# Patient Record
Sex: MALE | Race: WHITE | NOT HISPANIC OR LATINO | Employment: OTHER | URBAN - METROPOLITAN AREA
[De-identification: names, ages, dates, MRNs, and addresses within clinical notes are randomized per-mention and may not be internally consistent; named-entity substitution may affect disease eponyms.]

---

## 2023-04-04 ASSESSMENT — PATIENT HEALTH QUESTIONNAIRE - PHQ9: SUM OF ALL RESPONSES TO PHQ9 QUESTIONS 1 & 2: 0

## 2023-04-05 LAB
ALBUMIN (G/DL) IN SER/PLAS: 3.1 G/DL (ref 3.4–5)
ANION GAP IN SER/PLAS: 14 MMOL/L (ref 10–20)
CALCIUM (MG/DL) IN SER/PLAS: 8 MG/DL (ref 8.6–10.6)
CARBON DIOXIDE, TOTAL (MMOL/L) IN SER/PLAS: 28 MMOL/L (ref 21–32)
CHLORIDE (MMOL/L) IN SER/PLAS: 89 MMOL/L (ref 98–107)
CREATININE (MG/DL) IN SER/PLAS: 0.9 MG/DL (ref 0.5–1.3)
GFR MALE: >90 ML/MIN/1.73M2
GLUCOSE (MG/DL) IN SER/PLAS: 87 MG/DL (ref 74–99)
PHOSPHATE (MG/DL) IN SER/PLAS: 2.3 MG/DL (ref 2.5–4.9)
POTASSIUM (MMOL/L) IN SER/PLAS: 4.2 MMOL/L (ref 3.5–5.3)
SODIUM (MMOL/L) IN SER/PLAS: 127 MMOL/L (ref 136–145)
UREA NITROGEN (MG/DL) IN SER/PLAS: 19 MG/DL (ref 6–23)

## 2023-05-16 ENCOUNTER — HOSPITAL ENCOUNTER (OUTPATIENT)
Dept: DATA CONVERSION | Facility: HOSPITAL | Age: 63
End: 2023-05-23
Attending: STUDENT IN AN ORGANIZED HEALTH CARE EDUCATION/TRAINING PROGRAM
Payer: MEDICARE

## 2023-05-16 DIAGNOSIS — Z95.811 LVAD (LEFT VENTRICULAR ASSIST DEVICE) PRESENT (MULTI): ICD-10-CM

## 2023-05-16 DIAGNOSIS — G54.0 BRACHIAL PLEXOPATHY: ICD-10-CM

## 2023-05-16 DIAGNOSIS — I42.9 CARDIOMYOPATHY, UNSPECIFIED TYPE (MULTI): Primary | ICD-10-CM

## 2023-05-16 DIAGNOSIS — I47.20 VENTRICULAR TACHYARRHYTHMIA (MULTI): ICD-10-CM

## 2023-05-16 DIAGNOSIS — I48.20 CHRONIC ATRIAL FIBRILLATION (MULTI): ICD-10-CM

## 2023-05-16 DIAGNOSIS — R78.81 SALMONELLA BACTEREMIA: ICD-10-CM

## 2023-05-16 LAB
ANION GAP IN SER/PLAS: 9 MMOL/L (ref 10–20)
ANION GAP IN SER/PLAS: 9 MMOL/L (ref 10–20)
BASOPHILS (10*3/UL) IN BLOOD BY AUTOMATED COUNT: 0.09 X10E9/L (ref 0–0.1)
BASOPHILS (10*3/UL) IN BLOOD BY AUTOMATED COUNT: 0.09 X10E9/L (ref 0–0.1)
BASOPHILS/100 LEUKOCYTES IN BLOOD BY AUTOMATED COUNT: 1 % (ref 0–2)
BASOPHILS/100 LEUKOCYTES IN BLOOD BY AUTOMATED COUNT: 1 % (ref 0–2)
CALCIUM (MG/DL) IN SER/PLAS: 8.5 MG/DL (ref 8.6–10.3)
CALCIUM (MG/DL) IN SER/PLAS: 8.5 MG/DL (ref 8.6–10.3)
CARBON DIOXIDE, TOTAL (MMOL/L) IN SER/PLAS: 27 MMOL/L (ref 21–32)
CARBON DIOXIDE, TOTAL (MMOL/L) IN SER/PLAS: 27 MMOL/L (ref 21–32)
CHLORIDE (MMOL/L) IN SER/PLAS: 103 MMOL/L (ref 98–107)
CHLORIDE (MMOL/L) IN SER/PLAS: 103 MMOL/L (ref 98–107)
CREATININE (MG/DL) IN SER/PLAS: 0.57 MG/DL (ref 0.5–1.3)
CREATININE (MG/DL) IN SER/PLAS: 0.57 MG/DL (ref 0.5–1.3)
EOSINOPHILS (10*3/UL) IN BLOOD BY AUTOMATED COUNT: 0.17 X10E9/L (ref 0–0.7)
EOSINOPHILS (10*3/UL) IN BLOOD BY AUTOMATED COUNT: 0.17 X10E9/L (ref 0–0.7)
EOSINOPHILS/100 LEUKOCYTES IN BLOOD BY AUTOMATED COUNT: 1.9 % (ref 0–6)
EOSINOPHILS/100 LEUKOCYTES IN BLOOD BY AUTOMATED COUNT: 1.9 % (ref 0–6)
ERYTHROCYTE DISTRIBUTION WIDTH (RATIO) BY AUTOMATED COUNT: 15.2 % (ref 11.5–14.5)
ERYTHROCYTE DISTRIBUTION WIDTH (RATIO) BY AUTOMATED COUNT: 15.2 % (ref 11.5–14.5)
ERYTHROCYTE MEAN CORPUSCULAR HEMOGLOBIN CONCENTRATION (G/DL) BY AUTOMATED: 31.3 G/DL (ref 32–36)
ERYTHROCYTE MEAN CORPUSCULAR HEMOGLOBIN CONCENTRATION (G/DL) BY AUTOMATED: 31.3 G/DL (ref 32–36)
ERYTHROCYTE MEAN CORPUSCULAR VOLUME (FL) BY AUTOMATED COUNT: 98 FL (ref 80–100)
ERYTHROCYTE MEAN CORPUSCULAR VOLUME (FL) BY AUTOMATED COUNT: 98 FL (ref 80–100)
ERYTHROCYTES (10*6/UL) IN BLOOD BY AUTOMATED COUNT: 3.44 X10E12/L (ref 4.5–5.9)
ERYTHROCYTES (10*6/UL) IN BLOOD BY AUTOMATED COUNT: 3.44 X10E12/L (ref 4.5–5.9)
GFR MALE: >90 ML/MIN/1.73M2
GFR MALE: >90 ML/MIN/1.73M2
GLUCOSE (MG/DL) IN SER/PLAS: 100 MG/DL (ref 74–99)
GLUCOSE (MG/DL) IN SER/PLAS: 100 MG/DL (ref 74–99)
HEMATOCRIT (%) IN BLOOD BY AUTOMATED COUNT: 33.6 % (ref 41–52)
HEMATOCRIT (%) IN BLOOD BY AUTOMATED COUNT: 33.6 % (ref 41–52)
HEMOGLOBIN (G/DL) IN BLOOD: 10.5 G/DL (ref 13.5–17.5)
HEMOGLOBIN (G/DL) IN BLOOD: 10.5 G/DL (ref 13.5–17.5)
IMMATURE GRANULOCYTES/100 LEUKOCYTES IN BLOOD BY AUTOMATED COUNT: 0.7 % (ref 0–0.9)
IMMATURE GRANULOCYTES/100 LEUKOCYTES IN BLOOD BY AUTOMATED COUNT: 0.7 % (ref 0–0.9)
INR IN PPP BY COAGULATION ASSAY: 3 (ref 0.9–1.1)
INR IN PPP BY COAGULATION ASSAY: 3 (ref 0.9–1.1)
LEUKOCYTES (10*3/UL) IN BLOOD BY AUTOMATED COUNT: 8.9 X10E9/L (ref 4.4–11.3)
LEUKOCYTES (10*3/UL) IN BLOOD BY AUTOMATED COUNT: 8.9 X10E9/L (ref 4.4–11.3)
LYMPHOCYTES (10*3/UL) IN BLOOD BY AUTOMATED COUNT: 1.29 X10E9/L (ref 1.2–4.8)
LYMPHOCYTES (10*3/UL) IN BLOOD BY AUTOMATED COUNT: 1.29 X10E9/L (ref 1.2–4.8)
LYMPHOCYTES/100 LEUKOCYTES IN BLOOD BY AUTOMATED COUNT: 14.5 % (ref 13–44)
LYMPHOCYTES/100 LEUKOCYTES IN BLOOD BY AUTOMATED COUNT: 14.5 % (ref 13–44)
MONOCYTES (10*3/UL) IN BLOOD BY AUTOMATED COUNT: 0.75 X10E9/L (ref 0.1–1)
MONOCYTES (10*3/UL) IN BLOOD BY AUTOMATED COUNT: 0.75 X10E9/L (ref 0.1–1)
MONOCYTES/100 LEUKOCYTES IN BLOOD BY AUTOMATED COUNT: 8.4 % (ref 2–10)
MONOCYTES/100 LEUKOCYTES IN BLOOD BY AUTOMATED COUNT: 8.4 % (ref 2–10)
NEUTROPHILS (10*3/UL) IN BLOOD BY AUTOMATED COUNT: 6.54 X10E9/L (ref 1.2–7.7)
NEUTROPHILS (10*3/UL) IN BLOOD BY AUTOMATED COUNT: 6.54 X10E9/L (ref 1.2–7.7)
NEUTROPHILS/100 LEUKOCYTES IN BLOOD BY AUTOMATED COUNT: 73.5 % (ref 40–80)
NEUTROPHILS/100 LEUKOCYTES IN BLOOD BY AUTOMATED COUNT: 73.5 % (ref 40–80)
PLATELETS (10*3/UL) IN BLOOD AUTOMATED COUNT: 512 X10E9/L (ref 150–450)
PLATELETS (10*3/UL) IN BLOOD AUTOMATED COUNT: 512 X10E9/L (ref 150–450)
POTASSIUM (MMOL/L) IN SER/PLAS: 4.1 MMOL/L (ref 3.5–5.3)
POTASSIUM (MMOL/L) IN SER/PLAS: 4.1 MMOL/L (ref 3.5–5.3)
PROTHROMBIN TIME (PT) IN PPP BY COAGULATION ASSAY: 34.6 SEC (ref 9.8–13.4)
PROTHROMBIN TIME (PT) IN PPP BY COAGULATION ASSAY: 34.6 SEC (ref 9.8–13.4)
SODIUM (MMOL/L) IN SER/PLAS: 135 MMOL/L (ref 136–145)
SODIUM (MMOL/L) IN SER/PLAS: 135 MMOL/L (ref 136–145)
UREA NITROGEN (MG/DL) IN SER/PLAS: 17 MG/DL (ref 6–23)
UREA NITROGEN (MG/DL) IN SER/PLAS: 17 MG/DL (ref 6–23)

## 2023-05-16 PROCEDURE — 99222 1ST HOSP IP/OBS MODERATE 55: CPT | Performed by: INTERNAL MEDICINE

## 2023-05-17 LAB
ALANINE AMINOTRANSFERASE (SGPT) (U/L) IN SER/PLAS: 31 U/L (ref 10–52)
ALBUMIN (G/DL) IN SER/PLAS: 3.2 G/DL (ref 3.4–5)
ALKALINE PHOSPHATASE (U/L) IN SER/PLAS: 98 U/L (ref 33–136)
ANION GAP IN SER/PLAS: 12 MMOL/L (ref 10–20)
ASPARTATE AMINOTRANSFERASE (SGOT) (U/L) IN SER/PLAS: 27 U/L (ref 9–39)
BILIRUBIN TOTAL (MG/DL) IN SER/PLAS: 0.9 MG/DL (ref 0–1.2)
CALCIUM (MG/DL) IN SER/PLAS: 8.6 MG/DL (ref 8.6–10.3)
CARBON DIOXIDE, TOTAL (MMOL/L) IN SER/PLAS: 29 MMOL/L (ref 21–32)
CHLORIDE (MMOL/L) IN SER/PLAS: 99 MMOL/L (ref 98–107)
CREATININE (MG/DL) IN SER/PLAS: 0.54 MG/DL (ref 0.5–1.3)
ERYTHROCYTE DISTRIBUTION WIDTH (RATIO) BY AUTOMATED COUNT: 15.1 % (ref 11.5–14.5)
ERYTHROCYTE DISTRIBUTION WIDTH (RATIO) BY AUTOMATED COUNT: 15.1 % (ref 11.5–14.5)
ERYTHROCYTE MEAN CORPUSCULAR HEMOGLOBIN CONCENTRATION (G/DL) BY AUTOMATED: 31.4 G/DL (ref 32–36)
ERYTHROCYTE MEAN CORPUSCULAR HEMOGLOBIN CONCENTRATION (G/DL) BY AUTOMATED: 31.4 G/DL (ref 32–36)
ERYTHROCYTE MEAN CORPUSCULAR VOLUME (FL) BY AUTOMATED COUNT: 98 FL (ref 80–100)
ERYTHROCYTE MEAN CORPUSCULAR VOLUME (FL) BY AUTOMATED COUNT: 98 FL (ref 80–100)
ERYTHROCYTES (10*6/UL) IN BLOOD BY AUTOMATED COUNT: 3.64 X10E12/L (ref 4.5–5.9)
ERYTHROCYTES (10*6/UL) IN BLOOD BY AUTOMATED COUNT: 3.64 X10E12/L (ref 4.5–5.9)
GFR MALE: >90 ML/MIN/1.73M2
GLUCOSE (MG/DL) IN SER/PLAS: 72 MG/DL (ref 74–99)
HEMATOCRIT (%) IN BLOOD BY AUTOMATED COUNT: 35.7 % (ref 41–52)
HEMATOCRIT (%) IN BLOOD BY AUTOMATED COUNT: 35.7 % (ref 41–52)
HEMOGLOBIN (G/DL) IN BLOOD: 11.2 G/DL (ref 13.5–17.5)
HEMOGLOBIN (G/DL) IN BLOOD: 11.2 G/DL (ref 13.5–17.5)
INR IN PPP BY COAGULATION ASSAY: 2.6 (ref 0.9–1.1)
LEUKOCYTES (10*3/UL) IN BLOOD BY AUTOMATED COUNT: 8.1 X10E9/L (ref 4.4–11.3)
LEUKOCYTES (10*3/UL) IN BLOOD BY AUTOMATED COUNT: 8.1 X10E9/L (ref 4.4–11.3)
MAGNESIUM (MG/DL) IN SER/PLAS: 2.14 MG/DL (ref 1.6–2.4)
PLATELETS (10*3/UL) IN BLOOD AUTOMATED COUNT: 508 X10E9/L (ref 150–450)
PLATELETS (10*3/UL) IN BLOOD AUTOMATED COUNT: 508 X10E9/L (ref 150–450)
POTASSIUM (MMOL/L) IN SER/PLAS: 4 MMOL/L (ref 3.5–5.3)
PROTEIN TOTAL: 5.9 G/DL (ref 6.4–8.2)
PROTHROMBIN TIME (PT) IN PPP BY COAGULATION ASSAY: 30.2 SEC (ref 9.8–13.4)
SODIUM (MMOL/L) IN SER/PLAS: 136 MMOL/L (ref 136–145)
UREA NITROGEN (MG/DL) IN SER/PLAS: 15 MG/DL (ref 6–23)

## 2023-05-20 LAB
ALANINE AMINOTRANSFERASE (SGPT) (U/L) IN SER/PLAS: 32 U/L (ref 10–52)
ALBUMIN (G/DL) IN SER/PLAS: 3.4 G/DL (ref 3.4–5)
ALKALINE PHOSPHATASE (U/L) IN SER/PLAS: 99 U/L (ref 33–136)
ANION GAP IN SER/PLAS: 10 MMOL/L (ref 10–20)
ASPARTATE AMINOTRANSFERASE (SGOT) (U/L) IN SER/PLAS: 27 U/L (ref 9–39)
BILIRUBIN TOTAL (MG/DL) IN SER/PLAS: 0.7 MG/DL (ref 0–1.2)
CALCIUM (MG/DL) IN SER/PLAS: 8.8 MG/DL (ref 8.6–10.3)
CARBON DIOXIDE, TOTAL (MMOL/L) IN SER/PLAS: 29 MMOL/L (ref 21–32)
CHLORIDE (MMOL/L) IN SER/PLAS: 101 MMOL/L (ref 98–107)
CREATININE (MG/DL) IN SER/PLAS: 0.62 MG/DL (ref 0.5–1.3)
ERYTHROCYTE DISTRIBUTION WIDTH (RATIO) BY AUTOMATED COUNT: 14.9 % (ref 11.5–14.5)
ERYTHROCYTE MEAN CORPUSCULAR HEMOGLOBIN CONCENTRATION (G/DL) BY AUTOMATED: 31.6 G/DL (ref 32–36)
ERYTHROCYTE MEAN CORPUSCULAR VOLUME (FL) BY AUTOMATED COUNT: 97 FL (ref 80–100)
ERYTHROCYTES (10*6/UL) IN BLOOD BY AUTOMATED COUNT: 3.63 X10E12/L (ref 4.5–5.9)
GFR MALE: >90 ML/MIN/1.73M2
GLUCOSE (MG/DL) IN SER/PLAS: 82 MG/DL (ref 74–99)
HEMATOCRIT (%) IN BLOOD BY AUTOMATED COUNT: 35.1 % (ref 41–52)
HEMOGLOBIN (G/DL) IN BLOOD: 11.1 G/DL (ref 13.5–17.5)
INR IN PPP BY COAGULATION ASSAY: 2.3 (ref 0.9–1.1)
LEUKOCYTES (10*3/UL) IN BLOOD BY AUTOMATED COUNT: 7.1 X10E9/L (ref 4.4–11.3)
MAGNESIUM (MG/DL) IN SER/PLAS: 2.18 MG/DL (ref 1.6–2.4)
PLATELETS (10*3/UL) IN BLOOD AUTOMATED COUNT: 399 X10E9/L (ref 150–450)
POTASSIUM (MMOL/L) IN SER/PLAS: 4.2 MMOL/L (ref 3.5–5.3)
PROTEIN TOTAL: 6.2 G/DL (ref 6.4–8.2)
PROTHROMBIN TIME (PT) IN PPP BY COAGULATION ASSAY: 26.6 SEC (ref 9.8–13.4)
SODIUM (MMOL/L) IN SER/PLAS: 136 MMOL/L (ref 136–145)
UREA NITROGEN (MG/DL) IN SER/PLAS: 20 MG/DL (ref 6–23)

## 2023-05-21 LAB
ANION GAP IN SER/PLAS: NORMAL
CALCIUM (MG/DL) IN SER/PLAS: NORMAL
CARBON DIOXIDE, TOTAL (MMOL/L) IN SER/PLAS: NORMAL
CHLORIDE (MMOL/L) IN SER/PLAS: NORMAL
CREATININE (MG/DL) IN SER/PLAS: NORMAL
ERYTHROCYTE DISTRIBUTION WIDTH (RATIO) BY AUTOMATED COUNT: NORMAL
ERYTHROCYTE MEAN CORPUSCULAR HEMOGLOBIN CONCENTRATION (G/DL) BY AUTOMATED: NORMAL
ERYTHROCYTE MEAN CORPUSCULAR VOLUME (FL) BY AUTOMATED COUNT: NORMAL
ERYTHROCYTES (10*6/UL) IN BLOOD BY AUTOMATED COUNT: NORMAL
GFR FEMALE: NORMAL
GFR MALE: NORMAL
GLUCOSE (MG/DL) IN SER/PLAS: NORMAL
HEMATOCRIT (%) IN BLOOD BY AUTOMATED COUNT: NORMAL
HEMOGLOBIN (G/DL) IN BLOOD: NORMAL
LEUKOCYTES (10*3/UL) IN BLOOD BY AUTOMATED COUNT: NORMAL
NRBC (PER 100 WBCS) BY AUTOMATED COUNT: NORMAL
PLATELETS (10*3/UL) IN BLOOD AUTOMATED COUNT: NORMAL
POTASSIUM (MMOL/L) IN SER/PLAS: NORMAL
SODIUM (MMOL/L) IN SER/PLAS: NORMAL
UREA NITROGEN (MG/DL) IN SER/PLAS: NORMAL

## 2023-05-23 LAB
ALANINE AMINOTRANSFERASE (SGPT) (U/L) IN SER/PLAS: 22 U/L (ref 10–52)
ALANINE AMINOTRANSFERASE (SGPT) (U/L) IN SER/PLAS: NORMAL
ALBUMIN (G/DL) IN SER/PLAS: 3.3 G/DL (ref 3.4–5)
ALBUMIN (G/DL) IN SER/PLAS: NORMAL
ALKALINE PHOSPHATASE (U/L) IN SER/PLAS: 98 U/L (ref 33–136)
ALKALINE PHOSPHATASE (U/L) IN SER/PLAS: NORMAL
ANION GAP IN SER/PLAS: 12 MMOL/L (ref 10–20)
ANION GAP IN SER/PLAS: NORMAL
ASPARTATE AMINOTRANSFERASE (SGOT) (U/L) IN SER/PLAS: 19 U/L (ref 9–39)
ASPARTATE AMINOTRANSFERASE (SGOT) (U/L) IN SER/PLAS: NORMAL
BILIRUBIN TOTAL (MG/DL) IN SER/PLAS: 0.7 MG/DL (ref 0–1.2)
BILIRUBIN TOTAL (MG/DL) IN SER/PLAS: NORMAL
CALCIUM (MG/DL) IN SER/PLAS: 8.5 MG/DL (ref 8.6–10.3)
CALCIUM (MG/DL) IN SER/PLAS: NORMAL
CARBON DIOXIDE, TOTAL (MMOL/L) IN SER/PLAS: 29 MMOL/L (ref 21–32)
CARBON DIOXIDE, TOTAL (MMOL/L) IN SER/PLAS: NORMAL
CHLORIDE (MMOL/L) IN SER/PLAS: 101 MMOL/L (ref 98–107)
CHLORIDE (MMOL/L) IN SER/PLAS: NORMAL
CREATININE (MG/DL) IN SER/PLAS: 0.7 MG/DL (ref 0.5–1.3)
CREATININE (MG/DL) IN SER/PLAS: NORMAL
ERYTHROCYTE DISTRIBUTION WIDTH (RATIO) BY AUTOMATED COUNT: 15.2 % (ref 11.5–14.5)
ERYTHROCYTE DISTRIBUTION WIDTH (RATIO) BY AUTOMATED COUNT: NORMAL
ERYTHROCYTE MEAN CORPUSCULAR HEMOGLOBIN CONCENTRATION (G/DL) BY AUTOMATED: 31.3 G/DL (ref 32–36)
ERYTHROCYTE MEAN CORPUSCULAR HEMOGLOBIN CONCENTRATION (G/DL) BY AUTOMATED: NORMAL
ERYTHROCYTE MEAN CORPUSCULAR VOLUME (FL) BY AUTOMATED COUNT: 97 FL (ref 80–100)
ERYTHROCYTE MEAN CORPUSCULAR VOLUME (FL) BY AUTOMATED COUNT: NORMAL
ERYTHROCYTES (10*6/UL) IN BLOOD BY AUTOMATED COUNT: 3.47 X10E12/L (ref 4.5–5.9)
ERYTHROCYTES (10*6/UL) IN BLOOD BY AUTOMATED COUNT: NORMAL
GFR FEMALE: NORMAL
GFR MALE: >90 ML/MIN/1.73M2
GFR MALE: NORMAL
GLUCOSE (MG/DL) IN SER/PLAS: 73 MG/DL (ref 74–99)
GLUCOSE (MG/DL) IN SER/PLAS: NORMAL
HEMATOCRIT (%) IN BLOOD BY AUTOMATED COUNT: 33.6 % (ref 41–52)
HEMATOCRIT (%) IN BLOOD BY AUTOMATED COUNT: NORMAL
HEMOGLOBIN (G/DL) IN BLOOD: 10.5 G/DL (ref 13.5–17.5)
HEMOGLOBIN (G/DL) IN BLOOD: NORMAL
INR IN PPP BY COAGULATION ASSAY: 1.9 (ref 0.9–1.1)
INR IN PPP BY COAGULATION ASSAY: NORMAL
LEUKOCYTES (10*3/UL) IN BLOOD BY AUTOMATED COUNT: 8 X10E9/L (ref 4.4–11.3)
LEUKOCYTES (10*3/UL) IN BLOOD BY AUTOMATED COUNT: NORMAL
MAGNESIUM (MG/DL) IN SER/PLAS: 2.03 MG/DL (ref 1.6–2.4)
MAGNESIUM (MG/DL) IN SER/PLAS: NORMAL
NRBC (PER 100 WBCS) BY AUTOMATED COUNT: NORMAL
PLATELETS (10*3/UL) IN BLOOD AUTOMATED COUNT: 321 X10E9/L (ref 150–450)
PLATELETS (10*3/UL) IN BLOOD AUTOMATED COUNT: NORMAL
POTASSIUM (MMOL/L) IN SER/PLAS: 4 MMOL/L (ref 3.5–5.3)
POTASSIUM (MMOL/L) IN SER/PLAS: NORMAL
PROTEIN TOTAL: 5.9 G/DL (ref 6.4–8.2)
PROTEIN TOTAL: NORMAL
PROTHROMBIN TIME (PT) IN PPP BY COAGULATION ASSAY: 21.6 SEC (ref 9.8–13.4)
PROTHROMBIN TIME (PT) IN PPP BY COAGULATION ASSAY: NORMAL
SODIUM (MMOL/L) IN SER/PLAS: 138 MMOL/L (ref 136–145)
SODIUM (MMOL/L) IN SER/PLAS: NORMAL
UREA NITROGEN (MG/DL) IN SER/PLAS: 21 MG/DL (ref 6–23)
UREA NITROGEN (MG/DL) IN SER/PLAS: NORMAL

## 2023-05-26 LAB
ALANINE AMINOTRANSFERASE (SGPT) (U/L) IN SER/PLAS: NORMAL
ALBUMIN (G/DL) IN SER/PLAS: NORMAL
ALKALINE PHOSPHATASE (U/L) IN SER/PLAS: NORMAL
ANION GAP IN SER/PLAS: NORMAL
ASPARTATE AMINOTRANSFERASE (SGOT) (U/L) IN SER/PLAS: NORMAL
BILIRUBIN TOTAL (MG/DL) IN SER/PLAS: NORMAL
CALCIUM (MG/DL) IN SER/PLAS: NORMAL
CARBON DIOXIDE, TOTAL (MMOL/L) IN SER/PLAS: NORMAL
CHLORIDE (MMOL/L) IN SER/PLAS: NORMAL
CREATININE (MG/DL) IN SER/PLAS: NORMAL
ERYTHROCYTE DISTRIBUTION WIDTH (RATIO) BY AUTOMATED COUNT: NORMAL
ERYTHROCYTE MEAN CORPUSCULAR HEMOGLOBIN CONCENTRATION (G/DL) BY AUTOMATED: NORMAL
ERYTHROCYTE MEAN CORPUSCULAR VOLUME (FL) BY AUTOMATED COUNT: NORMAL
ERYTHROCYTES (10*6/UL) IN BLOOD BY AUTOMATED COUNT: NORMAL
GFR FEMALE: NORMAL
GFR MALE: NORMAL
GLUCOSE (MG/DL) IN SER/PLAS: NORMAL
HEMATOCRIT (%) IN BLOOD BY AUTOMATED COUNT: NORMAL
HEMOGLOBIN (G/DL) IN BLOOD: NORMAL
INR IN PPP BY COAGULATION ASSAY: NORMAL
LEUKOCYTES (10*3/UL) IN BLOOD BY AUTOMATED COUNT: NORMAL
MAGNESIUM (MG/DL) IN SER/PLAS: NORMAL
NRBC (PER 100 WBCS) BY AUTOMATED COUNT: NORMAL
PLATELETS (10*3/UL) IN BLOOD AUTOMATED COUNT: NORMAL
POTASSIUM (MMOL/L) IN SER/PLAS: NORMAL
PROTEIN TOTAL: NORMAL
PROTHROMBIN TIME (PT) IN PPP BY COAGULATION ASSAY: NORMAL
SODIUM (MMOL/L) IN SER/PLAS: NORMAL
UREA NITROGEN (MG/DL) IN SER/PLAS: NORMAL

## 2023-05-31 LAB
ALANINE AMINOTRANSFERASE (SGPT) (U/L) IN SER/PLAS: NORMAL
ALBUMIN (G/DL) IN SER/PLAS: NORMAL
ALKALINE PHOSPHATASE (U/L) IN SER/PLAS: NORMAL
ANION GAP IN SER/PLAS: NORMAL
ASPARTATE AMINOTRANSFERASE (SGOT) (U/L) IN SER/PLAS: NORMAL
BILIRUBIN TOTAL (MG/DL) IN SER/PLAS: NORMAL
CALCIUM (MG/DL) IN SER/PLAS: NORMAL
CARBON DIOXIDE, TOTAL (MMOL/L) IN SER/PLAS: NORMAL
CHLORIDE (MMOL/L) IN SER/PLAS: NORMAL
CREATININE (MG/DL) IN SER/PLAS: NORMAL
ERYTHROCYTE DISTRIBUTION WIDTH (RATIO) BY AUTOMATED COUNT: NORMAL
ERYTHROCYTE MEAN CORPUSCULAR HEMOGLOBIN CONCENTRATION (G/DL) BY AUTOMATED: NORMAL
ERYTHROCYTE MEAN CORPUSCULAR VOLUME (FL) BY AUTOMATED COUNT: NORMAL
ERYTHROCYTES (10*6/UL) IN BLOOD BY AUTOMATED COUNT: NORMAL
GFR FEMALE: NORMAL
GFR MALE: NORMAL
GLUCOSE (MG/DL) IN SER/PLAS: NORMAL
HEMATOCRIT (%) IN BLOOD BY AUTOMATED COUNT: NORMAL
HEMOGLOBIN (G/DL) IN BLOOD: NORMAL
INR IN PPP BY COAGULATION ASSAY: NORMAL
LEUKOCYTES (10*3/UL) IN BLOOD BY AUTOMATED COUNT: NORMAL
MAGNESIUM (MG/DL) IN SER/PLAS: NORMAL
NRBC (PER 100 WBCS) BY AUTOMATED COUNT: NORMAL
PLATELETS (10*3/UL) IN BLOOD AUTOMATED COUNT: NORMAL
POTASSIUM (MMOL/L) IN SER/PLAS: NORMAL
PROTEIN TOTAL: NORMAL
PROTHROMBIN TIME (PT) IN PPP BY COAGULATION ASSAY: NORMAL
SODIUM (MMOL/L) IN SER/PLAS: NORMAL
THYROTROPIN (MIU/L) IN SER/PLAS BY DETECTION LIMIT <= 0.05 MIU/L: NORMAL
THYROXINE (T4) FREE (NG/DL) IN SER/PLAS: NORMAL
UREA NITROGEN (MG/DL) IN SER/PLAS: NORMAL

## 2023-10-02 ENCOUNTER — TELEPHONE (OUTPATIENT)
Dept: TRANSPLANT | Facility: HOSPITAL | Age: 63
End: 2023-10-02
Payer: MEDICARE

## 2023-10-02 DIAGNOSIS — I50.20 HFREF (HEART FAILURE WITH REDUCED EJECTION FRACTION) (MULTI): Primary | ICD-10-CM

## 2023-10-02 RX ORDER — SACUBITRIL AND VALSARTAN 49; 51 MG/1; MG/1
1 TABLET, FILM COATED ORAL 2 TIMES DAILY
Qty: 180 TABLET | Refills: 3 | Status: ON HOLD | OUTPATIENT
Start: 2023-10-02 | End: 2024-03-06 | Stop reason: SDUPTHER

## 2023-10-02 RX ORDER — SACUBITRIL AND VALSARTAN 49; 51 MG/1; MG/1
1 TABLET, FILM COATED ORAL 2 TIMES DAILY
COMMUNITY
Start: 2023-03-12 | End: 2023-10-02 | Stop reason: SDUPTHER

## 2023-10-06 ENCOUNTER — DOCUMENTATION (OUTPATIENT)
Dept: TRANSPLANT | Facility: HOSPITAL | Age: 63
End: 2023-10-06
Payer: MEDICARE

## 2023-10-06 RX ORDER — TAMSULOSIN HYDROCHLORIDE 0.4 MG/1
0.4 CAPSULE ORAL DAILY
COMMUNITY
Start: 2023-08-18 | End: 2024-01-10

## 2023-10-06 RX ORDER — WARFARIN 2.5 MG/1
TABLET ORAL
COMMUNITY
Start: 2023-08-09 | End: 2024-03-06 | Stop reason: HOSPADM

## 2023-10-06 RX ORDER — PANTOPRAZOLE SODIUM 40 MG/1
40 TABLET, DELAYED RELEASE ORAL
COMMUNITY
Start: 2023-08-13 | End: 2023-10-27 | Stop reason: SDUPTHER

## 2023-10-06 RX ORDER — OMEGA-3-ACID ETHYL ESTERS 1 G/1
2 CAPSULE, LIQUID FILLED ORAL 2 TIMES DAILY
COMMUNITY
Start: 2023-06-06 | End: 2024-02-19 | Stop reason: ALTCHOICE

## 2023-10-06 RX ORDER — SERTRALINE HYDROCHLORIDE 25 MG/1
25 TABLET, FILM COATED ORAL DAILY
COMMUNITY
Start: 2023-08-18 | End: 2024-01-10

## 2023-10-06 RX ORDER — FLUTICASONE PROPIONATE 50 MCG
2 SPRAY, SUSPENSION (ML) NASAL DAILY
COMMUNITY
Start: 2023-02-07 | End: 2024-02-14 | Stop reason: WASHOUT

## 2023-10-06 RX ORDER — TORSEMIDE 10 MG/1
10 TABLET ORAL AS NEEDED
COMMUNITY
Start: 2023-09-06 | End: 2024-04-04 | Stop reason: SDUPTHER

## 2023-10-06 RX ORDER — LEVOTHYROXINE SODIUM 175 UG/1
175 TABLET ORAL
COMMUNITY
Start: 2023-08-13 | End: 2023-10-27 | Stop reason: SDUPTHER

## 2023-10-06 RX ORDER — SPIRONOLACTONE 50 MG/1
50 TABLET, FILM COATED ORAL DAILY
COMMUNITY
Start: 2023-09-21 | End: 2024-02-15 | Stop reason: SDUPTHER

## 2023-10-06 RX ORDER — ASPIRIN 81 MG/1
81 TABLET ORAL DAILY
COMMUNITY
Start: 2023-05-23

## 2023-10-06 RX ORDER — DIGOXIN 125 MCG
125 TABLET ORAL DAILY
COMMUNITY
Start: 2023-08-13 | End: 2024-04-10

## 2023-10-06 RX ORDER — EMPAGLIFLOZIN 10 MG/1
10 TABLET, FILM COATED ORAL DAILY
COMMUNITY
Start: 2023-08-13 | End: 2023-12-08 | Stop reason: SINTOL

## 2023-10-06 RX ORDER — BUDESONIDE AND FORMOTEROL FUMARATE DIHYDRATE 160; 4.5 UG/1; UG/1
2 AEROSOL RESPIRATORY (INHALATION)
Status: ON HOLD | COMMUNITY
Start: 2023-02-07 | End: 2024-02-23 | Stop reason: WASHOUT

## 2023-10-06 RX ORDER — AMIODARONE HYDROCHLORIDE 200 MG/1
200 TABLET ORAL DAILY
COMMUNITY
Start: 2023-09-25 | End: 2024-03-06 | Stop reason: HOSPADM

## 2023-10-27 ENCOUNTER — TELEPHONE (OUTPATIENT)
Dept: TRANSPLANT | Facility: HOSPITAL | Age: 63
End: 2023-10-27
Payer: MEDICARE

## 2023-10-27 DIAGNOSIS — E07.9 THYROID CONDITION: ICD-10-CM

## 2023-10-27 DIAGNOSIS — T82.9XXD COMPLICATION INVOLVING LEFT VENTRICULAR ASSIST DEVICE (LVAD), SUBSEQUENT ENCOUNTER: ICD-10-CM

## 2023-10-27 DIAGNOSIS — K21.9 GASTROESOPHAGEAL REFLUX DISEASE, UNSPECIFIED WHETHER ESOPHAGITIS PRESENT: ICD-10-CM

## 2023-10-27 DIAGNOSIS — I50.20 HFREF (HEART FAILURE WITH REDUCED EJECTION FRACTION) (MULTI): ICD-10-CM

## 2023-10-30 RX ORDER — LEVOTHYROXINE SODIUM 175 UG/1
175 TABLET ORAL
Qty: 90 TABLET | Refills: 3 | Status: SHIPPED | OUTPATIENT
Start: 2023-10-30 | End: 2024-03-06 | Stop reason: HOSPADM

## 2023-10-30 RX ORDER — PANTOPRAZOLE SODIUM 40 MG/1
40 TABLET, DELAYED RELEASE ORAL
Qty: 90 TABLET | Refills: 3 | Status: SHIPPED | OUTPATIENT
Start: 2023-10-30 | End: 2024-03-06 | Stop reason: HOSPADM

## 2023-10-31 ENCOUNTER — TELEPHONE (OUTPATIENT)
Dept: TRANSPLANT | Facility: HOSPITAL | Age: 63
End: 2023-10-31
Payer: MEDICARE

## 2023-10-31 NOTE — TELEPHONE ENCOUNTER
Patient had called to confirm his prescriptions were sent for refills because he got a notification from the pharmacy saying that his prescriptions were cancelled. Had patient call pharmacy to verify because new prescriptions were sent on Friday 10/27. Called patient back and he said that he spoke to pharmacy and they did receive the new prescriptions and were working on filling them and he should get them in the next day or 2.

## 2023-12-08 ENCOUNTER — OFFICE VISIT (OUTPATIENT)
Dept: TRANSPLANT | Facility: HOSPITAL | Age: 63
End: 2023-12-08
Payer: MEDICARE

## 2023-12-08 VITALS
TEMPERATURE: 97.9 F | HEART RATE: 79 BPM | BODY MASS INDEX: 32.96 KG/M2 | DIASTOLIC BLOOD PRESSURE: 55 MMHG | OXYGEN SATURATION: 95 % | SYSTOLIC BLOOD PRESSURE: 96 MMHG | WEIGHT: 223.2 LBS

## 2023-12-08 DIAGNOSIS — I50.22: ICD-10-CM

## 2023-12-08 DIAGNOSIS — I50.20 HFREF (HEART FAILURE WITH REDUCED EJECTION FRACTION) (MULTI): ICD-10-CM

## 2023-12-08 DIAGNOSIS — T82.9XXD COMPLICATION INVOLVING LEFT VENTRICULAR ASSIST DEVICE (LVAD), SUBSEQUENT ENCOUNTER: ICD-10-CM

## 2023-12-08 DIAGNOSIS — I42.9 CARDIOMYOPATHY, UNSPECIFIED TYPE (MULTI): ICD-10-CM

## 2023-12-08 PROCEDURE — 99215 OFFICE O/P EST HI 40 MIN: CPT | Performed by: STUDENT IN AN ORGANIZED HEALTH CARE EDUCATION/TRAINING PROGRAM

## 2023-12-08 PROCEDURE — 3008F BODY MASS INDEX DOCD: CPT | Performed by: STUDENT IN AN ORGANIZED HEALTH CARE EDUCATION/TRAINING PROGRAM

## 2023-12-08 RX ORDER — DAPAGLIFLOZIN 10 MG/1
10 TABLET, FILM COATED ORAL DAILY
Qty: 30 TABLET | Refills: 11 | Status: SHIPPED | OUTPATIENT
Start: 2023-12-08 | End: 2024-12-07

## 2023-12-08 ASSESSMENT — ENCOUNTER SYMPTOMS
PSYCHIATRIC NEGATIVE: 1
ENDOCRINE NEGATIVE: 1
BLURRED VISION: 1
CONSTITUTIONAL NEGATIVE: 1
HEMATOLOGIC/LYMPHATIC NEGATIVE: 1
CARDIOVASCULAR NEGATIVE: 1
MUSCULOSKELETAL NEGATIVE: 1
DIZZINESS: 1
RESPIRATORY NEGATIVE: 1
GASTROINTESTINAL NEGATIVE: 1

## 2023-12-08 ASSESSMENT — PAIN SCALES - GENERAL: PAINLEVEL: 0-NO PAIN

## 2023-12-08 NOTE — PATIENT INSTRUCTIONS
Patient Instructions:  -Please bring a list of your medications to every visit.  -If you have any questions or concerns, please contact the LVAD office at [phone], option 3 or the direct line at [phone]. Please state that you are an LVAD patient. If it is after hours and it is an emergency, please page the LVAD pager by calling [phone] #[phone]  - Continue current medications with the exception of: Begin Farxiga 10mg daily  --   - Labwork: CBC,CMP,LDH,DIG  - Imaging/Procedures: Echo  - Referrals: Electrophysiology   - Followup: 3 months

## 2023-12-08 NOTE — PROGRESS NOTES
VAD Cardiologist: Anita Vincent   VAD Coordinator: Ailyn Michelle   In visit: Patient, LVAD JUVENCIO Michelle  Patient's Location: [place] [address]    Date of Visit: 12/08/2023  1:00 PM EST  Location of visit: [facility]   Type of Visit: LVAD followup    Type of VAD:  HM3  Implant Date: 4/30/2023  Reason for VAD: ICM  Intent: Long-Term (patient choice)     HPI / Summary:   Anatoly Lane is a very pleasant 63 y.o. male presenting for management of Stage D HFrEF NYHA class 2-3.  s/p HM3 4/30/2023 CAD, h/o pAF and VT s/p ICD, hypothyroidism, and stage D systolic HF/ICM/HFrEF who returns to the LVAD clinic for ongoing evaluation and management.       Today:  Patient stopped taking empagliflozin recently due to itching. Patient states in the morning at times he will get dizzy with blurred vision before eating breakfast. This has happened about 10 times in the past several months. Patient further reports that he thinks he was shocked by his device on 8/3/2023. EP referral sent in as patient has not been seen for his ICD for one year.  Patient denies CP, palpitations, GRAVES, orthopnea, PND, edema, LVAD alarms, N/V/D, hematochezia, hematuria, epistaxis. Takes diuretic pills 2 times weekly. Most recent INR is 2.3.    Patient does not want to have a transplant at this time, will read about repeated need for right heart catheterization.     Driveline is clean with no drainage. Dressing last changed on 12/6/2023. Changed today in clinic.     Patient has not completed Cardiac Rehab.      Review of Systems   Constitutional: Negative.   HENT: Negative.     Eyes:  Positive for blurred vision.   Cardiovascular: Negative.    Respiratory: Negative.     Endocrine: Negative.    Hematologic/Lymphatic: Negative.    Skin: Negative.    Musculoskeletal: Negative.    Gastrointestinal: Negative.    Genitourinary: Negative.    Neurological:  Positive for dizziness.   Psychiatric/Behavioral: Negative.     : Full 10 pt review of  "symptoms of negative unless discussed above.     Medical History:   No past medical history on file.  Surgical Hx:   Past Surgical History:   Procedure Laterality Date    CT ABDOMEN PELVIS ANGIOGRAM W AND/OR WO IV CONTRAST  4/13/2023    CT ABDOMEN PELVIS ANGIOGRAM W AND/OR WO IV CONTRAST [facility] CT    OTHER SURGICAL HISTORY  01/05/2022    Complete colonoscopy    OTHER SURGICAL HISTORY  01/05/2022    Cardioverter defibrillator insertion      Vitals:       6/23/2023     1:26 PM 5/30/2023     2:22 PM 8/31/2022    11:04 AM 2/15/2022     4:04 PM   Vitals   Systolic   112 88   Diastolic   72 60   Heart Rate   74 73   Height (in)  1.753 m (5' 9\") 1.753 m (5' 9\") 1.753 m (5' 9\")   Weight (lb) 173 160 180 189   BMI 25.55 kg/m2 23.63 kg/m2 26.58 kg/m2 27.91 kg/m2   BSA (m2) 1.95 m2 1.88 m2 1.99 m2 2.04 m2     Wt Readings from Last 5 Encounters:   06/23/23 78.5 kg (173 lb)   05/30/23 72.6 kg (160 lb)   08/31/22 81.6 kg (180 lb)   02/15/22 85.7 kg (189 lb)     GEN: Pleasant, well-appearing, no acute distress.  HEENT: JVP not elevated, no icterus.   CHEST: Clear to auscultation. Sternotomy well healed.  CV: LVAD hum  ABD: Soft, ND, NT.  Driveline: No drainage. Dressing c/d/I. Secured.  Area of erythema in the region near there is contact between skin and tape.    EXT: Warm, well perfused, No LE edema.   NEURO: Pleasant, GAINES, Oriented to plan     Labs:   CMP:  Recent Labs     05/31/23  2134 05/28/23  0603 05/26/23  0056 05/23/23  0729 05/23/23  0304   NA CANCELED CANCELED CANCELED 138 CANCELED   K CANCELED CANCELED CANCELED 4.0 CANCELED   CL CANCELED CANCELED CANCELED 101 CANCELED   CO2 CANCELED CANCELED CANCELED 29 CANCELED   ANIONGAP CANCELED CANCELED CANCELED 12 CANCELED   BUN CANCELED CANCELED CANCELED 21 CANCELED   CREATININE CANCELED CANCELED CANCELED 0.70 CANCELED   MG CANCELED CANCELED CANCELED 2.03 CANCELED     Recent Labs     05/31/23  2134 05/28/23  0603 05/26/23  0056 05/23/23  0729 05/23/23  0304 05/21/23  1943 "   ALBUMIN CANCELED CANCELED CANCELED 3.3* CANCELED CANCELED   ALKPHOS CANCELED CANCELED CANCELED 98 CANCELED CANCELED   ALT CANCELED CANCELED CANCELED 22 CANCELED CANCELED   AST CANCELED CANCELED CANCELED 19 CANCELED CANCELED   BILITOT CANCELED CANCELED CANCELED 0.7 CANCELED CANCELED   LIPASE  --   --   --   --   --  CANCELED     CBC:  Recent Labs     05/31/23 2134 05/28/23  0603 05/26/23  0056 05/23/23  0729 05/23/23  0304   WBC CANCELED CANCELED CANCELED 8.0 CANCELED   HGB CANCELED CANCELED CANCELED 10.5* CANCELED   HCT CANCELED CANCELED CANCELED 33.6* CANCELED   PLT CANCELED CANCELED CANCELED 321 CANCELED   MCV CANCELED CANCELED CANCELED 97 CANCELED     COAG:   Recent Labs     05/31/23 2134 05/28/23  0603 05/26/23  0056 05/02/23  0030 05/01/23  0033 04/30/23  1619 04/30/23  0413 04/29/23  0221 04/28/23  0201 04/21/23  1412 04/21/23  1125 04/08/23  0315 04/07/23  1835 04/06/23  1852 04/06/23  1714   INR CANCELED CANCELED CANCELED   < > 1.3* 1.3* 1.2* 1.1 1.1   < > 1.2*   < >  --    < >  --    HAUF  --   --   --   --   --   --  0.3 0.3 0.3   < >  --    < >  --   --   --    HAPTOGLOBIN  --   --   --   --   --   --   --   --   --   --  <30  --  <30  --  <30   FIBRINOGEN  --   --   --   --  264 255 356 368 345   < >  --   --   --   --   --     < > = values in this interval not displayed.     ABO:   Recent Labs     05/02/23  0746   ABO AB     HEME/ENDO:  Recent Labs     05/31/23 2134 05/21/23 1943 05/09/23 0642 05/03/23 0228 04/25/23  0911 04/21/23  1125 04/07/23  0416 03/31/23 2331 03/31/23 2142 03/29/23 2151   FERRITIN  --   --   --   --   --  352*  --   --   --  79   IRONSAT  --   --   --   --   --  16*  --   --   --  6*   TSH CANCELED CANCELED 8.41* 6.56*   < >  --    < >  --    < > 15.36*   HGBA1C  --   --   --   --   --   --   --  5.8*  --  6.7*    < > = values in this interval not displayed.      CARDIAC:   Recent Labs     05/21/23 1943 05/18/23 2335 05/18/23  0859 05/17/23  0759 05/15/23  0633  05/14/23  1122 04/02/23  0154 03/29/23  2151   LDH  --  CANCELED CANCELED CANCELED 189 216   < >  --    TROPHS CANCELED  CANCELED  --   --   --   --   --   --   --    BNP CANCELED  --   --   --   --   --   --  3,231*    < > = values in this interval not displayed.     Recent Labs     03/29/23  2151   CHOL 126   LDLF 85   HDL 24.0*   TRIG 83     MICRO:   Recent Labs     05/12/23  0857   CRP 14.77*     Echocardiogram     Narrative  [facility], [address] [place] [address]  Tel [phone] and Fax [phone]    TRANSTHORACIC ECHOCARDIOGRAM REPORT      Patient Name:     BILLIE WARD       Nichelle Physician:  [id number] Spencer Lilly MD  Study Date:       5/15/2023          Referring           SHERRILL FLEMING  Physician:  MRN/PID:          [id number]           PCP:  Accession/Order#: [id number]          [facility] HHVI Non  Location:           Invasive  YOB: 1960          Fellow:             [id number] Tobi Ledbetter MD  Gender:           M                  Nurse:              Mei Jeff RN  Admit Date:       3/29/2023          Sonographer:        TONYA Wolfe RDCS  Admission Status: Inpatient -        Additional Staff:  Routine  Height:           175.26 cm          CC Report to:       [address]  Weight:           73.03 kg           Study Type:         Echocardiogram  BSA:              1.88 m2    Diagnosis/ICD: Z95.811-Presence of heart assist device  Indication:    Post LVAD follow up  Procedure/CPT: Echo Limited-79868; Color Doppler-44321; Doppler Limited-77027    Patient History:  Smoker:            Former.  Pertinent History: CAD, Cardiomyopathy and A-Fib. HFrEF, VT s/p dual chamber  ICD, anemia, hypothyroidism, cardiogenic shock, s/p LVAD  HMIII.    Study Detail: The following Echo studies were performed: 2D, M-Mode, Doppler and  color flow. Definity used as a contrast agent for endocardial  border definition. Total contrast used for this  procedure was 3.0  mL via IV push.      PHYSICIAN INTERPRETATION:  Left Ventricle: The left ventricular systolic function is severely decreased, with an estimated ejection fraction of 10-15%. There is global hypokinesis of the left ventricle with minor regional variations. The left ventricular cavity size is severely dilated. Spectral Doppler shows an abnormal pattern of left ventricular diastolic filling.  Left Atrium: The left atrium is severely dilated.  Right Ventricle: The right ventricle was not well visualized. There is reduced right ventricular systolic function. The RV is not completely visualized, but appears to be likely moderately dilated with moderately decreased function.  Right Atrium: The right atrium was not well visualized. There is a device visualized in the right atrium.  Aortic Valve: The aortic valve appears abnormal. There is mild aortic valve regurgitation.  Mitral Valve: The mitral valve is normal in structure. There is trace mitral valve regurgitation.  Tricuspid Valve: The tricuspid valve is structurally normal. Tricuspid regurgitation was not assessed.  Pulmonic Valve: The pulmonic valve is structurally normal. There is no indication of pulmonic valve regurgitation.  Pericardium: There is a trivial pericardial effusion.  Pleural: There is left pleural effusion.  Aorta: The aortic root was not well visualized.  Systemic Veins: The inferior vena cava appears to be of normal size. There is IVC inspiratory collapse greater than 50%.  In comparison to the previous echocardiogram(s): Although previous studies are available for review, their comparison with the current echocardiogram is clinically irrelevant. Compared with study from 4/25/2023,.    LEFT VENTRICULAR ASSIST DEVICE:  Study Type: This is a follow-up LVAD echocardiogram.  LVAD: The patient has a(n) Heartmate III LVAD device present.  Inflow Cannula: The inflow cannula is visualized in the left ventricular apex.  Outflow Cannula:  Visualization of the outflow cannula is technically difficult.  AV Leaflet Mobility: The aortic valve leaflets do not appear to open.  Inflow Velocities: The LVAD cannula inflow velocity is normal (.5-2msec).        CONCLUSIONS:  1. Left ventricular systolic function is severely decreased with a 10-15% estimated ejection fraction.  2. Spectral Doppler shows an abnormal pattern of left ventricular diastolic filling.  3. The RV is not completely visualized, but appears to be likely moderately dilated with moderately decreased function.  4. There is reduced right ventricular systolic function.  5. The left atrium is severely dilated.  6. Aortic valve appears abnormal.  7. Mild aortic valve regurgitation.  8. Left ventricular cavity size is severely dilated.  9. There is global hypokinesis of the left ventricle with minor regional variations.    QUANTITATIVE DATA SUMMARY:  2D MEASUREMENTS:  Normal Ranges:  IVSd:          0.90 cm    (0.6-1.1cm)  LVPWd:         1.00 cm    (0.6-1.1cm)  LVIDd:         6.20 cm    (3.9-5.9cm)  LV Mass Index: 129.8 g/m2    LV SYSTOLIC FUNCTION BY 2D PLANIMETRY (MOD):  Normal Ranges:  EF-A4C View: 13.9 % (>=55%)  EF-A2C View: 21.8 %  EF-Biplane:  17.2 %    TRICUSPID VALVE/RVSP:  Normal Ranges:  IVC Diam: 1.16 cm      [facility], Cath Lab, [address] [place] [address]    Cardiovascular Catheterization Report    Patient Name:     BILLIE      Performing Physician:  [id number] Vince WARD MD  Study Date:       4/27/2023  Verifying Physician:   [id number] Vince Boyer MD  MRN/PID:          [id number]   Cardiologist/Co-scrub:  Accession/Order#: [id number]  Fellow:                [id number] Tobi Ledbetter MD  YOB: 1960  Fellow:  Gender:           M          Referring Physician:  Admit Date:                  Referring Physician:   [id number] Anita Vincent MD  Surgeon:                     Referring Physician:    NA      Study: Right Heart Catheterization      Indications:  BILLIE WARD is a 63 year old male who presents with hypertension, atrial fibrillation and Tobacco Use - Former, Patient was referred for right heart catheterization to monitor hemodynamics for an acute exacerbation of heart failure requiring Impella support. Cardiomyopathy.    Appropriate Use Criteria:  Re-evaluation of know cardiomyopathy with change in clinical status or on cardiac exam or to guide therapy; AUC score = 7.    Procedure Description:  After infiltration with 1% Lidocaine, the was cannulated with a modified Seldinger technique. After infiltration of local anesthetic, the right internal jugular vein was identified with two-dimensional ultrasound. Under direct ultrasound visualization, the right internal jugular vein was cannulated with a micropuncture technique. A 9 Kittitian sheath was placed in the vein. A balloon tipped catheter was positioned in the right heart system to record pressures and remained in the patient when transferred from the lab. Cardiac output was calculated via the Raven method. Post-procedure, the venous sheath was left intact and sutured in place.    Right Heart Catheterization:  A balloon tipped catheter was positioned in the right heart system to record pressures and remained in the patient when transferred from the lab. Cardiac output was calculated via the Raven method.    Hemo Personnel:  +----------------------+-------------+  Name                  Duty           +----------------------+-------------+  Vince Boyer MD, MD 1  +----------------------+-------------+  Jey Driscoll RN       PROC CIRC 1  +----------------------+-------------+  Mariya Fam RN     PROC CIRC 2  +----------------------+-------------+  Edilberto Reinoso RN    PROC RECORD 1  +----------------------+-------------+  Tobi Ledbetter MD         FELLOW PHYS 1  +----------------------+-------------+      Sedation  Time:  +------------------------+----------------------------------------+  Sedation Start/End TimesTime                                      +------------------------+----------------------------------------+  Start                   4/27/2023 10:26:07                        +------------------------+----------------------------------------+  Drugs                   Versed 0.5 mg IV per physician for sedat  +------------------------+----------------------------------------+  End                     4/27/2023 10:56:04                        +------------------------+----------------------------------------+      Equipment Used:  +---------------------+--------------------------------------------------------+              Date/Time                                             Description  +---------------------+--------------------------------------------------------+        Hemodynamic Pressures:  Resting hemodynamics on maximal Impella support revealed a moderately elevated PCW pressure with a normal Raven cardiac index of 3.4 L/min/mï¿½. The PVR is normal at that 72.  +----+----------+---------+-------------+-------------+---+----+-------+-------+  SiteDate Time   Phase  Systolic mmHg  Diastolic  ED MeanA-Wave V-Wave                   Name                    mmHg     mmHmmHg mmHg   mmHg                                                      g                     +----+----------+---------+-------------+-------------+---+----+-------+-------+    AO 4/27/2023     Rest          101           72     78                      10:47:20                                                                      AM                                                          +----+----------+---------+-------------+-------------+---+----+-------+-------+    RA 4/27/2023     Rest                                4      8      5        10:48:31                                                                       AM                                                          +----+----------+---------+-------------+-------------+---+----+-------+-------+    RV 4/27/2023     Rest           47               8                          10:49:08                                                                      AM                                                          +----+----------+---------+-------------+-------------+---+----+-------+-------+    PW 4/27/2023     Rest                               20     21     24        10:50:13                                                                      AM                                                          +----+----------+---------+-------------+-------------+---+----+-------+-------+    PA 4/27/2023     Rest           47           18     26                      10:51:05                                                                      AM                                                          +----+----------+---------+-------------+-------------+---+----+-------+-------+        Oxygen Saturation %:  +-----------+----------+------------+  Sample SiteO2 Sat (%)HB (g/100ml)  +-----------+----------+------------+           FA        97         7.8  +-----------+----------+------------+           PA        62         7.8  +-----------+----------+------------+           FA        97         7.8  +-----------+----------+------------+           PA        62         7.8  +-----------+----------+------------+      Cardiac Outputs:  +----+---+---+      CI CO   +----+---+---+  FICK3.46.7  +----+---+---+      Vascular Resistance Calculated Values (Wood Units):  +---+---+  [id number]  +---+---+  PVR72   +---+---+      Complications:  No in-lab complications observed.    Cardiac Cath  Transition of Care Summary:  Post Procedure Diagnosis: Moderately elevated PCW pressure with a normal Raven  cardiac index on axillary Impella support.    Blood Loss:               Estimated blood loss during the procedure was 30 mls.  Specimens Removed:        Number of specimen(s) removed: none.    ____________________________________________________________________________________  CONCLUSIONS:  1. Moderately elevated PCW pressure with normal Raven cardiac index on Impella support.    ____________________________________________________________________________________  CPT Codes:  Right Heart Cath O2/Cardiac output without biopsy (RHC)-38403; Moderate Sedation Services 1st additional 15 minutes patient >5 years-61529; Moderate Sedation Services 2nd additional 15 minutes patient >5 years-56537    ICD 10 Codes:  I50.23-Acute on chronic systolic (congestive) heart failure    [id number] Vince Boyer MD  Performing Physician  Electronically signed by [id number] Vince Boyer MD on 4/28/2023 at 11:13:42 AM      Current Outpatient Medications   Medication Instructions    aspirin 81 mg, oral, Daily    digoxin (LANOXIN) 125 mcg, oral, Daily    Entresto 49-51 mg tablet 1 tablet, oral, 2 times daily    fluticasone (Flonase) 50 mcg/actuation nasal spray 2 sprays, Each Nostril, Daily    Jardiance 10 mg, oral, Daily    levothyroxine (SYNTHROID, LEVOXYL) 175 mcg, oral, Daily before breakfast    omega-3 acid ethyl esters (LOVAZA) 2 g, oral, 2 times daily    Pacerone 200 mg, oral, Daily    pantoprazole (PROTONIX) 40 mg, oral, Daily before breakfast    sertraline (ZOLOFT) 25 mg, oral, Daily    spironolactone (ALDACTONE) 50 mg, oral, Daily    Symbicort 160-4.5 mcg/actuation inhaler 2 puffs, inhalation, 2 times daily RT    tamsulosin (FLOMAX) 0.4 mg, oral, Daily    torsemide (DEMADEX) 10 mg, oral, Daily    warfarin (COUMADIN) 1 mg, oral, Every evening          Heart Mate III interrogation       Problems:   1) Stage D chronic systolic  HF/ICM/HFrEF s/p HM3 LVAD (4/30/23)  ABO: AB  Short/Long term: long term (pts choice; does not wish to proceed with OHT eval)     LVAD interrogation reveals no alarms or power spikes, no reported LVAD alarms.  Elevated MAP today but he has not taken any of his heart medications thus far  Will continue to optimize GDMT.   -c/w antithrombotic therapy (warfarin + ASA)  -remains off BB due to RV dysfunction  -c/w entresto 49/51 mg bid, jardiance 10 mg daily  -Continue torsemide as needed  -increase spironolactone to 50 mg daily  -Continue digoxin for RV support  -Continue fish oil and PPI  -encouraged consideration for cardiac rehab     2) Long term anticoagulations secondary to LVAD  -c/w warfarin (target INR 2-3)     3) Salmonella infection  Bactrim course now complete  -d/c abx     4) h/o right axillary repair with right brachial plexus injury and residual right hand numbness  CT head and neck wo contrast 5/3 showed degenerative changes C5-C6   -Previously had neurology referral for EMG and further mx     5) h/o VT  -c/w amiodarone 200 mg once daily, low threshold to discontinue   -Device interrogation before next visit    6) Hypothyroidism  -c/w levothyroxine       ____________________________________________________________  Anita Vincent MD PhD   Section of Advanced Heart Failure and Cardiac Transplantation  Division of Cardiovascular Medicine  [facility]

## 2023-12-11 ENCOUNTER — TELEPHONE (OUTPATIENT)
Dept: TRANSPLANT | Facility: HOSPITAL | Age: 63
End: 2023-12-11
Payer: MEDICARE

## 2023-12-11 NOTE — TELEPHONE ENCOUNTER
Called patient back. Patient called in order to give update about new dressing on driveline applied Friday; previous dressing causing itching and redness where adhesive was located. Patient states itching has subsided. Will contact [facility] for other options for dressings to send to patient for dressing changes.

## 2023-12-13 ENCOUNTER — TELEPHONE (OUTPATIENT)
Dept: CARDIOLOGY | Facility: CLINIC | Age: 63
End: 2023-12-13
Payer: MEDICARE

## 2023-12-13 DIAGNOSIS — I47.29 PAROXYSMAL VENTRICULAR TACHYCARDIA (MULTI): ICD-10-CM

## 2023-12-13 DIAGNOSIS — Z79.899 HIGH RISK MEDICATION USE: ICD-10-CM

## 2023-12-13 NOTE — LETTER
Anatoly Lane    [address]  [place] [address]      Dear Anatoly Lane,      Enclosed you will find an order form to have your testing completed at a facility of your choice. It is necessary for you to have  lab work. These tests are needed if you are on one of the following medications: Cordarone, Pacerone, or Amiodarone.    If you are unable to have these test done please contact our office at [phone] and press option #4 so that we may assist you in the problems.    Thank you for your compliance with this testing.      [facility]  [address] [address]  Phone: [phone]  Fax: [phone]    Please have done in ASAP

## 2024-01-06 DIAGNOSIS — F32.A DEPRESSION, UNSPECIFIED DEPRESSION TYPE: ICD-10-CM

## 2024-01-06 DIAGNOSIS — N40.0 ENLARGED PROSTATE: ICD-10-CM

## 2024-01-10 RX ORDER — TAMSULOSIN HYDROCHLORIDE 0.4 MG/1
0.4 CAPSULE ORAL DAILY
Qty: 90 CAPSULE | Refills: 3 | Status: SHIPPED | OUTPATIENT
Start: 2024-01-10 | End: 2024-06-04

## 2024-01-10 RX ORDER — SERTRALINE HYDROCHLORIDE 25 MG/1
25 TABLET, FILM COATED ORAL DAILY
Qty: 90 TABLET | Refills: 3 | Status: SHIPPED | OUTPATIENT
Start: 2024-01-10 | End: 2024-03-06 | Stop reason: HOSPADM

## 2024-02-07 ENCOUNTER — TELEPHONE (OUTPATIENT)
Dept: CARDIOLOGY | Facility: CLINIC | Age: 64
End: 2024-02-07
Payer: MEDICARE

## 2024-02-07 NOTE — TELEPHONE ENCOUNTER
Patient called inquiring on an ov with you due to his pacemaker. He was told in zafar that you should be following him due to his pacemaker. States he hasn't had any in person device checks   Please advise            From 08/22/2023 Telephone note  Since he had LVAD and I am very much aware of the whole situation, he would be better off continue to follow in the advanced heart failure clinic at    Spoke with patient advised of the above .He verbalized understanding.

## 2024-02-14 PROBLEM — I10 HYPERTENSION: Status: ACTIVE | Noted: 2024-02-14

## 2024-02-14 PROBLEM — I42.8 NON-ISCHEMIC CARDIOMYOPATHY (MULTI): Status: ACTIVE | Noted: 2024-02-14

## 2024-02-14 PROBLEM — Z95.810 CARDIAC DEFIBRILLATOR IN PLACE: Status: ACTIVE | Noted: 2024-02-14

## 2024-02-14 PROBLEM — I47.20 PAROXYSMAL VENTRICULAR TACHYCARDIA: Status: ACTIVE | Noted: 2024-02-14

## 2024-02-14 PROBLEM — Z95.811 LVAD (LEFT VENTRICULAR ASSIST DEVICE) PRESENT (MULTI): Status: ACTIVE | Noted: 2024-02-14

## 2024-02-14 PROBLEM — I50.9 CHF (CONGESTIVE HEART FAILURE) (MULTI): Status: ACTIVE | Noted: 2024-02-14

## 2024-02-14 PROBLEM — Z94.9 TRANSPLANT: Status: ACTIVE | Noted: 2024-02-14

## 2024-02-14 PROBLEM — E03.9 HYPOTHYROIDISM: Status: ACTIVE | Noted: 2024-02-14

## 2024-02-14 PROBLEM — Z79.899 HIGH RISK MEDICATION USE: Status: ACTIVE | Noted: 2024-02-14

## 2024-02-14 PROBLEM — I95.9 HYPOTENSION: Status: ACTIVE | Noted: 2024-02-14

## 2024-02-14 PROBLEM — I47.29 PAROXYSMAL VENTRICULAR TACHYCARDIA (MULTI): Status: ACTIVE | Noted: 2024-02-14

## 2024-02-14 PROBLEM — N52.9 ERECTILE DYSFUNCTION: Status: ACTIVE | Noted: 2024-02-14

## 2024-02-15 DIAGNOSIS — Z95.811 LVAD (LEFT VENTRICULAR ASSIST DEVICE) PRESENT (MULTI): ICD-10-CM

## 2024-02-15 DIAGNOSIS — I50.9 CONGESTIVE HEART FAILURE, UNSPECIFIED HF CHRONICITY, UNSPECIFIED HEART FAILURE TYPE (MULTI): ICD-10-CM

## 2024-02-16 RX ORDER — SPIRONOLACTONE 50 MG/1
50 TABLET, FILM COATED ORAL DAILY
Qty: 90 TABLET | Refills: 3 | Status: SHIPPED | OUTPATIENT
Start: 2024-02-16 | End: 2024-02-21 | Stop reason: SDUPTHER

## 2024-02-19 ENCOUNTER — OFFICE VISIT (OUTPATIENT)
Dept: CARDIOLOGY | Facility: CLINIC | Age: 64
End: 2024-02-19
Payer: MEDICARE

## 2024-02-19 VITALS — BODY MASS INDEX: 34.8 KG/M2 | HEART RATE: 75 BPM | HEIGHT: 69 IN | WEIGHT: 235 LBS

## 2024-02-19 DIAGNOSIS — I47.29 PAROXYSMAL VENTRICULAR TACHYCARDIA (MULTI): ICD-10-CM

## 2024-02-19 DIAGNOSIS — Z87.891 FORMER SMOKER: ICD-10-CM

## 2024-02-19 DIAGNOSIS — E66.9 OBESITY, CLASS I, BMI 30-34.9: ICD-10-CM

## 2024-02-19 DIAGNOSIS — I42.8 NON-ISCHEMIC CARDIOMYOPATHY (MULTI): ICD-10-CM

## 2024-02-19 DIAGNOSIS — Z95.810 CARDIAC DEFIBRILLATOR IN PLACE: ICD-10-CM

## 2024-02-19 DIAGNOSIS — Z79.899 HIGH RISK MEDICATION USE: ICD-10-CM

## 2024-02-19 DIAGNOSIS — Z95.811 LVAD (LEFT VENTRICULAR ASSIST DEVICE) PRESENT (MULTI): ICD-10-CM

## 2024-02-19 DIAGNOSIS — I50.9 CONGESTIVE HEART FAILURE, UNSPECIFIED HF CHRONICITY, UNSPECIFIED HEART FAILURE TYPE (MULTI): Primary | ICD-10-CM

## 2024-02-19 PROCEDURE — 93000 ELECTROCARDIOGRAM COMPLETE: CPT | Performed by: INTERNAL MEDICINE

## 2024-02-19 PROCEDURE — 99214 OFFICE O/P EST MOD 30 MIN: CPT | Performed by: INTERNAL MEDICINE

## 2024-02-19 PROCEDURE — 1036F TOBACCO NON-USER: CPT | Performed by: INTERNAL MEDICINE

## 2024-02-19 PROCEDURE — 3008F BODY MASS INDEX DOCD: CPT | Performed by: INTERNAL MEDICINE

## 2024-02-19 NOTE — PATIENT INSTRUCTIONS
BMI was above normal measurement. Current weight: 107 kg (235 lb)  Weight change since last visit (-) denotes wt loss 11.8 lbs   Weight loss needed to achieve BMI 25: 66.1 Lbs  Weight loss needed to achieve BMI 30: 32.3 Lbs  Provided instructions on dietary changes  Provided instructions on exercise  Advised to Increase physical activity    Please bring all medicines, vitamins, and herbal supplements with you when you come to the office.    Prescriptions will not be filled unless you are compliant with your follow up appointments or have a follow up appointment scheduled as per instruction of your physician. Refills should be requested at the time of your visit.

## 2024-02-19 NOTE — PROGRESS NOTES
Subjective   Anatoly Lane is a 63 y.o. male       Chief Complaint    Follow-up          HPI   Patient is in the office for follow-up for nonischemic cardiomyopathy status post left ventricular assist device that he had at [facility] in 2022.  He continues to follow with the device clinic and the heart failure clinic every 6 months.  He is due for a visit next month.  This is the first time I have seen and since he had a device.  He has gained 50 pounds in the interval which was not fluid weight.  His device seems to be working very well and his level of physical activity is very reasonable.  He has no orthopnea PND or lower extremity edema and has had no arrhythmias while he is on amiodarone.  I do not believe that he has had his standard amiodarone testing in the last year.  This was addressed today and will be arranged.  Also has not had a follow-up for his AICD in the pacemaker clinic which I arranged for.  He does not smoke or drink and maintains reasonable daily activities.  Due  to the fact that he has LVAD his heart sounds and blood pressure are not obtainable.    ASSESSMENT AND PLAN:      1. Nonischemic cardiomyopathy, stage C, functional class II, the patient is status post LVAD and stable.  He follows with the heart failure and device clinic at [facility] every 6 months.  Presently stable.  He is scheduled next month to be seen by the heart failure clinic at .  He is present medical therapy will be left unchanged.  What I do not know is if the patient has the LVAD as destination therapy or a bridge for transplantation but I am certain he is not on the waiting list for heart transplant at this time  2. AICD in place, follow-up as scheduled in the pacemaker clinic.  3. Paroxysmal ventricular tachycardia with no recurrences on amiodarone.  4. High-risk medication with amiodarone, amiodarone surveillance testing is scheduled  5. Hypothyroidism, currently under control on replacement  "therapy  6.  Class I with significant weight gain in the last 18 months nearly 50 pounds.  Encouraged the patient to watch his calorie consumption and maintains regular exercise.     Matias Leung MD, F   Review of Systems   All other systems reviewed and are negative.           Vitals:    02/19/24 1421   Pulse: 75   Weight: 107 kg (235 lb)   Height: 1.753 m (5' 9\")      EKG done in office today     Objective   Physical Exam  Constitutional:       Appearance: Normal appearance. He is normal weight.   HENT:      Nose: Nose normal.   Neck:      Vascular: No carotid bruit.   Cardiovascular:      Rate and Rhythm: Normal rate.      Pulses: Normal pulses.      Heart sounds: Normal heart sounds.   Pulmonary:      Effort: Pulmonary effort is normal.   Abdominal:      General: Bowel sounds are normal.      Palpations: Abdomen is soft.   Genitourinary:     Rectum: Normal.   Musculoskeletal:         General: Normal range of motion.      Cervical back: Normal range of motion.      Right lower leg: No edema.      Left lower leg: No edema.   Skin:     General: Skin is warm and dry.   Neurological:      General: No focal deficit present.      Mental Status: He is alert.   Psychiatric:         Mood and Affect: Mood normal.         Behavior: Behavior normal.         Thought Content: Thought content normal.         Judgment: Judgment normal.         Allergies  Jardiance [empagliflozin] and Amiodarone     Current Medications    Current Outpatient Medications:     aspirin 81 mg EC tablet, Take 1 tablet (81 mg) by mouth once daily., Disp: , Rfl:     dapagliflozin propanediol (Farxiga) 10 mg, Take 1 tablet (10 mg) by mouth once daily., Disp: 30 tablet, Rfl: 11    digoxin (Lanoxin) 125 MCG tablet, Take 1 tablet (125 mcg) by mouth once daily., Disp: , Rfl:     Entresto 49-51 mg tablet, Take 1 tablet by mouth 2 times a day., Disp: 180 tablet, Rfl: 3    levothyroxine (Synthroid, Levoxyl) 175 mcg tablet, Take 1 tablet (175 mcg) by mouth " once daily in the morning. Take before meals., Disp: 90 tablet, Rfl: 3    Pacerone 200 mg tablet, Take 1 tablet (200 mg) by mouth once daily., Disp: , Rfl:     pantoprazole (ProtoNix) 40 mg EC tablet, Take 1 tablet (40 mg) by mouth once daily in the morning. Take before meals., Disp: 90 tablet, Rfl: 3    sertraline (Zoloft) 25 mg tablet, TAKE 1 TABLET EVERY DAY, Disp: 90 tablet, Rfl: 3    spironolactone (Aldactone) 50 mg tablet, Take 1 tablet (50 mg) by mouth once daily., Disp: 90 tablet, Rfl: 3    Symbicort 160-4.5 mcg/actuation inhaler, Inhale 2 puffs 2 times a day., Disp: , Rfl:     torsemide (Demadex) 10 mg tablet, Take 1 tablet (10 mg) by mouth if needed., Disp: , Rfl:     warfarin (Coumadin) 1 mg tablet, Take 1 tablet (1 mg) by mouth once daily in the evening. As directed by [facility], Disp: , Rfl:     tamsulosin (Flomax) 0.4 mg 24 hr capsule, TAKE 1 CAPSULE EVERY DAY (Patient not taking: Reported on 2/19/2024), Disp: 90 capsule, Rfl: 3                     Assessment/Plan   1. Congestive heart failure, unspecified HF chronicity, unspecified heart failure type (CMS/HCC)  Follow Up In Cardiology    ECG 12 Lead    Referral to Pacemaker Clinic and Follow Up      2. Cardiac defibrillator in place  ECG 12 Lead    Referral to Pacemaker Clinic and Follow Up      3. Non-ischemic cardiomyopathy (CMS/HCC)        4. LVAD (left ventricular assist device) present (CMS/HCC)        5. Paroxysmal ventricular tachycardia (CMS/HCC)        6. High risk medication use        7. Former smoker        8. Obesity, Class I, BMI 30-34.9                 Scribe Attestation  By signing my name below, IJennifer LPN, Scribe   attest that this documentation has been prepared under the direction and in the presence of Matias Leung MD.     Provider Attestation - Scribe documentation    All medical record entries made by the Scribe were at my direction and personally dictated by me. I have reviewed the chart and agree that the  record accurately reflects my personal performance of the history, physical exam, discussion and plan.

## 2024-02-21 DIAGNOSIS — Z95.811 LVAD (LEFT VENTRICULAR ASSIST DEVICE) PRESENT (MULTI): ICD-10-CM

## 2024-02-21 DIAGNOSIS — I50.9 CONGESTIVE HEART FAILURE, UNSPECIFIED HF CHRONICITY, UNSPECIFIED HEART FAILURE TYPE (MULTI): ICD-10-CM

## 2024-02-21 RX ORDER — SPIRONOLACTONE 50 MG/1
50 TABLET, FILM COATED ORAL DAILY
Qty: 90 TABLET | Refills: 3 | Status: SHIPPED | OUTPATIENT
Start: 2024-02-21 | End: 2024-05-07 | Stop reason: ALTCHOICE

## 2024-02-21 NOTE — TELEPHONE ENCOUNTER
Spoke with patient about receiving a placard. Patient has an appoiontment on 03/08 with [facility]; we can discuss this at the time of the appointment,.     Patient states that he has had weight gain without edema in legs. Patient states he does not think his eating habits have changed. Offered patient to be seen in clinic Friday or Monday. At this time patient refused to move up appointment as he does not feel like he needs to.     Educated patient that if swelling occurs, SOB, inability to lay flat, or GRAVES from ambulating around the house occurs to please call or go to the ED for further assessment.

## 2024-02-22 ENCOUNTER — TELEPHONE (OUTPATIENT)
Dept: TRANSPLANT | Facility: HOSPITAL | Age: 64
End: 2024-02-22
Payer: MEDICARE

## 2024-02-22 NOTE — TELEPHONE ENCOUNTER
Patient called with complains of GRAVES and weight gain. Offered pt clinic visit tomorrow at 1pm and pt agreed to be seen in clinic tomorrow. Explained to pt he could potentially be admitted if his heart failure symptoms are concerning on exam, pt understands.

## 2024-02-23 ENCOUNTER — TELEPHONE (OUTPATIENT)
Dept: TRANSPLANT | Facility: HOSPITAL | Age: 64
End: 2024-02-23
Payer: MEDICARE

## 2024-02-23 ENCOUNTER — APPOINTMENT (OUTPATIENT)
Dept: RADIOLOGY | Facility: HOSPITAL | Age: 64
DRG: 377 | End: 2024-02-23
Payer: MEDICARE

## 2024-02-23 ENCOUNTER — APPOINTMENT (OUTPATIENT)
Dept: CARDIOLOGY | Facility: HOSPITAL | Age: 64
DRG: 377 | End: 2024-02-23
Payer: MEDICARE

## 2024-02-23 ENCOUNTER — HOSPITAL ENCOUNTER (INPATIENT)
Facility: HOSPITAL | Age: 64
LOS: 12 days | Discharge: HOME | DRG: 377 | End: 2024-03-06
Attending: INTERNAL MEDICINE
Payer: MEDICARE

## 2024-02-23 ENCOUNTER — OFFICE VISIT (OUTPATIENT)
Dept: TRANSPLANT | Facility: HOSPITAL | Age: 64
DRG: 377 | End: 2024-02-23
Payer: MEDICARE

## 2024-02-23 VITALS — BODY MASS INDEX: 34.05 KG/M2 | WEIGHT: 230.6 LBS | TEMPERATURE: 97 F

## 2024-02-23 DIAGNOSIS — E03.9 HYPOTHYROIDISM, UNSPECIFIED TYPE: ICD-10-CM

## 2024-02-23 DIAGNOSIS — I50.9 CONGESTIVE HEART FAILURE, UNSPECIFIED HF CHRONICITY, UNSPECIFIED HEART FAILURE TYPE (MULTI): ICD-10-CM

## 2024-02-23 DIAGNOSIS — Z95.811 LVAD (LEFT VENTRICULAR ASSIST DEVICE) PRESENT (MULTI): ICD-10-CM

## 2024-02-23 DIAGNOSIS — I50.20 HFREF (HEART FAILURE WITH REDUCED EJECTION FRACTION) (MULTI): ICD-10-CM

## 2024-02-23 DIAGNOSIS — I50.22 CHRONIC SYSTOLIC (CONGESTIVE) HEART FAILURE (MULTI): ICD-10-CM

## 2024-02-23 DIAGNOSIS — I50.9 ACUTE DECOMPENSATED HEART FAILURE (MULTI): Primary | ICD-10-CM

## 2024-02-23 DIAGNOSIS — D64.9 ANEMIA, UNSPECIFIED TYPE: ICD-10-CM

## 2024-02-23 DIAGNOSIS — Z94.9 TRANSPLANT: ICD-10-CM

## 2024-02-23 DIAGNOSIS — Z09 ENCOUNTER FOR FOLLOW-UP EXAMINATION AFTER COMPLETED TREATMENT FOR CONDITIONS OTHER THAN MALIGNANT NEOPLASM: ICD-10-CM

## 2024-02-23 DIAGNOSIS — Z79.01 ANTICOAGULANT LONG-TERM USE: ICD-10-CM

## 2024-02-23 DIAGNOSIS — N42.9 DISORDER OF PROSTATE, UNSPECIFIED: ICD-10-CM

## 2024-02-23 DIAGNOSIS — Z01.810 ENCOUNTER FOR PREPROCEDURAL CARDIOVASCULAR EXAMINATION: ICD-10-CM

## 2024-02-23 DIAGNOSIS — K29.61 GASTROINTESTINAL HEMORRHAGE ASSOCIATED WITH OTHER GASTRITIS: ICD-10-CM

## 2024-02-23 DIAGNOSIS — Z48.812 ENCOUNTER FOR SURGICAL AFTERCARE FOLLOWING SURGERY ON THE CIRCULATORY SYSTEM: ICD-10-CM

## 2024-02-23 DIAGNOSIS — F32.A DEPRESSION, UNSPECIFIED DEPRESSION TYPE: ICD-10-CM

## 2024-02-23 DIAGNOSIS — Z01.818 ENCOUNTER FOR OTHER PREPROCEDURAL EXAMINATION: ICD-10-CM

## 2024-02-23 DIAGNOSIS — I50.43 ACUTE ON CHRONIC COMBINED SYSTOLIC (CONGESTIVE) AND DIASTOLIC (CONGESTIVE) HEART FAILURE (MULTI): ICD-10-CM

## 2024-02-23 DIAGNOSIS — Z95.810 CARDIAC DEFIBRILLATOR IN PLACE: ICD-10-CM

## 2024-02-23 DIAGNOSIS — R09.89 OTHER SPECIFIED SYMPTOMS AND SIGNS INVOLVING THE CIRCULATORY AND RESPIRATORY SYSTEMS: ICD-10-CM

## 2024-02-23 DIAGNOSIS — Z76.82 HEART TRANSPLANT CANDIDATE: ICD-10-CM

## 2024-02-23 LAB
ABO GROUP (TYPE) IN BLOOD: NORMAL
ALBUMIN SERPL BCP-MCNC: 4 G/DL (ref 3.4–5)
ALP SERPL-CCNC: 47 U/L (ref 33–136)
ALT SERPL W P-5'-P-CCNC: 13 U/L (ref 10–52)
ANION GAP SERPL CALC-SCNC: 15 MMOL/L (ref 10–20)
ANTIBODY SCREEN: NORMAL
APTT PPP: 43 SECONDS (ref 27–38)
AST SERPL W P-5'-P-CCNC: 11 U/L (ref 9–39)
BASOPHILS # BLD AUTO: 0.05 X10*3/UL (ref 0–0.1)
BASOPHILS NFR BLD AUTO: 0.5 %
BILIRUB SERPL-MCNC: 0.4 MG/DL (ref 0–1.2)
BNP SERPL-MCNC: 33 PG/ML (ref 0–99)
BUN SERPL-MCNC: 44 MG/DL (ref 6–23)
CALCIUM SERPL-MCNC: 9.2 MG/DL (ref 8.6–10.6)
CARDIAC TROPONIN I PNL SERPL HS: 30 NG/L (ref 0–53)
CHLORIDE SERPL-SCNC: 100 MMOL/L (ref 98–107)
CO2 SERPL-SCNC: 26 MMOL/L (ref 21–32)
CREAT SERPL-MCNC: 1.3 MG/DL (ref 0.5–1.3)
DIGOXIN SERPL-MCNC: 0.89 NG/ML (ref 0.8–?)
EGFRCR SERPLBLD CKD-EPI 2021: 62 ML/MIN/1.73M*2
EOSINOPHIL # BLD AUTO: 0.15 X10*3/UL (ref 0–0.7)
EOSINOPHIL NFR BLD AUTO: 1.4 %
ERYTHROCYTE [DISTWIDTH] IN BLOOD BY AUTOMATED COUNT: 17.8 % (ref 11.5–14.5)
EST. AVERAGE GLUCOSE BLD GHB EST-MCNC: 80 MG/DL
FERRITIN SERPL-MCNC: 104 NG/ML (ref 20–300)
FOLATE SERPL-MCNC: 7.8 NG/ML
GLUCOSE SERPL-MCNC: 90 MG/DL (ref 74–99)
HBA1C MFR BLD: 4.4 %
HCT VFR BLD AUTO: 28.7 % (ref 41–52)
HGB BLD-MCNC: 9 G/DL (ref 13.5–17.5)
IMM GRANULOCYTES # BLD AUTO: 0.11 X10*3/UL (ref 0–0.7)
IMM GRANULOCYTES NFR BLD AUTO: 1 % (ref 0–0.9)
INR PPP: 1.9 (ref 0.9–1.1)
IRON SATN MFR SERPL: 18 % (ref 25–45)
IRON SERPL-MCNC: 72 UG/DL (ref 35–150)
LACTATE SERPL-SCNC: 0.7 MMOL/L (ref 0.4–2)
LDH SERPL L TO P-CCNC: 128 U/L (ref 84–246)
LYMPHOCYTES # BLD AUTO: 1.96 X10*3/UL (ref 1.2–4.8)
LYMPHOCYTES NFR BLD AUTO: 18.6 %
MAGNESIUM SERPL-MCNC: 2.22 MG/DL (ref 1.6–2.4)
MCH RBC QN AUTO: 32.1 PG (ref 26–34)
MCHC RBC AUTO-ENTMCNC: 31.4 G/DL (ref 32–36)
MCV RBC AUTO: 103 FL (ref 80–100)
MONOCYTES # BLD AUTO: 0.65 X10*3/UL (ref 0.1–1)
MONOCYTES NFR BLD AUTO: 6.2 %
NEUTROPHILS # BLD AUTO: 7.62 X10*3/UL (ref 1.2–7.7)
NEUTROPHILS NFR BLD AUTO: 72.3 %
NRBC BLD-RTO: 0.3 /100 WBCS (ref 0–0)
PLATELET # BLD AUTO: 252 X10*3/UL (ref 150–450)
POTASSIUM SERPL-SCNC: 4 MMOL/L (ref 3.5–5.3)
PROT SERPL-MCNC: 6.1 G/DL (ref 6.4–8.2)
PROTHROMBIN TIME: 21.2 SECONDS (ref 9.8–12.8)
RBC # BLD AUTO: 2.8 X10*6/UL (ref 4.5–5.9)
RH FACTOR (ANTIGEN D): NORMAL
SODIUM SERPL-SCNC: 137 MMOL/L (ref 136–145)
TIBC SERPL-MCNC: 398 UG/DL (ref 240–445)
TSH SERPL-ACNC: 18.02 MIU/L (ref 0.44–3.98)
UFH PPP CHRO-ACNC: 0.4 IU/ML
UIBC SERPL-MCNC: 326 UG/DL (ref 110–370)
VIT B12 SERPL-MCNC: 267 PG/ML (ref 211–911)
WBC # BLD AUTO: 10.5 X10*3/UL (ref 4.4–11.3)

## 2024-02-23 PROCEDURE — 85025 COMPLETE CBC W/AUTO DIFF WBC: CPT

## 2024-02-23 PROCEDURE — 2500000005 HC RX 250 GENERAL PHARMACY W/O HCPCS: Performed by: NURSE PRACTITIONER

## 2024-02-23 PROCEDURE — 83036 HEMOGLOBIN GLYCOSYLATED A1C: CPT

## 2024-02-23 PROCEDURE — 84443 ASSAY THYROID STIM HORMONE: CPT

## 2024-02-23 PROCEDURE — 80162 ASSAY OF DIGOXIN TOTAL: CPT

## 2024-02-23 PROCEDURE — 93750 INTERROGATION VAD IN PERSON: CPT | Mod: ZK | Performed by: REGISTERED NURSE

## 2024-02-23 PROCEDURE — 2500000001 HC RX 250 WO HCPCS SELF ADMINISTERED DRUGS (ALT 637 FOR MEDICARE OP)

## 2024-02-23 PROCEDURE — 83540 ASSAY OF IRON: CPT

## 2024-02-23 PROCEDURE — 99215 OFFICE O/P EST HI 40 MIN: CPT | Performed by: REGISTERED NURSE

## 2024-02-23 PROCEDURE — 86920 COMPATIBILITY TEST SPIN: CPT

## 2024-02-23 PROCEDURE — 80053 COMPREHEN METABOLIC PANEL: CPT

## 2024-02-23 PROCEDURE — 3008F BODY MASS INDEX DOCD: CPT | Performed by: REGISTERED NURSE

## 2024-02-23 PROCEDURE — 93306 TTE W/DOPPLER COMPLETE: CPT

## 2024-02-23 PROCEDURE — 71045 X-RAY EXAM CHEST 1 VIEW: CPT | Performed by: RADIOLOGY

## 2024-02-23 PROCEDURE — 93750 INTERROGATION VAD IN PERSON: CPT | Performed by: REGISTERED NURSE

## 2024-02-23 PROCEDURE — 82728 ASSAY OF FERRITIN: CPT

## 2024-02-23 PROCEDURE — 71045 X-RAY EXAM CHEST 1 VIEW: CPT

## 2024-02-23 PROCEDURE — 83605 ASSAY OF LACTIC ACID: CPT

## 2024-02-23 PROCEDURE — 82746 ASSAY OF FOLIC ACID SERUM: CPT

## 2024-02-23 PROCEDURE — 36415 COLL VENOUS BLD VENIPUNCTURE: CPT

## 2024-02-23 PROCEDURE — 93005 ELECTROCARDIOGRAM TRACING: CPT

## 2024-02-23 PROCEDURE — 85610 PROTHROMBIN TIME: CPT

## 2024-02-23 PROCEDURE — 93306 TTE W/DOPPLER COMPLETE: CPT | Performed by: INTERNAL MEDICINE

## 2024-02-23 PROCEDURE — 99223 1ST HOSP IP/OBS HIGH 75: CPT

## 2024-02-23 PROCEDURE — 82607 VITAMIN B-12: CPT

## 2024-02-23 PROCEDURE — 2500000004 HC RX 250 GENERAL PHARMACY W/ HCPCS (ALT 636 FOR OP/ED)

## 2024-02-23 PROCEDURE — 83735 ASSAY OF MAGNESIUM: CPT

## 2024-02-23 PROCEDURE — 99215 OFFICE O/P EST HI 40 MIN: CPT | Mod: ZK | Performed by: REGISTERED NURSE

## 2024-02-23 PROCEDURE — 86901 BLOOD TYPING SEROLOGIC RH(D): CPT

## 2024-02-23 PROCEDURE — 2020000001 HC ICU ROOM DAILY

## 2024-02-23 PROCEDURE — 83615 LACTATE (LD) (LDH) ENZYME: CPT

## 2024-02-23 PROCEDURE — 85520 HEPARIN ASSAY: CPT

## 2024-02-23 PROCEDURE — 1036F TOBACCO NON-USER: CPT | Performed by: REGISTERED NURSE

## 2024-02-23 PROCEDURE — 83880 ASSAY OF NATRIURETIC PEPTIDE: CPT

## 2024-02-23 PROCEDURE — 84484 ASSAY OF TROPONIN QUANT: CPT

## 2024-02-23 RX ORDER — SERTRALINE HYDROCHLORIDE 50 MG/1
25 TABLET, FILM COATED ORAL DAILY
Status: DISCONTINUED | OUTPATIENT
Start: 2024-02-23 | End: 2024-03-01

## 2024-02-23 RX ORDER — DAPAGLIFLOZIN 10 MG/1
10 TABLET, FILM COATED ORAL DAILY
Status: DISCONTINUED | OUTPATIENT
Start: 2024-02-23 | End: 2024-03-06 | Stop reason: HOSPADM

## 2024-02-23 RX ORDER — ACETAMINOPHEN 325 MG/1
650 TABLET ORAL EVERY 6 HOURS PRN
Status: DISCONTINUED | OUTPATIENT
Start: 2024-02-23 | End: 2024-03-06 | Stop reason: HOSPADM

## 2024-02-23 RX ORDER — HEPARIN SODIUM 10000 [USP'U]/100ML
INJECTION, SOLUTION INTRAVENOUS
Status: COMPLETED
Start: 2024-02-23 | End: 2024-02-23

## 2024-02-23 RX ORDER — DIGOXIN 125 MCG
125 TABLET ORAL DAILY
Status: DISCONTINUED | OUTPATIENT
Start: 2024-02-23 | End: 2024-03-06 | Stop reason: HOSPADM

## 2024-02-23 RX ORDER — POTASSIUM CHLORIDE 20 MEQ/1
40 TABLET, EXTENDED RELEASE ORAL ONCE
Status: CANCELLED | OUTPATIENT
Start: 2024-02-23 | End: 2024-02-23

## 2024-02-23 RX ORDER — PANTOPRAZOLE SODIUM 40 MG/1
40 TABLET, DELAYED RELEASE ORAL
Status: DISCONTINUED | OUTPATIENT
Start: 2024-02-24 | End: 2024-02-24

## 2024-02-23 RX ORDER — POLYETHYLENE GLYCOL 3350 17 G/17G
17 POWDER, FOR SOLUTION ORAL DAILY PRN
Status: DISCONTINUED | OUTPATIENT
Start: 2024-02-23 | End: 2024-03-06 | Stop reason: HOSPADM

## 2024-02-23 RX ORDER — HEPARIN SODIUM 5000 [USP'U]/ML
3000-6000 INJECTION, SOLUTION INTRAVENOUS; SUBCUTANEOUS EVERY 4 HOURS PRN
Status: DISCONTINUED | OUTPATIENT
Start: 2024-02-23 | End: 2024-02-23

## 2024-02-23 RX ORDER — FUROSEMIDE 10 MG/ML
40 INJECTION INTRAMUSCULAR; INTRAVENOUS ONCE
Status: COMPLETED | OUTPATIENT
Start: 2024-02-23 | End: 2024-02-23

## 2024-02-23 RX ORDER — FLUTICASONE FUROATE AND VILANTEROL 200; 25 UG/1; UG/1
1 POWDER RESPIRATORY (INHALATION)
Status: DISCONTINUED | OUTPATIENT
Start: 2024-02-23 | End: 2024-02-26

## 2024-02-23 RX ORDER — ACETAMINOPHEN 500 MG
5 TABLET ORAL NIGHTLY
Status: COMPLETED | OUTPATIENT
Start: 2024-02-23 | End: 2024-02-27

## 2024-02-23 RX ORDER — LIDOCAINE 560 MG/1
1 PATCH PERCUTANEOUS; TOPICAL; TRANSDERMAL DAILY
Status: DISCONTINUED | OUTPATIENT
Start: 2024-02-23 | End: 2024-03-06 | Stop reason: HOSPADM

## 2024-02-23 RX ORDER — SPIRONOLACTONE 25 MG/1
50 TABLET ORAL DAILY
Status: DISCONTINUED | OUTPATIENT
Start: 2024-02-23 | End: 2024-03-06 | Stop reason: HOSPADM

## 2024-02-23 RX ORDER — HEPARIN SODIUM 10000 [USP'U]/100ML
0-4500 INJECTION, SOLUTION INTRAVENOUS CONTINUOUS
Status: DISCONTINUED | OUTPATIENT
Start: 2024-02-23 | End: 2024-02-23

## 2024-02-23 RX ORDER — AMIODARONE HYDROCHLORIDE 200 MG/1
200 TABLET ORAL DAILY
Status: DISCONTINUED | OUTPATIENT
Start: 2024-02-23 | End: 2024-02-26

## 2024-02-23 RX ORDER — ASPIRIN 81 MG/1
81 TABLET ORAL DAILY
Status: DISCONTINUED | OUTPATIENT
Start: 2024-02-23 | End: 2024-03-06 | Stop reason: HOSPADM

## 2024-02-23 RX ORDER — WARFARIN 1 MG/1
TABLET ORAL
COMMUNITY
End: 2024-03-06 | Stop reason: HOSPADM

## 2024-02-23 RX ORDER — AMOXICILLIN 250 MG
2 CAPSULE ORAL 2 TIMES DAILY
Status: DISCONTINUED | OUTPATIENT
Start: 2024-02-23 | End: 2024-03-06 | Stop reason: HOSPADM

## 2024-02-23 RX ADMIN — FUROSEMIDE 40 MG: 10 INJECTION, SOLUTION INTRAMUSCULAR; INTRAVENOUS at 17:53

## 2024-02-23 RX ADMIN — ACETAMINOPHEN 650 MG: 325 TABLET ORAL at 19:26

## 2024-02-23 RX ADMIN — LIDOCAINE 1 PATCH: 4 PATCH TOPICAL at 20:36

## 2024-02-23 RX ADMIN — PERFLUTREN 3 ML OF DILUTION: 6.52 INJECTION, SUSPENSION INTRAVENOUS at 14:00

## 2024-02-23 RX ADMIN — HEPARIN SODIUM AND DEXTROSE 1900 UNITS/HR: 10000; 5 INJECTION INTRAVENOUS at 16:36

## 2024-02-23 RX ADMIN — HEPARIN SODIUM 1900 UNITS/HR: 10000 INJECTION, SOLUTION INTRAVENOUS at 16:36

## 2024-02-23 RX ADMIN — Medication 5 MG: at 21:36

## 2024-02-23 RX ADMIN — SACUBITRIL AND VALSARTAN 1 TABLET: 24; 26 TABLET, FILM COATED ORAL at 22:11

## 2024-02-23 SDOH — SOCIAL STABILITY: SOCIAL INSECURITY: DO YOU FEEL ANYONE HAS EXPLOITED OR TAKEN ADVANTAGE OF YOU FINANCIALLY OR OF YOUR PERSONAL PROPERTY?: NO

## 2024-02-23 SDOH — SOCIAL STABILITY: SOCIAL INSECURITY: HAVE YOU HAD THOUGHTS OF HARMING ANYONE ELSE?: NO

## 2024-02-23 SDOH — SOCIAL STABILITY: SOCIAL INSECURITY: ABUSE: ADULT

## 2024-02-23 SDOH — SOCIAL STABILITY: SOCIAL INSECURITY: WERE YOU ABLE TO COMPLETE ALL THE BEHAVIORAL HEALTH SCREENINGS?: YES

## 2024-02-23 SDOH — SOCIAL STABILITY: SOCIAL INSECURITY: ARE THERE ANY APPARENT SIGNS OF INJURIES/BEHAVIORS THAT COULD BE RELATED TO ABUSE/NEGLECT?: NO

## 2024-02-23 SDOH — SOCIAL STABILITY: SOCIAL INSECURITY: HAS ANYONE EVER THREATENED TO HURT YOUR FAMILY OR YOUR PETS?: NO

## 2024-02-23 SDOH — SOCIAL STABILITY: SOCIAL INSECURITY: DOES ANYONE TRY TO KEEP YOU FROM HAVING/CONTACTING OTHER FRIENDS OR DOING THINGS OUTSIDE YOUR HOME?: NO

## 2024-02-23 SDOH — SOCIAL STABILITY: SOCIAL INSECURITY: DO YOU FEEL UNSAFE GOING BACK TO THE PLACE WHERE YOU ARE LIVING?: NO

## 2024-02-23 SDOH — SOCIAL STABILITY: SOCIAL INSECURITY: ARE YOU OR HAVE YOU BEEN THREATENED OR ABUSED PHYSICALLY, EMOTIONALLY, OR SEXUALLY BY ANYONE?: NO

## 2024-02-23 ASSESSMENT — PAIN SCALES - GENERAL
PAINLEVEL_OUTOF10: 0 - NO PAIN
PAINLEVEL_OUTOF10: 0 - NO PAIN
PAINLEVEL: 0-NO PAIN
PAINLEVEL_OUTOF10: 3

## 2024-02-23 ASSESSMENT — COGNITIVE AND FUNCTIONAL STATUS - GENERAL
MOBILITY SCORE: 24
DAILY ACTIVITIY SCORE: 24
PATIENT BASELINE BEDBOUND: NO

## 2024-02-23 ASSESSMENT — ENCOUNTER SYMPTOMS
ENDOCRINE NEGATIVE: 1
MUSCULOSKELETAL NEGATIVE: 1
MUSCULOSKELETAL NEGATIVE: 1
BLURRED VISION: 0
ALLERGIC/IMMUNOLOGIC NEGATIVE: 1
WEAKNESS: 1
EYES NEGATIVE: 1
HEMATOLOGIC/LYMPHATIC NEGATIVE: 1
UNEXPECTED WEIGHT CHANGE: 1
DIZZINESS: 0
ROS GI COMMENTS: DARK STOOLS
PSYCHIATRIC NEGATIVE: 1
CONSTITUTIONAL NEGATIVE: 1
HEMATOLOGIC/LYMPHATIC NEGATIVE: 1
NEUROLOGICAL NEGATIVE: 1
RESPIRATORY NEGATIVE: 1
PSYCHIATRIC NEGATIVE: 1
DYSPNEA ON EXERTION: 1
GASTROINTESTINAL NEGATIVE: 1
SHORTNESS OF BREATH: 1
ENDOCRINE NEGATIVE: 1

## 2024-02-23 ASSESSMENT — COLUMBIA-SUICIDE SEVERITY RATING SCALE - C-SSRS
2. HAVE YOU ACTUALLY HAD ANY THOUGHTS OF KILLING YOURSELF?: NO
1. IN THE PAST MONTH, HAVE YOU WISHED YOU WERE DEAD OR WISHED YOU COULD GO TO SLEEP AND NOT WAKE UP?: NO
6. HAVE YOU EVER DONE ANYTHING, STARTED TO DO ANYTHING, OR PREPARED TO DO ANYTHING TO END YOUR LIFE?: NO

## 2024-02-23 ASSESSMENT — PAIN - FUNCTIONAL ASSESSMENT
PAIN_FUNCTIONAL_ASSESSMENT: 0-10

## 2024-02-23 ASSESSMENT — PAIN DESCRIPTION - LOCATION
LOCATION: NECK
LOCATION: NECK

## 2024-02-23 ASSESSMENT — PATIENT HEALTH QUESTIONNAIRE - PHQ9
SUM OF ALL RESPONSES TO PHQ9 QUESTIONS 1 & 2: 0
2. FEELING DOWN, DEPRESSED OR HOPELESS: NOT AT ALL
1. LITTLE INTEREST OR PLEASURE IN DOING THINGS: NOT AT ALL

## 2024-02-23 ASSESSMENT — ACTIVITIES OF DAILY LIVING (ADL)
GROOMING: INDEPENDENT
TOILETING: INDEPENDENT
FEEDING YOURSELF: INDEPENDENT
HEARING - LEFT EAR: FUNCTIONAL
DRESSING YOURSELF: INDEPENDENT
HEARING - RIGHT EAR: FUNCTIONAL
ASSISTIVE_DEVICE: EYEGLASSES
WALKS IN HOME: INDEPENDENT
BATHING: INDEPENDENT
ADEQUATE_TO_COMPLETE_ADL: YES
LACK_OF_TRANSPORTATION: NO
PATIENT'S MEMORY ADEQUATE TO SAFELY COMPLETE DAILY ACTIVITIES?: YES
JUDGMENT_ADEQUATE_SAFELY_COMPLETE_DAILY_ACTIVITIES: YES

## 2024-02-23 ASSESSMENT — LIFESTYLE VARIABLES
HOW OFTEN DO YOU HAVE A DRINK CONTAINING ALCOHOL: NEVER
HOW OFTEN DO YOU HAVE 6 OR MORE DRINKS ON ONE OCCASION: NEVER
SUBSTANCE_ABUSE_PAST_12_MONTHS: NO
AUDIT-C TOTAL SCORE: 0
HOW MANY STANDARD DRINKS CONTAINING ALCOHOL DO YOU HAVE ON A TYPICAL DAY: PATIENT DOES NOT DRINK
SKIP TO QUESTIONS 9-10: 1
AUDIT-C TOTAL SCORE: 0
PRESCIPTION_ABUSE_PAST_12_MONTHS: NO

## 2024-02-23 NOTE — PROGRESS NOTES
Pharmacy Medication History Review    Anatoly Lane is a 63 y.o. male admitted for Acute decompensated heart failure (CMS/[facility]). Pharmacy reviewed the patient's [id number] medications and allergies for accuracy.    The list below reflects the updated PTA list. Comments regarding how patient may be taking medications differently can be found in the Admit Orders Activity  Prior to Admission Medications   Prescriptions Last Dose Informant   Entresto 49-51 mg tablet  Self   Sig: Take 1 tablet by mouth 2 times a day.   Pacerone 200 mg tablet  Self   Sig: Take 1 tablet (200 mg) by mouth once daily.   aspirin 81 mg EC tablet  Self   Sig: Take 1 tablet (81 mg) by mouth once daily.   dapagliflozin propanediol (Farxiga) 10 mg  Self   Sig: Take 1 tablet (10 mg) by mouth once daily.   digoxin (Lanoxin) 125 MCG tablet  Self   Sig: Take 1 tablet (125 mcg) by mouth once daily.   levothyroxine (Synthroid, Levoxyl) 175 mcg tablet  Self   Sig: Take 1 tablet (175 mcg) by mouth once daily in the morning. Take before meals.   pantoprazole (ProtoNix) 40 mg EC tablet  Self   Sig: Take 1 tablet (40 mg) by mouth once daily in the morning. Take before meals.   sertraline (Zoloft) 25 mg tablet  Self   Sig: TAKE 1 TABLET EVERY DAY   spironolactone (Aldactone) 50 mg tablet  Self   Sig: Take 1 tablet (50 mg) by mouth once daily.   tamsulosin (Flomax) 0.4 mg 24 hr capsule  Self   Sig: TAKE 1 CAPSULE EVERY DAY   torsemide (Demadex) 10 mg tablet  Self   Sig: Take 1 tablet (10 mg) by mouth if needed.   warfarin (Coumadin) 1 mg tablet  Self   Sig: 3.5mg (1mg tablet + 2.5mg tablet) on Monday, Wednesday, Friday  5mg (2.5mg tablet + 2.5mg tablet) on Sunday, Tuesday, Thursday, Saturday   warfarin (Coumadin) 2.5 mg tablet  Self   Sig: 3.5mg (1mg tablet + 2.5mg tablet) on Monday, Wednesday, Friday  5mg (2.5mg tablet + 2.5mg tablet) on Sunday, Tuesday, Thursday, Saturday      Facility-Administered Medications: None        The list below reflects the  "updated allergy list. Please review each documented allergy for additional clarification and justification.  Allergies  Reviewed by Erna Ha, APRN-CNP on 2/23/2024        Severity Reactions Comments    Jardiance [empagliflozin] Not Specified Itching     Amiodarone Low Palpitations Patient states must use BRAND NAME formulation Pacerone            Patient declines M2B at discharge. Pharmacy has been updated to [facility] in [place], [place].    Sources:    -patient interview (had updated homemade list in [facility])  -outpatient pharmacy dispense history  -[facility] Everywhere medication lists  -[facility]  -cardiology office visit note with Dr. Torres 2/19    Below are additional concerns with the patient's PTA list:     Amiodarone \"allergy\"/intolerance-  patient states he must take BRAND NAME amiodarone or PACERONE only to maintain stable heart rate      Jarod Liu, PharmD  Transitions of Care Pharmacist  [facility]  Please reach out via Secure Chat for questions, or if no response call [phone] or vocera [facility]   "

## 2024-02-23 NOTE — PROGRESS NOTES
VAD Cardiologist: Anita Vincent   VAD Coordinator: Ailyn Michelle   In visit: Patient, LVAD JUVENCIO Michelle  Patient's Location: [place] [address]    Date of Visit: 02/23/2024  1:00 PM EST  Location of visit: [facility]   Type of Visit: LVAD followup    Type of VAD:  HM3  Implant Date: 4/30/2023  Reason for VAD: ICM  Intent: Long-Term (patient choice)     HPI / Summary:   Anatoly Lane is a very pleasant 63 y.o. male presenting for management of Stage D HFrEF NYHA class 2-3.  s/p HM3 4/30/2023 CAD, h/o pAF and VT s/p ICD, hypothyroidism, and stage D systolic HF/ICM/HFrEF who returns to the LVAD clinic for ongoing evaluation and management.     Previously, patient stopped taking Empagliflozin due to itching. Patient started on Farxiga 10mg at that time. Patient had EP consult d/t not being seen by an EP MD in about 1 year.     Today:  Patient contacted VAD office this week d/t increasing GRAVES, weight gain, and swelling in legs. Patient showing low MAP (50) and SOB in clinic today; states he is taking diuretic pills daily. Pt ambulated roughly 50 feet and had to stop 2/2 SOB. Patient stating he has occasional chest palpitations and his HR is often in the 120s at home. Patient denies CP, palpitations, orthopnea, PND, LVAD alarms, N/V/D, hematochezia, hematuria, epistaxis. Most recent INR 02/05 is 2.8.    Explained to patient we need further invasive tests to determine best course to optimization. Patient is agreeable to admission in HFICU and SGC. He did mention that he is more seriously considering transplant.     Driveline is clean with no drainage.     Patient has not completed Cardiac Rehab.      Review of Systems   Constitutional: Negative.   HENT: Negative.     Eyes:  Negative for blurred vision.   Cardiovascular:  Positive for dyspnea on exertion and leg swelling.   Respiratory: Negative.     Endocrine: Negative.    Hematologic/Lymphatic: Negative.    Skin: Negative.    Musculoskeletal: Negative.   "  Gastrointestinal: Negative.    Genitourinary: Negative.    Neurological:  Positive for weakness. Negative for dizziness.   Psychiatric/Behavioral: Negative.     : Full 10 pt review of symptoms of negative unless discussed above.     Medical History:   No past medical history on file.  Surgical Hx:   Past Surgical History:   Procedure Laterality Date    COLONOSCOPY W/ POLYPECTOMY  2023    CT ABDOMEN PELVIS ANGIOGRAM W AND/OR WO IV CONTRAST  04/13/2023    CT ABDOMEN PELVIS ANGIOGRAM W AND/OR WO IV CONTRAST [facility] CT    LEFT VENTRICULAR ASSIST DEVICE      OTHER SURGICAL HISTORY  01/05/2022    Complete colonoscopy    OTHER SURGICAL HISTORY  01/05/2022    Cardioverter defibrillator insertion      Vitals:       2/19/2024     2:21 PM 12/8/2023     1:19 PM 9/20/2023     1:22 PM 6/23/2023     1:26 PM 5/30/2023     2:22 PM 8/31/2022    11:04 AM   Vitals   Systolic  96    112   Diastolic  55    72   Heart Rate 75 79    74   Temp  36.6 °C (97.9 °F) 36.2 °C (97.2 °F)      Height (in) 1.753 m (5' 9\")    1.753 m (5' 9\") 1.753 m (5' 9\")   Weight (lb) 235 223.2 195 173 160 180   BMI 34.7 kg/m2 32.96 kg/m2 28.8 kg/m2 25.55 kg/m2 23.63 kg/m2 26.58 kg/m2   BSA (m2) 2.28 m2 2.22 m2 2.08 m2 1.95 m2 1.88 m2 1.99 m2   Visit Report Report Report         Wt Readings from Last 5 Encounters:   02/19/24 107 kg (235 lb)   12/08/23 101 kg (223 lb 3.2 oz)   09/20/23 88.5 kg (195 lb)   06/23/23 78.5 kg (173 lb)   05/30/23 72.6 kg (160 lb)     GEN: Pleasant, fatigued.   HEENT: JVP elevated, no icterus.   CHEST: Clear to auscultation. Sternotomy well healed.  CV: LVAD hum  ABD: Soft, ND, NT.  Driveline: No drainage. Dressing c/d/I. Secured.  Area of erythema in the region near there is contact between skin and tape.    EXT: Warm, well perfused, mild LE edema.   NEURO: EDER Mcclain, Oriented to plan     Labs:   CMP:  Recent Labs     05/31/23  2134 05/28/23  0603 05/26/23  0056 05/23/23  0729 05/23/23  0304   NA CANCELED CANCELED CANCELED 138 " CANCELED   K CANCELED CANCELED CANCELED 4.0 CANCELED   CL CANCELED CANCELED CANCELED 101 CANCELED   CO2 CANCELED CANCELED CANCELED 29 CANCELED   ANIONGAP CANCELED CANCELED CANCELED 12 CANCELED   BUN CANCELED CANCELED CANCELED 21 CANCELED   CREATININE CANCELED CANCELED CANCELED 0.70 CANCELED   MG CANCELED CANCELED CANCELED 2.03 CANCELED       Recent Labs     05/31/23 2134 05/28/23  0603 05/26/23  0056 05/23/23  0729 05/23/23  0304 05/21/23  1943   ALBUMIN CANCELED CANCELED CANCELED 3.3* CANCELED CANCELED   ALKPHOS CANCELED CANCELED CANCELED 98 CANCELED CANCELED   ALT CANCELED CANCELED CANCELED 22 CANCELED CANCELED   AST CANCELED CANCELED CANCELED 19 CANCELED CANCELED   BILITOT CANCELED CANCELED CANCELED 0.7 CANCELED CANCELED   LIPASE  --   --   --   --   --  CANCELED       CBC:  Recent Labs     05/31/23 2134 05/28/23  0603 05/26/23  0056 05/23/23  0729 05/23/23  0304   WBC CANCELED CANCELED CANCELED 8.0 CANCELED   HGB CANCELED CANCELED CANCELED 10.5* CANCELED   HCT CANCELED CANCELED CANCELED 33.6* CANCELED   PLT CANCELED CANCELED CANCELED 321 CANCELED   MCV CANCELED CANCELED CANCELED 97 CANCELED       COAG:   Recent Labs     05/31/23 2134 05/28/23  0603 05/26/23  0056 05/02/23  0030 05/01/23  0033 04/30/23  1619 04/30/23  0413 04/29/23  0221 04/28/23  0201 04/21/23  1412 04/21/23  1125 04/08/23  0315 04/07/23  1835 04/06/23  1852 04/06/23  1714   INR CANCELED CANCELED CANCELED   < > 1.3* 1.3* 1.2* 1.1 1.1   < > 1.2*   < >  --    < >  --    HAUF  --   --   --   --   --   --  0.3 0.3 0.3   < >  --    < >  --   --   --    HAPTOGLOBIN  --   --   --   --   --   --   --   --   --   --  <30  --  <30  --  <30   FIBRINOGEN  --   --   --   --  264 255 356 368 345   < >  --   --   --   --   --     < > = values in this interval not displayed.       ABO:   Recent Labs     05/02/23  0746   ABO AB       HEME/ENDO:  Recent Labs     05/31/23  2134 05/21/23  1943 05/09/23  0642 05/03/23  0228 04/25/23  0911 04/21/23  1125  04/07/23  0416 03/31/23  2331 03/31/23  2142 03/29/23  2151   FERRITIN  --   --   --   --   --  352*  --   --   --  79   IRONSAT  --   --   --   --   --  16*  --   --   --  6*   TSH CANCELED CANCELED 8.41* 6.56*   < >  --    < >  --    < > 15.36*   HGBA1C  --   --   --   --   --   --   --  5.8*  --  6.7*    < > = values in this interval not displayed.        CARDIAC:   Recent Labs     05/21/23  1943 05/18/23  2335 05/18/23  0859 05/17/23  0759 05/15/23  0633 05/14/23  1122 04/02/23  0154 03/29/23 2151   LDH  --  CANCELED CANCELED CANCELED 189 216   < >  --    TROPHS CANCELED  CANCELED  --   --   --   --   --   --   --    BNP CANCELED  --   --   --   --   --   --  3,231*    < > = values in this interval not displayed.       Recent Labs     03/29/23 2151   CHOL 126   LDLF 85   HDL 24.0*   TRIG 83       MICRO:   Recent Labs     05/12/23  0857   CRP 14.77*       Echocardiogram     Narrative  [facility], [address] [place] [address]  Tel [phone] and Fax [phone]    TRANSTHORACIC ECHOCARDIOGRAM REPORT      Patient Name:     BILLIE Setward Physician:  [id number] Spencer Lilly MD  Study Date:       5/15/2023          Referring           SHERRILL FLEMING  Physician:  MRN/PID:          [id number]           PCP:  Accession/Order#: [id number]          [facility]  Location:           Invasive  YOB: 1960          Fellow:             [id number] Tobi Ledbetter MD  Gender:           M                  Nurse:              Mei Jeff RN  Admit Date:       3/29/2023          Sonographer:        TONYA Wolfe RDCS  Admission Status: Inpatient -        Additional Staff:  Routine  Height:           175.26 cm          CC Report to:       Vijay [address]  Weight:           73.03 kg           Study Type:         Echocardiogram  BSA:              1.88 m2    Diagnosis/ICD: Z95.811-Presence of heart assist device  Indication:    Post LVAD follow  up  Procedure/CPT: Echo Limited-01082; Color Doppler-89934; Doppler Limited-91991    Patient History:  Smoker:            Former.  Pertinent History: CAD, Cardiomyopathy and A-Fib. HFrEF, VT s/p dual chamber  ICD, anemia, hypothyroidism, cardiogenic shock, s/p LVAD  HMIII.    Study Detail: The following Echo studies were performed: 2D, M-Mode, Doppler and  color flow. Definity used as a contrast agent for endocardial  border definition. Total contrast used for this procedure was 3.0  mL via IV push.      PHYSICIAN INTERPRETATION:  Left Ventricle: The left ventricular systolic function is severely decreased, with an estimated ejection fraction of 10-15%. There is global hypokinesis of the left ventricle with minor regional variations. The left ventricular cavity size is severely dilated. Spectral Doppler shows an abnormal pattern of left ventricular diastolic filling.  Left Atrium: The left atrium is severely dilated.  Right Ventricle: The right ventricle was not well visualized. There is reduced right ventricular systolic function. The RV is not completely visualized, but appears to be likely moderately dilated with moderately decreased function.  Right Atrium: The right atrium was not well visualized. There is a device visualized in the right atrium.  Aortic Valve: The aortic valve appears abnormal. There is mild aortic valve regurgitation.  Mitral Valve: The mitral valve is normal in structure. There is trace mitral valve regurgitation.  Tricuspid Valve: The tricuspid valve is structurally normal. Tricuspid regurgitation was not assessed.  Pulmonic Valve: The pulmonic valve is structurally normal. There is no indication of pulmonic valve regurgitation.  Pericardium: There is a trivial pericardial effusion.  Pleural: There is left pleural effusion.  Aorta: The aortic root was not well visualized.  Systemic Veins: The inferior vena cava appears to be of normal size. There is IVC inspiratory collapse greater than  50%.  In comparison to the previous echocardiogram(s): Although previous studies are available for review, their comparison with the current echocardiogram is clinically irrelevant. Compared with study from 4/25/2023,.    LEFT VENTRICULAR ASSIST DEVICE:  Study Type: This is a follow-up LVAD echocardiogram.  LVAD: The patient has a(n) Heartmate III LVAD device present.  Inflow Cannula: The inflow cannula is visualized in the left ventricular apex.  Outflow Cannula: Visualization of the outflow cannula is technically difficult.  AV Leaflet Mobility: The aortic valve leaflets do not appear to open.  Inflow Velocities: The LVAD cannula inflow velocity is normal (.5-2msec).        CONCLUSIONS:  1. Left ventricular systolic function is severely decreased with a 10-15% estimated ejection fraction.  2. Spectral Doppler shows an abnormal pattern of left ventricular diastolic filling.  3. The RV is not completely visualized, but appears to be likely moderately dilated with moderately decreased function.  4. There is reduced right ventricular systolic function.  5. The left atrium is severely dilated.  6. Aortic valve appears abnormal.  7. Mild aortic valve regurgitation.  8. Left ventricular cavity size is severely dilated.  9. There is global hypokinesis of the left ventricle with minor regional variations.    QUANTITATIVE DATA SUMMARY:  2D MEASUREMENTS:  Normal Ranges:  IVSd:          0.90 cm    (0.6-1.1cm)  LVPWd:         1.00 cm    (0.6-1.1cm)  LVIDd:         6.20 cm    (3.9-5.9cm)  LV Mass Index: 129.8 g/m2    LV SYSTOLIC FUNCTION BY 2D PLANIMETRY (MOD):  Normal Ranges:  EF-A4C View: 13.9 % (>=55%)  EF-A2C View: 21.8 %  EF-Biplane:  17.2 %    TRICUSPID VALVE/RVSP:  Normal Ranges:  IVC Diam: 1.16 cm      [facility], Cath Lab, [address] [place] [address]    Cardiovascular Catheterization Report    Patient Name:     BILLIE      Performing Physician:  [id number] Vince WARD                             MD  Study Date:       4/27/2023  Verifying Physician:   [id number] Vince Boyer MD  MRN/PID:          [id number]   Cardiologist/Co-scrub:  Accession/Order#: [id number]  Fellow:                [id number] Tobi Ledbetter MD  YOB: 1960  Fellow:  Gender:           M          Referring Physician:  Admit Date:                  Referring Physician:   [id number] Anita Vincent MD  Surgeon:                     Referring Physician:   VANESSA      Study: Right Heart Catheterization      Indications:  BILLIE WARD is a 63 year old male who presents with hypertension, atrial fibrillation and Tobacco Use - Former, Patient was referred for right heart catheterization to monitor hemodynamics for an acute exacerbation of heart failure requiring Impella support. Cardiomyopathy.    Appropriate Use Criteria:  Re-evaluation of know cardiomyopathy with change in clinical status or on cardiac exam or to guide therapy; AUC score = 7.    Procedure Description:  After infiltration with 1% Lidocaine, the was cannulated with a modified Seldinger technique. After infiltration of local anesthetic, the right internal jugular vein was identified with two-dimensional ultrasound. Under direct ultrasound visualization, the right internal jugular vein was cannulated with a micropuncture technique. A 9 Thai sheath was placed in the vein. A balloon tipped catheter was positioned in the right heart system to record pressures and remained in the patient when transferred from the lab. Cardiac output was calculated via the Raven method. Post-procedure, the venous sheath was left intact and sutured in place.    Right Heart Catheterization:  A balloon tipped catheter was positioned in the right heart system to record pressures and remained in the patient when transferred from the lab. Cardiac output was calculated via the Raven method.    Hemo Personnel:  +----------------------+-------------+  Name                  Duty            +----------------------+-------------+  Vince Boyer MD, MD 1  +----------------------+-------------+  Jey Driscoll RN       PROC CIRC 1  +----------------------+-------------+  Mariya Fam RN     PROC CIRC 2  +----------------------+-------------+  Edilberto Reinoso RN    PROC RECORD 1  +----------------------+-------------+  Tobi Ledbetter MD         FELLOW PHYS 1  +----------------------+-------------+      Sedation Time:  +------------------------+----------------------------------------+  Sedation Start/End TimesTime                                      +------------------------+----------------------------------------+  Start                   4/27/2023 10:26:07                        +------------------------+----------------------------------------+  Drugs                   Versed 0.5 mg IV per physician for sedat  +------------------------+----------------------------------------+  End                     4/27/2023 10:56:04                        +------------------------+----------------------------------------+      Equipment Used:  +---------------------+--------------------------------------------------------+              Date/Time                                             Description  +---------------------+--------------------------------------------------------+        Hemodynamic Pressures:  Resting hemodynamics on maximal Impella support revealed a moderately elevated PCW pressure with a normal Raven cardiac index of 3.4 L/min/mï¿½. The PVR is normal at that 72.  +----+----------+---------+-------------+-------------+---+----+-------+-------+  SiteDate Time   Phase  Systolic mmHg  Diastolic  ED MeanA-Wave V-Wave                   Name                    mmHg     mmHmmHg mmHg   mmHg                                                      g                      +----+----------+---------+-------------+-------------+---+----+-------+-------+    AO 4/27/2023     Rest          101           72     78                      10:47:20                                                                      AM                                                          +----+----------+---------+-------------+-------------+---+----+-------+-------+    RA 4/27/2023     Rest                                4      8      5        10:48:31                                                                      AM                                                          +----+----------+---------+-------------+-------------+---+----+-------+-------+    RV 4/27/2023     Rest           47               8                          10:49:08                                                                      AM                                                          +----+----------+---------+-------------+-------------+---+----+-------+-------+    PW 4/27/2023     Rest                               20     21     24        10:50:13                                                                      AM                                                          +----+----------+---------+-------------+-------------+---+----+-------+-------+    PA 4/27/2023     Rest           47           18     26                      10:51:05                                                                      AM                                                          +----+----------+---------+-------------+-------------+---+----+-------+-------+        Oxygen Saturation %:  +-----------+----------+------------+  Sample SiteO2 Sat (%)HB (g/100ml)  +-----------+----------+------------+           FA        97         7.8  +-----------+----------+------------+            PA        62         7.8  +-----------+----------+------------+           FA        97         7.8  +-----------+----------+------------+           PA        62         7.8  +-----------+----------+------------+      Cardiac Outputs:  +----+---+---+      CI CO   +----+---+---+  [id number]  +----+---+---+      Vascular Resistance Calculated Values (Wood Units):  +---+---+  [id number]  +---+---+  PVR72   +---+---+      Complications:  No in-lab complications observed.    Cardiac Cath Transition of Care Summary:  Post Procedure Diagnosis: Moderately elevated PCW pressure with a normal Raven  cardiac index on axillary Impella support.    Blood Loss:               Estimated blood loss during the procedure was 30 mls.  Specimens Removed:        Number of specimen(s) removed: none.    ____________________________________________________________________________________  CONCLUSIONS:  1. Moderately elevated PCW pressure with normal Raven cardiac index on Impella support.    ____________________________________________________________________________________  CPT Codes:  Right Heart Cath O2/Cardiac output without biopsy (RHC)-82306; Moderate Sedation Services 1st additional 15 minutes patient >5 years-38907; Moderate Sedation Services 2nd additional 15 minutes patient >5 years-58605    ICD 10 Codes:  I50.23-Acute on chronic systolic (congestive) heart failure    [id number] Vince Boyer MD  Performing Physician  Electronically signed by [id number] Vince Boyer MD on 4/28/2023 at 11:13:42 AM      Current Outpatient Medications   Medication Instructions    aspirin 81 mg, oral, Daily    dapagliflozin propanediol (FARXIGA) 10 mg, oral, Daily    digoxin (LANOXIN) 125 mcg, oral, Daily    Entresto 49-51 mg tablet 1 tablet, oral, 2 times daily    levothyroxine (SYNTHROID, LEVOXYL) 175 mcg, oral, Daily before breakfast    Pacerone 200 mg, oral, Daily    pantoprazole (PROTONIX) 40 mg, oral, Daily before  breakfast    sertraline (ZOLOFT) 25 mg, oral, Daily    spironolactone (ALDACTONE) 50 mg, oral, Daily    Symbicort 160-4.5 mcg/actuation inhaler 2 puffs, inhalation, 2 times daily RT    tamsulosin (FLOMAX) 0.4 mg, oral, Daily    torsemide (DEMADEX) 10 mg, oral, As needed    warfarin (COUMADIN) 1 mg, oral, Every evening, As directed by [facility]          Heart Mate III interrogation       Problems:   1) Stage D chronic systolic HF/ICM/HFrEF s/p HM3 LVAD (4/30/23)  ABO: AB  Short/Long term: long term (pts choice; does not wish to proceed with OHT eval)     LVAD interrogation reveals no alarms or power spikes, no reported LVAD alarms.  Hypotensive today w/ MAP 50.   -c/w antithrombotic therapy (warfarin + ASA)  -remains off BB due to RV dysfunction  -c/w entresto 49/51 mg bid, farxiga 10 mg daily  -Continue torsemide as needed  -increase spironolactone to 50 mg daily  -Continue digoxin for RV support  -Continue fish oil and PPI  -encouraged consideration for cardiac rehab  -Will admit patient to HFICU for optimization     2) Long term anticoagulations secondary to LVAD  -c/w warfarin (target INR 2-3)     3) Salmonella infection- resolved   Bactrim course now complete     4) h/o right axillary repair with right brachial plexus injury and residual right hand numbness  CT head and neck wo contrast 5/3 showed degenerative changes C5-C6   -Previously had neurology referral for EMG and further mx     5) h/o VT and pAF  -c/w amiodarone 200 mg once daily, low threshold to discontinue    6) Hypothyroidism  -c/w levothyroxine    Plan:  We will admit you to the HFICU. We will plan to insert a catheter into your neck to adjust heart failure medications and LVAD settings.     ____________________________________________________________  COOKIE Martinez-CNP   Section of Advanced Heart Failure and Cardiac Transplantation  Division of Cardiovascular Medicine  [facility] [place]  [place]  [facility] [place]

## 2024-02-23 NOTE — H&P
[facility]  HFICU HISTORY AND PHYSICAL     Anatoly Lane/[id number]    Admit Date: 2/23/2024  Hospital Length of Stay: 0   ICU Length of Stay: 35m   Primary Service: HFICU  Primary HF Cardiologist: Dr Ruano  Referring: Arleth Baker CNP     HPI:   Anatoly Lane is a very pleasant 63 y.o. male with a PMH of stage D HFrEF 10-15%, ICM, s/p HM3 4/30/2023 CAD,  pAF, VT s/p ICD, and hypothyroidism who is being admitted to HFICU for worsening shortness of breath, weight gain, and hypotension. Mr Lane called the LVAD clinic yesterday on 2/22 with complaints of worsening shortness of breath and inability to walk short distances. He presented to clinic today on 2/23 still with worsening shortness of breath. He was unable to walk in the [place] without feeling short of breath and his legs feeling heavy. dMAP taken in clinic was 50, Hr 92, and JVD was present on exam. Patient also stated he has gained 75 pounds over the last year (10 pounds per month). Patient routed to HFICU for swan guided optimization and possible advanced therapies for transplant.     On arrival to HFICU patient is warm on exam with 1-2+ bilateral extremity edema. dMAP on assessment was 70 with HR in the 80s in what appears to be NSR. Patients chief complaint continues to be shortness of breath, he Is 94% on RA. Plan is for stat ECHO and consider SGC insertion to guide treatment.     Cardiac Tests:  EKG: NSR   ICD Check:   Chest Radiograph: ordered   Echocardiogram: 5/15/23  CONCLUSIONS:  1. Left ventricular systolic function is severely decreased with a 10-15% estimated ejection fraction.  2. Spectral Doppler shows an abnormal pattern of left ventricular diastolic filling.  3. The RV is not completely visualized, but appears to be likely moderately dilated with moderately decreased function.  4. There is reduced right ventricular systolic function.  5. The left atrium is severely dilated.  6. Aortic valve appears  abnormal.  7. Mild aortic valve regurgitation.  8. Left ventricular cavity size is severely dilated.  9. There is global hypokinesis of the left ventricle with minor regional variations.  Cardiac Catheterization: MAP 78, CVP 4, PAP 47/18 (26), PAWP 20, , PVR 72, SVO2 62%      Past Medical History:  No past medical history on file.    Past Surgical History:  Past Surgical History:   Procedure Laterality Date    COLONOSCOPY W/ POLYPECTOMY  2023    CT ABDOMEN PELVIS ANGIOGRAM W AND/OR WO IV CONTRAST  04/13/2023    CT ABDOMEN PELVIS ANGIOGRAM W AND/OR WO IV CONTRAST [facility] CT    LEFT VENTRICULAR ASSIST DEVICE      OTHER SURGICAL HISTORY  01/05/2022    Complete colonoscopy    OTHER SURGICAL HISTORY  01/05/2022    Cardioverter defibrillator insertion       Family History:  Family History   Problem Relation Name Age of Onset    Hypertension Father      Colon cancer Father         Social History:  Social History     Socioeconomic History    Marital status:      Spouse name: Not on file    Number of children: Not on file    Years of education: Not on file    Highest education level: Not on file   Occupational History    Not on file   Tobacco Use    Smoking status: Former     Types: Cigarettes    Smokeless tobacco: Never   Substance and Sexual Activity    Alcohol use: Never    Drug use: Never    Sexual activity: Not on file   Other Topics Concern    Not on file   Social History Narrative    Not on file     Social Determinants of Health     Financial Resource Strain: Not on file   Food Insecurity: Not on file   Transportation Needs: Not on file   Physical Activity: Not on file   Stress: Not on file   Social Connections: Not on file   Intimate Partner Violence: Not on file   Housing Stability: Not on file       Allergies:  Allergies   Allergen Reactions    Jardiance [Empagliflozin] Itching    Amiodarone Palpitations       Prior to Admission Meds:  Medications Prior to Admission   Medication Sig Dispense Refill  Last Dose    aspirin 81 mg EC tablet Take 1 tablet (81 mg) by mouth once daily.       dapagliflozin propanediol (Farxiga) 10 mg Take 1 tablet (10 mg) by mouth once daily. 30 tablet 11     digoxin (Lanoxin) 125 MCG tablet Take 1 tablet (125 mcg) by mouth once daily.       Entresto 49-51 mg tablet Take 1 tablet by mouth 2 times a day. 180 tablet 3     levothyroxine (Synthroid, Levoxyl) 175 mcg tablet Take 1 tablet (175 mcg) by mouth once daily in the morning. Take before meals. 90 tablet 3     Pacerone 200 mg tablet Take 1 tablet (200 mg) by mouth once daily.       pantoprazole (ProtoNix) 40 mg EC tablet Take 1 tablet (40 mg) by mouth once daily in the morning. Take before meals. 90 tablet 3     sertraline (Zoloft) 25 mg tablet TAKE 1 TABLET EVERY DAY 90 tablet 3     spironolactone (Aldactone) 50 mg tablet Take 1 tablet (50 mg) by mouth once daily. 90 tablet 3     Symbicort 160-4.5 mcg/actuation inhaler Inhale 2 puffs 2 times a day.       tamsulosin (Flomax) 0.4 mg 24 hr capsule TAKE 1 CAPSULE EVERY DAY (Patient not taking: Reported on 2/19/2024) 90 capsule 3     torsemide (Demadex) 10 mg tablet Take 1 tablet (10 mg) by mouth if needed.       warfarin (Coumadin) 1 mg tablet Take 1 tablet (1 mg) by mouth once daily in the evening. As directed by [facility]          Current Medications:  Infusions:     Scheduled:  amiodarone, 200 mg, Daily  aspirin, 81 mg, Daily  dapagliflozin propanediol, 10 mg, Daily  digoxin, 125 mcg, Daily  fluticasone furoate-vilanteroL, 1 puff, Daily  [START ON 2/24/2024] levothyroxine, 175 mcg, Daily before breakfast  [START ON 2/24/2024] pantoprazole, 40 mg, Daily before breakfast  perflutren lipid microspheres, 0.5-10 mL of dilution, Once in imaging  sennosides-docusate sodium, 2 tablet, BID  sertraline, 25 mg, Daily  spironolactone, 50 mg, Daily      PRN:  acetaminophen, 650 mg, q6h PRN  oxygen, , Continuous PRN - O2/gases  polyethylene glycol, 17 g, Daily PRN          Invasive  Hemodynamics:    Most Recent Range Past 24hrs   BP (Art)   No data recorded   MAP(Art)   No data recorded   RA/CVP   No data recorded   PA   No data recorded   PA(mean)   No data recorded   PCWP   No data recorded   CO   No data recorded   CI   No data recorded   Mixed Venous   No data recorded   SVR    No data recorded   PVR   No data recorded     MCS:   Heart Mate III:     Most Recent Range Past 24hrs   Flow   No data recorded   Speed   No data recorded   Power   No data recorded   PI   No data recorded         PHYSICAL EXAM:   Visit Vitals  Smoking Status Former       Wt Readings from Last 5 Encounters:   02/23/24 105 kg (230 lb 9.6 oz)   02/19/24 107 kg (235 lb)   12/08/23 101 kg (223 lb 3.2 oz)   09/20/23 88.5 kg (195 lb)   06/23/23 78.5 kg (173 lb)       INTAKE/OUTPUT:  No intake/output data recorded.     Physical Exam  Constitutional:       General: He is not in acute distress.     Appearance: He is not ill-appearing.   HENT:      Mouth/Throat:      Mouth: Mucous membranes are moist.   Eyes:      Pupils: Pupils are equal, round, and reactive to light.   Cardiovascular:      Rate and Rhythm: Normal rate and regular rhythm.      Comments: LVAD hum present   Pulmonary:      Effort: Pulmonary effort is normal. No respiratory distress.      Breath sounds: Normal breath sounds.   Abdominal:      General: Abdomen is flat. Bowel sounds are normal.      Palpations: Abdomen is soft.   Musculoskeletal:         General: Normal range of motion.   Skin:     General: Skin is warm and dry.      Capillary Refill: Capillary refill takes less than 2 seconds.   Neurological:      General: No focal deficit present.      Mental Status: He is alert and oriented to person, place, and time.       Review of Systems   Constitutional:  Positive for unexpected weight change.   HENT: Negative.     Eyes: Negative.    Respiratory:  Positive for shortness of breath.    Cardiovascular:  Positive for leg swelling.   Gastrointestinal:          Dark stools    Endocrine: Negative.    Genitourinary: Negative.    Musculoskeletal: Negative.    Skin: Negative.    Allergic/Immunologic: Negative.    Neurological: Negative.    Hematological: Negative.    Psychiatric/Behavioral: Negative.     All other systems reviewed and are negative.      DATA:  CMP:  Recent Labs     05/31/23 2134 05/28/23  0603 05/26/23  0056 05/23/23  0729 05/23/23  0304 05/21/23  2221 05/21/23 1943 05/20/23  0630 05/17/23  0530 05/16/23  1135 05/16/23  0600 05/15/23  0633   NA CANCELED CANCELED CANCELED 138 CANCELED CANCELED CANCELED 136 136 CANCELED   < > 138   K CANCELED CANCELED CANCELED 4.0 CANCELED CANCELED CANCELED 4.2 4.0 CANCELED   < > 4.5   CL CANCELED CANCELED CANCELED 101 CANCELED CANCELED CANCELED 101 99 CANCELED   < > 104   CO2 CANCELED CANCELED CANCELED 29 CANCELED CANCELED CANCELED 29 29 CANCELED   < > 26   ANIONGAP CANCELED CANCELED CANCELED 12 CANCELED CANCELED CANCELED 10 12 CANCELED   < > 13   BUN CANCELED CANCELED CANCELED 21 CANCELED CANCELED CANCELED 20 15 CANCELED   < > 16   CREATININE CANCELED CANCELED CANCELED 0.70 CANCELED CANCELED CANCELED 0.62 0.54 CANCELED   < > 0.44*   MG CANCELED CANCELED CANCELED 2.03 CANCELED  --  CANCELED 2.18 2.14 CANCELED  --  2.09    < > = values in this interval not displayed.     Recent Labs     05/31/23 2134 05/28/23  0603 05/26/23  0056 05/23/23  0729 05/23/23  0304 05/21/23 1943 05/20/23  0630 05/17/23  0530   ALBUMIN CANCELED CANCELED CANCELED 3.3* CANCELED CANCELED 3.4 3.2*   ALT CANCELED CANCELED CANCELED 22 CANCELED CANCELED 32 31   AST CANCELED CANCELED CANCELED 19 CANCELED CANCELED 27 27   BILITOT CANCELED CANCELED CANCELED 0.7 CANCELED CANCELED 0.7 0.9   LIPASE  --   --   --   --   --  CANCELED  --   --      CBC:  Recent Labs     05/31/23  2134 05/28/23  0603 05/26/23  0056 05/23/23  0729 05/23/23  0304 05/21/23  2221 05/21/23  1943 05/20/23  0630   WBC CANCELED CANCELED CANCELED 8.0 CANCELED CANCELED CANCELED 7.1   HGB  CANCELED CANCELED CANCELED 10.5* CANCELED CANCELED CANCELED 11.1*   HCT CANCELED CANCELED CANCELED 33.6* CANCELED CANCELED CANCELED 35.1*   PLT CANCELED CANCELED CANCELED 321 CANCELED CANCELED CANCELED 399   MCV CANCELED CANCELED CANCELED 97 CANCELED CANCELED CANCELED 97     COAG:   Recent Labs     05/31/23 2134 05/28/23  0603 05/26/23  0056 05/23/23  0729 05/23/23  0304 05/21/23 1943 05/20/23  0630 05/17/23  0530   INR CANCELED CANCELED CANCELED 1.9* CANCELED CANCELED 2.3* 2.6*     ABO:   Recent Labs     05/02/23  0746   ABO AB     HEME/ENDO:  Recent Labs     05/31/23 2134 05/21/23 1943 04/25/23  0911 04/21/23  1125 04/07/23  0416 03/31/23  2331 03/31/23 2142 03/29/23 2151   FERRITIN  --   --   --  352*  --   --   --  79   IRONSAT  --   --   --  16*  --   --   --  6*   TSH CANCELED CANCELED   < >  --    < >  --    < > 15.36*   HGBA1C  --   --   --   --   --  5.8*  --  6.7*    < > = values in this interval not displayed.      CARDIAC:   Recent Labs     05/21/23 1943 05/18/23 2335 05/18/23  0859 05/17/23  0759 05/15/23  0633 05/14/23  1122 05/13/23  0819 05/12/23  0857 05/11/23  0731 04/02/23  0154 03/29/23 2151   LDH  --  CANCELED CANCELED CANCELED 189 216 176 240 225   < >  --    TROPHS CANCELED  CANCELED  --   --   --   --   --   --   --   --   --   --    BNP CANCELED  --   --   --   --   --   --   --   --   --  3,231*    < > = values in this interval not displayed.     Recent Labs     05/03/23  0555 05/02/23  2258 05/02/23  1841 05/02/23  0747 05/02/23  0441 05/02/23  0434 05/02/23  0040 05/01/23  1747 05/01/23  1458 05/01/23  1143 05/01/23  0827 05/01/23  0742 05/01/23  0241 05/01/23  0033 04/30/23  2328   LACMX  --   --   --  0.8  --   --   --  0.6 0.6 1.6 0.7  --    < >  --   --    LACTATEART  --   --   --   --   --  0.6 0.7  --   --   --   --  Canceled  --  2.3* 2.2*   SO2MV 60 60 58 61 65  --   --  61 63 60 63  --    < >  --   --    O2CMX 57.5 58.3 56.6 59.1 63.1  --   --  59.2 61.1 58.8 61.1  --    " < >  --   --     < > = values in this interval not displayed.     No results for input(s): \"TACROLIMUS\", \"SIROLIMUS\", \"CYCLOSPORINE\" in the last 73573 hours.      ASSESSMENT AND PLAN:   Anatoly Lane is a very pleasant 63 y.o. male with a PMH of stage D HFrEF 10-15%, ICM, s/p HM3 4/30/2023 CAD,  pAF, VT s/p ICD, and hypothyroidism who is being admitted to HFICU for worsening shortness of breath, weight gain, and hypotension.     Neuro:  #Depression  - C/w home Zoloft 25 mg   - Serial neuro and pain assessments   - PO Tylenol PRN for pain  - PT/OT Consult, OOB to chair  - CAM ICU score every shift  - Sleep/wake cycle normalization    # Physical Status  - Obesity, BMI 34  - Patient unable to walk due to shortness of breath  - Weight gain of 75 pounds over the last year      Cardiovascular:  #HM3  Daily VAD Interrogation   Device: HM3  Speed: 5500  Power: 4.2  Flow: 4.6  PI: 3.6  Hematocrit set at:   LDH: pending   INR: 2.3 from 2/12 new draw pending   Long term (pts choice; does not wish to proceed with OHT eval)   LVAD interrogation reveals no alarms or power spikes, no reported LVAD alarms   Weekly dressing change    Medications:  ASA  Coumadin 5 mg (Sunday, Tuesday, Thursday, Saturday) 3.5 mg (Monday, Wednesday, Friday) goal INR 2-3   PPI  Fish oil       #Stage D chronic systolic HF/ICM/HFrEF s/p HM3 LVAD (4/30/23)   - Stat complete ECHO ordered, last ECHO from 5/2023   - Admit BNP pending   - Remains of BB due to RV dysfunction  - C/w Entresto 24/26 BID start on lower dose and increase as tolerates (on Entresto 49/51 at home)  - C/w Farxiga and Spironolactone  - C/w digoxin 125 mcg daily for RV support, level pending   - Holding home torsemide, will evaluate diuretic needs based on assessment   - Consider SGC insertion   - Daily standing weights, 2gm sodium diet, 2L fluid restriction, strict I&Os     #History of VT and pAF  - C/w amiodarone 200 mg daily   - Hold home coumadin for procedures and initiate heparin " gtt   - Abbott dual chamber AICD - Device interrogation ordered     #Electrolyte Disturbances  - Maintain K>4, Mg >2   - Daily RFP  - Replete electrolytes as needed    Pulmonary:   #Ex smoker, 30 year smoking history   - C/w home Symbicort  - Monitor and maintain SpO2 > 92%    GI:  #GERD  - PPI daily  - Bowel regimen with colace BID and miralax PRN     #C/o dark stools  - Send occult stool to rule out GI bleeding     :  #No hx of DEBBIE or CKD  #Hx of retention   - Baseline BUN/Cr normal   - Admit BUN/Cr pending   - Was on tamulosin previously, can reconsider if needed   - I/Os  - Avoid hypotension and nephrotoxic agents    Heme:  #No history of anemia  - Labs: CBC, TIBC, ferritin, serum Fe, folate, B12   pending   - If %sat low, order venofer     #Hx of bilateral non-occlusive internal jugular clots  - Patient hasn't had recent US to rule out since 4/25/23  - Consider redoing US    #Chronic AC  - Holding home coumadin, c/w heparin gtt      Endo:  #No hx of DM  - Euglycemic  - hgbA1c pending     #Hypothyroid  - C/w synthroid 175 mcg daily   - TSH, Free T4 pending    Msk:  #H/o right axillary repair with right brachial plexus injury and residual right hand numbness  - CT head and neck wo contrast 5/3 showed degenerative changes C5-C6   - Previously had neurology referral for EMG and further mx     ID:  #No active issues  -afebrile, nontoxic   -no s/s infx  -trend temps q4h    PHYSICAL AND OCCUPATIONAL THERAPY: ordered and following     LINES:  PIVs     DVT: heparin gtt and SCDs  VAP BUNDLE: N/A  ULCER PPX: PPI  GLYCEMIC CONTROL: N/A  BOWEL CARE: Colace and Miralax PRN   INDWELLING CATHETER: N/A  NUTRITION: NPO Diet; Effective now      EMERGENCY CONTACT: Extended Emergency Contact Information  Primary Emergency Contact: RichardJanice  Home Phone: [phone]  Relation: Friend  FAMILY UPDATE: Given at bedside to parents  CODE STATUS: Full Code  DISPO: Admission to HFICU    Patient seen and assessed with   Concetta    _________________________________________________  COOKIE Hallman-TAPAN

## 2024-02-23 NOTE — NURSING NOTE
VAD Note   Name:  Anatoly Lane  Admission Date:  2024  2:06 PM  MRN: [id number]  Attending Provider: Shekhar Hylton MD MPH  Room/Bed:               Sex: male  : 1960       Age: 63 y.o.    VAD Readmission  Readmission Checklist  Readmission Checklist: Yes  Code Status Reviewed: Yes  Code Status Ordered: Yes  RPM Speed Set Point: 5500  Low Speed Limit: 5100  Anticoagulation Goals Entered: Yes  Event Monitor Checked: Yes  Driveline Secure: Yes  Driveline Site Assessed and Photographed: Yes  Dressing Change Alanna: Weekly  ICD: Nurse to Check Device Upon Admission: On    HeartMate Serial Numbers  Battery Clip 1 [id number]\   Battery Clip 2 \[id number]\   Battery Clip 3 [id number]\   Battery Clip 4 \[id number]\   Rechargeable Battery 1 \[id number]\   Rechargeable Battery 2 \[id number]\   Rechargeable Battery 3 \[id number]\   Rechargeable Battery 4 \[id number]\                               Programmed Backup  [id number]   Primary Controller [id number]           HeartMate Tracking  HeartMate Equipment Transfer  Transfer From: Admission     HeartWare Serial Numbers        HeartWare Tracking        VAD Discharge       Educations  Education Documentation  No documentation found.        Janell Shaffer RN  Date: 2024  Time: 3:52 PM

## 2024-02-23 NOTE — PATIENT INSTRUCTIONS
We will admit you to the HFICU. We will plan to insert a catheter into your neck to adjust heart failure medications and LVAD settings.

## 2024-02-24 ENCOUNTER — APPOINTMENT (OUTPATIENT)
Dept: GASTROENTEROLOGY | Facility: HOSPITAL | Age: 64
DRG: 377 | End: 2024-02-24
Payer: MEDICARE

## 2024-02-24 LAB
ALBUMIN SERPL BCP-MCNC: 3.2 G/DL (ref 3.4–5)
ANION GAP SERPL CALC-SCNC: 12 MMOL/L (ref 10–20)
APTT PPP: 74 SECONDS (ref 27–38)
APTT PPP: 76 SECONDS (ref 27–38)
BLOOD EXPIRATION DATE: NORMAL
BUN SERPL-MCNC: 39 MG/DL (ref 6–23)
CALCIUM SERPL-MCNC: 7.9 MG/DL (ref 8.6–10.6)
CHLORIDE SERPL-SCNC: 105 MMOL/L (ref 98–107)
CO2 SERPL-SCNC: 26 MMOL/L (ref 21–32)
CREAT SERPL-MCNC: 1.08 MG/DL (ref 0.5–1.3)
DISPENSE STATUS: NORMAL
EGFRCR SERPLBLD CKD-EPI 2021: 77 ML/MIN/1.73M*2
ERYTHROCYTE [DISTWIDTH] IN BLOOD BY AUTOMATED COUNT: 17.6 % (ref 11.5–14.5)
ERYTHROCYTE [DISTWIDTH] IN BLOOD BY AUTOMATED COUNT: 17.6 % (ref 11.5–14.5)
ERYTHROCYTE [DISTWIDTH] IN BLOOD BY AUTOMATED COUNT: 18.3 % (ref 11.5–14.5)
ERYTHROCYTE [DISTWIDTH] IN BLOOD BY AUTOMATED COUNT: 18.6 % (ref 11.5–14.5)
GLUCOSE SERPL-MCNC: 100 MG/DL (ref 74–99)
HCT VFR BLD AUTO: 23 % (ref 41–52)
HCT VFR BLD AUTO: 23 % (ref 41–52)
HCT VFR BLD AUTO: 26.8 % (ref 41–52)
HCT VFR BLD AUTO: 26.9 % (ref 41–52)
HEMOCCULT SP1 STL QL: POSITIVE
HGB BLD-MCNC: 7.2 G/DL (ref 13.5–17.5)
HGB BLD-MCNC: 7.4 G/DL (ref 13.5–17.5)
HGB BLD-MCNC: 8.6 G/DL (ref 13.5–17.5)
HGB BLD-MCNC: 8.7 G/DL (ref 13.5–17.5)
INR PPP: 2.6 (ref 0.9–1.1)
LDH SERPL L TO P-CCNC: 96 U/L (ref 84–246)
MAGNESIUM SERPL-MCNC: 2.11 MG/DL (ref 1.6–2.4)
MCH RBC QN AUTO: 32 PG (ref 26–34)
MCH RBC QN AUTO: 32.3 PG (ref 26–34)
MCH RBC QN AUTO: 32.3 PG (ref 26–34)
MCH RBC QN AUTO: 33.2 PG (ref 26–34)
MCHC RBC AUTO-ENTMCNC: 31.3 G/DL (ref 32–36)
MCHC RBC AUTO-ENTMCNC: 32 G/DL (ref 32–36)
MCHC RBC AUTO-ENTMCNC: 32.2 G/DL (ref 32–36)
MCHC RBC AUTO-ENTMCNC: 32.5 G/DL (ref 32–36)
MCV RBC AUTO: 100 FL (ref 80–100)
MCV RBC AUTO: 100 FL (ref 80–100)
MCV RBC AUTO: 103 FL (ref 80–100)
MCV RBC AUTO: 103 FL (ref 80–100)
NRBC BLD-RTO: 0 /100 WBCS (ref 0–0)
NRBC BLD-RTO: 0 /100 WBCS (ref 0–0)
NRBC BLD-RTO: 0.3 /100 WBCS (ref 0–0)
NRBC BLD-RTO: 0.3 /100 WBCS (ref 0–0)
PHOSPHATE SERPL-MCNC: 3.9 MG/DL (ref 2.5–4.9)
PLATELET # BLD AUTO: 210 X10*3/UL (ref 150–450)
PLATELET # BLD AUTO: 211 X10*3/UL (ref 150–450)
PLATELET # BLD AUTO: 215 X10*3/UL (ref 150–450)
PLATELET # BLD AUTO: 221 X10*3/UL (ref 150–450)
POTASSIUM SERPL-SCNC: 3.9 MMOL/L (ref 3.5–5.3)
PRODUCT BLOOD TYPE: 8400
PRODUCT CODE: NORMAL
PROTHROMBIN TIME: 30.1 SECONDS (ref 9.8–12.8)
RBC # BLD AUTO: 2.23 X10*6/UL (ref 4.5–5.9)
RBC # BLD AUTO: 2.23 X10*6/UL (ref 4.5–5.9)
RBC # BLD AUTO: 2.69 X10*6/UL (ref 4.5–5.9)
RBC # BLD AUTO: 2.69 X10*6/UL (ref 4.5–5.9)
SODIUM SERPL-SCNC: 139 MMOL/L (ref 136–145)
UNIT ABO: NORMAL
UNIT NUMBER: NORMAL
UNIT RH: NORMAL
UNIT VOLUME: 350
WBC # BLD AUTO: 7.1 X10*3/UL (ref 4.4–11.3)
WBC # BLD AUTO: 7.4 X10*3/UL (ref 4.4–11.3)
WBC # BLD AUTO: 7.8 X10*3/UL (ref 4.4–11.3)
WBC # BLD AUTO: 9.1 X10*3/UL (ref 4.4–11.3)
XM INTEP: NORMAL

## 2024-02-24 PROCEDURE — 43235 EGD DIAGNOSTIC BRUSH WASH: CPT | Performed by: INTERNAL MEDICINE

## 2024-02-24 PROCEDURE — 36415 COLL VENOUS BLD VENIPUNCTURE: CPT

## 2024-02-24 PROCEDURE — 82270 OCCULT BLOOD FECES: CPT

## 2024-02-24 PROCEDURE — 85730 THROMBOPLASTIN TIME PARTIAL: CPT

## 2024-02-24 PROCEDURE — 2500000001 HC RX 250 WO HCPCS SELF ADMINISTERED DRUGS (ALT 637 FOR MEDICARE OP)

## 2024-02-24 PROCEDURE — 43235 EGD DIAGNOSTIC BRUSH WASH: CPT

## 2024-02-24 PROCEDURE — 99222 1ST HOSP IP/OBS MODERATE 55: CPT | Performed by: STUDENT IN AN ORGANIZED HEALTH CARE EDUCATION/TRAINING PROGRAM

## 2024-02-24 PROCEDURE — 83735 ASSAY OF MAGNESIUM: CPT

## 2024-02-24 PROCEDURE — 2500000002 HC RX 250 W HCPCS SELF ADMINISTERED DRUGS (ALT 637 FOR MEDICARE OP, ALT 636 FOR OP/ED)

## 2024-02-24 PROCEDURE — 99291 CRITICAL CARE FIRST HOUR: CPT | Performed by: STUDENT IN AN ORGANIZED HEALTH CARE EDUCATION/TRAINING PROGRAM

## 2024-02-24 PROCEDURE — 2020000001 HC ICU ROOM DAILY

## 2024-02-24 PROCEDURE — 2500000004 HC RX 250 GENERAL PHARMACY W/ HCPCS (ALT 636 FOR OP/ED)

## 2024-02-24 PROCEDURE — 87449 NOS EACH ORGANISM AG IA: CPT | Performed by: STUDENT IN AN ORGANIZED HEALTH CARE EDUCATION/TRAINING PROGRAM

## 2024-02-24 PROCEDURE — 2500000005 HC RX 250 GENERAL PHARMACY W/O HCPCS: Performed by: NURSE PRACTITIONER

## 2024-02-24 PROCEDURE — 36430 TRANSFUSION BLD/BLD COMPNT: CPT

## 2024-02-24 PROCEDURE — 85610 PROTHROMBIN TIME: CPT

## 2024-02-24 PROCEDURE — P9016 RBC LEUKOCYTES REDUCED: HCPCS

## 2024-02-24 PROCEDURE — 85027 COMPLETE CBC AUTOMATED: CPT

## 2024-02-24 PROCEDURE — 85027 COMPLETE CBC AUTOMATED: CPT | Performed by: STUDENT IN AN ORGANIZED HEALTH CARE EDUCATION/TRAINING PROGRAM

## 2024-02-24 PROCEDURE — C9113 INJ PANTOPRAZOLE SODIUM, VIA: HCPCS

## 2024-02-24 PROCEDURE — 2500000002 HC RX 250 W HCPCS SELF ADMINISTERED DRUGS (ALT 637 FOR MEDICARE OP, ALT 636 FOR OP/ED): Mod: MUE

## 2024-02-24 PROCEDURE — 80069 RENAL FUNCTION PANEL: CPT

## 2024-02-24 PROCEDURE — 0DJ08ZZ INSPECTION OF UPPER INTESTINAL TRACT, VIA NATURAL OR ARTIFICIAL OPENING ENDOSCOPIC: ICD-10-PCS | Performed by: INTERNAL MEDICINE

## 2024-02-24 PROCEDURE — 83615 LACTATE (LD) (LDH) ENZYME: CPT

## 2024-02-24 RX ORDER — FENTANYL CITRATE 50 UG/ML
INJECTION, SOLUTION INTRAMUSCULAR; INTRAVENOUS
Status: COMPLETED
Start: 2024-02-24 | End: 2024-02-24

## 2024-02-24 RX ORDER — MIDAZOLAM HYDROCHLORIDE 1 MG/ML
INJECTION INTRAMUSCULAR; INTRAVENOUS
Status: COMPLETED
Start: 2024-02-24 | End: 2024-02-24

## 2024-02-24 RX ORDER — FENTANYL CITRATE 50 UG/ML
50 INJECTION, SOLUTION INTRAMUSCULAR; INTRAVENOUS ONCE
Status: COMPLETED | OUTPATIENT
Start: 2024-02-24 | End: 2024-02-24

## 2024-02-24 RX ORDER — MIDAZOLAM HYDROCHLORIDE 1 MG/ML
2 INJECTION INTRAMUSCULAR; INTRAVENOUS ONCE
Status: COMPLETED | OUTPATIENT
Start: 2024-02-24 | End: 2024-02-24

## 2024-02-24 RX ADMIN — MIDAZOLAM HYDROCHLORIDE 2 MG: 1 INJECTION, SOLUTION INTRAMUSCULAR; INTRAVENOUS at 12:45

## 2024-02-24 RX ADMIN — MIDAZOLAM HYDROCHLORIDE 2 MG: 1 INJECTION INTRAMUSCULAR; INTRAVENOUS at 12:45

## 2024-02-24 RX ADMIN — SERTRALINE HYDROCHLORIDE 25 MG: 50 TABLET ORAL at 08:15

## 2024-02-24 RX ADMIN — FENTANYL CITRATE 50 MCG: 50 INJECTION, SOLUTION INTRAMUSCULAR; INTRAVENOUS at 12:45

## 2024-02-24 RX ADMIN — DIGOXIN 125 MCG: 125 TABLET ORAL at 08:15

## 2024-02-24 RX ADMIN — DAPAGLIFLOZIN 10 MG: 10 TABLET, FILM COATED ORAL at 08:15

## 2024-02-24 RX ADMIN — BIVALIRUDIN 0.05 MG/KG/HR: 250 INJECTION, POWDER, LYOPHILIZED, FOR SOLUTION INTRAVENOUS at 00:26

## 2024-02-24 RX ADMIN — PANTOPRAZOLE SODIUM 8 MG/HR: 40 INJECTION, POWDER, FOR SOLUTION INTRAVENOUS at 06:32

## 2024-02-24 RX ADMIN — Medication 5 MG: at 21:03

## 2024-02-24 RX ADMIN — PANTOPRAZOLE SODIUM 8 MG/HR: 40 INJECTION, POWDER, FOR SOLUTION INTRAVENOUS at 15:27

## 2024-02-24 RX ADMIN — IRON SUCROSE 200 MG: 20 INJECTION, SOLUTION INTRAVENOUS at 05:50

## 2024-02-24 RX ADMIN — AMIODARONE HYDROCHLORIDE 200 MG: 200 TABLET ORAL at 08:15

## 2024-02-24 RX ADMIN — LEVOTHYROXINE SODIUM 175 MCG: 25 TABLET ORAL at 05:50

## 2024-02-24 RX ADMIN — LIDOCAINE 1 PATCH: 4 PATCH TOPICAL at 08:15

## 2024-02-24 ASSESSMENT — PAIN SCALES - GENERAL
PAINLEVEL_OUTOF10: 0 - NO PAIN

## 2024-02-24 ASSESSMENT — PAIN - FUNCTIONAL ASSESSMENT
PAIN_FUNCTIONAL_ASSESSMENT: 0-10

## 2024-02-24 NOTE — PROGRESS NOTES
[facility]  HFICU PROGRESS NOTE    Anatoly Lane/[id number]    Admit Date: 2/23/2024  Hospital Length of Stay: 1   ICU Length of Stay: 18h   Primary Service:   Primary HF Cardiologist:   Referring:    INTERVAL EVENTS / PERTINENT ROS:   Hb dropped overnight to 7.2, more episodes of melena. MAP remains in low 60s  Feeling tired but no SOB, LE edema    MEDICATIONS  Infusions:  pantoprazole (ProtoNix) infusion, Last Rate: 8 mg/hr (02/24/24 0800)      Scheduled:  amiodarone, 200 mg, Daily  [Held by provider] aspirin, 81 mg, Daily  dapagliflozin propanediol, 10 mg, Daily  digoxin, 125 mcg, Daily  fluticasone furoate-vilanteroL, 1 puff, Daily  iron sucrose, 200 mg, Daily  levothyroxine, 175 mcg, Daily before breakfast  lidocaine, 1 patch, Daily  melatonin, 5 mg, Nightly  [Held by provider] sacubitriL-valsartan, 1 tablet, BID  sennosides-docusate sodium, 2 tablet, BID  sertraline, 25 mg, Daily  [Held by provider] spironolactone, 50 mg, Daily      PRN:  acetaminophen, 650 mg, q6h PRN  oxygen, , Continuous PRN - O2/gases  polyethylene glycol, 17 g, Daily PRN      Invasive Hemodynamics:    Most Recent Range Past 24hrs   BP (Art)   No data recorded   MAP(Art)   No data recorded   RA/CVP   No data recorded   PA   No data recorded   PA(mean)   No data recorded   PCWP   No data recorded   CO   No data recorded   CI   No data recorded   Mixed Venous   No data recorded   SVR    No data recorded   PVR   No data recorded     MCS:   Heart Mate III:     Most Recent Range Past 24hrs   Flow 4.6 Pump Flow  Min: 4.4  Max: 5   Speed 5500 Speed RPM  Min: 5500  Max: 5550   Power 4.1    PI 4 Pulse Index  Min: 3.1  Max: 4     ECMO:     Most Recent Range Past 24hrs   Flow   No data recorded   Speed   No data recorded   Sweep   No data recorded     Impella:      Most Recent Range Past 24hrs   Performance Level   No data recorded   Flow (L/min)   No data recorded   Motor Current   No data recorded   Placement Signal     "Placement OK could not be evaluated. This SmartLink does not work with rows of the type: Custom List   Purge (mmHg)   No data recorded   Purge rate (mL/hr)   No data recorded     VENT:    Most Recent Range Past 24hrs   Mode      FiO2   No data recorded   Rate   No data recorded   Vt    No data recorded   PEEP   No data recorded     PHYSICAL EXAM:   Visit Vitals  BP (!) 65/65 Comment: map   Pulse 77   Temp 36.1 °C (97 °F) (Temporal)   Resp 14   Ht 1.753 m (5' 9\")   Wt 103 kg (227 lb 1.2 oz)   SpO2 94%   BMI 33.53 kg/m²   Smoking Status Former   BSA 2.24 m²       Wt Readings from Last 5 Encounters:   02/24/24 103 kg (227 lb 1.2 oz)   02/23/24 105 kg (230 lb 9.6 oz)   02/19/24 107 kg (235 lb)   12/08/23 101 kg (223 lb 3.2 oz)   09/20/23 88.5 kg (195 lb)       INTAKE/OUTPUT:  I/O last 3 completed shifts:  In: 83.1 (0.8 mL/kg) [I.V.:83.1 (0.8 mL/kg)]  Out: 1475 (14.3 mL/kg) [Urine:1475 (0.4 mL/kg/hr)]  Weight: 103 kg      Physical Exam  Gen - well appearing   Chest - CTAB  Neck - No JVD  Cardiac - mechanical whirring from VAD  LE - no edema     DATA:  CMP:  Results from last 7 days   Lab Units 02/24/24  0233 02/23/24  1433   SODIUM mmol/L 139 137   POTASSIUM mmol/L 3.9 4.0   CHLORIDE mmol/L 105 100   CO2 mmol/L 26 26   ANION GAP mmol/L 12 15   BUN mg/dL 39* 44*   CREATININE mg/dL 1.08 1.30   EGFR mL/min/1.73m*2 77 62   MAGNESIUM mg/dL 2.11 2.22   ALBUMIN g/dL 3.2* 4.0   ALT U/L  --  13   AST U/L  --  11   BILIRUBIN TOTAL mg/dL  --  0.4     CBC:  Results from last 7 days   Lab Units 02/24/24  0341 02/24/24  0233 02/23/24  1433   WBC AUTO x10*3/uL 7.1 7.8 10.5   HEMOGLOBIN g/dL 7.2* 7.4* 9.0*   HEMATOCRIT % 23.0* 23.0* 28.7*   PLATELETS AUTO x10*3/uL 215 221 252   MCV fL 103* 103* 103*     COAG:   Results from last 7 days   Lab Units 02/24/24  0233 02/23/24  1433   INR  2.6* 1.9*     ABO:   ABO TYPE   Date Value Ref Range Status   02/23/2024 AB  Final     HEME/ENDO:  Results from last 7 days   Lab Units 02/23/24  1433 "   FERRITIN ng/mL 104   IRON SATURATION % 18*   TSH mIU/L 18.02*   HEMOGLOBIN A1C % 4.4      CARDIAC:   Results from last 7 days   Lab Units 02/24/24  0233 02/23/24  1433   LD U/L 96 128   TROPHS ng/L  --  30   BNP pg/mL  --  33       ASSESSMENT AND PLAN:   63M PMHx ICM/HFrEF(10-15%) s/p HM3 VAD 4/23, pAF, VT s/p ICD, hypothyroidism. P/w weakness, SOB, found to be hypotensive and with significant anemia and melena.     Neuro:  #Depression  - C/w home Zoloft 25 mg   - Serial neuro and pain assessments   - PO Tylenol PRN for pain  - PT/OT Consult, OOB to chair  - CAM ICU score every shift  - Sleep/wake cycle normalization     # Physical Status  - Obesity, BMI 34  - Patient unable to walk due to shortness of breath  - Weight gain of 75 pounds over the last year      Cardiovascular:  #HM3  Daily VAD Interrogation   Device: HM3  Speed: 5500  Power: 4.2  Flow: 4.6  PI: 3.6  LDH: 96  INR: 2.6  Long term (pts choice; does not wish to proceed with OHT eval)   LVAD interrogation reveals no alarms or power spikes, no reported LVAD alarms   Weekly dressing change     Medications:  ASA  Coumadin 5 mg (Sunday, Tuesday, Thursday, Saturday) 3.5 mg (Monday, Wednesday, Friday) goal INR 2-3   PPI  Fish oil        #Stage D chronic systolic HF/ICM/HFrEF s/p HM3 LVAD (4/30/23)   Repeat echo 2/23/24 no effusion, overall no change from prior  - Admit BNP 33  - Remains off BB due to RV dysfunction  - HOLDING Entresto 24/26 BID given bleed and low MAP  - C/w Farxiga   - HOLDING Spironolactone given low MAPs  - C/w digoxin 125 mcg daily for RV support, level 0.98  - Holding home torsemide, will evaluate diuretic needs based on assessment   - Daily standing weights, 2gm sodium diet, 2L fluid restriction, strict I&Os     #History of VT and pAF  - C/w amiodarone 200 mg daily   - Hold home coumadin for procedures   - AC as above  - Abbott dual chamber AICD - Device interrogation ordered      #Electrolyte Disturbances  - Maintain K>4, Mg >2   -  Daily RFP  - Replete electrolytes as needed     Pulmonary:   #Ex smoker, 30 year smoking history   - C/w home Symbicort  - Monitor and maintain SpO2 > 92%     GI:  #Melena c/f GIB  Pt with 2 weeks of melena, dropping Hb.   - Holding AC as above, will continue to hold until CBC stabilizes  - Started protonix gtt  - GI consulted - plan for scope     :  #No hx of DEBBIE or CKD  #Hx of retention   - Was on tamulosin previously, can reconsider if needed   - I/Os  - Avoid hypotension and nephrotoxic agents     Heme:  #No history of anemia  - Labs: B12/folate wnl, iron/ferritin wnl, low Tsat (18%)  - Started venofer IV for 3d     #Hx of bilateral non-occlusive internal jugular clots  - Patient hasn't had recent US to rule out since 4/25/23  - Consider redoing US     #Chronic AC  - Holding home coumadin, hx of HIT so started bival gtt, HELD now given continued Hb drop     Endo:  #No hx of DM  - Euglycemic  - hgbA1c 4.4     #Hypothyroid  - C/w synthroid 175 mcg daily   - TSH elevated - likely in acute setting     Msk:  #H/o right axillary repair with right brachial plexus injury and residual right hand numbness  - CT head and neck wo contrast 5/3 showed degenerative changes C5-C6   - Previously had neurology referral for EMG and further mx      ID:  #No active issues  -afebrile, nontoxic   -no s/s infx  -trend temps q4h     PHYSICAL AND OCCUPATIONAL THERAPY: ordered and following      LINES:  PIVs      DVT: Bivalrudin gtt and SCDs  VAP BUNDLE: N/A  ULCER PPX: PPI  GLYCEMIC CONTROL: N/A  BOWEL CARE: Colace and Miralax PRN   INDWELLING CATHETER: N/A  NUTRITION: NPO Diet; Effective now     EMERGENCY CONTACT: Extended Emergency Contact Information  Primary Emergency Contact: Janice Ramos  Home Phone: [phone]  Relation: Friend  FAMILY UPDATE:  CODE STATUS: Full Code  DISPO:     Patient seen and assessed with Dr. Hylton    _________________________________________________  Clint Arriaza MD

## 2024-02-24 NOTE — CONSULTS
Gastroenterology Consult Service INITIAL CONSULT Note  Department of Gastroenterology & Hepatology  [facility]    [facility]  February 24, 2024   Patient: Anatoly Lane    Medical Record: [id number]    Reason for Consult: concern for GI bleed  Requesting Service: CTICU    Subjective     Anatoly Lane is a 63 y.o. male with HFrEF (10-15%), ICM, s/p HM3 4/2023, pAD, VT s/p ICD and hypothryoidism admitted to HFICU on 2/23 with worsening SOB and difficulty ambulating. Gastroenterology is consulted for concern for GIB.     Pt called clinic 2/22 with worsening SOB. In clinic noted to have JVD, had gained 75 lbs over last year. Admitted to HFICU for SGC placement and possible advanced therapies. Of note, pt is on coumadin daily as well as ASA 81 mg daily. Last dose of coumadin was Thursday evening.     Upon arrival to HFICU pt warm with 1-2+ LE edema. MAP was 70 on arrival. He was given bivalirudin (given hx of HIT), started at MN. Hb on admission 9.0 () --> downtrended to 7.4 this AM. Noted to be 7.2 on repeat.     Pt does note about 2 weeks of dark stools, which are solid. He refuses rectal exam today. Last stool was overnight last night. Not many intermittent labs since last hospital admission, however there are labs scanned in from Aug, 2023 which note a Hb of 14.3. Pt has never undergone Upper endoscopy. Last colonoscopy was pre-LVAD in April, 2023 which was poor prep, with 2 TAs <=5 mm removed. Recommended for repeat in 1 year. He denies any hx of NSIAD uses, only takes tylenol for pain.     Past Medical History:  No past medical history on file.  As above     Home Medications  Medications Prior to Admission   Medication Sig Dispense Refill Last Dose    aspirin 81 mg EC tablet Take 1 tablet (81 mg) by mouth once daily.       dapagliflozin propanediol (Farxiga) 10 mg Take 1 tablet (10 mg) by mouth once daily. 30 tablet 11     digoxin (Lanoxin) 125 MCG tablet Take 1  tablet (125 mcg) by mouth once daily.       Entresto 49-51 mg tablet Take 1 tablet by mouth 2 times a day. 180 tablet 3     levothyroxine (Synthroid, Levoxyl) 175 mcg tablet Take 1 tablet (175 mcg) by mouth once daily in the morning. Take before meals. 90 tablet 3     Pacerone 200 mg tablet Take 1 tablet (200 mg) by mouth once daily.       pantoprazole (ProtoNix) 40 mg EC tablet Take 1 tablet (40 mg) by mouth once daily in the morning. Take before meals. 90 tablet 3     sertraline (Zoloft) 25 mg tablet TAKE 1 TABLET EVERY DAY 90 tablet 3     spironolactone (Aldactone) 50 mg tablet Take 1 tablet (50 mg) by mouth once daily. 90 tablet 3     tamsulosin (Flomax) 0.4 mg 24 hr capsule TAKE 1 CAPSULE EVERY DAY 90 capsule 3     torsemide (Demadex) 10 mg tablet Take 1 tablet (10 mg) by mouth if needed.       warfarin (Coumadin) 1 mg tablet 3.5mg (1mg tablet + 2.5mg tablet) on Monday, Wednesday, Friday  5mg (2.5mg tablet + 2.5mg tablet) on Sunday, Tuesday, Thursday, Saturday       warfarin (Coumadin) 2.5 mg tablet 3.5mg (1mg tablet + 2.5mg tablet) on Monday, Wednesday, Friday  5mg (2.5mg tablet + 2.5mg tablet) on Sunday, Tuesday, Thursday, Saturday          Surgical History:    Past Surgical History:   Procedure Laterality Date    COLONOSCOPY W/ POLYPECTOMY  2023    CT ABDOMEN PELVIS ANGIOGRAM W AND/OR WO IV CONTRAST  04/13/2023    CT ABDOMEN PELVIS ANGIOGRAM W AND/OR WO IV CONTRAST [facility] CT    LEFT VENTRICULAR ASSIST DEVICE      OTHER SURGICAL HISTORY  01/05/2022    Complete colonoscopy    OTHER SURGICAL HISTORY  01/05/2022    Cardioverter defibrillator insertion       Allergies:    Allergies   Allergen Reactions    Jardiance [Empagliflozin] Itching    Amiodarone Palpitations     Patient states must use BRAND NAME formulation Pacerone       Social History:  No drinking in > 1 year. No smoking, quit 5 years prior. No drug use.   Family History:    Family History   Problem Relation Name Age of Onset    Hypertension Father       Colon cancer Father       No family history of GI or liver disease.     Objective     Vitals:    Vitals:    02/24/24 0400 02/24/24 0500 02/24/24 0600 02/24/24 0624   BP:       BP Location:       Patient Position:       Pulse: 70 71 70    Resp: 13 15 17    Temp:       TempSrc:       SpO2: 96% 100% 99%    Weight:    103 kg (227 lb 1.2 oz)   Height:         Failed to redirect to the Timeline version of the [employer] SmartLink.    Intake/Output Summary (Last 24 hours) at 2/24/2024 0709  Last data filed at 2/24/2024 0624  Gross per 24 hour   Intake 83.06 ml   Output 1475 ml   Net -1391.94 ml       Physical Exam  Gen: well appearing, A+Ox3, in no acute distress  HEENT: PEERL, EOMI, sclera anicteric, no conjunctival injection, MMM, no oropharyngeal lesions, no LAD  Resp: CTAB, normal breath sounds  CV: mechanical VAD sounds appreicated   GI: non-tender, non-distended, normal Bss, mechanical VAD sounds   MSK: warm and well perfused, 1-2+ pitting edema  Neuro: A+Ox3, no focal deficits, no asterixis  Skin: warm and dry, no lesions, no rashes     Labs:   Lab Results   Component Value Date    WBC 7.1 02/24/2024    HGB 7.2 (L) 02/24/2024    HCT 23.0 (L) 02/24/2024     (H) 02/24/2024     02/24/2024     Lab Results   Component Value Date    GLUCOSE 100 (H) 02/24/2024    CALCIUM 7.9 (L) 02/24/2024     02/24/2024    K 3.9 02/24/2024    CO2 26 02/24/2024     02/24/2024    BUN 39 (H) 02/24/2024    CREATININE 1.08 02/24/2024     Lab Results   Component Value Date    ALT 13 02/23/2024    AST 11 02/23/2024    ALKPHOS 47 02/23/2024    BILITOT 0.4 02/23/2024     Lab Results   Component Value Date    INR 2.6 (H) 02/24/2024    INR 1.9 (H) 02/23/2024    INR CANCELED 05/31/2023    PROTIME 30.1 (H) 02/24/2024    PROTIME 21.2 (H) 02/23/2024    PROTIME CANCELED 05/31/2023       Imaging: no recent abd imaging     GI procedures:    EGD 2/24/2024:  Impression  3 cm hiatal hernia  Mild gastritis in the prepyloric  region  Otherwise normal esophagus, stomach and duodenum. No ulcerations, AVMs appreciated.      Findings  The esophagus appeared normal.  3 cm hiatal hernia without Faustino lesions present - GE junction 40 cm from the incisors, diaphragmatic impression 43 cm from the incisors  Mild erythematous mucosa in the prepyloric region; no bleeding was identified; not biopsied at this time due to patient being on anticoagulation.   The stomach appeared otherwise normal. Stomach otherwise normal on forward view and retroflexion.  The duodenal bulb, 2nd part of the duodenum and 3rd part of the duodenum appeared normal. No AVMs, no bleeding lesions. No ulcerations.    Colonoscopy 4/2023:  Impression:            - A moderate amount of semi-liquid stool was found in                          the entire colon, interfering with visualization.                          Lavage of the area was performed resulting in near                          clearance with fair visualization                         - The examined portion of the ileum was normal.                         - One 4 mm polyp in the ascending colon, removed with                          a cold snare. Resected and retrieved.                         - One 5 mm polyp in the transverse colon, removed with                          a cold snare. Resected and retrieved.                         - Non-bleeding moderate diverticulosis in the entire                          examined colon, most extensive in the left colon.                         - Non-bleeding external and internal hemorrhoids.    A.  ASCENDING POLYP COLD SNARE:   -- TUBULAR ADENOMA     B.  TRANSVERSE POLYP COLD SNARE:   -- TUBULAR ADENOMA     Assessment & Plan    Anatoly Lane is a 63 y.o. male with HFrEF (10-15%), ICM, s/p HM3 4/2023, pAD, VT s/p ICD and hypothryoidism admitted to HFICU on 2/23 with worsening SOB and difficulty ambulating. Gastroenterology is consulted for concern for GIB.     Pt with downtrend in  Hb from 14.3 in August to 9.0 on admission to 7.0 this AM. This is likely 2/2 slow GIB. Likely upper source given black stools and elevation in BUN. Ddx: very suspicious for AVM or PUD, could be slow oozing from gastritis/ duodenitis, less likely malignancy. EGD done at bedside today notable for very mild gastritis, otherwise no explanation for bleed. Will plan to proceed with colonoscopy on Monday +/- VCE if no findings on colonoscopy.     #acute blood loss anemia  #melena  - plan for colonoscopy at bedside on Monday 2/26  - okay for clear liquids   - please have patient be NPO after midnight on 2/25  - Please order Golytely 2 liters to be administered at 1 pm on 2/25 and an additional 2 liters at 5 pm on 2/25; If the patient is not having clear stools at MN 2/26 then please administer an additional 1-2 liters of Golytely  - Continue to trend Hgb, plts, stooling, etc.   - reasonable to resume anticoagulation as needed  - no indication for PPI ggt, can transition to PPI PO BID for mild gastritis   - please send stool H pylori Ag     Patient seen and discussed with attending, Dr. Weaver.     aHnnah Gonzalez, GI fellow    Thank you for the consultation. Gastroenterology will continue to the follow the patient.   Please do not hesitate to contact me on Haiku or page [phone] if there are any further questions between the weekday hours of 7 AM - 5 PM.   If there is an urgent concern during the weekend, after-hours, or holidays; then please page the on-call GI fellow at [phone]. Thank you.    SIGNATURE: Hannah Gonzalez MD PATIENT NAME: Anatoly Lane   DATE: February 24, 2024 MRN: [id number]

## 2024-02-25 LAB
ALBUMIN SERPL BCP-MCNC: 3.8 G/DL (ref 3.4–5)
ANION GAP SERPL CALC-SCNC: 13 MMOL/L (ref 10–20)
APTT PPP: 39 SECONDS (ref 27–38)
BUN SERPL-MCNC: 20 MG/DL (ref 6–23)
CALCIUM SERPL-MCNC: 8.6 MG/DL (ref 8.6–10.6)
CHLORIDE SERPL-SCNC: 103 MMOL/L (ref 98–107)
CO2 SERPL-SCNC: 24 MMOL/L (ref 21–32)
CREAT SERPL-MCNC: 0.95 MG/DL (ref 0.5–1.3)
EGFRCR SERPLBLD CKD-EPI 2021: 90 ML/MIN/1.73M*2
ERYTHROCYTE [DISTWIDTH] IN BLOOD BY AUTOMATED COUNT: 18.4 % (ref 11.5–14.5)
ERYTHROCYTE [DISTWIDTH] IN BLOOD BY AUTOMATED COUNT: 18.7 % (ref 11.5–14.5)
GLUCOSE SERPL-MCNC: 102 MG/DL (ref 74–99)
HCT VFR BLD AUTO: 26.8 % (ref 41–52)
HCT VFR BLD AUTO: 30.9 % (ref 41–52)
HGB BLD-MCNC: 10 G/DL (ref 13.5–17.5)
HGB BLD-MCNC: 8.3 G/DL (ref 13.5–17.5)
INR PPP: 1.8 (ref 0.9–1.1)
LDH SERPL L TO P-CCNC: 142 U/L (ref 84–246)
MAGNESIUM SERPL-MCNC: 2.24 MG/DL (ref 1.6–2.4)
MCH RBC QN AUTO: 32.1 PG (ref 26–34)
MCH RBC QN AUTO: 32.2 PG (ref 26–34)
MCHC RBC AUTO-ENTMCNC: 31 G/DL (ref 32–36)
MCHC RBC AUTO-ENTMCNC: 32.4 G/DL (ref 32–36)
MCV RBC AUTO: 104 FL (ref 80–100)
MCV RBC AUTO: 99 FL (ref 80–100)
NRBC BLD-RTO: 0.2 /100 WBCS (ref 0–0)
NRBC BLD-RTO: 0.2 /100 WBCS (ref 0–0)
PHOSPHATE SERPL-MCNC: 3.2 MG/DL (ref 2.5–4.9)
PLATELET # BLD AUTO: 246 X10*3/UL (ref 150–450)
PLATELET # BLD AUTO: 259 X10*3/UL (ref 150–450)
POTASSIUM SERPL-SCNC: 4.2 MMOL/L (ref 3.5–5.3)
PROTHROMBIN TIME: 20.3 SECONDS (ref 9.8–12.8)
RBC # BLD AUTO: 2.58 X10*6/UL (ref 4.5–5.9)
RBC # BLD AUTO: 3.12 X10*6/UL (ref 4.5–5.9)
SODIUM SERPL-SCNC: 136 MMOL/L (ref 136–145)
WBC # BLD AUTO: 9 X10*3/UL (ref 4.4–11.3)
WBC # BLD AUTO: 9 X10*3/UL (ref 4.4–11.3)

## 2024-02-25 PROCEDURE — 2500000005 HC RX 250 GENERAL PHARMACY W/O HCPCS: Performed by: NURSE PRACTITIONER

## 2024-02-25 PROCEDURE — 2500000004 HC RX 250 GENERAL PHARMACY W/ HCPCS (ALT 636 FOR OP/ED): Performed by: NURSE PRACTITIONER

## 2024-02-25 PROCEDURE — 99291 CRITICAL CARE FIRST HOUR: CPT | Performed by: STUDENT IN AN ORGANIZED HEALTH CARE EDUCATION/TRAINING PROGRAM

## 2024-02-25 PROCEDURE — 99233 SBSQ HOSP IP/OBS HIGH 50: CPT | Performed by: STUDENT IN AN ORGANIZED HEALTH CARE EDUCATION/TRAINING PROGRAM

## 2024-02-25 PROCEDURE — C9113 INJ PANTOPRAZOLE SODIUM, VIA: HCPCS | Performed by: NURSE PRACTITIONER

## 2024-02-25 PROCEDURE — 2020000001 HC ICU ROOM DAILY

## 2024-02-25 PROCEDURE — 83735 ASSAY OF MAGNESIUM: CPT

## 2024-02-25 PROCEDURE — 2500000005 HC RX 250 GENERAL PHARMACY W/O HCPCS

## 2024-02-25 PROCEDURE — 2500000004 HC RX 250 GENERAL PHARMACY W/ HCPCS (ALT 636 FOR OP/ED)

## 2024-02-25 PROCEDURE — 2500000001 HC RX 250 WO HCPCS SELF ADMINISTERED DRUGS (ALT 637 FOR MEDICARE OP): Performed by: STUDENT IN AN ORGANIZED HEALTH CARE EDUCATION/TRAINING PROGRAM

## 2024-02-25 PROCEDURE — 85730 THROMBOPLASTIN TIME PARTIAL: CPT

## 2024-02-25 PROCEDURE — 2500000002 HC RX 250 W HCPCS SELF ADMINISTERED DRUGS (ALT 637 FOR MEDICARE OP, ALT 636 FOR OP/ED): Mod: MUE

## 2024-02-25 PROCEDURE — 85027 COMPLETE CBC AUTOMATED: CPT | Performed by: NURSE PRACTITIONER

## 2024-02-25 PROCEDURE — 80069 RENAL FUNCTION PANEL: CPT

## 2024-02-25 PROCEDURE — 36415 COLL VENOUS BLD VENIPUNCTURE: CPT

## 2024-02-25 PROCEDURE — 2500000001 HC RX 250 WO HCPCS SELF ADMINISTERED DRUGS (ALT 637 FOR MEDICARE OP): Performed by: NURSE PRACTITIONER

## 2024-02-25 PROCEDURE — 85027 COMPLETE CBC AUTOMATED: CPT

## 2024-02-25 PROCEDURE — 83615 LACTATE (LD) (LDH) ENZYME: CPT

## 2024-02-25 PROCEDURE — 2500000001 HC RX 250 WO HCPCS SELF ADMINISTERED DRUGS (ALT 637 FOR MEDICARE OP)

## 2024-02-25 PROCEDURE — 2500000002 HC RX 250 W HCPCS SELF ADMINISTERED DRUGS (ALT 637 FOR MEDICARE OP, ALT 636 FOR OP/ED)

## 2024-02-25 RX ORDER — PANTOPRAZOLE SODIUM 40 MG/10ML
40 INJECTION, POWDER, LYOPHILIZED, FOR SOLUTION INTRAVENOUS EVERY 12 HOURS
Status: DISCONTINUED | OUTPATIENT
Start: 2024-02-25 | End: 2024-02-25

## 2024-02-25 RX ORDER — PANTOPRAZOLE SODIUM 40 MG/10ML
80 INJECTION, POWDER, LYOPHILIZED, FOR SOLUTION INTRAVENOUS EVERY 12 HOURS
Status: DISCONTINUED | OUTPATIENT
Start: 2024-02-25 | End: 2024-02-25

## 2024-02-25 RX ORDER — PANTOPRAZOLE SODIUM 40 MG/10ML
80 INJECTION, POWDER, LYOPHILIZED, FOR SOLUTION INTRAVENOUS ONCE
Status: DISCONTINUED | OUTPATIENT
Start: 2024-02-25 | End: 2024-02-25

## 2024-02-25 RX ORDER — PANTOPRAZOLE SODIUM 40 MG/10ML
40 INJECTION, POWDER, LYOPHILIZED, FOR SOLUTION INTRAVENOUS 2 TIMES DAILY
Status: DISCONTINUED | OUTPATIENT
Start: 2024-02-25 | End: 2024-02-29

## 2024-02-25 RX ORDER — PANTOPRAZOLE SODIUM 40 MG/10ML
80 INJECTION, POWDER, LYOPHILIZED, FOR SOLUTION INTRAVENOUS ONCE
Status: COMPLETED | OUTPATIENT
Start: 2024-02-25 | End: 2024-02-25

## 2024-02-25 RX ORDER — ONDANSETRON HYDROCHLORIDE 2 MG/ML
4 INJECTION, SOLUTION INTRAVENOUS EVERY 6 HOURS PRN
Status: DISCONTINUED | OUTPATIENT
Start: 2024-02-25 | End: 2024-03-06 | Stop reason: HOSPADM

## 2024-02-25 RX ORDER — POLYETHYLENE GLYCOL 3350, SODIUM CHLORIDE, SODIUM BICARBONATE, POTASSIUM CHLORIDE 420; 11.2; 5.72; 1.48 G/4L; G/4L; G/4L; G/4L
2000 POWDER, FOR SOLUTION ORAL ONCE
Status: DISCONTINUED | OUTPATIENT
Start: 2024-02-25 | End: 2024-02-29

## 2024-02-25 RX ORDER — POLYETHYLENE GLYCOL 3350, SODIUM CHLORIDE, SODIUM BICARBONATE, POTASSIUM CHLORIDE 420; 11.2; 5.72; 1.48 G/4L; G/4L; G/4L; G/4L
4000 POWDER, FOR SOLUTION ORAL ONCE
Status: COMPLETED | OUTPATIENT
Start: 2024-02-25 | End: 2024-02-25

## 2024-02-25 RX ADMIN — LEVOTHYROXINE SODIUM 175 MCG: 25 TABLET ORAL at 05:01

## 2024-02-25 RX ADMIN — PANTOPRAZOLE SODIUM 40 MG: 40 INJECTION, POWDER, FOR SOLUTION INTRAVENOUS at 21:43

## 2024-02-25 RX ADMIN — AMIODARONE HYDROCHLORIDE 200 MG: 200 TABLET ORAL at 08:08

## 2024-02-25 RX ADMIN — Medication 5 MG: at 21:41

## 2024-02-25 RX ADMIN — Medication 2 L/MIN: at 23:30

## 2024-02-25 RX ADMIN — POLYETHYLENE GLYCOL 3350, SODIUM SULFATE ANHYDROUS, SODIUM BICARBONATE, SODIUM CHLORIDE, POTASSIUM CHLORIDE 4000 ML: 236; 22.74; 6.74; 5.86; 2.97 POWDER, FOR SOLUTION ORAL at 12:00

## 2024-02-25 RX ADMIN — DIGOXIN 125 MCG: 125 TABLET ORAL at 08:08

## 2024-02-25 RX ADMIN — IRON SUCROSE 200 MG: 20 INJECTION, SOLUTION INTRAVENOUS at 05:01

## 2024-02-25 RX ADMIN — LIDOCAINE 1 PATCH: 4 PATCH TOPICAL at 09:00

## 2024-02-25 RX ADMIN — SERTRALINE HYDROCHLORIDE 25 MG: 50 TABLET ORAL at 10:27

## 2024-02-25 RX ADMIN — PANTOPRAZOLE SODIUM 40 MG: 40 INJECTION, POWDER, FOR SOLUTION INTRAVENOUS at 08:08

## 2024-02-25 RX ADMIN — DAPAGLIFLOZIN 10 MG: 10 TABLET, FILM COATED ORAL at 08:08

## 2024-02-25 RX ADMIN — SENNOSIDES AND DOCUSATE SODIUM 2 TABLET: 8.6; 5 TABLET ORAL at 08:08

## 2024-02-25 RX ADMIN — PANTOPRAZOLE SODIUM 80 MG: 40 INJECTION, POWDER, FOR SOLUTION INTRAVENOUS at 01:51

## 2024-02-25 ASSESSMENT — PAIN SCALES - GENERAL
PAINLEVEL_OUTOF10: 0 - NO PAIN
PAINLEVEL_OUTOF10: 0 - NO PAIN

## 2024-02-25 ASSESSMENT — PAIN - FUNCTIONAL ASSESSMENT
PAIN_FUNCTIONAL_ASSESSMENT: 0-10
PAIN_FUNCTIONAL_ASSESSMENT: 0-10

## 2024-02-25 NOTE — PROGRESS NOTES
Gastroenterology Consult Service Progress Note  Department of Gastroenterology & Hepatology  [facility]    [facility]  February 25, 2024   Patient: Anatoly Lane    Medical Record: [id number]  Reason for Consult: concern for GI bleed  Requesting Service: CTICU    Interval History: s/p EGD yesterday with mild gastritis, no explanation for blood loss anemia. Remains stable in HFICU. No further blood transfusions: Hb 7.2 --> 1 U pRBC --> 8.6 --> 8.7 --> 10. Not yet started on AC.     Physical Exam:    Vitals:    02/25/24 0200 02/25/24 0300 02/25/24 0357 02/25/24 0400   BP:       Pulse: 80 69  67   Resp: 19 15  13   Temp:   36.1 °C (97 °F)    TempSrc:   Temporal    SpO2:       Weight:    103 kg (227 lb 1.2 oz)   Height:             Intake/Output Summary (Last 24 hours) at 2/25/2024 0634  Last data filed at 2/25/2024 0016  Gross per 24 hour   Intake 857.34 ml   Output 1300 ml   Net -442.66 ml       Gen: well appearing, A+Ox3, in no acute distress  HEENT: PEERL, EOMI, sclera anicteric, no conjunctival injection, MMM, no oropharyngeal lesions, no LAD  Resp: CTAB, normal breath sounds  CV: mechanical VAD sounds appreicated   GI: non-tender, non-distended, normal Bss, mechanical VAD sounds   MSK: warm and well perfused, 1-2+ pitting edema  Neuro: A+Ox3, no focal deficits, no asterixis  Skin: warm and dry, no lesions, no rashes     Labs:  Lab Results   Component Value Date    WBC 9.0 02/25/2024    HGB 10.0 (L) 02/25/2024    HCT 30.9 (L) 02/25/2024    MCV 99 02/25/2024     02/25/2024     Lab Results   Component Value Date    GLUCOSE 102 (H) 02/25/2024    CALCIUM 8.6 02/25/2024     02/25/2024    K 4.2 02/25/2024    CO2 24 02/25/2024     02/25/2024    BUN 20 02/25/2024    CREATININE 0.95 02/25/2024     Lab Results   Component Value Date    INR 1.8 (H) 02/25/2024    INR 2.6 (H) 02/24/2024    INR 1.9 (H) 02/23/2024    PROTIME 20.3 (H) 02/25/2024    PROTIME 30.1 (H)  02/24/2024    PROTIME 21.2 (H) 02/23/2024         Imaging: no recent abd imaging     GI procedures:    EGD 2/24/2024:  Impression  3 cm hiatal hernia  Mild gastritis in the prepyloric region  Otherwise normal esophagus, stomach and duodenum. No ulcerations, AVMs appreciated.      Findings  The esophagus appeared normal.  3 cm hiatal hernia without Faustino lesions present - GE junction 40 cm from the incisors, diaphragmatic impression 43 cm from the incisors  Mild erythematous mucosa in the prepyloric region; no bleeding was identified; not biopsied at this time due to patient being on anticoagulation.   The stomach appeared otherwise normal. Stomach otherwise normal on forward view and retroflexion.  The duodenal bulb, 2nd part of the duodenum and 3rd part of the duodenum appeared normal. No AVMs, no bleeding lesions. No ulcerations.     Colonoscopy 4/2023:  Impression:            - A moderate amount of semi-liquid stool was found in                          the entire colon, interfering with visualization.                          Lavage of the area was performed resulting in near                          clearance with fair visualization                         - The examined portion of the ileum was normal.                         - One 4 mm polyp in the ascending colon, removed with                          a cold snare. Resected and retrieved.                         - One 5 mm polyp in the transverse colon, removed with                          a cold snare. Resected and retrieved.                         - Non-bleeding moderate diverticulosis in the entire                          examined colon, most extensive in the left colon.                         - Non-bleeding external and internal hemorrhoids.     A.  ASCENDING POLYP COLD SNARE:   -- TUBULAR ADENOMA     B.  TRANSVERSE POLYP COLD SNARE:   -- TUBULAR ADENOMA     Assessment and Plan:        Anatoly Lane is a 63 y.o. male with HFrEF (10-15%), ICM, s/p  HM3 4/2023, pAD, VT s/p ICD and hypothryoidism admitted to HFICU on 2/23 with worsening SOB and difficulty ambulating. Gastroenterology is consulted for concern for GIB.      Pt with downtrend in Hb from 14.3 in August to 9.0 on admission to 7.0 this AM. This is likely 2/2 slow GIB. Likely upper source given black stools and elevation in BUN. Ddx: very suspicious for AVM or PUD, could be slow oozing from gastritis/ duodenitis, less likely malignancy. EGD done at bedside 2/24 notable for very mild gastritis, otherwise no explanation for bleed. Will plan to proceed with colonoscopy on Monday +/- VCE if no findings on colonoscopy.      #acute blood loss anemia  #melena  - plan for colonoscopy on Monday 2/26 +/- VCE  - okay for clear liquids   - please have patient be NPO after midnight on 2/25  - Please order Golytely 2 liters to be administered at 1 pm on 2/25 and an additional 2 liters at 5 pm on 2/25; If the patient is not having clear stools at MN 2/26 then please administer an additional 1-2 liters of Golytely  - Continue to trend Hgb, plts, stooling, etc.   - reasonable to resume anticoagulation as needed  - no indication for PPI ggt, can transition to PPI PO BID for mild gastritis   - please send stool H pylori Ag      Patient seen and discussed with attending, Dr. Hernandez.     Hannah Gonzalez, GI fellow    Thank you for the consultation. Gastroenterology will continue to the follow the patient.   Please do not hesitate to contact me on DocHalo or page [phone] if there are any further questions between the weekday hours of 7 AM - 5 PM.   If there is an urgent concern during the weekend, after-hours, or holidays; then please page the on-call GI fellow [place] [phone]. Thank you.    SIGNATURE: Hannah Gonzalez MD PATIENT NAME: Anatoly Lane   DATE: February 25, 2024 MRN: [id number]

## 2024-02-25 NOTE — PROGRESS NOTES
[facility]  HFICU PROGRESS NOTE    Anatoly Lane/[id number]    Admit Date: 2/23/2024  Hospital Length of Stay: 2   ICU Length of Stay: 1d 17h   Primary Service:   Primary HF Cardiologist:   Referring:    INTERVAL EVENTS / PERTINENT ROS:   Hb remains stable. EGD w/o evidence of bleed yesterday. MAPs improved to 80.    MEDICATIONS  Infusions:       Scheduled:  amiodarone, 200 mg, Daily  [Held by provider] aspirin, 81 mg, Daily  dapagliflozin propanediol, 10 mg, Daily  digoxin, 125 mcg, Daily  fluticasone furoate-vilanteroL, 1 puff, Daily  iron sucrose, 200 mg, Daily  levothyroxine, 175 mcg, Daily before breakfast  lidocaine, 1 patch, Daily  melatonin, 5 mg, Nightly  pantoprazole, 40 mg, BID  polyethylene glycol-electrolytes, 2,000 mL, Once  polyethylene glycol-electrolytes, 4,000 mL, Once  [Held by provider] sacubitriL-valsartan, 1 tablet, BID  sennosides-docusate sodium, 2 tablet, BID  sertraline, 25 mg, Daily  [Held by provider] spironolactone, 50 mg, Daily      PRN:  acetaminophen, 650 mg, q6h PRN  ondansetron, 4 mg, q6h PRN  oxygen, , Continuous PRN - O2/gases  polyethylene glycol, 17 g, Daily PRN      Invasive Hemodynamics:    Most Recent Range Past 24hrs   BP (Art)   No data recorded   MAP(Art)   No data recorded   RA/CVP   No data recorded   PA   No data recorded   PA(mean)   No data recorded   PCWP   No data recorded   CO   No data recorded   CI   No data recorded   Mixed Venous   No data recorded   SVR    No data recorded   PVR   No data recorded     MCS:   Heart Mate III:     Most Recent Range Past 24hrs   Flow 4.8 Pump Flow  Min: 4.6  Max: 4.9   Speed 5500 Speed RPM  Min: 5500  Max: 5550   Power 4.3    PI 3.5 Pulse Index  Min: 3.5  Max: 4     ECMO:     Most Recent Range Past 24hrs   Flow   No data recorded   Speed   No data recorded   Sweep   No data recorded     Impella:      Most Recent Range Past 24hrs   Performance Level   No data recorded   Flow (L/min)   No data recorded  "  Motor Current   No data recorded   Placement Signal    Placement OK could not be evaluated. This SmartLink does not work with rows of the type: Custom List   Purge (mmHg)   No data recorded   Purge rate (mL/hr)   No data recorded     VENT:    Most Recent Range Past 24hrs   Mode      FiO2   No data recorded   Rate   No data recorded   Vt    No data recorded   PEEP   No data recorded     PHYSICAL EXAM:   Visit Vitals  BP (!) 65/65 Comment: MAP 65   Pulse 79   Temp 36.1 °C (97 °F) (Temporal)   Resp 21   Ht 1.753 m (5' 9\")   Wt 103 kg (227 lb 1.2 oz)   SpO2 99%   BMI 33.53 kg/m²   Smoking Status Former   BSA 2.24 m²       Wt Readings from Last 5 Encounters:   02/25/24 103 kg (227 lb 1.2 oz)   02/23/24 105 kg (230 lb 9.6 oz)   02/19/24 107 kg (235 lb)   12/08/23 101 kg (223 lb 3.2 oz)   09/20/23 88.5 kg (195 lb)       INTAKE/OUTPUT:  I/O last 3 completed shifts:  In: 940.4 (9.1 mL/kg) [P.O.:330; I.V.:260.4 (2.5 mL/kg); Blood:350]  Out: 2400 (23.3 mL/kg) [Urine:2400 (0.6 mL/kg/hr)]  Weight: 103 kg      Physical Exam  Gen - well appearing   Chest - CTAB  Neck - No JVD  Cardiac - mechanical whirring from VAD  LE - no edema     DATA:  CMP:  Results from last 7 days   Lab Units 02/25/24  0208 02/24/24  0233 02/23/24  1433   SODIUM mmol/L 136 139 137   POTASSIUM mmol/L 4.2 3.9 4.0   CHLORIDE mmol/L 103 105 100   CO2 mmol/L 24 26 26   ANION GAP mmol/L 13 12 15   BUN mg/dL 20 39* 44*   CREATININE mg/dL 0.95 1.08 1.30   EGFR mL/min/1.73m*2 90 77 62   MAGNESIUM mg/dL 2.24 2.11 2.22   ALBUMIN g/dL 3.8 3.2* 4.0   ALT U/L  --   --  13   AST U/L  --   --  11   BILIRUBIN TOTAL mg/dL  --   --  0.4       CBC:  Results from last 7 days   Lab Units 02/25/24  0210 02/24/24  1955 02/24/24  1255 02/24/24  0341 02/24/24  0233 02/23/24  1433   WBC AUTO x10*3/uL 9.0 7.4 9.1 7.1 7.8 10.5   HEMOGLOBIN g/dL 10.0* 8.7* 8.6* 7.2* 7.4* 9.0*   HEMATOCRIT % 30.9* 26.8* 26.9* 23.0* 23.0* 28.7*   PLATELETS AUTO x10*3/uL 246 210 211 215 221 252   MCV fL " 99 100 100 103* 103* 103*       COAG:   Results from last 7 days   Lab Units 02/25/24  0208 02/24/24  0233 02/23/24  1433   INR  1.8* 2.6* 1.9*       ABO:   ABO TYPE   Date Value Ref Range Status   02/23/2024 AB  Final     HEME/ENDO:  Results from last 7 days   Lab Units 02/23/24  1433   FERRITIN ng/mL 104   IRON SATURATION % 18*   TSH mIU/L 18.02*   HEMOGLOBIN A1C % 4.4        CARDIAC:   Results from last 7 days   Lab Units 02/25/24  0208 02/24/24  0233 02/23/24  1433   LD U/L 142 96 128   TROPHS ng/L  --   --  30   BNP pg/mL  --   --  33         ASSESSMENT AND PLAN:   63M PMHx ICM/HFrEF(10-15%) s/p HM3 VAD 4/23, pAF, VT s/p ICD, hypothyroidism. P/w weakness, SOB, found to be hypotensive and with significant anemia and melena.     Neuro:  #Depression  - C/w home Zoloft 25 mg   - Serial neuro and pain assessments   - PO Tylenol PRN for pain  - PT/OT Consult, OOB to chair  - CAM ICU score every shift  - Sleep/wake cycle normalization     # Physical Status  - Obesity, BMI 34  - Patient unable to walk due to shortness of breath  - Weight gain of 75 pounds over the last year      Cardiovascular:  #HM3  Daily VAD Interrogation   Device: HM3  Speed: 5500  Power: 4.2  Flow: 4.6  PI: 3.6  LDH: 96  INR: 2.6  Long term (pts choice; does not wish to proceed with OHT eval)   LVAD interrogation reveals no alarms or power spikes, no reported LVAD alarms   Weekly dressing change     Medications:  ASA  Coumadin 5 mg (Sunday, Tuesday, Thursday, Saturday) 3.5 mg (Monday, Wednesday, Friday) goal INR 2-3   PPI  Fish oil        #Stage D chronic systolic HF/ICM/HFrEF s/p HM3 LVAD (4/30/23)   Repeat echo 2/23/24 no effusion, overall no change from prior  - Admit BNP 33  - Remains off BB due to RV dysfunction  - HOLDING Entresto 24/26 BID given bleed  - restart after colonoscopy if MAP ok  - C/w Farxiga   - Continue aldactone 50 daily  - C/w digoxin 125 mcg daily for RV support, level 0.98  - Holding home torsemide, will evaluate diuretic  needs based on assessment   - Daily standing weights, 2gm sodium diet, 2L fluid restriction, strict I&Os     #History of VT and pAF  - C/w amiodarone 200 mg daily   - Hold home coumadin for procedures   - AC as above  - Abbott dual chamber AICD - Device interrogation ordered      #Electrolyte Disturbances  - Maintain K>4, Mg >2   - Daily RFP  - Replete electrolytes as needed     Pulmonary:   #Ex smoker, 30 year smoking history   - C/w home Symbicort  - Monitor and maintain SpO2 > 92%     GI:  #Melena c/f GIB  Pt with 2 weeks of melena, dropping Hb.   - Holding AC as above, will continue to hold until CBC stabilizes  - Protonix 40 BID   - GI consulted, EGD showing gastritis, h pylori antigen pending  - Plan for colonoscopy 2/26, prep ordered     :  #No hx of DEBBIE or CKD  #Hx of retention   - Was on tamulosin previously, can reconsider if needed   - I/Os  - Avoid hypotension and nephrotoxic agents     Heme:  #No history of anemia  - Labs: B12/folate wnl, iron/ferritin wnl, low Tsat (18%)  - Started venofer IV for 3d     #Hx of bilateral non-occlusive internal jugular clots  - Patient hasn't had recent US to rule out since 4/25/23  - Consider redoing US     #Chronic AC  - Holding home coumadin, hx of HIT so started bival gtt, HELD now iso possible bleed, restart after colo if ok     Endo:  #No hx of DM  - Euglycemic  - hgbA1c 4.4     #Hypothyroid  - C/w synthroid 175 mcg daily   - TSH elevated - likely in acute setting     Msk:  #H/o right axillary repair with right brachial plexus injury and residual right hand numbness  - CT head and neck wo contrast 5/3 showed degenerative changes C5-C6   - Previously had neurology referral for EMG and further mx      ID:  #No active issues  -afebrile, nontoxic   -no s/s infx  -trend temps q4h     PHYSICAL AND OCCUPATIONAL THERAPY: ordered and following      LINES:  PIVs      DVT: Bivalrudin gtt and SCDs  VAP BUNDLE: N/A  ULCER PPX: PPI  GLYCEMIC CONTROL: N/A  BOWEL CARE: Colace  and Miralax PRN   INDWELLING CATHETER: N/A  NUTRITION: NPO Diet; Effective now     EMERGENCY CONTACT: Extended Emergency Contact Information  Primary Emergency Contact: Janice Ramos  Home Phone: [phone]  Relation: Friend  FAMILY UPDATE:  CODE STATUS: Full Code  DISPO:     Patient seen and assessed with Dr. Hylton    _________________________________________________  Clint Arriaza MD

## 2024-02-25 NOTE — CARE PLAN
Problem: Heart Failure  Goal: Improved gas exchange this shift  Outcome: Progressing  Goal: Improved urinary output this shift  Outcome: Progressing  Goal: Reduction in peripheral edema within 24 hours  Outcome: Progressing  Goal: Report improvement of dyspnea/breathlessness this shift  Outcome: Progressing  Goal: Weight from fluid excess reduced over 2-3 days, then stabilize  Outcome: Progressing  Goal: Increase self care and/or family involvement in 24 hours  Outcome: Progressing     Problem: Safety - Adult  Goal: Free from fall injury  Outcome: Progressing     Problem: Discharge Planning  Goal: Discharge to home or other facility with appropriate resources  Outcome: Progressing     Problem: Chronic Conditions and Co-morbidities  Goal: Patient's chronic conditions and co-morbidity symptoms are monitored and maintained or improved  Outcome: Progressing     Problem: Ventricular Assisted Device (VAD)  Goal: Hemodynamic stability, correction of coagulopathy, lab value stability  Outcome: Progressing  Goal: Wean vasopressors/nitric  Outcome: Progressing  Goal: Wean ventilator  Outcome: Progressing  Goal: Extubation  Outcome: Progressing  Goal: Stable metal status  Outcome: Progressing  Goal: Pulmonary toileting, incentive spirometry  Outcome: Progressing  Goal: Nutrition  Outcome: Progressing  Goal: Mobility/OT/PT  Outcome: Progressing  Goal: Rubber ball  Outcome: Progressing  Goal: ROM  Outcome: Progressing  Goal: Sitting  Outcome: Progressing  Goal: Walk  Outcome: Progressing  Goal: Involve in VAD awareness, dressing change  Outcome: Progressing  Goal: AICD On/Off  Outcome: Progressing   The patient's goals for the shift include remain comfortable and safe    The clinical goals for the shift include patient will remain hemodynamically stable

## 2024-02-26 ENCOUNTER — APPOINTMENT (OUTPATIENT)
Dept: GASTROENTEROLOGY | Facility: HOSPITAL | Age: 64
DRG: 377 | End: 2024-02-26
Payer: MEDICARE

## 2024-02-26 ENCOUNTER — ANESTHESIA (OUTPATIENT)
Dept: GASTROENTEROLOGY | Facility: HOSPITAL | Age: 64
DRG: 377 | End: 2024-02-26
Payer: MEDICARE

## 2024-02-26 ENCOUNTER — ANESTHESIA EVENT (OUTPATIENT)
Dept: GASTROENTEROLOGY | Facility: HOSPITAL | Age: 64
DRG: 377 | End: 2024-02-26
Payer: MEDICARE

## 2024-02-26 PROBLEM — Z79.01 ANTICOAGULANT LONG-TERM USE: Status: ACTIVE | Noted: 2024-02-26

## 2024-02-26 PROBLEM — K92.2 GI BLEED: Status: ACTIVE | Noted: 2024-02-26

## 2024-02-26 LAB
ALBUMIN SERPL BCP-MCNC: 3.4 G/DL (ref 3.4–5)
ANION GAP SERPL CALC-SCNC: 11 MMOL/L (ref 10–20)
ANION GAP SERPL CALC-SCNC: 11 MMOL/L (ref 10–20)
APTT PPP: 35 SECONDS (ref 27–38)
ATRIAL RATE: 75 BPM
BUN SERPL-MCNC: 11 MG/DL (ref 6–23)
CALCIUM SERPL-MCNC: 7.8 MG/DL (ref 8.6–10.6)
CHLORIDE SERPL-SCNC: 105 MMOL/L (ref 98–107)
CHLORIDE SERPL-SCNC: 105 MMOL/L (ref 98–107)
CK SERPL-CCNC: 55 U/L (ref 0–325)
CO2 SERPL-SCNC: 25 MMOL/L (ref 21–32)
CO2 SERPL-SCNC: 26 MMOL/L (ref 21–32)
CREAT SERPL-MCNC: 0.83 MG/DL (ref 0.5–1.3)
EGFRCR SERPLBLD CKD-EPI 2021: >90 ML/MIN/1.73M*2
ERYTHROCYTE [DISTWIDTH] IN BLOOD BY AUTOMATED COUNT: 18.4 % (ref 11.5–14.5)
GLUCOSE SERPL-MCNC: 85 MG/DL (ref 74–99)
HCT VFR BLD AUTO: 25.1 % (ref 41–52)
HGB BLD-MCNC: 8.3 G/DL (ref 13.5–17.5)
INR PPP: 1.5 (ref 0.9–1.1)
LDH SERPL L TO P-CCNC: 167 U/L (ref 84–246)
MAGNESIUM SERPL-MCNC: 2.2 MG/DL (ref 1.6–2.4)
MCH RBC QN AUTO: 32.9 PG (ref 26–34)
MCHC RBC AUTO-ENTMCNC: 33.1 G/DL (ref 32–36)
MCV RBC AUTO: 100 FL (ref 80–100)
MITRAL VALVE E/A RATIO: 0.73
MITRAL VALVE E/E' RATIO: 9.33
NRBC BLD-RTO: 0.3 /100 WBCS (ref 0–0)
P OFFSET: 134 MS
P ONSET: 114 MS
PHOSPHATE SERPL-MCNC: 2.3 MG/DL (ref 2.5–4.9)
PLATELET # BLD AUTO: 195 X10*3/UL (ref 150–450)
POTASSIUM SERPL-SCNC: 3.4 MMOL/L (ref 3.5–5.3)
POTASSIUM SERPL-SCNC: 3.9 MMOL/L (ref 3.5–5.3)
POTASSIUM SERPL-SCNC: 4.3 MMOL/L (ref 3.5–5.3)
PROTHROMBIN TIME: 17.3 SECONDS (ref 9.8–12.8)
Q ONSET: 208 MS
QRS COUNT: 12 BEATS
QRS DURATION: 128 MS
QT INTERVAL: 424 MS
QTC CALCULATION(BAZETT): 473 MS
QTC FREDERICIA: 456 MS
R AXIS: 252 DEGREES
RBC # BLD AUTO: 2.52 X10*6/UL (ref 4.5–5.9)
RIGHT VENTRICLE FREE WALL PEAK S': 0.6 CM/S
SODIUM SERPL-SCNC: 137 MMOL/L (ref 136–145)
SODIUM SERPL-SCNC: 138 MMOL/L (ref 136–145)
T AXIS: 57 DEGREES
T OFFSET: 420 MS
T4 FREE SERPL-MCNC: 1.21 NG/DL (ref 0.78–1.48)
THYROPEROXIDASE AB SERPL-ACNC: >1000 IU/ML
TRICUSPID ANNULAR PLANE SYSTOLIC EXCURSION: 1.4 CM
TSH SERPL-ACNC: 11.49 MIU/L (ref 0.44–3.98)
TSH SERPL-ACNC: 11.49 MIU/L (ref 0.44–3.98)
VENTRICULAR RATE: 75 BPM
WBC # BLD AUTO: 7.2 X10*3/UL (ref 4.4–11.3)

## 2024-02-26 PROCEDURE — 91110 GI TRC IMG INTRAL ESOPH-ILE: CPT | Performed by: INTERNAL MEDICINE

## 2024-02-26 PROCEDURE — 3700000002 HC GENERAL ANESTHESIA TIME - EACH INCREMENTAL 1 MINUTE: Performed by: INTERNAL MEDICINE

## 2024-02-26 PROCEDURE — 2500000004 HC RX 250 GENERAL PHARMACY W/ HCPCS (ALT 636 FOR OP/ED): Performed by: NURSE PRACTITIONER

## 2024-02-26 PROCEDURE — 7100000010 HC PHASE TWO TIME - EACH INCREMENTAL 1 MINUTE: Performed by: INTERNAL MEDICINE

## 2024-02-26 PROCEDURE — 2500000002 HC RX 250 W HCPCS SELF ADMINISTERED DRUGS (ALT 637 FOR MEDICARE OP, ALT 636 FOR OP/ED)

## 2024-02-26 PROCEDURE — 2500000005 HC RX 250 GENERAL PHARMACY W/O HCPCS: Performed by: NURSE PRACTITIONER

## 2024-02-26 PROCEDURE — 82550 ASSAY OF CK (CPK): CPT | Performed by: NURSE PRACTITIONER

## 2024-02-26 PROCEDURE — 85027 COMPLETE CBC AUTOMATED: CPT

## 2024-02-26 PROCEDURE — 45378 DIAGNOSTIC COLONOSCOPY: CPT | Performed by: INTERNAL MEDICINE

## 2024-02-26 PROCEDURE — 84132 ASSAY OF SERUM POTASSIUM: CPT

## 2024-02-26 PROCEDURE — 2500000005 HC RX 250 GENERAL PHARMACY W/O HCPCS

## 2024-02-26 PROCEDURE — 80069 RENAL FUNCTION PANEL: CPT

## 2024-02-26 PROCEDURE — 83735 ASSAY OF MAGNESIUM: CPT

## 2024-02-26 PROCEDURE — 84132 ASSAY OF SERUM POTASSIUM: CPT | Performed by: NURSE PRACTITIONER

## 2024-02-26 PROCEDURE — 2720000007 HC OR 272 NO HCPCS: Performed by: INTERNAL MEDICINE

## 2024-02-26 PROCEDURE — A45378 PR COLONOSCOPY,DIAGNOSTIC

## 2024-02-26 PROCEDURE — 36415 COLL VENOUS BLD VENIPUNCTURE: CPT

## 2024-02-26 PROCEDURE — 7100000009 HC PHASE TWO TIME - INITIAL BASE CHARGE: Performed by: INTERNAL MEDICINE

## 2024-02-26 PROCEDURE — 99291 CRITICAL CARE FIRST HOUR: CPT | Performed by: INTERNAL MEDICINE

## 2024-02-26 PROCEDURE — 83615 LACTATE (LD) (LDH) ENZYME: CPT

## 2024-02-26 PROCEDURE — 2500000004 HC RX 250 GENERAL PHARMACY W/ HCPCS (ALT 636 FOR OP/ED)

## 2024-02-26 PROCEDURE — 2500000001 HC RX 250 WO HCPCS SELF ADMINISTERED DRUGS (ALT 637 FOR MEDICARE OP): Performed by: STUDENT IN AN ORGANIZED HEALTH CARE EDUCATION/TRAINING PROGRAM

## 2024-02-26 PROCEDURE — 93750 INTERROGATION VAD IN PERSON: CPT | Performed by: INTERNAL MEDICINE

## 2024-02-26 PROCEDURE — 0DJD8ZZ INSPECTION OF LOWER INTESTINAL TRACT, VIA NATURAL OR ARTIFICIAL OPENING ENDOSCOPIC: ICD-10-PCS | Performed by: INTERNAL MEDICINE

## 2024-02-26 PROCEDURE — A45378 PR COLONOSCOPY,DIAGNOSTIC: Performed by: ANESTHESIOLOGY

## 2024-02-26 PROCEDURE — 2500000001 HC RX 250 WO HCPCS SELF ADMINISTERED DRUGS (ALT 637 FOR MEDICARE OP)

## 2024-02-26 PROCEDURE — C9113 INJ PANTOPRAZOLE SODIUM, VIA: HCPCS | Performed by: NURSE PRACTITIONER

## 2024-02-26 PROCEDURE — 84443 ASSAY THYROID STIM HORMONE: CPT | Performed by: STUDENT IN AN ORGANIZED HEALTH CARE EDUCATION/TRAINING PROGRAM

## 2024-02-26 PROCEDURE — 84443 ASSAY THYROID STIM HORMONE: CPT | Performed by: NURSE PRACTITIONER

## 2024-02-26 PROCEDURE — 2500000002 HC RX 250 W HCPCS SELF ADMINISTERED DRUGS (ALT 637 FOR MEDICARE OP, ALT 636 FOR OP/ED): Mod: MUE | Performed by: NURSE PRACTITIONER

## 2024-02-26 PROCEDURE — 99233 SBSQ HOSP IP/OBS HIGH 50: CPT | Performed by: NURSE PRACTITIONER

## 2024-02-26 PROCEDURE — 86376 MICROSOMAL ANTIBODY EACH: CPT | Performed by: STUDENT IN AN ORGANIZED HEALTH CARE EDUCATION/TRAINING PROGRAM

## 2024-02-26 PROCEDURE — 2500000002 HC RX 250 W HCPCS SELF ADMINISTERED DRUGS (ALT 637 FOR MEDICARE OP, ALT 636 FOR OP/ED): Performed by: NURSE PRACTITIONER

## 2024-02-26 PROCEDURE — 1200000002 HC GENERAL ROOM WITH TELEMETRY DAILY

## 2024-02-26 PROCEDURE — 99233 SBSQ HOSP IP/OBS HIGH 50: CPT | Performed by: STUDENT IN AN ORGANIZED HEALTH CARE EDUCATION/TRAINING PROGRAM

## 2024-02-26 PROCEDURE — 2500000002 HC RX 250 W HCPCS SELF ADMINISTERED DRUGS (ALT 637 FOR MEDICARE OP, ALT 636 FOR OP/ED): Mod: MUE

## 2024-02-26 PROCEDURE — 85730 THROMBOPLASTIN TIME PARTIAL: CPT

## 2024-02-26 PROCEDURE — 84439 ASSAY OF FREE THYROXINE: CPT | Performed by: STUDENT IN AN ORGANIZED HEALTH CARE EDUCATION/TRAINING PROGRAM

## 2024-02-26 PROCEDURE — 99222 1ST HOSP IP/OBS MODERATE 55: CPT | Performed by: STUDENT IN AN ORGANIZED HEALTH CARE EDUCATION/TRAINING PROGRAM

## 2024-02-26 PROCEDURE — 3700000001 HC GENERAL ANESTHESIA TIME - INITIAL BASE CHARGE: Performed by: INTERNAL MEDICINE

## 2024-02-26 RX ORDER — PROPOFOL 10 MG/ML
INJECTION, EMULSION INTRAVENOUS AS NEEDED
Status: DISCONTINUED | OUTPATIENT
Start: 2024-02-26 | End: 2024-02-26

## 2024-02-26 RX ORDER — SODIUM CHLORIDE, SODIUM LACTATE, POTASSIUM CHLORIDE, CALCIUM CHLORIDE 600; 310; 30; 20 MG/100ML; MG/100ML; MG/100ML; MG/100ML
INJECTION, SOLUTION INTRAVENOUS CONTINUOUS PRN
Status: DISCONTINUED | OUTPATIENT
Start: 2024-02-26 | End: 2024-02-26

## 2024-02-26 RX ORDER — SODIUM CHLORIDE, SODIUM LACTATE, POTASSIUM CHLORIDE, CALCIUM CHLORIDE 600; 310; 30; 20 MG/100ML; MG/100ML; MG/100ML; MG/100ML
100 INJECTION, SOLUTION INTRAVENOUS CONTINUOUS
Status: CANCELLED | OUTPATIENT
Start: 2024-02-26

## 2024-02-26 RX ORDER — POTASSIUM CHLORIDE 20 MEQ/1
20 TABLET, EXTENDED RELEASE ORAL ONCE
Status: DISCONTINUED | OUTPATIENT
Start: 2024-02-26 | End: 2024-02-26

## 2024-02-26 RX ORDER — PROPOFOL 10 MG/ML
INJECTION, EMULSION INTRAVENOUS CONTINUOUS PRN
Status: DISCONTINUED | OUTPATIENT
Start: 2024-02-26 | End: 2024-02-26

## 2024-02-26 RX ORDER — LIDOCAINE HCL/PF 100 MG/5ML
SYRINGE (ML) INTRAVENOUS AS NEEDED
Status: DISCONTINUED | OUTPATIENT
Start: 2024-02-26 | End: 2024-02-26

## 2024-02-26 RX ORDER — POTASSIUM CHLORIDE 20 MEQ/1
40 TABLET, EXTENDED RELEASE ORAL ONCE
Status: CANCELLED | OUTPATIENT
Start: 2024-02-26 | End: 2024-02-26

## 2024-02-26 RX ORDER — POTASSIUM CHLORIDE 20 MEQ/1
40 TABLET, EXTENDED RELEASE ORAL DAILY
Status: DISCONTINUED | OUTPATIENT
Start: 2024-02-26 | End: 2024-02-29

## 2024-02-26 RX ORDER — LIDOCAINE HYDROCHLORIDE 10 MG/ML
0.1 INJECTION, SOLUTION EPIDURAL; INFILTRATION; INTRACAUDAL; PERINEURAL ONCE
Status: CANCELLED | OUTPATIENT
Start: 2024-02-26 | End: 2024-02-26

## 2024-02-26 RX ADMIN — Medication 2 L/MIN: at 11:10

## 2024-02-26 RX ADMIN — PANTOPRAZOLE SODIUM 40 MG: 40 INJECTION, POWDER, FOR SOLUTION INTRAVENOUS at 08:24

## 2024-02-26 RX ADMIN — PROPOFOL 50 MG: 10 INJECTION, EMULSION INTRAVENOUS at 12:52

## 2024-02-26 RX ADMIN — POTASSIUM CHLORIDE 40 MEQ: 1500 TABLET, EXTENDED RELEASE ORAL at 15:03

## 2024-02-26 RX ADMIN — SENNOSIDES AND DOCUSATE SODIUM 2 TABLET: 8.6; 5 TABLET ORAL at 20:23

## 2024-02-26 RX ADMIN — LIDOCAINE 1 PATCH: 4 PATCH TOPICAL at 08:25

## 2024-02-26 RX ADMIN — DIGOXIN 125 MCG: 125 TABLET ORAL at 08:23

## 2024-02-26 RX ADMIN — DAPAGLIFLOZIN 10 MG: 10 TABLET, FILM COATED ORAL at 08:23

## 2024-02-26 RX ADMIN — PROPOFOL 50 MG: 10 INJECTION, EMULSION INTRAVENOUS at 12:46

## 2024-02-26 RX ADMIN — SERTRALINE HYDROCHLORIDE 25 MG: 50 TABLET ORAL at 08:23

## 2024-02-26 RX ADMIN — LIDOCAINE HYDROCHLORIDE 50 MG: 20 INJECTION INTRAVENOUS at 12:46

## 2024-02-26 RX ADMIN — PROPOFOL 200 MCG/KG/MIN: 10 INJECTION, EMULSION INTRAVENOUS at 12:46

## 2024-02-26 RX ADMIN — SACUBITRIL AND VALSARTAN 1 TABLET: 24; 26 TABLET, FILM COATED ORAL at 20:23

## 2024-02-26 RX ADMIN — SPIRONOLACTONE 50 MG: 25 TABLET, FILM COATED ORAL at 08:23

## 2024-02-26 RX ADMIN — SODIUM CHLORIDE, POTASSIUM CHLORIDE, SODIUM LACTATE AND CALCIUM CHLORIDE: 600; 310; 30; 20 INJECTION, SOLUTION INTRAVENOUS at 12:42

## 2024-02-26 RX ADMIN — LEVOTHYROXINE SODIUM 175 MCG: 25 TABLET ORAL at 05:26

## 2024-02-26 RX ADMIN — AMIODARONE HYDROCHLORIDE 200 MG: 200 TABLET ORAL at 08:23

## 2024-02-26 RX ADMIN — Medication 5 MG: at 20:20

## 2024-02-26 RX ADMIN — PANTOPRAZOLE SODIUM 40 MG: 40 INJECTION, POWDER, FOR SOLUTION INTRAVENOUS at 20:23

## 2024-02-26 SDOH — ECONOMIC STABILITY: INCOME INSECURITY: IN THE LAST 12 MONTHS, WAS THERE A TIME WHEN YOU WERE NOT ABLE TO PAY THE MORTGAGE OR RENT ON TIME?: NO

## 2024-02-26 SDOH — SOCIAL STABILITY: SOCIAL INSECURITY: WITHIN THE LAST YEAR, HAVE YOU BEEN HUMILIATED OR EMOTIONALLY ABUSED IN OTHER WAYS BY YOUR PARTNER OR EX-PARTNER?: NO

## 2024-02-26 SDOH — SOCIAL STABILITY: SOCIAL INSECURITY
WITHIN THE LAST YEAR, HAVE TO BEEN RAPED OR FORCED TO HAVE ANY KIND OF SEXUAL ACTIVITY BY YOUR PARTNER OR EX-PARTNER?: NO

## 2024-02-26 SDOH — ECONOMIC STABILITY: INCOME INSECURITY: IN THE PAST 12 MONTHS, HAS THE ELECTRIC, GAS, OIL, OR WATER COMPANY THREATENED TO SHUT OFF SERVICE IN YOUR HOME?: NO

## 2024-02-26 SDOH — SOCIAL STABILITY: SOCIAL INSECURITY
WITHIN THE LAST YEAR, HAVE YOU BEEN KICKED, HIT, SLAPPED, OR OTHERWISE PHYSICALLY HURT BY YOUR PARTNER OR EX-PARTNER?: NO

## 2024-02-26 SDOH — ECONOMIC STABILITY: HOUSING INSECURITY
IN THE LAST 12 MONTHS, WAS THERE A TIME WHEN YOU DID NOT HAVE A STEADY PLACE TO SLEEP OR SLEPT IN A SHELTER (INCLUDING NOW)?: NO

## 2024-02-26 SDOH — ECONOMIC STABILITY: FOOD INSECURITY: WITHIN THE PAST 12 MONTHS, YOU WORRIED THAT YOUR FOOD WOULD RUN OUT BEFORE YOU GOT MONEY TO BUY MORE.: NEVER TRUE

## 2024-02-26 SDOH — ECONOMIC STABILITY: HOUSING INSECURITY: IN THE LAST 12 MONTHS, HOW MANY PLACES HAVE YOU LIVED?: 1

## 2024-02-26 SDOH — ECONOMIC STABILITY: FOOD INSECURITY: WITHIN THE PAST 12 MONTHS, THE FOOD YOU BOUGHT JUST DIDN'T LAST AND YOU DIDN'T HAVE MONEY TO GET MORE.: NEVER TRUE

## 2024-02-26 SDOH — SOCIAL STABILITY: SOCIAL INSECURITY: WITHIN THE LAST YEAR, HAVE YOU BEEN AFRAID OF YOUR PARTNER OR EX-PARTNER?: NO

## 2024-02-26 SDOH — ECONOMIC STABILITY: TRANSPORTATION INSECURITY
IN THE PAST 12 MONTHS, HAS LACK OF TRANSPORTATION KEPT YOU FROM MEETINGS, WORK, OR FROM GETTING THINGS NEEDED FOR DAILY LIVING?: NO

## 2024-02-26 SDOH — ECONOMIC STABILITY: TRANSPORTATION INSECURITY
IN THE PAST 12 MONTHS, HAS THE LACK OF TRANSPORTATION KEPT YOU FROM MEDICAL APPOINTMENTS OR FROM GETTING MEDICATIONS?: NO

## 2024-02-26 SDOH — ECONOMIC STABILITY: INCOME INSECURITY: HOW HARD IS IT FOR YOU TO PAY FOR THE VERY BASICS LIKE FOOD, HOUSING, MEDICAL CARE, AND HEATING?: NOT HARD AT ALL

## 2024-02-26 ASSESSMENT — PAIN SCALES - GENERAL
PAINLEVEL_OUTOF10: 0 - NO PAIN

## 2024-02-26 ASSESSMENT — PAIN - FUNCTIONAL ASSESSMENT
PAIN_FUNCTIONAL_ASSESSMENT: 0-10

## 2024-02-26 ASSESSMENT — COGNITIVE AND FUNCTIONAL STATUS - GENERAL
MOBILITY SCORE: 24
DAILY ACTIVITIY SCORE: 24

## 2024-02-26 NOTE — CARE PLAN
The patient's goals for the shift include      The clinical goals for the shift include Pt will maintain map between 60-80 until end of shift 2/26/24 0730    Over the shift, the patient made progress toward the following goals. Barriers to progression include increase in oxygen requirementts.Pt required 2l nc overnight for saturation level 84-89% asleep room air. Pt placed on 2 liters.  Recommendations to address these barriers include using IS while awake.

## 2024-02-26 NOTE — PROGRESS NOTES
"Anatoly Lane is a 63 y.o. male on day 3 of admission presenting with Acute decompensated heart failure (CMS/HCC).    Subjective   HD stable, no other acute events overnight.  Patient sitting up in chair with no c/o chest pain or SOB.  Bowel prep complete, patient reports near clear BM's.       Plan:  -Colonoscopy today--> negative for active bleed; capsule study   -Restarted Entresto   -Restart home Coumadin after colonoscopy per GI recommendation  -Discontinue Amiodarone   -Consult Endo for elevated TSH   -Consider RHC/LHC 2/27 pending colonoscopy and Endo recs       Objective     Physical Exam  Constitutional:       General: He is not in acute distress.     Appearance: Normal appearance.   HENT:      Head: Normocephalic.   Eyes:      Extraocular Movements: Extraocular movements intact.      Pupils: Pupils are equal, round, and reactive to light.   Cardiovascular:      Rate and Rhythm: Normal rate.      Pulses: Normal pulses.      Comments: +LVAD hums   Pulmonary:      Effort: Pulmonary effort is normal. No respiratory distress.      Breath sounds: Normal breath sounds. No wheezing.   Abdominal:      General: Bowel sounds are normal.   Musculoskeletal:         General: Normal range of motion.   Skin:     General: Skin is warm and dry.      Capillary Refill: Capillary refill takes less than 2 seconds.   Neurological:      General: No focal deficit present.      Mental Status: He is alert and oriented to person, place, and time. Mental status is at baseline.   Psychiatric:         Mood and Affect: Mood normal.         Behavior: Behavior normal.         Last Recorded Vitals  Blood pressure 78/78, pulse 61, temperature 35.9 °C (96.6 °F), temperature source Temporal, resp. rate 11, height 1.753 m (5' 9\"), weight 101 kg (223 lb 8.7 oz), SpO2 98 %.        Intake/Output last 3 Shifts:  I/O last 3 completed shifts:  In: 917.7 (9.1 mL/kg) [P.O.:855; I.V.:62.7 (0.6 mL/kg)]  Out: 1600 (15.8 mL/kg) [Urine:1600 (0.4 " mL/kg/hr)]  Weight: 101.4 kg     Relevant Results  Results for orders placed or performed during the hospital encounter of 02/23/24 (from the past 24 hour(s))   CBC   Result Value Ref Range    WBC 9.0 4.4 - 11.3 x10*3/uL    nRBC 0.2 (H) 0.0 - 0.0 /100 WBCs    RBC 2.58 (L) 4.50 - 5.90 x10*6/uL    Hemoglobin 8.3 (L) 13.5 - 17.5 g/dL    Hematocrit 26.8 (L) 41.0 - 52.0 %     (H) 80 - 100 fL    MCH 32.2 26.0 - 34.0 pg    MCHC 31.0 (L) 32.0 - 36.0 g/dL    RDW 18.4 (H) 11.5 - 14.5 %    Platelets 259 150 - 450 x10*3/uL   CBC   Result Value Ref Range    WBC 7.2 4.4 - 11.3 x10*3/uL    nRBC 0.3 (H) 0.0 - 0.0 /100 WBCs    RBC 2.52 (L) 4.50 - 5.90 x10*6/uL    Hemoglobin 8.3 (L) 13.5 - 17.5 g/dL    Hematocrit 25.1 (L) 41.0 - 52.0 %     80 - 100 fL    MCH 32.9 26.0 - 34.0 pg    MCHC 33.1 32.0 - 36.0 g/dL    RDW 18.4 (H) 11.5 - 14.5 %    Platelets 195 150 - 450 x10*3/uL   Renal function panel   Result Value Ref Range    Glucose 85 74 - 99 mg/dL    Sodium 138 136 - 145 mmol/L    Potassium 3.9 3.5 - 5.3 mmol/L    Chloride 105 98 - 107 mmol/L    Bicarbonate 26 21 - 32 mmol/L    Anion Gap 11 10 - 20 mmol/L    Urea Nitrogen 11 6 - 23 mg/dL    Creatinine 0.83 0.50 - 1.30 mg/dL    eGFR >90 >60 mL/min/1.73m*2    Calcium 7.8 (L) 8.6 - 10.6 mg/dL    Phosphorus 2.3 (L) 2.5 - 4.9 mg/dL    Albumin 3.4 3.4 - 5.0 g/dL   Magnesium   Result Value Ref Range    Magnesium 2.20 1.60 - 2.40 mg/dL   Coagulation Screen   Result Value Ref Range    Protime 17.3 (H) 9.8 - 12.8 seconds    INR 1.5 (H) 0.9 - 1.1    aPTT 35 27 - 38 seconds   Lactate Dehydrogenase   Result Value Ref Range     84 - 246 U/L   Potassium   Result Value Ref Range    Potassium 3.4 (L) 3.5 - 5.3 mmol/L   Creatine Kinase   Result Value Ref Range    Creatine Kinase 55 0 - 325 U/L        Assessment & Plan:  63 y.o. male with a PMH of stage D HFrEF 10-15%, ICM, s/p HM3 4/30/2023 CAD,  pAF, VT s/p ICD, and hypothyroidism who is being admitted to HFICU for worsening  shortness of breath, weight gain, and hypotension.  Hospital course complicated by GIB on 2/24 and Elevated TSH.  Endocopy on 2/25 w/o evidence of bleeding, plan for colonoscopy 2/26.  Endo consulted and po amiodarone stopped.      Neuro:  #Depression  - C/w home Zoloft 25 mg   - Serial neuro and pain assessments   - PO Tylenol PRN for pain  - PT/OT Consult, OOB to chair  - CAM ICU score every shift  - Sleep/wake cycle normalization     # Physical Status  - Obesity, BMI 34  - Patient unable to walk due to shortness of breath  - Weight gain of 75 pounds over the last year     Cardiovascular:  #HM3  Daily VAD Interrogation   Device: HM3  Speed: 5500  Power: 4.2  Flow: 4.8  PI: 3.8  LDH: 167  INR: 1.5 (2/26)  Long term (pts choice; does not wish to proceed with OHT eval)   LVAD interrogation reveals no alarms or power spikes, no reported LVAD alarms   Weekly dressing change, due 3/1     Medications:  ASA (on hold due to GI bleed)  Coumadin 5 mg (Sunday, Tuesday, Thursday, Saturday) 3.5 mg (Monday, Wednesday, Friday) goal INR 2-3 (on hold due to GI bleed); currently on hold  Pantoprazole 40 mg BID  Fish oil       #Stage D chronic systolic HF/ICM/HFrEF s/p HM3 LVAD (4/30/23)   Repeat echo 2/23/24 no effusion, overall no change from prior  -Admit BNP 33  -Remains off BB due to RV dysfunction  -Entresto 24/26 mg BID restarted after colonoscopy-> up titrate as tolerated  -C/w Farxiga   -Continue Spironolactone 50 mg daily  -C/w digoxin 125 mcg daily for RV support, level 0.98  -Holding home torsemide, will evaluate diuretic needs based on assessment   -Daily standing weights, 2gm sodium diet, 2L fluid restriction, strict I&Os    #History of VT and pAF  -Discontinue amiodarone 200 mg daily due to elevated TSH level   -Hold home coumadin for procedures (plan to restart if GI recommend)  -[employer] dual chamber AICD      #Electrolyte Disturbances  -Maintain K>4, Mg >2   -Daily RFP  -Replete electrolytes as needed      Pulmonary:    #Ex smoker, 30 year smoking history   - C/w home Symbicort  -Breo Ellipta d/c'd per patient's request   - Monitor and maintain SpO2 > 92%    GI:  #Melena c/f GIB  Pt with 2 weeks of melena, dropping Hgb.   - Holding AC as above, will continue to hold until CBC stabilizes  - Protonix 40 mg BID   - GI consulted, EGD showing gastritis, h pylori antigen pending  - Colonoscopy scheduled on 2/26       :  #No hx of DEBBIE or CKD  #Hx of retention   -Was on tamulosin previously, can reconsider if needed   -I/Os  -Avoid hypotension and nephrotoxic agents    Heme:  #No history of anemia  -H&H stable 8.3/25.1  -Labs: B12/folate wnl, iron/ferritin wnl, low Tsat (18%)  -Venofer 200mg daily x3 days (2/23-2/25)     #Hx of bilateral non-occlusive internal jugular clots  -Patient hasn't had recent US to rule out since 4/25/23  -Consider repeat US     #Chronic AC  -Holding home coumadin, hx of HIT so started bival gtt, HELD now d/t GIB, restart home Coumadin after colonoscopy per GI recommendation      Endo:  #No hx of DM  -Euglycemic  -hgbA1c 4.4    #Thyroid  -C/w synthroid 175 mcg daily   -TSH elevated 18.02 - likely in acute setting  -Repeat TSH 2/26-->11.49, FT4 -->1.21  -Consult Endo         ID:  #No active issues  -Afebrile, nontoxic , no s/s infx  -WBC: 7.2 (2/26)  -Trend temps q4h    Feeding/fluids: 2g Na diet, 2L fluid restriction  Thromboprophylaxis: SCDs   VAP bundle: n/a  Ulcer prophylaxis: PPI  Glycemic control: n/a  Bowel care: Colace & miralax prn  Indwelling catheter: None    Lines:   PIVs   LVAD Drive-line     PT/OT:  ordered and following     Code Status: Full Code    Family Primary Contact/Next of Kin:   -Primary Emergency Contact: Janice Ramos  -Home Phone: [phone]  -Relation: Friend    Dispo: Admit to HF ICU    CEZAR ESPAZRA reviewed     Patient seen and assessed with Dr.Moghbelli MARIA ISABEL BRIDGES (APRN student)     I have reviewed and agree with the above assessment and plan   Evelyn Monet, APRN-CNP

## 2024-02-26 NOTE — ANESTHESIA PREPROCEDURE EVALUATION
Patient: Anatoly Lane    Procedure Information       Date/Time: 02/26/24 1200    Scheduled providers: Bhargavi Saenz MD; Kimmie Lindsey MD; Antoni Rabago RN    Procedure: COLONOSCOPY    Location: [facility]            Relevant Problems   Anesthesia (within normal limits)  Nno family hx      Cardiovascular  LVAD for one yr   (+) Acute decompensated heart failure (CMS/HCC)   (+) CHF (congestive heart failure) (CMS/HCC)   (+) Hypertension   (+) Paroxysmal ventricular tachycardia (CMS/HCC)      Endocrine   (+) Hypothyroidism      GI   (+) GI bleed (melena)      /Renal (within normal limits)      Neuro/Psych (within normal limits)      Pulmonary (within normal limits)      GI/Hepatic (within normal limits)      Hematology   (+) Anticoagulant long-term use      Musculoskeletal (within normal limits)      Eyes, Ears, Nose, and Throat (within normal limits)      Infectious Disease (within normal limits)     63M PMHx ICM/HFrEF(10-15%) s/p HM3 VAD 4/23, pAF, VT s/p ICD, hypothyroidism. P/w weakness, SOB, found to be hypotensive and with significant anemia and melena.   Clinical information reviewed:                   NPO Detail:  No data recorded     Physical Exam    Airway  Mallampati: II  TM distance: >3 FB     Cardiovascular   Comments: LVAD hum   Dental   Comments: Missing but intact   Pulmonary - normal exam     Abdominal          Vitals:    02/26/24 1157   BP:    Pulse: 70   Resp: 12   Temp: 36.1 °C (97 °F)   SpO2: 97%       Past Surgical History:   Procedure Laterality Date    COLONOSCOPY W/ POLYPECTOMY  2023    CT ABDOMEN PELVIS ANGIOGRAM W AND/OR WO IV CONTRAST  04/13/2023    CT ABDOMEN PELVIS ANGIOGRAM W AND/OR WO IV CONTRAST [facility] CT    LEFT VENTRICULAR ASSIST DEVICE      OTHER SURGICAL HISTORY  01/05/2022    Complete colonoscopy    OTHER SURGICAL HISTORY  01/05/2022    Cardioverter defibrillator insertion     History reviewed. No pertinent past medical history.    Current  Facility-Administered Medications:     acetaminophen (Tylenol) tablet 650 mg, 650 mg, oral, q6h PRN, WES Hallman, 650 mg at 02/23/24 1926    [Held by provider] aspirin EC tablet 81 mg, 81 mg, oral, Daily, WES Hallman    dapagliflozin propanediol (Farxiga) tablet 10 mg, 10 mg, oral, Daily, WES Hallman, 10 mg at 02/26/24 0823    digoxin (Lanoxin) tablet 125 mcg, 125 mcg, oral, Daily, WES Hallman, 125 mcg at 02/26/24 0823    levothyroxine (Synthroid, Levoxyl) tablet 175 mcg, 175 mcg, oral, Daily before breakfast, WES Hallman, 175 mcg at 02/26/24 0526    lidocaine 4 % patch 1 patch, 1 patch, transdermal, Daily, WES Mosqueda, 1 patch at 02/26/24 0825    melatonin tablet 5 mg, 5 mg, oral, Nightly, Clint Arriaza MD, 5 mg at 02/25/24 2141    ondansetron (Zofran) injection 4 mg, 4 mg, intravenous, q6h PRN, WES Guerra    oxygen (O2) therapy, , inhalation, Continuous PRN - O2/gases, WES Hallman, 2 L/min at 02/25/24 2330    [DISCONTINUED] pantoprazole (ProtoNix) injection 80 mg, 80 mg, intravenous, Once **AND** pantoprazole (ProtoNix) injection 40 mg, 40 mg, intravenous, BID, WES Guerra, 40 mg at 02/26/24 0824    polyethylene glycol (Glycolax, Miralax) packet 17 g, 17 g, oral, Daily PRN, WES Hallman    polyethylene glycol-electrolytes (Nulytely) solution 2,000 mL, 2,000 mL, oral, Once, Clint Arriaza MD    potassium chloride CR (Klor-Con M20) ER tablet 40 mEq, 40 mEq, oral, Daily, WES Mosqueda    [Held by provider] sacubitriL-valsartan (Entresto) 24-26 mg per tablet 1 tablet, 1 tablet, oral, BID, WES Hallman, 1 tablet at 02/23/24 2211    sennosides-docusate sodium (Dahlia-Colace) 8.6-50 mg per tablet 2 tablet, 2 tablet, oral, BID, WES Hallman, 2 tablet at 02/25/24 0808    sertraline (Zoloft) tablet 25 mg, 25 mg, oral,  Daily, WES Hallman, 25 mg at 02/26/24 0823    spironolactone (Aldactone) tablet 50 mg, 50 mg, oral, Daily, WES Hallman, 50 mg at 02/26/24 0823  Prior to Admission medications    Medication Sig Start Date End Date Taking? Authorizing Provider   aspirin 81 mg EC tablet Take 1 tablet (81 mg) by mouth once daily. 5/23/23   Historical Provider, MD   dapagliflozin propanediol (Farxiga) 10 mg Take 1 tablet (10 mg) by mouth once daily. 12/8/23 12/7/24  Anita Vincent MD PhD   digoxin (Lanoxin) 125 MCG tablet Take 1 tablet (125 mcg) by mouth once daily. 8/13/23   Historical Provider, MD   Entresto 49-51 mg tablet Take 1 tablet by mouth 2 times a day. 10/2/23 10/1/24  Anita Vincent MD PhD   levothyroxine (Synthroid, Levoxyl) 175 mcg tablet Take 1 tablet (175 mcg) by mouth once daily in the morning. Take before meals. 10/30/23 10/29/24  WES Martinez   Pacerone 200 mg tablet Take 1 tablet (200 mg) by mouth once daily. 9/25/23   Historical Provider, MD   pantoprazole (ProtoNix) 40 mg EC tablet Take 1 tablet (40 mg) by mouth once daily in the morning. Take before meals. 10/30/23 10/29/24  WES Martinez   sertraline (Zoloft) 25 mg tablet TAKE 1 TABLET EVERY DAY 1/10/24   Anita Vincent MD PhD   spironolactone (Aldactone) 50 mg tablet Take 1 tablet (50 mg) by mouth once daily. 2/21/24 2/20/25  Anita Vincent MD PhD   tamsulosin (Flomax) 0.4 mg 24 hr capsule TAKE 1 CAPSULE EVERY DAY 1/10/24   Anita Vincent MD PhD   torsemide (Demadex) 10 mg tablet Take 1 tablet (10 mg) by mouth if needed. 9/6/23   Historical Provider, MD   warfarin (Coumadin) 1 mg tablet 3.5mg (1mg tablet + 2.5mg tablet) on Monday, Wednesday, Friday  5mg (2.5mg tablet + 2.5mg tablet) on Sunday, Tuesday, Thursday, Saturday    Historical Provider, MD   warfarin (Coumadin) 2.5 mg tablet 3.5mg (1mg tablet + 2.5mg tablet) on Monday, Wednesday, Friday  5mg (2.5mg tablet + 2.5mg  tablet) on Sunday, Tuesday, Thursday, Saturday 8/9/23   Historical Provider, MD   spironolactone (Aldactone) 50 mg tablet Take 1 tablet (50 mg) by mouth once daily. 2/16/24 2/21/24  Anita Vincent MD PhD   Symbicort 160-4.5 mcg/actuation inhaler Inhale 2 puffs 2 times a day. 2/7/23 2/23/24  Historical Provider, MD     Allergies   Allergen Reactions    Jardiance [Empagliflozin] Itching    Amiodarone Palpitations     Patient states must use BRAND NAME formulation Pacerone     Social History     Tobacco Use    Smoking status: Former     Types: Cigarettes    Smokeless tobacco: Never   Substance Use Topics    Alcohol use: Never         Chemistry    Lab Results   Component Value Date/Time     02/26/2024 0522    K 3.4 (L) 02/26/2024 0811     02/26/2024 0522    CO2 26 02/26/2024 0522    BUN 11 02/26/2024 0522    CREATININE 0.83 02/26/2024 0522    Lab Results   Component Value Date/Time    CALCIUM 7.8 (L) 02/26/2024 0522    ALKPHOS 47 02/23/2024 1433    AST 11 02/23/2024 1433    ALT 13 02/23/2024 1433    BILITOT 0.4 02/23/2024 1433          Lab Results   Component Value Date/Time    WBC 7.2 02/26/2024 0522    HGB 8.3 (L) 02/26/2024 0522    HCT 25.1 (L) 02/26/2024 0522     02/26/2024 0522     Lab Results   Component Value Date/Time    PROTIME 17.3 (H) 02/26/2024 0522    INR 1.5 (H) 02/26/2024 0522     Encounter Date: 02/19/24   ECG 12 Lead    Narrative    ECG revealed normal sinus rhythm with fusion complexes, IVCD     No results found for this or any previous visit from the past 1095 days.     Anesthesia Plan    History of general anesthesia?: yes  History of complications of general anesthesia?: no    ASA 4     MAC     intravenous induction   Anesthetic plan and risks discussed with patient.  Use of blood products discussed with patient who consented to blood products.

## 2024-02-26 NOTE — PROGRESS NOTES
HFICU Attending Note      63M PMHx ICM/HFrEF(10-15%) s/p HM3 VAD 4/23, pAF, VT s/p ICD, hypothyroidism. P/w weakness, SOB, found to be hypotensive and with significant anemia and melena.     For Cscope  TSH 18 consult endo  DC amio  LVAD parameters stable    This critically ill patient continues to be at-risk for clinically significant deterioration / failure due to the above mentioned dysfunctional, unstable organ systems.  I have personally identified and managed all complex critical care issues to prevent aforementioned clinical deterioration.  Critical care time is spent at bedside and/or the immediate area and has included, but is not limited to, the review of diagnostic tests, labs, radiographs, serial assessments of hemodynamics, respiratory status, ventilatory management, and family updates.  Time spent in procedures and teaching are reported separately.    Critical care time: _35___ minutes

## 2024-02-26 NOTE — PROCEDURES
Anatoly is a 63 y.o. male with a HM3 implanted 4/30/2023. Patient admitted to HFICU after being seen in clinic Friday 2/23 with acute HF symptoms subsequently found to have low hemoglobin with dark tarry stools.      Procedures : colonoscopy       All LVAD readings in flowsheet. No events or alarms noted.

## 2024-02-26 NOTE — PROGRESS NOTES
Occupational Therapy                 Therapy Communication Note    Patient Name: Anatoly Lane  MRN: [id number]  Today's Date: 2/26/2024     Discipline: Occupational Therapy    Missed Visit Reason: Missed Visit Reason:  (Patient admitted for melena with concern for GI bleed, plan for colonoscopy today; will attempt OT next visit as appropriate.)    Missed Time: Attempt    Comment:  June Vega OTR/L  Inpatient Occupational Therapist   Rehab Office: [phone]

## 2024-02-26 NOTE — ANESTHESIA POSTPROCEDURE EVALUATION
Patient: Anatoly Lane    Procedure Summary       Date: 02/26/24 Room / Location: [facility]    Anesthesia Start: 1242 Anesthesia Stop: 1318    Procedure: COLONOSCOPY Diagnosis:       LVAD (left ventricular assist device) present (CMS/HCC)      Anemia, unspecified type    Scheduled Providers: Bhargavi Saenz MD; Kimmie Lindsey MD; Antoni Rabago RN Responsible Provider: Kimmie Lindsey MD    Anesthesia Type: MAC ASA Status: 4            Anesthesia Type: MAC    Vitals Value Taken Time   BP N/A 02/26/24 1318   Temp 36.8 °C (98.2 °F) 02/26/24 1312   Pulse 63 02/26/24 1312   Resp 12 02/26/24 1312   SpO2 100 % 02/26/24 1312     LVAD nurse was present.. LVAD reading s stable  Anesthesia Post Evaluation    Patient location during evaluation: bedside  Patient participation: complete - patient participated  Level of consciousness: awake  Pain management: adequate  Airway patency: patent  Cardiovascular status: acceptable  Respiratory status: acceptable  Hydration status: acceptable  Postoperative Nausea and Vomiting: none        No notable events documented.

## 2024-02-26 NOTE — PROGRESS NOTES
SOCIAL WORK NOTE   SW met with patient at bedside for assessment (please see flowsheets for further details). Patient normally lives at home alone and is independent at baseline. He identified mother Janice as NOK, but is listed as friend in chart. He does not have PCP and notes that his doctors under his insurance change often. He has history of [facility], but he anticipates being able to go home at discharge without needs. Currently denied needs for social work. Social work to follow.   Joy Esparza, SRIRAM, LISW-S ([id number])

## 2024-02-26 NOTE — CONSULTS
Inpatient consult to Endocrinology  Consult performed by: Manda Frost MD  Consult ordered by: Evelyn Monet, APRN-CNP  Reason for consult: Hypothyroidism        Reason For Consult  Hypothyroidism    History Of Present Illness  Anatoly Lane is a very pleasant 63 y.o. male presenting for management of Stage D HFrEF NYHA class 2-3.  s/p HM3 4/30/2023 CAD, h/o pAF and VT s/p ICD, hypothyroidism, and stage D systolic HF/ICM/HFrEF who was admitted to HFICU for worsening shortness of breath, weight gain, and hypotension c/f AHDF vs GIB.     Endocrinology was consulted due to hypothyroidism.    Pertinent Hx:  -Patient diagnosed with hypothyroidism ~2021. Was initially started on liothyronine 37.5 mcg per day by cardiologist in context of  TSH of 4.8.   -Patient was admitted in May 2023 for ADHF when endocrinology was consulted. He was changed to levothyroxine and eventually discharged on levothyroxine 175mcg daily which he has remained on since.    -Patient has been on amiodarone for many years (patient estimates since early 2000s).   -Home regimen: Levothyroxine 175mcg daily    Pertinent imaging:  -No dedicated thyroid imaging  -CT Chest w/o contrast (4/21/23) showed normal-appearing thyroid gland    Pertinent Labs:  -2/26/24: TSH 11.49, FT4 1.21  -2/23/24: TSH 18.02  -5/9/23:  TSH 8.41, FT4 1.40  -5/3/23: TSH 6.56, FT4 1.07  -4/25/23: TSH 10.34, FT4 1.28    Today, patient reports that he was experiencing significant fatigue at home along with SOB and weakness. Denies constipation. Patient did endorse compliance with his levothyroxine, however he was taking his levothyroxine along with his pantoprazole at home.     Past Medical History  He has no past medical history on file.    Surgical History  He has a past surgical history that includes Other surgical history (01/05/2022); Other surgical history (01/05/2022); CT angio abdomen pelvis w and or wo IV IV contrast (04/13/2023); Left ventricular assist device; and  "Colonoscopy w/ polypectomy (2023).     Social History  He reports that he has quit smoking. His smoking use included cigarettes. He has never used smokeless tobacco. He reports that he does not drink alcohol and does not use drugs.    Family History  Family History   Problem Relation Name Age of Onset    Hypertension Father      Colon cancer Father          Allergies  Jardiance [empagliflozin] and Amiodarone    Review of Systems: 10 point ROS neg unless stated above     Physical Exam  General: Alert, oriented x 3. No acute distress.  HEENT: EOMI, PERRL. Oral cavity mucosa moist  Lungs: No increased WOB   Heart: S1 and S2, regular.  Abd: soft, NT,  Ext: 1+ TANK  Skin: warm, dry.  Neuro: AOx3, no focal deficits  Psych: Appropriate mood and behavior      ROS, PMH, FH/SH, surgical history and allergies have been reviewed.    Last Recorded Vitals  Blood pressure 78/78, pulse 72, temperature 36.8 °C (98.2 °F), temperature source Temporal, resp. rate 20, height 1.753 m (5' 9\"), weight 101 kg (223 lb 8.7 oz), SpO2 97 %.    Scheduled medications  [Held by provider] aspirin, 81 mg, oral, Daily  dapagliflozin propanediol, 10 mg, oral, Daily  digoxin, 125 mcg, oral, Daily  levothyroxine, 175 mcg, oral, Daily before breakfast  lidocaine, 1 patch, transdermal, Daily  melatonin, 5 mg, oral, Nightly  pantoprazole, 40 mg, intravenous, BID  polyethylene glycol-electrolytes, 2,000 mL, oral, Once  potassium chloride CR, 40 mEq, oral, Daily  sacubitriL-valsartan, 1 tablet, oral, BID  sennosides-docusate sodium, 2 tablet, oral, BID  sertraline, 25 mg, oral, Daily  spironolactone, 50 mg, oral, Daily      Continuous medications     PRN medications  PRN medications: acetaminophen, ondansetron, oxygen, polyethylene glycol     Relevant Results  Results from last 7 days   Lab Units 02/26/24  0522 02/25/24  0208 02/24/24  0233 02/23/24  1433   GLUCOSE mg/dL 85 102* 100* 90     Results for orders placed or performed during the hospital encounter " of 02/23/24 (from the past 24 hour(s))   CBC   Result Value Ref Range    WBC 9.0 4.4 - 11.3 x10*3/uL    nRBC 0.2 (H) 0.0 - 0.0 /100 WBCs    RBC 2.58 (L) 4.50 - 5.90 x10*6/uL    Hemoglobin 8.3 (L) 13.5 - 17.5 g/dL    Hematocrit 26.8 (L) 41.0 - 52.0 %     (H) 80 - 100 fL    MCH 32.2 26.0 - 34.0 pg    MCHC 31.0 (L) 32.0 - 36.0 g/dL    RDW 18.4 (H) 11.5 - 14.5 %    Platelets 259 150 - 450 x10*3/uL   CBC   Result Value Ref Range    WBC 7.2 4.4 - 11.3 x10*3/uL    nRBC 0.3 (H) 0.0 - 0.0 /100 WBCs    RBC 2.52 (L) 4.50 - 5.90 x10*6/uL    Hemoglobin 8.3 (L) 13.5 - 17.5 g/dL    Hematocrit 25.1 (L) 41.0 - 52.0 %     80 - 100 fL    MCH 32.9 26.0 - 34.0 pg    MCHC 33.1 32.0 - 36.0 g/dL    RDW 18.4 (H) 11.5 - 14.5 %    Platelets 195 150 - 450 x10*3/uL   Renal function panel   Result Value Ref Range    Glucose 85 74 - 99 mg/dL    Sodium 138 136 - 145 mmol/L    Potassium 3.9 3.5 - 5.3 mmol/L    Chloride 105 98 - 107 mmol/L    Bicarbonate 26 21 - 32 mmol/L    Anion Gap 11 10 - 20 mmol/L    Urea Nitrogen 11 6 - 23 mg/dL    Creatinine 0.83 0.50 - 1.30 mg/dL    eGFR >90 >60 mL/min/1.73m*2    Calcium 7.8 (L) 8.6 - 10.6 mg/dL    Phosphorus 2.3 (L) 2.5 - 4.9 mg/dL    Albumin 3.4 3.4 - 5.0 g/dL   Magnesium   Result Value Ref Range    Magnesium 2.20 1.60 - 2.40 mg/dL   Coagulation Screen   Result Value Ref Range    Protime 17.3 (H) 9.8 - 12.8 seconds    INR 1.5 (H) 0.9 - 1.1    aPTT 35 27 - 38 seconds   Lactate Dehydrogenase   Result Value Ref Range     84 - 246 U/L   Potassium   Result Value Ref Range    Potassium 3.4 (L) 3.5 - 5.3 mmol/L   TSH with reflex to Free T4 if abnormal   Result Value Ref Range    Thyroid Stimulating Hormone 11.49 (H) 0.44 - 3.98 mIU/L   Creatine Kinase   Result Value Ref Range    Creatine Kinase 55 0 - 325 U/L   TSH   Result Value Ref Range    Thyroid Stimulating Hormone 11.49 (H) 0.44 - 3.98 mIU/L   Thyroxine, Free   Result Value Ref Range    Thyroxine, Free 1.21 0.78 - 1.48 ng/dL   Thyroid  Peroxidase (TPO) Antibody   Result Value Ref Range    Thyroid Peroxidase (TPO) Antibody >1,000 (H) <=60 IU/mL         Assessment/Plan   Anatoly Lane is a very pleasant 63 y.o. male presenting for management of Stage D HFrEF NYHA class 2-3.  s/p HM3 4/30/2023 CAD, h/o pAF and VT s/p ICD, hypothyroidism, and stage D systolic HF/ICM/HFrEF who was admitted to HFICU for worsening shortness of breath, weight gain, and hypotension c/f AHDF vs GIB.     Endocrinology was consulted due to hypothyroidism.    Pertinent Hx:  -Patient diagnosed with hypothyroidism ~2021. Was initially started on liothyronine 37.5 mcg per day by cardiologist in context of  TSH of 4.8.   -Patient was admitted in May 2023 for ADHF when endocrinology was consulted. He was changed to levothyroxine and eventually discharged on levothyroxine 175mcg daily which he has remained on since.    -Patient has been on amiodarone for many years (patient estimates since early 2000s).   -Home regimen: Levothyroxine 175mcg daily though taking along with his pantoprazole at home     Pertinent imaging:  -No dedicated thyroid imaging  -CT Chest w/o contrast (4/21/23) showed normal-appearing thyroid gland    Pertinent Labs:  -2/26/24: TSH 11.49, FT4 1.21  -2/23/24: TSH 18.02  -5/9/23:  TSH 8.41, FT4 1.40  -5/3/23: TSH 6.56, FT4 1.07  -4/25/23: TSH 10.34, FT4 1.28    Pertinent medication  -Maintained on home levothyroxine 175mcg daily since admission on 2/23    #Primary Hypothyroidism  -c/w levothyroxine 175mcg daily to be given first thing in the AM on an empty stomach  from food/other medications by at least 30 minutes. Also educated patient to take it separately from his pantoprazole at home (pt plans to start taking pantoprazole at bedtime instead)  -Recheck TSH and FT4 on 3/2 if still inpatient    Endocrine will continue to follow.  Please message on secure chat or page [phone] with any questions or concerns.  Patient discussed with Dr. Hopson who agrees  with the management plan.

## 2024-02-26 NOTE — PROGRESS NOTES
Gastroenterology Consult Service Progress Note  Department of Gastroenterology & Hepatology  Digestive [facility]    [facility]  February 26, 2024   Patient: Anatoly Lane    Medical Record: [id number]  Reason for Consult: concern for GI bleed  Requesting Service: CTICU    Interval History: stable overnight. Pt started prep for colonoscopy yesterday afternoon. Stool from 8 PM recorded as watery with red streaks, last stool recorded prior to this was brown. Hb trend: Hb 7.2 --> 1 U pRBC --> 8.6 --> 8.7 --> 10 (yesterday AM) --> 8.3 (yesterday PM) --> 8.3 (this morning). Not yet started on AC.     Physical Exam:    Vitals:    02/26/24 0000 02/26/24 0100 02/26/24 0200 02/26/24 0300   BP:       BP Location:       Patient Position:       Pulse: 69 68 71 73   Resp: (!) 9 13 14 13   Temp:       TempSrc:       SpO2: 100% 97% 98% 93%   Weight:       Height:             Intake/Output Summary (Last 24 hours) at 2/26/2024 0632  Last data filed at 2/25/2024 2200  Gross per 24 hour   Intake --   Output 400 ml   Net -400 ml         Gen: well appearing, A+Ox3, in no acute distress  HEENT: PEERL, EOMI, sclera anicteric, no conjunctival injection, MMM, no oropharyngeal lesions, no LAD  Resp: CTAB, normal breath sounds  CV: mechanical VAD sounds appreicated   GI: non-tender, non-distended, normal Bss, mechanical VAD sounds   MSK: warm and well perfused, 1-2+ pitting edema  Neuro: A+Ox3, no focal deficits, no asterixis  Skin: warm and dry, no lesions, no rashes     Labs:  Lab Results   Component Value Date    WBC 7.2 02/26/2024    HGB 8.3 (L) 02/26/2024    HCT 25.1 (L) 02/26/2024     02/26/2024     02/26/2024     Lab Results   Component Value Date    GLUCOSE 102 (H) 02/25/2024    CALCIUM 8.6 02/25/2024     02/25/2024    K 4.2 02/25/2024    CO2 24 02/25/2024     02/25/2024    BUN 20 02/25/2024    CREATININE 0.95 02/25/2024     Lab Results   Component Value Date    INR 1.5  (H) 02/26/2024    INR 1.8 (H) 02/25/2024    INR 2.6 (H) 02/24/2024    PROTIME 17.3 (H) 02/26/2024    PROTIME 20.3 (H) 02/25/2024    PROTIME 30.1 (H) 02/24/2024         Imaging: no recent abd imaging     GI procedures:    Colonoscopy 2/26:  Impression  Bilious residue, likely mixed with hematin, was found in the terminal ileum and throughout the colon. There was no active bleeding or stigmata of recent bleeding.   The terminal ileum appeared normal.  Scattered non-bleeding diverticulosis in the ascending colon, transverse colon, descending colon and sigmoid colon  The exam of the colon was otherwise normal. There were no polyps, inflammation, colitis, AVMs, ulceration or mass lesions found. No evidence of active bleeding or stigmata of recent bleeding.  Medium, prolapsing non-bleeding external hemorrhoids     Findings  A moderate amount of bilious residue, possibly mixed with hematin was found throughout the entire colon. Lavage with water was performed with good clearance and adequate visualization.  The terminal ileum appeared normal. Bilious residue and possibly some hematin seen in the terminal ileum, but no bright red blood or active bleeding.;  Multiple medium, scattered diverticula with no inflammation containing no content in the ascending colon, transverse colon, descending colon and sigmoid colon; no bleeding was identified  The exam of the colon was otherwise normal. There were no polyps, inflammation, colitis, AVMs, ulceration or mass lesions found. No evidence of active bleeding or stigmata of recent bleeding.  External medium, prolapsing hemorrhoids observed during digital rectal exam and retroflexion; no bleeding was identified    EGD 2/24/2024:  Impression  3 cm hiatal hernia  Mild gastritis in the prepyloric region  Otherwise normal esophagus, stomach and duodenum. No ulcerations, AVMs appreciated.      Findings  The esophagus appeared normal.  3 cm hiatal hernia without Faustino lesions present - GE  junction 40 cm from the incisors, diaphragmatic impression 43 cm from the incisors  Mild erythematous mucosa in the prepyloric region; no bleeding was identified; not biopsied at this time due to patient being on anticoagulation.   The stomach appeared otherwise normal. Stomach otherwise normal on forward view and retroflexion.  The duodenal bulb, 2nd part of the duodenum and 3rd part of the duodenum appeared normal. No AVMs, no bleeding lesions. No ulcerations.     Colonoscopy 4/2023:  Impression:            - A moderate amount of semi-liquid stool was found in                          the entire colon, interfering with visualization.                          Lavage of the area was performed resulting in near                          clearance with fair visualization                         - The examined portion of the ileum was normal.                         - One 4 mm polyp in the ascending colon, removed with                          a cold snare. Resected and retrieved.                         - One 5 mm polyp in the transverse colon, removed with                          a cold snare. Resected and retrieved.                         - Non-bleeding moderate diverticulosis in the entire                          examined colon, most extensive in the left colon.                         - Non-bleeding external and internal hemorrhoids.     A.  ASCENDING POLYP COLD SNARE:   -- TUBULAR ADENOMA     B.  TRANSVERSE POLYP COLD SNARE:   -- TUBULAR ADENOMA     Assessment and Plan:        Anatoly Lane is a 63 y.o. male with HFrEF (10-15%), ICM, s/p HM3 4/2023, pAD, VT s/p ICD and hypothryoidism admitted to HFICU on 2/23 with worsening SOB and difficulty ambulating. Gastroenterology is consulted for concern for GIB.      Pt with downtrend in Hb from 14.3 in August to 9.0 on admission to 7.0 this AM. This is likely 2/2 slow GIB. Likely upper source given black stools and elevation in BUN. Ddx: very suspicious for AVM or PUD,  could be slow oozing from gastritis/ duodenitis, less likely malignancy. EGD done at bedside 2/24 notable for very mild gastritis, otherwise no explanation for bleed. Colonoscopy today with evidence of scattered diverticula. S/p VCE, results pending.      #acute blood loss anemia  #melena  - VCE to be completed after 12 hours, will be read thereafter   - Continue to trend Hgb, plts, stooling, etc.   - reasonable to resume anticoagulation as needed, though would recommend ggt  - no indication for PPI ggt, can transition to PPI PO BID for mild gastritis   - please send stool H pylori Ag      Patient seen and discussed with attending, Dr. Hernandez.     Hannah Gonzalez, GI fellow    Thank you for the consultation. Gastroenterology will continue to the follow the patient.   Please do not hesitate to contact me on DocHalo or page [phone] if there are any further questions between the weekday hours of 7 AM - 5 PM.   If there is an urgent concern during the weekend, after-hours, or holidays; then please page the [place] GI [place] [phone]. Thank you.    SIGNATURE: Hannah Gonzalez MD PATIENT NAME: Anatoly Lane   DATE: February 26, 2024 MRN: [id number]

## 2024-02-27 PROBLEM — I50.43 ACUTE ON CHRONIC COMBINED SYSTOLIC (CONGESTIVE) AND DIASTOLIC (CONGESTIVE) HEART FAILURE (MULTI): Status: ACTIVE | Noted: 2024-02-23

## 2024-02-27 LAB
ALBUMIN SERPL BCP-MCNC: 3.4 G/DL (ref 3.4–5)
ANION GAP SERPL CALC-SCNC: 10 MMOL/L (ref 10–20)
APTT PPP: 35 SECONDS (ref 27–38)
BUN SERPL-MCNC: 13 MG/DL (ref 6–23)
CALCIUM SERPL-MCNC: 8.1 MG/DL (ref 8.6–10.6)
CHLORIDE SERPL-SCNC: 108 MMOL/L (ref 98–107)
CO2 SERPL-SCNC: 27 MMOL/L (ref 21–32)
CREAT SERPL-MCNC: 0.94 MG/DL (ref 0.5–1.3)
EGFRCR SERPLBLD CKD-EPI 2021: >90 ML/MIN/1.73M*2
ERYTHROCYTE [DISTWIDTH] IN BLOOD BY AUTOMATED COUNT: 18.6 % (ref 11.5–14.5)
GLUCOSE SERPL-MCNC: 128 MG/DL (ref 74–99)
H PYLORI AG STL QL IA: NEGATIVE
HCT VFR BLD AUTO: 24.9 % (ref 41–52)
HGB BLD-MCNC: 8.3 G/DL (ref 13.5–17.5)
INR PPP: 1.3 (ref 0.9–1.1)
LDH SERPL L TO P-CCNC: 113 U/L (ref 84–246)
MAGNESIUM SERPL-MCNC: 2.23 MG/DL (ref 1.6–2.4)
MCH RBC QN AUTO: 33.1 PG (ref 26–34)
MCHC RBC AUTO-ENTMCNC: 33.3 G/DL (ref 32–36)
MCV RBC AUTO: 99 FL (ref 80–100)
NRBC BLD-RTO: 0 /100 WBCS (ref 0–0)
PHOSPHATE SERPL-MCNC: 2.9 MG/DL (ref 2.5–4.9)
PLATELET # BLD AUTO: 223 X10*3/UL (ref 150–450)
POTASSIUM SERPL-SCNC: 4.3 MMOL/L (ref 3.5–5.3)
PROTHROMBIN TIME: 14.8 SECONDS (ref 9.8–12.8)
RBC # BLD AUTO: 2.51 X10*6/UL (ref 4.5–5.9)
SODIUM SERPL-SCNC: 141 MMOL/L (ref 136–145)
WBC # BLD AUTO: 6.1 X10*3/UL (ref 4.4–11.3)

## 2024-02-27 PROCEDURE — 36415 COLL VENOUS BLD VENIPUNCTURE: CPT

## 2024-02-27 PROCEDURE — 1200000002 HC GENERAL ROOM WITH TELEMETRY DAILY

## 2024-02-27 PROCEDURE — 2500000002 HC RX 250 W HCPCS SELF ADMINISTERED DRUGS (ALT 637 FOR MEDICARE OP, ALT 636 FOR OP/ED): Mod: MUE

## 2024-02-27 PROCEDURE — 2500000002 HC RX 250 W HCPCS SELF ADMINISTERED DRUGS (ALT 637 FOR MEDICARE OP, ALT 636 FOR OP/ED): Mod: MUE | Performed by: NURSE PRACTITIONER

## 2024-02-27 PROCEDURE — 2500000001 HC RX 250 WO HCPCS SELF ADMINISTERED DRUGS (ALT 637 FOR MEDICARE OP): Performed by: STUDENT IN AN ORGANIZED HEALTH CARE EDUCATION/TRAINING PROGRAM

## 2024-02-27 PROCEDURE — 2500000002 HC RX 250 W HCPCS SELF ADMINISTERED DRUGS (ALT 637 FOR MEDICARE OP, ALT 636 FOR OP/ED)

## 2024-02-27 PROCEDURE — 83735 ASSAY OF MAGNESIUM: CPT

## 2024-02-27 PROCEDURE — 2500000002 HC RX 250 W HCPCS SELF ADMINISTERED DRUGS (ALT 637 FOR MEDICARE OP, ALT 636 FOR OP/ED): Performed by: NURSE PRACTITIONER

## 2024-02-27 PROCEDURE — C9113 INJ PANTOPRAZOLE SODIUM, VIA: HCPCS | Performed by: NURSE PRACTITIONER

## 2024-02-27 PROCEDURE — 83615 LACTATE (LD) (LDH) ENZYME: CPT

## 2024-02-27 PROCEDURE — 80069 RENAL FUNCTION PANEL: CPT

## 2024-02-27 PROCEDURE — 2500000001 HC RX 250 WO HCPCS SELF ADMINISTERED DRUGS (ALT 637 FOR MEDICARE OP)

## 2024-02-27 PROCEDURE — 99232 SBSQ HOSP IP/OBS MODERATE 35: CPT

## 2024-02-27 PROCEDURE — 99233 SBSQ HOSP IP/OBS HIGH 50: CPT | Performed by: STUDENT IN AN ORGANIZED HEALTH CARE EDUCATION/TRAINING PROGRAM

## 2024-02-27 PROCEDURE — 97161 PT EVAL LOW COMPLEX 20 MIN: CPT | Mod: GP

## 2024-02-27 PROCEDURE — 85610 PROTHROMBIN TIME: CPT

## 2024-02-27 PROCEDURE — 2500000004 HC RX 250 GENERAL PHARMACY W/ HCPCS (ALT 636 FOR OP/ED): Performed by: NURSE PRACTITIONER

## 2024-02-27 PROCEDURE — 99291 CRITICAL CARE FIRST HOUR: CPT | Performed by: INTERNAL MEDICINE

## 2024-02-27 PROCEDURE — 85027 COMPLETE CBC AUTOMATED: CPT

## 2024-02-27 RX ORDER — WARFARIN SODIUM 5 MG/1
5 TABLET ORAL
Status: DISCONTINUED | OUTPATIENT
Start: 2024-02-27 | End: 2024-03-01

## 2024-02-27 RX ADMIN — LEVOTHYROXINE SODIUM 175 MCG: 25 TABLET ORAL at 05:23

## 2024-02-27 RX ADMIN — DAPAGLIFLOZIN 10 MG: 10 TABLET, FILM COATED ORAL at 08:16

## 2024-02-27 RX ADMIN — SACUBITRIL AND VALSARTAN 1 TABLET: 24; 26 TABLET, FILM COATED ORAL at 08:16

## 2024-02-27 RX ADMIN — PANTOPRAZOLE SODIUM 40 MG: 40 INJECTION, POWDER, FOR SOLUTION INTRAVENOUS at 22:15

## 2024-02-27 RX ADMIN — SPIRONOLACTONE 50 MG: 25 TABLET, FILM COATED ORAL at 08:16

## 2024-02-27 RX ADMIN — SERTRALINE HYDROCHLORIDE 25 MG: 50 TABLET ORAL at 08:16

## 2024-02-27 RX ADMIN — POTASSIUM CHLORIDE 40 MEQ: 1500 TABLET, EXTENDED RELEASE ORAL at 08:16

## 2024-02-27 RX ADMIN — DIGOXIN 125 MCG: 125 TABLET ORAL at 08:16

## 2024-02-27 RX ADMIN — Medication 5 MG: at 22:13

## 2024-02-27 RX ADMIN — SENNOSIDES AND DOCUSATE SODIUM 2 TABLET: 8.6; 5 TABLET ORAL at 08:16

## 2024-02-27 RX ADMIN — SENNOSIDES AND DOCUSATE SODIUM 2 TABLET: 8.6; 5 TABLET ORAL at 22:13

## 2024-02-27 RX ADMIN — SACUBITRIL AND VALSARTAN 1 TABLET: 24; 26 TABLET, FILM COATED ORAL at 22:14

## 2024-02-27 RX ADMIN — PANTOPRAZOLE SODIUM 40 MG: 40 INJECTION, POWDER, FOR SOLUTION INTRAVENOUS at 08:15

## 2024-02-27 RX ADMIN — WARFARIN SODIUM 5 MG: 5 TABLET ORAL at 17:45

## 2024-02-27 ASSESSMENT — PAIN SCALES - GENERAL
PAINLEVEL_OUTOF10: 0 - NO PAIN

## 2024-02-27 ASSESSMENT — COGNITIVE AND FUNCTIONAL STATUS - GENERAL
MOBILITY SCORE: 18
STANDING UP FROM CHAIR USING ARMS: A LITTLE
MOVING TO AND FROM BED TO CHAIR: A LITTLE
WALKING IN HOSPITAL ROOM: A LITTLE
MOVING FROM LYING ON BACK TO SITTING ON SIDE OF FLAT BED WITH BEDRAILS: A LITTLE
TURNING FROM BACK TO SIDE WHILE IN FLAT BAD: A LITTLE
CLIMB 3 TO 5 STEPS WITH RAILING: A LITTLE

## 2024-02-27 ASSESSMENT — PAIN - FUNCTIONAL ASSESSMENT
PAIN_FUNCTIONAL_ASSESSMENT: 0-10

## 2024-02-27 ASSESSMENT — ACTIVITIES OF DAILY LIVING (ADL): ADL_ASSISTANCE: INDEPENDENT

## 2024-02-27 NOTE — PROGRESS NOTES
HFICU Attending Note    63 y.o. male with a PMH of stage D HFrEF 10-15%, ICM, s/p HM3 4/30/2023 CAD,  pAF, VT s/p ICD, and hypothyroidism who is being admitted to HFICU for worsening shortness of breath, weight gain, and hypotension.  Hospital course complicated by GIB on 2/24 and Elevated TSH.  Endocopy (2/25) and colonoscopy (2/26) w/o evidence of bleeding. Capsule study complete 2/27 as well and negative for acute bleeding. Per GI, okay to restart coumadin from their stanpoint.  Endo consulted and po amiodarone stopped due to TSH of 18.  Plan for RHC with RAMP study in cath lab tomorrow (2/27). Per Dr. Rankin, regardless of plan for cath tomorrow, ok to restart Coumadin at home dose.        This critically ill patient continues to be at-risk for clinically significant deterioration / failure due to the above mentioned dysfunctional, unstable organ systems.  I have personally identified and managed all complex critical care issues to prevent aforementioned clinical deterioration.  Critical care time is spent at bedside and/or the immediate area and has included, but is not limited to, the review of diagnostic tests, labs, radiographs, serial assessments of hemodynamics, respiratory status, ventilatory management, and family updates.  Time spent in procedures and teaching are reported separately.    Critical care time: __35__ minutes

## 2024-02-27 NOTE — PROGRESS NOTES
Gastroenterology Consult Service Progress Note  Department of Gastroenterology & Hepatology  Digestive [facility]    [facility]  February 27, 2024   Patient: Anatoly Lane    Medical Record: [id number]  Reason for Consult: concern for GI bleed  Requesting Service: CTICU    Interval History: colonoscopy yesterday, as below, without source of bleeding. Stable overnight. Pt started prep for colonoscopy yesterday afternoon. Hb trend: 8.3 (2/25 PM) --> 8.3 (2/26) --> 8.3 (2/27 AM). Not yet started on AC. VCE prelim read without any evidence of bleeding.     Physical Exam:    Vitals:    02/27/24 0330 02/27/24 0400 02/27/24 0500 02/27/24 0600   BP:       Patient Position:       Pulse: 70 64 64 65   Resp: 21 12 15 17   Temp:       TempSrc:       SpO2: 93% 96% 96% 97%   Weight:       Height:             Intake/Output Summary (Last 24 hours) at 2/27/2024 0633  Last data filed at 2/27/2024 0442  Gross per 24 hour   Intake 38.91 ml   Output 925 ml   Net -886.09 ml         Gen: well appearing, A+Ox3, in no acute distress  HEENT: PEERL, EOMI, sclera anicteric, no conjunctival injection, MMM, no oropharyngeal lesions, no LAD  Resp: CTAB, normal breath sounds  CV: mechanical VAD sounds appreicated   GI: non-tender, non-distended, normal Bss, mechanical VAD sounds   MSK: warm and well perfused, 1-2+ pitting edema  Neuro: A+Ox3, no focal deficits, no asterixis  Skin: warm and dry, no lesions, no rashes     Labs:  Lab Results   Component Value Date    WBC 6.1 02/27/2024    HGB 8.3 (L) 02/27/2024    HCT 24.9 (L) 02/27/2024    MCV 99 02/27/2024     02/27/2024     Lab Results   Component Value Date    GLUCOSE 128 (H) 02/27/2024    CALCIUM 8.1 (L) 02/27/2024     02/27/2024    K 4.3 02/27/2024    CO2 27 02/27/2024     (H) 02/27/2024    BUN 13 02/27/2024    CREATININE 0.94 02/27/2024     Lab Results   Component Value Date    INR 1.3 (H) 02/27/2024    INR 1.5 (H) 02/26/2024    INR 1.8  (H) 02/25/2024    PROTIME 14.8 (H) 02/27/2024    PROTIME 17.3 (H) 02/26/2024    PROTIME 20.3 (H) 02/25/2024         Imaging: no recent abd imaging     GI procedures:    Colonoscopy 2/26:  Impression  Bilious residue, likely mixed with hematin, was found in the terminal ileum and throughout the colon. There was no active bleeding or stigmata of recent bleeding.   The terminal ileum appeared normal.  Scattered non-bleeding diverticulosis in the ascending colon, transverse colon, descending colon and sigmoid colon  The exam of the colon was otherwise normal. There were no polyps, inflammation, colitis, AVMs, ulceration or mass lesions found. No evidence of active bleeding or stigmata of recent bleeding.  Medium, prolapsing non-bleeding external hemorrhoids     Findings  A moderate amount of bilious residue, possibly mixed with hematin was found throughout the entire colon. Lavage with water was performed with good clearance and adequate visualization.  The terminal ileum appeared normal. Bilious residue and possibly some hematin seen in the terminal ileum, but no bright red blood or active bleeding.;  Multiple medium, scattered diverticula with no inflammation containing no content in the ascending colon, transverse colon, descending colon and sigmoid colon; no bleeding was identified  The exam of the colon was otherwise normal. There were no polyps, inflammation, colitis, AVMs, ulceration or mass lesions found. No evidence of active bleeding or stigmata of recent bleeding.  External medium, prolapsing hemorrhoids observed during digital rectal exam and retroflexion; no bleeding was identified    EGD 2/24/2024:  Impression  3 cm hiatal hernia  Mild gastritis in the prepyloric region  Otherwise normal esophagus, stomach and duodenum. No ulcerations, AVMs appreciated.      Findings  The esophagus appeared normal.  3 cm hiatal hernia without Faustino lesions present - GE junction 40 cm from the incisors, diaphragmatic  impression 43 cm from the incisors  Mild erythematous mucosa in the prepyloric region; no bleeding was identified; not biopsied at this time due to patient being on anticoagulation.   The stomach appeared otherwise normal. Stomach otherwise normal on forward view and retroflexion.  The duodenal bulb, 2nd part of the duodenum and 3rd part of the duodenum appeared normal. No AVMs, no bleeding lesions. No ulcerations.     Colonoscopy 4/2023:  Impression:            - A moderate amount of semi-liquid stool was found in                          the entire colon, interfering with visualization.                          Lavage of the area was performed resulting in near                          clearance with fair visualization                         - The examined portion of the ileum was normal.                         - One 4 mm polyp in the ascending colon, removed with                          a cold snare. Resected and retrieved.                         - One 5 mm polyp in the transverse colon, removed with                          a cold snare. Resected and retrieved.                         - Non-bleeding moderate diverticulosis in the entire                          examined colon, most extensive in the left colon.                         - Non-bleeding external and internal hemorrhoids.     A.  ASCENDING POLYP COLD SNARE:   -- TUBULAR ADENOMA     B.  TRANSVERSE POLYP COLD SNARE:   -- TUBULAR ADENOMA     Assessment and Plan:        Anatoly Lane is a 63 y.o. male with HFrEF (10-15%), ICM, s/p HM3 4/2023, pAD, VT s/p ICD and hypothryoidism admitted to HFICU on 2/23 with worsening SOB and difficulty ambulating. Gastroenterology is consulted for concern for GIB.      Pt with downtrend in Hb from 14.3 in August to 9.0 on admission to 7.0 this AM. This is likely 2/2 slow GIB. Likely upper source given black stools and elevation in BUN. Ddx: very suspicious for AVM or PUD, could be slow oozing from gastritis/  duodenitis, less likely malignancy. EGD done at bedside 2/24 notable for very mild gastritis, otherwise no explanation for bleed. Colonoscopy 2/26 with evidence of scattered diverticula, potentially hematin from TI?. S/p VCE, without any bleeding or sources of blood loss. Unclear etiology for occult blood loss, still could be small bowel AVM. Will plan to monitor clinically on anticoagulation.      #acute blood loss anemia  #melena  - Continue to trend Hgb, plts, stooling, etc.   - reasonable to resume anticoagulation as needed  - no indication for PPI IV, can transition to PPI PO BID for mild gastritis   - stool H pylori Ag pending     Patient seen and discussed with attending, Dr. Hernandez.     Hannah Gonzalez, GI fellow    Thank you for the consultation. Gastroenterology will continue to the follow the patient.   Please do not hesitate to contact me on DocHalo or page [phone] if there are any further questions between the weekday hours of 7 AM - 5 PM.   If there is an urgent concern during the weekend, after-hours, or holidays; then please page the [place] fellow [place] [phone]. Thank you.    SIGNATURE: Hannah Gonzalez MD PATIENT NAME: Anatoly Lane   DATE: February 27, 2024 MRN: [id number]

## 2024-02-27 NOTE — PROGRESS NOTES
[facility]  HFICU PROGRESS NOTE    Anatoly Lane/[id number]    Admit Date: 2/23/2024  Hospital Length of Stay: 4   ICU Length of Stay: 4d 1h   Primary Service:   Primary HF Cardiologist:   Referring:    INTERVAL EVENTS / PERTINENT ROS:   No acute events overnight. Hb remains stable. Colonoscopy w/o evidence of bleed yesterday. Capsule study in progress. MAPs between 70 and 80 since entresto started yesterday.     Plan:  -Capsule study results negative.  -RHC with RAMP study tomorrow in cath lab  -Consider transfer to floor tomorrow if pt.'s hemodynamics look good.   -continue to hold amiodarone due to hypothyroidism symptoms  -Ok to restart home coumadin dosing per Dr. Rankin    MEDICATIONS  Infusions:       Scheduled:  [Held by provider] aspirin, 81 mg, Daily  dapagliflozin propanediol, 10 mg, Daily  digoxin, 125 mcg, Daily  levothyroxine, 175 mcg, Daily before breakfast  lidocaine, 1 patch, Daily  melatonin, 5 mg, Nightly  pantoprazole, 40 mg, BID  polyethylene glycol-electrolytes, 2,000 mL, Once  potassium chloride CR, 40 mEq, Daily  sacubitriL-valsartan, 1 tablet, BID  sennosides-docusate sodium, 2 tablet, BID  sertraline, 25 mg, Daily  spironolactone, 50 mg, Daily  [START ON 2/28/2024] warfarin, 3.5 mg, Once per day on Mon Wed Fri  warfarin, 5 mg, Once per day on Sun Tue Thu Sat      PRN:  acetaminophen, 650 mg, q6h PRN  ondansetron, 4 mg, q6h PRN  oxygen, , Continuous PRN - O2/gases  polyethylene glycol, 17 g, Daily PRN      Invasive Hemodynamics:    Most Recent Range Past 24hrs   BP (Art)   No data recorded   MAP(Art)   No data recorded   RA/CVP   No data recorded   PA   No data recorded   PA(mean)   No data recorded   PCWP   No data recorded   CO   No data recorded   CI   No data recorded   Mixed Venous   No data recorded   SVR    No data recorded   PVR   No data recorded     MCS:   Heart Mate III:     Most Recent Range Past 24hrs   Flow 4.6 Pump Flow  Min: 4.4  Max: 5   Speed  "5500 Speed RPM  Min: 5500  Max: 5550   Power 4.1    PI 3.7 Pulse Index  Min: 3.3  Max: 4         PHYSICAL EXAM:   Visit Vitals  BP 78/78 (BP Location: Left arm, Patient Position: Sitting)   Pulse 69   Temp 36.4 °C (97.5 °F) (Temporal)   Resp 16   Ht 1.753 m (5' 9\")   Wt 104 kg (228 lb 2.8 oz)   SpO2 97%   BMI 33.70 kg/m²   Smoking Status Former   BSA 2.25 m²       Wt Readings from Last 5 Encounters:   02/27/24 104 kg (228 lb 2.8 oz)   02/23/24 105 kg (230 lb 9.6 oz)   02/19/24 107 kg (235 lb)   12/08/23 101 kg (223 lb 3.2 oz)   09/20/23 88.5 kg (195 lb)       INTAKE/OUTPUT:  I/O last 3 completed shifts:  In: 893.9 (8.8 mL/kg) [P.O.:855; I.V.:38.9 (0.4 mL/kg)]  Out: 1575 (15.5 mL/kg) [Urine:1575 (0.4 mL/kg/hr)]  Weight: 101.4 kg      Physical Exam  Constitutional:       Appearance: Normal appearance.   HENT:      Head: Normocephalic and atraumatic.   Eyes:      Extraocular Movements: Extraocular movements intact.      Pupils: Pupils are equal, round, and reactive to light.   Cardiovascular:      Rate and Rhythm: Normal rate and regular rhythm.      Comments: LVAD hum audible upon auscultation  Pulmonary:      Effort: Pulmonary effort is normal.      Breath sounds: Normal breath sounds.   Abdominal:      General: Abdomen is flat.      Palpations: Abdomen is soft.   Skin:     General: Skin is warm and dry.      Capillary Refill: Capillary refill takes less than 2 seconds.   Neurological:      General: No focal deficit present.      Mental Status: He is alert and oriented to person, place, and time.   Psychiatric:         Mood and Affect: Mood normal.         Behavior: Behavior normal.         DATA:  CMP:  Results from last 7 days   Lab Units 02/27/24  0522 02/26/24 2026 02/26/24  0811 02/26/24  0522 02/25/24  0208 02/24/24  0233 02/23/24  1433   SODIUM mmol/L 141 137  --  138 136 139 137   POTASSIUM mmol/L 4.3 4.3 3.4* 3.9 4.2 3.9 4.0   CHLORIDE mmol/L 108* 105  --  105 103 105 100   CO2 mmol/L 27 25  --  26 24 26 26 " "  ANION GAP mmol/L 10 11  --  11 13 12 15   BUN mg/dL 13  --   --  11 20 39* 44*   CREATININE mg/dL 0.94  --   --  0.83 0.95 1.08 1.30   EGFR mL/min/1.73m*2 >90  --   --  >90 90 77 62   MAGNESIUM mg/dL 2.23  --   --  2.20 2.24 2.11 2.22   ALBUMIN g/dL 3.4  --   --  3.4 3.8 3.2* 4.0   ALT U/L  --   --   --   --   --   --  13   AST U/L  --   --   --   --   --   --  11   BILIRUBIN TOTAL mg/dL  --   --   --   --   --   --  0.4     CBC:  Results from last 7 days   Lab Units 02/27/24  0522 02/26/24  0522 02/25/24  2139 02/25/24  0210 02/24/24  1955 02/24/24  1255 02/24/24  0341 02/24/24  0233   WBC AUTO x10*3/uL 6.1 7.2 9.0 9.0 7.4 9.1 7.1 7.8   HEMOGLOBIN g/dL 8.3* 8.3* 8.3* 10.0* 8.7* 8.6* 7.2* 7.4*   HEMATOCRIT % 24.9* 25.1* 26.8* 30.9* 26.8* 26.9* 23.0* 23.0*   PLATELETS AUTO x10*3/uL 223 195 259 246 210 211 215 221   MCV fL 99 100 104* 99 100 100 103* 103*     COAG:   Results from last 7 days   Lab Units 02/27/24  0522 02/26/24  0522 02/25/24  0208 02/24/24  0233 02/23/24  1433   INR  1.3* 1.5* 1.8* 2.6* 1.9*     ABO:   No results found for: \"ABO\"    HEME/ENDO:  Results from last 7 days   Lab Units 02/26/24  0811 02/23/24  1433   FERRITIN ng/mL  --  104   IRON SATURATION %  --  18*   TSH mIU/L 11.49*  11.49* 18.02*   HEMOGLOBIN A1C %  --  4.4      CARDIAC:   Results from last 7 days   Lab Units 02/27/24  0522 02/26/24  0522 02/25/24  0208 02/24/24  0233 02/23/24  1433   LD U/L 113 167 142 96 128   TROPHS ng/L  --   --   --   --  30   BNP pg/mL  --   --   --   --  33       Assessment & Plan:  63 y.o. male with a PMH of stage D HFrEF 10-15%, ICM, s/p HM3 4/30/2023 CAD,  pAF, VT s/p ICD, and hypothyroidism who is being admitted to HFICU for worsening shortness of breath, weight gain, and hypotension.  Hospital course complicated by GIB on 2/24 and Elevated TSH.  Endocopy (2/25) and colonoscopy (2/26) w/o evidence of bleeding. Capsule study complete 2/27 as well and negative for acute bleeding. Per GI, okay to restart " coumadin from their stanpoint.  Endo consulted and po amiodarone stopped due to TSH of 18.  Plan for RHC with RAMP study in cath lab tomorrow (2/27). Per Dr. Rankin, regardless of plan for cath tomorrow, ok to restart Coumadin at home dose.      Neuro:  #Depression  - C/w home Zoloft 25 mg   - Serial neuro and pain assessments   - PO Tylenol PRN for pain  - PT/OT Consult, OOB to chair  - CAM ICU score every shift  - Sleep/wake cycle normalization     # Physical Status  - Obesity, BMI 34  - Patient unable to walk due to shortness of breath  - Weight gain of 75 pounds over the last year      Cardiovascular:  #HM3  Daily VAD Interrogation (2/27)  Device: HM3  Speed: 5500  Power: 4.2  Flow: 4.8  PI: 4.0  LDH: 113  INR: 1.3 (2/27)  Long term (pts choice; does not wish to proceed with OHT eval)   LVAD interrogation reveals no alarms or power spikes, no reported LVAD alarms   Weekly dressing change, due 3/1     Medications:  ASA (on hold due to GI bleed)  Coumadin 5 mg (Sunday, Tuesday, Thursday, Saturday) 3.5 mg (Monday, Wednesday, Friday) goal INR 2-3 (on hold due to GI bleed); restarted 2/27 per Dr. Rankin's request.   Pantoprazole 40 mg BID  Fish oil        #Stage D chronic systolic HF/ICM/HFrEF s/p HM3 LVAD (4/30/23)   Repeat echo 2/23/24 no effusion, overall no change from prior  -Admit BNP 33  -Daily weight 104kg (2/27)  -Remains off BB due to RV dysfunction  -Entresto 24/26 mg BID restarted after colonoscopy-> up titrate as tolerated  -C/w Farxiga   -Continue Spironolactone 50 mg daily  -C/w digoxin 125 mcg daily for RV support, level 0.98 (2/23)  -Holding home torsemide, will evaluate diuretic needs based on assessment   -Plan for RHC with RAMP study in cath lab tomorrow 2/28  -Daily standing weights, 2gm sodium diet, 2L fluid restriction, strict I&Os     #History of VT and pAF  -continue to hold amiodarone 200 mg daily due to elevated TSH level   -Hold home coumadin for procedures (plan to restart if GI  recommend)  -[employer] dual chamber AICD      #Electrolyte Disturbances  -Maintain K>4, Mg >2   -Daily RFP  -Replete electrolytes as needed     Pulmonary:   #Ex smoker, 30 year smoking history   - C/w home Symbicort  -Breo Ellipta d/c'd per patient's request   - Monitor and maintain SpO2 > 92%     GI:  #Melena c/f GIB  Pt with 2 weeks of melena, dropping Hgb -->stable now   - Holding AC as above, will continue to hold for now  - Protonix 40 mg BID   - GI consulted, EGD showing gastritis, h pylori antigen pending  - Colonoscopy without signs of bleeding  - Capsule study complete with no evidence of acute bleeding 2/27. Ok to restart coumadin from GI's standpoint.        :  #No hx of DEBBIE or CKD  #Hx of retention   -Was on tamulosin previously, can reconsider if needed   -I/Os  -Avoid hypotension and nephrotoxic agents     Heme:  #No history of anemia  -H&H stable 8.3/25.1  -Labs: B12/folate wnl, iron/ferritin wnl, low Tsat (18%)  -Venofer 200mg daily x3 days (2/23-2/25)     #Hx of bilateral non-occlusive internal jugular clots  -Patient hasn't had recent US to rule out since 4/25/23  -Consider repeat US     #Chronic AC  -Restart home coumadin 2/27    Endo:  #No hx of DM  -Euglycemic  -hgbA1c 4.4     #Thyroid  -C/w synthroid 175 mcg daily   -TSH elevated 18.02 - likely in acute setting  -Repeat TSH 2/26-->11.49, FT4 -->1.21  -Consult Endo. Appreciate recs      ID:  #No active issues  -Afebrile, nontoxic , no s/s infx  -WBC: 7.2 (2/26)  -Trend temps q4h     PHYSICAL AND OCCUPATIONAL THERAPY: ordered and following      LINES:  PIVs   LVAD drive line     DVT: Coumadin and SCDs  VAP BUNDLE: N/A  ULCER PPX: PPI  GLYCEMIC CONTROL: N/A  BOWEL CARE: Colace and Miralax PRN   INDWELLING CATHETER: N/A  NUTRITION: NPO Diet; Effective now     EMERGENCY CONTACT: Extended Emergency Contact Information  Primary Emergency Contact: Janice Ramos  Address: [address] [place] [address] [facility]  Home  Phone: [phone]  Mobile Phone: [phone]  Relation: Friend  FAMILY UPDATE:  CODE STATUS: Full Code  [facility]    Patient seen and assessed with Dr. Rankin    _________________________________________________  Diamond Mullins, APRN-CNP

## 2024-02-28 ENCOUNTER — DOCUMENTATION (OUTPATIENT)
Dept: TRANSPLANT | Facility: HOSPITAL | Age: 64
End: 2024-02-28
Payer: MEDICARE

## 2024-02-28 LAB
ALBUMIN SERPL BCP-MCNC: 3.3 G/DL (ref 3.4–5)
ANION GAP SERPL CALC-SCNC: 10 MMOL/L (ref 10–20)
APTT PPP: 33 SECONDS (ref 27–38)
BUN SERPL-MCNC: 13 MG/DL (ref 6–23)
CALCIUM SERPL-MCNC: 7.9 MG/DL (ref 8.6–10.6)
CHLORIDE SERPL-SCNC: 109 MMOL/L (ref 98–107)
CO2 SERPL-SCNC: 27 MMOL/L (ref 21–32)
CREAT SERPL-MCNC: 1.03 MG/DL (ref 0.5–1.3)
EGFRCR SERPLBLD CKD-EPI 2021: 82 ML/MIN/1.73M*2
ERYTHROCYTE [DISTWIDTH] IN BLOOD BY AUTOMATED COUNT: 19 % (ref 11.5–14.5)
GLUCOSE SERPL-MCNC: 115 MG/DL (ref 74–99)
HCT VFR BLD AUTO: 27 % (ref 41–52)
HGB BLD-MCNC: 8.5 G/DL (ref 13.5–17.5)
INR PPP: 1.1 (ref 0.9–1.1)
LDH SERPL L TO P-CCNC: 109 U/L (ref 84–246)
MAGNESIUM SERPL-MCNC: 2.2 MG/DL (ref 1.6–2.4)
MCH RBC QN AUTO: 31.8 PG (ref 26–34)
MCHC RBC AUTO-ENTMCNC: 31.5 G/DL (ref 32–36)
MCV RBC AUTO: 101 FL (ref 80–100)
NRBC BLD-RTO: 0 /100 WBCS (ref 0–0)
PHOSPHATE SERPL-MCNC: 3.1 MG/DL (ref 2.5–4.9)
PLATELET # BLD AUTO: 222 X10*3/UL (ref 150–450)
POTASSIUM SERPL-SCNC: 4.2 MMOL/L (ref 3.5–5.3)
PROTHROMBIN TIME: 12.3 SECONDS (ref 9.8–12.8)
RBC # BLD AUTO: 2.67 X10*6/UL (ref 4.5–5.9)
SODIUM SERPL-SCNC: 142 MMOL/L (ref 136–145)
WBC # BLD AUTO: 5.3 X10*3/UL (ref 4.4–11.3)

## 2024-02-28 PROCEDURE — 97165 OT EVAL LOW COMPLEX 30 MIN: CPT | Mod: GO

## 2024-02-28 PROCEDURE — 2500000002 HC RX 250 W HCPCS SELF ADMINISTERED DRUGS (ALT 637 FOR MEDICARE OP, ALT 636 FOR OP/ED)

## 2024-02-28 PROCEDURE — 83735 ASSAY OF MAGNESIUM: CPT

## 2024-02-28 PROCEDURE — 84100 ASSAY OF PHOSPHORUS: CPT

## 2024-02-28 PROCEDURE — 99152 MOD SED SAME PHYS/QHP 5/>YRS: CPT | Performed by: STUDENT IN AN ORGANIZED HEALTH CARE EDUCATION/TRAINING PROGRAM

## 2024-02-28 PROCEDURE — 2720000007 HC OR 272 NO HCPCS: Performed by: STUDENT IN AN ORGANIZED HEALTH CARE EDUCATION/TRAINING PROGRAM

## 2024-02-28 PROCEDURE — 85610 PROTHROMBIN TIME: CPT

## 2024-02-28 PROCEDURE — 83615 LACTATE (LD) (LDH) ENZYME: CPT

## 2024-02-28 PROCEDURE — 93451 RIGHT HEART CATH: CPT | Performed by: STUDENT IN AN ORGANIZED HEALTH CARE EDUCATION/TRAINING PROGRAM

## 2024-02-28 PROCEDURE — 2500000004 HC RX 250 GENERAL PHARMACY W/ HCPCS (ALT 636 FOR OP/ED)

## 2024-02-28 PROCEDURE — 2500000004 HC RX 250 GENERAL PHARMACY W/ HCPCS (ALT 636 FOR OP/ED): Performed by: NURSE PRACTITIONER

## 2024-02-28 PROCEDURE — 2500000001 HC RX 250 WO HCPCS SELF ADMINISTERED DRUGS (ALT 637 FOR MEDICARE OP)

## 2024-02-28 PROCEDURE — 1200000002 HC GENERAL ROOM WITH TELEMETRY DAILY

## 2024-02-28 PROCEDURE — C9113 INJ PANTOPRAZOLE SODIUM, VIA: HCPCS

## 2024-02-28 PROCEDURE — C9113 INJ PANTOPRAZOLE SODIUM, VIA: HCPCS | Performed by: NURSE PRACTITIONER

## 2024-02-28 PROCEDURE — 2500000002 HC RX 250 W HCPCS SELF ADMINISTERED DRUGS (ALT 637 FOR MEDICARE OP, ALT 636 FOR OP/ED): Mod: MUE

## 2024-02-28 PROCEDURE — 2500000005 HC RX 250 GENERAL PHARMACY W/O HCPCS: Performed by: STUDENT IN AN ORGANIZED HEALTH CARE EDUCATION/TRAINING PROGRAM

## 2024-02-28 PROCEDURE — 36415 COLL VENOUS BLD VENIPUNCTURE: CPT

## 2024-02-28 PROCEDURE — 85027 COMPLETE CBC AUTOMATED: CPT

## 2024-02-28 PROCEDURE — 83721 ASSAY OF BLOOD LIPOPROTEIN: CPT | Performed by: NURSE PRACTITIONER

## 2024-02-28 PROCEDURE — 83718 ASSAY OF LIPOPROTEIN: CPT | Performed by: NURSE PRACTITIONER

## 2024-02-28 PROCEDURE — 99291 CRITICAL CARE FIRST HOUR: CPT | Performed by: INTERNAL MEDICINE

## 2024-02-28 PROCEDURE — C1727 CATH, BAL TIS DIS, NON-VAS: HCPCS | Performed by: STUDENT IN AN ORGANIZED HEALTH CARE EDUCATION/TRAINING PROGRAM

## 2024-02-28 PROCEDURE — 2500000002 HC RX 250 W HCPCS SELF ADMINISTERED DRUGS (ALT 637 FOR MEDICARE OP, ALT 636 FOR OP/ED): Performed by: NURSE PRACTITIONER

## 2024-02-28 PROCEDURE — 4A023N6 MEASUREMENT OF CARDIAC SAMPLING AND PRESSURE, RIGHT HEART, PERCUTANEOUS APPROACH: ICD-10-PCS | Performed by: STUDENT IN AN ORGANIZED HEALTH CARE EDUCATION/TRAINING PROGRAM

## 2024-02-28 PROCEDURE — 99232 SBSQ HOSP IP/OBS MODERATE 35: CPT

## 2024-02-28 PROCEDURE — 2500000002 HC RX 250 W HCPCS SELF ADMINISTERED DRUGS (ALT 637 FOR MEDICARE OP, ALT 636 FOR OP/ED): Mod: MUE | Performed by: NURSE PRACTITIONER

## 2024-02-28 PROCEDURE — 82465 ASSAY BLD/SERUM CHOLESTEROL: CPT | Performed by: NURSE PRACTITIONER

## 2024-02-28 PROCEDURE — C1894 INTRO/SHEATH, NON-LASER: HCPCS | Performed by: STUDENT IN AN ORGANIZED HEALTH CARE EDUCATION/TRAINING PROGRAM

## 2024-02-28 PROCEDURE — 2500000004 HC RX 250 GENERAL PHARMACY W/ HCPCS (ALT 636 FOR OP/ED): Performed by: STUDENT IN AN ORGANIZED HEALTH CARE EDUCATION/TRAINING PROGRAM

## 2024-02-28 RX ORDER — HEPARIN SODIUM 10000 [USP'U]/100ML
0-4000 INJECTION, SOLUTION INTRAVENOUS CONTINUOUS
Status: DISCONTINUED | OUTPATIENT
Start: 2024-02-28 | End: 2024-02-28

## 2024-02-28 RX ORDER — FENTANYL CITRATE 50 UG/ML
INJECTION, SOLUTION INTRAMUSCULAR; INTRAVENOUS AS NEEDED
Status: DISCONTINUED | OUTPATIENT
Start: 2024-02-28 | End: 2024-02-28 | Stop reason: HOSPADM

## 2024-02-28 RX ORDER — HEPARIN SODIUM 5000 [USP'U]/ML
2000-4000 INJECTION, SOLUTION INTRAVENOUS; SUBCUTANEOUS EVERY 4 HOURS PRN
Status: DISCONTINUED | OUTPATIENT
Start: 2024-02-28 | End: 2024-02-28

## 2024-02-28 RX ORDER — MIDAZOLAM HYDROCHLORIDE 1 MG/ML
INJECTION INTRAMUSCULAR; INTRAVENOUS AS NEEDED
Status: DISCONTINUED | OUTPATIENT
Start: 2024-02-28 | End: 2024-02-28 | Stop reason: HOSPADM

## 2024-02-28 RX ORDER — LIDOCAINE HYDROCHLORIDE 10 MG/ML
INJECTION, SOLUTION EPIDURAL; INFILTRATION; INTRACAUDAL; PERINEURAL AS NEEDED
Status: DISCONTINUED | OUTPATIENT
Start: 2024-02-28 | End: 2024-02-28 | Stop reason: HOSPADM

## 2024-02-28 RX ADMIN — SPIRONOLACTONE 50 MG: 25 TABLET, FILM COATED ORAL at 08:41

## 2024-02-28 RX ADMIN — WARFARIN SODIUM 3.5 MG: 2.5 TABLET ORAL at 18:17

## 2024-02-28 RX ADMIN — PANTOPRAZOLE SODIUM 40 MG: 40 INJECTION, POWDER, FOR SOLUTION INTRAVENOUS at 08:42

## 2024-02-28 RX ADMIN — POTASSIUM CHLORIDE 40 MEQ: 1500 TABLET, EXTENDED RELEASE ORAL at 08:41

## 2024-02-28 RX ADMIN — ASPIRIN 81 MG: 81 TABLET, COATED ORAL at 08:41

## 2024-02-28 RX ADMIN — SACUBITRIL AND VALSARTAN 1 TABLET: 24; 26 TABLET, FILM COATED ORAL at 20:43

## 2024-02-28 RX ADMIN — LEVOTHYROXINE SODIUM 175 MCG: 25 TABLET ORAL at 05:26

## 2024-02-28 RX ADMIN — SERTRALINE HYDROCHLORIDE 25 MG: 50 TABLET ORAL at 08:41

## 2024-02-28 RX ADMIN — SENNOSIDES AND DOCUSATE SODIUM 2 TABLET: 8.6; 5 TABLET ORAL at 20:43

## 2024-02-28 RX ADMIN — DIGOXIN 125 MCG: 125 TABLET ORAL at 08:41

## 2024-02-28 RX ADMIN — PANTOPRAZOLE SODIUM 40 MG: 40 INJECTION, POWDER, FOR SOLUTION INTRAVENOUS at 20:43

## 2024-02-28 RX ADMIN — DAPAGLIFLOZIN 10 MG: 10 TABLET, FILM COATED ORAL at 08:42

## 2024-02-28 RX ADMIN — BIVALIRUDIN 0.15 MG/KG/HR: 250 INJECTION, POWDER, LYOPHILIZED, FOR SOLUTION INTRAVENOUS at 13:09

## 2024-02-28 RX ADMIN — SACUBITRIL AND VALSARTAN 1 TABLET: 24; 26 TABLET, FILM COATED ORAL at 08:41

## 2024-02-28 ASSESSMENT — COGNITIVE AND FUNCTIONAL STATUS - GENERAL
MOBILITY SCORE: 24
DAILY ACTIVITIY SCORE: 24
DAILY ACTIVITIY SCORE: 24

## 2024-02-28 ASSESSMENT — PAIN SCALES - GENERAL
PAINLEVEL_OUTOF10: 0 - NO PAIN

## 2024-02-28 ASSESSMENT — ACTIVITIES OF DAILY LIVING (ADL)
BATHING_ASSISTANCE: INDEPENDENT
ADL_ASSISTANCE: INDEPENDENT

## 2024-02-28 ASSESSMENT — PAIN - FUNCTIONAL ASSESSMENT
PAIN_FUNCTIONAL_ASSESSMENT: 0-10

## 2024-02-28 NOTE — PROGRESS NOTES
[facility]  HFICU PROGRESS NOTE    Anatoly Lane/[id number]    Admit Date: 2/23/2024  Hospital Length of Stay: 5   ICU Length of Stay: 4d 20h   Primary Service:   Primary HF Cardiologist:   Referring:    INTERVAL EVENTS / PERTINENT ROS:   No acute events overnight. Hb remains stable. Colonoscopy w/o evidence of bleed yesterday. Capsule study in progress. MAPs between 70 and 80 since entresto started yesterday.     Plan:  -RHC with RAMP study in cath lab  -Consider transfer to floor if pt.'s hemodynamics look good in cath lab.   -continue to hold amiodarone due to hypothyroidism symptoms  -ASA restarted today  -Consider advanced therapy workup for transplant.   -Started Bivalirudin pre-RAMP study  -Vascular med consult to review anticoagulation if pt. Is evaluated for transplant. (PMH of HIT)     MEDICATIONS  Infusions:  bivalirudin        Scheduled:  aspirin, 81 mg, Daily  dapagliflozin propanediol, 10 mg, Daily  digoxin, 125 mcg, Daily  levothyroxine, 175 mcg, Daily before breakfast  lidocaine, 1 patch, Daily  pantoprazole, 40 mg, BID  polyethylene glycol-electrolytes, 2,000 mL, Once  potassium chloride CR, 40 mEq, Daily  sacubitriL-valsartan, 1 tablet, BID  sennosides-docusate sodium, 2 tablet, BID  sertraline, 25 mg, Daily  spironolactone, 50 mg, Daily  warfarin, 3.5 mg, Once per day on Mon Wed Fri  warfarin, 5 mg, Once per day on Sun Tue Thu Sat      PRN:  acetaminophen, 650 mg, q6h PRN  ondansetron, 4 mg, q6h PRN  oxygen, , Continuous PRN - O2/gases  polyethylene glycol, 17 g, Daily PRN      Invasive Hemodynamics:    Most Recent Range Past 24hrs   BP (Art)   No data recorded   MAP(Art)   No data recorded   RA/CVP   No data recorded   PA   No data recorded   PA(mean)   No data recorded   PCWP   No data recorded   CO   No data recorded   CI   No data recorded   Mixed Venous   No data recorded   SVR    No data recorded   PVR   No data recorded     MCS:   Heart Mate III:     Most Recent  "Range Past 24hrs   Flow 4.8 Pump Flow  Min: 4.5  Max: 4.8   Speed 5500 Speed RPM  Min: 5500  Max: 5500   Power 4.1    PI 3.8 Pulse Index  Min: 3.5  Max: 3.8         PHYSICAL EXAM:   Visit Vitals  BP 78/78 (BP Location: Left arm, Patient Position: Sitting)   Pulse 69   Temp 35.5 °C (95.9 °F) (Temporal)   Resp 16   Ht 1.753 m (5' 9\")   Wt 104 kg (229 lb 4.5 oz)   SpO2 96%   BMI 33.86 kg/m²   Smoking Status Former   BSA 2.25 m²       Wt Readings from Last 5 Encounters:   02/28/24 104 kg (229 lb 4.5 oz)   02/23/24 105 kg (230 lb 9.6 oz)   02/19/24 107 kg (235 lb)   12/08/23 101 kg (223 lb 3.2 oz)   09/20/23 88.5 kg (195 lb)       INTAKE/OUTPUT:  I/O last 3 completed shifts:  In: 438.9 (4.2 mL/kg) [P.O.:400; I.V.:38.9 (0.4 mL/kg)]  Out: 2475 (23.8 mL/kg) [Urine:2475 (0.7 mL/kg/hr)]  Weight: 104 kg      Physical Exam  Constitutional:       Appearance: Normal appearance.   HENT:      Head: Normocephalic and atraumatic.   Eyes:      Extraocular Movements: Extraocular movements intact.      Pupils: Pupils are equal, round, and reactive to light.   Cardiovascular:      Rate and Rhythm: Normal rate and regular rhythm.      Comments: LVAD hum audible upon auscultation  Pulmonary:      Effort: Pulmonary effort is normal.      Breath sounds: Normal breath sounds.   Abdominal:      General: Abdomen is flat.      Palpations: Abdomen is soft.   Skin:     General: Skin is warm and dry.      Capillary Refill: Capillary refill takes less than 2 seconds.   Neurological:      General: No focal deficit present.      Mental Status: He is alert and oriented to person, place, and time.   Psychiatric:         Mood and Affect: Mood normal.         Behavior: Behavior normal.         DATA:  CMP:  Results from last 7 days   Lab Units 02/28/24  0444 02/27/24  0522 02/26/24 2026 02/26/24  0811 02/26/24  0522 02/25/24  0208 02/24/24  0233 02/23/24  1433   SODIUM mmol/L 142 141 137  --  138 136 139 137   POTASSIUM mmol/L 4.2 4.3 4.3 3.4* 3.9 4.2 3.9 " "4.0   CHLORIDE mmol/L 109* 108* 105  --  105 103 105 100   CO2 mmol/L 27 27 25  --  26 24 26 26   ANION GAP mmol/L 10 10 11  --  11 13 12 15   BUN mg/dL 13 13  --   --  11 20 39* 44*   CREATININE mg/dL 1.03 0.94  --   --  0.83 0.95 1.08 1.30   EGFR mL/min/1.73m*2 82 >90  --   --  >90 90 77 62   MAGNESIUM mg/dL 2.20 2.23  --   --  2.20 2.24 2.11 2.22   ALBUMIN g/dL 3.3* 3.4  --   --  3.4 3.8 3.2* 4.0   ALT U/L  --   --   --   --   --   --   --  13   AST U/L  --   --   --   --   --   --   --  11   BILIRUBIN TOTAL mg/dL  --   --   --   --   --   --   --  0.4     CBC:  Results from last 7 days   Lab Units 02/28/24 0444 02/27/24 0522 02/26/24  0522 02/25/24  2139 02/25/24  0210 02/24/24  1955 02/24/24  1255 02/24/24  0341   WBC AUTO x10*3/uL 5.3 6.1 7.2 9.0 9.0 7.4 9.1 7.1   HEMOGLOBIN g/dL 8.5* 8.3* 8.3* 8.3* 10.0* 8.7* 8.6* 7.2*   HEMATOCRIT % 27.0* 24.9* 25.1* 26.8* 30.9* 26.8* 26.9* 23.0*   PLATELETS AUTO x10*3/uL 222 223 195 259 246 210 211 215   MCV fL 101* 99 100 104* 99 100 100 103*     COAG:   Results from last 7 days   Lab Units 02/28/24 0444 02/27/24 0522 02/26/24  0522 02/25/24  0208 02/24/24  0233 02/23/24  1433   INR  1.1 1.3* 1.5* 1.8* 2.6* 1.9*     ABO:   No results found for: \"ABO\"    HEME/ENDO:  Results from last 7 days   Lab Units 02/26/24  0811 02/23/24  1433   FERRITIN ng/mL  --  104   IRON SATURATION %  --  18*   TSH mIU/L 11.49*  11.49* 18.02*   HEMOGLOBIN A1C %  --  4.4      CARDIAC:   Results from last 7 days   Lab Units 02/28/24  0444 02/27/24  0522 02/26/24  0522 02/25/24  0208 02/24/24  0233 02/23/24  1433   LD U/L 109 113 167 142 96 128   TROPHS ng/L  --   --   --   --   --  30   BNP pg/mL  --   --   --   --   --  33       Assessment & Plan:  63 y.o. male with a PMH of stage D HFrEF 10-15%, ICM, s/p HM3 4/30/2023 CAD,  pAF, VT s/p ICD, and hypothyroidism who is being admitted to HFICU for worsening shortness of breath, weight gain, and hypotension.  Hospital course complicated by GIB on " 2/24 and Elevated TSH.  Endocopy (2/25) and colonoscopy (2/26) w/o evidence of bleeding. Capsule study complete 2/27 as well and negative for acute bleeding. Per GI, okay to restart coumadin from their stanpoint.  Endo consulted and po amiodarone stopped due to TSH of 18.  Plan for RHC with RAMP study in cath lab today(2/28). Per Dr. Rankin, regardless of plan for cath today, ok to restart Coumadin and ASA at home dose on 2/27. Bivalirudin gtt started 2/28 prior to RAMP study. Vascular medicine consulted to review anticoagulation options for pt. If he pursues transplant evaluation.      Neuro:  #Depression  - C/w home Zoloft 25 mg   - Serial neuro and pain assessments   - PO Tylenol PRN for pain  - PT/OT Consult, OOB to chair  - CAM ICU score every shift  - Sleep/wake cycle normalization     # Physical Status  - Obesity, BMI 34  - Patient unable to walk due to shortness of breath  - Weight gain of 75 pounds over the last year      Cardiovascular:  #HM3  Daily VAD Interrogation (2/28)  Device: HM3  Speed: 5500  Power: 4.1  Flow: 4.6  PI: 3.8  LDH: 109  INR: 1.3 (2/28)  Long term (pts choice; does not wish to proceed with OHT eval)   LVAD interrogation reveals no alarms or power spikes, no reported LVAD alarms   Weekly dressing change, due 3/1     Medications:  ASA -restarted 2/28  Coumadin 5 mg (Sunday, Tuesday, Thursday, Saturday) 3.5 mg (Monday, Wednesday, Friday) goal INR 2-3 (on hold due to GI bleed); restarted 2/27 per Dr. Rankin's request.   Pantoprazole 40 mg BID  Fish oil        #Stage D chronic systolic HF/ICM/HFrEF s/p HM3 LVAD (4/30/23)   Repeat echo 2/23/24 no effusion, overall no change from prior  -Admit BNP 33  -Daily weight 104kg (2/28)  -Remains off BB due to RV dysfunction  -Entresto 24/26 mg BID restarted after colonoscopy-> up titrate as tolerated  -C/w Farxiga   -Continue Spironolactone 50 mg daily  -C/w digoxin 125 mcg daily for RV support, level 0.98 (2/23)  -Holding home torsemide, will  evaluate diuretic needs based on assessment   -Plan for RHC with RAMP study in cath lab 2/28  -Bivalirudin gtt started pre RAMP (No heparin due to PMH of HIT) 2/28  -Consider advanced therapy evaluation for transplant 2/28  -Vascular med consult for anticoagulation recs if pt. Should pursue transplant 2/28  -Daily standing weights, 2gm sodium diet, 2L fluid restriction, strict I&Os     #History of VT and pAF  -continue to hold amiodarone 200 mg daily due to elevated TSH level   -c/w home coumadin dosing.   -[employer] dual chamber AICD      #Electrolyte Disturbances  -Maintain K>4, Mg >2   -Daily RFP  -Replete electrolytes as needed     Pulmonary:   #Ex smoker, 30 year smoking history   - C/w home Symbicort  -Breo Ellipta d/c'd per patient's request   - Monitor and maintain SpO2 > 92%     GI:  #Melena c/f GIB  Pt with 2 weeks of melena, dropping Hgb -->stable now   - Restarted AC as above. Ok with GI for now.  - Protonix 40 mg BID   - GI consulted, EGD showing gastritis, h pylori antigen pending  - Colonoscopy without signs of bleeding  - Capsule study complete with no evidence of acute bleeding 2/27. Restarted coumadin per GI recs (2/27)       :  #No hx of DEBBIE or CKD  #Hx of retention   -Was on tamsulosin previously, can reconsider if needed   -I/Os  -Avoid hypotension and nephrotoxic agents     Heme:  #No history of anemia  -H&H stable 8.3/25.1  -Labs: B12/folate wnl, iron/ferritin wnl, low Tsat (18%)  -Venofer 200mg daily x3 days (2/23-2/25)     #Hx of bilateral non-occlusive internal jugular clots  -Patient hasn't had recent US to rule out since 4/25/23  -Consider repeat US     #PMH of HIT  -PF4 0.613 4/7/23.   -platelets as low as 83 at that time  -vascular med consult for anticoagulation recs if pt. Should opt for transplant 2/28     #Chronic AC  -Restarted home coumadin 2/27    Endo:  #No hx of DM  -Euglycemic  -hgbA1c 4.4     #Thyroid  -C/w synthroid 175 mcg daily   -TSH elevated 18.02 - likely in acute  setting  -Repeat TSH 2/26-->11.49, FT4 -->1.21  -continue to hold amiodarone  -Consult Endo. Delma recs      ID:  #No active issues  -Afebrile, nontoxic , no s/s infx  -WBC: 7.2 (2/26)  -Trend temps q4h     PHYSICAL AND OCCUPATIONAL THERAPY: ordered and following      LINES:  PIVs   LVAD drive line     DVT: Coumadin and SCDs  VAP BUNDLE: N/A  ULCER PPX: PPI  GLYCEMIC CONTROL: N/A  BOWEL CARE: Colace and Miralax PRN   INDWELLING CATHETER: N/A  NUTRITION: Regular diet     EMERGENCY CONTACT: Extended Emergency Contact Information  Primary Emergency Contact: Janice Ramos  Address: [address] [place] [address]  Home Phone: [phone]  Mobile Phone: [phone]  Relation: Friend  Secondary Emergency Contact: SHARIF WARD  Mobile Phone: [phone]  Relation: Son  Preferred language: English   needed? No  FAMILY UPDATE:  CODE STATUS: Full Code  DISPO: Remain in HFICU    Patient seen and assessed with Dr. Rankin    _________________________________________________  COOKIE Campa-CNP

## 2024-02-28 NOTE — POST-PROCEDURE NOTE
Physician Transition of Care Summary  Invasive Cardiovascular Lab    Procedure Date: 2/28/2024  Attending:    * José Velasco - Primary  Resident/Fellow/Other Assistant: Surgeon(s) and Role:     * Rolando Lindo DO - Fellow    Indications:   Pre-op Diagnosis     * Acute decompensated heart failure (CMS/HCC) [I50.9]     * LVAD (left ventricular assist device) present (CMS/HCC) [Z95.811]     * Acute on chronic combined systolic (congestive) and diastolic (congestive) heart failure (CMS/HCC) [I50.43]    Post-procedure diagnosis:   Post-op Diagnosis     * Acute decompensated heart failure (CMS/HCC) [I50.9]     * LVAD (left ventricular assist device) present (CMS/HCC) [Z95.811]     * Acute on chronic combined systolic (congestive) and diastolic (congestive) heart failure (CMS/HCC) [I50.43]    Procedure(s):   Right Heart Cath  39770 - DE RIGHT HEART CATH O2 SATURATION & CARDIAC OUTPUT        Procedure Findings: normal pressures     Description of the Procedure:   RIj 7Fr access    Complications:   none    Stents/Implants:   Cardiovascular Implants       LVAD    Hm3 - Implanted        As of 9/26/2023       Status: Implanted                              Anticoagulation/Antiplatelet Plan:   Resume warfarin.     Estimated Blood Loss:   5 mL    Anesthesia: Moderate Sedation Anesthesia Staff: No anesthesia staff entered.    Any Specimen(s) Removed:   No specimens collected during this procedure.    Disposition:   Back to ICU      Electronically signed by: José Velasco MD, 2/28/2024 2:11 PM

## 2024-02-28 NOTE — CARE PLAN
Problem: Heart Failure  Goal: Improved gas exchange this shift  Outcome: Progressing  Goal: Improved urinary output this shift  Outcome: Progressing  Goal: Report improvement of dyspnea/breathlessness this shift  Outcome: Progressing  Goal: Weight from fluid excess reduced over 2-3 days, then stabilize  Outcome: Progressing  Goal: Increase self care and/or family involvement in 24 hours  Outcome: Progressing     Problem: Discharge Planning  Goal: Discharge to home or other facility with appropriate resources  Outcome: Progressing     Problem: Chronic Conditions and Co-morbidities  Goal: Patient's chronic conditions and co-morbidity symptoms are monitored and maintained or improved  Outcome: Progressing     Problem: Ventricular Assisted Device (VAD)  Goal: Hemodynamic stability, correction of coagulopathy, lab value stability  Outcome: Progressing  Goal: Wean vasopressors/nitric  Outcome: Progressing  Goal: Wean ventilator  Outcome: Progressing  Goal: Extubation  Outcome: Progressing  Goal: Stable metal status  Outcome: Progressing  Goal: Pulmonary toileting, incentive spirometry  Outcome: Progressing  Goal: Nutrition  Outcome: Progressing  Goal: Mobility/OT/PT  Outcome: Progressing  Goal: Rubber ball  Outcome: Progressing  Goal: ROM  Outcome: Progressing  Goal: Sitting  Outcome: Progressing  Goal: Walk  Outcome: Progressing  Goal: Involve in VAD awareness, dressing change  Outcome: Progressing  Goal: AICD On/Off  Outcome: Progressing   The patient's goals for the shift include remain HDS     The clinical goals for the shift include patient will maintain hemodynamic stability, anticipating passage of endoscopic pill camera.

## 2024-02-28 NOTE — PROGRESS NOTES
HFICU Attending Note    63 y.o. male with a PMH of stage D HFrEF 10-15%, ICM, s/p HM3 4/30/2023 CAD,  pAF, VT s/p ICD, and hypothyroidism who is being admitted to HFICU for worsening shortness of breath, weight gain, and hypotension.  Hospital course complicated by GIB on 2/24 and Elevated TSH.  Endocopy (2/25) and colonoscopy (2/26) w/o evidence of bleeding. Capsule study complete 2/27 as well and negative for acute bleeding. Per GI, okay to restart coumadin from their stanpoint.  Endo consulted and po amiodarone stopped due to TSH of 18.  Plan for RHC with RAMP study in cath lab today(2/28). Per Dr. Rankin, regardless of plan for cath today, ok to restart Coumadin and ASA at home dose on 2/27. Bivalirudin gtt started 2/28 prior to RAMP study. Vascular medicine consulted to review anticoagulation options for pt. If he pursues transplant evaluation       This critically ill patient continues to be at-risk for clinically significant deterioration / failure due to the above mentioned dysfunctional, unstable organ systems.  I have personally identified and managed all complex critical care issues to prevent aforementioned clinical deterioration.  Critical care time is spent at bedside and/or the immediate area and has included, but is not limited to, the review of diagnostic tests, labs, radiographs, serial assessments of hemodynamics, respiratory status, ventilatory management, and family updates.  Time spent in procedures and teaching are reported separately.    Critical care time: __35__ minutes

## 2024-02-28 NOTE — PROGRESS NOTES
Anatoly is a 63 y.o. male with a HM3 implanted 4/30/2023.      Procedures : RHC RAMP study       All LVAD readings in flowsheet. No events or alarms noted.  No changes made to speed. Pressures and flows optimized at this time.

## 2024-02-28 NOTE — PROGRESS NOTES
Occupational Therapy    Evaluation    Patient Name: Anatoly Lane  MRN: [id number]  Today's Date: 2/28/2024  Time Calculation  Start Time: 1007  Stop Time: 1033         Assessment:  OT Assessment: Anatoly presents with independence in bed mobility, transfers and ADLs. Pt demos good understanding of device use. Pt does not present with needs for skilled OT intervention at this level of care.  Barriers to Discharge: None  Evaluation/Treatment Tolerance: Patient tolerated treatment well  Medical Staff Made Aware: Yes  End of Session Communication: Bedside nurse  End of Session Patient Position: Bed, 3 rail up, Alarm off, not on at start of session  Barriers to Discharge: None  Evaluation/Treatment Tolerance: Patient tolerated treatment well  Medical Staff Made Aware: Yes  Plan:  No Skilled OT: At baseline function  OT Frequency: OT eval only  OT Discharge Recommendations: No OT needed after discharge  OT Recommended Transfer Status: Independent  OT - OK to Discharge: Yes       Subjective   Current Problem:  1. Acute decompensated heart failure (CMS/HCC)  Transthoracic Echo (TTE) Complete    Transthoracic Echo (TTE) Complete    Case Request Cath Lab: Right Heart Cath    Case Request Cath Lab: Right Heart Cath    Cardiac catheterization - non-coronary    Cardiac catheterization - non-coronary      2. LVAD (left ventricular assist device) present (CMS/HCC)  Transthoracic Echo (TTE) Complete    Transthoracic Echo (TTE) Complete    Colonoscopy Diagnostic    Colonoscopy Diagnostic    Case Request Cath Lab: Right Heart Cath    Case Request Cath Lab: Right Heart Cath    Cardiac catheterization - non-coronary    Cardiac catheterization - non-coronary      3. Anemia, unspecified type  EGD    Colonoscopy Diagnostic    Colonoscopy Diagnostic    CANCELED: Colonoscopy Diagnostic      4. Acute on chronic combined systolic (congestive) and diastolic (congestive) heart failure (CMS/HCC)  Case Request Cath Lab: Right Heart Cath    Case  Request Cath Lab: Right Heart Cath    Cardiac catheterization - non-coronary    Cardiac catheterization - non-coronary        General:  General  Reason for Referral: ADHF with course c/b GIB on 2/24 and elevated TSH.  Endocopy (2/25) and colonoscopy (2/26), Capsule study (2/27)  all negative for acute bleeding.  Past Medical History Relevant to Rehab: stage D HFrEF 10-15%, ICM, s/p HM3 (2023 )CAD,  pAF, VT s/p ICD, and hypothyroidism  Family/Caregiver Present: No  Prior to Session Communication: Bedside nurse  Patient Position Received: Bed, 3 rail up, Alarm off, not on at start of session  Preferred Learning Style: auditory, verbal, visual  General Comment: Pt is supine in bed upon entry to room. Demos ability to perform all LVAD functions. LVAD heartmate 3 (Pre session LVAD levels: speed 5500, flow 4.8, PI 3.7, PP 4.2  Post session LVAD levels: speed 5500 flow 4.7,  PI 3.6, PP 4.2.)  Precautions:  Medical Precautions: Cardiac precautions, Fall precautions (LVAD)  Precautions Comment: LVAD drive line intact,  Vital Signs:  Heart Rate: 74  Resp: 16  SpO2: 100 %  MAP (mmHg): 78  BP Location: Left arm  BP Method: Doppler  Patient Position: Lying  Pain:  Pain Assessment  Pain Assessment: 0-10  Pain Score: 0 - No pain    Objective   Cognition:  Overall Cognitive Status: Within Functional Limits  Arousal/Alertness: Appropriate responses to stimuli  Orientation Level: Oriented X4  Following Commands: Follows all commands and directions without difficulty  Attention: Within Functional Limits  Memory: Within Funtional Limits  Problem Solving: Within Functional Limits  Safety/Judgement: Within Functional Limits  Insight: Within function limits  Impulsive: Within functional limits           Home Living:  Type of Home: House  Lives With: Alone (has dog sridhar. neighbor may be able to help at times but pt. unsure.)  Home Adaptive Equipment: None  Home Layout: Two level, Able to live on main level with bedroom/bathroom (reports  does not use second floor)  Home Access: Stairs to enter with rails  Entrance Stairs-Rails: Left  Entrance Stairs-Number of Steps: 5  Bathroom Shower/Tub:  (has been sponge bathing, reports has not obtained shower bag yet)  Bathroom Toilet: Standard  Bathroom Equipment: None  Prior Function:  Level of Dane: Independent with ADLs and functional transfers, Independent with homemaking with ambulation  Receives Help From: Neighbor (as needed)  ADL Assistance: Independent  Homemaking Assistance: Independent  Ambulatory Assistance: Independent  Vocational: On disability  Leisure: watching TV  Prior Function Comments: + drives, denies falls, no AD use  IADL History:     ADL:  Eating Assistance: Independent  Grooming Assistance: Independent  Bathing Assistance: Independent  UE Dressing Assistance: Independent  LE Dressing Assistance: Independent  Toileting Assistance with Device: Independent  Activity Tolerance:  Endurance: Endurance does not limit participation in activity  Early Mobility/Exercise Safety Screen: Proceed with mobilization - No exclusion criteria met  Bed Mobility/Transfers: Bed Mobility  Bed Mobility: Yes  Bed Mobility 1  Bed Mobility 1: Supine to sitting, Sitting to supine  Level of Assistance 1: Independent    Transfers  Transfer: Yes  Transfer 1  Transfer From 1: Sit to, Stand to  Transfer to 1: Sit, Stand  Technique 1: Sit to stand, Stand to sit  Transfer Device 1:  (no AD)  Transfer Level of Assistance 1: Distant supervision      Ambulation/Gait Training:  Ambulation/Gait Training  Ambulation/Gait Training Performed: Yes  Ambulation/Gait Training 1  Comments/Distance (ft) 1: x200 ft with no AD or LOB   Modalities:     Vision:Vision - Basic Assessment  Current Vision: No visual deficits  Patient Visual Report: Other (Comment)   and    Sensation:  Light Touch: No apparent deficits  Strength:     Perception:     Coordination:  Movements are Fluid and Coordinated: Yes   Hand Function:      Extremities: RUE   RUE : Within Functional Limits  RUE AROM (degrees)  RUE AROM Comment: RUE AROM WFL  RUE Strength  RUE Overall Strength: Within Functional Limits - strength 5/5, Within Functional Limits - able to perform ADL tasks with strength, LUE   LUE: Within Functional Limits  LUE AROM (degrees)  LUE AROM Comment: RUE AROM WFL  LUE Strength  LUE Overall Strength: Within Functional Limits - strength 5/5, Within Functional Limits - able to perform ADL tasks with strength, RLE   RLE : Within Functional Limits, and LLE   LLE : Within Functional Limits        Outcome Measures:[facility] Daily Activity  Putting on and taking off regular lower body clothing: None  Bathing (including washing, rinsing, drying): None  Putting on and taking off regular upper body clothing: None  Toileting, which includes using toilet, bedpan or urinal: None  Taking care of personal grooming such as brushing teeth: None  Eating Meals: None  Daily Activity - Total Score: 24         and Confusion Assessment Method-ICU (CAM-ICU)  Feature 1: Acute Onset or Fluctuating Course: Negative  Feature 2: Inattention: Negative  Overall CAM-ICU: Negative    Education Documentation  Precautions, taught by Narda Rogers OT at 2/28/2024 12:13 PM.  Learner: Patient  Readiness: Acceptance  Method: Explanation  Response: Verbalizes Understanding    ADL Training, taught by Narda Rogers OT at 2/28/2024 12:13 PM.  Learner: Patient  Readiness: Acceptance  Method: Explanation  Response: Verbalizes Understanding    Education Comments  No comments found.        02/28/24 at 12:14 PM - Narad Rogers OT

## 2024-02-28 NOTE — SIGNIFICANT EVENT
[facility] hemodynamics cath lab ([facility] completed 2024):    MAP:  70  CVP: 7  PAP: 40/13 (22)  PCWP: 13  SVR: 681  CO: 7.4  CI: 3.38  PVR:  1.2  TP    VAD settings:  - PS:  5500  - PI: 3.8  - PP:  4.1  - PF: 4.8    Cardiac meds:    - Farxiga 10 mg daily  - Digoxin 125 mcg  - Entresto 24 / 26 mg  - Spironolactone 50 mg daily

## 2024-02-29 ENCOUNTER — APPOINTMENT (OUTPATIENT)
Dept: RADIOLOGY | Facility: HOSPITAL | Age: 64
DRG: 377 | End: 2024-02-29
Payer: MEDICARE

## 2024-02-29 ENCOUNTER — DOCUMENTATION (OUTPATIENT)
Dept: TRANSPLANT | Facility: HOSPITAL | Age: 64
End: 2024-02-29
Payer: MEDICARE

## 2024-02-29 LAB
ALBUMIN SERPL BCP-MCNC: 3.4 G/DL (ref 3.4–5)
AMPHETAMINES UR QL SCN: NORMAL
ANION GAP SERPL CALC-SCNC: 10 MMOL/L (ref 10–20)
APTT PPP: 67 SECONDS (ref 27–38)
BARBITURATES UR QL SCN: NORMAL
BENZODIAZ UR QL SCN: NORMAL
BUN SERPL-MCNC: 11 MG/DL (ref 6–23)
BZE UR QL SCN: NORMAL
CALCIUM SERPL-MCNC: 7.7 MG/DL (ref 8.6–10.6)
CANNABINOIDS UR QL SCN: NORMAL
CHLORIDE SERPL-SCNC: 109 MMOL/L (ref 98–107)
CHOLEST SERPL-MCNC: 155 MG/DL (ref 0–199)
CHOLEST SERPL-MCNC: 158 MG/DL (ref 0–199)
CHOLESTEROL/HDL RATIO: 4.1
CMV IGG AVIDITY SERPL IA-RTO: NONREACTIVE %
CO2 SERPL-SCNC: 26 MMOL/L (ref 21–32)
CREAT SERPL-MCNC: 0.87 MG/DL (ref 0.5–1.3)
EGFRCR SERPLBLD CKD-EPI 2021: >90 ML/MIN/1.73M*2
ERYTHROCYTE [DISTWIDTH] IN BLOOD BY AUTOMATED COUNT: 19.1 % (ref 11.5–14.5)
FENTANYL+NORFENTANYL UR QL SCN: NORMAL
GLUCOSE SERPL-MCNC: 95 MG/DL (ref 74–99)
HAV AB SER QL IA: REACTIVE
HAV AB SER QL IA: REACTIVE
HBV CORE AB SER QL: NONREACTIVE
HBV SURFACE AB SER-ACNC: <3.1 MIU/ML
HCT VFR BLD AUTO: 30.3 % (ref 41–52)
HDLC SERPL-MCNC: 38.3 MG/DL
HDLC SERPL-MCNC: 39.4 MG/DL
HGB BLD-MCNC: 9.2 G/DL (ref 13.5–17.5)
HIV 1+2 AB+HIV1 P24 AG SERPL QL IA: NONREACTIVE
INR PPP: 1.5 (ref 0.9–1.1)
LDH SERPL L TO P-CCNC: 116 U/L (ref 84–246)
LDLC SERPL DIRECT ASSAY-MCNC: 91 MG/DL (ref 0–129)
MAGNESIUM SERPL-MCNC: 2.23 MG/DL (ref 1.6–2.4)
MCH RBC QN AUTO: 32.3 PG (ref 26–34)
MCHC RBC AUTO-ENTMCNC: 30.4 G/DL (ref 32–36)
MCV RBC AUTO: 106 FL (ref 80–100)
NON-HDL CHOLESTEROL: 120 MG/DL (ref 0–149)
NRBC BLD-RTO: 0 /100 WBCS (ref 0–0)
OPIATES UR QL SCN: NORMAL
OXYCODONE+OXYMORPHONE UR QL SCN: NORMAL
PCP UR QL SCN: NORMAL
PHOSPHATE SERPL-MCNC: 2.5 MG/DL (ref 2.5–4.9)
PLATELET # BLD AUTO: 221 X10*3/UL (ref 150–450)
POTASSIUM SERPL-SCNC: 4.3 MMOL/L (ref 3.5–5.3)
PROTHROMBIN TIME: 16.8 SECONDS (ref 9.8–12.8)
RBC # BLD AUTO: 2.85 X10*6/UL (ref 4.5–5.9)
SODIUM SERPL-SCNC: 141 MMOL/L (ref 136–145)
TRIGL SERPL-MCNC: 145 MG/DL (ref 0–149)
WBC # BLD AUTO: 6.1 X10*3/UL (ref 4.4–11.3)

## 2024-02-29 PROCEDURE — 86708 HEPATITIS A ANTIBODY: CPT | Mod: MUE | Performed by: NURSE PRACTITIONER

## 2024-02-29 PROCEDURE — 80069 RENAL FUNCTION PANEL: CPT

## 2024-02-29 PROCEDURE — 85027 COMPLETE CBC AUTOMATED: CPT

## 2024-02-29 PROCEDURE — 2500000001 HC RX 250 WO HCPCS SELF ADMINISTERED DRUGS (ALT 637 FOR MEDICARE OP): Performed by: NURSE PRACTITIONER

## 2024-02-29 PROCEDURE — 2500000002 HC RX 250 W HCPCS SELF ADMINISTERED DRUGS (ALT 637 FOR MEDICARE OP, ALT 636 FOR OP/ED)

## 2024-02-29 PROCEDURE — 86644 CMV ANTIBODY: CPT | Performed by: NURSE PRACTITIONER

## 2024-02-29 PROCEDURE — 2500000002 HC RX 250 W HCPCS SELF ADMINISTERED DRUGS (ALT 637 FOR MEDICARE OP, ALT 636 FOR OP/ED): Mod: MUE | Performed by: NURSE PRACTITIONER

## 2024-02-29 PROCEDURE — 70355 PANORAMIC X-RAY OF JAWS: CPT

## 2024-02-29 PROCEDURE — 80323 ALKALOIDS NOS: CPT | Performed by: NURSE PRACTITIONER

## 2024-02-29 PROCEDURE — 80061 LIPID PANEL: CPT | Performed by: NURSE PRACTITIONER

## 2024-02-29 PROCEDURE — 2500000002 HC RX 250 W HCPCS SELF ADMINISTERED DRUGS (ALT 637 FOR MEDICARE OP, ALT 636 FOR OP/ED): Performed by: NURSE PRACTITIONER

## 2024-02-29 PROCEDURE — 2500000002 HC RX 250 W HCPCS SELF ADMINISTERED DRUGS (ALT 637 FOR MEDICARE OP, ALT 636 FOR OP/ED): Mod: MUE

## 2024-02-29 PROCEDURE — 85610 PROTHROMBIN TIME: CPT

## 2024-02-29 PROCEDURE — 86481 TB AG RESPONSE T-CELL SUSP: CPT | Performed by: NURSE PRACTITIONER

## 2024-02-29 PROCEDURE — 86706 HEP B SURFACE ANTIBODY: CPT | Performed by: NURSE PRACTITIONER

## 2024-02-29 PROCEDURE — 99233 SBSQ HOSP IP/OBS HIGH 50: CPT | Performed by: NURSE PRACTITIONER

## 2024-02-29 PROCEDURE — 80307 DRUG TEST PRSMV CHEM ANLYZR: CPT | Performed by: NURSE PRACTITIONER

## 2024-02-29 PROCEDURE — 36415 COLL VENOUS BLD VENIPUNCTURE: CPT | Performed by: NURSE PRACTITIONER

## 2024-02-29 PROCEDURE — 74176 CT ABD & PELVIS W/O CONTRAST: CPT

## 2024-02-29 PROCEDURE — 83615 LACTATE (LD) (LDH) ENZYME: CPT

## 2024-02-29 PROCEDURE — 2500000001 HC RX 250 WO HCPCS SELF ADMINISTERED DRUGS (ALT 637 FOR MEDICARE OP)

## 2024-02-29 PROCEDURE — 1200000002 HC GENERAL ROOM WITH TELEMETRY DAILY

## 2024-02-29 PROCEDURE — 83735 ASSAY OF MAGNESIUM: CPT

## 2024-02-29 PROCEDURE — 87389 HIV-1 AG W/HIV-1&-2 AB AG IA: CPT | Performed by: NURSE PRACTITIONER

## 2024-02-29 PROCEDURE — 86704 HEP B CORE ANTIBODY TOTAL: CPT | Performed by: NURSE PRACTITIONER

## 2024-02-29 RX ORDER — PANTOPRAZOLE SODIUM 40 MG/1
40 TABLET, DELAYED RELEASE ORAL 2 TIMES DAILY
Status: DISCONTINUED | OUTPATIENT
Start: 2024-02-29 | End: 2024-03-06 | Stop reason: HOSPADM

## 2024-02-29 RX ORDER — TAMSULOSIN HYDROCHLORIDE 0.4 MG/1
0.4 CAPSULE ORAL DAILY
Status: DISCONTINUED | OUTPATIENT
Start: 2024-02-29 | End: 2024-03-06 | Stop reason: HOSPADM

## 2024-02-29 RX ORDER — FUROSEMIDE 40 MG/1
40 TABLET ORAL ONCE
Status: COMPLETED | OUTPATIENT
Start: 2024-02-29 | End: 2024-02-29

## 2024-02-29 RX ADMIN — WARFARIN SODIUM 5 MG: 5 TABLET ORAL at 17:14

## 2024-02-29 RX ADMIN — DIGOXIN 125 MCG: 125 TABLET ORAL at 09:20

## 2024-02-29 RX ADMIN — SPIRONOLACTONE 50 MG: 25 TABLET, FILM COATED ORAL at 09:19

## 2024-02-29 RX ADMIN — LEVOTHYROXINE SODIUM 175 MCG: 25 TABLET ORAL at 05:20

## 2024-02-29 RX ADMIN — FUROSEMIDE 40 MG: 40 TABLET ORAL at 12:12

## 2024-02-29 RX ADMIN — ASPIRIN 81 MG: 81 TABLET, COATED ORAL at 09:20

## 2024-02-29 RX ADMIN — DAPAGLIFLOZIN 10 MG: 10 TABLET, FILM COATED ORAL at 09:20

## 2024-02-29 RX ADMIN — SACUBITRIL AND VALSARTAN 1 TABLET: 24; 26 TABLET, FILM COATED ORAL at 09:20

## 2024-02-29 RX ADMIN — PANTOPRAZOLE SODIUM 40 MG: 40 TABLET, DELAYED RELEASE ORAL at 09:20

## 2024-02-29 RX ADMIN — SACUBITRIL AND VALSARTAN 1 TABLET: 24; 26 TABLET, FILM COATED ORAL at 21:09

## 2024-02-29 RX ADMIN — PANTOPRAZOLE SODIUM 40 MG: 40 TABLET, DELAYED RELEASE ORAL at 21:09

## 2024-02-29 RX ADMIN — SERTRALINE HYDROCHLORIDE 25 MG: 50 TABLET ORAL at 09:20

## 2024-02-29 RX ADMIN — TAMSULOSIN HYDROCHLORIDE 0.4 MG: 0.4 CAPSULE ORAL at 12:50

## 2024-02-29 ASSESSMENT — COGNITIVE AND FUNCTIONAL STATUS - GENERAL
MOBILITY SCORE: 24
DAILY ACTIVITIY SCORE: 24
MOBILITY SCORE: 24
DAILY ACTIVITIY SCORE: 24
MOBILITY SCORE: 24
DAILY ACTIVITIY SCORE: 24

## 2024-02-29 ASSESSMENT — PAIN - FUNCTIONAL ASSESSMENT
PAIN_FUNCTIONAL_ASSESSMENT: 0-10
PAIN_FUNCTIONAL_ASSESSMENT: 0-10

## 2024-02-29 ASSESSMENT — PAIN SCALES - GENERAL
PAINLEVEL_OUTOF10: 0 - NO PAIN

## 2024-02-29 NOTE — CARE PLAN
Patient transferred out from HFICU this evening.   Patient seen and assessed at bedside.   No acute complaints or issues.   Hemodynamically stable at time of arrival with no change in physical exam.   Rest of care plan per HFICU note from this AM.

## 2024-02-29 NOTE — PROGRESS NOTES
Transplant/LVAD Education Consent:   Topic: Pre Advanced Therapy Patient Education   Attendees: Katia Martinez RN; Patient     Patient given the [facility] Pre Advanced Therapy Education binder. Patient received education regarding the following topics for their pre Heart Transplant and pre Left Ventricular Assist Device evaluation:    The evaluation process including advanced therapy team members and roles, required testing and consults, selection criteria and suitability for OHT and LVAD, OHT listing process and organ offer, hands on LVAD equipment review, psychological and financial considerations for a successful outcome with advanced therapies, and patient responsibilities including adherence to a strict medical regimen.    An overview of the surgical procedure for OHT and LVAD.    The potential for certain risk factors including infection, pneumonia, blood clot formation, organ rejection, failure, and possibility of re transplantation, lifetime immunosuppression and associated risks with OHT, arrhythmias and cardiovascular collapse, multi-organ system failure, depression, post- traumatic stress disorder, anxiety, issues of dependence or feelings of guilt, right ventricular failure with LVAD, and death.   National and Transplant [place] outcomes for one year patient and graft survival from the most recent [facility] program specific report.    Donor risk factors that could affect the success of the transplant and health of the patient including donor age, donor medical and social history, condition of the organ, and the risk of magen cancer, HIV, Hepatitis B or C, and/or malaria if the infection is not detectable at the time of donation.    Transplants not performed in a Medicare-approved transplant center could affect the patient's ability to have immunosuppression medication paid for under Medicare part B.    [facility] Intermacs and  LVAD implant outcomes for one year patient survival.    The medical  necessity of electricity and a working telephone with implantation of the LVAD.    Available alternatives to OHT and LVAD.    The patient's right to withdraw consent for the evaluations at any time during the process.      Patient was given the opportunity to have questions answered.   Consent signed for Heart Transplant and LVAD?: Yes  If no, which consent was signed? : Both  Consent obtained by: Katia Martinez RN

## 2024-02-29 NOTE — CARE PLAN
The patient's goals for the shift include  remain HDS    The clinical goals for the shift include Patient will remain HDS through shift      Problem: Heart Failure  Goal: Improved gas exchange this shift  Outcome: Progressing  Goal: Improved urinary output this shift  Outcome: Progressing

## 2024-02-29 NOTE — PROGRESS NOTES
Anatoly Lane is a 63 y.o. male on day 6 of admission presenting with Acute decompensated heart failure (CMS/HCC).    Subjective   Patient transferred from HFICU to LT5 without acute events. Denied melanic stool. Short runt of asymptomatic 8xbeats of NSVT on telemetry.     Objective     Physical Exam  Constitutional:       Appearance: Normal appearance. He is obese.   HENT:      Head: Normocephalic.      Mouth/Throat:      Mouth: Mucous membranes are moist.      Pharynx: Oropharynx is clear. No oropharyngeal exudate or posterior oropharyngeal erythema.   Eyes:      Extraocular Movements: Extraocular movements intact.      Conjunctiva/sclera: Conjunctivae normal.      Pupils: Pupils are equal, round, and reactive to light.   Neck:      Vascular: No JVD.   Cardiovascular:      Rate and Rhythm: Regular rhythm.      Heart sounds: No murmur heard.     No friction rub. No gallop.      Comments: NSR w/ intermittent ventricular pacing. AICD set DDDR . LVAD hum heard over left chest. Dopplerable distal pulses in all extremities. Capillary refill normal. Appears warm and well perfused.   Pulmonary:      Effort: Pulmonary effort is normal. No accessory muscle usage, respiratory distress or retractions.      Breath sounds: Normal breath sounds.   Abdominal:      General: Abdomen is flat. Bowel sounds are normal. There is no distension.      Palpations: Abdomen is soft. There is no mass.      Tenderness: There is no abdominal tenderness. There is no guarding.      Hernia: No hernia is present.   Genitourinary:     Penis: Normal.    Musculoskeletal:         General: No tenderness. Normal range of motion.      Cervical back: Full passive range of motion without pain.      Right lower leg: Edema present.      Left lower leg: Edema present.   Skin:     General: Skin is warm and dry.      Capillary Refill: Capillary refill takes less than 2 seconds.      Coloration: Skin is not jaundiced.      Findings: No bruising, laceration,  "lesion, rash or wound.      Comments: LVAD driveline site without drainage or erythema.    Neurological:      General: No focal deficit present.      Mental Status: He is alert and oriented to person, place, and time. Mental status is at baseline.   Psychiatric:         Mood and Affect: Mood normal.       Last Recorded Vitals  Blood pressure 78/78, pulse 80, temperature 36.2 °C (97.2 °F), temperature source Temporal, resp. rate 18, height 1.753 m (5' 9\"), weight 100 kg (221 lb 5.5 oz), SpO2 96 %.  Intake/Output last 3 Shifts:  I/O last 3 completed shifts:  In: - (0 mL/kg)   Out: 1655 (16.5 mL/kg) [Urine:1650 (0.5 mL/kg/hr); Blood:5]  Weight: 100.4 kg     Relevant Results  Current Outpatient Medications   Medication Instructions    aspirin 81 mg, oral, Daily    dapagliflozin propanediol (FARXIGA) 10 mg, oral, Daily    digoxin (LANOXIN) 125 mcg, oral, Daily    Entresto 49-51 mg tablet 1 tablet, oral, 2 times daily    levothyroxine (SYNTHROID, LEVOXYL) 175 mcg, oral, Daily before breakfast    Pacerone 200 mg, oral, Daily    pantoprazole (PROTONIX) 40 mg, oral, Daily before breakfast    sertraline (ZOLOFT) 25 mg, oral, Daily    spironolactone (ALDACTONE) 50 mg, oral, Daily    tamsulosin (FLOMAX) 0.4 mg, oral, Daily    torsemide (DEMADEX) 10 mg, oral, As needed    warfarin (Coumadin) 1 mg tablet 3.5mg (1mg tablet + 2.5mg tablet) on Monday, Wednesday, Friday<BR>5mg (2.5mg tablet + 2.5mg tablet) on Sunday, Tuesday, Thursday, Saturday    warfarin (Coumadin) 2.5 mg tablet 3.5mg (1mg tablet + 2.5mg tablet) on Monday, Wednesday, Friday<BR>5mg (2.5mg tablet + 2.5mg tablet) on Sunday, Tuesday, Thursday, Saturday       Scheduled medications  aspirin, 81 mg, oral, Daily  dapagliflozin propanediol, 10 mg, oral, Daily  digoxin, 125 mcg, oral, Daily  levothyroxine, 175 mcg, oral, Daily before breakfast  lidocaine, 1 patch, transdermal, Daily  pantoprazole, 40 mg, oral, BID  sacubitriL-valsartan, 1 tablet, oral, " BID  sennosides-docusate sodium, 2 tablet, oral, BID  sertraline, 25 mg, oral, Daily  spironolactone, 50 mg, oral, Daily  warfarin, 3.5 mg, oral, Once per day on Mon Wed Fri  warfarin, 5 mg, oral, Once per day on Sun Tue Thu Sat      Continuous medications     PRN medications  PRN medications: acetaminophen, ondansetron, oxygen, polyethylene glycol     Results for orders placed or performed during the hospital encounter of 02/23/24 (from the past 24 hour(s))   CBC   Result Value Ref Range    WBC 6.1 4.4 - 11.3 x10*3/uL    nRBC 0.0 0.0 - 0.0 /100 WBCs    RBC 2.85 (L) 4.50 - 5.90 x10*6/uL    Hemoglobin 9.2 (L) 13.5 - 17.5 g/dL    Hematocrit 30.3 (L) 41.0 - 52.0 %     (H) 80 - 100 fL    MCH 32.3 26.0 - 34.0 pg    MCHC 30.4 (L) 32.0 - 36.0 g/dL    RDW 19.1 (H) 11.5 - 14.5 %    Platelets 221 150 - 450 x10*3/uL   Renal function panel   Result Value Ref Range    Glucose 95 74 - 99 mg/dL    Sodium 141 136 - 145 mmol/L    Potassium 4.3 3.5 - 5.3 mmol/L    Chloride 109 (H) 98 - 107 mmol/L    Bicarbonate 26 21 - 32 mmol/L    Anion Gap 10 10 - 20 mmol/L    Urea Nitrogen 11 6 - 23 mg/dL    Creatinine 0.87 0.50 - 1.30 mg/dL    eGFR >90 >60 mL/min/1.73m*2    Calcium 7.7 (L) 8.6 - 10.6 mg/dL    Phosphorus 2.5 2.5 - 4.9 mg/dL    Albumin 3.4 3.4 - 5.0 g/dL   Magnesium   Result Value Ref Range    Magnesium 2.23 1.60 - 2.40 mg/dL   Coagulation Screen   Result Value Ref Range    Protime 16.8 (H) 9.8 - 12.8 seconds    INR 1.5 (H) 0.9 - 1.1    aPTT 67 (H) 27 - 38 seconds   Lactate Dehydrogenase   Result Value Ref Range     84 - 246 U/L       Assessment/Plan   63 y.o. male with a PMH of stage D/ICM/HFrEF 10-15%, s/p HM3 4/30/2023, CAD, pAF, VT s/p AICD ([facility] placed 1/4/2018), depression, HIT (PF4 + 4/7/2023), BPH & hypothyroidism. Originally admitted to HFICU on 2/23/24 due to worsening shortness of breath, weight gain, and hypotension at home.     Cardiac workup negative for source of symptoms; however, on further  review, patient states he has been having dark-tarry stools x 2 weeks prior to admission. Hg decreased from 14 in August 2023 to 7.0 during admission. GI consulted and performed EGD on 2/24 showing mild gastritis but no source of bleed. Colonoscopy performed on 2/26 without source of bleeding. VCS completed on 2/27 without source either. GIB likely 2/2 to AVM. Anemia stabilized without further anemia and transferred to LT5 on 2/28. Lastly, patient now requesting workup for OHT, now initiated.          Plan/Problem List:      NEURO PMH: Depression  Active Problem List:  #Depression  - Continue home Zoloft 25mg daily     #Obesity  - BMO 32.69 kg/m2  - Weight gain of 75 pounds over last year (appears to be dietary and hypothyroid induced)        CV PMH: Stage D/ICM/HFrEF 10-15%, s/p HM3 4/30/2023, CAD, pAF, VT s/p AICD ([facility] placed 1/4/2018), HLD  Active Problem List:   #Stage D/ICM/HFrEF 10-15%  #HM3 4/30/2023 (bridge to transplant)  #RV dysfunction     Last RHC: 2/28/24 w/ RA 7, PAP 40/13 (22), W 13, CI 3.38 &   Last LHC: 3/30/23 w/ severe LAD diffuse 80% stenosis, distal LCx 70% stenosis and RCA mild-diffuse CAD.   Last TTE/BOBBY: 2/23/24 EF 15% w/ mild RV dysfunction    LVAD Settings: high speed 5500 RPM & low speed 5100 RPM  Flows: 4.9  RPM: 5500  PI: 3.2  Power: 4.2  Dressing changes: Weekly  Drive Line assessment: Clean and free from signs/symptoms of infection     - Continue aspirin 81mg daily   - Continue farxiga 10mg daily   - Continue Entresto 24-26mg BID  - Continue spironolactone 50mg daily   - Holding beta-blocker due to persistent RV dysfunction   - 40mg PO Lasix today for goal 1-2L negative (takes 10mg torsemide PRN at home)    - Continue Coumadin 5mg sun/tues/Thur/Sat and 3.5mg M/W/F for goal INR 2-3 and daily levels  - Continue daily LDH  - Continue digoxin for 125mcg daily for RV support (last level 0.89 on 2/23/24)    #CAD  #History of HLD  - Last [facility] 3/30/23 shows multivessel CAD w/ severe  LAD diffuse 80% stenosis, distal LCx 70% stenosis and RCA mild-diffuse CAD.  - Not currently on statin   - Continue aspirin 81mg daily   - Repeat cholesterol studies on 2/29/24 appear normal     #pAfib and VT  - AICD placed 1/4/2018 ([facility]) set DDDR   - Currently NSR w/ intermittent V-pacing on telemetry. [id number] run of asymptomatic NSVT on 2/29/24  - Continue Digoxin 125mcg daily w/ appropriate levels   - Holding amiodarone due to concern for amio induced thyrotoxicosis     - Continue Coumadin for goal INR 2-3    #Heart Transplant Workup and Evaluation   - Patient states he is now ready for bridge to transplant and formal evaluation opened  - Blood type: AB+  - Last HLAs on 4/3/23 negative for class 1 and 2 antibodies  - Obtain formal evaluation consent today to initiate workup         PULM PMH: Previous smoker (quite in 2019), Restrictive lung physiology    Active Problem List:  #Restrictive lung disease  - Last PFts on 4/4/2023 show FEV1 1.66 (<60%), FVC 2.63 (<49%) w/ FEV1/FVC ratio of 82%  - Previous smoker quite in 2019  - Not currently on inhaler therapy          GI PMH: None  Active Problem List:   #Melena 2/2 to suspected AVM?  - Endorses having dark-tarry stools x 2 weeks prior to admission.   - Hg decreased from 14 in August 2023 to 7.0 during admission.  - EGD on 2/24 showing mild gastritis but no source of bleed.  - Colonoscopy performed on 2/26 without source of bleeding.  - Video capsule study completed on 2/27 without source of bleed  - H-pylori negative on 2/24  - No further melena and Hg stabilized without transfusions   - Likely source is AVM   - GI okay with restarting Coumadin and monitoring for GIB  - Continue pantoprazole 40mg BID      Diet: Regular diet   Bowel Regimen: Colace/senna BID, miralax PRN        PMH: BPH. 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    - Unclear if hypothyroidism 2/2 to amiodarone vs Hashimotos (thyroid peroxidase antibodies elevated > 1000).   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    - TSH levels also notably elevated at 11.49 on admission with normal Free T4  - Per patient, he has been taking his pantoprazole with his synthroid so he may not have been absorbing it  - Continue synthroid 175mcg daily   - Will repeat TSH and FT4 on 3/2/24 (ordered)       HEME PMH: Iron deficiency anemia, history of HIT   Active Problem List:   #GIB  - See GI for plan    #Iron deficiency anemia   - iron studies on admission show iron deficiency anemia, likely precipitated by GIB  - GIB now controlle  - Completed 3xdays of venofir   - Will start PO iron on discharge (to avoid confusion of melena inpatient)      #History of HIT   - PF4 positive on 4/7/23  - Will consult vascular medicine to assess potential plan for possible OHT in future        ID PMH: None  Active Problem List: None      Ortho/Skin PMH: None  Active Problem List: None     Proph:  SCDs  Coumadin  PPI       Lines: None    Restraints: N/A       Family: Will update at bedside      Seen with the Heart Failure/Heart Transplant Attending, Dr. Rankin     Dispo:     Heart Transplant Team:   [employer] Secure Chat  [id number] during day shift  [id number] during night shift     Christiano Dacosta, CNP

## 2024-02-29 NOTE — CARE PLAN
Pt transferred to LT5 from HFICU. Pt remained HDS and free of injury this shift. Pt had no c/o CP, palpitations, SOB, N/V, and dizziness. Pt had an uneventful night.    Problem: Heart Failure  Goal: Improved gas exchange this shift  Outcome: Progressing     Problem: Heart Failure  Goal: Improved urinary output this shift  Outcome: Progressing     Problem: Heart Failure  Goal: Report improvement of dyspnea/breathlessness this shift  Outcome: Progressing     Problem: Heart Failure  Goal: Weight from fluid excess reduced over 2-3 days, then stabilize  Outcome: Progressing     Problem: Heart Failure  Goal: Increase self care and/or family involvement in 24 hours  Outcome: Progressing     Problem: Discharge Planning  Goal: Discharge to home or other facility with appropriate resources  Outcome: Progressing

## 2024-03-01 ENCOUNTER — APPOINTMENT (OUTPATIENT)
Dept: VASCULAR MEDICINE | Facility: HOSPITAL | Age: 64
DRG: 377 | End: 2024-03-01
Payer: MEDICARE

## 2024-03-01 DIAGNOSIS — Z94.9 TRANSPLANT: ICD-10-CM

## 2024-03-01 LAB
ALBUMIN SERPL BCP-MCNC: 3.6 G/DL (ref 3.4–5)
ANION GAP SERPL CALC-SCNC: 12 MMOL/L (ref 10–20)
APPEARANCE UR: CLEAR
APTT PPP: 36 SECONDS (ref 27–38)
BASOPHILS # BLD AUTO: 0.03 X10*3/UL (ref 0–0.1)
BASOPHILS NFR BLD AUTO: 0.5 %
BILIRUB UR STRIP.AUTO-MCNC: NEGATIVE MG/DL
BUN SERPL-MCNC: 15 MG/DL (ref 6–23)
CALCIUM SERPL-MCNC: 8.1 MG/DL (ref 8.6–10.6)
CARDIOLIPIN IGA SERPL-ACNC: <0.5 APL U/ML
CARDIOLIPIN IGG SER IA-ACNC: <1.6 GPL U/ML
CARDIOLIPIN IGM SER IA-ACNC: 0.2 MPL U/ML
CHLORIDE SERPL-SCNC: 106 MMOL/L (ref 98–107)
CO2 SERPL-SCNC: 27 MMOL/L (ref 21–32)
COLOR UR: COLORLESS
CREAT SERPL-MCNC: 0.97 MG/DL (ref 0.5–1.3)
EBV EA IGG SER QL: NEGATIVE
EBV NA AB SER QL: POSITIVE
EBV VCA IGG SER IA-ACNC: POSITIVE
EBV VCA IGM SER IA-ACNC: ABNORMAL
EGFRCR SERPLBLD CKD-EPI 2021: 88 ML/MIN/1.73M*2
EOSINOPHIL # BLD AUTO: 0.26 X10*3/UL (ref 0–0.7)
EOSINOPHIL NFR BLD AUTO: 4.7 %
ERYTHROCYTE [DISTWIDTH] IN BLOOD BY AUTOMATED COUNT: 18.3 % (ref 11.5–14.5)
GLUCOSE BLD MANUAL STRIP-MCNC: 131 MG/DL (ref 74–99)
GLUCOSE SERPL-MCNC: 85 MG/DL (ref 74–99)
GLUCOSE UR STRIP.AUTO-MCNC: ABNORMAL MG/DL
HAV IGM SER QL: NONREACTIVE
HBV CORE IGM SER QL: NONREACTIVE
HBV SURFACE AG SERPL QL IA: NONREACTIVE
HCT VFR BLD AUTO: 31 % (ref 41–52)
HCV RNA SERPL NAA+PROBE-ACNC: NOT DETECTED K[IU]/ML
HCV RNA SERPL NAA+PROBE-LOG IU: NORMAL {LOG_IU}/ML
HERPES SIMPLEX VIRUS 1 IGG: >8 INDEX
HERPES SIMPLEX VIRUS 2 IGG: >8 INDEX
HGB BLD-MCNC: 9.7 G/DL (ref 13.5–17.5)
HLA CLS I TYP PNL BLD/T DONR HIGH RES: NORMAL
HLA RESULTS: NORMAL
HLA-A+B+C AB NFR SER: NORMAL %
HLA-DP+DQ+DR AB NFR SER: NORMAL %
HLA-DP2 QL: NORMAL
HLA-DQB1 HIGH RES: NORMAL
HLA-DRB1 HIGH RES: NORMAL
IGG SERPL-MCNC: 482 MG/DL (ref 700–1600)
IGG1 SER-MCNC: 315 MG/DL (ref 490–1140)
IGG2 SER-MCNC: 126 MG/DL (ref 150–640)
IGG3 SER-MCNC: 25 MG/DL (ref 11–85)
IGG4 SER-MCNC: 15 MG/DL (ref 3–200)
IGM SERPL-MCNC: 46 MG/DL (ref 40–230)
IMM GRANULOCYTES # BLD AUTO: 0.03 X10*3/UL (ref 0–0.7)
IMM GRANULOCYTES NFR BLD AUTO: 0.5 % (ref 0–0.9)
INR PPP: 1.3 (ref 0.9–1.1)
INTERPRETATION FOR ANTI-PLATELET FACTOR 4 ANTIBODY: NEGATIVE
KETONES UR STRIP.AUTO-MCNC: NEGATIVE MG/DL
LDH SERPL L TO P-CCNC: 130 U/L (ref 84–246)
LEUKOCYTE ESTERASE UR QL STRIP.AUTO: NEGATIVE
LYMPHOCYTES # BLD AUTO: 1.28 X10*3/UL (ref 1.2–4.8)
LYMPHOCYTES NFR BLD AUTO: 23.1 %
MAGNESIUM SERPL-MCNC: 2.18 MG/DL (ref 1.6–2.4)
MCH RBC QN AUTO: 32.4 PG (ref 26–34)
MCHC RBC AUTO-ENTMCNC: 31.3 G/DL (ref 32–36)
MCV RBC AUTO: 104 FL (ref 80–100)
MONOCYTES # BLD AUTO: 0.43 X10*3/UL (ref 0.1–1)
MONOCYTES NFR BLD AUTO: 7.7 %
NEUTROPHILS # BLD AUTO: 3.52 X10*3/UL (ref 1.2–7.7)
NEUTROPHILS NFR BLD AUTO: 63.5 %
NITRITE UR QL STRIP.AUTO: NEGATIVE
NRBC BLD-RTO: 0 /100 WBCS (ref 0–0)
PH UR STRIP.AUTO: 7 [PH]
PHOSPHATE SERPL-MCNC: 2.8 MG/DL (ref 2.5–4.9)
PLATELET # BLD AUTO: 232 X10*3/UL (ref 150–450)
POTASSIUM SERPL-SCNC: 3.7 MMOL/L (ref 3.5–5.3)
PROT UR STRIP.AUTO-MCNC: NEGATIVE MG/DL
PROTHROMBIN TIME: 14.6 SECONDS (ref 9.8–12.8)
RBC # BLD AUTO: 2.99 X10*6/UL (ref 4.5–5.9)
RBC # UR STRIP.AUTO: NEGATIVE /UL
SERUM AND PLATELET FACTOR 4: 0.15 OD UNITS
SODIUM SERPL-SCNC: 141 MMOL/L (ref 136–145)
SP GR UR STRIP.AUTO: 1.01
TREPONEMA PALLIDUM IGG+IGM AB [PRESENCE] IN SERUM OR PLASMA BY IMMUNOASSAY: NONREACTIVE
UROBILINOGEN UR STRIP.AUTO-MCNC: NORMAL MG/DL
WBC # BLD AUTO: 5.6 X10*3/UL (ref 4.4–11.3)

## 2024-03-01 PROCEDURE — 87340 HEPATITIS B SURFACE AG IA: CPT | Performed by: NURSE PRACTITIONER

## 2024-03-01 PROCEDURE — 99222 1ST HOSP IP/OBS MODERATE 55: CPT | Performed by: STUDENT IN AN ORGANIZED HEALTH CARE EDUCATION/TRAINING PROGRAM

## 2024-03-01 PROCEDURE — 81003 URINALYSIS AUTO W/O SCOPE: CPT | Performed by: NURSE PRACTITIONER

## 2024-03-01 PROCEDURE — 93978 VASCULAR STUDY: CPT | Performed by: INTERNAL MEDICINE

## 2024-03-01 PROCEDURE — 86778 TOXOPLASMA ANTIBODY IGM: CPT | Performed by: NURSE PRACTITIONER

## 2024-03-01 PROCEDURE — 36415 COLL VENOUS BLD VENIPUNCTURE: CPT | Performed by: NURSE PRACTITIONER

## 2024-03-01 PROCEDURE — 80069 RENAL FUNCTION PANEL: CPT | Performed by: NURSE PRACTITIONER

## 2024-03-01 PROCEDURE — 86645 CMV ANTIBODY IGM: CPT | Performed by: NURSE PRACTITIONER

## 2024-03-01 PROCEDURE — 93880 EXTRACRANIAL BILAT STUDY: CPT | Performed by: INTERNAL MEDICINE

## 2024-03-01 PROCEDURE — 1200000002 HC GENERAL ROOM WITH TELEMETRY DAILY

## 2024-03-01 PROCEDURE — 87522 HEPATITIS C REVRS TRNSCRPJ: CPT | Performed by: NURSE PRACTITIONER

## 2024-03-01 PROCEDURE — 82947 ASSAY GLUCOSE BLOOD QUANT: CPT

## 2024-03-01 PROCEDURE — 93922 UPR/L XTREMITY ART 2 LEVELS: CPT

## 2024-03-01 PROCEDURE — 82784 ASSAY IGA/IGD/IGG/IGM EACH: CPT | Performed by: NURSE PRACTITIONER

## 2024-03-01 PROCEDURE — 93922 UPR/L XTREMITY ART 2 LEVELS: CPT | Performed by: INTERNAL MEDICINE

## 2024-03-01 PROCEDURE — 86317 IMMUNOASSAY INFECTIOUS AGENT: CPT | Performed by: NURSE PRACTITIONER

## 2024-03-01 PROCEDURE — 2500000001 HC RX 250 WO HCPCS SELF ADMINISTERED DRUGS (ALT 637 FOR MEDICARE OP): Performed by: NURSE PRACTITIONER

## 2024-03-01 PROCEDURE — 93978 VASCULAR STUDY: CPT

## 2024-03-01 PROCEDURE — 99222 1ST HOSP IP/OBS MODERATE 55: CPT | Performed by: NURSE PRACTITIONER

## 2024-03-01 PROCEDURE — 86022 PLATELET ANTIBODIES: CPT | Performed by: NURSE PRACTITIONER

## 2024-03-01 PROCEDURE — 83735 ASSAY OF MAGNESIUM: CPT | Performed by: NURSE PRACTITIONER

## 2024-03-01 PROCEDURE — 86709 HEPATITIS A IGM ANTIBODY: CPT | Performed by: NURSE PRACTITIONER

## 2024-03-01 PROCEDURE — 2500000002 HC RX 250 W HCPCS SELF ADMINISTERED DRUGS (ALT 637 FOR MEDICARE OP, ALT 636 FOR OP/ED): Performed by: NURSE PRACTITIONER

## 2024-03-01 PROCEDURE — 86832 HLA CLASS I HIGH DEFIN QUAL: CPT | Performed by: NURSE PRACTITIONER

## 2024-03-01 PROCEDURE — 99233 SBSQ HOSP IP/OBS HIGH 50: CPT | Performed by: NURSE PRACTITIONER

## 2024-03-01 PROCEDURE — 83615 LACTATE (LD) (LDH) ENZYME: CPT | Performed by: NURSE PRACTITIONER

## 2024-03-01 PROCEDURE — 2500000002 HC RX 250 W HCPCS SELF ADMINISTERED DRUGS (ALT 637 FOR MEDICARE OP, ALT 636 FOR OP/ED): Mod: MUE

## 2024-03-01 PROCEDURE — 2500000002 HC RX 250 W HCPCS SELF ADMINISTERED DRUGS (ALT 637 FOR MEDICARE OP, ALT 636 FOR OP/ED)

## 2024-03-01 PROCEDURE — 93880 EXTRACRANIAL BILAT STUDY: CPT

## 2024-03-01 PROCEDURE — 85302 CLOT INHIBIT PROT C ANTIGEN: CPT | Performed by: NURSE PRACTITIONER

## 2024-03-01 PROCEDURE — 86780 TREPONEMA PALLIDUM: CPT | Performed by: NURSE PRACTITIONER

## 2024-03-01 PROCEDURE — 85610 PROTHROMBIN TIME: CPT | Performed by: NURSE PRACTITIONER

## 2024-03-01 PROCEDURE — 86695 HERPES SIMPLEX TYPE 1 TEST: CPT | Performed by: NURSE PRACTITIONER

## 2024-03-01 PROCEDURE — 85305 CLOT INHIBIT PROT S TOTAL: CPT | Performed by: NURSE PRACTITIONER

## 2024-03-01 PROCEDURE — 86147 CARDIOLIPIN ANTIBODY EA IG: CPT | Performed by: NURSE PRACTITIONER

## 2024-03-01 PROCEDURE — 86664 EPSTEIN-BARR NUCLEAR ANTIGEN: CPT | Performed by: NURSE PRACTITIONER

## 2024-03-01 PROCEDURE — 86705 HEP B CORE ANTIBODY IGM: CPT | Performed by: NURSE PRACTITIONER

## 2024-03-01 PROCEDURE — 85025 COMPLETE CBC W/AUTO DIFF WBC: CPT | Performed by: NURSE PRACTITIONER

## 2024-03-01 PROCEDURE — 2500000001 HC RX 250 WO HCPCS SELF ADMINISTERED DRUGS (ALT 637 FOR MEDICARE OP)

## 2024-03-01 PROCEDURE — 86665 EPSTEIN-BARR CAPSID VCA: CPT | Performed by: NURSE PRACTITIONER

## 2024-03-01 PROCEDURE — 2500000002 HC RX 250 W HCPCS SELF ADMINISTERED DRUGS (ALT 637 FOR MEDICARE OP, ALT 636 FOR OP/ED): Mod: MUE | Performed by: NURSE PRACTITIONER

## 2024-03-01 RX ORDER — WARFARIN SODIUM 5 MG/1
5 TABLET ORAL
Status: DISCONTINUED | OUTPATIENT
Start: 2024-03-01 | End: 2024-03-06

## 2024-03-01 RX ORDER — WARFARIN SODIUM 5 MG/1
5 TABLET ORAL DAILY
Status: DISCONTINUED | OUTPATIENT
Start: 2024-03-01 | End: 2024-03-01

## 2024-03-01 RX ORDER — WARFARIN SODIUM 5 MG/1
7.5 TABLET ORAL
Status: DISCONTINUED | OUTPATIENT
Start: 2024-03-01 | End: 2024-03-01

## 2024-03-01 RX ORDER — ACETAMINOPHEN 500 MG
5 TABLET ORAL NIGHTLY
Status: DISCONTINUED | OUTPATIENT
Start: 2024-03-01 | End: 2024-03-06 | Stop reason: HOSPADM

## 2024-03-01 RX ORDER — POTASSIUM CHLORIDE 20 MEQ/1
40 TABLET, EXTENDED RELEASE ORAL EVERY 6 HOURS
Status: COMPLETED | OUTPATIENT
Start: 2024-03-01 | End: 2024-03-01

## 2024-03-01 RX ORDER — FUROSEMIDE 40 MG/1
20 TABLET ORAL ONCE
Status: COMPLETED | OUTPATIENT
Start: 2024-03-01 | End: 2024-03-01

## 2024-03-01 RX ORDER — IPRATROPIUM BROMIDE AND ALBUTEROL SULFATE 2.5; .5 MG/3ML; MG/3ML
3 SOLUTION RESPIRATORY (INHALATION) EVERY 6 HOURS PRN
Status: DISCONTINUED | OUTPATIENT
Start: 2024-03-01 | End: 2024-03-06 | Stop reason: HOSPADM

## 2024-03-01 RX ORDER — WARFARIN SODIUM 5 MG/1
5 TABLET ORAL
Status: DISCONTINUED | OUTPATIENT
Start: 2024-03-02 | End: 2024-03-01

## 2024-03-01 RX ORDER — SERTRALINE HYDROCHLORIDE 25 MG/1
12.5 TABLET, FILM COATED ORAL DAILY
Status: DISCONTINUED | OUTPATIENT
Start: 2024-03-02 | End: 2024-03-06 | Stop reason: HOSPADM

## 2024-03-01 RX ORDER — WARFARIN SODIUM 5 MG/1
7.5 TABLET ORAL
Status: DISCONTINUED | OUTPATIENT
Start: 2024-03-02 | End: 2024-03-02

## 2024-03-01 RX ADMIN — SACUBITRIL AND VALSARTAN 1 TABLET: 24; 26 TABLET, FILM COATED ORAL at 09:24

## 2024-03-01 RX ADMIN — FUROSEMIDE 20 MG: 40 TABLET ORAL at 12:05

## 2024-03-01 RX ADMIN — Medication 5 MG: at 20:52

## 2024-03-01 RX ADMIN — TAMSULOSIN HYDROCHLORIDE 0.4 MG: 0.4 CAPSULE ORAL at 09:24

## 2024-03-01 RX ADMIN — DAPAGLIFLOZIN 10 MG: 10 TABLET, FILM COATED ORAL at 09:23

## 2024-03-01 RX ADMIN — ASPIRIN 81 MG: 81 TABLET, COATED ORAL at 09:26

## 2024-03-01 RX ADMIN — PANTOPRAZOLE SODIUM 40 MG: 40 TABLET, DELAYED RELEASE ORAL at 08:07

## 2024-03-01 RX ADMIN — SERTRALINE HYDROCHLORIDE 25 MG: 50 TABLET ORAL at 09:24

## 2024-03-01 RX ADMIN — SPIRONOLACTONE 50 MG: 25 TABLET, FILM COATED ORAL at 09:24

## 2024-03-01 RX ADMIN — LEVOTHYROXINE SODIUM 175 MCG: 25 TABLET ORAL at 06:18

## 2024-03-01 RX ADMIN — DIGOXIN 125 MCG: 125 TABLET ORAL at 09:24

## 2024-03-01 RX ADMIN — PANTOPRAZOLE SODIUM 40 MG: 40 TABLET, DELAYED RELEASE ORAL at 20:52

## 2024-03-01 RX ADMIN — POTASSIUM CHLORIDE 40 MEQ: 1500 TABLET, EXTENDED RELEASE ORAL at 12:05

## 2024-03-01 RX ADMIN — POTASSIUM CHLORIDE 40 MEQ: 1500 TABLET, EXTENDED RELEASE ORAL at 17:04

## 2024-03-01 RX ADMIN — SACUBITRIL AND VALSARTAN 1 TABLET: 24; 26 TABLET, FILM COATED ORAL at 20:52

## 2024-03-01 RX ADMIN — WARFARIN SODIUM 5 MG: 5 TABLET ORAL at 17:04

## 2024-03-01 ASSESSMENT — ENCOUNTER SYMPTOMS
CHILLS: 0
TREMORS: 0
CONSTIPATION: 0
WEAKNESS: 0
ARTHRALGIAS: 1
DIARRHEA: 0
SORE THROAT: 0
SHORTNESS OF BREATH: 0
ABDOMINAL PAIN: 0
UNEXPECTED WEIGHT CHANGE: 1
DIZZINESS: 0
FEVER: 0
HEMATOLOGIC/LYMPHATIC NEGATIVE: 1
LIGHT-HEADEDNESS: 0
ENDOCRINE NEGATIVE: 1
ALLERGIC/IMMUNOLOGIC NEGATIVE: 1
SEIZURES: 0
PSYCHIATRIC NEGATIVE: 1
HEMATURIA: 0

## 2024-03-01 ASSESSMENT — PATIENT HEALTH QUESTIONNAIRE - PHQ9
5. POOR APPETITE OR OVEREATING: NOT AT ALL
SUM OF ALL RESPONSES TO PHQ QUESTIONS 1-9: 4
SUM OF ALL RESPONSES TO PHQ9 QUESTIONS 1 & 2: 1
1. LITTLE INTEREST OR PLEASURE IN DOING THINGS: SEVERAL DAYS
2. FEELING DOWN, DEPRESSED OR HOPELESS: NOT AT ALL
6. FEELING BAD ABOUT YOURSELF - OR THAT YOU ARE A FAILURE OR HAVE LET YOURSELF OR YOUR FAMILY DOWN: NOT AT ALL
3. TROUBLE FALLING OR STAYING ASLEEP OR SLEEPING TOO MUCH: MORE THAN HALF THE DAYS
8. MOVING OR SPEAKING SO SLOWLY THAT OTHER PEOPLE COULD HAVE NOTICED. OR THE OPPOSITE, BEING SO FIGETY OR RESTLESS THAT YOU HAVE BEEN MOVING AROUND A LOT MORE THAN USUAL: NOT AT ALL
9. THOUGHTS THAT YOU WOULD BE BETTER OFF DEAD, OR OF HURTING YOURSELF: NOT AT ALL
10. IF YOU CHECKED OFF ANY PROBLEMS, HOW DIFFICULT HAVE THESE PROBLEMS MADE IT FOR YOU TO DO YOUR WORK, TAKE CARE OF THINGS AT HOME, OR GET ALONG WITH OTHER PEOPLE: SOMEWHAT DIFFICULT
4. FEELING TIRED OR HAVING LITTLE ENERGY: NOT AT ALL
7. TROUBLE CONCENTRATING ON THINGS, SUCH AS READING THE NEWSPAPER OR WATCHING TELEVISION: SEVERAL DAYS

## 2024-03-01 ASSESSMENT — COGNITIVE AND FUNCTIONAL STATUS - GENERAL
MOBILITY SCORE: 24
DAILY ACTIVITIY SCORE: 24
DAILY ACTIVITIY SCORE: 24
MOBILITY SCORE: 24

## 2024-03-01 ASSESSMENT — ANXIETY QUESTIONNAIRES
4. TROUBLE RELAXING: NOT AT ALL
6. BECOMING EASILY ANNOYED OR IRRITABLE: NOT AT ALL
2. NOT BEING ABLE TO STOP OR CONTROL WORRYING: NOT AT ALL
GAD7 TOTAL SCORE: 0
3. WORRYING TOO MUCH ABOUT DIFFERENT THINGS: NOT AT ALL
IF YOU CHECKED OFF ANY PROBLEMS ON THIS QUESTIONNAIRE, HOW DIFFICULT HAVE THESE PROBLEMS MADE IT FOR YOU TO DO YOUR WORK, TAKE CARE OF THINGS AT HOME, OR GET ALONG WITH OTHER PEOPLE: NOT DIFFICULT AT ALL
5. BEING SO RESTLESS THAT IT IS HARD TO SIT STILL: NOT AT ALL
7. FEELING AFRAID AS IF SOMETHING AWFUL MIGHT HAPPEN: NOT AT ALL
1. FEELING NERVOUS, ANXIOUS, OR ON EDGE: NOT AT ALL

## 2024-03-01 ASSESSMENT — PAIN SCALES - GENERAL
PAINLEVEL_OUTOF10: 0 - NO PAIN
PAINLEVEL_OUTOF10: 0 - NO PAIN

## 2024-03-01 ASSESSMENT — PAIN - FUNCTIONAL ASSESSMENT: PAIN_FUNCTIONAL_ASSESSMENT: 0-10

## 2024-03-01 NOTE — PROGRESS NOTES
Anatoly Lane is a 63 y.o. male on day 7 of admission presenting with Acute decompensated heart failure (CMS/HCC).    Subjective   No acute events overnight. Denied melanic stool. Short run of asymptomatic of NSVT on telemetry.     Objective     Physical Exam  Constitutional:       Appearance: Normal appearance. He is obese.   HENT:      Head: Normocephalic.      Mouth/Throat:      Mouth: Mucous membranes are moist.      Pharynx: Oropharynx is clear. No oropharyngeal exudate or posterior oropharyngeal erythema.   Eyes:      Extraocular Movements: Extraocular movements intact.      Conjunctiva/sclera: Conjunctivae normal.      Pupils: Pupils are equal, round, and reactive to light.   Neck:      Vascular: No JVD.   Cardiovascular:      Rate and Rhythm: Regular rhythm.      Heart sounds: No murmur heard.     No friction rub. No gallop.      Comments: NSR w/ intermittent ventricular pacing. AICD set DDDR . LVAD hum heard over left chest. Dopplerable distal pulses in all extremities. Capillary refill normal. Appears warm and well perfused.   Pulmonary:      Effort: Pulmonary effort is normal. No accessory muscle usage, respiratory distress or retractions.      Breath sounds: Normal breath sounds.   Abdominal:      General: Abdomen is flat. Bowel sounds are normal. There is no distension.      Palpations: Abdomen is soft. There is no mass.      Tenderness: There is no abdominal tenderness. There is no guarding.      Hernia: No hernia is present.   Genitourinary:     Penis: Normal.    Musculoskeletal:         General: No tenderness. Normal range of motion.      Cervical back: Full passive range of motion without pain.      Right lower leg: Edema present.      Left lower leg: Edema present.   Skin:     General: Skin is warm and dry.      Capillary Refill: Capillary refill takes less than 2 seconds.      Coloration: Skin is not jaundiced.      Findings: No bruising, laceration, lesion, rash or wound.      Comments: LVAD  "driveline site without drainage or erythema.    Neurological:      General: No focal deficit present.      Mental Status: He is alert and oriented to person, place, and time. Mental status is at baseline.   Psychiatric:         Mood and Affect: Mood normal.         Last Recorded Vitals  Blood pressure 78/78, pulse 66, temperature 35.9 °C (96.6 °F), temperature source Temporal, resp. rate 19, height 1.753 m (5' 9\"), weight 102 kg (225 lb 1.4 oz), SpO2 97 %.  Intake/Output last 3 Shifts:  I/O last 3 completed shifts:  In: 480 (4.7 mL/kg) [P.O.:480]  Out: 3325 (32.6 mL/kg) [Urine:3325 (0.9 mL/kg/hr)]  Weight: 102.1 kg     Relevant Results  Current Outpatient Medications   Medication Instructions    aspirin 81 mg, oral, Daily    dapagliflozin propanediol (FARXIGA) 10 mg, oral, Daily    digoxin (LANOXIN) 125 mcg, oral, Daily    Entresto 49-51 mg tablet 1 tablet, oral, 2 times daily    levothyroxine (SYNTHROID, LEVOXYL) 175 mcg, oral, Daily before breakfast    Pacerone 200 mg, oral, Daily    pantoprazole (PROTONIX) 40 mg, oral, Daily before breakfast    sertraline (ZOLOFT) 25 mg, oral, Daily    spironolactone (ALDACTONE) 50 mg, oral, Daily    tamsulosin (FLOMAX) 0.4 mg, oral, Daily    torsemide (DEMADEX) 10 mg, oral, As needed    warfarin (Coumadin) 1 mg tablet 3.5mg (1mg tablet + 2.5mg tablet) on Monday, Wednesday, Friday<BR>5mg (2.5mg tablet + 2.5mg tablet) on Sunday, Tuesday, Thursday, Saturday    warfarin (Coumadin) 2.5 mg tablet 3.5mg (1mg tablet + 2.5mg tablet) on Monday, Wednesday, Friday<BR>5mg (2.5mg tablet + 2.5mg tablet) on Sunday, Tuesday, Thursday, Saturday       Scheduled medications  aspirin, 81 mg, oral, Daily  dapagliflozin propanediol, 10 mg, oral, Daily  digoxin, 125 mcg, oral, Daily  furosemide, 20 mg, oral, Once  levothyroxine, 175 mcg, oral, Daily before breakfast  lidocaine, 1 patch, transdermal, Daily  pantoprazole, 40 mg, oral, BID  potassium chloride CR, 40 mEq, oral, q6h  sacubitriL-valsartan, 1 " tablet, oral, BID  sennosides-docusate sodium, 2 tablet, oral, BID  [START ON 3/2/2024] sertraline, 12.5 mg, oral, Daily  spironolactone, 50 mg, oral, Daily  tamsulosin, 0.4 mg, oral, Daily  warfarin, 5 mg, oral, Daily      Continuous medications     PRN medications  PRN medications: acetaminophen, ondansetron, polyethylene glycol     Results for orders placed or performed during the hospital encounter of 02/23/24 (from the past 24 hour(s))   Drug Screen, Urine   Result Value Ref Range    Amphetamine Screen, Urine Presumptive Negative Presumptive Negative    Barbiturate Screen, Urine Presumptive Negative Presumptive Negative    Benzodiazepines Screen, Urine Presumptive Negative Presumptive Negative    Cannabinoid Screen, Urine Presumptive Negative Presumptive Negative    Cocaine Metabolite Screen, Urine Presumptive Negative Presumptive Negative    Fentanyl Screen, Urine Presumptive Negative Presumptive Negative    Opiate Screen, Urine Presumptive Negative Presumptive Negative    Oxycodone Screen, Urine Presumptive Negative Presumptive Negative    PCP Screen, Urine Presumptive Negative Presumptive Negative   CBC and Auto Differential   Result Value Ref Range    WBC 5.6 4.4 - 11.3 x10*3/uL    nRBC 0.0 0.0 - 0.0 /100 WBCs    RBC 2.99 (L) 4.50 - 5.90 x10*6/uL    Hemoglobin 9.7 (L) 13.5 - 17.5 g/dL    Hematocrit 31.0 (L) 41.0 - 52.0 %     (H) 80 - 100 fL    MCH 32.4 26.0 - 34.0 pg    MCHC 31.3 (L) 32.0 - 36.0 g/dL    RDW 18.3 (H) 11.5 - 14.5 %    Platelets 232 150 - 450 x10*3/uL    Neutrophils % 63.5 40.0 - 80.0 %    Immature Granulocytes %, Automated 0.5 0.0 - 0.9 %    Lymphocytes % 23.1 13.0 - 44.0 %    Monocytes % 7.7 2.0 - 10.0 %    Eosinophils % 4.7 0.0 - 6.0 %    Basophils % 0.5 0.0 - 2.0 %    Neutrophils Absolute 3.52 1.20 - 7.70 x10*3/uL    Immature Granulocytes Absolute, Automated 0.03 0.00 - 0.70 x10*3/uL    Lymphocytes Absolute 1.28 1.20 - 4.80 x10*3/uL    Monocytes Absolute 0.43 0.10 - 1.00 x10*3/uL     Eosinophils Absolute 0.26 0.00 - 0.70 x10*3/uL    Basophils Absolute 0.03 0.00 - 0.10 x10*3/uL   Coagulation Screen   Result Value Ref Range    Protime 14.6 (H) 9.8 - 12.8 seconds    INR 1.3 (H) 0.9 - 1.1    aPTT 36 27 - 38 seconds   Lactate Dehydrogenase   Result Value Ref Range     84 - 246 U/L   Magnesium   Result Value Ref Range    Magnesium 2.18 1.60 - 2.40 mg/dL   Renal function panel   Result Value Ref Range    Glucose 85 74 - 99 mg/dL    Sodium 141 136 - 145 mmol/L    Potassium 3.7 3.5 - 5.3 mmol/L    Chloride 106 98 - 107 mmol/L    Bicarbonate 27 21 - 32 mmol/L    Anion Gap 12 10 - 20 mmol/L    Urea Nitrogen 15 6 - 23 mg/dL    Creatinine 0.97 0.50 - 1.30 mg/dL    eGFR 88 >60 mL/min/1.73m*2    Calcium 8.1 (L) 8.6 - 10.6 mg/dL    Phosphorus 2.8 2.5 - 4.9 mg/dL    Albumin 3.6 3.4 - 5.0 g/dL   POCT GLUCOSE   Result Value Ref Range    POCT Glucose 131 (H) 74 - 99 mg/dL       Assessment/Plan   63 y.o. male with a PMH of stage D/ICM/HFrEF 10-15%, s/p HM3 4/30/2023, CAD, pAF, VT s/p AICD ([facility] placed 1/4/2018), depression, BPH & hypothyroidism. Originally admitted to [facility] on 2/23/24 due to worsening shortness of breath, weight gain, and hypotension at home.     Cardiac workup negative for source of symptoms; however, on further review, patient states he has been having dark-tarry stools x 2 weeks prior to admission. Hg decreased from 14 in August 2023 to 7.0 during admission. GI consulted and performed EGD on 2/24 showing mild gastritis but no source of bleed. Colonoscopy performed on 2/26 without source of bleeding. VCS completed on 2/27 without source either. GIB likely 2/2 to AVM. Anemia stabilized without further anemia and transferred to LT5 on 2/28. Lastly, patient now requesting workup for OHT, now initiated.          Plan/Problem List:      NEURO PMH: Depression  Active Problem List:  #Depression  - Patient states he is unclear why or when he was started on zoloft, but it must have been after  his LVAD surgery.  - He currently denies any symptoms of anxiety or depression and has asked if it is safe to be weaned off.  - There are multiple side effects (including increased bleeding risk) with zoloft, so may be beneficial to taper off since the patient has requested.  - Wean Zoloft to 12.5 daily and then discontinue in 2-4 weeks if no withdrawal symptoms (~3/18 or 3/25)    #Obesity  - BMO 32.69 kg/m2  - Weight gain of 75 pounds over last year (appears to be dietary and hypothyroid induced)        CV PMH: Stage D/ICM/HFrEF 10-15%, s/p HM3 4/30/2023, CAD, pAF, VT s/p AICD ([facility] placed 1/4/2018), HLD  Active Problem List:   #Stage D/ICM/HFrEF 10-15%  #HM3 4/30/2023 (bridge to transplant)  #RV dysfunction     Last RHC: 2/28/24 w/ RA 7, PAP 40/13 (22), W 13, CI 3.38 &   Last LHC: 3/30/23 w/ severe LAD diffuse 80% stenosis, distal LCx 70% stenosis and RCA mild-diffuse CAD.   Last TTE/BOBBY: 2/23/24 EF 15% w/ mild RV dysfunction    LVAD Settings: high speed 5500 RPM & low speed 5100 RPM  Flows: 4.8  RPM: 5500  PI: 3.7  Power: 4.2  Dressing changes: Weekly  Drive Line assessment: Clean and free from signs/symptoms of infection     - Continue aspirin 81mg daily   - Continue farxiga 10mg daily   - Continue Entresto 24-26mg BID  - Continue spironolactone 50mg daily   - Holding beta-blocker due to persistent RV dysfunction   - 20mg PO Lasix today for goal 1-2L negative (takes 10mg torsemide PRN at home)    - Increase coumadin to 7.5mg sun/tues/Thur/Sat and 5.0mg M/W/F for goal INR 2-3 and daily levels. Not currently bridging, but should consider tomorrow if INR remains subtherapeutic.   - Continue daily LDH  - Continue digoxin 125mcg daily for RV support (last level 0.89 on 2/23/24)    #CAD  #History of HLD  - Last LHC 3/30/23 shows multivessel CAD w/ severe LAD diffuse 80% stenosis, distal LCx 70% stenosis and RCA mild-diffuse CAD.  - Repeat cholesterol studies on 2/29/24 appear normal   - Not currently on  statin   - Continue aspirin 81mg daily     #pAfib and VT  - AICD placed 1/4/2018 ([facility]) set DDDR   - Currently NSR w/ intermittent V-pacing on telemetry. Intermittent runs of asymptomatic NSVT on 2/29/24  - Continue Digoxin 125mcg daily w/ appropriate levels   - Holding amiodarone due to concern for amio induced thyrotoxicosis     - Continue Coumadin for goal INR 2-3    #Heart Transplant Workup and Evaluation   - Patient states he is now ready for bridge to transplant and formal evaluation opened  - Transplant evaluation consent obtained on 2/29/24  - Blood type: AB+  - Last HLAs on 4/3/23 negative for class 1 and 2 antibodies   - Repeat HLA testing pending from 2/29/24 and 3/1/24   - Labwork collected and pending for transplant workup   - RHC completed 2/28/24  - LHC 3/20/23; no need for further imaging at this time  - Echo completed 2/23/24  - EKG completed 3/1/24  - Carotid ultrasound 3/1/24 with <50% stenosis bilateral carotids  - U/S aorta and iliacs 3/1/24 with poor visualization due to bowel gas and LVAD  - ANPUAM testing 3/1/24 negative for PAD  - No need for CPET  - US abdomen ordered and pending  - No need for chest x-ray  - CT C/A/P non-con 2/29/24: shows multiple stable lung nodules bilaterally (0.3cm) all stable from 5/4/23. Liver shows hypodense simple fluid collections stable (1.3cm) from 5/4/23. Multiple small hypodense fluid collections on bilateral kidneys favored to be cysts all stable compared to 5/4/23.  Diffuse centrilobular ground glass opacities within bilateral lungs, compatible with bronchiolitis.     - PFTs to be done on 3/4/24  - Bone density to be ordered outpatient  - Colonoscopy 2/26/24 & EGD 2/24/24 without significant findings   - Pantogram completed 2/29/24 and negative for abscess or carries   - Nutrition, social work and palliative care consulted    - Plan to present to Advance Therapy Meeting on on Tuesday 3/5/24    #Vaccine status  - Immunity to HepA  - Immunity to  MMR  - Will need HepB series (Engerix-B): first dose ordered, 2nd dose at 4/2024, 3rd dose at 9/2024  - Has had chicken pox w/ + VZV IgG: Will need 2-dose shingrix series outpatient   - Patient denies having recent Td or Tdap administration within last 10 years: single injection ordered  - RSV vaccine (Arexy): Single injection ordered this admission on 3/5/24   - Pneumococcal vaccine (Prevnar 20): Single injection ordered this admission   - Has received 2xPfizer COVID vaccines in 2021: Will require 1-dose of either moderna or pfizer vaccine outpatient  - Has not received Flu vaccine this year: Will require 1-dose of any flu vaccine       PULM PMH: Previous smoker (quite in 2019), Restrictive lung physiology    Active Problem List:  #Dyspnea on exertion   #Restrictive lung disease  - Last PFTs on 4/4/2023 show FEV1 1.66 (<60%), FVC 2.63 (<49%) w/ FEV1/FVC ratio of 82%  - Previous smoker quite in 2019  - Not currently on inhaler therapy  - Admitted with complaints of GRAVES for multiple weeks  - Cardiac workup unremarkable as source of symptoms   - GRAVES likely in setting of anemia 2/2 to GIB; however, CT on 2/29/24 shows Diffuse centrilobular ground glass opacities within bilateral lungs, compatible with bronchiolitis    - Consult pulmonology in AM for transplant workup and evaluation of bronchiolitis as possible source of GRAVES    - Repeat PFTs ordered and scheduled for Monday 3/4/24   - Start duonebs Q6 PRN for SOB/GRAVES       GI PMH: None  Active Problem List:   #Melena 2/2 to suspected AVM?  - Endorses having dark-tarry stools x 2 weeks prior to admission.   - Hg decreased from 14 in August 2023 to 7.0 during admission.  - EGD on 2/24 showing mild gastritis but no source of bleed.  - Colonoscopy performed on 2/26 without source of bleeding.  - Video capsule study completed on 2/27 without source of bleed  - H-pylori negative on 2/24  - No further melena and Hg stabilized without transfusions   - Likely source is AVM   -  GI okay with restarting Coumadin and monitoring for GIB  - Continue pantoprazole 40mg BID      Diet: Regular diet   Bowel Regimen: Colace/senna BID, miralax PRN        PMH: BPH. 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    - TSH levels also notably elevated at 11.49 on admission with normal Free T4  - Unclear if hypothyroidism 2/2 to amiodarone vs Hashimotos (thyroid peroxidase antibodies elevated > 1000).   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    - Per patient, he has been taking his pantoprazole with his synthroid so he may not have been absorbing it  - Continue synthroid 175mcg daily   - Will repeat TSH and FT4 on 3/2/24 (ordered)       HEME PMH: Iron deficiency anemia, history of HIT   Active Problem List:   #GIB  - See GI for plan    #Iron deficiency anemia   - iron studies on admission show iron deficiency anemia, likely precipitated by GIB  - GIB now controlle  - Completed 3xdays of venofir   - Will start PO iron on discharge (to avoid confusion of melena inpatient)      #History of HIT   - PF4 positive; however, HONG negative on 4/7/23  - Repeat PF4 on 3/1/24 is negative   - Vascular medicine discussed and okay for patient to receive heparin       ID PMH: None  Active Problem List: None      Ortho/Skin PMH: None  Active Problem List: None     Proph:  SCDs  Coumadin  PPI       Lines: None    Restraints: N/A       Family: Will update at bedside      Seen with the Heart Failure/Heart Transplant Attending, Dr. Rankin     Dispo: Discharge likely next Tuesday pending transplant workup, therapeutic INR and no further GIB.     Heart Transplant Team:   [employer]  [id number] during day  shift  [phone] during night shift     Christiano Dacosta, CNP

## 2024-03-01 NOTE — CARE PLAN
The clinical goals for the shift include Patient will remain HDS throughout shift  Problem: Heart Failure  Goal: Improved gas exchange this shift  Outcome: Progressing  Goal: Improved urinary output this shift  Outcome: Progressing  Goal: Report improvement of dyspnea/breathlessness this shift  Outcome: Progressing  Goal: Weight from fluid excess reduced over 2-3 days, then stabilize  Outcome: Progressing  Goal: Increase self care and/or family involvement in 24 hours  Outcome: Progressing

## 2024-03-01 NOTE — PROGRESS NOTES
"ENCOUNTER    Visit Type Consult Visit - Pt received LVAD in April 2023 also at [facility], already known to this team    Location: [facility] [address]    Barriers to Communication / Understanding:   [] Language [] Vision [] Hearing [] Other     []  Present     Accompanied By: N/A, assessment completed with Pt by himself; SW spoke with his mom and step-dad each by phone separately    Organ For Transplant: Heart    Device For Implant: N/A, already has VAD    Ethnicity:  White    ADLs: SOB just walking between living room and kitchen, while cooking take breaks to sit down, has rest, able to do a few stairs (can access washer and dryer in basement), can do the 5 steps to get in (before LVAD had to crawl up these stairs)    Instrumental ADLs: independent    Level of Activity Sedentary    DME: none    Knowledge of Health: no other major problems    Why Do You Have End Stage Organ Disease: enlarged heart, unsure of cause    Knowledge of Transplant / VAD:  Yes Patient Is Able To Make An Informed Decision    Patient Understands the Risks of Transplant / VAD  Yes Rejection  Yes Infection Yes Complications  Yes Death    Patient Understands Recovery and Follow-Up from Transplant / VAD  Yes Length Of State Yes Appointments  Yes Labs  Yes Rehabilitation    Patient Has Identified Goals of Transplant / VAD: take a shower, swimming, \"normal stuff\", go with friends on a boat, get back into the dating/social world, feels LVAD has impeded ability to have sex    Overall Compliance     Compliance With Medications: every Sunday sorts into 3 separate pill boxes, would like daily pill sorter with 4 time slots for each day if LVAD team will provide     Managed By: himself    Understanding Of Medication: \"decent\", feels like he knows exactly what 1/2 of them do  Compliance With Appointments: no issues    How Does Patient Handle Health Problems:states he usually monitors for a little while, but if something persists then " "he'll call VAD on-call phone    Organ Heart    Heart    Cardiac Rehab: unsure if he's done formal cardiac rehab, but states he exercised at home on treadmill and elliptical, has a kettle bell with adjustable weight functionality    Fluid Restriction: none    Anticoagulation Service: coumadin clinic in [place], normally only has to go once a month    Medical And Clinic Appointment Compliant Yes    SOCIAL HISTORY  : none  Education: High School diploma  Literate Yes   Computer literate Yes - can google things on his cell phone  Internet access: does not have wifi, but has data for his cell phone     Sources of Income:  Patient's Current Employment: unemployed due to disability; receives social security disability  Employment History: Disabled since 2010  Worked in [employer] x 20 years, [employer] x 7 years    Will patient have paid status from employment during recovery: N/A, already on SSD  Pt   Spouse / SO Current Employment: N/A    Will spouse / SO have paid status from employment during recovery: N/A    Other Sources of Income: none    Does patient have financial concerns No     Is patient able to meet current monthly expenses Yes - is intentional about budgeting due to fixed income, feels somewhat limited in his hobbies because of income    Resources:  Food [place]: $19/month    Patient was provided information on transplant fundraising Yes    Insurance:  Primary Insurance: [employer] Gold Choice Medicare - states he also has the Medicaid that helps pay his Medicare premiums (likely QMB but not able to verify via facesheet); he has no copay for PCP, $35 copay for specialty appts, and pays nothing for Rxs    FAMILY SYSTEM    Twice , currently single, no significant other    Children:  Yes # Biological: 1 - Bird - age 34 - lives about 10 mins away from Pt, they do keep in contact, mostly texting, but son does not always answer Pt's calls/texts, \"busy with girlfriend\"    Parents:  Raised By: " mother and two step-fathers; first step-father adopted him, now passed away, mother remarried Channing so he has a 2nd step-father    Did the patient have contact with the other parent Yes    Siblings:  [x] # Biological       Has 2 sisters, 3 brothers, only see them on holidays/special occasions  Sis Nasima lives close, sister Luis Fernando in [place], not seen brothers since nephew's  last year  Not on bad terms, just don't spend a lot of time together    Support & Recovery Plan:  Yes Adequate    Primary Support:  Name: Channing Ramos  Phone: [phone]  Age: 79  Relationship to Patient: Step-father  If employed, can they take time off work: Retired  If so, is it paid time off: N/A  If not, will this impact your finances: N/A  Did they attend education classes: No, but familiar with previous heart transplant education and have been caring for him for the last 11 months with LVAD  Do they have other caregiver responsibilities (child or eldercare): No, does friendly visiting at nursing homes  Do they have their own conditions which may prevent them from providing care for you (Medical, psychological, physical limitations): No, states he is in very good health for his age  Are they available on short notice: Yes  Are they reliable: Yes  Are they responsible: Yes  Are they able to understand and process new information: Yes  Do they have reliable transportation or will you allow them to use your vehicle: Yes, has DL and personal vehicle  Are they currently involved in your care: Yes, completes his weekly driveline dressing change x last 11 months    Secondary Support:  Name: Janice Ramos  Phone: [phone]   Age: 82  Relationship to Patient: Mom  If employed, can they take time off work: 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, retired, 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  If so, is it paid time off: N/A  If not, will this impact your finances: N/A  Did they attend education classes: no, but familiar with Pt's care x last 11 months with LVAD  Do they have  "other caregiver responsibilities (child or eldercare): no  Do they have their own conditions which may prevent them from providing care for you (Medical, psychological, physical limitations): No, had elective left knee replacement a few weeks ago, but recovering as expected, no plans for any surgery to the other knee  Are they available on short notice: yes  Are they reliable: Yes  Are they responsible: Yes  Are they able to understand and process new information: Yes  Do they have reliable transportation or will you allow them to use your vehicle: Yes, her  normally drives them when they come over together, but she is also able to drive; has her own car  Are they currently involved in your care: Yes, keeps in regular touch with Pt, chaperones her /Pt's step-dad Channing to do the weekly dressing changes    Housing:  Yes Adequate Rents home  Type of Home Two Story, he lives on bottom floor, 5 [address]ELIN[address], full flight down to basement  Distance to [facility]: ~ 1 hr 15 minutes  Pets: adult border collie named Christy - neighbors assist with her care when Pt is hospitalized  Does Patient Feel Safe in Home: yes    Transportation:  Yes Adequate: yes and has vehicle, comfortable driving in any condition  # Licensed Drivers in the Home: 1 - Pt  Does Patient Drive: Yes  # Reliable Vehicles: 1  Does Patient use Public Transportation: no  Does Patient use Medical Transportation: no    MENTAL HEALTH  The patient reports their mood as fine, no mood problems, and that he actually feels \"great\" right now    Reported Mental Health Diagnosis: none  Family History of Mental Health Concerns: none  What are patient psychosocial stressors: nothing, just his health, but doesn't let anything stress him out    Cognition: occasional short term memory problems and difficulty with word finding, but thinks it's normal/age related, nothing new     Current Medications:  Mental Health Meds - on Zoloft but not sure why he takes it, thinks it may have " started around time of his LVAD surgery, but does not feel like he needs anti-depressant or mood stabilizer and would be interested in talking to team about reducing or discontinuing it if appropriate  Sleep Meds - Pt states none  Pain Meds - Pt states none  OTC Meds: Generic tylenol , nasal spray only if nose runny 1+ day  Past Medications: no prior bad reactions    Counseling: marriage counseling 20+ years ago     IOP  Has patient ever been hospitalized for mental health reasons No     Suicide Assessment:  History of Suicide Ideation No  History of Suicide Attempt No   History of Suicidal Ideation in the past 3 months No     Patient's Reported Trauma History: Pt states none    What are patient's coping behaviors: likes reality TV, law and order, crime shows,  shows, hospital shows, [employer]     Baptism / Spirituality: Restorationist    Attitude toward interviewer Appropriate, cooperative, very friendly and open    Eye Contact Patient maintained good eye contact throughout appointment    Appearance The patient was neatly groomed, appropriately dressed and adequately nourished    Affect Appropriate    Thought Process Appropriate    Substance Use /Abuse History:  Current Tobacco User No  Former Tobacco User Yes  Describe past tobacco use and date quit: started age 18, ~ 1 PPD max, quit smoking in 2019 cold turkey    Current Alcohol User No - none at all since LVAD  Former Alcohol User Yes  Describe past alcohol use and date quit: none at all since LVAD, as older adult was social/occasional in nature, in his 20s-40s, had an alcohol problem, daily drinker at times, got a DUI in his 20s and another in his 40s when he was going through 2nd divorce, was court ordered to do an alcohol education class of some kind and also go to some AA meetings, not still going to AA meetings, stated he no longer has an issues with alcohol    Do support people drink alcohol No  Is alcohol kept in the home No  Does Patient need to sign a  CD contract No    Current Illegal / Unprescribed Drug User No  Former Illegal / Unprescribed Drug User Yes: some marijuana smoking as a teen/in his 20s, occasional more than a year ago, none at all since LVAD    Prescription Drug Abuse:  No Has patient experienced feelings of addiction  No Has patient experienced symptoms of withdrawal  No Has patient experienced any side effects? e.g.  hallucinations or delusions    LEGAL ISSUES  Arrests: arrested for DV with first wife, charges dropped, was drunk, 1 DUI in 20s, 1 DUI in his 40s; no current criminal involvement    Citizenship:  Yes: [place] Citizen     Advance Directives: Yes  HCPOA: son    IMPRESSION  Mr. Anatoly Lane is a 63 year old male with LVAD from April 2023 now being evaluated for heart transplant during inpatient hospitalization. Assessment was completed with Pt by himself with separate collateral info gathered from his mom and step-dad each individually by phone.     Pt resides about 1 hr and 15 minutes from [facility] [place], [place] with his adult border collie named Christy. Pt's neighbors assist with caring for his dog when he is hospitalized. Pt has lived in this rental since 2009. Pt lives on bottom floor of a 2 story duplex. There are 5 stairs to enter his level and full flight down to laundry in the basement. Pt has a valid 's license and his own personal vehicle and does drive as needed. 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 Pt is funded via [employer] Gold Medicare with Medicaid (likely QMB) that assists with his Medicare premiums. Pt receives $19/month in Food [place]. Although Pt is intentional about his spending due to his fixed income, he reports being able to meet his current monthly expenses and no specific financial concerns related to transplant.     Although Pt still completes all ADLs and IADLs  independently, he reports that lately he is short of breathe just walking from one room to the next within his house, has to take rest breaks and sit down while cooking a meal, and gets winded climbing stairs. Pt describes himself as sedentary but that he would like to be more active and feels that transplant would offer him more opportunities to do so. Pt wants to be able to shower, swim, go out with his friends on their boat, and be more social and have a more robust dating/romantic life.     Pt had good recall of risk and expected recovery for transplant. Pt sorts and manages his own medications via weekly pill boxes with adequate compliance. Pt has no issues with appt compliance. Pt unsure if he's completed formal cardiac rehab, but does have a mild exercise regimen he completes at home with treadmill, elliptical machine, and kettle bell weights. Pt is not on fluid restriction. Pt is on Coumadin anticoagulant and participates in [facility], typically only having to go for labs once each month.     Pt is twice  and currently single with no significant other. Pt has one adult child: a son Bird who is 34 years old and lives about 10 minutes away from Pt. Pt reports they have a good relationship and text and talk on the phone regularly, but son is not a primary caregiver due to him having other responsibilities, having a girlfriend, and not consistently answering the phone. But Pt stated son would make himself available in case of emergency and do anything Pt might need. Pt reports supports as his mother Janice and Janice's /Pt's step-father Channing. 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 Channing is 79 and reports he is in excellent health for his age. Both have 's licenses and their own personal vehicles and both are retired. Channing has already been involved in Pt's care since LVAD by completing his driveline dressing  changes weekly. Both Janice and Channing indicated full support of Pt being considered for transplant and would continue to provide care in any capacity they are capable.     Pt reports that his mood has actually been pretty good and that he tries not to let anything get to him. Pt reports no mental health diagnoses and has never been hospitalized for a mental health reason. Pt is on Rx Zoloft and stated he was not really sure why, but that he doesn't feel like he has any mood issues and would like to discuss reducing or discontinuing all together if appropriate. Pt reports he has never participated in formal counseling except for some marriage counseling for a short period about 20 years ago. Pt scored 4 on PHQ9 indicating minimal symptoms of depression and 0 on OSCAR indicating no current symptoms of anxiety. Pt reports no trauma history. Pt stated he spends most of his time watching TV and his favorites are reality TV, crime and health dramas, and the [employer] channel.     Pt smoked cigarettes from age 18, approx. 1 PPD until 2019 when he quit cold turkey. Pt has alcohol abuse history and reports being a heavy drinker, sometimes daily at various times throughout his 20s, 30s, and 40s. Pt had 2 DUIs many years apart and was sentenced to alcohol educational classes and to go to a certain number of AA meetings. Pt did not participate in AA outside of what was court ordered. Pt stated after his 40s he drank occasionally/socially and has not consumed any alcohol whatsoever since LVAD surgery 11 months ago. Pt reports occasional marijuana smoking as a young person and again more than 1 year ago, also none whatsoever since LVAD surgery. Besides 2 DUIs, Pt was also arrested for domestic violence when  to his 1st wife, but the charges were dropped. Pt stated that incident was related to drinking.     PLAN  Pt scored 15 on SIPAT indicating he is a good transplant candidate. Pt is low psychosocial risk with no particular  contraindications. SW will continue to follow-up with Pt during inpatient stay, continue to follow outpatient as long as Pt has LVAD, and present to selection committee when applicable. If Pt is listed for transplant, SW will continue to follow and regularly confirm his supports remain in good health and capable of performing the necessary functions given their ages.

## 2024-03-01 NOTE — PROGRESS NOTES
03/01/24        Transitional Care Coordination Progress Note:   Patient discussed during interdisciplinary rounds.   Team members present: JUVENCIO HAWK MD   Plan per Medical/Surgical team: Cardiac work up [facility] VT followed by vascular med &GI monitoring for melena possible Heart transplant patient   Discharge disposition: Home No current needs   Status-Inpatient   Payer- [employer] MEDICARE    Potential Barriers: None   ADOD: 3/3/2024   Juan Lockett RN [facility] [phone]

## 2024-03-01 NOTE — CONSULTS
"Reason For Consult  Question of history of HIT.    History Of Present Illness  Anatoly Lane is a 63 y.o. male presenting with acute decompensated heart failure with symptoms of GRAVES with walking ~25 feet, hip pain and stiffness, bilateral shoulder pain and stiffness.  Patient reports he was concerned that his LVAD was not functioning correctly.   During hospital admission 4/2023 patient had investigation of thrombocytopenia when platelets were 75K after placement of Impella.  PF4 level was returned as positive, and HONG (which is confirmation test for HIT), was returned as NEGATIVE 4/7/23.  Vascular Medicine Service consulted to determine if patient has history of HIT.  Patient continues with daily ASA 81mg and is to start Warfarin this evening.     Past Medical History  Stage D/ICM/HFrEF 10-15%, s/p HM3 4/30/2023, CAD, pAF, VT s/p AICD ([facility] placed 1/4/2018), anxiety/depression, HIT (PF4 + 4/7/2023), BPH & hypothyroidism.     Surgical History  He has a past surgical history that includes Other surgical history (01/05/2022); Other surgical history (01/05/2022); CT angio abdomen pelvis w and or wo IV IV contrast (04/13/2023); Left ventricular assist device; and Colonoscopy w/ polypectomy (2023).     Social History  Patient lives in [facility]; single and lives alone; previously worked as a salesman selling televisions and appliances.  Former tobacco use, quit 6 years ago after 40 years of tobacco use 1 ppd    Family History  Family History   Problem Relation Name Age of Onset    Hypertension Father      Colon cancer Father          Allergies  Jardiance [empagliflozin] and Amiodarone    Review of Systems  Review of Systems   Constitutional:  Positive for unexpected weight change. Negative for chills and fever.        Reports gained 70# in past 8 months   HENT:  Negative for nosebleeds and sore throat.    Eyes:  Positive for visual disturbance.        Reports daily \"crossed eyes\" that occurs for ~2 minutes every " "morning while cooking breakfast, and reports that it resolves quickly.    Respiratory:  Negative for shortness of breath.         SOB with exertion, can walk ~25 feet before he feels \"winded\"   Cardiovascular:  Negative for chest pain and leg swelling.   Gastrointestinal:  Negative for abdominal pain, constipation and diarrhea.   Endocrine: Negative.    Genitourinary:  Negative for hematuria.   Musculoskeletal:  Positive for arthralgias.   Skin: Negative.    Allergic/Immunologic: Negative.    Neurological:  Negative for dizziness, tremors, seizures, syncope, weakness and light-headedness.   Hematological: Negative.    Psychiatric/Behavioral: Negative.           Physical Exam   Physical Exam  Constitutional:       Appearance: Normal appearance.   HENT:      Head: Normocephalic and atraumatic.      Mouth/Throat:      Mouth: Mucous membranes are moist.   Eyes:      Pupils: Pupils are equal, round, and reactive to light.   Neck:      Vascular: No carotid bruit.   Cardiovascular:      Rate and Rhythm: Normal rate and regular rhythm.      Pulses: Normal pulses.      Heart sounds: Normal heart sounds.   Pulmonary:      Effort: Pulmonary effort is normal.      Breath sounds: Normal breath sounds.   Abdominal:      General: There is no distension.      Palpations: Abdomen is soft. There is no mass.      Tenderness: There is no abdominal tenderness. There is no guarding.   Musculoskeletal:      Cervical back: Normal range of motion. No tenderness.      Left lower leg: No edema.   Skin:     General: Skin is warm and dry.      Capillary Refill: Capillary refill takes 2 to 3 seconds.      Findings: No bruising, erythema or rash.   Neurological:      General: No focal deficit present.      Mental Status: He is alert and oriented to person, place, and time.   Psychiatric:         Mood and Affect: Mood normal.         Behavior: Behavior normal.         Thought Content: Thought content normal.         Judgment: Judgment normal.      " "     Last Recorded Vitals  Blood pressure 78/78, pulse 75, temperature 36 °C (96.8 °F), temperature source Temporal, resp. rate 20, height 1.753 m (5' 9\"), weight 102 kg (225 lb 1.4 oz), SpO2 98 %.    Relevant Results  Scheduled medications  aspirin, 81 mg, oral, Daily  dapagliflozin propanediol, 10 mg, oral, Daily  digoxin, 125 mcg, oral, Daily  diph,pertuss(acel),tet vac (PF), 0.5 mL, intramuscular, During hospitalization  hepatitis B virus (recomb), 1 mL, intramuscular, Once  levothyroxine, 175 mcg, oral, Daily before breakfast  lidocaine, 1 patch, transdermal, Daily  melatonin, 5 mg, oral, Nightly  pantoprazole, 40 mg, oral, BID  [START ON 3/5/2024] pneumoc 20-sarthak conj-dip cr(PF), 0.5 mL, intramuscular, During hospitalization  [START ON 3/5/2024] respiratory syncytial virus (rsv) adjuvanted, age 65y+, 0.5 mL, intramuscular, Once  sacubitriL-valsartan, 1 tablet, oral, BID  sennosides-docusate sodium, 2 tablet, oral, BID  [START ON 3/2/2024] sertraline, 12.5 mg, oral, Daily  spironolactone, 50 mg, oral, Daily  tamsulosin, 0.4 mg, oral, Daily  warfarin, 5 mg, oral, Once per day on Mon Wed Fri  [START ON 3/2/2024] warfarin, 7.5 mg, oral, Once per day on Sun Tue Thu Sat      Results from last 7 days   Lab Units 03/01/24 0628 02/29/24 0658 02/28/24  0444   WBC AUTO x10*3/uL 5.6 6.1 5.3   HEMOGLOBIN g/dL 9.7* 9.2* 8.5*   HEMATOCRIT % 31.0* 30.3* 27.0*   PLATELETS AUTO x10*3/uL 232 221 222       Results from last 7 days   Lab Units 03/01/24 0628 02/29/24 0658 02/28/24  0444   SODIUM mmol/L 141 141 142   POTASSIUM mmol/L 3.7 4.3 4.2   CHLORIDE mmol/L 106 109* 109*   CO2 mmol/L 27 26 27   BUN mg/dL 15 11 13   CREATININE mg/dL 0.97 0.87 1.03   GLUCOSE mg/dL 85 95 115*   CALCIUM mg/dL 8.1* 7.7* 7.9*       Results from last 7 days   Lab Units 03/01/24  0628 02/29/24  0658 02/28/24  0444   APTT seconds 36 67* 33   INR  1.3* 1.5* 1.1       Results from last 7 days   Lab Units 02/23/24 2033   ANTI XA UNFRACTIONATED IU/mL " 0.4       tains critical data Anti-Platelet Factor 4 Antibody  Order: [id number]  Status: Final result       Visible to patient: Yes (not seen)    0 Result Notes   important suggestion  Newer results are available. Click to view them now.          Component  Ref Range & Units 10 mo ago 11 mo ago   Serum and Platelet Factor 4  <0.400 OD UNITS 0.613 Abnormal  0.166   Serum and HI Dose Heparin  OD UNITS 0.302    Percent Inhibition 70    Interpretation for Anti-Platlet Factor 4 Antibody POSITIVE Panic  NEGATIVE CM   Comment: ANTI-PLATELET FACTOR 4 ANTIBODY IS DETECTED  BY JORGE ASSAY.  WHILE A POSITIVE REACTION OBTAINED USING  THIS ASSAY MAY INDICATE THE PRESENCE OF A  HEPARIN-ASSOCIATED ANTIBODY, THE DETECTION  OF SUCH ANTIBODIES, HOWEVER, DOES NOT  CONFIRM THE DIAGNOSIS OF HEPARIN-INDUCED  THROMBOCYTOPENIA (HIT).         Serotonin Release Assay  Order: [id number]  Status: Final result       Visible to patient: Yes (not seen)    0 Result Notes      Component 10 mo ago   Porcine Result NEGATIVE   Comment:  UNFRACTIONATED HEPARIN LOW DOSE      1 %   UNFRACTIONATED HEPARIN HIGH DOSE     1 %   HONG Interpretation SEE BELOW   Comment: A positive result requires release of serotonin  from target platelets in the presence of  patients serum and low dose (0.1 U/ml) heparin  of >20%, together with inhibition of release  (<20%) with high dose (100 U/ml) heparin.  Results obtained with this patient's serum were  negative. These results argue against, but do  not completely rule out a diagnosis of Heparin-  Induced Thrombocytopenia (HIT).        Assessment/Plan     63 year old male with medical history as described above.  Vascular Medicine Service consulted to weigh in on report of HIT during previous hospitalization.  Patient reports he was never told about HIT diagnosis in the past.   Thorough review of the EMR with focus on previous hospitalization 4/2023 shows positive PF4 level, (with documentation stating +PF4 likely due to  Impella placement) and subsequent negative HONG which is the confirmatory test for HIT.  Patient did NOT have HIT.     Patient seen and examined with Dr. Cristian Zuleta - Vascular Medicine Service     Vascular Medicine Service will sign off.  Contact us on pager [phone] for questions.      WES Hayden  Vascular Medicine Service   .pager [phone]    Inpatient consult to Vascular Medicine  Consult performed by: WES Hayden  Consult ordered by: WES Campa  Reason for consult: report of HIT during previous hospitalization  Assessment/Recommendations: Patient examined and chart reviewed.  Documentation for positive PF4 and negative HONG 4/7/23.

## 2024-03-01 NOTE — CONSULTS
"Nutrition Initial Assessment:   Nutrition Assessment    Reason for Assessment: Provider consult order (Heart transplant workup)   Consult part of order set     Patient is a 63 y.o. male presenting with: Acute decompensated heart failure. PMH of  stage D HFrEF 10-15%, ICM, s/p HM3 4/30/2023 CAD,  pAF, VT s/p ICD, and hypothyroidism who is being admitted to HFICU for worsening shortness of breath, weight gain, and hypotension       Nutrition History:  Energy Intake: Good > 75 %  Food and Nutrient History: Met with pt at bedside for nutrition assessment. pt with good appetite and good PO intake, tolerating diet. No known food allergies. Pt reports no significant weight loss but does report weight gain related to hypothyroidism medication not nutrition related per Pt report. Pt reports weight is now stable. No N/V, no constipation or diarrhea reported. No other nutrition related questions or concerns at this time  Food Allergies/Intolerances:  None  GI Symptoms: None  Oral Problems: None       Anthropometrics:  Height: 175.3 cm (5' 9\")   Weight: 102 kg (225 lb 1.4 oz)   BMI (Calculated): 33.22  IBW/kg (Dietitian Calculated): 72.7 kg  Percent of IBW: 140 %       Weight History:   Wt Readings from Last 15 Encounters:   03/01/24 102 kg (225 lb 1.4 oz)   02/23/24 105 kg (230 lb 9.6 oz)   02/19/24 107 kg (235 lb)   12/08/23 101 kg (223 lb 3.2 oz)   09/20/23 88.5 kg (195 lb)   06/23/23 78.5 kg (173 lb)   05/30/23 72.6 kg (160 lb)   08/31/22 81.6 kg (180 lb)   02/15/22 85.7 kg (189 lb)      Weight Change %:  Weight History / % Weight Change: Noting a 30# weight gain x 6 months  Significant Weight Loss: No    Nutrition Focused Physical Exam Findings:    Subcutaneous Fat Loss:   Orbital Fat Pads: Well nourshed (slightly bulging fat pads)  Buccal Fat Pads: Well nourished (full, rounded cheeks)  Triceps: Well nourished (ample fat tissue)  Muscle Wasting:  Temporalis: Well nourished (well-defined muscle)  Pectoralis (Clavicular " Region): Well nourished (clavicle not visible)  Interosseous: Well nourished (muscle bulges)  Edema:  Edema: +1 trace  Edema Location: Right lower leg edema  Physical Findings:  Skin: Positive (Lower abdominal)    Nutrition Significant Labs: Reviewed  CBC Trend:   Results from last 7 days   Lab Units 03/01/24 0628 02/29/24  0658 02/28/24  0444 02/27/24  0522   WBC AUTO x10*3/uL 5.6 6.1 5.3 6.1   RBC AUTO x10*6/uL 2.99* 2.85* 2.67* 2.51*   HEMOGLOBIN g/dL 9.7* 9.2* 8.5* 8.3*   HEMATOCRIT % 31.0* 30.3* 27.0* 24.9*   MCV fL 104* 106* 101* 99   PLATELETS AUTO x10*3/uL 232 221 222 223    , BMP Trend:   Results from last 7 days   Lab Units 03/01/24  0628 02/29/24  0658 02/28/24 0444 02/27/24  0522   GLUCOSE mg/dL 85 95 115* 128*   CALCIUM mg/dL 8.1* 7.7* 7.9* 8.1*   SODIUM mmol/L 141 141 142 141   POTASSIUM mmol/L 3.7 4.3 4.2 4.3   CO2 mmol/L 27 26 27 27   CHLORIDE mmol/L 106 109* 109* 108*   BUN mg/dL 15 11 13 13   CREATININE mg/dL 0.97 0.87 1.03 0.94    , A1C:  Lab Results   Component Value Date    HGBA1C 4.4 02/23/2024   , BG POCT trend:   Results from last 7 days   Lab Units 03/01/24  0910   POCT GLUCOSE mg/dL 131*        Nutrition Specific Medications: All med's reviewed noting- levothyroxine, pantoprazole, potassium chloride, sennosides, sertraline, spironolactone, warfarin      I/O:   Last BM Date: 03/01/24 (0800); Stool Appearance: Loose (02/26/24 0800)        Dietary Orders (From admission, onward)       Start     Ordered    02/28/24 1823  Adult diet Regular  Diet effective now        Question:  Diet type  Answer:  Regular    02/28/24 1822                     Estimated Needs:   Total Energy Estimated Needs (kCal): 2000 kCal  Method for Estimating Needs: MSj x 1.1 activity factor x 1.1 stress factor  Total Protein Estimated Needs (g): 109 g  Method for Estimating Needs: 1.5 gm/kg of IBW  Total Fluid Estimated Needs (mL): 2000 mL  Method for Estimating Needs: 1mL/kcal or per team        Nutrition Diagnosis    Malnutrition Diagnosis  Patient has Malnutrition Diagnosis: No    Nutrition Diagnosis  Patient has Nutrition Diagnosis: No       Nutrition Interventions/Recommendations         Nutrition Prescription:  Individualized Nutrition Prescription Provided for : 2000 kcals, 109 gm PRO        Nutrition Interventions:   Interventions: Meals and snacks  Goal: Continue adutl diet regular as tolerated     No other nutrition interventions indicated at this time, RDN will remain available. Reconsult as needed          Nutrition Monitoring and Evaluation   Food/Nutrient Related History Monitoring  Monitoring and Evaluation Plan: Energy intake, Amount of food  Energy Intake: Estimated energy intake  Criteria: >75% of energy needs met via PO  Amount of Food: Estimated amout of food  Criteria: >50% of meals                      Time Spent/Follow-up Reminder:   Time Spent (min): 45 minutes  Last Date of Nutrition Visit: 03/01/24  Nutrition Follow-Up Needed?: None  Follow up Comment: Reconsutl RDN as needed

## 2024-03-01 NOTE — CONSULTS
Inpatient consult to Palliative Care  Consult performed by: Seema Hopkins  Consult ordered by: Christiano Dacosta, APRN-CNP        Palliative Medicine Consult  Complex medical decision making, symptom management, patient/family support    History obtained from chart review including ED note, H&P, patient's daily progress notes, review of lab/test results, and discussion with primary team and bedside RN.    Subjective    History of Present Illness  Anatoly Lane is a 63 y.o. male with a PMH of stage D HFrEF 10-15%, ICM, s/p HM3 4/30/2023 CAD,  pAF, VT s/p ICD, and hypothyroidism who is being admitted to HFICU for worsening shortness of breath, weight gain, and hypotension. Mr Lane called the LVAD clinic on 2/22 with complaints of worsening shortness of breath and inability to walk short distances. He presented to clinic on 2/23 with progressive worsening of shortness of breath. He was unable to walk in the [place] without feeling short of breath and his legs feeling heavy. dMAP taken in clinic was 50, Hr 92, and JVD was present on exam. Patient also stated he has gained 75 pounds over the last year (10 pounds per month). Patient routed to HFICU for swan guided optimization and possible advanced therapies for transplant.     Concern for GIB, however colonoscopy unrevealing. Patient now being evaluated for advanced therapies (heart transplant).    Introduction to Palliative Care  Met with patient at bedside.   Patient alert and oriented, has capacity to make their own medical decisions at this time.   Staff present: Seema Hopkins MS3, Viviane Soto MD  Palliative Medicine was introduced as a specialty service for patients with serious illness to help with symptom management, improve quality of life, assist with goals of care conversations, navigate complex decision making, and provide support to patients and families. Support and empathy was provided throughout the encounter. Provided reflective listening and presence.  "    Symptoms  Pain: denies  Dyspnea: rates as severe with activity  Fatigue: denies  Insomnia: rates as severe, often wakes up at night  Drowsiness: denies  Constipation: denies  Nausea: denies  Appetite: denies changes  Anxiety: denies  Depression: denies    Palliative medicine social history:  The patient is not . He has one adult son, Bird Lane. The patient lives with his parents. The patient is on disability insurance. The pt has financial concerns.  Pt does not require mobility devices and has no history of falls. Pt sees their PCP and other specialists regularly. Hobbies are going out with friends and on boat trips but since illness has progressed, pt is no longer able. For enjoyment, the pt watches sports. Denies any safety concerns.      Objective    Last Recorded Vitals  BP 78/78 (BP Location: Left arm, Patient Position: Sitting)   Pulse 75   Temp 36 °C (96.8 °F) (Temporal)   Resp 20   Ht 1.753 m (5' 9\")   Wt 102 kg (225 lb 1.4 oz)   SpO2 98%   BMI 33.24 kg/m²      Physical Exam  Constitutional:       Appearance: He is obese.   HENT:      Head: Normocephalic and atraumatic.      Mouth/Throat:      Mouth: Mucous membranes are moist.   Cardiovascular:      Comments: LVAD hum auscultated  Pulmonary:      Effort: Pulmonary effort is normal.      Breath sounds: Normal breath sounds.   Abdominal:      General: Abdomen is flat.      Palpations: Abdomen is soft.   Musculoskeletal:      Cervical back: Normal range of motion.   Skin:     General: Skin is warm and dry.      Capillary Refill: Capillary refill takes less than 2 seconds.   Neurological:      Mental Status: He is alert and oriented to person, place, and time.   Psychiatric:         Mood and Affect: Mood normal.         Behavior: Behavior normal.         Thought Content: Thought content normal.         Judgment: Judgment normal.          Relevant Results  Results for orders placed or performed during the hospital encounter of 02/23/24 " (from the past 24 hour(s))   Drug Screen, Urine   Result Value Ref Range    Amphetamine Screen, Urine Presumptive Negative Presumptive Negative    Barbiturate Screen, Urine Presumptive Negative Presumptive Negative    Benzodiazepines Screen, Urine Presumptive Negative Presumptive Negative    Cannabinoid Screen, Urine Presumptive Negative Presumptive Negative    Cocaine Metabolite Screen, Urine Presumptive Negative Presumptive Negative    Fentanyl Screen, Urine Presumptive Negative Presumptive Negative    Opiate Screen, Urine Presumptive Negative Presumptive Negative    Oxycodone Screen, Urine Presumptive Negative Presumptive Negative    PCP Screen, Urine Presumptive Negative Presumptive Negative   Syphilis Screen with Reflex   Result Value Ref Range    Syphilis Total Ab Nonreactive Nonreactive   Renuka-Barr Virus Antibody Panel   Result Value Ref Range    EBV VCA, IgG  Positive (A) Negative    EBV VCA, IgM       EBV Early Antigen Antibody, IgG Negative Negative    EBV Nuclear Antigen Antibody, IgG Positive (A) Negative   Anti-Cardiolipin Antibody (IgA, IgG, and IgM)   Result Value Ref Range    Anticardiolipin IgA <0.5 <20.0 APL U/mL    Anticardiolipin IgG <1.6 <20.0 GPL U/mL    Anticardiolipin IgM 0.2 <20.0 MPL U/mL   CBC and Auto Differential   Result Value Ref Range    WBC 5.6 4.4 - 11.3 x10*3/uL    nRBC 0.0 0.0 - 0.0 /100 WBCs    RBC 2.99 (L) 4.50 - 5.90 x10*6/uL    Hemoglobin 9.7 (L) 13.5 - 17.5 g/dL    Hematocrit 31.0 (L) 41.0 - 52.0 %     (H) 80 - 100 fL    MCH 32.4 26.0 - 34.0 pg    MCHC 31.3 (L) 32.0 - 36.0 g/dL    RDW 18.3 (H) 11.5 - 14.5 %    Platelets 232 150 - 450 x10*3/uL    Neutrophils % 63.5 40.0 - 80.0 %    Immature Granulocytes %, Automated 0.5 0.0 - 0.9 %    Lymphocytes % 23.1 13.0 - 44.0 %    Monocytes % 7.7 2.0 - 10.0 %    Eosinophils % 4.7 0.0 - 6.0 %    Basophils % 0.5 0.0 - 2.0 %    Neutrophils Absolute 3.52 1.20 - 7.70 x10*3/uL    Immature Granulocytes Absolute, Automated 0.03 0.00 -  0.70 x10*3/uL    Lymphocytes Absolute 1.28 1.20 - 4.80 x10*3/uL    Monocytes Absolute 0.43 0.10 - 1.00 x10*3/uL    Eosinophils Absolute 0.26 0.00 - 0.70 x10*3/uL    Basophils Absolute 0.03 0.00 - 0.10 x10*3/uL   Coagulation Screen   Result Value Ref Range    Protime 14.6 (H) 9.8 - 12.8 seconds    INR 1.3 (H) 0.9 - 1.1    aPTT 36 27 - 38 seconds   Lactate Dehydrogenase   Result Value Ref Range     84 - 246 U/L   Magnesium   Result Value Ref Range    Magnesium 2.18 1.60 - 2.40 mg/dL   Renal function panel   Result Value Ref Range    Glucose 85 74 - 99 mg/dL    Sodium 141 136 - 145 mmol/L    Potassium 3.7 3.5 - 5.3 mmol/L    Chloride 106 98 - 107 mmol/L    Bicarbonate 27 21 - 32 mmol/L    Anion Gap 12 10 - 20 mmol/L    Urea Nitrogen 15 6 - 23 mg/dL    Creatinine 0.97 0.50 - 1.30 mg/dL    eGFR 88 >60 mL/min/1.73m*2    Calcium 8.1 (L) 8.6 - 10.6 mg/dL    Phosphorus 2.8 2.5 - 4.9 mg/dL    Albumin 3.6 3.4 - 5.0 g/dL   Hepatitis C RNA, Quantitative, PCR   Result Value Ref Range    Hepatitis C RNA PCR Log      HCV RNA Result Not Detected Not detected   Anti-Plt Factor 4 AB with Serotonin Release Assay Reflex   Result Value Ref Range    Serum and Platelet Factor 4 0.151 <0.400 OD Units    Interpretation for Anti-Platelet Factor 4 Antibody Negative Negative   Hepatitis A Antibody, IgM   Result Value Ref Range    Hepatitis A  AB- IgM Nonreactive Nonreactive   Hepatitis B surface antigen   Result Value Ref Range    Hepatitis B Surface AG Nonreactive Nonreactive   Hepatitis B core antibody, IgM   Result Value Ref Range    Hepatitis B Core AB; IgM Nonreactive Nonreactive   POCT GLUCOSE   Result Value Ref Range    POCT Glucose 131 (H) 74 - 99 mg/dL      Vascular US aorta iliac duplex complete              [place] [address]    Tel [phone] and Fax [phone]       Vascular Lab Report  VASC US AORTA ILIAC DUPLEX COMPLETE       Patient Name:      BILLIE WARD          Reading Physician:  [id number] Zuleta MD, RPVI  Study Date:        3/1/2024             Ordering Physician: [id number] RAGHU HORTON  MRN/PID:           [id number]             Technologist:       Christal Jasso RVT  Accession#:        [id number]         Technologist 2:  Date of Birth/Age: 1960 / 63 years Encounter#:         [id number]  Gender:            M  Admission Status:  Inpatient            Location Performed: [facility]       Diagnosis/ICD: Encounter for other preprocedural examination-Z01.818  CPT Codes:     48576 Duplex Aorta/IVC/Iliac/Bypass Graft       CONCLUSIONS:  Aorta/Common Iliac Arteries/IVC: LImited exam due to bowel gas, LVAD and body habitus. Unable to obtain Dopplers due to LVAD. Unable to obtain proximal and mid aorta measurements.     Imaging & Doppler Findings:     AORTA    AP    Lateral  Distal 1.48 cm 1.29 cm       [id number] Cristian Zuleta MD, RPLUX  Electronically signed by [id number] Zuleta MD, ADRI on 3/1/2024 at 4:33:33 PM       ** Final **  Vascular US Ankle Brachial Index (ANUPAM) Without Exercise              [place] [address]    Tel [phone] and Fax [phone]       Vascular Lab Report  [facility] US ANKLE BRACHIAL INDEX (ANUPAM) WITHOUT EXERCISE       Patient Name:      BILLIE WARD         Reading Physician:  [id number] Cristian Zuleta MD, RPVI  Study Date:        3/1/2024             Ordering Physician: [id number] RAGHU HORTON  MRN/PID:           [id number]             Technologist:       Liliana Arshad RVT,                                                              [facility]  Accession#:        [id number]         Technologist 2:  Date of Birth/Age: 1960 / 63 years Encounter#:         [id number]  Gender:            M  Admission Status:  Inpatient            Location Performed: [facility]        Diagnosis/ICD: Encounter for preprocedural cardiovascular examination-Z01.810  CPT Codes:     97069 Peripheral artery ANUPAM Only       CONCLUSIONS:  Right Lower PVR: No evidence of arterial occlusive disease in the right lower extremity at rest. Patient has LVAD.  Left Lower PVR: No evidence of arterial occlusive disease in the left lower extremity at rest. Patient has LVAD.     Imaging & Doppler Findings:     RIGHT Lower PVR                Pressures Ratios  Right Posterior Tibial (Ankle) 91 mmHg   0.99  Right Dorsalis Pedis (Ankle)   90 mmHg   0.98          LEFT Lower PVR                Pressures Ratios  Left Posterior Tibial (Ankle) 90 mmHg   0.98  Left Dorsalis Pedis (Ankle)   98 mmHg   1.07                          Right   Left  Brachial Pressure 83 mmHg 92 mmHg          [id number] Cristian Zuleta MD, RPVI  Electronically signed by [id number] Cristian Zuleta MD, RPLUX on 3/1/2024 at 3:57:04 PM       ** Final **  XR panorex  Narrative: Interpreted By:  Prosper Guzman and Hofer Lindsay   STUDY:  XR PANOREX; ;  2/29/2024 8:43 pm      INDICATION:  Signs/Symptoms:Heart Transplant workup.      COMPARISON:  None.      ACCESSION NUMBER(S):  [id number]      ORDERING CLINICIAN:  RAGHU HORTON      FINDINGS:  The teeth are in alignment. There are multiple dental extractions  bilaterally. Multiple dental fillings bilaterally. No dental caries  noted. No periapical lucencies.      No fracture or malalignment. No dislocation. The visualized paranasal  sinuses are clear.      Impression: Multiple dental fillings as well as dental extractions. No dental  caries or periapical lucency to suggest abscess formation.      I personally reviewed the images/study and resident's interpretation  and I agree with the findings as stated by Adelia Briggs MD (resident  radiologist). This study was analyzed and interpreted at [place], [place].      MACRO:  None      Signed by: Prosper Guzman 3/1/2024  1:19 PM  Dictation workstation:   [id number]  CT chest abdomen pelvis wo IV contrast  Narrative: Interpreted By:  Ranjit Erazo and Nakamoto Kent   STUDY:  CT CHEST ABDOMEN PELVIS WO CONTRAST;  2/29/2024 6:47 pm      INDICATION:  Signs/Symptoms:Heart Transplant workup.      COMPARISON:  04/28/2023 CT chest, 04/17/2023 CT chest abdomen and pelvis      ACCESSION NUMBER(S):  [id number]      ORDERING CLINICIAN:  RAGHU HORTON      TECHNIQUE:  CT of the chest, abdomen and pelvis was performed. Contiguous axial  images were obtained at 3 mm slice thickness through the chest,  abdomen and pelvis. Coronal and sagittal reconstructions at 3 mm  slice thickness were performed.  No intravenous contrast was  administered; no oral contrast was given.      FINDINGS:  Please note that the study is limited without intravenous contrast.      CHEST:      LUNG/PLEURA/LARGE AIRWAYS:  The trachea and central airways are patent. No endobronchial lesions  are evident. No lung masses or consolidations are present.  There are diffusely spread centrilobular ground-glass opacities  within the bilateral lungs. There are several stable lung nodules  measuring up to 0.3 cm compared to prior exam.  0.3 cm right upper lobe nodule (series 6, image 96) is similar in  size compared to prior exam.  0.3 cm nodule near the right minor fissure is stable in size (series  6, image 134). In the 0.3 cm left lower lobe nodule is stable  compared to prior exam (series 6, image 129). There is no pleural  effusion, no pneumothorax.      VESSELS:  Aorta and pulmonary arteries are normal caliber.  Mild  atherosclerotic changes are noted of the aorta and branching vessels.  Mild coronary artery calcifications are present.      HEART:  The heart is mildly enlarged. No pericardial effusion  Interval placement of an LVAD device with drive wire entering the  right anterior chest wall. No evidence of fluid collections along the  drive line. AICD/pacemaker leads  are again noted with leads  terminating in the right atrium and right ventricle.          MEDIASTINUM AND MARY:  No mediastinal, hilar or axillary lymph nodes are present.  Esophagus: Within normal limits.      CHEST WALL AND LOWER NECK:  Left chest wall AICD/pacemaker as described above. The visualized  thyroid gland appears within normal limits. There are postsurgical  changes from median sternotomy.      ABDOMEN:      LIVER:  Many stable hypodense simple fluid attenuating areas in size and  attenuation within the bilateral lobes of the liver. The largest  hypodensity measures 1.3 cm (series 2, image 87) compared to 1.4 cm  previously.      BILE DUCTS:  The bile ducts are not dilated.      GALLBLADDER:  Gallbladder: No calcified stones. No wall thickening.      PANCREAS:  The pancreas appears unremarkable.      SPLEEN:  Within normal limits.      ADRENAL GLANDS:  Bilateral adrenal glands appear normal.      KIDNEYS AND URETERS:  Small bilateral subcentimeter hypodense well-circumscribed lesions  within the bilateral kidneys that are too small to characterize  likely represent renal cysts. Kidneys are normal in size. No  hydroureteronephrosis or nephroureterolithiasis is present.      PELVIS:      BLADDER:  The urinary bladder appears within normal limits.      REPRODUCTIVE ORGANS:  No pelvic masses.      BOWEL:  The stomach is unremarkable.  The small and large bowel are normal in  caliber and demonstrate no wall thickening.  Normal appendix.      VESSELS:  There is no aneurysmal dilatation of the abdominal aorta. The IVC is  within normal limits. There is atherosclerosis of the abdominal aorta  and its branching vessels.      PERITONEUM/RETROPERITONEUM/LYMPH NODES:  No ascites or free air, no fluid collection.  The retroperitoneum  appears unremarkable.  No abdominopelvic lymphadenopathy is present.      ABDOMINAL WALL:  The abdominal wall soft tissues appear normal.      BONES:  Redemonstrated hyperdense foci  within the bilateral iliac bones that  again likely represent bone islands. No suspicious osseous lesions  are present. Degenerative discogenic disease noted in the lower  thoracic and lumbar spine.      Impression: Chest-Abdomen-Pelvis  1. Diffuse scattered nodular centrilobular ground-glass opacities are  noted within the bilateral lungs, compatible with bronchiolitis,  potentially infectious or inflammatory.  2. Incidental findings are as detailed.      I personally reviewed the images/study and I agree with the findings  as stated by Semaj Jaffe MD. This study was interpreted at  [facility], [place], [place].      MACRO:  None      Signed by: Ranjit Erazo 3/1/2024 12:40 PM  Dictation workstation:   [id number]  Vascular US carotid artery duplex bilateral  Preliminary Cardiology Report              [place] [address]    Tel [phone] and Fax [phone]           Preliminary Vascular Lab Report     VASC US CAROTID ARTERY DUPLEX BILATERAL       Patient Name:      BILLIE TAFOYACAROLYN Steward Physician: [id number] Cristian Zuleta MD,                                                     RPVI  Study Date:        3/1/2024     Ordering           [id number] RAGHU HORTON                                  Physician:  MRN/PID:           [id number]     Technologist:      Christal Jasso T  Accession#:        [id number] Technologist 2:  Date of Birth/Age: 1960    Encounter#:        [id number]  Gender:            M  Admission Status:  Inpatient    Location           [facility]                                  Performed:       Diagnosis/ICD: Other specified symptoms and signs involving the circulatory and                 respiratory systems-R09.89  Procedure/CPT: 52666 Cerebrovascular Carotid Duplex scan complete       PRELIMINARY CONCLUSIONS:  Right Carotid: Findings are consistent with less than 50% stenosis of the right proximal  internal carotid artery. Right external carotid artery appears patent with no evidence of stenosis. No evidence of hemodynamically significant stenosis of the right common carotid artery. The right vertebral artery is patent with antegrade flow. No evidence of hemodynamically significant stenosis in the right subclavian artery. Unable to obtain Doppler measurements due to LVAD.  Left Carotid: Findings are consistent with less than 50% stenosis of the left proximal internal carotid artery. Left external carotid artery appears patent with no evidence of stenosis. No evidence of hemodynamically significant stenosis of the left common carotid artery. The left vertebral artery is patent with antegrade flow. No evidence of hemodynamically significant stenosis in the left subclavian artery. Unablel to obtain Doppler measurements due to LVAD.     Imaging & Doppler Findings:  Right Plaque Morph: The proximal right internal carotid artery demonstrates heterogenous plaque.  Left Plaque Morph: The proximal left internal carotid artery demonstrates heterogenous plaque.     Right                     Left   PSV    EDV               PSV    EDV  0 cm/s          CCA P    0 cm/s  0 cm/s          CCA D    0 cm/s  0 cm/s 0 cm/s   ICA P    0 cm/s 0 cm/s  0 cm/s 0 cm/s   ICA M    0 cm/s 0 cm/s  0 cm/s 0 cm/s   ICA D    0 cm/s 0 cm/s  0 cm/s 0 cm/s Vertebral  0 cm/s 0 cm/s  0 cm/s        Subclavian 0 cm/s         VASCULAR PRELIMINARY REPORT  completed by Christal Jasso RVT on 3/1/2024 at 12:12:07 PM       ** Final **          Allergies  Jardiance [empagliflozin] and Amiodarone    Scheduled medications  aspirin, 81 mg, oral, Daily  dapagliflozin propanediol, 10 mg, oral, Daily  digoxin, 125 mcg, oral, Daily  diph,pertuss(acel),tet vac (PF), 0.5 mL, intramuscular, During hospitalization  hepatitis B virus (recomb), 1 mL, intramuscular, Once  levothyroxine, 175 mcg, oral, Daily before breakfast  lidocaine, 1 patch, transdermal,  Daily  melatonin, 5 mg, oral, Nightly  pantoprazole, 40 mg, oral, BID  [START ON 3/5/2024] pneumoc 20-sarthak conj-dip cr(PF), 0.5 mL, intramuscular, During hospitalization  [START ON 3/5/2024] respiratory syncytial virus (rsv) adjuvanted, age 65y+, 0.5 mL, intramuscular, Once  sacubitriL-valsartan, 1 tablet, oral, BID  sennosides-docusate sodium, 2 tablet, oral, BID  [START ON 3/2/2024] sertraline, 12.5 mg, oral, Daily  spironolactone, 50 mg, oral, Daily  tamsulosin, 0.4 mg, oral, Daily  warfarin, 5 mg, oral, Once per day on Mon Wed Fri  [START ON 3/2/2024] warfarin, 7.5 mg, oral, Once per day on Sun Tue Thu Sat      Continuous medications     PRN medications  PRN medications: acetaminophen, ondansetron, polyethylene glycol     Assessment/Plan    Anatoly Lane is a 63 y.o. male with a PMH of stage D HFrEF 10-15%, ICM, s/p HM3 4/30/2023 CAD, pAF, VT s/p ICD, and hypothyroidism who is being admitted to HFICU for worsening shortness of breath, weight gain, and hypotension. Mr Lane called the LVAD clinic on 2/22 with complaints of worsening shortness of breath and inability to walk short distances. He presented to clinic on 2/23 still with worsening shortness of breath. He was unable to walk in the [place] without feeling short of breath and his legs feeling heavy. dMAP taken in clinic was 50, Hr 92, and JVD was present on exam. Patient also stated he has gained 75 pounds over the last year (10 pounds per month). Patient routed to HFICU for swan guided optimization and possible advanced therapies for transplant.     Concern for GIB, however colonoscopy unrevealing. Patient now being evaluated for advanced therapies (heart transplant). Palliative Medicine service consulted for symptom management and assessment prior to advanced therapies.     #Evaluation for Advanced Therapies  - Encouraged patient to speak with his son Bird Lane (whom he names as his surrogate decision-maker) regarding goals of care and wishes   - Patient  understands risks involved with advanced therapies such as heart transplantation including necessity of adhering to immunosuppression therapy to prevent rejection, as well as risk of developing infections while on immunosuppression therapy - patient understands these risks and wishes to proceed with advanced therapies evaluation    #Insomnia  - Recommend 5 mg melatonin at bedtime    #Dyspnea  - Recommend providing patient with bedside fan  -If dyspnea remains persistent, can trial low-dose opioids          Palliative Performance Scale (PPS): 70%      Advanced Care Planning  Life Limiting Disease: Severe exacerbation of HF.       Disease Specific Information Provided/Prognosis Discussed: Patient's current clinical condition, including diagnosis, prognosis, and management plan were discussed.   Counseling provided on transplantation risks  Counseling provided on guarded prognosis and what to expect with disease progression of HF.     Understanding/Overall Impression: Patient expressing fair understanding of overall health status and severity of illness.     Goals/Hopes: Discussion ensued about patient's goals for their medical care going forward. Allowed patient time to talk about his/her current quality of life, disease course/progression, and symptom and treatment burden. Discussed care plan to continue with aggressive hospital care despite symptom and treatment burden and patient is amenable    Fears/Worries/Concerns: Patient has no worries at this time, is trusting of his care team    Minimal Acceptable Quality of Life/Maximal [place] Tolerable for the Possibility of More Time: Patient could not define a minimal acceptable QoL for him at this time    Health Preferences and Priorities with Disease Progression:   Family is aware of LVAD and HF diagnosis, however he has not had recent discussions with them regarding advanced therapies process and trajectory of illness/prognosis. Palliative Medicine team strongly  encouraged patient to have these conversations with his family, especially surrogate decision maker (son) Bird Lane.      Surrogate Health Care Decision Maker: Bird Lane, jolanta      Code Status: Decision to keep code status Full Code at this time.     Thank you for allowing us to participate in the care of this patient. Palliative will continue to follow as needed. Palliative medicine is available Monday-Friday, 8a-6p. Please contact team with any questions or concerns.      Patient seen and discussed with fellow, Dr. Viviane Soto, PGY4.   Signed,  Seema Hopkins MS3  [facility]      Agree with medial student Seema Hopkins's assessment and plan as above. Patient was additionally examined and discussed with Palliative Medicine attending physician, Dr. Meche Hung.      Signed,  Viviane Soto MD  Heart Failure Fellow PGY4  Palliative Medicine Service   Team pager [phone] (weekdays)

## 2024-03-01 NOTE — CARE PLAN
Pt remained HDS and free of injury this shift. Pt had an uneventful night. Pt being worked up for heart transplant while inpatient. MAP's in the 70's and LVAD #'s WDL.     Problem: Heart Failure  Goal: Improved gas exchange this shift  Outcome: Progressing     Problem: Heart Failure  Goal: Improved urinary output this shift  Outcome: Progressing     Problem: Heart Failure  Goal: Report improvement of dyspnea/breathlessness this shift  Outcome: Progressing     Problem: Heart Failure  Goal: Weight from fluid excess reduced over 2-3 days, then stabilize  Outcome: Progressing     Problem: Heart Failure  Goal: Increase self care and/or family involvement in 24 hours  Outcome: Progressing     Problem: Discharge Planning  Goal: Discharge to home or other facility with appropriate resources  Outcome: Progressing     Problem: Chronic Conditions and Co-morbidities  Goal: Patient's chronic conditions and co-morbidity symptoms are monitored and maintained or improved  Outcome: Progressing

## 2024-03-02 LAB
ABO GROUP (TYPE) IN BLOOD: NORMAL
ALBUMIN SERPL BCP-MCNC: 3.9 G/DL (ref 3.4–5)
ANION GAP BLDA CALCULATED.4IONS-SCNC: 7 MMO/L (ref 10–25)
ANION GAP SERPL CALC-SCNC: 13 MMOL/L (ref 10–20)
ANTIBODY SCREEN: NORMAL
APTT PPP: 31 SECONDS (ref 27–38)
ARTERIAL PATENCY WRIST A: POSITIVE
BASE EXCESS BLDA CALC-SCNC: 1 MMOL/L (ref -2–3)
BASOPHILS # BLD AUTO: 0.04 X10*3/UL (ref 0–0.1)
BASOPHILS NFR BLD AUTO: 0.7 %
BODY TEMPERATURE: 37 DEGREES CELSIUS
BUN SERPL-MCNC: 22 MG/DL (ref 6–23)
CA-I BLDA-SCNC: 1.16 MMOL/L (ref 1.1–1.33)
CALCIUM SERPL-MCNC: 8.6 MG/DL (ref 8.6–10.6)
CHLORIDE BLDA-SCNC: 105 MMOL/L (ref 98–107)
CHLORIDE SERPL-SCNC: 103 MMOL/L (ref 98–107)
CMV IGM SERPL-ACNC: <8 AU/ML
CO2 SERPL-SCNC: 24 MMOL/L (ref 21–32)
CREAT SERPL-MCNC: 1.32 MG/DL (ref 0.5–1.3)
EGFRCR SERPLBLD CKD-EPI 2021: 61 ML/MIN/1.73M*2
EOSINOPHIL # BLD AUTO: 0.22 X10*3/UL (ref 0–0.7)
EOSINOPHIL NFR BLD AUTO: 3.9 %
ERYTHROCYTE [DISTWIDTH] IN BLOOD BY AUTOMATED COUNT: 17.5 % (ref 11.5–14.5)
GLUCOSE BLDA-MCNC: 92 MG/DL (ref 74–99)
GLUCOSE SERPL-MCNC: 84 MG/DL (ref 74–99)
HCO3 BLDA-SCNC: 26 MMOL/L (ref 22–26)
HCT VFR BLD AUTO: 31.5 % (ref 41–52)
HCT VFR BLD EST: 30 % (ref 41–52)
HGB BLD-MCNC: 10.1 G/DL (ref 13.5–17.5)
HGB BLDA-MCNC: 10 G/DL (ref 13.5–17.5)
IMM GRANULOCYTES # BLD AUTO: 0.04 X10*3/UL (ref 0–0.7)
IMM GRANULOCYTES NFR BLD AUTO: 0.7 % (ref 0–0.9)
INHALED O2 CONCENTRATION: 21 %
INR PPP: 1.4 (ref 0.9–1.1)
LACTATE BLDA-SCNC: 0.7 MMOL/L (ref 0.4–2)
LDH SERPL L TO P-CCNC: 147 U/L (ref 84–246)
LYMPHOCYTES # BLD AUTO: 1.16 X10*3/UL (ref 1.2–4.8)
LYMPHOCYTES NFR BLD AUTO: 20.7 %
MAGNESIUM SERPL-MCNC: 2.22 MG/DL (ref 1.6–2.4)
MCH RBC QN AUTO: 32.6 PG (ref 26–34)
MCHC RBC AUTO-ENTMCNC: 32.1 G/DL (ref 32–36)
MCV RBC AUTO: 102 FL (ref 80–100)
MONOCYTES # BLD AUTO: 0.44 X10*3/UL (ref 0.1–1)
MONOCYTES NFR BLD AUTO: 7.8 %
NEUTROPHILS # BLD AUTO: 3.71 X10*3/UL (ref 1.2–7.7)
NEUTROPHILS NFR BLD AUTO: 66.2 %
NRBC BLD-RTO: 0 /100 WBCS (ref 0–0)
OXYHGB MFR BLDA: 94.7 % (ref 94–98)
PCO2 BLDA: 42 MM HG (ref 38–42)
PH BLDA: 7.4 PH (ref 7.38–7.42)
PHOSPHATE SERPL-MCNC: 3.1 MG/DL (ref 2.5–4.9)
PLATELET # BLD AUTO: 222 X10*3/UL (ref 150–450)
PO2 BLDA: 82 MM HG (ref 85–95)
POTASSIUM BLDA-SCNC: 4.2 MMOL/L (ref 3.5–5.3)
POTASSIUM SERPL-SCNC: 4.2 MMOL/L (ref 3.5–5.3)
PROTHROMBIN TIME: 15.3 SECONDS (ref 9.8–12.8)
RBC # BLD AUTO: 3.1 X10*6/UL (ref 4.5–5.9)
RH FACTOR (ANTIGEN D): NORMAL
SAO2 % BLDA: 97 % (ref 94–100)
SODIUM BLDA-SCNC: 134 MMOL/L (ref 136–145)
SODIUM SERPL-SCNC: 136 MMOL/L (ref 136–145)
SPECIMEN DRAWN FROM PATIENT: ABNORMAL
T GONDII IGM SER-ACNC: <3 AU/ML
T4 FREE SERPL-MCNC: 1.52 NG/DL (ref 0.78–1.48)
TSH SERPL-ACNC: 8.1 MIU/L (ref 0.44–3.98)
UFH PPP CHRO-ACNC: 0.2 IU/ML
UFH PPP CHRO-ACNC: 0.2 IU/ML
UFH PPP CHRO-ACNC: 0.5 IU/ML
WBC # BLD AUTO: 5.6 X10*3/UL (ref 4.4–11.3)

## 2024-03-02 PROCEDURE — 84439 ASSAY OF FREE THYROXINE: CPT | Performed by: STUDENT IN AN ORGANIZED HEALTH CARE EDUCATION/TRAINING PROGRAM

## 2024-03-02 PROCEDURE — 99232 SBSQ HOSP IP/OBS MODERATE 35: CPT | Performed by: STUDENT IN AN ORGANIZED HEALTH CARE EDUCATION/TRAINING PROGRAM

## 2024-03-02 PROCEDURE — 2500000002 HC RX 250 W HCPCS SELF ADMINISTERED DRUGS (ALT 637 FOR MEDICARE OP, ALT 636 FOR OP/ED): Mod: MUE | Performed by: STUDENT IN AN ORGANIZED HEALTH CARE EDUCATION/TRAINING PROGRAM

## 2024-03-02 PROCEDURE — 93750 INTERROGATION VAD IN PERSON: CPT | Performed by: INTERNAL MEDICINE

## 2024-03-02 PROCEDURE — 36415 COLL VENOUS BLD VENIPUNCTURE: CPT | Performed by: STUDENT IN AN ORGANIZED HEALTH CARE EDUCATION/TRAINING PROGRAM

## 2024-03-02 PROCEDURE — 84132 ASSAY OF SERUM POTASSIUM: CPT | Performed by: NURSE PRACTITIONER

## 2024-03-02 PROCEDURE — 1200000002 HC GENERAL ROOM WITH TELEMETRY DAILY

## 2024-03-02 PROCEDURE — 85610 PROTHROMBIN TIME: CPT | Performed by: NURSE PRACTITIONER

## 2024-03-02 PROCEDURE — 83615 LACTATE (LD) (LDH) ENZYME: CPT | Performed by: NURSE PRACTITIONER

## 2024-03-02 PROCEDURE — 85520 HEPARIN ASSAY: CPT | Performed by: STUDENT IN AN ORGANIZED HEALTH CARE EDUCATION/TRAINING PROGRAM

## 2024-03-02 PROCEDURE — 90746 HEPB VACCINE 3 DOSE ADULT IM: CPT | Performed by: NURSE PRACTITIONER

## 2024-03-02 PROCEDURE — 99233 SBSQ HOSP IP/OBS HIGH 50: CPT | Performed by: INTERNAL MEDICINE

## 2024-03-02 PROCEDURE — 84132 ASSAY OF SERUM POTASSIUM: CPT | Performed by: INTERNAL MEDICINE

## 2024-03-02 PROCEDURE — 36600 WITHDRAWAL OF ARTERIAL BLOOD: CPT

## 2024-03-02 PROCEDURE — 2500000002 HC RX 250 W HCPCS SELF ADMINISTERED DRUGS (ALT 637 FOR MEDICARE OP, ALT 636 FOR OP/ED): Mod: MUE

## 2024-03-02 PROCEDURE — 2500000001 HC RX 250 WO HCPCS SELF ADMINISTERED DRUGS (ALT 637 FOR MEDICARE OP)

## 2024-03-02 PROCEDURE — 36415 COLL VENOUS BLD VENIPUNCTURE: CPT | Performed by: NURSE PRACTITIONER

## 2024-03-02 PROCEDURE — 2500000002 HC RX 250 W HCPCS SELF ADMINISTERED DRUGS (ALT 637 FOR MEDICARE OP, ALT 636 FOR OP/ED)

## 2024-03-02 PROCEDURE — G0010 ADMIN HEPATITIS B VACCINE: HCPCS | Performed by: NURSE PRACTITIONER

## 2024-03-02 PROCEDURE — 83735 ASSAY OF MAGNESIUM: CPT | Performed by: NURSE PRACTITIONER

## 2024-03-02 PROCEDURE — 2500000004 HC RX 250 GENERAL PHARMACY W/ HCPCS (ALT 636 FOR OP/ED): Performed by: STUDENT IN AN ORGANIZED HEALTH CARE EDUCATION/TRAINING PROGRAM

## 2024-03-02 PROCEDURE — 2500000001 HC RX 250 WO HCPCS SELF ADMINISTERED DRUGS (ALT 637 FOR MEDICARE OP): Performed by: NURSE PRACTITIONER

## 2024-03-02 PROCEDURE — 86901 BLOOD TYPING SEROLOGIC RH(D): CPT | Performed by: NURSE PRACTITIONER

## 2024-03-02 PROCEDURE — 2500000002 HC RX 250 W HCPCS SELF ADMINISTERED DRUGS (ALT 637 FOR MEDICARE OP, ALT 636 FOR OP/ED): Performed by: STUDENT IN AN ORGANIZED HEALTH CARE EDUCATION/TRAINING PROGRAM

## 2024-03-02 PROCEDURE — 2500000002 HC RX 250 W HCPCS SELF ADMINISTERED DRUGS (ALT 637 FOR MEDICARE OP, ALT 636 FOR OP/ED): Performed by: NURSE PRACTITIONER

## 2024-03-02 PROCEDURE — 2500000002 HC RX 250 W HCPCS SELF ADMINISTERED DRUGS (ALT 637 FOR MEDICARE OP, ALT 636 FOR OP/ED): Mod: MUE | Performed by: NURSE PRACTITIONER

## 2024-03-02 PROCEDURE — 84443 ASSAY THYROID STIM HORMONE: CPT | Performed by: STUDENT IN AN ORGANIZED HEALTH CARE EDUCATION/TRAINING PROGRAM

## 2024-03-02 PROCEDURE — 85025 COMPLETE CBC W/AUTO DIFF WBC: CPT | Performed by: NURSE PRACTITIONER

## 2024-03-02 PROCEDURE — 2500000004 HC RX 250 GENERAL PHARMACY W/ HCPCS (ALT 636 FOR OP/ED): Performed by: NURSE PRACTITIONER

## 2024-03-02 RX ORDER — HEPARIN SODIUM 5000 [USP'U]/ML
2000-4000 INJECTION, SOLUTION INTRAVENOUS; SUBCUTANEOUS EVERY 4 HOURS PRN
Status: DISCONTINUED | OUTPATIENT
Start: 2024-03-02 | End: 2024-03-06

## 2024-03-02 RX ORDER — HEPARIN SODIUM 10000 [USP'U]/100ML
0-4000 INJECTION, SOLUTION INTRAVENOUS CONTINUOUS
Status: DISCONTINUED | OUTPATIENT
Start: 2024-03-02 | End: 2024-03-06

## 2024-03-02 RX ORDER — WARFARIN SODIUM 5 MG/1
7.5 TABLET ORAL DAILY
Status: DISCONTINUED | OUTPATIENT
Start: 2024-03-02 | End: 2024-03-06

## 2024-03-02 RX ORDER — HEPARIN SODIUM 5000 [USP'U]/ML
4000 INJECTION, SOLUTION INTRAVENOUS; SUBCUTANEOUS ONCE
Status: COMPLETED | OUTPATIENT
Start: 2024-03-02 | End: 2024-03-02

## 2024-03-02 RX ADMIN — HEPATITIS B VACCINE (RECOMBINANT) 1 ML: 20 INJECTION, SUSPENSION INTRAMUSCULAR at 15:44

## 2024-03-02 RX ADMIN — DIGOXIN 125 MCG: 125 TABLET ORAL at 09:20

## 2024-03-02 RX ADMIN — SACUBITRIL AND VALSARTAN 1 TABLET: 24; 26 TABLET, FILM COATED ORAL at 21:35

## 2024-03-02 RX ADMIN — HEPARIN SODIUM 4000 UNITS: 5000 INJECTION INTRAVENOUS; SUBCUTANEOUS at 11:15

## 2024-03-02 RX ADMIN — HEPARIN SODIUM 1000 UNITS/HR: 10000 INJECTION, SOLUTION INTRAVENOUS at 11:12

## 2024-03-02 RX ADMIN — PANTOPRAZOLE SODIUM 40 MG: 40 TABLET, DELAYED RELEASE ORAL at 08:16

## 2024-03-02 RX ADMIN — SENNOSIDES AND DOCUSATE SODIUM 2 TABLET: 8.6; 5 TABLET ORAL at 09:00

## 2024-03-02 RX ADMIN — SPIRONOLACTONE 50 MG: 25 TABLET, FILM COATED ORAL at 09:20

## 2024-03-02 RX ADMIN — SENNOSIDES AND DOCUSATE SODIUM 2 TABLET: 8.6; 5 TABLET ORAL at 21:35

## 2024-03-02 RX ADMIN — Medication 5 MG: at 21:34

## 2024-03-02 RX ADMIN — ASPIRIN 81 MG: 81 TABLET, COATED ORAL at 09:20

## 2024-03-02 RX ADMIN — PANTOPRAZOLE SODIUM 40 MG: 40 TABLET, DELAYED RELEASE ORAL at 21:34

## 2024-03-02 RX ADMIN — TAMSULOSIN HYDROCHLORIDE 0.4 MG: 0.4 CAPSULE ORAL at 09:20

## 2024-03-02 RX ADMIN — DAPAGLIFLOZIN 10 MG: 10 TABLET, FILM COATED ORAL at 09:20

## 2024-03-02 RX ADMIN — SERTRALINE HYDROCHLORIDE 12.5 MG: 25 TABLET ORAL at 09:20

## 2024-03-02 RX ADMIN — WARFARIN SODIUM 7.5 MG: 5 TABLET ORAL at 17:24

## 2024-03-02 RX ADMIN — LEVOTHYROXINE SODIUM 175 MCG: 25 TABLET ORAL at 05:49

## 2024-03-02 RX ADMIN — SACUBITRIL AND VALSARTAN 1 TABLET: 24; 26 TABLET, FILM COATED ORAL at 09:20

## 2024-03-02 ASSESSMENT — COGNITIVE AND FUNCTIONAL STATUS - GENERAL
DAILY ACTIVITIY SCORE: 24
MOBILITY SCORE: 24
MOBILITY SCORE: 24
DAILY ACTIVITIY SCORE: 24

## 2024-03-02 ASSESSMENT — PAIN SCALES - GENERAL
PAINLEVEL_OUTOF10: 0 - NO PAIN
PAINLEVEL_OUTOF10: 0 - NO PAIN

## 2024-03-02 ASSESSMENT — PAIN - FUNCTIONAL ASSESSMENT: PAIN_FUNCTIONAL_ASSESSMENT: 0-10

## 2024-03-02 NOTE — PROGRESS NOTES
Anatoly Lane is a 63 y.o. male on day 8 of admission presenting with Acute decompensated heart failure (CMS/HCC).    Subjective   No acute events overnight. Continues to have non melanotic stools overnight.     Objective     Physical Exam  Constitutional:       Appearance: Normal appearance. He is obese.   HENT:      Head: Normocephalic.      Mouth/Throat:      Mouth: Mucous membranes are moist.      Pharynx: Oropharynx is clear. No oropharyngeal exudate or posterior oropharyngeal erythema.   Eyes:      Extraocular Movements: Extraocular movements intact.      Conjunctiva/sclera: Conjunctivae normal.      Pupils: Pupils are equal, round, and reactive to light.   Neck:      Vascular: No JVD.   Cardiovascular:      Rate and Rhythm: Regular rhythm.      Heart sounds: No murmur heard.     No friction rub. No gallop.      Comments: NSR w/ intermittent ventricular pacing. AICD set DDDR . LVAD hum heard over left chest. Dopplerable distal pulses in all extremities. Capillary refill normal. Appears warm and well perfused.   Pulmonary:      Effort: Pulmonary effort is normal. No accessory muscle usage, respiratory distress or retractions.      Breath sounds: Normal breath sounds.   Abdominal:      General: Abdomen is flat. Bowel sounds are normal. There is no distension.      Palpations: Abdomen is soft. There is no mass.      Tenderness: There is no abdominal tenderness. There is no guarding.      Hernia: No hernia is present.   Genitourinary:     Penis: Normal.    Musculoskeletal:         General: No tenderness. Normal range of motion.      Cervical back: Full passive range of motion without pain.      Right lower leg: No edema.      Left lower leg: No edema.   Skin:     General: Skin is warm and dry.      Capillary Refill: Capillary refill takes less than 2 seconds.      Coloration: Skin is not jaundiced.      Findings: No bruising, laceration, lesion, rash or wound.      Comments: LVAD driveline site without  "drainage or erythema.    Neurological:      General: No focal deficit present.      Mental Status: He is alert and oriented to person, place, and time. Mental status is at baseline.   Psychiatric:         Mood and Affect: Mood normal.         Last Recorded Vitals  Blood pressure 78/78, pulse 73, temperature 36.4 °C (97.5 °F), temperature source Temporal, resp. rate 18, height 1.753 m (5' 9\"), weight 102 kg (223 lb 15.8 oz), SpO2 96 %.  Intake/Output last 3 Shifts:  I/O last 3 completed shifts:  In: - (0 mL/kg)   Out: 3525 (34.7 mL/kg) [Urine:3525 (1 mL/kg/hr)]  Weight: 101.6 kg     Relevant Results  Current Outpatient Medications   Medication Instructions    aspirin 81 mg, oral, Daily    dapagliflozin propanediol (FARXIGA) 10 mg, oral, Daily    digoxin (LANOXIN) 125 mcg, oral, Daily    Entresto 49-51 mg tablet 1 tablet, oral, 2 times daily    levothyroxine (SYNTHROID, LEVOXYL) 175 mcg, oral, Daily before breakfast    Pacerone 200 mg, oral, Daily    pantoprazole (PROTONIX) 40 mg, oral, Daily before breakfast    sertraline (ZOLOFT) 25 mg, oral, Daily    spironolactone (ALDACTONE) 50 mg, oral, Daily    tamsulosin (FLOMAX) 0.4 mg, oral, Daily    torsemide (DEMADEX) 10 mg, oral, As needed    warfarin (Coumadin) 1 mg tablet 3.5mg (1mg tablet + 2.5mg tablet) on Monday, Wednesday, Friday<BR>5mg (2.5mg tablet + 2.5mg tablet) on Sunday, Tuesday, Thursday, Saturday    warfarin (Coumadin) 2.5 mg tablet 3.5mg (1mg tablet + 2.5mg tablet) on Monday, Wednesday, Friday<BR>5mg (2.5mg tablet + 2.5mg tablet) on Sunday, Tuesday, Thursday, Saturday       Scheduled medications  aspirin, 81 mg, oral, Daily  dapagliflozin propanediol, 10 mg, oral, Daily  digoxin, 125 mcg, oral, Daily  diph,pertuss(acel),tet vac (PF), 0.5 mL, intramuscular, During hospitalization  levothyroxine, 175 mcg, oral, Daily before breakfast  lidocaine, 1 patch, transdermal, Daily  melatonin, 5 mg, oral, Nightly  pantoprazole, 40 mg, oral, BID  [START ON 3/5/2024] " pneumoc 20-sarthak conj-dip cr(PF), 0.5 mL, intramuscular, During hospitalization  [START ON 3/5/2024] respiratory syncytial virus (rsv) adjuvanted, age 65y+, 0.5 mL, intramuscular, Once  sacubitriL-valsartan, 1 tablet, oral, BID  sennosides-docusate sodium, 2 tablet, oral, BID  sertraline, 12.5 mg, oral, Daily  spironolactone, 50 mg, oral, Daily  tamsulosin, 0.4 mg, oral, Daily  [Held by provider] warfarin, 5 mg, oral, Once per day on Mon Wed Fri  warfarin, 7.5 mg, oral, Daily      Continuous medications  heparin, 0-4,000 Units/hr, Last Rate: 1,000 Units/hr (03/02/24 1112)    PRN medications  PRN medications: acetaminophen, heparin, ipratropium-albuteroL, ondansetron, polyethylene glycol     Results for orders placed or performed during the hospital encounter of 02/23/24 (from the past 24 hour(s))   Urinalysis with Reflex Microscopic   Result Value Ref Range    Color, Urine Colorless (N) Light-Yellow, Yellow, Dark-Yellow    Appearance, Urine Clear Clear    Specific Gravity, Urine 1.008 1.005 - 1.035    pH, Urine 7.0 5.0, 5.5, 6.0, 6.5, 7.0, 7.5, 8.0    Protein, Urine NEGATIVE NEGATIVE, 10 (TRACE), 20 (TRACE) mg/dL    Glucose, Urine 500 (3+) (A) Normal mg/dL    Blood, Urine NEGATIVE NEGATIVE    Ketones, Urine NEGATIVE NEGATIVE mg/dL    Bilirubin, Urine NEGATIVE NEGATIVE    Urobilinogen, Urine Normal Normal mg/dL    Nitrite, Urine NEGATIVE NEGATIVE    Leukocyte Esterase, Urine NEGATIVE NEGATIVE   BLOOD GAS ARTERIAL FULL PANEL   Result Value Ref Range    POCT pH, Arterial 7.40 7.38 - 7.42 pH    POCT pCO2, Arterial 42 38 - 42 mm Hg    POCT pO2, Arterial 82 (L) 85 - 95 mm Hg    POCT SO2, Arterial 97 94 - 100 %    POCT Oxy Hemoglobin, Arterial 94.7 94.0 - 98.0 %    POCT Hematocrit Calculated, Arterial 30.0 (L) 41.0 - 52.0 %    POCT Sodium, Arterial 134 (L) 136 - 145 mmol/L    POCT Potassium, Arterial 4.2 3.5 - 5.3 mmol/L    POCT Chloride, Arterial 105 98 - 107 mmol/L    POCT Ionized Calcium, Arterial 1.16 1.10 - 1.33 mmol/L     POCT Glucose, Arterial 92 74 - 99 mg/dL    POCT Lactate, Arterial 0.7 0.4 - 2.0 mmol/L    POCT Base Excess, Arterial 1.0 -2.0 - 3.0 mmol/L    POCT HCO3 Calculated, Arterial 26.0 22.0 - 26.0 mmol/L    POCT Hemoglobin, Arterial 10.0 (L) 13.5 - 17.5 g/dL    POCT Anion Gap, Arterial 7 (L) 10 - 25 mmo/L    Patient Temperature 37.0 degrees Celsius    FiO2 21 %    Site of Arterial Puncture Radial Left     James's Test Positive    CBC and Auto Differential   Result Value Ref Range    WBC 5.6 4.4 - 11.3 x10*3/uL    nRBC 0.0 0.0 - 0.0 /100 WBCs    RBC 3.10 (L) 4.50 - 5.90 x10*6/uL    Hemoglobin 10.1 (L) 13.5 - 17.5 g/dL    Hematocrit 31.5 (L) 41.0 - 52.0 %     (H) 80 - 100 fL    MCH 32.6 26.0 - 34.0 pg    MCHC 32.1 32.0 - 36.0 g/dL    RDW 17.5 (H) 11.5 - 14.5 %    Platelets 222 150 - 450 x10*3/uL    Neutrophils % 66.2 40.0 - 80.0 %    Immature Granulocytes %, Automated 0.7 0.0 - 0.9 %    Lymphocytes % 20.7 13.0 - 44.0 %    Monocytes % 7.8 2.0 - 10.0 %    Eosinophils % 3.9 0.0 - 6.0 %    Basophils % 0.7 0.0 - 2.0 %    Neutrophils Absolute 3.71 1.20 - 7.70 x10*3/uL    Immature Granulocytes Absolute, Automated 0.04 0.00 - 0.70 x10*3/uL    Lymphocytes Absolute 1.16 (L) 1.20 - 4.80 x10*3/uL    Monocytes Absolute 0.44 0.10 - 1.00 x10*3/uL    Eosinophils Absolute 0.22 0.00 - 0.70 x10*3/uL    Basophils Absolute 0.04 0.00 - 0.10 x10*3/uL   Coagulation Screen   Result Value Ref Range    Protime 15.3 (H) 9.8 - 12.8 seconds    INR 1.4 (H) 0.9 - 1.1    aPTT 31 27 - 38 seconds   Lactate Dehydrogenase   Result Value Ref Range     84 - 246 U/L   Magnesium   Result Value Ref Range    Magnesium 2.22 1.60 - 2.40 mg/dL   Renal function panel   Result Value Ref Range    Glucose 84 74 - 99 mg/dL    Sodium 136 136 - 145 mmol/L    Potassium 4.2 3.5 - 5.3 mmol/L    Chloride 103 98 - 107 mmol/L    Bicarbonate 24 21 - 32 mmol/L    Anion Gap 13 10 - 20 mmol/L    Urea Nitrogen 22 6 - 23 mg/dL    Creatinine 1.32 (H) 0.50 - 1.30 mg/dL     eGFR 61 >60 mL/min/1.73m*2    Calcium 8.6 8.6 - 10.6 mg/dL    Phosphorus 3.1 2.5 - 4.9 mg/dL    Albumin 3.9 3.4 - 5.0 g/dL   Type and screen   Result Value Ref Range    ABO TYPE AB     Rh TYPE POS     ANTIBODY SCREEN NEG    TSH   Result Value Ref Range    Thyroid Stimulating Hormone 8.10 (H) 0.44 - 3.98 mIU/L   T4, free   Result Value Ref Range    Thyroxine, Free 1.52 (H) 0.78 - 1.48 ng/dL   Heparin Assay, UFH   Result Value Ref Range    Heparin Unfractionated 0.5 See Comment Below for Therapeutic Ranges IU/mL       Assessment/Plan   63 y.o. male with a PMH of stage D/ICM/HFrEF 10-15%, s/p HM3 4/30/2023, CAD, pAF, VT s/p AICD ([facility] placed 1/4/2018), depression, BPH & hypothyroidism. Originally admitted to [facility]ICU on 2/23/24 due to worsening shortness of breath, weight gain, and hypotension at home.     Cardiac workup negative for source of symptoms; however, on further review, patient states he has been having dark-tarry stools x 2 weeks prior to admission. Hg decreased from 14 in August 2023 to 7.0 during admission. GI consulted and performed EGD on 2/24 showing mild gastritis but no source of bleed. Colonoscopy performed on 2/26 without source of bleeding. VCS completed on 2/27 without source either. GIB likely 2/2 to AVM. Anemia stabilized without further anemia and transferred to LT5 on 2/28. Lastly, patient now requesting workup for OHT, now initiated.          Plan/Problem List:      NEURO PMH: Depression  Active Problem List:  #Depression  - Patient states he is unclear why or when he was started on zoloft, but it must have been after his LVAD surgery.  - He currently denies any symptoms of anxiety or depression and has asked if it is safe to be weaned off.  - There are multiple side effects (including increased bleeding risk) with zoloft, so may be beneficial to taper off since the patient has requested.  - Wean Zoloft to 12.5 daily and then discontinue in 2-4 weeks if no withdrawal symptoms (~3/18 or  3/25)    #Obesity  - BMO 32.69 kg/m2  - Weight gain of 75 pounds over last year (appears to be dietary and hypothyroid induced)        CV PMH: Stage D/ICM/HFrEF 10-15%, s/p HM3 4/30/2023, CAD, pAF, VT s/p AICD ([facility] placed 1/4/2018), HLD  Active Problem List:   #Stage D/ICM/HFrEF 10-15%  #HM3 4/30/2023 (bridge to transplant)  #RV dysfunction     Last RHC: 2/28/24 w/ RA 7, PAP 40/13 (22), W 13, CI 3.38 &   Last LHC: 3/30/23 w/ severe LAD diffuse 80% stenosis, distal LCx 70% stenosis and RCA mild-diffuse CAD.   Last TTE/BOBBY: 2/23/24 EF 15% w/ mild RV dysfunction    LVAD Settings: high speed 5500 RPM & low speed 5100 RPM  Flows: 4.8  RPM: 5500  PI: 3.7  Power: 4.2  Dressing changes: Weekly  Drive Line assessment: Clean and free from signs/symptoms of infection     - Continue aspirin 81mg daily   - Continue farxiga 10mg daily   - Continue Entresto 24-26mg BID  - Continue spironolactone 50mg daily   - Holding beta-blocker due to persistent RV dysfunction   - 20mg PO Lasix today for goal 1-2L negative (takes 10mg torsemide PRN at home). Cr slightly up, trend over next 24-48hrs and reassess dosing.    - Increase coumadin to 7.5mg daily for goal INR 2-3 and daily levels. Added heparin gtt bridge today as well.  - Continue daily LDH  - Continue digoxin 125mcg daily for RV support (last level 0.89 on 2/23/24)    #CAD  #History of HLD  - Last LHC 3/30/23 shows multivessel CAD w/ severe LAD diffuse 80% stenosis, distal LCx 70% stenosis and RCA mild-diffuse CAD.  - Repeat cholesterol studies on 2/29/24 appear normal   - Not currently on statin   - Continue aspirin 81mg daily     #pAfib and VT  - AICD placed 1/4/2018 ([facility]) set DDDR   - Currently NSR w/ intermittent V-pacing on telemetry. Intermittent runs of asymptomatic NSVT on 2/29/24  - Continue Digoxin 125mcg daily w/ appropriate levels   - Holding amiodarone due to concern for amio induced thyrotoxicosis     - Continue Coumadin for goal INR  2-3    #Heart Transplant Workup and Evaluation   - Patient states he is now ready for bridge to transplant and formal evaluation opened  - Transplant evaluation consent obtained on 2/29/24  - Blood type: AB+  - Last HLAs on 4/3/23 negative for class 1 and 2 antibodies   - Repeat HLA testing pending from 2/29/24 and 3/1/24   - Labwork collected and pending for transplant workup   - RHC completed 2/28/24  - LHC 3/20/23; no need for further imaging at this time  - Echo completed 2/23/24  - EKG completed 3/1/24  - Carotid ultrasound 3/1/24 with <50% stenosis bilateral carotids  - U/S aorta and iliacs 3/1/24 with poor visualization due to bowel gas and LVAD  - ANUPAM testing 3/1/24 negative for PAD  - No need for CPET  - US abdomen ordered and pending  - No need for chest x-ray  - CT C/A/P non-con 2/29/24: shows multiple stable lung nodules bilaterally (0.3cm) all stable from 5/4/23. Liver shows hypodense simple fluid collections stable (1.3cm) from 5/4/23. Multiple small hypodense fluid collections on bilateral kidneys favored to be cysts all stable compared to 5/4/23.  Diffuse centrilobular ground glass opacities within bilateral lungs, compatible with bronchiolitis.     - PFTs to be done on 3/4/24  - Bone density to be ordered outpatient  - Colonoscopy 2/26/24 & EGD 2/24/24 without significant findings   - Pantogram completed 2/29/24 and negative for abscess or carries   - Nutrition, social work and palliative care consulted    - Plan to present to Advance Therapy Meeting on on Tuesday 3/5/24    #Vaccine status  - Immunity to HepA  - Immunity to MMR  - Will need HepB series (Engerix-B): first dose ordered, 2nd dose at 4/2024, 3rd dose at 9/2024  - Has had chicken pox w/ + VZV IgG: Will need 2-dose shingrix series outpatient   - Patient denies having recent Td or Tdap administration within last 10 years: single injection ordered  - RSV vaccine (Arexy): Single injection ordered this admission on 3/5/24   - Pneumococcal  vaccine (Prevnar 20): Single injection ordered this admission   - Has received 2xPfizer COVID vaccines in 2021: Will require 1-dose of either moderna or pfizer vaccine outpatient  - Has not received Flu vaccine this year: Will require 1-dose of any flu vaccine       PULM PMH: Previous smoker (quite in 2019), Restrictive lung physiology    Active Problem List:  #Dyspnea on exertion   #Restrictive lung disease  - Last PFTs on 4/4/2023 show FEV1 1.66 (<60%), FVC 2.63 (<49%) w/ FEV1/FVC ratio of 82%  - Previous smoker quite in 2019  - Not currently on inhaler therapy  - Admitted with complaints of GRAVES for multiple weeks  - Cardiac workup unremarkable as source of symptoms   - GRAVES likely in setting of anemia 2/2 to GIB; however, CT on 2/29/24 shows Diffuse centrilobular ground glass opacities within bilateral lungs, compatible with bronchiolitis    - Consult pulmonology in AM for transplant workup and evaluation of bronchiolitis as possible source of GRAVES    - Repeat PFTs ordered and scheduled for Monday 3/4/24   - Start duonebs Q6 PRN for SOB/GRAVES       GI PMH: None  Active Problem List:   #Melena 2/2 to suspected AVM?  - Endorses having dark-tarry stools x 2 weeks prior to admission.   - Hg decreased from 14 in August 2023 to 7.0 during admission.  - EGD on 2/24 showing mild gastritis but no source of bleed.  - Colonoscopy performed on 2/26 without source of bleeding.  - Video capsule study completed on 2/27 without source of bleed  - H-pylori negative on 2/24  - No further melena and Hg stabilized without transfusions   - Likely source is AVM   - GI okay with restarting Coumadin and monitoring for GIB  - Continue pantoprazole 40mg BID      Diet: Regular diet   Bowel Regimen: Colace/senna BID, miralax PRN        PMH: BPH. 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    - TSH levels also notably elevated at 11.49 on admission with normal Free T4  - Unclear if hypothyroidism 2/2 to amiodarone vs Hashimotos (thyroid peroxidase antibodies elevated > 1000).   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    - Per patient, he has been taking his pantoprazole with his synthroid so he may not have been absorbing it  - Continue synthroid 175mcg daily   - Repeat TSH and FT4: 8.1/1.5, improved.     HEME PMH: Iron deficiency anemia, history of HIT   Active Problem List:   #GIB  - See GI for plan    #Iron deficiency anemia   - iron studies on admission show iron deficiency anemia, likely precipitated by GIB  - GIB now controlled  - Completed 3xdays of venofir   - Will start PO iron on discharge (to avoid confusion of melena inpatient)      #History of HIT   - PF4 positive; however, HONG negative on 4/7/23  - Repeat PF4 on 3/1/24 is negative   - Vascular medicine discussed and okay for patient to receive heparin       ID PMH: None  Active Problem List: None      Ortho/Skin PMH: None  Active Problem List: None     Proph:  SCDs  Coumadin  PPI       Lines: None    Restraints: N/A       Family: Will update at bedside      Seen with the HF attending, Dr. Rankin     Dispo: Discharge likely next week pending transplant workup, therapeutic INR and no further GIB.     Rolando Lindo DO  Advanced Heart Failure & Transplant Fellow

## 2024-03-02 NOTE — PROGRESS NOTES
"Anatoly Lane is a 63 y.o. male on day 8 of admission presenting with Acute decompensated heart failure (CMS/HCC).    Subjective   Patient sleeping soundly upon arrival. He is currently undergoing OHT work-up     I have reviewed histories, allergies and medications have been reviewed and there are no changes       Objective   Review of Systems: 10 point ROS neg unless stated above     Physical Exam  General: Alert, oriented x 3. No acute distress.  HEENT: EOMI, PERRL. Oral cavity mucosa moist  Lungs: No increased WOB   Abd: soft, NT  Ext: 1+ TANK  Skin: warm, dry.  Neuro: AOx3, no focal deficits  Psych: Appropriate mood and behavior     Last Recorded Vitals  Blood pressure 78/78, pulse 70, temperature 36.2 °C (97.2 °F), temperature source Temporal, resp. rate 18, height 1.753 m (5' 9\"), weight 102 kg (223 lb 15.8 oz), SpO2 97 %.  Intake/Output last 3 Shifts:  I/O last 3 completed shifts:  In: - (0 mL/kg)   Out: 3525 (34.7 mL/kg) [Urine:3525 (1 mL/kg/hr)]  Weight: 101.6 kg     Scheduled medications  aspirin, 81 mg, oral, Daily  dapagliflozin propanediol, 10 mg, oral, Daily  digoxin, 125 mcg, oral, Daily  diph,pertuss(acel),tet vac (PF), 0.5 mL, intramuscular, During hospitalization  hepatitis B virus (recomb), 1 mL, intramuscular, Once  levothyroxine, 175 mcg, oral, Daily before breakfast  lidocaine, 1 patch, transdermal, Daily  melatonin, 5 mg, oral, Nightly  pantoprazole, 40 mg, oral, BID  [START ON 3/5/2024] pneumoc 20-sarthak conj-dip cr(PF), 0.5 mL, intramuscular, During hospitalization  [START ON 3/5/2024] respiratory syncytial virus (rsv) adjuvanted, age 65y+, 0.5 mL, intramuscular, Once  sacubitriL-valsartan, 1 tablet, oral, BID  sennosides-docusate sodium, 2 tablet, oral, BID  sertraline, 12.5 mg, oral, Daily  spironolactone, 50 mg, oral, Daily  tamsulosin, 0.4 mg, oral, Daily  [Held by provider] warfarin, 5 mg, oral, Once per day on Mon Wed Fri  warfarin, 7.5 mg, oral, Daily      Continuous medications  heparin, " 0-4,000 Units/hr, Last Rate: 1,000 Units/hr (03/02/24 1112)      PRN medications  PRN medications: acetaminophen, heparin, ipratropium-albuteroL, ondansetron, polyethylene glycol     Relevant Results  Results from last 7 days   Lab Units 03/02/24  0610 03/01/24  0910 03/01/24  0628 02/29/24  0658 02/28/24  0444 02/27/24  0522   POCT GLUCOSE mg/dL  --  131*  --   --   --   --    GLUCOSE mg/dL 84  --  85 95 115* 128*        Results for orders placed or performed during the hospital encounter of 02/23/24 (from the past 24 hour(s))   Urinalysis with Reflex Microscopic   Result Value Ref Range    Color, Urine Colorless (N) Light-Yellow, Yellow, Dark-Yellow    Appearance, Urine Clear Clear    Specific Gravity, Urine 1.008 1.005 - 1.035    pH, Urine 7.0 5.0, 5.5, 6.0, 6.5, 7.0, 7.5, 8.0    Protein, Urine NEGATIVE NEGATIVE, 10 (TRACE), 20 (TRACE) mg/dL    Glucose, Urine 500 (3+) (A) Normal mg/dL    Blood, Urine NEGATIVE NEGATIVE    Ketones, Urine NEGATIVE NEGATIVE mg/dL    Bilirubin, Urine NEGATIVE NEGATIVE    Urobilinogen, Urine Normal Normal mg/dL    Nitrite, Urine NEGATIVE NEGATIVE    Leukocyte Esterase, Urine NEGATIVE NEGATIVE   BLOOD GAS ARTERIAL FULL PANEL   Result Value Ref Range    POCT pH, Arterial 7.40 7.38 - 7.42 pH    POCT pCO2, Arterial 42 38 - 42 mm Hg    POCT pO2, Arterial 82 (L) 85 - 95 mm Hg    POCT SO2, Arterial 97 94 - 100 %    POCT Oxy Hemoglobin, Arterial 94.7 94.0 - 98.0 %    POCT Hematocrit Calculated, Arterial 30.0 (L) 41.0 - 52.0 %    POCT Sodium, Arterial 134 (L) 136 - 145 mmol/L    POCT Potassium, Arterial 4.2 3.5 - 5.3 mmol/L    POCT Chloride, Arterial 105 98 - 107 mmol/L    POCT Ionized Calcium, Arterial 1.16 1.10 - 1.33 mmol/L    POCT Glucose, Arterial 92 74 - 99 mg/dL    POCT Lactate, Arterial 0.7 0.4 - 2.0 mmol/L    POCT Base Excess, Arterial 1.0 -2.0 - 3.0 mmol/L    POCT HCO3 Calculated, Arterial 26.0 22.0 - 26.0 mmol/L    POCT Hemoglobin, Arterial 10.0 (L) 13.5 - 17.5 g/dL    POCT Anion  Gap, Arterial 7 (L) 10 - 25 mmo/L    Patient Temperature 37.0 degrees Celsius    FiO2 21 %    Site of Arterial Puncture Radial Left     James's Test Positive    CBC and Auto Differential   Result Value Ref Range    WBC 5.6 4.4 - 11.3 x10*3/uL    nRBC 0.0 0.0 - 0.0 /100 WBCs    RBC 3.10 (L) 4.50 - 5.90 x10*6/uL    Hemoglobin 10.1 (L) 13.5 - 17.5 g/dL    Hematocrit 31.5 (L) 41.0 - 52.0 %     (H) 80 - 100 fL    MCH 32.6 26.0 - 34.0 pg    MCHC 32.1 32.0 - 36.0 g/dL    RDW 17.5 (H) 11.5 - 14.5 %    Platelets 222 150 - 450 x10*3/uL    Neutrophils % 66.2 40.0 - 80.0 %    Immature Granulocytes %, Automated 0.7 0.0 - 0.9 %    Lymphocytes % 20.7 13.0 - 44.0 %    Monocytes % 7.8 2.0 - 10.0 %    Eosinophils % 3.9 0.0 - 6.0 %    Basophils % 0.7 0.0 - 2.0 %    Neutrophils Absolute 3.71 1.20 - 7.70 x10*3/uL    Immature Granulocytes Absolute, Automated 0.04 0.00 - 0.70 x10*3/uL    Lymphocytes Absolute 1.16 (L) 1.20 - 4.80 x10*3/uL    Monocytes Absolute 0.44 0.10 - 1.00 x10*3/uL    Eosinophils Absolute 0.22 0.00 - 0.70 x10*3/uL    Basophils Absolute 0.04 0.00 - 0.10 x10*3/uL   Coagulation Screen   Result Value Ref Range    Protime 15.3 (H) 9.8 - 12.8 seconds    INR 1.4 (H) 0.9 - 1.1    aPTT 31 27 - 38 seconds   Lactate Dehydrogenase   Result Value Ref Range     84 - 246 U/L   Magnesium   Result Value Ref Range    Magnesium 2.22 1.60 - 2.40 mg/dL   Renal function panel   Result Value Ref Range    Glucose 84 74 - 99 mg/dL    Sodium 136 136 - 145 mmol/L    Potassium 4.2 3.5 - 5.3 mmol/L    Chloride 103 98 - 107 mmol/L    Bicarbonate 24 21 - 32 mmol/L    Anion Gap 13 10 - 20 mmol/L    Urea Nitrogen 22 6 - 23 mg/dL    Creatinine 1.32 (H) 0.50 - 1.30 mg/dL    eGFR 61 >60 mL/min/1.73m*2    Calcium 8.6 8.6 - 10.6 mg/dL    Phosphorus 3.1 2.5 - 4.9 mg/dL    Albumin 3.9 3.4 - 5.0 g/dL   Type and screen   Result Value Ref Range    ABO TYPE AB     Rh TYPE POS     ANTIBODY SCREEN NEG    TSH   Result Value Ref Range    Thyroid  Stimulating Hormone 8.10 (H) 0.44 - 3.98 mIU/L   T4, free   Result Value Ref Range    Thyroxine, Free 1.52 (H) 0.78 - 1.48 ng/dL           Assessment/Plan   Anatoly Lane is a very pleasant 63 y.o. male presenting for management of Stage D HFrEF NYHA class 2-3.  s/p HM3 4/30/2023 CAD, h/o pAF and VT s/p ICD, hypothyroidism, and stage D systolic HF/ICM/HFrEF who was admitted to HFICU for worsening shortness of breath, weight gain, and hypotension c/f AHDF vs GIB.      Endocrinology was consulted due to hypothyroidism.     Pertinent Hx:  -Patient diagnosed with hypothyroidism ~2021. Was initially started on liothyronine 37.5 mcg per day by cardiologist in context of  TSH of 4.8.   -Patient was admitted in May 2023 for ADHF when endocrinology was consulted. He was changed to levothyroxine and eventually discharged on levothyroxine 175mcg daily which he has remained on since.    -Patient has been on amiodarone for many years (patient estimates since early 2000s).   -Home regimen: Levothyroxine 175mcg daily though taking along with his pantoprazole at home      Pertinent imaging:  -No dedicated thyroid imaging  -CT Chest w/o contrast (4/21/23) showed normal-appearing thyroid gland     Pertinent Labs:  -3/2/24: TSH 8.1, FT4 1.52   -2/26/24: TSH 11.49, FT4 1.21, antiTPO >1000  -2/23/24: TSH 18.02  -5/9/23:  TSH 8.41, FT4 1.40  -5/3/23: TSH 6.56, FT4 1.07  -4/25/23: TSH 10.34, FT4 1.28     Pertinent medication  -Maintained on home levothyroxine 175mcg daily since admission on 2/23     #Primary Hypothyroidism    RECOMMENDATIONS:  -c/w levothyroxine 175mcg daily to be given first thing in the AM on an empty stomach  from food/other medications by at least 30 minutes. Also educated patient to take it separately from his pantoprazole at home (pt plans to start taking pantoprazole at bedtime instead)  -Recheck TSH and FT4 on 3/8 if still inpatient. Otherwise, patient should have TSH/FT4 checked as outpatient in ~4 weeks.    -Please assist patient with establishing with PCP      Endocrine will continue to follow.  Please message on secure chat or page [phone] with any questions or concerns.  Patient discussed with Dr. Hopson who agrees with the management plan.

## 2024-03-02 NOTE — CARE PLAN
The clin  Problem: Heart Failure  Goal: Improved gas exchange this shift  Outcome: Progressing  Goal: Improved urinary output this shift  Outcome: Progressing  Goal: Report improvement of dyspnea/breathlessness this shift  Outcome: Progressing  Goal: Weight from fluid excess reduced over 2-3 days, then stabilize  Outcome: Progressing  Goal: Increase self care and/or family involvement in 24 hours  Outcome: Progressing   ical goals for the shift include Patient will remain HDS throughout shift

## 2024-03-02 NOTE — CARE PLAN
The patient's goals for the shift include      The clinical goals for the shift include patient will remain HDS throughout shift    Over the shift, the patient remained HDS. His MAPs averaged at 70s.

## 2024-03-03 ENCOUNTER — APPOINTMENT (OUTPATIENT)
Dept: RADIOLOGY | Facility: HOSPITAL | Age: 64
DRG: 377 | End: 2024-03-03
Payer: MEDICARE

## 2024-03-03 LAB
ALBUMIN SERPL BCP-MCNC: 3.8 G/DL (ref 3.4–5)
ANION GAP SERPL CALC-SCNC: 14 MMOL/L (ref 10–20)
APTT PPP: 93 SECONDS (ref 27–38)
BASOPHILS # BLD AUTO: 0.04 X10*3/UL (ref 0–0.1)
BASOPHILS NFR BLD AUTO: 0.6 %
BUN SERPL-MCNC: 30 MG/DL (ref 6–23)
CALCIUM SERPL-MCNC: 8.6 MG/DL (ref 8.6–10.6)
CHLORIDE SERPL-SCNC: 103 MMOL/L (ref 98–107)
CO2 SERPL-SCNC: 24 MMOL/L (ref 21–32)
CREAT SERPL-MCNC: 1.01 MG/DL (ref 0.5–1.3)
EBV VCA IGM SER-ACNC: 68.6 U/ML (ref 0–43.9)
EGFRCR SERPLBLD CKD-EPI 2021: 84 ML/MIN/1.73M*2
EOSINOPHIL # BLD AUTO: 0.21 X10*3/UL (ref 0–0.7)
EOSINOPHIL NFR BLD AUTO: 3.3 %
ERYTHROCYTE [DISTWIDTH] IN BLOOD BY AUTOMATED COUNT: 17.2 % (ref 11.5–14.5)
GLUCOSE SERPL-MCNC: 89 MG/DL (ref 74–99)
HCT VFR BLD AUTO: 31.3 % (ref 41–52)
HGB BLD-MCNC: 10 G/DL (ref 13.5–17.5)
IMM GRANULOCYTES # BLD AUTO: 0.03 X10*3/UL (ref 0–0.7)
IMM GRANULOCYTES NFR BLD AUTO: 0.5 % (ref 0–0.9)
INR PPP: 1.6 (ref 0.9–1.1)
LDH SERPL L TO P-CCNC: 145 U/L (ref 84–246)
LYMPHOCYTES # BLD AUTO: 1.68 X10*3/UL (ref 1.2–4.8)
LYMPHOCYTES NFR BLD AUTO: 26.2 %
MAGNESIUM SERPL-MCNC: 2.18 MG/DL (ref 1.6–2.4)
MCH RBC QN AUTO: 32.2 PG (ref 26–34)
MCHC RBC AUTO-ENTMCNC: 31.9 G/DL (ref 32–36)
MCV RBC AUTO: 101 FL (ref 80–100)
MONOCYTES # BLD AUTO: 0.44 X10*3/UL (ref 0.1–1)
MONOCYTES NFR BLD AUTO: 6.9 %
NEUTROPHILS # BLD AUTO: 4.01 X10*3/UL (ref 1.2–7.7)
NEUTROPHILS NFR BLD AUTO: 62.5 %
NRBC BLD-RTO: 0 /100 WBCS (ref 0–0)
PHOSPHATE SERPL-MCNC: 3.6 MG/DL (ref 2.5–4.9)
PLATELET # BLD AUTO: 284 X10*3/UL (ref 150–450)
POTASSIUM SERPL-SCNC: 4.2 MMOL/L (ref 3.5–5.3)
PROTHROMBIN TIME: 18.3 SECONDS (ref 9.8–12.8)
RBC # BLD AUTO: 3.11 X10*6/UL (ref 4.5–5.9)
SODIUM SERPL-SCNC: 137 MMOL/L (ref 136–145)
UFH PPP CHRO-ACNC: 0.3 IU/ML
UFH PPP CHRO-ACNC: 0.4 IU/ML
WBC # BLD AUTO: 6.4 X10*3/UL (ref 4.4–11.3)

## 2024-03-03 PROCEDURE — 99233 SBSQ HOSP IP/OBS HIGH 50: CPT | Performed by: INTERNAL MEDICINE

## 2024-03-03 PROCEDURE — 76700 US EXAM ABDOM COMPLETE: CPT

## 2024-03-03 PROCEDURE — 93750 INTERROGATION VAD IN PERSON: CPT | Performed by: INTERNAL MEDICINE

## 2024-03-03 PROCEDURE — 85025 COMPLETE CBC W/AUTO DIFF WBC: CPT | Performed by: NURSE PRACTITIONER

## 2024-03-03 PROCEDURE — 36415 COLL VENOUS BLD VENIPUNCTURE: CPT | Performed by: NURSE PRACTITIONER

## 2024-03-03 PROCEDURE — 2500000004 HC RX 250 GENERAL PHARMACY W/ HCPCS (ALT 636 FOR OP/ED): Performed by: STUDENT IN AN ORGANIZED HEALTH CARE EDUCATION/TRAINING PROGRAM

## 2024-03-03 PROCEDURE — 2500000002 HC RX 250 W HCPCS SELF ADMINISTERED DRUGS (ALT 637 FOR MEDICARE OP, ALT 636 FOR OP/ED): Performed by: NURSE PRACTITIONER

## 2024-03-03 PROCEDURE — 83615 LACTATE (LD) (LDH) ENZYME: CPT | Performed by: NURSE PRACTITIONER

## 2024-03-03 PROCEDURE — 2500000002 HC RX 250 W HCPCS SELF ADMINISTERED DRUGS (ALT 637 FOR MEDICARE OP, ALT 636 FOR OP/ED): Mod: MUE | Performed by: NURSE PRACTITIONER

## 2024-03-03 PROCEDURE — 2500000001 HC RX 250 WO HCPCS SELF ADMINISTERED DRUGS (ALT 637 FOR MEDICARE OP): Performed by: NURSE PRACTITIONER

## 2024-03-03 PROCEDURE — 1200000002 HC GENERAL ROOM WITH TELEMETRY DAILY

## 2024-03-03 PROCEDURE — 80069 RENAL FUNCTION PANEL: CPT | Performed by: NURSE PRACTITIONER

## 2024-03-03 PROCEDURE — 85610 PROTHROMBIN TIME: CPT | Performed by: NURSE PRACTITIONER

## 2024-03-03 PROCEDURE — 2500000002 HC RX 250 W HCPCS SELF ADMINISTERED DRUGS (ALT 637 FOR MEDICARE OP, ALT 636 FOR OP/ED): Performed by: STUDENT IN AN ORGANIZED HEALTH CARE EDUCATION/TRAINING PROGRAM

## 2024-03-03 PROCEDURE — 2500000002 HC RX 250 W HCPCS SELF ADMINISTERED DRUGS (ALT 637 FOR MEDICARE OP, ALT 636 FOR OP/ED): Mod: MUE | Performed by: STUDENT IN AN ORGANIZED HEALTH CARE EDUCATION/TRAINING PROGRAM

## 2024-03-03 PROCEDURE — 2500000002 HC RX 250 W HCPCS SELF ADMINISTERED DRUGS (ALT 637 FOR MEDICARE OP, ALT 636 FOR OP/ED): Mod: MUE

## 2024-03-03 PROCEDURE — 83735 ASSAY OF MAGNESIUM: CPT | Performed by: NURSE PRACTITIONER

## 2024-03-03 PROCEDURE — 2500000001 HC RX 250 WO HCPCS SELF ADMINISTERED DRUGS (ALT 637 FOR MEDICARE OP)

## 2024-03-03 PROCEDURE — 2500000002 HC RX 250 W HCPCS SELF ADMINISTERED DRUGS (ALT 637 FOR MEDICARE OP, ALT 636 FOR OP/ED)

## 2024-03-03 PROCEDURE — 36415 COLL VENOUS BLD VENIPUNCTURE: CPT | Performed by: STUDENT IN AN ORGANIZED HEALTH CARE EDUCATION/TRAINING PROGRAM

## 2024-03-03 PROCEDURE — 85520 HEPARIN ASSAY: CPT | Performed by: STUDENT IN AN ORGANIZED HEALTH CARE EDUCATION/TRAINING PROGRAM

## 2024-03-03 RX ADMIN — ACETAMINOPHEN 650 MG: 325 TABLET ORAL at 13:34

## 2024-03-03 RX ADMIN — PANTOPRAZOLE SODIUM 40 MG: 40 TABLET, DELAYED RELEASE ORAL at 08:44

## 2024-03-03 RX ADMIN — ASPIRIN 81 MG: 81 TABLET, COATED ORAL at 08:44

## 2024-03-03 RX ADMIN — PANTOPRAZOLE SODIUM 40 MG: 40 TABLET, DELAYED RELEASE ORAL at 20:37

## 2024-03-03 RX ADMIN — Medication 5 MG: at 20:37

## 2024-03-03 RX ADMIN — SERTRALINE HYDROCHLORIDE 12.5 MG: 25 TABLET ORAL at 08:44

## 2024-03-03 RX ADMIN — SPIRONOLACTONE 50 MG: 25 TABLET, FILM COATED ORAL at 08:44

## 2024-03-03 RX ADMIN — DAPAGLIFLOZIN 10 MG: 10 TABLET, FILM COATED ORAL at 08:44

## 2024-03-03 RX ADMIN — SACUBITRIL AND VALSARTAN 1 TABLET: 24; 26 TABLET, FILM COATED ORAL at 08:44

## 2024-03-03 RX ADMIN — SENNOSIDES AND DOCUSATE SODIUM 2 TABLET: 8.6; 5 TABLET ORAL at 08:44

## 2024-03-03 RX ADMIN — HEPARIN SODIUM 1200 UNITS/HR: 10000 INJECTION, SOLUTION INTRAVENOUS at 02:09

## 2024-03-03 RX ADMIN — LEVOTHYROXINE SODIUM 175 MCG: 25 TABLET ORAL at 05:51

## 2024-03-03 RX ADMIN — TAMSULOSIN HYDROCHLORIDE 0.4 MG: 0.4 CAPSULE ORAL at 08:44

## 2024-03-03 RX ADMIN — HEPARIN SODIUM 1200 UNITS/HR: 10000 INJECTION, SOLUTION INTRAVENOUS at 20:46

## 2024-03-03 RX ADMIN — DIGOXIN 125 MCG: 125 TABLET ORAL at 08:44

## 2024-03-03 RX ADMIN — SACUBITRIL AND VALSARTAN 1 TABLET: 24; 26 TABLET, FILM COATED ORAL at 20:37

## 2024-03-03 RX ADMIN — WARFARIN SODIUM 7.5 MG: 5 TABLET ORAL at 18:07

## 2024-03-03 ASSESSMENT — PAIN SCALES - GENERAL
PAINLEVEL_OUTOF10: 0 - NO PAIN
PAINLEVEL_OUTOF10: 3

## 2024-03-03 ASSESSMENT — PAIN - FUNCTIONAL ASSESSMENT
PAIN_FUNCTIONAL_ASSESSMENT: 0-10

## 2024-03-03 ASSESSMENT — COGNITIVE AND FUNCTIONAL STATUS - GENERAL
MOBILITY SCORE: 24
DAILY ACTIVITIY SCORE: 24
MOBILITY SCORE: 24
DAILY ACTIVITIY SCORE: 24

## 2024-03-03 ASSESSMENT — PAIN DESCRIPTION - ORIENTATION: ORIENTATION: LOWER

## 2024-03-03 ASSESSMENT — PAIN DESCRIPTION - DESCRIPTORS: DESCRIPTORS: ACHING;SORE

## 2024-03-03 ASSESSMENT — PAIN DESCRIPTION - LOCATION: LOCATION: BACK

## 2024-03-03 NOTE — CARE PLAN
The patient's goals for the shift include To complete the evaluation for the Transplant    The clinical goals for the shift include Patient will be therapeutic on Heparin    Over the shift, the patient was not therapeutic on Heparin at 12ml/hour.  The patient reports 0/10 and reports no SOB or GRAVES.  MAPs in the 70's.  Patient is to go for US of abdomen, patient should remain NPO until after test.  Patient did not want to be disturbed until 5:30 - 5:45 (he wanted to sleep).  Patient remained safe free from falls and injury this shift.      Problem: Heart Failure  Goal: Improved gas exchange this shift  Outcome: Progressing  Goal: Improved urinary output this shift  Outcome: Progressing  Goal: Report improvement of dyspnea/breathlessness this shift  Outcome: Progressing  Goal: Weight from fluid excess reduced over 2-3 days, then stabilize  Outcome: Progressing  Goal: Increase self care and/or family involvement in 24 hours  Outcome: Progressing     Problem: Discharge Planning  Goal: Discharge to home or other facility with appropriate resources  Outcome: Progressing     Problem: Chronic Conditions and Co-morbidities  Goal: Patient's chronic conditions and co-morbidity symptoms are monitored and maintained or improved  Outcome: Progressing     Problem: Ventricular Assisted Device (VAD)  Goal: Hemodynamic stability, correction of coagulopathy, lab value stability  Outcome: Progressing  Goal: Wean vasopressors/nitric  Outcome: Progressing  Goal: Wean ventilator  Outcome: Progressing  Goal: Extubation  Outcome: Progressing  Goal: Stable metal status  Outcome: Progressing  Goal: Pulmonary toileting, incentive spirometry  Outcome: Progressing  Goal: Nutrition  Outcome: Progressing  Goal: Mobility/OT/PT  Outcome: Progressing  Goal: Rubber ball  Outcome: Progressing  Goal: ROM  Outcome: Progressing  Goal: Sitting  Outcome: Progressing  Goal: Walk  Outcome: Progressing  Goal: Involve in VAD awareness, dressing change  Outcome:  Progressing  Goal: AICD On/Off  Outcome: Progressing

## 2024-03-03 NOTE — CARE PLAN
The patient's goals for the shift include no negative s/e from medications    The clinical goals for the shift include therapeutic on heparin gtt    Over the shift, the patient was therapeutic on heparin gtt today and received 7.5mg of coumadin at dinner; pt remained hds and no negative s/e reported today from any of his meds; continue to monitor; plan for pft tomorrow.

## 2024-03-04 ENCOUNTER — APPOINTMENT (OUTPATIENT)
Dept: RESPIRATORY THERAPY | Facility: HOSPITAL | Age: 64
DRG: 377 | End: 2024-03-04
Payer: MEDICARE

## 2024-03-04 LAB
ALBUMIN SERPL BCP-MCNC: 3.8 G/DL (ref 3.4–5)
ANABASINE UR-MCNC: <5 NG/ML
ANION GAP SERPL CALC-SCNC: 13 MMOL/L (ref 10–20)
APTT PPP: 59 SECONDS (ref 27–38)
BASOPHILS # BLD AUTO: 0.06 X10*3/UL (ref 0–0.1)
BASOPHILS NFR BLD AUTO: 1.1 %
BUN SERPL-MCNC: 28 MG/DL (ref 6–23)
C TETANI TOXOID IGG SERPL IA-ACNC: 1.2 IU/ML
CALCIUM SERPL-MCNC: 8.5 MG/DL (ref 8.6–10.6)
CHLORIDE SERPL-SCNC: 106 MMOL/L (ref 98–107)
CO2 SERPL-SCNC: 23 MMOL/L (ref 21–32)
COTININE UR-MCNC: <15 NG/ML
CREAT SERPL-MCNC: 1.06 MG/DL (ref 0.5–1.3)
EGFRCR SERPLBLD CKD-EPI 2021: 79 ML/MIN/1.73M*2
EOSINOPHIL # BLD AUTO: 0.16 X10*3/UL (ref 0–0.7)
EOSINOPHIL NFR BLD AUTO: 2.9 %
ERYTHROCYTE [DISTWIDTH] IN BLOOD BY AUTOMATED COUNT: 17.3 % (ref 11.5–14.5)
GLUCOSE SERPL-MCNC: 93 MG/DL (ref 74–99)
HCT VFR BLD AUTO: 31.9 % (ref 41–52)
HGB BLD-MCNC: 10 G/DL (ref 13.5–17.5)
IMM GRANULOCYTES # BLD AUTO: 0.04 X10*3/UL (ref 0–0.7)
IMM GRANULOCYTES NFR BLD AUTO: 0.7 % (ref 0–0.9)
INR PPP: 1.9 (ref 0.9–1.1)
LDH SERPL L TO P-CCNC: 161 U/L (ref 84–246)
LYMPHOCYTES # BLD AUTO: 1.07 X10*3/UL (ref 1.2–4.8)
LYMPHOCYTES NFR BLD AUTO: 19.2 %
MAGNESIUM SERPL-MCNC: 2.14 MG/DL (ref 1.6–2.4)
MCH RBC QN AUTO: 32.2 PG (ref 26–34)
MCHC RBC AUTO-ENTMCNC: 31.3 G/DL (ref 32–36)
MCV RBC AUTO: 103 FL (ref 80–100)
MONOCYTES # BLD AUTO: 0.47 X10*3/UL (ref 0.1–1)
MONOCYTES NFR BLD AUTO: 8.4 %
NEUTROPHILS # BLD AUTO: 3.78 X10*3/UL (ref 1.2–7.7)
NEUTROPHILS NFR BLD AUTO: 67.7 %
NICOTINE UR-MCNC: <15 NG/ML
NRBC BLD-RTO: 0 /100 WBCS (ref 0–0)
PHOSPHATE SERPL-MCNC: 3.3 MG/DL (ref 2.5–4.9)
PLATELET # BLD AUTO: 274 X10*3/UL (ref 150–450)
POTASSIUM SERPL-SCNC: 4.5 MMOL/L (ref 3.5–5.3)
PROTHROMBIN TIME: 21.2 SECONDS (ref 9.8–12.8)
RBC # BLD AUTO: 3.11 X10*6/UL (ref 4.5–5.9)
SODIUM SERPL-SCNC: 137 MMOL/L (ref 136–145)
TRANS-3-OH-COTININE UR-MCNC: <50 NG/ML
WBC # BLD AUTO: 5.6 X10*3/UL (ref 4.4–11.3)

## 2024-03-04 PROCEDURE — 99233 SBSQ HOSP IP/OBS HIGH 50: CPT | Performed by: INTERNAL MEDICINE

## 2024-03-04 PROCEDURE — 83615 LACTATE (LD) (LDH) ENZYME: CPT | Performed by: NURSE PRACTITIONER

## 2024-03-04 PROCEDURE — 2500000002 HC RX 250 W HCPCS SELF ADMINISTERED DRUGS (ALT 637 FOR MEDICARE OP, ALT 636 FOR OP/ED): Performed by: STUDENT IN AN ORGANIZED HEALTH CARE EDUCATION/TRAINING PROGRAM

## 2024-03-04 PROCEDURE — 99232 SBSQ HOSP IP/OBS MODERATE 35: CPT | Performed by: INTERNAL MEDICINE

## 2024-03-04 PROCEDURE — 2500000002 HC RX 250 W HCPCS SELF ADMINISTERED DRUGS (ALT 637 FOR MEDICARE OP, ALT 636 FOR OP/ED): Mod: MUE | Performed by: STUDENT IN AN ORGANIZED HEALTH CARE EDUCATION/TRAINING PROGRAM

## 2024-03-04 PROCEDURE — 80069 RENAL FUNCTION PANEL: CPT | Performed by: NURSE PRACTITIONER

## 2024-03-04 PROCEDURE — 85025 COMPLETE CBC W/AUTO DIFF WBC: CPT | Performed by: NURSE PRACTITIONER

## 2024-03-04 PROCEDURE — 2500000001 HC RX 250 WO HCPCS SELF ADMINISTERED DRUGS (ALT 637 FOR MEDICARE OP)

## 2024-03-04 PROCEDURE — 2500000002 HC RX 250 W HCPCS SELF ADMINISTERED DRUGS (ALT 637 FOR MEDICARE OP, ALT 636 FOR OP/ED): Mod: MUE | Performed by: NURSE PRACTITIONER

## 2024-03-04 PROCEDURE — 99222 1ST HOSP IP/OBS MODERATE 55: CPT | Performed by: INTERNAL MEDICINE

## 2024-03-04 PROCEDURE — 2500000002 HC RX 250 W HCPCS SELF ADMINISTERED DRUGS (ALT 637 FOR MEDICARE OP, ALT 636 FOR OP/ED): Performed by: NURSE PRACTITIONER

## 2024-03-04 PROCEDURE — 2500000001 HC RX 250 WO HCPCS SELF ADMINISTERED DRUGS (ALT 637 FOR MEDICARE OP): Performed by: NURSE PRACTITIONER

## 2024-03-04 PROCEDURE — 2500000002 HC RX 250 W HCPCS SELF ADMINISTERED DRUGS (ALT 637 FOR MEDICARE OP, ALT 636 FOR OP/ED)

## 2024-03-04 PROCEDURE — 1200000002 HC GENERAL ROOM WITH TELEMETRY DAILY

## 2024-03-04 PROCEDURE — 93750 INTERROGATION VAD IN PERSON: CPT | Performed by: INTERNAL MEDICINE

## 2024-03-04 PROCEDURE — 2500000002 HC RX 250 W HCPCS SELF ADMINISTERED DRUGS (ALT 637 FOR MEDICARE OP, ALT 636 FOR OP/ED): Mod: MUE

## 2024-03-04 PROCEDURE — 83735 ASSAY OF MAGNESIUM: CPT | Performed by: NURSE PRACTITIONER

## 2024-03-04 PROCEDURE — 36415 COLL VENOUS BLD VENIPUNCTURE: CPT | Performed by: NURSE PRACTITIONER

## 2024-03-04 RX ADMIN — SACUBITRIL AND VALSARTAN 1 TABLET: 24; 26 TABLET, FILM COATED ORAL at 21:02

## 2024-03-04 RX ADMIN — SENNOSIDES AND DOCUSATE SODIUM 2 TABLET: 8.6; 5 TABLET ORAL at 21:02

## 2024-03-04 RX ADMIN — TAMSULOSIN HYDROCHLORIDE 0.4 MG: 0.4 CAPSULE ORAL at 09:27

## 2024-03-04 RX ADMIN — DAPAGLIFLOZIN 10 MG: 10 TABLET, FILM COATED ORAL at 09:26

## 2024-03-04 RX ADMIN — SENNOSIDES AND DOCUSATE SODIUM 2 TABLET: 8.6; 5 TABLET ORAL at 11:21

## 2024-03-04 RX ADMIN — LEVOTHYROXINE SODIUM 175 MCG: 25 TABLET ORAL at 05:20

## 2024-03-04 RX ADMIN — SACUBITRIL AND VALSARTAN 1 TABLET: 24; 26 TABLET, FILM COATED ORAL at 09:27

## 2024-03-04 RX ADMIN — WARFARIN SODIUM 7.5 MG: 5 TABLET ORAL at 18:14

## 2024-03-04 RX ADMIN — SPIRONOLACTONE 50 MG: 25 TABLET, FILM COATED ORAL at 09:27

## 2024-03-04 RX ADMIN — ASPIRIN 81 MG: 81 TABLET, COATED ORAL at 09:27

## 2024-03-04 RX ADMIN — PANTOPRAZOLE SODIUM 40 MG: 40 TABLET, DELAYED RELEASE ORAL at 21:00

## 2024-03-04 RX ADMIN — Medication 5 MG: at 21:02

## 2024-03-04 RX ADMIN — SERTRALINE HYDROCHLORIDE 12.5 MG: 25 TABLET ORAL at 09:26

## 2024-03-04 RX ADMIN — PANTOPRAZOLE SODIUM 40 MG: 40 TABLET, DELAYED RELEASE ORAL at 09:27

## 2024-03-04 RX ADMIN — DIGOXIN 125 MCG: 125 TABLET ORAL at 09:27

## 2024-03-04 ASSESSMENT — KANSAS CITY CARDIOMYOPATHY QUESTIONNAIRE (KCCQ12)
3. OVER THE PAST 2 WEEKS, ON AVERAGE, HOW MANY TIMES HAS FATIGUE LIMITED YOUR ABILITY TO DO WHAT YOU WANTED: ALL OF THE TIME
4. OVER THE PAST 2 WEEKS, ON AVERAGE, HOW MANY TIMES HAS SHORTNESS OF BREATH LIMITED YOUR ABILITY TO DO WHAT YOU WANTED: ALL OF THE TIME
8B. OVER THE PAST 2 WEEKS, ON AVERAGE, HOW HAS HEART FAILURE LIMITED YOU ABILITY TO WORK OR DO HOUSEHOLD CHORES: LIMITED QUITE A BIT
DID THE PATIENT COMPLETE A KCCQ FORM: YES
5. OVER THE PAST 2 WEEKS, ON AVERAGE, HOW MANY TIMES HAVE YOU BEEN FORCED TO SLEEP SITTING UP IN A CHAIR OR WITH AT LEAST 3 PILLOWS TO PROP YOU UP BECAUSE OF SHORTNESS OF BREATH?: NEVER OVER THE PAST 2 WEEKS
8A. OVER THE PAST 2 WEEKS, ON AVERAGE, HOW HAS HEART FAILURE LIMITED YOU ABILITY TO DO HOBBIES OR RECREATIONAL ACTIVITIES: LIMITED QUITE A BIT
1A. OVER THE PAST 2 WEEKS, HOW MUCH WERE YOU LIMITED BY HEART FAILURE SYMPTOMS (SHORTNESS OF BREATH OR FATIGUE) WHEN SHOWERING OR BATHING: MODERATELY LIMITED
6. OVER THE PAST 2 WEEKS, HOW MUCH HAS YOUR HEART FAILURE LIMITED YOUR ENJOYMENT OF LIFE: IT HAS LIMITED MY ENJOYMENT OF LIFE QUITE A BIT
2. OVER THE PAST 2 WEEKS, HOW MANY TIMES DID YOU HAVE SWELLING IN YOUR FEET, ANKLES OR LEGS WHEN YOU WOKE UP IN THE MORNING: LESS THAN ONCE A WEEK
1B. OVER THE PAST 2 WEEKS, HOW MUCH WERE YOU LIMITED BY HEART FAILURE SYMPTOMS (SHORTNESS OF BREATH OR FATIGUE) WHEN WALKING 1 BLOCK ON LEVEL GROUND: QUITE A BIT LIMITED
1C. OVER THE PAST 2 WEEKS, HOW MUCH WERE YOU LIMITED BY HEART FAILURE SYMPTOMS (SHORTNESS OF BREATH OR FATIGUE) WHEN HURRYING OR JOGGING (AS IF TO CATCH A BUS): LIMITED FOR OTHER REASONS OF DID NOT DO ACTIVITY
7. IF YOU HAD TO SPEND THE REST OF YOUR LIFE WITH YOUR HEART FAILURE THE WAY IT IS RIGHT NOW, HOW WOULD YOU FEEL ABOUT THIS?: MOSTLY DISSATISFIED

## 2024-03-04 ASSESSMENT — LIFESTYLE VARIABLES
CURRENT_SMOKER: NO
CURRENT_SMOKER_ECIG: NO

## 2024-03-04 ASSESSMENT — COGNITIVE AND FUNCTIONAL STATUS - GENERAL
TURNING FROM BACK TO SIDE WHILE IN FLAT BAD: A LITTLE
DAILY ACTIVITIY SCORE: 24
TURNING FROM BACK TO SIDE WHILE IN FLAT BAD: A LITTLE
DAILY ACTIVITIY SCORE: 24
MOBILITY SCORE: 23
MOBILITY SCORE: 23

## 2024-03-04 ASSESSMENT — PAIN SCALES - GENERAL
PAINLEVEL_OUTOF10: 0 - NO PAIN
PAINLEVEL_OUTOF10: 0 - NO PAIN

## 2024-03-04 ASSESSMENT — PAIN - FUNCTIONAL ASSESSMENT
PAIN_FUNCTIONAL_ASSESSMENT: 0-10
PAIN_FUNCTIONAL_ASSESSMENT: 0-10

## 2024-03-04 NOTE — PROGRESS NOTES
"Anatoly Lane is a 63 y.o. male on day 10 of admission presenting with Acute decompensated heart failure (CMS/HCC).    Subjective   Pt. Is seen and examined this AM.   I have reviewed histories, allergies and medications have been reviewed and there are no changes    Objective   Review of Systems  Physical Exam  Vitals:    03/04/24 0700   Pulse: 73   Resp:    Temp: 36.2 °C (97.2 °F)   SpO2: 97%   Physical Exam  General: Alert, oriented x 3. No acute distress.  HEENT: EOMI, PERRL. Oral cavity mucosa moist  Lungs: No increased WOB   Abd: soft, NT  Ext: 1+ TANK  Skin: warm, dry.  Neuro: AOx3, no focal deficits  Psych: Appropriate mood and behavior   Last Recorded Vitals  Blood pressure 78/78, pulse 73, temperature 36.2 °C (97.2 °F), temperature source Temporal, resp. rate 18, height 1.753 m (5' 9\"), weight 101 kg (222 lb 10.6 oz), SpO2 97 %.  Intake/Output last 3 Shifts:  I/O last 3 completed shifts:  In: 245.7 (2.4 mL/kg) [I.V.:245.7 (2.4 mL/kg)]  Out: 3100 (30.7 mL/kg) [Urine:3100 (0.9 mL/kg/hr)]  Weight: 101 kg     Relevant Results  Results from last 7 days   Lab Units 03/04/24  0947 03/03/24  0604 03/02/24  0610 03/01/24  0910 03/01/24  0628 02/29/24  0658   POCT GLUCOSE mg/dL  --   --   --  131*  --   --    GLUCOSE mg/dL 93 89 84  --  85 95     Assessment/Plan   Principal Problem:    Acute decompensated heart failure (CMS/HCC)  Active Problems:    Hypotension    LVAD (left ventricular assist device) present (CMS/[facility])    Anticoagulant long-term use    Acute on chronic combined systolic (congestive) and diastolic (congestive) heart failure (CMS/HCC)  Anatoly Lnae is a very pleasant 63 y.o. male presenting for management of Stage D HFrEF NYHA class 2-3.  s/p HM3 4/30/2023 CAD, h/o pAF and VT s/p ICD, hypothyroidism, and stage D systolic HF/ICM/HFrEF who was admitted to HFICU for worsening shortness of breath, weight gain, and hypotension c/f AHDF vs GIB.      Endocrinology was consulted due to hypothyroidism.   "   Pertinent Hx:  -Patient diagnosed with hypothyroidism ~2021. Was initially started on liothyronine 37.5 mcg per day by cardiologist in context of  TSH of 4.8.   -Patient was admitted in May 2023 for ADHF when endocrinology was consulted. He was changed to levothyroxine and eventually discharged on levothyroxine 175mcg daily which he has remained on since.    -Patient has been on amiodarone for many years (patient estimates since early 2000s).   -Home regimen: Levothyroxine 175mcg daily though taking along with his pantoprazole at home      Pertinent imaging:  -No dedicated thyroid imaging  -CT Chest w/o contrast (4/21/23) showed normal-appearing thyroid gland     Pertinent Labs:  -3/2/24: TSH 8.1, FT4 1.52   -2/26/24: TSH 11.49, FT4 1.21, antiTPO >1000  -2/23/24: TSH 18.02  -5/9/23:  TSH 8.41, FT4 1.40  -5/3/23: TSH 6.56, FT4 1.07  -4/25/23: TSH 10.34, FT4 1.28  Pertinent medication  -Maintained on home levothyroxine 175mcg daily since admission on 2/23  #Primary Hypothyroidism  It appears that TSH is down trending s/p separation of LT4 from Protonix - trend as above  RECOMMENDATIONS:  -c/w levothyroxine 175mcg daily to be given first thing in the AM on an empty stomach  from food/other medications by at least 30 minutes. Also educated patient to take it separately from his pantoprazole at home (pt plans to start taking pantoprazole at bedtime instead).  -Recheck TSH and FT4 on 3/8 if still inpatient. Otherwise, patient should have TSH/FT4 checked as outpatient in ~4 weeks.   -Please assist patient with establishing with PCP   Endocrine is signing off.   Please message on secure chat or page [phone] with any questions or concerns.  Patient discussed with Dr. Corley who agrees with the management plan.       I spent 45 minutes in the professional and overall care of this patient.  Harish Leigh MD

## 2024-03-04 NOTE — PROGRESS NOTES
Anatoly Lane is a 63 y.o. male on day 9 of admission presenting with Acute decompensated heart failure (CMS/HCC).    Subjective   No acute events overnight.     Objective     Physical Exam  Constitutional:       Appearance: Normal appearance. He is obese.   HENT:      Head: Normocephalic.      Mouth/Throat:      Mouth: Mucous membranes are moist.      Pharynx: Oropharynx is clear. No oropharyngeal exudate or posterior oropharyngeal erythema.   Eyes:      Extraocular Movements: Extraocular movements intact.      Conjunctiva/sclera: Conjunctivae normal.      Pupils: Pupils are equal, round, and reactive to light.   Neck:      Vascular: No JVD.   Cardiovascular:      Rate and Rhythm: Regular rhythm.      Heart sounds: No murmur heard.     No friction rub. No gallop.      Comments: NSR w/ intermittent ventricular pacing. AICD set DDDR . LVAD hum heard over left chest. Dopplerable distal pulses in all extremities. Capillary refill normal. Appears warm and well perfused.   Pulmonary:      Effort: Pulmonary effort is normal. No accessory muscle usage, respiratory distress or retractions.      Breath sounds: Normal breath sounds.   Abdominal:      General: Abdomen is flat. Bowel sounds are normal. There is no distension.      Palpations: Abdomen is soft. There is no mass.      Tenderness: There is no abdominal tenderness. There is no guarding.      Hernia: No hernia is present.   Genitourinary:     Penis: Normal.    Musculoskeletal:         General: No tenderness. Normal range of motion.      Cervical back: Full passive range of motion without pain.      Right lower leg: No edema.      Left lower leg: No edema.   Skin:     General: Skin is warm and dry.      Capillary Refill: Capillary refill takes less than 2 seconds.      Coloration: Skin is not jaundiced.      Findings: No bruising, laceration, lesion, rash or wound.      Comments: LVAD driveline site without drainage or erythema.    Neurological:      General: No  "focal deficit present.      Mental Status: He is alert and oriented to person, place, and time. Mental status is at baseline.   Psychiatric:         Mood and Affect: Mood normal.         Last Recorded Vitals  Blood pressure 78/78, pulse 77, temperature 36 °C (96.8 °F), temperature source Temporal, resp. rate 16, height 1.753 m (5' 9\"), weight 101 kg (221 lb 9 oz), SpO2 97 %.  Intake/Output last 3 Shifts:  I/O last 3 completed shifts:  In: 605.7 (6 mL/kg) [P.O.:360; I.V.:245.7 (2.4 mL/kg)]  Out: 2200 (21.9 mL/kg) [Urine:2200 (0.6 mL/kg/hr)]  Weight: 100.5 kg     Relevant Results  Current Outpatient Medications   Medication Instructions    aspirin 81 mg, oral, Daily    dapagliflozin propanediol (FARXIGA) 10 mg, oral, Daily    digoxin (LANOXIN) 125 mcg, oral, Daily    Entresto 49-51 mg tablet 1 tablet, oral, 2 times daily    levothyroxine (SYNTHROID, LEVOXYL) 175 mcg, oral, Daily before breakfast    Pacerone 200 mg, oral, Daily    pantoprazole (PROTONIX) 40 mg, oral, Daily before breakfast    sertraline (ZOLOFT) 25 mg, oral, Daily    spironolactone (ALDACTONE) 50 mg, oral, Daily    tamsulosin (FLOMAX) 0.4 mg, oral, Daily    torsemide (DEMADEX) 10 mg, oral, As needed    warfarin (Coumadin) 1 mg tablet 3.5mg (1mg tablet + 2.5mg tablet) on Monday, Wednesday, Friday<BR>5mg (2.5mg tablet + 2.5mg tablet) on Sunday, Tuesday, Thursday, Saturday    warfarin (Coumadin) 2.5 mg tablet 3.5mg (1mg tablet + 2.5mg tablet) on Monday, Wednesday, Friday<BR>5mg (2.5mg tablet + 2.5mg tablet) on Sunday, Tuesday, Thursday, Saturday       Scheduled medications  aspirin, 81 mg, oral, Daily  dapagliflozin propanediol, 10 mg, oral, Daily  digoxin, 125 mcg, oral, Daily  diph,pertuss(acel),tet vac (PF), 0.5 mL, intramuscular, During hospitalization  levothyroxine, 175 mcg, oral, Daily before breakfast  lidocaine, 1 patch, transdermal, Daily  melatonin, 5 mg, oral, Nightly  pantoprazole, 40 mg, oral, BID  [START ON 3/5/2024] pneumoc 20-sarthak conj-dip " cr(PF), 0.5 mL, intramuscular, During hospitalization  [START ON 3/5/2024] respiratory syncytial virus (rsv) adjuvanted, age 65y+, 0.5 mL, intramuscular, Once  sacubitriL-valsartan, 1 tablet, oral, BID  sennosides-docusate sodium, 2 tablet, oral, BID  sertraline, 12.5 mg, oral, Daily  spironolactone, 50 mg, oral, Daily  tamsulosin, 0.4 mg, oral, Daily  [Held by provider] warfarin, 5 mg, oral, Once per day on Mon Wed Fri  warfarin, 7.5 mg, oral, Daily      Continuous medications  heparin, 0-4,000 Units/hr, Last Rate: 1,200 Units/hr (03/03/24 0500)    PRN medications  PRN medications: acetaminophen, heparin, ipratropium-albuteroL, ondansetron, polyethylene glycol     Results for orders placed or performed during the hospital encounter of 02/23/24 (from the past 24 hour(s))   Heparin Assay, UFH   Result Value Ref Range    Heparin Unfractionated 0.2 See Comment Below for Therapeutic Ranges IU/mL   Heparin Assay, UFH   Result Value Ref Range    Heparin Unfractionated 0.2 See Comment Below for Therapeutic Ranges IU/mL   CBC and Auto Differential   Result Value Ref Range    WBC 6.4 4.4 - 11.3 x10*3/uL    nRBC 0.0 0.0 - 0.0 /100 WBCs    RBC 3.11 (L) 4.50 - 5.90 x10*6/uL    Hemoglobin 10.0 (L) 13.5 - 17.5 g/dL    Hematocrit 31.3 (L) 41.0 - 52.0 %     (H) 80 - 100 fL    MCH 32.2 26.0 - 34.0 pg    MCHC 31.9 (L) 32.0 - 36.0 g/dL    RDW 17.2 (H) 11.5 - 14.5 %    Platelets 284 150 - 450 x10*3/uL    Neutrophils % 62.5 40.0 - 80.0 %    Immature Granulocytes %, Automated 0.5 0.0 - 0.9 %    Lymphocytes % 26.2 13.0 - 44.0 %    Monocytes % 6.9 2.0 - 10.0 %    Eosinophils % 3.3 0.0 - 6.0 %    Basophils % 0.6 0.0 - 2.0 %    Neutrophils Absolute 4.01 1.20 - 7.70 x10*3/uL    Immature Granulocytes Absolute, Automated 0.03 0.00 - 0.70 x10*3/uL    Lymphocytes Absolute 1.68 1.20 - 4.80 x10*3/uL    Monocytes Absolute 0.44 0.10 - 1.00 x10*3/uL    Eosinophils Absolute 0.21 0.00 - 0.70 x10*3/uL    Basophils Absolute 0.04 0.00 - 0.10  x10*3/uL   Coagulation Screen   Result Value Ref Range    Protime 18.3 (H) 9.8 - 12.8 seconds    INR 1.6 (H) 0.9 - 1.1    aPTT 93 (H) 27 - 38 seconds   Lactate Dehydrogenase   Result Value Ref Range     84 - 246 U/L   Magnesium   Result Value Ref Range    Magnesium 2.18 1.60 - 2.40 mg/dL   Renal function panel   Result Value Ref Range    Glucose 89 74 - 99 mg/dL    Sodium 137 136 - 145 mmol/L    Potassium 4.2 3.5 - 5.3 mmol/L    Chloride 103 98 - 107 mmol/L    Bicarbonate 24 21 - 32 mmol/L    Anion Gap 14 10 - 20 mmol/L    Urea Nitrogen 30 (H) 6 - 23 mg/dL    Creatinine 1.01 0.50 - 1.30 mg/dL    eGFR 84 >60 mL/min/1.73m*2    Calcium 8.6 8.6 - 10.6 mg/dL    Phosphorus 3.6 2.5 - 4.9 mg/dL    Albumin 3.8 3.4 - 5.0 g/dL   Heparin Assay, UFH   Result Value Ref Range    Heparin Unfractionated 0.4 See Comment Below for Therapeutic Ranges IU/mL   Heparin Assay, UFH   Result Value Ref Range    Heparin Unfractionated 0.3 See Comment Below for Therapeutic Ranges IU/mL       Assessment/Plan   63 y.o. male with a PMH of stage D/ICM/HFrEF 10-15%, s/p HM3 4/30/2023, CAD, pAF, VT s/p AICD ([facility] placed 1/4/2018), depression, BPH & hypothyroidism. Originally admitted to HFICU on 2/23/24 due to worsening shortness of breath, weight gain, and hypotension at home.     Cardiac workup negative for source of symptoms; however, on further review, patient states he has been having dark-tarry stools x 2 weeks prior to admission. Hg decreased from 14 in August 2023 to 7.0 during admission. GI consulted and performed EGD on 2/24 showing mild gastritis but no source of bleed. Colonoscopy performed on 2/26 without source of bleeding. VCS completed on 2/27 without source either. GIB likely 2/2 to AVM. Anemia stabilized without further anemia and transferred to LT5 on 2/28. Lastly, patient now requesting workup for OHT, now initiated. Will be presented to selection committee on 3/5.         Plan/Problem List:      NEURO PMH:  Depression  Active Problem List:  #Depression  - Patient states he is unclear why or when he was started on zoloft, but it must have been after his LVAD surgery.  - He currently denies any symptoms of anxiety or depression and has asked if it is safe to be weaned off.  - There are multiple side effects (including increased bleeding risk) with zoloft, so may be beneficial to taper off since the patient has requested.  - Wean Zoloft to 12.5 daily and then discontinue in 2-4 weeks if no withdrawal symptoms (~3/18 or 3/25)    #Obesity  - BMO 32.69 kg/m2  - Weight gain of 75 pounds over last year (appears to be dietary and hypothyroid induced)        CV PMH: Stage D/ICM/HFrEF 10-15%, s/p HM3 4/30/2023, CAD, pAF, VT s/p AICD ([facility] placed 1/4/2018), HLD  Active Problem List:   #Stage D/ICM/HFrEF 10-15%  #HM3 4/30/2023 (bridge to transplant)  #RV dysfunction     Last RHC: 2/28/24 w/ RA 7, PAP 40/13 (22), W 13, CI 3.38 &   Last LHC: 3/30/23 w/ severe LAD diffuse 80% stenosis, distal LCx 70% stenosis and RCA mild-diffuse CAD.   Last TTE/BOBBY: 2/23/24 EF 15% w/ mild RV dysfunction    LVAD Settings: high speed 5500 RPM & low speed 5100 RPM  Flows: 4.8  RPM: 5500  PI: 3.7  Power: 4.2  Dressing changes: Weekly  Drive Line assessment: Clean and free from signs/symptoms of infection     - Continue aspirin 81mg daily   - Continue farxiga 10mg daily   - Continue Entresto 24-26mg BID  - Continue spironolactone 50mg daily   - Holding beta-blocker due to persistent RV dysfunction   - 20mg PO Lasix x1 yesterday, Cr improved today to 1.01.  - Increase coumadin to 7.5mg daily for goal INR 2-3 and daily levels. Continue heparin gtt bridge.  - Continue daily LDH  - Continue digoxin 125mcg daily for RV support (last level 0.89 on 2/23/24)    #CAD  #History of HLD  - Last LHC 3/30/23 shows multivessel CAD w/ severe LAD diffuse 80% stenosis, distal LCx 70% stenosis and RCA mild-diffuse CAD.  - Repeat cholesterol studies on 2/29/24  appear normal   - Not currently on statin   - Continue aspirin 81mg daily     #pAfib and VT  - AICD placed 1/4/2018 ([facility]) set DDDR   - Currently NSR w/ intermittent V-pacing on telemetry. Intermittent runs of asymptomatic NSVT on 2/29/24  - Continue Digoxin 125mcg daily w/ appropriate levels   - Holding amiodarone due to concern for amio induced thyrotoxicosis     - Continue Coumadin for goal INR 2-3    #Heart Transplant Workup and Evaluation   - Patient states he is now ready for bridge to transplant and formal evaluation opened  - Transplant evaluation consent obtained on 2/29/24  - Blood type: AB+  - Last HLAs on 4/3/23 negative for class 1 and 2 antibodies   - Repeat HLA testing pending from 2/29/24 and 3/1/24   - Labwork collected and pending for transplant workup   - RHC completed 2/28/24  - LHC 3/20/23; no need for further imaging at this time  - Echo completed 2/23/24  - EKG completed 3/1/24  - Carotid ultrasound 3/1/24 with <50% stenosis bilateral carotids  - U/S aorta and iliacs 3/1/24 with poor visualization due to bowel gas and LVAD  - ANUPAM testing 3/1/24 negative for PAD  - No need for CPET  - US abdomen ordered and pending  - No need for chest x-ray  - CT C/A/P non-con 2/29/24: shows multiple stable lung nodules bilaterally (0.3cm) all stable from 5/4/23. Liver shows hypodense simple fluid collections stable (1.3cm) from 5/4/23. Multiple small hypodense fluid collections on bilateral kidneys favored to be cysts all stable compared to 5/4/23.  Diffuse centrilobular ground glass opacities within bilateral lungs, compatible with bronchiolitis.     - PFTs to be done on 3/4/24  - Bone density to be ordered outpatient  - Colonoscopy 2/26/24 & EGD 2/24/24 without significant findings   - Pantogram completed 2/29/24 and negative for abscess or carries   - Nutrition, social work and palliative care consulted    - Plan to present to Advance Therapy Meeting on on Tuesday 3/5/24    #Vaccine status  -  Immunity to HepA  - Immunity to MMR  - Will need HepB series (Engerix-B): first dose ordered, 2nd dose at 4/2024, 3rd dose at 9/2024  - Has had chicken pox w/ + VZV IgG: Will need 2-dose shingrix series outpatient   - Patient denies having recent Td or Tdap administration within last 10 years: single injection ordered  - RSV vaccine (Arexy): Single injection ordered this admission on 3/5/24   - Pneumococcal vaccine (Prevnar 20): Single injection ordered this admission   - Has received 2xPfizer COVID vaccines in 2021: Will require 1-dose of either moderna or pfizer vaccine outpatient  - Has not received Flu vaccine this year: Will require 1-dose of any flu vaccine       PULM PMH: Previous smoker (quite in 2019), Restrictive lung physiology    Active Problem List:  #Dyspnea on exertion   #Restrictive lung disease  - Last PFTs on 4/4/2023 show FEV1 1.66 (<60%), FVC 2.63 (<49%) w/ FEV1/FVC ratio of 82%  - Previous smoker quite in 2019  - Not currently on inhaler therapy  - Admitted with complaints of GRAVES for multiple weeks  - Cardiac workup unremarkable as source of symptoms   - GRAVES likely in setting of anemia 2/2 to GIB; however, CT on 2/29/24 shows Diffuse centrilobular ground glass opacities within bilateral lungs, compatible with bronchiolitis    - Consult pulmonology for transplant workup and evaluation of bronchiolitis as possible source of GRAVES    - Repeat PFTs ordered and scheduled for Monday 3/4/24   - Start duonebs Q6 PRN for SOB/GRAVES       GI PMH: None  Active Problem List:   #Melena 2/2 to suspected AVM?  - Endorses having dark-tarry stools x 2 weeks prior to admission.   - Hg decreased from 14 in August 2023 to 7.0 during admission.  - EGD on 2/24 showing mild gastritis but no source of bleed.  - Colonoscopy performed on 2/26 without source of bleeding.  - Video capsule study completed on 2/27 without source of bleed  - H-pylori negative on 2/24  - No further melena and Hg stabilized without transfusions   -  Likely source is AVM   - GI okay with restarting Coumadin and monitoring for GIB  - Continue pantoprazole 40mg BID      Diet: Regular diet   Bowel Regimen: Colace/senna BID, miralax PRN        PMH: BPH. 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    - TSH levels also notably elevated at 11.49 on admission with normal Free T4  - Unclear if hypothyroidism 2/2 to amiodarone vs Hashimotos (thyroid peroxidase antibodies elevated > 1000).   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    - Per patient, he has been taking his pantoprazole with his synthroid so he may not have been absorbing it  - Continue synthroid 175mcg daily   - Repeat TSH and FT4: 8.1/1.5, improved. Repeat levels on 3/8.    HEME PMH: Iron deficiency anemia, history of HIT   Active Problem List:   #GIB  - See GI for plan    #Iron deficiency anemia   - iron studies on admission show iron deficiency anemia, likely precipitated by GIB  - GIB now controlled  - Completed 3xdays of venofir   - Will start PO iron on discharge (to avoid confusion of melena inpatient)      #History of HIT   - PF4 positive; however, HONG negative on 4/7/23  - Repeat PF4 on 3/1/24 is negative   - Vascular medicine discussed and okay for patient to receive heparin       ID PMH: None  Active Problem List: None      Ortho/Skin PMH: None  Active Problem List: None     Proph:  SCDs  Coumadin  PPI       Lines: None    Restraints: N/A       Family: Will update at bedside      Seen with the HF attending, Dr. Rankin     Dispo: Discharge likely next week pending transplant workup, therapeutic INR and no further GIB.     Rolando Lindo,   Advanced Heart Failure &  Transplant Fellow

## 2024-03-04 NOTE — PROGRESS NOTES
03/04/24        Transitional Care Coordination Progress Note:   Patient discussed during interdisciplinary rounds.   Team members present: JUVENCIO HAWK MD   Plan per Medical/Surgical team: adm dx Acute decompensated heart failure  Monitoring for melena medically ready 1-2 days   Discharge disposition: Home no needs  Status-Inpatient   Payer-  [employer] MEDICARE   Potential Barriers: None   ADOD: 3/5/2024  Juan Lockett RN [facility] [phone]

## 2024-03-04 NOTE — PROGRESS NOTES
SW met with pt at bedside on T5 room 60 for a follow up visit and to complete intermacs and KCCQ-12 questions. Pt was active and engaged during visit. Pt shared he has been struggling with mobility at home but since he has been hospitalized, he has felt better, able to ambulate easier. SW provided encouragement to pt as he was reflecting on his recent GI bleed. SW confirmed pt has no other questions or concerns at this time. SW to follow up routinely in patient.

## 2024-03-04 NOTE — CARE PLAN
The patient's goals for the shift include remain hds, no s/s of bleeding    The clinical goals for the shift include have pft today    Over the shift, the patient had pft as ordered; continue to complete transplant work up today; plans for interrogation of device prior to discharge; continue to monitor; no alarms on lvad.

## 2024-03-04 NOTE — PROGRESS NOTES
Anatoly Lane is a 63 y.o. male on day 10 of admission presenting with Acute decompensated heart failure (CMS/HCC).    Subjective   Patient refused to resume heparin drip as he was concerned about recurrence of GI bleeding. He declined morning labs. No LVAD alarms overnight. Telemetry monitor showed V-paced at 70s with NSVT.    Objective   Physical Exam  Constitutional:       Appearance: Normal appearance.   HENT:      Mouth/Throat:      Mouth: Mucous membranes are moist.      Pharynx: Oropharynx is clear. No oropharyngeal exudate or posterior oropharyngeal erythema.   Eyes:      Extraocular Movements: Extraocular movements intact.      Conjunctiva/sclera: Conjunctivae normal.      Pupils: Pupils are equal, round, and reactive to light.   Neck:      Vascular: No JVD.   Cardiovascular:      Rate and Rhythm: Regular rhythm.      Heart sounds: No murmur heard.     No friction rub. No gallop.      Comments: NSR w/ intermittent ventricular pacing. AICD set DDDR . LVAD hum heard over left chest. Dopplerable distal pulses in all extremities. Capillary refill normal. Appears warm and well perfused.   Pulmonary:      Effort: Pulmonary effort is normal. No accessory muscle usage, respiratory distress or retractions.      Breath sounds: Normal breath sounds.   Abdominal:      General: Abdomen is flat. Bowel sounds are normal. There is no distension.      Palpations: Abdomen is soft. There is no mass.      Tenderness: There is no abdominal tenderness. There is no guarding.      Hernia: No hernia is present.   Genitourinary:     Penis: Normal.    Musculoskeletal:         General: No tenderness. Normal range of motion.      Cervical back: Full passive range of motion without pain.      Right lower leg: No edema.      Left lower leg: No edema.   Skin:     General: Skin is warm and dry.      Capillary Refill: Capillary refill takes less than 2 seconds.      Coloration: Skin is not jaundiced.      Findings: No bruising,  "laceration, lesion, rash or wound.      Comments: LVAD driveline site without drainage or erythema.    Neurological:      General: No focal deficit present.      Mental Status: He is alert and oriented to person, place, and time.   Psychiatric:         Mood and Affect: Mood normal.       Last Recorded Vitals  Blood pressure 78/78, pulse 73, temperature 36.2 °C (97.2 °F), temperature source Temporal, resp. rate 18, height 1.753 m (5' 9\"), weight 101 kg (222 lb 10.6 oz), SpO2 97 %.  Intake/Output last 3 Shifts:  I/O last 3 completed shifts:  In: 245.7 (2.4 mL/kg) [I.V.:245.7 (2.4 mL/kg)]  Out: 3100 (30.7 mL/kg) [Urine:3100 (0.9 mL/kg/hr)]  Weight: 101 kg     Relevant Results  Current Outpatient Medications   Medication Instructions    aspirin 81 mg, oral, Daily    dapagliflozin propanediol (FARXIGA) 10 mg, oral, Daily    digoxin (LANOXIN) 125 mcg, oral, Daily    Entresto 49-51 mg tablet 1 tablet, oral, 2 times daily    levothyroxine (SYNTHROID, LEVOXYL) 175 mcg, oral, Daily before breakfast    Pacerone 200 mg, oral, Daily    pantoprazole (PROTONIX) 40 mg, oral, Daily before breakfast    sertraline (ZOLOFT) 25 mg, oral, Daily    spironolactone (ALDACTONE) 50 mg, oral, Daily    tamsulosin (FLOMAX) 0.4 mg, oral, Daily    torsemide (DEMADEX) 10 mg, oral, As needed    warfarin (Coumadin) 1 mg tablet 3.5mg (1mg tablet + 2.5mg tablet) on Monday, Wednesday, Friday<BR>5mg (2.5mg tablet + 2.5mg tablet) on Sunday, Tuesday, Thursday, Saturday    warfarin (Coumadin) 2.5 mg tablet 3.5mg (1mg tablet + 2.5mg tablet) on Monday, Wednesday, Friday<BR>5mg (2.5mg tablet + 2.5mg tablet) on Sunday, Tuesday, Thursday, Saturday       Scheduled medications  aspirin, 81 mg, oral, Daily  dapagliflozin propanediol, 10 mg, oral, Daily  digoxin, 125 mcg, oral, Daily  diph,pertuss(acel),tet vac (PF), 0.5 mL, intramuscular, During hospitalization  levothyroxine, 175 mcg, oral, Daily before breakfast  lidocaine, 1 patch, transdermal, Daily  melatonin, " 5 mg, oral, Nightly  pantoprazole, 40 mg, oral, BID  [START ON 3/5/2024] pneumoc 20-sarthak conj-dip cr(PF), 0.5 mL, intramuscular, During hospitalization  [START ON 3/5/2024] respiratory syncytial virus (rsv) adjuvanted, age 65y+, 0.5 mL, intramuscular, Once  sacubitriL-valsartan, 1 tablet, oral, BID  sennosides-docusate sodium, 2 tablet, oral, BID  sertraline, 12.5 mg, oral, Daily  spironolactone, 50 mg, oral, Daily  tamsulosin, 0.4 mg, oral, Daily  [Held by provider] warfarin, 5 mg, oral, Once per day on Mon Wed Fri  warfarin, 7.5 mg, oral, Daily      Continuous medications  heparin, 0-4,000 Units/hr, Last Rate: Stopped (03/03/24 2300)    PRN medications  PRN medications: acetaminophen, heparin, ipratropium-albuteroL, ondansetron, polyethylene glycol     Results for orders placed or performed during the hospital encounter of 02/23/24 (from the past 24 hour(s))   Coagulation Screen   Result Value Ref Range    Protime 21.2 (H) 9.8 - 12.8 seconds    INR 1.9 (H) 0.9 - 1.1    aPTT 59 (H) 27 - 38 seconds   CBC and Auto Differential   Result Value Ref Range    WBC 5.6 4.4 - 11.3 x10*3/uL    nRBC 0.0 0.0 - 0.0 /100 WBCs    RBC 3.11 (L) 4.50 - 5.90 x10*6/uL    Hemoglobin 10.0 (L) 13.5 - 17.5 g/dL    Hematocrit 31.9 (L) 41.0 - 52.0 %     (H) 80 - 100 fL    MCH 32.2 26.0 - 34.0 pg    MCHC 31.3 (L) 32.0 - 36.0 g/dL    RDW 17.3 (H) 11.5 - 14.5 %    Platelets 274 150 - 450 x10*3/uL    Neutrophils % 67.7 40.0 - 80.0 %    Immature Granulocytes %, Automated 0.7 0.0 - 0.9 %    Lymphocytes % 19.2 13.0 - 44.0 %    Monocytes % 8.4 2.0 - 10.0 %    Eosinophils % 2.9 0.0 - 6.0 %    Basophils % 1.1 0.0 - 2.0 %    Neutrophils Absolute 3.78 1.20 - 7.70 x10*3/uL    Immature Granulocytes Absolute, Automated 0.04 0.00 - 0.70 x10*3/uL    Lymphocytes Absolute 1.07 (L) 1.20 - 4.80 x10*3/uL    Monocytes Absolute 0.47 0.10 - 1.00 x10*3/uL    Eosinophils Absolute 0.16 0.00 - 0.70 x10*3/uL    Basophils Absolute 0.06 0.00 - 0.10 x10*3/uL   Lactate  Dehydrogenase   Result Value Ref Range     84 - 246 U/L   Magnesium   Result Value Ref Range    Magnesium 2.14 1.60 - 2.40 mg/dL   Renal function panel   Result Value Ref Range    Glucose 93 74 - 99 mg/dL    Sodium 137 136 - 145 mmol/L    Potassium 4.5 3.5 - 5.3 mmol/L    Chloride 106 98 - 107 mmol/L    Bicarbonate 23 21 - 32 mmol/L    Anion Gap 13 10 - 20 mmol/L    Urea Nitrogen 28 (H) 6 - 23 mg/dL    Creatinine 1.06 0.50 - 1.30 mg/dL    eGFR 79 >60 mL/min/1.73m*2    Calcium 8.5 (L) 8.6 - 10.6 mg/dL    Phosphorus 3.3 2.5 - 4.9 mg/dL    Albumin 3.8 3.4 - 5.0 g/dL   Pulmonary function test   Result Value Ref Range    FVC - Predicted 4.08     FEV1 - Predicted 3.16     FVC - PRE 4.00     FEV1 - Pre 2.44        Assessment/Plan   63 y.o. male with a PMH of stage D/ICM/HFrEF 10-15%, s/p HM3 4/30/2023, CAD, pAF, VT s/p AICD ([facility] placed 1/4/2018), depression, BPH & hypothyroidism. Originally admitted to [facility]ICU on 2/23/24 due to worsening shortness of breath, weight gain, and hypotension at home.     Cardiac workup negative for source of symptoms; however, on further review, patient states he has been having dark-tarry stools x 2 weeks prior to admission. Hg decreased from 14 in August 2023 to 7.0 during admission. GI consulted and performed EGD on 2/24 showing mild gastritis but no source of bleed. Colonoscopy performed on 2/26 without source of bleeding. VCS completed on 2/27 without source either. GIB likely 2/2 to AVM. Anemia stabilized without further anemia and transferred to LT5 on 2/28. Lastly, patient now requesting workup for OHT, now initiated. Will be presented to selection committee on 3/5.         Plan/Problem List:      NEURO PMH: Depression  Active Problem List:  #Depression  - Patient states he is unclear why or when he was started on zoloft, but it must have been after his LVAD surgery.  - He currently denies any symptoms of anxiety or depression and has asked if it is safe to be weaned off.  -  There are multiple side effects (including increased bleeding risk) with zoloft, so may be beneficial to taper off since the patient has requested.  - Wean Zoloft to 12.5 daily and then discontinue in 2-4 weeks if no withdrawal symptoms (~3/18 or 3/25)    #Obesity  - BMO 32.69 kg/m2  - Weight gain of 75 pounds over last year (appears to be dietary and hypothyroid induced)        CV PMH: Stage D/ICM/HFrEF 10-15%, s/p HM3 4/30/2023, CAD, pAF, VT s/p AICD ([facility] placed 1/4/2018), HLD  Active Problem List:   #Stage D/ICM/HFrEF 10-15%  #HM3 4/30/2023 (bridge to transplant)  #RV dysfunction     Last RHC: 2/28/24 w/ RA 7, PAP 40/13 (22), W 13, CI 3.38 &   Last LHC: 3/30/23 w/ severe LAD diffuse 80% stenosis, distal LCx 70% stenosis and RCA mild-diffuse CAD.   Last TTE/BOBBY: 2/23/24 EF 15% w/ mild RV dysfunction    LVAD Settings: high speed 5500 RPM & low speed 5100 RPM  Flows: 4.8  RPM: 5500  PI: 3.7  Power: 4.2  Dressing changes: Weekly  Drive Line assessment: Clean and free from signs/symptoms of infection     - Continue aspirin 81mg daily   - Continue farxiga 10mg daily   - Continue Entresto 24-26mg BID  - Continue spironolactone 50mg daily   - Holding beta-blocker due to persistent RV dysfunction   - Continue coumadin to 7.5mg daily for goal INR 2-3 and daily levels.   - Continue daily LDH  - Continue digoxin 125mcg daily for RV support (last level 0.89 on 2/23/24)    #CAD  #History of HLD  - Last LHC 3/30/23 shows multivessel CAD w/ severe LAD diffuse 80% stenosis, distal LCx 70% stenosis and RCA mild-diffuse CAD.  - Repeat cholesterol studies on 2/29/24 appear normal   - Not currently on statin   - Continue aspirin 81mg daily     #pAfib and VT  - AICD placed 1/4/2018 ([facility]) set DDDR   - Currently NSR w/ intermittent V-pacing on telemetry. Intermittent runs of asymptomatic NSVT on 2/29/24  - Continue Digoxin 125mcg daily w/ appropriate levels   - Holding amiodarone due to concern for amio induced  thyrotoxicosis     - Continue Coumadin for goal INR 2-3    #Heart Transplant Workup and Evaluation   - Patient states he is now ready for bridge to transplant and formal evaluation opened  - Transplant evaluation consent obtained on 2/29/24  - Blood type: AB+  - Last HLAs on 4/3/23 negative for class 1 and 2 antibodies   - Repeat HLA testing pending from 2/29/24 and 3/1/24   - Labwork collected and pending for transplant workup   - RHC completed 2/28/24  - LHC 3/20/23; no need for further imaging at this time  - Echo completed 2/23/24  - EKG completed 3/1/24  - Carotid ultrasound 3/1/24 with <50% stenosis bilateral carotids  - U/S aorta and iliacs 3/1/24 with poor visualization due to bowel gas and LVAD  - ANUPAM testing 3/1/24 negative for PAD  - No need for CPET  - US abdomen completed   - No need for chest x-ray  - CT C/A/P non-con 2/29/24: shows multiple stable lung nodules bilaterally (0.3cm) all stable from 5/4/23. Liver shows hypodense simple fluid collections stable (1.3cm) from 5/4/23. Multiple small hypodense fluid collections on bilateral kidneys favored to be cysts all stable compared to 5/4/23.  Diffuse centrilobular ground glass opacities within bilateral lungs, compatible with bronchiolitis.     - PFTs to be done on 3/4/24  - Pulmonary consult obtained (3/4)  - Bone density to be ordered outpatient  - Colonoscopy 2/26/24 & EGD 2/24/24 without significant findings   - Pantogram completed 2/29/24 and negative for abscess or carries   - Nutrition, social work and palliative care consulted    - Plan to present to Advance Therapy Meeting on on Tuesday 3/5/24    #Vaccine status  - Immunity to HepA  - Immunity to MMR  - Will need HepB series (Engerix-B): first dose ordered, 2nd dose at 4/2024, 3rd dose at 9/2024  - Has had chicken pox w/ + VZV IgG: Will need 2-dose shingrix series outpatient   - Patient denies having recent Td or Tdap administration within last 10 years: single injection ordered  - RSV vaccine  (Arexy): Single injection ordered this admission on 3/5/24   - Pneumococcal vaccine (Prevnar 20): Single injection ordered this admission   - Has received 2xPfizer COVID vaccines in 2021: Will require 1-dose of either moderna or pfizer vaccine outpatient  - Has not received Flu vaccine this year: Will require 1-dose of any flu vaccine       PULM PMH: Previous smoker (quite in 2019), Restrictive lung physiology    Active Problem List:  #Dyspnea on exertion   #Restrictive lung disease  - Last PFTs on 4/4/2023 show FEV1 1.66 (<60%), FVC 2.63 (<49%) w/ FEV1/FVC ratio of 82%  - Previous smoker quite in 2019  - Not currently on inhaler therapy  - Admitted with complaints of GRAVES for multiple weeks  - Cardiac workup unremarkable as source of symptoms   - GRAVES likely in setting of anemia 2/2 to GIB; however, CT on 2/29/24 shows Diffuse centrilobular ground glass opacities within bilateral lungs, compatible with bronchiolitis    - Pulmonary consult requested (3/4)  - Repeat PFTs ordered 3/4/24   - Start duonebs Q6 PRN for SOB/GRAVES       GI PMH: None  Active Problem List:   #Melena 2/2 to suspected AVM?  - Endorses having dark-tarry stools x 2 weeks prior to admission.   - Hg decreased from 14 in August 2023 to 7.0 during admission.  - EGD on 2/24 showing mild gastritis but no source of bleed.  - Colonoscopy performed on 2/26 without source of bleeding.  - Video capsule study completed on 2/27 without source of bleed  - H-pylori negative on 2/24  - No further melena and Hg stabilized without transfusions   - Likely source is AVM   - GI okay with restarting Coumadin and monitoring for GIB  - Continue pantoprazole 40mg BID      Diet: Regular diet   Bowel Regimen: Colace/senna BID, miralax PRN        PMH: BPH. 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    - TSH levels also notably elevated at 11.49 on admission with normal Free T4  - Unclear if hypothyroidism 2/2 to amiodarone vs Hashimotos (thyroid peroxidase antibodies elevated > 1000).   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    - Per patient, he has been taking his pantoprazole with his synthroid so he may not have been absorbing it  - Continue synthroid 175mcg daily. Discussed with Endocrine (3/4).   - Repeat TSH and FT4: 8.1/1.5, improved. Repeat levels on 3/8.    HEME PMH: Iron deficiency anemia, history of HIT   Active Problem List:   #GIB  - See GI for plan    #Iron deficiency anemia   - iron studies on admission show iron deficiency anemia, likely precipitated by GIB  - GIB now controlled  - Completed 3xdays of venofir   - Will start PO iron on discharge (to avoid confusion of melena inpatient)      #History of HIT   - PF4 positive; however, HONG negative on 4/7/23  - Repeat PF4 on 3/1/24 is negative   - Vascular medicine discussed and okay for patient to receive heparin       ID PMH: None  Active Problem List: None      Ortho/Skin PMH: None  Active Problem List: None     Proph:  SCDs  Coumadin  PPI       Lines: None    Restraints: N/A       Family: Will update at bedside      Seen with the HF attending, Dr. SCOTT Shrestha.     Dispo: Discharge likely next week pending transplant workup, therapeutic INR and no further GIB.     Karel Gunter MD, PGY-4  Advanced Heart Failure & Transplant Fellow

## 2024-03-04 NOTE — SIGNIFICANT EVENT
Patient is insistant on stopping heparin drip as he feels that it is making him bleed.  I explained the patient the rationale of continuing heparin drip and his Hb being stable and other reason could be possible for black stool. He is still insistent.  We decided to recheck INR and will stop heparin if INR is >1.8

## 2024-03-04 NOTE — NURSING NOTE
"Pt. states his heparin drip is making him bleed. Pt. States he had a black bowel movement earlier in the day. Day shift RN assessed stool and states the BM was not black. Day shift RN attempted to educate the pt. But Pt. Still insistent that he's bleeding despite no s/s of active bleed, negative GI imaging and stable Hgb. This RN reached out to the night  Requesting he come assess the pt. Night  Also attempted reeducation but pt. Still insisting he's bleeding and does not want the heparin drip. Night  Ordered coag to be drawn now and if INR >1.8 Night  Ok with stopping heparin drip. Pt. States \"I don't care what my INR is I already stopped my drip\". This RN bobby coag level. Result came back 1.9. 0635: Pt. Refusing blood draws this morning  "

## 2024-03-05 ENCOUNTER — DOCUMENTATION (OUTPATIENT)
Dept: TRANSPLANT | Facility: HOSPITAL | Age: 64
End: 2024-03-05
Payer: MEDICARE

## 2024-03-05 ENCOUNTER — TELEPHONE (OUTPATIENT)
Dept: CARDIOLOGY | Facility: CLINIC | Age: 64
End: 2024-03-05

## 2024-03-05 ENCOUNTER — APPOINTMENT (OUTPATIENT)
Dept: CARDIOLOGY | Facility: HOSPITAL | Age: 64
DRG: 377 | End: 2024-03-05
Payer: MEDICARE

## 2024-03-05 ENCOUNTER — COMMITTEE REVIEW (OUTPATIENT)
Dept: TRANSPLANT | Facility: HOSPITAL | Age: 64
End: 2024-03-05
Payer: MEDICARE

## 2024-03-05 DIAGNOSIS — Z79.899 HIGH RISK MEDICATION USE: ICD-10-CM

## 2024-03-05 DIAGNOSIS — I47.29 PAROXYSMAL VENTRICULAR TACHYCARDIA (MULTI): ICD-10-CM

## 2024-03-05 LAB
ALBUMIN SERPL BCP-MCNC: 4.1 G/DL (ref 3.4–5)
ANION GAP SERPL CALC-SCNC: 11 MMOL/L (ref 10–20)
APTT PPP: 45 SECONDS (ref 27–38)
BASOPHILS # BLD AUTO: 0.05 X10*3/UL (ref 0–0.1)
BASOPHILS NFR BLD AUTO: 0.8 %
BUN SERPL-MCNC: 25 MG/DL (ref 6–23)
CALCIUM SERPL-MCNC: 8.6 MG/DL (ref 8.6–10.6)
CHLORIDE SERPL-SCNC: 105 MMOL/L (ref 98–107)
CO2 SERPL-SCNC: 28 MMOL/L (ref 21–32)
CREAT SERPL-MCNC: 1.02 MG/DL (ref 0.5–1.3)
EGFRCR SERPLBLD CKD-EPI 2021: 83 ML/MIN/1.73M*2
EOSINOPHIL # BLD AUTO: 0.21 X10*3/UL (ref 0–0.7)
EOSINOPHIL NFR BLD AUTO: 3.2 %
ERYTHROCYTE [DISTWIDTH] IN BLOOD BY AUTOMATED COUNT: 17.2 % (ref 11.5–14.5)
GLUCOSE SERPL-MCNC: 93 MG/DL (ref 74–99)
HCT VFR BLD AUTO: 34.1 % (ref 41–52)
HGB BLD-MCNC: 10.3 G/DL (ref 13.5–17.5)
IMM GRANULOCYTES # BLD AUTO: 0.04 X10*3/UL (ref 0–0.7)
IMM GRANULOCYTES NFR BLD AUTO: 0.6 % (ref 0–0.9)
INR PPP: 2.6 (ref 0.9–1.1)
LDH SERPL L TO P-CCNC: 142 U/L (ref 84–246)
LYMPHOCYTES # BLD AUTO: 1.81 X10*3/UL (ref 1.2–4.8)
LYMPHOCYTES NFR BLD AUTO: 27.3 %
MAGNESIUM SERPL-MCNC: 2.5 MG/DL (ref 1.6–2.4)
MCH RBC QN AUTO: 31.6 PG (ref 26–34)
MCHC RBC AUTO-ENTMCNC: 30.2 G/DL (ref 32–36)
MCV RBC AUTO: 105 FL (ref 80–100)
MONOCYTES # BLD AUTO: 0.6 X10*3/UL (ref 0.1–1)
MONOCYTES NFR BLD AUTO: 9.1 %
NEUTROPHILS # BLD AUTO: 3.91 X10*3/UL (ref 1.2–7.7)
NEUTROPHILS NFR BLD AUTO: 59 %
NRBC BLD-RTO: 0 /100 WBCS (ref 0–0)
PHOSPHATE SERPL-MCNC: 3.6 MG/DL (ref 2.5–4.9)
PLATELET # BLD AUTO: 312 X10*3/UL (ref 150–450)
POTASSIUM SERPL-SCNC: 4.1 MMOL/L (ref 3.5–5.3)
PROTHROMBIN TIME: 30 SECONDS (ref 9.8–12.8)
RBC # BLD AUTO: 3.26 X10*6/UL (ref 4.5–5.9)
SODIUM SERPL-SCNC: 140 MMOL/L (ref 136–145)
WBC # BLD AUTO: 6.6 X10*3/UL (ref 4.4–11.3)

## 2024-03-05 PROCEDURE — 2500000002 HC RX 250 W HCPCS SELF ADMINISTERED DRUGS (ALT 637 FOR MEDICARE OP, ALT 636 FOR OP/ED): Performed by: STUDENT IN AN ORGANIZED HEALTH CARE EDUCATION/TRAINING PROGRAM

## 2024-03-05 PROCEDURE — 80069 RENAL FUNCTION PANEL: CPT | Performed by: NURSE PRACTITIONER

## 2024-03-05 PROCEDURE — 93750 INTERROGATION VAD IN PERSON: CPT | Performed by: INTERNAL MEDICINE

## 2024-03-05 PROCEDURE — 2500000002 HC RX 250 W HCPCS SELF ADMINISTERED DRUGS (ALT 637 FOR MEDICARE OP, ALT 636 FOR OP/ED): Mod: MUE | Performed by: NURSE PRACTITIONER

## 2024-03-05 PROCEDURE — G0008 ADMIN INFLUENZA VIRUS VAC: HCPCS | Performed by: INTERNAL MEDICINE

## 2024-03-05 PROCEDURE — 93283 PRGRMG EVAL IMPLANTABLE DFB: CPT

## 2024-03-05 PROCEDURE — 2500000002 HC RX 250 W HCPCS SELF ADMINISTERED DRUGS (ALT 637 FOR MEDICARE OP, ALT 636 FOR OP/ED): Mod: MUE | Performed by: STUDENT IN AN ORGANIZED HEALTH CARE EDUCATION/TRAINING PROGRAM

## 2024-03-05 PROCEDURE — 83735 ASSAY OF MAGNESIUM: CPT | Performed by: NURSE PRACTITIONER

## 2024-03-05 PROCEDURE — 2500000005 HC RX 250 GENERAL PHARMACY W/O HCPCS: Performed by: INTERNAL MEDICINE

## 2024-03-05 PROCEDURE — 90471 IMMUNIZATION ADMIN: CPT | Performed by: INTERNAL MEDICINE

## 2024-03-05 PROCEDURE — 83615 LACTATE (LD) (LDH) ENZYME: CPT | Performed by: NURSE PRACTITIONER

## 2024-03-05 PROCEDURE — 90715 TDAP VACCINE 7 YRS/> IM: CPT | Performed by: INTERNAL MEDICINE

## 2024-03-05 PROCEDURE — 2500000001 HC RX 250 WO HCPCS SELF ADMINISTERED DRUGS (ALT 637 FOR MEDICARE OP): Performed by: NURSE PRACTITIONER

## 2024-03-05 PROCEDURE — 90686 IIV4 VACC NO PRSV 0.5 ML IM: CPT | Performed by: INTERNAL MEDICINE

## 2024-03-05 PROCEDURE — 2500000002 HC RX 250 W HCPCS SELF ADMINISTERED DRUGS (ALT 637 FOR MEDICARE OP, ALT 636 FOR OP/ED): Mod: MUE

## 2024-03-05 PROCEDURE — 2500000001 HC RX 250 WO HCPCS SELF ADMINISTERED DRUGS (ALT 637 FOR MEDICARE OP)

## 2024-03-05 PROCEDURE — 85025 COMPLETE CBC W/AUTO DIFF WBC: CPT | Performed by: NURSE PRACTITIONER

## 2024-03-05 PROCEDURE — 2500000002 HC RX 250 W HCPCS SELF ADMINISTERED DRUGS (ALT 637 FOR MEDICARE OP, ALT 636 FOR OP/ED): Performed by: NURSE PRACTITIONER

## 2024-03-05 PROCEDURE — 2500000002 HC RX 250 W HCPCS SELF ADMINISTERED DRUGS (ALT 637 FOR MEDICARE OP, ALT 636 FOR OP/ED)

## 2024-03-05 PROCEDURE — 85610 PROTHROMBIN TIME: CPT | Performed by: NURSE PRACTITIONER

## 2024-03-05 PROCEDURE — 86331 IMMUNODIFFUSION OUCHTERLONY: CPT | Performed by: INTERNAL MEDICINE

## 2024-03-05 PROCEDURE — 2500000004 HC RX 250 GENERAL PHARMACY W/ HCPCS (ALT 636 FOR OP/ED): Performed by: INTERNAL MEDICINE

## 2024-03-05 PROCEDURE — 99233 SBSQ HOSP IP/OBS HIGH 50: CPT | Performed by: INTERNAL MEDICINE

## 2024-03-05 PROCEDURE — 1200000002 HC GENERAL ROOM WITH TELEMETRY DAILY

## 2024-03-05 PROCEDURE — 99232 SBSQ HOSP IP/OBS MODERATE 35: CPT | Performed by: INTERNAL MEDICINE

## 2024-03-05 PROCEDURE — 36415 COLL VENOUS BLD VENIPUNCTURE: CPT | Performed by: INTERNAL MEDICINE

## 2024-03-05 RX ADMIN — INFLUENZA VIRUS VACCINE 0.5 ML: 15; 15; 15; 15 SUSPENSION INTRAMUSCULAR at 17:03

## 2024-03-05 RX ADMIN — DIGOXIN 125 MCG: 125 TABLET ORAL at 08:54

## 2024-03-05 RX ADMIN — LEVOTHYROXINE SODIUM 175 MCG: 25 TABLET ORAL at 05:24

## 2024-03-05 RX ADMIN — SACUBITRIL AND VALSARTAN 1 TABLET: 24; 26 TABLET, FILM COATED ORAL at 19:44

## 2024-03-05 RX ADMIN — PANTOPRAZOLE SODIUM 40 MG: 40 TABLET, DELAYED RELEASE ORAL at 08:54

## 2024-03-05 RX ADMIN — SACUBITRIL AND VALSARTAN 1 TABLET: 24; 26 TABLET, FILM COATED ORAL at 08:54

## 2024-03-05 RX ADMIN — DAPAGLIFLOZIN 10 MG: 10 TABLET, FILM COATED ORAL at 08:54

## 2024-03-05 RX ADMIN — WARFARIN SODIUM 7.5 MG: 5 TABLET ORAL at 17:02

## 2024-03-05 RX ADMIN — SERTRALINE HYDROCHLORIDE 12.5 MG: 25 TABLET ORAL at 08:54

## 2024-03-05 RX ADMIN — ASPIRIN 81 MG: 81 TABLET, COATED ORAL at 08:54

## 2024-03-05 RX ADMIN — SENNOSIDES AND DOCUSATE SODIUM 2 TABLET: 8.6; 5 TABLET ORAL at 08:54

## 2024-03-05 RX ADMIN — SENNOSIDES AND DOCUSATE SODIUM 2 TABLET: 8.6; 5 TABLET ORAL at 19:44

## 2024-03-05 RX ADMIN — SPIRONOLACTONE 50 MG: 25 TABLET, FILM COATED ORAL at 08:53

## 2024-03-05 RX ADMIN — TAMSULOSIN HYDROCHLORIDE 0.4 MG: 0.4 CAPSULE ORAL at 08:54

## 2024-03-05 RX ADMIN — Medication 5 MG: at 19:44

## 2024-03-05 RX ADMIN — TETANUS TOXOID, REDUCED DIPHTHERIA TOXOID AND ACELLULAR PERTUSSIS VACCINE, ADSORBED 0.5 ML: 5; 2.5; 8; 8; 2.5 SUSPENSION INTRAMUSCULAR at 17:13

## 2024-03-05 RX ADMIN — PANTOPRAZOLE SODIUM 40 MG: 40 TABLET, DELAYED RELEASE ORAL at 19:44

## 2024-03-05 RX ADMIN — PNEUMOCOCCAL 20-VALENT CONJUGATE VACCINE 0.5 ML
2.2; 2.2; 2.2; 2.2; 2.2; 2.2; 2.2; 2.2; 2.2; 2.2; 2.2; 2.2; 2.2; 2.2; 2.2; 2.2; 4.4; 2.2; 2.2; 2.2 INJECTION, SUSPENSION INTRAMUSCULAR at 19:46

## 2024-03-05 ASSESSMENT — COGNITIVE AND FUNCTIONAL STATUS - GENERAL
MOBILITY SCORE: 23
DAILY ACTIVITIY SCORE: 24
MOBILITY SCORE: 24
TURNING FROM BACK TO SIDE WHILE IN FLAT BAD: A LITTLE
DAILY ACTIVITIY SCORE: 24

## 2024-03-05 ASSESSMENT — PAIN - FUNCTIONAL ASSESSMENT
PAIN_FUNCTIONAL_ASSESSMENT: 0-10
PAIN_FUNCTIONAL_ASSESSMENT: 0-10

## 2024-03-05 ASSESSMENT — PAIN SCALES - GENERAL
PAINLEVEL_OUTOF10: 0 - NO PAIN
PAINLEVEL_OUTOF10: 0 - NO PAIN

## 2024-03-05 NOTE — PROGRESS NOTES
Per selection committee discussion this morning, Pt approved for heart transplant listing and possible discharge tomorrow. CARINE met with Pt at bedside. Pt aware and had no questions at this time. CARINE provided list of in-network PCPs accepting new Pts from [employer]'s website based on proximity to Pt's home address per his request. Pt indicated he will research the providers and call to schedule a new Pt appt with provider of his choice. Pt has CARINE's contact info if any further assistance is needed in this area. Pt is eager to discharge home and reunite with his dog. CARINE will continue to follow while OP listed for heart transplant.

## 2024-03-05 NOTE — PROGRESS NOTES
Anatoly Lane is a 63 y.o. male on day 11 of admission presenting with Acute decompensated heart failure (CMS/HCC).    Subjective   Patient's case was discussed during the Advanced Therapy Selection Committee meeting earlier today. He was approved for OHT listing. NO overnight events reported. No LVAD low low alarms. MAP 70-76. Telemetry monitor showed no arrhythmias. He denies chest pain, SOB, dizziness.     Objective   Physical Exam  Constitutional:       Appearance: Normal appearance.   HENT:      Mouth/Throat:      Mouth: Mucous membranes are moist.      Pharynx: Oropharynx is clear. No oropharyngeal exudate or posterior oropharyngeal erythema.   Eyes:      Extraocular Movements: Extraocular movements intact.      Conjunctiva/sclera: Conjunctivae normal.      Pupils: Pupils are equal, round, and reactive to light.   Neck:      Vascular: No JVD.   Cardiovascular:      Rate and Rhythm: Regular rhythm.      Heart sounds: No murmur heard.     No friction rub. No gallop.      Comments: NSR w/ intermittent ventricular pacing. AICD set DDDR . LVAD hum heard over left chest. Dopplerable distal pulses in all extremities. Capillary refill normal. Appears warm and well perfused.   Pulmonary:      Effort: Pulmonary effort is normal. No accessory muscle usage, respiratory distress or retractions.      Breath sounds: Normal breath sounds.   Abdominal:      General: Abdomen is flat. Bowel sounds are normal. There is no distension.      Palpations: Abdomen is soft. There is no mass.      Tenderness: There is no abdominal tenderness. There is no guarding.      Hernia: No hernia is present.   Genitourinary:     Penis: Normal.    Musculoskeletal:         General: No tenderness. Normal range of motion.      Cervical back: Full passive range of motion without pain.      Right lower leg: No edema.      Left lower leg: No edema.   Skin:     General: Skin is warm and dry.      Capillary Refill: Capillary refill takes less than 2  "seconds.      Coloration: Skin is not jaundiced.      Findings: No bruising, laceration, lesion, rash or wound.      Comments: LVAD driveline site without drainage or erythema.    Neurological:      General: No focal deficit present.      Mental Status: He is alert and oriented to person, place, and time.   Psychiatric:         Mood and Affect: Mood normal.       Last Recorded Vitals  Blood pressure 78/78, pulse 66, temperature 36.4 °C (97.5 °F), temperature source Temporal, resp. rate 18, height 1.753 m (5' 9\"), weight 103 kg (227 lb 8.2 oz), SpO2 95 %.  Intake/Output last 3 Shifts:  I/O last 3 completed shifts:  In: 240 (2.3 mL/kg) [P.O.:240]  Out: 2000 (19.4 mL/kg) [Urine:2000 (0.5 mL/kg/hr)]  Weight: 103.2 kg     Relevant Results  Current Outpatient Medications   Medication Instructions    aspirin 81 mg, oral, Daily    dapagliflozin propanediol (FARXIGA) 10 mg, oral, Daily    digoxin (LANOXIN) 125 mcg, oral, Daily    Entresto 49-51 mg tablet 1 tablet, oral, 2 times daily    levothyroxine (SYNTHROID, LEVOXYL) 175 mcg, oral, Daily before breakfast    Pacerone 200 mg, oral, Daily    pantoprazole (PROTONIX) 40 mg, oral, Daily before breakfast    sertraline (ZOLOFT) 25 mg, oral, Daily    spironolactone (ALDACTONE) 50 mg, oral, Daily    tamsulosin (FLOMAX) 0.4 mg, oral, Daily    torsemide (DEMADEX) 10 mg, oral, As needed    warfarin (Coumadin) 1 mg tablet 3.5mg (1mg tablet + 2.5mg tablet) on Monday, Wednesday, Friday<BR>5mg (2.5mg tablet + 2.5mg tablet) on Sunday, Tuesday, Thursday, Saturday    warfarin (Coumadin) 2.5 mg tablet 3.5mg (1mg tablet + 2.5mg tablet) on Monday, Wednesday, Friday<BR>5mg (2.5mg tablet + 2.5mg tablet) on Sunday, Tuesday, Thursday, Saturday       Scheduled medications  aspirin, 81 mg, oral, Daily  dapagliflozin propanediol, 10 mg, oral, Daily  digoxin, 125 mcg, oral, Daily  diph,pertuss(acel),tet vac (PF), 0.5 mL, intramuscular, During hospitalization  influenza, 0.5 mL, intramuscular, During " hospitalization  levothyroxine, 175 mcg, oral, Daily before breakfast  lidocaine, 1 patch, transdermal, Daily  melatonin, 5 mg, oral, Nightly  pantoprazole, 40 mg, oral, BID  pneumoc 20-sarthak conj-dip cr(PF), 0.5 mL, intramuscular, During hospitalization  sacubitriL-valsartan, 1 tablet, oral, BID  sennosides-docusate sodium, 2 tablet, oral, BID  sertraline, 12.5 mg, oral, Daily  spironolactone, 50 mg, oral, Daily  tamsulosin, 0.4 mg, oral, Daily  [Held by provider] warfarin, 5 mg, oral, Once per day on Mon Wed Fri  warfarin, 7.5 mg, oral, Daily      Continuous medications  heparin, 0-4,000 Units/hr, Last Rate: Stopped (03/03/24 2300)    PRN medications  PRN medications: acetaminophen, heparin, ipratropium-albuteroL, ondansetron, polyethylene glycol     Results for orders placed or performed during the hospital encounter of 02/23/24 (from the past 24 hour(s))   CBC and Auto Differential   Result Value Ref Range    WBC 6.6 4.4 - 11.3 x10*3/uL    nRBC 0.0 0.0 - 0.0 /100 WBCs    RBC 3.26 (L) 4.50 - 5.90 x10*6/uL    Hemoglobin 10.3 (L) 13.5 - 17.5 g/dL    Hematocrit 34.1 (L) 41.0 - 52.0 %     (H) 80 - 100 fL    MCH 31.6 26.0 - 34.0 pg    MCHC 30.2 (L) 32.0 - 36.0 g/dL    RDW 17.2 (H) 11.5 - 14.5 %    Platelets 312 150 - 450 x10*3/uL    Neutrophils % 59.0 40.0 - 80.0 %    Immature Granulocytes %, Automated 0.6 0.0 - 0.9 %    Lymphocytes % 27.3 13.0 - 44.0 %    Monocytes % 9.1 2.0 - 10.0 %    Eosinophils % 3.2 0.0 - 6.0 %    Basophils % 0.8 0.0 - 2.0 %    Neutrophils Absolute 3.91 1.20 - 7.70 x10*3/uL    Immature Granulocytes Absolute, Automated 0.04 0.00 - 0.70 x10*3/uL    Lymphocytes Absolute 1.81 1.20 - 4.80 x10*3/uL    Monocytes Absolute 0.60 0.10 - 1.00 x10*3/uL    Eosinophils Absolute 0.21 0.00 - 0.70 x10*3/uL    Basophils Absolute 0.05 0.00 - 0.10 x10*3/uL   Coagulation Screen   Result Value Ref Range    Protime 30.0 (H) 9.8 - 12.8 seconds    INR 2.6 (H) 0.9 - 1.1    aPTT 45 (H) 27 - 38 seconds   Lactate  Dehydrogenase   Result Value Ref Range     84 - 246 U/L   Magnesium   Result Value Ref Range    Magnesium 2.50 (H) 1.60 - 2.40 mg/dL   Renal function panel   Result Value Ref Range    Glucose 93 74 - 99 mg/dL    Sodium 140 136 - 145 mmol/L    Potassium 4.1 3.5 - 5.3 mmol/L    Chloride 105 98 - 107 mmol/L    Bicarbonate 28 21 - 32 mmol/L    Anion Gap 11 10 - 20 mmol/L    Urea Nitrogen 25 (H) 6 - 23 mg/dL    Creatinine 1.02 0.50 - 1.30 mg/dL    eGFR 83 >60 mL/min/1.73m*2    Calcium 8.6 8.6 - 10.6 mg/dL    Phosphorus 3.6 2.5 - 4.9 mg/dL    Albumin 4.1 3.4 - 5.0 g/dL       Assessment/Plan   63 y.o. male with a PMH of stage D/ICM/HFrEF 10-15%, s/p HM3 4/30/2023, CAD, pAF, VT s/p AICD ([facility] placed 1/4/2018), depression, BPH & hypothyroidism. Originally admitted to HFICU on 2/23/24 due to worsening shortness of breath, weight gain, and hypotension at home.     Cardiac workup negative for source of symptoms; however, on further review, patient states he has been having dark-tarry stools x 2 weeks prior to admission. Hg decreased from 14 in August 2023 to 7.0 during admission. GI consulted and performed EGD on 2/24 showing mild gastritis but no source of bleed. Colonoscopy performed on 2/26 without source of bleeding. VCS completed on 2/27 without source either. GIB likely 2/2 to AVM. Anemia stabilized without further anemia and transferred to LT5 on 2/28. Lastly, patient now requesting workup for OHT, now initiated. His case was formally presented to selection committee on 3/5 and was approved for OHT listing. His HLA antibody panel will be repeated in 1 month (as he recently had pRBC transfusion on 02/24).          Plan/Problem List:      NEURO PMH: Depression  Active Problem List:  #Depression  - Patient states he is unclear why or when he was started on zoloft, but it must have been after his LVAD surgery.  - He currently denies any symptoms of anxiety or depression and has asked if it is safe to be weaned  off.  - There are multiple side effects (including increased bleeding risk) with zoloft, so may be beneficial to taper off since the patient has requested.  - Wean Zoloft to 12.5 daily and then discontinue in 2-4 weeks if no withdrawal symptoms (~3/18 or 3/25)    #Obesity  - BMO 32.69 kg/m2  - Weight gain of 75 pounds over last year (appears to be dietary and hypothyroid induced)        CV PMH: Stage D/ICM/HFrEF 10-15%, s/p HM3 4/30/2023, CAD, pAF, VT s/p AICD ([facility] placed 1/4/2018), HLD  Active Problem List:   #Stage D/ICM/HFrEF 10-15%  #HM3 4/30/2023 (bridge to transplant)  #RV dysfunction     Last RHC: 2/28/24 w/ RA 7, PAP 40/13 (22), W 13, CI 3.38 &   Last LHC: 3/30/23 w/ severe LAD diffuse 80% stenosis, distal LCx 70% stenosis and RCA mild-diffuse CAD.   Last TTE/BOBBY: 2/23/24 EF 15% w/ mild RV dysfunction    LVAD Settings: high speed 5500 RPM & low speed 5100 RPM  Flows: 4.8  RPM: 5500  PI: 3.7  Power: 4.2  Dressing changes: Weekly  Drive Line assessment: Clean and free from signs/symptoms of infection     - Continue aspirin 81mg daily   - Continue farxiga 10mg daily   - Continue Entresto 24-26mg BID  - Continue spironolactone 50mg daily   - Holding beta-blocker due to persistent RV dysfunction   - Continue coumadin to 7.5mg daily for goal INR 2-3 and daily levels.   - Continue daily LDH  - Continue digoxin 125mcg daily for RV support (last level 0.89 on 2/23/24)    #CAD  #History of HLD  - Last LHC 3/30/23 shows multivessel CAD w/ severe LAD diffuse 80% stenosis, distal LCx 70% stenosis and RCA mild-diffuse CAD.  - Repeat cholesterol studies on 2/29/24 appear normal   - Not currently on statin   - Continue aspirin 81mg daily     #pAfib and VT  - AICD placed 1/4/2018 ([facility]) set DDDR   - Currently NSR w/ intermittent V-pacing on telemetry. Intermittent runs of asymptomatic NSVT on 2/29/24  - Continue Digoxin 125mcg daily w/ appropriate levels   - Holding amiodarone due to concern for amio  induced thyrotoxicosis     - Continue Coumadin for goal INR 2-3    #Heart Transplant Workup and Evaluation   - Patient states he is now ready for bridge to transplant and formal evaluation opened  - Transplant evaluation consent obtained on 2/29/24  - Blood type: AB+  - Last HLAs on 4/3/23 negative for class 1 and 2 antibodies   - Repeat HLA testing pending from 2/29/24 and 3/1/24   - Labwork collected and pending for transplant workup   - RHC completed 2/28/24  - LHC 3/20/23; no need for further imaging at this time  - Echo completed 2/23/24  - EKG completed 3/1/24  - Carotid ultrasound 3/1/24 with <50% stenosis bilateral carotids  - U/S aorta and iliacs 3/1/24 with poor visualization due to bowel gas and LVAD  - ANUPAM testing 3/1/24 negative for PAD  - No need for CPET  - US abdomen completed   - No need for chest x-ray  - CT C/A/P non-con 2/29/24: shows multiple stable lung nodules bilaterally (0.3cm) all stable from 5/4/23. Liver shows hypodense simple fluid collections stable (1.3cm) from 5/4/23. Multiple small hypodense fluid collections on bilateral kidneys favored to be cysts all stable compared to 5/4/23.  Diffuse centrilobular ground glass opacities within bilateral lungs, compatible with bronchiolitis.     - PFTs to be done on 3/4/24  - Pulmonary consult obtained (3/4)  - Follow up Hypersensitivity panel (3/5) s per Pulmonary recs  - Bone density to be ordered outpatient  - Colonoscopy 2/26/24 & EGD 2/24/24 without significant findings   - Pantogram completed 2/29/24 and negative for abscess or carries   - Nutrition, social work and palliative care consulted    - His case has been approved for OHT listing as of 03/05/24    #Vaccine status  - Immunity to HepA  - Immunity to MMR  - Will need HepB series (Engerix-B): first dose ordered, 2nd dose at 4/2024, 3rd dose at 9/2024  - Has had chicken pox w/ + VZV IgG: Will need 2-dose shingrix series outpatient   - Patient denies having recent Td or Tdap  administration within last 10 years: single injection ordered  - RSV vaccine (Arexy): Single injection ordered this admission on 3/5/24   - Pneumococcal vaccine (Prevnar 20): Single injection ordered this admission   - Has received 2xPfizer COVID vaccines in 2021: Will require 1-dose of either moderna or pfizer vaccine outpatient  - Has not received Flu vaccine this year: Will require 1-dose of any flu vaccine       PULM PMH: Previous smoker (quite in 2019), Restrictive lung physiology    Active Problem List:  #Dyspnea on exertion   #Restrictive lung disease  #Bronchiolitis related to previous cigarette smoking  # Moderate COPD  - Last PFTs on 4/4/2023 show FEV1 1.66 (<60%), FVC 2.63 (<49%) w/ FEV1/FVC ratio of 82%  - Previous smoker quite in 2019  - Not currently on inhaler therapy  - Admitted with complaints of GRAVES for multiple weeks  - Cardiac workup unremarkable as source of symptoms   - GRAVES likely in setting of anemia 2/2 to GIB; however, CT on 2/29/24 shows Diffuse centrilobular ground glass opacities within bilateral lungs, compatible with bronchiolitis    - Pulmonary consult requested (3/4).   - Repeat PFTs ordered 3/4/24   - Continue duonebs Q6 PRN for SOB/GRAVES       GI PMH: None  Active Problem List:   #Melena 2/2 to suspected AVM?  - Endorses having dark-tarry stools x 2 weeks prior to admission.   - Hg decreased from 14 in August 2023 to 7.0 during admission.  - EGD on 2/24 showing mild gastritis but no source of bleed.  - Colonoscopy performed on 2/26 without source of bleeding.  - Video capsule study completed on 2/27 without source of bleed  - H-pylori negative on 2/24  - No further melena and Hg stabilized without transfusions   - Likely source is AVM   - GI okay with restarting Coumadin and monitoring for GIB  - Continue pantoprazole 40mg BID      Diet: Regular diet   Bowel Regimen: Colace/senna BID, miralax PRN        PMH: BPH. 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    - TSH levels also notably elevated at 11.49 on admission with normal Free T4  - Unclear if hypothyroidism 2/2 to amiodarone vs Hashimotos (thyroid peroxidase antibodies elevated > 1000).   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    - Per patient, he has been taking his pantoprazole with his synthroid so he may not have been absorbing it  - Continue synthroid 175mcg daily. Discussed with Endocrine (3/4).   - Repeat TSH and FT4: 8.1/1.5, improved. Repeat levels on 3/8.    HEME PMH: Iron deficiency anemia, history of HIT   Active Problem List:   #GIB  - See GI for plan    #Iron deficiency anemia   - iron studies on admission show iron deficiency anemia, likely precipitated by GIB  - GIB now controlled  - Completed 3xdays of venofir   - Will start PO iron on discharge (to avoid confusion of melena inpatient)      #History of HIT (resolved)  - PF4 positive; however, HONG negative on 4/7/23   - Repeat PF4 on 3/1/24 is negative   - Vascular medicine discussed and okay for patient to receive heparin  - No evidence of HIT.      ID PMH: None  Active Problem List: None      Ortho/Skin PMH: None  Active Problem List: None     Proph:  SCDs  Coumadin  PPI       Lines: None    Restraints: N/A       Family: Will update at bedside      Seen with the HF attending, Dr. SCOTT Shrestha.     Dispo: Discharge tomorrow AM.     Karel Gunter MD, PGY-4  Advanced Heart Failure & Transplant Fellow

## 2024-03-05 NOTE — COMMITTEE REVIEW
[facility] Advanced Heart Failure Therapeutics Committee     Patient's name: Anatoly Lane   Referring provider: 4  Primary HF cardiologist: Dr. Ruano    Primary diagnosis: ICM    Background information: Mr. Lane is a 62 y/o male with a PMH of Stage D/ICM/HFrEF 10-15%, s/p HM3 4/30/2023, CAD, pAF, VT s/p AICD ([facility] placed 1/4/2018), depression, HIT (PF4+ 4/7/2023), BPH & hypothyroidism.    Committee decision and plan of care (Please discuss modifiable/non-modifiable barriers, plan to address barriers, and any relevant plan for reassessment if applicable): Patient's case was discussed in detail at our Advanced Heart Failure Therapeutics Committee. Per the discussion of the committee, the patient is approved for heart transplantation with a listing status of 4. Patient will need to have a repeat PRA completed in 1 month. Patient will require a prospective crossmatch.     Patient to be updated by Dr. Shrestha at the bedside. Patient to be listed once listing authorization is received from patient's insurance company.     Financial plan: Awaiting listing authorization.    If approved for cardiac transplantation:  UNOS listing status: 4  Minimum donor height and weight: 170 cm and 70 kg  Prospective cross match required: YES  Induction plan: Simulect

## 2024-03-05 NOTE — PROGRESS NOTES
"Palliative Medicine Progress Note        Anatoly Lane is a 63 y.o. male on day 11 of admission presenting with Acute decompensated heart failure (CMS/HCC).    Subjective   Symptoms (0 - 10, Best to Worst)  Aline Symptom Assessment System  Pain Score: 0 - No pain  Patient reports no pain at rest. Endorses continued GRAVES.       Objective     Physical Exam  Constitutional:       Appearance: Normal appearance. He is obese.   HENT:      Head: Normocephalic.      Nose: Nose normal.      Mouth/Throat:      Mouth: Mucous membranes are moist.   Cardiovascular:      Comments: LVAD hum auscultated  Pulmonary:      Effort: Pulmonary effort is normal.      Breath sounds: Normal breath sounds.   Abdominal:      General: Abdomen is flat.      Palpations: Abdomen is soft.   Musculoskeletal:      Cervical back: Normal range of motion.   Skin:     General: Skin is warm.      Capillary Refill: Capillary refill takes less than 2 seconds.   Neurological:      Mental Status: He is alert and oriented to person, place, and time.   Psychiatric:         Mood and Affect: Mood normal.         Behavior: Behavior normal.         Thought Content: Thought content normal.         Judgment: Judgment normal.         Last Recorded Vitals  Blood pressure 78/78, pulse 69, temperature 36.5 °C (97.7 °F), temperature source Temporal, resp. rate 17, height 1.753 m (5' 9\"), weight 103 kg (227 lb 8.2 oz), SpO2 97 %.  Intake/Output last 3 Shifts:  I/O last 3 completed shifts:  In: 240 (2.3 mL/kg) [P.O.:240]  Out: 2000 (19.4 mL/kg) [Urine:2000 (0.5 mL/kg/hr)]  Weight: 103.2 kg     Relevant Results  Scheduled medications  aspirin, 81 mg, oral, Daily  dapagliflozin propanediol, 10 mg, oral, Daily  digoxin, 125 mcg, oral, Daily  diph,pertuss(acel),tet vac (PF), 0.5 mL, intramuscular, During hospitalization  influenza, 0.5 mL, intramuscular, During hospitalization  levothyroxine, 175 mcg, oral, Daily before breakfast  lidocaine, 1 patch, transdermal, " Daily  melatonin, 5 mg, oral, Nightly  pantoprazole, 40 mg, oral, BID  pneumoc 20-sarthak conj-dip cr(PF), 0.5 mL, intramuscular, During hospitalization  sacubitriL-valsartan, 1 tablet, oral, BID  sennosides-docusate sodium, 2 tablet, oral, BID  sertraline, 12.5 mg, oral, Daily  spironolactone, 50 mg, oral, Daily  tamsulosin, 0.4 mg, oral, Daily  [Held by provider] warfarin, 5 mg, oral, Once per day on Mon Wed Fri  warfarin, 7.5 mg, oral, Daily      Continuous medications  heparin, 0-4,000 Units/hr, Last Rate: Stopped (03/03/24 2300)      PRN medications  PRN medications: acetaminophen, heparin, ipratropium-albuteroL, ondansetron, polyethylene glycol     Results for orders placed or performed during the hospital encounter of 02/23/24 (from the past 24 hour(s))   Pulmonary function test   Result Value Ref Range    FVC - Predicted 4.08     FEV1 - Predicted 3.16     FVC - PRE 4.00     FEV1 - Pre 2.44    CBC and Auto Differential   Result Value Ref Range    WBC 6.6 4.4 - 11.3 x10*3/uL    nRBC 0.0 0.0 - 0.0 /100 WBCs    RBC 3.26 (L) 4.50 - 5.90 x10*6/uL    Hemoglobin 10.3 (L) 13.5 - 17.5 g/dL    Hematocrit 34.1 (L) 41.0 - 52.0 %     (H) 80 - 100 fL    MCH 31.6 26.0 - 34.0 pg    MCHC 30.2 (L) 32.0 - 36.0 g/dL    RDW 17.2 (H) 11.5 - 14.5 %    Platelets 312 150 - 450 x10*3/uL    Neutrophils % 59.0 40.0 - 80.0 %    Immature Granulocytes %, Automated 0.6 0.0 - 0.9 %    Lymphocytes % 27.3 13.0 - 44.0 %    Monocytes % 9.1 2.0 - 10.0 %    Eosinophils % 3.2 0.0 - 6.0 %    Basophils % 0.8 0.0 - 2.0 %    Neutrophils Absolute 3.91 1.20 - 7.70 x10*3/uL    Immature Granulocytes Absolute, Automated 0.04 0.00 - 0.70 x10*3/uL    Lymphocytes Absolute 1.81 1.20 - 4.80 x10*3/uL    Monocytes Absolute 0.60 0.10 - 1.00 x10*3/uL    Eosinophils Absolute 0.21 0.00 - 0.70 x10*3/uL    Basophils Absolute 0.05 0.00 - 0.10 x10*3/uL   Coagulation Screen   Result Value Ref Range    Protime 30.0 (H) 9.8 - 12.8 seconds    INR 2.6 (H) 0.9 - 1.1    aPTT  45 (H) 27 - 38 seconds   Lactate Dehydrogenase   Result Value Ref Range     84 - 246 U/L   Magnesium   Result Value Ref Range    Magnesium 2.50 (H) 1.60 - 2.40 mg/dL   Renal function panel   Result Value Ref Range    Glucose 93 74 - 99 mg/dL    Sodium 140 136 - 145 mmol/L    Potassium 4.1 3.5 - 5.3 mmol/L    Chloride 105 98 - 107 mmol/L    Bicarbonate 28 21 - 32 mmol/L    Anion Gap 11 10 - 20 mmol/L    Urea Nitrogen 25 (H) 6 - 23 mg/dL    Creatinine 1.02 0.50 - 1.30 mg/dL    eGFR 83 >60 mL/min/1.73m*2    Calcium 8.6 8.6 - 10.6 mg/dL    Phosphorus 3.6 2.5 - 4.9 mg/dL    Albumin 4.1 3.4 - 5.0 g/dL        Assessment/Plan   Anatoly Lane is a 63 y.o. male with a PMH of stage D HFrEF 10-15%, ICM, s/p HM3 4/30/2023 CAD, pAF, VT s/p ICD, and hypothyroidism who is being admitted to HFICU for worsening shortness of breath, weight gain, and hypotension. Mr Lane called the LVAD clinic on 2/22 with complaints of worsening shortness of breath and inability to walk short distances. He presented to clinic on 2/23 still with worsening shortness of breath. He was unable to walk in the [place] without feeling short of breath and his legs feeling heavy. dMAP taken in clinic was 50, Hr 92, and JVD was present on exam. Patient also stated he has gained 75 pounds over the last year (10 pounds per month). Patient routed to HFICU for swan guided optimization and possible advanced therapies for transplant.      Concern for GIB, however colonoscopy unrevealing. Patient now being evaluated for advanced therapies (heart transplant). Palliative Medicine service consulted for symptom management and assessment prior to advanced therapies.     #Evaluation for Advanced Therapies  - Encouraged patient to speak with his son Bird Lane, which he still has not done (whom he names as his surrogate decision-maker) regarding goals of care and wishes   - Patient understands risks involved with advanced therapies such as heart transplantation  including necessity of adhering to immunosuppression therapy to prevent rejection, as well as risk of developing infections while on immunosuppression therapy - patient understands these risks and wishes to proceed with advanced therapies evaluation  - offered to help facilitate conversation about GOC with family - patient prefers to discuss his illness with his son in private without additional support from palliative medicine service     #Insomnia  - Can increase nightly melatonin 5mg to 10mg if needed     #Dyspnea  - Recommend providing patient with bedside fan  - If dyspnea remains persistent, can trial low-dose opioids    Symptom Management  Pain: controlled  Medications recommended for pain?  No  Tiredness: denies  Nausea: denies  Depression: denies  Anxiety: denies  Drowsiness: denies  Appetite: states is normal  Wellbeing: rates as good  Dyspnea: on exertion  Intervention recommended for dyspnea?  yes  Other: bedside fan  Intervention recommended for constipation?  No        Patient/proxy preference for information  Prefers full information    Goals of Care  Full code, wants advanced therapies    Is the patient hospice-eligible?   No  Was a discussion held re hospice services?   no  Was a decision made re hospice services?  No    Other Palliative Support  Patient did not request any other palliative support at this time.          Palliative care to sign off.    Signed,  Seema Hopkins, MS3  [facility]    Agree with MS3 Seema Hopkins's assessment and plan as above. Patient was discussed with Palliative Medicine attending physician, Dr. Meche Hung.    Signed,  Viviane Soto MD  Heart Failure Fellow PGY4  Palliative Medicine Service        I saw and evaluated the patient. I personally obtained the key and critical portions of the history and physical exam or was physically present for key and critical portions performed by the resident/fellow. I reviewed the resident/fellow's documentation and  discussed the patient with the resident/fellow. I agree with the resident/fellow's medical decision making as documented in the note.    Meche Hung MD

## 2024-03-05 NOTE — TELEPHONE ENCOUNTER
Amiodarone testing orders sent to [facility] due in March.  PFT paper order also completed.

## 2024-03-05 NOTE — PROGRESS NOTES
Submitted listing request to [employer] transplant via [employer] online.Reference Number  [id number]    Status  PENDED    Review Reason 1  Requires Medical Review    Message  This case requires further review. You will be contacted if additional information is needed.

## 2024-03-05 NOTE — CONSULTS
Reason For Consult  Heart transplant workup, had bronchiolitis on CT scan    History Of Present Illness  Anatoly Lane is a 63 y.o. male with PMH of stage D/ICM/HFrEF 10-15%, s/p HM3 4/30/2023, CAD, pAF, VT s/p AICD ([facility] placed 1/4/2018), depression, BPH & hypothyroidism. Originally admitted to HFICU on 2/23/24 due to worsening shortness of breath, weight gain, and hypotension at home, anemia likely 2/2 AVM. Pulmonary consulted for CT findings of bronchiolitis for evaluation.      Patient reported daily cough with whitish sputum production. He denies wheezing. Not on any inhalers, never seen by pulmonologist in the past, no previous ED visit or hospitalization for pulmonary reasons. He is former smoker, 1 ppd x40 years, quit 6 year ago.     Past Medical History  He has no past medical history on file.    Surgical History  He has a past surgical history that includes Other surgical history (01/05/2022); Other surgical history (01/05/2022); CT angio abdomen pelvis w and or wo IV IV contrast (04/13/2023); Left ventricular assist device; Colonoscopy w/ polypectomy (2023); and Cardiac catheterization (N/A, 2/28/2024).     Social History  He is former smoker, 1 ppd x40 years, quit 6 year ago.    Family History  Family History   Problem Relation Name Age of Onset    Hypertension Father      Colon cancer Father          Allergies  Jardiance [empagliflozin] and Amiodarone    Review of Systems  12 points ROS is otherwise negative except for what mentioned in HPI       Physical Exam  Constitutional:       Appearance: Normal appearance. Not in distress  Neck:      Vascular: No JVD.   Cardiovascular:      Rate and Rhythm: Regular rhythm.      Heart sounds: No murmur heard.     No friction rub. No gallop.   Pulmonary:     Breath sounds: Normal breath sounds. No wheezes  Abdominal:      General: Abdomen is flat. Bowel sounds are normal. There is no distension.      Palpations: Abdomen is soft.   Neurological:      General: No  "focal deficit present.      Mental Status: He is alert and oriented to person, place, and time.   Psychiatric:         Mood and Affect: Mood normal.      Last Recorded Vitals  Blood pressure 78/78, pulse 76, temperature 36.4 °C (97.5 °F), temperature source Temporal, resp. rate 19, height 1.753 m (5' 9\"), weight 101 kg (222 lb 10.6 oz), SpO2 97 %.      Scheduled medications  aspirin, 81 mg, oral, Daily  dapagliflozin propanediol, 10 mg, oral, Daily  digoxin, 125 mcg, oral, Daily  diph,pertuss(acel),tet vac (PF), 0.5 mL, intramuscular, During hospitalization  influenza, 0.5 mL, intramuscular, During hospitalization  levothyroxine, 175 mcg, oral, Daily before breakfast  lidocaine, 1 patch, transdermal, Daily  melatonin, 5 mg, oral, Nightly  pantoprazole, 40 mg, oral, BID  pneumoc 20-sarthak conj-dip cr(PF), 0.5 mL, intramuscular, During hospitalization  sacubitriL-valsartan, 1 tablet, oral, BID  sennosides-docusate sodium, 2 tablet, oral, BID  sertraline, 12.5 mg, oral, Daily  spironolactone, 50 mg, oral, Daily  tamsulosin, 0.4 mg, oral, Daily  [Held by provider] warfarin, 5 mg, oral, Once per day on Mon Wed Fri  warfarin, 7.5 mg, oral, Daily      Continuous medications  heparin, 0-4,000 Units/hr, Last Rate: Stopped (03/03/24 2300)      PRN medications  PRN medications: acetaminophen, heparin, ipratropium-albuteroL, ondansetron, polyethylene glycol     Relevant Results  PFTs 3/4/24:  Moderate obstructive pattern, air trapping with normal TLC, normal DLCO.     CT chest 2/29/24  IMPRESSION:  Chest  1. Diffuse scattered nodular centrilobular ground-glass opacities are  noted within the bilateral lungs, compatible with bronchiolitis,  potentially infectious or inflammatory.     Assessment/Plan   Anatoly Lane is a 63 y.o. male with PMH of stage D/ICM/HFrEF 10-15%, s/p HM3 4/30/2023, CAD, pAF, VT s/p AICD ([facility] placed 1/4/2018), depression, BPH & hypothyroidism. Originally admitted to HFICU on 2/23/24 due to worsening " shortness of breath, weight gain, and hypotension at home, anemia likely 2/2 AVM. Pulmonary consulted for CT findings of bronchiolitis for evaluation.     Patient is asymptomatic from pulmonary point of view. PFTs showed moderate obstruction & air trapping. CT showed findings suggestive for bronchiolitis. PFTs & CT scan can be due to smoking related obstructive lung disease such as COPD or respiratory bronchiolitis. Patient doesn't have significant exposure history, hypersensitivity pneumonitis is unlikely but would suggest sending hypersensitivity pneumonitis panel for workup completion.    #Moderate COPD GOLD II/A  #diffuse centrilobular GGO, bronchiolitis    Recommendation:  - Start albuterol inhaler q6h PRN  - Send hypersensitivity pneumonitis panel (ordered)    Discussed with Dr. Shawn Zambrano MD

## 2024-03-05 NOTE — PROGRESS NOTES
Transplant Candidate Pharmacist Screening Note     Current Facility-Administered Medications on File Prior to Visit   Medication Dose Route Frequency Provider Last Rate Last Admin    acetaminophen (Tylenol) tablet 650 mg  650 mg oral q6h PRN Diamond KATALINA Mullins, APRN-CNP   650 mg at 03/03/24 1334    aspirin EC tablet 81 mg  81 mg oral Daily Diamond KATALINA Mullins, APRN-CNP   81 mg at 03/05/24 0854    dapagliflozin propanediol (Farxiga) tablet 10 mg  10 mg oral Daily Diamond KATALINA Mullins, APRN-CNP   10 mg at 03/05/24 0854    digoxin (Lanoxin) tablet 125 mcg  125 mcg oral Daily Diamond KATALINA Mullins, APRN-CNP   125 mcg at 03/05/24 0854    diphth,pertus(acell),tetanus (BoostRIX) 2.5-8-5 Lf-mcg-Lf/0.5mL vaccine 0.5 mL  0.5 mL intramuscular During hospitalization Karel Gunter MD        flu vaccine (IIV4) age 6 months and greater, preservative free  0.5 mL intramuscular During hospitalization Karel Gunter MD        heparin (porcine) injection 2,000-4,000 Units  2,000-4,000 Units intravenous q4h PRN Rolando Lopezanovic DO        heparin 25,000 Units in dextrose 5% 250 mL (100 Units/mL) infusion (premix)  0-4,000 Units/hr intravenous Continuous Radisa Tosanovic, DO   Stopped at 03/03/24 2300    ipratropium-albuteroL (Duo-Neb) 0.5-2.5 mg/3 mL nebulizer solution 3 mL  3 mL nebulization q6h PRN Christiano P Olesya, APRN-CNP        levothyroxine (Synthroid, Levoxyl) tablet 175 mcg  175 mcg oral Daily before breakfast Diamond KATALINA Mullins, APRN-CNP   175 mcg at 03/05/24 0524    lidocaine 4 % patch 1 patch  1 patch transdermal Daily Diamond KATALINA Mullins, APRN-CNP   1 patch at 02/26/24 0825    melatonin tablet 5 mg  5 mg oral Nightly Christiano P Olesya, APRN-CNP   5 mg at 03/04/24 2102    ondansetron (Zofran) injection 4 mg  4 mg intravenous q6h PRN Diamond KATALINA Mullins, APRN-CNP        pantoprazole (ProtoNix) EC tablet 40 mg  40 mg oral BID OCOKIE Elizabeth-CNP   40 mg at 03/05/24 0854    pneumococcal conjugate vaccine, 20-valent, adult (PREVNAR 20)  0.5 mL intramuscular During  hospitalization Karel Gunter MD        polyethylene glycol (Glycolax, Miralax) packet 17 g  17 g oral Daily PRN Diamond Mullins APRN-CNP        sacubitriL-valsartan (Entresto) 24-26 mg per tablet 1 tablet  1 tablet oral BID Diamond Mullins APRN-CNP   1 tablet at 03/05/24 0854    sennosides-docusate sodium (Dahlia-Colace) 8.6-50 mg per tablet 2 tablet  2 tablet oral BID Diamond Mullins APRN-CNP   2 tablet at 03/05/24 0854    sertraline (Zoloft) tablet 12.5 mg  12.5 mg oral Daily COOKIE Elizabeth-CNP   12.5 mg at 03/05/24 0854    spironolactone (Aldactone) tablet 50 mg  50 mg oral Daily Diamond Mullins, APRN-CNP   50 mg at 03/05/24 0853    tamsulosin (Flomax) 24 hr capsule 0.4 mg  0.4 mg oral Daily Christiano Dacosta APRN-CNP   0.4 mg at 03/05/24 0854    [Held by provider] warfarin (Coumadin) tablet 5 mg  5 mg oral Once per day on Mon Wed Fri COOKIE Elizabeth-CNP   5 mg at 03/01/24 1704    warfarin (Coumadin) tablet 7.5 mg  7.5 mg oral Daily Rolando Lindo DO   7.5 mg at 03/04/24 1814     Current Outpatient Medications on File Prior to Visit   Medication Sig Dispense Refill    aspirin 81 mg EC tablet Take 1 tablet (81 mg) by mouth once daily.      dapagliflozin propanediol (Farxiga) 10 mg Take 1 tablet (10 mg) by mouth once daily. 30 tablet 11    digoxin (Lanoxin) 125 MCG tablet Take 1 tablet (125 mcg) by mouth once daily.      Entresto 49-51 mg tablet Take 1 tablet by mouth 2 times a day. 180 tablet 3    levothyroxine (Synthroid, Levoxyl) 175 mcg tablet Take 1 tablet (175 mcg) by mouth once daily in the morning. Take before meals. 90 tablet 3    Pacerone 200 mg tablet Take 1 tablet (200 mg) by mouth once daily.      pantoprazole (ProtoNix) 40 mg EC tablet Take 1 tablet (40 mg) by mouth once daily in the morning. Take before meals. 90 tablet 3    sertraline (Zoloft) 25 mg tablet TAKE 1 TABLET EVERY DAY 90 tablet 3    spironolactone (Aldactone) 50 mg tablet Take 1 tablet (50 mg) by mouth once daily. 90 tablet 3     tamsulosin (Flomax) 0.4 mg 24 hr capsule TAKE 1 CAPSULE EVERY DAY 90 capsule 3    torsemide (Demadex) 10 mg tablet Take 1 tablet (10 mg) by mouth if needed.      warfarin (Coumadin) 1 mg tablet 3.5mg (1mg tablet + 2.5mg tablet) on Monday, Wednesday, Friday  5mg (2.5mg tablet + 2.5mg tablet) on Sunday, Tuesday, Thursday, Saturday      warfarin (Coumadin) 2.5 mg tablet 3.5mg (1mg tablet + 2.5mg tablet) on Monday, Wednesday, Friday  5mg (2.5mg tablet + 2.5mg tablet) on Sunday, Tuesday, Thursday, Saturday       The patient's reported medications have been reviewed. Based on the above medication list, there are no pharmacologic contraindications to heart transplantation.    Lorena Patricia, PharmD, BCTXP   Solid Organ Transplant Clinical Pharmacy Specialist

## 2024-03-05 NOTE — LETTER
March 6, 2024    Anatoly Lane  [address] [place] [address]      Dear Mr. Lane:    Our multi-disciplinary transplant team completed a review of your medical records on 3/5/2024.  I am pleased to inform you that you will be placed on the [facility] ([facility]) waiting list for a Heart transplant.    Our transplant program consists of surgeons and medical doctors who provide coverage 365 days a year, 24 hours a day.     If you have any questions or concerns regarding your insurance coverage or billing issues, a  is available to speak with you.     It is important to keep us updated of any major changes in your medical condition, contact information and health insurance coverage.     Please don't hesitate to contact us at Dept: [phone] with any questions or concerns. We look forward to working with you through this process.      Sincerely,      Holli Barrios RN          The [facility] Toll-free Patient Services Line:  Your Resource for Organ Transplant Information    If you have a question regarding your own medical care, you always should call your transplant hospital first. However, for general organ transplant-related information, you should call the [facility] ([facility]) toll-free patient services line at [phone].  Anyone, including potential transplant candidates, candidates, recipients, family members, friends, living donors, and donor family members, can call this number to:    Talk about organ donation, living donation, the transplant process, the donation process, and transplant policies.  Get a free patient information kit with helpful booklets, waiting list and transplant information, and a list of all transplant hospitals.  Ask questions about the Organ Procurement and Transplantation Network (OPTN) web site (http://optn.transplant.hrsa.gov/), the [facility] Web site (http://unos.org/), or the [facility] web site for living donors and  transplant recipients. (http://www.transplantliving.org/).  Learn how [facility] and the OPTN can help you.  Talk about any concerns that you may have with a transplant hospital.    [facility] is a not-for-profit organization that provides the administrative services for the national OPTN under federal contract to the Health Resources and Services Administration (HRSA), an agency under the U.S. Department of Health and Human Services (HHS).    [facility] and the OPTN are responsible for:    Providing educational material for patients, the public, and professionals.  Raising awareness of the need for donated organs and tissue.  Writing organ transplant policy with help from transplant professionals, transplant patients, transplant candidates, donor families, living donors, and the public.  Coordinating organ procurement, matching, and placement.  Collecting information about every organ transplant and donation that occurs in the [place].    Remember, you should contact your transplant hospital directly if you have questions or concerns about your own medical care including medical records, work-up progress, and test results.    [facility] is not your transplant hospital, and staff at [facility] will not be able to transfer you to your transplant hospital, so keep your transplant hospital’s phone number handy.    However, while you research your transplant needs and learn as much as you can about transplantation and donation, we welcome your call to our toll-free patient services line at [phone].      [facility] PIL Final Rev 1-

## 2024-03-06 ENCOUNTER — DOCUMENTATION (OUTPATIENT)
Dept: TRANSPLANT | Facility: HOSPITAL | Age: 64
End: 2024-03-06
Payer: MEDICARE

## 2024-03-06 VITALS
SYSTOLIC BLOOD PRESSURE: 117 MMHG | RESPIRATION RATE: 18 BRPM | TEMPERATURE: 96.5 F | DIASTOLIC BLOOD PRESSURE: 80 MMHG | OXYGEN SATURATION: 97 % | HEIGHT: 69 IN | WEIGHT: 226.19 LBS | BODY MASS INDEX: 33.5 KG/M2 | HEART RATE: 85 BPM

## 2024-03-06 PROBLEM — I95.9 HYPOTENSION: Status: RESOLVED | Noted: 2024-02-14 | Resolved: 2024-03-06

## 2024-03-06 LAB
ALBUMIN SERPL BCP-MCNC: 3.8 G/DL (ref 3.4–5)
ANION GAP SERPL CALC-SCNC: 12 MMOL/L (ref 10–20)
APTT PPP: 39 SECONDS (ref 27–38)
BASOPHILS # BLD AUTO: 0.06 X10*3/UL (ref 0–0.1)
BASOPHILS NFR BLD AUTO: 0.9 %
BUN SERPL-MCNC: 19 MG/DL (ref 6–23)
CALCIUM SERPL-MCNC: 8.3 MG/DL (ref 8.6–10.6)
CHLORIDE SERPL-SCNC: 107 MMOL/L (ref 98–107)
CO2 SERPL-SCNC: 25 MMOL/L (ref 21–32)
CREAT SERPL-MCNC: 0.96 MG/DL (ref 0.5–1.3)
EGFRCR SERPLBLD CKD-EPI 2021: 89 ML/MIN/1.73M*2
EOSINOPHIL # BLD AUTO: 0.19 X10*3/UL (ref 0–0.7)
EOSINOPHIL NFR BLD AUTO: 2.8 %
ERYTHROCYTE [DISTWIDTH] IN BLOOD BY AUTOMATED COUNT: 17.1 % (ref 11.5–14.5)
GLUCOSE SERPL-MCNC: 96 MG/DL (ref 74–99)
HCT VFR BLD AUTO: 31.1 % (ref 41–52)
HGB BLD-MCNC: 9.6 G/DL (ref 13.5–17.5)
IMM GRANULOCYTES # BLD AUTO: 0.05 X10*3/UL (ref 0–0.7)
IMM GRANULOCYTES NFR BLD AUTO: 0.7 % (ref 0–0.9)
INR PPP: 3.3 (ref 0.9–1.1)
LDH SERPL L TO P-CCNC: 116 U/L (ref 84–246)
LYMPHOCYTES # BLD AUTO: 1.56 X10*3/UL (ref 1.2–4.8)
LYMPHOCYTES NFR BLD AUTO: 22.8 %
MAGNESIUM SERPL-MCNC: 2.22 MG/DL (ref 1.6–2.4)
MCH RBC QN AUTO: 31.8 PG (ref 26–34)
MCHC RBC AUTO-ENTMCNC: 30.9 G/DL (ref 32–36)
MCV RBC AUTO: 103 FL (ref 80–100)
MONOCYTES # BLD AUTO: 0.66 X10*3/UL (ref 0.1–1)
MONOCYTES NFR BLD AUTO: 9.7 %
NEUTROPHILS # BLD AUTO: 4.31 X10*3/UL (ref 1.2–7.7)
NEUTROPHILS NFR BLD AUTO: 63.1 %
NRBC BLD-RTO: 0 /100 WBCS (ref 0–0)
PHOSPHATE SERPL-MCNC: 3 MG/DL (ref 2.5–4.9)
PLATELET # BLD AUTO: 264 X10*3/UL (ref 150–450)
POTASSIUM SERPL-SCNC: 4.2 MMOL/L (ref 3.5–5.3)
PROT C AG ACT/NOR PPP IA: 67 % (ref 63–153)
PROT S AG ACT/NOR PPP IA: 67 % (ref 84–134)
PROTHROMBIN TIME: 37.2 SECONDS (ref 9.8–12.8)
RBC # BLD AUTO: 3.02 X10*6/UL (ref 4.5–5.9)
SODIUM SERPL-SCNC: 140 MMOL/L (ref 136–145)
WBC # BLD AUTO: 6.8 X10*3/UL (ref 4.4–11.3)

## 2024-03-06 PROCEDURE — 85025 COMPLETE CBC W/AUTO DIFF WBC: CPT | Performed by: NURSE PRACTITIONER

## 2024-03-06 PROCEDURE — 2500000002 HC RX 250 W HCPCS SELF ADMINISTERED DRUGS (ALT 637 FOR MEDICARE OP, ALT 636 FOR OP/ED): Mod: MUE | Performed by: NURSE PRACTITIONER

## 2024-03-06 PROCEDURE — 85610 PROTHROMBIN TIME: CPT | Performed by: NURSE PRACTITIONER

## 2024-03-06 PROCEDURE — 83735 ASSAY OF MAGNESIUM: CPT | Performed by: NURSE PRACTITIONER

## 2024-03-06 PROCEDURE — 36415 COLL VENOUS BLD VENIPUNCTURE: CPT | Performed by: NURSE PRACTITIONER

## 2024-03-06 PROCEDURE — 2500000002 HC RX 250 W HCPCS SELF ADMINISTERED DRUGS (ALT 637 FOR MEDICARE OP, ALT 636 FOR OP/ED): Mod: MUE

## 2024-03-06 PROCEDURE — 80069 RENAL FUNCTION PANEL: CPT | Performed by: NURSE PRACTITIONER

## 2024-03-06 PROCEDURE — 2500000001 HC RX 250 WO HCPCS SELF ADMINISTERED DRUGS (ALT 637 FOR MEDICARE OP)

## 2024-03-06 PROCEDURE — 83615 LACTATE (LD) (LDH) ENZYME: CPT | Performed by: NURSE PRACTITIONER

## 2024-03-06 PROCEDURE — 2500000002 HC RX 250 W HCPCS SELF ADMINISTERED DRUGS (ALT 637 FOR MEDICARE OP, ALT 636 FOR OP/ED)

## 2024-03-06 PROCEDURE — 2500000002 HC RX 250 W HCPCS SELF ADMINISTERED DRUGS (ALT 637 FOR MEDICARE OP, ALT 636 FOR OP/ED): Performed by: NURSE PRACTITIONER

## 2024-03-06 PROCEDURE — 2500000001 HC RX 250 WO HCPCS SELF ADMINISTERED DRUGS (ALT 637 FOR MEDICARE OP): Performed by: NURSE PRACTITIONER

## 2024-03-06 RX ORDER — WARFARIN 2 MG/1
TABLET ORAL
Qty: 30 TABLET | Refills: 1 | Status: SHIPPED | OUTPATIENT
Start: 2024-03-07 | End: 2024-03-06 | Stop reason: HOSPADM

## 2024-03-06 RX ORDER — SERTRALINE HYDROCHLORIDE 25 MG/1
12.5 TABLET, FILM COATED ORAL DAILY
Qty: 90 TABLET | Refills: 3 | Status: SHIPPED | OUTPATIENT
Start: 2024-03-07 | End: 2024-04-04

## 2024-03-06 RX ORDER — PANTOPRAZOLE SODIUM 40 MG/1
40 TABLET, DELAYED RELEASE ORAL 2 TIMES DAILY
Qty: 60 TABLET | Refills: 3 | Status: SHIPPED | OUTPATIENT
Start: 2024-03-06 | End: 2024-04-11

## 2024-03-06 RX ORDER — LEVOTHYROXINE SODIUM 175 UG/1
175 TABLET ORAL
Qty: 30 TABLET | Refills: 3 | Status: SHIPPED | OUTPATIENT
Start: 2024-03-07 | End: 2024-05-30

## 2024-03-06 RX ORDER — WARFARIN 2 MG/1
4 TABLET ORAL DAILY
Status: DISCONTINUED | OUTPATIENT
Start: 2024-03-07 | End: 2024-03-06 | Stop reason: HOSPADM

## 2024-03-06 RX ORDER — WARFARIN 4 MG/1
TABLET ORAL
Qty: 30 TABLET | Refills: 0 | Status: SHIPPED | OUTPATIENT
Start: 2024-03-06 | End: 2025-03-06

## 2024-03-06 RX ORDER — SACUBITRIL AND VALSARTAN 49; 51 MG/1; MG/1
1 TABLET, FILM COATED ORAL 2 TIMES DAILY
Qty: 180 TABLET | Refills: 5 | Status: SHIPPED | OUTPATIENT
Start: 2024-03-06 | End: 2025-08-28

## 2024-03-06 RX ADMIN — SERTRALINE HYDROCHLORIDE 12.5 MG: 25 TABLET ORAL at 08:51

## 2024-03-06 RX ADMIN — LEVOTHYROXINE SODIUM 175 MCG: 25 TABLET ORAL at 05:50

## 2024-03-06 RX ADMIN — DAPAGLIFLOZIN 10 MG: 10 TABLET, FILM COATED ORAL at 08:51

## 2024-03-06 RX ADMIN — DIGOXIN 125 MCG: 125 TABLET ORAL at 08:51

## 2024-03-06 RX ADMIN — ASPIRIN 81 MG: 81 TABLET, COATED ORAL at 08:51

## 2024-03-06 RX ADMIN — SPIRONOLACTONE 50 MG: 25 TABLET, FILM COATED ORAL at 08:51

## 2024-03-06 RX ADMIN — TAMSULOSIN HYDROCHLORIDE 0.4 MG: 0.4 CAPSULE ORAL at 08:51

## 2024-03-06 RX ADMIN — PANTOPRAZOLE SODIUM 40 MG: 40 TABLET, DELAYED RELEASE ORAL at 08:51

## 2024-03-06 RX ADMIN — SACUBITRIL AND VALSARTAN 1 TABLET: 24; 26 TABLET, FILM COATED ORAL at 08:51

## 2024-03-06 RX ADMIN — SENNOSIDES AND DOCUSATE SODIUM 2 TABLET: 8.6; 5 TABLET ORAL at 08:51

## 2024-03-06 ASSESSMENT — COGNITIVE AND FUNCTIONAL STATUS - GENERAL
DAILY ACTIVITIY SCORE: 24
MOBILITY SCORE: 24

## 2024-03-06 ASSESSMENT — PAIN SCALES - GENERAL: PAINLEVEL_OUTOF10: 0 - NO PAIN

## 2024-03-06 ASSESSMENT — PAIN - FUNCTIONAL ASSESSMENT: PAIN_FUNCTIONAL_ASSESSMENT: 0-10

## 2024-03-06 NOTE — CARE PLAN
The patient's goals for the shift include go home    The clinical goals for the shift include remains hds today and go home    Over the shift, the patient remained hds and deemed appropriate for discharge; pis removed with tip intact and 2x2 applied; discharge instructions reviewed with the pt and when his next dose of meds are due; pt left the floor via wheel chair with all his belongings including 4 batteries, 4 clips and his extra controller in his go bag; pt has follow up appointments scheduled and already has his medications.

## 2024-03-06 NOTE — DISCHARGE SUMMARY
Discharge Diagnosis  Acute decompensated heart failure (CMS/HCC)  GI bleeding, likely upper GI bleeding, resolved  Acute blood loss anemia, stable    Issues Requiring Follow-Up  Repeat CBC and INR in 1 week (Epic orders in place)  Follow up in LVAD clinic as scheduled with repeat CBC, INR, HLA panel.  Hold coumadin tonight (3/6). Resume coumadin 4 mg on 3/7. Repeat INR next week.  Increase dose of Entresto 49/51 mg bid as prescribed  Wean Zoloft accordingly, consider discontinuing in 2-4 weeks if no withdrawal symptoms.   Follow up Hypersensitivity panel sent by Pulmonary service    Test Results Pending At Discharge  Pending Labs       Order Current Status    HLA Antibody Panel In process    Hypersensitivity Pneumonitis Panel In process    Protein C antigen, total In process    Protein S Antigen, Total In process          Hospital Course   Anatoly Lane is 63 y.o. male with a PMH of stage D/ICM/HFrEF 10-15%, s/p HM3 4/30/2023, CAD, pAF, VT s/p AICD ([employer] [facility] placed 1/4/2018), depression, BPH & hypothyroidism. He was admitted to HFICU on 2/23/24 due to worsening shortness of breath, weight gain, and hypotension at home.  Cardiac workup negative for source of symptoms; however, on further review, patient states he has been having dark-tarry stools x 2 weeks prior to admission. Hgb decreased from 14 in August 2023 to 7.0 during this admission. He received 1 unit pRBC on 02/24/24. GI service consulted and performed EGD on 2/24 showing mild gastritis but no source of bleed. Colonoscopy performed on 2/26 without source of bleeding. Video capsule endoscopy completed on 2/27 without source either. GIB likely 2/2 to AVM. Anemia stabilized without further anemia and transferred to LT5 unit on 2/28. Lastly, patient now requesting workup for OHT, now initiated. His case was formally presented to selection committee on 3/5 and was approved for OHT listing. His HLA antibody panel will be repeated in 1 month (as he recently had  pRBC transfusion on 02/24).   There was no recurrence of GI bleed while in the medical unit. CBC trend showed stable hemoglobin and hematocrit. He remained hemodynamically stable. No LVAD alarms. Device interrogation (3/6) on the day of discharge was unremarkable, without any alarms.        Plan/Problem List:        NEURO PMH: Depression  Active Problem List:  #Depression  - Patient states he is unclear why or when he was started on zoloft, but it must have been after his LVAD surgery.  - He currently denies any symptoms of anxiety or depression and has asked if it is safe to be weaned off.  - There are multiple side effects (including increased bleeding risk) with zoloft, so may be beneficial to taper off since the patient has requested.  - Wean Zoloft to 12.5 daily and then discontinue in 2-4 weeks if no withdrawal symptoms (~3/18 or 3/25)     #Obesity  - BMO 32.69 kg/m2  - Weight gain of 75 pounds over last year (appears to be dietary and hypothyroid induced)          CV PMH: Stage D/ICM/HFrEF 10-15%, s/p HM3 4/30/2023, CAD, pAF, VT s/p AICD ([facility] [facility] placed 1/4/2018), HLD  Active Problem List:   #Stage D/ICM/HFrEF 10-15%  #HM3 4/30/2023 (bridge to transplant)  #RV dysfunction      Last RHC: 2/28/24 w/ RA 7, PAP 40/13 (22), W 13, CI 3.38 &   Last LHC: 3/30/23 w/ severe LAD diffuse 80% stenosis, distal LCx 70% stenosis and RCA mild-diffuse CAD.   Last TTE/BOBBY: 2/23/24 EF 15% w/ mild RV dysfunction     LVAD Settings: high speed 5500 RPM & low speed 5100 RPM  Flows: 4.8  RPM: 5500  PI: 3.7  Power: 4.2  Dressing changes: Weekly  Drive Line assessment: Clean and free from signs/symptoms of infection      - Continue aspirin 81mg daily   - Continue farxiga 10mg daily   - Increase dose Entresto 49/51 mg BID   - Continue spironolactone 50mg daily   - Holding beta-blocker due to persistent RV dysfunction   - Continue coumadin 4 mg daily for goal INR 2-3 and daily levels. Hold coumadin on 03/06 then resume coumadin 4  mg on 03/07. Repeat INR next week.  - Continue daily LDH  - Continue digoxin 125mcg daily for RV support      #CAD  #History of HLD  - Last [facility] 3/30/23 shows multivessel CAD w/ severe LAD diffuse 80% stenosis, distal LCx 70% stenosis and RCA mild-diffuse CAD.  - Repeat cholesterol studies on 2/29/24 appear normal   - Not currently on statin   - Continue aspirin 81mg daily      #pAfib and VT  - AICD placed 1/4/2018 ([facility]) set DDDR   - Currently NSR w/ intermittent V-pacing on telemetry. Intermittent runs of asymptomatic NSVT on 2/29/24  - Continue Digoxin 125mcg daily w/ appropriate levels   - Holding amiodarone due to concern for amio induced thyrotoxicosis. Pacerone discontinued     - Continue Coumadin for goal INR 2-3     #Heart Transplant Workup and Evaluation   - Patient states he is now ready for bridge to transplant and formal evaluation opened  - Transplant evaluation consent obtained on 2/29/24  - Blood type: AB+  - Last HLAs on 4/3/23 negative for class 1 and 2 antibodies   - Repeat HLA testing pending from 2/29/24 and 3/1/24   - Labwork collected and pending for transplant workup   - RHC completed 2/28/24  - LHC 3/20/23; no need for further imaging at this time  - Echo completed 2/23/24  - EKG completed 3/1/24  - Carotid ultrasound 3/1/24 with <50% stenosis bilateral carotids  - U/S aorta and iliacs 3/1/24 with poor visualization due to bowel gas and LVAD  - ANUPAM testing 3/1/24 negative for PAD  - No need for CPET  - US abdomen completed   - No need for chest x-ray  - CT C/A/P non-con 2/29/24: shows multiple stable lung nodules bilaterally (0.3cm) all stable from 5/4/23. Liver shows hypodense simple fluid collections stable (1.3cm) from 5/4/23. Multiple small hypodense fluid collections on bilateral kidneys favored to be cysts all stable compared to 5/4/23.  Diffuse centrilobular ground glass opacities within bilateral lungs, compatible with bronchiolitis.     - PFTs to be done on 3/4/24  -  Pulmonary consult obtained (3/4)  - Follow up Hypersensitivity panel (3/5) s per Pulmonary recs  - Bone density to be ordered outpatient  - Colonoscopy 2/26/24 & EGD 2/24/24 without significant findings   - Pantogram completed 2/29/24 and negative for abscess or carries   - Nutrition, social work and palliative care consulted    - His case has been approved for OHT listing as of 03/05/24.      #Vaccine status  - Immunity to HepA  - Immunity to MMR  - Will need HepB series (Engerix-B): first dose ordered, 2nd dose at 4/2024, 3rd dose at 9/2024  - Has had chicken pox w/ + VZV IgG: Will need 2-dose shingrix series outpatient   - Patient denies having recent Td or Tdap administration within last 10 years: single injection ordered  - RSV vaccine (Arexy): Single injection ordered this admission on 3/5/24   - Pneumococcal vaccine (Prevnar 20): Single injection ordered this admission   - Has received 2xPfizer COVID vaccines in 2021: Will require 1-dose of either moderna or pfizer vaccine outpatient  - Has not received Flu vaccine this year: Will require 1-dose of any flu vaccine       PULM PMH: Previous smoker (quite in 2019), Restrictive lung physiology    Active Problem List:  #Dyspnea on exertion   #Restrictive lung disease  #Bronchiolitis related to previous cigarette smoking  # Moderate COPD  - Last PFTs on 4/4/2023 show FEV1 1.66 (<60%), FVC 2.63 (<49%) w/ FEV1/FVC ratio of 82%  - Previous smoker quite in 2019  - Not currently on inhaler therapy  - Admitted with complaints of GRAVES for multiple weeks  - Cardiac workup unremarkable as source of symptoms   - GRAVES likely in setting of anemia 2/2 to GIB; however, CT on 2/29/24 shows Diffuse centrilobular ground glass opacities within bilateral lungs, compatible with bronchiolitis    - Pulmonary consult requested (3/4).   - Repeat PFTs ordered 3/4/24   - Continue duonebs Q6 PRN for SOB/GRAVES        GI PMH: None  Active Problem List:   #Melena 2/2 to suspected AVM?  - Endorses  having dark-tarry stools x 2 weeks prior to admission.   - Hg decreased from 14 in August 2023 to 7.0 during admission.  - EGD on 2/24 showing mild gastritis but no source of bleed.  - Colonoscopy performed on 2/26 without source of bleeding.  - Video capsule study completed on 2/27 without source of bleed  - H-pylori negative on 2/24  - No further melena and Hg stabilized without transfusions   - Likely source is AVM   - GI okay with restarting Coumadin and monitoring for GIB  - Continue pantoprazole 40mg BID      Diet: Regular diet   Bowel Regimen: Colace/senna BID, miralax PRN          PMH: BPH. 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    - TSH levels also notably elevated at 11.49 on admission with normal Free T4  - Unclear if hypothyroidism 2/2 to amiodarone vs Hashimotos (thyroid peroxidase antibodies elevated > 1000).   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    - Per patient, he has been taking his pantoprazole with his synthroid so he may not have been absorbing it  - Continue synthroid 175mcg daily. Discussed with Endocrine (3/4).   - Repeat TSH and FT4: 8.1/1.5, improved. Repeat levels on 3/8.  - Pacerone stopped due to concern for thyroid dysfunction.      HEME PMH: Iron deficiency anemia, history of HIT   Active Problem List:   #GIB  - See GI for plan     #Iron deficiency anemia   - iron studies on admission show iron deficiency anemia, likely precipitated by GIB  - GIB now controlled  - Completed 3xdays of venofir   - Will consider starting PO iron on follow up (to avoid confusion of melena inpatient)       #History of HIT (resolved)  - PF4 positive; however, HONG negative on 4/7/23   - Repeat PF4 on  "3/1/24 is negative   - Vascular medicine discussed and okay for patient to receive heparin  - No evidence of HIT.      ID PMH: None  Active Problem List: None        Ortho/Skin PMH: None  Active Problem List: None    Patient has been officially listed for cardiac transplant. He officially signed the consent for listing on 3/6.    Pertinent Physical Exam At Time of Discharge  Visit Vitals  /80   Pulse 85   Temp 35.8 °C (96.5 °F) (Temporal)   Resp 18   Ht 1.753 m (5' 9\")   Wt 103 kg (226 lb 3.1 oz)   SpO2 97%   BMI 33.40 kg/m²   Smoking Status Former   BSA 2.24 m²        Physical Exam  Constitutional:       Appearance: Normal appearance.   HENT:      Mouth: Mucous membranes are moist.      Pharynx: Oropharynx is clear. No oropharyngeal exudate or posterior oropharyngeal erythema.   Eyes:      Extraocular Movements: Extraocular movements intact.      Conjunctiva/sclera: Conjunctivae normal.      Pupils: Pupils are equal, round, and reactive to light.   Neck:      Vascular: No JVD.   Cardiovascular:      Rate and Rhythm: Regular rhythm.      Heart sounds: No murmur heard. LVAD hums regular     No friction rub. No gallop.      Comments: NSR w/ intermittent ventricular pacing. AICD set DDDR . LVAD hum heard over left chest. Dopplerable distal pulses in all extremities. Capillary refill normal. Appears warm and well perfused.   Pulmonary:      Effort: Pulmonary effort is normal. No accessory muscle usage, respiratory distress or retractions.      Breath sounds: Normal breath sounds.   Abdominal:      General: Abdomen is flat. Bowel sounds are normal. There is no distension.      Palpations: Abdomen is soft. There is no mass.      Tenderness: There is no abdominal tenderness. There is no guarding.      Hernia: No hernia is present.   Genitourinary:     Penis: Normal.    Musculoskeletal:         General: No tenderness. Normal range of motion.      Cervical back: Full passive range of motion without pain.      " Right lower leg: No edema.      Left lower leg: No edema.   Skin:     General: Skin is warm and dry.      Capillary Refill: Capillary refill takes less than 2 seconds.      Coloration: Skin is not jaundiced.      Findings: No bruising, laceration, lesion, rash or wound.      Comments: LVAD driveline site without drainage or erythema.    Neurological:      General: No focal deficit present.      Mental Status: He is alert and oriented to person, place, and time.   Psychiatric:         Mood and Affect: Mood normal.     Home Medications     Medication List      CHANGE how you take these medications     pantoprazole 40 mg EC tablet; Commonly known as: ProtoNix; Take 1 tablet   (40 mg) by mouth 2 times a day. Do not crush, chew, or split.; What   changed: when to take this, additional instructions   sertraline 25 mg tablet; Commonly known as: Zoloft; Take 0.5 tablets   (12.5 mg) by mouth once daily. Do not start before March 7, 2024.; Start   taking on: March 7, 2024; What changed: how much to take   warfarin 2 mg tablet; Commonly known as: Coumadin; Take as directed per   After Visit Summary. Do not start before March 7, 2024.; Start taking on:   March 7, 2024; What changed: medication strength, additional instructions,   Another medication with the same name was removed. Continue taking this   medication, and follow the directions you see here.     CONTINUE taking these medications     aspirin 81 mg EC tablet   dapagliflozin propanediol 10 mg; Commonly known as: Farxiga; Take 1   tablet (10 mg) by mouth once daily.   digoxin 125 MCG tablet; Commonly known as: Lanoxin   Entresto 49-51 mg tablet; Generic drug: sacubitriL-valsartan; Take 1   tablet by mouth 2 times a day.   levothyroxine 175 mcg tablet; Commonly known as: Synthroid, Levoxyl;   Take 1 tablet (175 mcg) by mouth once daily in the morning. Take before   meals. Do not start before March 7, 2024.; Start taking on: March 7, 2024   spironolactone 50 mg tablet;  Commonly known as: Aldactone; Take 1 tablet   (50 mg) by mouth once daily.   tamsulosin 0.4 mg 24 hr capsule; Commonly known as: Flomax; TAKE 1   CAPSULE EVERY DAY   torsemide 10 mg tablet; Commonly known as: Demadex     STOP taking these medications     Pacerone 200 mg tablet; Generic drug: amiodarone       Outpatient Follow-Up  Future Appointments   Date Time Provider Department Center   3/26/2024  3:00 PM Arleth Baker, APRN-CNP [id number]   9/12/2024 11:00 AM Matias Leung MD [id number]     Patient was seen and evaluated with HF service attending, Dr. OSVALDO Shrestha.    Karel Gunter MD, PGY-4  Heart Failure fellow

## 2024-03-06 NOTE — PROGRESS NOTES
LISTING EDUCATION  Patient educated regarding the following prior to placement on the transplant waiting list:                 The patient's medical condition, prognosis, and treatment plan.                 The expectations and patient responsibilities while on the waiting list, including:                 Keeping the transplant center informed of any changes in contact information or insurance coverage                 Notifying the transplant center of any changes in medical status                 Required testing and/or re-evaluation appointments while awaiting transplant                 An overview of the surgical procedure, including potential risks and alternatives.                 Information regarding what to expect during the inpatient admission and recovery period.                 A discussion regarding organ offers and types of potential donors, including potential risks that may be associated with specific types of donors that could affect the success of the transplant or the health of the patient.                 National and center-specific outcomes from the most recent [facility] program specific report.                 The right to refuse transplantation.      Patient was given the opportunity to have questions answered.  Patient was provided a copy of the signed informed consent for heart transplant listing.    Education provided by: Holli Barrios RN   Transplant Coordinator: Katia Martinez RN   Transplant Physician: Dagoberto Shrestha MD    Signed listing informed consent received? Yes  Patient agrees to be listed for the following: HEART TRANSPLANT     Patient will be placed on the UNOS waiting list upon receipt of listing authorization from insurance company.

## 2024-03-06 NOTE — NURSING NOTE
The pt is discharged to home and has follow up appointments scheduled; pt left the floor with his 4 batteries, 4 clips, extra controller and his go bag; pt instructed and in his written discharge instructions that he is to take 4mg of coumadin tomorrow, no coumadin today.

## 2024-03-06 NOTE — SIGNIFICANT EVENT
Anatoly Lane is a 63 y.o. male with PMH of stage D/ICM/HFrEF 10-15%, s/p HM3 4/30/2023, CAD, pAF, VT s/p AICD ([facility] placed 1/4/2018), depression, BPH & hypothyroidism. Originally admitted to HFICU on 2/23/24 due to worsening shortness of breath, weight gain, and hypotension at home, anemia likely 2/2 AVM. Pulmonary consulted for CT findings of bronchiolitis for evaluation.      Patient is asymptomatic from pulmonary point of view. PFTs showed moderate obstruction & air trapping. CT showed findings suggestive for bronchiolitis. PFTs & CT scan can be due to smoking related obstructive lung disease such as COPD or respiratory bronchiolitis. Patient doesn't have significant exposure history, hypersensitivity pneumonitis is unlikely but would suggest sending hypersensitivity pneumonitis panel for workup completion.     #Moderate COPD GOLD II/A  #diffuse centrilobular GGO, bronchiolitis     Recommendation:  - albuterol inhaler q6h PRN  - follow up with pulmonary as outpatient     Discussed with Dr. Escobar

## 2024-03-07 LAB
MGC ASCENT PFT - FEV1 - PRE: 2.44
MGC ASCENT PFT - FEV1 - PREDICTED: 3.16
MGC ASCENT PFT - FVC - PRE: 4
MGC ASCENT PFT - FVC - PREDICTED: 4.08

## 2024-03-08 ENCOUNTER — HOSPITAL ENCOUNTER (OUTPATIENT)
Dept: RADIOLOGY | Facility: EXTERNAL LOCATION | Age: 64
Discharge: HOME | End: 2024-03-08

## 2024-03-08 ENCOUNTER — APPOINTMENT (OUTPATIENT)
Dept: CARDIOLOGY | Facility: HOSPITAL | Age: 64
End: 2024-03-08
Payer: MEDICARE

## 2024-03-08 ENCOUNTER — APPOINTMENT (OUTPATIENT)
Dept: TRANSPLANT | Facility: HOSPITAL | Age: 64
End: 2024-03-08
Payer: MEDICARE

## 2024-03-08 DIAGNOSIS — Z79.899 HIGH RISK MEDICATION USE: ICD-10-CM

## 2024-03-08 DIAGNOSIS — I47.29 PAROXYSMAL VENTRICULAR TACHYCARDIA (MULTI): ICD-10-CM

## 2024-03-09 LAB
A FUMIGATUS1 AB SER QL ID: NORMAL
A FUMIGATUS6 AB SER QL ID: NORMAL
A PULLULANS AB SER QL ID: NORMAL
PIGEON SERUM AB QL ID: NORMAL
S RECTIVIRGULA AB SER QL ID: NORMAL

## 2024-03-11 DIAGNOSIS — Z94.9 TRANSPLANT: ICD-10-CM

## 2024-03-13 ENCOUNTER — DOCUMENTATION (OUTPATIENT)
Dept: TRANSPLANT | Facility: HOSPITAL | Age: 64
End: 2024-03-13
Payer: MEDICARE

## 2024-03-13 ENCOUNTER — TELEPHONE (OUTPATIENT)
Dept: TRANSPLANT | Facility: HOSPITAL | Age: 64
End: 2024-03-13
Payer: MEDICARE

## 2024-03-13 NOTE — TELEPHONE ENCOUNTER
Called patient to notify him that his insurance approved transplant listing. Patient has been listed as a UNOS status 4 for heart transplant. Patient verbalized understanding and confirmed phone numbers.

## 2024-03-13 NOTE — PROGRESS NOTES
Per fax from [facility] transplant approved heart transplant listing 03/06/2024 to 03/05/2025. AUTH # [id number].  From Sara SUERO at [facility]

## 2024-03-13 NOTE — TELEPHONE ENCOUNTER
SW spoke with Pt by phone to follow-up regarding Pt getting established with new PCP. Pt stated he does still have the list of in-network local PCPs that SW provided when he discharged from the hospital recently, but had not looked at it and stated he had forgotten about it. Pt stated he will take a look and attempt to schedule and call SW if he needs any further assistance. SW to follow-up in 2-3 weeks with Pt to see if he has been able to successfully schedule new PCP appt.

## 2024-03-13 NOTE — LETTER
RE: Transplant Listing Status    Dear Anatoly,    Your pre-transplant evaluation has been completed and reviewed by our transplant team. We are pleased to inform you that you have been placed on the heart transplant waiting list as of 3/13/2024 with a listing status of UNOS status 4. As required by the [employer] ([employer]), the contractor for the national waiting list, we have enclosed the UNOS Patient Information Letter for you reference. In addition, please read carefully the following information that is necessary to maintain your listing status.    TRAVEL  If you plan to go out of town while you are on the waiting list, please call to provide us with information on how to reach you and the dates you will be gone.    CHANGES IN ADDRESS, PHONE, INSURANCE  It is very important that you notify the transplant office of any changes in your address, phone, or insurance information. Failure to do so may result in you missing an opportunity for a transplant and/or may result in your insurance company refusing payment.    HOSPITALIZATIONS  Please notify us if you are hospitalized at any hospital for any reason. Be sure the physicians caring for you are aware that you are waiting for a heart transplant. In some instances we will want to have you transferred to [facility].    TRANSPLANT ADMISSION  You will receive a call from one of the heart transplant coordinators when a heart has been matched for you. You will be given instructions at that time about when to come to the hospital and where to report.  We know that this can be a very anxious time for your and your loved ones.  Please feel free to contact us at [phone] when you have questions.    Sincerely,    Katia Martinez  Heart Transplant Coordinator    CC:  Encl: UNOS Patient Information Letter  [employer]OS Surgeon and Physician Coverage Plan

## 2024-03-15 ENCOUNTER — TELEPHONE (OUTPATIENT)
Dept: TRANSPLANT | Facility: HOSPITAL | Age: 64
End: 2024-03-15
Payer: MEDICARE

## 2024-03-15 NOTE — TELEPHONE ENCOUNTER
Patient called and missed his dose of medications yesterday. Patient did not realize he missed the medications until this morning, Patient called to ask what he should do because of the missed medicine.     Advised patient to continue his medication as ordered and do not try to take extra medication today. Patient states he feels fine; denies N/V/D/ dizziness and chest pain. No VAD alarms.

## 2024-03-26 ENCOUNTER — APPOINTMENT (OUTPATIENT)
Dept: TRANSPLANT | Facility: HOSPITAL | Age: 64
End: 2024-03-26
Payer: MEDICARE

## 2024-03-26 ENCOUNTER — TELEPHONE (OUTPATIENT)
Dept: TRANSPLANT | Facility: HOSPITAL | Age: 64
End: 2024-03-26

## 2024-03-26 ASSESSMENT — ENCOUNTER SYMPTOMS
PSYCHIATRIC NEGATIVE: 1
DIZZINESS: 0
DYSPNEA ON EXERTION: 1
HEMATOLOGIC/LYMPHATIC NEGATIVE: 1
CONSTITUTIONAL NEGATIVE: 1
ENDOCRINE NEGATIVE: 1
BLURRED VISION: 0
RESPIRATORY NEGATIVE: 1
WEAKNESS: 1
MUSCULOSKELETAL NEGATIVE: 1
GASTROINTESTINAL NEGATIVE: 1

## 2024-03-26 NOTE — TELEPHONE ENCOUNTER
Patient cancelled appointment today 2/2 GI upset, emesis. Called patient who denies any diarrhea, dizziness, bloody stool, bloody emesis, SOB, fevers, or LVAD alarms. States he had bile like emesis this morning x1. Instructed patient to call LVAD line back tomorrow if unable to tolerate PO intake.     Will reschedule patient to be seen in clinic and have HLA labs drawn next week.

## 2024-03-26 NOTE — PROGRESS NOTES
VAD Cardiologist: Anita Vincent   VAD Coordinator: Ailyn Michelle   In visit: Patient, LVAD JUVENCIO Michelle  Patient's Location: [place] [address]    Date of Visit: 03/26/2024  2:00 PM EDT  Location of visit: [facility]   Type of Visit: LVAD followup    Type of VAD:  HM3  Implant Date: 4/30/2023  Reason for VAD: ICM  Intent: Short term - listed status 4 on 3/13/2024     HPI / Summary:   Anatoly Lane is a very pleasant 63 y.o. male presenting for management of Stage D HFrEF NYHA class 2-3.  s/p HM3 4/30/2023 CAD, h/o pAF and VT s/p ICD, hypothyroidism, and stage D systolic HF/ICM/HFrEF who returns to the LVAD clinic for ongoing evaluation and management.     Previously, patient stopped taking Empagliflozin due to itching. Patient started on Farxiga 10mg at that time. Patient had EP consult d/t not being seen by an EP MD in about 1 year.     Interval Hx:  Patient recently admitted 2/23-3/06 for acute decompensated HF and suspected GI bleed which has since resolved. While in hospital, patient optimized medically and evaluated for OHT which he was ultimately listed as status 4 on 3/13/2024.       Today:  Patient denies CP, palpitations, dizziness, GRAVES, orthopnea, PND, edema, LVAD alarms, N/V/D, hematochezia, hematuria, epistaxis. Most recent INR is ***.  Driveline is clean with no drainage. Dressing was last changed on ***. Dressing change performed in clinic today.     VAD equipment interrogated in clinic with all batteries and clips functioning properly. Patient denies sleeping on batteries at home. Patient has/ has not dropped their controller since last visit.     Patient has/ has not completed Cardiac Rehab.   *** / 36  sessions completed.         Review of Systems   Constitutional: Negative.   HENT: Negative.     Eyes:  Negative for blurred vision.   Cardiovascular:  Positive for dyspnea on exertion and leg swelling.   Respiratory: Negative.     Endocrine: Negative.    Hematologic/Lymphatic:  Negative.    Skin: Negative.    Musculoskeletal: Negative.    Gastrointestinal: Negative.    Genitourinary: Negative.    Neurological:  Positive for weakness. Negative for dizziness.   Psychiatric/Behavioral: Negative.     : Full 10 pt review of symptoms of negative unless discussed above.     Medical History:   No past medical history on file.  Surgical Hx:   Past Surgical History:   Procedure Laterality Date    CARDIAC CATHETERIZATION N/A 2/28/2024    Procedure: Right Heart Cath;  Surgeon: José Velasco MD;  Location: [place] [address] Cardiac Cath Lab;  Service: Cardiovascular;  Laterality: N/A;  with RAMP study    COLONOSCOPY W/ POLYPECTOMY  2023    CT ABDOMEN PELVIS ANGIOGRAM W AND/OR WO IV CONTRAST  04/13/2023    CT ABDOMEN PELVIS ANGIOGRAM W AND/OR WO IV CONTRAST [facility] CT    LEFT VENTRICULAR ASSIST DEVICE      OTHER SURGICAL HISTORY  01/05/2022    Complete colonoscopy    OTHER SURGICAL HISTORY  01/05/2022    Cardioverter defibrillator insertion      Vitals:       3/6/2024     6:30 AM 3/6/2024     5:49 AM 3/6/2024     4:48 AM 3/6/2024     1:31 AM 3/5/2024    11:46 PM 3/5/2024     8:08 PM   Vitals   Systolic 117   94     Diastolic 80   55     Heart Rate   85  70 75   Temp   35.8 °C (96.5 °F)   36.5 °C (97.7 °F)   Resp   18  18 18   Weight (lb)  226.19 226.19      BMI  33.4 kg/m2 33.4 kg/m2      BSA (m2)  2.24 m2 2.24 m2        Wt Readings from Last 5 Encounters:   03/06/24 103 kg (226 lb 3.1 oz)   02/23/24 105 kg (230 lb 9.6 oz)   02/19/24 107 kg (235 lb)   12/08/23 101 kg (223 lb 3.2 oz)   09/20/23 88.5 kg (195 lb)     GEN: Pleasant, fatigued.   HEENT: JVP elevated, no icterus.   CHEST: Clear to auscultation. Sternotomy well healed.  CV: LVAD hum  ABD: Soft, ND, NT.  Driveline: No drainage. Dressing c/d/I. Secured.  Area of erythema in the region near there is contact between skin and tape.    EXT: Warm, well perfused, mild LE edema.   NEURO: Pleasant, GAINES, Oriented to plan     Labs:   CMP:  Recent Labs      03/06/24  0615 03/05/24  0609 03/04/24  0947 03/03/24  0604 03/02/24  0610    140 137 137 136   K 4.2 4.1 4.5 4.2 4.2    105 106 103 103   CO2 25 28 23 24 24   ANIONGAP 12 11 13 14 13   BUN 19 25* 28* 30* 22   CREATININE 0.96 1.02 1.06 1.01 1.32*   EGFR 89 83 79 84 61   MG 2.22 2.50* 2.14 2.18 2.22       Recent Labs     03/06/24  0615 03/05/24  0609 03/04/24  0947 03/03/24  0604 03/02/24  0610 02/24/24  0233 02/23/24  1433 05/31/23  2134 05/28/23  0603 05/26/23  0056 05/23/23  0729 05/23/23  0304 05/21/23  1943   ALBUMIN 3.8 4.1 3.8 3.8 3.9   < > 4.0 CANCELED CANCELED CANCELED 3.3*   < > CANCELED   ALKPHOS  --   --   --   --   --   --  47 CANCELED CANCELED CANCELED 98   < > CANCELED   ALT  --   --   --   --   --   --  13 CANCELED CANCELED CANCELED 22   < > CANCELED   AST  --   --   --   --   --   --  11 CANCELED CANCELED CANCELED 19   < > CANCELED   BILITOT  --   --   --   --   --   --  0.4 CANCELED CANCELED CANCELED 0.7   < > CANCELED   LIPASE  --   --   --   --   --   --   --   --   --   --   --   --  CANCELED    < > = values in this interval not displayed.       CBC:  Recent Labs     03/06/24  0615 03/05/24  0609 03/04/24  0947 03/03/24  0604 03/02/24  0610   WBC 6.8 6.6 5.6 6.4 5.6   HGB 9.6* 10.3* 10.0* 10.0* 10.1*   HCT 31.1* 34.1* 31.9* 31.3* 31.5*    312 274 284 222   * 105* 103* 101* 102*       COAG:   Recent Labs     03/06/24  0615 03/05/24  0609 03/03/24  2355 03/03/24  1055 03/03/24  0604 03/02/24  2315 05/02/23  0030 05/01/23  0033 04/30/23  1619 04/30/23  0413 04/21/23  1412 04/21/23  1125 04/08/23  0315 04/07/23  1835 04/06/23  1852 04/06/23  1714   INR 3.3* 2.6* 1.9*  --  1.6*  --    < > 1.3* 1.3* 1.2*   < > 1.2*   < >  --    < >  --    HAUF  --   --   --  0.3 0.4 0.2   < >  --   --  0.3   < >  --    < >  --   --   --    HAPTOGLOBIN  --   --   --   --   --   --   --   --   --   --   --  <30  --  <30  --  <30   FIBRINOGEN  --   --   --   --   --   --   --  264 255 356   < >   --   --   --   --   --     < > = values in this interval not displayed.       ABO:   Recent Labs     03/02/24  0610   ABO AB       HEME/ENDO:  Recent Labs     03/02/24  0610 02/26/24  0811 02/23/24  1433 05/31/23  2134 04/25/23  0911 04/21/23  1125 04/07/23  0416 03/31/23  2331 03/31/23  2142 03/29/23  2151   FERRITIN  --   --  104  --   --  352*  --   --   --  79   IRONSAT  --   --  18*  --   --  16*  --   --   --  6*   TSH 8.10* 11.49*  11.49* 18.02* CANCELED   < >  --    < >  --    < > 15.36*   HGBA1C  --   --  4.4  --   --   --   --  5.8*  --  6.7*    < > = values in this interval not displayed.        CARDIAC:   Recent Labs     03/06/24  0615 03/05/24  0609 03/04/24  0947 03/03/24  0604 03/02/24  0610 02/24/24  0233 02/23/24  1433 05/21/23  1943 04/02/23  0154 03/29/23  2151    142 161 145 147   < > 128  --    < >  --    TROPHS  --   --   --   --   --   --  30 CANCELED  CANCELED  --   --    BNP  --   --   --   --   --   --  33 CANCELED  --  3,231*    < > = values in this interval not displayed.       Recent Labs     02/29/24  0658 02/28/24  0444 03/29/23  2151   CHOL 158 155 126   LDLF  --   --  85   HDL 38.3 39.4 24.0*   TRIG 145  --  83       MICRO:   Recent Labs     05/12/23  0857   CRP 14.77*       Echocardiogram     Narrative  [facility], [address] [place] [address]  Tel [phone] and Fax [phone]    TRANSTHORACIC ECHOCARDIOGRAM REPORT      Patient Name:     BILLIE Steward Physician:  [id number] Spencer Lilly MD  Study Date:       5/15/2023          Referring           SHERRILL FLEMING  Physician:  MRN/PID:          [id number]           PCP:  Accession/Order#: [id number]          Department          [facility]  Location:           Invasive  YOB: 1960          Fellow:             [id number] Tobi Ledbetter MD  Gender:           M                  Nurse:              Mei Jeff RN  Admit Date:       3/29/2023          Sonographer:        Erika  [facility],  TONYA  Admission Status: Inpatient -        Additional Staff:  Routine  Height:           175.26 cm          CC Report to:       [address]  Weight:           73.03 kg           Study Type:         Echocardiogram  BSA:              1.88 m2    Diagnosis/ICD: Z95.811-Presence of heart assist device  Indication:    Post LVAD follow up  Procedure/CPT: Echo Limited-49653; Color Doppler-42633; Doppler Limited-59819    Patient History:  Smoker:            Former.  Pertinent History: CAD, Cardiomyopathy and A-Fib. HFrEF, VT s/p dual chamber  ICD, anemia, hypothyroidism, cardiogenic shock, s/p LVAD  HMIII.    Study Detail: The following Echo studies were performed: 2D, M-Mode, Doppler and  color flow. Definity used as a contrast agent for endocardial  border definition. Total contrast used for this procedure was 3.0  mL via IV push.      PHYSICIAN INTERPRETATION:  Left Ventricle: The left ventricular systolic function is severely decreased, with an estimated ejection fraction of 10-15%. There is global hypokinesis of the left ventricle with minor regional variations. The left ventricular cavity size is severely dilated. Spectral Doppler shows an abnormal pattern of left ventricular diastolic filling.  Left Atrium: The left atrium is severely dilated.  Right Ventricle: The right ventricle was not well visualized. There is reduced right ventricular systolic function. The RV is not completely visualized, but appears to be likely moderately dilated with moderately decreased function.  Right Atrium: The right atrium was not well visualized. There is a device visualized in the right atrium.  Aortic Valve: The aortic valve appears abnormal. There is mild aortic valve regurgitation.  Mitral Valve: The mitral valve is normal in structure. There is trace mitral valve regurgitation.  Tricuspid Valve: The tricuspid valve is structurally normal. Tricuspid regurgitation was not assessed.  Pulmonic Valve: The  pulmonic valve is structurally normal. There is no indication of pulmonic valve regurgitation.  Pericardium: There is a trivial pericardial effusion.  Pleural: There is left pleural effusion.  Aorta: The aortic root was not well visualized.  Systemic Veins: The inferior vena cava appears to be of normal size. There is IVC inspiratory collapse greater than 50%.  In comparison to the previous echocardiogram(s): Although previous studies are available for review, their comparison with the current echocardiogram is clinically irrelevant. Compared with study from 4/25/2023,.    LEFT VENTRICULAR ASSIST DEVICE:  Study Type: This is a follow-up LVAD echocardiogram.  LVAD: The patient has a(n) Heartmate III LVAD device present.  Inflow Cannula: The inflow cannula is visualized in the left ventricular apex.  Outflow Cannula: Visualization of the outflow cannula is technically difficult.  AV Leaflet Mobility: The aortic valve leaflets do not appear to open.  Inflow Velocities: The LVAD cannula inflow velocity is normal (.5-2msec).        CONCLUSIONS:  1. Left ventricular systolic function is severely decreased with a 10-15% estimated ejection fraction.  2. Spectral Doppler shows an abnormal pattern of left ventricular diastolic filling.  3. The RV is not completely visualized, but appears to be likely moderately dilated with moderately decreased function.  4. There is reduced right ventricular systolic function.  5. The left atrium is severely dilated.  6. Aortic valve appears abnormal.  7. Mild aortic valve regurgitation.  8. Left ventricular cavity size is severely dilated.  9. There is global hypokinesis of the left ventricle with minor regional variations.    QUANTITATIVE DATA SUMMARY:  2D MEASUREMENTS:  Normal Ranges:  IVSd:          0.90 cm    (0.6-1.1cm)  LVPWd:         1.00 cm    (0.6-1.1cm)  LVIDd:         6.20 cm    (3.9-5.9cm)  LV Mass Index: 129.8 g/m2    LV SYSTOLIC FUNCTION BY 2D PLANIMETRY (MOD):  Normal  Ranges:  EF-A4C View: 13.9 % (>=55%)  EF-A2C View: 21.8 %  EF-Biplane:  17.2 %    TRICUSPID VALVE/RVSP:  Normal Ranges:  IVC Diam: 1.16 cm      Jose  [facility], Cath Lab, [address] [place] [address]    Cardiovascular Catheterization Report    Patient Name:     BILLIE      Performing Physician:  [id number] Vince WARD MD  Study Date:       4/27/2023  Verifying Physician:   [id number] Vince Boyer MD  MRN/PID:          [id number]   Cardiologist/Co-scrub:  Accession/Order#: [id number]  Fellow:                [id number] Tobi Ledbetter MD  YOB: 1960  Fellow:  Gender:           M          Referring Physician:  Admit Date:                  Referring Physician:   [id number] Anita Vincent MD  Surgeon:                     Referring Physician:   VANESSA      Study: Right Heart Catheterization      Indications:  BILLIE WARD is a 63 year old male who presents with hypertension, atrial fibrillation and Tobacco Use - Former, Patient was referred for right heart catheterization to monitor hemodynamics for an acute exacerbation of heart failure requiring Impella support. Cardiomyopathy.    Appropriate Use Criteria:  Re-evaluation of know cardiomyopathy with change in clinical status or on cardiac exam or to guide therapy; AUC score = 7.    Procedure Description:  After infiltration with 1% Lidocaine, the was cannulated with a modified Seldinger technique. After infiltration of local anesthetic, the right internal jugular vein was identified with two-dimensional ultrasound. Under direct ultrasound visualization, the right internal jugular vein was cannulated with a micropuncture technique. A 9 Burkinan sheath was placed in the vein. A balloon tipped catheter was positioned in the right heart system to record pressures and remained in the patient when transferred from the lab. Cardiac output was calculated via the Raven method. Post-procedure, the venous  sheath was left intact and sutured in place.    Right Heart Catheterization:  A balloon tipped catheter was positioned in the right heart system to record pressures and remained in the patient when transferred from the lab. Cardiac output was calculated via the Raven method.    Hemo Personnel:  +----------------------+-------------+  Name                  Duty           +----------------------+-------------+  Vince Boyer MD, MD 1  +----------------------+-------------+  Jey Driscoll RN       PROC CIRC 1  +----------------------+-------------+  Mariya Fam RN     PROC CIRC 2  +----------------------+-------------+  Edilberto Reinoso RN    PROC RECORD 1  +----------------------+-------------+  Tobi Ledbetter MD         FELLOW PHYS 1  +----------------------+-------------+      Sedation Time:  +------------------------+----------------------------------------+  Sedation Start/End TimesTime                                      +------------------------+----------------------------------------+  Start                   4/27/2023 10:26:07                        +------------------------+----------------------------------------+  Drugs                   Versed 0.5 mg IV per physician for sedat  +------------------------+----------------------------------------+  End                     4/27/2023 10:56:04                        +------------------------+----------------------------------------+      Equipment Used:  +---------------------+--------------------------------------------------------+              Date/Time                                             Description  +---------------------+--------------------------------------------------------+        Hemodynamic Pressures:  Resting hemodynamics on maximal Impella support revealed a moderately elevated PCW pressure with a normal Raven cardiac index of 3.4 L/min/mï¿½. The PVR is normal at that  72.  +----+----------+---------+-------------+-------------+---+----+-------+-------+  SiteDate Time   Phase  Systolic mmHg  Diastolic  ED MeanA-Wave V-Wave                   Name                    mmHg     mmHmmHg mmHg   mmHg                                                      g                     +----+----------+---------+-------------+-------------+---+----+-------+-------+    AO 4/27/2023     Rest          101           72     78                      10:47:20                                                                      AM                                                          +----+----------+---------+-------------+-------------+---+----+-------+-------+    RA 4/27/2023     Rest                                4      8      5        10:48:31                                                                      AM                                                          +----+----------+---------+-------------+-------------+---+----+-------+-------+    RV 4/27/2023     Rest           47               8                          10:49:08                                                                      AM                                                          +----+----------+---------+-------------+-------------+---+----+-------+-------+    PW 4/27/2023     Rest                               20     21     24        10:50:13                                                                      AM                                                          +----+----------+---------+-------------+-------------+---+----+-------+-------+    PA 4/27/2023     Rest           47           18     26                      10:51:05                                                                      AM                                                           +----+----------+---------+-------------+-------------+---+----+-------+-------+        Oxygen Saturation %:  +-----------+----------+------------+  Sample SiteO2 Sat (%)HB (g/100ml)  +-----------+----------+------------+           FA        97         7.8  +-----------+----------+------------+           PA        62         7.8  +-----------+----------+------------+           FA        97         7.8  +-----------+----------+------------+           PA        62         7.8  +-----------+----------+------------+      Cardiac Outputs:  +----+---+---+      CI CO   +----+---+---+  [id number]  +----+---+---+      Vascular Resistance Calculated Values (Wood Units):  +---+---+  IED993  +---+---+  PVR72   +---+---+      Complications:  No in-lab complications observed.    Cardiac Cath Transition of Care Summary:  Post Procedure Diagnosis: Moderately elevated PCW pressure with a normal Raven  cardiac index on axillary Impella support.    Blood Loss:               Estimated blood loss during the procedure was 30 mls.  Specimens Removed:        Number of specimen(s) removed: none.    ____________________________________________________________________________________  CONCLUSIONS:  1. Moderately elevated PCW pressure with normal Raven cardiac index on Impella support.    ____________________________________________________________________________________  CPT Codes:  Right Heart Cath O2/Cardiac output without biopsy (RHC)-02013; Moderate Sedation Services 1st additional 15 minutes patient >5 years-16217; Moderate Sedation Services 2nd additional 15 minutes patient >5 years-54263    ICD 10 Codes:  I50.23-Acute on chronic systolic (congestive) heart failure    [id number] Vince Boyer MD  Performing Physician  Electronically signed by [id number] Vince Boyer MD on 4/28/2023 at 11:13:42 AM      Current Outpatient Medications   Medication Instructions    aspirin 81 mg,  oral, Daily    dapagliflozin propanediol (FARXIGA) 10 mg, oral, Daily    digoxin (LANOXIN) 125 mcg, oral, Daily    Entresto 49-51 mg tablet 1 tablet, oral, 2 times daily    levothyroxine (SYNTHROID, LEVOXYL) 175 mcg, oral, Daily before breakfast    pantoprazole (PROTONIX) 40 mg, oral, 2 times daily, Do not crush, chew, or split.    sertraline (ZOLOFT) 12.5 mg, oral, Daily    spironolactone (ALDACTONE) 50 mg, oral, Daily    tamsulosin (FLOMAX) 0.4 mg, oral, Daily    torsemide (DEMADEX) 10 mg, oral, As needed    warfarin (Coumadin) 4 mg tablet Take as directed per After Visit Summary.          Heart Mate III interrogation       Problems:   1) Stage D chronic systolic HF/ICM/HFrEF s/p HM3 LVAD (4/30/23)  ABO: AB  Short/Long term: long term (pts choice; does not wish to proceed with OHT eval)   - Continue aspirin 81mg daily   - Continue farxiga 10mg daily   - Increase dose Entresto 49/51 mg BID   - Continue spironolactone 50mg daily   - Holding beta-blocker due to persistent RV dysfunction   - Continue coumadin 4 mg daily for goal INR 2-3 and daily levels.   - Continue digoxin 125mcg daily for RV support   LVAD interrogation reveals no alarms or power spikes, no reported LVAD alarms.  Hypotensive today w/ MAP 50.   -Continue fish oil and PPI  -encouraged consideration for cardiac rehab     2) Long term anticoagulations secondary to LVAD  -c/w warfarin (target INR 2-3)     3) Salmonella infection- resolved   Bactrim course now complete     4) h/o right axillary repair with right brachial plexus injury and residual right hand numbness  CT head and neck wo contrast 5/3 showed degenerative changes C5-C6   -Previously had neurology referral for EMG and further mx     5) h/o VT and pAF  -pacerone discontinued     6) Hypothyroidism  -c/w levothyroxine  - TSH levels also notably elevated at 11.49 on admission with normal Free T4  - Unclear if hypothyroidism 2/2 to amiodarone vs Hashimotos (thyroid peroxidase antibodies elevated  > 1000).   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    - Per patient, he has been taking his pantoprazole with his synthroid so he may not have been absorbing it  - Continue synthroid 175mcg daily. Discussed with Endocrine (3/4).   - Repeat TSH and FT4: 8.1/1.5, improved. Repeat levels on 3/8.  - Pacerone stopped due to concern for thyroid dysfunction.     7) Depression  - He currently denies any symptoms of anxiety or depression and has asked if it is safe to be weaned off.  - There are multiple side effects (including increased bleeding risk) with zoloft, so may be beneficial to taper off since the patient has requested.  - Wean Zoloft to 12.5 daily and then discontinue in 2-4 weeks if no withdrawal symptoms (~3/18 or 3/25)    8) Vaccine status       - Immunity to HepA       - Immunity to MMR       - Will need HepB series (Engerix-B): first dose ordered, 2nd dose at 4/2024, 3rd dose at 9/2024       - Has had chicken pox w/ + VZV IgG: Will need 2-dose shingrix series outpatient        - Patient denies having recent Td or Tdap administration within last 10 years: single injection ordered       - RSV vaccine (Arexy): Single injection ordered this admission on 3/5/24        - Pneumococcal vaccine (Prevnar 20): Single injection ordered this admission        - Has received 2xPfizer COVID vaccines in 2021: Will require 1-dose of either moderna or pfizer vaccine outpatient       - Has not received Flu vaccine this year: Will require 1-dose of any flu vaccine            9) Melena 2/2 to suspected AVM       - EGD on 2/24 showing mild gastritis but no source of bleed.       - Colonoscopy performed on 2/26 without source of bleeding.        - Continue pantoprazole 40mg BID         - Will consider starting PO iron on follow up (to avoid confusion of melena inpatient)        ____________________________________________________________  Arleth Baker,  APRN-CNP   Section of Advanced Heart Failure and Cardiac Transplantation  Division of Cardiovascular Medicine  [facility]

## 2024-04-01 ENCOUNTER — TELEPHONE (OUTPATIENT)
Dept: TRANSPLANT | Facility: HOSPITAL | Age: 64
End: 2024-04-01
Payer: MEDICARE

## 2024-04-01 NOTE — TELEPHONE ENCOUNTER
SW spoke with Pt by phone to inquire if he has been able to establish with new PCP. Pt advised he has not selected a new PCP or made an appt yet and does not still have the list provided by CARINE. SW to provide another list when Pt is here for next clinic visit 4/4. Pt agreeable.

## 2024-04-02 ENCOUNTER — APPOINTMENT (OUTPATIENT)
Dept: TRANSPLANT | Facility: HOSPITAL | Age: 64
End: 2024-04-02
Payer: MEDICARE

## 2024-04-04 ENCOUNTER — SOCIAL WORK (OUTPATIENT)
Dept: TRANSPLANT | Facility: HOSPITAL | Age: 64
End: 2024-04-04
Payer: MEDICARE

## 2024-04-04 ENCOUNTER — OFFICE VISIT (OUTPATIENT)
Dept: TRANSPLANT | Facility: HOSPITAL | Age: 64
End: 2024-04-04
Payer: MEDICARE

## 2024-04-04 ENCOUNTER — LAB (OUTPATIENT)
Dept: LAB | Facility: LAB | Age: 64
End: 2024-04-04
Payer: MEDICARE

## 2024-04-04 VITALS — BODY MASS INDEX: 34.8 KG/M2 | WEIGHT: 235 LBS | TEMPERATURE: 97 F | HEIGHT: 69 IN

## 2024-04-04 DIAGNOSIS — I50.82 ACUTE ON CHRONIC CONGESTIVE HEART FAILURE WITH RIGHT VENTRICULAR DIASTOLIC DYSFUNCTION (MULTI): ICD-10-CM

## 2024-04-04 DIAGNOSIS — F32.A DEPRESSION, UNSPECIFIED DEPRESSION TYPE: ICD-10-CM

## 2024-04-04 DIAGNOSIS — I50.33 ACUTE ON CHRONIC CONGESTIVE HEART FAILURE WITH RIGHT VENTRICULAR DIASTOLIC DYSFUNCTION (MULTI): ICD-10-CM

## 2024-04-04 DIAGNOSIS — Z76.82 HEART TRANSPLANT CANDIDATE: ICD-10-CM

## 2024-04-04 DIAGNOSIS — Z79.01 ANTICOAGULANT LONG-TERM USE: ICD-10-CM

## 2024-04-04 DIAGNOSIS — I50.20 HFREF (HEART FAILURE WITH REDUCED EJECTION FRACTION) (MULTI): ICD-10-CM

## 2024-04-04 DIAGNOSIS — N42.9 DISORDER OF PROSTATE, UNSPECIFIED: ICD-10-CM

## 2024-04-04 DIAGNOSIS — Z94.9 TRANSPLANT: ICD-10-CM

## 2024-04-04 DIAGNOSIS — Z95.811 LVAD (LEFT VENTRICULAR ASSIST DEVICE) PRESENT (MULTI): ICD-10-CM

## 2024-04-04 DIAGNOSIS — Z79.899 HIGH RISK MEDICATION USE: ICD-10-CM

## 2024-04-04 DIAGNOSIS — I51.9 RIGHT VENTRICULAR DYSFUNCTION: ICD-10-CM

## 2024-04-04 DIAGNOSIS — I51.9 RIGHT VENTRICULAR DYSFUNCTION: Primary | ICD-10-CM

## 2024-04-04 DIAGNOSIS — I50.9 CONGESTIVE HEART FAILURE, UNSPECIFIED HF CHRONICITY, UNSPECIFIED HEART FAILURE TYPE (MULTI): ICD-10-CM

## 2024-04-04 DIAGNOSIS — I42.9 CARDIOMYOPATHY, UNSPECIFIED TYPE (MULTI): ICD-10-CM

## 2024-04-04 DIAGNOSIS — I47.29 PAROXYSMAL VENTRICULAR TACHYCARDIA (MULTI): ICD-10-CM

## 2024-04-04 LAB
ALBUMIN SERPL BCP-MCNC: 4.4 G/DL (ref 3.4–5)
ALP SERPL-CCNC: 55 U/L (ref 33–136)
ALT SERPL W P-5'-P-CCNC: 13 U/L (ref 10–52)
ANION GAP SERPL CALC-SCNC: 13 MMOL/L (ref 10–20)
AST SERPL W P-5'-P-CCNC: 12 U/L (ref 9–39)
BASOPHILS # BLD AUTO: 0.07 X10*3/UL (ref 0–0.1)
BASOPHILS NFR BLD AUTO: 0.8 %
BILIRUB SERPL-MCNC: 0.3 MG/DL (ref 0–1.2)
BNP SERPL-MCNC: 101 PG/ML (ref 0–99)
BUN SERPL-MCNC: 29 MG/DL (ref 6–23)
CALCIUM SERPL-MCNC: 9.3 MG/DL (ref 8.6–10.6)
CHLORIDE SERPL-SCNC: 101 MMOL/L (ref 98–107)
CO2 SERPL-SCNC: 28 MMOL/L (ref 21–32)
CREAT SERPL-MCNC: 1.05 MG/DL (ref 0.5–1.3)
DIGOXIN SERPL-MCNC: 0.43 NG/ML (ref 0.8–?)
EGFRCR SERPLBLD CKD-EPI 2021: 80 ML/MIN/1.73M*2
EOSINOPHIL # BLD AUTO: 0.16 X10*3/UL (ref 0–0.7)
EOSINOPHIL NFR BLD AUTO: 1.8 %
ERYTHROCYTE [DISTWIDTH] IN BLOOD BY AUTOMATED COUNT: 15.2 % (ref 11.5–14.5)
GLUCOSE SERPL-MCNC: 82 MG/DL (ref 74–99)
HCT VFR BLD AUTO: 39.8 % (ref 41–52)
HGB BLD-MCNC: 12.8 G/DL (ref 13.5–17.5)
HLA RESULTS: NORMAL
HLA-A+B+C AB NFR SER: NORMAL %
HLA-DP+DQ+DR AB NFR SER: NORMAL %
IMM GRANULOCYTES # BLD AUTO: 0.05 X10*3/UL (ref 0–0.7)
IMM GRANULOCYTES NFR BLD AUTO: 0.6 % (ref 0–0.9)
INR PPP: 2.2 (ref 0.9–1.1)
LDH SERPL L TO P-CCNC: 134 U/L (ref 84–246)
LYMPHOCYTES # BLD AUTO: 1.78 X10*3/UL (ref 1.2–4.8)
LYMPHOCYTES NFR BLD AUTO: 20.1 %
MCH RBC QN AUTO: 29.6 PG (ref 26–34)
MCHC RBC AUTO-ENTMCNC: 32.2 G/DL (ref 32–36)
MCV RBC AUTO: 92 FL (ref 80–100)
MONOCYTES # BLD AUTO: 0.71 X10*3/UL (ref 0.1–1)
MONOCYTES NFR BLD AUTO: 8 %
NEUTROPHILS # BLD AUTO: 6.08 X10*3/UL (ref 1.2–7.7)
NEUTROPHILS NFR BLD AUTO: 68.7 %
NRBC BLD-RTO: 0 /100 WBCS (ref 0–0)
PHOSPHATE SERPL-MCNC: 3.6 MG/DL (ref 2.5–4.9)
PLATELET # BLD AUTO: 311 X10*3/UL (ref 150–450)
POTASSIUM SERPL-SCNC: 4.1 MMOL/L (ref 3.5–5.3)
PROT SERPL-MCNC: 6.7 G/DL (ref 6.4–8.2)
PROTHROMBIN TIME: 25.4 SECONDS (ref 9.8–12.8)
PSA SERPL-MCNC: 0.25 NG/ML
RBC # BLD AUTO: 4.33 X10*6/UL (ref 4.5–5.9)
SODIUM SERPL-SCNC: 138 MMOL/L (ref 136–145)
TSH SERPL-ACNC: 0.7 MIU/L (ref 0.44–3.98)
WBC # BLD AUTO: 8.9 X10*3/UL (ref 4.4–11.3)

## 2024-04-04 PROCEDURE — 15852 DRESSING CHANGE NOT FOR BURN: CPT | Performed by: REGISTERED NURSE

## 2024-04-04 PROCEDURE — 83880 ASSAY OF NATRIURETIC PEPTIDE: CPT

## 2024-04-04 PROCEDURE — 3008F BODY MASS INDEX DOCD: CPT | Performed by: REGISTERED NURSE

## 2024-04-04 PROCEDURE — 85025 COMPLETE CBC W/AUTO DIFF WBC: CPT

## 2024-04-04 PROCEDURE — 83615 LACTATE (LD) (LDH) ENZYME: CPT

## 2024-04-04 PROCEDURE — 93750 INTERROGATION VAD IN PERSON: CPT | Mod: ZK | Performed by: REGISTERED NURSE

## 2024-04-04 PROCEDURE — 85610 PROTHROMBIN TIME: CPT

## 2024-04-04 PROCEDURE — 36415 COLL VENOUS BLD VENIPUNCTURE: CPT

## 2024-04-04 PROCEDURE — 80053 COMPREHEN METABOLIC PANEL: CPT

## 2024-04-04 PROCEDURE — 84100 ASSAY OF PHOSPHORUS: CPT

## 2024-04-04 PROCEDURE — 99215 OFFICE O/P EST HI 40 MIN: CPT | Performed by: REGISTERED NURSE

## 2024-04-04 PROCEDURE — 80162 ASSAY OF DIGOXIN TOTAL: CPT

## 2024-04-04 PROCEDURE — 84443 ASSAY THYROID STIM HORMONE: CPT

## 2024-04-04 PROCEDURE — 86832 HLA CLASS I HIGH DEFIN QUAL: CPT

## 2024-04-04 PROCEDURE — 84153 ASSAY OF PSA TOTAL: CPT

## 2024-04-04 PROCEDURE — 86833 HLA CLASS II HIGH DEFIN QUAL: CPT

## 2024-04-04 RX ORDER — TORSEMIDE 10 MG/1
20 TABLET ORAL DAILY
Qty: 60 TABLET | Refills: 11 | Status: SHIPPED | OUTPATIENT
Start: 2024-04-04 | End: 2025-04-04

## 2024-04-04 RX ORDER — SERTRALINE HYDROCHLORIDE 25 MG/1
25 TABLET, FILM COATED ORAL DAILY
Qty: 30 TABLET | Refills: 11 | Status: SHIPPED | OUTPATIENT
Start: 2024-04-04 | End: 2024-06-04

## 2024-04-04 RX ORDER — SENNOSIDES 8.6 MG/1
1 TABLET ORAL DAILY
COMMUNITY

## 2024-04-04 NOTE — PROGRESS NOTES
VAD Cardiologist: Anita Vincent   VAD Coordinator: Ailyn Michelle   In visit: Patient, LVAD JUVENCIO Michelle  Patient's Location: [place] [address]    Date of Visit: 03/26/2024  2:00 PM EDT  Location of visit: [facility]   Type of Visit: LVAD followup    Type of VAD:  HM3  Implant Date: 4/30/2023  Reason for VAD: ICM  Intent: Short term - listed status 4 on 3/13/2024     HPI / Summary:   Anatoly Lane is a very pleasant 63 y.o. male presenting for management of Stage D HFrEF NYHA class 2-3.  s/p HM3 4/30/2023 CAD, h/o pAF and VT s/p ICD, hypothyroidism, and stage D systolic HF/ICM/HFrEF who returns to the LVAD clinic for ongoing evaluation and management.     Previously, patient stopped taking Empagliflozin due to itching. Patient started on Farxiga 10mg at that time. Patient had EP consult d/t not being seen by an EP MD in about 1 year.     Interval Hx:  Patient recently admitted 2/23-3/06 for acute decompensated HF and suspected GI bleed which has since resolved. While in hospital, patient optimized medically and evaluated for OHT. He was ultimately listed as status 4 on 3/13/2024.     Today:  Patient denies CP, palpitations, orthopnea, PND, edema, LVAD alarms, N/V/D, hematochezia, hematuria, epistaxis. Most recent INR is 2.5. Denies any dark tarry stools since restarting Coumadin. Occasional dizziness with position change, SOB on exertion.   Driveline is clean with no drainage. Dressing was last changed on 3/27/24. Dressing change performed in clinic today. Takes Torsemide 10mg daily.     VAD equipment interrogated in clinic with all batteries and clips functioning properly. Patient denies sleeping on batteries at home. Patient has/ has not dropped their controller since last visit.     Patient has not completed Cardiac Rehab.   0 / 36  sessions completed.       Review of Systems   Constitutional: Negative.   HENT: Negative.     Eyes:  Negative for blurred vision.   Cardiovascular:  Positive for  dyspnea on exertion and leg swelling.   Respiratory: Negative.     Endocrine: Negative.    Hematologic/Lymphatic: Negative.    Skin: Negative.    Musculoskeletal: Negative.    Gastrointestinal: Negative.    Genitourinary: Negative.    Neurological:  Positive for weakness. Negative for dizziness.   Psychiatric/Behavioral: Negative.     : Full 10 pt review of symptoms of negative unless discussed above.     Medical History:   No past medical history on file.  Surgical Hx:   Past Surgical History:   Procedure Laterality Date    CARDIAC CATHETERIZATION N/A 2/28/2024    Procedure: Right Heart Cath;  Surgeon: José Velasco MD;  Location: [place] [address] Cardiac Cath Lab;  Service: Cardiovascular;  Laterality: N/A;  with RAMP study    COLONOSCOPY W/ POLYPECTOMY  2023    CT ABDOMEN PELVIS ANGIOGRAM W AND/OR WO IV CONTRAST  04/13/2023    CT ABDOMEN PELVIS ANGIOGRAM W AND/OR WO IV CONTRAST [facility] CT    LEFT VENTRICULAR ASSIST DEVICE      OTHER SURGICAL HISTORY  01/05/2022    Complete colonoscopy    OTHER SURGICAL HISTORY  01/05/2022    Cardioverter defibrillator insertion      Vitals:       3/6/2024     6:30 AM 3/6/2024     5:49 AM 3/6/2024     4:48 AM 3/6/2024     1:31 AM 3/5/2024    11:46 PM 3/5/2024     8:08 PM   Vitals   Systolic 117   94     Diastolic 80   55     Heart Rate   85  70 75   Temp   35.8 °C (96.5 °F)   36.5 °C (97.7 °F)   Resp   18  18 18   Weight (lb)  226.19 226.19      BMI  33.4 kg/m2 33.4 kg/m2      BSA (m2)  2.24 m2 2.24 m2        Wt Readings from Last 5 Encounters:   03/06/24 103 kg (226 lb 3.1 oz)   02/23/24 105 kg (230 lb 9.6 oz)   02/19/24 107 kg (235 lb)   12/08/23 101 kg (223 lb 3.2 oz)   09/20/23 88.5 kg (195 lb)     GEN: Pleasant, fatigued.   HEENT: JVP elevated, no icterus.   CHEST: Clear to auscultation. Sternotomy well healed.  CV: LVAD hum  ABD: Soft, ND, NT.  Driveline: No drainage. Dressing c/d/I. Secured.   EXT: Warm, well perfused, +1 BLE edema.   NEURO: Pleasant, GAINES, Oriented to  plan         Labs:   CMP:  Recent Labs     03/06/24  0615 03/05/24  0609 03/04/24  0947 03/03/24  0604 03/02/24  0610    140 137 137 136   K 4.2 4.1 4.5 4.2 4.2    105 106 103 103   CO2 25 28 23 24 24   ANIONGAP 12 11 13 14 13   BUN 19 25* 28* 30* 22   CREATININE 0.96 1.02 1.06 1.01 1.32*   EGFR 89 83 79 84 61   MG 2.22 2.50* 2.14 2.18 2.22       Recent Labs     03/06/24  0615 03/05/24  0609 03/04/24  0947 03/03/24  0604 03/02/24  0610 02/24/24  0233 02/23/24  1433 05/31/23  2134 05/28/23  0603 05/26/23  0056 05/23/23  0729 05/23/23  0304 05/21/23  1943   ALBUMIN 3.8 4.1 3.8 3.8 3.9   < > 4.0 CANCELED CANCELED CANCELED 3.3*   < > CANCELED   ALKPHOS  --   --   --   --   --   --  47 CANCELED CANCELED CANCELED 98   < > CANCELED   ALT  --   --   --   --   --   --  13 CANCELED CANCELED CANCELED 22   < > CANCELED   AST  --   --   --   --   --   --  11 CANCELED CANCELED CANCELED 19   < > CANCELED   BILITOT  --   --   --   --   --   --  0.4 CANCELED CANCELED CANCELED 0.7   < > CANCELED   LIPASE  --   --   --   --   --   --   --   --   --   --   --   --  CANCELED    < > = values in this interval not displayed.       CBC:  Recent Labs     03/06/24 0615 03/05/24  0609 03/04/24  0947 03/03/24  0604 03/02/24  0610   WBC 6.8 6.6 5.6 6.4 5.6   HGB 9.6* 10.3* 10.0* 10.0* 10.1*   HCT 31.1* 34.1* 31.9* 31.3* 31.5*    312 274 284 222   * 105* 103* 101* 102*       COAG:   Recent Labs     03/06/24  0615 03/05/24  0609 03/03/24  2355 03/03/24  1055 03/03/24  0604 03/02/24  2315 05/02/23  0030 05/01/23  0033 04/30/23  1619 04/30/23  0413 04/21/23  1412 04/21/23  1125 04/08/23  0315 04/07/23  1835 04/06/23  1852 04/06/23  1714   INR 3.3* 2.6* 1.9*  --  1.6*  --    < > 1.3* 1.3* 1.2*   < > 1.2*   < >  --    < >  --    HAUF  --   --   --  0.3 0.4 0.2   < >  --   --  0.3   < >  --    < >  --   --   --    HAPTOGLOBIN  --   --   --   --   --   --   --   --   --   --   --  <30  --  <30  --  <30   FIBRINOGEN  --   --    --   --   --   --   --  264 255 356   < >  --   --   --   --   --     < > = values in this interval not displayed.       ABO:   Recent Labs     03/02/24  0610   ABO AB       HEME/ENDO:  Recent Labs     03/02/24  0610 02/26/24  0811 02/23/24  1433 05/31/23  2134 04/25/23  0911 04/21/23  1125 04/07/23  0416 03/31/23  2331 03/31/23  2142 03/29/23  2151   FERRITIN  --   --  104  --   --  352*  --   --   --  79   IRONSAT  --   --  18*  --   --  16*  --   --   --  6*   TSH 8.10* 11.49*  11.49* 18.02* CANCELED   < >  --    < >  --    < > 15.36*   HGBA1C  --   --  4.4  --   --   --   --  5.8*  --  6.7*    < > = values in this interval not displayed.        CARDIAC:   Recent Labs     03/06/24  0615 03/05/24  0609 03/04/24  0947 03/03/24  0604 03/02/24  0610 02/24/24  0233 02/23/24  1433 05/21/23  1943 04/02/23  0154 03/29/23  2151    142 161 145 147   < > 128  --    < >  --    TROPHS  --   --   --   --   --   --  30 CANCELED  CANCELED  --   --    BNP  --   --   --   --   --   --  33 CANCELED  --  3,231*    < > = values in this interval not displayed.       Recent Labs     02/29/24  0658 02/28/24  0444 03/29/23  2151   CHOL 158 155 126   LDLF  --   --  85   HDL 38.3 39.4 24.0*   TRIG 145  --  83       MICRO:   Recent Labs     05/12/23  0857   CRP 14.77*       Echocardiogram     Narrative  [facility], [address] [place] [address]  Tel [phone] and Fax [phone]    TRANSTHORACIC ECHOCARDIOGRAM REPORT      Patient Name:     BILLIE WARD       [place] Physician:  [id number] Spencer Lilly MD  Study Date:       5/15/2023          Referring           SHERRILL FLEMING  Physician:  MRN/PID:          [id number]           PCP:  Accession/Order#: [id number]          Department          [facility]  Location:           Invasive  YOB: 1960          Fellow:             [id number] Tobi Ledbetter MD  Gender:           M                  Nurse:              Mei Jeff RN  Admit Date:        3/29/2023          Sonographer:        Erika Zamora RDCS,  TONYA  Admission Status: Inpatient -        Additional Staff:  Routine  Height:           175.26 cm          CC Report to:       Vijay [address]  Weight:           73.03 kg           Study Type:         Echocardiogram  BSA:              1.88 m2    Diagnosis/ICD: Z95.811-Presence of heart assist device  Indication:    Post LVAD follow up  Procedure/CPT: Echo Limited-19256; Color Doppler-41903; Doppler Limited-40217    Patient History:  Smoker:            Former.  Pertinent History: CAD, Cardiomyopathy and A-Fib. HFrEF, VT s/p dual chamber  ICD, anemia, hypothyroidism, cardiogenic shock, s/p LVAD  HMIII.    Study Detail: The following Echo studies were performed: 2D, M-Mode, Doppler and  color flow. Definity used as a contrast agent for endocardial  border definition. Total contrast used for this procedure was 3.0  mL via IV push.      PHYSICIAN INTERPRETATION:  Left Ventricle: The left ventricular systolic function is severely decreased, with an estimated ejection fraction of 10-15%. There is global hypokinesis of the left ventricle with minor regional variations. The left ventricular cavity size is severely dilated. Spectral Doppler shows an abnormal pattern of left ventricular diastolic filling.  Left Atrium: The left atrium is severely dilated.  Right Ventricle: The right ventricle was not well visualized. There is reduced right ventricular systolic function. The RV is not completely visualized, but appears to be likely moderately dilated with moderately decreased function.  Right Atrium: The right atrium was not well visualized. There is a device visualized in the right atrium.  Aortic Valve: The aortic valve appears abnormal. There is mild aortic valve regurgitation.  Mitral Valve: The mitral valve is normal in structure. There is trace mitral valve regurgitation.  Tricuspid Valve: The tricuspid valve is structurally normal. Tricuspid  regurgitation was not assessed.  Pulmonic Valve: The pulmonic valve is structurally normal. There is no indication of pulmonic valve regurgitation.  Pericardium: There is a trivial pericardial effusion.  Pleural: There is left pleural effusion.  Aorta: The aortic root was not well visualized.  Systemic Veins: The inferior vena cava appears to be of normal size. There is IVC inspiratory collapse greater than 50%.  In comparison to the previous echocardiogram(s): Although previous studies are available for review, their comparison with the current echocardiogram is clinically irrelevant. Compared with study from 4/25/2023,.    LEFT VENTRICULAR ASSIST DEVICE:  Study Type: This is a follow-up LVAD echocardiogram.  LVAD: The patient has a(n) Heartmate III LVAD device present.  Inflow Cannula: The inflow cannula is visualized in the left ventricular apex.  Outflow Cannula: Visualization of the outflow cannula is technically difficult.  AV Leaflet Mobility: The aortic valve leaflets do not appear to open.  Inflow Velocities: The LVAD cannula inflow velocity is normal (.5-2msec).        CONCLUSIONS:  1. Left ventricular systolic function is severely decreased with a 10-15% estimated ejection fraction.  2. Spectral Doppler shows an abnormal pattern of left ventricular diastolic filling.  3. The RV is not completely visualized, but appears to be likely moderately dilated with moderately decreased function.  4. There is reduced right ventricular systolic function.  5. The left atrium is severely dilated.  6. Aortic valve appears abnormal.  7. Mild aortic valve regurgitation.  8. Left ventricular cavity size is severely dilated.  9. There is global hypokinesis of the left ventricle with minor regional variations.    QUANTITATIVE DATA SUMMARY:  2D MEASUREMENTS:  Normal Ranges:  IVSd:          0.90 cm    (0.6-1.1cm)  LVPWd:         1.00 cm    (0.6-1.1cm)  LVIDd:         6.20 cm    (3.9-5.9cm)  LV Mass Index: 129.8 g/m2    LV  SYSTOLIC FUNCTION BY 2D PLANIMETRY (MOD):  Normal Ranges:  EF-A4C View: 13.9 % (>=55%)  EF-A2C View: 21.8 %  EF-Biplane:  17.2 %    TRICUSPID VALVE/RVSP:  Normal Ranges:  IVC Diam: 1.16 cm      [facility], Cath Lab, [address] [place] [address]    Cardiovascular Catheterization Report    Patient Name:     BILLIE      Performing Physician:  [id number] Vince WARD MD  Study Date:       4/27/2023  Verifying Physician:   [id number] Boyer MD  MRN/PID:          [id number]   Cardiologist/Co-scrub:  Accession/Order#: [id number]  Fellow:                [id number] Tobi Ledbetter MD  YOB: 1960  Fellow:  Gender:           M          Referring Physician:  Admit Date:                  Referring Physician:   [id number] Anita Vincent MD  Surgeon:                     Referring Physician:   VANESSA      Study: Right Heart Catheterization      Indications:  BILLIE WARD is a 63 year old male who presents with hypertension, atrial fibrillation and Tobacco Use - Former, Patient was referred for right heart catheterization to monitor hemodynamics for an acute exacerbation of heart failure requiring Impella support. Cardiomyopathy.    Appropriate Use Criteria:  Re-evaluation of know cardiomyopathy with change in clinical status or on cardiac exam or to guide therapy; AUC score = 7.    Procedure Description:  After infiltration with 1% Lidocaine, the was cannulated with a modified Seldinger technique. After infiltration of local anesthetic, the right internal jugular vein was identified with two-dimensional ultrasound. Under direct ultrasound visualization, the right internal jugular vein was cannulated with a micropuncture technique. A 9 [place]ij[place] sheath was placed in the vein. A balloon tipped catheter was positioned in the right heart system to record pressures and remained in the patient when transferred from the lab. Cardiac output was calculated via  the Raven method. Post-procedure, the venous sheath was left intact and sutured in place.    Right Heart Catheterization:  A balloon tipped catheter was positioned in the right heart system to record pressures and remained in the patient when transferred from the lab. Cardiac output was calculated via the Raven method.    Hemo Personnel:  +----------------------+-------------+  Name                  Duty           +----------------------+-------------+  Vince Boyer MD, MD 1  +----------------------+-------------+  Jey Driscoll RN       PROC CIRC 1  +----------------------+-------------+  Mariya Fam RN     PROC CIRC 2  +----------------------+-------------+  Edilberto Reinoso RN    PROC RECORD 1  +----------------------+-------------+  Tobi Ledbetter MD         FELLOW PHYS 1  +----------------------+-------------+      Sedation Time:  +------------------------+----------------------------------------+  Sedation Start/End TimesTime                                      +------------------------+----------------------------------------+  Start                   4/27/2023 10:26:07                        +------------------------+----------------------------------------+  Drugs                   Versed 0.5 mg IV per physician for sedat  +------------------------+----------------------------------------+  End                     4/27/2023 10:56:04                        +------------------------+----------------------------------------+      Equipment Used:  +---------------------+--------------------------------------------------------+              Date/Time                                             Description  +---------------------+--------------------------------------------------------+        Hemodynamic Pressures:  Resting hemodynamics on maximal Impella support revealed a moderately elevated PCW pressure with a normal Raven cardiac index of 3.4 L/min/mï¿½. The PVR  is normal at that 72.  +----+----------+---------+-------------+-------------+---+----+-------+-------+  SiteDate Time   Phase  Systolic mmHg  Diastolic  ED MeanA-Wave V-Wave                   Name                    mmHg     mmHmmHg mmHg   mmHg                                                      g                     +----+----------+---------+-------------+-------------+---+----+-------+-------+    AO 4/27/2023     Rest          101           72     78                      10:47:20                                                                      AM                                                          +----+----------+---------+-------------+-------------+---+----+-------+-------+    RA 4/27/2023     Rest                                4      8      5        10:48:31                                                                      AM                                                          +----+----------+---------+-------------+-------------+---+----+-------+-------+    RV 4/27/2023     Rest           47               8                          10:49:08                                                                      AM                                                          +----+----------+---------+-------------+-------------+---+----+-------+-------+    PW 4/27/2023     Rest                               20     21     24        10:50:13                                                                      AM                                                          +----+----------+---------+-------------+-------------+---+----+-------+-------+    PA 4/27/2023     Rest           47           18     26                      10:51:05                                                                      AM                                                           +----+----------+---------+-------------+-------------+---+----+-------+-------+        Oxygen Saturation %:  +-----------+----------+------------+  Sample SiteO2 Sat (%)HB (g/100ml)  +-----------+----------+------------+           FA        97         7.8  +-----------+----------+------------+           PA        62         7.8  +-----------+----------+------------+           FA        97         7.8  +-----------+----------+------------+           PA        62         7.8  +-----------+----------+------------+      Cardiac Outputs:  +----+---+---+      CI CO   +----+---+---+  FICK3.46.7  +----+---+---+      Vascular Resistance Calculated Values (Wood Units):  +---+---+  [id number]  +---+---+  [id number]   +---+---+      Complications:  No in-lab complications observed.    Cardiac Cath Transition of Care Summary:  Post Procedure Diagnosis: Moderately elevated PCW pressure with a normal Raven  cardiac index on axillary Impella support.    Blood Loss:               Estimated blood loss during the procedure was 30 mls.  Specimens Removed:        Number of specimen(s) removed: none.    ____________________________________________________________________________________  CONCLUSIONS:  1. Moderately elevated PCW pressure with normal Raven cardiac index on Impella support.    ____________________________________________________________________________________  CPT Codes:  Right Heart Cath O2/Cardiac output without biopsy (RHC)-49228; Moderate Sedation Services 1st additional 15 minutes patient >5 years-37799; Moderate Sedation Services 2nd additional 15 minutes patient >5 years-15416    ICD 10 Codes:  I50.23-Acute on chronic systolic (congestive) heart failure    [id number] Vince Boyer MD  Performing Physician  Electronically signed by [id number] Vince Boyer MD on 4/28/2023 at 11:13:42 AM      Current Outpatient Medications   Medication Instructions     aspirin 81 mg, oral, Daily    dapagliflozin propanediol (FARXIGA) 10 mg, oral, Daily    digoxin (LANOXIN) 125 mcg, oral, Daily    Entresto 49-51 mg tablet 1 tablet, oral, 2 times daily    levothyroxine (SYNTHROID, LEVOXYL) 175 mcg, oral, Daily before breakfast    pantoprazole (PROTONIX) 40 mg, oral, 2 times daily, Do not crush, chew, or split.    sertraline (ZOLOFT) 12.5 mg, oral, Daily    spironolactone (ALDACTONE) 50 mg, oral, Daily    tamsulosin (FLOMAX) 0.4 mg, oral, Daily    torsemide (DEMADEX) 10 mg, oral, As needed    warfarin (Coumadin) 4 mg tablet Take as directed per After Visit Summary.      Heart Mate III interrogation   Most Recent   Flow  5.0   Speed  5500   Power  4.3   PI  3.0   Alarms: No alarms   Hematocrit set to: 31%     Notable Therapies   Class  Agent SAFETY    ARNI / ACE / ARB  Entresto 49-51  Last BP:  , Last BUN/Cr (GFR): 19/0.96 (89), 3/6/2024:  6:15 AM.    Beta-Blocker  On hold 2/2 RV dysfunction  Last HR:      MRA  Burke 50mg  Last K: 4.2     SGLT2  Farxiga 10mg  Last A1c 4.4 (2/23/2024:  2:33 PM).    Diuretic  Torsemide 10mg -> increase to 20mg daily 2/2 BLE edema and SOB on exertion  Last BNP: 33    Hydralazine/ISDN  N/a     Anticoagulation  Coumadin Last Hgb 9.6 (3/6/2024:  6:15 AM).    Anti-arrhythmic  Digoxin 125mcg daily  Last digoxin   Lab Results   Component Value Date    DIGOXIN 0.89 02/23/2024        Antiplatelet  ASA 81mg daily      Lipid-Lowering   Last Tchol 158 / LDL No results found for requested labs within last 365 days. or No results found for requested labs within last 365 days. / HDL 38.3 / TRIG 145 (2/29/2024:  6:58 AM).    Other        Device(s)  LVAD HM3 Implant date: 4/30/2023     Cardiac Rehab,   if EF <35/MI/OHS          Problems:   1) Stage D chronic systolic HF/ICM/HFrEF s/p HM3 LVAD (4/30/23)  ABO: AB  Short/Long term: long term (pts choice; does not wish to proceed with OHT eval)   - Continue aspirin 81mg daily    - Continue farxiga 10mg daily    -  Entresto  49/51 mg BID    - Continue spironolactone 50mg daily   - Holding beta-blocker due to persistent RV dysfunction   - Continue coumadin daily for goal INR 2-3 and daily levels.   - Continue digoxin 125mcg daily for RV support   LVAD interrogation reveals no alarms or power spikes, no reported LVAD alarms.    - Continue fish oil and PPI  - Encouraged consideration for cardiac rehab     2) Long term anticoagulations secondary to LVAD  - c/w warfarin (target INR 2-3)     3) Salmonella infection- resolved   - Bactrim course now complete     4) h/o right axillary repair with right brachial plexus injury and residual right hand numbness  CT head and neck wo contrast 5/3 showed degenerative changes C5-C6   -Previously had neurology referral for EMG and further mx     5) h/o VT and pAF  - Pacerone discontinued 2/2 thyroid dysfunction     6) Hypothyroidism  - c/w levothyroxine  - TSH levels also notably elevated at 11.49 on admission with normal Free T4  - Unclear if hypothyroidism 2/2 to amiodarone vs Hashimotos (thyroid peroxidase antibodies elevated > 1000).   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    - Per patient, he has been taking his pantoprazole with his synthroid so he may not have been absorbing it  - Continue synthroid 175mcg daily. Discussed with Endocrine (3/4).   - Repeat TSH and FT4: 8.1/1.5, improved. Last TSH 4/4 0.70.   - Pacerone stopped due to concern for thyroid dysfunction.       7) Depression  - He currently denies any symptoms of anxiety or depression and has asked if it is safe to be weaned off.  - There are multiple side effects (including increased bleeding risk) with zoloft, so may be beneficial to taper off since the patient has requested.  - Restart Zoloft as patient did not tolerate wean and reports depressive symptoms           9) Melena 2/2 to suspected AVM       - EGD on 2/24 showing mild gastritis but no source of  bleed.       - Colonoscopy performed on 2/26 without source of bleeding.        - Continue pantoprazole 40mg BID         - Will consider starting PO iron on follow up (to avoid confusion of melena inpatient)      Patient Instructions:  -Please bring a list of your medications to every visit.  -If you have any questions or concerns, please contact the LVAD office at [phone], option 3 or the direct line at [phone]. Please state that you are an LVAD patient. If it is after hours and it is an emergency, please page the LVAD pager by calling [phone] #[phone]  - Continue current medications with the exception of:   - Increase torsemide to 2 tablets daily   - Restart Zoloft       -- PLEASE GO TO THE LAB TODAY!   - Followup: Monday July 15th at 1pm with Dr. Hylton and social work.     ____________________________________________________________  COOKIE Martinez-CNP   Section of Advanced Heart Failure and Cardiac Transplantation  Division of Cardiovascular Medicine  [facility] [place]  [facility]

## 2024-04-04 NOTE — PATIENT INSTRUCTIONS
Patient Instructions:  -Please bring a list of your medications to every visit.  -If you have any questions or concerns, please contact the LVAD office at [phone], option 3 or the direct line at [phone]. Please state that you are an LVAD patient. If it is after hours and it is an emergency, please page the LVAD pager by calling [phone] #[phone]  - Continue current medications with the exception of:   - Increase torsemide to 2 tablets daily   - Restart Zoloft       -- PLEASE GO TO THE LAB TODAY!   - Followup: Monday July 15th at 1pm with Dr. Hylton and social work.

## 2024-04-04 NOTE — PROGRESS NOTES
SW met with pt in [place] [id number] to provide information regarding him finding a new primary doctor. SW provided pt with a printed list of contacts for his specific insurance to select from and contact to create a new patient appt. Pt verbalized his understanding of the need to schedule with a new provider and that sw will follow up with pt. Pt shared overall he is still doing well at home, no major changes. Pt confirmed he is doing well with his medications and have no other questions or concerns at this time.     Nichole TURNER

## 2024-04-05 LAB
HLA RESULTS: NORMAL
HLA-A+B+C AB NFR SER: NORMAL %
HLA-DP+DQ+DR AB NFR SER: NORMAL %

## 2024-04-10 ENCOUNTER — COMMITTEE REVIEW (OUTPATIENT)
Dept: TRANSPLANT | Facility: HOSPITAL | Age: 64
End: 2024-04-10
Payer: MEDICARE

## 2024-04-10 DIAGNOSIS — K29.61 GASTROINTESTINAL HEMORRHAGE ASSOCIATED WITH OTHER GASTRITIS: ICD-10-CM

## 2024-04-10 DIAGNOSIS — Z95.811 LVAD (LEFT VENTRICULAR ASSIST DEVICE) PRESENT (MULTI): ICD-10-CM

## 2024-04-10 DIAGNOSIS — I50.22: ICD-10-CM

## 2024-04-10 DIAGNOSIS — I50.9 CONGESTIVE HEART FAILURE, UNSPECIFIED HF CHRONICITY, UNSPECIFIED HEART FAILURE TYPE (MULTI): ICD-10-CM

## 2024-04-10 RX ORDER — DIGOXIN 125 MCG
125 TABLET ORAL DAILY
Qty: 90 TABLET | Refills: 3 | Status: SHIPPED | OUTPATIENT
Start: 2024-04-10

## 2024-04-10 NOTE — LETTER
April 10, 2024    Anatoly Lane  [address] [place] [address]      Dear Mr. Lane:    Our multi-disciplinary transplant team completed a review of your medical records on 4/9/2024.  ***    Our transplant program consists of surgeons and medical doctors who provide coverage 365 days a year, 24 hours a day.     If you have any questions or concerns regarding your insurance coverage or billing issues, a  is available to speak with you.     It is important to keep us updated of any major changes in your medical condition, contact information and health insurance coverage.     Please don't hesitate to contact us at Dept: [phone] with any questions or concerns. We look forward to working with you through this process.      Sincerely,      Shekhar Hylton MD MPH          [facility] Toll-free Patient Services Line:  Your Resource for Organ Transplant Information    If you have a question regarding your own medical care, you always should call your transplant hospital first. However, for general organ transplant-related information, you should call the [employer] Organ [employer]aring (UNOS) toll-free patient services line at 1-790.562.8525.  Anyone, including potential transplant candidates, candidates, recipients, family members, friends, living donors, and donor family members, can call this number to:    Talk about organ donation, living donation, the transplant process, the donation process, and transplant policies.  Get a free patient information kit with helpful booklets, waiting list and transplant information, and a list of all transplant hospitals.  Ask questions about the Organ Procurement and Transplantation Network (OPTN) web site (http://optn.transplant.hrsa.gov/), the UNOS Web site (http://unos.org/), or the UNOS web site for living donors and transplant recipients. (http://www.transplantliving.org/).  Learn how UNOS and the OPTN can help you.  Talk about any concerns that you  may have with a transplant hospital.    [facility] is a not-for-profit organization that provides the administrative services for the national OPTN under federal contract to the Health Resources and Services Administration (HRSA), an agency under the U.S. Department of Health and Human Services (HHS).    [facility] and the OPTN are responsible for:    Providing educational material for patients, the public, and professionals.  Raising awareness of the need for donated organs and tissue.  Writing organ transplant policy with help from transplant professionals, transplant patients, transplant candidates, donor families, living donors, and the public.  Coordinating organ procurement, matching, and placement.  Collecting information about every organ transplant and donation that occurs in the [place].    Remember, you should contact your transplant hospital directly if you have questions or concerns about your own medical care including medical records, work-up progress, and test results.    [facility] is not your transplant hospital, and staff at [facility] will not be able to transfer you to your transplant hospital, so keep your transplant hospital’s phone number handy.    However, while you research your transplant needs and learn as much as you can about transplantation and donation, we welcome your call to our toll-free patient services line at [phone].      [facility] PIL Final Rev 1-

## 2024-04-10 NOTE — TELEPHONE ENCOUNTER
Medication refill requested from patient. Verified medication on med list. Sent to appropriate MD for authorization.

## 2024-04-10 NOTE — COMMITTEE REVIEW
Patient Discussion Note      Organ being evaluated for: Heart    Transplant Coordinator: Katia Martinez  Transplant Surgeon:   Primo Schrader    Referring Physician:  Vince Ruano    Committee Review Members:  Cardiology Johnathan Mcnair PA-C, Shahida Casillas, JUVENCIO   Financial Counseling and Assistance Services Americo Harrington   Lab Alexei Dobbs MD PhD   Pharmacy Omar Goodwin, PharmD   Psychiatry Ivette Veras, PhD   Transplant Crispin Hopson MD, Erna Ha, APRN-CNP, Bailee Damon MD, Nichole Sumner, [facility], Arleth Baker, APRN-CNP, Shekhar Hylton MD MPH, Ailyn Michelle, RN, JULI MONROY Jordan T Felice DO, Rajwinder Driscoll, JUVENCIO, Holli Barrios, RN, Susi Rankin MD MPH, Vince Ruano DO, Anita Vincent MD PhD, Christiano Dacosta, APRN-CNP, Christiano Kee, PhD, LELA CHUA Paige Hudec, JUVENCIO, Viviane Soto MD, Rolando Lindo DO, Xavi Brasher MD, José Velasco MD, Americo Chao MD, Americo Mazariegos, RN, Primo Schrader MD   Transplant Surgery Primo Schrader MD       Additional Discussion Notes and Findings: Patient discussed as an additional topic. Repeat HLAs were obtained on 4/4/24 and continue to be negative. Per discussion at committee, patient's listing criteria to be altered to reflect no prospective crossmatch needed.

## 2024-04-11 RX ORDER — PANTOPRAZOLE SODIUM 40 MG/1
TABLET, DELAYED RELEASE ORAL
Qty: 180 TABLET | Refills: 3 | Status: SHIPPED | OUTPATIENT
Start: 2024-04-11

## 2024-04-15 ENCOUNTER — TELEPHONE (OUTPATIENT)
Dept: TRANSPLANT | Facility: HOSPITAL | Age: 64
End: 2024-04-15
Payer: MEDICARE

## 2024-04-15 DIAGNOSIS — Z95.811 LVAD (LEFT VENTRICULAR ASSIST DEVICE) PRESENT (MULTI): Primary | ICD-10-CM

## 2024-04-15 NOTE — TELEPHONE ENCOUNTER
Pt called to let us know he did get his pacemaker checked when he was last in the hospital. Please call back if you have any questions.

## 2024-04-17 PROCEDURE — 93295 DEV INTERROG REMOTE 1/2/MLT: CPT | Performed by: INTERNAL MEDICINE

## 2024-04-24 NOTE — TELEPHONE ENCOUNTER
Pt called asking if we could write him a prescription of Omega 3 pills for his aches and pains. Please order to [facility]

## 2024-04-25 RX ORDER — ICOSAPENT ETHYL 1 G/1
2 CAPSULE ORAL
Qty: 120 CAPSULE | Refills: 11 | Status: SHIPPED | OUTPATIENT
Start: 2024-04-25 | End: 2025-04-25

## 2024-05-07 ENCOUNTER — TELEPHONE (OUTPATIENT)
Dept: TRANSPLANT | Facility: HOSPITAL | Age: 64
End: 2024-05-07
Payer: MEDICARE

## 2024-05-07 DIAGNOSIS — I50.9 CONGESTIVE HEART FAILURE, UNSPECIFIED HF CHRONICITY, UNSPECIFIED HEART FAILURE TYPE (MULTI): ICD-10-CM

## 2024-05-07 DIAGNOSIS — I42.8 NON-ISCHEMIC CARDIOMYOPATHY (MULTI): ICD-10-CM

## 2024-05-07 DIAGNOSIS — Z95.811 LVAD (LEFT VENTRICULAR ASSIST DEVICE) PRESENT (MULTI): ICD-10-CM

## 2024-05-07 RX ORDER — EPLERENONE 50 MG/1
100 TABLET, FILM COATED ORAL DAILY
Qty: 60 TABLET | Refills: 11 | Status: SHIPPED | OUTPATIENT
Start: 2024-05-07 | End: 2025-05-07

## 2024-05-07 NOTE — TELEPHONE ENCOUNTER
Patient called stating he is having pain in his chest and nipple area. Patient believes this started when he began taking his digoxin. Patient however is on spironolactone.     Discussed with Dr. Velasco and patient to switch to Eplerenone 100mg daily.

## 2024-05-07 NOTE — TELEPHONE ENCOUNTER
Called patient in order to discuss new plan of medication regimen. Patient verbalized. New med sent to mail in pharmacy.

## 2024-05-10 ENCOUNTER — TELEPHONE (OUTPATIENT)
Dept: TRANSPLANT | Facility: HOSPITAL | Age: 64
End: 2024-05-10
Payer: MEDICARE

## 2024-05-15 NOTE — TELEPHONE ENCOUNTER
Called patient in order to update that a PA is being sent today for the eplerenone. Patient states he has not been taking the spironolactone and only taking torsemide due to the breast and nipple soreness.

## 2024-05-16 NOTE — TELEPHONE ENCOUNTER
Called patient in order to update that [employer] approved coverage for Eplerenone. Patient to  new script from pharmacy.

## 2024-05-20 ENCOUNTER — TELEPHONE (OUTPATIENT)
Dept: TRANSPLANT | Facility: HOSPITAL | Age: 64
End: 2024-05-20
Payer: MEDICARE

## 2024-05-20 ENCOUNTER — TELEPHONE (OUTPATIENT)
Dept: CARDIOLOGY | Facility: HOSPITAL | Age: 64
End: 2024-05-20
Payer: MEDICARE

## 2024-05-20 DIAGNOSIS — Z95.811 LVAD (LEFT VENTRICULAR ASSIST DEVICE) PRESENT (MULTI): ICD-10-CM

## 2024-05-20 DIAGNOSIS — I50.43 ACUTE ON CHRONIC COMBINED SYSTOLIC (CONGESTIVE) AND DIASTOLIC (CONGESTIVE) HEART FAILURE (MULTI): ICD-10-CM

## 2024-05-20 DIAGNOSIS — I47.20 VT (VENTRICULAR TACHYCARDIA) (MULTI): Primary | ICD-10-CM

## 2024-05-20 DIAGNOSIS — I49.01 VF (VENTRICULAR FIBRILLATION) (MULTI): ICD-10-CM

## 2024-05-20 RX ORDER — AMIODARONE HYDROCHLORIDE 400 MG/1
400 TABLET ORAL DAILY
Qty: 30 TABLET | Refills: 5 | Status: SHIPPED | OUTPATIENT
Start: 2024-05-20 | End: 2024-05-21 | Stop reason: SDUPTHER

## 2024-05-20 NOTE — TELEPHONE ENCOUNTER
Attempted to call patient in order to discuss use of Pacerone. Left VM requesting call back.     Discussed use with Christiano Dacosta NP; reviewed recent stoppage due to elevated TSH. Discussed option for patient to send remote device reading to see underlying rhythm.     Will await phone call back.

## 2024-05-20 NOTE — TELEPHONE ENCOUNTER
Pt called stating that he has begun taking Pacerone 200 mg a day as he feels better when he is on it. Pt would like this to be prescribed to him again. Pt stated that coord could call him back if there are any questions.

## 2024-05-20 NOTE — TELEPHONE ENCOUNTER
Patient returned phone call. Explained to patient that he cannot continue taking the pacerone without seeing a provider and having his device interrogated. Patient states he becomes dizzy at time and experiences palpitations without the pacerone.     Explained to patient that his TSH levels were elevated which is why the pacerone was discontinued. Patient inquiring if he can take his old synthroid as well to aid in this issue.     Advised patient that we will need him to come to clinic in order to be seen by  a provider and decide next steps for medication management. Requested patient to please contact his EP MD at [facility] end another remote reading prior to this visit.     Patient refused to come Friday at 3pm and also refused Tuesday at 2pm.    Patient agreed to come Thursday at 1pm d/t traffic.

## 2024-05-20 NOTE — TELEPHONE ENCOUNTER
Notified by LVAD coordinator, Ailyn Michelle, that patient began taking his pacerone again without our heart failure team prompting him to.     I called the patient who states he noticed his HR was elevated. When sitting it was  and when standing/ambulating it would increase to ~140. He endorsed dizziness and fatigue during these episodes. Denies LVAD alarms, SOB or chest pain. Due to the above symptoms, he started taking 200mg PO pacerone daily with improved HR to 70s and resolution of symptoms.     Pacemaker remote monitor readings from 5/15/24 show multiple episodes of NSVT and VF requiring ATP. There is increased ventricular burden when compared to his previous device reading in 3/2024.     Notably, in 3/2024 his amiodarone was discontinued due to concern for thyrotoxicosis; however, endocrine at that time contributed his elevated TSH levels to be 2/2 the patient taking his synthroid and PPI at the same time. The endocrine team felt it okay to resume his amiodarone. Recent TSH levels have normalized.       I discussed the above with Mr. Lane and advised him to start taking 400mg PO pacerone daily w/ plan to recheck thyroid levels in 2 weeks.     Patient agreeable. Ailyn Michelle notified and will reschedule follow up appointment in 1-2 weeks as well. Lab orders placed.

## 2024-05-21 DIAGNOSIS — I47.20 VT (VENTRICULAR TACHYCARDIA) (MULTI): ICD-10-CM

## 2024-05-21 DIAGNOSIS — Z76.82 AWAITING ORGAN TRANSPLANT: ICD-10-CM

## 2024-05-21 DIAGNOSIS — I50.22 ACC/AHA STAGE C CHRONIC SYSTOLIC HEART FAILURE (MULTI): ICD-10-CM

## 2024-05-21 DIAGNOSIS — Z95.811 LVAD (LEFT VENTRICULAR ASSIST DEVICE) PRESENT (MULTI): ICD-10-CM

## 2024-05-21 DIAGNOSIS — I49.01 VF (VENTRICULAR FIBRILLATION) (MULTI): ICD-10-CM

## 2024-05-21 DIAGNOSIS — I50.20 ACC/AHA STAGE D SYSTOLIC HEART FAILURE (MULTI): ICD-10-CM

## 2024-05-21 RX ORDER — AMIODARONE HYDROCHLORIDE 200 MG/1
400 TABLET ORAL DAILY
Qty: 60 TABLET | Refills: 11 | Status: SHIPPED | OUTPATIENT
Start: 2024-05-21 | End: 2025-05-21

## 2024-05-21 NOTE — TELEPHONE ENCOUNTER
Pt called requesting a reorder of his  amiodarone (Pacerone) 400 mg tablet as the name brand of Pacerone, Pt stated that the amiodarone does not work well for him. Pt requested [facility] to be [facility] in [place].

## 2024-05-21 NOTE — TELEPHONE ENCOUNTER
Patient called stating he needs pacerone ordered 400mg. Order placed correctly. Patient is aware that this is correct. Patient to be seen 6/04 with our team.

## 2024-05-21 NOTE — TELEPHONE ENCOUNTER
Received prescription renewal request. All scripts reviewed and appropriate. Sent to [facility] for auth.      Amiodarone is listed as an allergy for patient, however there have been extensive conversations with patient regarding this over the past 2 days that are documented.

## 2024-05-23 ENCOUNTER — APPOINTMENT (OUTPATIENT)
Dept: TRANSPLANT | Facility: HOSPITAL | Age: 64
End: 2024-05-23
Payer: MEDICARE

## 2024-05-28 ENCOUNTER — TELEPHONE (OUTPATIENT)
Dept: TRANSPLANT | Facility: HOSPITAL | Age: 64
End: 2024-05-28
Payer: MEDICARE

## 2024-05-28 NOTE — TELEPHONE ENCOUNTER
Patient awaiting eplerenone to be delivered from pharmacy. Patient states it usually takes about a week or so to be delivered; patient PA was approved on 5/16.     Told patient to call VAD team if he does not receive medication this week.

## 2024-05-29 DIAGNOSIS — E03.9 HYPOTHYROIDISM, UNSPECIFIED TYPE: ICD-10-CM

## 2024-05-30 ENCOUNTER — TELEPHONE (OUTPATIENT)
Dept: TRANSPLANT | Facility: HOSPITAL | Age: 64
End: 2024-05-30
Payer: MEDICARE

## 2024-05-30 RX ORDER — LEVOTHYROXINE SODIUM 175 UG/1
TABLET ORAL
Qty: 90 TABLET | Refills: 0 | Status: SHIPPED | OUTPATIENT
Start: 2024-05-30

## 2024-05-30 NOTE — TELEPHONE ENCOUNTER
[facility] in [place] called asking to get orders for the patient faxed over. Provided call back number: [phone] Fax number: [phone]

## 2024-06-04 ENCOUNTER — OFFICE VISIT (OUTPATIENT)
Dept: TRANSPLANT | Facility: HOSPITAL | Age: 64
End: 2024-06-04
Payer: MEDICARE

## 2024-06-04 VITALS — WEIGHT: 240.7 LBS | TEMPERATURE: 97.3 F | BODY MASS INDEX: 35.55 KG/M2 | HEART RATE: 51 BPM | OXYGEN SATURATION: 96 %

## 2024-06-04 DIAGNOSIS — I50.9 CONGESTIVE HEART FAILURE, UNSPECIFIED HF CHRONICITY, UNSPECIFIED HEART FAILURE TYPE (MULTI): ICD-10-CM

## 2024-06-04 DIAGNOSIS — Z95.811 LVAD (LEFT VENTRICULAR ASSIST DEVICE) PRESENT (MULTI): ICD-10-CM

## 2024-06-04 DIAGNOSIS — E03.9 HYPOTHYROIDISM, UNSPECIFIED TYPE: Primary | ICD-10-CM

## 2024-06-04 DIAGNOSIS — I42.8 NON-ISCHEMIC CARDIOMYOPATHY (MULTI): ICD-10-CM

## 2024-06-04 DIAGNOSIS — F34.1 PERSISTENT DEPRESSIVE DISORDER: ICD-10-CM

## 2024-06-04 PROCEDURE — 93750 INTERROGATION VAD IN PERSON: CPT | Performed by: REGISTERED NURSE

## 2024-06-04 PROCEDURE — 3008F BODY MASS INDEX DOCD: CPT | Performed by: REGISTERED NURSE

## 2024-06-04 PROCEDURE — 99215 OFFICE O/P EST HI 40 MIN: CPT | Performed by: REGISTERED NURSE

## 2024-06-04 RX ORDER — TORSEMIDE 20 MG/1
20 TABLET ORAL DAILY
Qty: 30 TABLET | Refills: 11 | Status: SHIPPED | OUTPATIENT
Start: 2024-06-04 | End: 2025-06-04

## 2024-06-04 ASSESSMENT — PAIN SCALES - GENERAL: PAINLEVEL: 0-NO PAIN

## 2024-06-04 NOTE — PROGRESS NOTES
VAD Cardiologist: Anita Vincent   VAD Coordinator: Ailyn Michelle   In visit: Patient, LVAD JUVENCIO Michelle  Patient's Location: [place] [address]    Date of Visit: 03/26/2024  2:00 PM EDT  Location of visit: [facility]   Type of Visit: LVAD followup    Type of VAD:  HM3  Implant Date: 4/30/2023  Reason for VAD: ICM  Intent: Short term - listed status 4 on 3/13/2024     HPI / Summary:   Anatoly Lane is a very pleasant 63 y.o. male presenting for management of Stage D HFrEF NYHA class 2-3.  s/p HM3 4/30/2023 CAD, h/o pAF and VT s/p ICD, hypothyroidism, and stage D systolic HF/ICM/HFrEF who returns to the LVAD clinic for ongoing evaluation and management.     Previously, patient stopped taking Empagliflozin due to itching. Patient started on Farxiga 10mg at that time. Patient had EP consult d/t not being seen by an EP MD in about 1 year.     Interval Hx:  Patient recently admitted 2/23-3/06 for acute decompensated HF and suspected GI bleed which has since resolved. While in hospital, patient optimized medically and evaluated for OHT. He was ultimately listed as status 4 on 3/13/2024.     Reason for today's visit:  Patient began taking his pacerone again without our heart failure team prompting him to. Patient states he noticed his HR was elevated. When sitting it was  and when standing/ambulating it would increase to ~140. He endorsed dizziness and fatigue during these episodes. Denies LVAD alarms, SOB or chest pain. Due to the above symptoms, he started taking 200mg PO pacerone daily with improved HR to 70s and resolution of symptoms. Pacemaker remote monitor readings from 5/15/24 show multiple episodes of NSVT and VF requiring ATP. 3/2024 his amiodarone was discontinued due to concern for thyrotoxicosis. Endocrine contributed his elevated TSH levels to be 2/2 the patient taking his synthroid and PPI at the same time. The endocrine team felt it okay to resume his amiodarone. Recent TSH levels have  normalized. Patient advised to take 400mg PO pacerone daily w/ plan to recheck thyroid levels in 2 weeks      Previous visit 4/4: Increase torsemide to 2 tablets daily. Restarted Zoloft.    Today:  Patient denies CP, palpitations, dizziness, GRAVES, orthopnea, PND, edema, LVAD alarms, N/V/D, hematochezia, hematuria, epistaxis.  Most recent INR is 2.2 4/4.    Driveline is clean with no drainage. Dressing was last changed on 5/29/24.      VAD equipment interrogated in clinic with all batteries and clips functioning properly. Patient denies sleeping on batteries at home. Patient has not dropped their controller since last visit.     Patient has not completed Cardiac Rehab.   0 / 36  sessions completed.     NYHA class at implant - IV.   NYHA class today -  II.     Today, patient is concerned about weight gain. However, patient states he lives a sedentary lifestyle and rarely gets up from the couch. States he walked almost 2,000 steps today and that is above average for him. Instructed patient he should participate in a 30 minute walk daily and increase his steps goal to 8,000. Counseled patient that if he continues to gain weight he may no longer be eligible for heart transplant. Will consult dietician. Patient is also volume overloaded on exam. He reports he has not started taking his ordered Inspra. Will also increase his standing loop diuretic. He does not report further palpitations since amiodarone restart. Labs drawn at [facility] yesterday, will reach out to obtain results.     Review of Systems   Constitutional: Negative.   HENT: Negative.     Eyes:  Negative for blurred vision.   Cardiovascular:  Positive for leg swelling.   Respiratory: Negative.     Endocrine: Negative.    Hematologic/Lymphatic: Negative.    Skin: Negative.    Musculoskeletal: Negative.    Gastrointestinal: Negative.    Genitourinary: Negative.    Neurological:  Positive for weakness. Negative for dizziness.   Psychiatric/Behavioral: Negative.      : Full 10 pt review of symptoms of negative unless discussed above.     Medical History:   No past medical history on file.  Surgical Hx:   Past Surgical History:   Procedure Laterality Date    CARDIAC CATHETERIZATION N/A 2/28/2024    Procedure: Right Heart Cath;  Surgeon: José Velasco MD;  Location: [place] [address] Cardiac Cath Lab;  Service: Cardiovascular;  Laterality: N/A;  with RAMP study    COLONOSCOPY W/ POLYPECTOMY  2023    CT ABDOMEN PELVIS ANGIOGRAM W AND/OR WO IV CONTRAST  04/13/2023    CT ABDOMEN PELVIS ANGIOGRAM W AND/OR WO IV CONTRAST [facility] CT    LEFT VENTRICULAR ASSIST DEVICE      OTHER SURGICAL HISTORY  01/05/2022    Complete colonoscopy    OTHER SURGICAL HISTORY  01/05/2022    Cardioverter defibrillator insertion      Vitals:       3/6/2024     6:30 AM 3/6/2024     5:49 AM 3/6/2024     4:48 AM 3/6/2024     1:31 AM 3/5/2024    11:46 PM 3/5/2024     8:08 PM   Vitals   Systolic 117   94     Diastolic 80   55     Heart Rate   85  70 75   Temp   35.8 °C (96.5 °F)   36.5 °C (97.7 °F)   Resp   18  18 18   Weight (lb)  226.19 226.19      BMI  33.4 kg/m2 33.4 kg/m2      BSA (m2)  2.24 m2 2.24 m2        Wt Readings from Last 5 Encounters:   03/06/24 103 kg (226 lb 3.1 oz)   02/23/24 105 kg (230 lb 9.6 oz)   02/19/24 107 kg (235 lb)   12/08/23 101 kg (223 lb 3.2 oz)   09/20/23 88.5 kg (195 lb)     GEN: Pleasant, fatigued.   HEENT: JVP elevated, no icterus.   CHEST: Clear to auscultation. Sternotomy well healed.  CV: LVAD hum  ABD: Soft, ND, NT.  Driveline: No drainage. Dressing c/d/I. Secured.   EXT: Warm, well perfused, +1 BLE edema.   NEURO: Pleasant, GAINES, Oriented to plan         Labs:   CMP:  Recent Labs     03/06/24  0615 03/05/24  0609 03/04/24  0947 03/03/24  0604 03/02/24  0610    140 137 137 136   K 4.2 4.1 4.5 4.2 4.2    105 106 103 103   CO2 25 28 23 24 24   ANIONGAP 12 11 13 14 13   BUN 19 25* 28* 30* 22   CREATININE 0.96 1.02 1.06 1.01 1.32*   EGFR 89 83 79 84 61   MG 2.22  2.50* 2.14 2.18 2.22       Recent Labs     03/06/24  0615 03/05/24  0609 03/04/24  0947 03/03/24  0604 03/02/24  0610 02/24/24  0233 02/23/24  1433 05/31/23  2134 05/28/23  0603 05/26/23  0056 05/23/23  0729 05/23/23  0304 05/21/23  1943   ALBUMIN 3.8 4.1 3.8 3.8 3.9   < > 4.0 CANCELED CANCELED CANCELED 3.3*   < > CANCELED   ALKPHOS  --   --   --   --   --   --  47 CANCELED CANCELED CANCELED 98   < > CANCELED   ALT  --   --   --   --   --   --  13 CANCELED CANCELED CANCELED 22   < > CANCELED   AST  --   --   --   --   --   --  11 CANCELED CANCELED CANCELED 19   < > CANCELED   BILITOT  --   --   --   --   --   --  0.4 CANCELED CANCELED CANCELED 0.7   < > CANCELED   LIPASE  --   --   --   --   --   --   --   --   --   --   --   --  CANCELED    < > = values in this interval not displayed.       CBC:  Recent Labs     03/06/24  0615 03/05/24  0609 03/04/24  0947 03/03/24  0604 03/02/24  0610   WBC 6.8 6.6 5.6 6.4 5.6   HGB 9.6* 10.3* 10.0* 10.0* 10.1*   HCT 31.1* 34.1* 31.9* 31.3* 31.5*    312 274 284 222   * 105* 103* 101* 102*       COAG:   Recent Labs     03/06/24  0615 03/05/24  0609 03/03/24  2355 03/03/24  1055 03/03/24  0604 03/02/24  2315 05/02/23  0030 05/01/23  0033 04/30/23  1619 04/30/23  0413 04/21/23  1412 04/21/23  1125 04/08/23  0315 04/07/23  1835 04/06/23  1852 04/06/23  1714   INR 3.3* 2.6* 1.9*  --  1.6*  --    < > 1.3* 1.3* 1.2*   < > 1.2*   < >  --    < >  --    HAUF  --   --   --  0.3 0.4 0.2   < >  --   --  0.3   < >  --    < >  --   --   --    HAPTOGLOBIN  --   --   --   --   --   --   --   --   --   --   --  <30  --  <30  --  <30   FIBRINOGEN  --   --   --   --   --   --   --  264 255 356   < >  --   --   --   --   --     < > = values in this interval not displayed.       ABO:   Recent Labs     03/02/24  0610   ABO AB       HEME/ENDO:  Recent Labs     03/02/24  0610 02/26/24  0811 02/23/24  1433 05/31/23  2134 04/25/23  0911 04/21/23  1125 04/07/23  0416 03/31/23  2331  03/31/23  2142 03/29/23  2151   FERRITIN  --   --  104  --   --  352*  --   --   --  79   IRONSAT  --   --  18*  --   --  16*  --   --   --  6*   TSH 8.10* 11.49*  11.49* 18.02* CANCELED   < >  --    < >  --    < > 15.36*   HGBA1C  --   --  4.4  --   --   --   --  5.8*  --  6.7*    < > = values in this interval not displayed.        CARDIAC:   Recent Labs     03/06/24  0615 03/05/24  0609 03/04/24  0947 03/03/24  0604 03/02/24  0610 02/24/24  0233 02/23/24  1433 05/21/23  1943 04/02/23  0154 03/29/23  2151    142 161 145 147   < > 128  --    < >  --    TROPHS  --   --   --   --   --   --  30 CANCELED  CANCELED  --   --    BNP  --   --   --   --   --   --  33 CANCELED  --  3,231*    < > = values in this interval not displayed.       Recent Labs     02/29/24  0658 02/28/24  0444 03/29/23  2151   CHOL 158 155 126   LDLF  --   --  85   HDL 38.3 39.4 24.0*   TRIG 145  --  83       MICRO:   Recent Labs     05/12/23  0857   CRP 14.77*       Echocardiogram     Narrative  [facility], [address] [place] [address]  Tel [phone] and Fax [phone]    TRANSTHORACIC ECHOCARDIOGRAM REPORT      Patient Name:     BILLIESOLO TAFOYACAROLYN Steward Physician:  [id number] Spencer Lilly MD  Study Date:       5/15/2023          Referring           SHERRILL FLEMING  Physician:  MRN/PID:          [id number]           PCP:  Accession/Order#: [id number]          [facility] HHVI Non  Location:           Invasive  YOB: 1960          Fellow:             [id number] Tobi Ledbetter MD  Gender:           M                  Nurse:              Mei Jeff RN  Admit Date:       3/29/2023          Sonographer:        TONYA Wolfe RDCS  Admission Status: Inpatient -        Additional Staff:  Routine  Height:           175.26 cm          CC Report to:       Vijay T5 [facility]  Weight:           73.03 kg           Study Type:         Echocardiogram  BSA:              1.88  m2    Diagnosis/ICD: Z95.811-Presence of heart assist device  Indication:    Post LVAD follow up  Procedure/CPT: Echo Limited-66517; Color Doppler-02398; Doppler Limited-33898    Patient History:  Smoker:            Former.  Pertinent History: CAD, Cardiomyopathy and A-Fib. HFrEF, VT s/p dual chamber  ICD, anemia, hypothyroidism, cardiogenic shock, s/p LVAD  HMIII.    Study Detail: The following Echo studies were performed: 2D, M-Mode, Doppler and  color flow. Definity used as a contrast agent for endocardial  border definition. Total contrast used for this procedure was 3.0  mL via IV push.      PHYSICIAN INTERPRETATION:  Left Ventricle: The left ventricular systolic function is severely decreased, with an estimated ejection fraction of 10-15%. There is global hypokinesis of the left ventricle with minor regional variations. The left ventricular cavity size is severely dilated. Spectral Doppler shows an abnormal pattern of left ventricular diastolic filling.  Left Atrium: The left atrium is severely dilated.  Right Ventricle: The right ventricle was not well visualized. There is reduced right ventricular systolic function. The RV is not completely visualized, but appears to be likely moderately dilated with moderately decreased function.  Right Atrium: The right atrium was not well visualized. There is a device visualized in the right atrium.  Aortic Valve: The aortic valve appears abnormal. There is mild aortic valve regurgitation.  Mitral Valve: The mitral valve is normal in structure. There is trace mitral valve regurgitation.  Tricuspid Valve: The tricuspid valve is structurally normal. Tricuspid regurgitation was not assessed.  Pulmonic Valve: The pulmonic valve is structurally normal. There is no indication of pulmonic valve regurgitation.  Pericardium: There is a trivial pericardial effusion.  Pleural: There is left pleural effusion.  Aorta: The aortic root was not well visualized.  Systemic Veins: The  inferior vena cava appears to be of normal size. There is IVC inspiratory collapse greater than 50%.  In comparison to the previous echocardiogram(s): Although previous studies are available for review, their comparison with the current echocardiogram is clinically irrelevant. Compared with study from 4/25/2023,.    LEFT VENTRICULAR ASSIST DEVICE:  Study Type: This is a follow-up LVAD echocardiogram.  LVAD: The patient has a(n) Heartmate III LVAD device present.  Inflow Cannula: The inflow cannula is visualized in the left ventricular apex.  Outflow Cannula: Visualization of the outflow cannula is technically difficult.  AV Leaflet Mobility: The aortic valve leaflets do not appear to open.  Inflow Velocities: The LVAD cannula inflow velocity is normal (.5-2msec).        CONCLUSIONS:  1. Left ventricular systolic function is severely decreased with a 10-15% estimated ejection fraction.  2. Spectral Doppler shows an abnormal pattern of left ventricular diastolic filling.  3. The RV is not completely visualized, but appears to be likely moderately dilated with moderately decreased function.  4. There is reduced right ventricular systolic function.  5. The left atrium is severely dilated.  6. Aortic valve appears abnormal.  7. Mild aortic valve regurgitation.  8. Left ventricular cavity size is severely dilated.  9. There is global hypokinesis of the left ventricle with minor regional variations.    QUANTITATIVE DATA SUMMARY:  2D MEASUREMENTS:  Normal Ranges:  IVSd:          0.90 cm    (0.6-1.1cm)  LVPWd:         1.00 cm    (0.6-1.1cm)  LVIDd:         6.20 cm    (3.9-5.9cm)  LV Mass Index: 129.8 g/m2    LV SYSTOLIC FUNCTION BY 2D PLANIMETRY (MOD):  Normal Ranges:  EF-A4C View: 13.9 % (>=55%)  EF-A2C View: 21.8 %  EF-Biplane:  17.2 %    TRICUSPID VALVE/RVSP:  Normal Ranges:  IVC Diam: 1.16 cm      Narrative  [facility], Cath Lab, [address] [place] [address]    Cardiovascular Catheterization  Report    Patient Name:     BILLIE      Performing Physician:  [id number] Vince WARD MD  Study Date:       4/27/2023  Verifying Physician:   [id number] Vince Boyer MD  MRN/PID:          [id number]   Cardiologist/Co-scrub:  Accession/Order#: [id number]  Fellow:                [id number] Tobi Ledbetter MD  YOB: 1960  Fellow:  Gender:           M          Referring Physician:  Admit Date:                  Referring Physician:   [id number] Anita Vincent MD  Surgeon:                     Referring Physician:   VANESSA      Study: Right Heart Catheterization      Indications:  BILLIE WARD is a 63 year old male who presents with hypertension, atrial fibrillation and Tobacco Use - Former, Patient was referred for right heart catheterization to monitor hemodynamics for an acute exacerbation of heart failure requiring Impella support. Cardiomyopathy.    Appropriate Use Criteria:  Re-evaluation of know cardiomyopathy with change in clinical status or on cardiac exam or to guide therapy; AUC score = 7.    Procedure Description:  After infiltration with 1% Lidocaine, the was cannulated with a modified Seldinger technique. After infiltration of local anesthetic, the right internal jugular vein was identified with two-dimensional ultrasound. Under direct ultrasound visualization, the right internal jugular vein was cannulated with a micropuncture technique. A 9 Icelandic sheath was placed in the vein. A balloon tipped catheter was positioned in the right heart system to record pressures and remained in the patient when transferred from the lab. Cardiac output was calculated via the Raven method. Post-procedure, the venous sheath was left intact and sutured in place.    Right Heart Catheterization:  A balloon tipped catheter was positioned in the right heart system to record pressures and remained in the patient when transferred from the lab. Cardiac output was calculated via the Raven  method.    [facility] Personnel:  +----------------------+-------------+  Name                  Duty           +----------------------+-------------+  Vince Boyer MD, MD 1  +----------------------+-------------+  Jey Driscoll RN       PROC CIRC 1  +----------------------+-------------+  Mariya Fam RN     PROC CIRC 2  +----------------------+-------------+  Edilberto Reinoso RN    PROC RECORD 1  +----------------------+-------------+  Tobi Ledbetter MD         FELLOW PHYS 1  +----------------------+-------------+      Sedation Time:  +------------------------+----------------------------------------+  Sedation Start/End TimesTime                                      +------------------------+----------------------------------------+  Start                   4/27/2023 10:26:07                        +------------------------+----------------------------------------+  Drugs                   Versed 0.5 mg IV per physician for sedat  +------------------------+----------------------------------------+  End                     4/27/2023 10:56:04                        +------------------------+----------------------------------------+      Equipment Used:  +---------------------+--------------------------------------------------------+              Date/Time                                             Description  +---------------------+--------------------------------------------------------+        Hemodynamic Pressures:  Resting hemodynamics on maximal Impella support revealed a moderately elevated PCW pressure with a normal Raven cardiac index of 3.4 L/min/mï¿½. The PVR is normal at that 72.  +----+----------+---------+-------------+-------------+---+----+-------+-------+  SiteDate Time   Phase  Systolic mmHg  Diastolic  ED MeanA-Wave V-Wave                   Name                    mmHg     mmHmmHg mmHg   mmHg                                                       g                     +----+----------+---------+-------------+-------------+---+----+-------+-------+    AO 4/27/2023     Rest          101           72     78                      10:47:20                                                                      AM                                                          +----+----------+---------+-------------+-------------+---+----+-------+-------+    RA 4/27/2023     Rest                                4      8      5        10:48:31                                                                      AM                                                          +----+----------+---------+-------------+-------------+---+----+-------+-------+    RV 4/27/2023     Rest           47               8                          10:49:08                                                                      AM                                                          +----+----------+---------+-------------+-------------+---+----+-------+-------+    PW 4/27/2023     Rest                               20     21     24        10:50:13                                                                      AM                                                          +----+----------+---------+-------------+-------------+---+----+-------+-------+    PA 4/27/2023     Rest           47           18     26                      10:51:05                                                                      AM                                                          +----+----------+---------+-------------+-------------+---+----+-------+-------+        Oxygen Saturation %:  +-----------+----------+------------+  Sample SiteO2 Sat (%)HB (g/100ml)  +-----------+----------+------------+           FA        97          7.8  +-----------+----------+------------+           PA        62         7.8  +-----------+----------+------------+           FA        97         7.8  +-----------+----------+------------+           PA        62         7.8  +-----------+----------+------------+      Cardiac Outputs:  +----+---+---+      CI CO   +----+---+---+  [id number]  +----+---+---+      Vascular Resistance Calculated Values (Wood Units):  +---+---+  [id number]  +---+---+  [id number]   +---+---+      Complications:  No in-lab complications observed.    Cardiac Cath Transition of Care Summary:  Post Procedure Diagnosis: Moderately elevated PCW pressure with a normal Raven  cardiac index on axillary Impella support.    Blood Loss:               Estimated blood loss during the procedure was 30 mls.  Specimens Removed:        Number of specimen(s) removed: none.    ____________________________________________________________________________________  CONCLUSIONS:  1. Moderately elevated PCW pressure with normal Raven cardiac index on Impella support.    ____________________________________________________________________________________  CPT Codes:  Right Heart Cath O2/Cardiac output without biopsy (RHC)-80398; Moderate Sedation Services 1st additional 15 minutes patient >5 years-61412; Moderate Sedation Services 2nd additional 15 minutes patient >5 years-30444    ICD 10 Codes:  I50.23-Acute on chronic systolic (congestive) heart failure    [id number] Vince Boyer MD  Performing Physician  Electronically signed by [id number] Vince Boyer MD on 4/28/2023 at 11:13:42 AM      Current Outpatient Medications   Medication Instructions    aspirin 81 mg, oral, Daily    dapagliflozin propanediol (FARXIGA) 10 mg, oral, Daily    digoxin (LANOXIN) 125 mcg, oral, Daily    Entresto 49-51 mg tablet 1 tablet, oral, 2 times daily    levothyroxine (SYNTHROID, LEVOXYL) 175 mcg, oral, Daily before breakfast    pantoprazole (PROTONIX) 40 mg, oral, 2  times daily, Do not crush, chew, or split.    sertraline (ZOLOFT) 12.5 mg, oral, Daily    spironolactone (ALDACTONE) 50 mg, oral, Daily    tamsulosin (FLOMAX) 0.4 mg, oral, Daily    torsemide (DEMADEX) 10 mg, oral, As needed    warfarin (Coumadin) 4 mg tablet Take as directed per After Visit Summary.      Heart Mate III interrogation  LVAD  LVAD Flow (LPM): 4.9 LPM  LVAD Speed: 5500 BPM  LVAD PI: 3.5  LVAD POWER: 4.3  Any adverse events or device malfunctions : PI events noted  Arterial Extended Pressure Measurements  Arterial Extended Mean Pressure: 78 mmHg      Notable Therapies   Class  Agent SAFETY    ARNI / ACE / ARB  Entresto 49-51  Last BP:  , Last BUN/Cr (GFR): 29/1.05 (80), 4/4/2024:  3:09 PM.    Beta-Blocker  On hold 2/2 RV dysfunction  Last HR:      MRA  [place] 50mg ->Changed to Inspra 100mg daily a month ago (pt has not started taking)  Last K: 4.1     SGLT2  Farxiga 10mg  Last A1c 4.4 (2/23/2024:  2:33 PM).    Diuretic  Torsemide 20mg daily-> increase to 30mg daily  Last BNP: 101    Hydralazine/ISDN  N/a     Anticoagulation  Coumadin Last Hgb 12.8 (4/4/2024:  3:09 PM).    Anti-arrhythmic  Digoxin 125mcg daily  Last digoxin   Lab Results   Component Value Date    DIGOXIN 0.43 (L) 04/04/2024    Follow up digoxin level from [facility]    Antiplatelet  ASA 81mg daily      Lipid-Lowering   Last Tchol 158 / LDL No results found for requested labs within last 365 days. or No results found for requested labs within last 365 days. / HDL 38.3 / TRIG 145 (2/29/2024:  6:58 AM).    Other        Device(s)  LVAD HM3 Implant date: 4/30/2023     Cardiac Rehab,   if EF <35/MI/OHS          Problems:   1) Stage D chronic systolic HF/ICM/HFrEF s/p HM3 LVAD (4/30/23)  ABO: AB  Short/Long term: long term (pts choice; does not wish to proceed with OHT eval)   - Continue aspirin 81mg daily    - Continue farxiga 10mg daily    -  Entresto 49/51 mg BID    - Continue spironolactone 50mg daily   - Holding beta-blocker due to  persistent RV dysfunction   - Continue coumadin daily for goal INR 2-3 and daily levels.   - Continue digoxin 125mcg daily for RV support   LVAD interrogation reveals no alarms or power spikes, no reported LVAD alarms.    - Continue fish oil and PPI  - Encouraged consideration for cardiac rehab     2) Long term anticoagulations secondary to LVAD  - c/w warfarin (target INR 2-3)     3) Salmonella infection- resolved   - Bactrim course now complete     4) h/o right axillary repair with right brachial plexus injury and residual right hand numbness  CT head and neck wo contrast 5/3 showed degenerative changes C5-C6   -Previously had neurology referral for EMG and further mx     5) h/o VT and pAF  - Pacerone discontinued 2/2 thyroid dysfunction     6) Hypothyroidism  - c/w levothyroxine  - TSH levels also notably elevated at 11.49 on admission with normal Free T4  - Unclear if hypothyroidism 2/2 to amiodarone vs Hashimotos (thyroid peroxidase antibodies elevated > 1000).   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    - Per patient, he has been taking his pantoprazole with his synthroid so he may not have been absorbing it  - Continue synthroid 175mcg daily. Discussed with Endocrine (3/4).   - Repeat TSH and FT4: 8.1/1.5, improved. Last TSH 4/4 0.70. Repeat labs drawn at [facility].   - Pacerone stopped due to concern for thyroid dysfunction.       7) Depression  - He currently denies any symptoms of anxiety or depression and has asked if it is safe to be weaned off.  - There are multiple side effects (including increased bleeding risk) with zoloft, so may be beneficial to taper off since the patient has requested.  - C/w Zoloft           9) Melena 2/2 to suspected AVM       - EGD on 2/24 showing mild gastritis but no source of bleed.       - Colonoscopy performed on 2/26 without source of bleeding.        - Continue pantoprazole 40mg BID         - Will  consider starting PO iron on follow up (to avoid confusion of melena inpatient)      Orders Placed This Encounter   Procedures    CBC     Standing Status:   Future     Standing Expiration Date:   6/4/2025     Order Specific Question:   Release result to MyChart     Answer:   Immediate [1]    Comprehensive Metabolic Panel     Standing Status:   Future     Standing Expiration Date:   6/4/2025     Order Specific Question:   Release result to MyChart     Answer:   Immediate [1]    Lactate Dehydrogenase     Standing Status:   Future     Standing Expiration Date:   6/4/2025     Order Specific Question:   Release result to MyChart     Answer:   Immediate [1]    Digoxin     Standing Status:   Future     Standing Expiration Date:   6/4/2025     Order Specific Question:   Release result to MyChart     Answer:   Immediate [1]    Digoxin     Standing Status:   Future     Standing Expiration Date:   6/4/2025     Order Specific Question:   Release result to MyChart     Answer:   Immediate [1]    Referral to Nutrition Services     Standing Status:   Future     Standing Expiration Date:   6/4/2025     Referral Priority:   Routine     Referral Type:   Consultation     Referral Reason:   Specialty Services Required     Requested Specialty:   Nutrition     Number of Visits Requested:   1      Patient Instructions:  -Please bring a list of your medications to every visit.  -If you have any questions or concerns, please contact the LVAD office at [phone], option 3 or the direct line at [phone]. Please state that you are an LVAD patient. If it is after hours and it is an emergency, please page the LVAD pager by calling [phone] #[phone]  - Continue current medications with the exception of: Increase Torsemide to 30mg daily, please  and start taking Inspra       ____________________________________________________________  COOKIE Martinez-CNP   Section of Advanced Heart Failure and Cardiac  Transplantation  Division of Cardiovascular Medicine  [facility]

## 2024-06-04 NOTE — PATIENT INSTRUCTIONS
Patient Instructions:  -Please bring a list of your medications to every visit.  -If you have any questions or concerns, please contact the LVAD office at [phone], option 3 or the direct line at [phone]. Please state that you are an LVAD patient. If it is after hours and it is an emergency, please page the LVAD pager by calling [phone] #[phone]  - Continue current medications with the exception of:   -- increase Torsemide from 20mg daily  to 30mg daily (1 tablet of 10mg and 1 tablet of 20mg)  - Labwork: CBC, CMP, LDH, DIG  - Imaging/Procedures:   - Referrals: Nutrition consult  - Followup:  July 15th at 1pm

## 2024-06-07 ENCOUNTER — TELEPHONE (OUTPATIENT)
Dept: TRANSPLANT | Facility: HOSPITAL | Age: 64
End: 2024-06-07
Payer: MEDICARE

## 2024-06-07 NOTE — TELEPHONE ENCOUNTER
Returned patient's call regarding diuretic dosing. Patient states his feet are still swollen. Instructed patient to continue taking 30mg Torsemide.     Will arrange for patient to be seen in clinic this week.

## 2024-06-12 ENCOUNTER — CLINICAL SUPPORT (OUTPATIENT)
Dept: CARDIOLOGY | Facility: HOSPITAL | Age: 64
End: 2024-06-12
Payer: MEDICARE

## 2024-06-12 DIAGNOSIS — I50.9 CONGESTIVE HEART FAILURE, UNSPECIFIED HF CHRONICITY, UNSPECIFIED HEART FAILURE TYPE (MULTI): ICD-10-CM

## 2024-06-12 DIAGNOSIS — I42.8 NON-ISCHEMIC CARDIOMYOPATHY (MULTI): ICD-10-CM

## 2024-06-12 DIAGNOSIS — Z95.811 LVAD (LEFT VENTRICULAR ASSIST DEVICE) PRESENT (MULTI): ICD-10-CM

## 2024-06-12 PROCEDURE — 97802 MEDICAL NUTRITION INDIV IN: CPT | Mod: 95

## 2024-06-12 NOTE — PATIENT INSTRUCTIONS
Read labels for sodium and buy less processed foods. Reviewed with patient sodium guidelines of 1177-7307 mg of sodium per day and to work toward meeting this goal. Gave guidelines for low sodium foods and to keep most foods in the low sodium range. Explained on sodium impacts fluid retention and weight.  Use plate to guide food choices and balance. Reviewed portion control via using the plate method as well as balance for food groups. Discussed carb containing foods and using portion control to help with better carbohydrate intake. Discussed the importance of carbohydrates in the meal and complex carbs.  Buys foods in season to help with budgeting healthier foods into the diet.

## 2024-06-12 NOTE — PROGRESS NOTES
Assessment:  Virtual or Telephone Consent    A telephone visit (audio only) between the patient (at the originating site) and the provider (at the distant site) was utilized to provide this telehealth service.   Verbal consent was requested and obtained from Anatoly Lane on this date, 06/12/24 for a telehealth visit.     Pt is a 63 y.o. male with past medical history of congestive heart failure, hypertension referred for initial nutrition assessment.  Pt has been put on 80 pounds in the past year since having LVAD placed and would like to lose weight. Currently following a low carb (<20 g carbs/day) and 1500 calorie diet. He is often hungry when he eats carbs and is more satisfied by eating a low carb diet. Pt orders groceries online from [employer], does not read labels for sodium or fat - only carbs. He does not use salt at the table so feels his diet is lower in sodium. Patient read food labels for sodium while we were on the call and based on his findings, his typical daily intake is approximately 3,000-4,000 mg of sodium per day. Pt is on a limited income and struggles to buy fresh foods.    Lab Results   Component Value Date    HGBA1C 4.4 02/23/2024    CHOL 158 02/29/2024    LDLF 85 03/29/2023    TRIG 145 02/29/2024      Typical Intake    Meal 1: 6-8 sausage links in a low carb wrap with cheese, sometimes just sausage with SF syrup.  Meal 2: Ham/Turkey or Salami in a low carb wrap with cheese or 2 hot dogs with cheese  Meal 3: Canned Tuna or chicken with ruelas in a low carb wrap with cheese, occasionally a hamburger  Snacks:   Beverages: 6 cups coffee with a little milk and splenda    Estimated Energy Needs:    Weight Loss Needs: 1536-0681 kcal/day using [place] with very light activity factor    Exercise: Pt has started exercising on the elliptical and band weight machine. Tries to get 8,000 steps per day, but is averaging 4,000 per day. Does not sleep well, gets about 4-5 hours per night.     Nutrition  Diagnosis:    Diagnosis Statement 1:  Diagnosis Status: New  Diagnosis : Undesirable food choices related to  food and nutrition related knowledge deficit concerning sodium intake   as evidenced by  estimated sodium from diet greater than needs.     Nutrition Interventions:  MNT for Low Sodium diet, Congestive Heart Failure, Balanced Plate and healthy carbohydrate intake.    Educational Materials provided: [facility] Low Sodium Nutrition Therapy, Why should I Limit sodium, Sodium Can be Sneaky, Diabetes Plate planner and heart healthy tips handout.    Monitoring and Evaluation:  Will monitor weight trend, intake, labs, and pt progress

## 2024-06-14 ENCOUNTER — TELEPHONE (OUTPATIENT)
Dept: TRANSPLANT | Facility: HOSPITAL | Age: 64
End: 2024-06-14
Payer: MEDICARE

## 2024-06-14 NOTE — TELEPHONE ENCOUNTER
Received call from patient to report he has a cold, including congestion and cough. He wants to know if he still needs to come to clinic. Advised patient that he can still come and wear a mask. Patient stated he would just like to reschedule. Will have admins assist in rescheduling.

## 2024-06-27 ENCOUNTER — DOCUMENTATION (OUTPATIENT)
Dept: TRANSPLANT | Facility: HOSPITAL | Age: 64
End: 2024-06-27
Payer: MEDICARE

## 2024-06-27 ENCOUNTER — TELEPHONE (OUTPATIENT)
Dept: TRANSPLANT | Facility: HOSPITAL | Age: 64
End: 2024-06-27
Payer: MEDICARE

## 2024-06-27 NOTE — PROGRESS NOTES
Called patient with coordinator Holli Barrios to get Hepatitis B Core Ab donor offer consent from patient. Reviewed listing consent form with patient over the phone. Patient verbally consented to receive Hep B Core Ab positive offers. Consent witnessed by Holli Barrios RN. [facility] profile will be updated to reflect acceptance criteria. Patient will sign physical copy of consent in next office visit.

## 2024-06-27 NOTE — TELEPHONE ENCOUNTER
Holli Barrios, RN and Katia Martinez, RN called to check in with patient re: Hepatitis B Core Antibody donor offers. Per conversation with the patient, he does want to accept Hepatitis B Core Antibody offers for transplant at this time. Listing consent updated and documented telephone consent obtained. [facility]OS profile updated to reflect this change. Patient's primary cardiologist, Dr. Ruano, updated.     Patient also stated that he needs to know which other vaccines he needs. Will send a [employer] message with vaccinations.      Will obtain patients signature on updated listing consent at next office visit.

## 2024-07-02 ENCOUNTER — TELEPHONE (OUTPATIENT)
Dept: TRANSPLANT | Facility: HOSPITAL | Age: 64
End: 2024-07-02
Payer: MEDICARE

## 2024-07-02 NOTE — TELEPHONE ENCOUNTER
Patient sent a [employer] message on 7/2 stating that he no longer wants to accept Hepatitis B organs. [facility]

## 2024-07-02 NOTE — TELEPHONE ENCOUNTER
Received a MyChart message from the patient stating that he does NOT want to consent for Hepatitis B Core Antibody positive organs at this time. Informed patient that we would update his [facility]ET profile accordingly, and that he can change his mind at any time. Dr. Vincent, patient's primary cardiologist, was notified.

## 2024-07-15 ENCOUNTER — DOCUMENTATION (OUTPATIENT)
Dept: TRANSPLANT | Facility: HOSPITAL | Age: 64
End: 2024-07-15
Payer: MEDICARE

## 2024-07-15 ENCOUNTER — SOCIAL WORK (OUTPATIENT)
Dept: TRANSPLANT | Facility: HOSPITAL | Age: 64
End: 2024-07-15
Payer: MEDICARE

## 2024-07-15 DIAGNOSIS — Z95.811 LVAD (LEFT VENTRICULAR ASSIST DEVICE) PRESENT (MULTI): ICD-10-CM

## 2024-07-15 DIAGNOSIS — Z76.82 PRE-HEART TRANSPLANT, LISTED: ICD-10-CM

## 2024-07-15 NOTE — PROGRESS NOTES
Patient arrived to clinic visit and roomed by staff. Nursing placed patient on LVAD monitor and performed nursing assessment. When returning to room with Dr. Hylton, patient was not in the room. Attempted to contact patient via phone call without success.

## 2024-07-16 ENCOUNTER — TELEPHONE (OUTPATIENT)
Dept: TRANSPLANT | Facility: HOSPITAL | Age: 64
End: 2024-07-16
Payer: MEDICARE

## 2024-07-16 NOTE — TELEPHONE ENCOUNTER
Per discussion at selection committee, patient is to be made status 7 re: non-compliance and leaving office visit prior to being seen on 7/15. Patient to have a virtual visit with Dr. Vincent prior to being reactivated on the OS wait list.     Patient was made status 7 on 7/16/2024 re: medical non-compliance. Called to update patient who verbalized understanding.

## 2024-07-17 ENCOUNTER — TELEPHONE (OUTPATIENT)
Dept: CARDIOLOGY | Facility: CLINIC | Age: 64
End: 2024-07-17
Payer: MEDICARE

## 2024-07-17 NOTE — TELEPHONE ENCOUNTER
07-17-24 phoned pt to schedule, pt is seeing uh in [facility].pt states he do not need to follow both offices and will stay with  in [facility].     MD slim Ferrari

## 2024-07-18 ENCOUNTER — DOCUMENTATION (OUTPATIENT)
Dept: TRANSPLANT | Facility: HOSPITAL | Age: 64
End: 2024-07-18
Payer: MEDICARE

## 2024-07-18 NOTE — PROGRESS NOTES
Pt scheduled for routine outpatient follow-up related to current LVAD and being listed for heart transplant. Pt left prior to SW appt time citing he was busy and did not wish to wait for appt with transplant cardiologist nor SW. Coordinator was able to reach Pt by phone same day who confirmed the above and that he did not wish to speak with team at this time. SW will attempt to reach Pt by phone in the next few days to touch base.     HAKAN Mendosa  LVAD/Heart Transplant

## 2024-07-18 NOTE — PROGRESS NOTES
APICAL CUFF [id number] Given 4/30/2023  OUTFLOW  [id number] Given 4/30/2023  MOD CABLE [id number] Given 4/30/2023

## 2024-07-30 ENCOUNTER — DOCUMENTATION (OUTPATIENT)
Dept: TRANSPLANT | Facility: HOSPITAL | Age: 64
End: 2024-07-30
Payer: MEDICARE

## 2024-08-28 ENCOUNTER — TELEMEDICINE (OUTPATIENT)
Dept: CARDIOLOGY | Facility: HOSPITAL | Age: 64
End: 2024-08-28
Payer: MEDICARE

## 2024-08-28 DIAGNOSIS — Z95.811 LVAD (LEFT VENTRICULAR ASSIST DEVICE) PRESENT (MULTI): Primary | ICD-10-CM

## 2024-08-28 PROCEDURE — 99214 OFFICE O/P EST MOD 30 MIN: CPT | Performed by: STUDENT IN AN ORGANIZED HEALTH CARE EDUCATION/TRAINING PROGRAM

## 2024-08-28 NOTE — PROGRESS NOTES
" VAD Cardiologist: Anita Vincent   VAD Coordinator: Ailyn Michelle   In visit: Patient, LVAD JUVENCIO Michelle  Patient's Location: [place] [address]    Date of Visit: 08/28/24    Location of visit: virtual  Type of Visit: LVAD followup    Type of VAD:  HM3  Implant Date: 4/30/2023  Reason for VAD: ICM  Intent: Long-Term (patient choice)     HPI / Summary:   63 y.o. man presenting for management of Stage D HFrEF NYHA class 2-3.  s/p HM3 4/30/2023 CAD, h/o pAF and VT s/p ICD, hypothyroidism, and stage D systolic HF/ICM/HFrEF.  He is also listed as status 7 for heart transplantation.  There is documentation that he was made status 7 on 7/16/2024 as he left his clinic visit on 7/15/2024 prior to being seen by the physician in clinic.  There is also documentation that he stopped his antidepressants.  There is a need for further clarification prior to reactivating for heart transplantation.    Today Mr. Lane clarified that he had waited to be seen in clinic for more than 1.5 hours, and became frustrated with the prolonged wait and he left.  He also expressed that when he was hospitalized with gastrointestinal bleeding 6/2024 that his antidepressants were discontinued at that time by the medical team.  He is also volunteered that he is unsure why he was initially started on antidepressants, and had not found them to be helpful in the past.  Apart from chronic insomnia (I only sleep 4 hours at a time\" he denies any other mental health concerns.  He was happy to be seen again by the heart transplant social work team for formal reassessment.    He indicated that he remains very interested in heart transplantation, and his only question in this regard was about which vaccines he still needed to have done.    Physically, he remains fairly asymptomatic.  He has not had LVAD alarms.  He does note however that for the past several weeks he has had \"cross eye\" attacks where his vision becomes abnormal before spontaneous " improvement.  We discussed that we would pursue CT head for him to get to the bottom of this.  He was amenable to this.    His exercise capacity continues to be excellent, and he has no functional limitations.      Review of Systems   Constitutional: Negative.   HENT: Negative.     Eyes:  Positive for blurred vision.   Cardiovascular: Negative.    Respiratory: Negative.     Endocrine: Negative.    Hematologic/Lymphatic: Negative.    Skin: Negative.    Musculoskeletal: Negative.    Gastrointestinal: Negative.    Genitourinary: Negative.    Neurological:  Positive for abnormal vision intermittently  Psychiatric/Behavioral: Negative.     : Full 10 pt review of symptoms of negative unless discussed above.     Medical History:   No past medical history on file.  Surgical Hx:   Past Surgical History:   Procedure Laterality Date    CT ABDOMEN PELVIS ANGIOGRAM W AND/OR WO IV CONTRAST  4/13/2023    CT ABDOMEN PELVIS ANGIOGRAM W AND/OR WO IV CONTRAST [facility] CT    OTHER SURGICAL HISTORY  01/05/2022    Complete colonoscopy    OTHER SURGICAL HISTORY  01/05/2022    Cardioverter defibrillator insertion      Vitals:   No vitals taken at this visit    Physical exam:  No in person physical examination done  AO X 4  No apparent conversational dyspnea    Labs:   CMP:  Recent Labs     05/31/23  2134 05/28/23  0603 05/26/23  0056 05/23/23  0729 05/23/23  0304   NA CANCELED CANCELED CANCELED 138 CANCELED   K CANCELED CANCELED CANCELED 4.0 CANCELED   CL CANCELED CANCELED CANCELED 101 CANCELED   CO2 CANCELED CANCELED CANCELED 29 CANCELED   ANIONGAP CANCELED CANCELED CANCELED 12 CANCELED   BUN CANCELED CANCELED CANCELED 21 CANCELED   CREATININE CANCELED CANCELED CANCELED 0.70 CANCELED   MG CANCELED CANCELED CANCELED 2.03 CANCELED     Recent Labs     05/31/23  2134 05/28/23  0603 05/26/23  0056 05/23/23  0729 05/23/23  0304 05/21/23  1943   ALBUMIN CANCELED CANCELED CANCELED 3.3* CANCELED CANCELED   ALKPHOS CANCELED CANCELED CANCELED 98  CANCELED CANCELED   ALT CANCELED CANCELED CANCELED 22 CANCELED CANCELED   AST CANCELED CANCELED CANCELED 19 CANCELED CANCELED   BILITOT CANCELED CANCELED CANCELED 0.7 CANCELED CANCELED   LIPASE  --   --   --   --   --  CANCELED     CBC:  Recent Labs     05/31/23 2134 05/28/23  0603 05/26/23  0056 05/23/23  0729 05/23/23  0304   WBC CANCELED CANCELED CANCELED 8.0 CANCELED   HGB CANCELED CANCELED CANCELED 10.5* CANCELED   HCT CANCELED CANCELED CANCELED 33.6* CANCELED   PLT CANCELED CANCELED CANCELED 321 CANCELED   MCV CANCELED CANCELED CANCELED 97 CANCELED     COAG:   Recent Labs     05/31/23 2134 05/28/23  0603 05/26/23  0056 05/02/23  0030 05/01/23  0033 04/30/23  1619 04/30/23  0413 04/29/23  0221 04/28/23  0201 04/21/23  1412 04/21/23  1125 04/08/23  0315 04/07/23  1835 04/06/23  1852 04/06/23  1714   INR CANCELED CANCELED CANCELED   < > 1.3* 1.3* 1.2* 1.1 1.1   < > 1.2*   < >  --    < >  --    HAUF  --   --   --   --   --   --  0.3 0.3 0.3   < >  --    < >  --   --   --    HAPTOGLOBIN  --   --   --   --   --   --   --   --   --   --  <30  --  <30  --  <30   FIBRINOGEN  --   --   --   --  264 255 356 368 345   < >  --   --   --   --   --     < > = values in this interval not displayed.     ABO:   Recent Labs     05/02/23  0746   ABO AB     HEME/ENDO:  Recent Labs     05/31/23 2134 05/21/23  1943 05/09/23  0642 05/03/23  0228 04/25/23  0911 04/21/23  1125 04/07/23  0416 03/31/23  2331 03/31/23  2142 03/29/23 2151   FERRITIN  --   --   --   --   --  352*  --   --   --  79   IRONSAT  --   --   --   --   --  16*  --   --   --  6*   TSH CANCELED CANCELED 8.41* 6.56*   < >  --    < >  --    < > 15.36*   HGBA1C  --   --   --   --   --   --   --  5.8*  --  6.7*    < > = values in this interval not displayed.      CARDIAC:   Recent Labs     05/21/23  1943 05/18/23  2335 05/18/23  0859 05/17/23  0759 05/15/23  0633 05/14/23  1122 04/02/23  0154 03/29/23 2151   LDH  --  CANCELED CANCELED CANCELED 189 216   < >  --     TROPHS CANCELED  CANCELED  --   --   --   --   --   --   --    BNP CANCELED  --   --   --   --   --   --  3,231*    < > = values in this interval not displayed.     Recent Labs     03/29/23  2151   CHOL 126   LDLF 85   HDL 24.0*   TRIG 83     MICRO:   Recent Labs     05/12/23  0857   CRP 14.77*     Echocardiogram     Narrative  [facility], [address] [place] [address]  Tel [phone] and Fax [phone]    TRANSTHORACIC ECHOCARDIOGRAM REPORT      Patient Name:     BILLIE WARD       Reading Physician:  [id number] Spencer Lilly MD  Study Date:       5/15/2023          Referring           SHERRILL FLEMING  Physician:  MRN/PID:          [id number]           PCP:  Accession/Order#: [id number]          Formerly [facility] Non  Location:           Invasive  YOB: 1960          Fellow:             [id number] Tobi Ledbetter MD  Gender:           M                  Nurse:              Mei Jeff RN  Admit Date:       3/29/2023          Sonographer:        TONYA Wolfe RDCS  Admission Status: Inpatient -        Additional Staff:  Routine  Height:           175.26 cm          CC Report to:       [address]  Weight:           73.03 kg           Study Type:         Echocardiogram  BSA:              1.88 m2    Diagnosis/ICD: Z95.811-Presence of heart assist device  Indication:    Post LVAD follow up  Procedure/CPT: Echo Limited-08798; Color Doppler-49968; Doppler Limited-36740    Patient History:  Smoker:            Former.  Pertinent History: CAD, Cardiomyopathy and A-Fib. HFrEF, VT s/p dual chamber  ICD, anemia, hypothyroidism, cardiogenic shock, s/p LVAD  HMIII.    Study Detail: The following Echo studies were performed: 2D, M-Mode, Doppler and  color flow. Definity used as a contrast agent for endocardial  border definition. Total contrast used for this procedure was 3.0  mL via IV push.      PHYSICIAN INTERPRETATION:  Left Ventricle: The left ventricular  systolic function is severely decreased, with an estimated ejection fraction of 10-15%. There is global hypokinesis of the left ventricle with minor regional variations. The left ventricular cavity size is severely dilated. Spectral Doppler shows an abnormal pattern of left ventricular diastolic filling.  Left Atrium: The left atrium is severely dilated.  Right Ventricle: The right ventricle was not well visualized. There is reduced right ventricular systolic function. The RV is not completely visualized, but appears to be likely moderately dilated with moderately decreased function.  Right Atrium: The right atrium was not well visualized. There is a device visualized in the right atrium.  Aortic Valve: The aortic valve appears abnormal. There is mild aortic valve regurgitation.  Mitral Valve: The mitral valve is normal in structure. There is trace mitral valve regurgitation.  Tricuspid Valve: The tricuspid valve is structurally normal. Tricuspid regurgitation was not assessed.  Pulmonic Valve: The pulmonic valve is structurally normal. There is no indication of pulmonic valve regurgitation.  Pericardium: There is a trivial pericardial effusion.  Pleural: There is left pleural effusion.  Aorta: The aortic root was not well visualized.  Systemic Veins: The inferior vena cava appears to be of normal size. There is IVC inspiratory collapse greater than 50%.  In comparison to the previous echocardiogram(s): Although previous studies are available for review, their comparison with the current echocardiogram is clinically irrelevant. Compared with study from 4/25/2023,.    LEFT VENTRICULAR ASSIST DEVICE:  Study Type: This is a follow-up LVAD echocardiogram.  LVAD: The patient has a(n) Heartmate III LVAD device present.  Inflow Cannula: The inflow cannula is visualized in the left ventricular apex.  Outflow Cannula: Visualization of the outflow cannula is technically difficult.  AV Leaflet Mobility: The aortic valve  leaflets do not appear to open.  Inflow Velocities: The LVAD cannula inflow velocity is normal (.5-2msec).        CONCLUSIONS:  1. Left ventricular systolic function is severely decreased with a 10-15% estimated ejection fraction.  2. Spectral Doppler shows an abnormal pattern of left ventricular diastolic filling.  3. The RV is not completely visualized, but appears to be likely moderately dilated with moderately decreased function.  4. There is reduced right ventricular systolic function.  5. The left atrium is severely dilated.  6. Aortic valve appears abnormal.  7. Mild aortic valve regurgitation.  8. Left ventricular cavity size is severely dilated.  9. There is global hypokinesis of the left ventricle with minor regional variations.    QUANTITATIVE DATA SUMMARY:  2D MEASUREMENTS:  Normal Ranges:  IVSd:          0.90 cm    (0.6-1.1cm)  LVPWd:         1.00 cm    (0.6-1.1cm)  LVIDd:         6.20 cm    (3.9-5.9cm)  LV Mass Index: 129.8 g/m2    LV SYSTOLIC FUNCTION BY 2D PLANIMETRY (MOD):  Normal Ranges:  EF-A4C View: 13.9 % (>=55%)  EF-A2C View: 21.8 %  EF-Biplane:  17.2 %    TRICUSPID VALVE/RVSP:  Normal Ranges:  IVC Diam: 1.16 cm      [facility], Cath Lab, [address] [place] [address]    Cardiovascular Catheterization Report    Patient Name:     BILLIE      Performing Physician:  [id number] Vince WARD MD  Study Date:       4/27/2023  Verifying Physician:   [id number] Vince Boyer MD  MRN/PID:          [id number]   Cardiologist/Co-scrub:  Accession/Order#: [id number]  Fellow:                [id number] Tobi Ledbetter MD  YOB: 1960  Fellow:  Gender:           M          Referring Physician:  Admit Date:                  Referring Physician:   [id number] Anita Vincent MD  Surgeon:                     Referring Physician:   VANESSA      Study: Right Heart Catheterization      Indications:  BILLIE WARD is a 63 year old male who  presents with hypertension, atrial fibrillation and Tobacco Use - Former, Patient was referred for right heart catheterization to monitor hemodynamics for an acute exacerbation of heart failure requiring Impella support. Cardiomyopathy.    Appropriate Use Criteria:  Re-evaluation of know cardiomyopathy with change in clinical status or on cardiac exam or to guide therapy; AUC score = 7.    Procedure Description:  After infiltration with 1% Lidocaine, the was cannulated with a modified Seldinger technique. After infiltration of local anesthetic, the right internal jugular vein was identified with two-dimensional ultrasound. Under direct ultrasound visualization, the right internal jugular vein was cannulated with a micropuncture technique. A 9 Luxembourgish sheath was placed in the vein. A balloon tipped catheter was positioned in the right heart system to record pressures and remained in the patient when transferred from the lab. Cardiac output was calculated via the Raven method. Post-procedure, the venous sheath was left intact and sutured in place.    Right Heart Catheterization:  A balloon tipped catheter was positioned in the right heart system to record pressures and remained in the patient when transferred from the lab. Cardiac output was calculated via the Raven method.    Hemo Personnel:  +----------------------+-------------+  Name                  Duty           +----------------------+-------------+  Vince Boyer MD, MD 1  +----------------------+-------------+  Jey Driscoll RN       PROC CIRC 1  +----------------------+-------------+  Mariya Fam RN     PROC CIRC 2  +----------------------+-------------+  Edilberto Reinoso RN    PROC RECORD 1  +----------------------+-------------+  Tobi Ledbetter MD         FELLOW PHYS 1  +----------------------+-------------+      Sedation Time:  +------------------------+----------------------------------------+  Sedation Start/End TimesTime                                       +------------------------+----------------------------------------+  Start                   4/27/2023 10:26:07                        +------------------------+----------------------------------------+  Drugs                   Versed 0.5 mg IV per physician for sedat  +------------------------+----------------------------------------+  End                     4/27/2023 10:56:04                        +------------------------+----------------------------------------+      Equipment Used:  +---------------------+--------------------------------------------------------+              Date/Time                                             Description  +---------------------+--------------------------------------------------------+        Hemodynamic Pressures:  Resting hemodynamics on maximal Impella support revealed a moderately elevated PCW pressure with a normal Raven cardiac index of 3.4 L/min/mï¿½. The PVR is normal at that 72.  +----+----------+---------+-------------+-------------+---+----+-------+-------+  SiteDate Time   Phase  Systolic mmHg  Diastolic  ED MeanA-Wave V-Wave                   Name                    mmHg     mmHmmHg mmHg   mmHg                                                      g                     +----+----------+---------+-------------+-------------+---+----+-------+-------+    AO 4/27/2023     Rest          101           72     78                      10:47:20                                                                      AM                                                          +----+----------+---------+-------------+-------------+---+----+-------+-------+    RA 4/27/2023     Rest                                4      8      5        10:48:31                                                                      AM                                                           +----+----------+---------+-------------+-------------+---+----+-------+-------+    RV 4/27/2023     Rest           47               8                          10:49:08                                                                      AM                                                          +----+----------+---------+-------------+-------------+---+----+-------+-------+    PW 4/27/2023     Rest                               20     21     24        10:50:13                                                                      AM                                                          +----+----------+---------+-------------+-------------+---+----+-------+-------+    PA 4/27/2023     Rest           47           18     26                      10:51:05                                                                      AM                                                          +----+----------+---------+-------------+-------------+---+----+-------+-------+        Oxygen Saturation %:  +-----------+----------+------------+  Sample SiteO2 Sat (%)HB (g/100ml)  +-----------+----------+------------+           FA        97         7.8  +-----------+----------+------------+           PA        62         7.8  +-----------+----------+------------+           FA        97         7.8  +-----------+----------+------------+           PA        62         7.8  +-----------+----------+------------+      Cardiac Outputs:  +----+---+---+      CI CO   +----+---+---+  [id number]  +----+---+---+      Vascular Resistance Calculated Values (Wood Units):  +---+---+  [id number]  +---+---+  PVR72   +---+---+      Complications:  No in-lab complications observed.    Cardiac Cath Transition of Care Summary:  Post Procedure Diagnosis: Moderately elevated PCW pressure with a normal  Raven  cardiac index on axillary Impella support.    Blood Loss:               Estimated blood loss during the procedure was 30 mls.  Specimens Removed:        Number of specimen(s) removed: none.    ____________________________________________________________________________________  CONCLUSIONS:  1. Moderately elevated PCW pressure with normal Raven cardiac index on Impella support.    ____________________________________________________________________________________  CPT Codes:  Right Heart Cath O2/Cardiac output without biopsy ([facility])-74638; Moderate Sedation Services 1st additional 15 minutes patient >5 years-18196; Moderate Sedation Services 2nd additional 15 minutes patient >5 years-20210    ICD 10 Codes:  I50.23-Acute on chronic systolic (congestive) heart failure    [id number] Vince Boyer MD  Performing Physician  Electronically signed by [id number] Vince Boyer MD on 4/28/2023 at 11:13:42 AM      Current Outpatient Medications   Medication Instructions    aspirin 81 mg, oral, Daily    digoxin (LANOXIN) 125 mcg, oral, Daily    Entresto 49-51 mg tablet 1 tablet, oral, 2 times daily    fluticasone (Flonase) 50 mcg/actuation nasal spray 2 sprays, Each Nostril, Daily    Jardiance 10 mg, oral, Daily    levothyroxine (SYNTHROID, LEVOXYL) 175 mcg, oral, Daily before breakfast    omega-3 acid ethyl esters (LOVAZA) 2 g, oral, 2 times daily    Pacerone 200 mg, oral, Daily    pantoprazole (PROTONIX) 40 mg, oral, Daily before breakfast    sertraline (ZOLOFT) 25 mg, oral, Daily    spironolactone (ALDACTONE) 50 mg, oral, Daily    Symbicort 160-4.5 mcg/actuation inhaler 2 puffs, inhalation, 2 times daily RT    tamsulosin (FLOMAX) 0.4 mg, oral, Daily    torsemide (DEMADEX) 10 mg, oral, Daily    warfarin (COUMADIN) 1 mg, oral, Every evening     Problems:   63 y.o. man presenting for management of Stage D HFrEF NYHA class 2-3.  s/p HM3 4/30/2023 CAD, h/o pAF and VT s/p ICD, hypothyroidism, and stage D systolic HF/ICM/HFrEF.  " He is also listed as status 7 for heart transplantation.  There is documentation that he was made status 7 on 7/16/2024 as he left his clinic visit on 7/15/2024 prior to being seen by the physician in clinic.  There is also documentation that he stopped his antidepressants.  There is a need for further clarification prior to reactivating for heart transplantation.    Mr. Lane clarified that he had waited to be seen in clinic for more than 1.5 hours, and became frustrated with the prolonged wait and he left.  He also expressed that when he was hospitalized with gastrointestinal bleeding 6/2024 that his antidepressants were discontinued at that time by the medical team.  He is also volunteered that he is unsure why he was initially started on antidepressants, and had not found them to be helpful in the past.  Apart from chronic insomnia (I only sleep 4 hours at a time\" he denies any other mental health concerns.  He was happy to be seen again by the heart transplant social work team for formal reassessment.    He indicated that he remains very interested in heart transplantation, and his only question in this regard was about which vaccines he still needed to have done.    PLAN:  -Arrange for him to meet with our social workers (heart transplant) to assess for depression.  -Transplant team to touch base with them to clarify with respect to vaccine still needed  -CT head (noncontrast) to assess for CVAs given episodes of recurrent visual disturbance  -He also asked whether some of his appointments could be at [facility], I promised to inquire and our team will get back to him.  -We discussed that following the above assessments, we would be bringing him back to committee to discuss possible reactivation, if appropriate.    ____________________________________________________________  Anita Vincent MD PhD   Section of Advanced Heart Failure and Cardiac Transplantation  Division of Cardiovascular " Medicine  [facility]  [facility]

## 2024-09-05 ENCOUNTER — DOCUMENTATION (OUTPATIENT)
Dept: TRANSPLANT | Facility: HOSPITAL | Age: 64
End: 2024-09-05
Payer: MEDICARE

## 2024-09-05 DIAGNOSIS — Z94.1 HEART REPLACED BY TRANSPLANT (MULTI): ICD-10-CM

## 2024-09-05 DIAGNOSIS — Z01.818 PRE-TRANSPLANT EVALUATION FOR HEART TRANSPLANT: ICD-10-CM

## 2024-09-12 ENCOUNTER — TELEPHONE (OUTPATIENT)
Dept: RESPIRATORY THERAPY | Facility: HOSPITAL | Age: 64
End: 2024-09-12

## 2024-09-12 ENCOUNTER — SOCIAL WORK (OUTPATIENT)
Dept: TRANSPLANT | Facility: HOSPITAL | Age: 64
End: 2024-09-12
Payer: MEDICARE

## 2024-09-12 ENCOUNTER — APPOINTMENT (OUTPATIENT)
Dept: CARDIOLOGY | Facility: CLINIC | Age: 64
End: 2024-09-12
Payer: MEDICARE

## 2024-09-12 NOTE — TELEPHONE ENCOUNTER
Patient called regarding his social work appointment for 9/12. Stated he was not yet contacted. Requesting call back from CARINE.

## 2024-09-23 ENCOUNTER — HOSPITAL ENCOUNTER (OUTPATIENT)
Dept: RADIOLOGY | Facility: CLINIC | Age: 64
Discharge: HOME | End: 2024-09-23
Payer: MEDICARE

## 2024-09-23 DIAGNOSIS — Z01.818 PRE-TRANSPLANT EVALUATION FOR HEART TRANSPLANT: ICD-10-CM

## 2024-09-23 PROCEDURE — 70450 CT HEAD/BRAIN W/O DYE: CPT

## 2024-10-03 ENCOUNTER — SOCIAL WORK (OUTPATIENT)
Dept: TRANSPLANT | Facility: HOSPITAL | Age: 64
End: 2024-10-03
Payer: MEDICARE

## 2024-10-03 SDOH — HEALTH STABILITY: PHYSICAL HEALTH: ON AVERAGE, HOW MANY MINUTES DO YOU ENGAGE IN EXERCISE AT THIS LEVEL?: 30 MIN

## 2024-10-03 SDOH — ECONOMIC STABILITY: GENERAL
WHICH OF THE FOLLOWING DO YOU KNOW HOW TO USE AND HAVE ACCESS TO EVERY DAY? (CHOOSE ALL THAT APPLY): SMARTPHONE WITH CELLULAR DATA PLAN

## 2024-10-03 SDOH — ECONOMIC STABILITY: GENERAL
WHICH OF THE FOLLOWING WOULD YOU LIKE TO GET CONNECTED TO IN ORDER TO RECEIVE A DISCOUNT OR FOR FREE? (CHOOSE ALL THAT APPLY): NONE OF THESE

## 2024-10-03 SDOH — HEALTH STABILITY: PHYSICAL HEALTH: ON AVERAGE, HOW MANY DAYS PER WEEK DO YOU ENGAGE IN MODERATE TO STRENUOUS EXERCISE (LIKE A BRISK WALK)?: 3 DAYS

## 2024-10-03 ASSESSMENT — ANXIETY QUESTIONNAIRES
GAD7 TOTAL SCORE: 1
3. WORRYING TOO MUCH ABOUT DIFFERENT THINGS: NOT AT ALL
4. TROUBLE RELAXING: SEVERAL DAYS
2. NOT BEING ABLE TO STOP OR CONTROL WORRYING: NOT AT ALL
6. BECOMING EASILY ANNOYED OR IRRITABLE: NOT AT ALL
5. BEING SO RESTLESS THAT IT IS HARD TO SIT STILL: NOT AT ALL
IF YOU CHECKED OFF ANY PROBLEMS ON THIS QUESTIONNAIRE, HOW DIFFICULT HAVE THESE PROBLEMS MADE IT FOR YOU TO DO YOUR WORK, TAKE CARE OF THINGS AT HOME, OR GET ALONG WITH OTHER PEOPLE: NOT DIFFICULT AT ALL
7. FEELING AFRAID AS IF SOMETHING AWFUL MIGHT HAPPEN: NOT AT ALL
1. FEELING NERVOUS, ANXIOUS, OR ON EDGE: NOT AT ALL

## 2024-10-03 ASSESSMENT — PATIENT HEALTH QUESTIONNAIRE - PHQ9
5. POOR APPETITE OR OVEREATING: NOT AT ALL
SUM OF ALL RESPONSES TO PHQ QUESTIONS 1-9: 5
8. MOVING OR SPEAKING SO SLOWLY THAT OTHER PEOPLE COULD HAVE NOTICED. OR THE OPPOSITE, BEING SO FIGETY OR RESTLESS THAT YOU HAVE BEEN MOVING AROUND A LOT MORE THAN USUAL: NOT AT ALL
6. FEELING BAD ABOUT YOURSELF - OR THAT YOU ARE A FAILURE OR HAVE LET YOURSELF OR YOUR FAMILY DOWN: NOT AT ALL
10. IF YOU CHECKED OFF ANY PROBLEMS, HOW DIFFICULT HAVE THESE PROBLEMS MADE IT FOR YOU TO DO YOUR WORK, TAKE CARE OF THINGS AT HOME, OR GET ALONG WITH OTHER PEOPLE: SOMEWHAT DIFFICULT
3. TROUBLE FALLING OR STAYING ASLEEP OR SLEEPING TOO MUCH: MORE THAN HALF THE DAYS
4. FEELING TIRED OR HAVING LITTLE ENERGY: SEVERAL DAYS
9. THOUGHTS THAT YOU WOULD BE BETTER OFF DEAD, OR OF HURTING YOURSELF: NOT AT ALL
SUM OF ALL RESPONSES TO PHQ9 QUESTIONS 1 & 2: 2
7. TROUBLE CONCENTRATING ON THINGS, SUCH AS READING THE NEWSPAPER OR WATCHING TELEVISION: NOT AT ALL
1. LITTLE INTEREST OR PLEASURE IN DOING THINGS: SEVERAL DAYS
2. FEELING DOWN, DEPRESSED OR HOPELESS: SEVERAL DAYS

## 2024-10-03 ASSESSMENT — SOCIAL DETERMINANTS OF HEALTH (SDOH)
HOW OFTEN DO YOU GET TOGETHER WITH FRIENDS OR RELATIVES?: TWICE A WEEK
HOW OFTEN DO YOU ATTEND CHURCH OR RELIGIOUS SERVICES?: NEVER
HOW OFTEN DO YOU ATTENT MEETINGS OF THE CLUB OR ORGANIZATION YOU BELONG TO?: NEVER
IN A TYPICAL WEEK, HOW MANY TIMES DO YOU TALK ON THE PHONE WITH FAMILY, FRIENDS, OR NEIGHBORS?: THREE TIMES A WEEK
DO YOU BELONG TO ANY CLUBS OR ORGANIZATIONS SUCH AS CHURCH GROUPS UNIONS, FRATERNAL OR ATHLETIC GROUPS, OR SCHOOL GROUPS?: NO

## 2024-10-03 NOTE — PROGRESS NOTES
"SW met with Pt for virtual phone visit to touch base regarding his current Status 7 on heart transplant waitlist and Pt's feelings about being re-activated. Pt was made Status 7 a few months ago following incident of Pt coming for routine clinic visit but leaving before being seen without notifying staff and then expressing frustration with waiting for heart transplant and uncertainty if he wished to proceed. Pt elaborated that he always asks to be scheduled so he can be done with appts and on his way back home by 3 PM because it stressed him out to drive in rush hour traffic and due to provider running behind, his appt wouldn't have happened in time. Pt aware that leaving was not the best way to handle situation and indicated he would not handle the same way in the future.     Pt continues to reside with his dog Christy whom he loves dearly in [place], [place] ~ 1 hour from [facility]. Pt described \"never being alone\" at home because Christy is like his shadow. Pt received LVAD on 4/30/23. Pt was originally listed for heart transplant on 3/13/24. Pt continues to be insured with [employer] and Medicaid with no anticipated changes going into next year. Pt reports coverage is sufficient and he has little or no copays for most things. Pt continues to receive SSDI and ~ $30 in food stamps monthly. Pt advised he opted not to establish with a PCP as previously discussed due to LVAD team providing all his current care anyway. Pt obtains all meds except amiodorone from "[facility] mail order pharmacy with his amiodorone coming from local [facility] due to mail order being unable to provide that one specifically.     Pt reports no DME usage and that he is independent in all ADLs and IADLs including driving himself, although he says he puts maybe 100 miles/month on his car but that's because he doesn't have many places he needs to go. He gets groceries delivered to his home via [facility]. Pt's mother Janice and step-dad Channing come " "over at least once weekly to do his LVAD dressing change and remain primary supports. Pt confirmed his son Bird remains a backup support as well. Bird works full-time and lives about 15 mins away. Pt stated they talk on the phone or text weekly and he just visited Pt at home last week. Pt is not currently enrolled in cardiac rehab. Pt goes to coumadin clinic at [facility] about once a month or more often as ordered, but reported his INR is pretty stable these days.     Pt confirmed he has not restarted Zoloft since it was discontinued at his request during a prior hospitalization. Pt stated he does get down or depressed at times, but does not feel it's unmanageable or that he wants to be on a mood stabilizer or anti-depressant. Pt adamantly denied any suicidal ideations or any thoughts to impede his LVAD function in any way. During this visit Pt scored 5 on PHQ9 indicating mild symptoms of depression and 1 on OSCAR indicating minimal symptoms of anxiety. Pt is not currently established with any type of counseling. Pt shared that he did feel depressed when he learned he had been made Status 7 on transplant list because it remains his goal to move forward with transplant and he perceives he would have a much improved quality of life with transplant vs. LVAD. Pt shared that he feels very embarrassed in public of his LVAD and bag and opted not to attend a friend's birthday last month because of it. He feels it impedes him from dating and that he would really like to have another romantic relationship one day. Pt shared a particularly hurtful encounter during a CT scan where a nurse told him to \"put his purse over there\". Pt is very hopeful that if he were to receive heart transplant, he would be able to \"rejoin the real world\".     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 Pt described that he usually sleeps 8 hours within a 24 hour period, but never at once. He may sleep " "a few hours at night and then another few hours via a daytime nap. Pt shared he does have a weight machine and elliptical and tries to be active when he feels up to it, but did say he feels like he's been \"slacking\" and wants to be more intentional. Pt described having difficult losing weight these days and wonders if his medication regimen might contribute.     Pt is scheduled next for in person clinic visit at [facility] on 11/19/24 with LVAD TOM Arleth. SW will also plan to see Pt in person on that date and complete Intermacs for 10/30/24 date. Pt was not opposed to seeing transplant psychologist in conjunction to that visit if required by team; SW will advise LVAD/transplant team of updates above for further direction/requirements.     HAKAN Mendosa  LVAD/Heart transplant    "

## 2024-10-17 ENCOUNTER — PATIENT MESSAGE (OUTPATIENT)
Dept: TRANSPLANT | Facility: HOSPITAL | Age: 64
End: 2024-10-17
Payer: MEDICARE

## 2024-10-31 DIAGNOSIS — I50.9 CONGESTIVE HEART FAILURE, UNSPECIFIED HF CHRONICITY, UNSPECIFIED HEART FAILURE TYPE: Primary | ICD-10-CM

## 2024-10-31 DIAGNOSIS — I50.9 CONGESTIVE HEART FAILURE, UNSPECIFIED HF CHRONICITY, UNSPECIFIED HEART FAILURE TYPE: ICD-10-CM

## 2024-10-31 DIAGNOSIS — Z95.811 LVAD (LEFT VENTRICULAR ASSIST DEVICE) PRESENT (MULTI): ICD-10-CM

## 2024-10-31 RX ORDER — DOCUSATE SODIUM 50 MG AND SENNOSIDES 8.6 MG 8.6; 5 MG/1; MG/1
TABLET, FILM COATED ORAL
Qty: 180 TABLET | Refills: 3 | Status: SHIPPED | OUTPATIENT
Start: 2024-10-31

## 2024-11-01 RX ORDER — TORSEMIDE 20 MG/1
20 TABLET ORAL DAILY
Qty: 30 TABLET | Refills: 11 | Status: SHIPPED | OUTPATIENT
Start: 2024-11-01

## 2024-11-19 ENCOUNTER — TELEPHONE (OUTPATIENT)
Dept: TRANSPLANT | Facility: HOSPITAL | Age: 64
End: 2024-11-19
Payer: MEDICARE

## 2024-11-19 ENCOUNTER — OFFICE VISIT (OUTPATIENT)
Dept: TRANSPLANT | Facility: HOSPITAL | Age: 64
End: 2024-11-19
Payer: MEDICARE

## 2024-11-19 ENCOUNTER — SOCIAL WORK (OUTPATIENT)
Dept: TRANSPLANT | Facility: HOSPITAL | Age: 64
End: 2024-11-19
Payer: MEDICARE

## 2024-11-19 VITALS — BODY MASS INDEX: 36.45 KG/M2 | WEIGHT: 246.8 LBS | OXYGEN SATURATION: 96 % | TEMPERATURE: 97.8 F

## 2024-11-19 DIAGNOSIS — I42.8 NON-ISCHEMIC CARDIOMYOPATHY (MULTI): ICD-10-CM

## 2024-11-19 DIAGNOSIS — E03.9 HYPOTHYROIDISM, UNSPECIFIED TYPE: Primary | ICD-10-CM

## 2024-11-19 DIAGNOSIS — Z01.818 ENCOUNTER FOR PRE-TRANSPLANT EVALUATION FOR HEART TRANSPLANT: ICD-10-CM

## 2024-11-19 DIAGNOSIS — E66.812 CLASS 2 OBESITY WITH BODY MASS INDEX (BMI) OF 36.0 TO 36.9 IN ADULT, UNSPECIFIED OBESITY TYPE, UNSPECIFIED WHETHER SERIOUS COMORBIDITY PRESENT: ICD-10-CM

## 2024-11-19 DIAGNOSIS — Z95.811 LVAD (LEFT VENTRICULAR ASSIST DEVICE) PRESENT (MULTI): ICD-10-CM

## 2024-11-19 DIAGNOSIS — I50.9 CONGESTIVE HEART FAILURE, UNSPECIFIED HF CHRONICITY, UNSPECIFIED HEART FAILURE TYPE: ICD-10-CM

## 2024-11-19 PROCEDURE — 99417 PROLNG OP E/M EACH 15 MIN: CPT | Performed by: REGISTERED NURSE

## 2024-11-19 PROCEDURE — 93750 INTERROGATION VAD IN PERSON: CPT | Performed by: REGISTERED NURSE

## 2024-11-19 PROCEDURE — 99215 OFFICE O/P EST HI 40 MIN: CPT | Performed by: REGISTERED NURSE

## 2024-11-19 RX ORDER — EPLERENONE 25 MG/1
25 TABLET, FILM COATED ORAL DAILY
Qty: 30 TABLET | Refills: 11 | Status: SHIPPED | OUTPATIENT
Start: 2024-11-19 | End: 2025-11-19

## 2024-11-19 ASSESSMENT — ANXIETY QUESTIONNAIRES
1. FEELING NERVOUS, ANXIOUS, OR ON EDGE: NOT AT ALL
5. BEING SO RESTLESS THAT IT IS HARD TO SIT STILL: NOT AT ALL
4. TROUBLE RELAXING: NOT AT ALL
2. NOT BEING ABLE TO STOP OR CONTROL WORRYING: NOT AT ALL
6. BECOMING EASILY ANNOYED OR IRRITABLE: NOT AT ALL
3. WORRYING TOO MUCH ABOUT DIFFERENT THINGS: NOT AT ALL
IF YOU CHECKED OFF ANY PROBLEMS ON THIS QUESTIONNAIRE, HOW DIFFICULT HAVE THESE PROBLEMS MADE IT FOR YOU TO DO YOUR WORK, TAKE CARE OF THINGS AT HOME, OR GET ALONG WITH OTHER PEOPLE: NOT DIFFICULT AT ALL
7. FEELING AFRAID AS IF SOMETHING AWFUL MIGHT HAPPEN: NOT AT ALL
GAD7 TOTAL SCORE: 0

## 2024-11-19 ASSESSMENT — KANSAS CITY CARDIOMYOPATHY QUESTIONNAIRE (KCCQ12)
3. OVER THE PAST 2 WEEKS, ON AVERAGE, HOW MANY TIMES HAS FATIGUE LIMITED YOUR ABILITY TO DO WHAT YOU WANTED: 1-2 TIMES PER WEEK
4. OVER THE PAST 2 WEEKS, ON AVERAGE, HOW MANY TIMES HAS SHORTNESS OF BREATH LIMITED YOUR ABILITY TO DO WHAT YOU WANTED: 1-2 TIMES PER WEEK
6. OVER THE PAST 2 WEEKS, HOW MUCH HAS YOUR HEART FAILURE LIMITED YOUR ENJOYMENT OF LIFE: IT HAS MODERATELY LIMITED MY ENJOYMENT OF LIFE
8B. OVER THE PAST 2 WEEKS, ON AVERAGE, HOW HAS HEART FAILURE LIMITED YOU ABILITY TO WORK OR DO HOUSEHOLD CHORES: MODERATELY LIMITED
8C. OVER THE PAST 2 WEEKS, ON AVERAGE, HOW HAS HEART FAILURE LIMITED YOU ABILITY TO VISIT FAMILY AND FRIENDS OUR OF YOUR HOME: DID NOT LIMIT AT ALL
7. IF YOU HAD TO SPEND THE REST OF YOUR LIFE WITH YOUR HEART FAILURE THE WAY IT IS RIGHT NOW, HOW WOULD YOU FEEL ABOUT THIS?: SOMEWHAT SATISFIED
2. OVER THE PAST 2 WEEKS, HOW MANY TIMES DID YOU HAVE SWELLING IN YOUR FEET, ANKLES OR LEGS WHEN YOU WOKE UP IN THE MORNING: LESS THAN ONCE A WEEK
1B. OVER THE PAST 2 WEEKS, HOW MUCH WERE YOU LIMITED BY HEART FAILURE SYMPTOMS (SHORTNESS OF BREATH OR FATIGUE) WHEN WALKING 1 BLOCK ON LEVEL GROUND: MODERATELY LIMITED
1C. OVER THE PAST 2 WEEKS, HOW MUCH WERE YOU LIMITED BY HEART FAILURE SYMPTOMS (SHORTNESS OF BREATH OR FATIGUE) WHEN HURRYING OR JOGGING (AS IF TO CATCH A BUS): LIMITED FOR OTHER REASONS OF DID NOT DO ACTIVITY
1A. OVER THE PAST 2 WEEKS, HOW MUCH WERE YOU LIMITED BY HEART FAILURE SYMPTOMS (SHORTNESS OF BREATH OR FATIGUE) WHEN SHOWERING OR BATHING: NOT AT ALL LIMITED
DID THE PATIENT COMPLETE A KCCQ FORM: YES
5. OVER THE PAST 2 WEEKS, ON AVERAGE, HOW MANY TIMES HAVE YOU BEEN FORCED TO SLEEP SITTING UP IN A CHAIR OR WITH AT LEAST 3 PILLOWS TO PROP YOU UP BECAUSE OF SHORTNESS OF BREATH?: NEVER OVER THE PAST 2 WEEKS
8A. OVER THE PAST 2 WEEKS, ON AVERAGE, HOW HAS HEART FAILURE LIMITED YOU ABILITY TO DO HOBBIES OR RECREATIONAL ACTIVITIES: MODERATELY LIMITED

## 2024-11-19 ASSESSMENT — PATIENT HEALTH QUESTIONNAIRE - PHQ9
8. MOVING OR SPEAKING SO SLOWLY THAT OTHER PEOPLE COULD HAVE NOTICED. OR THE OPPOSITE, BEING SO FIGETY OR RESTLESS THAT YOU HAVE BEEN MOVING AROUND A LOT MORE THAN USUAL: NOT AT ALL
SUM OF ALL RESPONSES TO PHQ QUESTIONS 1-9: 5
2. FEELING DOWN, DEPRESSED OR HOPELESS: SEVERAL DAYS
4. FEELING TIRED OR HAVING LITTLE ENERGY: SEVERAL DAYS
SUM OF ALL RESPONSES TO PHQ9 QUESTIONS 1 & 2: 2
5. POOR APPETITE OR OVEREATING: NOT AT ALL
10. IF YOU CHECKED OFF ANY PROBLEMS, HOW DIFFICULT HAVE THESE PROBLEMS MADE IT FOR YOU TO DO YOUR WORK, TAKE CARE OF THINGS AT HOME, OR GET ALONG WITH OTHER PEOPLE: SOMEWHAT DIFFICULT
9. THOUGHTS THAT YOU WOULD BE BETTER OFF DEAD, OR OF HURTING YOURSELF: NOT AT ALL
3. TROUBLE FALLING OR STAYING ASLEEP OR SLEEPING TOO MUCH: MORE THAN HALF THE DAYS
7. TROUBLE CONCENTRATING ON THINGS, SUCH AS READING THE NEWSPAPER OR WATCHING TELEVISION: NOT AT ALL
1. LITTLE INTEREST OR PLEASURE IN DOING THINGS: SEVERAL DAYS
6. FEELING BAD ABOUT YOURSELF - OR THAT YOU ARE A FAILURE OR HAVE LET YOURSELF OR YOUR FAMILY DOWN: NOT AT ALL

## 2024-11-19 ASSESSMENT — LIFESTYLE VARIABLES
CURRENT_SMOKER: NO
CURRENT_SMOKER_ECIG: NO

## 2024-11-19 ASSESSMENT — PAIN SCALES - GENERAL: PAINLEVEL_OUTOF10: 0-NO PAIN

## 2024-11-19 NOTE — TELEPHONE ENCOUNTER
Called [facility] to get labs from October. Spoke to medical records and provided fax for office.

## 2024-11-19 NOTE — PATIENT INSTRUCTIONS
Patient Instructions:  -Please bring a list of your medications to every visit.  -If you have any questions or concerns, please contact the LVAD office at [phone], option 3 or the direct line at [phone]. Please state that you are an LVAD patient. If it is after hours and it is an emergency, please page the LVAD pager by calling [phone] #[phone]  - Continue current medications with the exception of:   -- 25mg Inspra daily   - Labwork: CBC, CMP, LDH, BNP, TSH  - Imaging/Procedures:   - Referrals: Endocrinology and cardiac rehab   - Followup:  January 2025    Your goal for January 2025 is to lose 7-10 pounds. At this time, we would be able to reactivate your transplant status.     Following your LVAD placement, we strongly recommend that you participate in a cardiac rehabilitation program to help you recover from your heart condition and improve your overall health.    The cardiac rehab program consists of a series of exercises, education, and counseling sessions that will help you regain your strength and confidence. Our cardiac rehabilitation professionals will work with you to develop a personalized plan that meets your specific needs.    During the program, you will gain skills about heart-healthy lifestyle changes, such as healthy heart eating habits, stress management, and physical activity, medications management and monitoring of your symptoms.    We are committed to providing you with the best possible care and support during your recovery.    We have referred you to [facility]. Your cardiac rehabilitation will take 3-4 sessions per week for 36 visits total or as agreed upon with your cardiac rehab professional.

## 2024-11-19 NOTE — PROGRESS NOTES
"SW met with Pt in [place] [id number] outpatient clinic for routine visit related to LVAD implanted 4/30/23. Pt was not accompanied by anyone to this visit. SW completed Q6 months Intermacs related to 10/30 due date including KCCQ12 and Euroqol post-LVAD questionnaires. SW also recently completed virtual visit with this Pt on 10/3/24.     Pt remains status 7 for heart transplant as of today's date. Pt was previously listed status 4 for transplant, but updated to status 7 due to concerns for compliance with appts and care after leaving a visit before being seen and whether Pt wanted to move forward with transplant at the time. As of today's clinic visit, Pt continues to verbalize a desire to be relisted and hopeful to be transplanted for better quality of life. Pt enjoyed an active social life prior and feels his LVAD limits his ability to be out in public, meet new people, hang out with friends, etc. Per discussion with LVAD team including TOM Reinoso, goal to relist Pt status 4 after Pt loses ~ 10 lbs. Pt in agreement.     Pt reports all of his information remains same as note from virtual visit 10/3 except Pt reports he restarted Zoloft about 1 week ago due to \"feeling a bit down\" and is hopeful it will help. On this date, Pt scored 5 on PHQ9 indicating mild symptoms of depression and 0 on OSCAR indicating no clinical symptoms of anxiety.     SW will continue to follow ~Q3 months while Pt is actively listed for transplant or sooner if needed. Pt has SW contact info.      ADINA Mendosa  Transplant/LVAD    "

## 2024-11-19 NOTE — PROGRESS NOTES
VAD Cardiologist: Anita Vincent   VAD Coordinator: Ailyn Michelle   In visit: Patient, LVAD JUVENCIO Michelle  Patient's Location: [place] [address]    Date of Visit: 03/26/2024  2:00 PM EDT  Location of visit: [facility]   Type of Visit: LVAD followup    Type of VAD:  HM3  Implant Date: 4/30/2023  Reason for VAD: ICM  Intent: Short term - listed status 4 on 3/13/2024     HPI / Summary:   Anatoly Lane is a very pleasant 63 y.o. male presenting for management of Stage D HFrEF NYHA class 2-3.  s/p HM3 4/30/2023 CAD, h/o pAF and VT s/p ICD, hypothyroidism, and stage D systolic HF/ICM/HFrEF who returns to the LVAD clinic for ongoing evaluation and management.     Previously, patient stopped taking Empagliflozin due to itching. Patient started on Farxiga 10mg at that time. Patient had EP consult d/t not being seen by an EP MD in about 1 year.     Interval Hx:  Patient recently admitted 2/23-3/06 for acute decompensated HF and suspected GI bleed which has since resolved. While in hospital, patient optimized medically and evaluated for OHT. He was ultimately listed as status 4 on 3/13/2024.     Previous Visit: patient concerned about weight gain. patient states he lives a sedentary lifestyle and rarely gets up from the couch. States he walked almost 2,000 stepsand that is above average for him. Patient instructed to participate in a 30 minute walk daily and increase his steps goal to 8,000. Counseled patient that if he continues to gain weight he may no longer be eligible for heart transplant. consulted dietician. Patient appeared volume overloaded on exam. He reports he has not started taking his ordered Inspra. increased torsemide to 30mg daily.    Patient was made status 7 previously due to weight gain and verbalization of being unsure that he wanted a heart transplant. Patient left the clinic visit previously due to not wanting to wait to see the MD.     Today:  Discussed transplant with patient today.  "Patient would like to proceed with transplant listing. Discussed commitments and necessities for remaining active on the list. Current BMI is 36.4. Patient will need to lose about 7-10lbs prior to being activated on the list. States he lives a sedentary lifestyle, but does have the energy to workout on the elliptical from \"time to time\". Referred to cardiac rehab (pt has never completed). Patient has mild BLE edema on exam, but does not report any functional symptoms. Previously stopped his Inspra, will restart today at a lower dose.    Unfortunately, unable to reactivate patient to UNOS status 4 at this time 2/2 BMI. There is also concern regarding patient self titration/management of meds. Patient stopped his Inspra and Zoloft without consultation from any medical team. Will note patient does follow through with requests such as labwork. In depth conversation about commitment to transplant had today, all questions answered. Will see patient in 3 months with the hopes of reactivating him as UNOS status 4 at that time; to remain status 7 for now.     Patient denies CP, palpitations, dizziness, GRAVES, orthopnea, PND, edema, LVAD alarms, N/V/D, hematochezia, hematuria, epistaxis.  Most recent INR is 3.5.    Driveline is clean with no drainage.   VAD equipment interrogated in clinic with all batteries and clips functioning properly. Patient denies sleeping on batteries at home. Patient has not dropped their controller since last visit.     Patient has not completed Cardiac Rehab.     NYHA class at implant - IV.   NYHA class today -  I .     Review of Systems   Constitutional: Negative.   HENT: Negative.     Eyes:  Negative for blurred vision.   Cardiovascular:  Positive for leg swelling.   Respiratory: Negative.     Endocrine: Negative.    Hematologic/Lymphatic: Negative.    Skin: Negative.    Musculoskeletal: Negative.    Gastrointestinal: Negative.    Genitourinary: Negative.    Neurological:  Positive for weakness. " Negative for dizziness.   Psychiatric/Behavioral: Negative.     : Full 10 pt review of symptoms of negative unless discussed above.     Medical History:   No past medical history on file.  Surgical Hx:   Past Surgical History:   Procedure Laterality Date    CARDIAC CATHETERIZATION N/A 2/28/2024    Procedure: Right Heart Cath;  Surgeon: José Velasco MD;  Location: [place] [address] Cardiac Cath Lab;  Service: Cardiovascular;  Laterality: N/A;  with RAMP study    COLONOSCOPY W/ POLYPECTOMY  2023    CT ABDOMEN PELVIS ANGIOGRAM W AND/OR WO IV CONTRAST  04/13/2023    CT ABDOMEN PELVIS ANGIOGRAM W AND/OR WO IV CONTRAST [facility] CT    LEFT VENTRICULAR ASSIST DEVICE      OTHER SURGICAL HISTORY  01/05/2022    Complete colonoscopy    OTHER SURGICAL HISTORY  01/05/2022    Cardioverter defibrillator insertion      Vitals:       3/6/2024     6:30 AM 3/6/2024     5:49 AM 3/6/2024     4:48 AM 3/6/2024     1:31 AM 3/5/2024    11:46 PM 3/5/2024     8:08 PM   Vitals   Systolic 117   94     Diastolic 80   55     Heart Rate   85  70 75   Temp   35.8 °C (96.5 °F)   36.5 °C (97.7 °F)   Resp   18  18 18   Weight (lb)  226.19 226.19      BMI  33.4 kg/m2 33.4 kg/m2      BSA (m2)  2.24 m2 2.24 m2        Wt Readings from Last 5 Encounters:   03/06/24 103 kg (226 lb 3.1 oz)   02/23/24 105 kg (230 lb 9.6 oz)   02/19/24 107 kg (235 lb)   12/08/23 101 kg (223 lb 3.2 oz)   09/20/23 88.5 kg (195 lb)     GEN: Pleasant, fatigued.   HEENT: JVP elevated, no icterus.   CHEST: Clear to auscultation. Sternotomy well healed.  CV: LVAD hum  ABD: Soft, ND, NT.  Driveline: No drainage. Dressing c/d/I. Secured.   EXT: Warm, well perfused, mild BLE edema.   NEURO: Pleasant, GAINES, Oriented to plan         Labs:   CMP:  Recent Labs     03/06/24  0615 03/05/24  0609 03/04/24  0947 03/03/24  0604 03/02/24  0610    140 137 137 136   K 4.2 4.1 4.5 4.2 4.2    105 106 103 103   CO2 25 28 23 24 24   ANIONGAP 12 11 13 14 13   BUN 19 25* 28* 30* 22    CREATININE 0.96 1.02 1.06 1.01 1.32*   EGFR 89 83 79 84 61   MG 2.22 2.50* 2.14 2.18 2.22       Recent Labs     03/06/24  0615 03/05/24  0609 03/04/24  0947 03/03/24  0604 03/02/24  0610 02/24/24  0233 02/23/24  1433 05/31/23  2134 05/28/23  0603 05/26/23  0056 05/23/23  0729 05/23/23  0304 05/21/23  1943   ALBUMIN 3.8 4.1 3.8 3.8 3.9   < > 4.0 CANCELED CANCELED CANCELED 3.3*   < > CANCELED   ALKPHOS  --   --   --   --   --   --  47 CANCELED CANCELED CANCELED 98   < > CANCELED   ALT  --   --   --   --   --   --  13 CANCELED CANCELED CANCELED 22   < > CANCELED   AST  --   --   --   --   --   --  11 CANCELED CANCELED CANCELED 19   < > CANCELED   BILITOT  --   --   --   --   --   --  0.4 CANCELED CANCELED CANCELED 0.7   < > CANCELED   LIPASE  --   --   --   --   --   --   --   --   --   --   --   --  CANCELED    < > = values in this interval not displayed.       CBC:  Recent Labs     03/06/24  0615 03/05/24  0609 03/04/24  0947 03/03/24  0604 03/02/24  0610   WBC 6.8 6.6 5.6 6.4 5.6   HGB 9.6* 10.3* 10.0* 10.0* 10.1*   HCT 31.1* 34.1* 31.9* 31.3* 31.5*    312 274 284 222   * 105* 103* 101* 102*       COAG:   Recent Labs     03/06/24  0615 03/05/24  0609 03/03/24  2355 03/03/24  1055 03/03/24  0604 03/02/24  2315 05/02/23  0030 05/01/23  0033 04/30/23  1619 04/30/23  0413 04/21/23  1412 04/21/23  1125 04/08/23  0315 04/07/23  1835 04/06/23  1852 04/06/23  1714   INR 3.3* 2.6* 1.9*  --  1.6*  --    < > 1.3* 1.3* 1.2*   < > 1.2*   < >  --    < >  --    HAUF  --   --   --  0.3 0.4 0.2   < >  --   --  0.3   < >  --    < >  --   --   --    HAPTOGLOBIN  --   --   --   --   --   --   --   --   --   --   --  <30  --  <30  --  <30   FIBRINOGEN  --   --   --   --   --   --   --  264 255 356   < >  --   --   --   --   --     < > = values in this interval not displayed.       ABO:   Recent Labs     03/02/24 0610   ABO AB       HEME/ENDO:  Recent Labs     03/02/24  0610 02/26/24  0811 02/23/24  1433 05/31/23  2134  04/25/23  0911 04/21/23  1125 04/07/23  0416 03/31/23  2331 03/31/23  2142 03/29/23  2151   FERRITIN  --   --  104  --   --  352*  --   --   --  79   IRONSAT  --   --  18*  --   --  16*  --   --   --  6*   TSH 8.10* 11.49*  11.49* 18.02* CANCELED   < >  --    < >  --    < > 15.36*   HGBA1C  --   --  4.4  --   --   --   --  5.8*  --  6.7*    < > = values in this interval not displayed.        CARDIAC:   Recent Labs     03/06/24  0615 03/05/24  0609 03/04/24  0947 03/03/24  0604 03/02/24  0610 02/24/24  0233 02/23/24  1433 05/21/23  1943 04/02/23  0154 03/29/23  2151    142 161 145 147   < > 128  --    < >  --    TROPHS  --   --   --   --   --   --  30 CANCELED  CANCELED  --   --    BNP  --   --   --   --   --   --  33 CANCELED  --  3,231*    < > = values in this interval not displayed.       Recent Labs     02/29/24  0658 02/28/24  0444 03/29/23  2151   CHOL 158 155 126   LDLF  --   --  85   HDL 38.3 39.4 24.0*   TRIG 145  --  83       MICRO:   Recent Labs     05/12/23  0857   CRP 14.77*       Echocardiogram     Narrative  [facility], [address] [place] [address]  Tel [phone] and Fax [phone]    TRANSTHORACIC ECHOCARDIOGRAM REPORT      Patient Name:     BILLIE Steward Physician:  [id number] Spencer Lilly MD  Study Date:       5/15/2023          Referring           SHERRILL FLEMING  Physician:  N/PID:          [id number]           PCP:  Accession/Order#: [id number]          [facility] Non  Location:           Invasive  YOB: 1960          Fellow:             [id number] Tobi Ledbetter MD  Gender:           M                  Nurse:              Mei Jeff RN  Admit Date:       3/29/2023          Sonographer:        TONYA Wolfe RDCS  Admission Status: Inpatient -        Additional Staff:  Routine  Height:           175.26 cm          CC Report to:       Vijay POLANCO [facility]  Weight:           73.03 kg           Study Type:          Echocardiogram  BSA:              1.88 m2    Diagnosis/ICD: Z95.811-Presence of heart assist device  Indication:    Post LVAD follow up  Procedure/CPT: Echo Limited-71775; Color Doppler-97856; Doppler Limited-87104    Patient History:  Smoker:            Former.  Pertinent History: CAD, Cardiomyopathy and A-Fib. HFrEF, VT s/p dual chamber  ICD, anemia, hypothyroidism, cardiogenic shock, s/p LVAD  HMIII.    Study Detail: The following Echo studies were performed: 2D, M-Mode, Doppler and  color flow. Definity used as a contrast agent for endocardial  border definition. Total contrast used for this procedure was 3.0  mL via IV push.      PHYSICIAN INTERPRETATION:  Left Ventricle: The left ventricular systolic function is severely decreased, with an estimated ejection fraction of 10-15%. There is global hypokinesis of the left ventricle with minor regional variations. The left ventricular cavity size is severely dilated. Spectral Doppler shows an abnormal pattern of left ventricular diastolic filling.  Left Atrium: The left atrium is severely dilated.  Right Ventricle: The right ventricle was not well visualized. There is reduced right ventricular systolic function. The RV is not completely visualized, but appears to be likely moderately dilated with moderately decreased function.  Right Atrium: The right atrium was not well visualized. There is a device visualized in the right atrium.  Aortic Valve: The aortic valve appears abnormal. There is mild aortic valve regurgitation.  Mitral Valve: The mitral valve is normal in structure. There is trace mitral valve regurgitation.  Tricuspid Valve: The tricuspid valve is structurally normal. Tricuspid regurgitation was not assessed.  Pulmonic Valve: The pulmonic valve is structurally normal. There is no indication of pulmonic valve regurgitation.  Pericardium: There is a trivial pericardial effusion.  Pleural: There is left pleural effusion.  Aorta: The aortic root was not  well visualized.  Systemic Veins: The inferior vena cava appears to be of normal size. There is IVC inspiratory collapse greater than 50%.  In comparison to the previous echocardiogram(s): Although previous studies are available for review, their comparison with the current echocardiogram is clinically irrelevant. Compared with study from 4/25/2023,.    LEFT VENTRICULAR ASSIST DEVICE:  Study Type: This is a follow-up LVAD echocardiogram.  LVAD: The patient has a(n) Heartmate III LVAD device present.  Inflow Cannula: The inflow cannula is visualized in the left ventricular apex.  Outflow Cannula: Visualization of the outflow cannula is technically difficult.  AV Leaflet Mobility: The aortic valve leaflets do not appear to open.  Inflow Velocities: The LVAD cannula inflow velocity is normal (.5-2msec).        CONCLUSIONS:  1. Left ventricular systolic function is severely decreased with a 10-15% estimated ejection fraction.  2. Spectral Doppler shows an abnormal pattern of left ventricular diastolic filling.  3. The RV is not completely visualized, but appears to be likely moderately dilated with moderately decreased function.  4. There is reduced right ventricular systolic function.  5. The left atrium is severely dilated.  6. Aortic valve appears abnormal.  7. Mild aortic valve regurgitation.  8. Left ventricular cavity size is severely dilated.  9. There is global hypokinesis of the left ventricle with minor regional variations.    QUANTITATIVE DATA SUMMARY:  2D MEASUREMENTS:  Normal Ranges:  IVSd:          0.90 cm    (0.6-1.1cm)  LVPWd:         1.00 cm    (0.6-1.1cm)  LVIDd:         6.20 cm    (3.9-5.9cm)  LV Mass Index: 129.8 g/m2    LV SYSTOLIC FUNCTION BY 2D PLANIMETRY (MOD):  Normal Ranges:  EF-A4C View: 13.9 % (>=55%)  EF-A2C View: 21.8 %  EF-Biplane:  17.2 %    TRICUSPID VALVE/RVSP:  Normal Ranges:  IVC Diam: 1.16 cm      Narrative  [facility], Cath Lab, [address] [place] [address]  [id number]    Cardiovascular Catheterization Report    Patient Name:     BILLIE      Performing Physician:  [id number] Vince WARD MD  Study Date:       4/27/2023  Verifying Physician:   [id number] Vince Boyer MD  MRN/PID:          [id number]   Cardiologist/Co-scrub:  Accession/Order#: [id number]  Fellow:                [id number] Tobi Ledbetter MD  YOB: 1960  Fellow:  Gender:           M          Referring Physician:  Admit Date:                  Referring Physician:   [id number] Anita Vincent MD  Surgeon:                     Referring Physician:   VANESSA      Study: Right Heart Catheterization      Indications:  BILLIE WARD is a 63 year old male who presents with hypertension, atrial fibrillation and Tobacco Use - Former, Patient was referred for right heart catheterization to monitor hemodynamics for an acute exacerbation of heart failure requiring Impella support. Cardiomyopathy.    Appropriate Use Criteria:  Re-evaluation of know cardiomyopathy with change in clinical status or on cardiac exam or to guide therapy; AUC score = 7.    Procedure Description:  After infiltration with 1% Lidocaine, the was cannulated with a modified Seldinger technique. After infiltration of local anesthetic, the right internal jugular vein was identified with two-dimensional ultrasound. Under direct ultrasound visualization, the right internal jugular vein was cannulated with a micropuncture technique. A 9 Persian sheath was placed in the vein. A balloon tipped catheter was positioned in the right heart system to record pressures and remained in the patient when transferred from the lab. Cardiac output was calculated via the Raven method. Post-procedure, the venous sheath was left intact and sutured in place.    Right Heart Catheterization:  A balloon tipped catheter was positioned in the right heart system to record pressures and remained in the patient when transferred from the lab. Cardiac  output was calculated via the Raven method.    Hemo Personnel:  +----------------------+-------------+  Name                  Duty           +----------------------+-------------+  Vince Boyer MD, MD 1  +----------------------+-------------+  Jey Driscoll RN       PROC CIRC 1  +----------------------+-------------+  Mariya Fam RN     PROC CIRC 2  +----------------------+-------------+  Edilberto Reinoso RN    PROC RECORD 1  +----------------------+-------------+  Tobi Ledbetter MD         FELLOW PHYS 1  +----------------------+-------------+      Sedation Time:  +------------------------+----------------------------------------+  Sedation Start/End TimesTime                                      +------------------------+----------------------------------------+  Start                   4/27/2023 10:26:07                        +------------------------+----------------------------------------+  Drugs                   Versed 0.5 mg IV per physician for sedat  +------------------------+----------------------------------------+  End                     4/27/2023 10:56:04                        +------------------------+----------------------------------------+      Equipment Used:  +---------------------+--------------------------------------------------------+              Date/Time                                             Description  +---------------------+--------------------------------------------------------+        Hemodynamic Pressures:  Resting hemodynamics on maximal Impella support revealed a moderately elevated PCW pressure with a normal Raven cardiac index of 3.4 L/min/mï¿½. The PVR is normal at that 72.  +----+----------+---------+-------------+-------------+---+----+-------+-------+  SiteDate Time   Phase  Systolic mmHg  Diastolic  ED MeanA-Wave V-Wave                   Name                    mmHg     mmHmmHg mmHg   mmHg                                                       g                     +----+----------+---------+-------------+-------------+---+----+-------+-------+    AO 4/27/2023     Rest          101           72     78                      10:47:20                                                                      AM                                                          +----+----------+---------+-------------+-------------+---+----+-------+-------+    RA 4/27/2023     Rest                                4      8      5        10:48:31                                                                      AM                                                          +----+----------+---------+-------------+-------------+---+----+-------+-------+    RV 4/27/2023     Rest           47               8                          10:49:08                                                                      AM                                                          +----+----------+---------+-------------+-------------+---+----+-------+-------+    PW 4/27/2023     Rest                               20     21     24        10:50:13                                                                      AM                                                          +----+----------+---------+-------------+-------------+---+----+-------+-------+    PA 4/27/2023     Rest           47           18     26                      10:51:05                                                                      AM                                                          +----+----------+---------+-------------+-------------+---+----+-------+-------+        Oxygen Saturation %:  +-----------+----------+------------+  Sample SiteO2 Sat (%)HB (g/100ml)  +-----------+----------+------------+            FA        97         7.8  +-----------+----------+------------+           PA        62         7.8  +-----------+----------+------------+           FA        97         7.8  +-----------+----------+------------+           PA        62         7.8  +-----------+----------+------------+      Cardiac Outputs:  +----+---+---+      CI CO   +----+---+---+  FICK3.46.7  +----+---+---+      Vascular Resistance Calculated Values (Wood Units):  +---+---+  [id number]  +---+---+  PVR72   +---+---+      Complications:  No in-lab complications observed.    Cardiac Cath Transition of Care Summary:  Post Procedure Diagnosis: Moderately elevated PCW pressure with a normal Raven  cardiac index on axillary Impella support.    Blood Loss:               Estimated blood loss during the procedure was 30 mls.  Specimens Removed:        Number of specimen(s) removed: none.    ____________________________________________________________________________________  CONCLUSIONS:  1. Moderately elevated PCW pressure with normal Raven cardiac index on Impella support.    ____________________________________________________________________________________  CPT Codes:  Right Heart Cath O2/Cardiac output without biopsy (RHC)-42993; Moderate Sedation Services 1st additional 15 minutes patient >5 years-30761; Moderate Sedation Services 2nd additional 15 minutes patient >5 years-05094    ICD 10 Codes:  I50.23-Acute on chronic systolic (congestive) heart failure    [id number] Vince Boyer MD  Performing Physician  Electronically signed by [id number] Vince Boyer MD on 4/28/2023 at 11:13:42 AM      Current Outpatient Medications   Medication Instructions    aspirin 81 mg, oral, Daily    dapagliflozin propanediol (FARXIGA) 10 mg, oral, Daily    digoxin (LANOXIN) 125 mcg, oral, Daily    Entresto 49-51 mg tablet 1 tablet, oral, 2 times daily    levothyroxine (SYNTHROID, LEVOXYL) 175 mcg, oral, Daily before breakfast    pantoprazole  (PROTONIX) 40 mg, oral, 2 times daily, Do not crush, chew, or split.    sertraline (ZOLOFT) 12.5 mg, oral, Daily    spironolactone (ALDACTONE) 50 mg, oral, Daily    tamsulosin (FLOMAX) 0.4 mg, oral, Daily    torsemide (DEMADEX) 10 mg, oral, As needed    warfarin (Coumadin) 4 mg tablet Take as directed per After Visit Summary.      Heart Mate III interrogation  LVAD  LVAD Flow (LPM): 5.2 LPM  LVAD Speed: 5500 BPM  LVAD PI: 1.5  LVAD POWER: 4.2  Arterial Extended Pressure Measurements  Arterial Extended Mean Pressure: 68 mmHg      Notable Therapies   Class  Agent SAFETY    ARNI / ACE / ARB  Entresto 49-51  Last BP:  , Last BUN/Cr (GFR): 29/1.05 (80), 4/4/2024:  3:09 PM.    Beta-Blocker  On hold 2/2 RV dysfunction  Last HR:      MRA  Burke 50mg ->Changed to Inspra 100mg daily a month ago (pt stopped taking) -> restart at 25mg today  Last K: 4.1     SGLT2  Farxiga 10mg  Last A1c 4.4 (2/23/2024:  2:33 PM).    Diuretic  Torsemide 30mg daily  Last BNP: 101    Hydralazine/ISDN  N/a     Anticoagulation  Coumadin Last Hgb 12.8 (4/4/2024:  3:09 PM).    Anti-arrhythmic  Digoxin 125mcg daily  Last digoxin   Lab Results   Component Value Date    DIGOXIN 0.43 (L) 04/04/2024    Follow up digoxin level from [facility]    Antiplatelet  ASA 81mg daily      Lipid-Lowering   Last Tchol 158 / LDL No results found for requested labs within last 365 days. or No results found for requested labs within last 365 days. / HDL 38.3 / TRIG 145 (2/29/2024:  6:58 AM).    Other        Device(s)  LVAD HM3 Implant date: 4/30/2023     Cardiac Rehab,   if EF <35/MI/OHS  Referred today         Problems:   1) Stage D chronic systolic HF/ICM/HFrEF s/p HM3 LVAD (4/30/23)  ABO: AB  Short/Long term: long term (pts choice; does not wish to proceed with OHT eval)   - Continue aspirin 81mg daily    - Continue farxiga 10mg daily    -  Entresto 49/51 mg BID    - Prescribed Inspra, pt stopped taking -> retrial today at lower dose   - Holding beta-blocker due to  persistent RV dysfunction   - Continue coumadin daily for goal INR 2-3 and daily levels.   - Continue digoxin 125mcg daily for RV support    - Continue fish oil and PPI  - Referred to cardiac rehab today      2) Long term anticoagulations secondary to LVAD  - c/w warfarin (target INR 2-3)     3) Salmonella infection- resolved   - Bactrim course now complete     4) h/o right axillary repair with right brachial plexus injury and residual right hand numbness  CT head and neck wo contrast 5/3 showed degenerative changes C5-C6   -Previously had neurology referral for EMG and further mx     5) h/o VT and pAF  - Pacerone discontinued 2/2 thyroid dysfunction     6) Hypothyroidism  - c/w levothyroxine  - TSH levels also notably elevated at 11.49 on admission with normal Free T4  - Unclear if hypothyroidism 2/2 to amiodarone vs Hashimotos (thyroid peroxidase antibodies elevated > 1000).   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    - Per patient, he has been taking his pantoprazole with his synthroid so he may not have been absorbing it  - Continue synthroid 175mcg daily. Discussed with Endocrine (3/4).   - Repeat TSH and FT4: 8.1/1.5, improved. Last TSH 4/4 0.70.  - Pacerone stopped due to concern for thyroid dysfunction.       7) Depression  - He currently denies any symptoms of anxiety or depression and has asked if it is safe to be weaned off.  - Patient previously took himself off Zoloft, but today 11/21 he states he restarted it a few weeks ago   - C/w Zoloft           9) Melena 2/2 to suspected AVM - resolved        - EGD on 2/24 showing mild gastritis but no source of bleed.       - Colonoscopy performed on 2/26 without source of bleeding.        - Continue pantoprazole 40mg daily             Orders Placed This Encounter   Procedures    CBC     Standing Status:   Future     Standing Expiration Date:   11/19/2025     Order Specific Question:    Release result to [facility]     Answer:   Immediate [1]    Comprehensive Metabolic Panel     Standing Status:   Future     Standing Expiration Date:   11/19/2025     Order Specific Question:   Release result to MyChart     Answer:   Immediate [1]    Lactate Dehydrogenase     Standing Status:   Future     Standing Expiration Date:   11/19/2025     Order Specific Question:   [facility]     Answer:   Immediate [1]    B-Type Natriuretic Peptide     Standing Status:   Future     Standing Expiration Date:   11/19/2025     Order Specific Question:   [facility]     Answer:   Immediate [1]    TSH with reflex to Free T4 if abnormal     Standing Status:   Future     Standing Expiration Date:   11/19/2025     Order Specific Question:   [facility]     Answer:   Immediate [1]    Referral to Endocrinology     Standing Status:   Future     Standing Expiration Date:   11/19/2025     Referral Priority:   Routine     Referral Type:   Consultation     Referral Reason:   Specialty Services Required     Requested Specialty:   Endocrinology     Number of Visits Requested:   1    Referral to Cardiac Rehab     Standing Status:   Future     Standing Expiration Date:   11/19/2025     Referral Priority:   Routine     Referral Type:   Consultation     Referral Reason:   Specialty Services Required     Requested Specialty:   Cardiac Rehabilitation     Number of Visits Requested:   1      Follow up: 3 months       ____________________________________________________________  COOKIE Martinez-CNP   Section of Advanced Heart Failure and Cardiac Transplantation  Division of Cardiovascular Medicine  [facility]

## 2024-12-09 ENCOUNTER — TELEPHONE (OUTPATIENT)
Dept: TRANSPLANT | Facility: HOSPITAL | Age: 64
End: 2024-12-09
Payer: MEDICARE

## 2024-12-09 DIAGNOSIS — I50.82 ACUTE ON CHRONIC CONGESTIVE HEART FAILURE WITH RIGHT VENTRICULAR DIASTOLIC DYSFUNCTION: ICD-10-CM

## 2024-12-09 DIAGNOSIS — F32.A DEPRESSION, UNSPECIFIED DEPRESSION TYPE: ICD-10-CM

## 2024-12-09 DIAGNOSIS — I50.33 ACUTE ON CHRONIC CONGESTIVE HEART FAILURE WITH RIGHT VENTRICULAR DIASTOLIC DYSFUNCTION: ICD-10-CM

## 2024-12-09 DIAGNOSIS — I51.9 RIGHT VENTRICULAR DYSFUNCTION: ICD-10-CM

## 2024-12-09 NOTE — TELEPHONE ENCOUNTER
Pt left vm requesting refill of tamsulosin (Flomax) 0.4 mg 24 hr capsule to [facility].

## 2024-12-10 RX ORDER — TAMSULOSIN HYDROCHLORIDE 0.4 MG/1
0.4 CAPSULE ORAL DAILY
Qty: 30 CAPSULE | Refills: 11 | Status: SHIPPED | OUTPATIENT
Start: 2024-12-10 | End: 2025-12-10

## 2024-12-12 ENCOUNTER — DOCUMENTATION (OUTPATIENT)
Dept: TRANSPLANT | Facility: HOSPITAL | Age: 64
End: 2024-12-12
Payer: MEDICARE

## 2024-12-12 NOTE — PROGRESS NOTES
Patient has one hepatitis B vaccine on file.     Lab Results   Component Value Date    HEPBSAB <3.1 02/29/2024       Immunization History   Administered Date(s) Administered Comments    Hepatitis B vaccine, adult *Check Product/Dose* 03/02/2024

## 2025-01-14 ENCOUNTER — TELEPHONE (OUTPATIENT)
Dept: RESPIRATORY THERAPY | Facility: HOSPITAL | Age: 65
End: 2025-01-14
Payer: MEDICARE

## 2025-01-14 NOTE — TELEPHONE ENCOUNTER
Patient called. Wanted to confirm with coord if any additional lab work is required prior to his upcoming appt on 1/28. Requesting a call back from coord.

## 2025-01-15 NOTE — TELEPHONE ENCOUNTER
Patient states that in the past, when labs are faxed to [facility] they do not get them. Advised patient I will fax again and if he has issues he can stop at the lab prior to his appointment. Patient verbalized understanding.

## 2025-01-21 ENCOUNTER — APPOINTMENT (OUTPATIENT)
Dept: TRANSPLANT | Facility: HOSPITAL | Age: 65
End: 2025-01-21
Payer: MEDICARE

## 2025-01-23 ENCOUNTER — APPOINTMENT (OUTPATIENT)
Dept: TRANSPLANT | Facility: HOSPITAL | Age: 65
End: 2025-01-23
Payer: MEDICARE

## 2025-01-28 ENCOUNTER — OFFICE VISIT (OUTPATIENT)
Dept: TRANSPLANT | Facility: HOSPITAL | Age: 65
End: 2025-01-28
Payer: MEDICARE

## 2025-01-28 ENCOUNTER — SOCIAL WORK (OUTPATIENT)
Dept: TRANSPLANT | Facility: HOSPITAL | Age: 65
End: 2025-01-28
Payer: MEDICARE

## 2025-01-28 VITALS — HEART RATE: 75 BPM | HEIGHT: 69 IN | TEMPERATURE: 98 F | BODY MASS INDEX: 33.92 KG/M2 | WEIGHT: 229 LBS

## 2025-01-28 DIAGNOSIS — I50.20 HFREF (HEART FAILURE WITH REDUCED EJECTION FRACTION): ICD-10-CM

## 2025-01-28 DIAGNOSIS — I47.20 VT (VENTRICULAR TACHYCARDIA) (MULTI): ICD-10-CM

## 2025-01-28 DIAGNOSIS — E87.6 HYPOKALEMIA: Primary | ICD-10-CM

## 2025-01-28 DIAGNOSIS — Z76.82 HEART TRANSPLANT CANDIDATE: ICD-10-CM

## 2025-01-28 DIAGNOSIS — Z95.811 LVAD (LEFT VENTRICULAR ASSIST DEVICE) PRESENT (MULTI): ICD-10-CM

## 2025-01-28 DIAGNOSIS — E03.9 HYPOTHYROIDISM, UNSPECIFIED TYPE: ICD-10-CM

## 2025-01-28 DIAGNOSIS — I50.9 CONGESTIVE HEART FAILURE, UNSPECIFIED HF CHRONICITY, UNSPECIFIED HEART FAILURE TYPE: ICD-10-CM

## 2025-01-28 DIAGNOSIS — I49.01 VF (VENTRICULAR FIBRILLATION) (MULTI): ICD-10-CM

## 2025-01-28 DIAGNOSIS — Z94.9 TRANSPLANT: ICD-10-CM

## 2025-01-28 DIAGNOSIS — Z79.899 OTHER LONG TERM (CURRENT) DRUG THERAPY: ICD-10-CM

## 2025-01-28 PROCEDURE — 86706 HEP B SURFACE ANTIBODY: CPT

## 2025-01-28 PROCEDURE — 86644 CMV ANTIBODY: CPT | Performed by: REGISTERED NURSE

## 2025-01-28 PROCEDURE — 84443 ASSAY THYROID STIM HORMONE: CPT | Performed by: REGISTERED NURSE

## 2025-01-28 PROCEDURE — 86644 CMV ANTIBODY: CPT

## 2025-01-28 PROCEDURE — 84439 ASSAY OF FREE THYROXINE: CPT

## 2025-01-28 PROCEDURE — 86645 CMV ANTIBODY IGM: CPT | Performed by: REGISTERED NURSE

## 2025-01-28 PROCEDURE — 85027 COMPLETE CBC AUTOMATED: CPT

## 2025-01-28 PROCEDURE — 83615 LACTATE (LD) (LDH) ENZYME: CPT

## 2025-01-28 PROCEDURE — 93750 INTERROGATION VAD IN PERSON: CPT | Performed by: REGISTERED NURSE

## 2025-01-28 PROCEDURE — 83615 LACTATE (LD) (LDH) ENZYME: CPT | Performed by: REGISTERED NURSE

## 2025-01-28 PROCEDURE — 80074 ACUTE HEPATITIS PANEL: CPT

## 2025-01-28 PROCEDURE — 87340 HEPATITIS B SURFACE AG IA: CPT | Performed by: REGISTERED NURSE

## 2025-01-28 PROCEDURE — 85027 COMPLETE CBC AUTOMATED: CPT | Performed by: REGISTERED NURSE

## 2025-01-28 PROCEDURE — 80307 DRUG TEST PRSMV CHEM ANLYZR: CPT | Performed by: REGISTERED NURSE

## 2025-01-28 PROCEDURE — 86665 EPSTEIN-BARR CAPSID VCA: CPT

## 2025-01-28 PROCEDURE — 86777 TOXOPLASMA ANTIBODY: CPT | Performed by: REGISTERED NURSE

## 2025-01-28 PROCEDURE — 86709 HEPATITIS A IGM ANTIBODY: CPT | Performed by: REGISTERED NURSE

## 2025-01-28 PROCEDURE — 81003 URINALYSIS AUTO W/O SCOPE: CPT

## 2025-01-28 PROCEDURE — 36415 COLL VENOUS BLD VENIPUNCTURE: CPT | Performed by: REGISTERED NURSE

## 2025-01-28 PROCEDURE — 87522 HEPATITIS C REVRS TRNSCRPJ: CPT

## 2025-01-28 PROCEDURE — 86705 HEP B CORE ANTIBODY IGM: CPT | Performed by: REGISTERED NURSE

## 2025-01-28 PROCEDURE — 81003 URINALYSIS AUTO W/O SCOPE: CPT | Performed by: REGISTERED NURSE

## 2025-01-28 PROCEDURE — 3008F BODY MASS INDEX DOCD: CPT | Performed by: REGISTERED NURSE

## 2025-01-28 PROCEDURE — 86663 EPSTEIN-BARR ANTIBODY: CPT

## 2025-01-28 PROCEDURE — 99215 OFFICE O/P EST HI 40 MIN: CPT | Performed by: REGISTERED NURSE

## 2025-01-28 PROCEDURE — 84075 ASSAY ALKALINE PHOSPHATASE: CPT | Performed by: REGISTERED NURSE

## 2025-01-28 PROCEDURE — 80053 COMPREHEN METABOLIC PANEL: CPT

## 2025-01-28 PROCEDURE — G2211 COMPLEX E/M VISIT ADD ON: HCPCS | Performed by: REGISTERED NURSE

## 2025-01-28 PROCEDURE — 84439 ASSAY OF FREE THYROXINE: CPT | Performed by: REGISTERED NURSE

## 2025-01-28 PROCEDURE — 80162 ASSAY OF DIGOXIN TOTAL: CPT | Performed by: REGISTERED NURSE

## 2025-01-28 PROCEDURE — 80307 DRUG TEST PRSMV CHEM ANLYZR: CPT

## 2025-01-28 PROCEDURE — 83880 ASSAY OF NATRIURETIC PEPTIDE: CPT | Performed by: REGISTERED NURSE

## 2025-01-28 PROCEDURE — 80162 ASSAY OF DIGOXIN TOTAL: CPT

## 2025-01-28 PROCEDURE — 86777 TOXOPLASMA ANTIBODY: CPT

## 2025-01-28 PROCEDURE — 99215 OFFICE O/P EST HI 40 MIN: CPT | Mod: 25 | Performed by: REGISTERED NURSE

## 2025-01-28 PROCEDURE — 83880 ASSAY OF NATRIURETIC PEPTIDE: CPT

## 2025-01-28 PROCEDURE — 86664 EPSTEIN-BARR NUCLEAR ANTIGEN: CPT

## 2025-01-28 PROCEDURE — 84443 ASSAY THYROID STIM HORMONE: CPT

## 2025-01-28 PROCEDURE — 86706 HEP B SURFACE ANTIBODY: CPT | Performed by: REGISTERED NURSE

## 2025-01-28 SDOH — ECONOMIC STABILITY: INCOME INSECURITY: IN THE LAST 12 MONTHS, WAS THERE A TIME WHEN YOU WERE NOT ABLE TO PAY THE MORTGAGE OR RENT ON TIME?: NO

## 2025-01-28 SDOH — HEALTH STABILITY: PHYSICAL HEALTH: ON AVERAGE, HOW MANY DAYS PER WEEK DO YOU ENGAGE IN MODERATE TO STRENUOUS EXERCISE (LIKE A BRISK WALK)?: 1 DAY

## 2025-01-28 SDOH — ECONOMIC STABILITY: GENERAL
WHICH OF THE FOLLOWING WOULD YOU LIKE TO GET CONNECTED TO IN ORDER TO RECEIVE A DISCOUNT OR FOR FREE? (CHOOSE ALL THAT APPLY): NO ASSISTANCE NEEDED

## 2025-01-28 SDOH — HEALTH STABILITY: PHYSICAL HEALTH: ON AVERAGE, HOW MANY MINUTES DO YOU ENGAGE IN EXERCISE AT THIS LEVEL?: 10 MIN

## 2025-01-28 ASSESSMENT — PATIENT HEALTH QUESTIONNAIRE - PHQ9
8. MOVING OR SPEAKING SO SLOWLY THAT OTHER PEOPLE COULD HAVE NOTICED. OR THE OPPOSITE, BEING SO FIGETY OR RESTLESS THAT YOU HAVE BEEN MOVING AROUND A LOT MORE THAN USUAL: NOT AT ALL
3. TROUBLE FALLING OR STAYING ASLEEP OR SLEEPING TOO MUCH: NOT AT ALL
7. TROUBLE CONCENTRATING ON THINGS, SUCH AS READING THE NEWSPAPER OR WATCHING TELEVISION: NOT AT ALL
SUM OF ALL RESPONSES TO PHQ9 QUESTIONS 1 & 2: 0
6. FEELING BAD ABOUT YOURSELF - OR THAT YOU ARE A FAILURE OR HAVE LET YOURSELF OR YOUR FAMILY DOWN: NOT AT ALL
1. LITTLE INTEREST OR PLEASURE IN DOING THINGS: NOT AT ALL
5. POOR APPETITE OR OVEREATING: NOT AT ALL
2. FEELING DOWN, DEPRESSED OR HOPELESS: NOT AT ALL
4. FEELING TIRED OR HAVING LITTLE ENERGY: NOT AT ALL
SUM OF ALL RESPONSES TO PHQ QUESTIONS 1-9: 0
9. THOUGHTS THAT YOU WOULD BE BETTER OFF DEAD, OR OF HURTING YOURSELF: NOT AT ALL

## 2025-01-28 ASSESSMENT — ANXIETY QUESTIONNAIRES
2. NOT BEING ABLE TO STOP OR CONTROL WORRYING: NOT AT ALL
3. WORRYING TOO MUCH ABOUT DIFFERENT THINGS: NOT AT ALL
7. FEELING AFRAID AS IF SOMETHING AWFUL MIGHT HAPPEN: NOT AT ALL
4. TROUBLE RELAXING: NOT AT ALL
1. FEELING NERVOUS, ANXIOUS, OR ON EDGE: NOT AT ALL
5. BEING SO RESTLESS THAT IT IS HARD TO SIT STILL: NOT AT ALL
6. BECOMING EASILY ANNOYED OR IRRITABLE: NOT AT ALL
GAD7 TOTAL SCORE: 0

## 2025-01-28 ASSESSMENT — SOCIAL DETERMINANTS OF HEALTH (SDOH)
HOW OFTEN DO YOU ATTENT MEETINGS OF THE CLUB OR ORGANIZATION YOU BELONG TO?: NEVER
DO YOU BELONG TO ANY CLUBS OR ORGANIZATIONS SUCH AS CHURCH GROUPS UNIONS, FRATERNAL OR ATHLETIC GROUPS, OR SCHOOL GROUPS?: NO
HOW HARD IS IT FOR YOU TO PAY FOR THE VERY BASICS LIKE FOOD, HOUSING, MEDICAL CARE, AND HEATING?: NOT HARD AT ALL
IN A TYPICAL WEEK, HOW MANY TIMES DO YOU TALK ON THE PHONE WITH FAMILY, FRIENDS, OR NEIGHBORS?: TWICE A WEEK
HOW OFTEN DO YOU ATTEND CHURCH OR RELIGIOUS SERVICES?: NEVER
HOW OFTEN DO YOU GET TOGETHER WITH FRIENDS OR RELATIVES?: NEVER
IN THE PAST 12 MONTHS, HAS THE ELECTRIC, GAS, OIL, OR WATER COMPANY THREATENED TO SHUT OFF SERVICE IN YOUR HOME?: NO

## 2025-01-28 ASSESSMENT — PAIN SCALES - GENERAL: PAINLEVEL_OUTOF10: 0-NO PAIN

## 2025-01-28 NOTE — PATIENT INSTRUCTIONS
Patient Instructions:  -Please bring a list of your medications to every visit.  -If you have any questions or concerns, please contact the [facility] office at [phone], option 3 or the direct line at [phone]. Please state that you are an [facility] patient. If it is after hours and it is an emergency, please page the [facility] pager by calling [phone] #[phone]  - Continue current medications as prescribed and please start PO potassium. Will recheck labs in a few weeks.   - Labwork: CBC, CMP, LDH, BNP, TSH, DIG, transplant labs   - Imaging/Procedures: CXR for transplant listing   - Referrals: Endocrinology   - Followup:    Please go to lab today. We will call you after your labs result to discuss re activating you on the transplant list

## 2025-01-28 NOTE — PROGRESS NOTES
VAD Cardiologist: Anita Vincent   VAD Coordinator: Ailyn Michelle   In visit: Patient, LVAD JUVENCIO Michelle  Patient's Location: [place] [address]    Date of Visit: 03/26/2024  2:00 PM EDT  Location of visit: [facility]   Type of Visit: LVAD followup    Type of VAD:  HM3  Implant Date: 4/30/2023  Reason for VAD: ICM  Intent: Short term - listed status 4 on 3/13/2024     HPI / Summary:   Anatoly Lane is a very pleasant 63 y.o. male presenting for management of Stage D HFrEF NYHA class 2-3.  s/p HM3 4/30/2023 CAD, h/o pAF and VT s/p ICD, hypothyroidism, and stage D systolic HF/ICM/HFrEF who returns to the LVAD clinic for ongoing evaluation and management.     Previously, patient stopped taking Empagliflozin due to itching. Patient started on Farxiga 10mg at that time. Patient had EP consult d/t not being seen by an EP MD in about 1 year.     Interval Hx:  Patient recently admitted 2/23-3/06 for acute decompensated HF and suspected GI bleed which has since resolved. While in hospital, patient optimized medically and evaluated for OHT. He was ultimately listed as status 4 on 3/13/2024.     Previous Visit: There was concern regarding patient self titration/management of meds. Patient stopped his Inspra and Zoloft without consultation from any medical team.   Patient noted to have mild BLE edema on exam, but does not report any functional symptoms. Previously stopped his Inspra, will restart today at a lower dose (25mg daily). Referred to  Endocrinology and cardiac rehab. Discussed a goal for January 2025 is to lose 7-10 pounds to reactivate your transplant status.        Today:  Patient has lost weight by tracking calorie and carb intake. Patient BMI today is 33.8. Patient states he got an elliptical to start working out. Patient denies CP, palpitations, dizziness, GRAVES, orthopnea, PND, edema, LVAD alarms, N/V/D, hematochezia, hematuria, epistaxis.     Driveline is clean with no drainage. Dressing was last  "changed last Wednesday. Dressing change performed in clinic today.   VAD equipment interrogated in clinic with all batteries and clips functioning properly. Patient denies sleeping on batteries at home. Patient has not dropped their controller since last visit.   Most recent six alarms interrogated from patient controller.     Labs reviewed, TSH concerning at 0.05, however when discussed with patient he states he has been taking 1.5 pills daily (262.5 mcg instead of prescribed 175 mcg) as he thought this would aid with his weight loss and \"wouldn't kill him\". Patient educated that medication compliance is extremely important (particularly in the setting of transplant listing and assessing patient ability to be compliant with future immunosuppression) and any questions should be directed to his medical team. Given that patient is still mildly hypervolemic on exam and hypokalemic, it was suggested to patient to increase Inspra however he refused stating this makes his nipples hurt. He is unable to decrease torsemide d/t hypervolemia, will start PO potassium supplements. He has lost weight since last visit and rest of labs remain acceptable, patient is an acceptable candidate to be relisted for transplant but will require close monitoring of medication adherence and continued medication education reinforcement.      NYHA class at implant - IV.   NYHA class today -  II .           Review of Systems   Constitutional: Negative.   HENT: Negative.     Eyes:  Negative for blurred vision.   Cardiovascular:  Positive for leg swelling.   Respiratory: Negative.     Endocrine: Negative.    Hematologic/Lymphatic: Negative.    Skin: Negative.    Musculoskeletal: Negative.    Gastrointestinal: Negative.    Genitourinary: Negative.    Neurological:  Positive for weakness. Negative for dizziness.   Psychiatric/Behavioral: Negative.     : Full 10 pt review of symptoms of negative unless discussed above.     Medical History:   No past " medical history on file.  Surgical Hx:   Past Surgical History:   Procedure Laterality Date    CARDIAC CATHETERIZATION N/A 2/28/2024    Procedure: Right Heart Cath;  Surgeon: José Velasco MD;  Location: [place] [address] Cardiac Cath Lab;  Service: Cardiovascular;  Laterality: N/A;  with RAMP study    COLONOSCOPY W/ POLYPECTOMY  2023    CT ABDOMEN PELVIS ANGIOGRAM W AND/OR WO IV CONTRAST  04/13/2023    CT ABDOMEN PELVIS ANGIOGRAM W AND/OR WO IV CONTRAST [facility] CT    LEFT VENTRICULAR ASSIST DEVICE      OTHER SURGICAL HISTORY  01/05/2022    Complete colonoscopy    OTHER SURGICAL HISTORY  01/05/2022    Cardioverter defibrillator insertion      Vitals:       3/6/2024     6:30 AM 3/6/2024     5:49 AM 3/6/2024     4:48 AM 3/6/2024     1:31 AM 3/5/2024    11:46 PM 3/5/2024     8:08 PM   Vitals   Systolic 117   94     Diastolic 80   55     Heart Rate   85  70 75   Temp   35.8 °C (96.5 °F)   36.5 °C (97.7 °F)   Resp   18  18 18   Weight (lb)  226.19 226.19      BMI  33.4 kg/m2 33.4 kg/m2      BSA (m2)  2.24 m2 2.24 m2        Wt Readings from Last 5 Encounters:   03/06/24 103 kg (226 lb 3.1 oz)   02/23/24 105 kg (230 lb 9.6 oz)   02/19/24 107 kg (235 lb)   12/08/23 101 kg (223 lb 3.2 oz)   09/20/23 88.5 kg (195 lb)     GEN: Pleasant, fatigued.   HEENT: JVP elevated, no icterus.   CHEST: Clear to auscultation. Sternotomy well healed.  CV: LVAD hum  ABD: Soft, ND, NT.  Driveline: No drainage. Dressing c/d/I. Secured.   EXT: Warm, well perfused, mild BLE edema.   NEURO: Pleasant, GAINES, Oriented to plan         Labs:   CMP:  Recent Labs     03/06/24  0615 03/05/24  0609 03/04/24  0947 03/03/24  0604 03/02/24  0610    140 137 137 136   K 4.2 4.1 4.5 4.2 4.2    105 106 103 103   CO2 25 28 23 24 24   ANIONGAP 12 11 13 14 13   BUN 19 25* 28* 30* 22   CREATININE 0.96 1.02 1.06 1.01 1.32*   EGFR 89 83 79 84 61   MG 2.22 2.50* 2.14 2.18 2.22       Recent Labs     03/06/24  0615 03/05/24  0609 03/04/24  0947 03/03/24  0604  03/02/24  0610 02/24/24  0233 02/23/24  1433 05/31/23  2134 05/28/23  0603 05/26/23  0056 05/23/23  0729 05/23/23  0304 05/21/23  1943   ALBUMIN 3.8 4.1 3.8 3.8 3.9   < > 4.0 CANCELED CANCELED CANCELED 3.3*   < > CANCELED   ALKPHOS  --   --   --   --   --   --  47 CANCELED CANCELED CANCELED 98   < > CANCELED   ALT  --   --   --   --   --   --  13 CANCELED CANCELED CANCELED 22   < > CANCELED   AST  --   --   --   --   --   --  11 CANCELED CANCELED CANCELED 19   < > CANCELED   BILITOT  --   --   --   --   --   --  0.4 CANCELED CANCELED CANCELED 0.7   < > CANCELED   LIPASE  --   --   --   --   --   --   --   --   --   --   --   --  CANCELED    < > = values in this interval not displayed.       CBC:  Recent Labs     03/06/24  0615 03/05/24  0609 03/04/24  0947 03/03/24  0604 03/02/24  0610   WBC 6.8 6.6 5.6 6.4 5.6   HGB 9.6* 10.3* 10.0* 10.0* 10.1*   HCT 31.1* 34.1* 31.9* 31.3* 31.5*    312 274 284 222   * 105* 103* 101* 102*       COAG:   Recent Labs     03/06/24  0615 03/05/24  0609 03/03/24  2355 03/03/24  1055 03/03/24  0604 03/02/24  2315 05/02/23  0030 05/01/23  0033 04/30/23  1619 04/30/23  0413 04/21/23  1412 04/21/23  1125 04/08/23  0315 04/07/23  1835 04/06/23  1852 04/06/23  1714   INR 3.3* 2.6* 1.9*  --  1.6*  --    < > 1.3* 1.3* 1.2*   < > 1.2*   < >  --    < >  --    HAUF  --   --   --  0.3 0.4 0.2   < >  --   --  0.3   < >  --    < >  --   --   --    HAPTOGLOBIN  --   --   --   --   --   --   --   --   --   --   --  <30  --  <30  --  <30   FIBRINOGEN  --   --   --   --   --   --   --  264 255 356   < >  --   --   --   --   --     < > = values in this interval not displayed.       ABO:   Recent Labs     03/02/24 0610   ABO AB       HEME/ENDO:  Recent Labs     03/02/24  0610 02/26/24  0811 02/23/24  1433 05/31/23  2134 04/25/23  0911 04/21/23  1125 04/07/23  0416 03/31/23  2331 03/31/23  2142 03/29/23  2151   FERRITIN  --   --  104  --   --  352*  --   --   --  79   IRONSAT  --   --  18*  --    --  16*  --   --   --  6*   TSH 8.10* 11.49*  11.49* 18.02* CANCELED   < >  --    < >  --    < > 15.36*   HGBA1C  --   --  4.4  --   --   --   --  5.8*  --  6.7*    < > = values in this interval not displayed.        CARDIAC:   Recent Labs     03/06/24  0615 03/05/24  0609 03/04/24  0947 03/03/24  0604 03/02/24  0610 02/24/24  0233 02/23/24  1433 05/21/23  1943 04/02/23  0154 03/29/23  2151    142 161 145 147   < > 128  --    < >  --    TROPHS  --   --   --   --   --   --  30 CANCELED  CANCELED  --   --    BNP  --   --   --   --   --   --  33 CANCELED  --  3,231*    < > = values in this interval not displayed.       Recent Labs     02/29/24  0658 02/28/24  0444 03/29/23  2151   CHOL 158 155 126   LDLF  --   --  85   HDL 38.3 39.4 24.0*   TRIG 145  --  83       MICRO:   Recent Labs     05/12/23  0857   CRP 14.77*       Echocardiogram     Narrative  [facility], [address] [place] [address]  Tel [phone] and Fax [phone]    TRANSTHORACIC ECHOCARDIOGRAM REPORT      Patient Name:     BILLIE Steward Physician:  [id number] Spencer Lilly MD  Study Date:       5/15/2023          Referring           SHERRILL FLEMING  Physician:  MRN/PID:          [id number]           PCP:  Accession/Order#: [id number]          [facility]  Location:           Invasive  YOB: 1960          Fellow:             [id number] Tobi Ledbetter MD  Gender:           M                  Nurse:              Mei Jeff RN  Admit Date:       3/29/2023          Sonographer:        TONYA Wolfe RDCS  Admission Status: Inpatient -        Additional Staff:  Routine  Height:           175.26 cm          CC Report to:       [address]  Weight:           73.03 kg           Study Type:         Echocardiogram  BSA:              1.88 m2    Diagnosis/ICD: Z95.811-Presence of heart assist device  Indication:    Post LVAD follow up  Procedure/CPT: Echo Limited-27045;  Color Doppler-19198; Doppler Limited-62284    Patient History:  Smoker:            Former.  Pertinent History: CAD, Cardiomyopathy and A-Fib. HFrEF, VT s/p dual chamber  ICD, anemia, hypothyroidism, cardiogenic shock, s/p LVAD  HMIII.    Study Detail: The following Echo studies were performed: 2D, M-Mode, Doppler and  color flow. Definity used as a contrast agent for endocardial  border definition. Total contrast used for this procedure was 3.0  mL via IV push.      PHYSICIAN INTERPRETATION:  Left Ventricle: The left ventricular systolic function is severely decreased, with an estimated ejection fraction of 10-15%. There is global hypokinesis of the left ventricle with minor regional variations. The left ventricular cavity size is severely dilated. Spectral Doppler shows an abnormal pattern of left ventricular diastolic filling.  Left Atrium: The left atrium is severely dilated.  Right Ventricle: The right ventricle was not well visualized. There is reduced right ventricular systolic function. The RV is not completely visualized, but appears to be likely moderately dilated with moderately decreased function.  Right Atrium: The right atrium was not well visualized. There is a device visualized in the right atrium.  Aortic Valve: The aortic valve appears abnormal. There is mild aortic valve regurgitation.  Mitral Valve: The mitral valve is normal in structure. There is trace mitral valve regurgitation.  Tricuspid Valve: The tricuspid valve is structurally normal. Tricuspid regurgitation was not assessed.  Pulmonic Valve: The pulmonic valve is structurally normal. There is no indication of pulmonic valve regurgitation.  Pericardium: There is a trivial pericardial effusion.  Pleural: There is left pleural effusion.  Aorta: The aortic root was not well visualized.  Systemic Veins: The inferior vena cava appears to be of normal size. There is IVC inspiratory collapse greater than 50%.  In comparison to the previous  echocardiogram(s): Although previous studies are available for review, their comparison with the current echocardiogram is clinically irrelevant. Compared with study from 4/25/2023,.    LEFT VENTRICULAR ASSIST DEVICE:  Study Type: This is a follow-up LVAD echocardiogram.  LVAD: The patient has a(n) Heartmate III LVAD device present.  Inflow Cannula: The inflow cannula is visualized in the left ventricular apex.  Outflow Cannula: Visualization of the outflow cannula is technically difficult.  AV Leaflet Mobility: The aortic valve leaflets do not appear to open.  Inflow Velocities: The LVAD cannula inflow velocity is normal (.5-2msec).        CONCLUSIONS:  1. Left ventricular systolic function is severely decreased with a 10-15% estimated ejection fraction.  2. Spectral Doppler shows an abnormal pattern of left ventricular diastolic filling.  3. The RV is not completely visualized, but appears to be likely moderately dilated with moderately decreased function.  4. There is reduced right ventricular systolic function.  5. The left atrium is severely dilated.  6. Aortic valve appears abnormal.  7. Mild aortic valve regurgitation.  8. Left ventricular cavity size is severely dilated.  9. There is global hypokinesis of the left ventricle with minor regional variations.    QUANTITATIVE DATA SUMMARY:  2D MEASUREMENTS:  Normal Ranges:  IVSd:          0.90 cm    (0.6-1.1cm)  LVPWd:         1.00 cm    (0.6-1.1cm)  LVIDd:         6.20 cm    (3.9-5.9cm)  LV Mass Index: 129.8 g/m2    LV SYSTOLIC FUNCTION BY 2D PLANIMETRY (MOD):  Normal Ranges:  EF-A4C View: 13.9 % (>=55%)  EF-A2C View: 21.8 %  EF-Biplane:  17.2 %    TRICUSPID VALVE/RVSP:  Normal Ranges:  IVC Diam: 1.16 cm      [facility], Cath Lab, [address] [place] [address]    Cardiovascular Catheterization Report    Patient Name:     BILLIE      Performing Physician:  [id number] Vince WARD MD  Study  Date:       4/27/2023  Verifying Physician:   [id number] Vince Boyer MD  MRN/PID:          [id number]   Cardiologist/Co-scrub:  Accession/Order#: [id number]  Fellow:                [id number] Tobi Ledbetter MD  YOB: 1960  Fellow:  Gender:           M          Referring Physician:  Admit Date:                  Referring Physician:   [id number] Anita Vincent MD  Surgeon:                     Referring Physician:   NA      Study: Right Heart Catheterization      Indications:  BILLIE WARD is a 63 year old male who presents with hypertension, atrial fibrillation and Tobacco Use - Former, Patient was referred for right heart catheterization to monitor hemodynamics for an acute exacerbation of heart failure requiring Impella support. Cardiomyopathy.    Appropriate Use Criteria:  Re-evaluation of know cardiomyopathy with change in clinical status or on cardiac exam or to guide therapy; AUC score = 7.    Procedure Description:  After infiltration with 1% Lidocaine, the was cannulated with a modified Seldinger technique. After infiltration of local anesthetic, the right internal jugular vein was identified with two-dimensional ultrasound. Under direct ultrasound visualization, the right internal jugular vein was cannulated with a micropuncture technique. A 9 Finnish sheath was placed in the vein. A balloon tipped catheter was positioned in the right heart system to record pressures and remained in the patient when transferred from the lab. Cardiac output was calculated via the Raven method. Post-procedure, the venous sheath was left intact and sutured in place.    Right Heart Catheterization:  A balloon tipped catheter was positioned in the right heart system to record pressures and remained in the patient when transferred from the lab. Cardiac output was calculated via the Raven method.    Hemo Personnel:  +----------------------+-------------+  Name                  Duty            +----------------------+-------------+  Vince Boyer MD, MD 1  +----------------------+-------------+  Jey Driscoll RN       PROC CIRC 1  +----------------------+-------------+  Mariya Fam RN     PROC CIRC 2  +----------------------+-------------+  Edilberto Reinoso RN    PROC RECORD 1  +----------------------+-------------+  Tobi Ledbetter MD         FELLOW PHYS 1  +----------------------+-------------+      Sedation Time:  +------------------------+----------------------------------------+  Sedation Start/End TimesTime                                      +------------------------+----------------------------------------+  Start                   4/27/2023 10:26:07                        +------------------------+----------------------------------------+  Drugs                   Versed 0.5 mg IV per physician for sedat  +------------------------+----------------------------------------+  End                     4/27/2023 10:56:04                        +------------------------+----------------------------------------+      Equipment Used:  +---------------------+--------------------------------------------------------+              Date/Time                                             Description  +---------------------+--------------------------------------------------------+        Hemodynamic Pressures:  Resting hemodynamics on maximal Impella support revealed a moderately elevated PCW pressure with a normal Raven cardiac index of 3.4 L/min/mï¿½. The PVR is normal at that 72.  +----+----------+---------+-------------+-------------+---+----+-------+-------+  SiteDate Time   Phase  Systolic mmHg  Diastolic  ED MeanA-Wave V-Wave                   Name                    mmHg     mmHmmHg mmHg   mmHg                                                      g                      +----+----------+---------+-------------+-------------+---+----+-------+-------+    AO 4/27/2023     Rest          101           72     78                      10:47:20                                                                      AM                                                          +----+----------+---------+-------------+-------------+---+----+-------+-------+    RA 4/27/2023     Rest                                4      8      5        10:48:31                                                                      AM                                                          +----+----------+---------+-------------+-------------+---+----+-------+-------+    RV 4/27/2023     Rest           47               8                          10:49:08                                                                      AM                                                          +----+----------+---------+-------------+-------------+---+----+-------+-------+    PW 4/27/2023     Rest                               20     21     24        10:50:13                                                                      AM                                                          +----+----------+---------+-------------+-------------+---+----+-------+-------+    PA 4/27/2023     Rest           47           18     26                      10:51:05                                                                      AM                                                          +----+----------+---------+-------------+-------------+---+----+-------+-------+        Oxygen Saturation %:  +-----------+----------+------------+  Sample SiteO2 Sat (%)HB (g/100ml)  +-----------+----------+------------+           FA        97         7.8  +-----------+----------+------------+            PA        62         7.8  +-----------+----------+------------+           FA        97         7.8  +-----------+----------+------------+           PA        62         7.8  +-----------+----------+------------+      Cardiac Outputs:  +----+---+---+      CI CO   +----+---+---+  [id number]  +----+---+---+      Vascular Resistance Calculated Values (Wood Units):  +---+---+  [id number]  +---+---+  PVR72   +---+---+      Complications:  No in-lab complications observed.    Cardiac Cath Transition of Care Summary:  Post Procedure Diagnosis: Moderately elevated PCW pressure with a normal Raven  cardiac index on axillary Impella support.    Blood Loss:               Estimated blood loss during the procedure was 30 mls.  Specimens Removed:        Number of specimen(s) removed: none.    ____________________________________________________________________________________  CONCLUSIONS:  1. Moderately elevated PCW pressure with normal Raven cardiac index on Impella support.    ____________________________________________________________________________________  CPT Codes:  Right Heart Cath O2/Cardiac output without biopsy (RHC)-51313; Moderate Sedation Services 1st additional 15 minutes patient >5 years-47809; Moderate Sedation Services 2nd additional 15 minutes patient >5 years-62223    ICD 10 Codes:  I50.23-Acute on chronic systolic (congestive) heart failure    [id number] Vince Boyer MD  Performing Physician  Electronically signed by [id number] Vince Boyer MD on 4/28/2023 at 11:13:42 AM      Current Outpatient Medications   Medication Instructions    aspirin 81 mg, oral, Daily    dapagliflozin propanediol (FARXIGA) 10 mg, oral, Daily    digoxin (LANOXIN) 125 mcg, oral, Daily    Entresto 49-51 mg tablet 1 tablet, oral, 2 times daily    levothyroxine (SYNTHROID, LEVOXYL) 175 mcg, oral, Daily before breakfast    pantoprazole (PROTONIX) 40 mg, oral, 2 times daily, Do not crush, chew, or split.     sertraline (ZOLOFT) 12.5 mg, oral, Daily    spironolactone (ALDACTONE) 50 mg, oral, Daily    tamsulosin (FLOMAX) 0.4 mg, oral, Daily    torsemide (DEMADEX) 10 mg, oral, As needed    warfarin (Coumadin) 4 mg tablet Take as directed per After Visit Summary.      Heart Mate III interrogation  LVAD  LVAD Flow (LPM): 5.3 LPM  LVAD Speed: 5500 BPM  LVAD PI: 2.8  LVAD POWER: 4.4  Arterial Extended Pressure Measurements  Arterial Extended Mean Pressure: 72 mmHg    VAD personally interrogated - consistent w/ adequate function. No low flows or power spikes, occasional PI events.   Notable Therapies   Class  Agent SAFETY    ARNI / ACE / ARB  Entresto 49-51  Last BP:  , Last BUN/Cr (GFR): 22/1.01 (83), 1/28/2025:  2:51 PM.    Beta-Blocker  On hold 2/2 RV dysfunction  Last HR: 75    MRA  Inspra 25mg daily, pt refuses to increase Last K: 3.3     SGLT2  Farxiga 10mg  Last A1c 4.4 (2/23/2024:  2:33 PM).    Diuretic  Torsemide 30mg daily  Last BNP: 69    Hydralazine/ISDN  N/a     Anticoagulation  Coumadin Last Hgb 12.5 (1/28/2025:  2:51 PM).    Anti-arrhythmic  Digoxin 125mcg daily  Last digoxin   Lab Results   Component Value Date    DIGOXIN 0.81 01/28/2025    Follow up digoxin level from Replaced by [facility]    Antiplatelet  ASA 81mg daily      Lipid-Lowering   Last Tchol 158 / LDL No results found for requested labs within last 365 days. or No results found for requested labs within last 365 days. / HDL 38.3 / TRIG 145 (2/29/2024:  6:58 AM).    Other        Device(s)  LVAD HM3 Implant date: 4/30/2023     Cardiac Rehab,   if EF <35/MI/OHS  Referred last visit, pt prefers to work out at home on elliptical         Problems:   1) Stage D chronic systolic HF/ICM/HFrEF s/p HM3 LVAD (4/30/23)  ABO: AB  Short/Long term: long term (pts choice; does not wish to proceed with OHT eval)   - Continue aspirin 81mg daily    - Continue farxiga 10mg daily    -  Entresto 49/51 mg BID    - Continue Inspra 25mg   - Holding beta-blocker due to persistent RV  dysfunction   - Continue coumadin daily for goal INR 2-3 and daily levels.   - Continue digoxin 125mcg daily for RV support    - Continue fish oil and PPI     2) Long term anticoagulations secondary to LVAD  - c/w warfarin (target INR 2-3)     3) Salmonella infection- resolved   - Bactrim course now complete     4) h/o right axillary repair with right brachial plexus injury and residual right hand numbness  CT head and neck wo contrast 5/3 showed degenerative changes C5-C6   - Previously had neurology referral for EMG and further mx  - Pt has no complaints at this time      5) h/o VT and pAF  - Pacerone initially discontinued March of 2024 2/2 thyroid dysfunction, however pt began taking his pacerone again in May of 2024 without our heart failure team prompting him to due to palpitations and dizziness. Pacemaker remote monitor readings from 5/15/24 show multiple episodes of NSVT and VF requiring ATP. Endocrine contributed his elevated TSH levels to be 2/2 the patient taking his synthroid and PPI at the same time. The endocrine team felt it okay to resume his amiodarone. However, then patient was taking 262.5 mcg (1.5 pills) instead of 175mcg Synthroid leading to over suppressed thyroid. Patient was instructed to only take 175mcg, will refer to endo (pt has not followed up with them) and repeat TSH in two weeks.   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 Clarified with patient he should be taking 200mg amiodarone, not 400.     6) Hypothyroidism  - c/w levothyroxine (see above for dosing)   - Unclear if hypothyroidism 2/2 to amiodarone vs Hashimotos (thyroid peroxidase antibodies elevated > 1000).   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    - Continue synthroid 175mcg daily. Discussed with Endocrine (3/4).     7)  Depression  - Patient previously took himself off Zoloft, but 11/2024 he states he restarted it a few weeks ago   - C/w Zoloft           9) Melena 2/2 to suspected AVM - resolved        - EGD on 2/24 showing mild gastritis but no source of bleed.       - Colonoscopy performed on 2/26 without source of bleeding.        - Continue pantoprazole 40mg daily       Orders Placed This Encounter   Procedures    XR chest 2 views    CBC    Comprehensive Metabolic Panel    Lactate Dehydrogenase    B-Type Natriuretic Peptide    TSH with reflex to Free T4 if abnormal    Digoxin    HLA Transplant Antibody Panel    Hepatitis A Antibody, IgM    Hepatitis B Core Antibody, IgM    Hepatitis B Surface Antibody    Hepatitis B Surface Antigen    Hepatitis C Antibody    Cytomegalovirus IgG    Cytomegalovirus Antibody, IgM    Toxoplasma IgG    Renuka-Barr Virus Antibody Panel    Hepatitis C RNA, Quantitative, PCR    Urinalysis with Reflex Microscopic    Nicotine + Metabolites, Urine    Drug Screen, Urine With Reflex to Confirmation    Extra Tubes    Extra Tubes    Thyroxine, Free    Referral to Endocrinology       Follow up: 3 months for RHC and physican visit       ____________________________________________________________  COOKIE Martinez-CNP   Section of Advanced Heart Failure and Cardiac Transplantation  Division of Cardiovascular Medicine  [facility]  [facility]

## 2025-01-28 NOTE — PROGRESS NOTES
SW met with pt in [place] [id number] for a follow up appt. Pt was active and engaged during visit. Pt confirmed his information remains the same including address, phone and insurance. Pt confirmed his support people remain the same. Pt shared his mood has been good, no major changes. SW had pt complete PHQ-9 and OSCAR. Pt scored a 0 on both meaning no clinical depression or anxiety symptoms at this time. Pt shared his sleep varies but he typically feels well rested. Pt confirmed his appetite has been good, is working on weight loss for tx listing. Pt shared he has not been having any trouble with his medications or the pharmacy. SW confirmed with pt he had no additional questions or concerns for sw at this time. SW to follow up with pt routinely.    Nichole TURNER

## 2025-01-29 ENCOUNTER — LAB (OUTPATIENT)
Dept: LAB | Facility: HOSPITAL | Age: 65
End: 2025-01-29
Payer: MEDICARE

## 2025-01-29 ENCOUNTER — HOSPITAL ENCOUNTER (OUTPATIENT)
Facility: HOSPITAL | Age: 65
Setting detail: OUTPATIENT SURGERY
End: 2025-01-29
Attending: STUDENT IN AN ORGANIZED HEALTH CARE EDUCATION/TRAINING PROGRAM | Admitting: STUDENT IN AN ORGANIZED HEALTH CARE EDUCATION/TRAINING PROGRAM
Payer: MEDICARE

## 2025-01-29 ENCOUNTER — TELEPHONE (OUTPATIENT)
Dept: TRANSPLANT | Facility: HOSPITAL | Age: 65
End: 2025-01-29
Payer: MEDICARE

## 2025-01-29 DIAGNOSIS — I50.9 CONGESTIVE HEART FAILURE, UNSPECIFIED HF CHRONICITY, UNSPECIFIED HEART FAILURE TYPE: ICD-10-CM

## 2025-01-29 DIAGNOSIS — I50.22: ICD-10-CM

## 2025-01-29 DIAGNOSIS — Z76.82 HEART TRANSPLANT CANDIDATE: ICD-10-CM

## 2025-01-29 DIAGNOSIS — E87.6 HYPOKALEMIA: ICD-10-CM

## 2025-01-29 DIAGNOSIS — E03.9 HYPOTHYROIDISM, UNSPECIFIED TYPE: ICD-10-CM

## 2025-01-29 DIAGNOSIS — I50.20 HFREF (HEART FAILURE WITH REDUCED EJECTION FRACTION): ICD-10-CM

## 2025-01-29 DIAGNOSIS — Z79.899 OTHER LONG TERM (CURRENT) DRUG THERAPY: ICD-10-CM

## 2025-01-29 DIAGNOSIS — Z95.811 LVAD (LEFT VENTRICULAR ASSIST DEVICE) PRESENT (MULTI): ICD-10-CM

## 2025-01-29 DIAGNOSIS — Z94.9 TRANSPLANT: ICD-10-CM

## 2025-01-29 LAB
ALBUMIN SERPL BCP-MCNC: 4.2 G/DL (ref 3.4–5)
ALP SERPL-CCNC: 56 U/L (ref 33–136)
ALT SERPL W P-5'-P-CCNC: 12 U/L (ref 10–52)
AMPHETAMINES UR QL SCN: NORMAL
ANION GAP SERPL CALC-SCNC: 14 MMOL/L (ref 10–20)
APPEARANCE UR: CLEAR
AST SERPL W P-5'-P-CCNC: 14 U/L (ref 9–39)
BARBITURATES UR QL SCN: NORMAL
BENZODIAZ UR QL SCN: NORMAL
BILIRUB SERPL-MCNC: 0.5 MG/DL (ref 0–1.2)
BILIRUB UR STRIP.AUTO-MCNC: NEGATIVE MG/DL
BNP SERPL-MCNC: 69 PG/ML (ref 0–99)
BUN SERPL-MCNC: 22 MG/DL (ref 6–23)
BZE UR QL SCN: NORMAL
CALCIUM SERPL-MCNC: 9.1 MG/DL (ref 8.6–10.6)
CANNABINOIDS UR QL SCN: NORMAL
CHLORIDE SERPL-SCNC: 99 MMOL/L (ref 98–107)
CMV IGG AVIDITY SERPL IA-RTO: NONREACTIVE %
CO2 SERPL-SCNC: 31 MMOL/L (ref 21–32)
COLOR UR: NORMAL
CREAT SERPL-MCNC: 1.01 MG/DL (ref 0.5–1.3)
DIGOXIN SERPL-MCNC: 0.81 NG/ML (ref 0.8–?)
EBV EA IGG SER QL: NEGATIVE
EBV NA AB SER QL: POSITIVE
EBV VCA IGG SER IA-ACNC: POSITIVE
EBV VCA IGM SER IA-ACNC: POSITIVE
EGFRCR SERPLBLD CKD-EPI 2021: 83 ML/MIN/1.73M*2
ERYTHROCYTE [DISTWIDTH] IN BLOOD BY AUTOMATED COUNT: 17.2 % (ref 11.5–14.5)
FENTANYL+NORFENTANYL UR QL SCN: NORMAL
GLUCOSE SERPL-MCNC: 89 MG/DL (ref 74–99)
GLUCOSE UR STRIP.AUTO-MCNC: NORMAL MG/DL
HAV IGM SER QL: NONREACTIVE
HBV CORE IGM SER QL: NONREACTIVE
HBV SURFACE AB SER-ACNC: <3.1 MIU/ML
HBV SURFACE AG SERPL QL IA: NONREACTIVE
HCT VFR BLD AUTO: 40.6 % (ref 41–52)
HCV AB SER QL: NONREACTIVE
HCV RNA SERPL NAA+PROBE-ACNC: NOT DETECTED K[IU]/ML
HCV RNA SERPL NAA+PROBE-LOG IU: NORMAL {LOG_IU}/ML
HGB BLD-MCNC: 12.5 G/DL (ref 13.5–17.5)
HOLD SPECIMEN: NORMAL
KETONES UR STRIP.AUTO-MCNC: NEGATIVE MG/DL
LDH SERPL L TO P-CCNC: 145 U/L (ref 84–246)
LEUKOCYTE ESTERASE UR QL STRIP.AUTO: NEGATIVE
MCH RBC QN AUTO: 28.6 PG (ref 26–34)
MCHC RBC AUTO-ENTMCNC: 30.8 G/DL (ref 32–36)
MCV RBC AUTO: 93 FL (ref 80–100)
METHADONE UR QL SCN: NORMAL
NITRITE UR QL STRIP.AUTO: NEGATIVE
NRBC BLD-RTO: 0 /100 WBCS (ref 0–0)
OPIATES UR QL SCN: NORMAL
OXYCODONE+OXYMORPHONE UR QL SCN: NORMAL
PCP UR QL SCN: NORMAL
PH UR STRIP.AUTO: 6 [PH]
PLATELET # BLD AUTO: 266 X10*3/UL (ref 150–450)
POTASSIUM SERPL-SCNC: 3.3 MMOL/L (ref 3.5–5.3)
PROT SERPL-MCNC: 6.6 G/DL (ref 6.4–8.2)
PROT UR STRIP.AUTO-MCNC: NEGATIVE MG/DL
RBC # BLD AUTO: 4.37 X10*6/UL (ref 4.5–5.9)
RBC # UR STRIP.AUTO: NEGATIVE /UL
SODIUM SERPL-SCNC: 141 MMOL/L (ref 136–145)
SP GR UR STRIP.AUTO: 1.01
T GONDII IGG SER-ACNC: NONREACTIVE
T4 FREE SERPL-MCNC: 2.66 NG/DL (ref 0.78–1.48)
TSH SERPL-ACNC: 0.05 MIU/L (ref 0.44–3.98)
UROBILINOGEN UR STRIP.AUTO-MCNC: NORMAL MG/DL
WBC # BLD AUTO: 8.3 X10*3/UL (ref 4.4–11.3)

## 2025-01-29 RX ORDER — DIGOXIN 125 MCG
125 TABLET ORAL DAILY
Qty: 90 TABLET | Refills: 3 | Status: SHIPPED | OUTPATIENT
Start: 2025-01-29

## 2025-01-29 RX ORDER — AMIODARONE HYDROCHLORIDE 200 MG/1
200 TABLET ORAL DAILY
Qty: 30 TABLET | Refills: 11 | Status: SHIPPED | OUTPATIENT
Start: 2025-01-29 | End: 2026-01-29

## 2025-01-29 RX ORDER — POTASSIUM CHLORIDE 750 MG/1
20 TABLET, FILM COATED, EXTENDED RELEASE ORAL DAILY
Qty: 60 TABLET | Refills: 11 | Status: SHIPPED | OUTPATIENT
Start: 2025-01-29 | End: 2026-01-29

## 2025-01-29 RX ORDER — LEVOTHYROXINE SODIUM 125 UG/1
125 TABLET ORAL DAILY
Qty: 30 TABLET | Refills: 11 | Status: SHIPPED | OUTPATIENT
Start: 2025-01-29 | End: 2025-01-29 | Stop reason: SINTOL

## 2025-01-29 RX ORDER — POTASSIUM CHLORIDE 20 MEQ/1
20 TABLET, EXTENDED RELEASE ORAL DAILY
Qty: 30 TABLET | Refills: 11 | Status: SHIPPED | OUTPATIENT
Start: 2025-01-29 | End: 2025-01-29 | Stop reason: ALTCHOICE

## 2025-01-29 RX ORDER — LEVOTHYROXINE SODIUM 175 UG/1
175 TABLET ORAL DAILY
Qty: 90 TABLET | Refills: 3 | Status: SHIPPED | OUTPATIENT
Start: 2025-01-29

## 2025-01-30 ENCOUNTER — TELEPHONE (OUTPATIENT)
Dept: RESPIRATORY THERAPY | Facility: HOSPITAL | Age: 65
End: 2025-01-30
Payer: MEDICARE

## 2025-01-30 LAB — CMV IGM SERPL-ACNC: <8 AU/ML

## 2025-01-30 NOTE — TELEPHONE ENCOUNTER
Called lab services to inquire about cancelled lab. Lab staff used wrong lab at time of draw which caused this error. Will address this issue in [facility] meeting today to find a solution.

## 2025-01-31 ENCOUNTER — DOCUMENTATION (OUTPATIENT)
Dept: TRANSPLANT | Facility: HOSPITAL | Age: 65
End: 2025-01-31
Payer: MEDICARE

## 2025-01-31 DIAGNOSIS — Z76.82 HEART TRANSPLANT CANDIDATE: ICD-10-CM

## 2025-01-31 DIAGNOSIS — Z95.811 LVAD (LEFT VENTRICULAR ASSIST DEVICE) PRESENT (MULTI): ICD-10-CM

## 2025-02-04 ENCOUNTER — DOCUMENTATION (OUTPATIENT)
Dept: TRANSPLANT | Facility: HOSPITAL | Age: 65
End: 2025-02-04
Payer: MEDICARE

## 2025-02-04 NOTE — PROGRESS NOTES
Per fax from [facility] transplant approved heart transplant listing 03/06/2024 to 03/05/2025. AUTH # [id number]. From Sara SUERO at [facility]

## 2025-02-06 ENCOUNTER — LAB (OUTPATIENT)
Dept: LAB | Facility: HOSPITAL | Age: 65
End: 2025-02-06
Payer: MEDICARE

## 2025-02-06 PROCEDURE — 86833 HLA CLASS II HIGH DEFIN QUAL: CPT

## 2025-02-06 PROCEDURE — 86832 HLA CLASS I HIGH DEFIN QUAL: CPT

## 2025-02-10 LAB
HLA RESULTS: NORMAL
HLA-A+B+C AB NFR SER: NORMAL %
HLA-DP+DQ+DR AB NFR SER: NORMAL %

## 2025-02-10 RX ORDER — LEVOTHYROXINE SODIUM 125 UG/1
125 TABLET ORAL DAILY
Qty: 30 TABLET | Refills: 11 | Status: SHIPPED | OUTPATIENT
Start: 2025-02-10 | End: 2026-02-10

## 2025-02-10 RX ORDER — POTASSIUM CHLORIDE 750 MG/1
20 TABLET, FILM COATED, EXTENDED RELEASE ORAL DAILY
Qty: 60 TABLET | Refills: 11 | Status: SHIPPED | OUTPATIENT
Start: 2025-02-10 | End: 2026-02-10

## 2025-02-10 NOTE — TELEPHONE ENCOUNTER
Called patient to update him that HLA labs were received and processed. cPRA remains 0. Patient has been reactivated as a UNOS Status 4 for transplant. He will require updated RHC in the coming weeks, we will notify him once it is scheduled. He requests that it is the latest slot possible since he is not a morning person. Patient has no questions at this time.

## 2025-02-14 DIAGNOSIS — I50.22 CHRONIC LEFT SYSTOLIC HEART FAILURE: ICD-10-CM

## 2025-02-14 DIAGNOSIS — Z76.82 AWAITING ORGAN TRANSPLANT: ICD-10-CM

## 2025-02-14 DIAGNOSIS — I50.22 CHRONIC CLINICAL SYSTOLIC HEART FAILURE: ICD-10-CM

## 2025-02-17 ENCOUNTER — APPOINTMENT (OUTPATIENT)
Dept: CARDIOLOGY | Facility: HOSPITAL | Age: 65
End: 2025-02-17
Payer: MEDICARE

## 2025-02-17 PROBLEM — Z76.82 AWAITING ORGAN TRANSPLANT: Status: ACTIVE | Noted: 2025-02-14

## 2025-02-17 PROBLEM — I50.22 CHRONIC LEFT SYSTOLIC HEART FAILURE: Status: ACTIVE | Noted: 2025-02-14

## 2025-02-18 ENCOUNTER — TELEPHONE (OUTPATIENT)
Dept: TRANSPLANT | Facility: HOSPITAL | Age: 65
End: 2025-02-18
Payer: MEDICARE

## 2025-02-18 NOTE — TELEPHONE ENCOUNTER
Returned patient's call re: appointments. He spoke to admin Елена and was able to sort everything out. Confirmed expectations for appointment.

## 2025-02-25 ENCOUNTER — TELEPHONE (OUTPATIENT)
Dept: TRANSPLANT | Facility: HOSPITAL | Age: 65
End: 2025-02-25
Payer: MEDICARE

## 2025-02-25 DIAGNOSIS — K29.61 GASTROINTESTINAL HEMORRHAGE ASSOCIATED WITH OTHER GASTRITIS: ICD-10-CM

## 2025-02-25 NOTE — TELEPHONE ENCOUNTER
Called patient to clarify - he did not realize cath was scheduled for 7am, so he set up ride service for 11am echo and is unable to change it. Per patient's request, will keep echo and clinic and reschedule cath for another day.

## 2025-02-25 NOTE — TELEPHONE ENCOUNTER
PT called and stated he can not make the RHC on 2/27 at 7:30a stated his ride would need 5 day notice

## 2025-02-26 NOTE — PROGRESS NOTES
VAD Cardiologist: Anita Vincent   VAD Coordinator: Ailyn Michelle   In visit: Patient, LVAD JUVENCIO Michelle  Patient's Location: [place] [address]    Date of Visit: 03/26/2024  2:00 PM EDT  Location of visit: [facility]   Type of Visit: LVAD followup    Type of VAD:  HM3  Implant Date: 4/30/2023  Reason for VAD: ICM  Intent: Short term - listed status 4 on 3/13/2024     HPI / Summary:   Anatoly Lane is a very pleasant 64 y.o. male presenting for management of Stage D HFrEF s/p HM3 4/30/2023 CAD, h/o pAF and VT s/p ICD, hypothyroidism, and stage D systolic HF/ICM/HFrEF who returns to the LVAD clinic for ongoing evaluation and management.       Interval Hx:  Patient denies CP, palpitations, dizziness, GRAVES, orthopnea, PND, edema, LVAD alarms, N/V/D, hematochezia, hematuria, epistaxis.  Most recent INR is 4/4/2024:  3:09 PM     Driveline is clean with no drainage. Dressing was last changed on 2/26. Dressing change performed in clinic today.   VAD equipment interrogated in clinic with all batteries and clips functioning properly. Patient denies sleeping on batteries at home. Patient has dropped their controller since last visit. No pain.   Most recent six alarms interrogated from patient controller.     Patient has not completed Cardiac Rehab.       NYHA class at implant - IV.   NYHA class today -  II .         Review of Systems   Constitutional: Negative.   HENT: Negative.     Eyes:  Negative for blurred vision.   Cardiovascular:  Positive for leg swelling.   Respiratory: Negative.     Endocrine: Negative.    Hematologic/Lymphatic: Negative.    Skin: Negative.    Musculoskeletal: Negative.    Gastrointestinal: Negative.    Genitourinary: Negative.    Neurological:  Positive for weakness. Negative for dizziness.   Psychiatric/Behavioral: Negative.     : Full 10 pt review of symptoms of negative unless discussed above.     Medical History:   No past medical history on file.  Surgical Hx:   Past Surgical  History:   Procedure Laterality Date    CARDIAC CATHETERIZATION N/A 2/28/2024    Procedure: Right Heart Cath;  Surgeon: José Velasco MD;  Location: [place] [address] Cardiac Cath Lab;  Service: Cardiovascular;  Laterality: N/A;  with RAMP study    COLONOSCOPY W/ POLYPECTOMY  2023    CT ABDOMEN PELVIS ANGIOGRAM W AND/OR WO IV CONTRAST  04/13/2023    CT ABDOMEN PELVIS ANGIOGRAM W AND/OR WO IV CONTRAST [facility] CT    LEFT VENTRICULAR ASSIST DEVICE      OTHER SURGICAL HISTORY  01/05/2022    Complete colonoscopy    OTHER SURGICAL HISTORY  01/05/2022    Cardioverter defibrillator insertion      Vitals:       3/6/2024     6:30 AM 3/6/2024     5:49 AM 3/6/2024     4:48 AM 3/6/2024     1:31 AM 3/5/2024    11:46 PM 3/5/2024     8:08 PM   Vitals   Systolic 117   94     Diastolic 80   55     Heart Rate   85  70 75   Temp   35.8 °C (96.5 °F)   36.5 °C (97.7 °F)   Resp   18  18 18   Weight (lb)  226.19 226.19      BMI  33.4 kg/m2 33.4 kg/m2      BSA (m2)  2.24 m2 2.24 m2        Wt Readings from Last 5 Encounters:   03/06/24 103 kg (226 lb 3.1 oz)   02/23/24 105 kg (230 lb 9.6 oz)   02/19/24 107 kg (235 lb)   12/08/23 101 kg (223 lb 3.2 oz)   09/20/23 88.5 kg (195 lb)     GEN: Pleasant, fatigued.   HEENT: JVP elevated, no icterus.   CHEST: Clear to auscultation. Sternotomy well healed.  CV: LVAD hum  ABD: Soft, ND, NT.  Driveline: No drainage. Dressing c/d/I. Secured.   EXT: Warm, well perfused, mild BLE edema.   NEURO: Pleasant, GAINES, Oriented to plan         Labs:   CMP:  Recent Labs     03/06/24  0615 03/05/24  0609 03/04/24  0947 03/03/24  0604 03/02/24  0610    140 137 137 136   K 4.2 4.1 4.5 4.2 4.2    105 106 103 103   CO2 25 28 23 24 24   ANIONGAP 12 11 13 14 13   BUN 19 25* 28* 30* 22   CREATININE 0.96 1.02 1.06 1.01 1.32*   EGFR 89 83 79 84 61   MG 2.22 2.50* 2.14 2.18 2.22       Recent Labs     03/06/24  0615 03/05/24  0609 03/04/24  0947 03/03/24  0604 03/02/24  0610 02/24/24  0233 02/23/24  1433  05/31/23  2134 05/28/23  0603 05/26/23  0056 05/23/23  0729 05/23/23  0304 05/21/23  1943   ALBUMIN 3.8 4.1 3.8 3.8 3.9   < > 4.0 CANCELED CANCELED CANCELED 3.3*   < > CANCELED   ALKPHOS  --   --   --   --   --   --  47 CANCELED CANCELED CANCELED 98   < > CANCELED   ALT  --   --   --   --   --   --  13 CANCELED CANCELED CANCELED 22   < > CANCELED   AST  --   --   --   --   --   --  11 CANCELED CANCELED CANCELED 19   < > CANCELED   BILITOT  --   --   --   --   --   --  0.4 CANCELED CANCELED CANCELED 0.7   < > CANCELED   LIPASE  --   --   --   --   --   --   --   --   --   --   --   --  CANCELED    < > = values in this interval not displayed.       CBC:  Recent Labs     03/06/24  0615 03/05/24  0609 03/04/24  0947 03/03/24  0604 03/02/24  0610   WBC 6.8 6.6 5.6 6.4 5.6   HGB 9.6* 10.3* 10.0* 10.0* 10.1*   HCT 31.1* 34.1* 31.9* 31.3* 31.5*    312 274 284 222   * 105* 103* 101* 102*       COAG:   Recent Labs     03/06/24  0615 03/05/24  0609 03/03/24  2355 03/03/24  1055 03/03/24  0604 03/02/24  2315 05/02/23  0030 05/01/23  0033 04/30/23  1619 04/30/23  0413 04/21/23  1412 04/21/23  1125 04/08/23  0315 04/07/23  1835 04/06/23  1852 04/06/23  1714   INR 3.3* 2.6* 1.9*  --  1.6*  --    < > 1.3* 1.3* 1.2*   < > 1.2*   < >  --    < >  --    HAUF  --   --   --  0.3 0.4 0.2   < >  --   --  0.3   < >  --    < >  --   --   --    HAPTOGLOBIN  --   --   --   --   --   --   --   --   --   --   --  <30  --  <30  --  <30   FIBRINOGEN  --   --   --   --   --   --   --  264 255 356   < >  --   --   --   --   --     < > = values in this interval not displayed.       ABO:   Recent Labs     03/02/24  0610   ABO AB       HEME/ENDO:  Recent Labs     03/02/24  0610 02/26/24  0811 02/23/24  1433 05/31/23  2134 04/25/23  0911 04/21/23  1125 04/07/23  0416 03/31/23  2331 03/31/23  2142 03/29/23  2151   FERRITIN  --   --  104  --   --  352*  --   --   --  79   IRONSAT  --   --  18*  --   --  16*  --   --   --  6*   TSH 8.10*  11.49*  11.49* 18.02* CANCELED   < >  --    < >  --    < > 15.36*   HGBA1C  --   --  4.4  --   --   --   --  5.8*  --  6.7*    < > = values in this interval not displayed.        CARDIAC:   Recent Labs     03/06/24  0615 03/05/24  0609 03/04/24  0947 03/03/24  0604 03/02/24  0610 02/24/24  0233 02/23/24  1433 05/21/23  1943 04/02/23  0154 03/29/23  2151    142 161 145 147   < > 128  --    < >  --    TROPHS  --   --   --   --   --   --  30 CANCELED  CANCELED  --   --    BNP  --   --   --   --   --   --  33 CANCELED  --  3,231*    < > = values in this interval not displayed.       Recent Labs     02/29/24  0658 02/28/24  0444 03/29/23  2151   CHOL 158 155 126   LDLF  --   --  85   HDL 38.3 39.4 24.0*   TRIG 145  --  83       MICRO:   Recent Labs     05/12/23  0857   CRP 14.77*       Echocardiogram     Narrative  [facility], [address] [place] [address]  Tel [phone] and Fax [phone]    TRANSTHORACIC ECHOCARDIOGRAM REPORT      Patient Name:     BILLIE WARD       Nichelle Physician:  [id number] Spencer Lilly MD  Study Date:       5/15/2023          Referring           SHERRILL FLEMING  Physician:  MRN/PID:          [id number]           PCP:  Accession/Order#: [id number]          [facility] HHVI Non  Location:           Invasive  YOB: 1960          Fellow:             [id number] Tobi Ledbetter MD  Gender:           M                  Nurse:              Mei Jeff RN  Admit Date:       3/29/2023          Sonographer:        TONYA Wolfe RDCS  Admission Status: Inpatient -        Additional Staff:  Routine  Height:           175.26 cm          CC Report to:       [address]  Weight:           73.03 kg           Study Type:         Echocardiogram  BSA:              1.88 m2    Diagnosis/ICD: Z95.811-Presence of heart assist device  Indication:    Post LVAD follow up  Procedure/CPT: Echo Limited-65573; Color Doppler-88403; Doppler  Limited-74717    Patient History:  Smoker:            Former.  Pertinent History: CAD, Cardiomyopathy and A-Fib. HFrEF, VT s/p dual chamber  ICD, anemia, hypothyroidism, cardiogenic shock, s/p LVAD  HMIII.    Study Detail: The following Echo studies were performed: 2D, M-Mode, Doppler and  color flow. Definity used as a contrast agent for endocardial  border definition. Total contrast used for this procedure was 3.0  mL via IV push.      PHYSICIAN INTERPRETATION:  Left Ventricle: The left ventricular systolic function is severely decreased, with an estimated ejection fraction of 10-15%. There is global hypokinesis of the left ventricle with minor regional variations. The left ventricular cavity size is severely dilated. Spectral Doppler shows an abnormal pattern of left ventricular diastolic filling.  Left Atrium: The left atrium is severely dilated.  Right Ventricle: The right ventricle was not well visualized. There is reduced right ventricular systolic function. The RV is not completely visualized, but appears to be likely moderately dilated with moderately decreased function.  Right Atrium: The right atrium was not well visualized. There is a device visualized in the right atrium.  Aortic Valve: The aortic valve appears abnormal. There is mild aortic valve regurgitation.  Mitral Valve: The mitral valve is normal in structure. There is trace mitral valve regurgitation.  Tricuspid Valve: The tricuspid valve is structurally normal. Tricuspid regurgitation was not assessed.  Pulmonic Valve: The pulmonic valve is structurally normal. There is no indication of pulmonic valve regurgitation.  Pericardium: There is a trivial pericardial effusion.  Pleural: There is left pleural effusion.  Aorta: The aortic root was not well visualized.  Systemic Veins: The inferior vena cava appears to be of normal size. There is IVC inspiratory collapse greater than 50%.  In comparison to the previous echocardiogram(s): Although  previous studies are available for review, their comparison with the current echocardiogram is clinically irrelevant. Compared with study from 4/25/2023,.    LEFT VENTRICULAR ASSIST DEVICE:  Study Type: This is a follow-up LVAD echocardiogram.  LVAD: The patient has a(n) Heartmate III LVAD device present.  Inflow Cannula: The inflow cannula is visualized in the left ventricular apex.  Outflow Cannula: Visualization of the outflow cannula is technically difficult.  AV Leaflet Mobility: The aortic valve leaflets do not appear to open.  Inflow Velocities: The LVAD cannula inflow velocity is normal (.5-2msec).        CONCLUSIONS:  1. Left ventricular systolic function is severely decreased with a 10-15% estimated ejection fraction.  2. Spectral Doppler shows an abnormal pattern of left ventricular diastolic filling.  3. The RV is not completely visualized, but appears to be likely moderately dilated with moderately decreased function.  4. There is reduced right ventricular systolic function.  5. The left atrium is severely dilated.  6. Aortic valve appears abnormal.  7. Mild aortic valve regurgitation.  8. Left ventricular cavity size is severely dilated.  9. There is global hypokinesis of the left ventricle with minor regional variations.    QUANTITATIVE DATA SUMMARY:  2D MEASUREMENTS:  Normal Ranges:  IVSd:          0.90 cm    (0.6-1.1cm)  LVPWd:         1.00 cm    (0.6-1.1cm)  LVIDd:         6.20 cm    (3.9-5.9cm)  LV Mass Index: 129.8 g/m2    LV SYSTOLIC FUNCTION BY 2D PLANIMETRY (MOD):  Normal Ranges:  EF-A4C View: 13.9 % (>=55%)  EF-A2C View: 21.8 %  EF-Biplane:  17.2 %    TRICUSPID VALVE/RVSP:  Normal Ranges:  IVC Diam: 1.16 cm      [facility], Cath Lab, [address] [place] [address]    Cardiovascular Catheterization Report    Patient Name:     BILLIE      Performing Physician:  [id number] Vince WARD MD  Study Date:       4/27/2023   Verifying Physician:   [id number] Vince Boyer MD  MRN/PID:          [id number]   Cardiologist/Co-scrub:  Accession/Order#: [id number]  Fellow:                [id number] Tobi Ledbetter MD  YOB: 1960  Fellow:  Gender:           M          Referring Physician:  Admit Date:                  Referring Physician:   [id number] Anita Vincent MD  Surgeon:                     Referring Physician:   VANESSA      Study: Right Heart Catheterization      Indications:  BILLIE WARD is a 63 year old male who presents with hypertension, atrial fibrillation and Tobacco Use - Former, Patient was referred for right heart catheterization to monitor hemodynamics for an acute exacerbation of heart failure requiring Impella support. Cardiomyopathy.    Appropriate Use Criteria:  Re-evaluation of know cardiomyopathy with change in clinical status or on cardiac exam or to guide therapy; AUC score = 7.    Procedure Description:  After infiltration with 1% Lidocaine, the was cannulated with a modified Seldinger technique. After infiltration of local anesthetic, the right internal jugular vein was identified with two-dimensional ultrasound. Under direct ultrasound visualization, the right internal jugular vein was cannulated with a micropuncture technique. A 9 Nepalese sheath was placed in the vein. A balloon tipped catheter was positioned in the right heart system to record pressures and remained in the patient when transferred from the lab. Cardiac output was calculated via the Raven method. Post-procedure, the venous sheath was left intact and sutured in place.    Right Heart Catheterization:  A balloon tipped catheter was positioned in the right heart system to record pressures and remained in the patient when transferred from the lab. Cardiac output was calculated via the Raven method.    Hemo Personnel:  +----------------------+-------------+  Name                  Duty           +----------------------+-------------+  Merlin  Vince BURCH MD 1  +----------------------+-------------+  Jey Driscoll RN       PROC CIRC 1  +----------------------+-------------+  Mariya Fam RN     PROC CIRC 2  +----------------------+-------------+  Edilberto Reinoso RN    PROC RECORD 1  +----------------------+-------------+  Tobi Ledbetter MD         FELLOW PHYS 1  +----------------------+-------------+      Sedation Time:  +------------------------+----------------------------------------+  Sedation Start/End TimesTime                                      +------------------------+----------------------------------------+  Start                   4/27/2023 10:26:07                        +------------------------+----------------------------------------+  Drugs                   Versed 0.5 mg IV per physician for sedat  +------------------------+----------------------------------------+  End                     4/27/2023 10:56:04                        +------------------------+----------------------------------------+      Equipment Used:  +---------------------+--------------------------------------------------------+              Date/Time                                             Description  +---------------------+--------------------------------------------------------+        Hemodynamic Pressures:  Resting hemodynamics on maximal Impella support revealed a moderately elevated PCW pressure with a normal Raven cardiac index of 3.4 L/min/mï¿½. The PVR is normal at that 72.  +----+----------+---------+-------------+-------------+---+----+-------+-------+  SiteDate Time   Phase  Systolic mmHg  Diastolic  ED MeanA-Wave V-Wave                   Name                    mmHg     mmHmmHg mmHg   mmHg                                                      g                     +----+----------+---------+-------------+-------------+---+----+-------+-------+    AO 4/27/2023     Rest           101           72     78                      10:47:20                                                                      AM                                                          +----+----------+---------+-------------+-------------+---+----+-------+-------+    RA 4/27/2023     Rest                                4      8      5        10:48:31                                                                      AM                                                          +----+----------+---------+-------------+-------------+---+----+-------+-------+    RV 4/27/2023     Rest           47               8                          10:49:08                                                                      AM                                                          +----+----------+---------+-------------+-------------+---+----+-------+-------+    PW 4/27/2023     Rest                               20     21     24        10:50:13                                                                      AM                                                          +----+----------+---------+-------------+-------------+---+----+-------+-------+    PA 4/27/2023     Rest           47           18     26                      10:51:05                                                                      AM                                                          +----+----------+---------+-------------+-------------+---+----+-------+-------+        Oxygen Saturation %:  +-----------+----------+------------+  Sample SiteO2 Sat (%)HB (g/100ml)  +-----------+----------+------------+           FA        97         7.8  +-----------+----------+------------+           PA        62         7.8  +-----------+----------+------------+           FA        97          7.8  +-----------+----------+------------+           PA        62         7.8  +-----------+----------+------------+      Cardiac Outputs:  +----+---+---+      CI CO   +----+---+---+  FICK3.46.7  +----+---+---+      Vascular Resistance Calculated Values (Wood Units):  +---+---+  [id number]  +---+---+  PVR72   +---+---+      Complications:  No in-lab complications observed.    Cardiac Cath Transition of Care Summary:  Post Procedure Diagnosis: Moderately elevated PCW pressure with a normal Raven  cardiac index on axillary Impella support.    Blood Loss:               Estimated blood loss during the procedure was 30 mls.  Specimens Removed:        Number of specimen(s) removed: none.    ____________________________________________________________________________________  CONCLUSIONS:  1. Moderately elevated PCW pressure with normal Raven cardiac index on Impella support.    ____________________________________________________________________________________  CPT Codes:  Right Heart Cath O2/Cardiac output without biopsy (RHC)-99814; Moderate Sedation Services 1st additional 15 minutes patient >5 years-98608; Moderate Sedation Services 2nd additional 15 minutes patient >5 years-88204    ICD 10 Codes:  I50.23-Acute on chronic systolic (congestive) heart failure    [id number] Vince Boyer MD  Performing Physician  Electronically signed by [id number] Vince Boyer MD on 4/28/2023 at 11:13:42 AM      Current Outpatient Medications   Medication Instructions    aspirin 81 mg, oral, Daily    dapagliflozin propanediol (FARXIGA) 10 mg, oral, Daily    digoxin (LANOXIN) 125 mcg, oral, Daily    Entresto 49-51 mg tablet 1 tablet, oral, 2 times daily    levothyroxine (SYNTHROID, LEVOXYL) 175 mcg, oral, Daily before breakfast    pantoprazole (PROTONIX) 40 mg, oral, 2 times daily, Do not crush, chew, or split.    sertraline (ZOLOFT) 12.5 mg, oral, Daily    spironolactone (ALDACTONE) 50 mg, oral, Daily    tamsulosin  (FLOMAX) 0.4 mg, oral, Daily    torsemide (DEMADEX) 10 mg, oral, As needed    warfarin (Coumadin) 4 mg tablet Take as directed per After Visit Summary.      Heart Mate III interrogation       VAD personally interrogated - consistent w/ adequate function. No low flows or power spikes, occasional PI events.   Notable Therapies   Class  Agent SAFETY    ARNI / ACE / ARB  Entresto 49-51  Last BP:  , Last BUN/Cr (GFR): 22/1.01 (83), 1/28/2025:  2:51 PM.    Beta-Blocker  On hold 2/2 RV dysfunction  Last HR:      MRA  Inspra 25mg daily, pt refuses to increase Last K: 3.3     SGLT2  Farxiga 10mg  Last A1c No results found for requested labs within last 365 days. (No results in last year.).    Diuretic  Torsemide 30mg daily  Last BNP: 69    Hydralazine/ISDN  N/a     Anticoagulation  Coumadin Last Hgb 12.5 (1/28/2025:  2:51 PM).    Anti-arrhythmic  Digoxin 125mcg daily  Last digoxin   Lab Results   Component Value Date    DIGOXIN 0.81 01/28/2025    Follow up digoxin level from [facility]    Antiplatelet  ASA 81mg daily      Lipid-Lowering   Last Tchol 158 / LDL No results found for requested labs within last 365 days. or No results found for requested labs within last 365 days. / HDL 38.3 / TRIG 145 (2/29/2024:  6:58 AM).    Other        Device(s)  LVAD HM3 Implant date: 4/30/2023     Cardiac Rehab,   if EF <35/MI/OHS  Referred last visit, pt prefers to work out at home on elliptical         Problems:   1) Stage D chronic systolic HF/ICM/HFrEF s/p HM3 LVAD (4/30/23)  ABO: AB  Short/Long term: short term; status 4   - Continue aspirin 81mg daily    - Continue farxiga 10mg daily    -  Entresto 49/51 mg BID    - Continue Inspra 25mg   - Holding beta-blocker due to persistent RV dysfunction   - Continue coumadin daily for goal INR 2-3 and daily levels.   - Continue digoxin 125mcg daily for RV support    - Continue fish oil and PPI  - He reports that he needs to reschedule his RHC again     2) Long term anticoagulations secondary to  LVAD  - c/w warfarin (target INR 2-3)     3) Salmonella infection- resolved   - Bactrim course now complete     4) h/o right axillary repair with right brachial plexus injury and residual right hand numbness  CT head and neck wo contrast 5/3 showed degenerative changes C5-C6   - Previously had neurology referral for EMG and further mx  - Pt has no complaints at this time      5) h/o VT and pAF  - Pacerone initially discontinued March of 2024 2/2 thyroid dysfunction, however pt began taking his pacerone again in May of 2024 without our heart failure team prompting him to due to palpitations and dizziness. Pacemaker remote monitor readings from 5/15/24 show multiple episodes of NSVT and VF requiring ATP. Endocrine contributed his elevated TSH levels to be 2/2 the patient taking his synthroid and PPI at the same time. The endocrine team felt it okay to resume his amiodarone. However, then patient was taking 262.5 mcg (1.5 pills) instead of 175mcg Synthroid leading to over suppressed thyroid. Patient was instructed to only take 175mcg, will refer to endo (pt has not followed up with them) and repeat TSH in two weeks.   - Continue amiodarone 200 mg daily    6) Hypothyroidism  - c/w levothyroxine (see above for dosing)   - Unclear if hypothyroidism 2/2 to amiodarone vs Hashimotos (thyroid peroxidase antibodies elevated > 1000).   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    - Continue synthroid 175mcg daily.     7) Depression  - Patient previously took himself off Zoloft, but 11/2024 he states he restarted it a few weeks ago   - C/w Zoloft           9) Melena 2/2 to suspected AVM - resolved        - EGD on 2/24 showing mild gastritis but no source of bleed.       - Colonoscopy performed on 2/26 without source of bleeding.        - Continue pantoprazole 40mg daily             Follow up: 3 months        ____________________________________________________________  Vince Ruano DO  Section of Advanced Heart Failure and Cardiac Transplantation  Division of Cardiovascular Medicine  [facility]  [facility]

## 2025-02-26 NOTE — PATIENT INSTRUCTIONS
Patient Instructions:  -Please bring a list of your medications to every visit.  -If you have any questions or concerns, please contact the LVAD office at [phone], option 3 or the direct line at [phone]. Please state that you are an LVAD patient. If it is after hours and it is an emergency, please call the emergency LVAD line at [phone]  - Continue current medications with the exception of:   --   - Labwork: CBC, CMP, LDH, BNP, TSH, DIG  - Imaging/Procedures:   - Referrals:   - Followup:  we will call you once we establish a date for your right heart cath

## 2025-02-27 ENCOUNTER — OFFICE VISIT (OUTPATIENT)
Facility: HOSPITAL | Age: 65
End: 2025-02-27
Payer: MEDICARE

## 2025-02-27 ENCOUNTER — HOSPITAL ENCOUNTER (OUTPATIENT)
Dept: CARDIOLOGY | Facility: HOSPITAL | Age: 65
Discharge: HOME | End: 2025-02-27
Payer: MEDICARE

## 2025-02-27 ENCOUNTER — OFFICE VISIT (OUTPATIENT)
Dept: CARDIOLOGY | Facility: HOSPITAL | Age: 65
End: 2025-02-27
Payer: MEDICARE

## 2025-02-27 VITALS — HEART RATE: 69 BPM | BODY MASS INDEX: 33.18 KG/M2 | OXYGEN SATURATION: 96 % | WEIGHT: 224 LBS | HEIGHT: 69 IN

## 2025-02-27 DIAGNOSIS — I50.22 CHRONIC LEFT SYSTOLIC HEART FAILURE: Primary | ICD-10-CM

## 2025-02-27 DIAGNOSIS — Z76.82 AWAITING ORGAN TRANSPLANT: ICD-10-CM

## 2025-02-27 DIAGNOSIS — E87.6 HYPOKALEMIA: ICD-10-CM

## 2025-02-27 DIAGNOSIS — I50.22 CHRONIC CLINICAL SYSTOLIC HEART FAILURE: ICD-10-CM

## 2025-02-27 DIAGNOSIS — E03.9 HYPOTHYROIDISM, UNSPECIFIED TYPE: ICD-10-CM

## 2025-02-27 DIAGNOSIS — I42.8 NON-ISCHEMIC CARDIOMYOPATHY (MULTI): ICD-10-CM

## 2025-02-27 DIAGNOSIS — Z95.811 LVAD (LEFT VENTRICULAR ASSIST DEVICE) PRESENT (MULTI): ICD-10-CM

## 2025-02-27 DIAGNOSIS — I50.20 ACC/AHA STAGE D SYSTOLIC HEART FAILURE: Primary | ICD-10-CM

## 2025-02-27 DIAGNOSIS — I50.9 CONGESTIVE HEART FAILURE, UNSPECIFIED HF CHRONICITY, UNSPECIFIED HEART FAILURE TYPE: ICD-10-CM

## 2025-02-27 LAB
BODY SURFACE AREA: 2.22 M2
EJECTION FRACTION: 13 %
LEFT VENTRICLE INTERNAL DIMENSION DIASTOLE: 8.22 CM (ref 3.5–6)
LEFT VENTRICULAR OUTFLOW TRACT DIAMETER: 2.62 CM
MITRAL VALVE E/A RATIO: 0.79
RIGHT VENTRICLE PEAK SYSTOLIC PRESSURE: 20.6 MMHG

## 2025-02-27 PROCEDURE — 93306 TTE W/DOPPLER COMPLETE: CPT

## 2025-02-27 PROCEDURE — 99215 OFFICE O/P EST HI 40 MIN: CPT | Performed by: INTERNAL MEDICINE

## 2025-02-27 PROCEDURE — 93306 TTE W/DOPPLER COMPLETE: CPT | Performed by: INTERNAL MEDICINE

## 2025-02-27 PROCEDURE — 3008F BODY MASS INDEX DOCD: CPT | Performed by: INTERNAL MEDICINE

## 2025-02-27 ASSESSMENT — COLUMBIA-SUICIDE SEVERITY RATING SCALE - C-SSRS
1. IN THE PAST MONTH, HAVE YOU WISHED YOU WERE DEAD OR WISHED YOU COULD GO TO SLEEP AND NOT WAKE UP?: NO
2. HAVE YOU ACTUALLY HAD ANY THOUGHTS OF KILLING YOURSELF?: NO
6. HAVE YOU EVER DONE ANYTHING, STARTED TO DO ANYTHING, OR PREPARED TO DO ANYTHING TO END YOUR LIFE?: NO

## 2025-02-27 ASSESSMENT — PATIENT HEALTH QUESTIONNAIRE - PHQ9
2. FEELING DOWN, DEPRESSED OR HOPELESS: NOT AT ALL
4. FEELING TIRED OR HAVING LITTLE ENERGY: NOT AT ALL
1. LITTLE INTEREST OR PLEASURE IN DOING THINGS: NOT AT ALL
SUM OF ALL RESPONSES TO PHQ9 QUESTIONS 1 AND 2: 0
3. TROUBLE FALLING OR STAYING ASLEEP OR SLEEPING TOO MUCH: NOT AT ALL
SUM OF ALL RESPONSES TO PHQ QUESTIONS 1-9: 0
7. TROUBLE CONCENTRATING ON THINGS, SUCH AS READING THE NEWSPAPER OR WATCHING TELEVISION: NOT AT ALL
9. THOUGHTS THAT YOU WOULD BE BETTER OFF DEAD, OR OF HURTING YOURSELF: NOT AT ALL
8. MOVING OR SPEAKING SO SLOWLY THAT OTHER PEOPLE COULD HAVE NOTICED. OR THE OPPOSITE, BEING SO FIGETY OR RESTLESS THAT YOU HAVE BEEN MOVING AROUND A LOT MORE THAN USUAL: NOT AT ALL
5. POOR APPETITE OR OVEREATING: NOT AT ALL
6. FEELING BAD ABOUT YOURSELF - OR THAT YOU ARE A FAILURE OR HAVE LET YOURSELF OR YOUR FAMILY DOWN: NOT AT ALL

## 2025-02-27 ASSESSMENT — ENCOUNTER SYMPTOMS
OCCASIONAL FEELINGS OF UNSTEADINESS: 0
DEPRESSION: 0
LOSS OF SENSATION IN FEET: 0

## 2025-02-27 ASSESSMENT — PAIN SCALES - GENERAL: PAINLEVEL_OUTOF10: 0-NO PAIN

## 2025-03-06 DIAGNOSIS — I50.20 HFREF (HEART FAILURE WITH REDUCED EJECTION FRACTION): ICD-10-CM

## 2025-03-06 RX ORDER — PANTOPRAZOLE SODIUM 40 MG/1
40 TABLET, DELAYED RELEASE ORAL DAILY
Qty: 180 TABLET | Refills: 3 | Status: SHIPPED | OUTPATIENT
Start: 2025-03-06

## 2025-03-06 NOTE — TELEPHONE ENCOUNTER
Pt called requesting refill of pantoprazole (ProtoNix) 40 mg EC tablet to [facility] pharmacy

## 2025-03-10 ENCOUNTER — TELEPHONE (OUTPATIENT)
Dept: TRANSPLANT | Facility: HOSPITAL | Age: 65
End: 2025-03-10
Payer: MEDICARE

## 2025-03-10 NOTE — TELEPHONE ENCOUNTER
Called patient back and states about one week ago he dropped his controller. He cleaned it at the time and did not have issues. Patient states he changed his dressing today and had some dried bloody drainage. Denies pain at the site other than burning from the cleansing solution.     Education provided in order to watch for signs of infection.

## 2025-03-10 NOTE — TELEPHONE ENCOUNTER
Pt called stating he had a little bleeding around his driveline. Pt stated he got it cleaned up and it stopped bleeding but he wanted to let us know. Coords were unavailable at the time, told pt coord would call back.

## 2025-03-18 ENCOUNTER — DOCUMENTATION (OUTPATIENT)
Dept: TRANSPLANT | Facility: HOSPITAL | Age: 65
End: 2025-03-18
Payer: MEDICARE

## 2025-03-18 NOTE — PROGRESS NOTES
[employer] is active. Faxed status update to [facility] transplant. Fax [phone]

## 2025-03-21 ENCOUNTER — DOCUMENTATION (OUTPATIENT)
Dept: TRANSPLANT | Facility: HOSPITAL | Age: 65
End: 2025-03-21
Payer: MEDICARE

## 2025-03-26 ENCOUNTER — DOCUMENTATION (OUTPATIENT)
Dept: TRANSPLANT | Facility: HOSPITAL | Age: 65
End: 2025-03-26
Payer: MEDICARE

## 2025-03-26 ENCOUNTER — HOSPITAL ENCOUNTER (OUTPATIENT)
Facility: HOSPITAL | Age: 65
Setting detail: OUTPATIENT SURGERY
Discharge: HOME | End: 2025-03-26
Attending: INTERNAL MEDICINE | Admitting: INTERNAL MEDICINE
Payer: MEDICARE

## 2025-03-26 VITALS
SYSTOLIC BLOOD PRESSURE: 104 MMHG | OXYGEN SATURATION: 95 % | RESPIRATION RATE: 12 BRPM | DIASTOLIC BLOOD PRESSURE: 83 MMHG | HEART RATE: 71 BPM

## 2025-03-26 DIAGNOSIS — Z76.82 AWAITING ORGAN TRANSPLANT: ICD-10-CM

## 2025-03-26 DIAGNOSIS — I50.22 CHRONIC LEFT SYSTOLIC HEART FAILURE: ICD-10-CM

## 2025-03-26 DIAGNOSIS — Z94.1 HEART TRANSPLANT STATUS: ICD-10-CM

## 2025-03-26 LAB
ALBUMIN SERPL BCP-MCNC: 3.8 G/DL (ref 3.4–5)
ALP SERPL-CCNC: 54 U/L (ref 33–136)
ALT SERPL W P-5'-P-CCNC: 13 U/L (ref 10–52)
ANION GAP SERPL CALC-SCNC: 15 MMOL/L (ref 10–20)
AST SERPL W P-5'-P-CCNC: 31 U/L (ref 9–39)
BASOPHILS # BLD AUTO: 0.04 X10*3/UL (ref 0–0.1)
BASOPHILS NFR BLD AUTO: 0.5 %
BILIRUB SERPL-MCNC: 0.5 MG/DL (ref 0–1.2)
BNP SERPL-MCNC: 57 PG/ML (ref 0–99)
BUN SERPL-MCNC: 23 MG/DL (ref 6–23)
CALCIUM SERPL-MCNC: 8.6 MG/DL (ref 8.6–10.6)
CHLORIDE SERPL-SCNC: 100 MMOL/L (ref 98–107)
CO2 SERPL-SCNC: 30 MMOL/L (ref 21–32)
CREAT SERPL-MCNC: 1.01 MG/DL (ref 0.5–1.3)
DIGOXIN SERPL-MCNC: 0.92 NG/ML (ref 0.8–?)
EGFRCR SERPLBLD CKD-EPI 2021: 83 ML/MIN/1.73M*2
EOSINOPHIL # BLD AUTO: 0.17 X10*3/UL (ref 0–0.7)
EOSINOPHIL NFR BLD AUTO: 1.9 %
ERYTHROCYTE [DISTWIDTH] IN BLOOD BY AUTOMATED COUNT: 15.9 % (ref 11.5–14.5)
GLUCOSE SERPL-MCNC: 80 MG/DL (ref 74–99)
HCT VFR BLD AUTO: 36.4 % (ref 41–52)
HGB BLD-MCNC: 12.3 G/DL (ref 13.5–17.5)
IMM GRANULOCYTES # BLD AUTO: 0.04 X10*3/UL (ref 0–0.7)
IMM GRANULOCYTES NFR BLD AUTO: 0.5 % (ref 0–0.9)
LDH SERPL L TO P-CCNC: 263 U/L (ref 84–246)
LYMPHOCYTES # BLD AUTO: 1.74 X10*3/UL (ref 1.2–4.8)
LYMPHOCYTES NFR BLD AUTO: 19.6 %
MCH RBC QN AUTO: 30.1 PG (ref 26–34)
MCHC RBC AUTO-ENTMCNC: 33.8 G/DL (ref 32–36)
MCV RBC AUTO: 89 FL (ref 80–100)
MONOCYTES # BLD AUTO: 0.54 X10*3/UL (ref 0.1–1)
MONOCYTES NFR BLD AUTO: 6.1 %
NEUTROPHILS # BLD AUTO: 6.33 X10*3/UL (ref 1.2–7.7)
NEUTROPHILS NFR BLD AUTO: 71.4 %
NRBC BLD-RTO: 0 /100 WBCS (ref 0–0)
PLATELET # BLD AUTO: 250 X10*3/UL (ref 150–450)
POTASSIUM SERPL-SCNC: 4.2 MMOL/L (ref 3.5–5.3)
PROT SERPL-MCNC: 6.3 G/DL (ref 6.4–8.2)
RBC # BLD AUTO: 4.09 X10*6/UL (ref 4.5–5.9)
SODIUM SERPL-SCNC: 141 MMOL/L (ref 136–145)
TSH SERPL-ACNC: 1.83 MIU/L (ref 0.44–3.98)
WBC # BLD AUTO: 8.9 X10*3/UL (ref 4.4–11.3)

## 2025-03-26 PROCEDURE — 83615 LACTATE (LD) (LDH) ENZYME: CPT

## 2025-03-26 PROCEDURE — 83880 ASSAY OF NATRIURETIC PEPTIDE: CPT

## 2025-03-26 PROCEDURE — C1894 INTRO/SHEATH, NON-LASER: HCPCS | Performed by: INTERNAL MEDICINE

## 2025-03-26 PROCEDURE — 7100000010 HC PHASE TWO TIME - EACH INCREMENTAL 1 MINUTE: Performed by: INTERNAL MEDICINE

## 2025-03-26 PROCEDURE — 7100000009 HC PHASE TWO TIME - INITIAL BASE CHARGE: Performed by: INTERNAL MEDICINE

## 2025-03-26 PROCEDURE — 85025 COMPLETE CBC W/AUTO DIFF WBC: CPT | Performed by: INTERNAL MEDICINE

## 2025-03-26 PROCEDURE — C1727 CATH, BAL TIS DIS, NON-VAS: HCPCS | Performed by: INTERNAL MEDICINE

## 2025-03-26 PROCEDURE — 93451 RIGHT HEART CATH: CPT | Performed by: INTERNAL MEDICINE

## 2025-03-26 PROCEDURE — 2720000007 HC OR 272 NO HCPCS: Performed by: INTERNAL MEDICINE

## 2025-03-26 PROCEDURE — 99152 MOD SED SAME PHYS/QHP 5/>YRS: CPT | Performed by: INTERNAL MEDICINE

## 2025-03-26 PROCEDURE — 84443 ASSAY THYROID STIM HORMONE: CPT

## 2025-03-26 PROCEDURE — 80053 COMPREHEN METABOLIC PANEL: CPT

## 2025-03-26 PROCEDURE — 2500000004 HC RX 250 GENERAL PHARMACY W/ HCPCS (ALT 636 FOR OP/ED): Performed by: INTERNAL MEDICINE

## 2025-03-26 PROCEDURE — 80162 ASSAY OF DIGOXIN TOTAL: CPT

## 2025-03-26 RX ORDER — LIDOCAINE HYDROCHLORIDE 20 MG/ML
INJECTION, SOLUTION INFILTRATION; PERINEURAL AS NEEDED
Status: DISCONTINUED | OUTPATIENT
Start: 2025-03-26 | End: 2025-03-26 | Stop reason: HOSPADM

## 2025-03-26 RX ORDER — WARFARIN 2.5 MG/1
2.5 TABLET ORAL
COMMUNITY

## 2025-03-26 RX ORDER — FENTANYL CITRATE 50 UG/ML
INJECTION, SOLUTION INTRAMUSCULAR; INTRAVENOUS AS NEEDED
Status: DISCONTINUED | OUTPATIENT
Start: 2025-03-26 | End: 2025-03-26 | Stop reason: HOSPADM

## 2025-03-26 RX ORDER — MIDAZOLAM HYDROCHLORIDE 1 MG/ML
INJECTION, SOLUTION INTRAMUSCULAR; INTRAVENOUS AS NEEDED
Status: DISCONTINUED | OUTPATIENT
Start: 2025-03-26 | End: 2025-03-26 | Stop reason: HOSPADM

## 2025-03-26 ASSESSMENT — ENCOUNTER SYMPTOMS
SHORTNESS OF BREATH: 1
COUGH: 0
PALPITATIONS: 0
CHEST TIGHTNESS: 0

## 2025-03-26 NOTE — PRE-SEDATION DOCUMENTATION
Pre-procedure Sedation Evaluation    ASA: 3    Mallampati: II    Jameel Barajas MD   Heart Failure Fellow  PGY-4

## 2025-03-26 NOTE — POST-PROCEDURE NOTE
Physician Transition of Care Summary  Invasive Cardiovascular Lab    Procedure Date: 3/26/2025  Attending:    * Vince Ruano - Primary  Resident/Fellow/Other Assistant: Surgeons and Role:  * No surgeons found with a matching role *    Indications:   Pre-op Diagnosis      * Awaiting organ transplant [Z76.82]     * Chronic left systolic heart failure [I50.22]    Post-procedure diagnosis:   Post-op Diagnosis     * Awaiting organ transplant [Z76.82]     * Chronic left systolic heart failure [I50.22]    Procedure(s):   Right Heart Cath  51250 - RI RIGHT HEART CATH O2 SATURATION & CARDIAC OUTPUT        Procedure Findings:   Preserved CO/CI with normal filling pressures    Description of the Procedure:   RHC via RIJ    Complications:   None      Estimated Blood Loss:   5 mL    Anesthesia: Moderate Sedation Anesthesia Staff: No anesthesia staff entered.    Any Specimen(s) Removed:   No specimens collected during this procedure.    Disposition:   Follow up in LVAD clinic      Electronically signed by: Vince Ruano DO, 3/26/2025 11:20 AM

## 2025-03-26 NOTE — H&P
History Of Present Illness  Anatoly Lane is a 64 y.o. male with Stage D HFrEF NYHA class 2-3. s/p HM3 4/30/2023 CAD, h/o pAF and VT s/p ICD, hypothyroidism, and stage D systolic HF/ICM/HFrEF presenting for routine pre-transplant RHC. He reports no new symptoms today. He took his warfarin last night.     Past Medical History  No past medical history on file.    Surgical History  Past Surgical History:   Procedure Laterality Date    CARDIAC CATHETERIZATION N/A 2/28/2024    Procedure: Right Heart Cath;  Surgeon: José Velasco MD;  Location: [place] [address] Cardiac Cath Lab;  Service: Cardiovascular;  Laterality: N/A;  with RAMP study    COLONOSCOPY W/ POLYPECTOMY  2023    CT ABDOMEN PELVIS ANGIOGRAM W AND/OR WO IV CONTRAST  04/13/2023    CT ABDOMEN PELVIS ANGIOGRAM W AND/OR WO IV CONTRAST [facility] CT    LEFT VENTRICULAR ASSIST DEVICE      OTHER SURGICAL HISTORY  01/05/2022    Complete colonoscopy    OTHER SURGICAL HISTORY  01/05/2022    Cardioverter defibrillator insertion        Social History  He reports that he has quit smoking. His smoking use included cigarettes. He has never used smokeless tobacco. He reports that he does not drink alcohol and does not use drugs.    Family History  Family History   Problem Relation Name Age of Onset    Hypertension Father      Colon cancer Father          Allergies  Jardiance [empagliflozin]    Review of Systems   Respiratory:  Positive for shortness of breath. Negative for cough and chest tightness.    Cardiovascular:  Negative for chest pain, palpitations and leg swelling.        Physical Exam  There were no vitals filed for this visit.    GEN: Healthy appearing, well-developed, NAD.  CV: RRR, no m/r/g. JVP flat/ LVAD hum present  LUNGS: CTAB, no w/r/c.  ABD: Soft, NT/ND, NBS, no masses or organomegaly.  SKIN: Warm, well perfused. No skin rashes or abnormal lesions. LE edema 1+ in BLE  MSK: Normal gait. No deformities.  EXT: No clubbing or cyanosis  NEURO: Ambulating with no  limitations. No focal deficits.       Last Recorded Vitals  There were no vitals taken for this visit.    Relevant Results           Assessment/Plan   Assessment & Plan  Awaiting organ transplant    Chronic left systolic heart failure    - Proceed with RHC today           Jameel Barajas MD

## 2025-03-26 NOTE — PROGRESS NOTES
Mr. Lane is a 64 y.o. M with a HM3 implanted 4/30/2023. Patient having procedure today d/t heart transplant listing status 4.      Procedures : Right heart cath      No complications with VAD throughout procedure. All LVAD readings in flowsheet. No events or alarms noted.

## 2025-03-28 ENCOUNTER — TELEPHONE (OUTPATIENT)
Dept: RESPIRATORY THERAPY | Facility: HOSPITAL | Age: 65
End: 2025-03-28
Payer: MEDICARE

## 2025-03-28 DIAGNOSIS — I51.9 RIGHT VENTRICULAR DYSFUNCTION: ICD-10-CM

## 2025-03-28 DIAGNOSIS — I50.33 ACUTE ON CHRONIC CONGESTIVE HEART FAILURE WITH RIGHT VENTRICULAR DIASTOLIC DYSFUNCTION: ICD-10-CM

## 2025-03-28 DIAGNOSIS — I50.82 ACUTE ON CHRONIC CONGESTIVE HEART FAILURE WITH RIGHT VENTRICULAR DIASTOLIC DYSFUNCTION: ICD-10-CM

## 2025-03-28 DIAGNOSIS — F32.A DEPRESSION, UNSPECIFIED DEPRESSION TYPE: ICD-10-CM

## 2025-03-28 RX ORDER — TORSEMIDE 10 MG/1
30 TABLET ORAL DAILY
Qty: 270 TABLET | Refills: 3 | Status: SHIPPED | OUTPATIENT
Start: 2025-03-28 | End: 2026-03-28

## 2025-03-28 NOTE — TELEPHONE ENCOUNTER
Called patient back. Discussed need for torsemide refill. Patient states his eye is blood shot following his RHC. Denies pain at the site. Advised patient to call us after the weekend is worsening or go to ED over the weekend if blurred vision or pain. Patient verbalized understanding.

## 2025-04-09 ENCOUNTER — TELEPHONE (OUTPATIENT)
Dept: TRANSPLANT | Facility: HOSPITAL | Age: 65
End: 2025-04-09
Payer: MEDICARE

## 2025-04-09 NOTE — TELEPHONE ENCOUNTER
Called patient to discuss canceling 4/30 RHC and rescheduling for June/July per pre heart transplant testing protocol.     Patient verbalized understanding. Katia Martinez pre heart transplant coordinator aware.

## 2025-04-10 ENCOUNTER — TELEPHONE (OUTPATIENT)
Dept: TRANSPLANT | Facility: HOSPITAL | Age: 65
End: 2025-04-10
Payer: MEDICARE

## 2025-04-15 ENCOUNTER — TELEPHONE (OUTPATIENT)
Dept: TRANSPLANT | Facility: HOSPITAL | Age: 65
End: 2025-04-15
Payer: MEDICARE

## 2025-04-15 NOTE — TELEPHONE ENCOUNTER
Called and spoke with patient regarding new kits. Verbally educated patient about the new dressing kit and explained that there are step by step instructions inside of the kit as well.     Patient is changing dressing tomorrow. Advised patient to call VAD office if further instruction is needed.

## 2025-04-24 ENCOUNTER — TELEPHONE (OUTPATIENT)
Dept: RESPIRATORY THERAPY | Facility: HOSPITAL | Age: 65
End: 2025-04-24
Payer: MEDICARE

## 2025-04-29 ENCOUNTER — TELEPHONE (OUTPATIENT)
Dept: TRANSPLANT | Facility: HOSPITAL | Age: 65
End: 2025-04-29
Payer: MEDICARE

## 2025-04-29 NOTE — TELEPHONE ENCOUNTER
Received call from [facility] care requesting a call from [facility]. Ref ID [id number]

## 2025-04-30 ENCOUNTER — APPOINTMENT (OUTPATIENT)
Dept: TRANSPLANT | Facility: HOSPITAL | Age: 65
End: 2025-04-30
Payer: MEDICARE

## 2025-05-21 DIAGNOSIS — Z76.82 HEART TRANSPLANT CANDIDATE: ICD-10-CM

## 2025-05-21 DIAGNOSIS — I50.20 HFREF (HEART FAILURE WITH REDUCED EJECTION FRACTION): ICD-10-CM

## 2025-05-21 DIAGNOSIS — Z95.811 LVAD (LEFT VENTRICULAR ASSIST DEVICE) PRESENT (MULTI): ICD-10-CM

## 2025-05-21 RX ORDER — ICOSAPENT ETHYL 1 G/1
2 CAPSULE ORAL
Qty: 360 CAPSULE | Refills: 3 | Status: SHIPPED | OUTPATIENT
Start: 2025-05-21 | End: 2026-05-21

## 2025-05-21 NOTE — TELEPHONE ENCOUNTER
Date of encounter 02/02/17  Chief Complaint   Patient presents with   • Hyperlipidemia   • Abdominal Pain     LUQ     Impressions      1. Urine frequency    2. General medical examination    3. Uncontrolled type 2 diabetes mellitus without complication, without long-term current use of insulin    4. HTN, goal below 130/80       Plan   Orders Placed This Encounter   • Basic Metabolic Panel   • Glycohemoglobin   • Microalbumin Urine Random   • SERVICE TO UROLOGY   • atorvastatin (LIPITOR) 10 MG tablet   • losartan (COZAAR) 25 MG tablet   • metFORMIN (GLUCOPHAGE) 500 MG tablet   • oxybutynin (DITROPAN) 5 MG tablet        · Health maintenance issues: Up-to-date  · Service to urology for other options and considerations for bladder medicines  · Refill current dose of Ditropan, Glucophage, Cozaar, Lipitor ×3 months  · Patient was encouraged to check her blood sugars on a regular basis, record and bring these values and to next visit  · Patient was encouraged to check blood pressure on a regular basis, record and bring these values to next visit  · Labs ordered in anticipation of next visit as a physical    Return in about 3 months (around 5/1/2017) for Physical, labs prior to next visit.    History: as reported directly by the patient.     Blood pressure-patient just recently got herself a blood pressure machine and has not started using it yet. She does not feel that there are any blood pressure issues. The patient was counseled to note that unless blood pressure is extraordinarily high, it does not provide any symptoms.     Diabetes-the patient was checking her blood sugar more frequently but then recently has decreased to approximately every 3 weeks. Recent readings include 120, 118, 1:30, 165.    Urinary frequency-the patient rarely has any leakage and she is usually able to get to a bathroom but still finds that she is urinating more frequently than she would like. She tried the higher dose of oxybutynin but found  Medication refill requested from pharmacy. Verified medication on med list. Sent to appropriate MD for authorization.      that it caused significant constipation and she is now back to 1 tablet b.i.d. which helps but is insufficient.    ROS:  No chest pains, chest pressures, palpitations or tachycardia   PMH: Problem list reviewed and updated     Patient Active Problem List   Diagnosis   • DM (diabetes mellitus), type 2, uncontrolled   • Goiter   • Primary osteoarthritis of both knees - alexsandra medial compartment   • HTN, goal below 130/80   • Obesity (BMI 30-39.9)   • Nuclear sclerotic cataract of both eyes   • Dyslipidemia, goal LDL below 100      SOC: she reports that she has never smoked. She has never used smokeless tobacco.     Vital signs, meds, allergies, problem list, social history were reviewed and updated within [facility].    Physical examination:   Visit Vitals   • /77 ([place] [facility] [place] Position: Sitting, Cuff Size: Large Adult)   • Pulse 79   • Resp 16   • Ht 5' 4\" (1.626 m)   • Wt 88 kg   • LMP  (Approximate)   • BMI 33.3 kg/m2     General - well developed and nourished, in no apparent distress  Psych - responds appropriately, alert and oriented x 3  Head - normocephalic, atraumatic, no sinus tenderness.   Ears - TM pearly gray with normal cone of light, no retraction, no erythema, no air/fluid levels.      External ear without deformity  Nose - No polyps. No discharge. Nasal mucosa pink. No bogginess or edema.   Throat - No tonsillar enlargement.  No exudates, no erythema, no cobblestoning,   Neck - no anterior, no posterior, no supraclavicular lymphadenopathy palpated.       Thyroid without enlargement or nodularity.  Heart - Normal point of maximal impulse.  Regular rate and rhythm without murmurs.   Lungs - Good chest excursion. Clear to auscultation bilaterally.

## 2025-05-29 DIAGNOSIS — I50.9 HEART FAILURE, UNSPECIFIED HF CHRONICITY, UNSPECIFIED HEART FAILURE TYPE: ICD-10-CM

## 2025-05-29 DIAGNOSIS — Z76.82 HEART TRANSPLANT CANDIDATE: ICD-10-CM

## 2025-05-29 DIAGNOSIS — R06.02 SHORTNESS OF BREATH: ICD-10-CM

## 2025-06-09 ENCOUNTER — TELEPHONE (OUTPATIENT)
Dept: TRANSPLANT | Facility: HOSPITAL | Age: 65
End: 2025-06-09
Payer: MEDICARE

## 2025-06-09 DIAGNOSIS — I50.9 CONGESTIVE HEART FAILURE, UNSPECIFIED HF CHRONICITY, UNSPECIFIED HEART FAILURE TYPE: ICD-10-CM

## 2025-06-09 DIAGNOSIS — Z95.811 LVAD (LEFT VENTRICULAR ASSIST DEVICE) PRESENT (MULTI): ICD-10-CM

## 2025-06-09 DIAGNOSIS — I42.8 NON-ISCHEMIC CARDIOMYOPATHY (MULTI): ICD-10-CM

## 2025-06-09 DIAGNOSIS — I50.20 HFREF (HEART FAILURE WITH REDUCED EJECTION FRACTION): ICD-10-CM

## 2025-06-09 RX ORDER — SACUBITRIL AND VALSARTAN 49; 51 MG/1; MG/1
1 TABLET, FILM COATED ORAL 2 TIMES DAILY
Qty: 180 TABLET | Refills: 3 | OUTPATIENT
Start: 2025-06-09

## 2025-06-09 RX ORDER — SACUBITRIL AND VALSARTAN 49; 51 MG/1; MG/1
1 TABLET, FILM COATED ORAL 2 TIMES DAILY
Qty: 180 TABLET | Refills: 5 | Status: SHIPPED | OUTPATIENT
Start: 2025-06-09 | End: 2026-12-01

## 2025-06-09 NOTE — TELEPHONE ENCOUNTER
Pt called requesting a refill of Entresto 49-51 mg tablet to [facility] pharmacy.

## 2025-06-16 DIAGNOSIS — R06.02 SHORTNESS OF BREATH: ICD-10-CM

## 2025-06-16 DIAGNOSIS — I50.9 HEART FAILURE, UNSPECIFIED HF CHRONICITY, UNSPECIFIED HEART FAILURE TYPE: ICD-10-CM

## 2025-06-16 DIAGNOSIS — Z76.82 HEART TRANSPLANT CANDIDATE: ICD-10-CM

## 2025-06-20 ENCOUNTER — ANESTHESIA (OUTPATIENT)
Dept: OPERATING ROOM | Facility: HOSPITAL | Age: 65
End: 2025-06-20
Payer: MEDICARE

## 2025-06-20 ENCOUNTER — ANESTHESIA EVENT (OUTPATIENT)
Dept: OPERATING ROOM | Facility: HOSPITAL | Age: 65
End: 2025-06-20
Payer: MEDICARE

## 2025-06-20 ENCOUNTER — APPOINTMENT (OUTPATIENT)
Dept: RADIOLOGY | Facility: HOSPITAL | Age: 65
DRG: 001 | End: 2025-06-20
Payer: MEDICARE

## 2025-06-20 ENCOUNTER — SURGERY (OUTPATIENT)
Age: 65
End: 2025-06-20
Payer: MEDICARE

## 2025-06-20 ENCOUNTER — DOCUMENTATION (OUTPATIENT)
Dept: TRANSPLANT | Facility: HOSPITAL | Age: 65
End: 2025-06-20
Payer: MEDICARE

## 2025-06-20 ENCOUNTER — APPOINTMENT (OUTPATIENT)
Dept: CARDIOLOGY | Facility: HOSPITAL | Age: 65
DRG: 001 | End: 2025-06-20
Payer: MEDICARE

## 2025-06-20 ENCOUNTER — HOSPITAL ENCOUNTER (INPATIENT)
Facility: HOSPITAL | Age: 65
End: 2025-06-20
Attending: THORACIC SURGERY (CARDIOTHORACIC VASCULAR SURGERY) | Admitting: THORACIC SURGERY (CARDIOTHORACIC VASCULAR SURGERY)
Payer: MEDICARE

## 2025-06-20 ENCOUNTER — HOSPITAL ENCOUNTER (OUTPATIENT)
Dept: OPERATING ROOM | Facility: HOSPITAL | Age: 65
Discharge: HOME | End: 2025-06-20

## 2025-06-20 ENCOUNTER — TELEPHONE (OUTPATIENT)
Dept: TRANSPLANT | Facility: HOSPITAL | Age: 65
End: 2025-06-20

## 2025-06-20 DIAGNOSIS — Z94.1 S/P ORTHOTOPIC HEART TRANSPLANT: ICD-10-CM

## 2025-06-20 DIAGNOSIS — I50.82 ACUTE ON CHRONIC CONGESTIVE HEART FAILURE WITH RIGHT VENTRICULAR DIASTOLIC DYSFUNCTION: ICD-10-CM

## 2025-06-20 DIAGNOSIS — I50.33 ACUTE ON CHRONIC CONGESTIVE HEART FAILURE WITH RIGHT VENTRICULAR DIASTOLIC DYSFUNCTION: ICD-10-CM

## 2025-06-20 DIAGNOSIS — F32.A DEPRESSION, UNSPECIFIED DEPRESSION TYPE: ICD-10-CM

## 2025-06-20 DIAGNOSIS — I42.8 NON-ISCHEMIC CARDIOMYOPATHY (MULTI): ICD-10-CM

## 2025-06-20 DIAGNOSIS — R73.9 STRESS HYPERGLYCEMIA: ICD-10-CM

## 2025-06-20 DIAGNOSIS — I51.9 RIGHT VENTRICULAR DYSFUNCTION: ICD-10-CM

## 2025-06-20 DIAGNOSIS — I42.0 CARDIOMYOPATHY, DILATED (MULTI): ICD-10-CM

## 2025-06-20 DIAGNOSIS — R73.9 HYPERGLYCEMIA: ICD-10-CM

## 2025-06-20 DIAGNOSIS — R29.898 LEFT ARM WEAKNESS: ICD-10-CM

## 2025-06-20 DIAGNOSIS — Z76.82 AWAITING ORGAN TRANSPLANT: ICD-10-CM

## 2025-06-20 DIAGNOSIS — Z76.82 HEART TRANSPLANT CANDIDATE: ICD-10-CM

## 2025-06-20 DIAGNOSIS — Z86.39 HISTORY OF HYPOTHYROIDISM: ICD-10-CM

## 2025-06-20 DIAGNOSIS — Z48.21 ENCOUNTER FOR AFTERCARE FOLLOWING HEART TRANSPLANT: ICD-10-CM

## 2025-06-20 DIAGNOSIS — E09.65 DRUG OR CHEMICAL INDUCED DIABETES MELLITUS WITH HYPERGLYCEMIA, UNSPECIFIED WHETHER LONG TERM INSULIN USE: ICD-10-CM

## 2025-06-20 DIAGNOSIS — I47.20 PAROXYSMAL VENTRICULAR TACHYCARDIA: ICD-10-CM

## 2025-06-20 DIAGNOSIS — I31.39 OTHER PERICARDIAL EFFUSION (NONINFLAMMATORY) (HHS-HCC): ICD-10-CM

## 2025-06-20 DIAGNOSIS — I50.43 ACUTE ON CHRONIC COMBINED SYSTOLIC (CONGESTIVE) AND DIASTOLIC (CONGESTIVE) HEART FAILURE: ICD-10-CM

## 2025-06-20 DIAGNOSIS — E87.6 HYPOKALEMIA: ICD-10-CM

## 2025-06-20 DIAGNOSIS — E03.9 HYPOTHYROIDISM, UNSPECIFIED TYPE: ICD-10-CM

## 2025-06-20 DIAGNOSIS — K29.61 GASTROINTESTINAL HEMORRHAGE ASSOCIATED WITH OTHER GASTRITIS: ICD-10-CM

## 2025-06-20 DIAGNOSIS — I50.20 HFREF (HEART FAILURE WITH REDUCED EJECTION FRACTION): Primary | ICD-10-CM

## 2025-06-20 DIAGNOSIS — Z95.811 LVAD (LEFT VENTRICULAR ASSIST DEVICE) PRESENT (MULTI): ICD-10-CM

## 2025-06-20 DIAGNOSIS — I50.89 OTHER HEART FAILURE: ICD-10-CM

## 2025-06-20 DIAGNOSIS — Z94.3 HEART AND LUNGS TRANSPLANT STATUS: ICD-10-CM

## 2025-06-20 LAB
ABO GROUP (TYPE) IN BLOOD: NORMAL
ALBUMIN SERPL BCP-MCNC: 4.3 G/DL (ref 3.4–5)
ALP SERPL-CCNC: 52 U/L (ref 33–136)
ALT SERPL W P-5'-P-CCNC: 13 U/L (ref 10–52)
ANION GAP SERPL CALC-SCNC: 14 MMOL/L (ref 10–20)
ANTIBODY SCREEN: NORMAL
APPEARANCE UR: CLEAR
APTT PPP: 48 SECONDS (ref 26–36)
APTT PPP: 55 SECONDS (ref 26–36)
AST SERPL W P-5'-P-CCNC: 15 U/L (ref 9–39)
BILIRUB DIRECT SERPL-MCNC: 0.1 MG/DL (ref 0–0.3)
BILIRUB SERPL-MCNC: 0.4 MG/DL (ref 0–1.2)
BILIRUB UR STRIP.AUTO-MCNC: NEGATIVE MG/DL
BUN SERPL-MCNC: 31 MG/DL (ref 6–23)
CA-I BLD-SCNC: 0.91 MMOL/L (ref 1.1–1.33)
CALCIUM SERPL-MCNC: 8.9 MG/DL (ref 8.6–10.6)
CHLORIDE SERPL-SCNC: 102 MMOL/L (ref 98–107)
CMV IGG AVIDITY SERPL IA-RTO: NONREACTIVE %
CO2 SERPL-SCNC: 29 MMOL/L (ref 21–32)
COLOR UR: ABNORMAL
CREAT SERPL-MCNC: 0.98 MG/DL (ref 0.5–1.3)
EBV EA IGG SER QL: NEGATIVE
EBV NA AB SER QL: POSITIVE
EBV VCA IGG SER IA-ACNC: POSITIVE
EBV VCA IGM SER IA-ACNC: POSITIVE
EGFRCR SERPLBLD CKD-EPI 2021: 86 ML/MIN/1.73M*2
ERYTHROCYTE [DISTWIDTH] IN BLOOD BY AUTOMATED COUNT: 15.9 % (ref 11.5–14.5)
FIBRINOGEN PPP-MCNC: 426 MG/DL (ref 200–400)
GLUCOSE SERPL-MCNC: 97 MG/DL (ref 74–99)
GLUCOSE UR STRIP.AUTO-MCNC: NORMAL MG/DL
HBV CORE AB SER QL: NONREACTIVE
HBV SURFACE AB SER-ACNC: 3.8 MIU/ML
HBV SURFACE AG SERPL QL IA: NONREACTIVE
HCT VFR BLD AUTO: 38.5 % (ref 41–52)
HCV AB SER QL: NONREACTIVE
HGB BLD-MCNC: 13.5 G/DL (ref 13.5–17.5)
HIV 1+2 AB+HIV1 P24 AG SERPL QL IA: NONREACTIVE
INR PPP: 2.4 (ref 0.9–1.1)
INR PPP: 3 (ref 0.9–1.1)
KETONES UR STRIP.AUTO-MCNC: NEGATIVE MG/DL
LEUKOCYTE ESTERASE UR QL STRIP.AUTO: ABNORMAL
MAGNESIUM SERPL-MCNC: 2.09 MG/DL (ref 1.6–2.4)
MCH RBC QN AUTO: 30.8 PG (ref 26–34)
MCHC RBC AUTO-ENTMCNC: 35.1 G/DL (ref 32–36)
MCV RBC AUTO: 88 FL (ref 80–100)
NITRITE UR QL STRIP.AUTO: NEGATIVE
NRBC BLD-RTO: 0 /100 WBCS (ref 0–0)
PH UR STRIP.AUTO: 7 [PH]
PHOSPHATE SERPL-MCNC: 3.3 MG/DL (ref 2.5–4.9)
PLATELET # BLD AUTO: 218 X10*3/UL (ref 150–450)
POTASSIUM SERPL-SCNC: 3.4 MMOL/L (ref 3.5–5.3)
PROT SERPL-MCNC: 6.5 G/DL (ref 6.4–8.2)
PROT UR STRIP.AUTO-MCNC: NEGATIVE MG/DL
PROTHROMBIN TIME: 26.1 SECONDS (ref 9.8–12.4)
PROTHROMBIN TIME: 33.3 SECONDS (ref 9.8–12.4)
RBC # BLD AUTO: 4.38 X10*6/UL (ref 4.5–5.9)
RBC # UR STRIP.AUTO: NEGATIVE MG/DL
RBC #/AREA URNS AUTO: NORMAL /HPF
RH FACTOR (ANTIGEN D): NORMAL
SODIUM SERPL-SCNC: 142 MMOL/L (ref 136–145)
SP GR UR STRIP.AUTO: 1.01
SQUAMOUS #/AREA URNS AUTO: NORMAL /HPF
T GONDII IGG SER-ACNC: NONREACTIVE
UROBILINOGEN UR STRIP.AUTO-MCNC: NORMAL MG/DL
VARICELLA ZOSTER IGG INDEX: 1.8 AI
VZV IGG SER QL IA: POSITIVE
WBC # BLD AUTO: 8.9 X10*3/UL (ref 4.4–11.3)
WBC #/AREA URNS AUTO: NORMAL /HPF

## 2025-06-20 PROCEDURE — 2500000004 HC RX 250 GENERAL PHARMACY W/ HCPCS (ALT 636 FOR OP/ED)

## 2025-06-20 PROCEDURE — 99291 CRITICAL CARE FIRST HOUR: CPT

## 2025-06-20 PROCEDURE — 86787 VARICELLA-ZOSTER ANTIBODY: CPT

## 2025-06-20 PROCEDURE — 87389 HIV-1 AG W/HIV-1&-2 AB AG IA: CPT

## 2025-06-20 PROCEDURE — 87522 HEPATITIS C REVRS TRNSCRPJ: CPT

## 2025-06-20 PROCEDURE — 86706 HEP B SURFACE ANTIBODY: CPT

## 2025-06-20 PROCEDURE — 86833 HLA CLASS II HIGH DEFIN QUAL: CPT | Mod: OUT | Performed by: STUDENT IN AN ORGANIZED HEALTH CARE EDUCATION/TRAINING PROGRAM

## 2025-06-20 PROCEDURE — 93005 ELECTROCARDIOGRAM TRACING: CPT

## 2025-06-20 PROCEDURE — 2500000002 HC RX 250 W HCPCS SELF ADMINISTERED DRUGS (ALT 637 FOR MEDICARE OP, ALT 636 FOR OP/ED)

## 2025-06-20 PROCEDURE — 86803 HEPATITIS C AB TEST: CPT

## 2025-06-20 PROCEDURE — 85520 HEPARIN ASSAY: CPT

## 2025-06-20 PROCEDURE — 37799 UNLISTED PX VASCULAR SURGERY: CPT

## 2025-06-20 PROCEDURE — 85027 COMPLETE CBC AUTOMATED: CPT

## 2025-06-20 PROCEDURE — 86777 TOXOPLASMA ANTIBODY: CPT

## 2025-06-20 PROCEDURE — 86900 BLOOD TYPING SEROLOGIC ABO: CPT

## 2025-06-20 PROCEDURE — 86645 CMV ANTIBODY IGM: CPT

## 2025-06-20 PROCEDURE — 86704 HEP B CORE ANTIBODY TOTAL: CPT

## 2025-06-20 PROCEDURE — 81379 HLA I TYPING COMPLETE HR: CPT | Mod: OUT | Performed by: STUDENT IN AN ORGANIZED HEALTH CARE EDUCATION/TRAINING PROGRAM

## 2025-06-20 PROCEDURE — 86663 EPSTEIN-BARR ANTIBODY: CPT

## 2025-06-20 PROCEDURE — 36415 COLL VENOUS BLD VENIPUNCTURE: CPT

## 2025-06-20 PROCEDURE — 84100 ASSAY OF PHOSPHORUS: CPT

## 2025-06-20 PROCEDURE — 86778 TOXOPLASMA ANTIBODY IGM: CPT

## 2025-06-20 PROCEDURE — 86644 CMV ANTIBODY: CPT

## 2025-06-20 PROCEDURE — 71045 X-RAY EXAM CHEST 1 VIEW: CPT

## 2025-06-20 PROCEDURE — 83735 ASSAY OF MAGNESIUM: CPT

## 2025-06-20 PROCEDURE — P9017 PLASMA 1 DONOR FRZ W/IN 8 HR: HCPCS

## 2025-06-20 PROCEDURE — 85384 FIBRINOGEN ACTIVITY: CPT

## 2025-06-20 PROCEDURE — 36430 TRANSFUSION BLD/BLD COMPNT: CPT

## 2025-06-20 PROCEDURE — 81001 URINALYSIS AUTO W/SCOPE: CPT

## 2025-06-20 PROCEDURE — 86825 HLA X-MATH NON-CYTOTOXIC: CPT | Mod: OUT | Performed by: STUDENT IN AN ORGANIZED HEALTH CARE EDUCATION/TRAINING PROGRAM

## 2025-06-20 PROCEDURE — 80053 COMPREHEN METABOLIC PANEL: CPT

## 2025-06-20 PROCEDURE — 82330 ASSAY OF CALCIUM: CPT

## 2025-06-20 PROCEDURE — 85610 PROTHROMBIN TIME: CPT

## 2025-06-20 PROCEDURE — 2020000001 HC ICU ROOM DAILY

## 2025-06-20 PROCEDURE — 87081 CULTURE SCREEN ONLY: CPT

## 2025-06-20 PROCEDURE — 86832 HLA CLASS I HIGH DEFIN QUAL: CPT | Mod: OUT | Performed by: STUDENT IN AN ORGANIZED HEALTH CARE EDUCATION/TRAINING PROGRAM

## 2025-06-20 PROCEDURE — 71045 X-RAY EXAM CHEST 1 VIEW: CPT | Performed by: RADIOLOGY

## 2025-06-20 PROCEDURE — 86923 COMPATIBILITY TEST ELECTRIC: CPT

## 2025-06-20 PROCEDURE — 87340 HEPATITIS B SURFACE AG IA: CPT

## 2025-06-20 PROCEDURE — 93010 ELECTROCARDIOGRAM REPORT: CPT | Performed by: INTERNAL MEDICINE

## 2025-06-20 PROCEDURE — 2500000005 HC RX 250 GENERAL PHARMACY W/O HCPCS

## 2025-06-20 RX ORDER — MAGNESIUM SULFATE HEPTAHYDRATE 40 MG/ML
4 INJECTION, SOLUTION INTRAVENOUS EVERY 6 HOURS PRN
Status: DISCONTINUED | OUTPATIENT
Start: 2025-06-20 | End: 2025-06-21

## 2025-06-20 RX ORDER — POTASSIUM CHLORIDE 20 MEQ/1
40 TABLET, EXTENDED RELEASE ORAL EVERY 6 HOURS PRN
Status: DISCONTINUED | OUTPATIENT
Start: 2025-06-20 | End: 2025-06-21

## 2025-06-20 RX ORDER — ONDANSETRON HYDROCHLORIDE 2 MG/ML
4 INJECTION, SOLUTION INTRAVENOUS EVERY 8 HOURS PRN
Status: DISCONTINUED | OUTPATIENT
Start: 2025-06-20 | End: 2025-07-09 | Stop reason: HOSPADM

## 2025-06-20 RX ORDER — MAGNESIUM SULFATE HEPTAHYDRATE 40 MG/ML
2 INJECTION, SOLUTION INTRAVENOUS EVERY 6 HOURS PRN
Status: DISCONTINUED | OUTPATIENT
Start: 2025-06-20 | End: 2025-06-21

## 2025-06-20 RX ORDER — POTASSIUM CHLORIDE 1.5 G/1.58G
40 POWDER, FOR SOLUTION ORAL EVERY 6 HOURS PRN
Status: DISCONTINUED | OUTPATIENT
Start: 2025-06-20 | End: 2025-06-21

## 2025-06-20 RX ORDER — POTASSIUM CHLORIDE 1.5 G/1.58G
20 POWDER, FOR SOLUTION ORAL EVERY 6 HOURS PRN
Status: DISCONTINUED | OUTPATIENT
Start: 2025-06-20 | End: 2025-06-21

## 2025-06-20 RX ORDER — CALCIUM GLUCONATE 20 MG/ML
1 INJECTION, SOLUTION INTRAVENOUS EVERY 6 HOURS PRN
Status: DISCONTINUED | OUTPATIENT
Start: 2025-06-20 | End: 2025-06-21

## 2025-06-20 RX ORDER — HEPARIN SODIUM 10000 [USP'U]/100ML
0-4000 INJECTION, SOLUTION INTRAVENOUS CONTINUOUS
Status: DISCONTINUED | OUTPATIENT
Start: 2025-06-20 | End: 2025-06-21

## 2025-06-20 RX ORDER — CALCIUM GLUCONATE 20 MG/ML
2 INJECTION, SOLUTION INTRAVENOUS EVERY 6 HOURS PRN
Status: DISCONTINUED | OUTPATIENT
Start: 2025-06-20 | End: 2025-06-21

## 2025-06-20 RX ORDER — CHLORHEXIDINE GLUCONATE ORAL RINSE 1.2 MG/ML
15 SOLUTION DENTAL ONCE
Status: CANCELLED | OUTPATIENT
Start: 2025-06-20 | End: 2025-06-20

## 2025-06-20 RX ORDER — ONDANSETRON 4 MG/1
4 TABLET, ORALLY DISINTEGRATING ORAL EVERY 8 HOURS PRN
Status: DISCONTINUED | OUTPATIENT
Start: 2025-06-20 | End: 2025-07-09 | Stop reason: HOSPADM

## 2025-06-20 RX ORDER — POTASSIUM CHLORIDE 20 MEQ/1
20 TABLET, EXTENDED RELEASE ORAL EVERY 6 HOURS PRN
Status: DISCONTINUED | OUTPATIENT
Start: 2025-06-20 | End: 2025-06-21

## 2025-06-20 RX ADMIN — HEPARIN SODIUM 1000 UNITS/HR: 10000 INJECTION, SOLUTION INTRAVENOUS at 19:24

## 2025-06-20 RX ADMIN — PHYTONADIONE 5 MG: 10 INJECTION, EMULSION INTRAMUSCULAR; INTRAVENOUS; SUBCUTANEOUS at 19:23

## 2025-06-20 RX ADMIN — POTASSIUM CHLORIDE 40 MEQ: 1500 TABLET, EXTENDED RELEASE ORAL at 19:53

## 2025-06-20 RX ADMIN — CALCIUM GLUCONATE 1 G: 20 INJECTION, SOLUTION INTRAVENOUS at 23:58

## 2025-06-20 SDOH — HEALTH STABILITY: PHYSICAL HEALTH: ON AVERAGE, HOW MANY MINUTES DO YOU ENGAGE IN EXERCISE AT THIS LEVEL?: 10 MIN

## 2025-06-20 SDOH — HEALTH STABILITY: PHYSICAL HEALTH: ON AVERAGE, HOW MANY DAYS PER WEEK DO YOU ENGAGE IN MODERATE TO STRENUOUS EXERCISE (LIKE A BRISK WALK)?: 1 DAY

## 2025-06-20 SDOH — SOCIAL STABILITY: SOCIAL INSECURITY: DO YOU FEEL UNSAFE GOING BACK TO THE PLACE WHERE YOU ARE LIVING?: NO

## 2025-06-20 SDOH — SOCIAL STABILITY: SOCIAL INSECURITY: HAVE YOU HAD THOUGHTS OF HARMING ANYONE ELSE?: NO

## 2025-06-20 SDOH — SOCIAL STABILITY: SOCIAL INSECURITY: DOES ANYONE TRY TO KEEP YOU FROM HAVING/CONTACTING OTHER FRIENDS OR DOING THINGS OUTSIDE YOUR HOME?: NO

## 2025-06-20 SDOH — SOCIAL STABILITY: SOCIAL INSECURITY: ARE THERE ANY APPARENT SIGNS OF INJURIES/BEHAVIORS THAT COULD BE RELATED TO ABUSE/NEGLECT?: NO

## 2025-06-20 SDOH — SOCIAL STABILITY: SOCIAL INSECURITY: HAVE YOU HAD ANY THOUGHTS OF HARMING ANYONE ELSE?: NO

## 2025-06-20 SDOH — SOCIAL STABILITY: SOCIAL INSECURITY: ABUSE: ADULT

## 2025-06-20 SDOH — SOCIAL STABILITY: SOCIAL INSECURITY: DO YOU FEEL ANYONE HAS EXPLOITED OR TAKEN ADVANTAGE OF YOU FINANCIALLY OR OF YOUR PERSONAL PROPERTY?: NO

## 2025-06-20 SDOH — SOCIAL STABILITY: SOCIAL INSECURITY: ARE YOU OR HAVE YOU BEEN THREATENED OR ABUSED PHYSICALLY, EMOTIONALLY, OR SEXUALLY BY ANYONE?: NO

## 2025-06-20 SDOH — SOCIAL STABILITY: SOCIAL INSECURITY: HAS ANYONE EVER THREATENED TO HURT YOUR FAMILY OR YOUR PETS?: NO

## 2025-06-20 SDOH — SOCIAL STABILITY: SOCIAL INSECURITY: WERE YOU ABLE TO COMPLETE ALL THE BEHAVIORAL HEALTH SCREENINGS?: YES

## 2025-06-20 ASSESSMENT — ACTIVITIES OF DAILY LIVING (ADL)
DRESSING YOURSELF: INDEPENDENT
LACK_OF_TRANSPORTATION: NO
GROOMING: INDEPENDENT
BATHING: INDEPENDENT
ASSISTIVE_DEVICE: EYEGLASSES
WALKS IN HOME: INDEPENDENT
FEEDING YOURSELF: INDEPENDENT
HEARING - LEFT EAR: FUNCTIONAL
TOILETING: INDEPENDENT
PATIENT'S MEMORY ADEQUATE TO SAFELY COMPLETE DAILY ACTIVITIES?: YES
ADEQUATE_TO_COMPLETE_ADL: YES
JUDGMENT_ADEQUATE_SAFELY_COMPLETE_DAILY_ACTIVITIES: YES
HEARING - RIGHT EAR: FUNCTIONAL

## 2025-06-20 ASSESSMENT — COGNITIVE AND FUNCTIONAL STATUS - GENERAL
PATIENT BASELINE BEDBOUND: NO
DAILY ACTIVITIY SCORE: 24
MOBILITY SCORE: 24

## 2025-06-20 ASSESSMENT — LIFESTYLE VARIABLES
HOW OFTEN DO YOU HAVE 6 OR MORE DRINKS ON ONE OCCASION: NEVER
AUDIT-C TOTAL SCORE: 0
HOW OFTEN DO YOU HAVE A DRINK CONTAINING ALCOHOL: NEVER
SUBSTANCE_ABUSE_PAST_12_MONTHS: NO
SKIP TO QUESTIONS 9-10: 1
PRESCIPTION_ABUSE_PAST_12_MONTHS: NO
HOW MANY STANDARD DRINKS CONTAINING ALCOHOL DO YOU HAVE ON A TYPICAL DAY: PATIENT DOES NOT DRINK
AUDIT-C TOTAL SCORE: 0

## 2025-06-20 ASSESSMENT — PAIN SCALES - GENERAL
PAINLEVEL_OUTOF10: 0 - NO PAIN
PAINLEVEL_OUTOF10: 0 - NO PAIN

## 2025-06-20 ASSESSMENT — PAIN - FUNCTIONAL ASSESSMENT
PAIN_FUNCTIONAL_ASSESSMENT: 0-10
PAIN_FUNCTIONAL_ASSESSMENT: 0-10

## 2025-06-20 ASSESSMENT — PATIENT HEALTH QUESTIONNAIRE - PHQ9
SUM OF ALL RESPONSES TO PHQ9 QUESTIONS 1 & 2: 0
1. LITTLE INTEREST OR PLEASURE IN DOING THINGS: NOT AT ALL
2. FEELING DOWN, DEPRESSED OR HOPELESS: NOT AT ALL

## 2025-06-20 NOTE — SIGNIFICANT EVENT
06/20/25 1954   Readmission Checklist   Readmission Checklist Yes   Code Status Reviewed Yes   Code Status Ordered Yes   RPM Speed Set Point 5500   Low Speed Limit 5200   Anticoagulation Goals Entered No   Event Monitor Checked Yes   Driveline Secure Yes   Driveline Site Assessed and Photographed Yes   Dressing Change Alanna Weekly   ICD: Nurse to Check Device Upon Admission   (Needs interrogation by device nurse)

## 2025-06-20 NOTE — SIGNIFICANT EVENT
06/20/25 1945   HeartMate Equipment Tracking/Serial Numbers   Battery Clip 1 [id number]   Battery Clip 2 [id number]   Battery Clip 3 [id number]   Battery Clip 4 [id number]   Rechargeable Battery 1 [id number]   Rechargeable Battery 2 [id number]   Rechargeable Battery 3 [id number]   Rechargeable Battery 4 [id number]   Rechargeable Battery 5 [id number]   Rechargeable Battery 6 [id number]   Go Gear Consolidated Bag No   Go Gear Accessory Kit No   Go Gear Vest No   Programmed Backup  [id number]   Primary Controller [id number]   Mobile Power Unit No   Black Emergency Red Tag Bag No   Shower Bag No   Apil Cuff No   Outflow graft No   Modular Cable Yes

## 2025-06-20 NOTE — PROGRESS NOTES
Per [employer] online; Reference Number [id number]  Status  PENDED  Review Reason 1  Requires Medical Review  Clinical uploaded to [email or web address].

## 2025-06-20 NOTE — H&P
CTICU History & Physical    Subjective   HPI:  Anatoly Lane is a 64 year old male with PMH stage D HFrEF NYHA 2-3 s/p HM2 (4/30/23), CAD, PAF, VT s/p ICD, hypothyroidism and stage D systolic HF/ICD who presented today pre- transplant for a potential heart transplant.     Cardiac Testing:     TTE: 02/27/2025  CONCLUSIONS:   1. Poorly visualized anatomical structures due to suboptimal image quality.   2. Left ventricular ejection fraction is severely decreased, by visual estimate at 10-15%.   3. There is global hypokinesis of the left ventricle with minor regional variations.   4. Spectral Doppler shows an abnormal pattern of left ventricular diastolic filling.   5. Left ventricular cavity size is severely dilated.   6. There is moderate eccentric left ventricular hypertrophy.   7. There is reduced right ventricular systolic function.   8. The left atrial size is mild to moderately dilated.    RHC: 02/28/2024  CONCLUSIONS:   1. Access: RIJ 7Fr Access, manual pressure closure.   2. Hemo: RA 7, RVSP 40, RVEDP 7, PA 40/13 (22) , W 13, Dopper MAP 70mmHg, LVAD speed 5500rpm.   3. CO/CI [Raven]: 7.4 / 3.38.   4. SVR: 681 cgs, PVR 1.2, TPG 9.   5. Borderline elevated biventricular filling pressures with preserved cardiac index.   6. Continue current LVAD speed settings.          Medical History[1]  Surgical History[2]  Prescriptions Prior to Admission[3]  Jardiance [empagliflozin]  Social History[4]  Family History[5]      Objective   Vitals:  Most Recent:  Vitals:    06/20/25 1600   Pulse: 75   Resp: 13   Temp: 36.1 °C (97 °F)   SpO2: 94%       24hr Min/Max:  Temp  Min: 36.1 °C (97 °F)  Max: 36.1 °C (97 °F)  Pulse  Min: 75  Max: 75  Resp  Min: 13  Max: 13  SpO2  Min: 94 %  Max: 94 %    I/O:  No intake/output data recorded.    LDA:       Physical Exam:   Physical Exam  Constitutional:       General: He is not in acute distress.     Appearance: Normal appearance. He is not toxic-appearing.   Cardiovascular:      Rate and  Rhythm: Normal rate.      Comments: LVAD  Pulmonary:      Effort: No respiratory distress.   Abdominal:      General: There is no distension.   Skin:     General: Skin is warm and dry.      Capillary Refill: Capillary refill takes less than 2 seconds.   Neurological:      General: No focal deficit present.      Mental Status: He is alert and oriented to person, place, and time. Mental status is at baseline.            Lab Review:      Results from last 7 days   Lab Units 06/20/25  1659   SODIUM mmol/L 142   POTASSIUM mmol/L 3.4*   CHLORIDE mmol/L 102   CO2 mmol/L 29   BUN mg/dL 31*   CREATININE mg/dL 0.98   CALCIUM mg/dL 8.9   PROTEIN TOTAL g/dL 6.5   BILIRUBIN TOTAL mg/dL 0.4   ALK PHOS U/L 52   ALT U/L 13   AST U/L 15   GLUCOSE mg/dL 97               Most recent labs and imaging reviewed.    Daily Risk Screen  Intubated: N/A  Central line: N/A  [place]: N/A    Assessment/Plan     Assessment:  Anatoly Lane is a 64 year old male with PMH stage D HFrEF NYHA 2-3 s/p HM2 (4/30/23), CAD, PAF, VT s/p ICD, hypothyroidism and stage D systolic HF/ICD who presented today pre- transplant for a potential heart transplant.      Plan:  NEURO:  AOx3.  -->  - Serial neuro and pain assessments   - CAM ICU score qshift  - Sleep/wake cycle hygiene     CV:  Patient has a history of stage D HFrEF NYHA 2-3 s/p HM2 (4/30/23), CAD, PAF, VT s/p ICD.  LVAD 5500/5.4L/PI 2.3. -->  - Hold home amiodarone, entresto, digoxin  - Weekly LVAD Dressing Change      PULM:  No history of pulmonary disease.  -->  - Pre op CXR    GI:  PMH of GERD.  OG in place.-->  - Hold home PPI   - NPO, for OR     :  History of BPH, baseline CRE 1.0.  -->  - Send preop RFP  - Hold home tamsulosin and torsemide     ENDO:  PMH of Hypothyroidism.  A1c: 4.4. -->  - Maintain BG <180, insulin per CTICU protocol  - Continue home synthroid 125 mcg daily     HEME:  Hx of coumadin for LVAD. -->    - Send pre op CBC, coags  - Vitamin K to reversal Coumadin, 1u FFP  - Start  heparin infusion for bridge  - Recheck coag 2030  - SCDs for DVT prophylaxis.  - Last type and screen: Send today  - Hold home coumadin     ID:  Afebrile, no current indications of infection. MRSA Pending. -->  - Trend temp q4h  - Send MRSA    Skin:  No active skin issues.  - preventative Mepilex dressings in place on sacrum and heels  - change preventative Mepilex weekly or more frequently as indicated (when moist/soiled)   - every shift skin assessment per nursing and weekly ICU skin rounds  - moisture barrier to be applied with agatha care  - active skin problems addressed with nursing on daily rounds     Proph:  SCDs  PPI     G:  Line    F: Family: will update at bedside postoperatively.     A,B,C,D,E,F,G: reviewed    I personally spent 35 minutes of critical care time directly and personally managing the patient exclusive of separately billable procedures.     Dispo: CTICU care for now.    CTICU TEAM PHONE [id number]           [1]   Past Medical History:  Diagnosis Date    ACC/AHA stage D systolic heart failure     Acute on chronic systolic heart failure, NYHA class 3     CAD (coronary artery disease)     Hypothyroid     Hypothyroidism     LVAD (left ventricular assist device) present (Multi)     PAF (paroxysmal atrial fibrillation) (Multi)    [2]   Past Surgical History:  Procedure Laterality Date    CARDIAC CATHETERIZATION N/A 2/28/2024    Procedure: Right Heart Cath;  Surgeon: José Velasco MD;  Location: [place] [address] Cardiac Cath Lab;  Service: Cardiovascular;  Laterality: N/A;  with RAMP study    CARDIAC CATHETERIZATION N/A 3/26/2025    Procedure: Right Heart Cath;  Surgeon: Vince Ruano DO;  Location: [place] [address] Cardiac Cath Lab;  Service: Cardiovascular;  Laterality: N/A;    COLONOSCOPY W/ POLYPECTOMY  2023    CT ABDOMEN PELVIS ANGIOGRAM W AND/OR WO IV CONTRAST  04/13/2023    CT ABDOMEN PELVIS ANGIOGRAM W AND/OR WO IV CONTRAST [facility] CT    LEFT VENTRICULAR ASSIST DEVICE      OTHER SURGICAL  HISTORY  01/05/2022    Complete colonoscopy    OTHER SURGICAL HISTORY  01/05/2022    Cardioverter defibrillator insertion   [3]   Facility-Administered Medications Prior to Admission   Medication Dose Route Frequency Provider Last Rate Last Admin    perflutren lipid microspheres (Definity) injection 0.5-10 mL of dilution  0.5-10 mL of dilution intravenous Once Anita Vincent MD PhD         Medications Prior to Admission   Medication Sig Dispense Refill Last Dose/Taking    amiodarone (Pacerone) 200 mg tablet Take 1 tablet (200 mg) by mouth once daily. 30 tablet 11     aspirin 81 mg EC tablet Take 1 tablet (81 mg) by mouth once daily.       digoxin (Lanoxin) 125 MCG tablet TAKE 1 TABLET EVERY DAY 90 tablet 3     Entresto 49-51 mg tablet Take 1 tablet by mouth 2 times a day. 180 tablet 5     eplerenone (Inspra) 25 mg tablet Take 1 tablet (25 mg) by mouth once daily. 30 tablet 11     icosapent ethyL (Vascepa) 1 gram capsule Take 2 capsules (2 g) by mouth 2 times daily (morning and late afternoon). 360 capsule 3     levothyroxine (Synthroid, Levoxyl) 125 mcg tablet Take 1 tablet (125 mcg) by mouth early in the morning.. Take on an empty stomach at the same time each day, either 30 to 60 minutes prior to breakfast 30 tablet 11     pantoprazole (ProtoNix) 40 mg EC tablet Take 1 tablet (40 mg) by mouth once daily. Do not crush, chew, or split. 180 tablet 3     potassium chloride CR (Klor-Con) 10 mEq ER tablet Take 2 tablets (20 mEq) by mouth once daily. Do not crush, chew, or split. 60 tablet 11     tamsulosin (Flomax) 0.4 mg 24 hr capsule Take 1 capsule (0.4 mg) by mouth once daily. 30 capsule 11     torsemide (Demadex) 10 mg tablet Take 3 tablets (30 mg) by mouth once daily. 270 tablet 3     torsemide (Demadex) 20 mg tablet Take 1 tablet (20 mg) by mouth once daily. 30 tablet 11     warfarin (Coumadin) 2.5 mg tablet Take 1 tablet (2.5 mg) by mouth once a day on Sunday, Tuesday, Thursday, and Saturday. Take as  directed per After Visit Summary.       warfarin (Coumadin) 4 mg tablet Take as directed per After Visit Summary. (Patient taking differently: Take 1 tablet (4 mg) by mouth once a day on Monday, Wednesday, and Friday. Take as directed per After Visit Summary.) 30 tablet 0    [4]   Social History  Tobacco Use    Smoking status: Former     Types: Cigarettes    Smokeless tobacco: Never   Substance Use Topics    Alcohol use: Never    Drug use: Never   [5]   Family History  Problem Relation Name Age of Onset    Hypertension Father      Colon cancer Father

## 2025-06-20 NOTE — SIGNIFICANT EVENT
06/20/25 1957   HeartMate Equipment Transfer   Transfer From Admission   Transfer To [facility]   Battery Charger No   Battery Clip 1 Yes   Battery Clip 2 Yes   Battery Clip 3 Yes   Battery Clip 4 Yes   Rechargeable Battery 1 Yes   Rechargeable Battery 2 Yes   Rechargeable Battery 3 Yes   Rechargeable Battery 4 Yes   Rechargeable Battery 5 Yes   Rechargeable Battery 6 Yes   Rechargeable Battery 7 No   Rechargeable Battery 8 No   Backup Battery 2 No   Go Gear Consolidated Bag No   Go Gear Accessory Kit No   Go Gear Vest No   Programmed Backup  Yes   Primary Controller Yes   Mobile Power Unit No   Black Emergency Red Tag Bag No   Shower Bag No   Apil Cuff No   Outflow graft Yes   Modular cable Yes

## 2025-06-20 NOTE — SIGNIFICANT EVENT
Anatoly Lane Perioperative Induction & Antimicrobial Plan     Blood type: AB  cPRA: 0% (2/6), recollection pending         Intraoperative Induction Plan:  Methylprednisolone 500mg IV upon arrival to the OR   Methylprednisolone 500mg IV after cross clamp removal        Postoperative Induction Plan to be completed in CTICU:   Basiliximab 20mg IV within 1 hour of arrival to CTICU (no need for premedication), 2nd dose: 20mg IVPB on POD4   Steroid taper starting with Methylprednisolone 125mg IV Q8 x 3 doses   - please order first dose 8 hours from last intraoperative dose        Perioperative Antimicrobial Plan:   Vancomycin 15mg/kg IV Q12 hours x 48hrs   Zosyn 3.375g IV Q6 hours x 48hrs   will extend coverage duration if necessary       Serologies not performed at this time as patient serologies from this admission still pending

## 2025-06-21 ENCOUNTER — APPOINTMENT (OUTPATIENT)
Dept: RADIOLOGY | Facility: HOSPITAL | Age: 65
DRG: 001 | End: 2025-06-21
Payer: MEDICARE

## 2025-06-21 ENCOUNTER — HOSPITAL ENCOUNTER (OUTPATIENT)
Dept: OPERATING ROOM | Facility: HOSPITAL | Age: 65
Discharge: HOME | End: 2025-06-21
Payer: MEDICARE

## 2025-06-21 LAB
ACT BLD: 109 SEC (ref 82–174)
ACT BLD: 140 SEC (ref 82–174)
ACT BLD: 399 SEC (ref 82–174)
ACT BLD: 595 SEC (ref 82–174)
ACT BLD: 661 SEC (ref 82–174)
ACT BLD: >1003 SEC (ref 82–174)
ALBUMIN SERPL BCP-MCNC: 3.5 G/DL (ref 3.4–5)
ANION GAP BLDA CALCULATED.4IONS-SCNC: 10 MMO/L (ref 10–25)
ANION GAP BLDA CALCULATED.4IONS-SCNC: 10 MMO/L (ref 10–25)
ANION GAP BLDA CALCULATED.4IONS-SCNC: 11 MMO/L (ref 10–25)
ANION GAP BLDA CALCULATED.4IONS-SCNC: 11 MMO/L (ref 10–25)
ANION GAP BLDA CALCULATED.4IONS-SCNC: 12 MMO/L (ref 10–25)
ANION GAP BLDA CALCULATED.4IONS-SCNC: 12 MMO/L (ref 10–25)
ANION GAP BLDA CALCULATED.4IONS-SCNC: 15 MMO/L (ref 10–25)
ANION GAP BLDA CALCULATED.4IONS-SCNC: 15 MMO/L (ref 10–25)
ANION GAP BLDA CALCULATED.4IONS-SCNC: 16 MMO/L (ref 10–25)
ANION GAP BLDA CALCULATED.4IONS-SCNC: 8 MMO/L (ref 10–25)
ANION GAP BLDA CALCULATED.4IONS-SCNC: 9 MMO/L (ref 10–25)
ANION GAP BLDMV CALCULATED.4IONS-SCNC: 13 MMO/L (ref 10–25)
ANION GAP SERPL CALC-SCNC: 17 MMOL/L (ref 10–20)
APTT PPP: 26 SECONDS (ref 26–36)
APTT PPP: 56 SECONDS (ref 26–36)
BASE EXCESS BLDA CALC-SCNC: -0.2 MMOL/L (ref -2–3)
BASE EXCESS BLDA CALC-SCNC: -0.8 MMOL/L (ref -2–3)
BASE EXCESS BLDA CALC-SCNC: -1.8 MMOL/L (ref -2–3)
BASE EXCESS BLDA CALC-SCNC: -5 MMOL/L (ref -2–3)
BASE EXCESS BLDA CALC-SCNC: 0 MMOL/L (ref -2–3)
BASE EXCESS BLDA CALC-SCNC: 0 MMOL/L (ref -2–3)
BASE EXCESS BLDA CALC-SCNC: 1.2 MMOL/L (ref -2–3)
BASE EXCESS BLDA CALC-SCNC: 1.7 MMOL/L (ref -2–3)
BASE EXCESS BLDA CALC-SCNC: 1.8 MMOL/L (ref -2–3)
BASE EXCESS BLDA CALC-SCNC: 2.3 MMOL/L (ref -2–3)
BASE EXCESS BLDA CALC-SCNC: 2.3 MMOL/L (ref -2–3)
BASE EXCESS BLDA CALC-SCNC: 3.2 MMOL/L (ref -2–3)
BASE EXCESS BLDA CALC-SCNC: 3.6 MMOL/L (ref -2–3)
BASE EXCESS BLDA CALC-SCNC: 5.4 MMOL/L (ref -2–3)
BASE EXCESS BLDA CALC-SCNC: 6 MMOL/L (ref -2–3)
BASE EXCESS BLDA CALC-SCNC: 6.8 MMOL/L (ref -2–3)
BASE EXCESS BLDA CALC-SCNC: 7.8 MMOL/L (ref -2–3)
BASE EXCESS BLDMV CALC-SCNC: -0.9 MMOL/L (ref -2–3)
BLOOD EXPIRATION DATE: NORMAL
BODY TEMPERATURE: 37 DEGREES CELSIUS
BUN SERPL-MCNC: 22 MG/DL (ref 6–23)
CA-I BLD-SCNC: 1.05 MMOL/L (ref 1.1–1.33)
CA-I BLDA-SCNC: 0.85 MMOL/L (ref 1.1–1.33)
CA-I BLDA-SCNC: 0.88 MMOL/L (ref 1.1–1.33)
CA-I BLDA-SCNC: 0.92 MMOL/L (ref 1.1–1.33)
CA-I BLDA-SCNC: 0.95 MMOL/L (ref 1.1–1.33)
CA-I BLDA-SCNC: 0.96 MMOL/L (ref 1.1–1.33)
CA-I BLDA-SCNC: 0.98 MMOL/L (ref 1.1–1.33)
CA-I BLDA-SCNC: 1.02 MMOL/L (ref 1.1–1.33)
CA-I BLDA-SCNC: 1.03 MMOL/L (ref 1.1–1.33)
CA-I BLDA-SCNC: 1.03 MMOL/L (ref 1.1–1.33)
CA-I BLDA-SCNC: 1.07 MMOL/L (ref 1.1–1.33)
CA-I BLDA-SCNC: 1.09 MMOL/L (ref 1.1–1.33)
CA-I BLDA-SCNC: 1.11 MMOL/L (ref 1.1–1.33)
CA-I BLDA-SCNC: 1.12 MMOL/L (ref 1.1–1.33)
CA-I BLDA-SCNC: 1.13 MMOL/L (ref 1.1–1.33)
CA-I BLDA-SCNC: 1.15 MMOL/L (ref 1.1–1.33)
CA-I BLDA-SCNC: 1.47 MMOL/L (ref 1.1–1.33)
CA-I BLDA-SCNC: 1.49 MMOL/L (ref 1.1–1.33)
CA-I BLDMV-SCNC: 1.08 MMOL/L (ref 1.1–1.33)
CALCIUM SERPL-MCNC: 8.5 MG/DL (ref 8.6–10.6)
CFT FORM KAOLIN IND BLD RES TEG: 0.9 MIN (ref 0.8–2.1)
CFT FORM KAOLIN IND BLD RES TEG: 4.1 MIN (ref 0.8–2.1)
CHLORIDE BLD-SCNC: 107 MMOL/L (ref 98–107)
CHLORIDE BLDA-SCNC: 101 MMOL/L (ref 98–107)
CHLORIDE BLDA-SCNC: 103 MMOL/L (ref 98–107)
CHLORIDE BLDA-SCNC: 103 MMOL/L (ref 98–107)
CHLORIDE BLDA-SCNC: 104 MMOL/L (ref 98–107)
CHLORIDE BLDA-SCNC: 104 MMOL/L (ref 98–107)
CHLORIDE BLDA-SCNC: 105 MMOL/L (ref 98–107)
CHLORIDE BLDA-SCNC: 106 MMOL/L (ref 98–107)
CHLORIDE BLDA-SCNC: 107 MMOL/L (ref 98–107)
CHLORIDE BLDA-SCNC: 108 MMOL/L (ref 98–107)
CHLORIDE BLDA-SCNC: 108 MMOL/L (ref 98–107)
CHLORIDE BLDA-SCNC: 112 MMOL/L (ref 98–107)
CHLORIDE BLDA-SCNC: 112 MMOL/L (ref 98–107)
CHLORIDE BLDA-SCNC: 114 MMOL/L (ref 98–107)
CHLORIDE SERPL-SCNC: 106 MMOL/L (ref 98–107)
CLOT ANGLE.KAOLIN INDUCED BLD RES TEG: 53 DEG (ref 63–78)
CLOT ANGLE.KAOLIN INDUCED BLD RES TEG: 76 DEG (ref 63–78)
CLOT INIT KAO IND P HEP NEUT BLD RES TEG: 15.3 MIN (ref 4.3–8.3)
CLOT INIT KAO IND P HEP NEUT BLD RES TEG: 6.3 MIN (ref 4.3–8.3)
CLOT INIT KAO IND P HEP NEUT BLD RES TEG: 6.8 MIN (ref 4.6–9.1)
CLOT INIT KAO IND P HEP NEUT BLD RES TEG: >17 MIN (ref 4.6–9.1)
CO2 SERPL-SCNC: 24 MMOL/L (ref 21–32)
COHGB MFR BLDA: 0.8 %
COHGB MFR BLDA: 1 %
COHGB MFR BLDA: 1.1 %
COHGB MFR BLDA: 1.1 %
COHGB MFR BLDA: 1.3 %
COHGB MFR BLDA: 1.3 %
COHGB MFR BLDA: 1.4 %
COHGB MFR BLDA: 1.5 %
COHGB MFR BLDA: 1.6 %
COHGB MFR BLDA: 1.7 %
CREAT SERPL-MCNC: 0.97 MG/DL (ref 0.5–1.3)
DISPENSE STATUS: NORMAL
DO-HGB MFR BLDA: 0 % (ref 0–5)
DO-HGB MFR BLDA: 0 % (ref 0–5)
DO-HGB MFR BLDA: 0.1 % (ref 0–5)
DO-HGB MFR BLDA: 0.1 % (ref 0–5)
DO-HGB MFR BLDA: 0.4 % (ref 0–5)
DO-HGB MFR BLDA: 0.5 % (ref 0–5)
DO-HGB MFR BLDA: 0.6 % (ref 0–5)
DO-HGB MFR BLDA: 0.7 % (ref 0–5)
DO-HGB MFR BLDA: 0.7 % (ref 0–5)
DO-HGB MFR BLDA: 0.8 % (ref 0–5)
DO-HGB MFR BLDA: 0.8 % (ref 0–5)
DO-HGB MFR BLDA: 1 % (ref 0–5)
EGFRCR SERPLBLD CKD-EPI 2021: 87 ML/MIN/1.73M*2
ERYTHROCYTE [DISTWIDTH] IN BLOOD BY AUTOMATED COUNT: 16 % (ref 11.5–14.5)
FIBRINOGEN BLD CALC-MCNC: 403 MG/DL (ref 278–581)
FIBRINOGEN BLD CALC-MCNC: 535 MG/DL (ref 278–581)
FIBRINOGEN PPP-MCNC: 291 MG/DL (ref 200–400)
GLUCOSE BLD MANUAL STRIP-MCNC: 120 MG/DL (ref 74–99)
GLUCOSE BLD MANUAL STRIP-MCNC: 121 MG/DL (ref 74–99)
GLUCOSE BLD MANUAL STRIP-MCNC: 123 MG/DL (ref 74–99)
GLUCOSE BLD MANUAL STRIP-MCNC: 126 MG/DL (ref 74–99)
GLUCOSE BLD MANUAL STRIP-MCNC: 144 MG/DL (ref 74–99)
GLUCOSE BLD MANUAL STRIP-MCNC: 166 MG/DL (ref 74–99)
GLUCOSE BLD MANUAL STRIP-MCNC: 170 MG/DL (ref 74–99)
GLUCOSE BLD MANUAL STRIP-MCNC: 179 MG/DL (ref 74–99)
GLUCOSE BLD MANUAL STRIP-MCNC: 208 MG/DL (ref 74–99)
GLUCOSE BLD MANUAL STRIP-MCNC: 210 MG/DL (ref 74–99)
GLUCOSE BLD MANUAL STRIP-MCNC: 214 MG/DL (ref 74–99)
GLUCOSE BLD-MCNC: 245 MG/DL (ref 74–99)
GLUCOSE BLDA-MCNC: 103 MG/DL (ref 74–99)
GLUCOSE BLDA-MCNC: 117 MG/DL (ref 74–99)
GLUCOSE BLDA-MCNC: 121 MG/DL (ref 74–99)
GLUCOSE BLDA-MCNC: 124 MG/DL (ref 74–99)
GLUCOSE BLDA-MCNC: 130 MG/DL (ref 74–99)
GLUCOSE BLDA-MCNC: 134 MG/DL (ref 74–99)
GLUCOSE BLDA-MCNC: 143 MG/DL (ref 74–99)
GLUCOSE BLDA-MCNC: 143 MG/DL (ref 74–99)
GLUCOSE BLDA-MCNC: 184 MG/DL (ref 74–99)
GLUCOSE BLDA-MCNC: 190 MG/DL (ref 74–99)
GLUCOSE BLDA-MCNC: 209 MG/DL (ref 74–99)
GLUCOSE BLDA-MCNC: 243 MG/DL (ref 74–99)
GLUCOSE BLDA-MCNC: 249 MG/DL (ref 74–99)
GLUCOSE BLDA-MCNC: 257 MG/DL (ref 74–99)
GLUCOSE BLDA-MCNC: 259 MG/DL (ref 74–99)
GLUCOSE BLDA-MCNC: 260 MG/DL (ref 74–99)
GLUCOSE BLDA-MCNC: 85 MG/DL (ref 74–99)
GLUCOSE SERPL-MCNC: 239 MG/DL (ref 74–99)
HCO3 BLDA-SCNC: 19 MMOL/L (ref 22–26)
HCO3 BLDA-SCNC: 21.5 MMOL/L (ref 22–26)
HCO3 BLDA-SCNC: 22.3 MMOL/L (ref 22–26)
HCO3 BLDA-SCNC: 23.1 MMOL/L (ref 22–26)
HCO3 BLDA-SCNC: 23.3 MMOL/L (ref 22–26)
HCO3 BLDA-SCNC: 23.3 MMOL/L (ref 22–26)
HCO3 BLDA-SCNC: 24.9 MMOL/L (ref 22–26)
HCO3 BLDA-SCNC: 25.5 MMOL/L (ref 22–26)
HCO3 BLDA-SCNC: 26.6 MMOL/L (ref 22–26)
HCO3 BLDA-SCNC: 26.8 MMOL/L (ref 22–26)
HCO3 BLDA-SCNC: 27.8 MMOL/L (ref 22–26)
HCO3 BLDA-SCNC: 27.8 MMOL/L (ref 22–26)
HCO3 BLDA-SCNC: 29.1 MMOL/L (ref 22–26)
HCO3 BLDA-SCNC: 31.1 MMOL/L (ref 22–26)
HCO3 BLDA-SCNC: 31.7 MMOL/L (ref 22–26)
HCO3 BLDA-SCNC: 32.7 MMOL/L (ref 22–26)
HCO3 BLDA-SCNC: 32.8 MMOL/L (ref 22–26)
HCO3 BLDMV-SCNC: 23.5 MMOL/L (ref 22–26)
HCT VFR BLD AUTO: 28.4 % (ref 41–52)
HCT VFR BLD EST: 24 % (ref 41–52)
HCT VFR BLD EST: 26 % (ref 41–52)
HCT VFR BLD EST: 27 % (ref 41–52)
HCT VFR BLD EST: 28 % (ref 41–52)
HCT VFR BLD EST: 29 % (ref 41–52)
HCT VFR BLD EST: 30 % (ref 41–52)
HCT VFR BLD EST: 31 % (ref 41–52)
HCT VFR BLD EST: 32 % (ref 41–52)
HCT VFR BLD EST: 33 % (ref 41–52)
HCT VFR BLD EST: 35 % (ref 41–52)
HCT VFR BLD EST: 37 % (ref 41–52)
HCT VFR BLD EST: 39 % (ref 41–52)
HGB BLD-MCNC: 10.1 G/DL (ref 13.5–17.5)
HGB BLDA-MCNC: 10 G/DL (ref 13.5–17.5)
HGB BLDA-MCNC: 10 G/DL (ref 13.5–17.5)
HGB BLDA-MCNC: 10.3 G/DL (ref 13.5–17.5)
HGB BLDA-MCNC: 10.3 G/DL (ref 13.5–17.5)
HGB BLDA-MCNC: 10.9 G/DL (ref 13.5–17.5)
HGB BLDA-MCNC: 11.6 G/DL (ref 13.5–17.5)
HGB BLDA-MCNC: 11.6 G/DL (ref 13.5–17.5)
HGB BLDA-MCNC: 12.2 G/DL (ref 13.5–17.5)
HGB BLDA-MCNC: 12.2 G/DL (ref 13.5–17.5)
HGB BLDA-MCNC: 13 G/DL (ref 13.5–17.5)
HGB BLDA-MCNC: 13 G/DL (ref 13.5–17.5)
HGB BLDA-MCNC: 8 G/DL (ref 13.5–17.5)
HGB BLDA-MCNC: 8.5 G/DL (ref 13.5–17.5)
HGB BLDA-MCNC: 8.5 G/DL (ref 13.5–17.5)
HGB BLDA-MCNC: 8.9 G/DL (ref 13.5–17.5)
HGB BLDA-MCNC: 9 G/DL (ref 13.5–17.5)
HGB BLDA-MCNC: 9 G/DL (ref 13.5–17.5)
HGB BLDA-MCNC: 9.1 G/DL (ref 13.5–17.5)
HGB BLDA-MCNC: 9.2 G/DL (ref 13.5–17.5)
HGB BLDA-MCNC: 9.4 G/DL (ref 13.5–17.5)
HGB BLDA-MCNC: 9.6 G/DL (ref 13.5–17.5)
HGB BLDA-MCNC: 9.6 G/DL (ref 13.5–17.5)
HGB BLDMV-MCNC: 10.6 G/DL (ref 13.5–17.5)
INHALED O2 CONCENTRATION: 100 %
INHALED O2 CONCENTRATION: 30 %
INHALED O2 CONCENTRATION: 40 %
INHALED O2 CONCENTRATION: 60 %
INHALED O2 CONCENTRATION: 60 %
INHALED O2 CONCENTRATION: 80 %
INHALED O2 CONCENTRATION: 85 %
INR PPP: 1.4 (ref 0.9–1.1)
INR PPP: 1.9 (ref 0.9–1.1)
LACTATE BLDA-SCNC: 0.4 MMOL/L (ref 0.4–2)
LACTATE BLDA-SCNC: 0.5 MMOL/L (ref 0.4–2)
LACTATE BLDA-SCNC: 0.6 MMOL/L (ref 0.4–2)
LACTATE BLDA-SCNC: 0.6 MMOL/L (ref 0.4–2)
LACTATE BLDA-SCNC: 0.7 MMOL/L (ref 0.4–2)
LACTATE BLDA-SCNC: 0.8 MMOL/L (ref 0.4–2)
LACTATE BLDA-SCNC: 1 MMOL/L (ref 0.4–2)
LACTATE BLDA-SCNC: 1.3 MMOL/L (ref 0.4–2)
LACTATE BLDA-SCNC: 1.8 MMOL/L (ref 0.4–2)
LACTATE BLDA-SCNC: 2.7 MMOL/L (ref 0.4–2)
LACTATE BLDA-SCNC: 2.7 MMOL/L (ref 0.4–2)
LACTATE BLDA-SCNC: 3.3 MMOL/L (ref 0.4–2)
LACTATE BLDA-SCNC: 3.5 MMOL/L (ref 0.4–2)
LACTATE BLDA-SCNC: 3.7 MMOL/L (ref 0.4–2)
LACTATE BLDA-SCNC: 4.1 MMOL/L (ref 0.4–2)
LACTATE BLDA-SCNC: 5.1 MMOL/L (ref 0.4–2)
LACTATE BLDA-SCNC: 5.3 MMOL/L (ref 0.4–2)
LACTATE BLDMV-SCNC: 5.1 MMOL/L (ref 0.4–2)
MA KAOLIN BLD RES TEG: 62 MM (ref 52–69)
MA KAOLIN BLD RES TEG: 66 MM (ref 52–69)
MA KAOLIN+TF BLD RES TEG: 65 MM (ref 52–70)
MA KAOLIN+TF BLD RES TEG: 69 MM (ref 52–70)
MA TF IND+IIB-IIIA INH BLD RES TEG: 22 MM (ref 15–32)
MA TF IND+IIB-IIIA INH BLD RES TEG: 29 MM (ref 15–32)
MAGNESIUM SERPL-MCNC: 2.76 MG/DL (ref 1.6–2.4)
MCH RBC QN AUTO: 31.5 PG (ref 26–34)
MCHC RBC AUTO-ENTMCNC: 35.6 G/DL (ref 32–36)
MCV RBC AUTO: 89 FL (ref 80–100)
METHGB MFR BLDA: 0.7 % (ref 0–1.5)
METHGB MFR BLDA: 0.8 % (ref 0–1.5)
METHGB MFR BLDA: 0.8 % (ref 0–1.5)
METHGB MFR BLDA: 0.9 % (ref 0–1.5)
METHGB MFR BLDA: 1 % (ref 0–1.5)
METHGB MFR BLDA: 1 % (ref 0–1.5)
METHGB MFR BLDA: 1.1 % (ref 0–1.5)
METHGB MFR BLDA: 1.2 % (ref 0–1.5)
METHGB MFR BLDA: 1.2 % (ref 0–1.5)
METHGB MFR BLDA: 1.3 % (ref 0–1.5)
NRBC BLD-RTO: 0 /100 WBCS (ref 0–0)
OXYHGB MFR BLDA: 92.6 % (ref 94–98)
OXYHGB MFR BLDA: 96.1 % (ref 94–98)
OXYHGB MFR BLDA: 96.6 % (ref 94–98)
OXYHGB MFR BLDA: 96.7 % (ref 94–98)
OXYHGB MFR BLDA: 96.7 % (ref 94–98)
OXYHGB MFR BLDA: 96.8 % (ref 94–98)
OXYHGB MFR BLDA: 96.9 % (ref 94–98)
OXYHGB MFR BLDA: 97.2 % (ref 94–98)
OXYHGB MFR BLDA: 97.2 % (ref 94–98)
OXYHGB MFR BLDA: 97.3 % (ref 94–98)
OXYHGB MFR BLDA: 97.4 % (ref 94–98)
OXYHGB MFR BLDA: 97.5 % (ref 94–98)
OXYHGB MFR BLDA: 98 % (ref 94–98)
OXYHGB MFR BLDA: 98 % (ref 94–98)
OXYHGB MFR BLDMV: 66.5 % (ref 45–75)
PCO2 BLDA: 28 MM HG (ref 38–42)
PCO2 BLDA: 30 MM HG (ref 38–42)
PCO2 BLDA: 31 MM HG (ref 38–42)
PCO2 BLDA: 32 MM HG (ref 38–42)
PCO2 BLDA: 32 MM HG (ref 38–42)
PCO2 BLDA: 34 MM HG (ref 38–42)
PCO2 BLDA: 35 MM HG (ref 38–42)
PCO2 BLDA: 35 MM HG (ref 38–42)
PCO2 BLDA: 36 MM HG (ref 38–42)
PCO2 BLDA: 42 MM HG (ref 38–42)
PCO2 BLDA: 46 MM HG (ref 38–42)
PCO2 BLDA: 47 MM HG (ref 38–42)
PCO2 BLDA: 47 MM HG (ref 38–42)
PCO2 BLDA: 48 MM HG (ref 38–42)
PCO2 BLDA: 49 MM HG (ref 38–42)
PCO2 BLDA: 50 MM HG (ref 38–42)
PCO2 BLDA: 53 MM HG (ref 38–42)
PCO2 BLDMV: 37 MM HG (ref 41–51)
PH BLDA: 7.38 PH (ref 7.38–7.42)
PH BLDA: 7.38 PH (ref 7.38–7.42)
PH BLDA: 7.39 PH (ref 7.38–7.42)
PH BLDA: 7.4 PH (ref 7.38–7.42)
PH BLDA: 7.41 PH (ref 7.38–7.42)
PH BLDA: 7.44 PH (ref 7.38–7.42)
PH BLDA: 7.45 PH (ref 7.38–7.42)
PH BLDA: 7.46 PH (ref 7.38–7.42)
PH BLDA: 7.46 PH (ref 7.38–7.42)
PH BLDA: 7.47 PH (ref 7.38–7.42)
PH BLDA: 7.48 PH (ref 7.38–7.42)
PH BLDA: 7.51 PH (ref 7.38–7.42)
PH BLDMV: 7.41 PH (ref 7.33–7.43)
PHOSPHATE SERPL-MCNC: <1 MG/DL (ref 2.5–4.9)
PLATELET # BLD AUTO: 214 X10*3/UL (ref 150–450)
PO2 BLDA: 103 MM HG (ref 85–95)
PO2 BLDA: 106 MM HG (ref 85–95)
PO2 BLDA: 108 MM HG (ref 85–95)
PO2 BLDA: 117 MM HG (ref 85–95)
PO2 BLDA: 122 MM HG (ref 85–95)
PO2 BLDA: 136 MM HG (ref 85–95)
PO2 BLDA: 181 MM HG (ref 85–95)
PO2 BLDA: 192 MM HG (ref 85–95)
PO2 BLDA: 213 MM HG (ref 85–95)
PO2 BLDA: 225 MM HG (ref 85–95)
PO2 BLDA: 310 MM HG (ref 85–95)
PO2 BLDA: 326 MM HG (ref 85–95)
PO2 BLDA: 384 MM HG (ref 85–95)
PO2 BLDA: 426 MM HG (ref 85–95)
PO2 BLDA: 458 MM HG (ref 85–95)
PO2 BLDA: 67 MM HG (ref 85–95)
PO2 BLDA: 91 MM HG (ref 85–95)
PO2 BLDMV: 41 MM HG (ref 35–45)
POTASSIUM BLDA-SCNC: 3 MMOL/L (ref 3.5–5.3)
POTASSIUM BLDA-SCNC: 3.6 MMOL/L (ref 3.5–5.3)
POTASSIUM BLDA-SCNC: 3.6 MMOL/L (ref 3.5–5.3)
POTASSIUM BLDA-SCNC: 3.8 MMOL/L (ref 3.5–5.3)
POTASSIUM BLDA-SCNC: 3.8 MMOL/L (ref 3.5–5.3)
POTASSIUM BLDA-SCNC: 3.9 MMOL/L (ref 3.5–5.3)
POTASSIUM BLDA-SCNC: 4 MMOL/L (ref 3.5–5.3)
POTASSIUM BLDA-SCNC: 4.1 MMOL/L (ref 3.5–5.3)
POTASSIUM BLDA-SCNC: 4.2 MMOL/L (ref 3.5–5.3)
POTASSIUM BLDA-SCNC: 4.5 MMOL/L (ref 3.5–5.3)
POTASSIUM BLDA-SCNC: 5.2 MMOL/L (ref 3.5–5.3)
POTASSIUM BLDA-SCNC: 5.8 MMOL/L (ref 3.5–5.3)
POTASSIUM BLDMV-SCNC: 3.9 MMOL/L (ref 3.5–5.3)
POTASSIUM SERPL-SCNC: 4.1 MMOL/L (ref 3.5–5.3)
PRODUCT BLOOD TYPE: 6200
PRODUCT BLOOD TYPE: 6200
PRODUCT BLOOD TYPE: 8400
PRODUCT CODE: NORMAL
PROTHROMBIN TIME: 15.1 SECONDS (ref 9.8–12.4)
PROTHROMBIN TIME: 20.6 SECONDS (ref 9.8–12.4)
RBC # BLD AUTO: 3.21 X10*6/UL (ref 4.5–5.9)
SAO2 % BLDA: 100 % (ref 94–100)
SAO2 % BLDA: 96 % (ref 94–100)
SAO2 % BLDA: 99 % (ref 94–100)
SAO2 % BLDMV: 69 % (ref 45–75)
SODIUM BLDA-SCNC: 137 MMOL/L (ref 136–145)
SODIUM BLDA-SCNC: 137 MMOL/L (ref 136–145)
SODIUM BLDA-SCNC: 138 MMOL/L (ref 136–145)
SODIUM BLDA-SCNC: 139 MMOL/L (ref 136–145)
SODIUM BLDA-SCNC: 140 MMOL/L (ref 136–145)
SODIUM BLDA-SCNC: 140 MMOL/L (ref 136–145)
SODIUM BLDA-SCNC: 141 MMOL/L (ref 136–145)
SODIUM BLDMV-SCNC: 140 MMOL/L (ref 136–145)
SODIUM SERPL-SCNC: 143 MMOL/L (ref 136–145)
TEST COMMENT: ABNORMAL
TEST COMMENT: NORMAL
UFH PPP CHRO-ACNC: 0.2 IU/ML (ref ?–1.1)
UNIT ABO: NORMAL
UNIT NUMBER: NORMAL
UNIT RH: NORMAL
UNIT VOLUME: 103
UNIT VOLUME: 198
UNIT VOLUME: 206
UNIT VOLUME: 254
UNIT VOLUME: 266
UNIT VOLUME: 272
UNIT VOLUME: 274
UNIT VOLUME: 274
UNIT VOLUME: 278
UNIT VOLUME: 315
UNIT VOLUME: 326
UNIT VOLUME: 350
UNIT VOLUME: 359
UNIT VOLUME: 98
WBC # BLD AUTO: 20.5 X10*3/UL (ref 4.4–11.3)
XM INTEP: NORMAL

## 2025-06-21 PROCEDURE — 2500000004 HC RX 250 GENERAL PHARMACY W/ HCPCS (ALT 636 FOR OP/ED): Performed by: NURSE PRACTITIONER

## 2025-06-21 PROCEDURE — 2500000004 HC RX 250 GENERAL PHARMACY W/ HCPCS (ALT 636 FOR OP/ED): Mod: TB

## 2025-06-21 PROCEDURE — P9073 PLATELETS PHERESIS PATH REDU: HCPCS

## 2025-06-21 PROCEDURE — 33244 REMOVE ELCTRD TRANSVENOUSLY: CPT | Performed by: THORACIC SURGERY (CARDIOTHORACIC VASCULAR SURGERY)

## 2025-06-21 PROCEDURE — 2720000007 HC OR 272 NO HCPCS: Performed by: THORACIC SURGERY (CARDIOTHORACIC VASCULAR SURGERY)

## 2025-06-21 PROCEDURE — 2500000004 HC RX 250 GENERAL PHARMACY W/ HCPCS (ALT 636 FOR OP/ED)

## 2025-06-21 PROCEDURE — 0JPT0PZ REMOVAL OF CARDIAC RHYTHM RELATED DEVICE FROM TRUNK SUBCUTANEOUS TISSUE AND FASCIA, OPEN APPROACH: ICD-10-PCS | Performed by: THORACIC SURGERY (CARDIOTHORACIC VASCULAR SURGERY)

## 2025-06-21 PROCEDURE — 93325 DOPPLER ECHO COLOR FLOW MAPG: CPT | Performed by: ANESTHESIOLOGY

## 2025-06-21 PROCEDURE — 02PA0MZ REMOVAL OF CARDIAC LEAD FROM HEART, OPEN APPROACH: ICD-10-PCS | Performed by: THORACIC SURGERY (CARDIOTHORACIC VASCULAR SURGERY)

## 2025-06-21 PROCEDURE — 88307 TISSUE EXAM BY PATHOLOGIST: CPT | Performed by: PATHOLOGY

## 2025-06-21 PROCEDURE — 83735 ASSAY OF MAGNESIUM: CPT | Performed by: NURSE PRACTITIONER

## 2025-06-21 PROCEDURE — 33980 REMOVE INTRACORPOREAL DEVICE: CPT | Performed by: THORACIC SURGERY (CARDIOTHORACIC VASCULAR SURGERY)

## 2025-06-21 PROCEDURE — 85347 COAGULATION TIME ACTIVATED: CPT

## 2025-06-21 PROCEDURE — 2500000002 HC RX 250 W HCPCS SELF ADMINISTERED DRUGS (ALT 637 FOR MEDICARE OP, ALT 636 FOR OP/ED)

## 2025-06-21 PROCEDURE — 84132 ASSAY OF SERUM POTASSIUM: CPT | Performed by: NURSE PRACTITIONER

## 2025-06-21 PROCEDURE — A4312 CATH W/O BAG 2-WAY SILICONE: HCPCS | Performed by: THORACIC SURGERY (CARDIOTHORACIC VASCULAR SURGERY)

## 2025-06-21 PROCEDURE — 99291 CRITICAL CARE FIRST HOUR: CPT | Performed by: NURSE PRACTITIONER

## 2025-06-21 PROCEDURE — 2020000001 HC ICU ROOM DAILY

## 2025-06-21 PROCEDURE — 3E043XZ INTRODUCTION OF VASOPRESSOR INTO CENTRAL VEIN, PERCUTANEOUS APPROACH: ICD-10-PCS | Performed by: THORACIC SURGERY (CARDIOTHORACIC VASCULAR SURGERY)

## 2025-06-21 PROCEDURE — 88300 SURGICAL PATH GROSS: CPT | Performed by: PATHOLOGY

## 2025-06-21 PROCEDURE — P9012 CRYOPRECIPITATE EACH UNIT: HCPCS

## 2025-06-21 PROCEDURE — 82947 ASSAY GLUCOSE BLOOD QUANT: CPT

## 2025-06-21 PROCEDURE — 36430 TRANSFUSION BLD/BLD COMPNT: CPT | Mod: GC | Performed by: STUDENT IN AN ORGANIZED HEALTH CARE EDUCATION/TRAINING PROGRAM

## 2025-06-21 PROCEDURE — P9045 ALBUMIN (HUMAN), 5%, 250 ML: HCPCS | Mod: JZ,TB | Performed by: NURSE PRACTITIONER

## 2025-06-21 PROCEDURE — 33945 TRANSPLANTATION OF HEART: CPT | Performed by: THORACIC SURGERY (CARDIOTHORACIC VASCULAR SURGERY)

## 2025-06-21 PROCEDURE — 99222 1ST HOSP IP/OBS MODERATE 55: CPT | Performed by: INTERNAL MEDICINE

## 2025-06-21 PROCEDURE — 2500000004 HC RX 250 GENERAL PHARMACY W/ HCPCS (ALT 636 FOR OP/ED): Performed by: ANESTHESIOLOGY

## 2025-06-21 PROCEDURE — 71045 X-RAY EXAM CHEST 1 VIEW: CPT | Performed by: RADIOLOGY

## 2025-06-21 PROCEDURE — 93312 ECHO TRANSESOPHAGEAL: CPT | Performed by: ANESTHESIOLOGY

## 2025-06-21 PROCEDURE — 2780000003 HC OR 278 NO HCPCS: Performed by: THORACIC SURGERY (CARDIOTHORACIC VASCULAR SURGERY)

## 2025-06-21 PROCEDURE — 85027 COMPLETE CBC AUTOMATED: CPT

## 2025-06-21 PROCEDURE — 88307 TISSUE EXAM BY PATHOLOGIST: CPT | Mod: TC,SUR | Performed by: THORACIC SURGERY (CARDIOTHORACIC VASCULAR SURGERY)

## 2025-06-21 PROCEDURE — 02NN0ZZ RELEASE PERICARDIUM, OPEN APPROACH: ICD-10-PCS | Performed by: THORACIC SURGERY (CARDIOTHORACIC VASCULAR SURGERY)

## 2025-06-21 PROCEDURE — 99291 CRITICAL CARE FIRST HOUR: CPT | Performed by: ANESTHESIOLOGY

## 2025-06-21 PROCEDURE — 94645 CONT INHLJ TX EACH ADDL HOUR: CPT

## 2025-06-21 PROCEDURE — 5A1221Z PERFORMANCE OF CARDIAC OUTPUT, CONTINUOUS: ICD-10-PCS | Performed by: THORACIC SURGERY (CARDIOTHORACIC VASCULAR SURGERY)

## 2025-06-21 PROCEDURE — 34714 OPN FEM ART EXPOS CNDT CRTJ: CPT | Performed by: THORACIC SURGERY (CARDIOTHORACIC VASCULAR SURGERY)

## 2025-06-21 PROCEDURE — 02PA0QZ REMOVAL OF IMPLANTABLE HEART ASSIST SYSTEM FROM HEART, OPEN APPROACH: ICD-10-PCS | Performed by: THORACIC SURGERY (CARDIOTHORACIC VASCULAR SURGERY)

## 2025-06-21 PROCEDURE — 3700000001 HC GENERAL ANESTHESIA TIME - INITIAL BASE CHARGE: Performed by: THORACIC SURGERY (CARDIOTHORACIC VASCULAR SURGERY)

## 2025-06-21 PROCEDURE — 71045 X-RAY EXAM CHEST 1 VIEW: CPT

## 2025-06-21 PROCEDURE — 2500000002 HC RX 250 W HCPCS SELF ADMINISTERED DRUGS (ALT 637 FOR MEDICARE OP, ALT 636 FOR OP/ED): Performed by: NURSE PRACTITIONER

## 2025-06-21 PROCEDURE — 85027 COMPLETE CBC AUTOMATED: CPT | Performed by: NURSE PRACTITIONER

## 2025-06-21 PROCEDURE — 3600000017 HC OR TIME - EACH INCREMENTAL 1 MINUTE - PROCEDURE LEVEL SIX: Performed by: THORACIC SURGERY (CARDIOTHORACIC VASCULAR SURGERY)

## 2025-06-21 PROCEDURE — 2500000005 HC RX 250 GENERAL PHARMACY W/O HCPCS: Performed by: ANESTHESIOLOGY

## 2025-06-21 PROCEDURE — 2500000005 HC RX 250 GENERAL PHARMACY W/O HCPCS: Performed by: THORACIC SURGERY (CARDIOTHORACIC VASCULAR SURGERY)

## 2025-06-21 PROCEDURE — 37799 UNLISTED PX VASCULAR SURGERY: CPT | Performed by: NURSE PRACTITIONER

## 2025-06-21 PROCEDURE — 5A1223Z PERFORMANCE OF CARDIAC PACING, CONTINUOUS: ICD-10-PCS | Performed by: THORACIC SURGERY (CARDIOTHORACIC VASCULAR SURGERY)

## 2025-06-21 PROCEDURE — 94002 VENT MGMT INPAT INIT DAY: CPT

## 2025-06-21 PROCEDURE — 33241 REMOVE PULSE GENERATOR: CPT | Performed by: THORACIC SURGERY (CARDIOTHORACIC VASCULAR SURGERY)

## 2025-06-21 PROCEDURE — 8120000001 HC CADAVER DONOR - HEART: Performed by: THORACIC SURGERY (CARDIOTHORACIC VASCULAR SURGERY)

## 2025-06-21 PROCEDURE — 93321 DOPPLER ECHO F-UP/LMTD STD: CPT | Performed by: ANESTHESIOLOGY

## 2025-06-21 PROCEDURE — 82375 ASSAY CARBOXYHB QUANT: CPT

## 2025-06-21 PROCEDURE — 82330 ASSAY OF CALCIUM: CPT | Performed by: NURSE PRACTITIONER

## 2025-06-21 PROCEDURE — 02YA0Z0 TRANSPLANTATION OF HEART, ALLOGENEIC, OPEN APPROACH: ICD-10-PCS | Performed by: THORACIC SURGERY (CARDIOTHORACIC VASCULAR SURGERY)

## 2025-06-21 PROCEDURE — 2500000001 HC RX 250 WO HCPCS SELF ADMINISTERED DRUGS (ALT 637 FOR MEDICARE OP): Performed by: NURSE PRACTITIONER

## 2025-06-21 PROCEDURE — C1887 CATHETER, GUIDING: HCPCS | Performed by: THORACIC SURGERY (CARDIOTHORACIC VASCULAR SURGERY)

## 2025-06-21 PROCEDURE — 3600000018 HC OR TIME - INITIAL BASE CHARGE - PROCEDURE LEVEL SIX: Performed by: THORACIC SURGERY (CARDIOTHORACIC VASCULAR SURGERY)

## 2025-06-21 PROCEDURE — 3700000002 HC GENERAL ANESTHESIA TIME - EACH INCREMENTAL 1 MINUTE: Performed by: THORACIC SURGERY (CARDIOTHORACIC VASCULAR SURGERY)

## 2025-06-21 PROCEDURE — 3600000012 HC PERFUSION TIME - EACH INCREMENTAL 1 MINUTE: Performed by: THORACIC SURGERY (CARDIOTHORACIC VASCULAR SURGERY)

## 2025-06-21 PROCEDURE — 85384 FIBRINOGEN ACTIVITY: CPT | Performed by: NURSE PRACTITIONER

## 2025-06-21 PROCEDURE — P9017 PLASMA 1 DONOR FRZ W/IN 8 HR: HCPCS

## 2025-06-21 PROCEDURE — 2500000005 HC RX 250 GENERAL PHARMACY W/O HCPCS

## 2025-06-21 PROCEDURE — 2500000004 HC RX 250 GENERAL PHARMACY W/ HCPCS (ALT 636 FOR OP/ED): Performed by: THORACIC SURGERY (CARDIOTHORACIC VASCULAR SURGERY)

## 2025-06-21 PROCEDURE — 3600000011 HC PERFUSION TIME - INITIAL BASE CHARGE: Performed by: THORACIC SURGERY (CARDIOTHORACIC VASCULAR SURGERY)

## 2025-06-21 PROCEDURE — 85610 PROTHROMBIN TIME: CPT | Performed by: NURSE PRACTITIONER

## 2025-06-21 PROCEDURE — 94799 UNLISTED PULMONARY SVC/PX: CPT

## 2025-06-21 PROCEDURE — 2500000005 HC RX 250 GENERAL PHARMACY W/O HCPCS: Performed by: NURSE PRACTITIONER

## 2025-06-21 PROCEDURE — P9037 PLATE PHERES LEUKOREDU IRRAD: HCPCS

## 2025-06-21 PROCEDURE — C1900 LEAD, CORONARY VENOUS: HCPCS | Performed by: THORACIC SURGERY (CARDIOTHORACIC VASCULAR SURGERY)

## 2025-06-21 RX ORDER — ACETAMINOPHEN 325 MG/1
650 TABLET ORAL EVERY 4 HOURS PRN
Status: DISCONTINUED | OUTPATIENT
Start: 2025-06-21 | End: 2025-06-21

## 2025-06-21 RX ORDER — POTASSIUM CHLORIDE 14.9 MG/ML
20 INJECTION INTRAVENOUS EVERY 6 HOURS PRN
Status: DISCONTINUED | OUTPATIENT
Start: 2025-06-21 | End: 2025-06-24

## 2025-06-21 RX ORDER — ACETAMINOPHEN 325 MG/1
650 TABLET ORAL EVERY 6 HOURS
Status: DISCONTINUED | OUTPATIENT
Start: 2025-06-21 | End: 2025-06-26

## 2025-06-21 RX ORDER — SODIUM CHLORIDE, SODIUM LACTATE, POTASSIUM CHLORIDE, CALCIUM CHLORIDE 600; 310; 30; 20 MG/100ML; MG/100ML; MG/100ML; MG/100ML
10 INJECTION, SOLUTION INTRAVENOUS CONTINUOUS
Status: DISCONTINUED | OUTPATIENT
Start: 2025-06-21 | End: 2025-06-28

## 2025-06-21 RX ORDER — POTASSIUM CHLORIDE 20 MEQ/1
40 TABLET, EXTENDED RELEASE ORAL EVERY 6 HOURS PRN
Status: DISCONTINUED | OUTPATIENT
Start: 2025-06-21 | End: 2025-06-24

## 2025-06-21 RX ORDER — MIDAZOLAM HYDROCHLORIDE 1 MG/ML
INJECTION INTRAMUSCULAR; INTRAVENOUS AS NEEDED
Status: DISCONTINUED | OUTPATIENT
Start: 2025-06-21 | End: 2025-06-21

## 2025-06-21 RX ORDER — POTASSIUM CHLORIDE 14.9 MG/ML
INJECTION INTRAVENOUS AS NEEDED
Status: DISCONTINUED | OUTPATIENT
Start: 2025-06-21 | End: 2025-06-21

## 2025-06-21 RX ORDER — GLYCOPYRROLATE 0.2 MG/ML
1 INJECTION INTRAMUSCULAR; INTRAVENOUS ONCE
Status: COMPLETED | OUTPATIENT
Start: 2025-06-21 | End: 2025-06-21

## 2025-06-21 RX ORDER — PANTOPRAZOLE SODIUM 40 MG/10ML
40 INJECTION, POWDER, LYOPHILIZED, FOR SOLUTION INTRAVENOUS DAILY
Status: DISCONTINUED | OUTPATIENT
Start: 2025-06-21 | End: 2025-06-22

## 2025-06-21 RX ORDER — VANCOMYCIN HYDROCHLORIDE 1 G/20ML
INJECTION, POWDER, LYOPHILIZED, FOR SOLUTION INTRAVENOUS DAILY PRN
Status: DISPENSED | OUTPATIENT
Start: 2025-06-21 | End: 2025-06-22

## 2025-06-21 RX ORDER — PROPOFOL 10 MG/ML
0-50 INJECTION, EMULSION INTRAVENOUS CONTINUOUS
Status: DISCONTINUED | OUTPATIENT
Start: 2025-06-21 | End: 2025-06-22

## 2025-06-21 RX ORDER — VANCOMYCIN HYDROCHLORIDE 1 G/20ML
INJECTION, POWDER, LYOPHILIZED, FOR SOLUTION INTRAVENOUS AS NEEDED
Status: DISCONTINUED | OUTPATIENT
Start: 2025-06-21 | End: 2025-06-21 | Stop reason: HOSPADM

## 2025-06-21 RX ORDER — ALBUMIN HUMAN 50 G/1000ML
12.5 SOLUTION INTRAVENOUS ONCE
Status: COMPLETED | OUTPATIENT
Start: 2025-06-21 | End: 2025-06-21

## 2025-06-21 RX ORDER — OXYCODONE HYDROCHLORIDE 10 MG/1
10 TABLET ORAL EVERY 4 HOURS PRN
Refills: 0 | Status: DISCONTINUED | OUTPATIENT
Start: 2025-06-21 | End: 2025-07-08

## 2025-06-21 RX ORDER — POTASSIUM CHLORIDE 29.8 MG/ML
INJECTION INTRAVENOUS AS NEEDED
Status: DISCONTINUED | OUTPATIENT
Start: 2025-06-21 | End: 2025-06-21

## 2025-06-21 RX ORDER — MYCOPHENOLATE MOFETIL 200 MG/ML
500 POWDER, FOR SUSPENSION ORAL 2 TIMES DAILY
Status: DISCONTINUED | OUTPATIENT
Start: 2025-06-21 | End: 2025-06-21

## 2025-06-21 RX ORDER — ONDANSETRON 4 MG/1
4 TABLET, FILM COATED ORAL EVERY 8 HOURS PRN
Status: CANCELLED | OUTPATIENT
Start: 2025-06-21

## 2025-06-21 RX ORDER — FENTANYL CITRATE 50 UG/ML
INJECTION, SOLUTION INTRAMUSCULAR; INTRAVENOUS AS NEEDED
Status: DISCONTINUED | OUTPATIENT
Start: 2025-06-21 | End: 2025-06-21

## 2025-06-21 RX ORDER — POLYETHYLENE GLYCOL 3350 17 G/17G
17 POWDER, FOR SOLUTION ORAL 2 TIMES DAILY
Status: DISCONTINUED | OUTPATIENT
Start: 2025-06-21 | End: 2025-06-25

## 2025-06-21 RX ORDER — ROCURONIUM BROMIDE 10 MG/ML
INJECTION, SOLUTION INTRAVENOUS AS NEEDED
Status: DISCONTINUED | OUTPATIENT
Start: 2025-06-21 | End: 2025-06-21

## 2025-06-21 RX ORDER — EPINEPHRINE IN 0.9 % SOD CHLOR 4MG/250ML
0-2 PLASTIC BAG, INJECTION (ML) INTRAVENOUS CONTINUOUS
Status: DISCONTINUED | OUTPATIENT
Start: 2025-06-21 | End: 2025-06-23

## 2025-06-21 RX ORDER — MAGNESIUM SULFATE HEPTAHYDRATE 40 MG/ML
2 INJECTION, SOLUTION INTRAVENOUS EVERY 6 HOURS PRN
Status: DISCONTINUED | OUTPATIENT
Start: 2025-06-21 | End: 2025-06-24

## 2025-06-21 RX ORDER — NEOSTIGMINE METHYLSULFATE 1 MG/ML
5 INJECTION INTRAVENOUS ONCE
Status: COMPLETED | OUTPATIENT
Start: 2025-06-21 | End: 2025-06-21

## 2025-06-21 RX ORDER — SODIUM CHLORIDE, SODIUM GLUCONATE, SODIUM ACETATE, POTASSIUM CHLORIDE AND MAGNESIUM CHLORIDE 30; 37; 368; 526; 502 MG/100ML; MG/100ML; MG/100ML; MG/100ML; MG/100ML
INJECTION, SOLUTION INTRAVENOUS CONTINUOUS PRN
Status: DISCONTINUED | OUTPATIENT
Start: 2025-06-21 | End: 2025-06-21

## 2025-06-21 RX ORDER — POTASSIUM CHLORIDE 29.8 MG/ML
40 INJECTION INTRAVENOUS EVERY 6 HOURS PRN
Status: DISCONTINUED | OUTPATIENT
Start: 2025-06-21 | End: 2025-06-24

## 2025-06-21 RX ORDER — INSULIN LISPRO 100 [IU]/ML
INJECTION, SOLUTION INTRAVENOUS; SUBCUTANEOUS AS NEEDED
Status: DISCONTINUED | OUTPATIENT
Start: 2025-06-21 | End: 2025-06-21

## 2025-06-21 RX ORDER — CALCIUM GLUCONATE 20 MG/ML
2 INJECTION, SOLUTION INTRAVENOUS EVERY 6 HOURS PRN
Status: DISCONTINUED | OUTPATIENT
Start: 2025-06-21 | End: 2025-06-24

## 2025-06-21 RX ORDER — ETOMIDATE 2 MG/ML
INJECTION INTRAVENOUS AS NEEDED
Status: DISCONTINUED | OUTPATIENT
Start: 2025-06-21 | End: 2025-06-21

## 2025-06-21 RX ORDER — SODIUM CHLORIDE 0.9 G/100ML
INJECTION, SOLUTION IRRIGATION AS NEEDED
Status: DISCONTINUED | OUTPATIENT
Start: 2025-06-21 | End: 2025-06-21 | Stop reason: HOSPADM

## 2025-06-21 RX ORDER — VANCOMYCIN HYDROCHLORIDE 1 G/20ML
INJECTION, POWDER, LYOPHILIZED, FOR SOLUTION INTRAVENOUS AS NEEDED
Status: DISCONTINUED | OUTPATIENT
Start: 2025-06-21 | End: 2025-06-21

## 2025-06-21 RX ORDER — PROPOFOL 10 MG/ML
INJECTION, EMULSION INTRAVENOUS
Status: COMPLETED
Start: 2025-06-21 | End: 2025-06-21

## 2025-06-21 RX ORDER — POTASSIUM CHLORIDE 1.5 G/1.58G
40 POWDER, FOR SOLUTION ORAL EVERY 6 HOURS PRN
Status: DISCONTINUED | OUTPATIENT
Start: 2025-06-21 | End: 2025-06-24

## 2025-06-21 RX ORDER — HEPARIN SODIUM 1000 [USP'U]/ML
INJECTION, SOLUTION INTRAVENOUS; SUBCUTANEOUS AS NEEDED
Status: DISCONTINUED | OUTPATIENT
Start: 2025-06-21 | End: 2025-06-21

## 2025-06-21 RX ORDER — LIDOCAINE 560 MG/1
1 PATCH PERCUTANEOUS; TOPICAL; TRANSDERMAL DAILY
Status: COMPLETED | OUTPATIENT
Start: 2025-06-21 | End: 2025-06-23

## 2025-06-21 RX ORDER — SODIUM CHLORIDE, SODIUM LACTATE, POTASSIUM CHLORIDE, CALCIUM CHLORIDE 600; 310; 30; 20 MG/100ML; MG/100ML; MG/100ML; MG/100ML
5 INJECTION, SOLUTION INTRAVENOUS CONTINUOUS
Status: DISCONTINUED | OUTPATIENT
Start: 2025-06-21 | End: 2025-06-21

## 2025-06-21 RX ORDER — POTASSIUM CHLORIDE 29.8 MG/ML
40 INJECTION INTRAVENOUS ONCE
Status: COMPLETED | OUTPATIENT
Start: 2025-06-21 | End: 2025-06-21

## 2025-06-21 RX ORDER — CALCIUM GLUCONATE 20 MG/ML
1 INJECTION, SOLUTION INTRAVENOUS EVERY 6 HOURS PRN
Status: DISCONTINUED | OUTPATIENT
Start: 2025-06-21 | End: 2025-06-24

## 2025-06-21 RX ORDER — HYDROMORPHONE HYDROCHLORIDE 0.2 MG/ML
0.2 INJECTION INTRAMUSCULAR; INTRAVENOUS; SUBCUTANEOUS
Status: DISCONTINUED | OUTPATIENT
Start: 2025-06-21 | End: 2025-06-21

## 2025-06-21 RX ORDER — INSULIN LISPRO 100 [IU]/ML
0-10 INJECTION, SOLUTION INTRAVENOUS; SUBCUTANEOUS EVERY 4 HOURS
Status: DISCONTINUED | OUTPATIENT
Start: 2025-06-21 | End: 2025-06-22

## 2025-06-21 RX ORDER — DEXMEDETOMIDINE HYDROCHLORIDE 4 UG/ML
0-1.5 INJECTION, SOLUTION INTRAVENOUS CONTINUOUS
Status: DISCONTINUED | OUTPATIENT
Start: 2025-06-21 | End: 2025-06-22

## 2025-06-21 RX ORDER — PROTAMINE SULFATE 10 MG/ML
INJECTION, SOLUTION INTRAVENOUS AS NEEDED
Status: DISCONTINUED | OUTPATIENT
Start: 2025-06-21 | End: 2025-06-21

## 2025-06-21 RX ORDER — DIPHENHYDRAMINE HYDROCHLORIDE 50 MG/ML
INJECTION, SOLUTION INTRAMUSCULAR; INTRAVENOUS AS NEEDED
Status: DISCONTINUED | OUTPATIENT
Start: 2025-06-21 | End: 2025-06-21

## 2025-06-21 RX ORDER — HYDROMORPHONE HYDROCHLORIDE 0.2 MG/ML
0.2 INJECTION INTRAMUSCULAR; INTRAVENOUS; SUBCUTANEOUS
Status: DISCONTINUED | OUTPATIENT
Start: 2025-06-21 | End: 2025-06-23

## 2025-06-21 RX ORDER — OXYCODONE HYDROCHLORIDE 5 MG/1
5 TABLET ORAL EVERY 4 HOURS PRN
Status: DISCONTINUED | OUTPATIENT
Start: 2025-06-21 | End: 2025-07-08

## 2025-06-21 RX ORDER — DEXTROSE 50 % IN WATER (D50W) INTRAVENOUS SYRINGE
25
Status: DISCONTINUED | OUTPATIENT
Start: 2025-06-21 | End: 2025-07-09 | Stop reason: HOSPADM

## 2025-06-21 RX ORDER — MYCOPHENOLATE MOFETIL 200 MG/ML
500 POWDER, FOR SUSPENSION ORAL 2 TIMES DAILY
Status: DISCONTINUED | OUTPATIENT
Start: 2025-06-21 | End: 2025-06-22

## 2025-06-21 RX ORDER — MAGNESIUM SULFATE HEPTAHYDRATE 40 MG/ML
4 INJECTION, SOLUTION INTRAVENOUS EVERY 6 HOURS PRN
Status: DISCONTINUED | OUTPATIENT
Start: 2025-06-21 | End: 2025-06-24

## 2025-06-21 RX ORDER — SODIUM CHLORIDE, SODIUM LACTATE, POTASSIUM CHLORIDE, CALCIUM CHLORIDE 600; 310; 30; 20 MG/100ML; MG/100ML; MG/100ML; MG/100ML
30 INJECTION, SOLUTION INTRAVENOUS CONTINUOUS
Status: CANCELLED | OUTPATIENT
Start: 2025-06-21 | End: 2025-06-28

## 2025-06-21 RX ORDER — SODIUM CHLORIDE, SODIUM LACTATE, POTASSIUM CHLORIDE, CALCIUM CHLORIDE 600; 310; 30; 20 MG/100ML; MG/100ML; MG/100ML; MG/100ML
20 INJECTION, SOLUTION INTRAVENOUS CONTINUOUS
Status: DISCONTINUED | OUTPATIENT
Start: 2025-06-21 | End: 2025-06-24

## 2025-06-21 RX ORDER — NOREPINEPHRINE BITARTRATE/D5W 8 MG/250ML
.01-1 PLASTIC BAG, INJECTION (ML) INTRAVENOUS CONTINUOUS
Status: DISCONTINUED | OUTPATIENT
Start: 2025-06-21 | End: 2025-06-22

## 2025-06-21 RX ORDER — DEXTROSE 50 % IN WATER (D50W) INTRAVENOUS SYRINGE
12.5
Status: DISCONTINUED | OUTPATIENT
Start: 2025-06-21 | End: 2025-07-09 | Stop reason: HOSPADM

## 2025-06-21 RX ORDER — NALOXONE HYDROCHLORIDE 0.4 MG/ML
0.2 INJECTION, SOLUTION INTRAMUSCULAR; INTRAVENOUS; SUBCUTANEOUS EVERY 5 MIN PRN
Status: DISCONTINUED | OUTPATIENT
Start: 2025-06-21 | End: 2025-07-09 | Stop reason: HOSPADM

## 2025-06-21 RX ORDER — CALCIUM CHLORIDE INJECTION 100 MG/ML
INJECTION, SOLUTION INTRAVENOUS AS NEEDED
Status: DISCONTINUED | OUTPATIENT
Start: 2025-06-21 | End: 2025-06-21

## 2025-06-21 RX ORDER — MILRINONE LACTATE 0.2 MG/ML
INJECTION, SOLUTION INTRAVENOUS CONTINUOUS PRN
Status: DISCONTINUED | OUTPATIENT
Start: 2025-06-21 | End: 2025-06-21

## 2025-06-21 RX ORDER — PHENYLEPHRINE 10 MG/250 ML(40 MCG/ML)IN 0.9 % SOD.CHLORIDE INTRAVENOUS
CONTINUOUS PRN
Status: DISCONTINUED | OUTPATIENT
Start: 2025-06-21 | End: 2025-06-21

## 2025-06-21 RX ORDER — POTASSIUM CHLORIDE 1.5 G/1.58G
20 POWDER, FOR SOLUTION ORAL EVERY 6 HOURS PRN
Status: DISCONTINUED | OUTPATIENT
Start: 2025-06-21 | End: 2025-06-24

## 2025-06-21 RX ORDER — NOREPINEPHRINE BITARTRATE 0.03 MG/ML
INJECTION, SOLUTION INTRAVENOUS CONTINUOUS PRN
Status: DISCONTINUED | OUTPATIENT
Start: 2025-06-21 | End: 2025-06-21

## 2025-06-21 RX ORDER — POTASSIUM CHLORIDE 20 MEQ/1
20 TABLET, EXTENDED RELEASE ORAL EVERY 6 HOURS PRN
Status: DISCONTINUED | OUTPATIENT
Start: 2025-06-21 | End: 2025-06-24

## 2025-06-21 RX ORDER — LIDOCAINE HYDROCHLORIDE 20 MG/ML
INJECTION, SOLUTION INFILTRATION; PERINEURAL AS NEEDED
Status: DISCONTINUED | OUTPATIENT
Start: 2025-06-21 | End: 2025-06-21

## 2025-06-21 RX ORDER — NALOXONE HYDROCHLORIDE 0.4 MG/ML
0.2 INJECTION, SOLUTION INTRAMUSCULAR; INTRAVENOUS; SUBCUTANEOUS EVERY 5 MIN PRN
Status: DISCONTINUED | OUTPATIENT
Start: 2025-06-21 | End: 2025-06-21

## 2025-06-21 RX ORDER — MILRINONE LACTATE 0.2 MG/ML
0-.75 INJECTION, SOLUTION INTRAVENOUS CONTINUOUS
Status: DISCONTINUED | OUTPATIENT
Start: 2025-06-21 | End: 2025-06-23

## 2025-06-21 RX ORDER — ONDANSETRON HYDROCHLORIDE 2 MG/ML
4 INJECTION, SOLUTION INTRAVENOUS EVERY 8 HOURS PRN
Status: CANCELLED | OUTPATIENT
Start: 2025-06-21

## 2025-06-21 RX ORDER — PROPOFOL 10 MG/ML
INJECTION, EMULSION INTRAVENOUS CONTINUOUS PRN
Status: DISCONTINUED | OUTPATIENT
Start: 2025-06-21 | End: 2025-06-21

## 2025-06-21 RX ORDER — EPINEPHRINE IN 0.9 % SOD CHLOR 4MG/250ML
PLASTIC BAG, INJECTION (ML) INTRAVENOUS CONTINUOUS PRN
Status: DISCONTINUED | OUTPATIENT
Start: 2025-06-21 | End: 2025-06-21

## 2025-06-21 RX ORDER — AMOXICILLIN 250 MG
2 CAPSULE ORAL 2 TIMES DAILY
Status: DISCONTINUED | OUTPATIENT
Start: 2025-06-21 | End: 2025-06-25

## 2025-06-21 RX ADMIN — MILRINONE LACTATE IN DEXTROSE 0.2 MCG/KG/MIN: 0.2 INJECTION, SOLUTION INTRAVENOUS at 19:19

## 2025-06-21 RX ADMIN — ACETAMINOPHEN 650 MG: 325 TABLET, FILM COATED ORAL at 10:38

## 2025-06-21 RX ADMIN — ROCURONIUM BROMIDE 30 MG: 10 INJECTION, SOLUTION INTRAVENOUS at 03:24

## 2025-06-21 RX ADMIN — VANCOMYCIN HYDROCHLORIDE 1750 MG: 5 INJECTION, POWDER, LYOPHILIZED, FOR SOLUTION INTRAVENOUS at 23:18

## 2025-06-21 RX ADMIN — METHYLPREDNISOLONE SODIUM SUCCINATE 125 MG: 125 INJECTION, POWDER, FOR SOLUTION INTRAMUSCULAR; INTRAVENOUS at 23:18

## 2025-06-21 RX ADMIN — MILRINONE LACTATE 0.3 MCG/KG/MIN: 0.2 INJECTION, SOLUTION INTRAVENOUS at 04:54

## 2025-06-21 RX ADMIN — EPINEPHRINE IN SODIUM CHLORIDE 0.06 MCG/KG/MIN: 16 INJECTION INTRAVENOUS at 14:29

## 2025-06-21 RX ADMIN — DEXMEDETOMIDINE HYDROCHLORIDE 0.2 MCG/KG/HR: 400 INJECTION INTRAVENOUS at 20:27

## 2025-06-21 RX ADMIN — PIPERACILLIN SODIUM AND TAZOBACTAM SODIUM 3.38 G: 3; .375 INJECTION, SOLUTION INTRAVENOUS at 01:28

## 2025-06-21 RX ADMIN — POTASSIUM PHOSPHATE, MONOBASIC AND POTASSIUM PHOSPHATE, DIBASIC 30 MMOL: 224; 236 INJECTION, SOLUTION, CONCENTRATE INTRAVENOUS at 12:13

## 2025-06-21 RX ADMIN — INSULIN LISPRO 5 UNITS: 100 INJECTION, SOLUTION INTRAVENOUS; SUBCUTANEOUS at 07:12

## 2025-06-21 RX ADMIN — DOCUSATE SODIUM 50 MG AND SENNOSIDES 8.6 MG 2 TABLET: 8.6; 5 TABLET, FILM COATED ORAL at 10:33

## 2025-06-21 RX ADMIN — SODIUM CHLORIDE 20 MG: 9 INJECTION, SOLUTION INTRAVENOUS at 08:08

## 2025-06-21 RX ADMIN — INSULIN HUMAN 15 UNITS/HR: 1 INJECTION, SOLUTION INTRAVENOUS at 11:15

## 2025-06-21 RX ADMIN — ROCURONIUM BROMIDE 30 MG: 10 INJECTION, SOLUTION INTRAVENOUS at 06:28

## 2025-06-21 RX ADMIN — CALCIUM CHLORIDE 0.3 G: 100 INJECTION, SOLUTION INTRAVENOUS at 05:44

## 2025-06-21 RX ADMIN — SODIUM CHLORIDE 3000 ML: 900 IRRIGANT IRRIGATION at 03:31

## 2025-06-21 RX ADMIN — DIPHENHYDRAMINE HYDROCHLORIDE 50 MG: 50 INJECTION, SOLUTION INTRAMUSCULAR; INTRAVENOUS at 02:43

## 2025-06-21 RX ADMIN — POLYETHYLENE GLYCOL 3350 17 G: 17 POWDER, FOR SOLUTION ORAL at 10:33

## 2025-06-21 RX ADMIN — ALBUMIN HUMAN 12.5 G: 0.05 INJECTION, SOLUTION INTRAVENOUS at 10:09

## 2025-06-21 RX ADMIN — SODIUM CHLORIDE 3000 ML: 900 IRRIGANT IRRIGATION at 01:00

## 2025-06-21 RX ADMIN — TRANEXAMIC ACID 1440 MG: 100 INJECTION INTRAVENOUS at 01:42

## 2025-06-21 RX ADMIN — VANCOMYCIN HYDROCHLORIDE 4 G: 1025 INJECTION, POWDER, FOR SOLUTION INTRAVENOUS; ORAL at 06:34

## 2025-06-21 RX ADMIN — INSULIN LISPRO 4 UNITS: 100 INJECTION, SOLUTION INTRAVENOUS; SUBCUTANEOUS at 05:35

## 2025-06-21 RX ADMIN — GLYCOPYRROLATE 1 MG: 0.2 INJECTION, SOLUTION INTRAMUSCULAR; INTRAVENOUS at 09:26

## 2025-06-21 RX ADMIN — INSULIN LISPRO 5 UNITS: 100 INJECTION, SOLUTION INTRAVENOUS; SUBCUTANEOUS at 06:47

## 2025-06-21 RX ADMIN — DOCUSATE SODIUM 50 MG AND SENNOSIDES 8.6 MG 2 TABLET: 8.6; 5 TABLET, FILM COATED ORAL at 20:26

## 2025-06-21 RX ADMIN — VASOPRESSIN 0.03 UNITS/MIN: 20 INJECTION, SOLUTION INTRAMUSCULAR; SUBCUTANEOUS at 04:58

## 2025-06-21 RX ADMIN — METHYLPREDNISOLONE SODIUM SUCCINATE 500 MG: 500 INJECTION INTRAMUSCULAR; INTRAVENOUS at 01:11

## 2025-06-21 RX ADMIN — HYDROMORPHONE HYDROCHLORIDE 0.2 MG: 0.2 INJECTION, SOLUTION INTRAMUSCULAR; INTRAVENOUS; SUBCUTANEOUS at 11:25

## 2025-06-21 RX ADMIN — ACETAMINOPHEN 650 MG: 325 TABLET, FILM COATED ORAL at 21:51

## 2025-06-21 RX ADMIN — POTASSIUM CHLORIDE 40 MEQ: 29.8 INJECTION, SOLUTION INTRAVENOUS at 03:58

## 2025-06-21 RX ADMIN — SODIUM CHLORIDE, SODIUM GLUCONATE, SODIUM ACETATE, POTASSIUM CHLORIDE AND MAGNESIUM CHLORIDE: 526; 502; 368; 37; 30 INJECTION, SOLUTION INTRAVENOUS at 01:07

## 2025-06-21 RX ADMIN — METHYLPREDNISOLONE SODIUM SUCCINATE 125 MG: 125 INJECTION, POWDER, FOR SOLUTION INTRAMUSCULAR; INTRAVENOUS at 15:52

## 2025-06-21 RX ADMIN — METHYLPREDNISOLONE SODIUM SUCCINATE 500 MG: 500 INJECTION INTRAMUSCULAR; INTRAVENOUS at 04:49

## 2025-06-21 RX ADMIN — POLYETHYLENE GLYCOL 3350 17 G: 17 POWDER, FOR SOLUTION ORAL at 20:26

## 2025-06-21 RX ADMIN — VANCOMYCIN HYDROCHLORIDE 1500 G: 1 INJECTION, POWDER, LYOPHILIZED, FOR SOLUTION INTRAVENOUS at 01:04

## 2025-06-21 RX ADMIN — PIPERACILLIN SODIUM AND TAZOBACTAM SODIUM 3.38 G: 3; .375 INJECTION, SOLUTION INTRAVENOUS at 10:39

## 2025-06-21 RX ADMIN — VANCOMYCIN HYDROCHLORIDE 4 G: 1025 INJECTION, POWDER, FOR SOLUTION INTRAVENOUS; ORAL at 05:33

## 2025-06-21 RX ADMIN — PIPERACILLIN SODIUM AND TAZOBACTAM SODIUM 3.38 G: 3; .375 INJECTION, SOLUTION INTRAVENOUS at 05:30

## 2025-06-21 RX ADMIN — HEPARIN SODIUM 38000 UNITS: 1000 INJECTION, SOLUTION INTRAVENOUS; SUBCUTANEOUS at 02:43

## 2025-06-21 RX ADMIN — PROPOFOL 100 MCG/KG/MIN: 10 INJECTION, EMULSION INTRAVENOUS at 05:03

## 2025-06-21 RX ADMIN — FENTANYL CITRATE 250 MCG: 50 INJECTION, SOLUTION INTRAMUSCULAR; INTRAVENOUS at 01:10

## 2025-06-21 RX ADMIN — SODIUM CHLORIDE, SODIUM LACTATE, POTASSIUM CHLORIDE, AND CALCIUM CHLORIDE 500 ML: 600; 310; 30; 20 INJECTION, SOLUTION INTRAVENOUS at 11:07

## 2025-06-21 RX ADMIN — NOREPINEPHRINE BITARTRATE 8 MCG: 1 INJECTION, SOLUTION, CONCENTRATE INTRAVENOUS at 02:42

## 2025-06-21 RX ADMIN — FENTANYL CITRATE 250 MCG: 50 INJECTION, SOLUTION INTRAMUSCULAR; INTRAVENOUS at 02:03

## 2025-06-21 RX ADMIN — POTASSIUM CHLORIDE 40 MEQ: 29.8 INJECTION, SOLUTION INTRAVENOUS at 01:43

## 2025-06-21 RX ADMIN — SODIUM CHLORIDE, SODIUM LACTATE, POTASSIUM CHLORIDE, AND CALCIUM CHLORIDE 250 ML: 600; 310; 30; 20 INJECTION, SOLUTION INTRAVENOUS at 17:19

## 2025-06-21 RX ADMIN — ROCURONIUM BROMIDE 30 MG: 10 INJECTION, SOLUTION INTRAVENOUS at 05:06

## 2025-06-21 RX ADMIN — ROCURONIUM BROMIDE 30 MG: 10 INJECTION, SOLUTION INTRAVENOUS at 04:14

## 2025-06-21 RX ADMIN — ALBUMIN HUMAN 12.5 G: 0.05 INJECTION, SOLUTION INTRAVENOUS at 13:18

## 2025-06-21 RX ADMIN — NOREPINEPHRINE BITARTRATE 8 MCG: 1 INJECTION, SOLUTION, CONCENTRATE INTRAVENOUS at 06:42

## 2025-06-21 RX ADMIN — VANCOMYCIN HYDROCHLORIDE 1750 MG: 5 INJECTION, POWDER, LYOPHILIZED, FOR SOLUTION INTRAVENOUS at 11:44

## 2025-06-21 RX ADMIN — HEPARIN SODIUM 5000 UNITS: 1000 INJECTION, SOLUTION INTRAVENOUS; SUBCUTANEOUS at 02:19

## 2025-06-21 RX ADMIN — PIPERACILLIN SODIUM AND TAZOBACTAM SODIUM 3.38 G: 3; .375 INJECTION, SOLUTION INTRAVENOUS at 22:57

## 2025-06-21 RX ADMIN — ROCURONIUM BROMIDE 100 MG: 10 INJECTION, SOLUTION INTRAVENOUS at 01:10

## 2025-06-21 RX ADMIN — SODIUM CHLORIDE, SODIUM LACTATE, POTASSIUM CHLORIDE, AND CALCIUM CHLORIDE 250 ML: 600; 310; 30; 20 INJECTION, SOLUTION INTRAVENOUS at 08:13

## 2025-06-21 RX ADMIN — ALBUMIN HUMAN 12.5 G: 0.05 INJECTION, SOLUTION INTRAVENOUS at 08:52

## 2025-06-21 RX ADMIN — TACROLIMUS 0.5 MG: 5 CAPSULE, GELATIN COATED ORAL at 18:48

## 2025-06-21 RX ADMIN — PROTAMINE SULFATE 430 MG: 10 INJECTION, SOLUTION INTRAVENOUS at 05:23

## 2025-06-21 RX ADMIN — INSULIN HUMAN 5.5 UNITS/HR: 1 INJECTION, SOLUTION INTRAVENOUS at 19:19

## 2025-06-21 RX ADMIN — EPINEPHRINE IN SODIUM CHLORIDE 0.05 MCG/KG/MIN: 16 INJECTION INTRAVENOUS at 04:54

## 2025-06-21 RX ADMIN — Medication 0.02 MCG/KG/MIN: at 01:44

## 2025-06-21 RX ADMIN — SODIUM CHLORIDE, SODIUM LACTATE, POTASSIUM CHLORIDE, AND CALCIUM CHLORIDE 250 ML: 600; 310; 30; 20 INJECTION, SOLUTION INTRAVENOUS at 09:45

## 2025-06-21 RX ADMIN — Medication 60 PERCENT: at 08:02

## 2025-06-21 RX ADMIN — SODIUM CHLORIDE 3000 ML: 900 IRRIGANT IRRIGATION at 02:06

## 2025-06-21 RX ADMIN — ROCURONIUM BROMIDE 20 MG: 10 INJECTION, SOLUTION INTRAVENOUS at 02:32

## 2025-06-21 RX ADMIN — PROPOFOL 30 MCG/KG/MIN: 10 INJECTION, EMULSION INTRAVENOUS at 22:06

## 2025-06-21 RX ADMIN — PIPERACILLIN SODIUM AND TAZOBACTAM SODIUM 3.38 G: 3; .375 INJECTION, SOLUTION INTRAVENOUS at 17:45

## 2025-06-21 RX ADMIN — SODIUM CHLORIDE 2 UNITS/HR: 9 INJECTION, SOLUTION INTRAVENOUS at 02:50

## 2025-06-21 RX ADMIN — ROCURONIUM BROMIDE 30 MG: 10 INJECTION, SOLUTION INTRAVENOUS at 02:28

## 2025-06-21 RX ADMIN — MYCOPHENOLATE MOFETIL 500 MG: 200 POWDER, FOR SUSPENSION ORAL at 21:17

## 2025-06-21 RX ADMIN — Medication 30 PERCENT: at 23:36

## 2025-06-21 RX ADMIN — NEOSTIGMINE METHYLSULFATE 5 MG: 1 INJECTION INTRAVENOUS at 09:26

## 2025-06-21 RX ADMIN — VASOPRESSIN 0.03 UNITS/MIN: 0.2 INJECTION INTRAVENOUS at 14:29

## 2025-06-21 RX ADMIN — EPINEPHRINE 8 MCG: 1 INJECTION INTRAMUSCULAR; INTRAVENOUS; SUBCUTANEOUS at 04:58

## 2025-06-21 RX ADMIN — EPOPROSTENOL 0.05 MCG/KG/MIN: 1.5 INJECTION, POWDER, LYOPHILIZED, FOR SOLUTION INTRAVENOUS at 11:22

## 2025-06-21 RX ADMIN — POTASSIUM CHLORIDE 40 MEQ: 29.8 INJECTION, SOLUTION INTRAVENOUS at 14:21

## 2025-06-21 RX ADMIN — MIDAZOLAM HYDROCHLORIDE 1 MG: 2 INJECTION, SOLUTION INTRAMUSCULAR; INTRAVENOUS at 01:10

## 2025-06-21 RX ADMIN — LIDOCAINE 4% 1 PATCH: 40 PATCH TOPICAL at 08:52

## 2025-06-21 RX ADMIN — LIDOCAINE HYDROCHLORIDE 100 MG: 20 INJECTION, SOLUTION INFILTRATION; PERINEURAL at 01:10

## 2025-06-21 RX ADMIN — SODIUM CHLORIDE, POTASSIUM CHLORIDE, SODIUM LACTATE AND CALCIUM CHLORIDE 5 ML/HR: 600; 310; 30; 20 INJECTION, SOLUTION INTRAVENOUS at 07:50

## 2025-06-21 RX ADMIN — PANTOPRAZOLE SODIUM 40 MG: 40 INJECTION, POWDER, FOR SOLUTION INTRAVENOUS at 09:29

## 2025-06-21 RX ADMIN — EPOPROSTENOL 0.05 MCG/KG/MIN: 1.5 INJECTION, POWDER, LYOPHILIZED, FOR SOLUTION INTRAVENOUS at 05:08

## 2025-06-21 RX ADMIN — LEVOTHYROXINE SODIUM 125 MCG: 0.05 TABLET ORAL at 10:38

## 2025-06-21 RX ADMIN — ACETAMINOPHEN 650 MG: 325 TABLET, FILM COATED ORAL at 15:52

## 2025-06-21 RX ADMIN — POTASSIUM CHLORIDE 20 MEQ: 14.9 INJECTION, SOLUTION INTRAVENOUS at 07:12

## 2025-06-21 RX ADMIN — PROPOFOL 30 MCG/KG/MIN: 10 INJECTION, EMULSION INTRAVENOUS at 15:52

## 2025-06-21 RX ADMIN — MIDAZOLAM HYDROCHLORIDE 1 MG: 2 INJECTION, SOLUTION INTRAMUSCULAR; INTRAVENOUS at 00:53

## 2025-06-21 RX ADMIN — PHENYLEPHRINE-NACL IV SOLUTION 10 MG/250ML-0.9% 0.4 MCG/KG/MIN: 10-0.9/25 SOLUTION at 00:57

## 2025-06-21 RX ADMIN — Medication 30 PERCENT: at 20:22

## 2025-06-21 RX ADMIN — MIDAZOLAM HYDROCHLORIDE 2 MG: 2 INJECTION, SOLUTION INTRAMUSCULAR; INTRAVENOUS at 05:06

## 2025-06-21 RX ADMIN — CALCIUM CHLORIDE 0.7 G: 100 INJECTION, SOLUTION INTRAVENOUS at 06:24

## 2025-06-21 RX ADMIN — CALCIUM GLUCONATE 1 G: 20 INJECTION, SOLUTION INTRAVENOUS at 13:49

## 2025-06-21 RX ADMIN — ETOMIDATE INJECTION 10 MG: 2 SOLUTION INTRAVENOUS at 01:10

## 2025-06-21 RX ADMIN — NOREPINEPHRINE BITARTRATE 8 MCG: 1 INJECTION, SOLUTION, CONCENTRATE INTRAVENOUS at 01:43

## 2025-06-21 ASSESSMENT — PAIN - FUNCTIONAL ASSESSMENT

## 2025-06-21 ASSESSMENT — PAIN SCALES - GENERAL
PAINLEVEL_OUTOF10: 0 - NO PAIN
PAINLEVEL_OUTOF10: 0 - NO PAIN
PAINLEVEL_OUTOF10: 5 - MODERATE PAIN
PAINLEVEL_OUTOF10: 0 - NO PAIN

## 2025-06-21 NOTE — CONSULTS
Advanced Heart Failure Initial Consultation Note   Consulting Team:CTICU  Primary Cardiologist: Dr. Ruano  Reason for Consult: s/p OHT    Subjective   Anatoly Lane is a 64 y.o. male with a PMHx of Stage D HFrEF s/p HM3 (4/30/23), CAD, PAF, VT s/p ICD, hypothyroidism who underwent OHT 06/21 with Dr. Lorenzo. Received solumedrol intraoperatively.   Arrived from the OR to CTICU on milrinone 0.2 levophed 0.06, vasopressin 0.03, and epinephrine 0.06. Post op CVP 5-7, PAP 25/14(18), CO/CI 5.9/2.8  Remains intubated and sedated.  Normal Biventricular function intra op BOBBY.  Family at bedside.    The following portions of the chart were reviewed this encounter and updated as appropriate:  Med Hx  Surg Hx         Review of Systems  Otherwise, limited cardiovascular review of systems is negative.    Medical History[1]  Surgical History[2]  Social History     Socioeconomic History    Marital status:      Spouse name: Not on file    Number of children: Not on file    Years of education: Not on file    Highest education level: Not on file   Occupational History    Not on file   Tobacco Use    Smoking status: Former     Types: Cigarettes    Smokeless tobacco: Never   Substance and Sexual Activity    Alcohol use: Never    Drug use: Never    Sexual activity: Not on file   Other Topics Concern    Not on file   Social History Narrative    Not on file     Social Drivers of Health     Financial Resource Strain: Low Risk  (6/20/2025)    Overall Financial Resource Strain (CARDIA)     Difficulty of Paying Living Expenses: Not hard at all   Food Insecurity: No Food Insecurity (2/26/2024)    Hunger Vital Sign     Worried About Running Out of Food in the Last Year: Never true     Ran Out of Food in the Last Year: Never true   Transportation Needs: No Transportation Needs (6/20/2025)    PRAPARE - Transportation     Lack of Transportation (Medical): No     Lack of Transportation (Non-Medical): No   Physical Activity:  Insufficiently Active (6/20/2025)    Exercise Vital Sign     Days of Exercise per Week: 1 day     Minutes of Exercise per Session: 10 min   Stress: No Stress Concern Present (1/28/2025)    [employer] [place] [employer] - Occupational Stress Questionnaire     Feeling of Stress : Not at all   Social Connections: Socially Isolated (1/28/2025)    Social Connection and Isolation Panel [NHANES]     Frequency of Communication with Friends and Family: Twice a week     Frequency of Social Gatherings with Friends and Family: Never     Attends Restorationist Services: Never     Active Member of Clubs or Organizations: No     Attends Club or Organization Meetings: Never     Marital Status:    Intimate Partner Violence: Not At Risk (2/26/2024)    Humiliation, Afraid, Rape, and Kick questionnaire     Fear of Current or Ex-Partner: No     Emotionally Abused: No     Physically Abused: No     Sexually Abused: No   Housing Stability: Low Risk  (6/20/2025)    Housing Stability Vital Sign     Unable to Pay for Housing in the Last Year: No     Number of Times Moved in the Last Year: 0     Homeless in the Last Year: No     Family History[3]    Current Outpatient Medications   Medication Instructions    amiodarone (PACERONE) 200 mg, oral, Daily    aspirin 81 mg, Daily    digoxin (LANOXIN) 125 mcg, oral, Daily    Entresto 49-51 mg tablet 1 tablet, oral, 2 times daily    eplerenone (INSPRA) 25 mg, oral, Daily    icosapent ethyL (VASCEPA) 2 g, oral, 2 times daily (morning and late afternoon)    levothyroxine (SYNTHROID, LEVOXYL) 125 mcg, oral, Daily, Take on an empty stomach at the same time each day, either 30 to 60 minutes prior to breakfast    pantoprazole (PROTONIX) 40 mg, oral, Daily, Do not crush, chew, or split.    potassium chloride CR (Klor-Con) 10 mEq ER tablet 20 mEq, oral, Daily, Do not crush, chew, or split.    tamsulosin (FLOMAX) 0.4 mg, oral, Daily    torsemide (DEMADEX) 20 mg, oral, Daily    torsemide (DEMADEX)  30 mg, oral, Daily    warfarin (Coumadin) 4 mg tablet Take as directed per After Visit Summary.    warfarin (COUMADIN) 2.5 mg, Every Sun/Tues/Thur/Sat         Objective   Physical Exam  Vitals:    06/21/25 0900   BP:    Pulse: 90   Resp: 19   Temp:    SpO2: 100%       GEN: intubated, sedated   On multiple pressors  CV: RRR, no m/r/g  LUNGS: mechanical breath sounds, chest tube in place  ABD: Soft, NT/ND, NBS,   SKIN: Warm, well perfused.   EXT: No clubbing, cyanosis, or edema.  NEURO: sedated    I have personally reviewed the following images and laboratory findings:    ECG:  .EKG post OHT not available    Results for orders placed during the hospital encounter of 02/27/25    Transthoracic Echo (TTE) Complete    Narrative  [facility], [address] [place] [address]  Tel [phone] and Fax [phone]    TRANSTHORACIC ECHOCARDIOGRAM REPORT      Patient Name:       BILLIE Steward Physician:    [id number] Gabino Rosenthal MD  Study Date:         2/27/2025           Ordering Provider:    [id number] TRINIDAD TIWARI  MRN/PID:            [id number]            Fellow:  Accession#:         [id number]        Nurse:                Tl Rose RN  Date of Birth/Age:  1960 / 64      Sonographer:          Unruly flanagan                                     RDCS, RVT  Gender assigned at  M                   Additional Staff:  Birth:  Height:             175.26 cm           Admit Date:  Weight:             103.87 kg           Admission Status:     Outpatient  BSA / BMI:          2.19 m2 / 33.82     Encounter#:           [id number]  kg/m2  Blood Pressure:     117/80 mmHg         Department Location:  [facility] Echo Lab    Study Type:    TRANSTHORACIC ECHO (TTE) COMPLETE  Diagnosis/ICD: Chronic systolic (congestive) heart failure (CHF)-I50.22  Indication:    listed for heart transplant  CPT Code:      Echo Complete w Full Doppler-[id number]    Patient History:  Pertinent History: LVAD, CHF, HTN,  cardiomyopathy, tachycardia.    Study Detail: The following Echo studies were performed: 2D, M-Mode, Doppler and  color flow. Technically challenging study due to poor acoustic  windows and body habitus.      PHYSICIAN INTERPRETATION:  Left Ventricle: Left ventricular ejection fraction is severely decreased, by visual estimate at 10-15%. There is moderate eccentric left ventricular hypertrophy. There is global hypokinesis of the left ventricle with minor regional variations. The left ventricular cavity size is severely dilated. There is normal septal and normal posterior left ventricular wall thickness. Spectral Doppler shows an abnormal pattern of left ventricular diastolic filling.  Left Atrium: The left atrial size is mild to moderately dilated.  Right Ventricle: The right ventricle was not well visualized. There is reduced right ventricular systolic function. A device is visualized in the right ventricle.  Right Atrium: The right atrial size was not well visualized. There is a device visualized in the right atrium.  Aortic Valve: The aortic valve was not well visualized. There is trace to mild aortic valve regurgitation.  Mitral Valve: The mitral valve is normal in structure. There is trace mitral valve regurgitation.  Tricuspid Valve: The tricuspid valve was not well visualized. Tricuspid regurgitation was not assessed.  Pulmonic Valve: The pulmonic valve is not well visualized. Pulmonic valve regurgitation was not assessed.  Pericardium: There is no pericardial effusion noted.  Aorta: The aortic root was not well visualized.  Systemic Veins: The inferior vena cava appears small in size, with IVC inspiratory collapse greater than 50%.    LEFT VENTRICULAR ASSIST DEVICE:  LVAD: The patient has a(n) HeartMate 3 LVAD device present.  Inflow Cannula: Visualization of the inflow cannula is technically difficult.  Outflow Cannula: Visualization of the outflow cannula is technically difficult.  AV Leaflet Mobility: The  aortic valve leaflets do not appear to open.        CONCLUSIONS:  1. Poorly visualized anatomical structures due to suboptimal image quality.  2. Left ventricular ejection fraction is severely decreased, by visual estimate at 10-15%.  3. There is global hypokinesis of the left ventricle with minor regional variations.  4. Spectral Doppler shows an abnormal pattern of left ventricular diastolic filling.  5. Left ventricular cavity size is severely dilated.  6. There is moderate eccentric left ventricular hypertrophy.  7. There is reduced right ventricular systolic function.  8. The left atrial size is mild to moderately dilated.    QUANTITATIVE DATA SUMMARY:    2D MEASUREMENTS:          Normal Ranges:  IVSd:            0.70 cm  (0.6-1.1cm)  LVPWd:           0.81 cm  (0.6-1.1cm)  LVIDd:           8.22 cm  (3.9-5.9cm)  LVIDs:           7.85 cm  LV Mass Index:   140 g/m2  LV % FS          4.5 %      LV SYSTOLIC FUNCTION:  Normal Ranges:  EF-Visual:      13 %  LV EF Reported: 13 %      LV DIASTOLIC FUNCTION:          Normal Ranges:  MV Peak E:             0.42 m/s (0.7-1.2 m/s)  MV Peak A:             0.54 m/s (0.42-0.7 m/s)  E/A Ratio:             0.79     (1.0-2.2)  MV medial e'           0.05 m/s      MITRAL VALVE:          Normal Ranges:  MV DT:        219 msec (150-240msec)      AORTIC VALVE:          Normal Ranges:  LVOT Diameter: 2.62 cm (1.8-2.4cm)      TRICUSPID VALVE/RVSP:          Normal Ranges:  Peak TR Velocity:     2.10 m/s  RV Syst Pressure:     21 mmHg  (< 30mmHg)  IVC Diam:             0.75 cm      PULMONIC VALVE:          Normal Ranges:  PV Accel Time:  103 msec (>120ms)  PV Max Nam:     0.8 m/s  (0.6-0.9m/s)  PV Max P.3 mmHg      AORTA:  Asc Ao Diam 3.89 cm      [id number] Gabino Rosenthal MD  Electronically signed on 2025 at 12:06:40 PM        ** Final **       Imaging  Chest x-ray: repeat CXR post op ordered.     Lab Review   [unfilled]  [unfilled]  Lab Results   Component Value Date      06/20/2025    K 3.4 (L) 06/20/2025     06/20/2025    CO2 29 06/20/2025    BUN 31 (H) 06/20/2025    CREATININE 0.98 06/20/2025    GLUCOSE 97 06/20/2025    CALCIUM 8.9 06/20/2025       Troponin I, High Sensitivity   Date/Time Value Ref Range Status   02/23/2024 02:33 PM 30 0 - 53 ng/L Final     Troponin I   Date/Time Value Ref Range Status   05/21/2023 07:43 PM CANCELED       Comment:     .  Less than 99th percentile of normal range cutoff-  Female and children under 18 years old <14 ng/L; Male <21 ng/L: Negative  Repeat testing should be performed if clinically indicated.   .  Female and children under 18 years old 14-50 ng/L; Male 21-50 ng/L:  Consistent with possible cardiac damage and possible increased clinical   risk. Serial measurements may help to assess extent of myocardial damage.   .  >50 ng/L: Consistent with cardiac damage, increased clinical risk and  myocardial infarction. Serial measurements may help assess extent of   myocardial damage.   .   NOTE: Children less than 1 year old may have higher baseline troponin   levels and results should be interpreted in conjunction with the overall   clinical context.   .  NOTE: Troponin I testing is performed using a different   testing methodology at [facility] than at other   [facility]. Direct result comparisons should only   be made within the same method.    Result canceled by the ancillary.     05/21/2023 07:43 PM CANCELED       Comment:     .  Less than 99th percentile of normal range cutoff-  Female and children under 18 years old <14 ng/L; Male <21 ng/L: Negative  Repeat testing should be performed if clinically indicated.   .  Female and children under 18 years old 14-50 ng/L; Male 21-50 ng/L:  Consistent with possible cardiac damage and possible increased clinical   risk. Serial measurements may help to assess extent of myocardial damage.   .  >50 ng/L: Consistent with cardiac damage, increased clinical risk and  myocardial  infarction. Serial measurements may help assess extent of   myocardial damage.   .   NOTE: Children less than 1 year old may have higher baseline troponin   levels and results should be interpreted in conjunction with the overall   clinical context.   .  NOTE: Troponin I testing is performed using a different   testing methodology at [facility] than at other   [facility] hospitals. Direct result comparisons should only   be made within the same method.    Result canceled by the ancillary.       BNP   Date/Time Value Ref Range Status   03/26/2025 11:57 AM 57 0 - 99 pg/mL Final   01/28/2025 02:51 PM 69 0 - 99 pg/mL Final   04/04/2024 03:09  (H) 0 - 99 pg/mL Final     LDL   Date/Time Value Ref Range Status   03/29/2023 09:51 PM 85 0 - 99 mg/dL Final     Comment:     .                           NEAR      BORD      AGE      DESIRABLE  OPTIMAL    HIGH     HIGH     VERY HIGH     0-19 Y     0 - 109     ---    110-129   >/= 130     ----    20-24 Y     0 - 119     ---    120-159   >/= 160     ----      >24 Y     0 -  99   100-129  130-159   160-189     >/=190  .       Triglycerides   Date/Time Value Ref Range Status   02/29/2024 06:58  0 - 149 mg/dL Final     Comment:        Age         Desirable   Borderline High   High     Very High   0 D-90 D    19 - 174         ----         ----        ----  91 D- 9 Y     0 -  74        75 -  99     >/= 100      ----    10-19 Y     0 -  89        90 - 129     >/= 130      ----    20-24 Y     0 - 114       115 - 149     >/= 150      ----         >24 Y     0 - 149       150 - 199    200- 499    >/= 500    Venipuncture immediately after or during the administration of Metamizole may lead to falsely low results. Testing should be performed immediately prior to Metamizole dosing.   03/29/2023 09:51 PM 83 0 - 149 mg/dL Final     Comment:     .      AGE      DESIRABLE   BORDERLINE HIGH   HIGH     VERY HIGH   0 D-90 D    19 - 174         ----         ----        ----  91 D-  9 Y     0 -  74        75 -  99     >/= 100      ----    10-19 Y     0 -  89        90 - 129     >/= 130      ----    20-24 Y     0 - 114       115 - 149     >/= 150      ----         >24 Y     0 - 149       150 - 199    200- 499    >/= 500  .   Venipuncture immediately after or during the    administration of Metamizole may lead to falsely   low results. Testing should be performed immediately   prior to Metamizole dosing.          Assessment and Plan   Anatoly Lane is a 64 y.o. male with a PMHx of Stage D HFrEF s/p HM3 (4/30/23), CAD, PAF, VT s/p ICD, hypothyroidism who underwent OHT 06/21 and explant of LVAD and AICD with Dr. Lorenzo. HF/transplant team following given post OHT status.     # s/p LVAD(HM3) explant and OHT 06/21  Hx of Stage D chronic systolic HF/ICM/HFrEF s/p HM3 LVAD (4/30/23) now s/p LVAD and AICD explant.  ABO: AB. He was status 4 on UNOS  Arrived from the OR to CTICU on milrinone 0.2 levophed 0.06, vasopressin 0.03, and epinephrine 0.06 and Iepo.  Post op CVP 5-7, PAP 25/14(18), CO/CI 5.9/2.8     Donor/Recipient Infectious history: pending. Transplant coordinators to verify serologies 06/23 per Dr. Rankin    Rejection/Prophylaxis (transplant):  - Induction: s/p methylprednisolone 500mg IV x2(06/20)  -Postoperative Induction Plan to be completed in CTICU:   Basiliximab 20mg IV within 1 hour of arrival to CTICU (no need for premedication), 2nd dose: 20mg IVPB on POD4   Steroid taper starting with Methylprednisolone 125mg IV Q8 x 3 doses   - please order first dose 8 hours from last intraoperative dose  -followed by IV Solumedrol 60 mg q 12 hrs x 2 doses followed by prednisone 35 mg q 12hrs PO q 12 hrs x 6 doses   -Perioperative Antimicrobial Plan:   Vancomycin 15mg/kg IV Q12 hours x 48hrs   Zosyn 3.375g IV Q6 hours x 48hrs   will extend coverage duration if necessary   - Tacrolimus: mg @ 0630 & mg @ 1830 w/ daily levels drawn @ 0600- Please start tacrolimus 0.5mg tonight at 1830  -  Tacrolimus goal troughs: 10-12  - Cellcept: start cellcept 500mg IV BID starting tonight  - Antifungals:    - Antivirals: pending serologies  - Anti PCP & Toxoplasmosis: currently on broad spectrum IV abx. Will begin bactrim likely POD 5 if renal function and cbc stable     Last cardiac biopsy:   Last HLA: cPRAs 02/2025 negative. Repeat in process  Last Allomap: n/a  Last RHC: pre OHT 03/26/2025  Last LHC: 03/2023(pre OHT)  Last TTE/BOBBY: intra-op BOBBY with normal bi ventricular function  Osteopenia/osteoporosis prophylaxis: Vitamin D3 and calcium supplements (please start once extubated)  Peptic/gastric ulcer prophylaxis: Pantoprazole 40mg daily   CAV Prophylaxis: start aspirin 81mg once ok from CT surgery standpoint. Start pravastatin    -agree with IVF/volume resuscitation followed by weaning off I Epo  -rest of care per CTICU team    For any questions or concerns, feel free to reach out via [employer] chat or page at [phone].         SIGNATURE: Yamila Pat MD PATIENT NAME: Anatoly Lane   DATE/TIME: June 21, 2025 9:18 AM MRN: [id number]     This plan was discussed with Dr. Rankin, HF attending.         [1]   Past Medical History:  Diagnosis Date    ACC/AHA stage D systolic heart failure     Acute on chronic systolic heart failure, NYHA class 3     CAD (coronary artery disease)     Hypothyroid     Hypothyroidism     LVAD (left ventricular assist device) present (Multi)     PAF (paroxysmal atrial fibrillation) (Multi)    [2]   Past Surgical History:  Procedure Laterality Date    CARDIAC CATHETERIZATION N/A 2/28/2024    Procedure: Right Heart Cath;  Surgeon: José Velasco MD;  Location: [place] [address] Cardiac Cath Lab;  Service: Cardiovascular;  Laterality: N/A;  with RAMP study    CARDIAC CATHETERIZATION N/A 3/26/2025    Procedure: Right Heart Cath;  Surgeon: Vince Ruano DO;  Location: [place] [address] Cardiac Cath Lab;  Service: Cardiovascular;  Laterality: N/A;    COLONOSCOPY W/ POLYPECTOMY  2023    CT  ABDOMEN PELVIS ANGIOGRAM W AND/OR WO IV CONTRAST  04/13/2023    CT ABDOMEN PELVIS ANGIOGRAM W AND/OR WO IV CONTRAST [facility] CT    LEFT VENTRICULAR ASSIST DEVICE      OTHER SURGICAL HISTORY  01/05/2022    Complete colonoscopy    OTHER SURGICAL HISTORY  01/05/2022    Cardioverter defibrillator insertion   [3]   Family History  Problem Relation Name Age of Onset    Hypertension Father      Colon cancer Father

## 2025-06-21 NOTE — SIGNIFICANT EVENT
Procedure/Surgeon: LVAD explant, AICD removal, heart transplant  Anesthesiology Attending/frontliner: Leelee Matthews    OR Course/Issues: Bronched- right lung not immediately up.     CPB time: 144min  Cross clamp time: 134min  Circ arrest time: n/a  Echo Pre/Post: NML BiV function  Chest Tubes/Drains: Bilateral pleurals, 2 mediastinals  Temporary wires location/setting: A/V wires    Fluids   Crystalloid: 2L  Colloid: n/a  Cellsaver: 1140ml  Products: 2 FFP, 2 cryo, 2 plts. Kcentra prepump.   EBL: 500ml   UOP: 1375ml    Anesthesia  Intubation: grade 1 view per sign out but second attempt  Intravenous Access: RIJ MAC with PA cath, L brachial mary, PIVs  AICD: removed  Regional anesthesia: n/a  NMB dose/last administration: not reversed  Antibiotic time: zosyn last 0528, vanc 0104  Temperature on admission to ICU: 35.9    Rest of plan per daily note

## 2025-06-21 NOTE — ANESTHESIA POSTPROCEDURE EVALUATION
Patient: Anatoly Lane    Procedure Summary       Date: 06/21/25 Room / Location: [facility] OR 18 / [facility] OR; [facility] OR    Anesthesia Start: 0049 Anesthesia Stop: 0743    Procedures:       LVAD EXPLANT; HEART TRANSPLANT, REMOVAL ICD;CANNULATION OF THE RIGHT FEMORAL ARTERY AND VEIN      TRANSPLANT, HEART      ANESTHESIA INTRAOPERATIVE BOBBY Diagnosis:       Awaiting organ transplant      Heart transplant candidate      Acute on chronic combined systolic (congestive) and diastolic (congestive) heart failure      Paroxysmal ventricular tachycardia      Non-ischemic cardiomyopathy (Multi)    Scheduled Providers: Morteza Pham MD PhD; Primo Schrader MD PhD Responsible Provider: Luis M Moore MD    Anesthesia Type: general ASA Status: 4            Anesthesia Type: general    Vitals Value Taken Time   BP 89/51 06/21/25 07:43   Temp 36 06/21/25 07:43   Pulse 90 06/21/25 07:43   Resp 16 06/21/25 07:43   SpO2 99 06/21/25 07:43       Anesthesia Post Evaluation    Patient location during evaluation: ICU  Patient participation: complete - patient cannot participate  Level of consciousness: sedated  Pain management: adequate  Airway patency: patent  Cardiovascular status: acceptable and hemodynamically stable  Respiratory status: acceptable, ETT, intubated and ventilator  Hydration status: acceptable  Postoperative Nausea and Vomiting: none  Comments: Patient transported to CTICU on continuous monitors, intubated sedated and on ICU ventilator.        No notable events documented.

## 2025-06-21 NOTE — PROGRESS NOTES
Physical Therapy                 Therapy Communication Note    Patient Name: Anatoly Lane  MRN: [id number]  Department: [facility] CTICU  Room: 09/09-A  Today's Date: 6/21/2025     Discipline: Physical Therapy    Missed Visit: PT Missed Visit: Yes     Missed Visit Reason: Missed Visit Reason:  (Pt remains intubated and sedated; defer PT eval and reattempt when pt is medically appropriate to participate in PT)    Missed Time: Attempt    ELIZABETH CHAMORRO, PT

## 2025-06-21 NOTE — ANESTHESIA PROCEDURE NOTES
Central Venous Line:    Date/Time: 6/21/2025 1:25 AM    A central venous line was placed in the OR for the following indication(s): central venous access and CVP monitoring.  Staffing  Performed: attending   Authorized by: Camilo Mott MD    Performed by: Victor Manuel Matthews DO    Sterility preparation included the following: provider hand hygiene performed prior to central venous catheter insertion, all 5 sterile barriers used (gloves, gown, cap, mask, large sterile drape) during central venous catheter insertion, antiseptic used during central venous catheter insertion and skin prep agent completely dried prior to procedure.  The patient was placed in Trendelenburg position.    Right internal jugular vein was prepped.    The site was prepped with Chlorhexidine.  Size: 9 Fr (8 Lithuanian)   Catheter type: introducer   Number of Lumens: double lumen      During the procedure, the following specific steps were taken: target vein identified, needle advanced into vein and blood aspirated and guidewire advanced into vein.  Seldinger technique used.  Procedure performed using ultrasound guidance.  Sterile gel and probe cover used in ultrasound-guided central venous catheter insertion.    Intravenous verification was obtained by ultrasound, venous blood return and transducer.      Post insertion care included: all ports aspirated, all ports flushed easily, guidewire removed intact, Biopatch applied, line sutured in place and dressing applied.    During the procedure the patient experienced: patient tolerated procedure well with no complications.           images stored in chart    Additional notes:  PA catheter placed through introducer port

## 2025-06-21 NOTE — ANESTHESIA PROCEDURE NOTES
Airway  Date/Time: 6/21/2025 1:14 AM  Reason: elective    Airway not difficult    Staffing  Performed: resident   Authorized by: Camilo Mott MD    Performed by: Victor Manuel Matthews DO  Patient location during procedure: OR    Patient Condition  Indications for airway management: anesthesia  Patient position: sniffing  No planned trial extubation  Sedation level: deep     Final Airway Details   Preoxygenated: yes  Final airway type: endotracheal airway  Successful airway: ETT  Cuffed: yes   Successful intubation technique: direct laryngoscopy  Adjuncts used in placement: intubating stylet  Endotracheal tube insertion site: oral  Blade: Sonido  Blade size: #4  ETT size (mm): 7.5  Cormack-Lehane Classification: grade IIa - partial view of glottis  Placement verified by: chest auscultation and capnometry   Measured from: lips  ETT to lips (cm): 22  Ventilation between attempts: none  Number of attempts at approach: 2    Additional Comments  First attempt unclear placement thus ETT removed and replaced. Grade 1 view with both attempts. Easy intubation.       Statement Selected

## 2025-06-21 NOTE — BRIEF OP NOTE
Date: 2025  OR Location: [facility] OR    Name: Anatoly Lane, : 1960, Age: 64 y.o., MRN: [id number], Sex: male    Diagnosis  * No Diagnosis Codes entered * * No Diagnosis Codes entered *       Chest Tubes/Drains: Farnk drains x 4       Temporary Pacing Wires: A & V pacing wires    -Settings:   -Underlying Rhythm:    Permanent pacer/ICD: No   -Preoperative settings:    -Intra-op/ Postoperative settings:    Sternotomy performed by: Dr. Brito/Dr. Pham    Conduit Harvested by:     Sternal Wires placed by: Dr. Brito    Arm/Leg/Groin Closure/Cutdown performed by:  Dr. Lorenzo    Cardio Pulmonary Bypass Time: 144 min  Cross-clamp Time: 134 min  Circulatory Arrest: No Time:     Is patient candidate for Emergency Re-sternotomy? Yes   -If yes, POD #10 is -    Procedures  LVAD EXPLANT; HEART TRANSPLANT, REMOVAL ICD;CANNULATION OF THE RIGHT FEMORAL ARTERY AND VEIN  50491 - [place] HEART TRANSPLANT W/WO RECIPIENT CARDIECTOMY    TRANSPLANT, HEART  72094 - [place] HEART TRANSPLANT W/WO RECIPIENT CARDIECTOMY  Redo Sternotomy  Heart Transplant  Explant of LVAD  Explant of Left Axillary AICD  Right Femoral Cannulation    Surgeons   Panel 1:     * Primo Schrader - Primary  Panel 2:     * Morteza Pham - Primary    Resident/Fellow/Other Assistant:  Surgeons and Role:  Panel 1:     * Isidra Brito MD - Assisting  Heather Corrigan  Staff:   [place] Person:   Joannaulator: Maciel Shanks Person: Devendra  Circulator: Tiffany  Surgical Assistant: Victor Manuel  Surgical Assistant: Wilfredo  Circulator: Lorena  Surgical Assistant: Juan Antonio    Anesthesia Staff: Anesthesiologist: Luis M Moore MD; Camilo Mott MD  Anesthesia Resident: Victor Manuel Matthews DO  Perfusionist: Nikhil Roy    Procedure Summary  Anesthesia: General  ASA: IV  Estimated Blood Loss: 500mL  Intra-op Medications:   Administrations occurring from 25 2300 to 25 0400:   Medication Name Total Dose   sodium chloride 0.9 % irrigation  solution 3,000 mL   sodium chloride 0.9 % irrigation solution 6,000 mL   calcium gluconate 1 g in sodium chloride (iso) IV 50 mL 50 mL   calcium gluconate 2 g in sodium chloride (iso)  mL Cannot be calculated   diphenhydrAMINE 50 mg/mL 50 mg   electrolyte-A (Plasmalyte-A) Cannot be calculated   etomidate (Amidate) injection 10 mg   fentaNYL (Sublimaze) injection 50 mcg/mL 500 mcg   prothrombin complex human (Kcentra) 1000 units 250 Units   heparin injection 1,000 units/mL 43,000 Units   heparin 25,000 Units in dextrose 5% 250 mL (100 Units/mL) infusion (premix) Cannot be calculated   insulin regular (HumuLIN R,NovoLIN R) 100 Units in sodium chloride 0.9% 100 mL (1 Units/mL) infusion 2.33 Units   lidocaine (Xylocaine) injection 2 % 100 mg   magnesium sulfate 2 g in sterile water for injection 50 mL Cannot be calculated   magnesium sulfate 4 g in sterile water for injection 100 mL Cannot be calculated   midazolam PF (Versed) injection 1 mg/mL 2 mg   norepinephrine (Levophed) 8 mg in sodium chloride 0.9% 250 mL (0.032 mg/mL) infusion (premix) 0.18 mg   norepinephrine (Levophed) 16 mcg/mL syringe for Anesthesia 16 mcg   phenylephrine (Prabhakar-Synephrine) 10 mg in sodium chloride 0.9% 250 mL (0.04 mg/mL) infusion (premix) 1.39 mg   piperacillin-tazobactam (Zosyn) IV 3.375 g in 50 mL (premix) 3.375 g   potassium chloride 40 mEq in 100 mL IV premix 80 mEq   rocuronium (ZeMuron) 50 mg/5 mL injection 180 mg   tranexamic acid (Cyklokapron) 5,000 mg in sodium chloride 0.9% 250 mL (20 mg/mL) infusion 2,027.43 mg   vancomycin 1 g 1,500 g   methylPREDNISolone sodium succinate (SOLU-Medrol) 500 mg in dextrose 5% 100 mL  mg              Anesthesia Record               Intraprocedure I/O Totals          Intake    CRYOPRECIPITATE 203.00 mL    PLASMA 600.00 mL    PLATELETS 500.00 mL    electrolyte-A (Plasmalyte-A) 2000.00 mL    Norepinephrine Drip 0.00 mL    The total shown is the total volume documented since Anesthesia Start  was filed.    Tranexamic Acid 0.00 mL    The total shown is the total volume documented since Anesthesia Start was filed.    Insulin Drip 0.00 mL    The total shown is the total volume documented since Anesthesia Start was filed.    Epinephrine Drip 0.00 mL    The total shown is the total volume documented since Anesthesia Start was filed.    Milrinone Drip 0.00 mL    The total shown is the total volume documented since Anesthesia Start was filed.    Phenylephrine Drip 0.00 mL    The total shown is the total volume documented since Anesthesia Start was filed.    Vasopressin Drip 0.00 mL    The total shown is the total volume documented since Anesthesia Start was filed.    Cell Saver 1140 mL    Total Intake 4443 mL       Output    Urine 1475 mL    Total Output 1475 mL       Net    Net Volume 2968 mL          Specimen:   ID Type Source Tests Collected by Time   1 : Explanted native heart with heartmate 3 Tissue HEART RECIPIENT HEART SURGICAL PATHOLOGY EXAM Primo Schrader MD PhD 6/21/2025 0616                  Findings: Heart Failure    Complications:  None; patient tolerated the procedure well.     Disposition: ICU - intubated and hemodynamically stable.  Condition: stable  Specimens Collected:   ID Type Source Tests Collected by Time   1 : Explanted native heart with heartmate 3 Tissue HEART RECIPIENT HEART SURGICAL PATHOLOGY EXAM Primo Schrader MD PhD 6/21/2025 0616     Attending Attestation: I was present and scrubbed for the key portions of the procedure.    Primo Schrader  Phone Number: [phone]

## 2025-06-21 NOTE — CONSULTS
Pharmacy to Dose Vancomycin:  Anatoly Lane is a 64 y.o. male ordered pharmacy to dose vancomycin for surgical proph x 48hrs. Today is day 1 of therapy.  Goal: -600mg/L*hr  Results from last 7 days   Lab Units 06/21/25  0801 06/20/25  1700 06/20/25  1659   BUN mg/dL 22  --  31*   CREATININE mg/dL 0.97  --  0.98   WBC AUTO x10*3/uL 20.5* 8.9  --       Staph/MRSA Screen Culture   Date/Time Value Ref Range Status   04/29/2023 05:55 PM NO Staphylococcus aureus ISOLATED.  Final     Urine Culture   Date/Time Value Ref Range Status   04/11/2023 08:01 PM Salmonella (A)  Final     Comment:     20,000-80,000 CFU/ML     Blood Culture   Date/Time Value Ref Range Status   05/12/2023 11:25 AM   Final    No Growth at 1 days~No Growth at 2 days~No Growth at 3 days~NO GROWTH at 4 days - FINAL REPORT   05/12/2023 11:25 AM   Final    No Growth at 1 days~No Growth at 2 days~No Growth at 3 days~NO GROWTH at 4 days - FINAL REPORT     Tissue/Wound Culture/Smear   Date/Time Value Ref Range Status   04/02/2023 05:50 PM ONE COLONY SKIN TUNG  Final     Gram Stain   Date/Time Value Ref Range Status   04/18/2023 08:39 AM   Final    THE GRAM STAIN INDICATES THAT THE SPECIMEN CONTAINS~SIGNIFICANT SALIVARY CONTAMINATION.~THE CULTURE WILL NOT BE PROCESSED AS IT WILL BE OF LIMITED~DIAGNOSTIC VALUE. A SPECIMEN OF BETTER QUALITY SHOULD BE~SUBMITTED IF CLINICALLY INDICATED.     C. difficile Toxin, PCR   Date/Time Value Ref Range Status   04/04/2023 01:23 AM NOT DETECTED Not Detected Final     Comment:      This assay detects the presence of the tcdB (toxin B)   gene via DNA amplification, and results should be   interpreted in the context of the patients history   and clinical findings. This test cannot be performed   on formed stools or used as a test of cure, and should   not be performed more than once per 7 days.        No results found for the last 90 days.    Antibiotics: Zosyn (Day 1).  Pertinent Medications: Epi drip, norepi drip,  vasopressin drip.  Renal function remains stable.    Plan:  Will give vanco [id number] x 48hrs.  Follow-up level not indicated.  Pharmacy will continue daily vancomycin monitoring. Please contact the pharmacy with any questions.    Thank you,  Sergei Hale [facility]

## 2025-06-21 NOTE — CARE PLAN
Problem: Pain - Adult  Goal: Verbalizes/displays adequate comfort level or baseline comfort level  Outcome: Progressing     Problem: Safety - Adult  Goal: Free from fall injury  Outcome: Progressing     Problem: Discharge Planning  Goal: Discharge to home or other facility with appropriate resources  Outcome: Progressing     Problem: Chronic Conditions and Co-morbidities  Goal: Patient's chronic conditions and co-morbidity symptoms are monitored and maintained or improved  Outcome: Progressing     Problem: Nutrition  Goal: Nutrient intake appropriate for maintaining nutritional needs  Outcome: Progressing     Problem: Safety - Medical Restraint  Goal: Remains free of injury from restraints (Restraint for Interference with Medical Device)  Outcome: Progressing  Goal: Free from restraint(s) (Restraint for Interference with Medical Device)  Outcome: Progressing

## 2025-06-21 NOTE — NURSING NOTE
0045: Anaesthesia and perfusion at bedside patient placed to battery backup on LVAD. Patient taken to OR for heart transplant. Family at bedside.

## 2025-06-21 NOTE — PROGRESS NOTES
"Anatoly Lane is a 64 y.o. male on day 1 of admission presenting with No Principal Problem: There is no principal problem currently on the Problem List. Please update the Problem List and refresh..      Objective     Physical Exam    Last Recorded Vitals  Blood pressure 78/62, pulse 100, temperature 35.9 °C (96.6 °F), temperature source Core, resp. rate 18, height 1.753 m (5' 9\"), weight 96.3 kg (212 lb 4.9 oz), SpO2 100%.  Intake/Output last 3 Shifts:  I/O last 3 completed shifts:  In: 7872 (81.7 mL/kg) [I.V.:46 (0.5 mL/kg); Blood:2967; Other:2759; IV Piggyback:2100]  Out: 6175 (64.1 mL/kg) [Urine:6175 (1.8 mL/kg/hr)]  Weight: 96.3 kg       Assessment & Plan    POD#0 OHTx, removal of LVAD/ICD  Good BiV function  Bronchoscopy for right side mucous plug  Some hematuria noted on admission  Admitted to ICU with modest inotropic/pressor support, mechanically ventilated/sedated, and on iEPO    Patient requires fluid resuscitation this morning with a CVP ~3-4 and modest lactic acidosis. Will continue swan directed fluid resuscitation and weaning of pressors. Following continued stability this morning we will begin weaning iEPO with ultimately providing SAT/SBT and WTE.    Plan reviewed with Heart Tx on 6/21    Immunosuppression/prophy per heart Tx.      Past medical history notable for BPH and hypothyroid.    1) S/P OHTx and explant of ICD, LVAD  2) Hematuria  3) Acidosis, lactic  4) Hx of hypothyroidism  5) Hx of BPH    Due to the high probability of life threatening clinical decompensation, the patient required critical care time evaluating and managing this patient.  Critical care time included obtaining a history, examining the patient, ordering and reviewing studies, discussing, developing, and implementing a management plan, evaluating the patient's response to treatment, and discussion with other care team providers. I saw and evaluated the patient myself. I reviewed the provider's documentation and discussed the " patient with the provider. Critical care time was performed exclusive of billable procedures.    Critical Care Time: 40 minutes     Best Ferguson MD

## 2025-06-21 NOTE — ANESTHESIA PROCEDURE NOTES
Peripheral IV  Date/Time: 6/21/2025 1:49 AM      Placement  Needle size: 16 G  Laterality: right  Location: hand         Scribe Attestation (For Scribes USE Only)... Attending Attestation (For Attendings USE Only).../Scribe Attestation (For Scribes USE Only)...

## 2025-06-21 NOTE — PROGRESS NOTES
CTICU Progress Note  Anatoly Lane/[id number]    Admit Date: 6/20/2025  Hospital Length of Stay: 1   ICU Length of Stay: 21h   CT SURGEON:     SUBJECTIVE:   See significant event note for OR details    MEDICATIONS  Infusions:  EPINEPHrine, Last Rate: 0.06 mcg/kg/min (06/21/25 0740)  epoprostenol, Last Rate: 0.03 mcg/kg/min (06/21/25 1305)  insulin regular infusion for cardiac surgery, Last Rate: 9.5 Units/hr (06/21/25 1256)  lactated Ringer's, Last Rate: 5 mL/hr (06/21/25 0750)  lactated Ringer's, Last Rate: 60 mL/hr (06/21/25 0745)  milrinone, Last Rate: 0.2 mcg/kg/min (06/21/25 0740)  norepinephrine, Last Rate: 0.09 mcg/kg/min (06/21/25 1159)  propofol, Last Rate: 20 mcg/kg/min (06/21/25 1125)  vasopressin, Last Rate: 0.03 Units/min (06/21/25 0740)      Scheduled:  acetaminophen, 650 mg, q6h  albumin human, 12.5 g, Once  [START ON 6/25/2025] basiliximab, 20 mg, Once  levothyroxine, 125 mcg, Daily  lidocaine, 1 patch, Daily  methylPREDNISolone sodium succinate (PF), 125 mg, q8h   Followed by  [START ON 6/22/2025] methylPREDNISolone sodium succinate (PF), 60 mg, q12h   Followed by  [START ON 6/23/2025] predniSONE, 35 mg, q12h LUPILLO  mycophenolate, 500 mg, BID  oxygen, , Continuous - Inhalation  pantoprazole, 40 mg, Daily  piperacillin-tazobactam, 3.375 g, q6h  polyethylene glycol, 17 g, BID  potassium phosphate, 30 mmol, Once  sennosides-docusate sodium, 2 tablet, BID  tacrolimus, 0.5 mg, q12h LUPILLO  vancomycin, 1,750 mg, q12h      PRN:  calcium gluconate, 1 g, q6h PRN  calcium gluconate, 2 g, q6h PRN  dextrose, 25 g, q15 min PRN   Or  glucagon, 1 mg, q15 min PRN  HYDROmorphone, 0.2 mg, q15 min PRN  insulin regular, 0-15 Units, PRN  magnesium sulfate, 2 g, q6h PRN  magnesium sulfate, 4 g, q6h PRN  naloxone, 0.2 mg, q5 min PRN  ondansetron ODT, 4 mg, q8h PRN   Or  ondansetron, 4 mg, q8h PRN  oxyCODONE, 10 mg, q4h PRN  oxyCODONE, 5 mg, q4h PRN  oxygen, , Continuous PRN - O2/gases  potassium chloride CR, 20 mEq, q6h PRN    "Or  potassium chloride, 20 mEq, q6h PRN  potassium chloride CR, 40 mEq, q6h PRN   Or  potassium chloride, 40 mEq, q6h PRN  vancomycin, , Daily PRN        PHYSICAL EXAM:   Visit Vitals  BP 78/62   Pulse 100   Temp 35.9 °C (96.6 °F) (Core)   Resp 18   Ht 1.753 m (5' 9\")   Wt 96.3 kg (212 lb 4.9 oz)   SpO2 100%   BMI 31.35 kg/m²   Smoking Status Former   BSA 2.17 m²     Wt Readings from Last 5 Encounters:   06/21/25 96.3 kg (212 lb 4.9 oz)   02/27/25 102 kg (224 lb)   01/28/25 104 kg (229 lb)   11/19/24 112 kg (246 lb 12.8 oz)   07/15/24 110 kg (243 lb 9.6 oz)     INTAKE/OUTPUT:  I/O last 3 completed shifts:  In: 7872 (81.7 mL/kg) [I.V.:46 (0.5 mL/kg); Blood:2967; Other:2759; IV Piggyback:2100]  Out: 6175 (64.1 mL/kg) [Urine:6175 (1.8 mL/kg/hr)]  Weight: 96.3 kg          Vent settings:  Vent Mode: Volume control/assist control  S RR:  [18] 18  S VT:  [560 mL-600 mL] 560 mL  PEEP/CPAP (cm H2O):  [8 cm H20] 8 cm H20  MAP (cm H2O):  [13] 13    Physical Exam:   - CONSTITUTION: robust appearing male in bed intubated/sedated  - NEUROLOGIC: PERRL. Beginning to move extremities but still sedated  - CARDIOVASCULAR: NSR in 70's underlying. A/V wires, AAI paced  - RESPIRATORY: Intubated. Clear bilaterally. Bilateral meds without airleak, serosang output. Bilateral pleural Y'd together with airleak, serosang output.   - GI: soft, absent BS  - : conteh with dark red urine  - EXTREMITIES: warm, well perfused.   - SKIN: intact other than surgical incision  - PSYCHIATRIC: NADEGE    Daily Risk Screen  Intubated: WTE pending hemodynamics  Central line: continue for vasoactives  Conteh: continue for strict I/Os    Images: Personally reviewed.     Invasive Hemodynamics:    Most Recent Range Past 24hrs   BP (Art) 126/61 Arterial Line BP 1  Min: 78/47  Max: 126/61   MAP(Art) 80 mmHg Arterial Line MAP 1 (mmHg)   Min: 58 mmHg  Max: 80 mmHg   RA/CVP   No data recorded   PA 28/15 PAP  Min: 19/9  Max: 34/12   PA(mean) 20 mmHg PAP (Mean)  Min: 13 " mmHg  Max: 21 mmHg   CO 5.9 L/min CO (L/min)  Min: 5.9 L/min  Max: 5.9 L/min   CI 2.8 L/min/m2 CI (L/min/m2)  Min: 2.8 L/min/m2  Max: 2.8 L/min/m2   Mixed Venous   No data recorded   SVR  827 (dyne*sec)/cm5 SVR (dyne*sec)/cm5  Min: 827 (dyne*sec)/cm5  Max: 827 (dyne*sec)/cm5         Assessment/Plan   Assessment:  Anatoly Lane is a 64 year old male with PMH stage D HFrEF NYHA 2-3 s/p HM2 (4/30/23), CAD, PAF, VT s/p ICD, hypothyroidism and stage D systolic HF/ICD now s/p LVAD explant, AICD removal, heart transplant 6/21.     Plan:  NEURO: No significant neuro hx. Reversed. Sedated on propofol infusion, beginning to move all extremities.  -->  - Serial neuro and pain assessments   - daily SAT. Will minimize propofol  - schedule tylenol  - prn oxy and dilaudid for pain scales  - CAM ICU score qshift  - Sleep/wake cycle hygiene     CV:  Patient has a history of stage D HFrEF NYHA 2-3 s/p HM2 (4/30/23), CAD, PAF, VT s/p ICD LVAD explant, AICD removal, heart transplant 6/21. Normal BiV function in OR on BOBBY. Arrived on epi 0.05, milrinone 0.2, iEPO 0.05 with low filling pressures/hypovolemia and normal CI. Levo and vaso for hypotension. NSR in 70's underlying. A/V wires AAI @ 90.  -->  - continuous EKG and ABP monitoring  - keep epi/milrinone for now. Will wean iEPO if CI remain stable  - wean levo/vaso while volume resuscitating  - continue A pacing for now  - No current indication to start preop CV regimen     PULM:  No history of pulmonary disease. Approprriate oxygenation and ventilation on lung protective vent setting. Airleak from pleural chest tubes.  -->  - continuous pulse oxy  - reversed and cpap if remains hemodynamically stable  - chest tubes to suction. Will UnY pleural chest tubes.      GI:  PMH of GERD. OG in place. -->  - continue PPI indefinitely   - NPO for now  - start bowel regimen  - swallow eval postop extubation    :  History of BPH. Baseline creatinine 1.0. Hematuria from OR, dark with no overt  clots. Making appropriate urine but hypovolemic. Hypokalemia, hypocalcemia, and hypophosphatemia.  -->  - RFP as indicated  - monitor blood in urine. Will consult urology if worsening  - electrolyte repletion per protocol  - Hold home tamsulosin and torsemide     ENDO:  PMH of hypothyroidism.  A1c: 4.4. Steroid and stress induced hyperglycemia on high dose insulin infusion.  -->  - Maintain BG <180  - continue insulin infusion for now  - Continue home synthroid 125 mcg daily     HEME:  INR reversed. Received plts, cryo, and FFP in OR but no postop bleeding. Acute blood loss anemia and thrombocytopenia mild coagulopathy.   - CBC daily  - SCDs for DVT prophylaxis.  - Last type and screen: 6/20  - no indication to resume home coumadin     ID:  Afebrile, no current indications of infection. MRSA Pending. -->  - Trend temp q4h  - periop vanc/zosyn x48hrs with hx of LVAD    Immunosuppression/prophylaxis:  - basiliximab now and POD 4  - continue methylpred taper, currently 125mg q8h   - start 500mg MMF BID per HF  - start tacro 05mg BID per HF      Skin:  No active skin issues.  - preventative Mepilex dressings in place on sacrum and heels  - change preventative Mepilex weekly or more frequently as indicated (when moist/soiled)   - every shift skin assessment per nursing and weekly ICU skin rounds  - moisture barrier to be applied with agatha care  - active skin problems addressed with nursing on daily rounds     Proph:  SCDs  PPI     G:  Line  Left brachial mary 6/21  RIJ MAC with PA cath 6/21  PIV x3    F: Family: will update at bedside postoperatively.     A,B,C,D,E,F,G: reviewed     I personally spent 70 minutes of critical care time directly and personally managing the patient exclusive of separately billable procedures.     Dispo: CTICU care for now.    CTICU TEAM PHONE [phone]

## 2025-06-21 NOTE — ANESTHESIA PROCEDURE NOTES
Arterial Line:    Date/Time: 6/21/2025 1:04 AM    Staffing  Performed: resident   Authorized by: Camilo Mott MD    Performed by: Camilo Mott MD    An arterial line was placed. Procedure performed using ultrasound guidance.in the OR for the following indication(s): continuous blood pressure monitoring and blood sampling needed.    A 20 gauge (size), 12 cm (length), Angiocath (type) catheter was placed into the Left brachial artery, secured by Tegadefelice   Seldinger technique used.  Events:  patient tolerated procedure well with no complications.

## 2025-06-21 NOTE — ANESTHESIA PREPROCEDURE EVALUATION
Patient: Anatoly Lane    Procedure Information       Date: 06/20/25    Procedure: TRANSPLANT, HEART    Location: [facility] OR    Surgeons: Primo Schrader MD PhD            Relevant Problems   Cardiac   (+) CHF (congestive heart failure)   (+) Hypertension   (+) Paroxysmal ventricular tachycardia      GI   (+) GI bleed      Endocrine   (+) Hypothyroidism      Hematology   (+) Anticoagulant long-term use      Circulatory   (+) Acute decompensated heart failure   (+) Acute on chronic combined systolic (congestive) and diastolic (congestive) heart failure   (+) Cardiac defibrillator in place   (+) Chronic left systolic heart failure   (+) HFrEF (heart failure with reduced ejection fraction)   (+) LVAD (left ventricular assist device) present (Multi)   (+) Non-ischemic cardiomyopathy (Multi)      Advance Directives and General Issues   (+) Awaiting organ transplant      Tobacco   (+) Former smoker      Cardiac and Vasculature   (+) Heart transplant candidate       Clinical information reviewed:     Meds   Med Hx  Surg Hx            NPO Detail:  No data recorded     Vitals:    06/20/25 2100   Pulse: 69   Resp: 13   Temp:    SpO2: 95%       Chemistry    Lab Results   Component Value Date/Time     06/20/2025 1659    K 3.4 (L) 06/20/2025 1659     06/20/2025 1659    CO2 29 06/20/2025 1659    BUN 31 (H) 06/20/2025 1659    CREATININE 0.98 06/20/2025 1659    Lab Results   Component Value Date/Time    CALCIUM 8.9 06/20/2025 1659    ALKPHOS 52 06/20/2025 1659    AST 15 06/20/2025 1659    ALT 13 06/20/2025 1659    BILITOT 0.4 06/20/2025 1659          Lab Results   Component Value Date/Time    WBC 8.9 06/20/2025 1700    HGB 13.5 06/20/2025 1700    HCT 38.5 (L) 06/20/2025 1700     06/20/2025 1700     Lab Results   Component Value Date/Time    PROTIME 33.3 (H) 06/20/2025 1659    INR 3.0 (H) 06/20/2025 1659     No results found for this or any previous visit (from the past 4464 hours).  No results found  for this or any previous visit from the past 1095 days.  Transthoracic Echo (TTE) Complete  Result Date: 2/27/2025   [facility], [address] [place] [address]                Tel [phone] and Fax [phone] TRANSTHORACIC ECHOCARDIOGRAM REPORT  Patient Name:       BILLIE WARD        Reading Physician:    [id number] Rosenthal MD Study Date:         2/27/2025           Ordering Provider:    [id number] TRINIDAD TIWARI MRN/PID:            [id number]            Fellow: Accession#:         [id number]        Nurse:                lT Rose RN Date of Birth/Age:  1960 / 64      Sonographer:          Unruly flanagan                                     RDCS, RVT Gender assigned at                     Additional Staff: Birth: Height:             175.26 cm           Admit Date: Weight:             103.87 kg           Admission Status:     Outpatient BSA / BMI:          2.19 m2 / 33.82     Encounter#:           [id number]                     kg/m2 Blood Pressure:     117/80 mmHg         Department Location:  [facility] Echo Lab Study Type:    TRANSTHORACIC ECHO (TTE) COMPLETE Diagnosis/ICD: Chronic systolic (congestive) heart failure (CHF)-I50.22 Indication:    listed for heart transplant CPT Code:      Echo Complete w Full Doppler-[id number] Patient History: Pertinent History: LVAD, CHF, HTN, cardiomyopathy, tachycardia. Study Detail: The following Echo studies were performed: 2D, M-Mode, Doppler and               color flow. Technically challenging study due to poor acoustic               windows and body habitus.  PHYSICIAN INTERPRETATION: Left Ventricle: Left ventricular ejection fraction is severely decreased, by visual estimate at 10-15%. There is moderate eccentric left ventricular hypertrophy. There is global hypokinesis of the left ventricle  with minor regional variations. The left ventricular cavity size is severely dilated. There is normal septal and normal posterior left ventricular wall thickness. Spectral Doppler shows an abnormal pattern of left ventricular diastolic filling. Left Atrium: The left atrial size is mild to moderately dilated. Right Ventricle: The right ventricle was not well visualized. There is reduced right ventricular systolic function. A device is visualized in the right ventricle. Right Atrium: The right atrial size was not well visualized. There is a device visualized in the right atrium. Aortic Valve: The aortic valve was not well visualized. There is trace to mild aortic valve regurgitation. Mitral Valve: The mitral valve is normal in structure. There is trace mitral valve regurgitation. Tricuspid Valve: The tricuspid valve was not well visualized. Tricuspid regurgitation was not assessed. Pulmonic Valve: The pulmonic valve is not well visualized. Pulmonic valve regurgitation was not assessed. Pericardium: There is no pericardial effusion noted. Aorta: The aortic root was not well visualized. Systemic Veins: The inferior vena cava appears small in size, with IVC inspiratory collapse greater than 50%.  LEFT VENTRICULAR ASSIST DEVICE: LVAD: The patient has a(n) HeartMate 3 LVAD device present. Inflow Cannula: Visualization of the inflow cannula is technically difficult. Outflow Cannula: Visualization of the outflow cannula is technically difficult. AV Leaflet Mobility: The aortic valve leaflets do not appear to open.  CONCLUSIONS:  1. Poorly visualized anatomical structures due to suboptimal image quality.  2. Left ventricular ejection fraction is severely decreased, by visual estimate at 10-15%.  3. There is global hypokinesis of the left ventricle with minor regional variations.  4. Spectral Doppler shows an abnormal pattern of left ventricular diastolic filling.  5. Left ventricular cavity size is severely dilated.  6. There  is moderate eccentric left ventricular hypertrophy.  7. There is reduced right ventricular systolic function.  8. The left atrial size is mild to moderately dilated. QUANTITATIVE DATA SUMMARY:  2D MEASUREMENTS:          Normal Ranges: IVSd:            0.70 cm  (0.6-1.1cm) LVPWd:           0.81 cm  (0.6-1.1cm) LVIDd:           8.22 cm  (3.9-5.9cm) LVIDs:           7.85 cm LV Mass Index:   140 g/m2 LV % FS          4.5 %  LV SYSTOLIC FUNCTION:                      Normal Ranges: EF-Visual:      13 % LV EF Reported: 13 %  LV DIASTOLIC FUNCTION:          Normal Ranges: MV Peak E:             0.42 m/s (0.7-1.2 m/s) MV Peak A:             0.54 m/s (0.42-0.7 m/s) E/A Ratio:             0.79     (1.0-2.2) MV medial e'           0.05 m/s  MITRAL VALVE:          Normal Ranges: MV DT:        219 msec (150-240msec)  AORTIC VALVE:          Normal Ranges: LVOT Diameter: 2.62 cm (1.8-2.4cm)  TRICUSPID VALVE/RVSP:          Normal Ranges: Peak TR Velocity:     2.10 m/s RV Syst Pressure:     21 mmHg  (< 30mmHg) IVC Diam:             0.75 cm  PULMONIC VALVE:          Normal Ranges: PV Accel Time:  103 msec (>120ms) PV Max Nam:     0.8 m/s  (0.6-0.9m/s) PV Max P.3 mmHg  AORTA: Asc Ao Diam 3.89 cm  [id number] Gabino Rosenthal MD Electronically signed on 2025 at 12:06:40 PM  ** Final **       Physical Exam    Airway  Mallampati: II  TM distance: >3 FB  Neck ROM: full  Mouth opening: 3 or more finger widths     Cardiovascular    Dental    Pulmonary Breath sounds clear to auscultation     Abdominal      Other findings:  LVAD in place.        Anesthesia Plan    History of general anesthesia?: yes  History of complications of general anesthesia?: no    ASA 4     general   (Plan GA by saulo, dmitri hall, PA, BOBBY. )  intravenous induction   Postoperative pain plan includes opioids.  Anesthetic plan and risks discussed with patient.  Use of blood products discussed with patient who.    Plan discussed with resident and attending.

## 2025-06-22 ENCOUNTER — APPOINTMENT (OUTPATIENT)
Dept: RADIOLOGY | Facility: HOSPITAL | Age: 65
DRG: 001 | End: 2025-06-22
Payer: MEDICARE

## 2025-06-22 LAB
ALBUMIN SERPL BCP-MCNC: 3.7 G/DL (ref 3.4–5)
ALBUMIN SERPL BCP-MCNC: 3.7 G/DL (ref 3.4–5)
ALBUMIN SERPL BCP-MCNC: 3.9 G/DL (ref 3.4–5)
ANION GAP BLDA CALCULATED.4IONS-SCNC: 11 MMO/L (ref 10–25)
ANION GAP BLDA CALCULATED.4IONS-SCNC: 13 MMO/L (ref 10–25)
ANION GAP BLDMV CALCULATED.4IONS-SCNC: 7 MMO/L (ref 10–25)
ANION GAP SERPL CALC-SCNC: 12 MMOL/L (ref 10–20)
ANION GAP SERPL CALC-SCNC: 15 MMOL/L (ref 10–20)
ANION GAP SERPL CALC-SCNC: 15 MMOL/L (ref 10–20)
BASE EXCESS BLDA CALC-SCNC: -1 MMOL/L (ref -2–3)
BASE EXCESS BLDA CALC-SCNC: -3.4 MMOL/L (ref -2–3)
BASE EXCESS BLDMV CALC-SCNC: 0.9 MMOL/L (ref -2–3)
BODY TEMPERATURE: 37 DEGREES CELSIUS
BUN SERPL-MCNC: 18 MG/DL (ref 6–23)
BUN SERPL-MCNC: 19 MG/DL (ref 6–23)
BUN SERPL-MCNC: 20 MG/DL (ref 6–23)
CA-I BLD-SCNC: 1.08 MMOL/L (ref 1.1–1.33)
CA-I BLD-SCNC: 1.12 MMOL/L (ref 1.1–1.33)
CA-I BLD-SCNC: 1.12 MMOL/L (ref 1.1–1.33)
CA-I BLDA-SCNC: 1.05 MMOL/L (ref 1.1–1.33)
CA-I BLDA-SCNC: 1.07 MMOL/L (ref 1.1–1.33)
CA-I BLDMV-SCNC: 1.08 MMOL/L (ref 1.1–1.33)
CALCIUM SERPL-MCNC: 8.3 MG/DL (ref 8.6–10.6)
CALCIUM SERPL-MCNC: 8.4 MG/DL (ref 8.6–10.6)
CALCIUM SERPL-MCNC: 8.5 MG/DL (ref 8.6–10.6)
CHLORIDE BLD-SCNC: 110 MMOL/L (ref 98–107)
CHLORIDE BLDA-SCNC: 109 MMOL/L (ref 98–107)
CHLORIDE BLDA-SCNC: 109 MMOL/L (ref 98–107)
CHLORIDE SERPL-SCNC: 107 MMOL/L (ref 98–107)
CHLORIDE SERPL-SCNC: 108 MMOL/L (ref 98–107)
CHLORIDE SERPL-SCNC: 109 MMOL/L (ref 98–107)
CO2 SERPL-SCNC: 24 MMOL/L (ref 21–32)
CO2 SERPL-SCNC: 25 MMOL/L (ref 21–32)
CO2 SERPL-SCNC: 27 MMOL/L (ref 21–32)
CREAT SERPL-MCNC: 0.73 MG/DL (ref 0.5–1.3)
CREAT SERPL-MCNC: 0.8 MG/DL (ref 0.5–1.3)
CREAT SERPL-MCNC: 0.8 MG/DL (ref 0.5–1.3)
EGFRCR SERPLBLD CKD-EPI 2021: >90 ML/MIN/1.73M*2
ERYTHROCYTE [DISTWIDTH] IN BLOOD BY AUTOMATED COUNT: 16.4 % (ref 11.5–14.5)
ERYTHROCYTE [DISTWIDTH] IN BLOOD BY AUTOMATED COUNT: 16.8 % (ref 11.5–14.5)
GLUCOSE BLD MANUAL STRIP-MCNC: 181 MG/DL (ref 74–99)
GLUCOSE BLD MANUAL STRIP-MCNC: 189 MG/DL (ref 74–99)
GLUCOSE BLD MANUAL STRIP-MCNC: 191 MG/DL (ref 74–99)
GLUCOSE BLD MANUAL STRIP-MCNC: 237 MG/DL (ref 74–99)
GLUCOSE BLD MANUAL STRIP-MCNC: 72 MG/DL (ref 74–99)
GLUCOSE BLD-MCNC: 173 MG/DL (ref 74–99)
GLUCOSE BLDA-MCNC: 173 MG/DL (ref 74–99)
GLUCOSE BLDA-MCNC: 179 MG/DL (ref 74–99)
GLUCOSE SERPL-MCNC: 165 MG/DL (ref 74–99)
GLUCOSE SERPL-MCNC: 181 MG/DL (ref 74–99)
GLUCOSE SERPL-MCNC: 92 MG/DL (ref 74–99)
HCO3 BLDA-SCNC: 21.3 MMOL/L (ref 22–26)
HCO3 BLDA-SCNC: 23.5 MMOL/L (ref 22–26)
HCO3 BLDMV-SCNC: 26.6 MMOL/L (ref 22–26)
HCT VFR BLD AUTO: 25 % (ref 41–52)
HCT VFR BLD AUTO: 25.9 % (ref 41–52)
HCT VFR BLD EST: 23 % (ref 41–52)
HCT VFR BLD EST: 26 % (ref 41–52)
HCT VFR BLD EST: 27 % (ref 41–52)
HCV RNA SERPL NAA+PROBE-ACNC: NOT DETECTED K[IU]/ML
HCV RNA SERPL NAA+PROBE-LOG IU: NORMAL {LOG_IU}/ML
HGB BLD-MCNC: 8.5 G/DL (ref 13.5–17.5)
HGB BLD-MCNC: 9 G/DL (ref 13.5–17.5)
HGB BLDA-MCNC: 8.7 G/DL (ref 13.5–17.5)
HGB BLDA-MCNC: 9 G/DL (ref 13.5–17.5)
HGB BLDMV-MCNC: 7.6 G/DL (ref 13.5–17.5)
INHALED O2 CONCENTRATION: 30 %
INHALED O2 CONCENTRATION: 40 %
INHALED O2 CONCENTRATION: 44 %
LACTATE BLDA-SCNC: 0.8 MMOL/L (ref 0.4–2)
LACTATE BLDA-SCNC: 1 MMOL/L (ref 0.4–2)
LACTATE BLDMV-SCNC: 1 MMOL/L (ref 0.4–2)
MAGNESIUM SERPL-MCNC: 2.27 MG/DL (ref 1.6–2.4)
MAGNESIUM SERPL-MCNC: 2.3 MG/DL (ref 1.6–2.4)
MAGNESIUM SERPL-MCNC: 2.35 MG/DL (ref 1.6–2.4)
MCH RBC QN AUTO: 30.5 PG (ref 26–34)
MCH RBC QN AUTO: 31.4 PG (ref 26–34)
MCHC RBC AUTO-ENTMCNC: 34 G/DL (ref 32–36)
MCHC RBC AUTO-ENTMCNC: 34.7 G/DL (ref 32–36)
MCV RBC AUTO: 90 FL (ref 80–100)
MCV RBC AUTO: 90 FL (ref 80–100)
NRBC BLD-RTO: 0 /100 WBCS (ref 0–0)
NRBC BLD-RTO: 0 /100 WBCS (ref 0–0)
OXYHGB MFR BLDA: 95.6 % (ref 94–98)
OXYHGB MFR BLDA: 96.1 % (ref 94–98)
OXYHGB MFR BLDMV: 70.1 % (ref 45–75)
PCO2 BLDA: 36 MM HG (ref 38–42)
PCO2 BLDA: 37 MM HG (ref 38–42)
PCO2 BLDMV: 47 MM HG (ref 41–51)
PH BLDA: 7.38 PH (ref 7.38–7.42)
PH BLDA: 7.41 PH (ref 7.38–7.42)
PH BLDMV: 7.36 PH (ref 7.33–7.43)
PHOSPHATE SERPL-MCNC: 3 MG/DL (ref 2.5–4.9)
PHOSPHATE SERPL-MCNC: 3.2 MG/DL (ref 2.5–4.9)
PHOSPHATE SERPL-MCNC: 3.9 MG/DL (ref 2.5–4.9)
PLATELET # BLD AUTO: 162 X10*3/UL (ref 150–450)
PLATELET # BLD AUTO: 178 X10*3/UL (ref 150–450)
PO2 BLDA: 86 MM HG (ref 85–95)
PO2 BLDA: 88 MM HG (ref 85–95)
PO2 BLDMV: 41 MM HG (ref 35–45)
POTASSIUM BLDA-SCNC: 4.2 MMOL/L (ref 3.5–5.3)
POTASSIUM BLDA-SCNC: 4.3 MMOL/L (ref 3.5–5.3)
POTASSIUM BLDMV-SCNC: 4 MMOL/L (ref 3.5–5.3)
POTASSIUM SERPL-SCNC: 3.9 MMOL/L (ref 3.5–5.3)
POTASSIUM SERPL-SCNC: 4.1 MMOL/L (ref 3.5–5.3)
POTASSIUM SERPL-SCNC: 4.2 MMOL/L (ref 3.5–5.3)
RBC # BLD AUTO: 2.79 X10*6/UL (ref 4.5–5.9)
RBC # BLD AUTO: 2.87 X10*6/UL (ref 4.5–5.9)
SAO2 % BLDA: 98 % (ref 94–100)
SAO2 % BLDA: 99 % (ref 94–100)
SAO2 % BLDMV: 72 % (ref 45–75)
SODIUM BLDA-SCNC: 139 MMOL/L (ref 136–145)
SODIUM BLDA-SCNC: 139 MMOL/L (ref 136–145)
SODIUM BLDMV-SCNC: 140 MMOL/L (ref 136–145)
SODIUM SERPL-SCNC: 143 MMOL/L (ref 136–145)
SODIUM SERPL-SCNC: 143 MMOL/L (ref 136–145)
SODIUM SERPL-SCNC: 144 MMOL/L (ref 136–145)
STAPHYLOCOCCUS SPEC CULT: NORMAL
TACROLIMUS BLD-MCNC: <2 NG/ML
VANCOMYCIN SERPL-MCNC: 18.5 UG/ML (ref 5–20)
WBC # BLD AUTO: 22.8 X10*3/UL (ref 4.4–11.3)
WBC # BLD AUTO: 27.5 X10*3/UL (ref 4.4–11.3)

## 2025-06-22 PROCEDURE — 84132 ASSAY OF SERUM POTASSIUM: CPT | Performed by: NURSE PRACTITIONER

## 2025-06-22 PROCEDURE — 2500000005 HC RX 250 GENERAL PHARMACY W/O HCPCS

## 2025-06-22 PROCEDURE — 74018 RADEX ABDOMEN 1 VIEW: CPT | Performed by: RADIOLOGY

## 2025-06-22 PROCEDURE — 2500000004 HC RX 250 GENERAL PHARMACY W/ HCPCS (ALT 636 FOR OP/ED): Mod: JZ,TB

## 2025-06-22 PROCEDURE — 37799 UNLISTED PX VASCULAR SURGERY: CPT | Performed by: NURSE PRACTITIONER

## 2025-06-22 PROCEDURE — 82947 ASSAY GLUCOSE BLOOD QUANT: CPT

## 2025-06-22 PROCEDURE — 2500000005 HC RX 250 GENERAL PHARMACY W/O HCPCS: Performed by: NURSE PRACTITIONER

## 2025-06-22 PROCEDURE — 2500000002 HC RX 250 W HCPCS SELF ADMINISTERED DRUGS (ALT 637 FOR MEDICARE OP, ALT 636 FOR OP/ED)

## 2025-06-22 PROCEDURE — P9045 ALBUMIN (HUMAN), 5%, 250 ML: HCPCS | Mod: JZ,TB

## 2025-06-22 PROCEDURE — 82810 BLOOD GASES O2 SAT ONLY: CPT

## 2025-06-22 PROCEDURE — 2500000002 HC RX 250 W HCPCS SELF ADMINISTERED DRUGS (ALT 637 FOR MEDICARE OP, ALT 636 FOR OP/ED): Performed by: NURSE PRACTITIONER

## 2025-06-22 PROCEDURE — 82330 ASSAY OF CALCIUM: CPT | Performed by: NURSE PRACTITIONER

## 2025-06-22 PROCEDURE — 71045 X-RAY EXAM CHEST 1 VIEW: CPT | Performed by: RADIOLOGY

## 2025-06-22 PROCEDURE — 99233 SBSQ HOSP IP/OBS HIGH 50: CPT | Performed by: INTERNAL MEDICINE

## 2025-06-22 PROCEDURE — 94003 VENT MGMT INPAT SUBQ DAY: CPT

## 2025-06-22 PROCEDURE — 99291 CRITICAL CARE FIRST HOUR: CPT | Performed by: ANESTHESIOLOGY

## 2025-06-22 PROCEDURE — 2500000001 HC RX 250 WO HCPCS SELF ADMINISTERED DRUGS (ALT 637 FOR MEDICARE OP): Performed by: NURSE PRACTITIONER

## 2025-06-22 PROCEDURE — 99291 CRITICAL CARE FIRST HOUR: CPT | Performed by: STUDENT IN AN ORGANIZED HEALTH CARE EDUCATION/TRAINING PROGRAM

## 2025-06-22 PROCEDURE — 71045 X-RAY EXAM CHEST 1 VIEW: CPT

## 2025-06-22 PROCEDURE — 2500000004 HC RX 250 GENERAL PHARMACY W/ HCPCS (ALT 636 FOR OP/ED): Performed by: NURSE PRACTITIONER

## 2025-06-22 PROCEDURE — 2020000001 HC ICU ROOM DAILY

## 2025-06-22 PROCEDURE — 83735 ASSAY OF MAGNESIUM: CPT | Performed by: NURSE PRACTITIONER

## 2025-06-22 PROCEDURE — 85027 COMPLETE CBC AUTOMATED: CPT

## 2025-06-22 PROCEDURE — 2500000004 HC RX 250 GENERAL PHARMACY W/ HCPCS (ALT 636 FOR OP/ED)

## 2025-06-22 PROCEDURE — 99291 CRITICAL CARE FIRST HOUR: CPT | Performed by: NURSE PRACTITIONER

## 2025-06-22 PROCEDURE — 80202 ASSAY OF VANCOMYCIN: CPT | Performed by: THORACIC SURGERY (CARDIOTHORACIC VASCULAR SURGERY)

## 2025-06-22 PROCEDURE — 74018 RADEX ABDOMEN 1 VIEW: CPT

## 2025-06-22 PROCEDURE — 80197 ASSAY OF TACROLIMUS: CPT | Performed by: NURSE PRACTITIONER

## 2025-06-22 PROCEDURE — P9047 ALBUMIN (HUMAN), 25%, 50ML: HCPCS | Performed by: NURSE PRACTITIONER

## 2025-06-22 RX ORDER — ALBUMIN HUMAN 250 G/1000ML
25 SOLUTION INTRAVENOUS ONCE
Status: COMPLETED | OUTPATIENT
Start: 2025-06-22 | End: 2025-06-22

## 2025-06-22 RX ORDER — MYCOPHENOLATE MOFETIL 200 MG/ML
1000 POWDER, FOR SUSPENSION ORAL 2 TIMES DAILY
Status: DISCONTINUED | OUTPATIENT
Start: 2025-06-22 | End: 2025-06-22

## 2025-06-22 RX ORDER — VORICONAZOLE 200 MG/1
200 TABLET, FILM COATED ORAL EVERY 12 HOURS SCHEDULED
Status: DISCONTINUED | OUTPATIENT
Start: 2025-06-22 | End: 2025-07-09 | Stop reason: HOSPADM

## 2025-06-22 RX ORDER — MYCOPHENOLATE MOFETIL 250 MG/1
1000 CAPSULE ORAL 2 TIMES DAILY
Status: DISCONTINUED | OUTPATIENT
Start: 2025-06-22 | End: 2025-06-26

## 2025-06-22 RX ORDER — INSULIN LISPRO 100 [IU]/ML
0-15 INJECTION, SOLUTION INTRAVENOUS; SUBCUTANEOUS EVERY 4 HOURS
Status: DISCONTINUED | OUTPATIENT
Start: 2025-06-22 | End: 2025-06-23

## 2025-06-22 RX ORDER — TACROLIMUS 1 MG/1
1 CAPSULE ORAL
Status: DISCONTINUED | OUTPATIENT
Start: 2025-06-22 | End: 2025-06-24

## 2025-06-22 RX ORDER — METHOCARBAMOL 100 MG/ML
500 INJECTION, SOLUTION INTRAMUSCULAR; INTRAVENOUS ONCE
Status: COMPLETED | OUTPATIENT
Start: 2025-06-22 | End: 2025-06-22

## 2025-06-22 RX ORDER — ACYCLOVIR 400 MG/1
400 TABLET ORAL 2 TIMES DAILY
Status: DISCONTINUED | OUTPATIENT
Start: 2025-06-22 | End: 2025-07-09 | Stop reason: HOSPADM

## 2025-06-22 RX ORDER — HEPARIN SODIUM 5000 [USP'U]/ML
5000 INJECTION, SOLUTION INTRAVENOUS; SUBCUTANEOUS EVERY 8 HOURS
Status: DISCONTINUED | OUTPATIENT
Start: 2025-06-22 | End: 2025-07-09 | Stop reason: HOSPADM

## 2025-06-22 RX ORDER — ALBUMIN HUMAN 50 G/1000ML
12.5 SOLUTION INTRAVENOUS ONCE
Status: COMPLETED | OUTPATIENT
Start: 2025-06-22 | End: 2025-06-22

## 2025-06-22 RX ORDER — PANTOPRAZOLE SODIUM 40 MG/1
40 TABLET, DELAYED RELEASE ORAL
Status: DISCONTINUED | OUTPATIENT
Start: 2025-06-23 | End: 2025-07-09 | Stop reason: HOSPADM

## 2025-06-22 RX ORDER — LIDOCAINE 560 MG/1
1 PATCH PERCUTANEOUS; TOPICAL; TRANSDERMAL DAILY PRN
Status: DISCONTINUED | OUTPATIENT
Start: 2025-06-22 | End: 2025-07-01

## 2025-06-22 RX ORDER — DEXTROSE 50 % IN WATER (D50W) INTRAVENOUS SYRINGE
25
Status: DISCONTINUED | OUTPATIENT
Start: 2025-06-22 | End: 2025-07-09 | Stop reason: HOSPADM

## 2025-06-22 RX ORDER — ALBUMIN HUMAN 50 G/1000ML
SOLUTION INTRAVENOUS
Status: COMPLETED
Start: 2025-06-22 | End: 2025-06-22

## 2025-06-22 RX ADMIN — ACETAMINOPHEN 650 MG: 325 TABLET, FILM COATED ORAL at 04:30

## 2025-06-22 RX ADMIN — Medication 6 L/MIN: at 20:11

## 2025-06-22 RX ADMIN — POTASSIUM CHLORIDE 20 MEQ: 14.9 INJECTION, SOLUTION INTRAVENOUS at 18:56

## 2025-06-22 RX ADMIN — MILRINONE LACTATE IN DEXTROSE 0.2 MCG/KG/MIN: 0.2 INJECTION, SOLUTION INTRAVENOUS at 13:46

## 2025-06-22 RX ADMIN — METHYLPREDNISOLONE SODIUM SUCCINATE 60 MG: 125 INJECTION, POWDER, FOR SOLUTION INTRAMUSCULAR; INTRAVENOUS at 20:10

## 2025-06-22 RX ADMIN — VANCOMYCIN HYDROCHLORIDE 1250 MG: 1.25 INJECTION, POWDER, LYOPHILIZED, FOR SOLUTION INTRAVENOUS at 13:43

## 2025-06-22 RX ADMIN — POLYETHYLENE GLYCOL 3350 17 G: 17 POWDER, FOR SOLUTION ORAL at 20:11

## 2025-06-22 RX ADMIN — ACYCLOVIR 400 MG: 400 TABLET ORAL at 20:11

## 2025-06-22 RX ADMIN — METHOCARBAMOL 500 MG: 1000 INJECTION, SOLUTION INTRAMUSCULAR; INTRAVENOUS at 11:05

## 2025-06-22 RX ADMIN — LIDOCAINE 4% 1 PATCH: 40 PATCH TOPICAL at 09:32

## 2025-06-22 RX ADMIN — ONDANSETRON 4 MG: 2 INJECTION INTRAMUSCULAR; INTRAVENOUS at 12:46

## 2025-06-22 RX ADMIN — MYCOPHENOLATE MOFETIL 1000 MG: 250 CAPSULE ORAL at 20:11

## 2025-06-22 RX ADMIN — PIPERACILLIN SODIUM AND TAZOBACTAM SODIUM 3.38 G: 3; .375 INJECTION, SOLUTION INTRAVENOUS at 23:01

## 2025-06-22 RX ADMIN — SODIUM CHLORIDE, SODIUM LACTATE, POTASSIUM CHLORIDE, AND CALCIUM CHLORIDE 500 ML: 600; 310; 30; 20 INJECTION, SOLUTION INTRAVENOUS at 14:29

## 2025-06-22 RX ADMIN — SODIUM CHLORIDE, SODIUM LACTATE, POTASSIUM CHLORIDE, AND CALCIUM CHLORIDE 250 ML: 600; 310; 30; 20 INJECTION, SOLUTION INTRAVENOUS at 19:13

## 2025-06-22 RX ADMIN — INSULIN LISPRO 2 UNITS: 100 INJECTION, SOLUTION INTRAVENOUS; SUBCUTANEOUS at 11:19

## 2025-06-22 RX ADMIN — TACROLIMUS 0.5 MG: 5 CAPSULE, GELATIN COATED ORAL at 06:20

## 2025-06-22 RX ADMIN — TACROLIMUS 1 MG: 1 CAPSULE ORAL at 18:15

## 2025-06-22 RX ADMIN — ALBUMIN HUMAN 25 G: 0.25 SOLUTION INTRAVENOUS at 10:48

## 2025-06-22 RX ADMIN — OXYCODONE HYDROCHLORIDE 10 MG: 10 TABLET ORAL at 17:49

## 2025-06-22 RX ADMIN — HEPARIN SODIUM 5000 UNITS: 5000 INJECTION INTRAVENOUS; SUBCUTANEOUS at 23:01

## 2025-06-22 RX ADMIN — MYCOPHENOLATE MOFETIL 500 MG: 200 POWDER, FOR SUSPENSION ORAL at 10:56

## 2025-06-22 RX ADMIN — EPINEPHRINE IN SODIUM CHLORIDE 0.05 MCG/KG/MIN: 16 INJECTION INTRAVENOUS at 04:30

## 2025-06-22 RX ADMIN — INSULIN LISPRO 2 UNITS: 100 INJECTION, SOLUTION INTRAVENOUS; SUBCUTANEOUS at 18:56

## 2025-06-22 RX ADMIN — ALBUMIN HUMAN 12.5 G: 50 SOLUTION INTRAVENOUS at 09:28

## 2025-06-22 RX ADMIN — PANTOPRAZOLE SODIUM 40 MG: 40 INJECTION, POWDER, FOR SOLUTION INTRAVENOUS at 09:14

## 2025-06-22 RX ADMIN — METHYLPREDNISOLONE SODIUM SUCCINATE 125 MG: 125 INJECTION, POWDER, FOR SOLUTION INTRAMUSCULAR; INTRAVENOUS at 09:14

## 2025-06-22 RX ADMIN — PIPERACILLIN SODIUM AND TAZOBACTAM SODIUM 3.38 G: 3; .375 INJECTION, SOLUTION INTRAVENOUS at 17:16

## 2025-06-22 RX ADMIN — HYDROMORPHONE HYDROCHLORIDE 0.2 MG: 0.2 INJECTION, SOLUTION INTRAMUSCULAR; INTRAVENOUS; SUBCUTANEOUS at 10:24

## 2025-06-22 RX ADMIN — ACETAMINOPHEN 650 MG: 325 TABLET, FILM COATED ORAL at 21:34

## 2025-06-22 RX ADMIN — VASOPRESSIN 0.01 UNITS/MIN: 0.2 INJECTION INTRAVENOUS at 03:04

## 2025-06-22 RX ADMIN — Medication 30 PERCENT: at 02:50

## 2025-06-22 RX ADMIN — PIPERACILLIN SODIUM AND TAZOBACTAM SODIUM 3.38 G: 3; .375 INJECTION, SOLUTION INTRAVENOUS at 05:29

## 2025-06-22 RX ADMIN — HYDROMORPHONE HYDROCHLORIDE 0.2 MG: 0.2 INJECTION, SOLUTION INTRAMUSCULAR; INTRAVENOUS; SUBCUTANEOUS at 06:33

## 2025-06-22 RX ADMIN — VORICONAZOLE 200 MG: 200 TABLET, COATED ORAL at 20:11

## 2025-06-22 RX ADMIN — ALBUMIN HUMAN 12.5 G: 0.05 INJECTION, SOLUTION INTRAVENOUS at 03:24

## 2025-06-22 RX ADMIN — HYDROMORPHONE HYDROCHLORIDE 0.2 MG: 0.2 INJECTION, SOLUTION INTRAMUSCULAR; INTRAVENOUS; SUBCUTANEOUS at 09:38

## 2025-06-22 RX ADMIN — INSULIN LISPRO 6 UNITS: 100 INJECTION, SOLUTION INTRAVENOUS; SUBCUTANEOUS at 23:01

## 2025-06-22 RX ADMIN — DEXMEDETOMIDINE HYDROCHLORIDE 0.5 MCG/KG/HR: 400 INJECTION INTRAVENOUS at 03:04

## 2025-06-22 RX ADMIN — INSULIN LISPRO 2 UNITS: 100 INJECTION, SOLUTION INTRAVENOUS; SUBCUTANEOUS at 03:27

## 2025-06-22 RX ADMIN — HEPARIN SODIUM 5000 UNITS: 5000 INJECTION INTRAVENOUS; SUBCUTANEOUS at 09:14

## 2025-06-22 RX ADMIN — INSULIN LISPRO 2 UNITS: 100 INJECTION, SOLUTION INTRAVENOUS; SUBCUTANEOUS at 07:10

## 2025-06-22 RX ADMIN — LIDOCAINE 4% 1 PATCH: 40 PATCH TOPICAL at 13:42

## 2025-06-22 RX ADMIN — EPINEPHRINE IN SODIUM CHLORIDE 0.05 MCG/KG/MIN: 16 INJECTION INTRAVENOUS at 19:16

## 2025-06-22 RX ADMIN — DOCUSATE SODIUM 50 MG AND SENNOSIDES 8.6 MG 2 TABLET: 8.6; 5 TABLET, FILM COATED ORAL at 20:11

## 2025-06-22 RX ADMIN — CALCIUM GLUCONATE 1 G: 20 INJECTION, SOLUTION INTRAVENOUS at 09:14

## 2025-06-22 RX ADMIN — ALBUMIN HUMAN 12.5 G: 0.05 INJECTION, SOLUTION INTRAVENOUS at 09:28

## 2025-06-22 RX ADMIN — HEPARIN SODIUM 5000 UNITS: 5000 INJECTION INTRAVENOUS; SUBCUTANEOUS at 17:49

## 2025-06-22 RX ADMIN — OXYCODONE HYDROCHLORIDE 10 MG: 10 TABLET ORAL at 12:19

## 2025-06-22 RX ADMIN — INSULIN LISPRO 2 UNITS: 100 INJECTION, SOLUTION INTRAVENOUS; SUBCUTANEOUS at 15:55

## 2025-06-22 RX ADMIN — ACETAMINOPHEN 650 MG: 325 TABLET, FILM COATED ORAL at 17:16

## 2025-06-22 RX ADMIN — PIPERACILLIN SODIUM AND TAZOBACTAM SODIUM 3.38 G: 3; .375 INJECTION, SOLUTION INTRAVENOUS at 11:05

## 2025-06-22 RX ADMIN — LEVOTHYROXINE SODIUM 125 MCG: 0.05 TABLET ORAL at 05:29

## 2025-06-22 ASSESSMENT — PAIN DESCRIPTION - LOCATION
LOCATION: CHEST
LOCATION: BACK
LOCATION: CHEST
LOCATION: STERNUM

## 2025-06-22 ASSESSMENT — PAIN - FUNCTIONAL ASSESSMENT
PAIN_FUNCTIONAL_ASSESSMENT: 0-10
PAIN_FUNCTIONAL_ASSESSMENT: 0-10
PAIN_FUNCTIONAL_ASSESSMENT: CPOT (CRITICAL CARE PAIN OBSERVATION TOOL)
PAIN_FUNCTIONAL_ASSESSMENT: 0-10
PAIN_FUNCTIONAL_ASSESSMENT: CPOT (CRITICAL CARE PAIN OBSERVATION TOOL)
PAIN_FUNCTIONAL_ASSESSMENT: 0-10
PAIN_FUNCTIONAL_ASSESSMENT: CPOT (CRITICAL CARE PAIN OBSERVATION TOOL)
PAIN_FUNCTIONAL_ASSESSMENT: CPOT (CRITICAL CARE PAIN OBSERVATION TOOL)
PAIN_FUNCTIONAL_ASSESSMENT: 0-10
PAIN_FUNCTIONAL_ASSESSMENT: CPOT (CRITICAL CARE PAIN OBSERVATION TOOL)

## 2025-06-22 ASSESSMENT — PAIN SCALES - GENERAL
PAINLEVEL_OUTOF10: 9
PAINLEVEL_OUTOF10: 3
PAINLEVEL_OUTOF10: 0 - NO PAIN
PAINLEVEL_OUTOF10: 0 - NO PAIN
PAINLEVEL_OUTOF10: 5 - MODERATE PAIN
PAINLEVEL_OUTOF10: 9
PAINLEVEL_OUTOF10: 0 - NO PAIN
PAINLEVEL_OUTOF10: 0 - NO PAIN
PAINLEVEL_OUTOF10: 7
PAINLEVEL_OUTOF10: 0 - NO PAIN
PAINLEVEL_OUTOF10: 8
PAINLEVEL_OUTOF10: 7

## 2025-06-22 ASSESSMENT — PAIN DESCRIPTION - DESCRIPTORS
DESCRIPTORS: ACHING
DESCRIPTORS: ACHING

## 2025-06-22 ASSESSMENT — PAIN DESCRIPTION - ORIENTATION
ORIENTATION: MID

## 2025-06-22 NOTE — PROGRESS NOTES
"Anatoly Lane is a 64 y.o. male on day 2 of admission presenting with HFrEF (heart failure with reduced ejection fraction).      Objective     Physical Exam    Last Recorded Vitals  Blood pressure 78/62, pulse 100, temperature 36.9 °C (98.4 °F), temperature source Core, resp. rate 13, height 1.753 m (5' 9\"), weight 96.3 kg (212 lb 4.9 oz), SpO2 96%.  Intake/Output last 3 Shifts:  I/O last 3 completed shifts:  In: 18244.4 (146.9 mL/kg) [I.V.:3122.4 (32.4 mL/kg); Blood:3717; Other:2759; NG/GT:200; IV Piggyback:4350]  Out: 8635 (89.7 mL/kg) [Urine:7945 (2.3 mL/kg/hr); Chest Tube:690]  Weight: 96.3 kg       Assessment & Plan  HFrEF (heart failure with reduced ejection fraction)    POD#1 OHTx, removal of LVAD, ICD, with good BiV fxn on inotropic support. Extubated earlier this morning. CI remains robust. Lower CVP. Epi and low dose vaso. No bleeding.    Plan  Mobilize to chair/walking  Keep epi/mil->TTE planned for tomorrow  Immunosuppression/Prophy per Tx (note Basiliximab on POD#4)  Subcu heparin  Synthroid  Keep lines  Nutrition/bowel care    Discussed with Heart Tx team, reviewed with CTS    1) S/P OHTx and explant of ICD, LVAD  2) Hematuria (improved)  3) Acidosis, lactic (improved)  4) Hx of hypothyroidism  5) Hx of BPH      Due to the high probability of life threatening clinical decompensation, the patient required critical care time evaluating and managing this patient.  Critical care time included obtaining a history, examining the patient, ordering and reviewing studies, discussing, developing, and implementing a management plan, evaluating the patient's response to treatment, and discussion with other care team providers. I saw and evaluated the patient myself. I reviewed the provider's documentation and discussed the patient with the provider. Critical care time was performed exclusive of billable procedures.    Critical Care Time: 38 minutes   Best Ferguson MD    "

## 2025-06-22 NOTE — CARE PLAN
Problem: Pain - Adult  Goal: Verbalizes/displays adequate comfort level or baseline comfort level  Outcome: Progressing     Problem: Safety - Adult  Goal: Free from fall injury  Outcome: Progressing     Problem: Skin  Goal: Decreased wound size/increased tissue granulation at next dressing change  Outcome: Progressing  Goal: Participates in plan/prevention/treatment measures  Outcome: Progressing  Goal: Prevent/manage excess moisture  Outcome: Progressing  Goal: Prevent/minimize sheer/friction injuries  Outcome: Progressing  Goal: Promote/optimize nutrition  Outcome: Progressing   The patient's goals for the shift include      Problem: Safety - Medical Restraint  Goal: Remains free of injury from restraints (Restraint for Interference with Medical Device)  Outcome: Not Progressing  Flowsheets (Taken 6/22/2025 0542)  Remains free of injury from restraints (restraint for interference with medical device):   Inform patient/family regarding the reason for restraint   Every 2 hours: Monitor safety, psychosocial status, comfort, nutrition and hydration  Goal: Free from restraint(s) (Restraint for Interference with Medical Device)  Outcome: Not Progressing  Flowsheets (Taken 6/22/2025 0542)  Free from restraint(s) (restraint for interference with medical device):   ONCE/SHIFT or MINIMUM Every 12 hours: Assess and document the continuing need for restraints   Every 24 hours: Continued use of restraint requires Licensed Independent Practitioner to perform face to face examination and written order   Identify and implement measures to help patient regain control

## 2025-06-22 NOTE — SIGNIFICANT EVENT
Heart Transplant Donor and Recipient Infectious Serology Confirmation:     The donor's infectious serology results were confirmed on the [employer]'s ([employer]) website, UNET, and recipient's infectious serology results were confirmed on [facility]'s [facility] EMR by two independent members of the Advanced Heart Failure and Heart Transplant team:    Team member #1: Arleth Baker  Team member #2: Katia Martinez     Donor/Recipient Infectious Serology Results:    CMV: -/-   EBV: +/+   Toxo: -/-   Heb B: -/-  Hep C: -/-  HIV: -/-

## 2025-06-22 NOTE — PROGRESS NOTES
Vancomycin Dosing by Pharmacy- FOLLOW UP    Anatoly Lane is a 64 y.o. year old male who Pharmacy has been consulted for vancomycin dosing for 48hr surgical prophylaxis. Based on the patient's indication and renal status this patient is being dosed based on a goal AUC of 400-600.     Renal function is currently Stable.    Current vancomycin dose: 1750 mg every 12 hours    Estimated vancomycin AUC on current dose: 652 mg/L.hr     Estimated Creatinine Clearance: 106.7 mL/min (by C-G formula based on SCr of 0.8 mg/dL).     Results from last 7 days   Lab Units 06/22/25  0530 06/21/25  2304 06/21/25  2257 06/21/25  0801   BUN mg/dL 19  --  18 22   CREATININE mg/dL 0.80  --  0.80 0.97   WBC AUTO x10*3/uL 27.5* 22.8*  --  20.5*   VANCOMYCIN RM ug/mL 18.5  --   --   --         Staph/MRSA Screen Culture   Date/Time Value Ref Range Status   06/20/2025 05:18 PM No Staphylococcus aureus isolated  Final     Urine Culture   Date/Time Value Ref Range Status   04/11/2023 08:01 PM Salmonella (A)  Final     Comment:     20,000-80,000 CFU/ML     Blood Culture   Date/Time Value Ref Range Status   05/12/2023 11:25 AM   Final    No Growth at 1 days~No Growth at 2 days~No Growth at 3 days~NO GROWTH at 4 days - FINAL REPORT   05/12/2023 11:25 AM   Final    No Growth at 1 days~No Growth at 2 days~No Growth at 3 days~NO GROWTH at 4 days - FINAL REPORT     Tissue/Wound Culture/Smear   Date/Time Value Ref Range Status   04/02/2023 05:50 PM ONE COLONY SKIN TUNG  Final     Gram Stain   Date/Time Value Ref Range Status   04/18/2023 08:39 AM   Final    THE GRAM STAIN INDICATES THAT THE SPECIMEN CONTAINS~SIGNIFICANT SALIVARY CONTAMINATION.~THE CULTURE WILL NOT BE PROCESSED AS IT WILL BE OF LIMITED~DIAGNOSTIC VALUE. A SPECIMEN OF BETTER QUALITY SHOULD BE~SUBMITTED IF CLINICALLY INDICATED.     C. difficile Toxin, PCR   Date/Time Value Ref Range Status   04/04/2023 01:23 AM NOT DETECTED Not Detected Final     Comment:      This assay detects the  presence of the tcdB (toxin B)   gene via DNA amplification, and results should be   interpreted in the context of the patients history   and clinical findings. This test cannot be performed   on formed stools or used as a test of cure, and should   not be performed more than once per 7 days.          No results found for the last 90 days.      Visit Vitals  BP 78/62   Pulse 100   Temp 36.9 °C (98.4 °F) (Core)   Resp 16        Assessment/Plan    Patient received Vancomycin 1500mg x1 intra-op on 6/21/25 @0104, then started on 1750mg every 12 hours postop  (given 2 doses).     Above goal AUC. Orders placed for new vancomcyin regimen of 1250 mg every 12 hours  x1 dose to complete 48hr course (scheduled for 6/22/25 @1400).    This dosing regimen is predicted by [employer] to result in the following pharmacokinetic parameters:  Regimen: 1250 mg IV every 12 hours.  Start time: 14:00 on 06/22/2025  Exposure target: AUC24 (range) 400-600 mg/L.hr   BSL64-51: 467 mg/L.hr  AUC24,ss: 472 mg/L.hr  Probability of AUC24 > 400: 73 %  Ctrough,ss: 15.7 mg/L  Probability of Ctrough,ss > 20: 27 %    No follow-up required after last dose of Vancomycin today. Consult to pharmacy for vancomycin dosing has completed, pharmacy will sign off at this time.      Monik Persaud, PharmD

## 2025-06-22 NOTE — PROGRESS NOTES
Advanced Heart Failure Progress Note   Consulting Team: CTICU  Primary Cardiologist: Dr. Ruano  Reason for Consult: s/p OHT    Subjective   Extubated to NC  Off levophed and inhaled epoprostenol  Remains on milrinone, 0.25 epinephrine 0.06-->0.05 and vasopressin 0.03-->0.02 today  Received first dose of tacrolimus 0.5 and cellcept 500mg yesterday evening  Objective   Physical Exam  Vitals:    06/22/25 1300   BP:    Pulse: 100   Resp: 17   Temp:    SpO2: 100%     GEN: on NC in NAD  On multiple pressors  CV: RRR, no m/r/g; A/V wires. AAI paced NSR in 60s underlying  LUNGS: chest tubes in place with SS output  ABD: Soft, NT/ND, NBS,   SKIN: Warm, well perfused.   EXT: No clubbing, cyanosis, or edema.  NEURO: sedated    I have personally reviewed the following images and laboratory findings:    ECG:  .EKG post OHT not available       Results for orders placed during the hospital encounter of 02/27/25    Transthoracic Echo (TTE) Complete    Narrative  [facility], [address] [place] [address]  Tel [phone] and Fax [phone]    TRANSTHORACIC ECHOCARDIOGRAM REPORT      Patient Name:       BILLIE Steward Physician:    [id number] Rosenthal MD  Study Date:         2/27/2025           Ordering Provider:    [id number] TRINIDAD TIWARI  MRN/PID:            [id number]            Fellow:  Accession#:         [id number]        Nurse:                Tl Rose RN  Date of Birth/Age:  1960 / 64      Sonographer:          Unruly flanagan                                     RDCS, RVT  Gender assigned at  M                   Additional Staff:  Birth:  Height:             175.26 cm           Admit Date:  Weight:             103.87 kg           Admission Status:     Outpatient  BSA / BMI:          2.19 m2 / 33.82     Encounter#:           [id number]  kg/m2  Blood Pressure:     117/80 mmHg         Department Location:  [facility]    Study Type:    TRANSTHORACIC ECHO (TTE)  COMPLETE  Diagnosis/ICD: Chronic systolic (congestive) heart failure (CHF)-I50.22  Indication:    listed for heart transplant  CPT Code:      Echo Complete w Full Doppler-[id number]    Patient History:  Pertinent History: LVAD, CHF, HTN, cardiomyopathy, tachycardia.    Study Detail: The following Echo studies were performed: 2D, M-Mode, Doppler and  color flow. Technically challenging study due to poor acoustic  windows and body habitus.      PHYSICIAN INTERPRETATION:  Left Ventricle: Left ventricular ejection fraction is severely decreased, by visual estimate at 10-15%. There is moderate eccentric left ventricular hypertrophy. There is global hypokinesis of the left ventricle with minor regional variations. The left ventricular cavity size is severely dilated. There is normal septal and normal posterior left ventricular wall thickness. Spectral Doppler shows an abnormal pattern of left ventricular diastolic filling.  Left Atrium: The left atrial size is mild to moderately dilated.  Right Ventricle: The right ventricle was not well visualized. There is reduced right ventricular systolic function. A device is visualized in the right ventricle.  Right Atrium: The right atrial size was not well visualized. There is a device visualized in the right atrium.  Aortic Valve: The aortic valve was not well visualized. There is trace to mild aortic valve regurgitation.  Mitral Valve: The mitral valve is normal in structure. There is trace mitral valve regurgitation.  Tricuspid Valve: The tricuspid valve was not well visualized. Tricuspid regurgitation was not assessed.  Pulmonic Valve: The pulmonic valve is not well visualized. Pulmonic valve regurgitation was not assessed.  Pericardium: There is no pericardial effusion noted.  Aorta: The aortic root was not well visualized.  Systemic Veins: The inferior vena cava appears small in size, with IVC inspiratory collapse greater than 50%.    LEFT VENTRICULAR ASSIST DEVICE:  LVAD: The  patient has a(n) HeartMate 3 LVAD device present.  Inflow Cannula: Visualization of the inflow cannula is technically difficult.  Outflow Cannula: Visualization of the outflow cannula is technically difficult.  AV Leaflet Mobility: The aortic valve leaflets do not appear to open.        CONCLUSIONS:  1. Poorly visualized anatomical structures due to suboptimal image quality.  2. Left ventricular ejection fraction is severely decreased, by visual estimate at 10-15%.  3. There is global hypokinesis of the left ventricle with minor regional variations.  4. Spectral Doppler shows an abnormal pattern of left ventricular diastolic filling.  5. Left ventricular cavity size is severely dilated.  6. There is moderate eccentric left ventricular hypertrophy.  7. There is reduced right ventricular systolic function.  8. The left atrial size is mild to moderately dilated.    QUANTITATIVE DATA SUMMARY:    2D MEASUREMENTS:          Normal Ranges:  IVSd:            0.70 cm  (0.6-1.1cm)  LVPWd:           0.81 cm  (0.6-1.1cm)  LVIDd:           8.22 cm  (3.9-5.9cm)  LVIDs:           7.85 cm  LV Mass Index:   140 g/m2  LV % FS          4.5 %      LV SYSTOLIC FUNCTION:  Normal Ranges:  EF-Visual:      13 %  LV EF Reported: 13 %      LV DIASTOLIC FUNCTION:          Normal Ranges:  MV Peak E:             0.42 m/s (0.7-1.2 m/s)  MV Peak A:             0.54 m/s (0.42-0.7 m/s)  E/A Ratio:             0.79     (1.0-2.2)  MV medial e'           0.05 m/s      MITRAL VALVE:          Normal Ranges:  MV DT:        219 msec (150-240msec)      AORTIC VALVE:          Normal Ranges:  LVOT Diameter: 2.62 cm (1.8-2.4cm)      TRICUSPID VALVE/RVSP:          Normal Ranges:  Peak TR Velocity:     2.10 m/s  RV Syst Pressure:     21 mmHg  (< 30mmHg)  IVC Diam:             0.75 cm      PULMONIC VALVE:          Normal Ranges:  PV Accel Time:  103 msec (>120ms)  PV Max Nam:     0.8 m/s  (0.6-0.9m/s)  PV Max P.3 mmHg      AORTA:  Asc Ao Diam 3.89  cm      [id number] Gabino Rosenthal MD  Electronically signed on 2/27/2025 at 12:06:40 PM        ** Final **       Imaging  Chest x-ray:  IMPRESSION:  1. No significant interval change in perihilar congestion and  bibasilar atelectasis.  2. Medical devices and postsurgical changes as described above.    Lab Review   [unfilled]  [unfilled]    Troponin I, High Sensitivity   Date/Time Value Ref Range Status   02/23/2024 02:33 PM 30 0 - 53 ng/L Final     Troponin I   Date/Time Value Ref Range Status   05/21/2023 07:43 PM CANCELED       Comment:     .  Less than 99th percentile of normal range cutoff-  Female and children under 18 years old <14 ng/L; Male <21 ng/L: Negative  Repeat testing should be performed if clinically indicated.   .  Female and children under 18 years old 14-50 ng/L; Male 21-50 ng/L:  Consistent with possible cardiac damage and possible increased clinical   risk. Serial measurements may help to assess extent of myocardial damage.   .  >50 ng/L: Consistent with cardiac damage, increased clinical risk and  myocardial infarction. Serial measurements may help assess extent of   myocardial damage.   .   NOTE: Children less than 1 year old may have higher baseline troponin   levels and results should be interpreted in conjunction with the overall   clinical context.   .  NOTE: Troponin I testing is performed using a different   testing methodology at [facility] than at other   [facility]. Direct result comparisons should only   be made within the same method.    Result canceled by the ancillary.     05/21/2023 07:43 PM CANCELED       Comment:     .  Less than 99th percentile of normal range cutoff-  Female and children under 18 years old <14 ng/L; Male <21 ng/L: Negative  Repeat testing should be performed if clinically indicated.   .  Female and children under 18 years old 14-50 ng/L; Male 21-50 ng/L:  Consistent with possible cardiac damage and possible increased clinical   risk. Serial  measurements may help to assess extent of myocardial damage.   .  >50 ng/L: Consistent with cardiac damage, increased clinical risk and  myocardial infarction. Serial measurements may help assess extent of   myocardial damage.   .   NOTE: Children less than 1 year old may have higher baseline troponin   levels and results should be interpreted in conjunction with the overall   clinical context.   .  NOTE: Troponin I testing is performed using a different   testing methodology at [facility] than at other   [facility]. Direct result comparisons should only   be made within the same method.    Result canceled by the ancillary.       BNP   Date/Time Value Ref Range Status   03/26/2025 11:57 AM 57 0 - 99 pg/mL Final   01/28/2025 02:51 PM 69 0 - 99 pg/mL Final   04/04/2024 03:09  (H) 0 - 99 pg/mL Final     LDL   Date/Time Value Ref Range Status   03/29/2023 09:51 PM 85 0 - 99 mg/dL Final     Comment:     .                           NEAR      BORD      AGE      DESIRABLE  OPTIMAL    HIGH     HIGH     VERY HIGH     0-19 Y     0 - 109     ---    110-129   >/= 130     ----    20-24 Y     0 - 119     ---    120-159   >/= 160     ----      >24 Y     0 -  99   100-129  130-159   160-189     >/=190  .       Triglycerides   Date/Time Value Ref Range Status   02/29/2024 06:58  0 - 149 mg/dL Final     Comment:        Age         Desirable   Borderline High   High     Very High   0 D-90 D    19 - 174         ----         ----        ----  91 D- 9 Y     0 -  74        75 -  99     >/= 100      ----    10-19 Y     0 -  89        90 - 129     >/= 130      ----    20-24 Y     0 - 114       115 - 149     >/= 150      ----         >24 Y     0 - 149       150 - 199    200- 499    >/= 500    Venipuncture immediately after or during the administration of Metamizole may lead to falsely low results. Testing should be performed immediately prior to Metamizole dosing.   03/29/2023 09:51 PM 83 0 - 149 mg/dL Final      Comment:     .      AGE      DESIRABLE   BORDERLINE HIGH   HIGH     VERY HIGH   0 D-90 D    19 - 174         ----         ----        ----  91 D- 9 Y     0 -  74        75 -  99     >/= 100      ----    10-19 Y     0 -  89        90 - 129     >/= 130      ----    20-24 Y     0 - 114       115 - 149     >/= 150      ----         >24 Y     0 - 149       150 - 199    200- 499    >/= 500  .   Venipuncture immediately after or during the    administration of Metamizole may lead to falsely   low results. Testing should be performed immediately   prior to Metamizole dosing.          Assessment and Plan   Anatoly Lane is a 64 y.o. male with a PMHx of Stage D HFrEF s/p HM3 (4/30/23), CAD, PAF, VT s/p ICD, hypothyroidism who underwent OHT 06/21 and explant of LVAD and AICD with Dr. Lorenzo. HF/transplant team following given post OHT status.      # s/p LVAD(HM3) explant and OHT 06/21  Hx of Stage D chronic systolic HF/ICM/HFrEF s/p HM3 LVAD (4/30/23) now s/p LVAD and AICD explant.  ABO: AB. He was status 4 on UNOS  Arrived from the OR to CTICU on milrinone 0.2 levophed 0.06, vasopressin 0.03, and epinephrine 0.06 and Iepo.  Post op CVP 5-7, PAP 25/14(18), CO/CI 5.9/2.8  Off levophed and inhaled epoprostenol now  Remains on milrinone, 0.25 epinephrine 0.06-->0.05 and vasopressin 0.03-->0.02 today     Donor/Recipient Infectious Serology Results:  CMV: -/-   EBV: +/+   Toxo: -/-   Heb B: -/-  Hep C: -/-  HIV: -/-     Rejection/Prophylaxis (transplant):  - Induction: s/p methylprednisolone 500mg IV x2(06/20)  -Postoperative Induction Plan to be completed in CTICU:   Basiliximab 20mg IV within 1 hour of arrival to CTICU (no need for premedication), 2nd dose: 20mg IVPB on POD4(06/25)   Steroid taper starting with Methylprednisolone 125mg IV Q8 x 3 doses   - please order first dose 8 hours from last intraoperative dose  -followed by IV Solumedrol 60 mg q 12 hrs x 2 doses followed by prednisone 35 mg q 12hrs PO q 12 hrs x 6 doses    -Perioperative Antimicrobial Plan:   Vancomycin 15mg/kg IV Q12 hours x 48hrs   Zosyn 3.375g IV Q6 hours x 48hrs   will extend coverage duration if necessary   - Tacrolimus: mg @ 0630 & mg @ 1830 w/ daily levels drawn @ 0600- Please start increase tacrolimus from 0.5mg BID to 1mg BID starting tonight  - Tacrolimus goal troughs: 10-12  - Cellcept: please increase cellcept 500mg to 1000mg BID daily  - Antifungals: start voriconazole 200mg BID x3 months end date 09/22/2025  - Antivirals: start acylovir 400mg BID(06/22  x3 months. End date 09/22/2025  - Anti PCP & Toxoplasmosis: currently on broad spectrum IV abx. Will begin bactrim likely POD 5 if renal function and cbc stable     Last cardiac biopsy: n/a  Last HLA: cPRAs 02/2025 negative. Repeat in process  Last Allomap: n/a  Last RHC: pre OHT 03/26/2025  Last LHC: 03/2023(pre OHT)  Last TTE/BOBBY: intra-op BOBBY with normal bi ventricular function  Osteopenia/osteoporosis prophylaxis: Vitamin D3 and calcium supplements   Peptic/gastric ulcer prophylaxis: Pantoprazole 40mg daily   CAV Prophylaxis: start aspirin 81mg once ok from CT surgery standpoint. Start pravastatin     -would continue with milrinone and epinephrine for now.   -Iepo weaned off now  -please obtain repeat TTE today    -rest of care per CTICU team     For any questions or concerns, feel free to     Thank you for the opportunity to involve us in the care of this fine individual. This plan was discussed with Dr. Vincent, HF attending. For any questions or concerns, feel free to reach out via [employer] chat or page at [phone].    Yamila Pat MD   Heart Failure Fellow  PGY-4

## 2025-06-22 NOTE — SIGNIFICANT EVENT
06/22/25 0302   Daily Screen   Total RSBI 31   Weaning Parameters   Weaning Vital Capacity 964 mL   Negative Inspiratory Force (NIF) -30   Spontaneous Minute Volume (MV) 6.2   Weaning Tidal Volume 435 mL   Respiratory Depth/Rhythm Regular  (rr 15)   Respiratory Effort Unlabored   Weaning Tolerance Excellent

## 2025-06-22 NOTE — CARE PLAN
Problem: Pain - Adult  Goal: Verbalizes/displays adequate comfort level or baseline comfort level  Outcome: Progressing     Problem: Safety - Adult  Goal: Free from fall injury  Outcome: Progressing     Problem: Discharge Planning  Goal: Discharge to home or other facility with appropriate resources  Outcome: Progressing     Problem: Chronic Conditions and Co-morbidities  Goal: Patient's chronic conditions and co-morbidity symptoms are monitored and maintained or improved  Outcome: Progressing     Problem: Nutrition  Goal: Nutrient intake appropriate for maintaining nutritional needs  Outcome: Progressing     Problem: Safety - Medical Restraint  Goal: Remains free of injury from restraints (Restraint for Interference with Medical Device)  Outcome: Met  Goal: Free from restraint(s) (Restraint for Interference with Medical Device)  Outcome: Met     Problem: Skin  Goal: Decreased wound size/increased tissue granulation at next dressing change  Outcome: Progressing  Goal: Participates in plan/prevention/treatment measures  Outcome: Progressing  Goal: Prevent/manage excess moisture  Outcome: Progressing  Goal: Prevent/minimize sheer/friction injuries  Outcome: Progressing  Goal: Promote/optimize nutrition  Outcome: Progressing  Goal: Promote skin healing  Outcome: Progressing     Problem: Heart Failure  Goal: Improved gas exchange this shift  Outcome: Progressing  Goal: Improved urinary output this shift  Outcome: Progressing  Goal: Reduction in peripheral edema within 24 hours  Outcome: Progressing  Goal: Report improvement of dyspnea/breathlessness this shift  Outcome: Progressing  Goal: Weight from fluid excess reduced over 2-3 days, then stabilize  Outcome: Progressing  Goal: Increase self care and/or family involvement in 24 hours  Outcome: Progressing

## 2025-06-22 NOTE — PROGRESS NOTES
CTICU Progress Note  Anatoly Lane/[id number]    Admit Date: 6/20/2025  Hospital Length of Stay: 2   ICU Length of Stay: 1d 15h   CT SURGEON:     SUBJECTIVE:   Extubated. Levo off and vaso down. Stable CI and end organ function.    MEDICATIONS  Infusions:  dexmedeTOMIDine, Last Rate: 0.5 mcg/kg/hr (06/22/25 0600)  EPINEPHrine, Last Rate: 0.05 mcg/kg/min (06/22/25 0600)  lactated Ringer's, Last Rate: 5 mL/hr (06/22/25 0600)  lactated Ringer's, Last Rate: 40 mL/hr (06/22/25 0600)  milrinone, Last Rate: 0.2 mcg/kg/min (06/22/25 0600)  norepinephrine, Last Rate: Stopped (06/21/25 2137)  propofol, Last Rate: Stopped (06/22/25 0245)  vasopressin, Last Rate: Stopped (06/22/25 0330)      Scheduled:  acetaminophen, 650 mg, q6h  [START ON 6/25/2025] basiliximab, 20 mg, Once  insulin lispro, 0-10 Units, q4h  levothyroxine, 125 mcg, Daily  lidocaine, 1 patch, Daily  methylPREDNISolone sodium succinate (PF), 125 mg, q8h   Followed by  methylPREDNISolone sodium succinate (PF), 60 mg, q12h   Followed by  [START ON 6/23/2025] predniSONE, 35 mg, q12h LUPILLO  mycophenolate, 500 mg, BID  oxygen, , Continuous - Inhalation  pantoprazole, 40 mg, Daily  piperacillin-tazobactam, 3.375 g, q6h  polyethylene glycol, 17 g, BID  sennosides-docusate sodium, 2 tablet, BID  tacrolimus, 0.5 mg, q12h LUPILLO      PRN:  calcium gluconate, 1 g, q6h PRN  calcium gluconate, 2 g, q6h PRN  dextrose, 12.5 g, q15 min PRN  dextrose, 25 g, q15 min PRN   Or  glucagon, 1 mg, q15 min PRN  glucagon, 1 mg, q15 min PRN  HYDROmorphone, 0.2 mg, q15 min PRN  insulin regular, 0-15 Units, PRN  magnesium sulfate, 2 g, q6h PRN  magnesium sulfate, 4 g, q6h PRN  naloxone, 0.2 mg, q5 min PRN  ondansetron ODT, 4 mg, q8h PRN   Or  ondansetron, 4 mg, q8h PRN  oxyCODONE, 10 mg, q4h PRN  oxyCODONE, 5 mg, q4h PRN  oxygen, , Continuous PRN - O2/gases  potassium chloride CR, 20 mEq, q6h PRN   Or  potassium chloride, 20 mEq, q6h PRN  potassium chloride CR, 40 mEq, q6h PRN   Or  potassium  "chloride, 40 mEq, q6h PRN  potassium chloride, 20 mEq, q6h PRN  potassium chloride, 40 mEq, q6h PRN  vancomycin, , Daily PRN        PHYSICAL EXAM:   Visit Vitals  BP 78/62   Pulse 100   Temp 37 °C (98.6 °F) (Core)   Resp 18   Ht 1.753 m (5' 9\")   Wt 96.3 kg (212 lb 4.9 oz)   SpO2 96%   BMI 31.35 kg/m²   Smoking Status Former   BSA 2.17 m²     Wt Readings from Last 5 Encounters:   06/21/25 96.3 kg (212 lb 4.9 oz)   02/27/25 102 kg (224 lb)   01/28/25 104 kg (229 lb)   11/19/24 112 kg (246 lb 12.8 oz)   07/15/24 110 kg (243 lb 9.6 oz)     INTAKE/OUTPUT:  I/O last 3 completed shifts:  In: 95895 (111.7 mL/kg) [I.V.:176 (1.8 mL/kg); Blood:3717; Other:2759; IV Piggyback:4100]  Out: 7930 (82.3 mL/kg) [Urine:7520 (2.2 mL/kg/hr); Chest Tube:410]  Weight: 96.3 kg          Vent settings:  Vent Mode: Pressure support  FiO2 (%):  [30 %-40 %] 36 %  S RR:  [14-18] 14  S VT:  [560 mL-600 mL] 560 mL  PEEP/CPAP (cm H2O):  [5 cm H20-8 cm H20] 5 cm H20  AL SUP:  [5 cm H20] 5 cm H20  MAP (cm H2O):  [11-13] 12    Physical Exam:   - CONSTITUTION: robust appearing male in no distress now extubated in bed  - NEUROLOGIC: PERRL. Following commands in all extremities.   - CARDIOVASCULAR: NSR in 60's underlying. A/V wires, AAI paced  - RESPIRATORY: Clear bilaterally. Bilateral meds without airleak, serosang output. Left pleural with airleak, serosang output. Right pleural with airleak, serosang output.   - GI: soft, hypoactive BS  - : conteh now with clear yellow urine  - EXTREMITIES: warm, well perfused.   - SKIN: intact other than surgical incision  - PSYCHIATRIC: appropriate    Daily Risk Screen  Intubated: n/s  Central line: continue for vasoactives  Conteh: continue for strict I/Os    Images: Personally reviewed.     Invasive Hemodynamics:    Most Recent Range Past 24hrs   BP (Art) 117/55 Arterial Line BP 1  Min: 78/47  Max: 143/66   MAP(Art) 73 mmHg Arterial Line MAP 1 (mmHg)   Min: 58 mmHg  Max: 175 mmHg   RA/CVP   No data recorded   PA " 27/13 PAP  Min: 19/9  Max: 47/23   PA(mean) 18 mmHg PAP (Mean)  Min: 13 mmHg  Max: 32 mmHg   CO 6.2 L/min CO (L/min)  Min: 5.5 L/min  Max: 6.2 L/min   CI 2.9 L/min/m2 CI (L/min/m2)  Min: 2.6 L/min/m2  Max: 2.9 L/min/m2   Mixed Venous   No data recorded   SVR  987 (dyne*sec)/cm5 SVR (dyne*sec)/cm5  Min: 827 (dyne*sec)/cm5  Max: 1056 (dyne*sec)/cm5         Assessment/Plan   Assessment:  Anatoly Lane is a 64 year old male with PMH stage D HFrEF NYHA 2-3 s/p HM2 (4/30/23), CAD, PAF, VT s/p ICD, hypothyroidism and stage D systolic HF/ICD now s/p LVAD explant, AICD removal, heart transplant 6/21.     Plan:  NEURO: No significant neuro hx. Extubated and neuro intact. Not yet OOB to chair.  C/o low back pain. -->  - Serial neuro and pain assessments   - schedule tylenol  - prn oxy and dilaudid for pain scales  - robaxin x1  - CAM ICU score qshift  - Sleep/wake cycle hygiene     CV:  Patient has a history of stage D HFrEF NYHA 2-3 s/p HM2 (4/30/23), CAD, PAF, VT s/p ICD LVAD explant, AICD removal, heart transplant 6/21. Normal BiV function in OR on BOBBY. Appropriate CI and normalized lactate on epi 0.05, milrinone 0.2 mcg/kg/min. Continue low filling pressures and orthostatic this AM despite being 3L positive. Low dose vaso 0.01 mcg/kg/min for hypotensoin. NSR in 60's underlying. A/V wires AAI @ 100.  -->  - continuous EKG and ABP monitoring  - continue epi/milrinone for now. Repeat ECHO tomorrow per HF team  - 250ml albumin and wean off vaso  - continue A pacing for now  - No current indication to start preop CV regimen     PULM:  No history of pulmonary disease. Extubated to NC with appropriate oxygenation and ventilation. Airleak from left pleural chest tubes.  -->  - continuous pulse oxy  - wean NC as able  - chest tubes to suction.      GI:  PMH of GERD. -->  - continue PPI indefinitely   - swallow eval in 4 hours  - continue bowel regimen    :  History of BPH. Baseline creatinine 1.0. Hematuria, resolved. Urine now  clear. Hypovolemia persisting.  -->  - RFP as indicated  - gentle volume resuscitation  - electrolyte repletion per protocol  - Hold home tamsulosin and torsemide. Will resume tamsulosin if blood pressure allows     ENDO:  PMH of hypothyroidism.  A1c: 4.4. Steroid and stress induced hyperglycemia improved now on SSI.  -->  - Maintain BG <180  - continue SSI q4h for now  - Continue home synthroid 125 mcg daily     HEME:  INR reversed. Received plts, cryo, and FFP in OR but no postop bleeding. Acute blood loss anemia stable.   - CBC daily  - start SQH  - SCDs for DVT prophylaxis.  - Last type and screen: 6/20  - no indication to resume home coumadin     ID:  Afebrile, no current indications of infection. MRSA negative. -->  - Trend temp q4h  - periop vanc/zosyn x48hrs with hx of LVAD    Immunosuppression/prophylaxis:  CMV: -/-   EBV: +/+   Toxo: -/-   Heb B: -/-  Hep C: -/-  HIV: -/-   - basiliximab now and POD 4 (Wednesday)  - continue methylpred taper, currently 60mg BID  - Increase to 1000mg MMF BID per HF  - increase tacro to 1 mg BID per HF. Daily level  - Will start prophylaxis per HF     Skin:  No active skin issues.  - preventative Mepilex dressings in place on sacrum and heels  - change preventative Mepilex weekly or more frequently as indicated (when moist/soiled)   - every shift skin assessment per nursing and weekly ICU skin rounds  - moisture barrier to be applied with agatha care  - active skin problems addressed with nursing on daily rounds     Proph:  SCDs  PPI  SQH     G:  Lines:  Left brachial mary 6/21  RIJ MAC with PA cath 6/21  PIV x3     F: Family: Sister, mother, and father updated at bedside yesterday. Will update again today when available.      A,B,C,D,E,F,G: reviewed     I personally spent 60 minutes of critical care time directly and personally managing the patient exclusive of separately billable procedures.     Dispo: CTICU care for now.    CTICU TEAM PHONE [phone]

## 2025-06-23 ENCOUNTER — APPOINTMENT (OUTPATIENT)
Dept: RADIOLOGY | Facility: HOSPITAL | Age: 65
DRG: 001 | End: 2025-06-23
Payer: MEDICARE

## 2025-06-23 ENCOUNTER — APPOINTMENT (OUTPATIENT)
Dept: CARDIOLOGY | Facility: HOSPITAL | Age: 65
End: 2025-06-23
Payer: MEDICARE

## 2025-06-23 ENCOUNTER — DOCUMENTATION (OUTPATIENT)
Dept: TRANSPLANT | Facility: HOSPITAL | Age: 65
End: 2025-06-23
Payer: MEDICARE

## 2025-06-23 VITALS
RESPIRATION RATE: 19 BRPM | SYSTOLIC BLOOD PRESSURE: 78 MMHG | WEIGHT: 212.3 LBS | HEART RATE: 100 BPM | DIASTOLIC BLOOD PRESSURE: 62 MMHG | OXYGEN SATURATION: 99 % | BODY MASS INDEX: 31.44 KG/M2 | TEMPERATURE: 98.6 F | HEIGHT: 69 IN

## 2025-06-23 PROBLEM — Z76.82 AWAITING ORGAN TRANSPLANT: Status: RESOLVED | Noted: 2025-02-14 | Resolved: 2025-06-23

## 2025-06-23 PROBLEM — Z87.438 HISTORY OF BPH: Status: ACTIVE | Noted: 2025-06-23

## 2025-06-23 PROBLEM — Z79.01 ANTICOAGULANT LONG-TERM USE: Status: RESOLVED | Noted: 2024-02-26 | Resolved: 2025-06-23

## 2025-06-23 PROBLEM — I50.43 ACUTE ON CHRONIC COMBINED SYSTOLIC (CONGESTIVE) AND DIASTOLIC (CONGESTIVE) HEART FAILURE: Status: RESOLVED | Noted: 2024-02-23 | Resolved: 2025-06-23

## 2025-06-23 PROBLEM — Z95.811 LVAD (LEFT VENTRICULAR ASSIST DEVICE) PRESENT (MULTI): Status: RESOLVED | Noted: 2024-02-14 | Resolved: 2025-06-23

## 2025-06-23 PROBLEM — I50.9 CHF (CONGESTIVE HEART FAILURE): Status: RESOLVED | Noted: 2024-02-14 | Resolved: 2025-06-23

## 2025-06-23 PROBLEM — E86.1 HYPOVOLEMIA: Status: ACTIVE | Noted: 2025-06-23

## 2025-06-23 PROBLEM — I50.22 CHRONIC LEFT SYSTOLIC HEART FAILURE: Status: RESOLVED | Noted: 2025-02-14 | Resolved: 2025-06-23

## 2025-06-23 PROBLEM — D84.9 IMMUNOSUPPRESSION: Status: ACTIVE | Noted: 2025-06-23

## 2025-06-23 PROBLEM — R73.9 STRESS HYPERGLYCEMIA: Status: ACTIVE | Noted: 2025-06-23

## 2025-06-23 PROBLEM — Z95.810 CARDIAC DEFIBRILLATOR IN PLACE: Status: RESOLVED | Noted: 2024-02-14 | Resolved: 2025-06-23

## 2025-06-23 PROBLEM — I95.1 ORTHOSTATIC HYPOTENSION: Status: ACTIVE | Noted: 2025-06-23

## 2025-06-23 PROBLEM — I50.9 ACUTE DECOMPENSATED HEART FAILURE: Status: RESOLVED | Noted: 2024-02-23 | Resolved: 2025-06-23

## 2025-06-23 PROBLEM — I95.1 ORTHOSTATIC HYPOTENSION: Status: RESOLVED | Noted: 2025-06-23 | Resolved: 2025-06-23

## 2025-06-23 PROBLEM — Z94.1 S/P ORTHOTOPIC HEART TRANSPLANT: Status: ACTIVE | Noted: 2025-06-23

## 2025-06-23 PROBLEM — Z79.899 HIGH RISK MEDICATION USE: Status: RESOLVED | Noted: 2024-02-14 | Resolved: 2025-06-23

## 2025-06-23 PROBLEM — Z94.9 TRANSPLANT: Status: RESOLVED | Noted: 2024-02-14 | Resolved: 2025-06-23

## 2025-06-23 PROBLEM — I47.20 PAROXYSMAL VENTRICULAR TACHYCARDIA: Status: RESOLVED | Noted: 2024-02-14 | Resolved: 2025-06-23

## 2025-06-23 PROBLEM — Z76.82 HEART TRANSPLANT CANDIDATE: Status: RESOLVED | Noted: 2025-01-28 | Resolved: 2025-06-23

## 2025-06-23 PROBLEM — I50.20 HFREF (HEART FAILURE WITH REDUCED EJECTION FRACTION): Status: RESOLVED | Noted: 2025-01-28 | Resolved: 2025-06-23

## 2025-06-23 PROBLEM — Z86.39 HISTORY OF HYPOTHYROIDISM: Status: ACTIVE | Noted: 2025-06-23

## 2025-06-23 PROBLEM — Z79.899 RECEIVING INOTROPIC MEDICATION: Status: ACTIVE | Noted: 2025-06-23

## 2025-06-23 PROBLEM — I42.8 NON-ISCHEMIC CARDIOMYOPATHY (MULTI): Status: RESOLVED | Noted: 2024-02-14 | Resolved: 2025-06-23

## 2025-06-23 PROBLEM — D62 ABLA (ACUTE BLOOD LOSS ANEMIA): Status: ACTIVE | Noted: 2025-06-23

## 2025-06-23 LAB
ABO GROUP (TYPE) IN BLOOD: NORMAL
ALBUMIN SERPL BCP-MCNC: 3.8 G/DL (ref 3.4–5)
ALBUMIN SERPL BCP-MCNC: 3.9 G/DL (ref 3.4–5)
ANION GAP SERPL CALC-SCNC: 11 MMOL/L (ref 10–20)
ANION GAP SERPL CALC-SCNC: 11 MMOL/L (ref 10–20)
ANTIBODY SCREEN: NORMAL
ATRIAL RATE: 50 BPM
BASE EXCESS BLDMV CALC-SCNC: -1.8 MMOL/L (ref -2–3)
BLOOD EXPIRATION DATE: NORMAL
BODY TEMPERATURE: 37 DEGREES CELSIUS
BUN SERPL-MCNC: 23 MG/DL (ref 6–23)
BUN SERPL-MCNC: 26 MG/DL (ref 6–23)
CA-I BLD-SCNC: 1.17 MMOL/L (ref 1.1–1.33)
CA-I BLD-SCNC: 1.17 MMOL/L (ref 1.1–1.33)
CALCIUM SERPL-MCNC: 8.5 MG/DL (ref 8.6–10.6)
CALCIUM SERPL-MCNC: 8.7 MG/DL (ref 8.6–10.6)
CHLORIDE SERPL-SCNC: 107 MMOL/L (ref 98–107)
CHLORIDE SERPL-SCNC: 107 MMOL/L (ref 98–107)
CMV IGM SERPL-ACNC: <8 AU/ML
CO2 SERPL-SCNC: 29 MMOL/L (ref 21–32)
CO2 SERPL-SCNC: 30 MMOL/L (ref 21–32)
CREAT SERPL-MCNC: 0.76 MG/DL (ref 0.5–1.3)
CREAT SERPL-MCNC: 0.78 MG/DL (ref 0.5–1.3)
DISPENSE STATUS: NORMAL
EGFRCR SERPLBLD CKD-EPI 2021: >90 ML/MIN/1.73M*2
EGFRCR SERPLBLD CKD-EPI 2021: >90 ML/MIN/1.73M*2
EJECTION FRACTION APICAL 4 CHAMBER: 65.4
EJECTION FRACTION: 67 %
ERYTHROCYTE [DISTWIDTH] IN BLOOD BY AUTOMATED COUNT: 17.3 % (ref 11.5–14.5)
ERYTHROCYTE [DISTWIDTH] IN BLOOD BY AUTOMATED COUNT: 17.5 % (ref 11.5–14.5)
GLUCOSE BLD MANUAL STRIP-MCNC: 168 MG/DL (ref 74–99)
GLUCOSE BLD MANUAL STRIP-MCNC: 173 MG/DL (ref 74–99)
GLUCOSE BLD MANUAL STRIP-MCNC: 211 MG/DL (ref 74–99)
GLUCOSE BLD MANUAL STRIP-MCNC: 228 MG/DL (ref 74–99)
GLUCOSE SERPL-MCNC: 168 MG/DL (ref 74–99)
GLUCOSE SERPL-MCNC: 232 MG/DL (ref 74–99)
HCO3 BLDMV-SCNC: 23.1 MMOL/L (ref 22–26)
HCT VFR BLD AUTO: 23 % (ref 41–52)
HCT VFR BLD AUTO: 23.2 % (ref 41–52)
HGB BLD-MCNC: 7.5 G/DL (ref 13.5–17.5)
HGB BLD-MCNC: 7.7 G/DL (ref 13.5–17.5)
LEFT VENTRICLE INTERNAL DIMENSION DIASTOLE: 4.6 CM (ref 3.5–6)
LEFT VENTRICULAR OUTFLOW TRACT DIAMETER: 2.2 CM
MAGNESIUM SERPL-MCNC: 2.51 MG/DL (ref 1.6–2.4)
MAGNESIUM SERPL-MCNC: 2.58 MG/DL (ref 1.6–2.4)
MCH RBC QN AUTO: 30.3 PG (ref 26–34)
MCH RBC QN AUTO: 30.9 PG (ref 26–34)
MCHC RBC AUTO-ENTMCNC: 32.6 G/DL (ref 32–36)
MCHC RBC AUTO-ENTMCNC: 33.2 G/DL (ref 32–36)
MCV RBC AUTO: 91 FL (ref 80–100)
MCV RBC AUTO: 95 FL (ref 80–100)
NRBC BLD-RTO: 0 /100 WBCS (ref 0–0)
NRBC BLD-RTO: 0.2 /100 WBCS (ref 0–0)
OXYHGB MFR BLDMV: 70.2 % (ref 45–75)
P AXIS: 39 DEGREES
P OFFSET: 136 MS
P ONSET: 116 MS
PCO2 BLDMV: 39 MM HG (ref 41–51)
PH BLDMV: 7.38 PH (ref 7.33–7.43)
PHOSPHATE SERPL-MCNC: 2.5 MG/DL (ref 2.5–4.9)
PHOSPHATE SERPL-MCNC: 2.6 MG/DL (ref 2.5–4.9)
PLATELET # BLD AUTO: 125 X10*3/UL (ref 150–450)
PLATELET # BLD AUTO: 159 X10*3/UL (ref 150–450)
PO2 BLDMV: 41 MM HG (ref 35–45)
POTASSIUM SERPL-SCNC: 4.3 MMOL/L (ref 3.5–5.3)
POTASSIUM SERPL-SCNC: 4.8 MMOL/L (ref 3.5–5.3)
PRODUCT BLOOD TYPE: 8400
PRODUCT CODE: NORMAL
Q ONSET: 156 MS
QRS COUNT: 12 BEATS
QRS DURATION: 186 MS
QT INTERVAL: 432 MS
QTC CALCULATION(BAZETT): 473 MS
QTC FREDERICIA: 459 MS
R AXIS: 185 DEGREES
RBC # BLD AUTO: 2.43 X10*6/UL (ref 4.5–5.9)
RBC # BLD AUTO: 2.54 X10*6/UL (ref 4.5–5.9)
RH FACTOR (ANTIGEN D): NORMAL
SAO2 % BLDMV: 72 % (ref 45–75)
SODIUM SERPL-SCNC: 143 MMOL/L (ref 136–145)
SODIUM SERPL-SCNC: 143 MMOL/L (ref 136–145)
T AXIS: 124 DEGREES
T GONDII IGM SER-ACNC: <3 AU/ML
T OFFSET: 372 MS
TACROLIMUS BLD-MCNC: 2.3 NG/ML
UNIT ABO: NORMAL
UNIT NUMBER: NORMAL
UNIT RH: NORMAL
UNIT VOLUME: 278
UNIT VOLUME: 350
VENTRICULAR RATE: 72 BPM
WBC # BLD AUTO: 27.9 X10*3/UL (ref 4.4–11.3)
WBC # BLD AUTO: 33 X10*3/UL (ref 4.4–11.3)
XM INTEP: NORMAL

## 2025-06-23 PROCEDURE — 2500000002 HC RX 250 W HCPCS SELF ADMINISTERED DRUGS (ALT 637 FOR MEDICARE OP, ALT 636 FOR OP/ED): Performed by: NURSE PRACTITIONER

## 2025-06-23 PROCEDURE — 97161 PT EVAL LOW COMPLEX 20 MIN: CPT | Mod: GP

## 2025-06-23 PROCEDURE — 2500000004 HC RX 250 GENERAL PHARMACY W/ HCPCS (ALT 636 FOR OP/ED): Performed by: NURSE PRACTITIONER

## 2025-06-23 PROCEDURE — 2500000002 HC RX 250 W HCPCS SELF ADMINISTERED DRUGS (ALT 637 FOR MEDICARE OP, ALT 636 FOR OP/ED)

## 2025-06-23 PROCEDURE — 82330 ASSAY OF CALCIUM: CPT | Performed by: NURSE PRACTITIONER

## 2025-06-23 PROCEDURE — 2500000005 HC RX 250 GENERAL PHARMACY W/O HCPCS: Performed by: NURSE PRACTITIONER

## 2025-06-23 PROCEDURE — 2500000001 HC RX 250 WO HCPCS SELF ADMINISTERED DRUGS (ALT 637 FOR MEDICARE OP): Performed by: NURSE PRACTITIONER

## 2025-06-23 PROCEDURE — 2500000005 HC RX 250 GENERAL PHARMACY W/O HCPCS

## 2025-06-23 PROCEDURE — 71045 X-RAY EXAM CHEST 1 VIEW: CPT | Performed by: RADIOLOGY

## 2025-06-23 PROCEDURE — 97166 OT EVAL MOD COMPLEX 45 MIN: CPT | Mod: GO

## 2025-06-23 PROCEDURE — 86850 RBC ANTIBODY SCREEN: CPT

## 2025-06-23 PROCEDURE — 85027 COMPLETE CBC AUTOMATED: CPT

## 2025-06-23 PROCEDURE — 93321 DOPPLER ECHO F-UP/LMTD STD: CPT

## 2025-06-23 PROCEDURE — 83735 ASSAY OF MAGNESIUM: CPT | Performed by: NURSE PRACTITIONER

## 2025-06-23 PROCEDURE — P9045 ALBUMIN (HUMAN), 5%, 250 ML: HCPCS | Mod: JZ,TB | Performed by: NURSE PRACTITIONER

## 2025-06-23 PROCEDURE — 99291 CRITICAL CARE FIRST HOUR: CPT | Performed by: ANESTHESIOLOGY

## 2025-06-23 PROCEDURE — 37799 UNLISTED PX VASCULAR SURGERY: CPT | Performed by: NURSE PRACTITIONER

## 2025-06-23 PROCEDURE — 71045 X-RAY EXAM CHEST 1 VIEW: CPT

## 2025-06-23 PROCEDURE — 93308 TTE F-UP OR LMTD: CPT

## 2025-06-23 PROCEDURE — 80069 RENAL FUNCTION PANEL: CPT | Performed by: NURSE PRACTITIONER

## 2025-06-23 PROCEDURE — 93325 DOPPLER ECHO COLOR FLOW MAPG: CPT

## 2025-06-23 PROCEDURE — 99291 CRITICAL CARE FIRST HOUR: CPT | Performed by: NURSE PRACTITIONER

## 2025-06-23 PROCEDURE — 2020000001 HC ICU ROOM DAILY

## 2025-06-23 PROCEDURE — 2500000004 HC RX 250 GENERAL PHARMACY W/ HCPCS (ALT 636 FOR OP/ED)

## 2025-06-23 PROCEDURE — 2500000004 HC RX 250 GENERAL PHARMACY W/ HCPCS (ALT 636 FOR OP/ED): Performed by: REGISTERED NURSE

## 2025-06-23 PROCEDURE — 97530 THERAPEUTIC ACTIVITIES: CPT | Mod: GP

## 2025-06-23 PROCEDURE — 82947 ASSAY GLUCOSE BLOOD QUANT: CPT

## 2025-06-23 PROCEDURE — 80197 ASSAY OF TACROLIMUS: CPT | Performed by: NURSE PRACTITIONER

## 2025-06-23 RX ORDER — METHOCARBAMOL 500 MG/1
500 TABLET, FILM COATED ORAL EVERY 6 HOURS SCHEDULED
Status: COMPLETED | OUTPATIENT
Start: 2025-06-23 | End: 2025-06-26

## 2025-06-23 RX ORDER — MILRINONE LACTATE 0.2 MG/ML
0.12 INJECTION, SOLUTION INTRAVENOUS CONTINUOUS
Status: DISCONTINUED | OUTPATIENT
Start: 2025-06-23 | End: 2025-06-23

## 2025-06-23 RX ORDER — TAMSULOSIN HYDROCHLORIDE 0.4 MG/1
0.4 CAPSULE ORAL DAILY
Status: DISCONTINUED | OUTPATIENT
Start: 2025-06-23 | End: 2025-07-09 | Stop reason: HOSPADM

## 2025-06-23 RX ORDER — EPINEPHRINE IN 0.9 % SOD CHLOR 4MG/250ML
0.05 PLASTIC BAG, INJECTION (ML) INTRAVENOUS CONTINUOUS
Status: DISCONTINUED | OUTPATIENT
Start: 2025-06-23 | End: 2025-06-23

## 2025-06-23 RX ORDER — MILRINONE LACTATE 0.2 MG/ML
0.12 INJECTION, SOLUTION INTRAVENOUS CONTINUOUS
Status: DISCONTINUED | OUTPATIENT
Start: 2025-06-23 | End: 2025-06-26

## 2025-06-23 RX ORDER — EPINEPHRINE IN 0.9 % SOD CHLOR 4MG/250ML
0-.1 PLASTIC BAG, INJECTION (ML) INTRAVENOUS CONTINUOUS
Status: DISCONTINUED | OUTPATIENT
Start: 2025-06-23 | End: 2025-06-23

## 2025-06-23 RX ORDER — HYDROMORPHONE HYDROCHLORIDE 0.2 MG/ML
0.2 INJECTION INTRAMUSCULAR; INTRAVENOUS; SUBCUTANEOUS EVERY 2 HOUR PRN
Status: DISCONTINUED | OUTPATIENT
Start: 2025-06-23 | End: 2025-06-28

## 2025-06-23 RX ORDER — FUROSEMIDE 10 MG/ML
20 INJECTION INTRAMUSCULAR; INTRAVENOUS DAILY
Status: DISCONTINUED | OUTPATIENT
Start: 2025-06-23 | End: 2025-06-24

## 2025-06-23 RX ORDER — INSULIN GLARGINE 100 [IU]/ML
10 INJECTION, SOLUTION SUBCUTANEOUS ONCE
Status: COMPLETED | OUTPATIENT
Start: 2025-06-23 | End: 2025-06-23

## 2025-06-23 RX ORDER — EPINEPHRINE IN 0.9 % SOD CHLOR 4MG/250ML
0.02 PLASTIC BAG, INJECTION (ML) INTRAVENOUS CONTINUOUS
Status: DISCONTINUED | OUTPATIENT
Start: 2025-06-23 | End: 2025-06-24

## 2025-06-23 RX ORDER — INSULIN LISPRO 100 [IU]/ML
0-20 INJECTION, SOLUTION INTRAVENOUS; SUBCUTANEOUS
Status: DISCONTINUED | OUTPATIENT
Start: 2025-06-23 | End: 2025-07-09 | Stop reason: HOSPADM

## 2025-06-23 RX ORDER — ALBUMIN HUMAN 50 G/1000ML
12.5 SOLUTION INTRAVENOUS ONCE
Status: COMPLETED | OUTPATIENT
Start: 2025-06-23 | End: 2025-06-23

## 2025-06-23 RX ORDER — FUROSEMIDE 10 MG/ML
20 INJECTION INTRAMUSCULAR; INTRAVENOUS ONCE
Status: COMPLETED | OUTPATIENT
Start: 2025-06-23 | End: 2025-06-23

## 2025-06-23 RX ADMIN — TACROLIMUS 1 MG: 1 CAPSULE ORAL at 18:32

## 2025-06-23 RX ADMIN — OXYCODONE HYDROCHLORIDE 10 MG: 10 TABLET ORAL at 01:36

## 2025-06-23 RX ADMIN — INSULIN LISPRO 4 UNITS: 100 INJECTION, SOLUTION INTRAVENOUS; SUBCUTANEOUS at 09:04

## 2025-06-23 RX ADMIN — MYCOPHENOLATE MOFETIL 1000 MG: 250 CAPSULE ORAL at 20:42

## 2025-06-23 RX ADMIN — VORICONAZOLE 200 MG: 200 TABLET, COATED ORAL at 08:28

## 2025-06-23 RX ADMIN — PIPERACILLIN SODIUM AND TAZOBACTAM SODIUM 3.38 G: 3; .375 INJECTION, SOLUTION INTRAVENOUS at 05:32

## 2025-06-23 RX ADMIN — VORICONAZOLE 200 MG: 200 TABLET, COATED ORAL at 20:41

## 2025-06-23 RX ADMIN — ACETAMINOPHEN 650 MG: 325 TABLET, FILM COATED ORAL at 04:00

## 2025-06-23 RX ADMIN — HEPARIN SODIUM 5000 UNITS: 5000 INJECTION INTRAVENOUS; SUBCUTANEOUS at 08:28

## 2025-06-23 RX ADMIN — Medication 2 L/MIN: at 20:42

## 2025-06-23 RX ADMIN — PERFLUTREN 3 ML OF DILUTION: 6.52 INJECTION, SUSPENSION INTRAVENOUS at 13:00

## 2025-06-23 RX ADMIN — EPINEPHRINE IN SODIUM CHLORIDE 0.05 MCG/KG/MIN: 16 INJECTION INTRAVENOUS at 08:29

## 2025-06-23 RX ADMIN — TAMSULOSIN HYDROCHLORIDE 0.4 MG: 0.4 CAPSULE ORAL at 11:40

## 2025-06-23 RX ADMIN — HYDROMORPHONE HYDROCHLORIDE 0.2 MG: 0.2 INJECTION, SOLUTION INTRAMUSCULAR; INTRAVENOUS; SUBCUTANEOUS at 12:49

## 2025-06-23 RX ADMIN — ACETAMINOPHEN 650 MG: 325 TABLET, FILM COATED ORAL at 11:30

## 2025-06-23 RX ADMIN — ACYCLOVIR 400 MG: 400 TABLET ORAL at 20:43

## 2025-06-23 RX ADMIN — METHYLPREDNISOLONE SODIUM SUCCINATE 60 MG: 125 INJECTION, POWDER, FOR SOLUTION INTRAMUSCULAR; INTRAVENOUS at 08:28

## 2025-06-23 RX ADMIN — INSULIN LISPRO 6 UNITS: 100 INJECTION, SOLUTION INTRAVENOUS; SUBCUTANEOUS at 03:45

## 2025-06-23 RX ADMIN — METHOCARBAMOL TABLETS 500 MG: 500 TABLET, COATED ORAL at 17:41

## 2025-06-23 RX ADMIN — METHOCARBAMOL TABLETS 500 MG: 500 TABLET, COATED ORAL at 23:07

## 2025-06-23 RX ADMIN — DOCUSATE SODIUM 50 MG AND SENNOSIDES 8.6 MG 2 TABLET: 8.6; 5 TABLET, FILM COATED ORAL at 20:42

## 2025-06-23 RX ADMIN — ACETAMINOPHEN 650 MG: 325 TABLET, FILM COATED ORAL at 16:14

## 2025-06-23 RX ADMIN — POLYETHYLENE GLYCOL 3350 17 G: 17 POWDER, FOR SOLUTION ORAL at 20:42

## 2025-06-23 RX ADMIN — LIDOCAINE 4% 1 PATCH: 40 PATCH TOPICAL at 08:52

## 2025-06-23 RX ADMIN — INSULIN LISPRO 8 UNITS: 100 INJECTION, SOLUTION INTRAVENOUS; SUBCUTANEOUS at 20:49

## 2025-06-23 RX ADMIN — INSULIN LISPRO 4 UNITS: 100 INJECTION, SOLUTION INTRAVENOUS; SUBCUTANEOUS at 11:52

## 2025-06-23 RX ADMIN — ALBUMIN HUMAN 12.5 G: 0.05 INJECTION, SOLUTION INTRAVENOUS at 11:58

## 2025-06-23 RX ADMIN — FUROSEMIDE 20 MG: 10 INJECTION, SOLUTION INTRAMUSCULAR; INTRAVENOUS at 23:07

## 2025-06-23 RX ADMIN — MYCOPHENOLATE MOFETIL 1000 MG: 250 CAPSULE ORAL at 08:28

## 2025-06-23 RX ADMIN — OXYCODONE HYDROCHLORIDE 10 MG: 10 TABLET ORAL at 09:04

## 2025-06-23 RX ADMIN — Medication 2 L/MIN: at 09:00

## 2025-06-23 RX ADMIN — ACETAMINOPHEN 650 MG: 325 TABLET, FILM COATED ORAL at 21:33

## 2025-06-23 RX ADMIN — LEVOTHYROXINE SODIUM 125 MCG: 0.05 TABLET ORAL at 05:43

## 2025-06-23 RX ADMIN — HEPARIN SODIUM 5000 UNITS: 5000 INJECTION INTRAVENOUS; SUBCUTANEOUS at 23:07

## 2025-06-23 RX ADMIN — HEPARIN SODIUM 5000 UNITS: 5000 INJECTION INTRAVENOUS; SUBCUTANEOUS at 16:14

## 2025-06-23 RX ADMIN — ACYCLOVIR 400 MG: 400 TABLET ORAL at 08:29

## 2025-06-23 RX ADMIN — FUROSEMIDE 20 MG: 10 INJECTION, SOLUTION INTRAMUSCULAR; INTRAVENOUS at 16:14

## 2025-06-23 RX ADMIN — POLYETHYLENE GLYCOL 3350 17 G: 17 POWDER, FOR SOLUTION ORAL at 08:29

## 2025-06-23 RX ADMIN — INSULIN GLARGINE 10 UNITS: 100 INJECTION, SOLUTION SUBCUTANEOUS at 09:04

## 2025-06-23 RX ADMIN — PREDNISONE 35 MG: 20 TABLET ORAL at 20:42

## 2025-06-23 RX ADMIN — PANTOPRAZOLE SODIUM 40 MG: 40 TABLET, DELAYED RELEASE ORAL at 06:05

## 2025-06-23 RX ADMIN — DOCUSATE SODIUM 50 MG AND SENNOSIDES 8.6 MG 2 TABLET: 8.6; 5 TABLET, FILM COATED ORAL at 08:28

## 2025-06-23 RX ADMIN — TACROLIMUS 1 MG: 1 CAPSULE ORAL at 06:05

## 2025-06-23 ASSESSMENT — COGNITIVE AND FUNCTIONAL STATUS - GENERAL
STANDING UP FROM CHAIR USING ARMS: A LITTLE
CLIMB 3 TO 5 STEPS WITH RAILING: A LOT
HELP NEEDED FOR BATHING: A LITTLE
MOVING FROM LYING ON BACK TO SITTING ON SIDE OF FLAT BED WITH BEDRAILS: A LITTLE
WALKING IN HOSPITAL ROOM: A LITTLE
TURNING FROM BACK TO SIDE WHILE IN FLAT BAD: A LITTLE
DRESSING REGULAR LOWER BODY CLOTHING: A LOT
DAILY ACTIVITIY SCORE: 18
MOBILITY SCORE: 17
DRESSING REGULAR UPPER BODY CLOTHING: A LITTLE
TOILETING: A LOT
MOVING TO AND FROM BED TO CHAIR: A LITTLE

## 2025-06-23 ASSESSMENT — PAIN DESCRIPTION - LOCATION
LOCATION: CHEST
LOCATION: STERNUM

## 2025-06-23 ASSESSMENT — PAIN SCALES - GENERAL
PAINLEVEL_OUTOF10: 4
PAINLEVEL_OUTOF10: 0 - NO PAIN
PAINLEVEL_OUTOF10: 3
PAINLEVEL_OUTOF10: 8
PAINLEVEL_OUTOF10: 0 - NO PAIN
PAINLEVEL_OUTOF10: 8
PAINLEVEL_OUTOF10: 0 - NO PAIN
PAINLEVEL_OUTOF10: 3
PAINLEVEL_OUTOF10: 10 - WORST POSSIBLE PAIN
PAINLEVEL_OUTOF10: 0 - NO PAIN
PAINLEVEL_OUTOF10: 0 - NO PAIN
PAINLEVEL_OUTOF10: 10 - WORST POSSIBLE PAIN
PAINLEVEL_OUTOF10: 9
PAINLEVEL_OUTOF10: 3

## 2025-06-23 ASSESSMENT — ACTIVITIES OF DAILY LIVING (ADL)
BATHING_ASSISTANCE: MINIMAL
ADL_ASSISTANCE: INDEPENDENT
ADLS_ADDRESSED: NO
ADL_ASSISTANCE: INDEPENDENT

## 2025-06-23 ASSESSMENT — PAIN DESCRIPTION - ORIENTATION: ORIENTATION: LEFT

## 2025-06-23 NOTE — PROGRESS NOTES
Advanced Heart Failure Progress Note   Consulting Team: CTICU  Primary Cardiologist: Dr. Ruano  Reason for Consult: s/p OHT    Subjective   Seen at bedside, denies acute complaints, has generalized body tremors that CTICU is working on. Otherwise decent urine output and renal function at baseline.     Remains on milrinone, 0.25 epinephrine 0.05    Objective   Physical Exam  Vitals:    06/23/25 1100   BP:    Pulse: 90   Resp: (!) 9   Temp:    SpO2: 97%     GEN: on NC in NAD  On multiple pressors  CV: RRR, no m/r/g; A/V wires. AAI paced NSR in 60s underlying  LUNGS: chest tubes in place with SS output  ABD: Soft, NT/ND, NBS,   SKIN: Warm, well perfused.   EXT: No clubbing, cyanosis, or edema.  NEURO: AO x 3, no new focal deficits, generalized tremors noted     I have personally reviewed the following images and laboratory findings:    ECG:  .EKG post OHT not available       Results for orders placed during the hospital encounter of 02/27/25    Transthoracic Echo (TTE) Limited post heart transplant is pending.     Imaging  Chest x-ray:  IMPRESSION:  1. No significant interval change in perihilar congestion and  bibasilar atelectasis.  2. Medical devices and postsurgical changes as described above.    Lab Review   Results for orders placed or performed during the hospital encounter of 06/20/25 (from the past 24 hours)   Renal Function Panel   Result Value Ref Range    Glucose 181 (H) 74 - 99 mg/dL    Sodium 143 136 - 145 mmol/L    Potassium 3.9 3.5 - 5.3 mmol/L    Chloride 107 98 - 107 mmol/L    Bicarbonate 25 21 - 32 mmol/L    Anion Gap 15 10 - 20 mmol/L    Urea Nitrogen 20 6 - 23 mg/dL    Creatinine 0.73 0.50 - 1.30 mg/dL    eGFR >90 >60 mL/min/1.73m*2    Calcium 8.4 (L) 8.6 - 10.6 mg/dL    Phosphorus 3.2 2.5 - 4.9 mg/dL    Albumin 3.9 3.4 - 5.0 g/dL   Magnesium   Result Value Ref Range    Magnesium 2.27 1.60 - 2.40 mg/dL   Calcium, Ionized   Result Value Ref Range    POCT Calcium, Ionized 1.12 1.1 - 1.33 mmol/L    POCT GLUCOSE   Result Value Ref Range    POCT Glucose 189 (H) 74 - 99 mg/dL   Blood Gas Mixed Venous Full Panel   Result Value Ref Range    POCT pH, Mixed 7.36 7.33 - 7.43 pH    POCT pCO2, Mixed 47 41 - 51 mm Hg    POCT pO2, Mixed 41 35 - 45 mm Hg    POCT SO2, Mixed 72 45 - 75 %    POCT Oxy Hemoglobin, Mixed 70.1 45.0 - 75.0 %    POCT Hematocrit Calculated, Mixed 23.0 (L) 41.0 - 52.0 %    POCT Sodium, Mixed 140 136 - 145 mmol/L    POCT Potassium, Mixed 4.0 3.5 - 5.3 mmol/L    POCT Chloride, Mixed 110 (H) 98 - 107 mmol/L    POCT Ionized Calcium, Mixed 1.08 (L) 1.10 - 1.33 mmol/L    POCT Glucose, Mixed 173 (H) 74 - 99 mg/dL    POCT Lactate, Mixed 1.0 0.4 - 2.0 mmol/L    POCT Base Excess, Mixed 0.9 -2.0 - 3.0 mmol/L    POCT HCO3 Calculated, Mixed 26.6 (H) 22.0 - 26.0 mmol/L    POCT Hemoglobin, Mixed 7.6 (L) 13.5 - 17.5 g/dL    POCT Anion Gap, Mixed 7 (L) 10 - 25 mmo/L    Patient Temperature 37.0 degrees Celsius    FiO2 44 %   POCT GLUCOSE   Result Value Ref Range    POCT Glucose 181 (H) 74 - 99 mg/dL   POCT GLUCOSE   Result Value Ref Range    POCT Glucose 237 (H) 74 - 99 mg/dL   Calcium, Ionized   Result Value Ref Range    POCT Calcium, Ionized 1.17 1.1 - 1.33 mmol/L   Magnesium   Result Value Ref Range    Magnesium 2.51 (H) 1.60 - 2.40 mg/dL   Renal Function Panel   Result Value Ref Range    Glucose 232 (H) 74 - 99 mg/dL    Sodium 143 136 - 145 mmol/L    Potassium 4.3 3.5 - 5.3 mmol/L    Chloride 107 98 - 107 mmol/L    Bicarbonate 29 21 - 32 mmol/L    Anion Gap 11 10 - 20 mmol/L    Urea Nitrogen 23 6 - 23 mg/dL    Creatinine 0.76 0.50 - 1.30 mg/dL    eGFR >90 >60 mL/min/1.73m*2    Calcium 8.7 8.6 - 10.6 mg/dL    Phosphorus 2.6 2.5 - 4.9 mg/dL    Albumin 3.9 3.4 - 5.0 g/dL   CBC   Result Value Ref Range    WBC 33.0 (H) 4.4 - 11.3 x10*3/uL    nRBC 0.0 0.0 - 0.0 /100 WBCs    RBC 2.54 (L) 4.50 - 5.90 x10*6/uL    Hemoglobin 7.7 (L) 13.5 - 17.5 g/dL    Hematocrit 23.2 (L) 41.0 - 52.0 %    MCV 91 80 - 100 fL    MCH 30.3  26.0 - 34.0 pg    MCHC 33.2 32.0 - 36.0 g/dL    RDW 17.3 (H) 11.5 - 14.5 %    Platelets 159 150 - 450 x10*3/uL   POCT GLUCOSE   Result Value Ref Range    POCT Glucose 228 (H) 74 - 99 mg/dL   Tacrolimus   Result Value Ref Range    Tacrolimus  2.3 <=15.0 ng/mL   POCT GLUCOSE   Result Value Ref Range    POCT Glucose 173 (H) 74 - 99 mg/dL       Troponin I, High Sensitivity   Date/Time Value Ref Range Status   02/23/2024 02:33 PM 30 0 - 53 ng/L Final     Troponin I   Date/Time Value Ref Range Status   05/21/2023 07:43 PM CANCELED       Comment:     .  Less than 99th percentile of normal range cutoff-  Female and children under 18 years old <14 ng/L; Male <21 ng/L: Negative  Repeat testing should be performed if clinically indicated.   .  Female and children under 18 years old 14-50 ng/L; Male 21-50 ng/L:  Consistent with possible cardiac damage and possible increased clinical   risk. Serial measurements may help to assess extent of myocardial damage.   .  >50 ng/L: Consistent with cardiac damage, increased clinical risk and  myocardial infarction. Serial measurements may help assess extent of   myocardial damage.   .   NOTE: Children less than 1 year old may have higher baseline troponin   levels and results should be interpreted in conjunction with the overall   clinical context.   .  NOTE: Troponin I testing is performed using a different   testing methodology at [facility] [place] [facility] than at other   [facility]. Direct result comparisons should only   be made within the same method.    Result canceled by the ancillary.     05/21/2023 07:43 PM CANCELED       Comment:     .  Less than 99th percentile of normal range cutoff-  Female and children under 18 years old <14 ng/L; Male <21 ng/L: Negative  Repeat testing should be performed if clinically indicated.   .  Female and children under 18 years old 14-50 ng/L; Male 21-50 ng/L:  Consistent with possible cardiac damage and possible increased clinical   risk.  Serial measurements may help to assess extent of myocardial damage.   .  >50 ng/L: Consistent with cardiac damage, increased clinical risk and  myocardial infarction. Serial measurements may help assess extent of   myocardial damage.   .   NOTE: Children less than 1 year old may have higher baseline troponin   levels and results should be interpreted in conjunction with the overall   clinical context.   .  NOTE: Troponin I testing is performed using a different   testing methodology at [facility] than at other   [facility]. Direct result comparisons should only   be made within the same method.    Result canceled by the ancillary.       BNP   Date/Time Value Ref Range Status   03/26/2025 11:57 AM 57 0 - 99 pg/mL Final   01/28/2025 02:51 PM 69 0 - 99 pg/mL Final   04/04/2024 03:09  (H) 0 - 99 pg/mL Final     LDL   Date/Time Value Ref Range Status   03/29/2023 09:51 PM 85 0 - 99 mg/dL Final     Comment:     .                           NEAR      BORD      AGE      DESIRABLE  OPTIMAL    HIGH     HIGH     VERY HIGH     0-19 Y     0 - 109     ---    110-129   >/= 130     ----    20-24 Y     0 - 119     ---    120-159   >/= 160     ----      >24 Y     0 -  99   100-129  130-159   160-189     >/=190  .       Triglycerides   Date/Time Value Ref Range Status   02/29/2024 06:58  0 - 149 mg/dL Final     Comment:        Age         Desirable   Borderline High   High     Very High   0 D-90 D    19 - 174         ----         ----        ----  91 D- 9 Y     0 -  74        75 -  99     >/= 100      ----    10-19 Y     0 -  89        90 - 129     >/= 130      ----    20-24 Y     0 - 114       115 - 149     >/= 150      ----         >24 Y     0 - 149       150 - 199    200- 499    >/= 500    Venipuncture immediately after or during the administration of Metamizole may lead to falsely low results. Testing should be performed immediately prior to Metamizole dosing.   03/29/2023 09:51 PM 83 0 - 149 mg/dL  Final     Comment:     .      AGE      DESIRABLE   BORDERLINE HIGH   HIGH     VERY HIGH   0 D-90 D    19 - 174         ----         ----        ----  91 D- 9 Y     0 -  74        75 -  99     >/= 100      ----    10-19 Y     0 -  89        90 - 129     >/= 130      ----    20-24 Y     0 - 114       115 - 149     >/= 150      ----         >24 Y     0 - 149       150 - 199    200- 499    >/= 500  .   Venipuncture immediately after or during the    administration of Metamizole may lead to falsely   low results. Testing should be performed immediately   prior to Metamizole dosing.          Assessment and Plan   Anatoly Lane is a 64 y.o. male with a PMHx of Stage D HFrEF s/p HM3 (4/30/23), CAD, PAF, VT s/p ICD, hypothyroidism who underwent OHT 06/21 and explant of LVAD and AICD with Dr. Lorenzo. HF/transplant team following given post OHT status.      # s/p LVAD(HM3) explant and OHT 06/21  Hx of Stage D chronic systolic HF/ICM/HFrEF s/p HM3 LVAD (4/30/23) now s/p LVAD and AICD explant.  ABO: AB. He was status 4 on UNOS  Arrived from the OR to CTICU on milrinone 0.2 levophed 0.06, vasopressin 0.03, and epinephrine 0.06 and Iepo.  Post op CVP 5-7, PAP 25/14(18), CO/CI 5.9/2.8  Off levophed, vasopressin and inhaled epoprostenol now  Remains on milrinone, 0.25 and epinephrine 0.05     Donor/Recipient Infectious Serology Results:  CMV: -/-   EBV: +/+   Toxo: -/-   Heb B: -/-  Hep C: -/-  HIV: -/-     Rejection/Prophylaxis (transplant):  - Induction: s/p methylprednisolone 500mg IV x2(06/20)  - Postoperative Induction Plan to be completed in CTICU:   Basiliximab 20mg IV within 1 hour of arrival to CTICU (no need for premedication), 2nd dose: 20mg IVPB on POD4 (06/25)   Steroid taper starting with Methylprednisolone 125mg IV Q8 x 3 doses followed by IV Solumedrol 60 mg q 12 hrs x 2 doses followed by prednisone 35 mg q 12hrs PO q 12 hrs x 6 doses   -Perioperative Antimicrobial Plan:   Vancomycin 15mg/kg IV Q12 hours x 48hrs    Zosyn 3.375g IV Q6 hours x 48hrs   will extend coverage duration if necessary   - Tacrolimus: mg @ 0630 & mg @ 1830 w/ daily levels drawn @ 0600- C/w 1mg BID (dose adjusted 06/22)  - Tacrolimus goal troughs: 10-12  - Cellcept: c/w 1000mg BID daily  - Antifungals: c/w voriconazole 200mg BID x3 months end date 09/22/2025  - Antivirals: c/w acylovir 400mg BID (06/22  x3 months. End date 09/22/2025  - Anti PCP & Toxoplasmosis: Will begin bactrim likely POD 5 (06/26) if renal function and cbc stable     Last cardiac biopsy: n/a  Last HLA: cPRAs 02/2025 negative. Repeat in process  Last Allomap: n/a  Last RHC: pre OHT 03/26/2025  Last LHC: 03/2023(pre OHT)  Last TTE/BOBBY: intra-op BOBBY with normal bi ventricular function  Osteopenia/osteoporosis prophylaxis: Vitamin D3 and calcium supplements   Peptic/gastric ulcer prophylaxis: Pantoprazole 40mg daily   CAV Prophylaxis: start aspirin 81mg once ok from CT surgery standpoint. Start pravastatin     - Continue weaning milrinone and epinephrine gtt   - Limited TTE today  - Diurese for CVP >11.     -rest of care per CTICU team     This plan was discussed with Dr. Hylton, HF attending. For any questions or concerns, feel free to reach out via [facility] chat or page at [phone].    Kana Lemon MD   Heart Failure Fellow  PGY-4

## 2025-06-23 NOTE — PROGRESS NOTES
Physical Therapy    Physical Therapy Evaluation & Treatment    Patient Name: Anatoly Lane  MRN: [id number]  Department: [facility] CTU  Room: 09/09-A  Today's Date: 6/23/2025   Time Calculation  Start Time: 0851  Stop Time: 0938  Time Calculation (min): 47 min    Assessment/Plan   PT Assessment  PT Assessment Results: Decreased strength, Decreased endurance, Impaired balance, Decreased mobility, Pain  Rehab Prognosis: Good  Barriers to Discharge Home: No anticipated barriers  Evaluation/Treatment Tolerance: Patient tolerated treatment well, Patient limited by pain  Medical Staff Made Aware: Yes  End of Session Communication: Bedside nurse  Assessment Comment: Pt demonstrated ability to complete bed mobility, sit<>stand transfer and ambulation ~30ft with thoracic walker.  CGA-minAx1 provided for all functional mobility.  Drop in MAP with change in position however improved with mobility.  No instability noted.  Pain limiting mobility this date.  End of Session Patient Position: Up in chair, Alarm off, not on at start of session   IP OR SWING BED PT PLAN  Inpatient or Swing Bed: Inpatient  PT Plan  Treatment/Interventions: Bed mobility, Transfer training, Gait training, Stair training, Balance training, Strengthening, Endurance training, Therapeutic exercise, Therapeutic activity  PT Plan: Ongoing PT  PT Frequency: Daily  PT Discharge Recommendations: Low intensity level of continued care  Equipment Recommended upon Discharge: Wheeled walker  PT Recommended Transfer Status: Assist x1, Assistive device  PT - OK to Discharge: Yes    Subjective     PT Visit Info:  PT Received On: 06/23/25  General Visit Information:  General  Reason for Referral: LVAD explant, AICD removal & OHT  Referred By: Dr. Lorenzo on 6/21.  Past Medical History Relevant to Rehab: Ex smoker, NICM, stage D HFrEF NYHA 2-3 s/p HM2 (4/30/23), VT s/p ICD, CAD, pAF, HTN, BPH, GERD and hypothyroidism.  Family/Caregiver Present: No  Prior to Session  Communication: Bedside nurse  Patient Position Received: Bed, 3 rail up, Alarm off, not on at start of session  Preferred Learning Style: auditory, verbal, visual, written  General Comment: Pt awake, alert and willing to participate in PT session  Home Living:  Home Living  Type of Home: House  Lives With: Alone (x1 dog)  Home Layout:  (5 MELINDA with railing, tub/shower, bed/bath - 1st floor)  Prior Level of Function:  Prior Function Per Pt/Caregiver Report  Level of [place]: Independent with ADLs and functional transfers  ADL Assistance: Independent  Homemaking Assistance: Independent  Ambulatory Assistance: Independent  Vocational: On disability  Prior Function Comments: (+) drives, (-) falls  Precautions:  Precautions  Hearing/Visual Limitations: WFL  Medical Precautions: Cardiac precautions, Fall precautions, Oxygen therapy device and L/min, Chest tube (2L, CT x3)  Post-Surgical Precautions: Move in the Tube  Precautions Comment: AAI @ 90 (NSR underlying)     06/23/25 0851 06/23/25 0938   Vital Signs   Vitals Session Pre PT Post PT   Heart Rate 90 90   Resp 22 18   SpO2 96 % 96 %   /51 113/55   MAP (mmHg) 72 74   BP Method Arterial line Arterial line   Patient Position Lying Sitting   Vital Signs Comment  --  Upon sitting, MAP dropped to 61 and upon standing MAP dropped to 55 however with cues to complete APs/marching MAP increased.  Asymptomatic. RN aware.     Objective   Pain:  Pain Assessment  Pain Assessment: 0-10  0-10 (Numeric) Pain Score: 8  Pain Type: Surgical pain  Pain Location: Chest  Pain Interventions:  (Pain meds provided at start of session)  Cognition:  Cognition  Overall Cognitive Status: Within Functional Limits  Arousal/Alertness: Appropriate responses to stimuli  Orientation Level: Oriented X4  Following Commands: Follows one step commands without difficulty    General Assessments:  General Observation  General Observation: Tele, mary, bam, CVP, CVC, conteh, epi .05, mil  .125    Activity Tolerance  Endurance: Tolerates 10 - 20 min exercise with multiple rests  Early Mobility/Exercise Safety Screen: Proceed with mobilization - No exclusion criteria met  Activity Tolerance Comments: Limited mobiltiy d/t pain    Sensation  Light Touch: No apparent deficits (Post-op)    Strength  Strength Comments: BLEs at least 3/5 shown through functional mobility  Coordination  Movements are Fluid and Coordinated: Yes    Postural Control  Postural Control: Within Functional Limits    Static Sitting Balance  Static Sitting-Balance Support: Bilateral upper extremity supported, Feet supported  Static Sitting-Level of Assistance: Close supervision    Static Standing Balance  Static Standing-Balance Support: Bilateral upper extremity supported  Static Standing-Level of Assistance: Contact guard  Static Standing-Comment/Number of Minutes: Thoracic walker for upright support  Functional Assessments:  ADL  ADL's Addressed: No    Bed Mobility  Bed Mobility: Yes  Bed Mobility 1  Bed Mobility 1: Supine to sitting  Level of Assistance 1: Minimum assistance  Bed Mobility Comments 1: HOB elevated, assistance with advancing trunk upright    Transfers  Transfer: Yes  Transfer 1  Transfer From 1: Sit to, Stand to  Transfer to 1: Sit, Stand  Technique 1: Sit to stand, Stand to sit  Transfer Device 1: Thoracic walker  Transfer Level of Assistance 1: Contact guard  Trials/Comments 1: Cues for hand placement and proper use of AD    Ambulation/Gait Training  Ambulation/Gait Training Performed: Yes  Ambulation/Gait Training 1  Surface 1: Level tile  Device 1: Thoracic walker  Assistance 1: Contact guard  Comments/Distance (ft) 1: 30ft;  Limited distance d/t R inner thigh pain    Stairs  Stairs: No  Extremity/Trunk Assessments:  RLE   RLE : Within Functional Limits  LLE   LLE : Within Functional Limits  Treatments:  Therapeutic Exercise  Therapeutic Exercise Performed: Yes  Therapeutic Exercise Activity 1: 1x10 reps of  incentive spirometer: </=1000    Therapeutic Activity  Therapeutic Activity Performed: Yes  Therapeutic Activity 1: Increased time for skilled ICU line/tube management for safe functional mobility  Therapeutic Activity 2: Education: MITT (handout provided and reviewed), use of AD, use of IS  Therapeutic Activity 3: Static sitting ~8 min prior to ambulation.  Initial drop in MAP however recovered with cues to complete APs.  Static standing ~3 min with thoracic walker for upright support.  No instability however pt reporting pain in R inner thigh.  Drop in MAP with standing however improved with time upright.  No symptoms reported.  Outcome Measures:  [facility] Basic Mobility  Turning from your back to your side while in a flat bed without using bedrails: A little  Moving from lying on your back to sitting on the side of a flat bed without using bedrails: A little  Moving to and from bed to chair (including a wheelchair): A little  Standing up from a chair using your arms (e.g. wheelchair or bedside chair): A little  To walk in hospital room: A little  Climbing 3-5 steps with railing: A lot  Basic Mobility - Total Score: 17    FSS-ICU  Ambulation: Walks <50 feet with any assistance x1 or walks any distance with assistance x2 people  Rolling: Minimal assistance (performs 75% or more of task)  Sitting: Supervision or set-up only  Transfer Sit-to-Stand: Supervision or set-up only  Transfer Supine-to-Sit: Minimal assistance (performs 75% or more of task)  Total Score: 19    Early Mobility/Exercise Safety Screen: Proceed with mobilization - No exclusion criteria met  ICU Mobility Scale: Walking with assistance of 1 person [8]    Encounter Problems       Encounter Problems (Active)       Balance       Pt will demonstrated ability to score at least 24/28 on the Tinetti balance assessment tool to ensure safety upon D/C.  (Progressing)       Start:  06/23/25    Expected End:  07/07/25               Mobility       Pt will  demonstrated ability to ambulate >/=300ft with proper form and no balance deficits for safe home going.   (Progressing)       Start:  06/23/25    Expected End:  07/07/25            Pt will demonstrate ability to ascend/descend at least 5 stairs with unilateral rail and no balance deficits for safe home going.  (Progressing)       Start:  06/23/25    Expected End:  07/07/25               PT Transfers       Pt will demonstrate ability to complete 5X STS in < 12 sec with good from and consistency between transfers for safe D/C.  (Progressing)       Start:  06/23/25    Expected End:  07/07/25                   Education Documentation  Handouts, taught by Lucille Crooks PT at 6/23/2025 12:45 PM.  Learner: Patient  Readiness: Acceptance  Method: Explanation, Demonstration, Handout  Response: Demonstrated Understanding, Verbalizes Understanding  Comment: Refer to note for additional information    Precautions, taught by Lucille Crooks PT at 6/23/2025 12:45 PM.  Learner: Patient  Readiness: Acceptance  Method: Explanation, Demonstration, Handout  Response: Demonstrated Understanding, Verbalizes Understanding  Comment: Refer to note for additional information    Body Mechanics, taught by Lucille Crooks PT at 6/23/2025 12:45 PM.  Learner: Patient  Readiness: Acceptance  Method: Explanation, Demonstration, Handout  Response: Demonstrated Understanding, Verbalizes Understanding  Comment: Refer to note for additional information    Mobility Training, taught by Lucille Crooks PT at 6/23/2025 12:45 PM.  Learner: Patient  Readiness: Acceptance  Method: Explanation, Demonstration, Handout  Response: Demonstrated Understanding, Verbalizes Understanding  Comment: Refer to note for additional information    Education Comments  No comments found.

## 2025-06-23 NOTE — PROGRESS NOTES
Occupational Therapy    Evaluation    Patient Name: Anatoly Lane  MRN: [id number]  Today's Date: 6/23/2025  Room: 09/09  Time Calculation  Start Time: 1040  Stop Time: 1110  Time Calculation (min): 30 min    Assessment  IP OT Assessment  OT Assessment: Pt is a 64 year old male who demonstrates decreased strength, balance, and activity tolerance, which impedes occupational performance.  Prognosis: Good  Barriers to Discharge Home: No anticipated barriers  Evaluation/Treatment Tolerance: Patient tolerated treatment well  Medical Staff Made Aware: Yes  End of Session Communication: Bedside nurse  End of Session Patient Position: Bed, 3 rail up, Alarm off, caregiver present    Plan:  Inpatient Plan  Treatment Interventions: ADL retraining, Functional transfer training, UE strengthening/ROM, Endurance training, Cognitive reorientation, Patient/family training, Equipment evaluation/education, Neuromuscular reeducation, Compensatory technique education  OT Frequency: 3 times per week  OT Discharge Recommendations: Low intensity level of continued care  Equipment Recommended upon Discharge: Wheeled walker  OT Recommended Transfer Status: Assist of 1  OT - OK to Discharge: Yes  OT Assessment  OT Assessment Results: Decreased ADL status, Decreased endurance, Decreased functional mobility, Decreased IADLs  Prognosis: Good  Evaluation/Treatment Tolerance: Patient tolerated treatment well  Medical Staff Made Aware: Yes    Subjective   Current Problem:  1. HFrEF (heart failure with reduced ejection fraction)  Anesthesia Intraoperative Transesophageal Echocardiogram    Anesthesia Intraoperative Transesophageal Echocardiogram      2. Cardiomyopathy, dilated (Multi)  Anesthesia Intraoperative Transesophageal Echocardiogram    Anesthesia Intraoperative Transesophageal Echocardiogram      3. LVAD (left ventricular assist device) present (Multi)        4. Awaiting organ transplant  Anesthesia Intraoperative Transesophageal  Echocardiogram    Anesthesia Intraoperative Transesophageal Echocardiogram      5. Heart transplant candidate  Anesthesia Intraoperative Transesophageal Echocardiogram    Anesthesia Intraoperative Transesophageal Echocardiogram      6. Acute on chronic combined systolic (congestive) and diastolic (congestive) heart failure  Anesthesia Intraoperative Transesophageal Echocardiogram    Anesthesia Intraoperative Transesophageal Echocardiogram      7. Paroxysmal ventricular tachycardia  Anesthesia Intraoperative Transesophageal Echocardiogram    Anesthesia Intraoperative Transesophageal Echocardiogram      8. Non-ischemic cardiomyopathy (Multi)  Anesthesia Intraoperative Transesophageal Echocardiogram    Anesthesia Intraoperative Transesophageal Echocardiogram      9. Other heart failure  Surgical Pathology Exam    Surgical Pathology Exam      10. S/P orthotopic heart transplant  Transthoracic Echo (TTE) Limited    Transthoracic Echo (TTE) Limited        General:  Reason for Referral: LVAD explant, AICD removal & OHT 6/21  Past Medical History Relevant to Rehab: Ex smoker, NICM, stage D HFrEF NYHA 2-3 s/p HM2 (4/30/23), VT s/p ICD, CAD, pAF, HTN, BPH, GERD and hypothyroidism.  Prior to Session Communication: Bedside nurse  Patient Position Received: Up in chair, Alarm off, not on at start of session  General Comment: Pt seated in chair on arrival, requesting return to bed. Limited by hypotension. On .125 milrinone, .05 epi, 6 L NC, external pacer AAI 90     Precautions:  Medical Precautions: Cardiac precautions, Fall precautions, Oxygen therapy device and L/min, Chest tube  Post-Surgical Precautions: Move in the Tube    Vital Signs:   06/23/25 1040 06/23/25 1100 06/23/25 1110   Vital Signs   Vitals Session Pre OT  --  Post OT   Heart Rate 90 90 90   Resp 23 (!) 9 19   SpO2 95 % 97 % 98 %   /51  --  129/55   MAP (mmHg) 70  --  77   Vital Signs Comment  --   --  MAP decrease to 52 with STS requiring return to sitting  and seated ankle pumps and LAQs       Pain:  Pain Assessment  Pain Assessment: 0-10  0-10 (Numeric) Pain Score: 9  Pain Type: Surgical pain  Pain Location: Chest  Pain Interventions: Repositioned, Distraction, Emotional support, Therapeutic presence  Response to Interventions: Resting quietly    Lines/Tubes/Drains:  CVC 06/21/25 Double lumen Right Internal jugular (Active)   Number of days: 2       Arterial Line 06/21/25 Left Brachial (Active)   Number of days: 2       Pulmonary Artery Catheter Internal jugular (Active)   Number of days: 2       Urethral Catheter Temperature probe 16 Fr. (Active)   Number of days: 2       Chest Tube Left Pleural (Active)   Number of days: 1       Chest Tube Right Pleural (Active)   Number of days: 1       Y Chest Tube 1 and 2 Mediastinal Mediastinal (Active)   Number of days: 2       Objective   Cognition:  Overall Cognitive Status: Within Functional Limits  Arousal/Alertness: Appropriate responses to stimuli  Orientation Level: Oriented X4     Confusion Assessment Method (CAM)  Acute Onset and Fluctuating Course (1A): No     Home Living:  Type of Home: House  Lives With: Alone  Home Adaptive Equipment: None  Home Layout: Able to live on main level with bedroom/bathroom, Laundry in basement  Home Access: Stairs to enter with rails  Entrance Stairs-Number of Steps: 5  Bathroom Shower/Tub: Tub/shower unit     Prior Function:  Level of [place]: Independent with ADLs and functional transfers  ADL Assistance: Independent  Homemaking Assistance: Independent  Ambulatory Assistance: Independent  Vocational: On disability  Leisure: watching TV  Hand Dominance: Right  Prior Function Comments: (+) drives, (-) falls. Reports limited assistance is available.    ADL:  Eating Assistance: Independent  Grooming Assistance: Modified independent (Device)  Grooming Deficit:  (seated)  Bathing Assistance: Minimal  UE Dressing Assistance: Minimal  LE Dressing Assistance: Moderate  Toileting  Assistance with Device: Moderate    Activity Tolerance:  Endurance: Tolerates 10 - 20 min exercise with multiple rests  Early Mobility/Exercise Safety Screen: Proceed with mobilization - No exclusion criteria met  Activity Tolerance Comments: Limited by hypotension    Balance:  Dynamic Sitting Balance  Dynamic Sitting-Level of Assistance: Close supervision  Static Sitting Balance  Static Sitting-Level of Assistance: Close supervision  Static Standing Balance  Static Standing-Level of Assistance: Contact guard    Bed Mobility/Transfers: Bed Mobility  Bed Mobility: Yes  Bed Mobility 1  Bed Mobility 1: Sitting to supine  Level of Assistance 1: Moderate assistance, +2  Bed Mobility Comments 1: log roll   and Transfers  Transfer: Yes  Transfer 1  Transfer From 1: Sit to, Stand to  Transfer to 1: Stand, Sit  Transfer Level of Assistance 1: Minimum assistance  Trials/Comments 1: x2 trials; increased difficulty from chair reported.       Vision: Vision - Basic Assessment  Current Vision: No visual deficits      Sensation:  Sensation Comment: Reports baseline hand numbness and tingling    Strength:  Strength Comments: BUE >/=3/5 assessed via observation     Coordination:  Movements are Fluid and Coordinated: Yes      Extremities:   RUE   RUE : Within Functional Limits (within MITT),   LUE   LUE: Within Functional Limits (within MITT),  ,     Outcome Measures: [facility] Daily Activity  Putting on and taking off regular lower body clothing: A lot  Bathing (including washing, rinsing, drying): A little  Putting on and taking off regular upper body clothing: A little  Toileting, which includes using toilet, bedpan or urinal: A lot  Taking care of personal grooming such as brushing teeth: None  Eating Meals: None  Daily Activity - Total Score: 18        ICU Mobility Screen  Early Mobility/Exercise Safety Screen: Proceed with mobilization - No exclusion criteria met  ICU Mobility Scale: Transferring bed to chair,          Education  Documentation  Body Mechanics, taught by Nori Pedro OT at 6/23/2025  3:02 PM.  Learner: Patient  Readiness: Acceptance  Method: Explanation, Demonstration  Response: Verbalizes Understanding  Comment: OT goals/POC, MITT protocol    Precautions, taught by Nori Pedro OT at 6/23/2025  3:02 PM.  Learner: Patient  Readiness: Acceptance  Method: Explanation, Demonstration  Response: Verbalizes Understanding  Comment: OT goals/POC, MITT protocol    ADL Training, taught by Nori Pedro OT at 6/23/2025  3:02 PM.  Learner: Patient  Readiness: Acceptance  Method: Explanation, Demonstration  Response: Verbalizes Understanding  Comment: OT goals/POC, MITT protocol    Education Comments  No comments found.        Goals:     Encounter Problems       Encounter Problems (Active)       ADLs       Patient will perform UB and LB bathing with modified independent level of assistance.       Start:  06/23/25    Expected End:  07/07/25            Patient with complete upper body dressing with modified independent level of assistance donning and doffing all UE clothes with PRN adaptive equipment       Start:  06/23/25    Expected End:  07/07/25            Patient with complete lower body dressing with modified independent level of assistance donning and doffing all LE clothes  with PRN adaptive equipment       Start:  06/23/25    Expected End:  07/07/25            Patient will complete toileting including hygiene clothing management/hygiene with modified independent level of assistance.       Start:  06/23/25    Expected End:  07/07/25               COGNITION/SAFETY       Pt will demonstrate accurate medication management with pillbox without cues.        Start:  06/23/25    Expected End:  07/07/25            Patient will participate in cognitive activities to demonstrate WFL score on further cognitive assessments, including Medi-Cog, MoCA and remain A&O x3, CAM (-).        Start:  06/23/25     Expected End:  07/07/25               MOBILITY       Patient will perform Functional mobility max Household distances with modified independent level of assistance and least restrictive device in order to improve safety and functional mobility.       Start:  06/23/25    Expected End:  07/07/25 06/23/25 at 3:03 PM   Nori Pedro, OT   Rehab Office: [phone]

## 2025-06-23 NOTE — CONSULTS
"Nutrition Initial Assessment:   Nutrition Assessment    Reason for Assessment: Admission nursing screening    Malnutrition Screening Tool (MST)  Have you recently lost weight without trying?: Yes  If yes, how much weight have you lost?: Lost 14 - 23 pounds  Weight Loss Score: 2  Have you been eating poorly because of a decreased appetite?: No  Malnutrition Score: 2    Patient is a 64 y.o. male presenting to [facility] following LVAD & AICD removal and heart transplant 6/21. Extubated and diet initiated 6/22.     PMH stage D HFrEF NYHA 2-3 s/p HM2 (4/30/23), CAD, PAF, VT s/p ICD, hypothyroidism and stage D systolic HF/ICD     Nutrition History:  Energy Intake: Poor < 50 % (post-op)  Food and Nutrient History: Reports poor oral intake since surgery, usual BW ~215#  - unable to complete diet hx interview as pt c/o'ing chest pain. RN notified. Per details of MST screen recent loss of 14-23#'s but denied eating poorly d/t decreased appetite.       Anthropometrics:  Height: 175.3 cm (5' 9\")     IBW/kg (Dietitian Calculated): 72.7 kg                         Weight History:   Date/Time Weight Dosing Weight   06/23/25 1004 82.3 kg (181 lb 7 oz)   ?accuracy --   06/21/25 0802 96.3 kg (212 lb 4.9 oz) --   06/20/25 2000 -- 96.3 kg (212 lb 4.9 oz)   06/20/25 1500 96.3 kg (212 lb 4.9 oz) --       Wt Readings from Last 10 Encounters:   06/23/25 82.3 kg (181 lb 7 oz)   02/27/25 102 kg (224 lb)   01/28/25 104 kg (229 lb)   11/19/24 112 kg (246 lb 12.8 oz)   07/15/24 110 kg (243 lb 9.6 oz)   06/04/24 109 kg (240 lb 11.2 oz)   04/04/24 107 kg (235 lb)   03/06/24 103 kg (226 lb 3.1 oz)   02/23/24 105 kg (230 lb 9.6 oz)   02/19/24 107 kg (235 lb)       Weight Change %:  Weight History / % Weight Change: using current dosing weight of 96.3 kgs > down ~6 kg in ~3 months, ~16 kgs in 7 months & ~12 kgs in ~1 year  Significant Weight Loss: No    Nutrition Focused Physical Exam Findings:  Subcutaneous Fat Loss:   Orbital Fat Pads: Well nourished " (slightly bulging fat pads) (visual)  Buccal Fat Pads: Well nourished (full, rounded cheeks)  Triceps: Well nourished (ample fat tissue)  Ribs: Well nourished (chest is full, ribs do not show, slight to no protrusion of the iliac crest)  Muscle Wasting:  Pectoralis (Clavicular Region): Well nourished (clavicle not visible) (visual)  Deltoid/Trapezius: Well nourished (rounded appearance at arm, shoulder, neck)  Interosseous: Well nourished (muscle bulges)  Edema:  Edema Location: generalized non-pitting  Physical Findings:  Hair: Negative  Eyes: Negative  Nails: Negative  Skin:  (sternal surg site)    Nutrition Significant Labs:  CBC Trend:   Results from last 7 days   Lab Units 06/23/25  0134 06/22/25  0530 06/21/25  2304 06/21/25  0801   WBC AUTO x10*3/uL 33.0* 27.5* 22.8* 20.5*   RBC AUTO x10*6/uL 2.54* 2.79* 2.87* 3.21*   HEMOGLOBIN g/dL 7.7* 8.5* 9.0* 10.1*   HEMATOCRIT % 23.2* 25.0* 25.9* 28.4*   MCV fL 91 90 90 89   PLATELETS AUTO x10*3/uL 159 162 178 214    , A1C:  Lab Results   Component Value Date    HGBA1C 4.4 02/23/2024   , BG POCT trend:   Results from last 7 days   Lab Units 06/23/25  1142 06/23/25  0856 06/23/25  0348 06/22/25  2245 06/22/25  1808   POCT GLUCOSE mg/dL 168* 173* 228* 237* 181*    , Liver Function Trend:   Results from last 7 days   Lab Units 06/20/25  1659   ALK PHOS U/L 52   AST U/L 15   ALT U/L 13   BILIRUBIN TOTAL mg/dL 0.4    , Renal Lab Trend:   Results from last 7 days   Lab Units 06/23/25  0134 06/22/25  1443 06/22/25  0530 06/21/25  2257   POTASSIUM mmol/L 4.3 3.9 4.2 4.1   PHOSPHORUS mg/dL 2.6 3.2 3.9 3.0   SODIUM mmol/L 143 143 143 144   MAGNESIUM mg/dL 2.51* 2.27 2.30 2.35   EGFR mL/min/1.73m*2 >90 >90 >90 >90   BUN mg/dL 23 20 19 18   CREATININE mg/dL 0.76 0.73 0.80 0.80        Nutrition Specific Medications:  insulin lispro, 0-20 Units, subcutaneous, Before meals & nightly  levothyroxine, 125 mcg, oral, Daily  mycophenolate, 1,000 mg, oral, BID  pantoprazole, 40 mg, oral,  Daily before breakfast  polyethylene glycol, 17 g, oral, BID  predniSONE, 35 mg, oral, q12h LUPILLO  sennosides-docusate sodium, 2 tablet, oral, BID  tacrolimus, 1 mg, oral, q12h LUPILLO  voriconazole, 200 mg, oral, q12h LUPILLO      Continuous medications  EPINEPHrine, 0.02 mcg/kg/min (Dosing Weight), Last Rate: 0.02 mcg/kg/min (06/23/25 1300)  lactated Ringer's, 5 mL/hr, Last Rate: 5 mL/hr (06/23/25 1300)  lactated Ringer's, 20 mL/hr, Last Rate: 20 mL/hr (06/23/25 1300)  milrinone, 0.125 mcg/kg/min (Dosing Weight), Last Rate: 0.125 mcg/kg/min (06/23/25 1300)      I/O:   Last BM Date: 06/20/25; Stool Appearance: Formed (06/20/25 2000)    Dietary Orders (From admission, onward)       Start     Ordered    06/23/25 0818  Adult diet Regular, Consistent Carb; CCD 90 gm/meal  Diet effective now        Question Answer Comment   Diet type Regular    Diet type Consistent Carb    Carb diet selection: CCD 90 gm/meal        06/23/25 0817    06/22/25 1605  Oral nutritional supplements  Until discontinued        Comments: Strawberry ensure preferred   Question Answer Comment   Deliver with All meals    Select supplement: Ensure Plus        06/22/25 1608    06/21/25 1408  Oral nutritional supplements - [place]  Until discontinued        Question Answer Comment   Deliver with Breakfast    Deliver with Dinner    Select supplement: [place]        06/21/25 1407    06/20/25 2110  May Participate in Room Service  ( ROOM SERVICE MAY PARTICIPATE)  Once        Question:  .  Answer:  Yes    06/20/25 2109                     Estimated Needs:   Total Energy Estimated Needs in 24 hours (kCal): 2400 kCal  Method for Estimating Needs: ~25 kcals/kg using dosing wt of 96.3 kgs  Total Protein Estimated Needs in 24 Hours (g): 125 g  Method for Estimating 24 Hour Protein Needs: ~1.3 gm/kg dosing wt of 96.3 kg  Total Fluid Estimated Needs in 24 Hours (mL):  (per team)           Nutrition Diagnosis   Malnutrition Diagnosis  Patient has Malnutrition Diagnosis:  No    Nutrition Diagnosis  Patient has Nutrition Diagnosis: Yes  Diagnosis Status (1): New  Nutrition Diagnosis 1: Inadequate oral intake  Related to (1): post-op course  As Evidenced by (1): pt reports only drinking Ensure thus far  Additional Assessment Information (1): Pt currently <72 hours following surgery -- suspect intakes will improve as he progresses in his post-op recovery.       Nutrition Interventions/Recommendations   Nutrition prescription for oral nutrition    Nutrition Recommendations:  Agree with diabetic diet for control of stress related hyperglycemia - liberalize once this resolves.     Continue Ensure Plus TID with meals while intakes poor, change to lower carb option PRN (Glucerna or Ensure Max).    Nutrition Interventions/Goals:   Meals and Snacks: Carbohydrate-modified diet  Goal: 90 gram carbs per meal  Medical Food Supplement: Commercial beverage medical food supplement therapy  Goal: Ensure Plus TID for 350 kcals & 13gm protein each      Education Documentation  No documentation found.            Nutrition Monitoring and Evaluation   Estimated Energy Intake: Energy intake 50 -75% of estimated energy needs  Intake / Amount of food: Consumes > or equal to 70% of supplement    Body Weight: Body weight - Maintain stable weight    Electrolyte and Renal Panel: Electrolytes within normal limits  Glucose/Endocrine Profile: Glucose within normal limits ( mg/dL)         Goal Status: New goal(s) identified

## 2025-06-23 NOTE — PROGRESS NOTES
"CTICU Progress Note  Anatoly Lane/[id number]    Admit Date: 6/20/2025  Hospital Length of Stay: 3   ICU Length of Stay: 2d 15h     SUBJECTIVE:   NAOE    MEDICATIONS  Infusions:  EPINEPHrine, Last Rate: 0.05 mcg/kg/min (06/23/25 0700)  lactated Ringer's, Last Rate: 5 mL/hr (06/23/25 0700)  lactated Ringer's, Last Rate: 20 mL/hr (06/23/25 0700)  milrinone, Last Rate: 0.2 mcg/kg/min (06/23/25 0700)  vasopressin, Last Rate: Stopped (06/23/25 0130)      Scheduled:  acetaminophen, 650 mg, q6h  acyclovir, 400 mg, BID  [START ON 6/25/2025] basiliximab, 20 mg, Once  heparin (porcine), 5,000 Units, q8h  insulin lispro, 0-15 Units, q4h  levothyroxine, 125 mcg, Daily  lidocaine, 1 patch, Daily  methylPREDNISolone sodium succinate (PF), 60 mg, q12h   Followed by  predniSONE, 35 mg, q12h LUPILLO  mycophenolate, 1,000 mg, BID  oxygen, , Continuous - Inhalation  pantoprazole, 40 mg, Daily before breakfast  polyethylene glycol, 17 g, BID  sennosides-docusate sodium, 2 tablet, BID  tacrolimus, 1 mg, q12h LUPILLO  voriconazole, 200 mg, q12h LUPILLO      PRN:  calcium gluconate, 1 g, q6h PRN  calcium gluconate, 2 g, q6h PRN  dextrose, 12.5 g, q15 min PRN  dextrose, 25 g, q15 min PRN  dextrose, 25 g, q15 min PRN  glucagon, 1 mg, q15 min PRN  glucagon, 1 mg, q15 min PRN  HYDROmorphone, 0.2 mg, q15 min PRN  lidocaine, 1 patch, Daily PRN  magnesium sulfate, 2 g, q6h PRN  magnesium sulfate, 4 g, q6h PRN  naloxone, 0.2 mg, q5 min PRN  ondansetron ODT, 4 mg, q8h PRN   Or  ondansetron, 4 mg, q8h PRN  oxyCODONE, 10 mg, q4h PRN  oxyCODONE, 5 mg, q4h PRN  oxygen, , Continuous PRN - O2/gases  potassium chloride CR, 20 mEq, q6h PRN   Or  potassium chloride, 20 mEq, q6h PRN  potassium chloride CR, 40 mEq, q6h PRN   Or  potassium chloride, 40 mEq, q6h PRN  potassium chloride, 20 mEq, q6h PRN  potassium chloride, 40 mEq, q6h PRN        PHYSICAL EXAM:   Visit Vitals  BP 78/62   Pulse 100   Temp 36.7 °C (98.1 °F)   Resp 13   Ht 1.753 m (5' 9\")   Wt 96.3 kg (212 lb " 4.9 oz)   SpO2 100%   BMI 31.35 kg/m²   Smoking Status Former   BSA 2.17 m²     Wt Readings from Last 5 Encounters:   06/21/25 96.3 kg (212 lb 4.9 oz)   02/27/25 102 kg (224 lb)   01/28/25 104 kg (229 lb)   11/19/24 112 kg (246 lb 12.8 oz)   07/15/24 110 kg (243 lb 9.6 oz)     INTAKE/OUTPUT:  I/O last 3 completed shifts:  In: 6961.2 (72.3 mL/kg) [P.O.:475; I.V.:4336.2 (45 mL/kg); Blood:350; NG/GT:200; IV Piggyback:1600]  Out: 3870 (40.2 mL/kg) [Urine:2900 (0.8 mL/kg/hr); Chest Tube:970]  Weight: 96.3 kg          Vent settings:  FiO2 (%):  [40 %] 40 %    Physical Exam:   - CONSTITUTION: adult male sitting in bed in NAD, pleasant and conversant.  Intermittently anxious.   - NEUROLOGIC: No focal deficits, ambulating.  CAM negative and GCS 15.  BUE/BLE mild tremors patient reports have persisted since OR.   - CARDIOVASCULAR: NSR in 60's underlying. A/V wires, AAI paced 90  - RESPIRATORY: CTAB on 2L NC.  Chest tubes with appropriate serosang output and no airleak.   - GI: S/ND/NT, hypoactive BS  - : conteh with clear yellow urine  - EXTREMITIES: NV exam intact x 4, minimal BLE edema  - SKIN: intact, incisions/dressings CDI  - PSYCHIATRIC: slightly anxious but otherwise WNL    All relevant imaging, diagnostics and labs reviewed.       Assessment/Plan   Assessment:  Anatoly Lane is a 64 year old male with relevant PMH of ex smoker, NICM, stage D HFrEF NYHA 2-3 s/p HM2 (4/30/23), VT s/p ICD, CAD, pAF, HTN, BPH, GERD and hypothyroidism.  Now admitted to CTICU s/p LVAD explant, AICD removal & OHT by Dr. Lorenzo on 6/21.     Plan:  NEURO: No hx. Neuro exam intact without focal deficit, CAM negative.  BUE/BLE mild tremors since OR per patient.  Acute post op pain adequately controlled on current regimen but complaining of low back spasms.  Ambulated OOB to chair this AM.  -->  - Serial neuro and pain assessments   - scheduled Tylenol  - PRN oxycodone 5-10 mg q4 PRN for mod-severe pain and decrease Dilaudid 0.2 mg q2 for  breakthrough pain   - start scheduled Robaxin 500 mg q6  - continue lidocaine patches  - CAM ICU score qshift. Sleep/wake cycle hygiene  - regarding mild tremors since OR, Transplant team will discuss possible low dose demerol today    CV:  Hx of NICM, stage D HFrEF NYHA 2-3 s/p HM2 (4/30/23), VT s/p ICD, CAD, pAF, HTN.  Now s/p LVAD explant, AICD removal, & OHT on 6/21. Normal BiV function in OR on BOBBY.   On inotropic support with mil 0.2 and epi 0.05 with robust CI.  Normotensive but orthostatic with ambulation, CVP remains grossly 8-10.  NSR in 60's underlying. A/V wires AAI @ 90.  -->  - continuous EKG and ABP monitoring  - thermos or mixed q4 while actively titrating inotropes  - plan to wean inotropic support to mil 0.125 & epi 0.02 as able today, wean to goal CI > 2.2  - continue A pacing @ 90 for today per Abu Carroll for RV support  - No current indication to start preop CV regimen  - follow up routine post op TTE ordered for today per HF     PULM:  Hx of ex smoker. On 2L NC with appropriate oxygenation and ventilation. AM CXR unremarkable.  CT's without airleak and appropriate serosang output, small dump with first ambulation this AM.  -->  - continuous pulse oxy  - daily CXR   - wean NC as able for goal spo2> 92%  - chest tubes to suction. Will dc once output criteria met (ok per Abu Carroll)     GI:  Hx of GERD. -->  - continue PPI indefinitely post transplant   - regular carb controlled diet for now d/t stress hyperglycemia  - continue bowel regimen    :  Hx of BPH. Baseline Cr 1.0. Cr stable and lytes appropriate.  Remains slightly hypervolemic on exam with low CVP and orthostasis with ambulation.   -->  - gentle volume resuscitation with 250 5% today, holding diuresis for now unless CVP consistently > 12  - RFP PRN but daily at minimum, electrolyte repletion per protocol  - resume home tamsulosin   - hold home torsemide     ENDO:  Hx of hypothyroidism.  A1c: 4.4. Steroid and stress induced hyperglycemia  persists despite SSI.  -->  - Maintain BG <180  - increase SSI to #4 and give Lantus 10u x 1  for now  - Continue home synthroid 125 mcg daily     HEME:  Acute blood loss anemia stable, no active s/s of bleeding.   - CBC PRN but daily at minimum  - SCDs and SQH for DVT prophylaxis.  - Last type and screen: 6/20, reordered for today     ID:  MRSA negative.  Reactive leukocytosis grossly stable, remains afebrile. -->  - Trend temp q4h  - completed periop vanc/zosyn x48hrs with hx of LVAD, if WBC with continued uptrend will discuss resumption of abx with HF     Immunosuppression/prophylaxis (managed by HF Transplant team):  CMV: -/-   EBV: +/+   Toxo: -/-   Heb B: -/-  Hep C: -/-  HIV: -/-   - basiliximab now and POD 4 (Wednesday)  - continue methylpred taper, currently 60mg BID  - continue 1000mg MMF BID per HF  - continue tacro 1 mg BID per HF. Daily level    Skin:  No active skin issues.  - preventative Mepilex dressings in place on sacrum and heels  - change preventative Mepilex weekly or more frequently as indicated (when moist/soiled)   - every shift skin assessment per nursing and weekly ICU skin rounds  - moisture barrier to be applied with agatha care  - active skin problems addressed with nursing on daily rounds     Proph:  SCDs  PPI  SQH     G:  Lines:  Left brachial mary 6/21  RIJ MAC with PA cath 6/21  PIV x3  Dumont 6/21    Daily Risk Screen  Intubated: n/a  Central line: continue for vasoactives  Dumont: continue for strict I/Os      F: Family: Parents at bedside and updated on POC today.     A,B,C,D,E,F,G: reviewed     Dispo: CTICU    I personally spent 49 minutes of critical care time directly and personally managing the patient exclusive of separately billable procedures.    COOKIE Duff-CNP  CTICU TEAM PHONE [phone]

## 2025-06-23 NOTE — PROGRESS NOTES
Faxed clinical to [facility] transplant for review. Fax [phone]. Reference Number [id number] in heart transplant.

## 2025-06-23 NOTE — PROGRESS NOTES
Pharmacist Post-Transplant Note    The Clinical Transplant Pharmacist is aware that Anatoly Lane has been admitted and has undergone heart transplantation. A transplant pharmacist will be rounding with the multidisciplinary transplant team and making recommendations on his medication regimen.     The patient's medications have been reviewed in conjunction with the multidisciplinary transplant team. The pharmacist is in agreement with the plan of care for medications at this time.    Patient and donor factors were screened to determine the appropriate post-transplant protocol and current immunosuppression plan includes:    Induction Regimen:  Basiliximab    Maintenance Regimen:  Tacrolimus, Mycophenolate mofetil, and Methylprednisolone/Prednisone Taper    Maintenance immunosuppression and anti-infective prophylaxis will begin according to protocol. Home medications will begin when the patient is clinically stable. Of note, CMV D-/R- so patient will not require CMV prophylaxis but will require 3 months of HSV prophylaxis with acyclovir per protocol.    Omar Goodwin, PharmD, BCPS, BCTXP    Solid Organ Transplant Clinical Pharmacy Specialist

## 2025-06-23 NOTE — PROGRESS NOTES
Plan:  - analgesia; add robaxin  - discontinue chest tubes    - starting tamsulosin  - volume resuscitation as clinically indicated   - Diet, PRN anti-emetics, and Bowel regimen  - Current abx: agatha-op with vanc/zosyn  - Dispo: ICU    Quality/Safety/Proph:  - GI prophy: PPI  - DVT prophy: scd and subcutaneous heparin  - Central line: hemodynamic monitoring, parenteral medications (I.e. chemo, vasoactive), and access  - Dumont: critically ill patient who need accurate urinary output measurements  - Restraint: n/a    Assessment:    Neuro/MSK: expected acute post op pain   A-F bundle; Sleep Hygiene measures: appropriate daytime stimulation/physical activity. Lights out at 2100, avoidance of night time lab draws/night baths, minimize mind altering medicaments  PT/OT and OOB as appropriate    CV: HTN hx  and now s/p OHT   -    epi  and milrinone     Pulm: n/a  BPH with IS/flutter/OOB  not vented    Renal: na   Trend UOP and renal indices; replete lytes PRN     GI: GERD     ID/rheum: n/a  Follow cx data:    MICRO: No results found for the last 90 days.      Endo: Stress hyperglycemia and hypothyroid  Cont ISS and Lantus 10  for BG goal <180  Last HbA1c: No results found for requested labs within last 365 days.      Heme: ABL anemia    -     Trend Hb and transfuse PRN for goal Hb>7    LDA:  CVC 06/21/25 Double lumen Right Internal jugular (Active)   Placement Date/Time: 06/21/25 (c) 0125   Hand Hygiene Performed Prior to CVC Insertion: Yes  Site Prep: Chlorhexidine   Site Prep Agent has Completely Dried Before Insertion: Yes  All 5 Sterile Barriers Used (Gloves, Gown, Cap, Mask, Large Sterile Avelina...   Number of days: 2       Arterial Line 06/21/25 Left Brachial (Active)   Placement Date/Time: 06/21/25 (c) 0104   Size: 20 G  Orientation: Left  Location: Brachial  Securement Method: Transparent dressing  Patient Tolerance: Tolerated well   Number of days: 2       Pulmonary Artery Catheter Internal jugular (Active)   Placement  Date/Time: 06/21/25 0730   Location: Internal jugular   Number of days: 2       Urethral Catheter Temperature probe 16 Fr. (Active)   Placement Date/Time: 06/21/25 0120   Placed by: Wilfredo Romero SA  Hand Hygiene Completed: Yes  Catheter Type: Temperature probe  Tube Size (Fr.): 16 Fr.  Urine Returned: Yes   Number of days: 2       Chest Tube Left Pleural (Active)   Placement Date/Time: 06/21/25 1630   Chest Tube Orientation: Left  Chest Tube Location: Pleural   Number of days: 1       Chest Tube Right Pleural (Active)   Placement Date/Time: 06/21/25 1630   Chest Tube Orientation: Right  Chest Tube Location: Pleural   Number of days: 1       Y Chest Tube 1 and 2 Mediastinal Mediastinal (Active)   Placement Date/Time: 06/21/25 0700   Chest Tube Location 1: Mediastinal  Chest Tube Location 2: Mediastinal   Number of days: 2       Exam:    A&O x 4  NSR  Adequate air-exchange  Abd soft, NT  Extremities warm     REASON FOR ADMISSION    64 y.o. male with PMH of ex smoker, NICM, stage D HFrEF NYHA 2-3 s/p HM2 (4/30/23), VT s/p ICD, CAD, pAF, HTN, BPH, GERD and hypothyroidism.  Now admitted to CTICU s/p LVAD explant, AICD removal & OHT by Dr. Lorenzo on 6/21.     TODAY'S EVENTS    6/23: no issues overnight; c/o pain this am but well controlled    This critically ill patient continues to be at-risk for clinically significant deterioration / failure due to the above mentioned dysfunctional, unstable organ systems.  I have personally identified and managed all complex critical care issues to prevent aforementioned clinical deterioration.  Critical care time is spent at bedside and/or the immediate area and has included, but is not limited to, the review of diagnostic tests, labs, radiographs, serial assessments of hemodynamics, respiratory status, ventilatory management, and family updates.  Time spent in procedures and teaching are reported separately. CF CRITICAL CARE TIME: 45 minutes             LABS:  CMP:  Results from  "last 7 days   Lab Units 06/23/25  0134 06/22/25  1443 06/22/25  0530 06/21/25  2257 06/21/25  0801 06/20/25  1659   SODIUM mmol/L 143 143 143 144 143 142   POTASSIUM mmol/L 4.3 3.9 4.2 4.1 4.1 3.4*   CHLORIDE mmol/L 107 107 108* 109* 106 102   CO2 mmol/L 29 25 24 27 24 29   ANION GAP mmol/L 11 15 15 12 17 14   BUN mg/dL 23 20 19 18 22 31*   CREATININE mg/dL 0.76 0.73 0.80 0.80 0.97 0.98   EGFR mL/min/1.73m*2 >90 >90 >90 >90 87 86   MAGNESIUM mg/dL 2.51* 2.27 2.30 2.35 2.76* 2.09   ALBUMIN g/dL 3.9 3.9 3.7 3.7 3.5 4.3   ALK PHOS U/L  --   --   --   --   --  52   ALT U/L  --   --   --   --   --  13   AST U/L  --   --   --   --   --  15   BILIRUBIN TOTAL mg/dL  --   --   --   --   --  0.4     CBC:  Results from last 7 days   Lab Units 06/23/25  0134 06/22/25  0530 06/21/25  2304 06/21/25  0801 06/20/25  1700   WBC AUTO x10*3/uL 33.0* 27.5* 22.8* 20.5* 8.9   HEMOGLOBIN g/dL 7.7* 8.5* 9.0* 10.1* 13.5   HEMATOCRIT % 23.2* 25.0* 25.9* 28.4* 38.5*   PLATELETS AUTO x10*3/uL 159 162 178 214 218     COAG:   Results from last 7 days   Lab Units 06/21/25  0801 06/20/25  2330 06/20/25  2118 06/20/25  1659   INR  1.4* 1.9* 2.4* 3.0*     ABO:   ABO TYPE   Date Value Ref Range Status   06/20/2025 AB  Final     HEME/ENDO:     CARDIAC:       No lab exists for component: \"CK\", \"CKMBP\"    "

## 2025-06-24 ENCOUNTER — APPOINTMENT (OUTPATIENT)
Dept: RADIOLOGY | Facility: HOSPITAL | Age: 65
DRG: 001 | End: 2025-06-24
Payer: MEDICARE

## 2025-06-24 ENCOUNTER — LAB REQUISITION (OUTPATIENT)
Dept: LAB | Facility: CLINIC | Age: 65
DRG: 001 | End: 2025-06-24
Payer: MEDICARE

## 2025-06-24 DIAGNOSIS — Z94.1 HEART TRANSPLANT STATUS: ICD-10-CM

## 2025-06-24 LAB
ALBUMIN SERPL BCP-MCNC: 3.8 G/DL (ref 3.4–5)
ALBUMIN SERPL BCP-MCNC: 4.2 G/DL (ref 3.4–5)
ANION GAP SERPL CALC-SCNC: 11 MMOL/L (ref 10–20)
ANION GAP SERPL CALC-SCNC: 8 MMOL/L (ref 10–20)
BUN SERPL-MCNC: 26 MG/DL (ref 6–23)
BUN SERPL-MCNC: 30 MG/DL (ref 6–23)
CA-I BLD-SCNC: 1.16 MMOL/L (ref 1.1–1.33)
CA-I BLD-SCNC: 1.2 MMOL/L (ref 1.1–1.33)
CALCIUM SERPL-MCNC: 8.7 MG/DL (ref 8.6–10.6)
CALCIUM SERPL-MCNC: 9 MG/DL (ref 8.6–10.6)
CHLORIDE SERPL-SCNC: 106 MMOL/L (ref 98–107)
CHLORIDE SERPL-SCNC: 106 MMOL/L (ref 98–107)
CO2 SERPL-SCNC: 30 MMOL/L (ref 21–32)
CO2 SERPL-SCNC: 32 MMOL/L (ref 21–32)
CREAT SERPL-MCNC: 0.77 MG/DL (ref 0.5–1.3)
CREAT SERPL-MCNC: 0.79 MG/DL (ref 0.5–1.3)
EGFRCR SERPLBLD CKD-EPI 2021: >90 ML/MIN/1.73M*2
EGFRCR SERPLBLD CKD-EPI 2021: >90 ML/MIN/1.73M*2
ERYTHROCYTE [DISTWIDTH] IN BLOOD BY AUTOMATED COUNT: 17.4 % (ref 11.5–14.5)
ERYTHROCYTE [DISTWIDTH] IN BLOOD BY AUTOMATED COUNT: 17.6 % (ref 11.5–14.5)
FLOW AUTOCROSSMATCH: NORMAL
GLUCOSE BLD MANUAL STRIP-MCNC: 128 MG/DL (ref 74–99)
GLUCOSE SERPL-MCNC: 111 MG/DL (ref 74–99)
GLUCOSE SERPL-MCNC: 133 MG/DL (ref 74–99)
HCT VFR BLD AUTO: 21.5 % (ref 41–52)
HCT VFR BLD AUTO: 23.8 % (ref 41–52)
HGB BLD-MCNC: 7.1 G/DL (ref 13.5–17.5)
HGB BLD-MCNC: 7.6 G/DL (ref 13.5–17.5)
HLA RESULTS: NORMAL
MAGNESIUM SERPL-MCNC: 2.3 MG/DL (ref 1.6–2.4)
MAGNESIUM SERPL-MCNC: 2.42 MG/DL (ref 1.6–2.4)
MCH RBC QN AUTO: 30.2 PG (ref 26–34)
MCH RBC QN AUTO: 31.4 PG (ref 26–34)
MCHC RBC AUTO-ENTMCNC: 31.9 G/DL (ref 32–36)
MCHC RBC AUTO-ENTMCNC: 33 G/DL (ref 32–36)
MCV RBC AUTO: 94 FL (ref 80–100)
MCV RBC AUTO: 95 FL (ref 80–100)
NRBC BLD-RTO: 0.3 /100 WBCS (ref 0–0)
NRBC BLD-RTO: 0.5 /100 WBCS (ref 0–0)
PHOSPHATE SERPL-MCNC: 2.2 MG/DL (ref 2.5–4.9)
PHOSPHATE SERPL-MCNC: 2.6 MG/DL (ref 2.5–4.9)
PLATELET # BLD AUTO: 102 X10*3/UL (ref 150–450)
PLATELET # BLD AUTO: 130 X10*3/UL (ref 150–450)
POTASSIUM SERPL-SCNC: 4.5 MMOL/L (ref 3.5–5.3)
POTASSIUM SERPL-SCNC: 4.6 MMOL/L (ref 3.5–5.3)
RBC # BLD AUTO: 2.26 X10*6/UL (ref 4.5–5.9)
RBC # BLD AUTO: 2.52 X10*6/UL (ref 4.5–5.9)
SODIUM SERPL-SCNC: 141 MMOL/L (ref 136–145)
SODIUM SERPL-SCNC: 142 MMOL/L (ref 136–145)
TACROLIMUS BLD-MCNC: 2.2 NG/ML
VZV IGM SER IA-ACNC: 0.42 ISR
WBC # BLD AUTO: 18.7 X10*3/UL (ref 4.4–11.3)
WBC # BLD AUTO: 28.2 X10*3/UL (ref 4.4–11.3)

## 2025-06-24 PROCEDURE — 82947 ASSAY GLUCOSE BLOOD QUANT: CPT

## 2025-06-24 PROCEDURE — P9047 ALBUMIN (HUMAN), 25%, 50ML: HCPCS

## 2025-06-24 PROCEDURE — 71045 X-RAY EXAM CHEST 1 VIEW: CPT | Performed by: RADIOLOGY

## 2025-06-24 PROCEDURE — 2500000002 HC RX 250 W HCPCS SELF ADMINISTERED DRUGS (ALT 637 FOR MEDICARE OP, ALT 636 FOR OP/ED): Performed by: NURSE PRACTITIONER

## 2025-06-24 PROCEDURE — 99291 CRITICAL CARE FIRST HOUR: CPT | Performed by: NURSE PRACTITIONER

## 2025-06-24 PROCEDURE — 97116 GAIT TRAINING THERAPY: CPT | Mod: GP

## 2025-06-24 PROCEDURE — 2500000004 HC RX 250 GENERAL PHARMACY W/ HCPCS (ALT 636 FOR OP/ED): Performed by: NURSE PRACTITIONER

## 2025-06-24 PROCEDURE — 85027 COMPLETE CBC AUTOMATED: CPT

## 2025-06-24 PROCEDURE — 82330 ASSAY OF CALCIUM: CPT | Performed by: NURSE PRACTITIONER

## 2025-06-24 PROCEDURE — 2500000002 HC RX 250 W HCPCS SELF ADMINISTERED DRUGS (ALT 637 FOR MEDICARE OP, ALT 636 FOR OP/ED)

## 2025-06-24 PROCEDURE — 99291 CRITICAL CARE FIRST HOUR: CPT | Performed by: ANESTHESIOLOGY

## 2025-06-24 PROCEDURE — 83735 ASSAY OF MAGNESIUM: CPT | Performed by: NURSE PRACTITIONER

## 2025-06-24 PROCEDURE — 97530 THERAPEUTIC ACTIVITIES: CPT | Mod: GP

## 2025-06-24 PROCEDURE — 2500000004 HC RX 250 GENERAL PHARMACY W/ HCPCS (ALT 636 FOR OP/ED): Performed by: REGISTERED NURSE

## 2025-06-24 PROCEDURE — 2500000005 HC RX 250 GENERAL PHARMACY W/O HCPCS

## 2025-06-24 PROCEDURE — 2500000001 HC RX 250 WO HCPCS SELF ADMINISTERED DRUGS (ALT 637 FOR MEDICARE OP): Performed by: NURSE PRACTITIONER

## 2025-06-24 PROCEDURE — 80069 RENAL FUNCTION PANEL: CPT | Performed by: NURSE PRACTITIONER

## 2025-06-24 PROCEDURE — 2020000001 HC ICU ROOM DAILY

## 2025-06-24 PROCEDURE — 2500000004 HC RX 250 GENERAL PHARMACY W/ HCPCS (ALT 636 FOR OP/ED)

## 2025-06-24 PROCEDURE — 37799 UNLISTED PX VASCULAR SURGERY: CPT | Performed by: NURSE PRACTITIONER

## 2025-06-24 PROCEDURE — 80197 ASSAY OF TACROLIMUS: CPT | Performed by: NURSE PRACTITIONER

## 2025-06-24 PROCEDURE — 71045 X-RAY EXAM CHEST 1 VIEW: CPT

## 2025-06-24 PROCEDURE — 99233 SBSQ HOSP IP/OBS HIGH 50: CPT | Performed by: INTERNAL MEDICINE

## 2025-06-24 RX ORDER — ALBUMIN HUMAN 250 G/1000ML
SOLUTION INTRAVENOUS
Status: DISPENSED
Start: 2025-06-24 | End: 2025-06-24

## 2025-06-24 RX ORDER — ALBUMIN HUMAN 250 G/1000ML
50 SOLUTION INTRAVENOUS ONCE
Status: COMPLETED | OUTPATIENT
Start: 2025-06-24 | End: 2025-06-24

## 2025-06-24 RX ORDER — ALBUMIN HUMAN 250 G/1000ML
SOLUTION INTRAVENOUS
Status: COMPLETED
Start: 2025-06-24 | End: 2025-06-24

## 2025-06-24 RX ORDER — SULFAMETHOXAZOLE AND TRIMETHOPRIM 400; 80 MG/1; MG/1
1 TABLET ORAL DAILY
Status: DISCONTINUED | OUTPATIENT
Start: 2025-06-24 | End: 2025-07-09 | Stop reason: HOSPADM

## 2025-06-24 RX ADMIN — ALBUMIN HUMAN 50 G: 250 SOLUTION INTRAVENOUS at 09:44

## 2025-06-24 RX ADMIN — PREDNISONE 35 MG: 20 TABLET ORAL at 08:25

## 2025-06-24 RX ADMIN — ACETAMINOPHEN 650 MG: 325 TABLET, FILM COATED ORAL at 10:39

## 2025-06-24 RX ADMIN — METHOCARBAMOL TABLETS 500 MG: 500 TABLET, COATED ORAL at 18:55

## 2025-06-24 RX ADMIN — TACROLIMUS 1.5 MG: 0.5 CAPSULE ORAL at 18:55

## 2025-06-24 RX ADMIN — METHOCARBAMOL TABLETS 500 MG: 500 TABLET, COATED ORAL at 23:39

## 2025-06-24 RX ADMIN — MYCOPHENOLATE MOFETIL 1000 MG: 250 CAPSULE ORAL at 08:25

## 2025-06-24 RX ADMIN — LEVOTHYROXINE SODIUM 125 MCG: 0.05 TABLET ORAL at 06:23

## 2025-06-24 RX ADMIN — ACETAMINOPHEN 650 MG: 325 TABLET, FILM COATED ORAL at 16:28

## 2025-06-24 RX ADMIN — DIBASIC SODIUM PHOSPHATE, MONOBASIC POTASSIUM PHOSPHATE AND MONOBASIC SODIUM PHOSPHATE 250 MG: 852; 155; 130 TABLET ORAL at 20:13

## 2025-06-24 RX ADMIN — ACETAMINOPHEN 650 MG: 325 TABLET, FILM COATED ORAL at 04:26

## 2025-06-24 RX ADMIN — METHOCARBAMOL TABLETS 500 MG: 500 TABLET, COATED ORAL at 06:22

## 2025-06-24 RX ADMIN — DOCUSATE SODIUM 50 MG AND SENNOSIDES 8.6 MG 2 TABLET: 8.6; 5 TABLET, FILM COATED ORAL at 08:27

## 2025-06-24 RX ADMIN — VORICONAZOLE 200 MG: 200 TABLET, COATED ORAL at 08:27

## 2025-06-24 RX ADMIN — HEPARIN SODIUM 5000 UNITS: 5000 INJECTION INTRAVENOUS; SUBCUTANEOUS at 15:13

## 2025-06-24 RX ADMIN — METHOCARBAMOL TABLETS 500 MG: 500 TABLET, COATED ORAL at 12:16

## 2025-06-24 RX ADMIN — ACYCLOVIR 400 MG: 400 TABLET ORAL at 20:13

## 2025-06-24 RX ADMIN — ALBUMIN HUMAN 50 G: 0.25 SOLUTION INTRAVENOUS at 09:44

## 2025-06-24 RX ADMIN — TACROLIMUS 1 MG: 1 CAPSULE ORAL at 06:23

## 2025-06-24 RX ADMIN — VORICONAZOLE 200 MG: 200 TABLET, COATED ORAL at 20:13

## 2025-06-24 RX ADMIN — TAMSULOSIN HYDROCHLORIDE 0.4 MG: 0.4 CAPSULE ORAL at 08:27

## 2025-06-24 RX ADMIN — HEPARIN SODIUM 5000 UNITS: 5000 INJECTION INTRAVENOUS; SUBCUTANEOUS at 08:27

## 2025-06-24 RX ADMIN — SULFAMETHOXAZOLE AND TRIMETHOPRIM 1 TABLET: 400; 80 TABLET ORAL at 13:19

## 2025-06-24 RX ADMIN — MYCOPHENOLATE MOFETIL 1000 MG: 250 CAPSULE ORAL at 20:13

## 2025-06-24 RX ADMIN — PREDNISONE 35 MG: 20 TABLET ORAL at 20:13

## 2025-06-24 RX ADMIN — Medication 2 L/MIN: at 20:20

## 2025-06-24 RX ADMIN — DIBASIC SODIUM PHOSPHATE, MONOBASIC POTASSIUM PHOSPHATE AND MONOBASIC SODIUM PHOSPHATE 250 MG: 852; 155; 130 TABLET ORAL at 17:50

## 2025-06-24 RX ADMIN — HEPARIN SODIUM 5000 UNITS: 5000 INJECTION INTRAVENOUS; SUBCUTANEOUS at 23:39

## 2025-06-24 RX ADMIN — ACYCLOVIR 400 MG: 400 TABLET ORAL at 08:27

## 2025-06-24 RX ADMIN — MILRINONE LACTATE IN DEXTROSE 0.12 MCG/KG/MIN: 0.2 INJECTION, SOLUTION INTRAVENOUS at 15:13

## 2025-06-24 RX ADMIN — PANTOPRAZOLE SODIUM 40 MG: 40 TABLET, DELAYED RELEASE ORAL at 06:23

## 2025-06-24 ASSESSMENT — PAIN - FUNCTIONAL ASSESSMENT
PAIN_FUNCTIONAL_ASSESSMENT: 0-10

## 2025-06-24 ASSESSMENT — COGNITIVE AND FUNCTIONAL STATUS - GENERAL
MOVING TO AND FROM BED TO CHAIR: A LITTLE
CLIMB 3 TO 5 STEPS WITH RAILING: A LOT
STANDING UP FROM CHAIR USING ARMS: A LITTLE
TURNING FROM BACK TO SIDE WHILE IN FLAT BAD: A LOT
WALKING IN HOSPITAL ROOM: A LITTLE
MOVING FROM LYING ON BACK TO SITTING ON SIDE OF FLAT BED WITH BEDRAILS: A LITTLE
MOBILITY SCORE: 16

## 2025-06-24 ASSESSMENT — PAIN SCALES - GENERAL
PAINLEVEL_OUTOF10: 7
PAINLEVEL_OUTOF10: 0 - NO PAIN
PAINLEVEL_OUTOF10: 4
PAINLEVEL_OUTOF10: 1
PAINLEVEL_OUTOF10: 0 - NO PAIN
PAINLEVEL_OUTOF10: 7
PAINLEVEL_OUTOF10: 7
PAINLEVEL_OUTOF10: 0 - NO PAIN

## 2025-06-24 NOTE — PROGRESS NOTES
"CTICU Progress Note  Anatoly Lane/[id number]    Admit Date: 6/20/2025  Hospital Length of Stay: 4   ICU Length of Stay: 3d 16h     SUBJECTIVE:   Lasix 20 mg for goal negative.     MEDICATIONS  Infusions:  EPINEPHrine, Last Rate: 0.02 mcg/kg/min (06/24/25 0700)  lactated Ringer's, Last Rate: 5 mL/hr (06/24/25 0700)  lactated Ringer's, Last Rate: 20 mL/hr (06/24/25 0700)  milrinone, Last Rate: 0.125 mcg/kg/min (06/24/25 0700)      Scheduled:  acetaminophen, 650 mg, q6h  acyclovir, 400 mg, BID  [START ON 6/25/2025] basiliximab, 20 mg, Once  furosemide, 20 mg, Daily  heparin (porcine), 5,000 Units, q8h  insulin lispro, 0-20 Units, Before meals & nightly  levothyroxine, 125 mcg, Daily  methocarbamol, 500 mg, q6h LUPILLO  mycophenolate, 1,000 mg, BID  oxygen, , Continuous - Inhalation  pantoprazole, 40 mg, Daily before breakfast  polyethylene glycol, 17 g, BID  predniSONE, 35 mg, q12h LUPILLO  sennosides-docusate sodium, 2 tablet, BID  tacrolimus, 1 mg, q12h LUPILLO  tamsulosin, 0.4 mg, Daily  voriconazole, 200 mg, q12h LUPILLO      PRN:  calcium gluconate, 1 g, q6h PRN  calcium gluconate, 2 g, q6h PRN  dextrose, 12.5 g, q15 min PRN  dextrose, 25 g, q15 min PRN  dextrose, 25 g, q15 min PRN  glucagon, 1 mg, q15 min PRN  glucagon, 1 mg, q15 min PRN  HYDROmorphone, 0.2 mg, q2h PRN  lidocaine, 1 patch, Daily PRN  magnesium sulfate, 2 g, q6h PRN  magnesium sulfate, 4 g, q6h PRN  naloxone, 0.2 mg, q5 min PRN  ondansetron ODT, 4 mg, q8h PRN   Or  ondansetron, 4 mg, q8h PRN  oxyCODONE, 10 mg, q4h PRN  oxyCODONE, 5 mg, q4h PRN  oxygen, , Continuous PRN - O2/gases  potassium chloride CR, 20 mEq, q6h PRN   Or  potassium chloride, 20 mEq, q6h PRN  potassium chloride CR, 40 mEq, q6h PRN   Or  potassium chloride, 40 mEq, q6h PRN  potassium chloride, 20 mEq, q6h PRN  potassium chloride, 40 mEq, q6h PRN        PHYSICAL EXAM:   Visit Vitals  /55   Pulse 90   Temp 36.9 °C (98.4 °F) (Core)   Resp 14   Ht 1.753 m (5' 9\")   Wt 83.6 kg (184 lb 4.9 oz) "   SpO2 100%   BMI 27.22 kg/m²   Smoking Status Former   BSA 2.02 m²     Wt Readings from Last 5 Encounters:   06/24/25 83.6 kg (184 lb 4.9 oz)   02/27/25 102 kg (224 lb)   01/28/25 104 kg (229 lb)   11/19/24 112 kg (246 lb 12.8 oz)   07/15/24 110 kg (243 lb 9.6 oz)     INTAKE/OUTPUT:  I/O last 3 completed shifts:  In: 3997.3 (47.8 mL/kg) [P.O.:1157; I.V.:2240.3 (26.8 mL/kg); IV Piggyback:600]  Out: 4748 (56.8 mL/kg) [Urine:3818 (1.3 mL/kg/hr); Chest Tube:930]  Weight: 83.6 kg          Vent settings:       Physical Exam:   - CONSTITUTION: adult male sitting in bed in NAD, pleasant and conversant.   - NEUROLOGIC: No focal deficits, ambulating.  CAM negative and GCS 15.  BUE/BLE equal strength, no tremors noted on exam    - CARDIOVASCULAR: NSR in 60's underlying. A/V wires, AAI paced 90  - RESPIRATORY: CTAB on 2L NC.  Chest tubes with appropriate serosang output and no airleak.   - GI: S/ND/NT, active BS  - : conteh with clear yellow urine  - EXTREMITIES: NV exam intact x 4, minimal BLE edema  - SKIN: intact, incisions/dressings CDI  - PSYCHIATRIC: calm cooperative     All relevant imaging, diagnostics and labs reviewed.       Assessment/Plan   Assessment:  Anatoly Lane is a 64 year old male with relevant PMH of ex smoker, NICM, stage D HFrEF NYHA 2-3 s/p HM2 (4/30/23), VT s/p ICD, CAD, pAF, HTN, BPH, GERD and hypothyroidism.  Now admitted to CTICU s/p LVAD explant, AICD removal & OHT by Dr. Lorenzo on 6/21.     Plan:  NEURO: No hx. Neuro exam intact without focal deficit, CAM negative.  BUE/BLE mild tremors since OR per patient.  Acute post op pain adequately controlled on current regimen but complaining of low back spasms.  Ambulated OOB to chair this AM.  -->  - Serial neuro and pain assessments   - scheduled Tylenol  - PRN oxycodone 5-10 mg q4 PRN for mod-severe pain and decrease Dilaudid 0.2 mg q2 for breakthrough pain   - continue scheduled Robaxin 500 mg q6  - continue lidocaine patches  - CAM ICU score  qshift. Sleep/wake cycle hygiene  -  mild tremors post op continue to monitor     CV:  Hx of NICM, stage D HFrEF NYHA 2-3 s/p HM2 (4/30/23), VT s/p ICD, CAD, pAF, HTN.  Now s/p LVAD explant, AICD removal, & OHT on 6/21. Normal BiV function in OR on BOBBY.   On inotropic support with epi 0.02 mil 0.125 with CI > 3.  Normotensive but orthostatic with ambulation, CVP remains  mid teens.  NSR in 60's underlying. A/V wires AAI @ 90.  6/22 TTE Normal BiV -->  - continuous EKG and ABP monitoring  - thermos or mixed q4 while actively titrating inotropes  - plan to wean inotropic support, wean epi off first then mil 0.125 wean to goal CI > 2.2  - continue A pacing @ 90 for today per Abu Carroll for RV support  - No current indication to start preop CV regimen       PULM:  Hx of ex smoker. On 2L NC with appropriate oxygenation and ventilation. AM CXR unremarkable.  CT's without airleak and appropriate serosang output, .  -->  - continuous pulse oxy  - daily CXR   - wean NC as able for goal spo2> 92%  - chest tubes to suction. Discontinue chest tubes      GI:  Hx of GERD. -->  - continue PPI indefinitely post transplant   - regular carb controlled diet for now d/t stress hyperglycemia  - continue bowel regimen    :  Hx of BPH. Baseline Cr 1.0. Cr stable and lytes appropriate.  Diuresed with lasix 20 overnight and net negative    -->  - RFP PRN but daily at minimum, electrolyte repletion per protocol  - continue home tamsulosin   - hold home torsemide     ENDO:  Hx of hypothyroidism.  A1c: 4.4. Steroid and stress induced hyperglycemia persists despite SSI.  -->  - Maintain BG <180  - continue SSI to #4 and give Lantus 10u x 1  for now  - Continue home synthroid 125 mcg daily     HEME:  Acute blood loss anemia stable, no active s/s of bleeding.   - CBC PRN but daily at minimum  - SCDs and SQH for DVT prophylaxis.  - Last type and screen: 6/23     ID:  MRSA negative.  Reactive leukocytosis grossly stable, remains afebrile. -->  -  Trend temp q4h  - completed periop vanc/zosyn x48hrs with hx of LVAD, if WBC with continued uptrend will discuss resumption of abx with HF     Immunosuppression/prophylaxis (managed by HF Transplant team):  CMV: -/-   EBV: +/+   Toxo: -/-   Heb B: -/-  Hep C: -/-  HIV: -/-   - basiliximab now and POD 4 (Wednesday) ordered  - completed methylpred taper, now on pred 35 mg BID   - continue 1000mg MMF BID per HF  - continue tacro 1 mg BID per HF. Daily level  - acyclovir 400 mg BID   - f/u re starting bactrim     Skin:  No active skin issues.  - preventative Mepilex dressings in place on sacrum and heels  - change preventative Mepilex weekly or more frequently as indicated (when moist/soiled)   - every shift skin assessment per nursing and weekly ICU skin rounds  - moisture barrier to be applied with agatha care  - active skin problems addressed with nursing on daily rounds     Proph:  SCDs  PPI  SQH     G:  Lines:  Left brachial mary 6/21  RIJ MAC with PA cath 6/21  PIV x3  Dumont 6/21 - discontinue     Daily Risk Screen  Intubated: n/a  Central line: continue for vasoactives  Dumont: continue for strict I/Os      F: Family: Parents at bedside and updated on POC today.     A,B,C,D,E,F,G: reviewed     Dispo: HFICU today     I personally spent 55 minutes of critical care time directly and personally managing the patient exclusive of separately billable procedures.    Gabriela Urbano, COOKIE-CNP  CTICU TEAM PHONE [phone]

## 2025-06-24 NOTE — PROGRESS NOTES
Physical Therapy    Physical Therapy Treatment    Patient Name: Anatoly Lane  MRN: [id number]  Department: [facility] CTICU  Room: 09/09-A  Today's Date: 6/24/2025  Time Calculation  Start Time: 1209  Stop Time: 1239  Time Calculation (min): 30 min    Assessment/Plan   PT Assessment  PT Assessment Results: Decreased strength, Decreased endurance, Impaired balance, Decreased mobility, Pain  Rehab Prognosis: Good  Barriers to Discharge Home: No anticipated barriers  Evaluation/Treatment Tolerance: Patient tolerated treatment well, Patient limited by fatigue, Patient limited by pain  Medical Staff Made Aware: Yes  End of Session Communication: Bedside nurse  End of Session Patient Position: Up in chair, Alarm off, not on at start of session (Utilizing bed pan in chair, RN aware.  Pt with call light.)  PT Plan  Inpatient/Swing Bed or Outpatient: Inpatient  PT Plan  Treatment/Interventions: Bed mobility, Transfer training, Gait training, Stair training, Balance training, Strengthening, Endurance training, Therapeutic exercise, Therapeutic activity  PT Plan: Ongoing PT  PT Frequency: Daily  PT Discharge Recommendations: Low intensity level of continued care  Equipment Recommended upon Discharge: Wheeled walker  PT Recommended Transfer Status: Assist x1, Assistive device  PT - OK to Discharge: Yes    PT Visit Info:  PT Received On: 06/24/25  Response to Previous Treatment: Patient with no complaints from previous session.     General Visit Information:   General  Family/Caregiver Present: No  Prior to Session Communication: Bedside nurse  Patient Position Received: Bed, 3 rail up, Alarm off, not on at start of session  Preferred Learning Style: auditory, verbal  General Comment: Pt awake, alert and willing to participate in PT session.  Pt completed bed mobility, sit<>stand transfer and ambulation ~125ft with thoracic walker.  CGA-minAx1 provided throughout session.  No instability noted.  Vitals stable. (Tele, mary, swan, CVP,  CVC, conteh, mil .125)    Subjective   Precautions:  Precautions  Medical Precautions: Cardiac precautions, Fall precautions, Oxygen therapy device and L/min (6L)  Post-Surgical Precautions: Move in the Tube  Precautions Comment: AAI @ 90     Date/Time Vitals Session Patient Position Pulse Resp SpO2 BP MAP (mmHg)    06/24/25 1209 Pre PT  Lying  90  19  100 %  127/53  79     06/24/25 1239 Post PT  Sitting  90  24  100 %  124/87  99      Vital Signs Comment: Vitals stable with mobility     Objective   Pain:  Pain Assessment  Pain Assessment: 0-10  0-10 (Numeric) Pain Score: 7  Pain Type: Surgical pain  Pain Location: Chest  Cognition:  Cognition  Overall Cognitive Status: Within Functional Limits  Arousal/Alertness: Appropriate responses to stimuli  Orientation Level: Oriented X4  Following Commands: Follows all commands and directions without difficulty  Activity Tolerance:  Activity Tolerance  Endurance: Tolerates 10 - 20 min exercise with multiple rests  Early Mobility/Exercise Safety Screen: Proceed with mobilization - No exclusion criteria met  Activity Tolerance Comments: Fatigue with activity  Treatments:  Therapeutic Activity  Therapeutic Activity Performed: Yes  Therapeutic Activity 1: Increased time for skilled ICU line/tube management for safe functional mobility  Therapeutic Activity 2: Static sitting ~10 min prior to ambulation.  Pt reported pain in R inner thigh.  No instability, vitals stable.  Therapeutic Activity 3: Static standing ~4 min with thoracic walker for upright support. No instability however pt reporting pain in R inner thigh. Noted drainage from recently pulled CT site (reinforced prior to ambulation, RN notified).    Bed Mobility  Bed Mobility: Yes  Bed Mobility 1  Bed Mobility 1: Supine to sitting  Level of Assistance 1: Moderate assistance  Bed Mobility Comments 1: HOB elevated, assistance with advancing trunk upright    Ambulation/Gait Training  Ambulation/Gait Training Performed:  Yes  Ambulation/Gait Training 1  Surface 1: Level tile  Device 1: Thoracic walker  Assistance 1: Contact guard  Quality of Gait 1: Decreased step length, Forward flexed posture  Comments/Distance (ft) 1: ~125ft;  Limited distance d/t pain and needing to use restroom  Transfers  Transfer: Yes  Transfer 1  Transfer From 1: Sit to, Stand to  Transfer to 1: Stand, Sit  Technique 1: Sit to stand, Stand to sit  Transfer Device 1: Thoracic walker  Transfer Level of Assistance 1: Contact guard  Trials/Comments 1: x1 transfer from EOB, x1 transfer from chair;  Cues for hand placement and proper use of AD    Stairs  Stairs: No    Outcome Measures:  [facility] Basic Mobility  Turning from your back to your side while in a flat bed without using bedrails: A little  Moving from lying on your back to sitting on the side of a flat bed without using bedrails: A lot  Moving to and from bed to chair (including a wheelchair): A little  Standing up from a chair using your arms (e.g. wheelchair or bedside chair): A little  To walk in hospital room: A little  Climbing 3-5 steps with railing: A lot  Basic Mobility - Total Score: 16    FSS-ICU  Ambulation: Walks >/ or equal to 50 feet with any assistance x1  Rolling: Supervision or set-up only  Sitting: Supervision or set-up only  Transfer Sit-to-Stand: Supervision or set-up only  Transfer Supine-to-Sit: Moderate assistance (performs 50 - 74% of task)  Total Score: 20    Early Mobility/Exercise Safety Screen: Proceed with mobilization - No exclusion criteria met  ICU Mobility Scale: Walking with assistance of 1 person [8]    Education Documentation  Precautions, taught by Lucille Crooks PT at 6/24/2025 12:49 PM.  Learner: Patient  Readiness: Acceptance  Method: Explanation, Demonstration  Response: Demonstrated Understanding    Body Mechanics, taught by Lucille Crooks PT at 6/24/2025 12:49 PM.  Learner: Patient  Readiness: Acceptance  Method: Explanation, Demonstration  Response: Demonstrated  Understanding    Mobility Training, taught by Lucille Crooks, PT at 6/24/2025 12:49 PM.  Learner: Patient  Readiness: Acceptance  Method: Explanation, Demonstration  Response: Demonstrated Understanding    Education Comments  No comments found.    EDUCATION:       Encounter Problems       Encounter Problems (Active)       Balance       Pt will demonstrated ability to score at least 24/28 on the Tinetti balance assessment tool to ensure safety upon D/C.  (Progressing)       Start:  06/23/25    Expected End:  07/07/25               Mobility       Pt will demonstrated ability to ambulate >/=300ft with proper form and no balance deficits for safe home going.   (Progressing)       Start:  06/23/25    Expected End:  07/07/25            Pt will demonstrate ability to ascend/descend at least 5 stairs with unilateral rail and no balance deficits for safe home going.  (Progressing)       Start:  06/23/25    Expected End:  07/07/25               PT Transfers       Pt will demonstrate ability to complete 5X STS in < 12 sec with good from and consistency between transfers for safe D/C.  (Progressing)       Start:  06/23/25    Expected End:  07/07/25

## 2025-06-24 NOTE — SIGNIFICANT EVENT
Transfer from CT ICU     Anatoly Lane is a 64 year old male with relevant PMH of ex smoker, NICM, stage D HFrEF NYHA 2-3, S/p HM2 (4/30/23), VT s/p ICD, CAD, pAF, HTN, BPH, GERD and hypothyroidism.  S/p LVAD explant, AICD removal & OHT by Dr. Lorenzo on 6/21. Now transferred from  CTICU to HF ICU 6/24. Patient HD stable, on milrinone drip 0.125 mcg/kg/min.     Neuro:  # Anxiety. Depression, Acute pain   # BUE/BLE mild tremor  - Serial neuro and pain assessments   - PO Tylenol PRN for pain  - PT/OT Consult, OOB to chair  - CAM ICU score every shift  - Sleep/wake cycle normalization    Cardiovascular:  # Donor/Recipient Infectious history:   CMV: -/-   EBV: +/+   Toxo: -/-   Heb B: -/-  Hep C: -/-  HIV: -/-      Rejection/Prophylaxis:    - Induction: - Induction: s/p methylprednisolone 500mg IV x2  - Postoperative Induction Plan to be completed   Basiliximab 20mg IV within 1 hour of arrival to CTICU (no need for premedication), 2nd dose: 20mg IVPB on POD4 (06/25)   Steroid taper starting with Methylprednisolone 125mg IV Q8 x 3 doses followed by IV Solumedrol 60 mg q 12 hrs x 2 doses followed by prednisone 35 mg q 12hrs PO q 12 hrs  - Steroids: prednisone 35 mg q 12hrs PO q 12 hrs until first biopsy   - Tacrolimus: 1mg BID, increase to 1.5mg BID   - Tacrolimus goal troughs: 10-12, Last tacrolimus trough: 2.2 6/24/2025:  6:23 AM  - Cellcept: C/w 1,000mg BID  - Antifungals: c/w voriconazole 200mg BID x3 months end date 09/22/2025   - Antivirals: c/w acylovir 400mg BID (06/22 x3 months. End date 09/22/2025   - Anti PCP & Toxoplasmosis: Can start SS Bactrim daily (end date of 12/22/25)      Next RHC/biopsy: Scheduled for 7/1 w/ Dr. Velasco   Last TTE/BOBBY: ; normal BiV function   Last HLA: First due on 7/24  Last Allomap: ~ will assess starting at 6-month ana post-op    Last Allosure: ~ will assess starting at 6-month ana post-op   Last [facility]: 03/2023(pre OHT); First due ~ (one year post tx)   Osteopenia/osteoporosis  prophylaxis: Vitamin D3 and calcium supplements - needs started, consider in HFICU   Peptic/gastric ulcer prophylaxis: Pantoprazole 40mg daily    CAV Prophylaxis: Aspirin 81mg daily (once cleared by CT surgery) & Pravastatin 40mg daily - needs started, consider in HFICU     #  NICM, s/p HM2 (4/30/23), VT s/p ICD, CAD, pAF, HTN.  Now s/p LVAD explant, AICD removal, & OHT on 6/21  - TTE (6/22): Normal BiV   - admit weight (6/24): 83.6 KG   - admit BNP  - ASA/Statin  - Diuretics as needed  - Daily SGC #s: pending upon transfer in the HF ICU   - Daily standing weights, 2gm sodium diet, 2L fluid restriction, strict I&Os    #Electrolyte Disturbances  - Keep K >4, Mag >2    Pulmonary:   # Ex-smoker  -Monitor and maintain SpO2 > 92%    GI:  # H/x GERD  - Bowel regimen  - PPI     :  # H/x BPH  -Baseline Cr: 1.0   -Admit BUN/Cr: Normal   -I/Os  -avoid hypotension and nephrotoxic agents    Heme:  # Acute blood loss anemia stable, no active s/s of bleeding  - Trending H/h   - Iron study: TIBC, ferritin, serum Fe, folate, B12         Endo:  # Steroid and stress induced hyperglycemia persists despite SSI.  -->  - Maintain BG <180  - continue SSI to #4 and give Lantus 10u x 1  for now  - hgbA1c  was 4.1    # H/o of hypothyroid  -TSH (6/24):   - Steroid and stress induced hyperglycemia persists despite SSI.  -->  - Maintain BG <180  - continue SSI to #4 and give Lantus 10u x 1 in CT ICU 6/24     ID:  # Reactive leukocytosis  - MRSA negative  - completed periop vanc/zosyn x48hrs with hx of LVAD  -trend temps q4h    Lines:  Left brachial mary 6/21  RIJ MAC with PA cath 6/21  PIV x3  [place] 6/21 - discontinue      Smiley Queen,  CNP

## 2025-06-24 NOTE — PROGRESS NOTES
Advanced Heart Failure Progress Note   Consulting Team: CTICU  Primary Cardiologist: Dr. Ruano  Reason for Consult: s/p OHT    Subjective   Seen at bedside, denies acute complaints, tremors are better today. Able to walk with PT yesterday.  Otherwise decent urine output and renal function at baseline.     Remains on milrinone, 0.125 epinephrine 0.02    CVP is > 20 and getting IV diuresis.     Objective   Physical Exam  Vitals:    06/24/25 1000   BP:    Pulse: 90   Resp: 18   Temp:    SpO2: 100%     GEN: on NC in NAD  CV: RRR, no m/r/g; A/V wires. AAI paced NSR in 60s underlying  LUNGS: chest tubes in place with minimal output  ABD: Soft, NT/ND, NBS,   SKIN: Warm, well perfused.   EXT: No clubbing, cyanosis, or edema.  NEURO: AO x 3, no new focal deficits    I have personally reviewed the following images and laboratory findings:    ECG:  .EKG post OHT not available      Results for orders placed during the hospital encounter of 06/20/25    Transthoracic Echo (TTE) Limited      PHYSICIAN INTERPRETATION:  Left Ventricle: Left ventricular ejection fraction is normal calculated by Severino's biplane at 67%. There are no regional left ventricular wall motion abnormalities. The left ventricular cavity size is normal. There is normal septal and normal posterior left ventricular wall thickness. Left ventricular diastolic filling cannot be determined due to heart transplant.  Left Atrium: The left atrium is consistent with transplant.  Right Ventricle: The right ventricle was not assessed. There is normal right ventricular global systolic function. A device is visualized in the right ventricle.  Right Atrium: The right atrial size was not assessed. There is a device visualized in the right atrium.  Aortic Valve: The aortic valve is probably trileaflet. There is no evidence of aortic valve regurgitation.  Mitral Valve: The mitral valve is normal in structure. There is trace mitral valve regurgitation.  Tricuspid Valve: The  tricuspid valve is structurally normal. No evidence of tricuspid regurgitation.  Pulmonic Valve: The pulmonic valve is structurally normal. There is no indication of pulmonic valve regurgitation.  Pericardium: There is no pericardial effusion noted.  Aorta: The aortic root is normal. The Asc Ao is 3.30 cm. There is no dilatation of the ascending aorta.  Systemic Veins: The inferior vena cava was not well visualized, IVC inspiratory collapse is not well visualized.  In comparison to the previous echocardiogram(s): Compared with study dated 2/27/2025, patient is now s/p heart transplant.      CONCLUSIONS:  1. Poorly visualized anatomical structures due to suboptimal image quality.  2. Left ventricular ejection fraction is normal calculated by Severino's biplane at 67%.  3. There is normal right ventricular global systolic function.    QUANTITATIVE DATA SUMMARY:    2D MEASUREMENTS:          Normal Ranges:  Ao Root d:       3.60 cm  (2.0-3.7cm)  IVSd:            0.90 cm  (0.6-1.1cm)  LVPWd:           0.90 cm  (0.6-1.1cm)  LVIDd:           4.60 cm  (3.9-5.9cm)  LVIDs:           3.10 cm  LV Mass Index:   68 g/m2  LVEDV Index:     75 ml/m2  LV % FS          32.6 %      AORTA MEASUREMENTS:         Normal Ranges:  Asc Ao, d:          3.30 cm (2.1-3.4cm)      LV SYSTOLIC FUNCTION:  Normal Ranges:  EF-A4C View:    65 % (>=55%)  EF-A2C View:    71 %  EF-Biplane:     67 %  LV EF Reported: 67 %      AORTIC VALVE:          Normal Ranges:  LVOT Diameter: 2.20 cm (1.8-2.4cm)      TRICUSPID VALVE/RVSP:   Normal Ranges:  Est. RA Pressure:     3      [id number] Ama Flynn MD  Electronically signed on 6/23/2025 at 4:14:24 PM      Imaging  Chest x-ray:  IMPRESSION:  1. No significant interval change in perihilar congestion and  bibasilar atelectasis.  2. Medical devices and postsurgical changes as described above.    Lab Review   Results for orders placed or performed during the hospital encounter of 06/20/25 (from the past 24 hours)    POCT GLUCOSE   Result Value Ref Range    POCT Glucose 168 (H) 74 - 99 mg/dL   Transthoracic Echo (TTE) Limited   Result Value Ref Range    LVOT diam 2.20 cm    LV EF 67 %    LVIDd 4.60 cm    LV A4C EF 65.4    Type and screen   Result Value Ref Range    ABO TYPE AB     Rh TYPE POS     ANTIBODY SCREEN NEG    Renal Function Panel   Result Value Ref Range    Glucose 168 (H) 74 - 99 mg/dL    Sodium 143 136 - 145 mmol/L    Potassium 4.8 3.5 - 5.3 mmol/L    Chloride 107 98 - 107 mmol/L    Bicarbonate 30 21 - 32 mmol/L    Anion Gap 11 10 - 20 mmol/L    Urea Nitrogen 26 (H) 6 - 23 mg/dL    Creatinine 0.78 0.50 - 1.30 mg/dL    eGFR >90 >60 mL/min/1.73m*2    Calcium 8.5 (L) 8.6 - 10.6 mg/dL    Phosphorus 2.5 2.5 - 4.9 mg/dL    Albumin 3.8 3.4 - 5.0 g/dL   Magnesium   Result Value Ref Range    Magnesium 2.58 (H) 1.60 - 2.40 mg/dL   CBC   Result Value Ref Range    WBC 27.9 (H) 4.4 - 11.3 x10*3/uL    nRBC 0.2 (H) 0.0 - 0.0 /100 WBCs    RBC 2.43 (L) 4.50 - 5.90 x10*6/uL    Hemoglobin 7.5 (L) 13.5 - 17.5 g/dL    Hematocrit 23.0 (L) 41.0 - 52.0 %    MCV 95 80 - 100 fL    MCH 30.9 26.0 - 34.0 pg    MCHC 32.6 32.0 - 36.0 g/dL    RDW 17.5 (H) 11.5 - 14.5 %    Platelets 125 (L) 150 - 450 x10*3/uL   Calcium, Ionized   Result Value Ref Range    POCT Calcium, Ionized 1.17 1.1 - 1.33 mmol/L   POCT GLUCOSE   Result Value Ref Range    POCT Glucose 211 (H) 74 - 99 mg/dL   Calcium, Ionized   Result Value Ref Range    POCT Calcium, Ionized 1.16 1.1 - 1.33 mmol/L   CBC   Result Value Ref Range    WBC 28.2 (H) 4.4 - 11.3 x10*3/uL    nRBC 0.3 (H) 0.0 - 0.0 /100 WBCs    RBC 2.52 (L) 4.50 - 5.90 x10*6/uL    Hemoglobin 7.6 (L) 13.5 - 17.5 g/dL    Hematocrit 23.8 (L) 41.0 - 52.0 %    MCV 94 80 - 100 fL    MCH 30.2 26.0 - 34.0 pg    MCHC 31.9 (L) 32.0 - 36.0 g/dL    RDW 17.6 (H) 11.5 - 14.5 %    Platelets 130 (L) 150 - 450 x10*3/uL   Magnesium   Result Value Ref Range    Magnesium 2.30 1.60 - 2.40 mg/dL   Renal Function Panel   Result Value Ref Range     Glucose 133 (H) 74 - 99 mg/dL    Sodium 141 136 - 145 mmol/L    Potassium 4.5 3.5 - 5.3 mmol/L    Chloride 106 98 - 107 mmol/L    Bicarbonate 32 21 - 32 mmol/L    Anion Gap 8 (L) 10 - 20 mmol/L    Urea Nitrogen 26 (H) 6 - 23 mg/dL    Creatinine 0.77 0.50 - 1.30 mg/dL    eGFR >90 >60 mL/min/1.73m*2    Calcium 8.7 8.6 - 10.6 mg/dL    Phosphorus 2.6 2.5 - 4.9 mg/dL    Albumin 3.8 3.4 - 5.0 g/dL   Tacrolimus   Result Value Ref Range    Tacrolimus  2.2 <=15.0 ng/mL     Assessment and Plan   Anatoly Lane is a 64 y.o. male with a PMHx of Stage D HFrEF s/p HM3 (4/30/23), CAD, PAF, VT s/p ICD, hypothyroidism who underwent OHT 06/21 and explant of LVAD and AICD with Dr. Lorenzo. HF/transplant team following given post OHT status.      # s/p LVAD(HM3) explant and OHT 06/21  Hx of Stage D chronic systolic HF/ICM/HFrEF s/p HM3 LVAD (4/30/23) now s/p LVAD and AICD explant.  ABO: AB. He was status 4 on UNOS  Arrived from the OR to CTICU on milrinone 0.2 levophed 0.06, vasopressin 0.03, and epinephrine 0.06 and Iepo.  Post op CVP 5-7, PAP 25/14(18), CO/CI 5.9/2.8  Off levophed, vasopressin, inhaled epoprostenol now  Remains on milrinone, 0.125 and epinephrine 0.02     Donor/Recipient Infectious Serology Results:  CMV: -/-   EBV: +/+   Toxo: -/-   Heb B: -/-  Hep C: -/-  HIV: -/-     Rejection/Prophylaxis (transplant):  - Induction: s/p methylprednisolone 500mg IV x2(06/20)  - Postoperative Induction Plan to be completed in CTICU:   Basiliximab 20mg IV within 1 hour of arrival to CTICU (no need for premedication), 2nd dose: 20mg IVPB on POD4 (06/25)   Steroid taper starting with Methylprednisolone 125mg IV Q8 x 3 doses followed by IV Solumedrol 60 mg q 12 hrs x 2 doses followed by prednisone 35 mg q 12hrs PO q 12 hrs x 6 doses     -Immunosuppression:   - Tacrolimus: mg @ 0630 & mg @ 1830 w/ daily levels drawn @ 0600- C/w 1mg BID (dose adjusted 06/22)  - Tacrolimus goal troughs: 10-12  - Cellcept: c/w 1000mg BID daily  -  Antifungals: c/w voriconazole 200mg BID x3 months end date 09/22/2025  - Antivirals: c/w acylovir 400mg BID (06/22  x3 months. End date 09/22/2025  - Anti PCP & Toxoplasmosis: Will begin bactrim likely POD 5 (06/26) if renal function and cbc stable     Cardiac biopsy: Tentative plans for 06/30, will need case request   Last HLA: cPRAs 02/2025 negative. Repeat in process  Last Allomap: n/a  Last RHC: pre OHT 03/26/2025  Last LHC: 03/2023(pre OHT)  Last TTE/BOBBY: 06/23, normal BiV function, LVEF 67%  Osteopenia/osteoporosis prophylaxis: Vitamin D3 and calcium supplements   Peptic/gastric ulcer prophylaxis: Pantoprazole 40mg daily   CAV Prophylaxis: start aspirin 81mg once ok from CT surgery standpoint. Start pravastatin     - Continue weaning milrinone and epinephrine gtt   - Diurese for CVP >11.     -rest of care per CTICU team     This plan was discussed with Dr. Hylton, HF attending. For any questions or concerns, feel free to reach out via [employer] chat or page at [phone].    Kana Lemon MD   Heart Failure Fellow  PGY-4

## 2025-06-24 NOTE — PROGRESS NOTES
Plan:  - analgesia; cont robaxin  - discontinue chest tubes    - cont tamsulosin  - volume resuscitation as clinically indicated  500cc 5%alb  - stop epi and follow CI, likely stop milrinone too  - hold lasix for now  - Diet, PRN anti-emetics, and Bowel regimen  - Current abx: agatha-op with vanc/zosyn  - Dispo: ICU    Quality/Safety/Proph:  - GI prophy: PPI  - DVT prophy: scd and subcutaneous heparin  - Central line: hemodynamic monitoring, parenteral medications (I.e. chemo, vasoactive), and access  - Dumont: critically ill patient who need accurate urinary output measurements  - Restraint: n/a    Assessment:    Neuro/MSK: expected acute post op pain   A-F bundle; Sleep Hygiene measures: appropriate daytime stimulation/physical activity. Lights out at 2100, avoidance of night time lab draws/night baths, minimize mind altering medicaments  PT/OT and OOB as appropriate    CV: HTN hx  and now s/p OHT   -    epi  and milrinone     Pulm: n/a  BPH with IS/flutter/OOB  not vented    Renal: na   Trend UOP and renal indices; replete lytes PRN     GI: GERD     ID/rheum: n/a  Follow cx data:    MICRO: No results found for the last 90 days.      Endo: Stress hyperglycemia and hypothyroid  Cont ISS and Lantus 10  for BG goal <180  Last HbA1c: No results found for requested labs within last 365 days.      Heme: ABL anemia , Anemia of chronic disease , and Secondary thrombocytopenia    -     Trend Hb and transfuse PRN for goal Hb>7    LDA:  CVC 06/21/25 Double lumen Right Internal jugular (Active)   Placement Date/Time: 06/21/25 (c) 0125   Hand Hygiene Performed Prior to CVC Insertion: Yes  Site Prep: Chlorhexidine   Site Prep Agent has Completely Dried Before Insertion: Yes  All 5 Sterile Barriers Used (Gloves, Gown, Cap, Mask, Large Sterile Avelina...   Number of days: 3       Arterial Line 06/21/25 Left Brachial (Active)   Placement Date/Time: 06/21/25 (c) 0104   Size: 20 G  Orientation: Left  Location: Brachial  Securement Method:  Transparent dressing  Patient Tolerance: Tolerated well   Number of days: 3       Pulmonary Artery Catheter Internal jugular (Active)   Placement Date/Time: 06/21/25 0730   Location: Internal jugular   Number of days: 3       Urethral Catheter Temperature probe 16 Fr. (Active)   Placement Date/Time: 06/21/25 0120   Placed by: Wilfredo Romero SA  Hand Hygiene Completed: Yes  Catheter Type: Temperature probe  Tube Size (Fr.): 16 Fr.  Urine Returned: Yes   Number of days: 3       Chest Tube Left Pleural (Active)   Placement Date/Time: 06/21/25 1630   Chest Tube Orientation: Left  Chest Tube Location: Pleural   Number of days: 2       Chest Tube Right Pleural (Active)   Placement Date/Time: 06/21/25 1630   Chest Tube Orientation: Right  Chest Tube Location: Pleural   Number of days: 2       Y Chest Tube 1 and 2 Mediastinal Mediastinal (Active)   Placement Date/Time: 06/21/25 0700   Chest Tube Location 1: Mediastinal  Chest Tube Location 2: Mediastinal   Number of days: 3       Exam:    A&O x 4  NSR  Adequate air-exchange  Abd soft, NT  Extremities warm     REASON FOR ADMISSION    64 y.o. male with PMH of ex smoker, NICM, stage D HFrEF NYHA 2-3 s/p HM2 (4/30/23), VT s/p ICD, CAD, pAF, HTN, BPH, GERD and hypothyroidism.  Now admitted to CTICU s/p LVAD explant, AICD removal & OHT by Dr. Lorenzo on 6/21.     TODAY'S EVENTS    6/23: no issues overnight; c/o pain this am but well controlled  6/24: c/o pain, uneventful night    This critically ill patient continues to be at-risk for clinically significant deterioration / failure due to the above mentioned dysfunctional, unstable organ systems.  I have personally identified and managed all complex critical care issues to prevent aforementioned clinical deterioration.  Critical care time is spent at bedside and/or the immediate area and has included, but is not limited to, the review of diagnostic tests, labs, radiographs, serial assessments of hemodynamics, respiratory status,  "ventilatory management, and family updates.  Time spent in procedures and teaching are reported separately. CF CRITICAL CARE TIME: 45 minutes             LABS:  CMP:  Results from last 7 days   Lab Units 06/24/25  0050 06/23/25  1433 06/23/25  0134 06/22/25  1443 06/22/25  0530 06/21/25  0801 06/20/25  1659   SODIUM mmol/L 141 143 143 143 143   < > 142   POTASSIUM mmol/L 4.5 4.8 4.3 3.9 4.2   < > 3.4*   CHLORIDE mmol/L 106 107 107 107 108*   < > 102   CO2 mmol/L 32 30 29 25 24   < > 29   ANION GAP mmol/L 8* 11 11 15 15   < > 14   BUN mg/dL 26* 26* 23 20 19   < > 31*   CREATININE mg/dL 0.77 0.78 0.76 0.73 0.80   < > 0.98   EGFR mL/min/1.73m*2 >90 >90 >90 >90 >90   < > 86   MAGNESIUM mg/dL 2.30 2.58* 2.51* 2.27 2.30   < > 2.09   ALBUMIN g/dL 3.8 3.8 3.9 3.9 3.7   < > 4.3   ALK PHOS U/L  --   --   --   --   --   --  52   ALT U/L  --   --   --   --   --   --  13   AST U/L  --   --   --   --   --   --  15   BILIRUBIN TOTAL mg/dL  --   --   --   --   --   --  0.4    < > = values in this interval not displayed.     CBC:  Results from last 7 days   Lab Units 06/24/25  0050 06/23/25  1433 06/23/25  0134 06/22/25  0530 06/21/25  2304 06/21/25  0801 06/20/25  1700   WBC AUTO x10*3/uL 28.2* 27.9* 33.0* 27.5* 22.8* 20.5* 8.9   HEMOGLOBIN g/dL 7.6* 7.5* 7.7* 8.5* 9.0* 10.1* 13.5   HEMATOCRIT % 23.8* 23.0* 23.2* 25.0* 25.9* 28.4* 38.5*   PLATELETS AUTO x10*3/uL 130* 125* 159 162 178 214 218     COAG:   Results from last 7 days   Lab Units 06/21/25  0801 06/20/25  2330 06/20/25  2118 06/20/25  1659   INR  1.4* 1.9* 2.4* 3.0*     ABO:   ABO TYPE   Date Value Ref Range Status   06/23/2025 AB  Final     HEME/ENDO:     CARDIAC:       No lab exists for component: \"CK\", \"CKMBP\"    "

## 2025-06-24 NOTE — SIGNIFICANT EVENT
Immunosuppression     BD OHT 6/21/25    Donor/Recipient Infectious history:   CMV: -/-   EBV: +/+   Toxo: -/-   Heb B: -/-  Hep C: -/-  HIV: -/-     Rejection/Prophylaxis:    - Induction: - Induction: s/p methylprednisolone 500mg IV x2  - Postoperative Induction Plan to be completed in CTICU:   Basiliximab 20mg IV within 1 hour of arrival to CTICU (no need for premedication), 2nd dose: 20mg IVPB on POD4 (06/25)   Steroid taper starting with Methylprednisolone 125mg IV Q8 x 3 doses followed by IV Solumedrol 60 mg q 12 hrs x 2 doses followed by prednisone 35 mg q 12hrs PO q 12 hrs  - Steroids: prednisone 35 mg q 12hrs PO q 12 hrs until first biopsy   - Tacrolimus: 1mg BID, increase to 1.5mg BID   - Tacrolimus goal troughs: 10-12, Last tacrolimus trough: 2.2 6/24/2025:  6:23 AM  - Cellcept: C/w 1,000mg BID  - Antifungals: c/w voriconazole 200mg BID x3 months end date 09/22/2025   - Antivirals: c/w acylovir 400mg BID (06/22 x3 months. End date 09/22/2025   - Anti PCP & Toxoplasmosis: Can start SS Bactrim daily (end date of 12/22/25)     Next RHC/biopsy: Scheduled for 7/1 w/ Dr. Velasco   Last TTE/BOBBY: ; normal BiV function   Last HLA: First due on 7/24  Last Allomap: ~ will assess starting at 6-month ana post-op    Last Allosure: ~ will assess starting at 6-month ana post-op   Last C: 03/2023(pre OHT); First due ~ (one year post tx)   Osteopenia/osteoporosis prophylaxis: Vitamin D3 and calcium supplements - needs started, consider in HFICU   Peptic/gastric ulcer prophylaxis: Pantoprazole 40mg daily    CAV Prophylaxis: Aspirin 81mg daily (once cleared by CT surgery) & Pravastatin 40mg daily - needs started, consider in HFICU

## 2025-06-25 ENCOUNTER — DOCUMENTATION (OUTPATIENT)
Dept: TRANSPLANT | Facility: HOSPITAL | Age: 65
End: 2025-06-25
Payer: MEDICARE

## 2025-06-25 ENCOUNTER — APPOINTMENT (OUTPATIENT)
Dept: RADIOLOGY | Facility: HOSPITAL | Age: 65
DRG: 001 | End: 2025-06-25
Payer: MEDICARE

## 2025-06-25 ENCOUNTER — LAB REQUISITION (OUTPATIENT)
Dept: LAB | Facility: CLINIC | Age: 65
DRG: 001 | End: 2025-06-25
Payer: MEDICARE

## 2025-06-25 DIAGNOSIS — Z94.1 HEART TRANSPLANT STATUS: ICD-10-CM

## 2025-06-25 PROBLEM — D50.0 BLOOD LOSS ANEMIA: Status: ACTIVE | Noted: 2025-06-23

## 2025-06-25 LAB
ALBUMIN SERPL BCP-MCNC: 3.6 G/DL (ref 3.4–5)
ANION GAP BLDMV CALCULATED.4IONS-SCNC: 5 MMO/L (ref 10–25)
ANION GAP BLDMV CALCULATED.4IONS-SCNC: 5 MMO/L (ref 10–25)
ANION GAP BLDMV CALCULATED.4IONS-SCNC: 6 MMO/L (ref 10–25)
ANION GAP BLDMV CALCULATED.4IONS-SCNC: 6 MMO/L (ref 10–25)
ANION GAP BLDMV CALCULATED.4IONS-SCNC: 7 MMO/L (ref 10–25)
ANION GAP SERPL CALC-SCNC: 10 MMOL/L (ref 10–20)
BASE EXCESS BLDMV CALC-SCNC: 10.6 MMOL/L (ref -2–3)
BASE EXCESS BLDMV CALC-SCNC: 4.6 MMOL/L (ref -2–3)
BASE EXCESS BLDMV CALC-SCNC: 6.7 MMOL/L (ref -2–3)
BASE EXCESS BLDMV CALC-SCNC: 7 MMOL/L (ref -2–3)
BASE EXCESS BLDMV CALC-SCNC: 8.6 MMOL/L (ref -2–3)
BODY TEMPERATURE: 37 DEGREES CELSIUS
BUN SERPL-MCNC: 34 MG/DL (ref 6–23)
C DIF TOX TCDA+TCDB STL QL NAA+PROBE: DETECTED
C DIFF TOX A+B STL QL IA: NEGATIVE
CA-I BLDMV-SCNC: 1.08 MMOL/L (ref 1.1–1.33)
CA-I BLDMV-SCNC: 1.13 MMOL/L (ref 1.1–1.33)
CA-I BLDMV-SCNC: 1.16 MMOL/L (ref 1.1–1.33)
CA-I BLDMV-SCNC: 1.18 MMOL/L (ref 1.1–1.33)
CA-I BLDMV-SCNC: 1.23 MMOL/L (ref 1.1–1.33)
CALCIUM SERPL-MCNC: 8.5 MG/DL (ref 8.6–10.6)
CHLORIDE BLD-SCNC: 101 MMOL/L (ref 98–107)
CHLORIDE BLD-SCNC: 102 MMOL/L (ref 98–107)
CHLORIDE BLD-SCNC: 103 MMOL/L (ref 98–107)
CHLORIDE BLD-SCNC: 103 MMOL/L (ref 98–107)
CHLORIDE BLD-SCNC: 105 MMOL/L (ref 98–107)
CHLORIDE SERPL-SCNC: 101 MMOL/L (ref 98–107)
CO2 SERPL-SCNC: 34 MMOL/L (ref 21–32)
CREAT SERPL-MCNC: 0.76 MG/DL (ref 0.5–1.3)
EGFRCR SERPLBLD CKD-EPI 2021: >90 ML/MIN/1.73M*2
ERYTHROCYTE [DISTWIDTH] IN BLOOD BY AUTOMATED COUNT: 16.9 % (ref 11.5–14.5)
FERRITIN SERPL-MCNC: 130 NG/ML (ref 20–300)
GLUCOSE BLD MANUAL STRIP-MCNC: 119 MG/DL (ref 74–99)
GLUCOSE BLD MANUAL STRIP-MCNC: 129 MG/DL (ref 74–99)
GLUCOSE BLD MANUAL STRIP-MCNC: 162 MG/DL (ref 74–99)
GLUCOSE BLD MANUAL STRIP-MCNC: 178 MG/DL (ref 74–99)
GLUCOSE BLD-MCNC: 111 MG/DL (ref 74–99)
GLUCOSE BLD-MCNC: 127 MG/DL (ref 74–99)
GLUCOSE BLD-MCNC: 141 MG/DL (ref 74–99)
GLUCOSE BLD-MCNC: 143 MG/DL (ref 74–99)
GLUCOSE BLD-MCNC: 164 MG/DL (ref 74–99)
GLUCOSE SERPL-MCNC: 120 MG/DL (ref 74–99)
HCO3 BLDMV-SCNC: 30.2 MMOL/L (ref 22–26)
HCO3 BLDMV-SCNC: 31.9 MMOL/L (ref 22–26)
HCO3 BLDMV-SCNC: 32.2 MMOL/L (ref 22–26)
HCO3 BLDMV-SCNC: 33.9 MMOL/L (ref 22–26)
HCO3 BLDMV-SCNC: 36 MMOL/L (ref 22–26)
HCT VFR BLD AUTO: 23.4 % (ref 41–52)
HCT VFR BLD EST: 21 % (ref 41–52)
HCT VFR BLD EST: 21 % (ref 41–52)
HCT VFR BLD EST: 22 % (ref 41–52)
HCT VFR BLD EST: 24 % (ref 41–52)
HCT VFR BLD EST: 26 % (ref 41–52)
HGB BLD-MCNC: 7.7 G/DL (ref 13.5–17.5)
HGB BLDMV-MCNC: 7 G/DL (ref 13.5–17.5)
HGB BLDMV-MCNC: 7 G/DL (ref 13.5–17.5)
HGB BLDMV-MCNC: 7.2 G/DL (ref 13.5–17.5)
HGB BLDMV-MCNC: 8 G/DL (ref 13.5–17.5)
HGB BLDMV-MCNC: 8.6 G/DL (ref 13.5–17.5)
HLA CLS I TYP PNL BLD/T DONR HIGH RES: NORMAL
HLA RESULTS: NORMAL
HLA RESULTS: NORMAL
HLA-A+B+C AB NFR SER: NORMAL %
HLA-DP+DQ+DR AB NFR SER: NORMAL %
HLA-DP2 QL: NORMAL
HLA-DQB1 HIGH RES: NORMAL
HLA-DRB1 HIGH RES: NORMAL
INHALED O2 CONCENTRATION: 28 %
INHALED O2 CONCENTRATION: 30 %
INHALED O2 CONCENTRATION: 30 %
IRON SATN MFR SERPL: 21 % (ref 25–45)
IRON SERPL-MCNC: 41 UG/DL (ref 35–150)
LACTATE BLDMV-SCNC: 1.1 MMOL/L (ref 0.4–2)
LACTATE BLDMV-SCNC: 1.5 MMOL/L (ref 0.4–2)
LACTATE BLDMV-SCNC: 1.5 MMOL/L (ref 0.4–2)
LACTATE BLDMV-SCNC: 1.6 MMOL/L (ref 0.4–2)
LACTATE BLDMV-SCNC: 2.3 MMOL/L (ref 0.4–2)
MAGNESIUM SERPL-MCNC: 2.25 MG/DL (ref 1.6–2.4)
MCH RBC QN AUTO: 30.9 PG (ref 26–34)
MCHC RBC AUTO-ENTMCNC: 32.9 G/DL (ref 32–36)
MCV RBC AUTO: 94 FL (ref 80–100)
NRBC BLD-RTO: 0.5 /100 WBCS (ref 0–0)
OXYHGB MFR BLDMV: 48.6 % (ref 45–75)
OXYHGB MFR BLDMV: 53.9 % (ref 45–75)
OXYHGB MFR BLDMV: 60 % (ref 45–75)
OXYHGB MFR BLDMV: 65.9 % (ref 45–75)
OXYHGB MFR BLDMV: 69.2 % (ref 45–75)
PCO2 BLDMV: 47 MM HG (ref 41–51)
PCO2 BLDMV: 51 MM HG (ref 41–51)
PCO2 BLDMV: 51 MM HG (ref 41–51)
PCO2 BLDMV: 52 MM HG (ref 41–51)
PCO2 BLDMV: 53 MM HG (ref 41–51)
PH BLDMV: 7.38 PH (ref 7.33–7.43)
PH BLDMV: 7.4 PH (ref 7.33–7.43)
PH BLDMV: 7.43 PH (ref 7.33–7.43)
PH BLDMV: 7.44 PH (ref 7.33–7.43)
PH BLDMV: 7.44 PH (ref 7.33–7.43)
PHOSPHATE SERPL-MCNC: 3.8 MG/DL (ref 2.5–4.9)
PLATELET # BLD AUTO: 112 X10*3/UL (ref 150–450)
PO2 BLDMV: 32 MM HG (ref 35–45)
PO2 BLDMV: 35 MM HG (ref 35–45)
PO2 BLDMV: 39 MM HG (ref 35–45)
PO2 BLDMV: 41 MM HG (ref 35–45)
PO2 BLDMV: 43 MM HG (ref 35–45)
POTASSIUM BLDMV-SCNC: 3.7 MMOL/L (ref 3.5–5.3)
POTASSIUM BLDMV-SCNC: 3.8 MMOL/L (ref 3.5–5.3)
POTASSIUM BLDMV-SCNC: 4.1 MMOL/L (ref 3.5–5.3)
POTASSIUM BLDMV-SCNC: 4.2 MMOL/L (ref 3.5–5.3)
POTASSIUM BLDMV-SCNC: 4.4 MMOL/L (ref 3.5–5.3)
POTASSIUM SERPL-SCNC: 4 MMOL/L (ref 3.5–5.3)
RBC # BLD AUTO: 2.49 X10*6/UL (ref 4.5–5.9)
SAO2 % BLDMV: 50 % (ref 45–75)
SAO2 % BLDMV: 55 % (ref 45–75)
SAO2 % BLDMV: 61 % (ref 45–75)
SAO2 % BLDMV: 68 % (ref 45–75)
SAO2 % BLDMV: 71 % (ref 45–75)
SODIUM BLDMV-SCNC: 137 MMOL/L (ref 136–145)
SODIUM BLDMV-SCNC: 138 MMOL/L (ref 136–145)
SODIUM BLDMV-SCNC: 138 MMOL/L (ref 136–145)
SODIUM SERPL-SCNC: 141 MMOL/L (ref 136–145)
TACROLIMUS BLD-MCNC: <2 NG/ML
TIBC SERPL-MCNC: 196 UG/DL (ref 240–445)
UIBC SERPL-MCNC: 155 UG/DL (ref 110–370)
WBC # BLD AUTO: 13.1 X10*3/UL (ref 4.4–11.3)

## 2025-06-25 PROCEDURE — 2500000001 HC RX 250 WO HCPCS SELF ADMINISTERED DRUGS (ALT 637 FOR MEDICARE OP)

## 2025-06-25 PROCEDURE — 83735 ASSAY OF MAGNESIUM: CPT | Performed by: NURSE PRACTITIONER

## 2025-06-25 PROCEDURE — 2500000002 HC RX 250 W HCPCS SELF ADMINISTERED DRUGS (ALT 637 FOR MEDICARE OP, ALT 636 FOR OP/ED)

## 2025-06-25 PROCEDURE — 2500000004 HC RX 250 GENERAL PHARMACY W/ HCPCS (ALT 636 FOR OP/ED)

## 2025-06-25 PROCEDURE — 71045 X-RAY EXAM CHEST 1 VIEW: CPT

## 2025-06-25 PROCEDURE — 2500000001 HC RX 250 WO HCPCS SELF ADMINISTERED DRUGS (ALT 637 FOR MEDICARE OP): Performed by: NURSE PRACTITIONER

## 2025-06-25 PROCEDURE — 71045 X-RAY EXAM CHEST 1 VIEW: CPT | Performed by: RADIOLOGY

## 2025-06-25 PROCEDURE — 97116 GAIT TRAINING THERAPY: CPT | Mod: GP

## 2025-06-25 PROCEDURE — 2500000004 HC RX 250 GENERAL PHARMACY W/ HCPCS (ALT 636 FOR OP/ED): Performed by: REGISTERED NURSE

## 2025-06-25 PROCEDURE — 2500000002 HC RX 250 W HCPCS SELF ADMINISTERED DRUGS (ALT 637 FOR MEDICARE OP, ALT 636 FOR OP/ED): Performed by: NURSE PRACTITIONER

## 2025-06-25 PROCEDURE — 2500000004 HC RX 250 GENERAL PHARMACY W/ HCPCS (ALT 636 FOR OP/ED): Performed by: NURSE PRACTITIONER

## 2025-06-25 PROCEDURE — 82947 ASSAY GLUCOSE BLOOD QUANT: CPT

## 2025-06-25 PROCEDURE — 80197 ASSAY OF TACROLIMUS: CPT | Performed by: NURSE PRACTITIONER

## 2025-06-25 PROCEDURE — 99291 CRITICAL CARE FIRST HOUR: CPT | Performed by: INTERNAL MEDICINE

## 2025-06-25 PROCEDURE — 83540 ASSAY OF IRON: CPT

## 2025-06-25 PROCEDURE — 82728 ASSAY OF FERRITIN: CPT

## 2025-06-25 PROCEDURE — 84132 ASSAY OF SERUM POTASSIUM: CPT | Performed by: NURSE PRACTITIONER

## 2025-06-25 PROCEDURE — 2020000001 HC ICU ROOM DAILY

## 2025-06-25 PROCEDURE — 2500000005 HC RX 250 GENERAL PHARMACY W/O HCPCS

## 2025-06-25 PROCEDURE — 37799 UNLISTED PX VASCULAR SURGERY: CPT | Performed by: NURSE PRACTITIONER

## 2025-06-25 PROCEDURE — 87324 CLOSTRIDIUM AG IA: CPT | Performed by: NURSE PRACTITIONER

## 2025-06-25 PROCEDURE — 87493 C DIFF AMPLIFIED PROBE: CPT | Performed by: NURSE PRACTITIONER

## 2025-06-25 PROCEDURE — 85027 COMPLETE CBC AUTOMATED: CPT

## 2025-06-25 PROCEDURE — 97530 THERAPEUTIC ACTIVITIES: CPT | Mod: GP

## 2025-06-25 RX ORDER — MILRINONE LACTATE 0.2 MG/ML
0.12 INJECTION, SOLUTION INTRAVENOUS CONTINUOUS
Status: DISCONTINUED | OUTPATIENT
Start: 2025-06-25 | End: 2025-06-25

## 2025-06-25 RX ORDER — PRAVASTATIN SODIUM 40 MG/1
40 TABLET ORAL NIGHTLY
Status: DISCONTINUED | OUTPATIENT
Start: 2025-06-25 | End: 2025-07-09 | Stop reason: HOSPADM

## 2025-06-25 RX ORDER — POLYETHYLENE GLYCOL 3350 17 G/17G
17 POWDER, FOR SOLUTION ORAL DAILY PRN
Status: DISCONTINUED | OUTPATIENT
Start: 2025-06-25 | End: 2025-07-02

## 2025-06-25 RX ORDER — POTASSIUM CHLORIDE 20 MEQ/1
40 TABLET, EXTENDED RELEASE ORAL ONCE
Status: COMPLETED | OUTPATIENT
Start: 2025-06-25 | End: 2025-06-25

## 2025-06-25 RX ORDER — FUROSEMIDE 10 MG/ML
80 INJECTION INTRAMUSCULAR; INTRAVENOUS ONCE
Status: COMPLETED | OUTPATIENT
Start: 2025-06-25 | End: 2025-06-25

## 2025-06-25 RX ORDER — VANCOMYCIN HYDROCHLORIDE 125 MG/1
125 CAPSULE ORAL 4 TIMES DAILY
Status: DISCONTINUED | OUTPATIENT
Start: 2025-06-25 | End: 2025-06-25

## 2025-06-25 RX ORDER — VANCOMYCIN HYDROCHLORIDE 125 MG/1
125 CAPSULE ORAL 4 TIMES DAILY
Status: COMPLETED | OUTPATIENT
Start: 2025-06-25 | End: 2025-07-05

## 2025-06-25 RX ADMIN — ACYCLOVIR 400 MG: 400 TABLET ORAL at 20:39

## 2025-06-25 RX ADMIN — TACROLIMUS 1.5 MG: 0.5 CAPSULE ORAL at 18:27

## 2025-06-25 RX ADMIN — METHOCARBAMOL TABLETS 500 MG: 500 TABLET, COATED ORAL at 18:27

## 2025-06-25 RX ADMIN — HEPARIN SODIUM 5000 UNITS: 5000 INJECTION INTRAVENOUS; SUBCUTANEOUS at 08:42

## 2025-06-25 RX ADMIN — TACROLIMUS 1.5 MG: 0.5 CAPSULE ORAL at 06:26

## 2025-06-25 RX ADMIN — INSULIN LISPRO 4 UNITS: 100 INJECTION, SOLUTION INTRAVENOUS; SUBCUTANEOUS at 16:26

## 2025-06-25 RX ADMIN — PANTOPRAZOLE SODIUM 40 MG: 40 TABLET, DELAYED RELEASE ORAL at 06:26

## 2025-06-25 RX ADMIN — ACETAMINOPHEN 650 MG: 325 TABLET, FILM COATED ORAL at 21:58

## 2025-06-25 RX ADMIN — HEPARIN SODIUM 5000 UNITS: 5000 INJECTION INTRAVENOUS; SUBCUTANEOUS at 23:10

## 2025-06-25 RX ADMIN — VORICONAZOLE 200 MG: 200 TABLET, COATED ORAL at 08:42

## 2025-06-25 RX ADMIN — POTASSIUM CHLORIDE 40 MEQ: 1500 TABLET, EXTENDED RELEASE ORAL at 18:27

## 2025-06-25 RX ADMIN — SULFAMETHOXAZOLE AND TRIMETHOPRIM 1 TABLET: 400; 80 TABLET ORAL at 08:41

## 2025-06-25 RX ADMIN — PREDNISONE 35 MG: 20 TABLET ORAL at 08:42

## 2025-06-25 RX ADMIN — FUROSEMIDE 80 MG: 10 INJECTION, SOLUTION INTRAMUSCULAR; INTRAVENOUS at 06:26

## 2025-06-25 RX ADMIN — ACETAMINOPHEN 650 MG: 325 TABLET, FILM COATED ORAL at 10:13

## 2025-06-25 RX ADMIN — MYCOPHENOLATE MOFETIL 1000 MG: 250 CAPSULE ORAL at 08:41

## 2025-06-25 RX ADMIN — INSULIN LISPRO 4 UNITS: 100 INJECTION, SOLUTION INTRAVENOUS; SUBCUTANEOUS at 21:58

## 2025-06-25 RX ADMIN — ACETAMINOPHEN 650 MG: 325 TABLET, FILM COATED ORAL at 04:20

## 2025-06-25 RX ADMIN — VANCOMYCIN HYDROCHLORIDE 125 MG: 125 CAPSULE ORAL at 23:10

## 2025-06-25 RX ADMIN — Medication 2 L/MIN: at 20:00

## 2025-06-25 RX ADMIN — MYCOPHENOLATE MOFETIL 1000 MG: 250 CAPSULE ORAL at 20:40

## 2025-06-25 RX ADMIN — SODIUM CHLORIDE 20 MG: 9 INJECTION, SOLUTION INTRAVENOUS at 08:43

## 2025-06-25 RX ADMIN — FUROSEMIDE 10 MG/HR: 10 INJECTION, SOLUTION INTRAMUSCULAR; INTRAVENOUS at 18:28

## 2025-06-25 RX ADMIN — METHOCARBAMOL TABLETS 500 MG: 500 TABLET, COATED ORAL at 13:28

## 2025-06-25 RX ADMIN — PRAVASTATIN SODIUM 40 MG: 40 TABLET ORAL at 20:40

## 2025-06-25 RX ADMIN — METHOCARBAMOL TABLETS 500 MG: 500 TABLET, COATED ORAL at 06:26

## 2025-06-25 RX ADMIN — HEPARIN SODIUM 5000 UNITS: 5000 INJECTION INTRAVENOUS; SUBCUTANEOUS at 16:26

## 2025-06-25 RX ADMIN — LEVOTHYROXINE SODIUM 125 MCG: 0.05 TABLET ORAL at 06:26

## 2025-06-25 RX ADMIN — FUROSEMIDE 80 MG: 10 INJECTION INTRAMUSCULAR; INTRAVENOUS at 00:57

## 2025-06-25 RX ADMIN — PREDNISONE 35 MG: 20 TABLET ORAL at 20:39

## 2025-06-25 RX ADMIN — ACETAMINOPHEN 650 MG: 325 TABLET, FILM COATED ORAL at 16:26

## 2025-06-25 RX ADMIN — METHOCARBAMOL TABLETS 500 MG: 500 TABLET, COATED ORAL at 23:10

## 2025-06-25 RX ADMIN — TAMSULOSIN HYDROCHLORIDE 0.4 MG: 0.4 CAPSULE ORAL at 08:43

## 2025-06-25 RX ADMIN — VORICONAZOLE 200 MG: 200 TABLET, COATED ORAL at 20:39

## 2025-06-25 RX ADMIN — ACYCLOVIR 400 MG: 400 TABLET ORAL at 08:42

## 2025-06-25 ASSESSMENT — PAIN SCALES - GENERAL
PAINLEVEL_OUTOF10: 0 - NO PAIN

## 2025-06-25 ASSESSMENT — COGNITIVE AND FUNCTIONAL STATUS - GENERAL
MOVING FROM LYING ON BACK TO SITTING ON SIDE OF FLAT BED WITH BEDRAILS: A LOT
STANDING UP FROM CHAIR USING ARMS: A LITTLE
CLIMB 3 TO 5 STEPS WITH RAILING: TOTAL
MOBILITY SCORE: 14
WALKING IN HOSPITAL ROOM: A LITTLE
TURNING FROM BACK TO SIDE WHILE IN FLAT BAD: A LOT
MOVING TO AND FROM BED TO CHAIR: A LITTLE

## 2025-06-25 ASSESSMENT — PAIN - FUNCTIONAL ASSESSMENT
PAIN_FUNCTIONAL_ASSESSMENT: 0-10

## 2025-06-25 NOTE — OP NOTE
Pre-operative Diagnosis:   S/p LVAD HM3  implantation in 2023  Status 4 on the Heart Tx list  Need for heart transplantation     Post-operative Diagnosis:  Same     Procedure:   1.Orthotopic heart Tx ( bi-caval anastomoses))  2. Removal of AICD  3. Right femoral cutdown and direct cannulation of right femoral artery and vein for CPB, and direct repair of both  4. Explantation of the LVAD HM3  5. Redo sternotomy  6. Recipient cardiectomy and extensive cardiolysis  7. Preparation of the donor heart of a back table    Surgeons:  Morteza Pham MD  and Primo Schrader MD, PhD    Assistants: Mr. Gonzalez and Mr. Rashid  Surgical assistants had provided standard assistance throughout the operation with tissue and suture retraction, exposure, and closure of the sternotomy and all incisions.       Time-Out: A complete time out was carried out prior to incision, with confirmation of patient identity, correct procedure, correct operative site, appropriate antibiotic prophylaxis, review of any known allergies, and presence of all needed equipment.     ABO Verification: Immediately following arrival of the donor organ and prior to implantation, a formal ABO confirmation was done according to hospital and [facility] policies. I confirmed the [facility] ID number of the donor organ and the donor and recipient ABO types, directly verifying these data by comparison with the [facility] report. This was separately recorded in OpTime with two signatures.           ANESTHESIA:  BRIANA - Dr. Mott    Complications - None    Condition to CT ICU - stable  Total ischemic time - 3h 20min     Indications.  This is a 64 year old very pleasant gentleman who underwent a HM3 LVAD implantation for end stage ICMP in 2023 after a prolonged and complicated hospital stay.  Patient did well after the LVAD and was placed on a OHT wait list.  On 6/2, excellent donor heart became available. Risks, benefits and complications of the OHT were discussed with the  "patient and informed consent was obtained.    Findings.   Right femoral cutdown was performed and both right femoral artery and vein were cannulated directly prior to sternotomy.  Patient had completely \" sealed\" mediastinum and pericardial space, so we had performed a very difficult cardiolysis.   Donor heart was thoroughly examined on the back table after having been delivered in a Laurent pack storage container. Donor heart was structurally normal with no PFO.  Donor heart had excellent biventricular function at the end of the operation.    Description.  With the patient supine on the operating table, the skin was prepped and draped.  Right groin cutdown was performed, right femoral artery and vein were exposed and cannulated after partial heparinization. Redo median sternotomy was performed without issues. We had proceeded with complete lysis of mediastinal and pericardial adhesions which was quite difficult. Ultimately, full dose of  heparin was administered. Once the ACT reached over 400 sec, patient was placed on CPB.  A 24-Stateless SVC cannula was placed in a standard fashion. SVC and IVC tapes were applied.   Once the donor heart was close by, the cavae was snared and aortic cross-clamp was applied.  The recipient cardiectomy with the LVAD was completed as per routine.  The cuffs were prepared for implantation.  The donor heart was on then checked in routinely on arrival to the OR. After that was completed, the donor heart was removed from the [employer]  packaging, inspected on the back table, prepared routinely for implantation. and brought up to the operating field. Antegrade cardioplegic cannula was placed into the ascending aorta,so that three doses of cold blood cardioplegia  were delivered in an antegrade fashion during the implantation procedure. A dose of warm cardioplegia ( warm blood) was given prior to removal of X-clamp.   Implantation proceeded routinely with left atrial anastomosis using " continuous 3-0 Prolene sutures, and following delivery of antegrade cold blood cardioplegia to the donor heart, the pulmonary artery anastomosis was performed using continuous 4-0 Prolene sutures followed by continuous 4-0 Prolene sutures to the end-to-end anastomosis with the aorta. The IVC was then completed using continuous 4-0 Prolene sutures and then the SVC using continuous 5-0 Prolene sutures.     Following de-airing with the aid of CO2, the aortic cross-clamp was removed.  The heart resumed activity.  A- and V-pacing wires were applied and pacing was commenced. Ventilation was restarted with use of iEPO.  Cardiopulmonary bypass was weaned uneventfully with low to moderate doses of inotropic and pressor support.     The BOBBY demonstrated satisfactory graft function.  Protamine was then administered to reverse systemic anticoagulation.  Chest tunes were placed. Hemostasis was secured. Mediastinum was closed closed routinely.    The AICD and leads were removed through an overlying incision over the left side.     Patient tolerated this procedure well and was transferred to the Cardiothoracic Intensive Care Unit in stable condition.     Total ischemic time for the donor heart : 3h 20min

## 2025-06-25 NOTE — NURSING NOTE
Patient educated at bedside. Patient actively engaged and eager to receive education.     Provided educational binder to patient as well as food safety pamphlet. Reviewed transplant medications and contact information, as well as when to call the call phone. Stopped session before beginning section re: post-transplant complications. Will resume education with patient next week.     Advised patient to call transplant team with any further questions and provided office number and on-call transplant coordinator number.

## 2025-06-25 NOTE — PROGRESS NOTES
"Physical Therapy    Physical Therapy Treatment    Patient Name: Anatoly Lane  MRN: [id number]  Department: [facility]  Room: 10/10-A  Today's Date: 6/25/2025  Time Calculation  Start Time: 1651  Stop Time: 1748  Time Calculation (min): 57 min         Assessment/Plan   PT Assessment  Barriers to Discharge Home: No anticipated barriers  Evaluation/Treatment Tolerance: Patient limited by pain (R groin pain)  End of Session Communication: Bedside nurse  Assessment Comment: Patient overall limited by R groin pain, but also demonstrating decreased strength and mobility.He would benefit from low intensity therapy to prevent further decline and improve functional improvement.  End of Session Patient Position: Up in chair, Alarm on  PT Plan  Inpatient/Swing Bed or Outpatient: Inpatient  PT Plan  Treatment/Interventions: Bed mobility, Transfer training, Gait training, Stair training, Balance training, Strengthening, Endurance training, Therapeutic exercise, Therapeutic activity  PT Plan: Ongoing PT  PT Frequency: Daily  PT Discharge Recommendations: Low intensity level of continued care  Equipment Recommended upon Discharge: Wheeled walker  PT Recommended Transfer Status: Assist x1  PT - OK to Discharge: Yes    PT Visit Info:  PT Received On: 06/25/25     General Visit Information:   General  Reason for Referral: Patient s/p 06/21 LVAD explant, ACID removal and OHT  Family/Caregiver Present: No  Prior to Session Communication: Bedside nurse  Patient Position Received: Bed, 3 rail up, Alarm off, not on at start of session  General Comment: patient initially refusing PT due to \"not feelin well.\" with additional verbal encouragement, patient agreeable to therapy. chest incision checked pre and post session, stable. R groin site checked end of session, appears stable but patient continuing to report R groin pain.    Subjective   Precautions:  Precautions  Medical Precautions: Cardiac precautions, Fall precautions, Oxygen therapy " device and L/min    Lines/Tubes:   Telemetry   Arterial Line   [place] Catheter   2L O2   0.125 mcg milrinone   Back up Pacer - AAI @ 60       Vital Signs    Vitals Session Pre PT Post PT   Heart Rate 80 84   Resp 21 22   SpO2 96 96   /55 121/60   MAP (mmHg) 80 78     Objective   Pain:  Pain Assessment  Pain Assessment: 0-10  0-10 (Numeric) Pain Score:  (ssignificant unrated pain R groin that patient perseverated on during therapy which overall limited this session. notified RN and HFICU team)  Cognition:  Cognition  Overall Cognitive Status: Within Functional Limits  Arousal/Alertness: Appropriate responses to stimuli  Orientation Level: Oriented X4  Following Commands: Follows all commands and directions without difficulty    Activity Tolerance:  Activity Tolerance  Endurance: Tolerates 30 min exercise with multiple rests  Early Mobility/Exercise Safety Screen: Proceed with mobilization - No exclusion criteria met  Treatments:  Bed Mobility  Bed Mobility: Yes  Bed Mobility 1  Bed Mobility 1: Supine to sitting  Level of Assistance 1: Moderate assistance (x1 person)  Bed Mobility Comments 1: x1 trial; patient requiring HHA assistane for trunk and additional assistance for LE    Ambulation/Gait Training  Ambulation/Gait Training Performed: Yes  Ambulation/Gait Training 1  Surface 1: Level tile  Device 1: Rolling walker  Assistance 1: Contact guard  Quality of Gait 1: Wide base of support, Decreased step length, Forward flexed posture  Comments/Distance (ft) 1: ~6ft; patient requiring tactile assistance for walker management during ambulation  Ambulation/Gait Training 2  Surface 2: Level tile  Device 2: Rolling walker  Assistance 2: Contact guard  Quality of Gait 2: Wide base of support, Decreased step length, Forward flexed posture  Comments/Distance (ft) 2: ~15ft; patient requiring assistance for walker management to avoid lateral deviation; patient refusing to walk any further due to not feeling well, but  hemodynamically stable  Transfers  Transfer: Yes  Transfer 1  Technique 1: Sit to stand, Stand to sit  Transfer Device 1: Walker  Transfer Level of Assistance 1: Contact guard  Trials/Comments 1: x3 trials; trial 1 completed from bed at lowest heigh, trial 2 completed from toilet, and trial 3 completed from chair. For all 3 trials, patient requiring bilateral UE support from thighs, however, able to complete on first attempt with emphasized forward lean    Outcome Measures:  [facility] Basic Mobility  Turning from your back to your side while in a flat bed without using bedrails: A lot  Moving from lying on your back to sitting on the side of a flat bed without using bedrails: A lot  Moving to and from bed to chair (including a wheelchair): A little  Standing up from a chair using your arms (e.g. wheelchair or bedside chair): A little  To walk in hospital room: A little  Climbing 3-5 steps with railing: Total  Basic Mobility - Total Score: 14    Confusion Assessment Method-ICU (CAM-ICU)  Feature 1: Acute Onset or Fluctuating Course: Negative  Overall CAM-ICU: Negative    FSS-ICU  Ambulation: Walks <50 feet with any assistance x1 or walks any distance with assistance x2 people  Rolling: Minimal assistance (performs 75% or more of task)  Sitting: Minimal assistance (performs 75% or more of task)  Transfer Sit-to-Stand: Minimal assistance (performs 75% or more of task)  Transfer Supine-to-Sit: Moderate assistance (performs 50 - 74% of task)  Total Score: 16      Early Mobility/Exercise Safety Screen: Proceed with mobilization - No exclusion criteria met  ICU Mobility Scale: Walking with assistance of 1 person [8]  E = Exercise and Early Mobility  Early Mobility/Exercise Safety Screen: Proceed with mobilization - No exclusion criteria met  ICU Mobility Scale: Walking with assistance of 1 person    Education Documentation  Precautions, taught by VENU Bishop at 6/25/2025  6:21 PM.  Learner: Patient  Readiness:  Acceptance  Method: Explanation, Demonstration  Response: Needs Reinforcement, Demonstrated Understanding  Comment: Educated patient on MITT precautions and re-inforced with movement.    Education Comments  No comments found.        OP EDUCATION:       Encounter Problems       Encounter Problems (Active)       Balance       Pt will demonstrated ability to score at least 24/28 on the Tinetti balance assessment tool to ensure safety upon D/C.  (Progressing)       Start:  06/23/25    Expected End:  07/07/25               Mobility       Pt will demonstrated ability to ambulate >/=300ft with proper form and no balance deficits for safe home going.   (Progressing)       Start:  06/23/25    Expected End:  07/07/25            Pt will demonstrate ability to ascend/descend at least 5 stairs with unilateral rail and no balance deficits for safe home going.  (Progressing)       Start:  06/23/25    Expected End:  07/07/25               PT Transfers       Pt will demonstrate ability to complete 5X STS in < 12 sec with good from and consistency between transfers for safe D/C.  (Progressing)       Start:  06/23/25    Expected End:  07/07/25               PT Transfers       Patient to transfer to and from sit to supine indep (Progressing)       Start:  06/25/25    Expected End:  07/07/25            Patient will transfer sit to and from stand with LRAD and modified indep (Progressing)       Start:  06/25/25    Expected End:  07/07/25               TRICE Bishop  Completion of this session, clinical decision making, and documentation performed under the supervision/direction of Rosa Isela Fatima DPT.

## 2025-06-25 NOTE — PROGRESS NOTES
Social Work progress note  - HFICU Treatment Plan: s/p heart transplant on 6/20, transferred to HFICU on 6/24 on milirnone  - Additional information: none   - Payor: [employer]   - Support: Son Bird and mother Janice are listed as emergency contacts  -Planned Disposition:  Rehab recommendation - Low intensity will need a wheeled walker at discharge  - Social concerns for discharge:   Tracy Thorpe, BOB, LSW

## 2025-06-25 NOTE — PROGRESS NOTES
Per Letter from [employer] transplant; approved inpatient heart transplant . Fax # [phone]. Did not provide LOS. Auth # [id number].

## 2025-06-25 NOTE — PROGRESS NOTES
[facility]  HFICU PROGRESS NOTE    Anatoly Lane/[id number]    Admit Date: 6/20/2025  Hospital Length of Stay: 5   ICU Length of Stay: 15h   Primary Service: HFICU  Primary HF Cardiologist:   Referring:     INTERVAL EVENTS / PERTINENT ROS:   DAJUAN. Transferred to HFICU yesterday evening. He remains hemodynamically stable and his native conduction is NSR at ~75 BPM. His pacer was set to a back up rate of 60 BPM, atrially paced this morning.    MEDICATIONS  Infusions:  lactated Ringer's, Last Rate: 10 mL/hr (06/25/25 0600)  [Held by provider] milrinone, Last Rate: Stopped (06/25/25 0917)      Scheduled:  acetaminophen, 650 mg, q6h  acyclovir, 400 mg, BID  heparin (porcine), 5,000 Units, q8h  insulin lispro, 0-20 Units, Before meals & nightly  levothyroxine, 125 mcg, Daily  methocarbamol, 500 mg, q6h LUPILLO  mycophenolate, 1,000 mg, BID  oxygen, , Continuous - Inhalation  pantoprazole, 40 mg, Daily before breakfast  predniSONE, 35 mg, q12h LUPILLO  sulfamethoxazole-trimethoprim, 1 tablet, Daily  tacrolimus, 1.5 mg, q12h LUPILLO  tamsulosin, 0.4 mg, Daily  voriconazole, 200 mg, q12h LUPILLO      PRN:  dextrose, 12.5 g, q15 min PRN  dextrose, 25 g, q15 min PRN  dextrose, 25 g, q15 min PRN  glucagon, 1 mg, q15 min PRN  glucagon, 1 mg, q15 min PRN  HYDROmorphone, 0.2 mg, q2h PRN  lidocaine, 1 patch, Daily PRN  naloxone, 0.2 mg, q5 min PRN  ondansetron ODT, 4 mg, q8h PRN   Or  ondansetron, 4 mg, q8h PRN  oxyCODONE, 10 mg, q4h PRN  oxyCODONE, 5 mg, q4h PRN  oxygen, , Continuous PRN - O2/gases  polyethylene glycol, 17 g, Daily PRN      Invasive Hemodynamics:    Most Recent Range Past 24hrs   BP (Art) 117/49 Arterial Line BP 1  Min: 98/50  Max: 146/64   MAP(Art) 69 mmHg Arterial Line MAP 1 (mmHg)   Min: 60 mmHg  Max: 94 mmHg   RA/CVP   No data recorded   PA 31/16 PAP  Min: 25/13  Max: 48/26   PA(mean) 22 mmHg PAP (Mean)  Min: 17 mmHg  Max: 34 mmHg   PCWP   No data recorded   CO 9.5 L/min CO (L/min)  Min: 7.18 L/min   "Max: 9.5 L/min   CI 4.3 L/min/m2 CI (L/min/m2)  Min: 3.39 L/min/m2  Max: 4.8 L/min/m2   Mixed Venous 68 % SVO2 (%)  Min: 68 %  Max: 71 %   SVR  471 (dyne*sec)/cm5 SVR (dyne*sec)/cm5  Min: 377 (dyne*sec)/cm5  Max: 613 (dyne*sec)/cm5   PVR   No data recorded       ECMO:     Most Recent Range Past 24hrs   Flow   No data recorded   Speed   No data recorded   Sweep   No data recorded     Impella:      Most Recent Range Past 24hrs   Performance Level   No data recorded   Flow (L/min)   No data recorded   Motor Current   No data recorded   Placement Signal    Placement OK could not be evaluated. This SmartLink does not work with rows of the type: Custom List   Purge (mmHg)   No data recorded   Purge rate (mL/hr)   No data recorded     VENT:    Most Recent Range Past 24hrs   Mode (S) Pressure support    FiO2 40 % No data recorded   Rate 14 No data recorded   Vt 560 mL  No data recorded   PEEP (S) 5 cm H20 No data recorded     PHYSICAL EXAM:   Visit Vitals  /87   Pulse 74   Temp 36.5 °C (97.7 °F)   Resp 21   Ht 1.753 m (5' 9\")   Wt 106 kg (234 lb 6.4 oz)   SpO2 97%   BMI 34.61 kg/m²   Smoking Status Former   BSA 2.27 m²       Wt Readings from Last 5 Encounters:   06/25/25 106 kg (234 lb 6.4 oz)   02/27/25 102 kg (224 lb)   01/28/25 104 kg (229 lb)   11/19/24 112 kg (246 lb 12.8 oz)   07/15/24 110 kg (243 lb 9.6 oz)       INTAKE/OUTPUT:  I/O last 3 completed shifts:  In: 1981.3 (20.6 mL/kg) [P.O.:947; I.V.:834.3 (8.7 mL/kg); IV Piggyback:200]  Out: 5615 (58.3 mL/kg) [Urine:5265 (1.5 mL/kg/hr); Chest Tube:350]  Dosing Weight: 96.3 kg      Physical Exam  HENT:      Head: Normocephalic and atraumatic.   Cardiovascular:      Rate and Rhythm: Normal rate.      Heart sounds: No murmur heard.     No friction rub. No gallop.   Pulmonary:      Effort: Pulmonary effort is normal. No respiratory distress.      Breath sounds: Rales present. No wheezing.      Comments: Stenotomy incision appears C/D/I  Abdominal:      General: " Abdomen is flat. There is no distension.      Palpations: Abdomen is soft. There is no mass.      Tenderness: There is no abdominal tenderness. There is no guarding.   Musculoskeletal:      Right lower leg: No edema.      Left lower leg: No edema.   Skin:     General: Skin is warm.      Findings: No rash.   Neurological:      General: No focal deficit present.      Mental Status: He is alert and oriented to person, place, and time.   Psychiatric:         Mood and Affect: Mood normal.         Behavior: Behavior normal.         DATA:  CMP:  Results from last 7 days   Lab Units 06/25/25  0609 06/24/25  1320 06/24/25  0050 06/23/25  1433 06/23/25  0134 06/22/25  1443 06/22/25  0530 06/21/25  2257 06/21/25  0801 06/20/25  1659   SODIUM mmol/L 141 142 141 143 143 143 143 144 143 142   POTASSIUM mmol/L 4.0 4.6 4.5 4.8 4.3 3.9 4.2 4.1 4.1 3.4*   CHLORIDE mmol/L 101 106 106 107 107 107 108* 109* 106 102   CO2 mmol/L 34* 30 32 30 29 25 24 27 24 29   ANION GAP mmol/L 10 11 8* 11 11 15 15 12 17 14   BUN mg/dL 34* 30* 26* 26* 23 20 19 18 22 31*   CREATININE mg/dL 0.76 0.79 0.77 0.78 0.76 0.73 0.80 0.80 0.97 0.98   EGFR mL/min/1.73m*2 >90 >90 >90 >90 >90 >90 >90 >90 87 86   MAGNESIUM mg/dL 2.25 2.42* 2.30 2.58* 2.51* 2.27 2.30 2.35 2.76* 2.09   ALBUMIN g/dL 3.6 4.2 3.8 3.8 3.9 3.9 3.7 3.7 3.5 4.3   ALT U/L  --   --   --   --   --   --   --   --   --  13   AST U/L  --   --   --   --   --   --   --   --   --  15   BILIRUBIN TOTAL mg/dL  --   --   --   --   --   --   --   --   --  0.4     CBC:  Results from last 7 days   Lab Units 06/25/25  0609 06/24/25  1320 06/24/25  0050 06/23/25  1433 06/23/25  0134 06/22/25  0530 06/21/25  2304 06/21/25  0801   WBC AUTO x10*3/uL 13.1* 18.7* 28.2* 27.9* 33.0* 27.5* 22.8* 20.5*   HEMOGLOBIN g/dL 7.7* 7.1* 7.6* 7.5* 7.7* 8.5* 9.0* 10.1*   HEMATOCRIT % 23.4* 21.5* 23.8* 23.0* 23.2* 25.0* 25.9* 28.4*   PLATELETS AUTO x10*3/uL 112* 102* 130* 125* 159 162 178 214   MCV fL 94 95 94 95 91 90 90 89  "    COAG:   Results from last 7 days   Lab Units 06/21/25  0801 06/20/25  2330 06/20/25  2118 06/20/25  1659   INR  1.4* 1.9* 2.4* 3.0*     ABO:   ABO TYPE   Date Value Ref Range Status   06/23/2025 AB  Final     HEME/ENDO:  Results from last 7 days   Lab Units 06/25/25  0609   FERRITIN ng/mL 130   IRON SATURATION % 21*      CARDIAC:       No lab exists for component: \"CK\", \"CKMBP\"    ASSESSMENT AND PLAN:   Anatoly Lane is a 64 year old male with relevant PMH of ex smoker, NICM, stage D HFrEF NYHA 2-3, S/p HM2 (4/30/23), VT s/p ICD, CAD, pAF, HTN, BPH, GERD and hypothyroidism.  S/p LVAD explant, AICD removal & OHT by Dr. Lorenzo on 6/21. Now transferred from CTICU to HF ICU 6/24. Patient arrived to HFICU HDS stable, on milrinone drip 0.125 mcg/kg/min. Aiming to wean milrinone and optimize volume while in the HFICU.    Neuro:  # Anxiety. Depression, Acute pain   # BUE/BLE mild tremor  - Serial neuro and pain assessments   - PO Tylenol PRN for pain  - PT/OT Consult, OOB to chair  - CAM ICU score every shift  - Sleep/wake cycle normalization     Cardiovascular:  # Donor/Recipient Infectious history:   CMV: -/-   EBV: +/+   Toxo: -/-   Heb B: -/-  Hep C: -/-  HIV: -/-      Rejection/Prophylaxis:    - Induction: - Induction: s/p methylprednisolone 500mg IV x2  - Postoperative Induction Plan:  S/p Basiliximab 20mg IV within 1 hour of arrival to CTICU (no need for premedication), 2nd dose: 20mg IVPB on POD4 (06/25)   Steroid taper starting with Methylprednisolone 125mg IV Q8 x 3 doses followed by IV Solumedrol 60 mg q 12 hrs x 2 doses followed by prednisone 35 mg q 12hrs PO q 12 hrs  - Steroids: Currently on prednisone 35 mg q 12hrs PO q 12 hrs until first biopsy   - Tacrolimus: Increased to 1.5mg BID on 6/25.   - Tacrolimus goal troughs: 10-12, Follow up level today  - Cellcept: C/w 1,000mg BID  - Antifungals: c/w voriconazole 200mg BID x3 months end date 09/22/2025   - Antivirals: c/w acylovir 400mg BID (06/22 x3 " months. End date 09/22/2025   - Anti PCP & Toxoplasmosis: Can start SS Bactrim daily (end date of 12/22/25)      Next RHC/biopsy: Scheduled for 7/1 w/ Dr. Velasco   Last TTE/BOBBY on 6/23: ; normal BiV function   Last HLA: First due on 7/24  Last Allomap: ~ will assess starting at 6-month ana post-op    Last Allosure: ~ will assess starting at 6-month ana post-op   Last LHC: 03/2023(pre OHT); First due ~ (one year post tx)   Osteopenia/osteoporosis prophylaxis: Vitamin D3 and calcium supplements - needs started, consider in HFICU   Peptic/gastric ulcer prophylaxis: Pantoprazole 40mg daily    CAV Prophylaxis: Aspirin 81mg daily (once cleared by CT surgery). Start pravastatin 40 mg at bedtime tonight.  Pacing: Pacer back up rate set to 60 BPM. Native conduction is NSR at 75 BPM     #  NICM, s/p HM2 (4/30/23), VT s/p ICD, CAD, pAF, HTN.  Now s/p LVAD explant, AICD removal, & OHT on 6/21  - TTE (6/22): Normal BiV   - admit weight (6/24): 83.6 KG   - Continue diuresis with IV lasix 80 mg spot doses.  - Wean off milrinone and recheck numbers this afternoon.  - Daily SGC #s: MAP 68, CVP 12, PA 30/17 (22), CO/CI 9.5/4.3 (F), CO/CI: 8/4 (TD), SvO2 68% on milrinone 0.125  - Daily standing weights, 2gm sodium diet, 2L fluid restriction, strict I&Os     #Electrolyte Disturbances  - Keep K >4, Mag >2     Pulmonary:   # Ex-smoker  -Monitor and maintain SpO2 > 92%     GI:  # H/x GERD  - Bowel regimen  - PPI     :  # H/x BPH  -Baseline Cr: 1.0   -Admit BUN/Cr: Normal   -I/Os  -avoid hypotension and nephrotoxic agents  - C/w home tamsulosin 0.4 mg daily.     Heme:  # Acute blood loss anemia stable, no active s/s of bleeding  - Trending H/h   - Iron study: %saturation 21%, ferritin 130. No need for IV iron.      Endo:  # Steroid and stress induced hyperglycemia persists despite SSI.  -->  - Maintain BG <180  - continue SSI to #4 and give Lantus 10u x 1  for now  - hgbA1c  was 4.1     # H/o of hypothyroid  -TSH (3/2025): 1.83  -  Steroid and stress induced hyperglycemia persists despite SSI.  -->  - Maintain BG <180      ID:  # Reactive leukocytosis (improving)  - MRSA negative  - completed periop vanc/zosyn x48hrs with hx of LVAD  -trend temps q4h     PHYSICAL AND OCCUPATIONAL THERAPY: PT/OT    LINES: R internal jugular swan (6/21/25-*)  Arterial line L brachial (6/21-*)    DVT: Heparin SQ  VAP BUNDLE: None  CENTRAL LINE BUNDLE: Ordered  ULCER PPX: PPI  GLYCEMIC CONTROL: SSI  BOWEL CARE: Senna, mirlax  INDWELLING CATHETER: None  NUTRITION: Adult diet Regular, Consistent Carb; CCD 90 gm/meal      EMERGENCY CONTACT: Extended Emergency Contact Information  Primary Emergency Contact: Janice Ramos  Address: [address] [place] [address] [facility]  Home Phone: [phone]  Mobile Phone: [phone]  Relation: Mother  Secondary Emergency Contact: Bird Lane  Address: [address] [place] [address] [address] [place] [address] [facility]  Home Phone: [phone]  Mobile Phone: [phone]  Relation: Son  Preferred language: English   needed? No  FAMILY UPDATE:  CODE STATUS: Full Code  DISPO:  HFICU    Patient seen and assessed with Dr. Concetta HOLLIDAY personally spent 60 minutes of critical care time directly and personally managing the patient exclusive of separately billable procedures   _________________________________________________  Jameel Barajas MD

## 2025-06-26 ENCOUNTER — APPOINTMENT (OUTPATIENT)
Dept: RADIOLOGY | Facility: HOSPITAL | Age: 65
DRG: 001 | End: 2025-06-26
Payer: MEDICARE

## 2025-06-26 LAB
ALBUMIN SERPL BCP-MCNC: 3.8 G/DL (ref 3.4–5)
ANION GAP BLDMV CALCULATED.4IONS-SCNC: 10 MMO/L (ref 10–25)
ANION GAP BLDMV CALCULATED.4IONS-SCNC: 10 MMO/L (ref 10–25)
ANION GAP BLDMV CALCULATED.4IONS-SCNC: 6 MMO/L (ref 10–25)
ANION GAP BLDMV CALCULATED.4IONS-SCNC: 9 MMO/L (ref 10–25)
ANION GAP SERPL CALC-SCNC: 12 MMOL/L (ref 10–20)
BASE EXCESS BLDMV CALC-SCNC: 10.4 MMOL/L (ref -2–3)
BASE EXCESS BLDMV CALC-SCNC: 6.8 MMOL/L (ref -2–3)
BASE EXCESS BLDMV CALC-SCNC: 8 MMOL/L (ref -2–3)
BASE EXCESS BLDMV CALC-SCNC: 9.5 MMOL/L (ref -2–3)
BODY TEMPERATURE: 37 DEGREES CELSIUS
BUN SERPL-MCNC: 33 MG/DL (ref 6–23)
CA-I BLDMV-SCNC: 1.05 MMOL/L (ref 1.1–1.33)
CA-I BLDMV-SCNC: 1.06 MMOL/L (ref 1.1–1.33)
CA-I BLDMV-SCNC: 1.08 MMOL/L (ref 1.1–1.33)
CA-I BLDMV-SCNC: 1.09 MMOL/L (ref 1.1–1.33)
CALCIUM SERPL-MCNC: 8.1 MG/DL (ref 8.6–10.6)
CHLORIDE BLD-SCNC: 97 MMOL/L (ref 98–107)
CHLORIDE BLD-SCNC: 99 MMOL/L (ref 98–107)
CHLORIDE SERPL-SCNC: 98 MMOL/L (ref 98–107)
CO2 SERPL-SCNC: 34 MMOL/L (ref 21–32)
CREAT SERPL-MCNC: 0.95 MG/DL (ref 0.5–1.3)
EGFRCR SERPLBLD CKD-EPI 2021: 89 ML/MIN/1.73M*2
EJECTION FRACTION: 53 %
ERYTHROCYTE [DISTWIDTH] IN BLOOD BY AUTOMATED COUNT: 16.4 % (ref 11.5–14.5)
GLUCOSE BLD MANUAL STRIP-MCNC: 135 MG/DL (ref 74–99)
GLUCOSE BLD MANUAL STRIP-MCNC: 210 MG/DL (ref 74–99)
GLUCOSE BLD MANUAL STRIP-MCNC: 216 MG/DL (ref 74–99)
GLUCOSE BLD-MCNC: 115 MG/DL (ref 74–99)
GLUCOSE BLD-MCNC: 184 MG/DL (ref 74–99)
GLUCOSE BLD-MCNC: 203 MG/DL (ref 74–99)
GLUCOSE BLD-MCNC: 211 MG/DL (ref 74–99)
GLUCOSE SERPL-MCNC: 127 MG/DL (ref 74–99)
HCO3 BLDMV-SCNC: 31.3 MMOL/L (ref 22–26)
HCO3 BLDMV-SCNC: 32.7 MMOL/L (ref 22–26)
HCO3 BLDMV-SCNC: 34.2 MMOL/L (ref 22–26)
HCO3 BLDMV-SCNC: 36 MMOL/L (ref 22–26)
HCT VFR BLD AUTO: 23.5 % (ref 41–52)
HCT VFR BLD EST: 25 % (ref 41–52)
HCT VFR BLD EST: 26 % (ref 41–52)
HCT VFR BLD EST: 26 % (ref 41–52)
HCT VFR BLD EST: 29 % (ref 41–52)
HGB BLD-MCNC: 7.5 G/DL (ref 13.5–17.5)
HGB BLDMV-MCNC: 8.3 G/DL (ref 13.5–17.5)
HGB BLDMV-MCNC: 8.8 G/DL (ref 13.5–17.5)
HGB BLDMV-MCNC: 8.8 G/DL (ref 13.5–17.5)
HGB BLDMV-MCNC: 9.5 G/DL (ref 13.5–17.5)
INHALED O2 CONCENTRATION: 21 %
INHALED O2 CONCENTRATION: 21 %
INHALED O2 CONCENTRATION: 28 %
INHALED O2 CONCENTRATION: 28 %
LACTATE BLDMV-SCNC: 1.2 MMOL/L (ref 0.4–2)
LACTATE BLDMV-SCNC: 2.3 MMOL/L (ref 0.4–2)
LACTATE BLDMV-SCNC: 2.4 MMOL/L (ref 0.4–2)
LACTATE BLDMV-SCNC: 3.2 MMOL/L (ref 0.4–2)
MAGNESIUM SERPL-MCNC: 2.25 MG/DL (ref 1.6–2.4)
MCH RBC QN AUTO: 30.4 PG (ref 26–34)
MCHC RBC AUTO-ENTMCNC: 31.9 G/DL (ref 32–36)
MCV RBC AUTO: 95 FL (ref 80–100)
NRBC BLD-RTO: 0.5 /100 WBCS (ref 0–0)
OXYHGB MFR BLDMV: 54.7 % (ref 45–75)
OXYHGB MFR BLDMV: 57.5 % (ref 45–75)
OXYHGB MFR BLDMV: 61.3 % (ref 45–75)
OXYHGB MFR BLDMV: 66.1 % (ref 45–75)
PCO2 BLDMV: 44 MM HG (ref 41–51)
PCO2 BLDMV: 46 MM HG (ref 41–51)
PCO2 BLDMV: 47 MM HG (ref 41–51)
PCO2 BLDMV: 53 MM HG (ref 41–51)
PH BLDMV: 7.44 PH (ref 7.33–7.43)
PH BLDMV: 7.46 PH (ref 7.33–7.43)
PH BLDMV: 7.46 PH (ref 7.33–7.43)
PH BLDMV: 7.47 PH (ref 7.33–7.43)
PHOSPHATE SERPL-MCNC: 3.2 MG/DL (ref 2.5–4.9)
PLATELET # BLD AUTO: 154 X10*3/UL (ref 150–450)
PO2 BLDMV: 37 MM HG (ref 35–45)
PO2 BLDMV: 39 MM HG (ref 35–45)
PO2 BLDMV: 39 MM HG (ref 35–45)
PO2 BLDMV: 42 MM HG (ref 35–45)
POTASSIUM BLDMV-SCNC: 3.7 MMOL/L (ref 3.5–5.3)
POTASSIUM BLDMV-SCNC: 3.8 MMOL/L (ref 3.5–5.3)
POTASSIUM BLDMV-SCNC: 4.1 MMOL/L (ref 3.5–5.3)
POTASSIUM BLDMV-SCNC: 4.2 MMOL/L (ref 3.5–5.3)
POTASSIUM SERPL-SCNC: 3.9 MMOL/L (ref 3.5–5.3)
RBC # BLD AUTO: 2.47 X10*6/UL (ref 4.5–5.9)
SAO2 % BLDMV: 56 % (ref 45–75)
SAO2 % BLDMV: 59 % (ref 45–75)
SAO2 % BLDMV: 63 % (ref 45–75)
SAO2 % BLDMV: 68 % (ref 45–75)
SODIUM BLDMV-SCNC: 136 MMOL/L (ref 136–145)
SODIUM BLDMV-SCNC: 137 MMOL/L (ref 136–145)
SODIUM SERPL-SCNC: 140 MMOL/L (ref 136–145)
TACROLIMUS BLD-MCNC: 3.6 NG/ML
WBC # BLD AUTO: 12.9 X10*3/UL (ref 4.4–11.3)

## 2025-06-26 PROCEDURE — 2500000002 HC RX 250 W HCPCS SELF ADMINISTERED DRUGS (ALT 637 FOR MEDICARE OP, ALT 636 FOR OP/ED): Performed by: NURSE PRACTITIONER

## 2025-06-26 PROCEDURE — 84132 ASSAY OF SERUM POTASSIUM: CPT | Performed by: NURSE PRACTITIONER

## 2025-06-26 PROCEDURE — 99291 CRITICAL CARE FIRST HOUR: CPT | Performed by: INTERNAL MEDICINE

## 2025-06-26 PROCEDURE — 85027 COMPLETE CBC AUTOMATED: CPT

## 2025-06-26 PROCEDURE — 97535 SELF CARE MNGMENT TRAINING: CPT | Mod: GO

## 2025-06-26 PROCEDURE — 2500000004 HC RX 250 GENERAL PHARMACY W/ HCPCS (ALT 636 FOR OP/ED): Performed by: NURSE PRACTITIONER

## 2025-06-26 PROCEDURE — 2500000001 HC RX 250 WO HCPCS SELF ADMINISTERED DRUGS (ALT 637 FOR MEDICARE OP)

## 2025-06-26 PROCEDURE — 97116 GAIT TRAINING THERAPY: CPT | Mod: GP

## 2025-06-26 PROCEDURE — 2500000002 HC RX 250 W HCPCS SELF ADMINISTERED DRUGS (ALT 637 FOR MEDICARE OP, ALT 636 FOR OP/ED)

## 2025-06-26 PROCEDURE — 2500000005 HC RX 250 GENERAL PHARMACY W/O HCPCS

## 2025-06-26 PROCEDURE — 83735 ASSAY OF MAGNESIUM: CPT | Performed by: NURSE PRACTITIONER

## 2025-06-26 PROCEDURE — 2020000001 HC ICU ROOM DAILY

## 2025-06-26 PROCEDURE — 71045 X-RAY EXAM CHEST 1 VIEW: CPT

## 2025-06-26 PROCEDURE — 2500000004 HC RX 250 GENERAL PHARMACY W/ HCPCS (ALT 636 FOR OP/ED)

## 2025-06-26 PROCEDURE — 71045 X-RAY EXAM CHEST 1 VIEW: CPT | Performed by: RADIOLOGY

## 2025-06-26 PROCEDURE — 80197 ASSAY OF TACROLIMUS: CPT | Performed by: NURSE PRACTITIONER

## 2025-06-26 PROCEDURE — 2500000001 HC RX 250 WO HCPCS SELF ADMINISTERED DRUGS (ALT 637 FOR MEDICARE OP): Performed by: NURSE PRACTITIONER

## 2025-06-26 PROCEDURE — 2500000004 HC RX 250 GENERAL PHARMACY W/ HCPCS (ALT 636 FOR OP/ED): Performed by: REGISTERED NURSE

## 2025-06-26 PROCEDURE — 37799 UNLISTED PX VASCULAR SURGERY: CPT | Performed by: NURSE PRACTITIONER

## 2025-06-26 PROCEDURE — 82947 ASSAY GLUCOSE BLOOD QUANT: CPT

## 2025-06-26 PROCEDURE — 97530 THERAPEUTIC ACTIVITIES: CPT | Mod: GP

## 2025-06-26 PROCEDURE — 97530 THERAPEUTIC ACTIVITIES: CPT | Mod: GO

## 2025-06-26 RX ORDER — ACETAMINOPHEN 325 MG/1
650 TABLET ORAL EVERY 6 HOURS
Status: DISCONTINUED | OUTPATIENT
Start: 2025-06-26 | End: 2025-07-09 | Stop reason: HOSPADM

## 2025-06-26 RX ORDER — EPINEPHRINE IN 0.9 % SOD CHLOR 4MG/250ML
0-1 PLASTIC BAG, INJECTION (ML) INTRAVENOUS CONTINUOUS
Status: DISCONTINUED | OUTPATIENT
Start: 2025-06-26 | End: 2025-06-28

## 2025-06-26 RX ORDER — GABAPENTIN 300 MG/1
300 CAPSULE ORAL NIGHTLY
Status: DISCONTINUED | OUTPATIENT
Start: 2025-06-26 | End: 2025-07-09 | Stop reason: HOSPADM

## 2025-06-26 RX ORDER — EPINEPHRINE IN 0.9 % SOD CHLOR 4MG/250ML
0-1 PLASTIC BAG, INJECTION (ML) INTRAVENOUS CONTINUOUS
Status: DISCONTINUED | OUTPATIENT
Start: 2025-06-26 | End: 2025-06-26

## 2025-06-26 RX ORDER — FUROSEMIDE 10 MG/ML
80 INJECTION INTRAMUSCULAR; INTRAVENOUS ONCE
Status: COMPLETED | OUTPATIENT
Start: 2025-06-26 | End: 2025-06-26

## 2025-06-26 RX ORDER — CALCIUM CARBONATE 500(1250)
1250 TABLET ORAL
Status: DISCONTINUED | OUTPATIENT
Start: 2025-06-26 | End: 2025-06-26

## 2025-06-26 RX ORDER — TACROLIMUS 0.5 MG/1
0.5 CAPSULE ORAL ONCE
Status: COMPLETED | OUTPATIENT
Start: 2025-06-26 | End: 2025-06-26

## 2025-06-26 RX ORDER — POTASSIUM CHLORIDE 20 MEQ/1
40 TABLET, EXTENDED RELEASE ORAL ONCE
Status: COMPLETED | OUTPATIENT
Start: 2025-06-26 | End: 2025-06-26

## 2025-06-26 RX ORDER — CHOLECALCIFEROL (VITAMIN D3) 25 MCG
50 TABLET ORAL DAILY
Status: DISCONTINUED | OUTPATIENT
Start: 2025-06-27 | End: 2025-07-09 | Stop reason: HOSPADM

## 2025-06-26 RX ORDER — MYCOPHENOLATE MOFETIL 250 MG/1
1000 CAPSULE ORAL EVERY 12 HOURS SCHEDULED
Status: DISCONTINUED | OUTPATIENT
Start: 2025-06-26 | End: 2025-07-09 | Stop reason: HOSPADM

## 2025-06-26 RX ORDER — TACROLIMUS 1 MG/1
2 CAPSULE ORAL
Status: DISCONTINUED | OUTPATIENT
Start: 2025-06-26 | End: 2025-06-29

## 2025-06-26 RX ORDER — CALCIUM CARBONATE 500(1250)
1250 TABLET ORAL 2 TIMES DAILY
Status: DISCONTINUED | OUTPATIENT
Start: 2025-06-26 | End: 2025-07-09 | Stop reason: HOSPADM

## 2025-06-26 RX ADMIN — MYCOPHENOLATE MOFETIL 1000 MG: 250 CAPSULE ORAL at 18:51

## 2025-06-26 RX ADMIN — VANCOMYCIN HYDROCHLORIDE 125 MG: 125 CAPSULE ORAL at 12:35

## 2025-06-26 RX ADMIN — MYCOPHENOLATE MOFETIL 1000 MG: 250 CAPSULE ORAL at 08:03

## 2025-06-26 RX ADMIN — ACETAMINOPHEN 650 MG: 325 TABLET ORAL at 11:49

## 2025-06-26 RX ADMIN — EPINEPHRINE IN SODIUM CHLORIDE 0.01 MCG/KG/MIN: 16 INJECTION INTRAVENOUS at 13:04

## 2025-06-26 RX ADMIN — INSULIN LISPRO 8 UNITS: 100 INJECTION, SOLUTION INTRAVENOUS; SUBCUTANEOUS at 21:07

## 2025-06-26 RX ADMIN — VANCOMYCIN HYDROCHLORIDE 125 MG: 125 CAPSULE ORAL at 21:05

## 2025-06-26 RX ADMIN — ACYCLOVIR 400 MG: 400 TABLET ORAL at 08:03

## 2025-06-26 RX ADMIN — ACETAMINOPHEN 650 MG: 325 TABLET, FILM COATED ORAL at 05:08

## 2025-06-26 RX ADMIN — POTASSIUM CHLORIDE 40 MEQ: 1500 TABLET, EXTENDED RELEASE ORAL at 08:03

## 2025-06-26 RX ADMIN — TACROLIMUS 1.5 MG: 0.5 CAPSULE ORAL at 06:00

## 2025-06-26 RX ADMIN — HEPARIN SODIUM 5000 UNITS: 5000 INJECTION INTRAVENOUS; SUBCUTANEOUS at 16:46

## 2025-06-26 RX ADMIN — TAMSULOSIN HYDROCHLORIDE 0.4 MG: 0.4 CAPSULE ORAL at 08:03

## 2025-06-26 RX ADMIN — FUROSEMIDE 80 MG: 10 INJECTION, SOLUTION INTRAMUSCULAR; INTRAVENOUS at 21:04

## 2025-06-26 RX ADMIN — PREDNISONE 35 MG: 20 TABLET ORAL at 21:04

## 2025-06-26 RX ADMIN — VORICONAZOLE 200 MG: 200 TABLET, COATED ORAL at 21:04

## 2025-06-26 RX ADMIN — TACROLIMUS 2 MG: 1 CAPSULE ORAL at 18:51

## 2025-06-26 RX ADMIN — PANTOPRAZOLE SODIUM 40 MG: 40 TABLET, DELAYED RELEASE ORAL at 06:00

## 2025-06-26 RX ADMIN — PREDNISONE 35 MG: 20 TABLET ORAL at 08:02

## 2025-06-26 RX ADMIN — CALCIUM 1 TABLET: 500 TABLET ORAL at 21:05

## 2025-06-26 RX ADMIN — INSULIN LISPRO 8 UNITS: 100 INJECTION, SOLUTION INTRAVENOUS; SUBCUTANEOUS at 16:46

## 2025-06-26 RX ADMIN — VORICONAZOLE 200 MG: 200 TABLET, COATED ORAL at 08:03

## 2025-06-26 RX ADMIN — ACETAMINOPHEN 650 MG: 325 TABLET ORAL at 18:51

## 2025-06-26 RX ADMIN — PRAVASTATIN SODIUM 40 MG: 40 TABLET ORAL at 21:04

## 2025-06-26 RX ADMIN — SULFAMETHOXAZOLE AND TRIMETHOPRIM 1 TABLET: 400; 80 TABLET ORAL at 08:03

## 2025-06-26 RX ADMIN — INSULIN LISPRO 8 UNITS: 100 INJECTION, SOLUTION INTRAVENOUS; SUBCUTANEOUS at 13:05

## 2025-06-26 RX ADMIN — Medication 2 L/MIN: at 20:00

## 2025-06-26 RX ADMIN — FUROSEMIDE 10 MG/HR: 10 INJECTION, SOLUTION INTRAMUSCULAR; INTRAVENOUS at 08:03

## 2025-06-26 RX ADMIN — HEPARIN SODIUM 5000 UNITS: 5000 INJECTION INTRAVENOUS; SUBCUTANEOUS at 08:03

## 2025-06-26 RX ADMIN — TACROLIMUS 0.5 MG: 0.5 CAPSULE ORAL at 11:49

## 2025-06-26 RX ADMIN — LEVOTHYROXINE SODIUM 125 MCG: 0.05 TABLET ORAL at 05:08

## 2025-06-26 RX ADMIN — ACETAMINOPHEN 650 MG: 325 TABLET ORAL at 23:09

## 2025-06-26 RX ADMIN — METHOCARBAMOL TABLETS 500 MG: 500 TABLET, COATED ORAL at 05:08

## 2025-06-26 RX ADMIN — GABAPENTIN 300 MG: 300 CAPSULE ORAL at 21:04

## 2025-06-26 RX ADMIN — ACYCLOVIR 400 MG: 400 TABLET ORAL at 21:04

## 2025-06-26 RX ADMIN — VANCOMYCIN HYDROCHLORIDE 125 MG: 125 CAPSULE ORAL at 08:02

## 2025-06-26 RX ADMIN — Medication 4 L/MIN: at 07:27

## 2025-06-26 RX ADMIN — VANCOMYCIN HYDROCHLORIDE 125 MG: 125 CAPSULE ORAL at 16:46

## 2025-06-26 RX ADMIN — MILRINONE LACTATE IN DEXTROSE 0.12 MCG/KG/MIN: 0.2 INJECTION, SOLUTION INTRAVENOUS at 08:36

## 2025-06-26 RX ADMIN — METHOCARBAMOL TABLETS 500 MG: 500 TABLET, COATED ORAL at 11:49

## 2025-06-26 RX ADMIN — HEPARIN SODIUM 5000 UNITS: 5000 INJECTION INTRAVENOUS; SUBCUTANEOUS at 23:09

## 2025-06-26 ASSESSMENT — PAIN SCALES - GENERAL
PAINLEVEL_OUTOF10: 0 - NO PAIN

## 2025-06-26 ASSESSMENT — COGNITIVE AND FUNCTIONAL STATUS - GENERAL
WALKING IN HOSPITAL ROOM: A LITTLE
CLIMB 3 TO 5 STEPS WITH RAILING: A LOT
DRESSING REGULAR LOWER BODY CLOTHING: A LOT
DAILY ACTIVITIY SCORE: 18
MOBILITY SCORE: 18
TURNING FROM BACK TO SIDE WHILE IN FLAT BAD: A LITTLE
STANDING UP FROM CHAIR USING ARMS: A LITTLE
TOILETING: A LOT
DRESSING REGULAR UPPER BODY CLOTHING: A LITTLE
HELP NEEDED FOR BATHING: A LITTLE
MOVING TO AND FROM BED TO CHAIR: A LITTLE

## 2025-06-26 ASSESSMENT — PAIN - FUNCTIONAL ASSESSMENT
PAIN_FUNCTIONAL_ASSESSMENT: 0-10

## 2025-06-26 ASSESSMENT — ACTIVITIES OF DAILY LIVING (ADL): HOME_MANAGEMENT_TIME_ENTRY: 25

## 2025-06-26 NOTE — PROGRESS NOTES
[facility]  HFICU PROGRESS NOTE    Anatoly Lane/[id number]    Admit Date: 6/20/2025  Hospital Length of Stay: 6   ICU Length of Stay: 1d 16h   Primary Service: HFICU  Primary HF Cardiologist:   Referring:     INTERVAL EVENTS / PERTINENT ROS:   Yesterday, attempted to wean off milrinone, but CVP increased to 23 and SvO2 dropped from 68->50% off milrinone. It was thus restarted and he was aggressively diuresed with a lasix gtt. In addition, he reported frequent diarrhea and was found to be C. Diff PCR positive (tox negative) and so he was started on PO vanc.     Plan:  - Increase tacro to 2 mg BID  - C/w PO vanc 125 mg QID x 10 days  - Continue diuresis with IVP lasix.    MEDICATIONS  Infusions:  lactated Ringer's, Last Rate: 10 mL/hr (06/26/25 1004)  milrinone, Last Rate: 0.125 mcg/kg/min (06/26/25 0836)      Scheduled:  acetaminophen, 650 mg, q6h  acyclovir, 400 mg, BID  furosemide, 80 mg, Once  heparin (porcine), 5,000 Units, q8h  insulin lispro, 0-20 Units, Before meals & nightly  levothyroxine, 125 mcg, Daily  methocarbamol, 500 mg, q6h LUPILLO  mycophenolate, 1,000 mg, BID  oxygen, , Continuous - Inhalation  pantoprazole, 40 mg, Daily before breakfast  pravastatin, 40 mg, Nightly  predniSONE, 35 mg, q12h LUPILLO  sulfamethoxazole-trimethoprim, 1 tablet, Daily  tacrolimus, 0.5 mg, Once  tacrolimus, 2 mg, q12h LUPILLO  tamsulosin, 0.4 mg, Daily  vancomycin, 125 mg, 4x daily  voriconazole, 200 mg, q12h LUPILLO      PRN:  dextrose, 12.5 g, q15 min PRN  dextrose, 25 g, q15 min PRN  dextrose, 25 g, q15 min PRN  glucagon, 1 mg, q15 min PRN  glucagon, 1 mg, q15 min PRN  HYDROmorphone, 0.2 mg, q2h PRN  lidocaine, 1 patch, Daily PRN  naloxone, 0.2 mg, q5 min PRN  ondansetron ODT, 4 mg, q8h PRN   Or  ondansetron, 4 mg, q8h PRN  oxyCODONE, 10 mg, q4h PRN  oxyCODONE, 5 mg, q4h PRN  oxygen, , Continuous PRN - O2/gases  polyethylene glycol, 17 g, Daily PRN      Invasive Hemodynamics:    Most Recent Range Past 24hrs  "  BP (Art) 110/55 Arterial Line BP 1  Min: 105/42  Max: 145/63   MAP(Art) 72 mmHg Arterial Line MAP 1 (mmHg)   Min: 61 mmHg  Max: 85 mmHg   RA/CVP   No data recorded   PA 26/12 PAP  Min: 25/14  Max: 39/23   PA(mean) 19 mmHg PAP (Mean)  Min: 16 mmHg  Max: 29 mmHg   PCWP   No data recorded   CO 6.8 L/min CO (L/min)  Min: 5.7 L/min  Max: 7.3 L/min   CI 3.1 L/min/m2 CI (L/min/m2)  Min: 2.6 L/min/m2  Max: 3.3 L/min/m2   Mixed Venous 59 % SVO2 (%)  Min: 50 %  Max: 61 %   SVR  926 (dyne*sec)/cm5 SVR (dyne*sec)/cm5  Min: 559 (dyne*sec)/cm5  Max: 926 (dyne*sec)/cm5   PVR   No data recorded       ECMO:     Most Recent Range Past 24hrs   Flow   No data recorded   Speed   No data recorded   Sweep   No data recorded     Impella:      Most Recent Range Past 24hrs   Performance Level   No data recorded   Flow (L/min)   No data recorded   Motor Current   No data recorded   Placement Signal    Placement OK could not be evaluated. This SmartLink does not work with rows of the type: Custom List   Purge (mmHg)   No data recorded   Purge rate (mL/hr)   No data recorded     VENT:    Most Recent Range Past 24hrs   Mode (S) Pressure support    FiO2 40 % No data recorded   Rate 14 No data recorded   Vt 560 mL  No data recorded   PEEP (S) 5 cm H20 No data recorded     PHYSICAL EXAM:   Visit Vitals  /87   Pulse 84   Temp 36.1 °C (97 °F)   Resp 17   Ht 1.753 m (5' 9\")   Wt 102 kg (225 lb 5 oz)   SpO2 92%   BMI 33.27 kg/m²   Smoking Status Former   BSA 2.23 m²       Wt Readings from Last 5 Encounters:   06/26/25 102 kg (225 lb 5 oz)   02/27/25 102 kg (224 lb)   01/28/25 104 kg (229 lb)   11/19/24 112 kg (246 lb 12.8 oz)   07/15/24 110 kg (243 lb 9.6 oz)       INTAKE/OUTPUT:  I/O last 3 completed shifts:  In: 1925.7 (20 mL/kg) [P.O.:1440; I.V.:485.7 (5 mL/kg)]  Out: 8880 (92.2 mL/kg) [Urine:8880 (2.6 mL/kg/hr)]  Dosing Weight: 96.3 kg      Physical Exam  HENT:      Head: Normocephalic and atraumatic.   Cardiovascular:      Rate and Rhythm: " Normal rate.      Heart sounds: No murmur heard.     No friction rub. No gallop.   Pulmonary:      Effort: Pulmonary effort is normal. No respiratory distress.      Breath sounds: Rales present. No wheezing.      Comments: Stenotomy incision appears C/D/I  Abdominal:      General: Abdomen is flat. There is no distension.      Palpations: Abdomen is soft. There is no mass.      Tenderness: There is no abdominal tenderness. There is no guarding.   Musculoskeletal:      Right lower leg: No edema.      Left lower leg: No edema.   Skin:     General: Skin is warm.      Findings: No rash.   Neurological:      General: No focal deficit present.      Mental Status: He is alert and oriented to person, place, and time.   Psychiatric:         Mood and Affect: Mood normal.         Behavior: Behavior normal.         DATA:  CMP:  Results from last 7 days   Lab Units 06/26/25  0506 06/25/25  0609 06/24/25  1320 06/24/25  0050 06/23/25  1433 06/23/25  0134 06/22/25  1443 06/22/25  0530 06/21/25  2257 06/21/25  0801 06/20/25  1659   SODIUM mmol/L 140 141 142 141 143 143 143 143 144 143 142   POTASSIUM mmol/L 3.9 4.0 4.6 4.5 4.8 4.3 3.9 4.2 4.1 4.1 3.4*   CHLORIDE mmol/L 98 101 106 106 107 107 107 108* 109* 106 102   CO2 mmol/L 34* 34* 30 32 30 29 25 24 27 24 29   ANION GAP mmol/L 12 10 11 8* 11 11 15 15 12 17 14   BUN mg/dL 33* 34* 30* 26* 26* 23 20 19 18 22 31*   CREATININE mg/dL 0.95 0.76 0.79 0.77 0.78 0.76 0.73 0.80 0.80 0.97 0.98   EGFR mL/min/1.73m*2 89 >90 >90 >90 >90 >90 >90 >90 >90 87 86   MAGNESIUM mg/dL 2.25 2.25 2.42* 2.30 2.58* 2.51* 2.27 2.30 2.35 2.76* 2.09   ALBUMIN g/dL 3.8 3.6 4.2 3.8 3.8 3.9 3.9 3.7 3.7 3.5 4.3   ALT U/L  --   --   --   --   --   --   --   --   --   --  13   AST U/L  --   --   --   --   --   --   --   --   --   --  15   BILIRUBIN TOTAL mg/dL  --   --   --   --   --   --   --   --   --   --  0.4     CBC:  Results from last 7 days   Lab Units 06/26/25  0506 06/25/25  0609 06/24/25  1081  "06/24/25  0050 06/23/25  1433 06/23/25  0134 06/22/25  0530 06/21/25  2304   WBC AUTO x10*3/uL 12.9* 13.1* 18.7* 28.2* 27.9* 33.0* 27.5* 22.8*   HEMOGLOBIN g/dL 7.5* 7.7* 7.1* 7.6* 7.5* 7.7* 8.5* 9.0*   HEMATOCRIT % 23.5* 23.4* 21.5* 23.8* 23.0* 23.2* 25.0* 25.9*   PLATELETS AUTO x10*3/uL 154 112* 102* 130* 125* 159 162 178   MCV fL 95 94 95 94 95 91 90 90     COAG:   Results from last 7 days   Lab Units 06/21/25  0801 06/20/25  2330 06/20/25  2118 06/20/25  1659   INR  1.4* 1.9* 2.4* 3.0*     ABO:   ABO TYPE   Date Value Ref Range Status   06/23/2025 AB  Final     HEME/ENDO:  Results from last 7 days   Lab Units 06/25/25  0609   FERRITIN ng/mL 130   IRON SATURATION % 21*      CARDIAC:       No lab exists for component: \"CK\", \"CKMBP\"    ASSESSMENT AND PLAN:   Anatoly Lane is a 64 year old male with relevant PMH of ex smoker, NICM, stage D HFrEF NYHA 2-3, S/p HM2 (4/30/23), VT s/p ICD, CAD, pAF, HTN, BPH, GERD and hypothyroidism.  S/p LVAD explant, AICD removal & OHT by Dr. Lorenzo on 6/21. Now transferred from CTICU to HF ICU 6/24. Patient arrived to HFICU HDS stable, on milrinone drip 0.125 mcg/kg/min. Aiming to wean milrinone and optimize volume while in the HFICU.    Neuro:  # Anxiety. Depression, Acute pain   # BUE/BLE mild tremor  - Serial neuro and pain assessments   - PO Tylenol PRN for pain  - PT/OT Consult, OOB to chair  - CAM ICU score every shift  - Sleep/wake cycle normalization     Cardiovascular:  # Donor/Recipient Infectious history:   CMV: -/-   EBV: +/+   Toxo: -/-   Heb B: -/-  Hep C: -/-  HIV: -/-      Rejection/Prophylaxis:    - Induction: - Induction: s/p methylprednisolone 500mg IV x2  - Postoperative Induction Plan:  S/p Basiliximab 20mg IV within 1 hour of arrival to CTICU (no need for premedication), s/p 2nd dose: 20mg IVPB on POD4 (06/25)   Steroid taper starting with Methylprednisolone 125mg IV Q8 x 3 doses followed by IV Solumedrol 60 mg q 12 hrs x 2 doses followed by prednisone 35 mg q " 12hrs PO q 12 hrs  - Steroids: Currently on prednisone 35 mg q 12hrs PO q 12 hrs until first biopsy   - Tacrolimus: Tacro 3.6 on 1.5 mg q12. Increase to 2 mg BID on 6/26 given subtherapeutic levels.   - Tacrolimus goal troughs: 10-12  - Cellcept: C/w 1,000mg BID  - Antifungals: c/w voriconazole 200mg BID x3 months end date 09/22/2025   - Antivirals: c/w acylovir 400mg BID (06/22 x3 months. End date 09/22/2025   - Anti PCP & Toxoplasmosis: Can start SS Bactrim daily (end date of 12/22/25)      Next RHC/biopsy: Scheduled for 7/1 w/ Dr. Velasco   Last TTE/BOBBY on 6/23: ; normal BiV function   Last HLA: First due on 7/24  Last Allomap: ~ will assess starting at 6-month ana post-op    Last Allosure: ~ will assess starting at 6-month ana post-op   Last LHC: 03/2023(pre OHT); First due ~ (one year post tx)   Osteopenia/osteoporosis prophylaxis: Start Vit D 2000U daily and calcium 500 mg BID (give calcium 2 hrs after MMF)   Peptic/gastric ulcer prophylaxis: Pantoprazole 40mg daily    CAV Prophylaxis: Aspirin 81mg daily to be started once Hgb stable. C/w pravastatin 40 mg QHS  Pacing: Pacer back up rate set to 60 BPM. Native conduction is NSR at 75 BPM     #  NICM, s/p HM2 (4/30/23), VT s/p ICD, CAD, pAF, HTN.  Now s/p LVAD explant, AICD removal, & OHT on 6/21  - TTE (6/22): Normal BiV   - admit weight (6/24): 83.6 KG   - Continue diuresis with IV lasix 80 mg spot doses.  - Attempted to wean milrinone on 6/25, but worsening CVP and SvO2 off milrinone, so it was resumed.  - Daily SGC #s: BP (117/74 (89), CVP 10, PA 26/11 (17), CO/CI 6.8/3.1 (F), CO/CI: 6.4/2.9 (TD), SvO2 59% on milrinone 0.125  - Daily standing weights, 2gm sodium diet, 2L fluid restriction, strict I&Os     #Electrolyte Disturbances  - Keep K >4, Mag >2     Pulmonary:   # Ex-smoker  -Monitor and maintain SpO2 > 92%     GI:  # H/x GERD  - Bowel regimen  - PPI     :  # H/x BPH  -Baseline Cr: 1.0   -Admit BUN/Cr: Normal   -I/Os  -avoid hypotension and  nephrotoxic agents  - C/w home tamsulosin 0.4 mg daily.     Heme:  # Acute blood loss anemia stable, no active s/s of bleeding  - Trending H/h   - Iron study: %saturation 21%, ferritin 130. No need for IV iron.      Endo:  # Steroid and stress induced hyperglycemia  - Maintain BG <180  - continue SSI to #4   - hgbA1c  was 4.1     # H/o of hypothyroid  -TSH (3/2025): 1.83  - Maintain BG <180      ID:  # Reactive leukocytosis (improving)  - MRSA negative  - completed periop vanc/zosyn x48hrs with hx of LVAD  -trend temps q4h     PHYSICAL AND OCCUPATIONAL THERAPY: PT/OT    LINES: R internal jugular swan (6/21/25-*)  Arterial line L brachial (6/21-*)    DVT: Heparin SQ  VAP BUNDLE: None  CENTRAL LINE BUNDLE: Ordered  ULCER PPX: PPI  GLYCEMIC CONTROL: SSI  BOWEL CARE: Senna, mirlax  INDWELLING CATHETER: None  NUTRITION: Adult diet Regular, Consistent Carb; CCD 90 gm/meal      EMERGENCY CONTACT: Extended Emergency Contact Information  Primary Emergency Contact: Janice Ramos  Address: [address] [place] [address]  Home Phone: [phone]  Mobile Phone: [phone]  Relation: Mother  Secondary Emergency Contact: Bird Lane  Address: [address] [place] [address] [address] [place] [address]  Home Phone: [phone]  Mobile Phone: [phone]  Relation: Son  Preferred language: English   needed? No  FAMILY UPDATE:  CODE STATUS: Full Code  DISPO:  HFICU    Patient seen and assessed with Dr. Concetta HOLLIDAY personally spent 60 minutes of critical care time directly and personally managing the patient exclusive of separately billable procedures   _________________________________________________  Jameel Barajas MD

## 2025-06-26 NOTE — PROGRESS NOTES
Social Work progress note  - HFICU Treatment Plan: attempting to wean off milirnone, diuresing   - Additional information: none   - Payor: [employer]   - Support: Janice Mendez and son Bird are listed as emergency contacts  -Planned Disposition:  Rehab recommendation - Low- intensity - Per PT patient will need a wheeled walker at discharge  - Social concerns for discharge: none at this time  Tracy Thorpe, BOB, LSW

## 2025-06-26 NOTE — PROGRESS NOTES
"Occupational Therapy    Occupational Therapy Treatment    Name: Anatoly Lane  MRN: [id number]  Department: [facility] HFICU  Room: 10/10-A  Date: 06/26/25  Time Calculation  Start Time: 1153  Stop Time: 1234  Time Calculation (min): 41 min    Assessment:  OT Assessment: Patient completed functional transfers/mobility with FWW and CGA, requires increased assist for toileting/LB dressing. Remains appropriate for low intensity post-acute OT.  Barriers to Discharge Home: No anticipated barriers  Plan:  Treatment Interventions: ADL retraining, Functional transfer training, UE strengthening/ROM, Endurance training, Cognitive reorientation, Patient/family training, Equipment evaluation/education, Neuromuscular reeducation, Compensatory technique education  OT Frequency: 3 times per week  OT Discharge Recommendations: Low intensity level of continued care  Equipment Recommended upon Discharge: Wheeled walker  OT Recommended Transfer Status: Assist of 1  OT - OK to Discharge: Yes    Subjective   OT Visit Info:  OT Received On: 06/26/25  General:  General  Family/Caregiver Present: Yes  Caregiver Feedback: patient's sister and her  present  Prior to Session Communication: Bedside nurse  Patient Position Received: Up in chair, Alarm off, not on at start of session  General Comment: patient pleasant and agreeable to OT; reports \"stinging\" (R) thigh pain with mobility and at end of session reports seeing \"little black bugs moving\" outside window; medical team aware  Precautions:  Medical Precautions: Cardiac precautions, Fall precautions  Post-Surgical Precautions: Move in the Tube    Vital Signs     Vitals Session Pre OT During OT Post OT   /41  121/53   HR 84  83   SpO2 92  92   MAP (mmHg) 60  71     Pain Assessment:  Pain Assessment  Pain Assessment: 0-10  0-10 (Numeric) Pain Score:  (reports \"stinging\" (R) thigh pain with mobility, did not rate)    Objective   Cognition:  Overall Cognitive Status: Within Functional " Limits  Arousal/Alertness: Appropriate responses to stimuli  Orientation Level: Oriented X4  Following Commands: Follows one step commands without difficulty  Activities of Daily Living: Grooming  Grooming Comments: independent in sitting to clean hands    LE Dressing  LE Dressing: Yes  LE Dressing Comments: max A for doffing shorts    Toileting  Toileting Comments: max A for toilet hygiene, patient with difficulty reaching 2/2 lines/tubes     Bed Mobility/Transfers: Bed Mobility  Bed Mobility: Yes  Bed Mobility 1  Bed Mobility 1: Sitting to supine  Level of Assistance 1: Minimum assistance, Minimal verbal cues    Transfers  Transfer: Yes  Transfer 1  Transfer From 1: Sit to, Stand to  Transfer to 1: Sit, Stand  Technique 1: Sit to stand, Stand to sit  Transfer Device 1: Walker  Transfer Level of Assistance 1: Contact guard  Trials/Comments 1: functional mobility short household distances with FWW and CGA  Transfers 2  Trials/Comments 2: toilet transfer in bathroom with FWW + cue to use grab bar and CGA     Therapy/Activity: Therapeutic Activity  Therapeutic Activity Performed: Yes  Therapeutic Activity 1: Provided patient with yellow theraputty and red therapy sponge for (B) hand strength/dexterity as patient reporting numbness in (L) 4th-5th digits and (R) 2nd-3rd digits and difficulty opening items/manipulating objects. Patient demo'd ability to use sponge and theraputty independently.    Outcome Measures:  [facility] Daily Activity  Putting on and taking off regular lower body clothing: A lot  Bathing (including washing, rinsing, drying): A little  Putting on and taking off regular upper body clothing: A little  Toileting, which includes using toilet, bedpan or urinal: A lot  Taking care of personal grooming such as brushing teeth: None  Eating Meals: None  Daily Activity - Total Score: 18    , Confusion Assessment Method-ICU (CAM-ICU)  Feature 1: Acute Onset or Fluctuating Course: Negative  Feature 3: Altered Level  of Consciousness: Negative  Overall CAM-ICU: Negative  , and E = Exercise and Early Mobility  ICU Mobility Scale: Walking with assistance of 1 person    Education Documentation  Body Mechanics, taught by June Vega OT at 6/26/2025  2:58 PM.  Learner: Patient  Readiness: Acceptance  Method: Explanation, Demonstration  Response: Verbalizes Understanding  Comment: OT POC    Home Exercise Program, taught by June Vega OT at 6/26/2025  2:58 PM.  Learner: Patient  Readiness: Acceptance  Method: Explanation, Demonstration  Response: Verbalizes Understanding  Comment: OT POC    Precautions, taught by June Vega OT at 6/26/2025  2:58 PM.  Learner: Patient  Readiness: Acceptance  Method: Explanation, Demonstration  Response: Verbalizes Understanding  Comment: OT POC    ADL Training, taught by June Vega OT at 6/26/2025  2:58 PM.  Learner: Patient  Readiness: Acceptance  Method: Explanation, Demonstration  Response: Verbalizes Understanding  Comment: OT POC    Education Comments  No comments found.    Goals:  Encounter Problems       Encounter Problems (Active)       ADLs       Patient will perform UB and LB bathing with modified independent level of assistance. (Not Progressing)       Start:  06/23/25    Expected End:  07/07/25            Patient with complete upper body dressing with modified independent level of assistance donning and doffing all UE clothes with PRN adaptive equipment (Not Progressing)       Start:  06/23/25    Expected End:  07/07/25            Patient with complete lower body dressing with modified independent level of assistance donning and doffing all LE clothes  with PRN adaptive equipment (Progressing)       Start:  06/23/25    Expected End:  07/07/25            Patient will complete toileting including hygiene clothing management/hygiene with modified independent level of assistance. (Progressing)       Start:  06/23/25    Expected End:  07/07/25                COGNITION/SAFETY       Pt will demonstrate accurate medication management with pillbox without cues.  (Not Progressing)       Start:  06/23/25    Expected End:  07/07/25            Patient will participate in cognitive activities to demonstrate WFL score on further cognitive assessments, including Medi-Cog, MoCA and remain A&O x3, CAM (-).  (Not Progressing)       Start:  06/23/25    Expected End:  07/07/25               MOBILITY       Patient will perform Functional mobility max Household distances with modified independent level of assistance and least restrictive device in order to improve safety and functional mobility. (Progressing)       Start:  06/23/25    Expected End:  07/07/25               June Vega OTR/L  Inpatient Occupational Therapist   Rehab Office: [phone]

## 2025-06-27 ENCOUNTER — APPOINTMENT (OUTPATIENT)
Dept: RADIOLOGY | Facility: HOSPITAL | Age: 65
DRG: 001 | End: 2025-06-27
Payer: MEDICARE

## 2025-06-27 ENCOUNTER — APPOINTMENT (OUTPATIENT)
Dept: CARDIOLOGY | Facility: HOSPITAL | Age: 65
End: 2025-06-27
Payer: MEDICARE

## 2025-06-27 LAB
ALBUMIN SERPL BCP-MCNC: 3.7 G/DL (ref 3.4–5)
ANION GAP BLDMV CALCULATED.4IONS-SCNC: 5 MMO/L (ref 10–25)
ANION GAP BLDMV CALCULATED.4IONS-SCNC: 6 MMO/L (ref 10–25)
ANION GAP BLDMV CALCULATED.4IONS-SCNC: 8 MMO/L (ref 10–25)
ANION GAP SERPL CALC-SCNC: 11 MMOL/L (ref 10–20)
BASE EXCESS BLDMV CALC-SCNC: 10.2 MMOL/L (ref -2–3)
BASE EXCESS BLDMV CALC-SCNC: 10.4 MMOL/L (ref -2–3)
BASE EXCESS BLDMV CALC-SCNC: 9.7 MMOL/L (ref -2–3)
BODY TEMPERATURE: 37 DEGREES CELSIUS
BUN SERPL-MCNC: 35 MG/DL (ref 6–23)
CA-I BLDMV-SCNC: 1.1 MMOL/L (ref 1.1–1.33)
CA-I BLDMV-SCNC: 1.11 MMOL/L (ref 1.1–1.33)
CA-I BLDMV-SCNC: 1.14 MMOL/L (ref 1.1–1.33)
CALCIUM SERPL-MCNC: 8.4 MG/DL (ref 8.6–10.6)
CHLORIDE BLD-SCNC: 98 MMOL/L (ref 98–107)
CHLORIDE BLD-SCNC: 99 MMOL/L (ref 98–107)
CHLORIDE BLD-SCNC: 99 MMOL/L (ref 98–107)
CHLORIDE SERPL-SCNC: 97 MMOL/L (ref 98–107)
CO2 SERPL-SCNC: 35 MMOL/L (ref 21–32)
CREAT SERPL-MCNC: 0.99 MG/DL (ref 0.5–1.3)
EGFRCR SERPLBLD CKD-EPI 2021: 85 ML/MIN/1.73M*2
EJECTION FRACTION: 63 %
ERYTHROCYTE [DISTWIDTH] IN BLOOD BY AUTOMATED COUNT: 16.6 % (ref 11.5–14.5)
FLOW ALLOCROSSMATCH: NORMAL
FLOW AUTOCROSSMATCH: NORMAL
GLUCOSE BLD MANUAL STRIP-MCNC: 139 MG/DL (ref 74–99)
GLUCOSE BLD MANUAL STRIP-MCNC: 166 MG/DL (ref 74–99)
GLUCOSE BLD MANUAL STRIP-MCNC: 188 MG/DL (ref 74–99)
GLUCOSE BLD MANUAL STRIP-MCNC: 79 MG/DL (ref 74–99)
GLUCOSE BLD-MCNC: 171 MG/DL (ref 74–99)
GLUCOSE BLD-MCNC: 182 MG/DL (ref 74–99)
GLUCOSE BLD-MCNC: 243 MG/DL (ref 74–99)
GLUCOSE SERPL-MCNC: 239 MG/DL (ref 74–99)
HCO3 BLDMV-SCNC: 34.8 MMOL/L (ref 22–26)
HCO3 BLDMV-SCNC: 35 MMOL/L (ref 22–26)
HCO3 BLDMV-SCNC: 35.4 MMOL/L (ref 22–26)
HCT VFR BLD AUTO: 22.7 % (ref 41–52)
HCT VFR BLD EST: 25 % (ref 41–52)
HCT VFR BLD EST: 26 % (ref 41–52)
HCT VFR BLD EST: 27 % (ref 41–52)
HGB BLD-MCNC: 7.7 G/DL (ref 13.5–17.5)
HGB BLDMV-MCNC: 8.2 G/DL (ref 13.5–17.5)
HGB BLDMV-MCNC: 8.7 G/DL (ref 13.5–17.5)
HGB BLDMV-MCNC: 8.9 G/DL (ref 13.5–17.5)
HLA RESULTS: NORMAL
HLA RESULTS: NORMAL
HLA-A+B+C AB NFR SER: NORMAL %
HLA-DP+DQ+DR AB NFR SER: NORMAL %
INHALED O2 CONCENTRATION: 21 %
INHALED O2 CONCENTRATION: 28 %
INHALED O2 CONCENTRATION: 28 %
LACTATE BLDMV-SCNC: 1.5 MMOL/L (ref 0.4–2)
LACTATE BLDMV-SCNC: 1.8 MMOL/L (ref 0.4–2)
LACTATE BLDMV-SCNC: 2 MMOL/L (ref 0.4–2)
LEFT VENTRICLE INTERNAL DIMENSION DIASTOLE: 5.45 CM (ref 3.5–6)
MAGNESIUM SERPL-MCNC: 2.16 MG/DL (ref 1.6–2.4)
MCH RBC QN AUTO: 30.7 PG (ref 26–34)
MCHC RBC AUTO-ENTMCNC: 33.9 G/DL (ref 32–36)
MCV RBC AUTO: 90 FL (ref 80–100)
NRBC BLD-RTO: 0.3 /100 WBCS (ref 0–0)
OXYHGB MFR BLDMV: 50.9 % (ref 45–75)
OXYHGB MFR BLDMV: 62.9 % (ref 45–75)
OXYHGB MFR BLDMV: 63.4 % (ref 45–75)
PCO2 BLDMV: 47 MM HG (ref 41–51)
PCO2 BLDMV: 50 MM HG (ref 41–51)
PCO2 BLDMV: 51 MM HG (ref 41–51)
PH BLDMV: 7.45 PH (ref 7.33–7.43)
PH BLDMV: 7.45 PH (ref 7.33–7.43)
PH BLDMV: 7.48 PH (ref 7.33–7.43)
PHOSPHATE SERPL-MCNC: 3.9 MG/DL (ref 2.5–4.9)
PLATELET # BLD AUTO: 173 X10*3/UL (ref 150–450)
PO2 BLDMV: 34 MM HG (ref 35–45)
PO2 BLDMV: 39 MM HG (ref 35–45)
PO2 BLDMV: 42 MM HG (ref 35–45)
POTASSIUM BLDMV-SCNC: 4.2 MMOL/L (ref 3.5–5.3)
POTASSIUM BLDMV-SCNC: 4.3 MMOL/L (ref 3.5–5.3)
POTASSIUM BLDMV-SCNC: 4.3 MMOL/L (ref 3.5–5.3)
POTASSIUM SERPL-SCNC: 3.9 MMOL/L (ref 3.5–5.3)
RBC # BLD AUTO: 2.51 X10*6/UL (ref 4.5–5.9)
RIGHT VENTRICLE FREE WALL PEAK S': 10 CM/S
RIGHT VENTRICLE PEAK SYSTOLIC PRESSURE: 25 MMHG
SAO2 % BLDMV: 52 % (ref 45–75)
SAO2 % BLDMV: 64 % (ref 45–75)
SAO2 % BLDMV: 65 % (ref 45–75)
SODIUM BLDMV-SCNC: 135 MMOL/L (ref 136–145)
SODIUM BLDMV-SCNC: 136 MMOL/L (ref 136–145)
SODIUM BLDMV-SCNC: 137 MMOL/L (ref 136–145)
SODIUM SERPL-SCNC: 139 MMOL/L (ref 136–145)
TACROLIMUS BLD-MCNC: 4.7 NG/ML
TRICUSPID ANNULAR PLANE SYSTOLIC EXCURSION: 1.6 CM
WBC # BLD AUTO: 23.1 X10*3/UL (ref 4.4–11.3)

## 2025-06-27 PROCEDURE — 2500000002 HC RX 250 W HCPCS SELF ADMINISTERED DRUGS (ALT 637 FOR MEDICARE OP, ALT 636 FOR OP/ED): Performed by: NURSE PRACTITIONER

## 2025-06-27 PROCEDURE — 2500000001 HC RX 250 WO HCPCS SELF ADMINISTERED DRUGS (ALT 637 FOR MEDICARE OP)

## 2025-06-27 PROCEDURE — 97116 GAIT TRAINING THERAPY: CPT | Mod: GP

## 2025-06-27 PROCEDURE — 2500000004 HC RX 250 GENERAL PHARMACY W/ HCPCS (ALT 636 FOR OP/ED): Performed by: REGISTERED NURSE

## 2025-06-27 PROCEDURE — 2500000005 HC RX 250 GENERAL PHARMACY W/O HCPCS

## 2025-06-27 PROCEDURE — 2500000001 HC RX 250 WO HCPCS SELF ADMINISTERED DRUGS (ALT 637 FOR MEDICARE OP): Performed by: NURSE PRACTITIONER

## 2025-06-27 PROCEDURE — 93325 DOPPLER ECHO COLOR FLOW MAPG: CPT

## 2025-06-27 PROCEDURE — 93321 DOPPLER ECHO F-UP/LMTD STD: CPT | Performed by: INTERNAL MEDICINE

## 2025-06-27 PROCEDURE — 97530 THERAPEUTIC ACTIVITIES: CPT | Mod: GP

## 2025-06-27 PROCEDURE — 2020000001 HC ICU ROOM DAILY

## 2025-06-27 PROCEDURE — 80197 ASSAY OF TACROLIMUS: CPT | Performed by: NURSE PRACTITIONER

## 2025-06-27 PROCEDURE — 93308 TTE F-UP OR LMTD: CPT | Performed by: INTERNAL MEDICINE

## 2025-06-27 PROCEDURE — 37799 UNLISTED PX VASCULAR SURGERY: CPT | Performed by: NURSE PRACTITIONER

## 2025-06-27 PROCEDURE — 97110 THERAPEUTIC EXERCISES: CPT | Mod: GP

## 2025-06-27 PROCEDURE — 97110 THERAPEUTIC EXERCISES: CPT | Mod: GO

## 2025-06-27 PROCEDURE — 2500000002 HC RX 250 W HCPCS SELF ADMINISTERED DRUGS (ALT 637 FOR MEDICARE OP, ALT 636 FOR OP/ED)

## 2025-06-27 PROCEDURE — 2500000004 HC RX 250 GENERAL PHARMACY W/ HCPCS (ALT 636 FOR OP/ED)

## 2025-06-27 PROCEDURE — 71045 X-RAY EXAM CHEST 1 VIEW: CPT | Performed by: RADIOLOGY

## 2025-06-27 PROCEDURE — 93325 DOPPLER ECHO COLOR FLOW MAPG: CPT | Performed by: INTERNAL MEDICINE

## 2025-06-27 PROCEDURE — 82947 ASSAY GLUCOSE BLOOD QUANT: CPT

## 2025-06-27 PROCEDURE — 71045 X-RAY EXAM CHEST 1 VIEW: CPT

## 2025-06-27 PROCEDURE — 85027 COMPLETE CBC AUTOMATED: CPT

## 2025-06-27 PROCEDURE — 83735 ASSAY OF MAGNESIUM: CPT | Performed by: NURSE PRACTITIONER

## 2025-06-27 PROCEDURE — 84132 ASSAY OF SERUM POTASSIUM: CPT | Performed by: NURSE PRACTITIONER

## 2025-06-27 PROCEDURE — 99291 CRITICAL CARE FIRST HOUR: CPT | Performed by: INTERNAL MEDICINE

## 2025-06-27 PROCEDURE — 2500000004 HC RX 250 GENERAL PHARMACY W/ HCPCS (ALT 636 FOR OP/ED): Performed by: NURSE PRACTITIONER

## 2025-06-27 RX ORDER — ALUMINUM HYDROXIDE, MAGNESIUM HYDROXIDE, AND SIMETHICONE 1200; 120; 1200 MG/30ML; MG/30ML; MG/30ML
10 SUSPENSION ORAL ONCE
Status: COMPLETED | OUTPATIENT
Start: 2025-06-27 | End: 2025-06-27

## 2025-06-27 RX ORDER — FUROSEMIDE 10 MG/ML
80 INJECTION INTRAMUSCULAR; INTRAVENOUS ONCE
Status: COMPLETED | OUTPATIENT
Start: 2025-06-27 | End: 2025-06-27

## 2025-06-27 RX ADMIN — GABAPENTIN 300 MG: 300 CAPSULE ORAL at 20:50

## 2025-06-27 RX ADMIN — LEVOTHYROXINE SODIUM 125 MCG: 0.05 TABLET ORAL at 06:23

## 2025-06-27 RX ADMIN — PANTOPRAZOLE SODIUM 40 MG: 40 TABLET, DELAYED RELEASE ORAL at 06:23

## 2025-06-27 RX ADMIN — VORICONAZOLE 200 MG: 200 TABLET, COATED ORAL at 08:57

## 2025-06-27 RX ADMIN — FUROSEMIDE 80 MG: 10 INJECTION, SOLUTION INTRAMUSCULAR; INTRAVENOUS at 10:19

## 2025-06-27 RX ADMIN — VANCOMYCIN HYDROCHLORIDE 125 MG: 125 CAPSULE ORAL at 18:25

## 2025-06-27 RX ADMIN — TAMSULOSIN HYDROCHLORIDE 0.4 MG: 0.4 CAPSULE ORAL at 08:58

## 2025-06-27 RX ADMIN — MYCOPHENOLATE MOFETIL 1000 MG: 250 CAPSULE ORAL at 06:23

## 2025-06-27 RX ADMIN — HEPARIN SODIUM 5000 UNITS: 5000 INJECTION INTRAVENOUS; SUBCUTANEOUS at 08:58

## 2025-06-27 RX ADMIN — INSULIN LISPRO 4 UNITS: 100 INJECTION, SOLUTION INTRAVENOUS; SUBCUTANEOUS at 08:59

## 2025-06-27 RX ADMIN — ACYCLOVIR 400 MG: 400 TABLET ORAL at 08:57

## 2025-06-27 RX ADMIN — ACYCLOVIR 400 MG: 400 TABLET ORAL at 20:50

## 2025-06-27 RX ADMIN — Medication 2 L/MIN: at 07:03

## 2025-06-27 RX ADMIN — VANCOMYCIN HYDROCHLORIDE 125 MG: 125 CAPSULE ORAL at 08:57

## 2025-06-27 RX ADMIN — ACETAMINOPHEN 650 MG: 325 TABLET ORAL at 18:25

## 2025-06-27 RX ADMIN — MYCOPHENOLATE MOFETIL 1000 MG: 250 CAPSULE ORAL at 18:25

## 2025-06-27 RX ADMIN — SULFAMETHOXAZOLE AND TRIMETHOPRIM 1 TABLET: 400; 80 TABLET ORAL at 08:57

## 2025-06-27 RX ADMIN — PRAVASTATIN SODIUM 40 MG: 40 TABLET ORAL at 22:29

## 2025-06-27 RX ADMIN — INSULIN LISPRO 4 UNITS: 100 INJECTION, SOLUTION INTRAVENOUS; SUBCUTANEOUS at 12:15

## 2025-06-27 RX ADMIN — HEPARIN SODIUM 5000 UNITS: 5000 INJECTION INTRAVENOUS; SUBCUTANEOUS at 18:26

## 2025-06-27 RX ADMIN — ACETAMINOPHEN 650 MG: 325 TABLET ORAL at 12:15

## 2025-06-27 RX ADMIN — ALUMINUM HYDROXIDE, MAGNESIUM HYDROXIDE, AND DIMETHICONE 10 ML: 200; 20; 200 SUSPENSION ORAL at 14:53

## 2025-06-27 RX ADMIN — VORICONAZOLE 200 MG: 200 TABLET, COATED ORAL at 20:50

## 2025-06-27 RX ADMIN — ACETAMINOPHEN 650 MG: 325 TABLET ORAL at 06:23

## 2025-06-27 RX ADMIN — PREDNISONE 35 MG: 20 TABLET ORAL at 08:57

## 2025-06-27 RX ADMIN — VANCOMYCIN HYDROCHLORIDE 125 MG: 125 CAPSULE ORAL at 12:15

## 2025-06-27 RX ADMIN — TACROLIMUS 2 MG: 1 CAPSULE ORAL at 06:22

## 2025-06-27 RX ADMIN — TACROLIMUS 2 MG: 1 CAPSULE ORAL at 18:25

## 2025-06-27 RX ADMIN — CALCIUM 1 TABLET: 500 TABLET ORAL at 08:58

## 2025-06-27 RX ADMIN — PREDNISONE 35 MG: 20 TABLET ORAL at 20:49

## 2025-06-27 RX ADMIN — CHOLECALCIFEROL TAB 25 MCG (1000 UNIT) 50 MCG: 25 TAB at 08:56

## 2025-06-27 RX ADMIN — CALCIUM 1 TABLET: 500 TABLET ORAL at 20:50

## 2025-06-27 RX ADMIN — VANCOMYCIN HYDROCHLORIDE 125 MG: 125 CAPSULE ORAL at 20:49

## 2025-06-27 ASSESSMENT — COGNITIVE AND FUNCTIONAL STATUS - GENERAL
MOBILITY SCORE: 18
TOILETING: A LOT
CLIMB 3 TO 5 STEPS WITH RAILING: A LITTLE
MOVING TO AND FROM BED TO CHAIR: A LITTLE
STANDING UP FROM CHAIR USING ARMS: A LITTLE
DAILY ACTIVITIY SCORE: 18
DRESSING REGULAR UPPER BODY CLOTHING: A LITTLE
HELP NEEDED FOR BATHING: A LITTLE
WALKING IN HOSPITAL ROOM: A LITTLE
DRESSING REGULAR LOWER BODY CLOTHING: A LOT
DAILY ACTIVITIY SCORE: 24
MOVING FROM LYING ON BACK TO SITTING ON SIDE OF FLAT BED WITH BEDRAILS: A LITTLE
TURNING FROM BACK TO SIDE WHILE IN FLAT BAD: A LITTLE
MOBILITY SCORE: 24

## 2025-06-27 ASSESSMENT — PAIN - FUNCTIONAL ASSESSMENT
PAIN_FUNCTIONAL_ASSESSMENT: 0-10

## 2025-06-27 ASSESSMENT — PAIN SCALES - GENERAL
PAINLEVEL_OUTOF10: 0 - NO PAIN
PAINLEVEL_OUTOF10: 0 - NO PAIN
PAINLEVEL_OUTOF10: 10 - WORST POSSIBLE PAIN
PAINLEVEL_OUTOF10: 0 - NO PAIN
PAINLEVEL_OUTOF10: 0 - NO PAIN

## 2025-06-27 NOTE — PROGRESS NOTES
Occupational Therapy    Occupational Therapy Treatment    Name: Anatoly Lane  MRN: [id number]  Department: [facility] HFICU  Room: 10/10-A  Date: 06/27/25  Time Calculation  Start Time: 1209  Stop Time: 1224  Time Calculation (min): 15 min    Assessment:  OT Assessment: Patient participated in UE exercises/activities to address sensory and strength deficits. Remains appropriate for low intensity post-acute OT.  Barriers to Discharge Home: No anticipated barriers  End of Session Communication: Bedside nurse, Physician  End of Session Patient Position: Bed, 3 rail up, Alarm off, not on at start of session  Plan:  Treatment Interventions: ADL retraining, Functional transfer training, UE strengthening/ROM, Endurance training, Cognitive reorientation, Patient/family training, Equipment evaluation/education, Neuromuscular reeducation, Compensatory technique education  OT Frequency: 3 times per week  OT Discharge Recommendations: Low intensity level of continued care  Equipment Recommended upon Discharge: Wheeled walker  OT Recommended Transfer Status: Assist of 1  OT - OK to Discharge: Yes    Subjective   OT Visit Info:  OT Received On: 06/27/25  General:  General  Family/Caregiver Present: No  Prior to Session Communication: Bedside nurse, Physician ( MD expressing concern for patient having possible brachial plexus injury affecting (L) hand)  Patient Position Received: Bed, 3 rail up, Alarm off, not on at start of session  General Comment: patient pleasant and agreeable to OT  Precautions:  Medical Precautions: Cardiac precautions, Fall precautions  Post-Surgical Precautions: Move in the Tube    Pain Assessment:  Pain Assessment  Pain Assessment: 0-10  0-10 (Numeric) Pain Score: 0 - No pain    Objective   Cognition:  Overall Cognitive Status: Within Functional Limits  Arousal/Alertness: Appropriate responses to stimuli  Orientation Level: Oriented X4  Following Commands: Follows one step commands without difficulty      Therapy/Activity: Therapeutic Exercise  Therapeutic Exercise Performed: Yes  Therapeutic Exercise Activity 1: Patient demonstrates active flexion/extension of (L) wrist, unable to actively extend 3rd-5th digits at MCP joints however can passively extend, continues to have numbness of 4th-5th digits. Reviewed hand exercises with red therapy sponge and patient able to actively squeeze and partially release.  Therapeutic Exercise Activity 2: Completed multiple reps of active digit flexion with AA/PROM extension at MCP joints; demonstrated stretches for digit and wrist extension, patient able to return demo; demonstrated WBing through (L) hand with (L) hand flat on bed/table; encouraged patient to move (L) hand across various textures for sensory input, patient demo'd moving hand over sheets and heart pillow; patient able to use thigh vs pillow to assist with extending MCP joints    Outcome Measures:  [facility] Daily Activity  Putting on and taking off regular lower body clothing: A lot  Bathing (including washing, rinsing, drying): A little  Putting on and taking off regular upper body clothing: A little  Toileting, which includes using toilet, bedpan or urinal: A lot  Taking care of personal grooming such as brushing teeth: None  Eating Meals: None  Daily Activity - Total Score: 18    , Confusion Assessment Method-ICU (CAM-ICU)  Feature 1: Acute Onset or Fluctuating Course: Negative  Feature 3: Altered Level of Consciousness: Negative  Overall CAM-ICU: Negative  , and E = Exercise and Early Mobility  ICU Mobility Scale: Sitting in bed, exercises in bed    Education Documentation  Home Exercise Program, taught by June Vega OT at 6/27/2025  1:31 PM.  Learner: Patient  Readiness: Acceptance  Method: Explanation, Demonstration  Response: Verbalizes Understanding, Needs Reinforcement  Comment: HEP    Body Mechanics, taught by June Vega OT at 6/26/2025  2:58 PM.  Learner: Patient  Readiness:  Acceptance  Method: Explanation, Demonstration  Response: Verbalizes Understanding  Comment: OT POC    Home Exercise Program, taught by June Vega OT at 6/26/2025  2:58 PM.  Learner: Patient  Readiness: Acceptance  Method: Explanation, Demonstration  Response: Verbalizes Understanding  Comment: OT POC    Precautions, taught by June Vega OT at 6/26/2025  2:58 PM.  Learner: Patient  Readiness: Acceptance  Method: Explanation, Demonstration  Response: Verbalizes Understanding  Comment: OT POC    ADL Training, taught by June Vega OT at 6/26/2025  2:58 PM.  Learner: Patient  Readiness: Acceptance  Method: Explanation, Demonstration  Response: Verbalizes Understanding  Comment: OT POC    Education Comments  No comments found.    Goals:  Encounter Problems       Encounter Problems (Active)       ADLs       Patient will perform UB and LB bathing with modified independent level of assistance. (Not Progressing)       Start:  06/23/25    Expected End:  07/07/25            Patient with complete upper body dressing with modified independent level of assistance donning and doffing all UE clothes with PRN adaptive equipment (Not Progressing)       Start:  06/23/25    Expected End:  07/07/25            Patient with complete lower body dressing with modified independent level of assistance donning and doffing all LE clothes  with PRN adaptive equipment (Not Progressing)       Start:  06/23/25    Expected End:  07/07/25            Patient will complete toileting including hygiene clothing management/hygiene with modified independent level of assistance. (Not Progressing)       Start:  06/23/25    Expected End:  07/07/25               COGNITION/SAFETY       Pt will demonstrate accurate medication management with pillbox without cues.  (Not Progressing)       Start:  06/23/25    Expected End:  07/07/25            Patient will participate in cognitive activities to demonstrate WFL score on further cognitive  assessments, including Medi-Cog, MoCA and remain A&O x3, CAM (-).  (Not Progressing)       Start:  06/23/25    Expected End:  07/07/25               EXERCISE/STRENGTHENING       Patient will be educated on BUE HEP for increased ADL performance. (Progressing)       Start:  06/27/25    Expected End:  07/07/25               MOBILITY       Patient will perform Functional mobility max Household distances with modified independent level of assistance and least restrictive device in order to improve safety and functional mobility. (Not Progressing)       Start:  06/23/25    Expected End:  07/07/25               June Vega OTR/L  Inpatient Occupational Therapist   Rehab Office: [phone]

## 2025-06-27 NOTE — PROGRESS NOTES
Physical Therapy    Physical Therapy Treatment    Patient Name: Anatoly Lane  MRN: [id number]  Department: [facility] HFICU  Room: 10/10-A  Today's Date: 6/26/2025  Time Calculation  Start Time: 1013  Stop Time: 1051  Time Calculation (min): 38 min         Assessment/Plan   PT Assessment  Evaluation/Treatment Tolerance: Patient limited by pain  Medical Staff Made Aware: Yes  End of Session Communication: Bedside nurse  End of Session Patient Position: Up in chair, Alarm on  PT Plan  Inpatient/Swing Bed or Outpatient: Inpatient  PT Plan  Treatment/Interventions: Bed mobility, Transfer training, Gait training, Stair training, Balance training, Strengthening, Endurance training, Therapeutic exercise, Therapeutic activity  PT Plan: Ongoing PT  PT Frequency: Daily  PT Discharge Recommendations: Low intensity level of continued care  Equipment Recommended upon Discharge: Wheeled walker  PT Recommended Transfer Status: Assist x1  PT - OK to Discharge: Yes    PT Visit Info:  PT Received On: 06/26/25  Response to Previous Treatment: Patient with no complaints from previous session.     General Visit Information:   General  Family/Caregiver Present: No  Prior to Session Communication: Bedside nurse  Patient Position Received: Up in chair, Alarm off, not on at start of session  General Comment: Pt sitting in chair upon PT arrival. Discussed and examined R anterior thigh pain. Appears to be a sensory nerve pain, which 'comes and goes'. When it 'comes' he reports it as a sharp burning, or 'shock'. Alerted HF team and RN. Team will discuss with patient.  Pain increased with ambulation.    Subjective   Precautions:  Precautions  Medical Precautions: Cardiac precautions, Fall precautions, Oxygen therapy device and L/min      Lines/Tubes:   Telemetry   Arterial Line   [place] Catheter   2L O2 during ambulation, RA at rest   0.125 mcg milrinone   Back up Pacer - AAI @ 60      Vitals  PRE: /49, MAP 68, HR 73, spO2 91% on room  air  DURING: lowest MAP at 59, pt denies dizziness/lightheadedness, spO2 on 2L at 95% and greater        Objective   Pain:  Pain Assessment  Pain Assessment: 0-10  0-10 (Numeric) Pain Score:  (R anterior thigh. did not give rating but did limit mobility this session. 'burning and stinging')    Cognition:  Cognition  Overall Cognitive Status: Within Functional Limits  Arousal/Alertness: Appropriate responses to stimuli  Orientation Level: Oriented X4       Postural Control:  Static Sitting Balance  Static Sitting-Balance Support: Feet supported, No upper extremity supported  Static Sitting-Level of Assistance: Distant supervision  Dynamic Sitting Balance  Dynamic Sitting-Balance Support: No upper extremity supported, Feet supported  Dynamic Sitting-Level of Assistance: Distant supervision  Static Standing Balance  Static Standing-Balance Support: Bilateral upper extremity supported  Static Standing-Level of Assistance: Minimum assistance  Dynamic Standing Balance  Dynamic Standing-Balance Support: Bilateral upper extremity supported  Dynamic Standing-Level of Assistance: Minimum assistance    Activity Tolerance:  Activity Tolerance  Endurance: Tolerates 30 min exercise with multiple rests  Early Mobility/Exercise Safety Screen: Proceed with mobilization - No exclusion criteria met  Rate of Perceived Dyspnea (RPD): 4/10  Rate of Perceived Exertion (RPE): 12/20    Treatments:  Therapeutic Exercise  Therapeutic Exercise Performed: Yes  Therapeutic Exercise Activity 1: 1x10 reps of incentive spirometer. assisted with airway clearance following ambulation with darby cough.    Therapeutic Activity  Therapeutic Activity Performed: Yes  Therapeutic Activity 1: PT assisted with donning shorts and shoes prior to mobility.         Ambulation/Gait Training  Ambulation/Gait Training Performed: Yes  Ambulation/Gait Training 1  Surface 1: Level tile  Device 1:  (thoracic walker)  Assistance 1: Contact guard  Quality of Gait 1: Wide  base of support, Decreased step length, Forward flexed posture  Comments/Distance (ft) 1: 160 ft (slowed los, 2-3x stopping 2/2 R anterior thigh pain.)  Transfers  Transfer: Yes  Transfer 1  Transfer From 1: Sit to, Stand to  Transfer to 1: Stand, Sit  Technique 1: Sit to stand, Stand to sit  Transfer Device 1: Thoracic walker  Transfer Level of Assistance 1: Minimum assistance    Outcome Measures:  [facility] Basic Mobility  Turning from your back to your side while in a flat bed without using bedrails: None  Moving from lying on your back to sitting on the side of a flat bed without using bedrails: A little  Moving to and from bed to chair (including a wheelchair): A little  Standing up from a chair using your arms (e.g. wheelchair or bedside chair): A little  To walk in hospital room: A little  Climbing 3-5 steps with railing: A lot  Basic Mobility - Total Score: 18    FSS-ICU  Ambulation: Walks >/ or equal to 150 feet with minimal assistance x1  Rolling: Supervision or set-up only  Sitting: Supervision or set-up only  Transfer Sit-to-Stand: Minimal assistance (performs 75% or more of task)  Transfer Supine-to-Sit: Minimal assistance (performs 75% or more of task)  Total Score: 22      Early Mobility/Exercise Safety Screen: Proceed with mobilization - No exclusion criteria met  ICU Mobility Scale: Walking with assistance of 1 person [8]  E = Exercise and Early Mobility  Early Mobility/Exercise Safety Screen: Proceed with mobilization - No exclusion criteria met  ICU Mobility Scale: Walking with assistance of 1 person    Education Documentation  Mobility Training, taught by Purvi Hernandez PT at 6/26/2025  9:41 PM.  Learner: Patient  Readiness: Acceptance  Method: Explanation  Response: Verbalizes Understanding  Comment: MITT    Education Comments  No comments found.        OP EDUCATION:       Encounter Problems       Encounter Problems (Active)       Balance       Pt will demonstrated ability to score at least  24/28 on the Tinetti balance assessment tool to ensure safety upon D/C.  (Progressing)       Start:  06/23/25    Expected End:  07/07/25               Mobility       Pt will demonstrated ability to ambulate >/=300ft with proper form and no balance deficits for safe home going.   (Progressing)       Start:  06/23/25    Expected End:  07/07/25            Pt will demonstrate ability to ascend/descend at least 5 stairs with unilateral rail and no balance deficits for safe home going.  (Progressing)       Start:  06/23/25    Expected End:  07/07/25               PT Transfers       Pt will demonstrate ability to complete 5X STS in < 12 sec with good from and consistency between transfers for safe D/C.  (Progressing)       Start:  06/23/25    Expected End:  07/07/25               PT Transfers       Patient to transfer to and from sit to supine indep (Progressing)       Start:  06/25/25    Expected End:  07/07/25            Patient will transfer sit to and from stand with LRAD and modified indep (Progressing)       Start:  06/25/25    Expected End:  07/07/25

## 2025-06-27 NOTE — PROGRESS NOTES
Spiritual Care Visit  Spiritual Care Request    Reason for Visit:  Routine Visit: Introduction (rounding)     Focus of Care:  Visited With: Patient       Outcome:  Outcome of Spiritual Care Visit: Affirmation, [place], Support system identified, Empowerment     Spiritual Care Annotation    Annotation:   checked on patient while rounding on unit. Patient was receptive to spiritual care visit.      explored patient's thoughts, feelings, and concerns. Patient discussed his health journey of about 15 years from receiving a pacemaker, getting an LVAD, and now, having a completed heart transplant. Patient knows he has a long recovery but is excited about his life and the possibilities that accompany his heart transplant. Patient has support from his family and has a strong [employer] luis that has helped him throughout the years. Patient is also appreciative of the medical staff who are caring for him.      actively listened, provided a calm, supportive presence, affirmed patient's thoughts and feelings, and prayed with patient at his request. Patient seemed encouraged and expressed gratitude for support. .    Signed: Tam Daley, Clinical Care

## 2025-06-27 NOTE — PROGRESS NOTES
F/up from LVAD explant, AICD removal, and heart transplant on 6/21 with surgeons Dr. Schrader and Dr. Pham. SW reviewed, participated and is in agreement with multi-disciplinary plan of care. SW met with Pt at bedside in HFICU.      Functional/Medical Status: Transferred from CTICU to HF ICU 6/24; alert and oriented on this date; ambulated 160 ft CGA with thoracic walker with multiple breaks for thigh pain; diagnosed with c-diff, on PO vanco.   Dialysis start date: N/A  Adaption to the Stress of Transplant: Pt has appreciated visits from  and appears to be doing well; Pt eager to return home to see his dog Christy who is being watched by his adult son.   Mental Health/Substance use: Previous diagnoses of anxiety and depression, did not have interest in participating in any counseling or therapy previously; has taken Zoloft on and off over last couple years, unsure if he was actively taking it prior to transplant.   Post Discharge Supports/Recovery Plan: Plan to return home with [facility]. Supports include his mother, step-father, and adult son. Pt stated he uses senior transportation with a few days notice for medical appts. Pt advised his mom and step-dad don't like to drive to [place] and his son works.   Benefits: Insured via [employer]  Impressions: Pt progressing adequately for this stage and remains in good spirits.   Plan: RHC and biopsy scheduled for 7/1 with Dr. Velasco. SW will continue to follow during index stay and on outpatient basis following discharge.     ADINA Mendosa  Transplant

## 2025-06-27 NOTE — PROGRESS NOTES
Physical Therapy    Physical Therapy Treatment    Patient Name: Anatoly Lane  MRN: [id number]  Department: [facility] HFICU  Room: 10/10-A  Today's Date: 6/27/2025  Time Calculation  Start Time: 1635  Stop Time: 1720  Time Calculation (min): 45 min    Assessment/Plan   PT Assessment  Barriers to Discharge Home: No anticipated barriers  End of Session Communication: Bedside nurse  Assessment Comment: Patient overall limited by R groin pain, but also demonstrating decreased strength and mobility. He would benefit from low intensity therapy to prevent further decline and improve functional improvement.  End of Session Patient Position: Up in chair, Alarm off, not on at start of session  PT Plan  Inpatient/Swing Bed or Outpatient: Inpatient  PT Plan  Treatment/Interventions: Bed mobility, Transfer training, Gait training, Stair training, Balance training, Strengthening, Endurance training, Therapeutic exercise, Therapeutic activity  PT Plan: Ongoing PT  PT Frequency: Daily  PT Discharge Recommendations: Low intensity level of continued care  Equipment Recommended upon Discharge: Wheeled walker  PT Recommended Transfer Status: Assist x1, Assistive device  PT - OK to Discharge: Yes    PT Visit Info:  PT Received On: 06/27/25  Response to Previous Treatment: Patient with no complaints from previous session.     General Visit Information:   General  Family/Caregiver Present: No  Prior to Session Communication: Bedside nurse  Patient Position Received: Bed, 3 rail up, Alarm off, not on at start of session  General Comment: Pt pleasant and cooperative. Pt reporting that recliner is uncomfortable and reluctant to sit upright post session despite education. After finding different chair, pt agreeable. Pt still having limitations related to R groin pain- RN aware.    Subjective     Precautions:  Precautions  Hearing/Visual Limitations: WFL  Medical Precautions: Cardiac precautions, Fall precautions  Post-Surgical Precautions: Move in  the Tube    Lines/Tubes:   Telemetry   Duenweg-matthew catheter   Back-up pacer set at 60 AAI     Objective     Pain:  Pain Assessment  Pain Assessment: 0-10  0-10 (Numeric) Pain Score: 10 - Worst possible pain  Pain Type:  (beginning of session- R groin/thigh; reporting it is 6/10 with ambulating)    Cognition:  Cognition  Overall Cognitive Status: Within Functional Limits  Arousal/Alertness: Appropriate responses to stimuli  Orientation Level: Oriented X4, Other (Comment) (except exact date)  Following Commands: Follows one step commands without difficulty    Activity Tolerance:  Activity Tolerance  Early Mobility/Exercise Safety Screen: Proceed with mobilization - No exclusion criteria met    Treatments:  Therapeutic Exercise  Therapeutic Exercise Performed: Yes  Therapeutic Exercise Activity 1: Pt performed seated EOB warm-up 3 x 10 reps: B LE LAQs, L hip FLEX.    Bed Mobility  Bed Mobility: Yes  Bed Mobility 1  Bed Mobility 1: Supine to sitting  Level of Assistance 1: Moderate assistance (x1 person; cues for seqeuncing)  Bed Mobility Comments 1: HOB elevated    Ambulation/Gait Training  Ambulation/Gait Training Performed: Yes  Ambulation/Gait Training 1  Surface 1: Level tile  Device 1: Rolling walker  Assistance 1: Contact guard (cues for sequencing/upright posture)  Quality of Gait 1: Forward flexed posture (slow los)  Comments/Distance (ft) 1: 450 ft, one standing rest break 2/2 SOB  Transfers  Transfer: Yes  Transfer 1  Technique 1: Sit to stand, Stand to sit  Transfer Device 1: Walker  Transfer Level of Assistance 1: Close supervision (cues for hand placement)  Trials/Comments 1: increased effort to rise with increased anterior lean    Outcome Measures:  [facility] Basic Mobility  Turning from your back to your side while in a flat bed without using bedrails: A little  Moving from lying on your back to sitting on the side of a flat bed without using bedrails: A little  Moving to and from bed to chair (including a  wheelchair): A little  Standing up from a chair using your arms (e.g. wheelchair or bedside chair): A little  To walk in hospital room: A little  Climbing 3-5 steps with railing: A little  Basic Mobility - Total Score: 18    Confusion Assessment Method-ICU (CAM-ICU)  Feature 1: Acute Onset or Fluctuating Course: Negative  Overall CAM-ICU: Negative    FSS-ICU  Ambulation: Walks >/ or equal to 150 feet with minimal assistance x1  Rolling: Supervision or set-up only  Sitting: Supervision or set-up only  Transfer Sit-to-Stand: Minimal assistance (performs 75% or more of task)  Transfer Supine-to-Sit: Moderate assistance (performs 50 - 74% of task)  Total Score: 21    Early Mobility/Exercise Safety Screen: Proceed with mobilization - No exclusion criteria met  ICU Mobility Scale: Walking with assistance of 1 person [8]  E = Exercise and Early Mobility  Early Mobility/Exercise Safety Screen: Proceed with mobilization - No exclusion criteria met  ICU Mobility Scale: Walking with assistance of 1 person    Education Documentation  Precautions, taught by Rosa Isela Fatima PT at 6/27/2025  5:49 PM.  Learner: Patient  Readiness: Acceptance  Method: Explanation  Response: Needs Reinforcement, Verbalizes Understanding  Comment: MITT precautions, splinted coughing    Mobility Training, taught by Rosa Isela Fatima PT at 6/27/2025  5:49 PM.  Learner: Patient  Readiness: Acceptance  Method: Explanation  Response: Needs Reinforcement, Verbalizes Understanding  Comment: mobility precautions    Education Comments  No comments found.      Encounter Problems       Encounter Problems (Active)       Balance       Pt will demonstrated ability to score at least 24/28 on the Tinetti balance assessment tool to ensure safety upon D/C.  (Progressing)       Start:  06/23/25    Expected End:  07/07/25               Mobility       Pt will demonstrated ability to ambulate >/=300ft with proper form and no balance deficits for safe home going.   (Not  met)       Start:  06/23/25    Expected End:  07/07/25    Resolved:  06/27/25    Updated to: Pt will demonstrated ability to ambulate >/=750 ft with LRAD and supervision with proper form and no balance deficits for safe home going.    Update reason: progressing         Pt will demonstrate ability to ascend/descend at least 5 stairs with unilateral rail and no balance deficits for safe home going.  (Progressing)       Start:  06/23/25    Expected End:  07/07/25            Pt will demonstrated ability to ambulate >/=750 ft with LRAD and supervision with proper form and no balance deficits for safe home going. (Progressing)       Start:  06/27/25    Expected End:  07/07/25                   PT Transfers       Pt will demonstrate ability to complete 5X STS in < 12 sec with good from and consistency between transfers for safe D/C.  (Progressing)       Start:  06/23/25    Expected End:  07/07/25               PT Transfers       Patient to transfer to and from sit to supine indep (Progressing)       Start:  06/25/25    Expected End:  07/07/25            Patient will transfer sit to and from stand with LRAD and modified indep (Progressing)       Start:  06/25/25    Expected End:  07/07/25                 Signed by Rosa Isela Fatima DPT

## 2025-06-27 NOTE — PROGRESS NOTES
[facility]  HFICU PROGRESS NOTE    Anatoly Lane/[id number]    Admit Date: 6/20/2025  Hospital Length of Stay: 7   ICU Length of Stay: 2d 15h   Primary Service: HFICU  Primary HF Cardiologist:   Referring:     INTERVAL EVENTS / PERTINENT ROS:   Yesterday, dBP was low in the 40-50s, driving his MAP to <60. Thus, he was switched from milrinone to epinephrine. He was given IV lasix 80 mg x 2 and is -2.5L this morning. He complains of L arm swelling, weakness along the medial hand and 4+5th digits and inability to straighten the fingers of his L hand out.    Plan:  - Increase tacro to 2 mg BID  - Reduce epinephrine to 0.01  - Continue diuresis with IVP lasix.  - remove L brachial arterial line today.    MEDICATIONS  Infusions:  EPINEPHrine, Last Rate: 0.01 mcg/kg/min (06/27/25 0957)  lactated Ringer's, Last Rate: 10 mL/hr (06/27/25 0923)      Scheduled:  acetaminophen, 650 mg, q6h  acyclovir, 400 mg, BID  calcium carbonate, 1,250 mg of calcium carbonate, BID  cholecalciferol, 50 mcg, Daily  gabapentin, 300 mg, Nightly  heparin (porcine), 5,000 Units, q8h  insulin lispro, 0-20 Units, Before meals & nightly  levothyroxine, 125 mcg, Daily  mycophenolate, 1,000 mg, q12h LUPILLO  oxygen, , Continuous - Inhalation  pantoprazole, 40 mg, Daily before breakfast  pravastatin, 40 mg, Nightly  predniSONE, 35 mg, q12h LUPILLO  sulfamethoxazole-trimethoprim, 1 tablet, Daily  tacrolimus, 2 mg, q12h LUPILLO  tamsulosin, 0.4 mg, Daily  vancomycin, 125 mg, 4x daily  voriconazole, 200 mg, q12h LUPILLO      PRN:  dextrose, 12.5 g, q15 min PRN  dextrose, 25 g, q15 min PRN  dextrose, 25 g, q15 min PRN  glucagon, 1 mg, q15 min PRN  glucagon, 1 mg, q15 min PRN  HYDROmorphone, 0.2 mg, q2h PRN  lidocaine, 1 patch, Daily PRN  naloxone, 0.2 mg, q5 min PRN  ondansetron ODT, 4 mg, q8h PRN   Or  ondansetron, 4 mg, q8h PRN  oxyCODONE, 10 mg, q4h PRN  oxyCODONE, 5 mg, q4h PRN  oxygen, , Continuous PRN - O2/gases  polyethylene glycol, 17 g,  "Daily PRN      Invasive Hemodynamics:    Most Recent Range Past 24hrs   BP (Art) 121/42 Arterial Line BP 1  Min: 101/14  Max: 170/62   MAP(Art) 63 mmHg Arterial Line MAP 1 (mmHg)   Min: 51 mmHg  Max: 95 mmHg   RA/CVP   No data recorded   PA 29/14 PAP  Min: 14/10  Max: 39/17   PA(mean) 20 mmHg PAP (Mean)  Min: 9 mmHg  Max: 26 mmHg   PCWP 13 mmHg PCWP (mmHg)  Min: 13 mmHg  Max: 13 mmHg   CO 8 L/min CO (L/min)  Min: 7.1 L/min  Max: 8 L/min   CI 3.7 L/min/m2 CI (L/min/m2)  Min: 3.29 L/min/m2  Max: 3.7 L/min/m2   Mixed Venous 65 % SVO2 (%)  Min: 56 %  Max: 65 %   SVR  698 (dyne*sec)/cm5 SVR (dyne*sec)/cm5  Min: 648 (dyne*sec)/cm5  Max: 698 (dyne*sec)/cm5   PVR 45 (dyne*sec)/cm5 PVR (dyne*sec)/cm5  Min: 45 (dyne*sec)/cm5  Max: 45 (dyne*sec)/cm5       ECMO:     Most Recent Range Past 24hrs   Flow   No data recorded   Speed   No data recorded   Sweep   No data recorded     Impella:      Most Recent Range Past 24hrs   Performance Level   No data recorded   Flow (L/min)   No data recorded   Motor Current   No data recorded   Placement Signal    Placement OK could not be evaluated. This SmartLink does not work with rows of the type: Custom List   Purge (mmHg)   No data recorded   Purge rate (mL/hr)   No data recorded     VENT:    Most Recent Range Past 24hrs   Mode (S) Pressure support    FiO2 40 % No data recorded   Rate 14 No data recorded   Vt 560 mL  No data recorded   PEEP (S) 5 cm H20 No data recorded     PHYSICAL EXAM:   Visit Vitals  /87   Pulse 84   Temp 35.5 °C (95.9 °F) (Temporal)   Resp 20   Ht 1.753 m (5' 9\")   Wt 101 kg (221 lb 12.8 oz)   SpO2 98%   BMI 32.75 kg/m²   Smoking Status Former   BSA 2.22 m²       Wt Readings from Last 5 Encounters:   06/27/25 101 kg (221 lb 12.8 oz)   02/27/25 102 kg (224 lb)   01/28/25 104 kg (229 lb)   11/19/24 112 kg (246 lb 12.8 oz)   07/15/24 110 kg (243 lb 9.6 oz)       INTAKE/OUTPUT:  I/O last 3 completed shifts:  In: 1716 (17.8 mL/kg) [P.O.:990; I.V.:726 (7.5 " mL/kg)]  Out: 7975 (82.8 mL/kg) [Urine:7975 (2.3 mL/kg/hr)]  Dosing Weight: 96.3 kg      Physical Exam  HENT:      Head: Normocephalic and atraumatic.   Cardiovascular:      Rate and Rhythm: Normal rate.      Heart sounds: No murmur heard.     No friction rub. No gallop.   Pulmonary:      Effort: Pulmonary effort is normal. No respiratory distress.      Breath sounds: Rales present. No wheezing.      Comments: Stenotomy incision appears C/D/I  Abdominal:      General: Abdomen is flat. There is no distension.      Palpations: Abdomen is soft. There is no mass.      Tenderness: There is no abdominal tenderness. There is no guarding.   Musculoskeletal:      Right lower leg: No edema.      Left lower leg: No edema.   Skin:     General: Skin is warm.      Findings: No rash.   Neurological:      General: No focal deficit present.      Mental Status: He is alert and oriented to person, place, and time.   Psychiatric:         Mood and Affect: Mood normal.         Behavior: Behavior normal.         DATA:  CMP:  Results from last 7 days   Lab Units 06/27/25  0621 06/26/25  0506 06/25/25  0609 06/24/25  1320 06/24/25  0050 06/23/25  1433 06/23/25  0134 06/22/25  1443 06/22/25  0530 06/21/25  2257 06/21/25  0801 06/20/25  1659   SODIUM mmol/L 139 140 141 142 141 143 143 143 143 144   < > 142   POTASSIUM mmol/L 3.9 3.9 4.0 4.6 4.5 4.8 4.3 3.9 4.2 4.1   < > 3.4*   CHLORIDE mmol/L 97* 98 101 106 106 107 107 107 108* 109*   < > 102   CO2 mmol/L 35* 34* 34* 30 32 30 29 25 24 27   < > 29   ANION GAP mmol/L 11 12 10 11 8* 11 11 15 15 12   < > 14   BUN mg/dL 35* 33* 34* 30* 26* 26* 23 20 19 18   < > 31*   CREATININE mg/dL 0.99 0.95 0.76 0.79 0.77 0.78 0.76 0.73 0.80 0.80   < > 0.98   EGFR mL/min/1.73m*2 85 89 >90 >90 >90 >90 >90 >90 >90 >90   < > 86   MAGNESIUM mg/dL 2.16 2.25 2.25 2.42* 2.30 2.58* 2.51* 2.27 2.30 2.35   < > 2.09   ALBUMIN g/dL 3.7 3.8 3.6 4.2 3.8 3.8 3.9 3.9 3.7 3.7   < > 4.3   ALT U/L  --   --   --   --   --   --    "--   --   --   --   --  13   AST U/L  --   --   --   --   --   --   --   --   --   --   --  15   BILIRUBIN TOTAL mg/dL  --   --   --   --   --   --   --   --   --   --   --  0.4    < > = values in this interval not displayed.     CBC:  Results from last 7 days   Lab Units 06/27/25  0621 06/26/25  0506 06/25/25  0609 06/24/25  1320 06/24/25  0050 06/23/25  1433 06/23/25  0134 06/22/25  0530   WBC AUTO x10*3/uL 23.1* 12.9* 13.1* 18.7* 28.2* 27.9* 33.0* 27.5*   HEMOGLOBIN g/dL 7.7* 7.5* 7.7* 7.1* 7.6* 7.5* 7.7* 8.5*   HEMATOCRIT % 22.7* 23.5* 23.4* 21.5* 23.8* 23.0* 23.2* 25.0*   PLATELETS AUTO x10*3/uL 173 154 112* 102* 130* 125* 159 162   MCV fL 90 95 94 95 94 95 91 90     COAG:   Results from last 7 days   Lab Units 06/21/25  0801 06/20/25  2330 06/20/25  2118 06/20/25  1659   INR  1.4* 1.9* 2.4* 3.0*     ABO:   No results found for: \"ABO\"    HEME/ENDO:  Results from last 7 days   Lab Units 06/25/25  0609   FERRITIN ng/mL 130   IRON SATURATION % 21*      CARDIAC:       No lab exists for component: \"CK\", \"CKMBP\"    ASSESSMENT AND PLAN:   Anatoly Lane is a 64 year old male with relevant PMH of ex smoker, NICM, stage D HFrEF NYHA 2-3, S/p HM2 (4/30/23), VT s/p ICD, CAD, pAF, HTN, BPH, GERD and hypothyroidism.  S/p LVAD explant, AICD removal & OHT by Dr. Lorenzo on 6/21. Now transferred from CTICU to HF ICU 6/24. Patient arrived to HFICU HDS stable, on milrinone drip 0.125 mcg/kg/min. Aiming to wean milrinone and optimize volume while in the HFICU.    Neuro:  # Anxiety. Depression, Acute pain   # BUE/BLE mild tremor  - Serial neuro and pain assessments   - PO Tylenol PRN for pain  - PT/OT Consult, OOB to chair  - CAM ICU score every shift  - Sleep/wake cycle normalization    #L hand extensor palsy and 4th+5th digital numbness  - On 6/27, patient c/o inability to extend digits 2-5 around the MCP joints. In addition, he is complaining of numbness from the medial aspect of the wrist to the 4th and 5th digits and part of " the palm.  - Deficits do not align completely with a specific nerve injury- would not expect numbness in digits 4-5 with a radial nerve palsy and would not expect difficult with finger extension with an ulnar nerve palsy.  - Ddx includes nerve compression from forearm edema vs brachial plexus injury.  - Remove brachial mary today.  - Consider neuro consult.  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  - Gabapentin 300 mg at bedtime      Cardiovascular:  # Donor/Recipient Infectious history:   CMV: -/-   EBV: +/+   Toxo: -/-   Heb B: -/-  Hep C: -/-  HIV: -/-      Rejection/Prophylaxis:    - Induction: - Induction: s/p methylprednisolone 500mg IV x2  - Postoperative Induction Plan:  S/p Basiliximab 20mg IV within 1 hour of arrival to CTICU (no need for premedication), s/p 2nd dose: 20mg IVPB on POD4 (06/25)   Steroid taper starting with Methylprednisolone 125mg IV Q8 x 3 doses followed by IV Solumedrol 60 mg q 12 hrs x 2 doses followed by prednisone 35 mg q 12hrs PO q 12 hrs  - Steroids: Currently on prednisone 35 mg q 12hrs PO q 12 hrs until first biopsy   - Tacrolimus: Tacro 4.7 on 2.0mg q12. Adjust to goal as below.  - Tacrolimus goal troughs: 10-12  - Cellcept: C/w 1,000mg BID  - Antifungals: c/w voriconazole 200mg BID x3 months end date 09/22/2025   - Antivirals: c/w acylovir 400mg BID (06/22 x3 months. End date 09/22/2025   - Anti PCP & Toxoplasmosis: Can start SS Bactrim daily (end date of 12/22/25)      Next RHC/biopsy: Scheduled for 7/1 w/ Dr. Velasco   Last TTE/BOBBY on 6/23: ; normal BiV function   Last HLA: First due on 7/24  Last Allomap: ~ will assess starting at 6-month ana post-op    Last Allosure: ~ will assess starting at 6-month ana post-op   Last LHC: 03/2023(pre OHT); First due ~ (one year post tx)   Osteopenia/osteoporosis prophylaxis: C/w Vit D 2000U daily and calcium 500 mg BID  (give calcium 2 hrs after MMF)   Peptic/gastric ulcer prophylaxis: Pantoprazole 40mg daily    CAV Prophylaxis: Aspirin 81mg daily to be started once Hgb stable. C/w pravastatin 40 mg QHS  Pacing: Pacer back up rate set to 60 BPM. Native conduction is NSR at 75 BPM     #  NICM, s/p HM2 (4/30/23), VT s/p ICD, CAD, pAF, HTN.  Now s/p LVAD explant, AICD removal, & OHT on 6/21  - TTE (6/22): Normal BiV   - admit weight (6/24): 83.6 KG   - Continue diuresis with IV lasix 80 mg spot doses.  - Inotropes: milrinone OFF (6/26), epinephrine 0.02 (6/26-*). Reduce to 0.01 today and follow up numbers.  - Attempted to wean milrinone on 6/25, but worsening CVP and SvO2 off milrinone, so it was resumed.  - MAP <60 on 6/26 -> milrinone switched to epinephrine for more vasoconstriction.  - Daily SGC #s: /48 (79), CVP 9, PA 30/13 (20), CO/CI 8.0/3.7 (F), SvO2 65% on epi 0.02  - Daily standing weights, 2gm sodium diet, 2L fluid restriction, strict I&Os     #Electrolyte Disturbances  - Keep K >4, Mag >2     Pulmonary:   # Ex-smoker  -Monitor and maintain SpO2 > 92%     GI:  # H/x GERD  - Bowel regimen  - PPI     :  # H/x BPH  -Baseline Cr: 1.0   -Admit BUN/Cr: Normal   -I/Os  -avoid hypotension and nephrotoxic agents  - C/w home tamsulosin 0.4 mg daily.     Heme:  # Acute blood loss anemia stable, no active s/s of bleeding  - Trending H/h   - Iron studies: %saturation 21%, ferritin 130. No need for IV iron.      Endo:  # Steroid and stress induced hyperglycemia  - Maintain BG <180  - continue SSI to #4   - hgbA1c  was 4.1     # H/o of hypothyroid  -TSH (3/2025): 1.83  - Maintain BG <180      ID:  # Reactive leukocytosis (improving)  - MRSA negative  - completed periop vanc/zosyn x48hrs with hx of LVAD  -trend temps q4h     PHYSICAL AND OCCUPATIONAL THERAPY: PT/OT    LINES: R internal jugular swan (6/21/25-*)  Arterial line L brachial (6/21-6/27)    DVT: Heparin SQ  VAP BUNDLE: None  CENTRAL LINE BUNDLE: Ordered  ULCER PPX:  PPI  GLYCEMIC CONTROL: SSI  BOWEL CARE: Senna, mirlax  INDWELLING CATHETER: None  NUTRITION: Adult diet Regular, Consistent Carb; CCD 90 gm/meal      EMERGENCY CONTACT: Extended Emergency Contact Information  Primary Emergency Contact: Janice Ramos  Address: [address] [place] [id number] [facility]  Home Phone: [phone]  Mobile Phone: [phone]  Relation: Mother  Secondary Emergency Contact: Bird Lane  Address: [address] [place], [place] [address]           [place] [address] [facility]  Home Phone: [phone]  Mobile Phone: [phone]  Relation: Son  Preferred language: English   needed? No  FAMILY UPDATE: At bedside  CODE STATUS: Full Code  DISPO:  HFICU    Patient seen and assessed with Dr. Concetta HOLLIDAY personally spent 60 minutes of critical care time directly and personally managing the patient exclusive of separately billable procedures   _________________________________________________  Jameel Barajas MD

## 2025-06-28 ENCOUNTER — APPOINTMENT (OUTPATIENT)
Dept: RADIOLOGY | Facility: HOSPITAL | Age: 65
DRG: 001 | End: 2025-06-28
Payer: MEDICARE

## 2025-06-28 PROBLEM — A04.72 C. DIFFICILE DIARRHEA: Status: ACTIVE | Noted: 2025-06-28

## 2025-06-28 LAB
ALBUMIN SERPL BCP-MCNC: 3.7 G/DL (ref 3.4–5)
ANION GAP BLDMV CALCULATED.4IONS-SCNC: 6 MMO/L (ref 10–25)
ANION GAP SERPL CALC-SCNC: 9 MMOL/L (ref 10–20)
BASE EXCESS BLDMV CALC-SCNC: 9.3 MMOL/L (ref -2–3)
BODY TEMPERATURE: 37 DEGREES CELSIUS
BUN SERPL-MCNC: 41 MG/DL (ref 6–23)
CA-I BLDMV-SCNC: 1.14 MMOL/L (ref 1.1–1.33)
CALCIUM SERPL-MCNC: 8.7 MG/DL (ref 8.6–10.6)
CHLORIDE BLD-SCNC: 99 MMOL/L (ref 98–107)
CHLORIDE SERPL-SCNC: 97 MMOL/L (ref 98–107)
CO2 SERPL-SCNC: 37 MMOL/L (ref 21–32)
CREAT SERPL-MCNC: 0.94 MG/DL (ref 0.5–1.3)
EGFRCR SERPLBLD CKD-EPI 2021: >90 ML/MIN/1.73M*2
ERYTHROCYTE [DISTWIDTH] IN BLOOD BY AUTOMATED COUNT: 17.4 % (ref 11.5–14.5)
GLUCOSE BLD MANUAL STRIP-MCNC: 152 MG/DL (ref 74–99)
GLUCOSE BLD MANUAL STRIP-MCNC: 159 MG/DL (ref 74–99)
GLUCOSE BLD MANUAL STRIP-MCNC: 168 MG/DL (ref 74–99)
GLUCOSE BLD MANUAL STRIP-MCNC: 185 MG/DL (ref 74–99)
GLUCOSE BLD-MCNC: 153 MG/DL (ref 74–99)
GLUCOSE SERPL-MCNC: 166 MG/DL (ref 74–99)
HCO3 BLDMV-SCNC: 35 MMOL/L (ref 22–26)
HCT VFR BLD AUTO: 26.4 % (ref 41–52)
HCT VFR BLD EST: 27 % (ref 41–52)
HGB BLD-MCNC: 8.5 G/DL (ref 13.5–17.5)
HGB BLDMV-MCNC: 9 G/DL (ref 13.5–17.5)
INHALED O2 CONCENTRATION: 21 %
LACTATE BLDMV-SCNC: 1.7 MMOL/L (ref 0.4–2)
MAGNESIUM SERPL-MCNC: 2.37 MG/DL (ref 1.6–2.4)
MCH RBC QN AUTO: 31.3 PG (ref 26–34)
MCHC RBC AUTO-ENTMCNC: 32.2 G/DL (ref 32–36)
MCV RBC AUTO: 97 FL (ref 80–100)
NRBC BLD-RTO: 0.2 /100 WBCS (ref 0–0)
OXYHGB MFR BLDMV: 58.8 % (ref 45–75)
PCO2 BLDMV: 54 MM HG (ref 41–51)
PH BLDMV: 7.42 PH (ref 7.33–7.43)
PHOSPHATE SERPL-MCNC: 3.8 MG/DL (ref 2.5–4.9)
PLATELET # BLD AUTO: 193 X10*3/UL (ref 150–450)
PO2 BLDMV: 39 MM HG (ref 35–45)
POTASSIUM BLDMV-SCNC: 4.4 MMOL/L (ref 3.5–5.3)
POTASSIUM SERPL-SCNC: 4.1 MMOL/L (ref 3.5–5.3)
RBC # BLD AUTO: 2.72 X10*6/UL (ref 4.5–5.9)
SAO2 % BLDMV: 60 % (ref 45–75)
SODIUM BLDMV-SCNC: 136 MMOL/L (ref 136–145)
SODIUM SERPL-SCNC: 139 MMOL/L (ref 136–145)
TACROLIMUS BLD-MCNC: 5.6 NG/ML
WBC # BLD AUTO: 20.7 X10*3/UL (ref 4.4–11.3)

## 2025-06-28 PROCEDURE — 2500000004 HC RX 250 GENERAL PHARMACY W/ HCPCS (ALT 636 FOR OP/ED)

## 2025-06-28 PROCEDURE — 1200000002 HC GENERAL ROOM WITH TELEMETRY DAILY

## 2025-06-28 PROCEDURE — 37799 UNLISTED PX VASCULAR SURGERY: CPT | Performed by: NURSE PRACTITIONER

## 2025-06-28 PROCEDURE — 71045 X-RAY EXAM CHEST 1 VIEW: CPT | Performed by: RADIOLOGY

## 2025-06-28 PROCEDURE — 2500000001 HC RX 250 WO HCPCS SELF ADMINISTERED DRUGS (ALT 637 FOR MEDICARE OP)

## 2025-06-28 PROCEDURE — 82947 ASSAY GLUCOSE BLOOD QUANT: CPT

## 2025-06-28 PROCEDURE — 2500000004 HC RX 250 GENERAL PHARMACY W/ HCPCS (ALT 636 FOR OP/ED): Performed by: NURSE PRACTITIONER

## 2025-06-28 PROCEDURE — 84132 ASSAY OF SERUM POTASSIUM: CPT | Performed by: NURSE PRACTITIONER

## 2025-06-28 PROCEDURE — 85027 COMPLETE CBC AUTOMATED: CPT

## 2025-06-28 PROCEDURE — 76937 US GUIDE VASCULAR ACCESS: CPT

## 2025-06-28 PROCEDURE — 2500000001 HC RX 250 WO HCPCS SELF ADMINISTERED DRUGS (ALT 637 FOR MEDICARE OP): Performed by: NURSE PRACTITIONER

## 2025-06-28 PROCEDURE — 83735 ASSAY OF MAGNESIUM: CPT | Performed by: NURSE PRACTITIONER

## 2025-06-28 PROCEDURE — 99291 CRITICAL CARE FIRST HOUR: CPT | Performed by: INTERNAL MEDICINE

## 2025-06-28 PROCEDURE — 2500000002 HC RX 250 W HCPCS SELF ADMINISTERED DRUGS (ALT 637 FOR MEDICARE OP, ALT 636 FOR OP/ED)

## 2025-06-28 PROCEDURE — 2500000002 HC RX 250 W HCPCS SELF ADMINISTERED DRUGS (ALT 637 FOR MEDICARE OP, ALT 636 FOR OP/ED): Performed by: NURSE PRACTITIONER

## 2025-06-28 PROCEDURE — 2500000004 HC RX 250 GENERAL PHARMACY W/ HCPCS (ALT 636 FOR OP/ED): Performed by: REGISTERED NURSE

## 2025-06-28 PROCEDURE — 71045 X-RAY EXAM CHEST 1 VIEW: CPT

## 2025-06-28 PROCEDURE — 80197 ASSAY OF TACROLIMUS: CPT | Performed by: NURSE PRACTITIONER

## 2025-06-28 RX ORDER — ACETAMINOPHEN 500 MG
5 TABLET ORAL NIGHTLY PRN
Status: DISCONTINUED | OUTPATIENT
Start: 2025-06-28 | End: 2025-07-09 | Stop reason: HOSPADM

## 2025-06-28 RX ORDER — GUAIFENESIN 600 MG/1
600 TABLET, EXTENDED RELEASE ORAL 2 TIMES DAILY PRN
Status: DISCONTINUED | OUTPATIENT
Start: 2025-06-28 | End: 2025-07-09 | Stop reason: HOSPADM

## 2025-06-28 RX ORDER — ACETAMINOPHEN 500 MG
5 TABLET ORAL DAILY
Status: DISCONTINUED | OUTPATIENT
Start: 2025-06-28 | End: 2025-06-28

## 2025-06-28 RX ORDER — LIDOCAINE HYDROCHLORIDE 10 MG/ML
5 INJECTION, SOLUTION EPIDURAL; INFILTRATION; INTRACAUDAL; PERINEURAL ONCE
Status: DISCONTINUED | OUTPATIENT
Start: 2025-06-28 | End: 2025-06-28

## 2025-06-28 RX ADMIN — INSULIN LISPRO 4 UNITS: 100 INJECTION, SOLUTION INTRAVENOUS; SUBCUTANEOUS at 21:52

## 2025-06-28 RX ADMIN — ACETAMINOPHEN 650 MG: 325 TABLET ORAL at 01:56

## 2025-06-28 RX ADMIN — ACETAMINOPHEN 650 MG: 325 TABLET ORAL at 17:30

## 2025-06-28 RX ADMIN — VORICONAZOLE 200 MG: 200 TABLET, COATED ORAL at 21:52

## 2025-06-28 RX ADMIN — MYCOPHENOLATE MOFETIL 1000 MG: 250 CAPSULE ORAL at 06:22

## 2025-06-28 RX ADMIN — GABAPENTIN 300 MG: 300 CAPSULE ORAL at 21:53

## 2025-06-28 RX ADMIN — ACETAMINOPHEN 650 MG: 325 TABLET ORAL at 12:05

## 2025-06-28 RX ADMIN — MYCOPHENOLATE MOFETIL 1000 MG: 250 CAPSULE ORAL at 17:30

## 2025-06-28 RX ADMIN — ACYCLOVIR 400 MG: 400 TABLET ORAL at 21:53

## 2025-06-28 RX ADMIN — INSULIN LISPRO 4 UNITS: 100 INJECTION, SOLUTION INTRAVENOUS; SUBCUTANEOUS at 16:39

## 2025-06-28 RX ADMIN — GUAIFENESIN 600 MG: 600 TABLET ORAL at 17:30

## 2025-06-28 RX ADMIN — ACETAMINOPHEN 650 MG: 325 TABLET ORAL at 06:22

## 2025-06-28 RX ADMIN — ACYCLOVIR 400 MG: 400 TABLET ORAL at 08:36

## 2025-06-28 RX ADMIN — PANTOPRAZOLE SODIUM 40 MG: 40 TABLET, DELAYED RELEASE ORAL at 06:22

## 2025-06-28 RX ADMIN — PREDNISONE 35 MG: 20 TABLET ORAL at 21:52

## 2025-06-28 RX ADMIN — TACROLIMUS 2 MG: 1 CAPSULE ORAL at 06:22

## 2025-06-28 RX ADMIN — VANCOMYCIN HYDROCHLORIDE 125 MG: 125 CAPSULE ORAL at 12:05

## 2025-06-28 RX ADMIN — CALCIUM 1 TABLET: 500 TABLET ORAL at 08:36

## 2025-06-28 RX ADMIN — Medication 5 MG: at 01:56

## 2025-06-28 RX ADMIN — SULFAMETHOXAZOLE AND TRIMETHOPRIM 1 TABLET: 400; 80 TABLET ORAL at 08:36

## 2025-06-28 RX ADMIN — LEVOTHYROXINE SODIUM 125 MCG: 0.05 TABLET ORAL at 06:22

## 2025-06-28 RX ADMIN — VANCOMYCIN HYDROCHLORIDE 125 MG: 125 CAPSULE ORAL at 16:39

## 2025-06-28 RX ADMIN — INSULIN LISPRO 4 UNITS: 100 INJECTION, SOLUTION INTRAVENOUS; SUBCUTANEOUS at 08:35

## 2025-06-28 RX ADMIN — CHOLECALCIFEROL TAB 25 MCG (1000 UNIT) 50 MCG: 25 TAB at 08:36

## 2025-06-28 RX ADMIN — PRAVASTATIN SODIUM 40 MG: 40 TABLET ORAL at 21:53

## 2025-06-28 RX ADMIN — TACROLIMUS 2 MG: 1 CAPSULE ORAL at 18:04

## 2025-06-28 RX ADMIN — VANCOMYCIN HYDROCHLORIDE 125 MG: 125 CAPSULE ORAL at 06:43

## 2025-06-28 RX ADMIN — VORICONAZOLE 200 MG: 200 TABLET, COATED ORAL at 08:36

## 2025-06-28 RX ADMIN — TAMSULOSIN HYDROCHLORIDE 0.4 MG: 0.4 CAPSULE ORAL at 08:36

## 2025-06-28 RX ADMIN — OXYCODONE HYDROCHLORIDE 5 MG: 5 TABLET ORAL at 22:21

## 2025-06-28 RX ADMIN — PREDNISONE 35 MG: 20 TABLET ORAL at 08:36

## 2025-06-28 ASSESSMENT — PAIN SCALES - GENERAL
PAINLEVEL_OUTOF10: 0 - NO PAIN
PAINLEVEL_OUTOF10: 8
PAINLEVEL_OUTOF10: 0 - NO PAIN

## 2025-06-28 ASSESSMENT — COGNITIVE AND FUNCTIONAL STATUS - GENERAL
DAILY ACTIVITIY SCORE: 24
MOBILITY SCORE: 24

## 2025-06-28 ASSESSMENT — PAIN - FUNCTIONAL ASSESSMENT
PAIN_FUNCTIONAL_ASSESSMENT: 0-10

## 2025-06-28 NOTE — CARE PLAN
Problem: Safety - Adult  Goal: Free from fall injury  Outcome: Progressing     Problem: Discharge Planning  Goal: Discharge to home or other facility with appropriate resources  Outcome: Progressing     Problem: Chronic Conditions and Co-morbidities  Goal: Patient's chronic conditions and co-morbidity symptoms are monitored and maintained or improved  Outcome: Progressing     Problem: Nutrition  Goal: Nutrient intake appropriate for maintaining nutritional needs  Outcome: Progressing     Problem: Skin  Goal: Decreased wound size/increased tissue granulation at next dressing change  Outcome: Progressing  Goal: Participates in plan/prevention/treatment measures  Outcome: Progressing  Goal: Prevent/manage excess moisture  Outcome: Progressing  Goal: Prevent/minimize sheer/friction injuries  Outcome: Progressing  Goal: Promote/optimize nutrition  Outcome: Progressing  Goal: Promote skin healing  Outcome: Progressing     Problem: Heart Failure  Goal: Improved urinary output this shift  Outcome: Progressing  Goal: Reduction in peripheral edema within 24 hours  Outcome: Progressing  Goal: Report improvement of dyspnea/breathlessness this shift  Outcome: Progressing  Goal: Weight from fluid excess reduced over 2-3 days, then stabilize  Outcome: Progressing  Goal: Increase self care and/or family involvement in 24 hours  Outcome: Progressing     Problem: Pain  Goal: Takes deep breaths with improved pain control throughout the shift  Outcome: Progressing  Goal: Turns in bed with improved pain control throughout the shift  Outcome: Progressing  Goal: Walks with improved pain control throughout the shift  Outcome: Progressing  Goal: Participates in PT with improved pain control throughout the shift  Outcome: Progressing

## 2025-06-28 NOTE — CARE PLAN
Problem: Safety - Adult  Goal: Free from fall injury  Outcome: Progressing     Problem: Discharge Planning  Goal: Discharge to home or other facility with appropriate resources  Outcome: Progressing     Problem: Chronic Conditions and Co-morbidities  Goal: Patient's chronic conditions and co-morbidity symptoms are monitored and maintained or improved  Outcome: Progressing     Problem: Nutrition  Goal: Nutrient intake appropriate for maintaining nutritional needs  Outcome: Progressing     Problem: Skin  Goal: Decreased wound size/increased tissue granulation at next dressing change  Outcome: Progressing  Goal: Participates in plan/prevention/treatment measures  Outcome: Progressing  Goal: Prevent/manage excess moisture  Outcome: Progressing  Goal: Prevent/minimize sheer/friction injuries  Outcome: Progressing  Goal: Promote/optimize nutrition  Outcome: Progressing  Goal: Promote skin healing  Outcome: Progressing     Problem: Heart Failure  Goal: Improved gas exchange this shift  Outcome: Progressing  Goal: Improved urinary output this shift  Outcome: Progressing  Goal: Reduction in peripheral edema within 24 hours  Outcome: Progressing  Goal: Report improvement of dyspnea/breathlessness this shift  Outcome: Progressing  Goal: Weight from fluid excess reduced over 2-3 days, then stabilize  Outcome: Progressing  Goal: Increase self care and/or family involvement in 24 hours  Outcome: Progressing     Problem: Pain  Goal: Takes deep breaths with improved pain control throughout the shift  Outcome: Progressing  Goal: Turns in bed with improved pain control throughout the shift  Outcome: Progressing  Goal: Walks with improved pain control throughout the shift  Outcome: Progressing  Goal: Participates in PT with improved pain control throughout the shift  Outcome: Progressing       The clinical goals for the shift include pt. [facility]

## 2025-06-28 NOTE — PROGRESS NOTES
Heart Failure Intensive Care Unit Staff Note    Principal Problem:    S/P orthotopic heart transplant  Active Problems:    Hypothyroidism    Stress hyperglycemia    Receiving inotropic medication    History of hypothyroidism    History of BPH    Blood loss anemia    Immunosuppression    C. difficile diarrhea    POD #7 status post heart transplantation.     Doing OK, stable overnight.  Epinephrine has now been weaned off.    With p.o. vancomycin diarrhea due to C. difficile infection has resolved.    Kidney function remains stable.     -- Remove [place]  -- Obtain peripheral IVs and/or midline catheter  -- Routine immunosuppression management per protocol  -- Transition to stepdown floor     This critically ill patient continues to be at-risk for clinically significant deterioration / failure due to the above mentioned dysfunctional, unstable organ systems.  I have personally identified and managed all complex critical care issues to prevent aforementioned clinical deterioration.  Critical care time is spent at bedside and/or the immediate area and has included, but is not limited to, the review of diagnostic tests, labs, radiographs, serial assessments of hemodynamics, respiratory status, ventilatory management, and family updates.  Time spent in procedures and teaching are reported separately.    Critical care time: __35__ minutes       Shekhar Hylton MD, MPH  Advanced Heart Failure and Transplant Cardiology  [facility]  [facility]

## 2025-06-28 NOTE — SIGNIFICANT EVENT
Closing Aurora Numbers  -Am Aurora #'s: /63 (85), CVP 10, PAP 33/14 (20), CO/CI 7.58/3.51, , SVO 2 60%

## 2025-06-28 NOTE — PROGRESS NOTES
[facility]  HFICU PROGRESS NOTE    Anatoly Lane/[id number]    Admit Date: 6/20/2025  Hospital Length of Stay: 8   ICU Length of Stay: 3d 18h   Primary Service: HFICU  Primary HF Cardiologist:   Referring:     INTERVAL EVENTS / PERTINENT ROS:   -Off Inotropes   -Still complaining about stiff hands and not being able to grasp   -Up and moving    Plan:  -Am swan #'s: /63 (85), CVP 10, PAP 33/14 (20), CO/CI 7.58/3.51, , SVO 2 60%   -Epi off since noon yesterday  -d/c swan today and transfer to floor?  -Needs peripheral line   -Tac 5.6 level->> 2mg BID     MEDICATIONS  Infusions:  [Held by provider] EPINEPHrine, Last Rate: Stopped (06/27/25 1210)      Scheduled:  acetaminophen, 650 mg, q6h  acyclovir, 400 mg, BID  calcium carbonate, 1,250 mg of calcium carbonate, BID  cholecalciferol, 50 mcg, Daily  gabapentin, 300 mg, Nightly  heparin (porcine), 5,000 Units, q8h  insulin lispro, 0-20 Units, Before meals & nightly  levothyroxine, 125 mcg, Daily  lidocaine, 5 mL, Once  mycophenolate, 1,000 mg, q12h LUPILLO  oxygen, , Continuous - Inhalation  pantoprazole, 40 mg, Daily before breakfast  perflutren lipid microspheres, 0.5-10 mL of dilution, Once in imaging  perflutren protein A microsphere, 0.5 mL, Once in imaging  pravastatin, 40 mg, Nightly  predniSONE, 35 mg, q12h LUPILLO  sulfamethoxazole-trimethoprim, 1 tablet, Daily  sulfur hexafluoride microsphr, 2 mL, Once in imaging  tacrolimus, 2 mg, q12h LUPILLO  tamsulosin, 0.4 mg, Daily  vancomycin, 125 mg, 4x daily  voriconazole, 200 mg, q12h LUPILLO      PRN:  dextrose, 12.5 g, q15 min PRN  dextrose, 25 g, q15 min PRN  dextrose, 25 g, q15 min PRN  glucagon, 1 mg, q15 min PRN  glucagon, 1 mg, q15 min PRN  HYDROmorphone, 0.2 mg, q2h PRN  lidocaine, 1 patch, Daily PRN  melatonin, 5 mg, Nightly PRN  naloxone, 0.2 mg, q5 min PRN  ondansetron ODT, 4 mg, q8h PRN   Or  ondansetron, 4 mg, q8h PRN  oxyCODONE, 10 mg, q4h PRN  oxyCODONE, 5 mg, q4h PRN  oxygen, ,  "Continuous PRN - O2/gases  polyethylene glycol, 17 g, Daily PRN      Invasive Hemodynamics:    Most Recent Range Past 24hrs   BP (Art) 117/41 No data recorded   MAP(Art) 61 mmHg No data recorded   RA/CVP   No data recorded   PA 27/14 PAP  Min: 24/10  Max: 52/21   PA(mean) 19 mmHg PAP (Mean)  Min: 14 mmHg  Max: 61 mmHg   PCWP 13 mmHg No data recorded   CO 7.6 L/min CO (L/min)  Min: 6.1 L/min  Max: 7.6 L/min   CI 3.5 L/min/m2 CI (L/min/m2)  Min: 2.8 L/min/m2  Max: 3.5 L/min/m2   Mixed Venous 60 % SVO2 (%)  Min: 52 %  Max: 60 %   SVR  791 (dyne*sec)/cm5 SVR (dyne*sec)/cm5  Min: 791 (dyne*sec)/cm5  Max: 1029 (dyne*sec)/cm5   PVR 45 (dyne*sec)/cm5 No data recorded       ECMO:     Most Recent Range Past 24hrs   Flow   No data recorded   Speed   No data recorded   Sweep   No data recorded     Impella:      Most Recent Range Past 24hrs   Performance Level   No data recorded   Flow (L/min)   No data recorded   Motor Current   No data recorded   Placement Signal    Placement OK could not be evaluated. This SmartLink does not work with rows of the type: Custom List   Purge (mmHg)   No data recorded   Purge rate (mL/hr)   No data recorded     VENT:    Most Recent Range Past 24hrs   Mode (S) Pressure support    FiO2 40 % No data recorded   Rate 14 No data recorded   Vt 560 mL  No data recorded   PEEP (S) 5 cm H20 No data recorded     PHYSICAL EXAM:   Visit Vitals  /65 (BP Location: Right arm, Patient Position: Lying)   Pulse 74   Temp 36.3 °C (97.3 °F) (Temporal)   Resp 18   Ht 1.753 m (5' 9\")   Wt 101 kg (222 lb 3.6 oz)   SpO2 96%   BMI 32.82 kg/m²   Smoking Status Former   BSA 2.22 m²       Wt Readings from Last 5 Encounters:   06/28/25 101 kg (222 lb 3.6 oz)   02/27/25 102 kg (224 lb)   01/28/25 104 kg (229 lb)   11/19/24 112 kg (246 lb 12.8 oz)   07/15/24 110 kg (243 lb 9.6 oz)       INTAKE/OUTPUT:  I/O last 3 completed shifts:  In: 1698.7 (17.6 mL/kg) [P.O.:1200; I.V.:498.7 (5.2 mL/kg)]  Out: 6350 (65.9 mL/kg) " [Urine:6350 (1.8 mL/kg/hr)]  Dosing Weight: 96.3 kg      Physical Exam  HENT:      Head: Normocephalic and atraumatic.   Cardiovascular:      Rate and Rhythm: Normal rate.      Heart sounds: No murmur heard.     No friction rub. No gallop.   Pulmonary:      Effort: Pulmonary effort is normal. No respiratory distress.      Breath sounds: Rales present. No wheezing.      Comments: Stenotomy incision appears C/D/I  Abdominal:      General: Abdomen is flat. There is no distension.      Palpations: Abdomen is soft. There is no mass.      Tenderness: There is no abdominal tenderness. There is no guarding.   Musculoskeletal:      Right lower leg: No edema.      Left lower leg: No edema.   Skin:     General: Skin is warm.      Findings: No rash.   Neurological:      General: No focal deficit present.      Mental Status: He is alert and oriented to person, place, and time.   Psychiatric:         Mood and Affect: Mood normal.         Behavior: Behavior normal.       DATA:  CMP:  Results from last 7 days   Lab Units 06/28/25  0606 06/27/25  0621 06/26/25  0506 06/25/25  0609 06/24/25  1320 06/24/25  0050 06/23/25  1433 06/23/25  0134 06/22/25  1443 06/22/25  0530   SODIUM mmol/L 139 139 140 141 142 141 143 143 143 143   POTASSIUM mmol/L 4.1 3.9 3.9 4.0 4.6 4.5 4.8 4.3 3.9 4.2   CHLORIDE mmol/L 97* 97* 98 101 106 106 107 107 107 108*   CO2 mmol/L 37* 35* 34* 34* 30 32 30 29 25 24   ANION GAP mmol/L 9* 11 12 10 11 8* 11 11 15 15   BUN mg/dL 41* 35* 33* 34* 30* 26* 26* 23 20 19   CREATININE mg/dL 0.94 0.99 0.95 0.76 0.79 0.77 0.78 0.76 0.73 0.80   EGFR mL/min/1.73m*2 >90 85 89 >90 >90 >90 >90 >90 >90 >90   MAGNESIUM mg/dL 2.37 2.16 2.25 2.25 2.42* 2.30 2.58* 2.51* 2.27 2.30   ALBUMIN g/dL 3.7 3.7 3.8 3.6 4.2 3.8 3.8 3.9 3.9 3.7     CBC:  Results from last 7 days   Lab Units 06/28/25  0606 06/27/25  0621 06/26/25  0506 06/25/25  0609 06/24/25  1320 06/24/25  0050 06/23/25  1433 06/23/25  0134   WBC AUTO x10*3/uL 20.7* 23.1* 12.9*  "13.1* 18.7* 28.2* 27.9* 33.0*   HEMOGLOBIN g/dL 8.5* 7.7* 7.5* 7.7* 7.1* 7.6* 7.5* 7.7*   HEMATOCRIT % 26.4* 22.7* 23.5* 23.4* 21.5* 23.8* 23.0* 23.2*   PLATELETS AUTO x10*3/uL 193 173 154 112* 102* 130* 125* 159   MCV fL 97 90 95 94 95 94 95 91     COAG:         ABO:   No results found for: \"ABO\"    HEME/ENDO:  Results from last 7 days   Lab Units 06/25/25  0609   FERRITIN ng/mL 130   IRON SATURATION % 21*      CARDIAC:       No lab exists for component: \"CK\", \"CKMBP\"    ASSESSMENT AND PLAN:   Anatoly Lane is a 64 year old male with relevant PMH of ex smoker, NICM, stage D HFrEF NYHA 2-3, S/p HM2 (4/30/23), VT s/p ICD, CAD, pAF, HTN, BPH, GERD and hypothyroidism.  S/p LVAD explant, AICD removal & OHT by Dr. Lorenzo on 6/21. Now transferred from CTICU to HF ICU 6/24. Patient arrived to HFICU HDS stable, on milrinone drip 0.125 mcg/kg/min. Aiming to wean milrinone and optimize volume while in the HFICU.    Neuro:  # Anxiety. Depression, Acute pain   # BUE/BLE mild tremor  - Serial neuro and pain assessments   - PO Tylenol PRN for pain  - PT/OT Consult, OOB to chair  - CAM ICU score every shift  - Sleep/wake cycle normalization    #L hand extensor palsy and 4th+5th digital numbness  - On 6/27, patient c/o inability to extend digits 2-5 around the MCP joints. In addition, he is complaining of numbness from the medial aspect of the wrist to the 4th and 5th digits and part of the palm.  - Deficits do not align completely with a specific nerve injury- would not expect numbness in digits 4-5 with a radial nerve palsy and would not expect difficult with finger extension with an ulnar nerve palsy.  - Ddx includes nerve compression from forearm edema vs brachial plexus injury.  - Remove brachial mary today.  - Consider neuro consult.  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  - Gabapentin 300 mg at bedtime    "   Cardiovascular:  # Donor/Recipient Infectious history:   CMV: -/-   EBV: +/+   Toxo: -/-   Heb B: -/-  Hep C: -/-  HIV: -/-      Rejection/Prophylaxis:    - Induction: - Induction: s/p methylprednisolone 500mg IV x2  - Postoperative Induction Plan:  S/p Basiliximab 20mg IV within 1 hour of arrival to CTICU (no need for premedication), s/p 2nd dose: 20mg IVPB on POD4 (06/25)   Steroid taper starting with Methylprednisolone 125mg IV Q8 x 3 doses followed by IV Solumedrol 60 mg q 12 hrs x 2 doses followed by prednisone 35 mg q 12hrs PO q 12 hrs  - Steroids: Currently on prednisone 35 mg q 12hrs PO q 12 hrs until first biopsy   - Tacrolimus: Tacro 4.7 on 2.0mg q12. Adjust to goal as below.  - Tacrolimus goal troughs: 10-12  - Cellcept: C/w 1,000mg BID  - Antifungals: c/w voriconazole 200mg BID x3 months end date 09/22/2025   - Antivirals: c/w acylovir 400mg BID (06/22 x3 months. End date 09/22/2025   - Anti PCP & Toxoplasmosis: Can start SS Bactrim daily (end date of 12/22/25)      Next RHC/biopsy: Scheduled for 7/1 w/ Dr. Velasco   Last TTE/BOBBY on 6/23: ; normal BiV function   Last HLA: First due on 7/24  Last Allomap: ~ will assess starting at 6-month ana post-op    Last Allosure: ~ will assess starting at 6-month ana post-op   Last LHC: 03/2023(pre OHT); First due ~ (one year post tx)   Osteopenia/osteoporosis prophylaxis: C/w Vit D 2000U daily and calcium 500 mg BID (give calcium 2 hrs after MMF)   Peptic/gastric ulcer prophylaxis: Pantoprazole 40mg daily    CAV Prophylaxis: Aspirin 81mg daily to be started once Hgb stable. C/w pravastatin 40 mg QHS  Pacing: Pacer back up rate set to 60 BPM. Native conduction is NSR at 75 BPM     #  NICM, s/p HM2 (4/30/23), VT s/p ICD, CAD, pAF, HTN.  Now s/p LVAD explant, AICD removal, & OHT on 6/21  - TTE (6/22): Normal BiV   - admit weight (6/24): 83.6 KG   - Continue diuresis with IV lasix 80 mg spot doses.  - Inotropes: milrinone OFF (6/26), epinephrine 0.02 (6/26-*).  Reduce to 0.01 today and follow up numbers.  - Attempted to wean milrinone on 6/25, but worsening CVP and SvO2 off milrinone, so it was resumed.  - MAP <60 on 6/26 -> milrinone switched to epinephrine for more vasoconstriction.  - Daily SGC #s: /63 (85), CVP 10, PAP 33/14 (20), CO/CI 7.58/3.51, , SVO 2 60%   - Daily standing weights, 2gm sodium diet, 2L fluid restriction, strict I&Os     #Electrolyte Disturbances  - Keep K >4, Mag >2     Pulmonary:   # Ex-smoker  -Monitor and maintain SpO2 > 92%     GI:  # H/x GERD  - Bowel regimen  - PPI     :  # H/x BPH  -Baseline Cr: 1.0   -Admit BUN/Cr: Normal   -I/Os  -avoid hypotension and nephrotoxic agents  - C/w home tamsulosin 0.4 mg daily.     Heme:  # Acute blood loss anemia stable, no active s/s of bleeding  - Trending H/h   - Iron studies: %saturation 21%, ferritin 130. No need for IV iron.      Endo:  # Steroid and stress induced hyperglycemia  - Maintain BG <180  - continue SSI to #4   - hgbA1c  was 4.1     # H/o of hypothyroid  -TSH (3/2025): 1.83  - Maintain BG <180      ID:  # Reactive leukocytosis (improving)  - MRSA negative  - completed periop vanc/zosyn x48hrs with hx of LVAD  -trend temps q4h     PHYSICAL AND OCCUPATIONAL THERAPY: PT/OT    LINES: R internal jugular swan (6/21/25-*)  Arterial line L brachial (6/21-6/27)    DVT: Heparin SQ  VAP BUNDLE: None  CENTRAL LINE BUNDLE: Ordered  ULCER PPX: PPI  GLYCEMIC CONTROL: SSI  BOWEL CARE: Senna, mirlax  INDWELLING CATHETER: None  NUTRITION: Adult diet Regular, Consistent Carb; CCD 90 gm/meal      EMERGENCY CONTACT: Extended Emergency Contact Information  Primary Emergency Contact: Janice Ramos  Address: [address] [place] [address] [place]  Home Phone: [phone]  Mobile Phone: [phone]  Relation: Mother  Secondary Emergency Contact: Bird Lane  Address: [address] [place] [address] [address]           [place] [address] [place]  Home Phone:  [phone]  Mobile Phone: [phone]  Relation: Son  Preferred language: English   needed? No  FAMILY UPDATE: At bedside  CODE STATUS: Full Code  DISPO:  HFICU    Patient seen and assessed with Dr. Concetta HOLLIDAY personally spent 60 minutes of critical care time directly and personally managing the patient exclusive of separately billable procedures   _________________________________________________  COOKIE Brennan-CNP

## 2025-06-29 ENCOUNTER — APPOINTMENT (OUTPATIENT)
Dept: RADIOLOGY | Facility: HOSPITAL | Age: 65
DRG: 001 | End: 2025-06-29
Payer: MEDICARE

## 2025-06-29 LAB
ALBUMIN SERPL BCP-MCNC: 3.4 G/DL (ref 3.4–5)
ANION GAP SERPL CALC-SCNC: 16 MMOL/L (ref 10–20)
BUN SERPL-MCNC: 38 MG/DL (ref 6–23)
CALCIUM SERPL-MCNC: 8.6 MG/DL (ref 8.6–10.6)
CHLORIDE SERPL-SCNC: 101 MMOL/L (ref 98–107)
CO2 SERPL-SCNC: 25 MMOL/L (ref 21–32)
CREAT SERPL-MCNC: 0.95 MG/DL (ref 0.5–1.3)
EGFRCR SERPLBLD CKD-EPI 2021: 89 ML/MIN/1.73M*2
ERYTHROCYTE [DISTWIDTH] IN BLOOD BY AUTOMATED COUNT: 17.7 % (ref 11.5–14.5)
GLUCOSE BLD MANUAL STRIP-MCNC: 131 MG/DL (ref 74–99)
GLUCOSE BLD MANUAL STRIP-MCNC: 136 MG/DL (ref 74–99)
GLUCOSE BLD MANUAL STRIP-MCNC: 174 MG/DL (ref 74–99)
GLUCOSE BLD MANUAL STRIP-MCNC: 178 MG/DL (ref 74–99)
GLUCOSE BLD MANUAL STRIP-MCNC: 201 MG/DL (ref 74–99)
GLUCOSE SERPL-MCNC: 123 MG/DL (ref 74–99)
HCT VFR BLD AUTO: 27.2 % (ref 41–52)
HGB BLD-MCNC: 8.6 G/DL (ref 13.5–17.5)
MAGNESIUM SERPL-MCNC: 2.47 MG/DL (ref 1.6–2.4)
MCH RBC QN AUTO: 31 PG (ref 26–34)
MCHC RBC AUTO-ENTMCNC: 31.6 G/DL (ref 32–36)
MCV RBC AUTO: 98 FL (ref 80–100)
NRBC BLD-RTO: 0.2 /100 WBCS (ref 0–0)
PHOSPHATE SERPL-MCNC: 4.4 MG/DL (ref 2.5–4.9)
PLATELET # BLD AUTO: 228 X10*3/UL (ref 150–450)
POTASSIUM SERPL-SCNC: 5.1 MMOL/L (ref 3.5–5.3)
RBC # BLD AUTO: 2.77 X10*6/UL (ref 4.5–5.9)
SODIUM SERPL-SCNC: 137 MMOL/L (ref 136–145)
TACROLIMUS BLD-MCNC: 5.5 NG/ML
WBC # BLD AUTO: 19.6 X10*3/UL (ref 4.4–11.3)

## 2025-06-29 PROCEDURE — 36415 COLL VENOUS BLD VENIPUNCTURE: CPT

## 2025-06-29 PROCEDURE — 2500000004 HC RX 250 GENERAL PHARMACY W/ HCPCS (ALT 636 FOR OP/ED)

## 2025-06-29 PROCEDURE — 2500000001 HC RX 250 WO HCPCS SELF ADMINISTERED DRUGS (ALT 637 FOR MEDICARE OP): Performed by: STUDENT IN AN ORGANIZED HEALTH CARE EDUCATION/TRAINING PROGRAM

## 2025-06-29 PROCEDURE — 80069 RENAL FUNCTION PANEL: CPT

## 2025-06-29 PROCEDURE — 2500000001 HC RX 250 WO HCPCS SELF ADMINISTERED DRUGS (ALT 637 FOR MEDICARE OP)

## 2025-06-29 PROCEDURE — 71045 X-RAY EXAM CHEST 1 VIEW: CPT | Performed by: RADIOLOGY

## 2025-06-29 PROCEDURE — 99232 SBSQ HOSP IP/OBS MODERATE 35: CPT

## 2025-06-29 PROCEDURE — 82947 ASSAY GLUCOSE BLOOD QUANT: CPT

## 2025-06-29 PROCEDURE — 1200000002 HC GENERAL ROOM WITH TELEMETRY DAILY

## 2025-06-29 PROCEDURE — 2500000002 HC RX 250 W HCPCS SELF ADMINISTERED DRUGS (ALT 637 FOR MEDICARE OP, ALT 636 FOR OP/ED)

## 2025-06-29 PROCEDURE — 71045 X-RAY EXAM CHEST 1 VIEW: CPT

## 2025-06-29 PROCEDURE — 2500000004 HC RX 250 GENERAL PHARMACY W/ HCPCS (ALT 636 FOR OP/ED): Performed by: STUDENT IN AN ORGANIZED HEALTH CARE EDUCATION/TRAINING PROGRAM

## 2025-06-29 PROCEDURE — 80197 ASSAY OF TACROLIMUS: CPT

## 2025-06-29 PROCEDURE — 83735 ASSAY OF MAGNESIUM: CPT

## 2025-06-29 PROCEDURE — 85027 COMPLETE CBC AUTOMATED: CPT

## 2025-06-29 PROCEDURE — 99233 SBSQ HOSP IP/OBS HIGH 50: CPT | Performed by: INTERNAL MEDICINE

## 2025-06-29 RX ORDER — AMLODIPINE BESYLATE 5 MG/1
5 TABLET ORAL DAILY
Status: DISCONTINUED | OUTPATIENT
Start: 2025-06-29 | End: 2025-07-01

## 2025-06-29 RX ORDER — ASPIRIN 81 MG/1
81 TABLET ORAL DAILY
Status: DISCONTINUED | OUTPATIENT
Start: 2025-06-29 | End: 2025-07-09 | Stop reason: HOSPADM

## 2025-06-29 RX ADMIN — ACETAMINOPHEN 650 MG: 325 TABLET ORAL at 09:11

## 2025-06-29 RX ADMIN — TACROLIMUS 2 MG: 1 CAPSULE ORAL at 06:38

## 2025-06-29 RX ADMIN — INSULIN LISPRO 8 UNITS: 100 INJECTION, SOLUTION INTRAVENOUS; SUBCUTANEOUS at 12:04

## 2025-06-29 RX ADMIN — ACETAMINOPHEN 650 MG: 325 TABLET ORAL at 01:00

## 2025-06-29 RX ADMIN — MYCOPHENOLATE MOFETIL 1000 MG: 250 CAPSULE ORAL at 18:02

## 2025-06-29 RX ADMIN — MYCOPHENOLATE MOFETIL 1000 MG: 250 CAPSULE ORAL at 06:38

## 2025-06-29 RX ADMIN — VANCOMYCIN HYDROCHLORIDE 125 MG: 125 CAPSULE ORAL at 18:02

## 2025-06-29 RX ADMIN — PREDNISONE 35 MG: 20 TABLET ORAL at 20:12

## 2025-06-29 RX ADMIN — VANCOMYCIN HYDROCHLORIDE 125 MG: 125 CAPSULE ORAL at 09:11

## 2025-06-29 RX ADMIN — Medication 5 MG: at 22:31

## 2025-06-29 RX ADMIN — CHOLECALCIFEROL TAB 25 MCG (1000 UNIT) 50 MCG: 25 TAB at 09:11

## 2025-06-29 RX ADMIN — GABAPENTIN 300 MG: 300 CAPSULE ORAL at 20:13

## 2025-06-29 RX ADMIN — ACETAMINOPHEN 650 MG: 325 TABLET ORAL at 18:02

## 2025-06-29 RX ADMIN — PANTOPRAZOLE SODIUM 40 MG: 40 TABLET, DELAYED RELEASE ORAL at 06:38

## 2025-06-29 RX ADMIN — VANCOMYCIN HYDROCHLORIDE 125 MG: 125 CAPSULE ORAL at 21:56

## 2025-06-29 RX ADMIN — SULFAMETHOXAZOLE AND TRIMETHOPRIM 1 TABLET: 400; 80 TABLET ORAL at 09:12

## 2025-06-29 RX ADMIN — TACROLIMUS 2.5 MG: 0.5 CAPSULE ORAL at 18:02

## 2025-06-29 RX ADMIN — HEPARIN SODIUM 5000 UNITS: 5000 INJECTION INTRAVENOUS; SUBCUTANEOUS at 01:00

## 2025-06-29 RX ADMIN — VORICONAZOLE 200 MG: 200 TABLET, COATED ORAL at 09:11

## 2025-06-29 RX ADMIN — ACYCLOVIR 400 MG: 400 TABLET ORAL at 09:12

## 2025-06-29 RX ADMIN — TAMSULOSIN HYDROCHLORIDE 0.4 MG: 0.4 CAPSULE ORAL at 09:12

## 2025-06-29 RX ADMIN — CALCIUM 1 TABLET: 500 TABLET ORAL at 20:12

## 2025-06-29 RX ADMIN — VORICONAZOLE 200 MG: 200 TABLET, COATED ORAL at 20:12

## 2025-06-29 RX ADMIN — Medication 5 MG: at 02:56

## 2025-06-29 RX ADMIN — HEPARIN SODIUM 5000 UNITS: 5000 INJECTION INTRAVENOUS; SUBCUTANEOUS at 09:11

## 2025-06-29 RX ADMIN — HEPARIN SODIUM 5000 UNITS: 5000 INJECTION INTRAVENOUS; SUBCUTANEOUS at 15:18

## 2025-06-29 RX ADMIN — OXYCODONE HYDROCHLORIDE 10 MG: 10 TABLET ORAL at 22:31

## 2025-06-29 RX ADMIN — LEVOTHYROXINE SODIUM 125 MCG: 0.05 TABLET ORAL at 06:38

## 2025-06-29 RX ADMIN — VANCOMYCIN HYDROCHLORIDE 125 MG: 125 CAPSULE ORAL at 01:00

## 2025-06-29 RX ADMIN — AMLODIPINE BESYLATE 5 MG: 5 TABLET ORAL at 15:18

## 2025-06-29 RX ADMIN — VANCOMYCIN HYDROCHLORIDE 125 MG: 125 CAPSULE ORAL at 12:12

## 2025-06-29 RX ADMIN — PREDNISONE 35 MG: 20 TABLET ORAL at 09:11

## 2025-06-29 RX ADMIN — ACYCLOVIR 400 MG: 400 TABLET ORAL at 20:13

## 2025-06-29 RX ADMIN — CALCIUM 1 TABLET: 500 TABLET ORAL at 09:12

## 2025-06-29 RX ADMIN — ASPIRIN 81 MG: 81 TABLET, COATED ORAL at 15:18

## 2025-06-29 RX ADMIN — INSULIN LISPRO 4 UNITS: 100 INJECTION, SOLUTION INTRAVENOUS; SUBCUTANEOUS at 21:55

## 2025-06-29 RX ADMIN — PRAVASTATIN SODIUM 40 MG: 40 TABLET ORAL at 20:12

## 2025-06-29 ASSESSMENT — COGNITIVE AND FUNCTIONAL STATUS - GENERAL
DRESSING REGULAR UPPER BODY CLOTHING: A LITTLE
MOVING TO AND FROM BED TO CHAIR: A LITTLE
DAILY ACTIVITIY SCORE: 19
PERSONAL GROOMING: A LITTLE
HELP NEEDED FOR BATHING: A LITTLE
DRESSING REGULAR LOWER BODY CLOTHING: A LITTLE
MOVING FROM LYING ON BACK TO SITTING ON SIDE OF FLAT BED WITH BEDRAILS: A LITTLE
MOBILITY SCORE: 19
CLIMB 3 TO 5 STEPS WITH RAILING: A LOT
TOILETING: A LITTLE
WALKING IN HOSPITAL ROOM: A LITTLE

## 2025-06-29 ASSESSMENT — PAIN SCALES - GENERAL
PAINLEVEL_OUTOF10: 8
PAINLEVEL_OUTOF10: 2
PAINLEVEL_OUTOF10: 0 - NO PAIN

## 2025-06-29 ASSESSMENT — PAIN DESCRIPTION - LOCATION: LOCATION: INCISION

## 2025-06-29 ASSESSMENT — PAIN - FUNCTIONAL ASSESSMENT
PAIN_FUNCTIONAL_ASSESSMENT: 0-10
PAIN_FUNCTIONAL_ASSESSMENT: 0-10

## 2025-06-29 ASSESSMENT — PAIN DESCRIPTION - DESCRIPTORS: DESCRIPTORS: DULL

## 2025-06-29 ASSESSMENT — ACTIVITIES OF DAILY LIVING (ADL): LACK_OF_TRANSPORTATION: NO

## 2025-06-29 NOTE — CARE PLAN
The patient's goals for the shift include      The clinical goals for the shift include Patient will remain HDS throughout shift      Problem: Safety - Adult  Goal: Free from fall injury  Outcome: Progressing     Problem: Discharge Planning  Goal: Discharge to home or other facility with appropriate resources  Outcome: Progressing     Problem: Chronic Conditions and Co-morbidities  Goal: Patient's chronic conditions and co-morbidity symptoms are monitored and maintained or improved  Outcome: Progressing     Problem: Nutrition  Goal: Nutrient intake appropriate for maintaining nutritional needs  Outcome: Progressing     Problem: Skin  Goal: Decreased wound size/increased tissue granulation at next dressing change  Outcome: Progressing  Goal: Participates in plan/prevention/treatment measures  Outcome: Progressing  Goal: Prevent/manage excess moisture  Outcome: Progressing  Goal: Prevent/minimize sheer/friction injuries  Outcome: Progressing  Goal: Promote/optimize nutrition  Outcome: Progressing  Goal: Promote skin healing  Outcome: Progressing     Problem: Heart Failure  Goal: Improved gas exchange this shift  Outcome: Progressing  Goal: Improved urinary output this shift  Outcome: Progressing  Goal: Reduction in peripheral edema within 24 hours  Outcome: Progressing  Goal: Report improvement of dyspnea/breathlessness this shift  Outcome: Progressing  Goal: Weight from fluid excess reduced over 2-3 days, then stabilize  Outcome: Progressing  Goal: Increase self care and/or family involvement in 24 hours  Outcome: Progressing     Problem: Pain  Goal: Takes deep breaths with improved pain control throughout the shift  Outcome: Progressing  Goal: Turns in bed with improved pain control throughout the shift  Outcome: Progressing  Goal: Walks with improved pain control throughout the shift  Outcome: Progressing  Goal: Participates in PT with improved pain control throughout the shift  Outcome: Progressing

## 2025-06-29 NOTE — PROGRESS NOTES
[facility]  Advanced HF PROGRESS NOTE    Anatoly Lane/[id number]    Admit Date: 6/20/2025  Hospital Length of Stay: 9   ICU Length of Stay: 4d   Primary Service: HFICU  Primary HF Cardiologist:   Referring:     INTERVAL EVENTS / PERTINENT ROS:   Reports productive cough that has been present for years, no fever, CXR with stable small L sided pleural effusion.Denies CP, SOB, LE edema or any other complaints    Plan:  - BP high, introduce Norvasc 5 mg  - Tacro dose increased to 2.5 mg BID todat, trough was 5.5  - Cardiac biopsy 07/01, scheduled   - PT/OT     MEDICATIONS  Infusions:       Scheduled:  acetaminophen, 650 mg, q6h  acyclovir, 400 mg, BID  amLODIPine, 5 mg, Daily  calcium carbonate, 1,250 mg of calcium carbonate, BID  cholecalciferol, 50 mcg, Daily  gabapentin, 300 mg, Nightly  heparin (porcine), 5,000 Units, q8h  insulin lispro, 0-20 Units, Before meals & nightly  levothyroxine, 125 mcg, Daily  mycophenolate, 1,000 mg, q12h LUPILLO  pantoprazole, 40 mg, Daily before breakfast  perflutren lipid microspheres, 0.5-10 mL of dilution, Once in imaging  perflutren protein A microsphere, 0.5 mL, Once in imaging  pravastatin, 40 mg, Nightly  predniSONE, 35 mg, q12h LUPILLO  sulfamethoxazole-trimethoprim, 1 tablet, Daily  sulfur hexafluoride microsphr, 2 mL, Once in imaging  tacrolimus, 2.5 mg, q12h LUPILLO  tamsulosin, 0.4 mg, Daily  vancomycin, 125 mg, 4x daily  voriconazole, 200 mg, q12h LUPILLO      PRN:  dextrose, 12.5 g, q15 min PRN  dextrose, 25 g, q15 min PRN  dextrose, 25 g, q15 min PRN  glucagon, 1 mg, q15 min PRN  glucagon, 1 mg, q15 min PRN  guaiFENesin, 600 mg, BID PRN  lidocaine, 1 patch, Daily PRN  melatonin, 5 mg, Nightly PRN  naloxone, 0.2 mg, q5 min PRN  ondansetron ODT, 4 mg, q8h PRN   Or  ondansetron, 4 mg, q8h PRN  oxyCODONE, 10 mg, q4h PRN  oxyCODONE, 5 mg, q4h PRN  oxygen, , Continuous PRN - O2/gases  polyethylene glycol, 17 g, Daily PRN      PHYSICAL EXAM:   Visit Vitals  /74  "(BP Location: Left arm, Patient Position: Sitting)   Pulse 79   Temp 36.1 °C (97 °F) (Temporal)   Resp 18   Ht 1.753 m (5' 9\")   Wt 98.8 kg (217 lb 14.4 oz)   SpO2 95%   BMI 32.18 kg/m²   Smoking Status Former   BSA 2.19 m²       Wt Readings from Last 5 Encounters:   06/29/25 98.8 kg (217 lb 14.4 oz)   02/27/25 102 kg (224 lb)   01/28/25 104 kg (229 lb)   11/19/24 112 kg (246 lb 12.8 oz)   07/15/24 110 kg (243 lb 9.6 oz)       INTAKE/OUTPUT:  I/O last 3 completed shifts:  In: 1613 (16.8 mL/kg) [P.O.:1440; I.V.:173 (1.8 mL/kg)]  Out: 2550 (26.5 mL/kg) [Urine:2550 (0.7 mL/kg/hr)]  Dosing Weight: 96.3 kg      Physical Exam  HENT:      Head: Normocephalic and atraumatic.   Cardiovascular:      Rate and Rhythm: Normal rate.      Heart sounds: No murmur heard.     No friction rub. No gallop.   Pulmonary:      Effort: Pulmonary effort is normal. No respiratory distress.      Breath sounds: Rales present. No wheezing.      Comments: Stenotomy incision appears C/D/I  Abdominal:      General: Abdomen is flat. There is no distension.      Palpations: Abdomen is soft. There is no mass.      Tenderness: There is no abdominal tenderness. There is no guarding.   Musculoskeletal:      Right lower leg: No edema.      Left lower leg: No edema.   Skin:     General: Skin is warm.      Findings: No rash.   Neurological:      General: No focal deficit present.      Mental Status: He is alert and oriented to person, place, and time.   Psychiatric:         Mood and Affect: Mood normal.         Behavior: Behavior normal.         DATA:  CMP:  Results from last 7 days   Lab Units 06/29/25  0631 06/28/25  0606 06/27/25  0621 06/26/25  0506 06/25/25  0609 06/24/25  1320 06/24/25  0050 06/23/25  1433 06/23/25  0134 06/22/25  1443   SODIUM mmol/L 137 139 139 140 141 142 141 143 143 143   POTASSIUM mmol/L 5.1 4.1 3.9 3.9 4.0 4.6 4.5 4.8 4.3 3.9   CHLORIDE mmol/L 101 97* 97* 98 101 106 106 107 107 107   CO2 mmol/L 25 37* 35* 34* 34* 30 32 30 29 25 " "  ANION GAP mmol/L 16 9* 11 12 10 11 8* 11 11 15   BUN mg/dL 38* 41* 35* 33* 34* 30* 26* 26* 23 20   CREATININE mg/dL 0.95 0.94 0.99 0.95 0.76 0.79 0.77 0.78 0.76 0.73   EGFR mL/min/1.73m*2 89 >90 85 89 >90 >90 >90 >90 >90 >90   MAGNESIUM mg/dL 2.47* 2.37 2.16 2.25 2.25 2.42* 2.30 2.58* 2.51* 2.27   ALBUMIN g/dL 3.4 3.7 3.7 3.8 3.6 4.2 3.8 3.8 3.9 3.9     CBC:  Results from last 7 days   Lab Units 06/29/25  0631 06/28/25  0606 06/27/25  0621 06/26/25  0506 06/25/25  0609 06/24/25  1320 06/24/25  0050 06/23/25  1433   WBC AUTO x10*3/uL 19.6* 20.7* 23.1* 12.9* 13.1* 18.7* 28.2* 27.9*   HEMOGLOBIN g/dL 8.6* 8.5* 7.7* 7.5* 7.7* 7.1* 7.6* 7.5*   HEMATOCRIT % 27.2* 26.4* 22.7* 23.5* 23.4* 21.5* 23.8* 23.0*   PLATELETS AUTO x10*3/uL 228 193 173 154 112* 102* 130* 125*   MCV fL 98 97 90 95 94 95 94 95     COAG:         ABO:   No results found for: \"ABO\"    HEME/ENDO:  Results from last 7 days   Lab Units 06/25/25  0609   FERRITIN ng/mL 130   IRON SATURATION % 21*      CARDIAC:       No lab exists for component: \"CK\", \"CKMBP\"    ASSESSMENT AND PLAN:   Anatoly Lane is a 64 year old male with relevant PMH of ex smoker, NICM, stage D HFrEF NYHA 2-3, S/p HM2 (4/30/23), VT s/p ICD, CAD, pAF, HTN, BPH, GERD and hypothyroidism.  S/p LVAD explant, AICD removal & OHT by Dr. Lorenzo on 6/21. Now transferred from CTICU to HF ICU 6/24. Patient arrived to HFICU HDS stable, on milrinone drip 0.125 mcg/kg/min. Aiming to wean milrinone and optimize volume while in the HFICU.    Neuro:  # Anxiety. Depression, Acute pain   # BUE/BLE mild tremor  - Serial neuro and pain assessments   - PO Tylenol PRN for pain  - PT/OT Consult, OOB to chair  - CAM ICU score every shift  - Sleep/wake cycle normalization    #L hand extensor palsy and 4th+5th digital numbness  - On 6/27, patient c/o inability to extend digits 2-5 around the MCP joints. In addition, he is complaining of numbness from the medial aspect of the wrist to the 4th and 5th digits and " part of the palm.  - Deficits do not align completely with a specific nerve injury- would not expect numbness in digits 4-5 with a radial nerve palsy and would not expect difficult with finger extension with an ulnar nerve palsy.  - Ddx includes nerve compression from forearm edema vs brachial plexus injury.  - Remove brachial mary today.  - Consider neuro consult.  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  Now s/p LVAD explant, AICD removal, & OHT on 6/21  - TTE (6/22): Normal BiV   Donor/Recipient Infectious history:   CMV: -/-   EBV: +/+   Toxo: -/-   Heb B: -/-  Hep C: -/-  HIV: -/-      Rejection/Prophylaxis:    - Induction: - Induction: s/p methylprednisolone 500mg IV x2  - Postoperative Induction Plan:  S/p Basiliximab 20mg IV within 1 hour of arrival to CTICU (no need for premedication), s/p 2nd dose: 20mg IVPB on POD4 (06/25)   Steroid taper starting with Methylprednisolone 125mg IV Q8 x 3 doses followed by IV Solumedrol 60 mg q 12 hrs x 2 doses followed by prednisone 35 mg q 12hrs PO q 12 hrs  - Steroids: Currently on prednisone 35 mg q 12hrs PO q 12 hrs until first biopsy   - Tacrolimus: Tacro 5.5 on 2.0mg q12. Increased to 2.5 mg BID tdoay   - Tacrolimus goal troughs: 10-12  - Cellcept: C/w 1,000mg BID  - Antifungals: c/w voriconazole 200mg BID x3 months end date 09/22/2025   - Antivirals: c/w acylovir 400mg BID (06/22 x3 months. End date 09/22/2025   - Anti PCP & Toxoplasmosis: Can start SS Bactrim daily (end date of 12/22/25)      Next RHC/biopsy: Scheduled for 7/1 w/ Dr. Velasco   Last TTE/BOBBY on 6/23: ; normal BiV function   Last HLA: First due on 7/24  Last Allomap: ~ will assess starting at 6-month ana post-op    Last Allosure: ~ will assess starting at 6-month ana post-op    Last [facility]: 03/2023(pre OHT); First due ~ (one year post tx)   Osteopenia/osteoporosis prophylaxis: C/w Vit D 2000U daily and calcium 500 mg BID (give calcium 2 hrs after MMF)   Peptic/gastric ulcer prophylaxis: Pantoprazole 40mg daily    CAV Prophylaxis:  C/w  aspirin and  pravastatin 40 mg QHS  Pacing: Pacer back up rate set to 60 BPM. Native conduction is NSR at 75 BPM, pacing wires can be cut.     # HTN:  - BP elevated, will start Norvasc 5 mg today      Pulmonary:   # Ex-smoker  -Monitor and maintain SpO2 > 92%     GI:  # H/x GERD  - Bowel regimen  - PPI     :  # H/x BPH  -Baseline Cr: 1.0   -Admit BUN/Cr: Normal   -I/Os  -avoid hypotension and nephrotoxic agents  - C/w home tamsulosin 0.4 mg daily.     Heme:  # Acute blood loss anemia stable, no active s/s of bleeding  - Trending H/h   - Iron studies: %saturation 21%, ferritin 130. No need for IV iron.      Endo:  # Steroid and stress induced hyperglycemia  - Maintain BG <180  - continue SSI to #4   - hgbA1c  was 4.1     # H/o of hypothyroid  -TSH (3/2025): 1.83  - Maintain BG <180      ID:  # Reactive leukocytosis (improving)  - MRSA negative  - completed periop vanc/zosyn x48hrs with hx of LVAD  - trend temps q4h     PHYSICAL AND OCCUPATIONAL THERAPY: PT/OT    LINES: R internal jugular swan (6/21/25-*06/28)  Arterial line L brachial (6/21-6/27)    DVT: Heparin SQ  ULCER PPX: PPI  GLYCEMIC CONTROL: SSI  BOWEL CARE: Senna, mirlax  NUTRITION: Adult diet Regular, Consistent Carb; CCD 90 gm/meal      EMERGENCY CONTACT: Extended Emergency Contact Information  Primary Emergency Contact: Janice Ramos  Address: [address] [place] [id number] [facility]  Home Phone: [phone]  Mobile Phone: [phone]  Relation: Mother  Secondary Emergency Contact: Bird Lane  Address: [address] [place] [address] [address]           [place] [address] [facility]  Home Phone: [phone]  Mobile Phone: [phone]  Relation:  Son  Preferred language: English   needed? No  FAMILY UPDATE: At bedside  CODE STATUS: Full Code  DISPO:  Pending PT eval     Patient seen and assessed with Dr. Hylton

## 2025-06-29 NOTE — CARE PLAN
The patient's goals for the shift include        Problem: Safety - Adult  Goal: Free from fall injury  Outcome: Progressing     Problem: Nutrition  Goal: Nutrient intake appropriate for maintaining nutritional needs  Outcome: Progressing     Problem: Skin  Goal: Decreased wound size/increased tissue granulation at next dressing change  Outcome: Progressing  Flowsheets (Taken 6/29/2025 3400)  Decreased wound size/increased tissue granulation at next dressing change: Promote sleep for wound healing

## 2025-06-29 NOTE — PROGRESS NOTES
06/29/25 1103   Discharge Planning   Number of Stairs to Enter Residence 5   Number of Stairs Within Residence 0  (15 to the basement with hand rails)   Do you have animals or pets at home? No   Who is requesting discharge planning? Provider   Home or Post Acute Services None   Expected Discharge Disposition Home H   Does the patient need discharge transport arranged? No   Financial Resource Strain   How hard is it for you to pay for the very basics like food, housing, medical care, and heating? Not very   Housing Stability   In the last 12 months, was there a time when you were not able to pay the mortgage or rent on time? N   In the past 12 months, how many times have you moved where you were living? 0   At any time in the past 12 months, were you homeless or living in a shelter (including now)? N   Transportation Needs   In the past 12 months, has lack of transportation kept you from medical appointments or from getting medications? no   In the past 12 months, has lack of transportation kept you from meetings, work, or from getting things needed for daily living? No   Patient Choice   Provider Choice list and CMS website (https://medicare.gov/care-compare#search) for post-acute Quality and Resource Measure Data were provided and reviewed with: Patient     Met with patient to introduce myself, role and discuss discharge planning.  Patient lives alone.  Patient is independent in all adl's.  Patient denies any recent falls.  Patient is independent in all adl's.  Patient denies active home care, but is agreeable to home care post discharge.  Patient to be provided a list of home care agencies via care port of 4-5 star agencies with 20 miles of his home.  Patient does feel safe at home and denies any issues with making follow up appointments or getting his medications.  Patient has expressed no questions and no social work concerns.  Family will transport home.  Insurance:  [employer]  PCP:  N/A  Pharmacy:   [facility]  HD:  N/A  DM:  N/A  DME:  N/A  No issues with ambulating stairs.

## 2025-06-29 NOTE — PROGRESS NOTES
"CARDIAC SURGERY DAILY PROGRESS NOTE    Anatoly Lane is a 64 year old male with relevant PMH of ex smoker, NICM, stage D HFrEF NYHA 2-3, S/p HM2 (4/30/23), VT s/p ICD, CAD, pAF, HTN, BPH, GERD and hypothyroidism. S/p LVAD explant, AICD removal & OHT by Dr. Lorenzo on 6/21. Now transferred from CTICU to HF ICU 6/24, and then to T3 on 6/28.     06/21/2025 OPERATION/PROCEDURE:  with Dr. Pham and Dr. Schrader   1.Orthotopic heart Tx ( bi-caval anastomoses))  2. Removal of AICD  3. Right femoral cutdown and direct cannulation of right femoral artery and vein for CPB, and direct repair of both  4. Explantation of the LVAD HM3  5. Redo sternotomy  6. Recipient cardiectomy and extensive cardiolysis  7. Preparation of the donor heart of a back table    CTICU/HFICU Course: uneventful   Transferred to T3 on 6/28    Interval History:   HF Transplant team is following for management   - Next RHC/Biopsy: Scheduled for 7/1 with Dr. Da Silva   - Last HLA: First due on 7/24  - Last Allomap: ~ will assess starting at 6-month ana post-op    - Last Allosure: ~ will assess starting at 6-month ana post-op   - Last LHC: 03/2023(pre OHT); First due ~ (one year post tx)     SUBJECTIVE:  Doing well this AM, no questions or concerns, pain well controlled.     Objective   /74 (BP Location: Left arm, Patient Position: Sitting)   Pulse 79   Temp 36.1 °C (97 °F) (Temporal)   Resp 18   Ht 1.753 m (5' 9\")   Wt 98.8 kg (217 lb 14.4 oz)   SpO2 95%   BMI 32.18 kg/m²   0-10 (Numeric) Pain Score: 0 - No pain   3 Day Weight Change: -1.12 kg (-2 lb 7.5 oz) per day    Intake and Output    Intake/Output Summary (Last 24 hours) at 6/29/2025 1453  Last data filed at 6/29/2025 1157  Gross per 24 hour   Intake 600 ml   Output 800 ml   Net -200 ml       Physical Exam  Physical Exam  Vitals and nursing note reviewed.   Constitutional:       Appearance: Normal appearance.   HENT:      Head: Normocephalic and atraumatic.      Mouth/Throat:      " Mouth: Mucous membranes are moist.   Eyes:      Conjunctiva/sclera: Conjunctivae normal.   Cardiovascular:      Rate and Rhythm: Normal rate and regular rhythm.      Pulses: Normal pulses.      Heart sounds: Normal heart sounds.      Comments: Tele: NSR HR 70s  Epicardial wires AAI@60  Pulmonary:      Effort: Pulmonary effort is normal.      Comments: On RA  Diminished lung sounds bilaterally   Abdominal:      General: Bowel sounds are normal.      Palpations: Abdomen is soft.      Comments: BM 6/28   Genitourinary:     Comments: Voids independently   Musculoskeletal:      Cervical back: Neck supple.      Comments: GAINES    -inability to extend digits 2-5 around the MCP joints   Skin:     General: Skin is warm and dry.      Capillary Refill: Capillary refill takes less than 2 seconds.      Comments: midsternal chest incision C/D/I, well approximated, no s/s infection     Neurological:      General: No focal deficit present.      Mental Status: He is alert and oriented to person, place, and time.   Psychiatric:         Mood and Affect: Mood normal.         Behavior: Behavior normal.       Medications  Scheduled medications  Scheduled Medications[1]Continuous medications  Continuous Medications[2]PRN medications  PRN Medications[3]    Labs  Results for orders placed or performed during the hospital encounter of 06/20/25 (from the past 24 hours)   POCT GLUCOSE   Result Value Ref Range    POCT Glucose 185 (H) 74 - 99 mg/dL   POCT GLUCOSE   Result Value Ref Range    POCT Glucose 159 (H) 74 - 99 mg/dL   Tacrolimus   Result Value Ref Range    Tacrolimus  5.5 <=15.0 ng/mL   Magnesium   Result Value Ref Range    Magnesium 2.47 (H) 1.60 - 2.40 mg/dL   CBC   Result Value Ref Range    WBC 19.6 (H) 4.4 - 11.3 x10*3/uL    nRBC 0.2 (H) 0.0 - 0.0 /100 WBCs    RBC 2.77 (L) 4.50 - 5.90 x10*6/uL    Hemoglobin 8.6 (L) 13.5 - 17.5 g/dL    Hematocrit 27.2 (L) 41.0 - 52.0 %    MCV 98 80 - 100 fL    MCH 31.0 26.0 - 34.0 pg    MCHC 31.6 (L)  32.0 - 36.0 g/dL    RDW 17.7 (H) 11.5 - 14.5 %    Platelets 228 150 - 450 x10*3/uL   Renal Function Panel   Result Value Ref Range    Glucose 123 (H) 74 - 99 mg/dL    Sodium 137 136 - 145 mmol/L    Potassium 5.1 3.5 - 5.3 mmol/L    Chloride 101 98 - 107 mmol/L    Bicarbonate 25 21 - 32 mmol/L    Anion Gap 16 10 - 20 mmol/L    Urea Nitrogen 38 (H) 6 - 23 mg/dL    Creatinine 0.95 0.50 - 1.30 mg/dL    eGFR 89 >60 mL/min/1.73m*2    Calcium 8.6 8.6 - 10.6 mg/dL    Phosphorus 4.4 2.5 - 4.9 mg/dL    Albumin 3.4 3.4 - 5.0 g/dL   POCT GLUCOSE   Result Value Ref Range    POCT Glucose 136 (H) 74 - 99 mg/dL   POCT GLUCOSE   Result Value Ref Range    POCT Glucose 201 (H) 74 - 99 mg/dL         IMAGING/ DIAGNOSTIC TESTING:  I have personally reviewed the following test result(s):      XR chest 1 view 06/29/2025   IMPRESSION:  1.  Stable small left pleural effusion with associated left basilar  atelectasis.  2. Interval removal of the Lincoln-Ricardo catheter.    Transthoracic ECHO (TTE) Limited 06/27/2025  CONCLUSIONS:   1. The left ventricular systolic function is normal with a visually estimated ejection fraction of 60-65%.   2. Abnormal septal motion consistent with post-operative status.   3. There is mildly reduced right ventricular systolic function.   4. Mildly enlarged right ventricle.   5. The Doppler estimated RVSP is within normal limits at 25 mmHg.    IMPRESSION & PLAN:  POD # 8 s/p LVAD explant, AICD removal and OHT by Dr. Schrader on 6/21  - Donor/Recipient Infectious Hx: CMV: -/-, EBV: +/+, Toxo: -/-, Hep B: -/-, Hep C: -/-, HIV: -/-   - Increase activity/ ambulation; PT/OT  - Encourage IS, C/DB; respiratory therapy; wean O2 as arsalan   - Cardiac rehab referral   - Continue cardiac meds:  statin   - Pain and anticonstipation meds  - Additional imaging per HF team  - TTE 6/22: Normal Bi-V  - Cut epicardial wires prior to discharge   - Tele until discharge  - Optimize nutrition and electrolytes    Rhythm: Hx pAF, VT s/p ICU  now s/p AICD removal: home med: amiodarone 200mg daily   - Tele: NSR HR 70s  - Adjust medications as tolerated      Rejection/Prophylaxis   - Rejection/prophylaxis per heart transplant team   - Induction: - Induction: s/p methylprednisolone 500mg IV x2  - Postoperative Induction Plan:  S/p Basiliximab 20mg IV within 1 hour of arrival to CTICU (no need for premedication), s/p 2nd dose: 20mg IVPB on POD4 (06/25)   Steroid taper starting with Methylprednisolone 125mg IV Q8 x 3 doses followed by IV Solumedrol 60 mg q 12 hrs x 2 doses followed by prednisone 35 mg q 12hrs PO q 12 hrs  - Steroids: Currently on prednisone 35 mg q 12hrs PO q 12 hrs until first biopsy   - Tacrolimus: Tacro 5.5 on 2.0mg q12. Increased to 2.5 mg BID 6/29  - Tacrolimus goal troughs: 10-12  - Cellcept: C/w 1,000mg BID  - Antifungals: c/w voriconazole 200mg BID x3 months end date 09/22/2025   - Antivirals: c/w acylovir 400mg BID (06/22 x3 months. End date 09/22/2025   - Anti PCP & Toxoplasmosis: Can start SS Bactrim daily (end date of 12/22/25)   - Osteopenia/osteoporosis prophylaxis: c/w Vit D 2000U daily and calcium 500mg BID   - Peptic/gastric ulcer prophylaxis: Pantoprazole 40mg daily   - CAV Prophylaxis: c/w aspirin and pravastatin 40mg at bedtime    Tacrolimus   Recent Labs     06/29/25  0611 06/28/25  0606 06/27/25  0621 06/26/25  0555 06/25/25  0609 06/24/25  0623 06/23/25  0540 06/22/25  0530   TACROLIMUS 5.5 5.6 4.7 3.6 <2.0 2.2 2.3 <2.0       Acute Blood Loss Anemia   Recent Labs     06/29/25  0631 06/28/25  0606 06/27/25  0621 06/26/25  0506 06/25/25  0609 06/24/25  1320 06/24/25  0050   HGB 8.6* 8.5* 7.7* 7.5* 7.7* 7.1* 7.6*   HCT 27.2* 26.4* 22.7* 23.5* 23.4* 21.5* 23.8*   - Daily labs, transfuse as indicated    Thrombocytopenia  Recent Labs     06/29/25  0631 06/28/25  0606 06/27/25  0621 06/26/25  0506 06/25/25  0609 06/24/25  1320 06/24/25  0050    193 173 154 112* 102* 130*   - Etiology likely postop/CPB related  -  Continue to trend with daily CBCs    Volume/Electrolyte Status: Preop wt Weight: 96.3 kg (212 lb 4.9 oz)   Vitals:    25 0531   Weight: 98.8 kg (217 lb 14.4 oz)   - Weight: 98.8, 96.3  - Adjust diuresis as needed for postop cardiac surgery hypervolemia  - Replete electrolytes for hypokalemia/hypomagnesemia/hypophosphatemia as needed   - Daily weights and strict I&Os  - Daily RFP while admitted    HFrEF NYHA 2-3, s/p HM2 23  - Home meds: Entresto 49-51mg BID, torsemide 30mg daily, eplerenone 25mg daily, warfarin 2.5/4mg  - s/p OHT on , ECHO  LVEF 60-65%    Leukocytosis  Recent Labs     25  0631 25  0606 25  0621 25  0506 25  0609 25  1320 25  0050   WBC 19.6* 20.7* 23.1* 12.9* 13.1* 18.7* 28.2*   Temp (36hrs), Av.1 °C (97 °F), Min:35.7 °C (96.3 °F), Max:36.5 °C (97.7 °F)  - aggressive pulmonary hygiene  - monitor for s/s infection  - likely atelectasis/ postoperative in etiology  - daily CBC to follow    L. Hand extensor palsy and 4th-5th digital numbness   - : pt c/o inability to extend digits 2-5 around MCP joints.  Additionally complaining of numbness from the medial aspect of the wrist to the 4th-5th digits and part of palm.    - Ddx nerve compression from forearm edema vs. Brachial plexus injurty   - Consider neuro consult     Neuropathic pain of R. 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 insertion of cardiopulmonary bypass cannulae in right groin   - Continue Gabapentin 300mg at bedtime     Hypertension: home meds: digoxin 125mcg, Entresto 49-51mg BID  Systolic (24hrs), Av , Min:129 , Max:155   - : started Norvasc 5mg daily per HF   - additional antihypertensives as needed    Tobacco use  -encourage smoking cessation  -offered nicotine replacement  -aggressive pulmonary hygiene  -wean O2 for O2 sats > 92%  -ABGs PRN    GERD:  home pantoprazole 40mg daily  - continue pantoprazole  - diet as tolerated    BPH: home meds:  tamsulosin 0.4 daily   -continue home tamsulosin  -voiding after conteh removed  -monitor for s/s retention    Hypothyroidism: home meds: levothyroxine 125mcg  Lab Results   Component Value Date    TSH 1.83 03/26/2025    - continue synthroid  - follow up with PCP for TSH level after discharge as appropriate      VTE Prophylaxis: SCDs/TEDs, ambulation, SQ heparin  Code Status: Full Code    Dispo  - PT/OT recs low intensity   - Would benefit from homecare RN for cardiac surgery carepath  - Dispo per HF team     Kristin Rose PA-C  Cardiac Surgery TOM  [facility]  Team Phone [phone]    6/29/2025  2:53 PM               [1] acetaminophen, 650 mg, oral, q6h  acyclovir, 400 mg, oral, BID  amLODIPine, 5 mg, oral, Daily  aspirin, 81 mg, oral, Daily  calcium carbonate, 1,250 mg of calcium carbonate, oral, BID  cholecalciferol, 50 mcg, oral, Daily  gabapentin, 300 mg, oral, Nightly  heparin (porcine), 5,000 Units, subcutaneous, q8h  insulin lispro, 0-20 Units, subcutaneous, Before meals & nightly  levothyroxine, 125 mcg, oral, Daily  mycophenolate, 1,000 mg, oral, q12h LUPILLO  pantoprazole, 40 mg, oral, Daily before breakfast  perflutren lipid microspheres, 0.5-10 mL of dilution, intravenous, Once in imaging  perflutren protein A microsphere, 0.5 mL, intravenous, Once in imaging  pravastatin, 40 mg, oral, Nightly  predniSONE, 35 mg, oral, q12h LUPILLO  sulfamethoxazole-trimethoprim, 1 tablet, oral, Daily  sulfur hexafluoride microsphr, 2 mL, intravenous, Once in imaging  tacrolimus, 2.5 mg, oral, q12h LUPILLO  tamsulosin, 0.4 mg, oral, Daily  vancomycin, 125 mg, oral, 4x daily  voriconazole, 200 mg, oral, q12h LUPILLO  [2]    [3] PRN medications: dextrose, dextrose **OR** [DISCONTINUED] glucagon, dextrose, glucagon, glucagon, guaiFENesin, lidocaine, melatonin, naloxone, ondansetron ODT **OR** ondansetron, oxyCODONE, oxyCODONE, oxygen, polyethylene glycol

## 2025-06-30 ENCOUNTER — APPOINTMENT (OUTPATIENT)
Dept: RADIOLOGY | Facility: HOSPITAL | Age: 65
End: 2025-06-30
Payer: MEDICARE

## 2025-06-30 ENCOUNTER — APPOINTMENT (OUTPATIENT)
Dept: CARDIOLOGY | Facility: HOSPITAL | Age: 65
End: 2025-06-30
Payer: MEDICARE

## 2025-06-30 LAB
ALBUMIN SERPL BCP-MCNC: 3.3 G/DL (ref 3.4–5)
ANION GAP SERPL CALC-SCNC: 12 MMOL/L (ref 10–20)
BUN SERPL-MCNC: 35 MG/DL (ref 6–23)
CALCIUM SERPL-MCNC: 8.5 MG/DL (ref 8.6–10.6)
CHLORIDE SERPL-SCNC: 101 MMOL/L (ref 98–107)
CO2 SERPL-SCNC: 29 MMOL/L (ref 21–32)
CREAT SERPL-MCNC: 0.82 MG/DL (ref 0.5–1.3)
EGFRCR SERPLBLD CKD-EPI 2021: >90 ML/MIN/1.73M*2
EJECTION FRACTION APICAL 4 CHAMBER: 70.3
EJECTION FRACTION: 63 %
ERYTHROCYTE [DISTWIDTH] IN BLOOD BY AUTOMATED COUNT: 18.4 % (ref 11.5–14.5)
GLUCOSE BLD MANUAL STRIP-MCNC: 152 MG/DL (ref 74–99)
GLUCOSE BLD MANUAL STRIP-MCNC: 172 MG/DL (ref 74–99)
GLUCOSE BLD MANUAL STRIP-MCNC: 174 MG/DL (ref 74–99)
GLUCOSE BLD MANUAL STRIP-MCNC: 208 MG/DL (ref 74–99)
GLUCOSE SERPL-MCNC: 131 MG/DL (ref 74–99)
HCT VFR BLD AUTO: 26.8 % (ref 41–52)
HGB BLD-MCNC: 8.5 G/DL (ref 13.5–17.5)
LEFT VENTRICLE INTERNAL DIMENSION DIASTOLE: 4.4 CM (ref 3.5–6)
MAGNESIUM SERPL-MCNC: 2.35 MG/DL (ref 1.6–2.4)
MCH RBC QN AUTO: 31.3 PG (ref 26–34)
MCHC RBC AUTO-ENTMCNC: 31.7 G/DL (ref 32–36)
MCV RBC AUTO: 99 FL (ref 80–100)
MITRAL VALVE E/A RATIO: 1.94
NRBC BLD-RTO: 0.1 /100 WBCS (ref 0–0)
PHOSPHATE SERPL-MCNC: 4.1 MG/DL (ref 2.5–4.9)
PLATELET # BLD AUTO: 243 X10*3/UL (ref 150–450)
POTASSIUM SERPL-SCNC: 5.1 MMOL/L (ref 3.5–5.3)
RBC # BLD AUTO: 2.72 X10*6/UL (ref 4.5–5.9)
RIGHT VENTRICLE FREE WALL PEAK S': 7.4 CM/S
SODIUM SERPL-SCNC: 137 MMOL/L (ref 136–145)
TACROLIMUS BLD-MCNC: 8.1 NG/ML
WBC # BLD AUTO: 17.4 X10*3/UL (ref 4.4–11.3)

## 2025-06-30 PROCEDURE — C8924 2D TTE W OR W/O FOL W/CON,FU: HCPCS

## 2025-06-30 PROCEDURE — 93308 TTE F-UP OR LMTD: CPT | Performed by: INTERNAL MEDICINE

## 2025-06-30 PROCEDURE — 80069 RENAL FUNCTION PANEL: CPT

## 2025-06-30 PROCEDURE — 2500000002 HC RX 250 W HCPCS SELF ADMINISTERED DRUGS (ALT 637 FOR MEDICARE OP, ALT 636 FOR OP/ED)

## 2025-06-30 PROCEDURE — 2500000001 HC RX 250 WO HCPCS SELF ADMINISTERED DRUGS (ALT 637 FOR MEDICARE OP)

## 2025-06-30 PROCEDURE — 97116 GAIT TRAINING THERAPY: CPT | Mod: GP,CQ

## 2025-06-30 PROCEDURE — 82947 ASSAY GLUCOSE BLOOD QUANT: CPT

## 2025-06-30 PROCEDURE — 97530 THERAPEUTIC ACTIVITIES: CPT | Mod: GP,CQ

## 2025-06-30 PROCEDURE — 99233 SBSQ HOSP IP/OBS HIGH 50: CPT | Performed by: INTERNAL MEDICINE

## 2025-06-30 PROCEDURE — 2500000004 HC RX 250 GENERAL PHARMACY W/ HCPCS (ALT 636 FOR OP/ED)

## 2025-06-30 PROCEDURE — 2500000001 HC RX 250 WO HCPCS SELF ADMINISTERED DRUGS (ALT 637 FOR MEDICARE OP): Performed by: STUDENT IN AN ORGANIZED HEALTH CARE EDUCATION/TRAINING PROGRAM

## 2025-06-30 PROCEDURE — 85027 COMPLETE CBC AUTOMATED: CPT

## 2025-06-30 PROCEDURE — 99223 1ST HOSP IP/OBS HIGH 75: CPT

## 2025-06-30 PROCEDURE — 2500000004 HC RX 250 GENERAL PHARMACY W/ HCPCS (ALT 636 FOR OP/ED): Performed by: STUDENT IN AN ORGANIZED HEALTH CARE EDUCATION/TRAINING PROGRAM

## 2025-06-30 PROCEDURE — 83735 ASSAY OF MAGNESIUM: CPT

## 2025-06-30 PROCEDURE — 93325 DOPPLER ECHO COLOR FLOW MAPG: CPT | Performed by: INTERNAL MEDICINE

## 2025-06-30 PROCEDURE — 1200000002 HC GENERAL ROOM WITH TELEMETRY DAILY

## 2025-06-30 PROCEDURE — 36415 COLL VENOUS BLD VENIPUNCTURE: CPT

## 2025-06-30 PROCEDURE — 80197 ASSAY OF TACROLIMUS: CPT

## 2025-06-30 RX ADMIN — CALCIUM 1 TABLET: 500 TABLET ORAL at 20:21

## 2025-06-30 RX ADMIN — ACETAMINOPHEN 650 MG: 325 TABLET ORAL at 06:45

## 2025-06-30 RX ADMIN — VANCOMYCIN HYDROCHLORIDE 125 MG: 125 CAPSULE ORAL at 06:45

## 2025-06-30 RX ADMIN — HEPARIN SODIUM 5000 UNITS: 5000 INJECTION INTRAVENOUS; SUBCUTANEOUS at 23:36

## 2025-06-30 RX ADMIN — ASPIRIN 81 MG: 81 TABLET, COATED ORAL at 09:58

## 2025-06-30 RX ADMIN — SULFAMETHOXAZOLE AND TRIMETHOPRIM 1 TABLET: 400; 80 TABLET ORAL at 09:58

## 2025-06-30 RX ADMIN — TAMSULOSIN HYDROCHLORIDE 0.4 MG: 0.4 CAPSULE ORAL at 09:58

## 2025-06-30 RX ADMIN — INSULIN LISPRO 4 UNITS: 100 INJECTION, SOLUTION INTRAVENOUS; SUBCUTANEOUS at 20:21

## 2025-06-30 RX ADMIN — PANTOPRAZOLE SODIUM 40 MG: 40 TABLET, DELAYED RELEASE ORAL at 06:49

## 2025-06-30 RX ADMIN — VANCOMYCIN HYDROCHLORIDE 125 MG: 125 CAPSULE ORAL at 23:36

## 2025-06-30 RX ADMIN — TACROLIMUS 2.5 MG: 0.5 CAPSULE ORAL at 06:45

## 2025-06-30 RX ADMIN — OXYCODONE HYDROCHLORIDE 5 MG: 5 TABLET ORAL at 17:39

## 2025-06-30 RX ADMIN — LEVOTHYROXINE SODIUM 125 MCG: 0.05 TABLET ORAL at 06:45

## 2025-06-30 RX ADMIN — CHOLECALCIFEROL TAB 25 MCG (1000 UNIT) 50 MCG: 25 TAB at 09:58

## 2025-06-30 RX ADMIN — VANCOMYCIN HYDROCHLORIDE 125 MG: 125 CAPSULE ORAL at 18:51

## 2025-06-30 RX ADMIN — ACETAMINOPHEN 650 MG: 325 TABLET ORAL at 17:39

## 2025-06-30 RX ADMIN — GABAPENTIN 300 MG: 300 CAPSULE ORAL at 20:21

## 2025-06-30 RX ADMIN — MYCOPHENOLATE MOFETIL 1000 MG: 250 CAPSULE ORAL at 17:50

## 2025-06-30 RX ADMIN — ACETAMINOPHEN 650 MG: 325 TABLET ORAL at 23:36

## 2025-06-30 RX ADMIN — VORICONAZOLE 200 MG: 200 TABLET, COATED ORAL at 20:21

## 2025-06-30 RX ADMIN — PREDNISONE 35 MG: 20 TABLET ORAL at 09:58

## 2025-06-30 RX ADMIN — INSULIN LISPRO 8 UNITS: 100 INJECTION, SOLUTION INTRAVENOUS; SUBCUTANEOUS at 17:50

## 2025-06-30 RX ADMIN — MYCOPHENOLATE MOFETIL 1000 MG: 250 CAPSULE ORAL at 06:45

## 2025-06-30 RX ADMIN — TACROLIMUS 2.5 MG: 0.5 CAPSULE ORAL at 17:50

## 2025-06-30 RX ADMIN — VANCOMYCIN HYDROCHLORIDE 125 MG: 125 CAPSULE ORAL at 15:32

## 2025-06-30 RX ADMIN — OXYCODONE HYDROCHLORIDE 5 MG: 5 TABLET ORAL at 22:08

## 2025-06-30 RX ADMIN — HEPARIN SODIUM 5000 UNITS: 5000 INJECTION INTRAVENOUS; SUBCUTANEOUS at 15:32

## 2025-06-30 RX ADMIN — ACYCLOVIR 400 MG: 400 TABLET ORAL at 20:21

## 2025-06-30 RX ADMIN — AMLODIPINE BESYLATE 5 MG: 5 TABLET ORAL at 09:58

## 2025-06-30 RX ADMIN — ACYCLOVIR 400 MG: 400 TABLET ORAL at 09:58

## 2025-06-30 RX ADMIN — CALCIUM 1 TABLET: 500 TABLET ORAL at 09:58

## 2025-06-30 RX ADMIN — PRAVASTATIN SODIUM 40 MG: 40 TABLET ORAL at 20:25

## 2025-06-30 RX ADMIN — VORICONAZOLE 200 MG: 200 TABLET, COATED ORAL at 09:58

## 2025-06-30 RX ADMIN — PREDNISONE 35 MG: 20 TABLET ORAL at 20:21

## 2025-06-30 RX ADMIN — INSULIN LISPRO 4 UNITS: 100 INJECTION, SOLUTION INTRAVENOUS; SUBCUTANEOUS at 09:58

## 2025-06-30 RX ADMIN — PERFLUTREN 10 ML OF DILUTION: 6.52 INJECTION, SUSPENSION INTRAVENOUS at 16:19

## 2025-06-30 ASSESSMENT — PAIN SCALES - GENERAL
PAINLEVEL_OUTOF10: 6
PAINLEVEL_OUTOF10: 0 - NO PAIN
PAINLEVEL_OUTOF10: 2
PAINLEVEL_OUTOF10: 0 - NO PAIN
PAINLEVEL_OUTOF10: 6

## 2025-06-30 ASSESSMENT — PAIN - FUNCTIONAL ASSESSMENT
PAIN_FUNCTIONAL_ASSESSMENT: 0-10

## 2025-06-30 ASSESSMENT — COGNITIVE AND FUNCTIONAL STATUS - GENERAL
CLIMB 3 TO 5 STEPS WITH RAILING: A LOT
WALKING IN HOSPITAL ROOM: A LITTLE
DAILY ACTIVITIY SCORE: 19
MOVING TO AND FROM BED TO CHAIR: A LITTLE
MOVING TO AND FROM BED TO CHAIR: A LITTLE
DAILY ACTIVITIY SCORE: 19
TOILETING: A LITTLE
DRESSING REGULAR UPPER BODY CLOTHING: A LITTLE
DRESSING REGULAR LOWER BODY CLOTHING: A LITTLE
MOBILITY SCORE: 18
DRESSING REGULAR LOWER BODY CLOTHING: A LITTLE
MOVING FROM LYING ON BACK TO SITTING ON SIDE OF FLAT BED WITH BEDRAILS: A LITTLE
DRESSING REGULAR UPPER BODY CLOTHING: A LITTLE
MOVING FROM LYING ON BACK TO SITTING ON SIDE OF FLAT BED WITH BEDRAILS: A LITTLE
HELP NEEDED FOR BATHING: A LITTLE
TURNING FROM BACK TO SIDE WHILE IN FLAT BAD: A LITTLE
TURNING FROM BACK TO SIDE WHILE IN FLAT BAD: A LITTLE
STANDING UP FROM CHAIR USING ARMS: A LITTLE
PERSONAL GROOMING: A LITTLE
HELP NEEDED FOR BATHING: A LITTLE
STANDING UP FROM CHAIR USING ARMS: A LITTLE
MOBILITY SCORE: 17
MOBILITY SCORE: 19
MOVING FROM LYING ON BACK TO SITTING ON SIDE OF FLAT BED WITH BEDRAILS: A LITTLE
CLIMB 3 TO 5 STEPS WITH RAILING: A LOT
PERSONAL GROOMING: A LITTLE
WALKING IN HOSPITAL ROOM: A LITTLE
MOVING TO AND FROM BED TO CHAIR: A LITTLE
TOILETING: A LITTLE
WALKING IN HOSPITAL ROOM: A LITTLE
CLIMB 3 TO 5 STEPS WITH RAILING: A LITTLE

## 2025-06-30 ASSESSMENT — PAIN DESCRIPTION - ORIENTATION: ORIENTATION: MID;UPPER

## 2025-06-30 ASSESSMENT — PAIN DESCRIPTION - LOCATION: LOCATION: BACK

## 2025-06-30 ASSESSMENT — PAIN DESCRIPTION - DESCRIPTORS
DESCRIPTORS: SPASM
DESCRIPTORS: DULL

## 2025-06-30 NOTE — PROGRESS NOTES
Physical Therapy    Physical Therapy Treatment    Patient Name: Anatoly Lane  MRN: [id number]  Department: [place] [id number]  Room: [address] [place] [address]  Today's Date: 6/30/2025  Time Calculation  Start Time: 1431  Stop Time: 1501  Time Calculation (min): 30 min    Assessment/Plan   PT Assessment  Barriers to Discharge Home: No anticipated barriers  End of Session Communication: Bedside nurse  Assessment Comment: Pt tolerated PT session well, able to complete increased ambulation with FWW. Pt continues to remain appropriate for Low intensity PT upon D/C from hospital.  End of Session Patient Position: Bed, 3 rail up, Alarm off, not on at start of session  PT Plan  Inpatient/Swing Bed or Outpatient: Inpatient  PT Plan  Treatment/Interventions: Bed mobility, Transfer training, Gait training, Stair training, Balance training, Strengthening, Endurance training, Therapeutic exercise, Therapeutic activity  PT Plan: Ongoing PT  PT Frequency: Daily  PT Discharge Recommendations: Low intensity level of continued care  Equipment Recommended upon Discharge: Wheeled walker  PT Recommended Transfer Status: Contact guard, Assistive device  PT - OK to Discharge: Yes    PT Visit Info:  PT Received On: 06/30/25     General Visit Information:   General  Family/Caregiver Present: No  Prior to Session Communication: Bedside nurse  Patient Position Received: Bed, 3 rail up, Alarm off, not on at start of session  General Comment: Pt supine in bed, agreeable to work with PT.    Subjective   Precautions:  Precautions  Medical Precautions: Cardiac precautions, Fall precautions (Contact precautions (c-diff))  Post-Surgical Precautions: Move in the Tube  Precautions Comment: AAI @ 90, Reviewed Mitt precautions     Date/Time Vitals Session Patient Position Pulse Resp SpO2 BP MAP (mmHg)    06/30/25 1431 During PT  --  --  --  94 %  --  --           Objective   Pain:  Pain Assessment  Pain Assessment: 0-10  0-10 (Numeric) Pain Score: 0 - No  pain  Cognition:  Cognition  Overall Cognitive Status: Within Functional Limits  Orientation Level: Oriented X4    Activity Tolerance:  Activity Tolerance  Endurance: Tolerates 30 min exercise with multiple rests  Treatments:  Therapeutic Activity  Therapeutic Activity Performed: Yes  Therapeutic Activity 1: Pt performed x10 BLE Hip Flexion with BUE support. CGA    Bed Mobility  Bed Mobility: Yes  Bed Mobility 1  Bed Mobility 1: Supine to sitting  Level of Assistance 1: Close supervision  Bed Mobility Comments 1: HOB elevated  Bed Mobility 2  Bed Mobility  2: Sitting to supine  Level of Assistance 2: Close supervision, Minimal verbal cues  Bed Mobility Comments 2: Verbal cues for reverse log roll sequencing.    Ambulation/Gait Training  Ambulation/Gait Training Performed: Yes  Ambulation/Gait Training 1  Surface 1: Level tile  Device 1: Rolling walker  Assistance 1: Close supervision, Minimal verbal cues  Quality of Gait 1: Forward flexed posture (Decreased los, heavy UE support on FWW)  Comments/Distance (ft) 1: x~500ft cues for sequencing and decreased UE support on FWW, upright posture and positioning. x2 standing rest breaks during, reports some SOB and moderate fatigue once returned to room.    Transfers  Transfer: Yes  Transfer 1  Transfer From 1: Sit to  Transfer to 1: Stand  Technique 1: Sit to stand, Stand to sit  Transfer Device 1: Walker  Transfer Level of Assistance 1: Close supervision, Minimal verbal cues  Trials/Comments 1: x2 trials, cues for safe hand placement.    Stairs  Stairs: No    Outcome Measures:  [facility] Basic Mobility  Turning from your back to your side while in a flat bed without using bedrails: A little  Moving from lying on your back to sitting on the side of a flat bed without using bedrails: A little  Moving to and from bed to chair (including a wheelchair): A little  Standing up from a chair using your arms (e.g. wheelchair or bedside chair): A little  To walk in hospital room: A  little  Climbing 3-5 steps with railing: A little  Basic Mobility - Total Score: 18    Education Documentation  Precautions, taught by Erika Jain PTA at 6/30/2025  3:33 PM.  Learner: Patient  Readiness: Acceptance  Method: Explanation  Response: Verbalizes Understanding  Comment: Mitt precautions, importance of functional mobility,    Body Mechanics, taught by Erika Jain PTA at 6/30/2025  3:33 PM.  Learner: Patient  Readiness: Acceptance  Method: Explanation  Response: Verbalizes Understanding  Comment: Mitt precautions, importance of functional mobility,    Mobility Training, taught by Erika Jain PTA at 6/30/2025  3:33 PM.  Learner: Patient  Readiness: Acceptance  Method: Explanation  Response: Verbalizes Understanding  Comment: Mitt precautions, importance of functional mobility,    Education Comments  No comments found.      Encounter Problems       Encounter Problems (Active)       Balance       Pt will demonstrated ability to score at least 24/28 on the Tinetti balance assessment tool to ensure safety upon D/C.  (Progressing)       Start:  06/23/25    Expected End:  07/07/25               Mobility       Pt will demonstrated ability to ambulate >/=300ft with proper form and no balance deficits for safe home going.   (Not met)       Start:  06/23/25    Expected End:  07/07/25    Resolved:  06/27/25    Updated to: Pt will demonstrated ability to ambulate >/=750 ft with LRAD and supervision with proper form and no balance deficits for safe home going.    Update reason: progressing         Pt will demonstrate ability to ascend/descend at least 5 stairs with unilateral rail and no balance deficits for safe home going.  (Progressing)       Start:  06/23/25    Expected End:  07/07/25            Pt will demonstrated ability to ambulate >/=750 ft with LRAD and supervision with proper form and no balance deficits for safe home going. (Progressing)       Start:  06/27/25    Expected End:  07/07/25                    PT Transfers       Pt will demonstrate ability to complete 5X STS in < 12 sec with good from and consistency between transfers for safe D/C.  (Progressing)       Start:  06/23/25    Expected End:  07/07/25               PT Transfers       Patient to transfer to and from sit to supine indep (Progressing)       Start:  06/25/25    Expected End:  07/07/25            Patient will transfer sit to and from stand with LRAD and modified indep (Progressing)       Start:  06/25/25    Expected End:  07/07/25 06/30/25 at 3:35 PM   Erika Jain [facility]   Rehab Office: [phone]

## 2025-06-30 NOTE — PROGRESS NOTES
[facility]  Advanced HF PROGRESS NOTE    Anatoly Lane/[id number]    Admit Date: 6/20/2025  Hospital Length of Stay: 10   ICU Length of Stay: 4d   Primary Service: HFICU  Primary HF Cardiologist:   Referring:     INTERVAL EVENTS / PERTINENT ROS:   Reports productive cough that has been present for years, no fever, CXR with stable small L sided pleural effusion. Also has L arm weakness without swelling that patient says started after heart transplant.     Denies CP, SOB, LE edema or any other complaints. Completing PO vanc for C. Diff .     Plan:  - Neuro consult for LUE weakness eval  - CT chest to rule out pneumonia as auscultation shows rhonchi and rales  - Limited TTE to check for vent functon, pericardial effusion before biopsy tomorrow   - Cardiac biopsy 07/01, scheduled   - PT/OT     MEDICATIONS  Infusions:       Scheduled:  acetaminophen, 650 mg, q6h  acyclovir, 400 mg, BID  amLODIPine, 5 mg, Daily  aspirin, 81 mg, Daily  calcium carbonate, 1,250 mg of calcium carbonate, BID  cholecalciferol, 50 mcg, Daily  gabapentin, 300 mg, Nightly  heparin (porcine), 5,000 Units, q8h  insulin lispro, 0-20 Units, Before meals & nightly  levothyroxine, 125 mcg, Daily  mycophenolate, 1,000 mg, q12h LUPILLO  pantoprazole, 40 mg, Daily before breakfast  perflutren lipid microspheres, 0.5-10 mL of dilution, Once in imaging  perflutren protein A microsphere, 0.5 mL, Once in imaging  pravastatin, 40 mg, Nightly  predniSONE, 35 mg, q12h LUPILLO  sulfamethoxazole-trimethoprim, 1 tablet, Daily  sulfur hexafluoride microsphr, 2 mL, Once in imaging  tacrolimus, 2.5 mg, q12h LUPILLO  tamsulosin, 0.4 mg, Daily  vancomycin, 125 mg, 4x daily  voriconazole, 200 mg, q12h LUPILLO      PRN:  dextrose, 12.5 g, q15 min PRN  dextrose, 25 g, q15 min PRN  dextrose, 25 g, q15 min PRN  glucagon, 1 mg, q15 min PRN  glucagon, 1 mg, q15 min PRN  guaiFENesin, 600 mg, BID PRN  lidocaine, 1 patch, Daily PRN  melatonin, 5 mg, Nightly  "PRN  naloxone, 0.2 mg, q5 min PRN  ondansetron ODT, 4 mg, q8h PRN   Or  ondansetron, 4 mg, q8h PRN  oxyCODONE, 10 mg, q4h PRN  oxyCODONE, 5 mg, q4h PRN  oxygen, , Continuous PRN - O2/gases  polyethylene glycol, 17 g, Daily PRN      PHYSICAL EXAM:   Visit Vitals  /74 (BP Location: Left arm, Patient Position: Sitting)   Pulse 67   Temp 36.4 °C (97.5 °F) (Temporal)   Resp 18   Ht 1.753 m (5' 9\")   Wt 101 kg (223 lb 4.8 oz)   SpO2 93%   BMI 32.98 kg/m²   Smoking Status Former   BSA 2.22 m²       Wt Readings from Last 5 Encounters:   06/30/25 101 kg (223 lb 4.8 oz)   02/27/25 102 kg (224 lb)   01/28/25 104 kg (229 lb)   11/19/24 112 kg (246 lb 12.8 oz)   07/15/24 110 kg (243 lb 9.6 oz)       INTAKE/OUTPUT:  I/O last 3 completed shifts:  In: 580 (6 mL/kg) [P.O.:580]  Out: 1625 (16.9 mL/kg) [Urine:1625 (0.5 mL/kg/hr)]  Dosing Weight: 96.3 kg      Physical Exam  HENT:      Head: Normocephalic and atraumatic.   Cardiovascular:      Rate and Rhythm: Normal rate.      Heart sounds: No murmur heard.     No friction rub. No gallop.   Pulmonary:      Effort: Pulmonary effort is normal. No respiratory distress.      Breath sounds: Rales present. No wheezing.      Comments: Stenotomy incision appears C/D/I  Abdominal:      General: Abdomen is flat. There is no distension.      Palpations: Abdomen is soft. There is no mass.      Tenderness: There is no abdominal tenderness. There is no guarding.   Musculoskeletal:      Right lower leg: No edema.      Left lower leg: No edema.   Skin:     General: Skin is warm.      Findings: No rash.   Neurological:      General: No focal deficit present.      Mental Status: He is alert and oriented to person, place, and time.   Psychiatric:         Mood and Affect: Mood normal.         Behavior: Behavior normal.         DATA:  CMP:  Results from last 7 days   Lab Units 06/30/25  0536 06/29/25  0631 06/28/25  0606 06/27/25  0621 06/26/25  0506 06/25/25  0609 06/24/25  1320 06/24/25  0050 " "06/23/25  1433   SODIUM mmol/L 137 137 139 139 140 141 142 141 143   POTASSIUM mmol/L 5.1 5.1 4.1 3.9 3.9 4.0 4.6 4.5 4.8   CHLORIDE mmol/L 101 101 97* 97* 98 101 106 106 107   CO2 mmol/L 29 25 37* 35* 34* 34* 30 32 30   ANION GAP mmol/L 12 16 9* 11 12 10 11 8* 11   BUN mg/dL 35* 38* 41* 35* 33* 34* 30* 26* 26*   CREATININE mg/dL 0.82 0.95 0.94 0.99 0.95 0.76 0.79 0.77 0.78   EGFR mL/min/1.73m*2 >90 89 >90 85 89 >90 >90 >90 >90   MAGNESIUM mg/dL 2.35 2.47* 2.37 2.16 2.25 2.25 2.42* 2.30 2.58*   ALBUMIN g/dL 3.3* 3.4 3.7 3.7 3.8 3.6 4.2 3.8 3.8     CBC:  Results from last 7 days   Lab Units 06/30/25  0536 06/29/25  0631 06/28/25  0606 06/27/25  0621 06/26/25  0506 06/25/25  0609 06/24/25  1320 06/24/25  0050   WBC AUTO x10*3/uL 17.4* 19.6* 20.7* 23.1* 12.9* 13.1* 18.7* 28.2*   HEMOGLOBIN g/dL 8.5* 8.6* 8.5* 7.7* 7.5* 7.7* 7.1* 7.6*   HEMATOCRIT % 26.8* 27.2* 26.4* 22.7* 23.5* 23.4* 21.5* 23.8*   PLATELETS AUTO x10*3/uL 243 228 193 173 154 112* 102* 130*   MCV fL 99 98 97 90 95 94 95 94     COAG:         ABO:   No results found for: \"ABO\"    HEME/ENDO:  Results from last 7 days   Lab Units 06/25/25  0609   FERRITIN ng/mL 130   IRON SATURATION % 21*      CARDIAC:       No lab exists for component: \"CK\", \"CKMBP\"    ASSESSMENT AND PLAN:   Anatoly Lane is a 64 year old male with relevant PMH of ex smoker, NICM, stage D HFrEF NYHA 2-3, S/p HM2 (4/30/23), VT s/p ICD, CAD, pAF, HTN, BPH, GERD and hypothyroidism.  S/p LVAD explant, AICD removal & OHT by Dr. Lorenzo on 6/21. Now transferred from CTICU to HF ICU 6/24. Patient arrived to HFICU HDS stable, on milrinone drip 0.125 mcg/kg/min. Aiming to wean milrinone and optimize volume while in the HFICU.    Neuro:  # Anxiety. Depression, Acute pain   # BUE/BLE mild tremor  - Serial neuro and pain assessments   - PO Tylenol PRN for pain  - PT/OT Consult, OOB to chair  - CAM ICU score every shift  - Sleep/wake cycle normalization    #L hand extensor palsy and 4th+5th digital " numbness  - On 6/27, patient c/o inability to extend digits 2-5 around the MCP joints. In addition, he is complaining of numbness from the medial aspect of the wrist to the 4th and 5th digits and part of the palm.  - Deficits do not align completely with a specific nerve injury- would not expect numbness in digits 4-5 with a radial nerve palsy and would not expect difficult with finger extension with an ulnar nerve palsy.  - Ddx includes nerve compression from forearm edema vs brachial plexus injury.  - neuro consult placed.  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  Now s/p LVAD explant, AICD removal, & OHT on 6/21  - TTE (6/22): Normal BiV   Donor/Recipient Infectious history:   CMV: -/-   EBV: +/+   Toxo: -/-   Heb B: -/-  Hep C: -/-  HIV: -/-      Rejection/Prophylaxis:    - Induction: - Induction: s/p methylprednisolone 500mg IV x2  - Postoperative Induction Plan:  S/p Basiliximab 20mg IV within 1 hour of arrival to CTICU (no need for premedication), s/p 2nd dose: 20mg IVPB on POD4 (06/25)   Steroid taper starting with Methylprednisolone 125mg IV Q8 x 3 doses followed by IV Solumedrol 60 mg q 12 hrs x 2 doses followed by prednisone 35 mg q 12hrs PO q 12 hrs  - Steroids: Currently on prednisone 35 mg q 12hrs PO q 12 hrs until first biopsy   - Tacrolimus: Tacro 8.8 on 2.5 mg BID (dose adjusted 06/29), will continue same dose today.    - Tacrolimus goal troughs: 10-12  - Cellcept: C/w 1,000mg BID  - Antifungals: c/w voriconazole 200mg BID x3 months end date 09/22/2025   - Antivirals: c/w acylovir 400mg BID (06/22 x3 months. End date 09/22/2025   - Anti PCP & Toxoplasmosis: Can start SS Bactrim daily (end date of 12/22/25)      Next RHC/biopsy: Scheduled for 7/1 w/ Dr. Velasco   Last TTE/BOBBY on  6/23: ; normal BiV function, repeat Limited TTE ordered for today  Last HLA: First due on 7/24  Last Allomap: ~ will assess starting at 6-month ana post-op    Last Allosure: ~ will assess starting at 6-month ana post-op   Last LHC: 03/2023(pre OHT); First due ~ (one year post tx)   Osteopenia/osteoporosis prophylaxis: C/w Vit D 2000U daily and calcium 500 mg BID (give calcium 2 hrs after MMF)   Peptic/gastric ulcer prophylaxis: Pantoprazole 40mg daily    CAV Prophylaxis:  C/w  aspirin and  pravastatin 40 mg QHS  Pacing: Pacer back up rate set to 60 BPM. Native conduction is NSR at 75 BPM, pacing wires can be cut.     # HTN:  -c/w Norvasc 5 mg  - BP still mildly elevated, if remains elevated in next few days, consider increasing to 10 mg.       Pulmonary:   # Productive cough  # Ex-smoker  - Noticed to have mod-severe productive cough with clear expectorant, pt says he has chronic bronchitis from years of smoking  - Auscultation with rough crackles, rhonchi  - CXR 06/29 small stable L pleural effusion, no fevers, WBC elevated but likely from steroids  - CT chest w/o con to rule out hospital acquired pneumonia.   -Monitor and maintain SpO2 > 92%     GI:  # C- Diff infection, diarrhea (improved)  - Diagnosed 06/25, on PO vanc x 10 days (07/05)  - Diarrhea already better, leucocytosis improving   - Bowel regimen PRN   - PPI     :  # H/x BPH  -Baseline Cr: 0.8   -Admit BUN/Cr: Normal   -I/Os  -avoid hypotension and nephrotoxic agents  - C/w home tamsulosin 0.4 mg daily.     Heme:  # Acute blood loss anemia stable, no active s/s of bleeding  - Trending H/h   - Iron studies: %saturation 21%, ferritin 130. No need for IV iron.      Endo:  # Steroid and stress induced hyperglycemia  - Maintain BG <180  - continue SSI to #4   - hgbA1c  was 4.1     # H/o of hypothyroid  -TSH (3/2025): 1.83  - Maintain BG <180      ID:  # C Diff infection   # Reactive leukocytosis (improving)  - Completing PO vanc till 07/05   - completed  periop vanc/zosyn x48hrs with hx of LVAD  - trend temps q4h     PHYSICAL AND OCCUPATIONAL THERAPY: PT/OT    LINES: R internal jugular swan (6/21/25-*06/28)  Arterial line L brachial (6/21-6/27)    DVT: Heparin SQ  ULCER PPX: PPI  GLYCEMIC CONTROL: SSI  BOWEL CARE: Senna, mirlax  NUTRITION: Adult diet Regular, Consistent Carb; CCD 90 gm/meal  NPO Diet Except: Other (specify); Additional Details: Clear liquids until 0500. May have clears up to 2 hours prior to procedure if exact time is known.; Effective midnight      EMERGENCY CONTACT: Extended Emergency Contact Information  Primary Emergency Contact: Janice Ramos  Address: [address] [place] [id number] [facility]  Home Phone: [phone]  Mobile Phone: [phone]  Relation: Mother  Secondary Emergency Contact: Bird Lane  Address: [address] [place] [address] [address]           [place] [address] [facility]  Home Phone: [phone]  Mobile Phone: [phone]  Relation: Son  Preferred language: English   needed? No  FAMILY UPDATE: At bedside  CODE STATUS: Full Code  DISPO:  Pending PT eval, likely rehab next week.     Patient seen and assessed with Dr. Marcus Pabon.

## 2025-06-30 NOTE — OP NOTE
LVAD EXPLANT; HEART TRANSPLANT, REMOVAL ICD;CANNULATION OF THE RIGHT FEMORAL ARTERY AND VEIN Procedure Note    Indications  Anatoly Lane is a 64 y.o.-year-old male who underwent a HM3 LVAD implantation for end stage ICMP in 2023 after a prolonged and complicated hospital stay.  Patient did well after the LVAD and was placed on a OHT wait list.   He was evaluated by the multidisciplinary Heart Failure service and was deemed to be an appropriate candidate for heart transplantation. A suitable heart donor was identified, and he was taken to the operating room for heart transplant.  The risks benefits and alternatives were discussed with the patient and include but are not limited to, bleeding, infection, injury to other structures, need for re-operation, arrhythmia, respiratory failure, prolonged intubation, stroke, rejection and death.  These were discussed with the patient in detail, all questions were answered and informed consent was obtained.         Pre-operative Diagnosis:    Surgeon: Panel 1:     * Primo Schrader - Primary     * Isidra Brito - Assisting  Panel 2:     * Morteza Pham - Primary       Dr Pham and MG both performed portions of the operation including extensive dissection and explant of the LVAD, preparation of the donor heart as well as implantation of the donor heart     No appropriately qualified resident was available to assist with the procedure.    The SA/RNFA provided full assistance throughout the procedure including handling tissue as well as wound closure.    Procedure and Anesthesia:  Procedure(s) and Anesthesia Type:  Panel 1:     * LVAD EXPLANT; HEART TRANSPLANT, REMOVAL ICD;CANNULATION OF THE RIGHT FEMORAL ARTERY AND VEIN    Panel 2:     * TRANSPLANT, HEART - General      Time-Out: A complete time out was carried out prior to incision, with confirmation of patient identity, correct procedure, correct operative site, appropriate antibiotic prophylaxis, review of any known allergies,  and presence of all needed equipment.    ABO Verification: Immediately following arrival of the donor organ and prior to implantation, a formal ABO confirmation was done according to hospital and [facility]OS policies. I confirmed the UNOS ID number of the donor organ and the donor and recipient ABO types, directly verifying these data by comparison with the [employer] report.     Donor UNOS ID: [id number]  Donor [employer] ID: [id number]  Donor Blood Type:  AB  Recipient Blood Type: AB        Procedure Detail:    Findings: Dense adhesions as expected.       Procedure Details: After informed consent was obtained, the patient was brought to the operating room and placed supine on the operating table. After adequate general endotracheal anesthesia was established, the patient was prepped and draped from the chin down to below the knees and draped in the standard surgical fashion. Surgical time-out was then completed. Prior to proceeding, access lines were placed.       A small right groin crease incision was made exposing the femoral artery and vein.  5.0 pursestring sutures were applied and after administration of 5000 units of heparin the vein and artery were cannulated using a 25 Indonesian cannula and 19 Indonesian cannula respectively.     Redo sternotomy was performed.  Dense adhesions were encountered as expected these were divided.  Full dose heparin was administered.  The outflow graft with LVAD was identified isolated and clamped. Cardiopulmonary bypass was commenced.  Once the SVC was isolated and SVC cannula was inserted and connected to the venous drainage.  The patient was cooled to 34 °C aiming for mean arterial pressure of 70 mmHg and above.  The LVAD was dissected completely free and the driveline was divided.  Once the donor heart was close-by, the heart and LVAD were explanted and the cuffs were prepared for implantation.     The heart arrived in cold storage - [employer].  Full ABO verification was completed.  The  donor heart was then removed from cold storage and inspected and prepared on the back table revealing no abnormalities.       The transplant proceeded with the left atrial anastomosis using a running 3-0 Prolene suture.  We then prepared the pulmonary artery and performed the anastomosis with a running 4-0 prolene hemoseal suture.  This process was repeated for the aorta.  The IVC was sewn with a running 3-0 prolene suture. Steroids were administered.  A root vent was placed and the cross clamp was removed.   While the heart was reprofusing the SVC anastomosis was completed using continuous 5.0 Prolene sutures.  The patient was rewarmed systemically.  Ventilation was commenced with the use of iEPO     Bipolar right atrial and right ventricular pacing was were applied.  The heart was reperfused on cardiopulmonary bypass.  Inotropes were commenced and cardiopulmonary bypass was weaned uneventfully.  Protamine and blood products were infused to reverse systemic anticoagulation.     The ICD overlying the left chest was removed through a small incision and all the wires were freed and removed.     The LVAD driveline was removed.     The femoral artery and vein were decannulated and repaired using 5-0 Prolene sutures.     Once hemostasis was secured chest tubes were inserted.  The sternum was closed with interrupted stainless steel wires and subcutaneous layers were closed with Vicryl and the skin was closed with subcuticular sutures.     The groin incision and the incision overlying the pacemaker were closed using Vicryl and subcuticular sutures.     The patient remained stable and was transferred to the cardiothoracic intensive care unit in stable cardiorespiratory condition.     I was available for all aspects of the procedure pre- and post-operatively.       Primo Schrader MD PhD

## 2025-06-30 NOTE — CONSULTS
"NEUROLOGY CONSULTATION NOTE  Subjective   Consult Question: L arm weakness without swelling   History Of Presenting Illness  Anatoly Lane is a 64 y.o. left handed male with a PMH of ex smoker, nonischemic cardiomyopathy, stage D HFrEF, S/p HM2 (4/30/23), VT s/p ICD, CAD, pAF, HTN, presenting for S/p LVAD explant, AICD removal & heart transplant by Dr. Lorenzo on 6/21, currently in the HF ICU (6/24). Neurology is consulted for L hand extensor palsy and 4th+5th digital numbness.     Patient developed new onset L hand weakness immediately following his sternotomy for heart transplant and LVAD explant surgery 6/21. He says this has worsened since that time. Says he can't lift his fingers and is having hard time gripping things. Also complains of numbness/tingling on medial aspect of hand and fingers. Denies any shoulder pain.     Patient also developed new onset R anteromedial thigh stabbing pain that is episodic. He also feels that his RLE is weak. Started on gabapentin 300mg at bedtime.     Patient has history of R 1st and 2nd finger weakness and tingling after a R impella procedure, this happened years before.       ROS:  A comprehensive review-of-systems was obtained and negative unless noted above in the HPI.    Past Medical History  Medical History[1]  Surgical History  Surgical History[2]  Social History  Social History[3]  Allergies  Jardiance [empagliflozin]      =========  Medications  Scheduled medications  Scheduled Medications[4]  Continuous medications  Continuous Medications[5]  PRN medications  PRN Medications[6]    =========      Objective   Vital Signs  /74 (BP Location: Left arm, Patient Position: Sitting)   Pulse 67   Temp 36.4 °C (97.5 °F) (Temporal)   Resp 18   Ht 1.753 m (5' 9\")   Wt 101 kg (223 lb 4.8 oz)   SpO2 94%   BMI 32.98 kg/m²     Physical Exam  GENERAL APPEARANCE:  No distress, alert, interactive and cooperative.      MENTAL STATE:   Orientation was normal to time, place and " person. Recent and remote memory was intact.  Attention span and concentration were normal. Language testing was normal for comprehension, repetition, expression, and naming. The patient could correctly interpret a picture. General fund of knowledge was intact.     OPHTHALMOSCOPIC:   The ophthalmoscopic exam was deferred.     CRANIAL NERVES:   CN 2   Visual fields full to confrontation.   CN 3, 4, 6   Pupils round, 4 mm in diameter, equally reactive to light. Lids symmetric; no ptosis. EOMs normal alignment, full range with normal saccades, pursuit and convergence.   No nystagmus.   CN 5   Facial sensation intact bilaterally.   CN 7   Normal and symmetric facial strength. Nasolabial folds symmetric.   CN 8   Hearing intact to conversation.  CN 9/10  Palate elevates symmetrically.   CN 11   Normal strength of shoulder shrug and neck turning.   CN 12   Tongue midline, with normal bulk and strength; no fasciculations.     MOTOR:   Muscle bulk and tone were normal in both upper and lower extremities.   No fasciculations, tremor or other abnormal movements were present.                         R          L  Deltoids        5          5  Biceps          5          5  Triceps          5          4+  Wrist Flex      5          4  Wrist Ext       5          4+  Finger Flex    5         4  Finger Ext      5          1  Finger Abd    4          2  Thumb APB   5           4  ADM           5           1     Hip Flex         4          5  Knee Flex      5          5  Knee Ext        4          5  Dorsiflex         5          5  Plantarflex      5          5    REFLEXES:                       R          L  BR:               2         2  Biceps:         2          2  Triceps:        2          2  Knee:           3          3  Ankle:          2          2    Supra-patellar reflex present on L and R    SENSORY:   Sensation reduced to pinprick in L 5th digit and hyperalgesia and reduced to light touch in dorsal ulnar side of L hand.  Otherwise, in both upper and lower extremities, sensation was intact to light touch.    Did not assess coordination or gait          Recent/Relevant Labs:  Results from last 72 hours   Lab Units 06/30/25  0536 06/29/25  0631 06/28/25  0606   WBC AUTO x10*3/uL 17.4* 19.6* 20.7*   HEMOGLOBIN g/dL 8.5* 8.6* 8.5*   HEMATOCRIT % 26.8* 27.2* 26.4*   PLATELETS AUTO x10*3/uL 243 228 193        Results from last 72 hours   Lab Units 06/30/25  0536 06/29/25  0631 06/28/25  0606   SODIUM mmol/L 137 137 139   POTASSIUM mmol/L 5.1 5.1 4.1   CHLORIDE mmol/L 101 101 97*   CO2 mmol/L 29 25 37*   BUN mg/dL 35* 38* 41*   CREATININE mg/dL 0.82 0.95 0.94   MAGNESIUM mg/dL 2.35 2.47* 2.37   PHOSPHORUS mg/dL 4.1 4.4 3.8            Recent/Relevant Imaging:  No MRI head results found for the past 14 days  No CT head results found for the past 14 days    Other Recent/Relevant Studies:  Transthoracic Echo (TTE) Limited  Result Date: 6/27/2025   [facility], [address] [place] [address]                Tel [phone] and Fax [phone] TRANSTHORACIC ECHOCARDIOGRAM REPORT  Patient Name:       BILLIE SYLVIACAROLYN Steward Physician:    [id number] Spencer Lilly MD Study Date:         6/27/2025           Ordering Provider:    [id number] FRANZ MRN/PID:            [id number]            Fellow: Accession#:         [id number]        Nurse: Date of Birth/Age:  1960 / 64      Sonographer:          Regina Plata RDCS                     years Gender assigned at  M                   Additional Staff: Birth: Height:             175.26 cm           Admit Date:           6/23/2025 Weight:             99.79 kg            Admission Status:     Inpatient -                                                               Routine BSA / BMI:          2.15 m2 / 32.49     Encounter#:           [id number]                      kg/m2 Blood Pressure:     130/71 mmHg         Department Location:  [address] Study Type:    TRANSTHORACIC ECHO (TTE) LIMITED Diagnosis/ICD: Heart transplant status-Z94.1 Indication:    Evaluate RV and for pericardial effusion CPT Code:      Echo Limited-00228; Doppler Limited-13557; Color Doppler-36479 Patient History: Pertinent History: HTN. OHT 6/20/2025. Study Detail: The following Echo studies were performed: 2D, M-Mode, Doppler and               color flow.  PHYSICIAN INTERPRETATION: Left Ventricle: The left ventricular systolic function is normal with a visually estimated ejection fraction of 60-65%. There are no regional left ventricular wall motion abnormalities. The left ventricular cavity size is normal. There is normal posterior left ventricular wall thickness. Abnormal (paradoxical) septal motion consistent with post-operative status. Left ventricular diastolic filling was not assessed. Left Atrium: The left atrium is consistent with transplant. Right Ventricle: The right ventricle is mildly enlarged. There is mildly reduced right ventricular systolic function. A device is visualized in the right ventricle. TAPSE= 16 mm; RV S'= 10 cm/s. Right Atrium: The right atrial size was not assessed. There is a device visualized in the right atrium. Aortic Valve: The aortic valve is trileaflet. Aortic valve regurgitation was not assessed. Mitral Valve: The mitral valve is normal in structure. Mitral valve regurgitation was not assessed. Tricuspid Valve: The tricuspid valve is structurally normal. There is mild tricuspid regurgitation. The Doppler estimated right ventricular systolic pressure (RVSP) is within normal limits at 25 mmHg. Pulmonic Valve: The pulmonic valve was not assessed. Pulmonic valve regurgitation was not assessed. Pericardium: Trivial pericardial effusion. Pleural: There is left pleural effusion. Aorta: The aortic root is normal. Systemic Veins: The inferior vena cava appears normal in  size, with IVC inspiratory collapse greater than 50%. In comparison to the previous echocardiogram(s): Compared with study dated 6/23/2025, no significant change.  CONCLUSIONS:  1. The left ventricular systolic function is normal with a visually estimated ejection fraction of 60-65%.  2. Abnormal septal motion consistent with post-operative status.  3. There is mildly reduced right ventricular systolic function.  4. Mildly enlarged right ventricle.  5. The Doppler estimated RVSP is within normal limits at 25 mmHg. QUANTITATIVE DATA SUMMARY:  2D MEASUREMENTS:         Normal Ranges: IVSd:            0.53 cm (0.6-1.1cm) LVPWd:           0.63 cm (0.6-1.1cm) LVIDd:           5.45 cm (3.9-5.9cm) LVIDs:           3.73 cm LV Mass Index:   49 g/m2 LV % FS          31.7 %  LV SYSTOLIC FUNCTION:                      Normal Ranges: EF-Visual:      63 % LV EF Reported: 63 %  RIGHT VENTRICLE: RV Basal 3.60 cm RV Mid   3.60 cm RV Major 7.6 cm TAPSE:   16.0 mm RV s'    0.10 m/s  TRICUSPID VALVE/RVSP:          Normal Ranges: Peak TR Velocity:     2.36 m/s Est. RA Pressure:     3 RV Syst Pressure:     25       (< 30mmHg) IVC Diam:             2.00 cm  [id number] Spencer Lilly MD Electronically signed on 6/27/2025 at 3:43:39 PM  ** Final **     Transthoracic Echo (TTE) Limited  Result Date: 6/23/2025   [facility], [address] [place] [address]                Tel [phone] and Fax [phone] TRANSTHORACIC ECHOCARDIOGRAM REPORT  Patient Name:       BILLIE WARD        Nichelle Physician:    [id number] Ama Flynn MD Study Date:         6/23/2025           Ordering Provider:    [id number] WILLIAM VANEGAS MRN/PID:            [id number]            Fellow: Accession#:         [id number]        Nurse: Date of Birth/Age:  1960 / 64      Sonographer:          Sophia Edge                      years                                     RDCS Gender assigned at  M                   Additional Staff:     Camilo Garibay Birth:                                                        RDCS, RCS, FASE,                                                               ACS Height:             175.26 cm           Admit Date: Weight:             85.72 kg            Admission Status:     Inpatient -                                                               Routine BSA / BMI:          2.02 m2 / 27.91     Encounter#:           [id number]                     kg/m2 Blood Pressure:     110/49 mmHg         Department Location:  [facility] Study Type:    TRANSTHORACIC ECHO (TTE) LIMITED Diagnosis/ICD: Heart transplant status-Z94.1 Indication:    S/P heart transplant CPT Code:      Echo Limited-32516; Doppler Limited-03186; Color Doppler-45867 Patient History: Pertinent History: S/P heart transplant 6/21/25, HTN, Paroxysmal V-tach, LVAD. Study Detail: The following Echo studies were performed: 2D, M-Mode, Doppler and               color flow. Technically challenging study due to poor acoustic               windows, postoperative dressings, prominent lung artifact and body               habitus. Definity used as a contrast agent for endocardial border               definition. Total contrast used for this procedure was 2 mL via IV               push. Unable to obtain IVC view.  PHYSICIAN INTERPRETATION: Left Ventricle: Left ventricular ejection fraction is normal calculated by Severino's biplane at 67%. There are no regional left ventricular wall motion abnormalities. The left ventricular cavity size is normal. There is normal septal and normal posterior left ventricular wall thickness. Left ventricular diastolic filling cannot be determined due to heart transplant. Left Atrium: The left atrium is consistent with transplant. Right Ventricle: The right ventricle was not assessed. There is normal right ventricular global  systolic function. A device is visualized in the right ventricle. Right Atrium: The right atrial size was not assessed. There is a device visualized in the right atrium. Aortic Valve: The aortic valve is probably trileaflet. There is no evidence of aortic valve regurgitation. Mitral Valve: The mitral valve is normal in structure. There is trace mitral valve regurgitation. Tricuspid Valve: The tricuspid valve is structurally normal. No evidence of tricuspid regurgitation. Pulmonic Valve: The pulmonic valve is structurally normal. There is no indication of pulmonic valve regurgitation. Pericardium: There is no pericardial effusion noted. Aorta: The aortic root is normal. The Asc Ao is 3.30 cm. There is no dilatation of the ascending aorta. Systemic Veins: The inferior vena cava was not well visualized, IVC inspiratory collapse is not well visualized. In comparison to the previous echocardiogram(s): Compared with study dated 2/27/2025, patient is now s/p heart transplant.  CONCLUSIONS:  1. Poorly visualized anatomical structures due to suboptimal image quality.  2. Left ventricular ejection fraction is normal calculated by Severino's biplane at 67%.  3. There is normal right ventricular global systolic function. QUANTITATIVE DATA SUMMARY:  2D MEASUREMENTS:          Normal Ranges: Ao Root d:       3.60 cm  (2.0-3.7cm) IVSd:            0.90 cm  (0.6-1.1cm) LVPWd:           0.90 cm  (0.6-1.1cm) LVIDd:           4.60 cm  (3.9-5.9cm) LVIDs:           3.10 cm LV Mass Index:   68 g/m2 LVEDV Index:     75 ml/m2 LV % FS          32.6 %  AORTA MEASUREMENTS:         Normal Ranges: Asc Ao, d:          3.30 cm (2.1-3.4cm)  LV SYSTOLIC FUNCTION:                      Normal Ranges: EF-A4C View:    65 % (>=55%) EF-A2C View:    71 % EF-Biplane:     67 % LV EF Reported: 67 %  AORTIC VALVE:          Normal Ranges: LVOT Diameter: 2.20 cm (1.8-2.4cm)  TRICUSPID VALVE/RVSP:   Normal Ranges: Est. RA Pressure:     3  [id number] Ama Flynn MD  Electronically signed on 6/23/2025 at 4:14:24 PM  ** Final **       =========  Assessment/Plan   Assessment:  Anatoly Lane is a 64 y.o. male with a PMH ex smoker, nonischemic cardiomyopathy, stage D HFrEF s/p LVAD now s/p heart transplant and LVAD explant 6/21, AF, HTN here after heart transplant surgery. Neurology consulted for LUE weakness.     On exam, patient has weakness in triceps extension, wrist extension, severe weakness with finger abduction, finger extension, finger flexion. There's also sensory loss in the C8 distribution of the distal LUE. 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     He also has R anteromedial thigh pain accompanied by weakness in R hip flexion and knee extension. He had femoral access on R side. Findings concerning for femoral nerve injury.     Recovery from these nerve injuries especially for the L hand is indeterminate. Recommend EMG/NCS for better localization and prognostication. Also recommend further eval of R hip with CT A/P to rule out compressive hematoma.     Recommendations:  - EMG/NCS of LUE and RLE (L lower/middle trunk injury and R femoral nerve injury)  - CT A/P focusing on R pelvic area to rule out compressive hematoma   - Needs aggressive PT/OT   - May increase gabapentin as tolerated to control R thigh pain   - Follow up with neuromuscular neurology (Dr. Alexandra) outpatient     Seen with attending, Dr. Mcbride.     Aleida Niño MD  Neurology PGY-4  Gen Neuro Pager: [phone]         [1]   Past Medical History:  Diagnosis Date    ACC/AHA stage D systolic heart failure     Acute decompensated heart failure 02/23/2024    Acute on chronic combined systolic (congestive) and diastolic (congestive) heart failure 02/23/2024    Acute on chronic systolic heart failure, NYHA class 3     Anticoagulant long-term use 02/26/2024    CAD (coronary artery disease)     Cardiac  defibrillator in place 02/14/2024    CHF (congestive heart failure) 02/14/2024    Chronic left systolic heart failure 02/14/2025    Hypothyroid     Hypothyroidism     LVAD (left ventricular assist device) present (Multi)     LVAD (left ventricular assist device) present (Multi) 02/14/2024    Non-ischemic cardiomyopathy (Multi) 02/14/2024    PAF (paroxysmal atrial fibrillation) (Multi)     Paroxysmal ventricular tachycardia 02/14/2024   [2]   Past Surgical History:  Procedure Laterality Date    CARDIAC CATHETERIZATION N/A 2/28/2024    Procedure: Right Heart Cath;  Surgeon: José Velasco MD;  Location: [place] [address] Cardiac Cath Lab;  Service: Cardiovascular;  Laterality: N/A;  with RAMP study    CARDIAC CATHETERIZATION N/A 3/26/2025    Procedure: Right Heart Cath;  Surgeon: Vince Ruano DO;  Location: [place] [address] Cardiac Cath Lab;  Service: Cardiovascular;  Laterality: N/A;    COLONOSCOPY W/ POLYPECTOMY  2023    CT ABDOMEN PELVIS ANGIOGRAM W AND/OR WO IV CONTRAST  04/13/2023    CT ABDOMEN PELVIS ANGIOGRAM W AND/OR WO IV CONTRAST [facility]    LEFT VENTRICULAR ASSIST DEVICE      OTHER SURGICAL HISTORY  01/05/2022    Complete colonoscopy    OTHER SURGICAL HISTORY  01/05/2022    Cardioverter defibrillator insertion   [3]   Social History  Tobacco Use    Smoking status: Former     Types: Cigarettes    Smokeless tobacco: Never   Substance Use Topics    Alcohol use: Never    Drug use: Never   [4] acetaminophen, 650 mg, oral, q6h  acyclovir, 400 mg, oral, BID  amLODIPine, 5 mg, oral, Daily  aspirin, 81 mg, oral, Daily  calcium carbonate, 1,250 mg of calcium carbonate, oral, BID  cholecalciferol, 50 mcg, oral, Daily  gabapentin, 300 mg, oral, Nightly  heparin (porcine), 5,000 Units, subcutaneous, q8h  insulin lispro, 0-20 Units, subcutaneous, Before meals & nightly  levothyroxine, 125 mcg, oral, Daily  mycophenolate, 1,000 mg, oral, q12h LUPILLO  pantoprazole, 40 mg, oral, Daily before breakfast  perflutren  protein A microsphere, 0.5 mL, intravenous, Once in imaging  pravastatin, 40 mg, oral, Nightly  predniSONE, 35 mg, oral, q12h LUPILLO  sulfamethoxazole-trimethoprim, 1 tablet, oral, Daily  sulfur hexafluoride microsphr, 2 mL, intravenous, Once in imaging  tacrolimus, 2.5 mg, oral, q12h LUPILLO  tamsulosin, 0.4 mg, oral, Daily  vancomycin, 125 mg, oral, 4x daily  voriconazole, 200 mg, oral, q12h LUPILLO     [5]    [6] PRN medications: dextrose, dextrose **OR** [DISCONTINUED] glucagon, dextrose, glucagon, glucagon, guaiFENesin, lidocaine, melatonin, naloxone, ondansetron ODT **OR** ondansetron, oxyCODONE, oxyCODONE, oxygen, polyethylene glycol

## 2025-06-30 NOTE — CARE PLAN
Problem: Safety - Adult  Goal: Free from fall injury  Outcome: Progressing     Problem: Discharge Planning  Goal: Discharge to home or other facility with appropriate resources  Outcome: Progressing     Problem: Chronic Conditions and Co-morbidities  Goal: Patient's chronic conditions and co-morbidity symptoms are monitored and maintained or improved  Outcome: Progressing     Problem: Nutrition  Goal: Nutrient intake appropriate for maintaining nutritional needs  Outcome: Progressing     Problem: Skin  Goal: Decreased wound size/increased tissue granulation at next dressing change  Outcome: Progressing  Goal: Participates in plan/prevention/treatment measures  Outcome: Progressing  Goal: Prevent/manage excess moisture  Outcome: Progressing  Goal: Prevent/minimize sheer/friction injuries  Outcome: Progressing  Goal: Promote/optimize nutrition  Outcome: Progressing  Goal: Promote skin healing  Outcome: Progressing     Problem: Heart Failure  Goal: Improved gas exchange this shift  Outcome: Progressing  Goal: Improved urinary output this shift  Outcome: Progressing  Goal: Reduction in peripheral edema within 24 hours  Outcome: Progressing  Goal: Report improvement of dyspnea/breathlessness this shift  Outcome: Progressing  Goal: Weight from fluid excess reduced over 2-3 days, then stabilize  Outcome: Progressing  Goal: Increase self care and/or family involvement in 24 hours  Outcome: Progressing     Problem: Pain  Goal: Takes deep breaths with improved pain control throughout the shift  Outcome: Progressing  Goal: Turns in bed with improved pain control throughout the shift  Outcome: Progressing  Goal: Walks with improved pain control throughout the shift  Outcome: Progressing  Goal: Participates in PT with improved pain control throughout the shift  Outcome: Progressing

## 2025-06-30 NOTE — CONSULTS
"NEUROLOGY CONSULTATION NOTE  Subjective   Consult Question: L arm weakness without swelling   History Of Presenting Illness  Anatoly Lane is a 64 y.o. left handed male with a PMH of ex smoker, nonischemic cardiomyopathy, stage D HFrEF, S/p HM2 (4/30/23), VT s/p ICD, CAD, pAF, HTN, presenting for S/p LVAD explant, AICD removal & OHT by Dr. Lorenzo on 6/21, currently in the HF ICU (6/24). Neurology is consulted for L hand extensor palsy and 4th+5th digital numbness. On 6/27, patient c/o inability to extend digits 2-5 around the MCP joints and numbness from the medial aspect of the wrist to the 4th and 5th digits and part of the palm. 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 Currently taking 300 mg gabapentin at bedtime.    6/30 @ 1400 - attempted to see patient, pt refused consult at this time  6/30 @ 1534 - H&P obtained    ROS:  A comprehensive review-of-systems was obtained and negative unless noted above in the HPI.    Past Medical History  Medical History[1]  Surgical History  Surgical History[2]  Social History  Social History[3]  Allergies  Jardiance [empagliflozin]      =========  Medications  Scheduled medications  Scheduled Medications[4]  Continuous medications  Continuous Medications[5]  PRN medications  PRN Medications[6]    =========      Objective   Vital Signs  /74 (BP Location: Left arm, Patient Position: Sitting)   Pulse 67   Temp 36.4 °C (97.5 °F) (Temporal)   Resp 18   Ht 1.753 m (5' 9\")   Wt 101 kg (223 lb 4.8 oz)   SpO2 93%   BMI 32.98 kg/m²     Physical Exam  GENERAL APPEARANCE:  No distress, alert, interactive and cooperative.      MENTAL STATE:   Orientation was normal to time, place and person. Recent and remote memory was intact.  Attention span and concentration were normal. Language testing was normal for comprehension, repetition, expression, and " naming. The patient could correctly interpret a picture. General fund of knowledge was intact.     OPHTHALMOSCOPIC:   The ophthalmoscopic exam was deferred.     CRANIAL NERVES:   CN 2   Visual fields full to confrontation.   CN 3, 4, 6   Pupils round, 4 mm in diameter, equally reactive to light. Lids symmetric; no ptosis. EOMs normal alignment, full range with normal saccades, pursuit and convergence.   No nystagmus.   CN 5   Facial sensation intact bilaterally.   CN 7   Normal and symmetric facial strength. Nasolabial folds symmetric.   CN 8   Hearing intact to conversation.  CN 9/10  Palate elevates symmetrically.   CN 11   Normal strength of shoulder shrug and neck turning.   CN 12   Tongue midline, with normal bulk and strength; no fasciculations.     MOTOR:   Muscle bulk and tone were normal in both upper and lower extremities.   No fasciculations, tremor or other abnormal movements were present.                         R          L  Deltoids        5          5  Biceps          5          5  Triceps          5          4+  Wrist Flex      5          4  Wrist Ext       5          4+  Finger Flex    5         4  Finger Ext      5          1  Finger Abd    4/4-     2  Thumb APB   5           5  FOI           5           5  ADM  5 1     Hip Flex         5          5  Knee Flex      5          5  Knee Ext        5          5  Dorsiflex         5          5  Plantarflex      5          5    REFLEXES:                       R          L  BR:               2         1  Biceps:         2          1  Triceps:        2          1  Knee:            2         3+  Ankle:          2          2    Supra-patellar reflex present on L and R    SENSORY:   Sensation reduced to pinprick in L 5th digit and hyperalgesia and reduced to light touch in dorsal ulnar side of L hand. Otherwise, in both upper and lower extremities, sensation was intact to light touch.    COORDINATION:    In both upper extremities, finger-nose-finger was  intact without dysmetria or overshoot.   In both lower extremities, heel-to-shin was intact.           Recent/Relevant Labs:  Results from last 72 hours   Lab Units 06/30/25  0536 06/29/25  0631 06/28/25  0606   WBC AUTO x10*3/uL 17.4* 19.6* 20.7*   HEMOGLOBIN g/dL 8.5* 8.6* 8.5*   HEMATOCRIT % 26.8* 27.2* 26.4*   PLATELETS AUTO x10*3/uL 243 228 193        Results from last 72 hours   Lab Units 06/30/25  0536 06/29/25  0631 06/28/25  0606   SODIUM mmol/L 137 137 139   POTASSIUM mmol/L 5.1 5.1 4.1   CHLORIDE mmol/L 101 101 97*   CO2 mmol/L 29 25 37*   BUN mg/dL 35* 38* 41*   CREATININE mg/dL 0.82 0.95 0.94   MAGNESIUM mg/dL 2.35 2.47* 2.37   PHOSPHORUS mg/dL 4.1 4.4 3.8            Recent/Relevant Imaging:  No MRI head results found for the past 14 days  No CT head results found for the past 14 days    Other Recent/Relevant Studies:  Transthoracic Echo (TTE) Limited  Result Date: 6/27/2025   [facility], [address] [place] [address]                Tel [phone] and Fax [phone] TRANSTHORACIC ECHOCARDIOGRAM REPORT  Patient Name:       BILLIE Steward Physician:    [id number] Spencer Lilly MD Study Date:         6/27/2025           Ordering Provider:    [id number] FRANZ MRN/PID:            [id number]            Fellow: Accession#:         [id number]        Nurse: Date of Birth/Age:  1960 / 64      Sonographer:          Regina Plata RDCS                     years Gender assigned at  M                   Additional Staff: Birth: Height:             175.26 cm           Admit Date:           6/23/2025 Weight:             99.79 kg            Admission Status:     Inpatient -                                                               Routine BSA / BMI:          2.15 m2 / 32.49     Encounter#:           [id number]                     kg/m2 Blood Pressure:      130/71 mmHg         Department Location:  [address] Study Type:    TRANSTHORACIC ECHO (TTE) LIMITED Diagnosis/ICD: Heart transplant status-Z94.1 Indication:    Evaluate RV and for pericardial effusion CPT Code:      Echo Limited-91188; Doppler Limited-77085; Color Doppler-11478 Patient History: Pertinent History: HTN. OHT 6/20/2025. Study Detail: The following Echo studies were performed: 2D, M-Mode, Doppler and               color flow.  PHYSICIAN INTERPRETATION: Left Ventricle: The left ventricular systolic function is normal with a visually estimated ejection fraction of 60-65%. There are no regional left ventricular wall motion abnormalities. The left ventricular cavity size is normal. There is normal posterior left ventricular wall thickness. Abnormal (paradoxical) septal motion consistent with post-operative status. Left ventricular diastolic filling was not assessed. Left Atrium: The left atrium is consistent with transplant. Right Ventricle: The right ventricle is mildly enlarged. There is mildly reduced right ventricular systolic function. A device is visualized in the right ventricle. TAPSE= 16 mm; RV S'= 10 cm/s. Right Atrium: The right atrial size was not assessed. There is a device visualized in the right atrium. Aortic Valve: The aortic valve is trileaflet. Aortic valve regurgitation was not assessed. Mitral Valve: The mitral valve is normal in structure. Mitral valve regurgitation was not assessed. Tricuspid Valve: The tricuspid valve is structurally normal. There is mild tricuspid regurgitation. The Doppler estimated right ventricular systolic pressure (RVSP) is within normal limits at 25 mmHg. Pulmonic Valve: The pulmonic valve was not assessed. Pulmonic valve regurgitation was not assessed. Pericardium: Trivial pericardial effusion. Pleural: There is left pleural effusion. Aorta: The aortic root is normal. Systemic Veins: The inferior vena cava appears normal in size, with IVC inspiratory  collapse greater than 50%. In comparison to the previous echocardiogram(s): Compared with study dated 6/23/2025, no significant change.  CONCLUSIONS:  1. The left ventricular systolic function is normal with a visually estimated ejection fraction of 60-65%.  2. Abnormal septal motion consistent with post-operative status.  3. There is mildly reduced right ventricular systolic function.  4. Mildly enlarged right ventricle.  5. The Doppler estimated RVSP is within normal limits at 25 mmHg. QUANTITATIVE DATA SUMMARY:  2D MEASUREMENTS:         Normal Ranges: IVSd:            0.53 cm (0.6-1.1cm) LVPWd:           0.63 cm (0.6-1.1cm) LVIDd:           5.45 cm (3.9-5.9cm) LVIDs:           3.73 cm LV Mass Index:   49 g/m2 LV % FS          31.7 %  LV SYSTOLIC FUNCTION:                      Normal Ranges: EF-Visual:      63 % LV EF Reported: 63 %  RIGHT VENTRICLE: RV Basal 3.60 cm RV Mid   3.60 cm RV Major 7.6 cm TAPSE:   16.0 mm RV s'    0.10 m/s  TRICUSPID VALVE/RVSP:          Normal Ranges: Peak TR Velocity:     2.36 m/s Est. RA Pressure:     3 RV Syst Pressure:     25       (< 30mmHg) IVC Diam:             2.00 cm  [id number] Spencer Lilly MD Electronically signed on 6/27/2025 at 3:43:39 PM  ** Final **     Transthoracic Echo (TTE) Limited  Result Date: 6/23/2025   [facility], [address] [place] [address]                Tel [phone] and Fax [phone] TRANSTHORACIC ECHOCARDIOGRAM REPORT  Patient Name:       BILLIE Steward Physician:    [id number] Ama Flynn MD Study Date:         6/23/2025           Ordering Provider:    [id number] WILLIAM VANEGAS MRN/PID:            [id number]            Fellow: Accession#:         [id number]        Nurse: Date of Birth/Age:  1960 / 64      Sonographer:          Sophia flanagan                                      RAE Gender assigned at  M                   Additional Staff:     Camilo Garibay Birth:                                                        RDCS, RCS, FASE,                                                               ACS Height:             175.26 cm           Admit Date: Weight:             85.72 kg            Admission Status:     Inpatient -                                                               Routine BSA / BMI:          2.02 m2 / 27.91     Encounter#:           [id number]                     kg/m2 Blood Pressure:     110/49 mmHg         Department Location:  [facility] Study Type:    TRANSTHORACIC ECHO (TTE) LIMITED Diagnosis/ICD: Heart transplant status-Z94.1 Indication:    S/P heart transplant CPT Code:      Echo Limited-57851; Doppler Limited-94188; Color Doppler-73790 Patient History: Pertinent History: S/P heart transplant 6/21/25, HTN, Paroxysmal V-tach, LVAD. Study Detail: The following Echo studies were performed: 2D, M-Mode, Doppler and               color flow. Technically challenging study due to poor acoustic               windows, postoperative dressings, prominent lung artifact and body               habitus. Definity used as a contrast agent for endocardial border               definition. Total contrast used for this procedure was 2 mL via IV               push. Unable to obtain IVC view.  PHYSICIAN INTERPRETATION: Left Ventricle: Left ventricular ejection fraction is normal calculated by Severino's biplane at 67%. There are no regional left ventricular wall motion abnormalities. The left ventricular cavity size is normal. There is normal septal and normal posterior left ventricular wall thickness. Left ventricular diastolic filling cannot be determined due to heart transplant. Left Atrium: The left atrium is consistent with transplant. Right Ventricle: The right ventricle was not assessed. There is normal right ventricular global systolic function. A device is  visualized in the right ventricle. Right Atrium: The right atrial size was not assessed. There is a device visualized in the right atrium. Aortic Valve: The aortic valve is probably trileaflet. There is no evidence of aortic valve regurgitation. Mitral Valve: The mitral valve is normal in structure. There is trace mitral valve regurgitation. Tricuspid Valve: The tricuspid valve is structurally normal. No evidence of tricuspid regurgitation. Pulmonic Valve: The pulmonic valve is structurally normal. There is no indication of pulmonic valve regurgitation. Pericardium: There is no pericardial effusion noted. Aorta: The aortic root is normal. The Asc Ao is 3.30 cm. There is no dilatation of the ascending aorta. Systemic Veins: The inferior vena cava was not well visualized, IVC inspiratory collapse is not well visualized. In comparison to the previous echocardiogram(s): Compared with study dated 2/27/2025, patient is now s/p heart transplant.  CONCLUSIONS:  1. Poorly visualized anatomical structures due to suboptimal image quality.  2. Left ventricular ejection fraction is normal calculated by Severino's biplane at 67%.  3. There is normal right ventricular global systolic function. QUANTITATIVE DATA SUMMARY:  2D MEASUREMENTS:          Normal Ranges: Ao Root d:       3.60 cm  (2.0-3.7cm) IVSd:            0.90 cm  (0.6-1.1cm) LVPWd:           0.90 cm  (0.6-1.1cm) LVIDd:           4.60 cm  (3.9-5.9cm) LVIDs:           3.10 cm LV Mass Index:   68 g/m2 LVEDV Index:     75 ml/m2 LV % FS          32.6 %  AORTA MEASUREMENTS:         Normal Ranges: Asc Ao, d:          3.30 cm (2.1-3.4cm)  LV SYSTOLIC FUNCTION:                      Normal Ranges: EF-A4C View:    65 % (>=55%) EF-A2C View:    71 % EF-Biplane:     67 % LV EF Reported: 67 %  AORTIC VALVE:          Normal Ranges: LVOT Diameter: 2.20 cm (1.8-2.4cm)  TRICUSPID VALVE/RVSP:   Normal Ranges: Est. RA Pressure:     3  [id number] Ama Flynn MD Electronically signed on  6/23/2025 at 4:14:24 PM  ** Final **       =========  Assessment/Plan   Assessment:  Anatoly Lane is a 64 y.o. male with a PMH ex smoker, nonischemic cardiomyopathy, stage D HFrEF, S/p HM2 (4/30/23), VT s/p ICD, CAD, pAF, HTN, who is admitted to [facility] for s/p LVAD explant, AICD removal & OHT by Dr. Lorenzo on 6/21, currently in the HF ICU (6/24). Neurology is consulted for L hand extensor palsy and 4th+5th digit and ulnar sensation loss likely secondary to sternotomy on 6/21. 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     Recommendations:  - Plan to conduct EMG nerve study on LUE.  - Plan to titrate neuropathic medication for thigh neuropathic pain.  - Recommend PT/OT.  - Follow up outpatient and possibly consider nerve graft in future.    Aleida Niño MD  Neurology PGY-3    Gen Neuro Pager: [phone]  Stroke Pager: [phone]  Epilepsy Pager: [phone]  [facility] Pager: [phone]  Peds Neuro Pager: [phone]              [1]   Past Medical History:  Diagnosis Date    ACC/AHA stage D systolic heart failure     Acute decompensated heart failure 02/23/2024    Acute on chronic combined systolic (congestive) and diastolic (congestive) heart failure 02/23/2024    Acute on chronic systolic heart failure, NYHA class 3     Anticoagulant long-term use 02/26/2024    CAD (coronary artery disease)     Cardiac defibrillator in place 02/14/2024    CHF (congestive heart failure) 02/14/2024    Chronic left systolic heart failure 02/14/2025    Hypothyroid     Hypothyroidism     LVAD (left ventricular assist device) present (Multi)     LVAD (left ventricular assist device) present (Multi) 02/14/2024    Non-ischemic cardiomyopathy (Multi) 02/14/2024    PAF (paroxysmal atrial fibrillation) (Multi)     Paroxysmal ventricular tachycardia 02/14/2024   [2]   Past Surgical History:  Procedure Laterality Date    CARDIAC CATHETERIZATION N/A 2/28/2024     Procedure: Right Heart Cath;  Surgeon: José Velasco MD;  Location: [place] [address] Cardiac Cath Lab;  Service: Cardiovascular;  Laterality: N/A;  with RAMP study    CARDIAC CATHETERIZATION N/A 3/26/2025    Procedure: Right Heart Cath;  Surgeon: Vince Ruano DO;  Location: [facility] Cardiac Cath Lab;  Service: Cardiovascular;  Laterality: N/A;    COLONOSCOPY W/ POLYPECTOMY  2023    CT ABDOMEN PELVIS ANGIOGRAM W AND/OR WO IV CONTRAST  04/13/2023    CT ABDOMEN PELVIS ANGIOGRAM W AND/OR WO IV CONTRAST [facility] CT    LEFT VENTRICULAR ASSIST DEVICE      OTHER SURGICAL HISTORY  01/05/2022    Complete colonoscopy    OTHER SURGICAL HISTORY  01/05/2022    Cardioverter defibrillator insertion   [3]   Social History  Tobacco Use    Smoking status: Former     Types: Cigarettes    Smokeless tobacco: Never   Substance Use Topics    Alcohol use: Never    Drug use: Never   [4] acetaminophen, 650 mg, oral, q6h  acyclovir, 400 mg, oral, BID  amLODIPine, 5 mg, oral, Daily  aspirin, 81 mg, oral, Daily  calcium carbonate, 1,250 mg of calcium carbonate, oral, BID  cholecalciferol, 50 mcg, oral, Daily  gabapentin, 300 mg, oral, Nightly  heparin (porcine), 5,000 Units, subcutaneous, q8h  insulin lispro, 0-20 Units, subcutaneous, Before meals & nightly  levothyroxine, 125 mcg, oral, Daily  mycophenolate, 1,000 mg, oral, q12h LUPILLO  pantoprazole, 40 mg, oral, Daily before breakfast  perflutren lipid microspheres, 0.5-10 mL of dilution, intravenous, Once in imaging  perflutren protein A microsphere, 0.5 mL, intravenous, Once in imaging  pravastatin, 40 mg, oral, Nightly  predniSONE, 35 mg, oral, q12h LUPILLO  sulfamethoxazole-trimethoprim, 1 tablet, oral, Daily  sulfur hexafluoride microsphr, 2 mL, intravenous, Once in imaging  tacrolimus, 2.5 mg, oral, q12h LUPILLO  tamsulosin, 0.4 mg, oral, Daily  vancomycin, 125 mg, oral, 4x daily  voriconazole, 200 mg, oral, q12h LUPILLO  [5]    [6] PRN medications: dextrose, dextrose **OR**  [DISCONTINUED] glucagon, dextrose, glucagon, glucagon, guaiFENesin, lidocaine, melatonin, naloxone, ondansetron ODT **OR** ondansetron, oxyCODONE, oxyCODONE, oxygen, polyethylene glycol

## 2025-07-01 ENCOUNTER — APPOINTMENT (OUTPATIENT)
Dept: RADIOLOGY | Facility: HOSPITAL | Age: 65
End: 2025-07-01
Payer: MEDICARE

## 2025-07-01 ENCOUNTER — APPOINTMENT (OUTPATIENT)
Dept: CARDIOLOGY | Facility: HOSPITAL | Age: 65
DRG: 001 | End: 2025-07-01
Payer: MEDICARE

## 2025-07-01 ENCOUNTER — APPOINTMENT (OUTPATIENT)
Dept: CARDIOLOGY | Facility: HOSPITAL | Age: 65
End: 2025-07-01
Payer: MEDICARE

## 2025-07-01 ENCOUNTER — APPOINTMENT (OUTPATIENT)
Dept: RADIOLOGY | Facility: HOSPITAL | Age: 65
DRG: 001 | End: 2025-07-01
Payer: MEDICARE

## 2025-07-01 ENCOUNTER — SURGERY (OUTPATIENT)
Age: 65
End: 2025-07-01
Payer: MEDICARE

## 2025-07-01 LAB
ALBUMIN SERPL BCP-MCNC: 3.9 G/DL (ref 3.4–5)
ANION GAP SERPL CALC-SCNC: 10 MMOL/L (ref 10–20)
ATRIAL RATE: 103 BPM
BUN SERPL-MCNC: 30 MG/DL (ref 6–23)
CALCIUM SERPL-MCNC: 9 MG/DL (ref 8.6–10.6)
CHLORIDE SERPL-SCNC: 100 MMOL/L (ref 98–107)
CLINICAL SIG UPDATED INFO: NORMAL
CO2 SERPL-SCNC: 32 MMOL/L (ref 21–32)
CREAT SERPL-MCNC: 0.81 MG/DL (ref 0.5–1.3)
EGFRCR SERPLBLD CKD-EPI 2021: >90 ML/MIN/1.73M*2
EJECTION FRACTION APICAL 4 CHAMBER: 59.1
ERYTHROCYTE [DISTWIDTH] IN BLOOD BY AUTOMATED COUNT: 19.6 % (ref 11.5–14.5)
GLUCOSE BLD MANUAL STRIP-MCNC: 127 MG/DL (ref 74–99)
GLUCOSE BLD MANUAL STRIP-MCNC: 138 MG/DL (ref 74–99)
GLUCOSE BLD MANUAL STRIP-MCNC: 204 MG/DL (ref 74–99)
GLUCOSE BLD MANUAL STRIP-MCNC: 213 MG/DL (ref 74–99)
GLUCOSE SERPL-MCNC: 162 MG/DL (ref 74–99)
HCT VFR BLD AUTO: 30.7 % (ref 41–52)
HGB BLD-MCNC: 9.6 G/DL (ref 13.5–17.5)
LAB AP ASR DISCLAIMER: NORMAL
LABORATORY COMMENT REPORT: NORMAL
LABORATORY COMMENT REPORT: NORMAL
MAGNESIUM SERPL-MCNC: 2.26 MG/DL (ref 1.6–2.4)
MCH RBC QN AUTO: 31.1 PG (ref 26–34)
MCHC RBC AUTO-ENTMCNC: 31.3 G/DL (ref 32–36)
MCV RBC AUTO: 99 FL (ref 80–100)
MITRAL VALVE E/A RATIO: 2.3
NRBC BLD-RTO: 0.2 /100 WBCS (ref 0–0)
P AXIS: 93 DEGREES
P OFFSET: 209 MS
P ONSET: 168 MS
PATH REPORT.FINAL DX SPEC: NORMAL
PATH REPORT.FINAL DX SPEC: NORMAL
PATH REPORT.GROSS SPEC: NORMAL
PATH REPORT.GROSS SPEC: NORMAL
PATH REPORT.RELEVANT HX SPEC: NORMAL
PATH REPORT.TOTAL CANCER: NORMAL
PATH REPORT.TOTAL CANCER: NORMAL
PHOSPHATE SERPL-MCNC: 3.8 MG/DL (ref 2.5–4.9)
PLATELET # BLD AUTO: 315 X10*3/UL (ref 150–450)
POTASSIUM SERPL-SCNC: 5.4 MMOL/L (ref 3.5–5.3)
PR INTERVAL: 122 MS
Q ONSET: 219 MS
QRS COUNT: 17 BEATS
QRS DURATION: 94 MS
QT INTERVAL: 362 MS
QTC CALCULATION(BAZETT): 474 MS
QTC FREDERICIA: 433 MS
R AXIS: 13 DEGREES
RBC # BLD AUTO: 3.09 X10*6/UL (ref 4.5–5.9)
RIGHT VENTRICLE FREE WALL PEAK S': 6.75 CM/S
SODIUM SERPL-SCNC: 137 MMOL/L (ref 136–145)
T AXIS: 55 DEGREES
T OFFSET: 400 MS
TACROLIMUS BLD-MCNC: 8.9 NG/ML
VENTRICULAR RATE: 103 BPM
WBC # BLD AUTO: 24.9 X10*3/UL (ref 4.4–11.3)

## 2025-07-01 PROCEDURE — 2500000004 HC RX 250 GENERAL PHARMACY W/ HCPCS (ALT 636 FOR OP/ED): Performed by: REGISTERED NURSE

## 2025-07-01 PROCEDURE — 71250 CT THORAX DX C-: CPT | Performed by: RADIOLOGY

## 2025-07-01 PROCEDURE — 2500000004 HC RX 250 GENERAL PHARMACY W/ HCPCS (ALT 636 FOR OP/ED)

## 2025-07-01 PROCEDURE — 85027 COMPLETE CBC AUTOMATED: CPT

## 2025-07-01 PROCEDURE — 80069 RENAL FUNCTION PANEL: CPT

## 2025-07-01 PROCEDURE — 71250 CT THORAX DX C-: CPT

## 2025-07-01 PROCEDURE — 4A023N6 MEASUREMENT OF CARDIAC SAMPLING AND PRESSURE, RIGHT HEART, PERCUTANEOUS APPROACH: ICD-10-PCS | Performed by: STUDENT IN AN ORGANIZED HEALTH CARE EDUCATION/TRAINING PROGRAM

## 2025-07-01 PROCEDURE — 2500000001 HC RX 250 WO HCPCS SELF ADMINISTERED DRUGS (ALT 637 FOR MEDICARE OP)

## 2025-07-01 PROCEDURE — 88350 IMFLUOR EA ADDL 1ANTB STN PX: CPT | Performed by: PATHOLOGY

## 2025-07-01 PROCEDURE — 93308 TTE F-UP OR LMTD: CPT | Performed by: INTERNAL MEDICINE

## 2025-07-01 PROCEDURE — 80197 ASSAY OF TACROLIMUS: CPT

## 2025-07-01 PROCEDURE — 82947 ASSAY GLUCOSE BLOOD QUANT: CPT

## 2025-07-01 PROCEDURE — 93505 ENDOMYOCARDIAL BIOPSY: CPT | Performed by: STUDENT IN AN ORGANIZED HEALTH CARE EDUCATION/TRAINING PROGRAM

## 2025-07-01 PROCEDURE — 02BK3ZX EXCISION OF RIGHT VENTRICLE, PERCUTANEOUS APPROACH, DIAGNOSTIC: ICD-10-PCS | Performed by: STUDENT IN AN ORGANIZED HEALTH CARE EDUCATION/TRAINING PROGRAM

## 2025-07-01 PROCEDURE — 1200000002 HC GENERAL ROOM WITH TELEMETRY DAILY

## 2025-07-01 PROCEDURE — 93321 DOPPLER ECHO F-UP/LMTD STD: CPT

## 2025-07-01 PROCEDURE — 2500000005 HC RX 250 GENERAL PHARMACY W/O HCPCS: Performed by: REGISTERED NURSE

## 2025-07-01 PROCEDURE — 93325 DOPPLER ECHO COLOR FLOW MAPG: CPT | Performed by: INTERNAL MEDICINE

## 2025-07-01 PROCEDURE — 2500000002 HC RX 250 W HCPCS SELF ADMINISTERED DRUGS (ALT 637 FOR MEDICARE OP, ALT 636 FOR OP/ED)

## 2025-07-01 PROCEDURE — C8924 2D TTE W OR W/O FOL W/CON,FU: HCPCS

## 2025-07-01 PROCEDURE — 93321 DOPPLER ECHO F-UP/LMTD STD: CPT | Performed by: INTERNAL MEDICINE

## 2025-07-01 PROCEDURE — 2500000004 HC RX 250 GENERAL PHARMACY W/ HCPCS (ALT 636 FOR OP/ED): Performed by: STUDENT IN AN ORGANIZED HEALTH CARE EDUCATION/TRAINING PROGRAM

## 2025-07-01 PROCEDURE — 74176 CT ABD & PELVIS W/O CONTRAST: CPT

## 2025-07-01 PROCEDURE — 99152 MOD SED SAME PHYS/QHP 5/>YRS: CPT | Performed by: STUDENT IN AN ORGANIZED HEALTH CARE EDUCATION/TRAINING PROGRAM

## 2025-07-01 PROCEDURE — 88346 IMFLUOR 1ST 1ANTB STAIN PX: CPT | Performed by: PATHOLOGY

## 2025-07-01 PROCEDURE — 76942 ECHO GUIDE FOR BIOPSY: CPT | Performed by: STUDENT IN AN ORGANIZED HEALTH CARE EDUCATION/TRAINING PROGRAM

## 2025-07-01 PROCEDURE — 74176 CT ABD & PELVIS W/O CONTRAST: CPT | Performed by: STUDENT IN AN ORGANIZED HEALTH CARE EDUCATION/TRAINING PROGRAM

## 2025-07-01 PROCEDURE — 83735 ASSAY OF MAGNESIUM: CPT

## 2025-07-01 PROCEDURE — 2500000001 HC RX 250 WO HCPCS SELF ADMINISTERED DRUGS (ALT 637 FOR MEDICARE OP): Performed by: REGISTERED NURSE

## 2025-07-01 PROCEDURE — 93010 ELECTROCARDIOGRAM REPORT: CPT | Performed by: INTERNAL MEDICINE

## 2025-07-01 PROCEDURE — 2500000002 HC RX 250 W HCPCS SELF ADMINISTERED DRUGS (ALT 637 FOR MEDICARE OP, ALT 636 FOR OP/ED): Performed by: REGISTERED NURSE

## 2025-07-01 PROCEDURE — 93451 RIGHT HEART CATH: CPT | Performed by: STUDENT IN AN ORGANIZED HEALTH CARE EDUCATION/TRAINING PROGRAM

## 2025-07-01 PROCEDURE — 93005 ELECTROCARDIOGRAM TRACING: CPT

## 2025-07-01 PROCEDURE — 88307 TISSUE EXAM BY PATHOLOGIST: CPT | Performed by: PATHOLOGY

## 2025-07-01 PROCEDURE — 36415 COLL VENOUS BLD VENIPUNCTURE: CPT

## 2025-07-01 PROCEDURE — 2500000001 HC RX 250 WO HCPCS SELF ADMINISTERED DRUGS (ALT 637 FOR MEDICARE OP): Performed by: STUDENT IN AN ORGANIZED HEALTH CARE EDUCATION/TRAINING PROGRAM

## 2025-07-01 PROCEDURE — 71250 CT THORAX DX C-: CPT | Performed by: STUDENT IN AN ORGANIZED HEALTH CARE EDUCATION/TRAINING PROGRAM

## 2025-07-01 PROCEDURE — C1727 CATH, BAL TIS DIS, NON-VAS: HCPCS | Performed by: STUDENT IN AN ORGANIZED HEALTH CARE EDUCATION/TRAINING PROGRAM

## 2025-07-01 PROCEDURE — 2720000007 HC OR 272 NO HCPCS: Performed by: STUDENT IN AN ORGANIZED HEALTH CARE EDUCATION/TRAINING PROGRAM

## 2025-07-01 PROCEDURE — C1894 INTRO/SHEATH, NON-LASER: HCPCS | Performed by: STUDENT IN AN ORGANIZED HEALTH CARE EDUCATION/TRAINING PROGRAM

## 2025-07-01 PROCEDURE — 99233 SBSQ HOSP IP/OBS HIGH 50: CPT | Performed by: REGISTERED NURSE

## 2025-07-01 PROCEDURE — 88307 TISSUE EXAM BY PATHOLOGIST: CPT | Mod: TC,SUR | Performed by: STUDENT IN AN ORGANIZED HEALTH CARE EDUCATION/TRAINING PROGRAM

## 2025-07-01 RX ORDER — LIDOCAINE HYDROCHLORIDE 20 MG/ML
INJECTION, SOLUTION INFILTRATION; PERINEURAL AS NEEDED
Status: DISCONTINUED | OUTPATIENT
Start: 2025-07-01 | End: 2025-07-01 | Stop reason: HOSPADM

## 2025-07-01 RX ORDER — CYCLOBENZAPRINE HCL 10 MG
5 TABLET ORAL EVERY 8 HOURS
Status: DISCONTINUED | OUTPATIENT
Start: 2025-07-01 | End: 2025-07-03

## 2025-07-01 RX ORDER — MIDAZOLAM HYDROCHLORIDE 1 MG/ML
INJECTION, SOLUTION INTRAMUSCULAR; INTRAVENOUS AS NEEDED
Status: DISCONTINUED | OUTPATIENT
Start: 2025-07-01 | End: 2025-07-01 | Stop reason: HOSPADM

## 2025-07-01 RX ORDER — FUROSEMIDE 10 MG/ML
40 INJECTION INTRAMUSCULAR; INTRAVENOUS ONCE
Status: COMPLETED | OUTPATIENT
Start: 2025-07-01 | End: 2025-07-01

## 2025-07-01 RX ORDER — FENTANYL CITRATE 50 UG/ML
INJECTION, SOLUTION INTRAMUSCULAR; INTRAVENOUS AS NEEDED
Status: DISCONTINUED | OUTPATIENT
Start: 2025-07-01 | End: 2025-07-01 | Stop reason: HOSPADM

## 2025-07-01 RX ORDER — LIDOCAINE 560 MG/1
1 PATCH PERCUTANEOUS; TOPICAL; TRANSDERMAL DAILY
Status: DISCONTINUED | OUTPATIENT
Start: 2025-07-01 | End: 2025-07-09 | Stop reason: HOSPADM

## 2025-07-01 RX ORDER — AMLODIPINE BESYLATE 10 MG/1
10 TABLET ORAL DAILY
Status: DISCONTINUED | OUTPATIENT
Start: 2025-07-01 | End: 2025-07-01

## 2025-07-01 RX ORDER — HYDRALAZINE HYDROCHLORIDE 25 MG/1
25 TABLET, FILM COATED ORAL 3 TIMES DAILY
Status: DISCONTINUED | OUTPATIENT
Start: 2025-07-01 | End: 2025-07-03

## 2025-07-01 RX ADMIN — FENTANYL CITRATE 50 MCG: 50 INJECTION, SOLUTION INTRAMUSCULAR; INTRAVENOUS at 08:02

## 2025-07-01 RX ADMIN — SODIUM ZIRCONIUM CYCLOSILICATE 10 G: 10 POWDER, FOR SUSPENSION ORAL at 13:59

## 2025-07-01 RX ADMIN — LIDOCAINE 4% 1 PATCH: 40 PATCH TOPICAL at 12:54

## 2025-07-01 RX ADMIN — MIDAZOLAM 1 MG: 1 INJECTION INTRAMUSCULAR; INTRAVENOUS at 08:02

## 2025-07-01 RX ADMIN — PANTOPRAZOLE SODIUM 40 MG: 40 TABLET, DELAYED RELEASE ORAL at 06:29

## 2025-07-01 RX ADMIN — PRAVASTATIN SODIUM 40 MG: 40 TABLET ORAL at 20:43

## 2025-07-01 RX ADMIN — INSULIN LISPRO 8 UNITS: 100 INJECTION, SOLUTION INTRAVENOUS; SUBCUTANEOUS at 21:50

## 2025-07-01 RX ADMIN — HYDRALAZINE HYDROCHLORIDE 25 MG: 25 TABLET ORAL at 12:53

## 2025-07-01 RX ADMIN — INSULIN LISPRO 8 UNITS: 100 INJECTION, SOLUTION INTRAVENOUS; SUBCUTANEOUS at 18:44

## 2025-07-01 RX ADMIN — VANCOMYCIN HYDROCHLORIDE 125 MG: 125 CAPSULE ORAL at 20:43

## 2025-07-01 RX ADMIN — MYCOPHENOLATE MOFETIL 1000 MG: 250 CAPSULE ORAL at 18:45

## 2025-07-01 RX ADMIN — LIDOCAINE HYDROCHLORIDE 10 ML: 20 INJECTION, SOLUTION INFILTRATION; PERINEURAL at 08:01

## 2025-07-01 RX ADMIN — CYCLOBENZAPRINE 5 MG: 10 TABLET, FILM COATED ORAL at 12:53

## 2025-07-01 RX ADMIN — GABAPENTIN 300 MG: 300 CAPSULE ORAL at 20:43

## 2025-07-01 RX ADMIN — VANCOMYCIN HYDROCHLORIDE 125 MG: 125 CAPSULE ORAL at 12:53

## 2025-07-01 RX ADMIN — FUROSEMIDE 40 MG: 10 INJECTION, SOLUTION INTRAMUSCULAR; INTRAVENOUS at 18:44

## 2025-07-01 RX ADMIN — VORICONAZOLE 200 MG: 200 TABLET, COATED ORAL at 20:43

## 2025-07-01 RX ADMIN — ACYCLOVIR 400 MG: 400 TABLET ORAL at 10:10

## 2025-07-01 RX ADMIN — SULFAMETHOXAZOLE AND TRIMETHOPRIM 1 TABLET: 400; 80 TABLET ORAL at 10:10

## 2025-07-01 RX ADMIN — ACETAMINOPHEN 650 MG: 325 TABLET ORAL at 12:53

## 2025-07-01 RX ADMIN — VORICONAZOLE 200 MG: 200 TABLET, COATED ORAL at 10:10

## 2025-07-01 RX ADMIN — ACETAMINOPHEN 650 MG: 325 TABLET ORAL at 18:45

## 2025-07-01 RX ADMIN — CALCIUM 1 TABLET: 500 TABLET ORAL at 20:44

## 2025-07-01 RX ADMIN — TACROLIMUS 2.5 MG: 0.5 CAPSULE ORAL at 06:29

## 2025-07-01 RX ADMIN — MYCOPHENOLATE MOFETIL 1000 MG: 250 CAPSULE ORAL at 06:29

## 2025-07-01 RX ADMIN — VANCOMYCIN HYDROCHLORIDE 125 MG: 125 CAPSULE ORAL at 06:40

## 2025-07-01 RX ADMIN — FUROSEMIDE 40 MG: 10 INJECTION, SOLUTION INTRAMUSCULAR; INTRAVENOUS at 10:10

## 2025-07-01 RX ADMIN — PREDNISONE 25 MG: 20 TABLET ORAL at 20:43

## 2025-07-01 RX ADMIN — TACROLIMUS 2.5 MG: 0.5 CAPSULE ORAL at 18:45

## 2025-07-01 RX ADMIN — ACYCLOVIR 400 MG: 400 TABLET ORAL at 20:44

## 2025-07-01 RX ADMIN — CALCIUM 1 TABLET: 500 TABLET ORAL at 10:11

## 2025-07-01 RX ADMIN — HEPARIN SODIUM 5000 UNITS: 5000 INJECTION INTRAVENOUS; SUBCUTANEOUS at 10:27

## 2025-07-01 RX ADMIN — PERFLUTREN 10 ML OF DILUTION: 6.52 INJECTION, SUSPENSION INTRAVENOUS at 12:54

## 2025-07-01 RX ADMIN — ACETAMINOPHEN 650 MG: 325 TABLET ORAL at 06:29

## 2025-07-01 RX ADMIN — OXYCODONE HYDROCHLORIDE 10 MG: 10 TABLET ORAL at 03:51

## 2025-07-01 RX ADMIN — OXYCODONE HYDROCHLORIDE 10 MG: 10 TABLET ORAL at 13:59

## 2025-07-01 RX ADMIN — CYCLOBENZAPRINE 5 MG: 10 TABLET, FILM COATED ORAL at 20:45

## 2025-07-01 RX ADMIN — AMLODIPINE BESYLATE 10 MG: 10 TABLET ORAL at 10:11

## 2025-07-01 RX ADMIN — CHOLECALCIFEROL TAB 25 MCG (1000 UNIT) 50 MCG: 25 TAB at 10:10

## 2025-07-01 RX ADMIN — LEVOTHYROXINE SODIUM 125 MCG: 0.05 TABLET ORAL at 06:29

## 2025-07-01 RX ADMIN — HYDRALAZINE HYDROCHLORIDE 25 MG: 25 TABLET ORAL at 20:43

## 2025-07-01 RX ADMIN — PREDNISONE 35 MG: 20 TABLET ORAL at 10:10

## 2025-07-01 RX ADMIN — ASPIRIN 81 MG: 81 TABLET, COATED ORAL at 10:10

## 2025-07-01 RX ADMIN — VANCOMYCIN HYDROCHLORIDE 125 MG: 125 CAPSULE ORAL at 18:45

## 2025-07-01 RX ADMIN — TAMSULOSIN HYDROCHLORIDE 0.4 MG: 0.4 CAPSULE ORAL at 10:10

## 2025-07-01 ASSESSMENT — COGNITIVE AND FUNCTIONAL STATUS - GENERAL
TOILETING: A LITTLE
DRESSING REGULAR LOWER BODY CLOTHING: A LITTLE
MOVING FROM LYING ON BACK TO SITTING ON SIDE OF FLAT BED WITH BEDRAILS: A LITTLE
MOVING TO AND FROM BED TO CHAIR: A LITTLE
DAILY ACTIVITIY SCORE: 19
DRESSING REGULAR UPPER BODY CLOTHING: A LITTLE
CLIMB 3 TO 5 STEPS WITH RAILING: A LOT
TURNING FROM BACK TO SIDE WHILE IN FLAT BAD: A LITTLE
DRESSING REGULAR UPPER BODY CLOTHING: A LITTLE
CLIMB 3 TO 5 STEPS WITH RAILING: A LOT
MOBILITY SCORE: 17
HELP NEEDED FOR BATHING: A LITTLE
MOBILITY SCORE: 17
TOILETING: A LITTLE
TURNING FROM BACK TO SIDE WHILE IN FLAT BAD: A LITTLE
WALKING IN HOSPITAL ROOM: A LITTLE
STANDING UP FROM CHAIR USING ARMS: A LITTLE
MOVING TO AND FROM BED TO CHAIR: A LITTLE
STANDING UP FROM CHAIR USING ARMS: A LITTLE
HELP NEEDED FOR BATHING: A LITTLE
PERSONAL GROOMING: A LITTLE
DRESSING REGULAR LOWER BODY CLOTHING: A LITTLE
WALKING IN HOSPITAL ROOM: A LITTLE
MOVING FROM LYING ON BACK TO SITTING ON SIDE OF FLAT BED WITH BEDRAILS: A LITTLE
DAILY ACTIVITIY SCORE: 20

## 2025-07-01 ASSESSMENT — PAIN SCALES - GENERAL
PAINLEVEL_OUTOF10: 6
PAINLEVEL_OUTOF10: 0 - NO PAIN
PAINLEVEL_OUTOF10: 0 - NO PAIN
PAINLEVEL_OUTOF10: 10 - WORST POSSIBLE PAIN

## 2025-07-01 ASSESSMENT — PAIN - FUNCTIONAL ASSESSMENT
PAIN_FUNCTIONAL_ASSESSMENT: 0-10

## 2025-07-01 ASSESSMENT — PAIN DESCRIPTION - DESCRIPTORS: DESCRIPTORS: ACHING;SORE

## 2025-07-01 NOTE — CARE PLAN
The patient's goals for the shift include  rest     The clinical goals for the shift include pt will remain hds this shift

## 2025-07-01 NOTE — CONSULTS
Wound Care Consult     Visit Date: 7/1/2025      Patient Name: Anatoly Lane         MRN: [id number]             Reason for Consult: old drive line site        Assessment:  Wound 02/23/24 Other (comment) Abdomen Lower;Medial (Active)   Date First Assessed/Time First Assessed: 02/23/24 1600   Present on Original Admission: Yes  Hand Hygiene Completed: Yes  Primary Wound Type: (c) Other (comment)  Location: Abdomen  Wound Location Orientation: Lower;Medial      Assessments 7/1/2025 12:46 PM   Wound Image     Site Assessment Other (Comment)   Dahlia-Wound Assessment Intact   Wound Length (cm) 0.2 cm   Wound Width (cm) 1.5 cm   Wound Surface Area (cm^2) 0.24 cm^2   Wound Depth (cm) 0 cm   Wound Volume (cm^3) 0 cm^3   Margins Attached edges   Drainage Description None   Drainage Amount None   Dressing Open to air   Dressing Changed Changed   Dressing Status Clean;Dry       Active Orders   Date Order Priority Status Authorizing Provider   07/01/25 0903 Inpatient Consult to Wound and Ostomy Nurse Routine Active COOKIE Martinez-CNP     - Reason for Consult?:    Wound     - Reason for Consult?:    site of LVAD driveline extraction       Inactive Orders   Date Order Priority Status Authorizing Provider   06/20/25 1822 Change dressing Other (comment) (LVAD driveline) Lower;Medial Abdomen Routine Discontinued Adelia Hein, APRN-CNP        Old surgical dressing removed, old driveline site closed, well approximated.  Keep clean and dry, leave open to air.                Wound Plan: Provider: Wound care to sign off at this time.  Wound care may be reconsulted if wound worsens or new area of concern appears.       Celeste Babcock, MSN, RN, CWCN, COCN  07/01/25 6:48 PM

## 2025-07-01 NOTE — PROGRESS NOTES
Physical Therapy                 Therapy Communication Note    Patient Name: Anatoly Lane  MRN: [id number]  Department: [facility] [id number]  Room: [id number]/*  Today's Date: 7/1/2025     Discipline: Physical Therapy    Missed Visit: PT Missed Visit: Yes     Missed Visit Reason: Missed Visit Reason: Patient in a medical procedure (Pt off unit at Cath Lab, will re-attempt as time permits.)    Missed Time: Attempt    07/01/25 at 8:28 AM   Erika Jain PTA   Rehab Office: [phone]

## 2025-07-01 NOTE — PROGRESS NOTES
[facility]  Advanced HF PROGRESS NOTE    Anatoly Lane/[id number]    Admit Date: 6/20/2025  Hospital Length of Stay: 11   ICU Length of Stay: 4d   Primary Service: HFICU  Primary HF Cardiologist:   Referring:     INTERVAL EVENTS / PERTINENT ROS:   Patient underwent RHC w/ EMB this morning w/ elevated BiV filling pressures. This morning upon assessment, he c/o pain back that started when working w/ PT yesterday. Hyperkalemic w/ K 5.4 this morning. WBC spike to 24.     Plan:  - CT C/A/P to workup leukocytosis as well as rule out R groin hematoma as possible cause for RLE weakness   - EMG ordered, but unable to be performed inpatient 2/2 c diff infection   - Start daily lokelma and monitor   - Lasix 40mg IV x1 and assess response/need to daily diuretics   - Switch amlodipine to hydralazine (BLE edema)   - Needs TTE post biopsy   - Lidocaine patch and PRN Flexeril for back pain; continue to work w/ PT/OT    MEDICATIONS  Infusions:       Scheduled:  acetaminophen, 650 mg, q6h  acyclovir, 400 mg, BID  aspirin, 81 mg, Daily  calcium carbonate, 1,250 mg of calcium carbonate, BID  cholecalciferol, 50 mcg, Daily  cyclobenzaprine, 5 mg, q8h  furosemide, 40 mg, Once  gabapentin, 300 mg, Nightly  heparin (porcine), 5,000 Units, q8h  hydrALAZINE, 25 mg, TID  insulin lispro, 0-20 Units, Before meals & nightly  levothyroxine, 125 mcg, Daily  lidocaine, 1 patch, Daily  mycophenolate, 1,000 mg, q12h LUPILLO  pantoprazole, 40 mg, Daily before breakfast  perflutren protein A microsphere, 0.5 mL, Once in imaging  pravastatin, 40 mg, Nightly  predniSONE, 25 mg, q12h LUPILLO  sodium zirconium cyclosilicate, 10 g, Daily  sulfamethoxazole-trimethoprim, 1 tablet, Daily  sulfur hexafluoride microsphr, 2 mL, Once in imaging  tacrolimus, 2.5 mg, q12h LUPILLO  tamsulosin, 0.4 mg, Daily  vancomycin, 125 mg, 4x daily  voriconazole, 200 mg, q12h LUPILLO      PRN:  dextrose, 12.5 g, q15 min PRN  dextrose, 25 g, q15 min PRN  dextrose, 25 g,  "q15 min PRN  glucagon, 1 mg, q15 min PRN  glucagon, 1 mg, q15 min PRN  guaiFENesin, 600 mg, BID PRN  melatonin, 5 mg, Nightly PRN  naloxone, 0.2 mg, q5 min PRN  ondansetron ODT, 4 mg, q8h PRN   Or  ondansetron, 4 mg, q8h PRN  oxyCODONE, 10 mg, q4h PRN  oxyCODONE, 5 mg, q4h PRN  oxygen, , Continuous PRN - O2/gases  polyethylene glycol, 17 g, Daily PRN      PHYSICAL EXAM:   Visit Vitals  /72 (BP Location: Left arm, Patient Position: Lying)   Pulse 70   Temp 36.4 °C (97.5 °F) (Temporal)   Resp 18   Ht 1.753 m (5' 9\")   Wt 105 kg (230 lb 6.4 oz)   SpO2 93%   BMI 34.02 kg/m²   Smoking Status Former   BSA 2.26 m²       Wt Readings from Last 5 Encounters:   07/01/25 105 kg (230 lb 6.4 oz)   02/27/25 102 kg (224 lb)   01/28/25 104 kg (229 lb)   11/19/24 112 kg (246 lb 12.8 oz)   07/15/24 110 kg (243 lb 9.6 oz)       INTAKE/OUTPUT:  I/O last 3 completed shifts:  In: 240 (2.5 mL/kg) [P.O.:240]  Out: 2630 (27.3 mL/kg) [Urine:2630 (0.8 mL/kg/hr)]  Dosing Weight: 96.3 kg      Physical Exam  HENT:      Head: Normocephalic and atraumatic.   Cardiovascular:      Rate and Rhythm: Normal rate.      Heart sounds: No murmur heard.     No friction rub. No gallop.   Pulmonary:      Effort: Pulmonary effort is normal. No respiratory distress.      Breath sounds: Rales present. No wheezing.      Comments: Stenotomy incision appears C/D/I  Abdominal:      General: Abdomen is flat. There is no distension.      Palpations: Abdomen is soft. There is no mass.      Tenderness: There is no abdominal tenderness. There is no guarding.   Musculoskeletal:      Right lower leg: No edema.      Left lower leg: No edema.   Skin:     General: Skin is warm.      Findings: No rash.   Neurological:      General: No focal deficit present.      Mental Status: He is alert and oriented to person, place, and time.   Psychiatric:         Mood and Affect: Mood normal.         Behavior: Behavior normal.         DATA:  CMP:  Results from last 7 days   Lab Units " "07/01/25  0607 06/30/25  0536 06/29/25  0631 06/28/25  0606 06/27/25  0621 06/26/25  0506 06/25/25  0609   SODIUM mmol/L 137 137 137 139 139 140 141   POTASSIUM mmol/L 5.4* 5.1 5.1 4.1 3.9 3.9 4.0   CHLORIDE mmol/L 100 101 101 97* 97* 98 101   CO2 mmol/L 32 29 25 37* 35* 34* 34*   ANION GAP mmol/L 10 12 16 9* 11 12 10   BUN mg/dL 30* 35* 38* 41* 35* 33* 34*   CREATININE mg/dL 0.81 0.82 0.95 0.94 0.99 0.95 0.76   EGFR mL/min/1.73m*2 >90 >90 89 >90 85 89 >90   MAGNESIUM mg/dL 2.26 2.35 2.47* 2.37 2.16 2.25 2.25   ALBUMIN g/dL 3.9 3.3* 3.4 3.7 3.7 3.8 3.6     CBC:  Results from last 7 days   Lab Units 07/01/25  0607 06/30/25  0536 06/29/25  0631 06/28/25  0606 06/27/25  0621 06/26/25  0506 06/25/25  0609   WBC AUTO x10*3/uL 24.9* 17.4* 19.6* 20.7* 23.1* 12.9* 13.1*   HEMOGLOBIN g/dL 9.6* 8.5* 8.6* 8.5* 7.7* 7.5* 7.7*   HEMATOCRIT % 30.7* 26.8* 27.2* 26.4* 22.7* 23.5* 23.4*   PLATELETS AUTO x10*3/uL 315 243 228 193 173 154 112*   MCV fL 99 99 98 97 90 95 94     COAG:         ABO:   No results found for: \"ABO\"    HEME/ENDO:  Results from last 7 days   Lab Units 06/25/25  0609   FERRITIN ng/mL 130   IRON SATURATION % 21*      CARDIAC:       No lab exists for component: \"CK\", \"CKMBP\"    ASSESSMENT AND PLAN:   Anatoly Lane is a 64 year old male with relevant PMH of ex smoker, NICM, stage D HFrEF NYHA 2-3, S/p HM2 (4/30/23), VT s/p ICD, CAD, pAF, HTN, BPH, GERD and hypothyroidism.  S/p LVAD explant, AICD removal & OHT by Dr. Lorenzo on 6/21. Now transferred from CTICU to HF ICU 6/24. Patient arrived to HFICU HDS stable, on milrinone drip 0.125 mcg/kg/min. Aiming to wean milrinone and optimize volume while in the HFICU.    Neuro:  Anxiety. Depression, Acute pain   BUE/BLE mild tremor  - Serial neuro and pain assessments   - PO Tylenol PRN for pain  - PT/OT Consult  - Sleep/wake cycle normalization    L hand extensor palsy and 4th+5th digital numbness  - On 6/27, patient c/o inability to extend digits 2-5 around the MCP " joints. In addition, he is complaining of numbness from the medial aspect of the wrist to the 4th and 5th digits and part of the palm.  - Deficits do not align completely with a specific nerve injury- would not expect numbness in digits 4-5 with a radial nerve palsy and would not expect difficult with finger extension with an ulnar nerve palsy.  - Ddx includes nerve compression from forearm edema vs brachial plexus injury.  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  Now s/p LVAD explant, AICD removal, & OHT on 6/21  - TTE (6/22): Normal BiV   Donor/Recipient Infectious history:   CMV: -/-   EBV: +/+   Toxo: -/-   Heb B: -/-  Hep C: -/-  HIV: -/-      Rejection/Prophylaxis:    - Induction: - Induction: s/p methylprednisolone 500mg IV x2  - Postoperative Induction Plan:  S/p Basiliximab 20mg IV within 1 hour of arrival to CTICU (no need for premedication), s/p 2nd dose: 20mg IVPB on POD4 (06/25)   Steroid taper starting with Methylprednisolone 125mg IV Q8 x 3 doses followed by IV Solumedrol 60 mg q 12 hrs x 2 doses followed by prednisone 35 mg q 12hrs PO q 12 hrs -> reduced today to 25mg daily as biopsy is negative  - Steroids: Currently on prednisone 35 mg q 12hrs PO q 12 hrs until first biopsy   - Tacrolimus: Tacro 8.8 on 2.5 mg BID (dose adjusted 06/29), will continue same dose today.    - Tacrolimus goal troughs: 10-12  - Cellcept: C/w 1,000mg BID  - Antifungals: c/w voriconazole 200mg BID x3 months end date 09/22/2025   - Antivirals: c/w acylovir 400mg BID (06/22 x3 months. End date 09/22/2025   - Anti  PCP & Toxoplasmosis: Can start SS Bactrim daily (end date of 12/22/25)      Next RHC/biopsy: Completed today 7/1, negative for AMR/ACR. Will need second biopsy next week. Will not schedule at this time as unclear if patient will be inpatient or outpatient at that time. There is availability at [facility] 7/7  Last TTE/BOBBY on 6/23: ; normal BiV function, repeat Limited TTE negative for pericardial effusion   Last HLA: First due on 7/24  Last Allomap: ~ will assess starting at 6-month ana post-op    Last Allosure: ~ will assess starting at 6-month ana post-op   Last LHC: 03/2023(pre OHT); First due ~ (one year post tx)   Osteopenia/osteoporosis prophylaxis: C/w Vit D 2000U daily and calcium 500 mg BID (give calcium 2 hrs after MMF)   Peptic/gastric ulcer prophylaxis: Pantoprazole 40mg daily    CAV Prophylaxis:  C/w  aspirin and  pravastatin 40 mg QHS  Pacing: Pacer back up rate set to 60 BPM. Native conduction is NSR at 75 BPM, pacing wires can be cut.     HTN  - Switching from amlodipine to hydralazine 2/2 BLE edema      Pulmonary:   Productive cough  Ex-smoker  - Noticed to have mod-severe productive cough with clear expectorant, pt says he has chronic bronchitis from years of smoking  - Auscultation with rough crackles, rhonchi  - CXR 06/29 small stable L pleural effusion, no fevers, WBC elevated but likely from steroids  - CT chest appears to be volume overload, will diurese today. Given WBC increase, consider abx tomorrow pending WBC  -Monitor and maintain SpO2 > 92%     GI:  C- Diff infection, diarrhea (improved)  - Diagnosed 06/25, on PO vanc x 10 days (07/05)  - Diarrhea already better, leucocytosis improving   - Bowel regimen PRN   - PPI     :  H/x BPH  -Baseline Cr: 0.8   -Admit BUN/Cr: Normal   -I/Os  -avoid hypotension and nephrotoxic agents  - C/w home tamsulosin 0.4 mg daily.     Heme:  Acute blood loss anemia stable, no active s/s of bleeding  - Trending H/h   - Iron studies: %saturation 21%, ferritin  130. No need for IV iron.      Endo:  Steroid and stress induced hyperglycemia  - Maintain BG <180  - continue SSI to #4   - hgbA1c  was 4.1     H/o of hypothyroid  -TSH (3/2025): 1.83  - Maintain BG <180      ID:  C Diff infection   Reactive leukocytosis  - Completing PO vanc till 07/05   - completed periop vanc/zosyn x48hrs with hx of LVAD  - trend temps q4h     PHYSICAL AND OCCUPATIONAL THERAPY: PT/OT    LINES: R internal jugular swan (6/21/25-*06/28)  Arterial line L brachial (6/21-6/27)    DVT: Heparin SQ  ULCER PPX: PPI  GLYCEMIC CONTROL: SSI  BOWEL CARE: Senna, mirlax  NUTRITION: Adult diet Regular      EMERGENCY CONTACT: Extended Emergency Contact Information  Primary Emergency Contact: Janice Ramos  Address: [address] [place] [address]  Home Phone: [phone]  Mobile Phone: [phone]  Relation: Mother  Secondary Emergency Contact: Bird Lane  Address: [address] [place] [address] [address]           [place], [place] [address] [place] [address]  Home Phone: [phone]  Mobile Phone: [phone]  Relation: Son  Preferred language: English   needed? No  FAMILY UPDATE: At bedside  CODE STATUS: Full Code  DISPO:  PT recommending low intensity and states pt is progressing and ambulation is improving     Patient seen and assessed with Dr. Marcus Pabon.

## 2025-07-01 NOTE — POST-PROCEDURE NOTE
Physician Transition of Care Summary  Invasive Cardiovascular Lab    Procedure Date: 7/1/2025  Attending:    * José Velasco - Primary  Resident/Fellow/Other Assistant: Surgeons and Role:  * No surgeons found with a matching role *    Indications:   Pre-op Diagnosis      * S/P orthotopic heart transplant [Z94.1]    Post-procedure diagnosis:   Post-op Diagnosis     * S/P orthotopic heart transplant [Z94.1]    Procedure(s):   RHC  51763 - RI RIGHT HEART CATH O2 SATURATION & CARDIAC OUTPUT    Endomyocardial Biopsy  56926 - RI ENDOMYOCARDIAL BIOPSY        Procedure Findings:   Elevated biventricular filling pressures with preserved cardiac output by priyank.     Description of the Procedure:   RIJ 7Fr SS, manual pressure closure    Complications:   none    Stents/Implants:   Implants       No implant documentation for this case.            Anticoagulation/Antiplatelet Plan:   No change    Estimated Blood Loss:   * No values recorded between 7/1/2025  7:41 AM and 7/1/2025  8:26 AM *    Anesthesia: Moderate Sedation Anesthesia Staff: No anesthesia staff entered.    Any Specimen(s) Removed:   No specimens collected during this procedure.    Disposition:   Return to LT3 with plan for augmented diuretic therapy      Electronically signed by: José Velasco MD, 7/1/2025 8:26 AM

## 2025-07-02 LAB
ALBUMIN SERPL BCP-MCNC: 3.3 G/DL (ref 3.4–5)
ANION GAP SERPL CALC-SCNC: 13 MMOL/L (ref 10–20)
BUN SERPL-MCNC: 36 MG/DL (ref 6–23)
CALCIUM SERPL-MCNC: 8.3 MG/DL (ref 8.6–10.6)
CHLORIDE SERPL-SCNC: 98 MMOL/L (ref 98–107)
CO2 SERPL-SCNC: 30 MMOL/L (ref 21–32)
CREAT SERPL-MCNC: 0.84 MG/DL (ref 0.5–1.3)
EGFRCR SERPLBLD CKD-EPI 2021: >90 ML/MIN/1.73M*2
ERYTHROCYTE [DISTWIDTH] IN BLOOD BY AUTOMATED COUNT: 19.9 % (ref 11.5–14.5)
GLUCOSE BLD MANUAL STRIP-MCNC: 158 MG/DL (ref 74–99)
GLUCOSE BLD MANUAL STRIP-MCNC: 189 MG/DL (ref 74–99)
GLUCOSE BLD MANUAL STRIP-MCNC: 216 MG/DL (ref 74–99)
GLUCOSE BLD MANUAL STRIP-MCNC: 238 MG/DL (ref 74–99)
GLUCOSE SERPL-MCNC: 207 MG/DL (ref 74–99)
HCT VFR BLD AUTO: 30 % (ref 41–52)
HGB BLD-MCNC: 9.5 G/DL (ref 13.5–17.5)
MAGNESIUM SERPL-MCNC: 2.1 MG/DL (ref 1.6–2.4)
MCH RBC QN AUTO: 31.8 PG (ref 26–34)
MCHC RBC AUTO-ENTMCNC: 31.7 G/DL (ref 32–36)
MCV RBC AUTO: 100 FL (ref 80–100)
NRBC BLD-RTO: 0.2 /100 WBCS (ref 0–0)
PHOSPHATE SERPL-MCNC: 3.4 MG/DL (ref 2.5–4.9)
PLATELET # BLD AUTO: 348 X10*3/UL (ref 150–450)
POTASSIUM SERPL-SCNC: 4.7 MMOL/L (ref 3.5–5.3)
RBC # BLD AUTO: 2.99 X10*6/UL (ref 4.5–5.9)
SODIUM SERPL-SCNC: 136 MMOL/L (ref 136–145)
TACROLIMUS BLD-MCNC: 11 NG/ML
WBC # BLD AUTO: 21.9 X10*3/UL (ref 4.4–11.3)

## 2025-07-02 PROCEDURE — 85027 COMPLETE CBC AUTOMATED: CPT

## 2025-07-02 PROCEDURE — 2500000001 HC RX 250 WO HCPCS SELF ADMINISTERED DRUGS (ALT 637 FOR MEDICARE OP): Performed by: STUDENT IN AN ORGANIZED HEALTH CARE EDUCATION/TRAINING PROGRAM

## 2025-07-02 PROCEDURE — 2500000001 HC RX 250 WO HCPCS SELF ADMINISTERED DRUGS (ALT 637 FOR MEDICARE OP): Performed by: REGISTERED NURSE

## 2025-07-02 PROCEDURE — 2500000002 HC RX 250 W HCPCS SELF ADMINISTERED DRUGS (ALT 637 FOR MEDICARE OP, ALT 636 FOR OP/ED)

## 2025-07-02 PROCEDURE — 2500000001 HC RX 250 WO HCPCS SELF ADMINISTERED DRUGS (ALT 637 FOR MEDICARE OP)

## 2025-07-02 PROCEDURE — 2500000004 HC RX 250 GENERAL PHARMACY W/ HCPCS (ALT 636 FOR OP/ED)

## 2025-07-02 PROCEDURE — 99233 SBSQ HOSP IP/OBS HIGH 50: CPT | Performed by: REGISTERED NURSE

## 2025-07-02 PROCEDURE — 36415 COLL VENOUS BLD VENIPUNCTURE: CPT

## 2025-07-02 PROCEDURE — 82947 ASSAY GLUCOSE BLOOD QUANT: CPT

## 2025-07-02 PROCEDURE — 97116 GAIT TRAINING THERAPY: CPT | Mod: GP,CQ

## 2025-07-02 PROCEDURE — 2500000002 HC RX 250 W HCPCS SELF ADMINISTERED DRUGS (ALT 637 FOR MEDICARE OP, ALT 636 FOR OP/ED): Performed by: REGISTERED NURSE

## 2025-07-02 PROCEDURE — 2500000004 HC RX 250 GENERAL PHARMACY W/ HCPCS (ALT 636 FOR OP/ED): Performed by: REGISTERED NURSE

## 2025-07-02 PROCEDURE — 80197 ASSAY OF TACROLIMUS: CPT

## 2025-07-02 PROCEDURE — 2500000004 HC RX 250 GENERAL PHARMACY W/ HCPCS (ALT 636 FOR OP/ED): Performed by: STUDENT IN AN ORGANIZED HEALTH CARE EDUCATION/TRAINING PROGRAM

## 2025-07-02 PROCEDURE — 2500000005 HC RX 250 GENERAL PHARMACY W/O HCPCS: Performed by: REGISTERED NURSE

## 2025-07-02 PROCEDURE — 97530 THERAPEUTIC ACTIVITIES: CPT | Mod: GP,CQ

## 2025-07-02 PROCEDURE — 1200000002 HC GENERAL ROOM WITH TELEMETRY DAILY

## 2025-07-02 PROCEDURE — 99233 SBSQ HOSP IP/OBS HIGH 50: CPT | Performed by: INTERNAL MEDICINE

## 2025-07-02 PROCEDURE — 84100 ASSAY OF PHOSPHORUS: CPT

## 2025-07-02 PROCEDURE — 83735 ASSAY OF MAGNESIUM: CPT

## 2025-07-02 RX ORDER — FUROSEMIDE 40 MG/1
40 TABLET ORAL DAILY
Status: DISCONTINUED | OUTPATIENT
Start: 2025-07-02 | End: 2025-07-06

## 2025-07-02 RX ORDER — POLYETHYLENE GLYCOL 3350 17 G/17G
17 POWDER, FOR SOLUTION ORAL DAILY
Status: DISCONTINUED | OUTPATIENT
Start: 2025-07-02 | End: 2025-07-03

## 2025-07-02 RX ORDER — AMOXICILLIN 250 MG
1 CAPSULE ORAL 2 TIMES DAILY
Status: DISCONTINUED | OUTPATIENT
Start: 2025-07-02 | End: 2025-07-09 | Stop reason: HOSPADM

## 2025-07-02 RX ADMIN — SULFAMETHOXAZOLE AND TRIMETHOPRIM 1 TABLET: 400; 80 TABLET ORAL at 10:29

## 2025-07-02 RX ADMIN — INSULIN LISPRO 4 UNITS: 100 INJECTION, SOLUTION INTRAVENOUS; SUBCUTANEOUS at 10:28

## 2025-07-02 RX ADMIN — ACETAMINOPHEN 650 MG: 325 TABLET ORAL at 18:53

## 2025-07-02 RX ADMIN — PREDNISONE 25 MG: 20 TABLET ORAL at 10:28

## 2025-07-02 RX ADMIN — HEPARIN SODIUM 5000 UNITS: 5000 INJECTION INTRAVENOUS; SUBCUTANEOUS at 10:28

## 2025-07-02 RX ADMIN — TACROLIMUS 2.5 MG: 0.5 CAPSULE ORAL at 18:51

## 2025-07-02 RX ADMIN — LEVOTHYROXINE SODIUM 125 MCG: 0.05 TABLET ORAL at 06:26

## 2025-07-02 RX ADMIN — CALCIUM 1 TABLET: 500 TABLET ORAL at 10:28

## 2025-07-02 RX ADMIN — INSULIN LISPRO 4 UNITS: 100 INJECTION, SOLUTION INTRAVENOUS; SUBCUTANEOUS at 14:43

## 2025-07-02 RX ADMIN — VORICONAZOLE 200 MG: 200 TABLET, COATED ORAL at 21:09

## 2025-07-02 RX ADMIN — SODIUM ZIRCONIUM CYCLOSILICATE 10 G: 10 POWDER, FOR SUSPENSION ORAL at 10:42

## 2025-07-02 RX ADMIN — INSULIN LISPRO 8 UNITS: 100 INJECTION, SOLUTION INTRAVENOUS; SUBCUTANEOUS at 21:12

## 2025-07-02 RX ADMIN — PRAVASTATIN SODIUM 40 MG: 40 TABLET ORAL at 21:08

## 2025-07-02 RX ADMIN — POLYETHYLENE GLYCOL 3350 17 G: 17 POWDER, FOR SOLUTION ORAL at 10:30

## 2025-07-02 RX ADMIN — HYDRALAZINE HYDROCHLORIDE 25 MG: 25 TABLET ORAL at 16:35

## 2025-07-02 RX ADMIN — ACYCLOVIR 400 MG: 400 TABLET ORAL at 10:28

## 2025-07-02 RX ADMIN — LIDOCAINE 4% 1 PATCH: 40 PATCH TOPICAL at 10:29

## 2025-07-02 RX ADMIN — HYDRALAZINE HYDROCHLORIDE 25 MG: 25 TABLET ORAL at 21:09

## 2025-07-02 RX ADMIN — CHOLECALCIFEROL TAB 25 MCG (1000 UNIT) 50 MCG: 25 TAB at 10:28

## 2025-07-02 RX ADMIN — CYCLOBENZAPRINE 5 MG: 10 TABLET, FILM COATED ORAL at 12:19

## 2025-07-02 RX ADMIN — ASPIRIN 81 MG: 81 TABLET, COATED ORAL at 10:28

## 2025-07-02 RX ADMIN — ACYCLOVIR 400 MG: 400 TABLET ORAL at 21:09

## 2025-07-02 RX ADMIN — GABAPENTIN 300 MG: 300 CAPSULE ORAL at 21:08

## 2025-07-02 RX ADMIN — HEPARIN SODIUM 5000 UNITS: 5000 INJECTION INTRAVENOUS; SUBCUTANEOUS at 00:00

## 2025-07-02 RX ADMIN — VANCOMYCIN HYDROCHLORIDE 125 MG: 125 CAPSULE ORAL at 21:09

## 2025-07-02 RX ADMIN — ACETAMINOPHEN 650 MG: 325 TABLET ORAL at 12:18

## 2025-07-02 RX ADMIN — VANCOMYCIN HYDROCHLORIDE 125 MG: 125 CAPSULE ORAL at 12:18

## 2025-07-02 RX ADMIN — PANTOPRAZOLE SODIUM 40 MG: 40 TABLET, DELAYED RELEASE ORAL at 06:26

## 2025-07-02 RX ADMIN — MYCOPHENOLATE MOFETIL 1000 MG: 250 CAPSULE ORAL at 18:51

## 2025-07-02 RX ADMIN — HEPARIN SODIUM 5000 UNITS: 5000 INJECTION INTRAVENOUS; SUBCUTANEOUS at 16:35

## 2025-07-02 RX ADMIN — PREDNISONE 25 MG: 20 TABLET ORAL at 21:09

## 2025-07-02 RX ADMIN — OXYCODONE HYDROCHLORIDE 5 MG: 5 TABLET ORAL at 18:58

## 2025-07-02 RX ADMIN — CALCIUM 1 TABLET: 500 TABLET ORAL at 21:09

## 2025-07-02 RX ADMIN — VANCOMYCIN HYDROCHLORIDE 125 MG: 125 CAPSULE ORAL at 06:26

## 2025-07-02 RX ADMIN — SENNOSIDES AND DOCUSATE SODIUM 1 TABLET: 50; 8.6 TABLET ORAL at 21:09

## 2025-07-02 RX ADMIN — VORICONAZOLE 200 MG: 200 TABLET, COATED ORAL at 10:28

## 2025-07-02 RX ADMIN — VANCOMYCIN HYDROCHLORIDE 125 MG: 125 CAPSULE ORAL at 16:35

## 2025-07-02 RX ADMIN — ACETAMINOPHEN 650 MG: 325 TABLET ORAL at 06:26

## 2025-07-02 RX ADMIN — FUROSEMIDE 40 MG: 40 TABLET ORAL at 14:44

## 2025-07-02 RX ADMIN — TAMSULOSIN HYDROCHLORIDE 0.4 MG: 0.4 CAPSULE ORAL at 10:28

## 2025-07-02 RX ADMIN — HYDRALAZINE HYDROCHLORIDE 25 MG: 25 TABLET ORAL at 10:28

## 2025-07-02 RX ADMIN — CYCLOBENZAPRINE 5 MG: 10 TABLET, FILM COATED ORAL at 03:39

## 2025-07-02 RX ADMIN — TACROLIMUS 2.5 MG: 0.5 CAPSULE ORAL at 07:21

## 2025-07-02 RX ADMIN — ACETAMINOPHEN 650 MG: 325 TABLET ORAL at 00:00

## 2025-07-02 RX ADMIN — MYCOPHENOLATE MOFETIL 1000 MG: 250 CAPSULE ORAL at 06:26

## 2025-07-02 RX ADMIN — CYCLOBENZAPRINE 5 MG: 10 TABLET, FILM COATED ORAL at 21:03

## 2025-07-02 ASSESSMENT — COGNITIVE AND FUNCTIONAL STATUS - GENERAL
MOVING FROM LYING ON BACK TO SITTING ON SIDE OF FLAT BED WITH BEDRAILS: A LITTLE
MOVING TO AND FROM BED TO CHAIR: A LITTLE
DRESSING REGULAR UPPER BODY CLOTHING: A LITTLE
TURNING FROM BACK TO SIDE WHILE IN FLAT BAD: A LITTLE
TOILETING: A LITTLE
CLIMB 3 TO 5 STEPS WITH RAILING: A LITTLE
STANDING UP FROM CHAIR USING ARMS: A LITTLE
DAILY ACTIVITIY SCORE: 20
CLIMB 3 TO 5 STEPS WITH RAILING: A LOT
MOVING FROM LYING ON BACK TO SITTING ON SIDE OF FLAT BED WITH BEDRAILS: A LITTLE
MOVING TO AND FROM BED TO CHAIR: A LITTLE
HELP NEEDED FOR BATHING: A LITTLE
WALKING IN HOSPITAL ROOM: A LITTLE
HELP NEEDED FOR BATHING: A LITTLE
MOVING TO AND FROM BED TO CHAIR: A LITTLE
TOILETING: A LITTLE
STANDING UP FROM CHAIR USING ARMS: A LITTLE
MOBILITY SCORE: 17
TURNING FROM BACK TO SIDE WHILE IN FLAT BAD: A LITTLE
TURNING FROM BACK TO SIDE WHILE IN FLAT BAD: A LITTLE
MOBILITY SCORE: 17
MOBILITY SCORE: 18
WALKING IN HOSPITAL ROOM: A LITTLE
DRESSING REGULAR UPPER BODY CLOTHING: A LITTLE
CLIMB 3 TO 5 STEPS WITH RAILING: A LOT
WALKING IN HOSPITAL ROOM: A LITTLE
DRESSING REGULAR LOWER BODY CLOTHING: A LITTLE
DAILY ACTIVITIY SCORE: 20
MOVING FROM LYING ON BACK TO SITTING ON SIDE OF FLAT BED WITH BEDRAILS: A LITTLE
DRESSING REGULAR LOWER BODY CLOTHING: A LITTLE
STANDING UP FROM CHAIR USING ARMS: A LITTLE

## 2025-07-02 ASSESSMENT — PAIN - FUNCTIONAL ASSESSMENT
PAIN_FUNCTIONAL_ASSESSMENT: 0-10

## 2025-07-02 ASSESSMENT — PAIN SCALES - GENERAL
PAINLEVEL_OUTOF10: 6
PAINLEVEL_OUTOF10: 0 - NO PAIN

## 2025-07-02 ASSESSMENT — PAIN DESCRIPTION - LOCATION: LOCATION: BACK

## 2025-07-02 NOTE — PROGRESS NOTES
07/02/25 1005   Discharge Planning   Living Arrangements Alone   Support Systems Family members   Type of Residence Private residence   Who is requesting discharge planning? Provider   Home or Post Acute Services In home services   Type of Home Care Services Home nursing visits   Expected Discharge Disposition Home H   Does the patient need discharge transport arranged? No     Patient not medically ready requires diuresis and wbc work-up.  Will continue to monitor patient for all home going needs.  Will need an AOC.  List to be provided to patient for selection.

## 2025-07-02 NOTE — PROGRESS NOTES
[facility]  Advanced HF PROGRESS NOTE    Anatoly Lane/[id number]    Admit Date: 6/20/2025  Hospital Length of Stay: 12   ICU Length of Stay: 4d   Primary Service: HFICU  Primary HF Cardiologist:   Referring:     INTERVAL EVENTS / PERTINENT ROS:   WBC downtrending to 21   K downtrending 4.7  NSR 70-80s on tele   SBP 130s   Edema improving on exam   Pt reports constipation and no BM x last 3 days     Plan:  - Lasix 40mg PO daily, can decrease as needed   - Restart Senna/Miralax     MEDICATIONS  Infusions:       Scheduled:  acetaminophen, 650 mg, q6h  acyclovir, 400 mg, BID  aspirin, 81 mg, Daily  calcium carbonate, 1,250 mg of calcium carbonate, BID  cholecalciferol, 50 mcg, Daily  cyclobenzaprine, 5 mg, q8h  furosemide, 40 mg, Daily  gabapentin, 300 mg, Nightly  heparin (porcine), 5,000 Units, q8h  hydrALAZINE, 25 mg, TID  insulin lispro, 0-20 Units, Before meals & nightly  levothyroxine, 125 mcg, Daily  lidocaine, 1 patch, Daily  mycophenolate, 1,000 mg, q12h LUPILLO  pantoprazole, 40 mg, Daily before breakfast  perflutren protein A microsphere, 0.5 mL, Once in imaging  polyethylene glycol, 17 g, Daily  pravastatin, 40 mg, Nightly  predniSONE, 25 mg, q12h LUPILLO  sennosides-docusate sodium, 1 tablet, BID  sodium zirconium cyclosilicate, 10 g, Daily  sulfamethoxazole-trimethoprim, 1 tablet, Daily  sulfur hexafluoride microsphr, 2 mL, Once in imaging  tacrolimus, 2.5 mg, q12h LUPILLO  tamsulosin, 0.4 mg, Daily  vancomycin, 125 mg, 4x daily  voriconazole, 200 mg, q12h LUPILLO      PRN:  dextrose, 12.5 g, q15 min PRN  dextrose, 25 g, q15 min PRN  dextrose, 25 g, q15 min PRN  glucagon, 1 mg, q15 min PRN  glucagon, 1 mg, q15 min PRN  guaiFENesin, 600 mg, BID PRN  melatonin, 5 mg, Nightly PRN  naloxone, 0.2 mg, q5 min PRN  ondansetron ODT, 4 mg, q8h PRN   Or  ondansetron, 4 mg, q8h PRN  oxyCODONE, 10 mg, q4h PRN  oxyCODONE, 5 mg, q4h PRN  oxygen, , Continuous PRN - O2/gases      PHYSICAL EXAM:   Visit  "Vitals  /76 (BP Location: Left arm, Patient Position: Lying)   Pulse 79   Temp 36.7 °C (98.1 °F) (Temporal)   Resp 18   Ht 1.753 m (5' 9\")   Wt 102 kg (224 lb)   SpO2 94%   BMI 33.08 kg/m²   Smoking Status Former   BSA 2.23 m²       Wt Readings from Last 5 Encounters:   07/02/25 102 kg (224 lb)   02/27/25 102 kg (224 lb)   01/28/25 104 kg (229 lb)   11/19/24 112 kg (246 lb 12.8 oz)   07/15/24 110 kg (243 lb 9.6 oz)       INTAKE/OUTPUT:  I/O last 3 completed shifts:  In: 240 (2.5 mL/kg) [P.O.:240]  Out: 3385 (35.2 mL/kg) [Urine:3380 (1 mL/kg/hr); Blood:5]  Dosing Weight: 96.3 kg      Physical Exam  HENT:      Head: Normocephalic and atraumatic.   Cardiovascular:      Rate and Rhythm: Normal rate.      Heart sounds: No murmur heard.     No friction rub. No gallop.   Pulmonary:      Effort: Pulmonary effort is normal. No respiratory distress.      Breath sounds: Rhonchi and rales present. No wheezing.      Comments: Stenotomy incision appears C/D/I  Abdominal:      General: Abdomen is flat. There is no distension.      Palpations: Abdomen is soft. There is no mass.      Tenderness: There is no abdominal tenderness. There is no guarding.   Musculoskeletal:      Right lower leg: No edema.      Left lower leg: No edema.   Skin:     General: Skin is warm.      Findings: No rash.   Neurological:      General: No focal deficit present.      Mental Status: He is alert and oriented to person, place, and time.   Psychiatric:         Mood and Affect: Mood normal.         Behavior: Behavior normal.       DATA:  CMP:  Results from last 7 days   Lab Units 07/02/25  0713 07/01/25  0607 06/30/25  0536 06/29/25  0631 06/28/25  0606 06/27/25  0621 06/26/25  0506   SODIUM mmol/L 136 137 137 137 139 139 140   POTASSIUM mmol/L 4.7 5.4* 5.1 5.1 4.1 3.9 3.9   CHLORIDE mmol/L 98 100 101 101 97* 97* 98   CO2 mmol/L 30 32 29 25 37* 35* 34*   ANION GAP mmol/L 13 10 12 16 9* 11 12   BUN mg/dL 36* 30* 35* 38* 41* 35* 33*   CREATININE mg/dL " "0.84 0.81 0.82 0.95 0.94 0.99 0.95   EGFR mL/min/1.73m*2 >90 >90 >90 89 >90 85 89   MAGNESIUM mg/dL 2.10 2.26 2.35 2.47* 2.37 2.16 2.25   ALBUMIN g/dL 3.3* 3.9 3.3* 3.4 3.7 3.7 3.8     CBC:  Results from last 7 days   Lab Units 07/02/25  0713 07/01/25  0607 06/30/25  0536 06/29/25  0631 06/28/25  0606 06/27/25  0621 06/26/25  0506   WBC AUTO x10*3/uL 21.9* 24.9* 17.4* 19.6* 20.7* 23.1* 12.9*   HEMOGLOBIN g/dL 9.5* 9.6* 8.5* 8.6* 8.5* 7.7* 7.5*   HEMATOCRIT % 30.0* 30.7* 26.8* 27.2* 26.4* 22.7* 23.5*   PLATELETS AUTO x10*3/uL 348 315 243 228 193 173 154   MCV fL 100 99 99 98 97 90 95     COAG:         ABO:   No results found for: \"ABO\"    HEME/ENDO:         CARDIAC:       No lab exists for component: \"CK\", \"CKMBP\"    ASSESSMENT AND PLAN:   Anatoly Lane is a 64 year old male with relevant PMH of ex smoker, NICM, stage D HFrEF NYHA 2-3, S/p HM2 (4/30/23), VT s/p ICD, CAD, pAF, HTN, BPH, GERD and hypothyroidism.  S/p LVAD explant, AICD removal & OHT by Dr. Lorenzo on 6/21. Now transferred from CTICU to HF ICU 6/24. Patient arrived to HFICU HDS stable, on milrinone drip 0.125 mcg/kg/min. Aiming to wean milrinone and optimize volume while in the HFICU.    Neuro:  Anxiety. Depression, Acute pain   BUE/BLE mild tremor  Acute back pain   - Serial neuro and pain assessments   - PO Tylenol PRN for pain  - PT/OT Consult  - Sleep/wake cycle normalization  - C/w PRN Flexeril and Lidocaine patch     L hand extensor palsy and 4th+5th digital numbness  - On 6/27, patient c/o inability to extend digits 2-5 around the MCP joints. In addition, he is complaining of numbness from the medial aspect of the wrist to the 4th and 5th digits and part of the palm.  - Deficits do not align completely with a specific nerve injury- would not expect numbness in digits 4-5 with a radial nerve palsy and would not expect difficult with finger extension with an ulnar nerve palsy.  - Ddx includes nerve compression from forearm edema vs brachial plexus " injury.  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  Now s/p LVAD explant, AICD removal, & OHT on 6/21  - TTE (6/22): Normal BiV   Donor/Recipient Infectious history:   CMV: -/-   EBV: +/+   Toxo: -/-   Heb B: -/-  Hep C: -/-  HIV: -/-      Rejection/Prophylaxis:    - Induction: - Induction: s/p methylprednisolone 500mg IV x2, S/p Basiliximab 20mg IV within 1 hour of arrival to CTICU (no need for premedication), s/p 2nd dose: 20mg IVPB on POD4 (06/25)   - Steroids: Prednisone 25mg PO BID (scheduled for a 10 day course as next biopsy is not scheduled yet, will adjust dose as needed following next biopsy)   - Tacrolimus: Last tacrolimus trough: 11.0 7/2/2025:  7:13 AM 2.5 mg BID (dose adjusted 06/29), will continue same dose today.    - Tacrolimus goal troughs: 10-12  - Cellcept: C/w 1,000mg BID  - Antifungals: c/w voriconazole 200mg BID x3 months end date 09/22/2025   - Antivirals: c/w acylovir 400mg BID (06/22 x3 months. End date 09/22/2025   - Anti PCP & Toxoplasmosis: Can start SS Bactrim daily (end date of 12/22/25)      Next RHC/biopsy: Completed today 7/1, negative for AMR/ACR. Will need second biopsy next week. Will not schedule at this time as unclear if patient will be inpatient or outpatient at that time. There is availability [facility] 7/7  Last TTE/BOBBY on 6/23: ; normal BiV function (poor windows on 7/1 TTE)   Last HLA: First due on 7/24  Last Allomap: ~ will assess starting at 6-month ana post-op    Last Allosure: ~ will assess starting at 6-month ana post-op   Last [facility]: 03/2023(pre OHT); First  due ~ (one year post tx)   Osteopenia/osteoporosis prophylaxis: C/w Vit D 2000U daily and calcium 500 mg BID (give calcium 2 hrs after MMF)   Peptic/gastric ulcer prophylaxis: Pantoprazole 40mg daily    CAV Prophylaxis:  C/w  aspirin and  pravastatin 40 mg QHS  Pacing: Pacer back up rate set to 60 BPM. Native conduction is NSR at 75 BPM, pacing wires can be cut.     Subcutaneous emphysema and pneumomediastinum  - Dr. Birch aware, continue to monitor     HTN  - C/w hydralazine 25mg TID and increase as needed      Pulmonary:   Productive cough  Ex-smoker  - Noticed to have mod-severe productive cough with clear expectorant, pt says he has chronic bronchitis from years of smoking  - Auscultation with rough crackles, rhonchi  - CXR 06/29 small stable L pleural effusion, no fevers, WBC elevated but likely from steroids  - CT chest appears to be volume overload, will diurese today. WBC downtrending   - Monitor and maintain SpO2 > 92%     GI:  C- Diff infection, diarrhea (improved)  - Diagnosed 06/25, on PO vanc x 10 days (07/05)  - Diarrhea already better, leucocytosis improving   - Bowel regimen PRN   - PPI     :  H/x BPH  -Baseline Cr: 0.8   -Admit BUN/Cr: Normal   -I/Os  -avoid hypotension and nephrotoxic agents  - C/w home tamsulosin 0.4 mg daily      Heme:  Acute blood loss anemia stable, no active s/s of bleeding  - Trending H/h   - Iron studies: %saturation 21%, ferritin 130. No need for IV iron.      Endo:  Steroid and stress induced hyperglycemia  - Maintain BG <180  - continue SSI to #4   - hgbA1c  was 4.1     H/o of hypothyroid  -TSH (3/2025): 1.83  - Maintain BG <180      ID:  C Diff infection   Reactive leukocytosis  - Completing PO vanc till 07/05   - completed periop vanc/zosyn x48hrs with hx of LVAD  - trend temps q4h     PHYSICAL AND OCCUPATIONAL THERAPY: PT/OT    DVT: Heparin SQ  ULCER PPX: PPI  GLYCEMIC CONTROL: SSI  BOWEL CARE: Senna, mirlax  NUTRITION: Adult diet Regular      EMERGENCY CONTACT:  Extended Emergency Contact Information  Primary Emergency Contact: RamosMarianela owensila  Address: [address] [place] [address] [facility]  Home Phone: [phone]  Mobile Phone: [phone]  Relation: Mother  Secondary Emergency Contact: Bird Lane  Address: [address] [facility] [address]           [place] [address] [facility]  Home Phone: [phone]  Mobile Phone: [phone]  Relation: Son  Preferred language: English   needed? No  FAMILY UPDATE: At bedside  CODE STATUS: Full Code  DISPO:  PT recommending low intensity and states pt is progressing and ambulation is improving     Patient seen and assessed with Dr. Marcus Pabon.

## 2025-07-02 NOTE — CARE PLAN
The patient's goals for the shift include      The clinical goals for the shift include pt will remain HDS throughout the shift

## 2025-07-02 NOTE — PROGRESS NOTES
"Anatoly Lane is a 64 y.o. male on day 12 of admission presenting with S/P orthotopic heart transplant.    Subjective   Doing well , no CV complaints  No fever , no chills        Objective     Physical Exam  GEN: NAD , AOX3  HEENT : JVP at 1 cm above the clavicle in a sitting position   Heart : regular rhythm , normal S1 and S2 , no murmurs   Lungs : clear , resonant , normal air entry bilaterally   Abdomen : soft , non tender   Ext: warm , well perfused , +1 pitting edema  Neuro : grossly intact      Last Recorded Vitals  Blood pressure 139/76, pulse 79, temperature 36.7 °C (98.1 °F), temperature source Temporal, resp. rate 18, height 1.753 m (5' 9\"), weight 102 kg (224 lb), SpO2 94%.  Intake/Output last 3 Shifts:  I/O last 3 completed shifts:  In: 240 (2.5 mL/kg) [P.O.:240]  Out: 3385 (35.2 mL/kg) [Urine:3380 (1 mL/kg/hr); Blood:5]  Dosing Weight: 96.3 kg     Relevant Results  Scheduled medications  Scheduled Medications[1]  Continuous medications  Continuous Medications[2]  PRN medications  PRN Medications[3]  Results for orders placed or performed during the hospital encounter of 06/20/25 (from the past 24 hours)   POCT GLUCOSE   Result Value Ref Range    POCT Glucose 127 (H) 74 - 99 mg/dL   POCT GLUCOSE   Result Value Ref Range    POCT Glucose 213 (H) 74 - 99 mg/dL   POCT GLUCOSE   Result Value Ref Range    POCT Glucose 204 (H) 74 - 99 mg/dL   Tacrolimus   Result Value Ref Range    Tacrolimus  11.0 <=15.0 ng/mL   Magnesium   Result Value Ref Range    Magnesium 2.10 1.60 - 2.40 mg/dL   CBC   Result Value Ref Range    WBC 21.9 (H) 4.4 - 11.3 x10*3/uL    nRBC 0.2 (H) 0.0 - 0.0 /100 WBCs    RBC 2.99 (L) 4.50 - 5.90 x10*6/uL    Hemoglobin 9.5 (L) 13.5 - 17.5 g/dL    Hematocrit 30.0 (L) 41.0 - 52.0 %     80 - 100 fL    MCH 31.8 26.0 - 34.0 pg    MCHC 31.7 (L) 32.0 - 36.0 g/dL    RDW 19.9 (H) 11.5 - 14.5 %    Platelets 348 150 - 450 x10*3/uL   Renal Function Panel   Result Value Ref Range    Glucose 207 (H) 74 - " 99 mg/dL    Sodium 136 136 - 145 mmol/L    Potassium 4.7 3.5 - 5.3 mmol/L    Chloride 98 98 - 107 mmol/L    Bicarbonate 30 21 - 32 mmol/L    Anion Gap 13 10 - 20 mmol/L    Urea Nitrogen 36 (H) 6 - 23 mg/dL    Creatinine 0.84 0.50 - 1.30 mg/dL    eGFR >90 >60 mL/min/1.73m*2    Calcium 8.3 (L) 8.6 - 10.6 mg/dL    Phosphorus 3.4 2.5 - 4.9 mg/dL    Albumin 3.3 (L) 3.4 - 5.0 g/dL   POCT GLUCOSE   Result Value Ref Range    POCT Glucose 189 (H) 74 - 99 mg/dL                        Assessment & Plan  S/P orthotopic heart transplant    Hypothyroidism    Stress hyperglycemia    Receiving inotropic medication    History of hypothyroidism    History of BPH    Blood loss anemia    Immunosuppression    C. difficile diarrhea    - will continue to optimize his volume status and diurese him   - monitor tacrolimus throughs   -Appreciate Neurology care   - PT to guide us on discharge planning       Bailee Damon MD         [1] acetaminophen, 650 mg, oral, q6h  acyclovir, 400 mg, oral, BID  aspirin, 81 mg, oral, Daily  calcium carbonate, 1,250 mg of calcium carbonate, oral, BID  cholecalciferol, 50 mcg, oral, Daily  cyclobenzaprine, 5 mg, oral, q8h  furosemide, 40 mg, oral, Daily  gabapentin, 300 mg, oral, Nightly  heparin (porcine), 5,000 Units, subcutaneous, q8h  hydrALAZINE, 25 mg, oral, TID  insulin lispro, 0-20 Units, subcutaneous, Before meals & nightly  levothyroxine, 125 mcg, oral, Daily  lidocaine, 1 patch, transdermal, Daily  mycophenolate, 1,000 mg, oral, q12h LUPILLO  pantoprazole, 40 mg, oral, Daily before breakfast  perflutren protein A microsphere, 0.5 mL, intravenous, Once in imaging  polyethylene glycol, 17 g, oral, Daily  pravastatin, 40 mg, oral, Nightly  predniSONE, 25 mg, oral, q12h LUPILLO  sennosides-docusate sodium, 1 tablet, oral, BID  sodium zirconium cyclosilicate, 10 g, oral, Daily  sulfamethoxazole-trimethoprim, 1 tablet, oral, Daily  sulfur hexafluoride microsphr, 2 mL, intravenous, Once in imaging  tacrolimus,  2.5 mg, oral, q12h LUPILLO  tamsulosin, 0.4 mg, oral, Daily  vancomycin, 125 mg, oral, 4x daily  voriconazole, 200 mg, oral, q12h LUPILLO  [2]    [3] PRN medications: dextrose, dextrose **OR** [DISCONTINUED] glucagon, dextrose, glucagon, glucagon, guaiFENesin, melatonin, naloxone, ondansetron ODT **OR** ondansetron, oxyCODONE, oxyCODONE, oxygen

## 2025-07-02 NOTE — PROGRESS NOTES
Physical Therapy    Physical Therapy Treatment    Patient Name: Anatoly Lane  MRN: [id number]  Department: [place] [id number]  Room: [address] [place] [address]  Today's Date: 7/2/2025  Time Calculation  Start Time: 1215  Stop Time: 1246  Time Calculation (min): 31 min    Assessment/Plan   PT Assessment  Barriers to Discharge Home: No anticipated barriers  End of Session Communication: Bedside nurse  Assessment Comment: Pt tolerated PT session well, completed increased ambulation as well as stair negotiation. Pt continues to remain appropriate for Low intensity PT upon D/C from hospital.  End of Session Patient Position: Bed, 3 rail up, Alarm off, not on at start of session  PT Plan  Inpatient/Swing Bed or Outpatient: Inpatient  PT Plan  Treatment/Interventions: Bed mobility, Transfer training, Gait training, Stair training, Balance training, Strengthening, Endurance training, Therapeutic exercise, Therapeutic activity  PT Plan: Ongoing PT  PT Frequency: Daily  PT Discharge Recommendations: Low intensity level of continued care  Equipment Recommended upon Discharge: Wheeled walker  PT Recommended Transfer Status: Contact guard, Assistive device  PT - OK to Discharge: Yes    PT Visit Info:  PT Received On: 07/02/25     General Visit Information:   General  Family/Caregiver Present: No  Prior to Session Communication: Bedside nurse  Patient Position Received: Alarm off, not on at start of session, Up in chair  General Comment: Pt seated in chair, agreable to work with PT.    Subjective   Precautions:  Precautions  Medical Precautions: Cardiac precautions, Fall precautions (Contact + (c-diff))  Post-Surgical Precautions: Move in the Tube  Precautions Comment: AAI @ 90, Reviewed Mitt precautions    Objective   Pain:  Pain Assessment  Pain Assessment: 0-10  0-10 (Numeric) Pain Score: 0 - No pain  Cognition:  Cognition  Overall Cognitive Status: Within Functional Limits  Orientation Level: Oriented X4    Activity Tolerance:  Activity  Tolerance  Endurance: Tolerates 30 min exercise with multiple rests  Treatments:  Therapeutic Activity  Therapeutic Activity Performed: Yes  Therapeutic Activity 1: Increased time spent, educating on log roll sequencing for in/out of bed mobility.    Bed Mobility  Bed Mobility: Yes  Bed Mobility 1  Bed Mobility 1: Sitting to supine  Level of Assistance 1: Minimum assistance, Minimal verbal cues  Bed Mobility Comments 1: Assist with BLEs, verbal cues for sequencing, edcuated on reverse log roll as well as verbal education on supine to sit transfer.    Ambulation/Gait Training  Ambulation/Gait Training Performed: Yes  Ambulation/Gait Training 1  Surface 1: Level tile  Device 1: Rolling walker  Assistance 1: Close supervision  Quality of Gait 1: Decreased step length (Slight FF posture, decreased los,)  Comments/Distance (ft) 1: 175ft, 350ft, x1-2 standing rest break d/t minimal fatigue and some SOB.    Transfers  Transfer: Yes  Transfer 1  Transfer From 1: Sit to  Transfer to 1: Stand  Technique 1: Sit to stand, Stand to sit  Transfer Device 1: Walker  Transfer Level of Assistance 1: Close supervision  Trials/Comments 1: x1 trial,    Stairs  Stairs: Yes  Stairs  Rails 1: Bilateral  Curb Step 1: No  Device 1: Railing  Assistance 1: Minimum assistance, Contact guard, Minimal verbal cues  Comment/Number of Steps 1: x5 steps, Jose Cruz for ascending, CGA for descending, step to step pattern, cues for sequencing.    Outcome Measures:  [facility] Basic Mobility  Turning from your back to your side while in a flat bed without using bedrails: A little  Moving from lying on your back to sitting on the side of a flat bed without using bedrails: A little  Moving to and from bed to chair (including a wheelchair): A little  Standing up from a chair using your arms (e.g. wheelchair or bedside chair): A little  To walk in hospital room: A little  Climbing 3-5 steps with railing: A little  Basic Mobility - Total Score: 18    Education  Documentation  Precautions, taught by Erika Jain PTA at 7/2/2025  1:02 PM.  Learner: Patient  Readiness: Acceptance  Method: Explanation  Response: Verbalizes Understanding  Comment: Safe stair negotiation, Mitt precautions, Log roll    Body Mechanics, taught by Erika Jain PTA at 7/2/2025  1:02 PM.  Learner: Patient  Readiness: Acceptance  Method: Explanation  Response: Verbalizes Understanding  Comment: Safe stair negotiation, Mitt precautions, Log roll    Mobility Training, taught by Erika Jain PTA at 7/2/2025  1:02 PM.  Learner: Patient  Readiness: Acceptance  Method: Explanation  Response: Verbalizes Understanding  Comment: Safe stair negotiation, Mitt precautions, Log roll    Education Comments  No comments found.      Encounter Problems       Encounter Problems (Active)       Balance       Pt will demonstrated ability to score at least 24/28 on the [employer]etti balance assessment tool to ensure safety upon D/C.  (Progressing)       Start:  06/23/25    Expected End:  07/07/25               Mobility       Pt will demonstrated ability to ambulate >/=300ft with proper form and no balance deficits for safe home going.   (Not met)       Start:  06/23/25    Expected End:  07/07/25    Resolved:  06/27/25    Updated to: Pt will demonstrated ability to ambulate >/=750 ft with LRAD and supervision with proper form and no balance deficits for safe home going.    Update reason: progressing         Pt will demonstrate ability to ascend/descend at least 5 stairs with unilateral rail and no balance deficits for safe home going.  (Progressing)       Start:  06/23/25    Expected End:  07/07/25            Pt will demonstrated ability to ambulate >/=750 ft with LRAD and supervision with proper form and no balance deficits for safe home going. (Progressing)       Start:  06/27/25    Expected End:  07/07/25                   PT Transfers       Pt will demonstrate ability to complete 5X STS in < 12 sec with good from  and consistency between transfers for safe D/C.  (Progressing)       Start:  06/23/25    Expected End:  07/07/25               PT Transfers       Patient to transfer to and from sit to supine indep (Progressing)       Start:  06/25/25    Expected End:  07/07/25            Patient will transfer sit to and from stand with LRAD and modified indep (Progressing)       Start:  06/25/25    Expected End:  07/07/25 07/02/25 at 1:04 PM   Erika Jain, CARL   Rehab Office: [phone]

## 2025-07-03 ENCOUNTER — APPOINTMENT (OUTPATIENT)
Dept: RADIOLOGY | Facility: HOSPITAL | Age: 65
DRG: 001 | End: 2025-07-03
Payer: MEDICARE

## 2025-07-03 LAB
ALBUMIN SERPL BCP-MCNC: 3.3 G/DL (ref 3.4–5)
ANION GAP SERPL CALC-SCNC: 12 MMOL/L (ref 10–20)
BUN SERPL-MCNC: 33 MG/DL (ref 6–23)
CALCIUM SERPL-MCNC: 8.2 MG/DL (ref 8.6–10.6)
CHLORIDE SERPL-SCNC: 100 MMOL/L (ref 98–107)
CO2 SERPL-SCNC: 31 MMOL/L (ref 21–32)
CREAT SERPL-MCNC: 0.87 MG/DL (ref 0.5–1.3)
EGFRCR SERPLBLD CKD-EPI 2021: >90 ML/MIN/1.73M*2
ERYTHROCYTE [DISTWIDTH] IN BLOOD BY AUTOMATED COUNT: 20.7 % (ref 11.5–14.5)
GLUCOSE BLD MANUAL STRIP-MCNC: 162 MG/DL (ref 74–99)
GLUCOSE BLD MANUAL STRIP-MCNC: 172 MG/DL (ref 74–99)
GLUCOSE BLD MANUAL STRIP-MCNC: 200 MG/DL (ref 74–99)
GLUCOSE BLD MANUAL STRIP-MCNC: 204 MG/DL (ref 74–99)
GLUCOSE SERPL-MCNC: 180 MG/DL (ref 74–99)
HCT VFR BLD AUTO: 29.2 % (ref 41–52)
HGB BLD-MCNC: 9.2 G/DL (ref 13.5–17.5)
MAGNESIUM SERPL-MCNC: 1.91 MG/DL (ref 1.6–2.4)
MCH RBC QN AUTO: 31.4 PG (ref 26–34)
MCHC RBC AUTO-ENTMCNC: 31.5 G/DL (ref 32–36)
MCV RBC AUTO: 100 FL (ref 80–100)
NRBC BLD-RTO: 0.1 /100 WBCS (ref 0–0)
PHOSPHATE SERPL-MCNC: 2.8 MG/DL (ref 2.5–4.9)
PLATELET # BLD AUTO: 344 X10*3/UL (ref 150–450)
POTASSIUM SERPL-SCNC: 4.5 MMOL/L (ref 3.5–5.3)
RBC # BLD AUTO: 2.93 X10*6/UL (ref 4.5–5.9)
SODIUM SERPL-SCNC: 138 MMOL/L (ref 136–145)
TACROLIMUS BLD-MCNC: 9.6 NG/ML
WBC # BLD AUTO: 22.6 X10*3/UL (ref 4.4–11.3)

## 2025-07-03 PROCEDURE — 2500000002 HC RX 250 W HCPCS SELF ADMINISTERED DRUGS (ALT 637 FOR MEDICARE OP, ALT 636 FOR OP/ED)

## 2025-07-03 PROCEDURE — 36415 COLL VENOUS BLD VENIPUNCTURE: CPT

## 2025-07-03 PROCEDURE — 99233 SBSQ HOSP IP/OBS HIGH 50: CPT | Performed by: REGISTERED NURSE

## 2025-07-03 PROCEDURE — 80069 RENAL FUNCTION PANEL: CPT

## 2025-07-03 PROCEDURE — 99222 1ST HOSP IP/OBS MODERATE 55: CPT

## 2025-07-03 PROCEDURE — 2500000004 HC RX 250 GENERAL PHARMACY W/ HCPCS (ALT 636 FOR OP/ED): Performed by: REGISTERED NURSE

## 2025-07-03 PROCEDURE — 85027 COMPLETE CBC AUTOMATED: CPT

## 2025-07-03 PROCEDURE — 2500000001 HC RX 250 WO HCPCS SELF ADMINISTERED DRUGS (ALT 637 FOR MEDICARE OP)

## 2025-07-03 PROCEDURE — 1200000002 HC GENERAL ROOM WITH TELEMETRY DAILY

## 2025-07-03 PROCEDURE — 2500000004 HC RX 250 GENERAL PHARMACY W/ HCPCS (ALT 636 FOR OP/ED)

## 2025-07-03 PROCEDURE — 80197 ASSAY OF TACROLIMUS: CPT

## 2025-07-03 PROCEDURE — 82947 ASSAY GLUCOSE BLOOD QUANT: CPT

## 2025-07-03 PROCEDURE — 71045 X-RAY EXAM CHEST 1 VIEW: CPT

## 2025-07-03 PROCEDURE — 2500000001 HC RX 250 WO HCPCS SELF ADMINISTERED DRUGS (ALT 637 FOR MEDICARE OP): Performed by: STUDENT IN AN ORGANIZED HEALTH CARE EDUCATION/TRAINING PROGRAM

## 2025-07-03 PROCEDURE — 99222 1ST HOSP IP/OBS MODERATE 55: CPT | Performed by: STUDENT IN AN ORGANIZED HEALTH CARE EDUCATION/TRAINING PROGRAM

## 2025-07-03 PROCEDURE — 99233 SBSQ HOSP IP/OBS HIGH 50: CPT | Performed by: INTERNAL MEDICINE

## 2025-07-03 PROCEDURE — 2500000005 HC RX 250 GENERAL PHARMACY W/O HCPCS: Performed by: REGISTERED NURSE

## 2025-07-03 PROCEDURE — 2500000004 HC RX 250 GENERAL PHARMACY W/ HCPCS (ALT 636 FOR OP/ED): Performed by: STUDENT IN AN ORGANIZED HEALTH CARE EDUCATION/TRAINING PROGRAM

## 2025-07-03 PROCEDURE — 83735 ASSAY OF MAGNESIUM: CPT

## 2025-07-03 PROCEDURE — 97530 THERAPEUTIC ACTIVITIES: CPT | Mod: GP,CQ

## 2025-07-03 PROCEDURE — 2500000001 HC RX 250 WO HCPCS SELF ADMINISTERED DRUGS (ALT 637 FOR MEDICARE OP): Performed by: REGISTERED NURSE

## 2025-07-03 PROCEDURE — 71045 X-RAY EXAM CHEST 1 VIEW: CPT | Performed by: RADIOLOGY

## 2025-07-03 PROCEDURE — 2500000002 HC RX 250 W HCPCS SELF ADMINISTERED DRUGS (ALT 637 FOR MEDICARE OP, ALT 636 FOR OP/ED): Performed by: REGISTERED NURSE

## 2025-07-03 PROCEDURE — 97116 GAIT TRAINING THERAPY: CPT | Mod: GP,CQ

## 2025-07-03 RX ORDER — HYDRALAZINE HYDROCHLORIDE 50 MG/1
50 TABLET, FILM COATED ORAL 3 TIMES DAILY
Status: DISCONTINUED | OUTPATIENT
Start: 2025-07-03 | End: 2025-07-09 | Stop reason: HOSPADM

## 2025-07-03 RX ORDER — INSULIN GLARGINE 100 [IU]/ML
12 INJECTION, SOLUTION SUBCUTANEOUS NIGHTLY
Status: DISCONTINUED | OUTPATIENT
Start: 2025-07-03 | End: 2025-07-04

## 2025-07-03 RX ORDER — CYCLOBENZAPRINE HCL 10 MG
5 TABLET ORAL 3 TIMES DAILY PRN
Status: DISCONTINUED | OUTPATIENT
Start: 2025-07-03 | End: 2025-07-09 | Stop reason: HOSPADM

## 2025-07-03 RX ORDER — POLYETHYLENE GLYCOL 3350 17 G/17G
17 POWDER, FOR SOLUTION ORAL DAILY PRN
Status: DISCONTINUED | OUTPATIENT
Start: 2025-07-03 | End: 2025-07-09 | Stop reason: HOSPADM

## 2025-07-03 RX ADMIN — INSULIN LISPRO 4 UNITS: 100 INJECTION, SOLUTION INTRAVENOUS; SUBCUTANEOUS at 08:38

## 2025-07-03 RX ADMIN — PRAVASTATIN SODIUM 40 MG: 40 TABLET ORAL at 22:00

## 2025-07-03 RX ADMIN — LIDOCAINE 4% 1 PATCH: 40 PATCH TOPICAL at 08:37

## 2025-07-03 RX ADMIN — INSULIN LISPRO 4 UNITS: 100 INJECTION, SOLUTION INTRAVENOUS; SUBCUTANEOUS at 14:14

## 2025-07-03 RX ADMIN — VANCOMYCIN HYDROCHLORIDE 125 MG: 125 CAPSULE ORAL at 18:15

## 2025-07-03 RX ADMIN — SODIUM ZIRCONIUM CYCLOSILICATE 10 G: 10 POWDER, FOR SUSPENSION ORAL at 14:14

## 2025-07-03 RX ADMIN — HEPARIN SODIUM 5000 UNITS: 5000 INJECTION INTRAVENOUS; SUBCUTANEOUS at 00:29

## 2025-07-03 RX ADMIN — FUROSEMIDE 40 MG: 40 TABLET ORAL at 08:37

## 2025-07-03 RX ADMIN — SENNOSIDES AND DOCUSATE SODIUM 1 TABLET: 50; 8.6 TABLET ORAL at 08:37

## 2025-07-03 RX ADMIN — CALCIUM 1 TABLET: 500 TABLET ORAL at 08:37

## 2025-07-03 RX ADMIN — HYDRALAZINE HYDROCHLORIDE 50 MG: 50 TABLET ORAL at 08:50

## 2025-07-03 RX ADMIN — SENNOSIDES AND DOCUSATE SODIUM 1 TABLET: 50; 8.6 TABLET ORAL at 21:51

## 2025-07-03 RX ADMIN — ACETAMINOPHEN 650 MG: 325 TABLET ORAL at 06:43

## 2025-07-03 RX ADMIN — TACROLIMUS 2.5 MG: 0.5 CAPSULE ORAL at 18:15

## 2025-07-03 RX ADMIN — HYDRALAZINE HYDROCHLORIDE 50 MG: 50 TABLET ORAL at 21:50

## 2025-07-03 RX ADMIN — HEPARIN SODIUM 5000 UNITS: 5000 INJECTION INTRAVENOUS; SUBCUTANEOUS at 08:37

## 2025-07-03 RX ADMIN — CHOLECALCIFEROL TAB 25 MCG (1000 UNIT) 50 MCG: 25 TAB at 08:38

## 2025-07-03 RX ADMIN — VORICONAZOLE 200 MG: 200 TABLET, COATED ORAL at 21:50

## 2025-07-03 RX ADMIN — HYDRALAZINE HYDROCHLORIDE 50 MG: 50 TABLET ORAL at 14:14

## 2025-07-03 RX ADMIN — INSULIN LISPRO 4 UNITS: 100 INJECTION, SOLUTION INTRAVENOUS; SUBCUTANEOUS at 18:14

## 2025-07-03 RX ADMIN — ASPIRIN 81 MG: 81 TABLET, COATED ORAL at 08:37

## 2025-07-03 RX ADMIN — ACETAMINOPHEN 650 MG: 325 TABLET ORAL at 00:29

## 2025-07-03 RX ADMIN — PANTOPRAZOLE SODIUM 40 MG: 40 TABLET, DELAYED RELEASE ORAL at 06:43

## 2025-07-03 RX ADMIN — ACYCLOVIR 400 MG: 400 TABLET ORAL at 08:37

## 2025-07-03 RX ADMIN — GABAPENTIN 300 MG: 300 CAPSULE ORAL at 21:50

## 2025-07-03 RX ADMIN — CYCLOBENZAPRINE 5 MG: 10 TABLET, FILM COATED ORAL at 22:09

## 2025-07-03 RX ADMIN — INSULIN GLARGINE 12 UNITS: 100 INJECTION, SOLUTION SUBCUTANEOUS at 21:52

## 2025-07-03 RX ADMIN — TACROLIMUS 2.5 MG: 0.5 CAPSULE ORAL at 06:43

## 2025-07-03 RX ADMIN — VANCOMYCIN HYDROCHLORIDE 125 MG: 125 CAPSULE ORAL at 06:43

## 2025-07-03 RX ADMIN — PREDNISONE 25 MG: 20 TABLET ORAL at 08:37

## 2025-07-03 RX ADMIN — INSULIN LISPRO 4 UNITS: 100 INJECTION, SOLUTION INTRAVENOUS; SUBCUTANEOUS at 22:00

## 2025-07-03 RX ADMIN — SULFAMETHOXAZOLE AND TRIMETHOPRIM 1 TABLET: 400; 80 TABLET ORAL at 08:37

## 2025-07-03 RX ADMIN — LEVOTHYROXINE SODIUM 125 MCG: 0.05 TABLET ORAL at 06:43

## 2025-07-03 RX ADMIN — VORICONAZOLE 200 MG: 200 TABLET, COATED ORAL at 08:38

## 2025-07-03 RX ADMIN — VANCOMYCIN HYDROCHLORIDE 125 MG: 125 CAPSULE ORAL at 21:51

## 2025-07-03 RX ADMIN — ACYCLOVIR 400 MG: 400 TABLET ORAL at 21:51

## 2025-07-03 RX ADMIN — ACETAMINOPHEN 650 MG: 325 TABLET ORAL at 14:14

## 2025-07-03 RX ADMIN — HEPARIN SODIUM 5000 UNITS: 5000 INJECTION INTRAVENOUS; SUBCUTANEOUS at 18:14

## 2025-07-03 RX ADMIN — CALCIUM 1 TABLET: 500 TABLET ORAL at 21:51

## 2025-07-03 RX ADMIN — VANCOMYCIN HYDROCHLORIDE 125 MG: 125 CAPSULE ORAL at 14:14

## 2025-07-03 RX ADMIN — MYCOPHENOLATE MOFETIL 1000 MG: 250 CAPSULE ORAL at 18:14

## 2025-07-03 RX ADMIN — PREDNISONE 25 MG: 20 TABLET ORAL at 22:00

## 2025-07-03 RX ADMIN — POLYETHYLENE GLYCOL 3350 17 G: 17 POWDER, FOR SOLUTION ORAL at 08:38

## 2025-07-03 RX ADMIN — TAMSULOSIN HYDROCHLORIDE 0.4 MG: 0.4 CAPSULE ORAL at 08:37

## 2025-07-03 RX ADMIN — MYCOPHENOLATE MOFETIL 1000 MG: 250 CAPSULE ORAL at 06:43

## 2025-07-03 ASSESSMENT — COGNITIVE AND FUNCTIONAL STATUS - GENERAL
DRESSING REGULAR UPPER BODY CLOTHING: A LITTLE
MOVING TO AND FROM BED TO CHAIR: A LITTLE
WALKING IN HOSPITAL ROOM: A LITTLE
WALKING IN HOSPITAL ROOM: A LITTLE
TURNING FROM BACK TO SIDE WHILE IN FLAT BAD: A LITTLE
TOILETING: A LITTLE
HELP NEEDED FOR BATHING: A LITTLE
CLIMB 3 TO 5 STEPS WITH RAILING: A LITTLE
CLIMB 3 TO 5 STEPS WITH RAILING: A LOT
DAILY ACTIVITIY SCORE: 20
MOBILITY SCORE: 18
DRESSING REGULAR LOWER BODY CLOTHING: A LITTLE
MOBILITY SCORE: 17
MOVING FROM LYING ON BACK TO SITTING ON SIDE OF FLAT BED WITH BEDRAILS: A LITTLE
MOVING TO AND FROM BED TO CHAIR: A LITTLE
STANDING UP FROM CHAIR USING ARMS: A LITTLE
STANDING UP FROM CHAIR USING ARMS: A LITTLE
TURNING FROM BACK TO SIDE WHILE IN FLAT BAD: A LITTLE
MOVING FROM LYING ON BACK TO SITTING ON SIDE OF FLAT BED WITH BEDRAILS: A LITTLE

## 2025-07-03 ASSESSMENT — ENCOUNTER SYMPTOMS
ABDOMINAL PAIN: 0
SHORTNESS OF BREATH: 0
DYSURIA: 0
HEADACHES: 0
LIGHT-HEADEDNESS: 1
NUMBNESS: 1

## 2025-07-03 ASSESSMENT — PAIN DESCRIPTION - DESCRIPTORS
DESCRIPTORS: ACHING
DESCRIPTORS: SPASM

## 2025-07-03 ASSESSMENT — PAIN - FUNCTIONAL ASSESSMENT
PAIN_FUNCTIONAL_ASSESSMENT: 0-10

## 2025-07-03 ASSESSMENT — PAIN SCALES - GENERAL
PAINLEVEL_OUTOF10: 0 - NO PAIN
PAINLEVEL_OUTOF10: 5 - MODERATE PAIN
PAINLEVEL_OUTOF10: 3
PAINLEVEL_OUTOF10: 0 - NO PAIN

## 2025-07-03 NOTE — PROGRESS NOTES
"Anatoly Lane is a 64 y.o. male on day 13 of admission presenting with S/P orthotopic heart transplant.    Subjective   Continues to report decreased exertional ability and mobility in his left hand          Objective     Physical Exam  GEN: NAD , AOX3  HEENT : JVP at the clavicle   Heart : regular rhythm , normal S1 and S2 , no murmurs   Lungs : clear , resonant , normal air entry bilaterally   Abdomen : soft , non tender   Ext: warm , well perfused ,+1 pitting edema bilaterally   Neuro : grossly intact      Last Recorded Vitals  Blood pressure 148/75, pulse 79, temperature 36.6 °C (97.9 °F), temperature source Temporal, resp. rate 18, height 1.753 m (5' 9\"), weight 102 kg (224 lb), SpO2 94%.  Intake/Output last 3 Shifts:  I/O last 3 completed shifts:  In: 360 (3.7 mL/kg) [P.O.:360]  Out: 2675 (27.8 mL/kg) [Urine:2675 (0.8 mL/kg/hr)]  Dosing Weight: 96.3 kg     Relevant Results  Scheduled medications  Scheduled Medications[1]  Continuous medications  Continuous Medications[2]  PRN medications  PRN Medications[3]  Results for orders placed or performed during the hospital encounter of 06/20/25 (from the past 24 hours)   POCT GLUCOSE   Result Value Ref Range    POCT Glucose 216 (H) 74 - 99 mg/dL   POCT GLUCOSE   Result Value Ref Range    POCT Glucose 238 (H) 74 - 99 mg/dL   Tacrolimus   Result Value Ref Range    Tacrolimus  9.6 <=15.0 ng/mL   Magnesium   Result Value Ref Range    Magnesium 1.91 1.60 - 2.40 mg/dL   CBC   Result Value Ref Range    WBC 22.6 (H) 4.4 - 11.3 x10*3/uL    nRBC 0.1 (H) 0.0 - 0.0 /100 WBCs    RBC 2.93 (L) 4.50 - 5.90 x10*6/uL    Hemoglobin 9.2 (L) 13.5 - 17.5 g/dL    Hematocrit 29.2 (L) 41.0 - 52.0 %     80 - 100 fL    MCH 31.4 26.0 - 34.0 pg    MCHC 31.5 (L) 32.0 - 36.0 g/dL    RDW 20.7 (H) 11.5 - 14.5 %    Platelets 344 150 - 450 x10*3/uL   Renal Function Panel   Result Value Ref Range    Glucose 180 (H) 74 - 99 mg/dL    Sodium 138 136 - 145 mmol/L    Potassium 4.5 3.5 - 5.3 mmol/L    " Chloride 100 98 - 107 mmol/L    Bicarbonate 31 21 - 32 mmol/L    Anion Gap 12 10 - 20 mmol/L    Urea Nitrogen 33 (H) 6 - 23 mg/dL    Creatinine 0.87 0.50 - 1.30 mg/dL    eGFR >90 >60 mL/min/1.73m*2    Calcium 8.2 (L) 8.6 - 10.6 mg/dL    Phosphorus 2.8 2.5 - 4.9 mg/dL    Albumin 3.3 (L) 3.4 - 5.0 g/dL   POCT GLUCOSE   Result Value Ref Range    POCT Glucose 162 (H) 74 - 99 mg/dL   POCT GLUCOSE   Result Value Ref Range    POCT Glucose 172 (H) 74 - 99 mg/dL                           Assessment & Plan  S/P orthotopic heart transplant    Hypothyroidism    Stress hyperglycemia    Receiving inotropic medication    History of hypothyroidism    History of BPH    Blood loss anemia    Immunosuppression    C. difficile diarrhea    Improving volume status with diuresis   Tacrolimus through at goal   Next EMB is on Tuesday   Tentative discharge afterwards   Will engage PM&R for discharge planning recommendations as he continues to report that he is too weak to be able to safely go home       Bailee Damon MD         [1] acetaminophen, 650 mg, oral, q6h  acyclovir, 400 mg, oral, BID  aspirin, 81 mg, oral, Daily  calcium carbonate, 1,250 mg of calcium carbonate, oral, BID  cholecalciferol, 50 mcg, oral, Daily  furosemide, 40 mg, oral, Daily  gabapentin, 300 mg, oral, Nightly  heparin (porcine), 5,000 Units, subcutaneous, q8h  hydrALAZINE, 50 mg, oral, TID  insulin glargine, 12 Units, subcutaneous, Nightly  insulin lispro, 0-20 Units, subcutaneous, Before meals & nightly  levothyroxine, 125 mcg, oral, Daily  lidocaine, 1 patch, transdermal, Daily  mycophenolate, 1,000 mg, oral, q12h LUPILLO  pantoprazole, 40 mg, oral, Daily before breakfast  perflutren protein A microsphere, 0.5 mL, intravenous, Once in imaging  pravastatin, 40 mg, oral, Nightly  predniSONE, 25 mg, oral, q12h LUPILLO  sennosides-docusate sodium, 1 tablet, oral, BID  sodium zirconium cyclosilicate, 10 g, oral, Daily  sulfamethoxazole-trimethoprim, 1 tablet, oral,  Daily  sulfur hexafluoride microsphr, 2 mL, intravenous, Once in imaging  tacrolimus, 2.5 mg, oral, q12h LUPILLO  tamsulosin, 0.4 mg, oral, Daily  vancomycin, 125 mg, oral, 4x daily  voriconazole, 200 mg, oral, q12h LUPILLO  [2]    [3] PRN medications: cyclobenzaprine, dextrose, dextrose **OR** [DISCONTINUED] glucagon, dextrose, glucagon, glucagon, guaiFENesin, melatonin, naloxone, ondansetron ODT **OR** ondansetron, oxyCODONE, oxyCODONE, oxygen, polyethylene glycol

## 2025-07-03 NOTE — CARE PLAN
The patient's goals for the shift include      The clinical goals for the shift include Pt will inrease ambulation this shift

## 2025-07-03 NOTE — PROGRESS NOTES
"Anatoly Lane is a 64 y.o. male on day 13 of admission presenting with S/P orthotopic heart transplant.      Subjective   Patient feeling well today, frustrated with weakness, was asking about rehab.        Objective     Last Recorded Vitals  Blood pressure 148/75, pulse 79, temperature 36.6 °C (97.9 °F), temperature source Temporal, resp. rate 18, height 1.753 m (5' 9\"), weight 102 kg (224 lb), SpO2 94%.    Physical Exam  Neurological Exam  Relevant Results                    [place] Coma Scale  Best Eye Response: Spontaneous  Best Verbal Response: Oriented  Best Motor Response: Follows commands  [place] Coma Scale Score: 15                  Neurological Exam:  Alert and orinted x3,   Midline gaze, no VF deficits,   Motor, LUE - finger and wrist extensors 1/5, arm flexion and extension is 5/5 and 4/5 respectively  RLE and LLE 4/5 hip flexion, 5/5 hip extension  Reflexes: 2+ biceps, patellars bl.       Assessment and Plan:  Anatoly Lane is a 64 y.o. male with a PMH ex smoker, nonischemic cardiomyopathy, stage D HFrEF s/p LVAD now s/p heart transplant and LVAD explant 6/21, AF, HTN here after heart transplant surgery. Neurology consulted for LUE weakness.     On exam on 6/30  there was concern for R anteromedial thigh pain accompanied by weakness in R hip flexion and knee extension. He had femoral access on R side. Findings concerning for femoral nerve injury. CT A/P showed a seroma over femoral vein. On reexamination today pateints strength has improved. Pateints initial weakness likely due to femoral nerve compression ad is expected to improve with PT/OT and as seroma resolves.     On 6/30 there was also weakness in triceps extension and fingers extensors with sensory loss on c8 distruibution which has also improved on todays exam - this  fits with a brachial plexus injury involving predominantly the lower and middle trunk which is a known complication of a sternotomy.      Impression:   1) Seroma related femoral " nerve compression in RLE --> improving will resolve with time  2) c/F CS-T1 brachial plexus injury iso sternotomy in LUE --> improving       Recommendations:  - Please order outpatient EMG for LUE to evaluate for brachial plexus injury  - Needs aggressive PT/OT   - Follow up with neuromuscular neurology (Dr. Alexandra) outpatient      Seen with attending, Dr. Mcbride.        Citlalli Brandon MD  Neurology PGY-4  Gen Neuro Pager: [phone]

## 2025-07-03 NOTE — PROGRESS NOTES
[facility]  Advanced HF PROGRESS NOTE    Anatoly Lane/[id number]    Admit Date: 6/20/2025  Hospital Length of Stay: 13   ICU Length of Stay: 4d   Primary Service: HFICU  Primary HF Cardiologist:   Referring:     INTERVAL EVENTS / PERTINENT ROS:   NSR 70-80s on tele   SBP 150s   Edema improving on exam, lung sounds improving   PT recommending low intensity PT at discharge, patient feels he requires acute rehab     Plan:  - C/w Lasix 40mg PO daily, can decrease as needed   - CXR to assess resolution of pulmonary edema   - Consult PM&R     MEDICATIONS  Infusions:       Scheduled:  acetaminophen, 650 mg, q6h  acyclovir, 400 mg, BID  aspirin, 81 mg, Daily  calcium carbonate, 1,250 mg of calcium carbonate, BID  cholecalciferol, 50 mcg, Daily  furosemide, 40 mg, Daily  gabapentin, 300 mg, Nightly  heparin (porcine), 5,000 Units, q8h  hydrALAZINE, 50 mg, TID  insulin glargine, 12 Units, Nightly  insulin lispro, 0-20 Units, Before meals & nightly  levothyroxine, 125 mcg, Daily  lidocaine, 1 patch, Daily  mycophenolate, 1,000 mg, q12h LUPILLO  pantoprazole, 40 mg, Daily before breakfast  perflutren protein A microsphere, 0.5 mL, Once in imaging  pravastatin, 40 mg, Nightly  predniSONE, 25 mg, q12h LUPILLO  sennosides-docusate sodium, 1 tablet, BID  sodium zirconium cyclosilicate, 10 g, Daily  sulfamethoxazole-trimethoprim, 1 tablet, Daily  sulfur hexafluoride microsphr, 2 mL, Once in imaging  tacrolimus, 2.5 mg, q12h LUPILLO  tamsulosin, 0.4 mg, Daily  vancomycin, 125 mg, 4x daily  voriconazole, 200 mg, q12h LUPILLO      PRN:  cyclobenzaprine, 5 mg, TID PRN  dextrose, 12.5 g, q15 min PRN  dextrose, 25 g, q15 min PRN  dextrose, 25 g, q15 min PRN  glucagon, 1 mg, q15 min PRN  glucagon, 1 mg, q15 min PRN  guaiFENesin, 600 mg, BID PRN  melatonin, 5 mg, Nightly PRN  naloxone, 0.2 mg, q5 min PRN  ondansetron ODT, 4 mg, q8h PRN   Or  ondansetron, 4 mg, q8h PRN  oxyCODONE, 10 mg, q4h PRN  oxyCODONE, 5 mg, q4h PRN  oxygen,  ", Continuous PRN - O2/gases  polyethylene glycol, 17 g, Daily PRN      PHYSICAL EXAM:   Visit Vitals  /75 (BP Location: Left arm, Patient Position: Lying)   Pulse 79   Temp 36.6 °C (97.9 °F) (Temporal)   Resp 18   Ht 1.753 m (5' 9\")   Wt 102 kg (224 lb)   SpO2 94%   BMI 33.08 kg/m²   Smoking Status Former   BSA 2.23 m²       Wt Readings from Last 5 Encounters:   07/02/25 102 kg (224 lb)   02/27/25 102 kg (224 lb)   01/28/25 104 kg (229 lb)   11/19/24 112 kg (246 lb 12.8 oz)   07/15/24 110 kg (243 lb 9.6 oz)       INTAKE/OUTPUT:  I/O last 3 completed shifts:  In: 360 (3.7 mL/kg) [P.O.:360]  Out: 2675 (27.8 mL/kg) [Urine:2675 (0.8 mL/kg/hr)]  Dosing Weight: 96.3 kg      Physical Exam  HENT:      Head: Normocephalic and atraumatic.   Cardiovascular:      Rate and Rhythm: Normal rate.      Heart sounds: No murmur heard.     No friction rub. No gallop.   Pulmonary:      Effort: Pulmonary effort is normal. No respiratory distress.      Breath sounds: Rhonchi and rales present. No wheezing.      Comments: Stenotomy incision appears C/D/I  Abdominal:      General: Abdomen is flat. There is no distension.      Palpations: Abdomen is soft. There is no mass.      Tenderness: There is no abdominal tenderness. There is no guarding.   Musculoskeletal:      Right lower leg: No edema.      Left lower leg: No edema.   Skin:     General: Skin is warm.      Findings: No rash.   Neurological:      General: No focal deficit present.      Mental Status: He is alert and oriented to person, place, and time.   Psychiatric:         Mood and Affect: Mood normal.         Behavior: Behavior normal.         DATA:  CMP:  Results from last 7 days   Lab Units 07/03/25  0630 07/02/25  0713 07/01/25  0607 06/30/25  0536 06/29/25  0631 06/28/25  0606 06/27/25  0621   SODIUM mmol/L 138 136 137 137 137 139 139   POTASSIUM mmol/L 4.5 4.7 5.4* 5.1 5.1 4.1 3.9   CHLORIDE mmol/L 100 98 100 101 101 97* 97*   CO2 mmol/L 31 30 32 29 25 37* 35*   ANION " "GAP mmol/L 12 13 10 12 16 9* 11   BUN mg/dL 33* 36* 30* 35* 38* 41* 35*   CREATININE mg/dL 0.87 0.84 0.81 0.82 0.95 0.94 0.99   EGFR mL/min/1.73m*2 >90 >90 >90 >90 89 >90 85   MAGNESIUM mg/dL 1.91 2.10 2.26 2.35 2.47* 2.37 2.16   ALBUMIN g/dL 3.3* 3.3* 3.9 3.3* 3.4 3.7 3.7     CBC:  Results from last 7 days   Lab Units 07/03/25  0630 07/02/25  0713 07/01/25  0607 06/30/25  0536 06/29/25  0631 06/28/25  0606 06/27/25  0621   WBC AUTO x10*3/uL 22.6* 21.9* 24.9* 17.4* 19.6* 20.7* 23.1*   HEMOGLOBIN g/dL 9.2* 9.5* 9.6* 8.5* 8.6* 8.5* 7.7*   HEMATOCRIT % 29.2* 30.0* 30.7* 26.8* 27.2* 26.4* 22.7*   PLATELETS AUTO x10*3/uL 344 348 315 243 228 193 173   MCV fL 100 100 99 99 98 97 90     COAG:         ABO:   No results found for: \"ABO\"    HEME/ENDO:         CARDIAC:       No lab exists for component: \"CK\", \"CKMBP\"    ASSESSMENT AND PLAN:   Anatoly Lane is a 64 year old male with relevant PMH of ex smoker, NICM, stage D HFrEF NYHA 2-3, S/p HM2 (4/30/23), VT s/p ICD, CAD, pAF, HTN, BPH, GERD and hypothyroidism.  S/p LVAD explant, AICD removal & OHT by Dr. Lorenzo on 6/21. Now transferred from CTICU to HF ICU 6/24. Patient arrived to HFICU HDS stable, on milrinone drip 0.125 mcg/kg/min. Aiming to wean milrinone and optimize volume while in the HFICU.    Neuro:  Anxiety. Depression, Acute pain   BUE/BLE mild tremor  Acute back pain   - Serial neuro and pain assessments   - PO Tylenol PRN for pain  - PT/OT Consult  - Sleep/wake cycle normalization  - C/w PRN Flexeril and Lidocaine patch     L hand extensor palsy and 4th+5th digital numbness  - On 6/27, patient c/o inability to extend digits 2-5 around the MCP joints. In addition, he is complaining of numbness from the medial aspect of the wrist to the 4th and 5th digits and part of the palm.  - Deficits do not align completely with a specific nerve injury- would not expect numbness in digits 4-5 with a radial nerve palsy and would not expect difficult with finger extension with " an ulnar nerve palsy.  - Ddx includes nerve compression from forearm edema vs brachial plexus injury.  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  Now s/p LVAD explant, AICD removal, & OHT on 6/21  - TTE (6/22): Normal BiV   Donor/Recipient Infectious history:   CMV: -/-   EBV: +/+   Toxo: -/-   Heb B: -/-  Hep C: -/-  HIV: -/-      Rejection/Prophylaxis:    - Induction: s/p methylprednisolone 500mg IV x2, S/p Basiliximab 20mg IV within 1 hour of arrival to CTICU (no need for premedication), s/p 2nd dose: 20mg IVPB on POD4 (06/25)   - Steroids: Prednisone 25mg PO BID (scheduled w/o end date, will need to be manually reduced if next biopsy negative)  - Tacrolimus: Last tacrolimus trough: 9.6 7/3/2025:  6:30 AM 2.5 mg BID (dose adjusted 06/29), will continue same dose today.    - Tacrolimus goal troughs: 10-12  - Cellcept: C/w 1,000mg BID  - Antifungals: c/w voriconazole 200mg BID x3 months end date 09/22/2025   - Antivirals: c/w acylovir 400mg BID (06/22 x3 months. End date 09/22/2025   - Anti PCP & Toxoplasmosis: SS Bactrim daily (end date of 12/22/25)      Next RHC/biopsy: 7/1: negative for AMR/ACR. Next biopsy scheduled 7/8 w/ Dr. Ruano.   Last TTE/BOBBY on 6/23: ; normal BiV function (poor windows on 7/1 TTE)   Last HLA: First due on 7/24  Last Allomap: ~ will assess starting at 6-month ana post-op    Last Allosure: ~ will assess starting at 6-month ana post-op   Last [facility]: 03/2023(pre OHT); First due ~ (one year post tx)   Osteopenia/osteoporosis prophylaxis: C/w Vit D 2000U daily and calcium 500 mg  BID (give calcium 2 hrs after MMF)   Peptic/gastric ulcer prophylaxis: Pantoprazole 40mg daily    CAV Prophylaxis:  C/w  aspirin and  pravastatin 40 mg QHS  Pacing: Pacer back up rate set to 60 BPM. Native conduction is NSR at 75 BPM, pacing wires can be cut.     Subcutaneous emphysema and pneumomediastinum  - Dr. Birch aware, continue to monitor     HTN  - C/w hydralazine 25mg TID and increase as needed      Pulmonary:   Productive cough  Ex-smoker  - Noticed to have mod-severe productive cough with clear expectorant, pt says he has chronic bronchitis from years of smoking  - Auscultation with rough crackles, rhonchi  - CXR 06/29 small stable L pleural effusion, no fevers, WBC elevated but likely from steroids  - CT chest appears to be volume overload, will diurese today. WBC downtrending   - Monitor and maintain SpO2 > 92%     GI:  C- Diff infection, diarrhea (improved)  - Diagnosed 06/25, on PO vanc x 10 days (07/05)  - Diarrhea already better, leucocytosis improving   - Bowel regimen PRN   - PPI     :  H/x BPH  -Baseline Cr: 0.8   -Admit BUN/Cr: Normal   -I/Os  -avoid hypotension and nephrotoxic agents  - C/w home tamsulosin 0.4 mg daily      Heme:  Acute blood loss anemia stable, no active s/s of bleeding  - Trending H/h   - Iron studies: %saturation 21%, ferritin 130. No need for IV iron.      Endo:  Steroid and stress induced hyperglycemia  - Maintain BG <180  - continue SSI to #4   - hgbA1c  was 4.1  - Endo consulted: starting Lantus 12u nightly (pt will need diabetic education prior to dc)      H/o of hypothyroid  -TSH (3/2025): 1.83  - Maintain BG <180      ID:  C Diff infection   Reactive leukocytosis  - Completing PO vanc till 07/05   - completed periop vanc/zosyn x48hrs with hx of LVAD  - trend temps q4h     PHYSICAL AND OCCUPATIONAL THERAPY: PT/OT    DVT: Heparin SQ  ULCER PPX: PPI  GLYCEMIC CONTROL: SSI  BOWEL CARE: Senna, mirlax  NUTRITION: Adult diet Regular      EMERGENCY CONTACT: Extended  Emergency Contact Information  Primary Emergency Contact: Janice Ramos  Address: [address] [place] [address] [facility]  Home Phone: [phone]  Mobile Phone: [phone]  Relation: Mother  Secondary Emergency Contact: Bird Lane  Address: [address]           [place] [address] [facility]  Home Phone: [phone]  Mobile Phone: [phone]  Relation: Son  Preferred language: English   needed? No  FAMILY UPDATE: At bedside  CODE STATUS: Full Code  DISPO:  PT recommending low intensity and states pt is progressing and ambulation is improving. PM&R consulted. He can possibly be discharged after his biopsy next week.     Patient seen and assessed with Dr. Marcus Pabon.

## 2025-07-03 NOTE — CONSULTS
Inpatient consult to Endocrinology  Consult performed by: Cecille Le MD  Consult ordered by: COOKIE Martinez-CNP    Reason For Consult  Hyperglycemic on SSI    History Of Present Illness  Anatoly Lane is a 64 y.o. male with past medical history significant for stage D HFrEF, CAD, PAF, VT, hypothyroidism who presented to the hospital to undergo an orthotopic heart transplant 6/21. Endocrine was consulted for management of steroid and stress induced hyperglycemia in the post operative setting.     Patient was pre-diabetic in 3/2023 with A1C at 6.7%. Last A1C 4/23/24 was 4.1%.     Steroid regimen:  - 25mg q12h Prednisone for a 10 day course then adjust based on biopsy  - Previously on 35mg q12h Pred    Current insulin regimen:  - SSI #4  - Requiring about 12-20u a day    Current blood glucose goal <180    POC glucose:  7/2/25: 189, 158, 216, 238  7/3/25: 162    Prelim recommendations for the team given last night were 12 units of glargine.     Patient is feeling tired this morning. Endorses some tenderness near the surgical site and some numbness and tingling in his left fingers. Also states he has been feeling lightheaded here and there. Not postural. Denies headache, vision changes, abdominal pain, dysuria, leg swelling.     Past Medical History  He has a past medical history of ACC/AHA stage D systolic heart failure, Acute decompensated heart failure (02/23/2024), Acute on chronic combined systolic (congestive) and diastolic (congestive) heart failure (02/23/2024), Acute on chronic systolic heart failure, NYHA class 3, Anticoagulant long-term use (02/26/2024), CAD (coronary artery disease), Cardiac defibrillator in place (02/14/2024), CHF (congestive heart failure) (02/14/2024), Chronic left systolic heart failure (02/14/2025), Hypothyroid, Hypothyroidism, LVAD (left ventricular assist device) present (Multi), LVAD (left ventricular assist device) present (Multi) (02/14/2024), Non-ischemic  cardiomyopathy (Multi) (02/14/2024), PAF (paroxysmal atrial fibrillation) (Multi), and Paroxysmal ventricular tachycardia (02/14/2024).    Surgical History  He has a past surgical history that includes Other surgical history (01/05/2022); Other surgical history (01/05/2022); CT angio abdomen pelvis w and or wo IV IV contrast (04/13/2023); Left ventricular assist device; Colonoscopy w/ polypectomy (2023); Cardiac catheterization (N/A, 2/28/2024); Cardiac catheterization (N/A, 3/26/2025); Cardiac catheterization (N/A, 7/1/2025); and Cardiac catheterization (N/A, 7/1/2025).     Social History  He reports that he has quit smoking. His smoking use included cigarettes. He has never used smokeless tobacco. He reports that he does not drink alcohol and does not use drugs.    Family History  Family History[1]     Allergies  Jardiance [empagliflozin]    Review of Systems   Eyes:  Negative for visual disturbance.   Respiratory:  Negative for shortness of breath.    Cardiovascular:  Negative for leg swelling.   Gastrointestinal:  Negative for abdominal pain.   Genitourinary:  Negative for decreased urine volume and dysuria.   Neurological:  Positive for light-headedness and numbness. Negative for headaches.        Physical Exam  Constitutional:       General: He is not in acute distress.     Appearance: Normal appearance.   HENT:      Head: Normocephalic and atraumatic.      Mouth/Throat:      Mouth: Mucous membranes are moist.   Eyes:      Extraocular Movements: Extraocular movements intact.   Abdominal:      General: Abdomen is flat.      Palpations: Abdomen is soft.      Tenderness: There is no abdominal tenderness.   Musculoskeletal:         General: Normal range of motion.      Right lower leg: No edema.      Left lower leg: No edema.   Skin:     General: Skin is warm and dry.   Neurological:      General: No focal deficit present.      Mental Status: He is alert and oriented to person, place, and time.      ROS, PMH,  "FH/SH, surgical history and allergies have been reviewed.    Last Recorded Vitals  Blood pressure 151/73, pulse 79, temperature 36.6 °C (97.9 °F), temperature source Temporal, resp. rate 18, height 1.753 m (5' 9\"), weight 102 kg (224 lb), SpO2 95%.    Relevant Results  Results from last 7 days   Lab Units 07/03/25  0747 07/03/25  0630 07/02/25 2010 07/02/25  1621 07/02/25  1259 07/02/25  0839 07/02/25  0713 07/01/25  0859 07/01/25  0607 06/30/25  0846 06/30/25  0536 06/29/25  0820 06/29/25  0631   POCT GLUCOSE mg/dL 162*  --  238* 216* 158* 189*  --    < >  --    < >  --    < >  --    GLUCOSE mg/dL  --  180*  --   --   --   --  207*  --  162*  --  131*  --  123*    < > = values in this interval not displayed.       Scheduled medications  Scheduled Medications[2]  Continuous medications  Continuous Medications[3]  PRN medications  PRN Medications[4]     Assessment/Plan   Assessment & Plan  Stress hyperglycemia      Assessment    Anatoly Lane is a 64 y.o. male with past medical history significant for stage D HFrEF, CAD, PAF, VT, hypothyroidism who is s/p orthotopic heart transplant 6/21 consulted for management of steroid and stress induced hyperglycemia in the post operative setting. His sugars have been mildly controlled on SSI, but a basal is indicated for stricter control.     Plan    Recommendations:  - Start 10 units of insulin glargine daily  - Continue SSI #2  - Consider lispro pre-prandial if above regimen does not control  - Aim for stricter blood glucose control 120-140mg/dL  - Low carb diet. Use Glucerna over Ensure when able.     Thank you for the consult. Please reach out with any further questions. We will be following. This case was discussed with endocrinology fellow Dr. Edwards and attending Dr. Hopson.     Cecille Le MD  Internal Medicine PGY-1         [1]   Family History  Problem Relation Name Age of Onset    Hypertension Father      Colon cancer Father     [2] acetaminophen, 650 mg, " oral, q6h  acyclovir, 400 mg, oral, BID  aspirin, 81 mg, oral, Daily  calcium carbonate, 1,250 mg of calcium carbonate, oral, BID  cholecalciferol, 50 mcg, oral, Daily  cyclobenzaprine, 5 mg, oral, q8h  furosemide, 40 mg, oral, Daily  gabapentin, 300 mg, oral, Nightly  heparin (porcine), 5,000 Units, subcutaneous, q8h  hydrALAZINE, 50 mg, oral, TID  insulin lispro, 0-20 Units, subcutaneous, Before meals & nightly  levothyroxine, 125 mcg, oral, Daily  lidocaine, 1 patch, transdermal, Daily  mycophenolate, 1,000 mg, oral, q12h LUPILLO  pantoprazole, 40 mg, oral, Daily before breakfast  perflutren protein A microsphere, 0.5 mL, intravenous, Once in imaging  polyethylene glycol, 17 g, oral, Daily  pravastatin, 40 mg, oral, Nightly  predniSONE, 25 mg, oral, q12h LUPILLO  sennosides-docusate sodium, 1 tablet, oral, BID  sodium zirconium cyclosilicate, 10 g, oral, Daily  sulfamethoxazole-trimethoprim, 1 tablet, oral, Daily  sulfur hexafluoride microsphr, 2 mL, intravenous, Once in imaging  tacrolimus, 2.5 mg, oral, q12h LUPILLO  tamsulosin, 0.4 mg, oral, Daily  vancomycin, 125 mg, oral, 4x daily  voriconazole, 200 mg, oral, q12h LUPILLO  [3]    [4] PRN medications: dextrose, dextrose **OR** [DISCONTINUED] glucagon, dextrose, glucagon, glucagon, guaiFENesin, melatonin, naloxone, ondansetron ODT **OR** ondansetron, oxyCODONE, oxyCODONE, oxygen

## 2025-07-03 NOTE — CONSULTS
PM&R Consult Note  Patient: Anatoly Lane   Age/sex: 64 y.o.   Medical Record #: [id number]       Referring provider: COOKIE Martinez-CNP  Consulting physician: Dr. Weber       Procedures performed on/related to this admission:  -Orthotopic heart transplant 6/21 with LVAD and AICD removal    Chief complaint:   Impairments and activity limitations in ADLs, mobility, functional communication, and cognition secondary to orthotopic heart transplant.    HPI:   Anatoly Lane is a 64 y.o. male patient with a past medical history of former tobacco smoking, nonischemic cardiomyopathy, HFrEF status post LVAD 4/30/2023, ICD, coronary artery disease, paroxysmal atrial fibrillation, hypertension, BPH, GERD and hypothyroidism who [place] to [facility] [place] and underwent orthotopic heart transplant on 6/21 with LVAD and AICD removal.  Hospital course has been complicated by left hand extensor palsy and left 4th and 5th digital numbness concerning for brachial plexus injury versus nerve compression; neuro consulted, concern for brachial plexus injury and also noted right hip flexion and knee extension weakness concerning for femoral nerve injury CT abdomen pelvis negative for compressive hematoma, he will follow-up with neuromuscular neurology outpatient.    He is currently on antirejection and prophylaxis medicines including prednisone, tacrolimus, CellCept, voriconazole, acyclovir and Bactrim.  He was also diagnosed with C. difficile on 6/25 and will finish course of vancomycin 7/5; improving.      PO: Regular diet.  Pain: Currently on Flexeril, lidocaine patch, Tylenol as needed.  Bowel: Last bowel movement 6/29.  Bladder: Dumont removed, voiding volitionally.  DVT prophylaxis with heparin.   Weight bearing status: Weightbearing as tolerated  Precautions: Contact precautions (C. difficile), cardiac precautions, fall precautions    Medical History[1]     Surgical History[2]     Family History[3]     RX Allergies[4]     Scheduled  Medications[5]     PRN Medications[6]     Social History:  Working History: Currently on disability.  Social supports: Limited assistance available.  Home situation: Lives in house alone, laundry in basement.  Able to live on main level.  5 steps to enter.    Functional History:  Home DME: None  Premorbid ADL's: independent   Premorbid Mobility: independent      PT evaluation 7/2: Walking 175 feet, 350 feet with standing rest break due to fatigue at close supervision.  Min assist with sitting to supine.  Close supervision sit to stand and stand to sit using walker.  Ascending 5 steps min assist, contact-guard for descending.    OT evaluation 6/27: Alert and oriented x 4, following one-step commands without difficulty.  Working on active digit flexion and extension; weightbearing through left hand.  Significant assistance with lower body dressing and toileting.  Evaluation 6/26; max assist for donning shorts, max assist for toilet hygiene (difficulty secondary to lines/tubes) min assist with bed mobility.    ROS  10 point review of systems is performed and negative unless mentioned in HPI    Physical Exam  GENERAL: Awake and alert, well-developed, well-nourished, No acute distress   HEENT - NC/AT   CARDIOVASCULAR: Midline sternotomy incision.   RESPIRATORY: No conversational dyspnea, comfortable on room air, symmetrical chest rise    MSK: No visible deformities or local swelling. Sternotomy incision.   SKIN: Normal color, warm, dry, no rashes    Psych: Cooperative, affect appropriate, conversational, good-eye contact      NEURO:   RUE strength: 5/5 elbow flexors, 5/5 elbow extensors, 5/5 wrist extensors, 5/5 finger flexors, 5/5 finger abductors    LUE strength: 4/5 elbow flexors, 4/5 elbow extensors, 4/5 wrist extensors, 4/5 finger flexors, 3/5 finger abductors    RLE strength: 3/5 hip flexors, 5/5 knee extensors, 5/5 knee flexors, 5/5 ankle dorsiflexors, 5/5 ankle plantar flexors   LLE strength: 3/5 hip flexors, 5/5  knee extensors, 5/5 knee flexors, 5/5 ankle dorsiflexors,  5/5 ankle plantar flexors   Sensation - impaired dorsal aspect of left wrist and palmar aspect of 4th and 5th digits (left)     Imaging:  === 06/20/25 ===    XR CHEST 1 VIEW    - Impression -  1.  Stable small left pleural effusion with associated left basilar  atelectasis.  2. Interval removal of the [place] catheter.      MACRO:  None    Signed by: Trever Boyle 6/29/2025 9:33 AM  Dictation workstation:   [id number]      IMPRESSION:  Anatoly Lane is a 64 y.o. year old male patient who presented to [facility] [place] and underwent orthotopic heart transplant on 6/21 with LVAD and AICD removal.  Hospital course has been complicated by left hand extensor palsy and left 4th and 5th digital numbness concerning for brachial plexus injury for which he will follow up with neurology outpatient.      PM&R was consulted for recommendations and for IRF appropriateness.    Recommendations:  #  Patient is not appropriate for acute inpatient rehabilitation at this time. Recommend home with home therapy or if he has ongoing medical needs would recommend SNF instead.     # Neurogenic bowel/bladder  - Voiding volitionally   - Last BM 6/29; current bowel regimen includes sennosides-docusate sodium twice daily and MiraLAX daily as needed.  Would recommend addition of scheduled MiraLAX daily for more regular bowel movements especially while utilizing opioid pain management..  Goal of 1 bowel movement per day.    # Immobility   - Prevent secondary complications   - Skin protection: low air loss mattress, turn q 2 hours in bed, maintain bowel and bladder continence/containment  - Prevention of pulmonary complications: incentive spirometry and pulmonary toileting  - Maintain adequate nutrition and hydration  - Q shift PROM of upper and lower extremities to prevent contracture    # Impaired mobility and Impaired independence with ADLs and I/ADLs  - Continue PT, working on bed mobility,  transfers, balance, endurance, strength, gait, eval for appropriate assistive device  - Continue OT, working on functional cognition, functional mobility, upper limb strength/coordination, balance, endurance, eval for appropriate adaptive equipment, ADLs, and I/ADLs.    # Pain Management   - Current pain regimen includes scheduled Tylenol, gabapentin nightly, Flexeril 3 times daily as needed, oxycodone 5 mg / 10 mg every 4 HR as needed.    We will follow along with you.  Thank you very much for this consult. Continue with excellent care per primary team.     Patient seen and examined with attending Dr. Weber who agrees with assessment and plan. Note is not finalized until signed by attending physician.     Isidoro Shah,  PGY4  Physical Medicine & Rehabilitation              [1]   Past Medical History:  Diagnosis Date    ACC/AHA stage D systolic heart failure     Acute decompensated heart failure 02/23/2024    Acute on chronic combined systolic (congestive) and diastolic (congestive) heart failure 02/23/2024    Acute on chronic systolic heart failure, NYHA class 3     Anticoagulant long-term use 02/26/2024    CAD (coronary artery disease)     Cardiac defibrillator in place 02/14/2024    CHF (congestive heart failure) 02/14/2024    Chronic left systolic heart failure 02/14/2025    Hypothyroid     Hypothyroidism     LVAD (left ventricular assist device) present (Multi)     LVAD (left ventricular assist device) present (Multi) 02/14/2024    Non-ischemic cardiomyopathy (Multi) 02/14/2024    PAF (paroxysmal atrial fibrillation) (Multi)     Paroxysmal ventricular tachycardia 02/14/2024   [2]   Past Surgical History:  Procedure Laterality Date    CARDIAC CATHETERIZATION N/A 2/28/2024    Procedure: Right Heart Cath;  Surgeon: José Velasco MD;  Location: [place] [address] Cardiac Cath Lab;  Service: Cardiovascular;  Laterality: N/A;  with RAMP study    CARDIAC CATHETERIZATION N/A 3/26/2025    Procedure: Right Heart Cath;   Surgeon: Vince Ruano DO;  Location: [place] [address] Cardiac Cath Lab;  Service: Cardiovascular;  Laterality: N/A;    CARDIAC CATHETERIZATION N/A 7/1/2025    Procedure: RHC;  Surgeon: José Velasco MD;  Location: [place] [address] Cardiac Cath Lab;  Service: Cardiovascular;  Laterality: N/A;  w/ EMB    CARDIAC CATHETERIZATION N/A 7/1/2025    Procedure: Endomyocardial Biopsy;  Surgeon: José Velasco MD;  Location: [place] [address] Cardiac Cath Lab;  Service: Cardiovascular;  Laterality: N/A;    COLONOSCOPY W/ POLYPECTOMY  2023    CT ABDOMEN PELVIS ANGIOGRAM W AND/OR WO IV CONTRAST  04/13/2023    CT ABDOMEN PELVIS ANGIOGRAM W AND/OR WO IV CONTRAST [facility] CT    LEFT VENTRICULAR ASSIST DEVICE      OTHER SURGICAL HISTORY  01/05/2022    Complete colonoscopy    OTHER SURGICAL HISTORY  01/05/2022    Cardioverter defibrillator insertion   [3]   Family History  Problem Relation Name Age of Onset    Hypertension Father      Colon cancer Father     [4]   Allergies  Allergen Reactions    Jardiance [Empagliflozin] Itching   [5] acetaminophen, 650 mg, oral, q6h  acyclovir, 400 mg, oral, BID  aspirin, 81 mg, oral, Daily  calcium carbonate, 1,250 mg of calcium carbonate, oral, BID  cholecalciferol, 50 mcg, oral, Daily  furosemide, 40 mg, oral, Daily  gabapentin, 300 mg, oral, Nightly  heparin (porcine), 5,000 Units, subcutaneous, q8h  hydrALAZINE, 50 mg, oral, TID  insulin lispro, 0-20 Units, subcutaneous, Before meals & nightly  levothyroxine, 125 mcg, oral, Daily  lidocaine, 1 patch, transdermal, Daily  mycophenolate, 1,000 mg, oral, q12h LUPILLO  pantoprazole, 40 mg, oral, Daily before breakfast  perflutren protein A microsphere, 0.5 mL, intravenous, Once in imaging  pravastatin, 40 mg, oral, Nightly  predniSONE, 25 mg, oral, q12h LUPILLO  sennosides-docusate sodium, 1 tablet, oral, BID  sodium zirconium cyclosilicate, 10 g, oral, Daily  sulfamethoxazole-trimethoprim, 1 tablet, oral, Daily  sulfur hexafluoride microsphr, 2  mL, intravenous, Once in imaging  tacrolimus, 2.5 mg, oral, q12h LUPILLO  tamsulosin, 0.4 mg, oral, Daily  vancomycin, 125 mg, oral, 4x daily  voriconazole, 200 mg, oral, q12h LUPILLO  [6] PRN medications: cyclobenzaprine, dextrose, dextrose **OR** [DISCONTINUED] glucagon, dextrose, glucagon, glucagon, guaiFENesin, melatonin, naloxone, ondansetron ODT **OR** ondansetron, oxyCODONE, oxyCODONE, oxygen, polyethylene glycol

## 2025-07-03 NOTE — PROGRESS NOTES
Physical Therapy    Physical Therapy Treatment    Patient Name: Anatoly Lane  MRN: [id number]  Department: [place] [id number]  Room: [address] [place] [address]  Today's Date: 7/3/2025  Time Calculation  Start Time: 1225  Stop Time: 1254  Time Calculation (min): 29 min    Assessment/Plan   PT Assessment  Barriers to Discharge Home: No anticipated barriers  End of Session Communication: Bedside nurse  Assessment Comment: Pt tolerated PT session well, completed step ups on 6 inch step and completed 5x STS in 19.22 seconds. Pt continues to remain appropriate for Low intensity PT upon D/C from hospital.  End of Session Patient Position: Bed, 3 rail up, Alarm off, not on at start of session  PT Plan  Inpatient/Swing Bed or Outpatient: Inpatient  PT Plan  Treatment/Interventions: Bed mobility, Transfer training, Gait training, Stair training, Balance training, Strengthening, Endurance training, Therapeutic exercise, Therapeutic activity  PT Plan: Ongoing PT  PT Frequency: Daily  PT Discharge Recommendations: Low intensity level of continued care  Equipment Recommended upon Discharge: Wheeled walker  PT Recommended Transfer Status: Contact guard, Assistive device  PT - OK to Discharge: Yes    PT Visit Info:  PT Received On: 07/03/25     General Visit Information:   General  Family/Caregiver Present: No  Prior to Session Communication: Bedside nurse  Patient Position Received: Bed, 3 rail up, Alarm off, not on at start of session  General Comment: Pt supine in bed, agreeable to work with PT.    Subjective   Precautions:  Precautions  Medical Precautions: Cardiac precautions, Fall precautions (Contact + (c-diff))  Post-Surgical Precautions: Move in the Tube  Precautions Comment: AAI @ 90, Reviewed Mitt precautions    Objective   Pain:  Pain Assessment  Pain Assessment: 0-10  0-10 (Numeric) Pain Score: 0 - No pain  Cognition:  Cognition  Overall Cognitive Status: Within Functional Limits  Orientation Level: Oriented X4    Activity Tolerance:  Activity  Tolerance  Endurance: Tolerates 30 min exercise with multiple rests  Treatments:  Therapeutic Activity  Therapeutic Activity Performed: Yes  Therapeutic Activity 1: Pt performed step ups on 6 inch step, x10 leading with LLE with CGA and x3 leading with RLE with Jose Cruz.  Therapeutic Activity 2: Pt performed 5x STS timed trial in 19.22 seconds with BUE support on armrests.    Bed Mobility  Bed Mobility: Yes  Bed Mobility 1  Bed Mobility 1: Supine to sitting  Level of Assistance 1: Contact guard  Bed Mobility 2  Bed Mobility  2: Sitting to supine  Level of Assistance 2: Minimum assistance  Bed Mobility Comments 2: Assist with LEs    Ambulation/Gait Training  Ambulation/Gait Training Performed: Yes  Ambulation/Gait Training 1  Surface 1: Level tile  Device 1: No device  Assistance 1: Contact guard, Close supervision, Minimal verbal cues  Quality of Gait 1: Decreased step length, Wide base of support (Decreased los, decreased endurance,)  Comments/Distance (ft) 1: x250ft, x3 standing rest breaks noted d/t SOB. Educated on activity pacing.    Transfers  Transfer: Yes  Transfer 1  Transfer From 1: Sit to  Transfer to 1: Stand  Technique 1: Sit to stand, Stand to sit  Transfer Level of Assistance 1: Close supervision  Trials/Comments 1: x7 trials,    Stairs  Stairs: No    Outcome Measures:  [facility] Basic Mobility  Turning from your back to your side while in a flat bed without using bedrails: A little  Moving from lying on your back to sitting on the side of a flat bed without using bedrails: A little  Moving to and from bed to chair (including a wheelchair): A little  Standing up from a chair using your arms (e.g. wheelchair or bedside chair): A little  To walk in hospital room: A little  Climbing 3-5 steps with railing: A little  Basic Mobility - Total Score: 18    Other Measures  5x Sit to Stand: 19.22 seconds    Education Documentation  Precautions, taught by Erika Jain PTA at 7/3/2025  3:00 PM.  Learner:  Patient  Readiness: Acceptance  Method: Explanation  Response: Verbalizes Understanding  Comment: activity pacing    Body Mechanics, taught by Erika Jain PTA at 7/3/2025  3:00 PM.  Learner: Patient  Readiness: Acceptance  Method: Explanation  Response: Verbalizes Understanding  Comment: activity pacing    Mobility Training, taught by Erika Jain PTA at 7/3/2025  3:00 PM.  Learner: Patient  Readiness: Acceptance  Method: Explanation  Response: Verbalizes Understanding  Comment: activity pacing    Education Comments  No comments found.      Encounter Problems       Encounter Problems (Active)       Balance       Pt will demonstrated ability to score at least 24/28 on the Tinetti balance assessment tool to ensure safety upon D/C.  (Progressing)       Start:  06/23/25    Expected End:  07/07/25               Mobility       Pt will demonstrated ability to ambulate >/=300ft with proper form and no balance deficits for safe home going.   (Not met)       Start:  06/23/25    Expected End:  07/07/25    Resolved:  06/27/25    Updated to: Pt will demonstrated ability to ambulate >/=750 ft with LRAD and supervision with proper form and no balance deficits for safe home going.    Update reason: progressing         Pt will demonstrate ability to ascend/descend at least 5 stairs with unilateral rail and no balance deficits for safe home going.  (Progressing)       Start:  06/23/25    Expected End:  07/07/25            Pt will demonstrated ability to ambulate >/=750 ft with LRAD and supervision with proper form and no balance deficits for safe home going. (Progressing)       Start:  06/27/25    Expected End:  07/07/25                   PT Transfers       Pt will demonstrate ability to complete 5X STS in < 12 sec with good from and consistency between transfers for safe D/C.  (Progressing)       Start:  06/23/25    Expected End:  07/07/25               PT Transfers       Patient to transfer to and from sit to supine  indep (Progressing)       Start:  06/25/25    Expected End:  07/07/25            Patient will transfer sit to and from stand with LRAD and modified indep (Progressing)       Start:  06/25/25    Expected End:  07/07/25 07/03/25 at 3:01 PM   Erika Jain PTA   Rehab Office: [phone]

## 2025-07-04 LAB
ALBUMIN SERPL BCP-MCNC: 3.1 G/DL (ref 3.4–5)
ANION GAP SERPL CALC-SCNC: 12 MMOL/L (ref 10–20)
BUN SERPL-MCNC: 32 MG/DL (ref 6–23)
CALCIUM SERPL-MCNC: 8.2 MG/DL (ref 8.6–10.6)
CHLORIDE SERPL-SCNC: 101 MMOL/L (ref 98–107)
CO2 SERPL-SCNC: 30 MMOL/L (ref 21–32)
CREAT SERPL-MCNC: 0.81 MG/DL (ref 0.5–1.3)
EGFRCR SERPLBLD CKD-EPI 2021: >90 ML/MIN/1.73M*2
ERYTHROCYTE [DISTWIDTH] IN BLOOD BY AUTOMATED COUNT: 21.1 % (ref 11.5–14.5)
GLUCOSE BLD MANUAL STRIP-MCNC: 166 MG/DL (ref 74–99)
GLUCOSE BLD MANUAL STRIP-MCNC: 177 MG/DL (ref 74–99)
GLUCOSE BLD MANUAL STRIP-MCNC: 180 MG/DL (ref 74–99)
GLUCOSE BLD MANUAL STRIP-MCNC: 234 MG/DL (ref 74–99)
GLUCOSE SERPL-MCNC: 204 MG/DL (ref 74–99)
HCT VFR BLD AUTO: 28.7 % (ref 41–52)
HGB BLD-MCNC: 9 G/DL (ref 13.5–17.5)
MAGNESIUM SERPL-MCNC: 1.99 MG/DL (ref 1.6–2.4)
MCH RBC QN AUTO: 31.5 PG (ref 26–34)
MCHC RBC AUTO-ENTMCNC: 31.4 G/DL (ref 32–36)
MCV RBC AUTO: 100 FL (ref 80–100)
NRBC BLD-RTO: 0.1 /100 WBCS (ref 0–0)
PHOSPHATE SERPL-MCNC: 2.9 MG/DL (ref 2.5–4.9)
PLATELET # BLD AUTO: 303 X10*3/UL (ref 150–450)
POTASSIUM SERPL-SCNC: 4.9 MMOL/L (ref 3.5–5.3)
RBC # BLD AUTO: 2.86 X10*6/UL (ref 4.5–5.9)
SODIUM SERPL-SCNC: 138 MMOL/L (ref 136–145)
TACROLIMUS BLD-MCNC: 10.1 NG/ML
WBC # BLD AUTO: 20 X10*3/UL (ref 4.4–11.3)

## 2025-07-04 PROCEDURE — 80069 RENAL FUNCTION PANEL: CPT

## 2025-07-04 PROCEDURE — 85027 COMPLETE CBC AUTOMATED: CPT

## 2025-07-04 PROCEDURE — 36415 COLL VENOUS BLD VENIPUNCTURE: CPT

## 2025-07-04 PROCEDURE — 2500000002 HC RX 250 W HCPCS SELF ADMINISTERED DRUGS (ALT 637 FOR MEDICARE OP, ALT 636 FOR OP/ED)

## 2025-07-04 PROCEDURE — 2500000001 HC RX 250 WO HCPCS SELF ADMINISTERED DRUGS (ALT 637 FOR MEDICARE OP)

## 2025-07-04 PROCEDURE — 2500000005 HC RX 250 GENERAL PHARMACY W/O HCPCS: Performed by: REGISTERED NURSE

## 2025-07-04 PROCEDURE — 99233 SBSQ HOSP IP/OBS HIGH 50: CPT | Performed by: STUDENT IN AN ORGANIZED HEALTH CARE EDUCATION/TRAINING PROGRAM

## 2025-07-04 PROCEDURE — 1200000002 HC GENERAL ROOM WITH TELEMETRY DAILY

## 2025-07-04 PROCEDURE — 99233 SBSQ HOSP IP/OBS HIGH 50: CPT | Performed by: REGISTERED NURSE

## 2025-07-04 PROCEDURE — 2500000001 HC RX 250 WO HCPCS SELF ADMINISTERED DRUGS (ALT 637 FOR MEDICARE OP): Performed by: STUDENT IN AN ORGANIZED HEALTH CARE EDUCATION/TRAINING PROGRAM

## 2025-07-04 PROCEDURE — 2500000004 HC RX 250 GENERAL PHARMACY W/ HCPCS (ALT 636 FOR OP/ED): Performed by: REGISTERED NURSE

## 2025-07-04 PROCEDURE — 80197 ASSAY OF TACROLIMUS: CPT

## 2025-07-04 PROCEDURE — 82947 ASSAY GLUCOSE BLOOD QUANT: CPT

## 2025-07-04 PROCEDURE — 2500000004 HC RX 250 GENERAL PHARMACY W/ HCPCS (ALT 636 FOR OP/ED)

## 2025-07-04 PROCEDURE — 2500000001 HC RX 250 WO HCPCS SELF ADMINISTERED DRUGS (ALT 637 FOR MEDICARE OP): Performed by: REGISTERED NURSE

## 2025-07-04 PROCEDURE — 2500000002 HC RX 250 W HCPCS SELF ADMINISTERED DRUGS (ALT 637 FOR MEDICARE OP, ALT 636 FOR OP/ED): Performed by: REGISTERED NURSE

## 2025-07-04 PROCEDURE — 83735 ASSAY OF MAGNESIUM: CPT

## 2025-07-04 PROCEDURE — 2500000004 HC RX 250 GENERAL PHARMACY W/ HCPCS (ALT 636 FOR OP/ED): Performed by: STUDENT IN AN ORGANIZED HEALTH CARE EDUCATION/TRAINING PROGRAM

## 2025-07-04 RX ORDER — INSULIN GLARGINE 100 [IU]/ML
16 INJECTION, SOLUTION SUBCUTANEOUS NIGHTLY
Status: DISCONTINUED | OUTPATIENT
Start: 2025-07-04 | End: 2025-07-07

## 2025-07-04 RX ADMIN — INSULIN LISPRO 8 UNITS: 100 INJECTION, SOLUTION INTRAVENOUS; SUBCUTANEOUS at 17:40

## 2025-07-04 RX ADMIN — ACETAMINOPHEN 650 MG: 325 TABLET ORAL at 17:40

## 2025-07-04 RX ADMIN — ACYCLOVIR 400 MG: 400 TABLET ORAL at 21:51

## 2025-07-04 RX ADMIN — HEPARIN SODIUM 5000 UNITS: 5000 INJECTION INTRAVENOUS; SUBCUTANEOUS at 09:44

## 2025-07-04 RX ADMIN — INSULIN GLARGINE 16 UNITS: 100 INJECTION, SOLUTION SUBCUTANEOUS at 21:52

## 2025-07-04 RX ADMIN — TAMSULOSIN HYDROCHLORIDE 0.4 MG: 0.4 CAPSULE ORAL at 09:45

## 2025-07-04 RX ADMIN — PANTOPRAZOLE SODIUM 40 MG: 40 TABLET, DELAYED RELEASE ORAL at 06:19

## 2025-07-04 RX ADMIN — HYDRALAZINE HYDROCHLORIDE 50 MG: 50 TABLET ORAL at 09:45

## 2025-07-04 RX ADMIN — HYDRALAZINE HYDROCHLORIDE 50 MG: 50 TABLET ORAL at 15:43

## 2025-07-04 RX ADMIN — LIDOCAINE 4% 1 PATCH: 40 PATCH TOPICAL at 09:45

## 2025-07-04 RX ADMIN — TACROLIMUS 2.5 MG: 0.5 CAPSULE ORAL at 17:39

## 2025-07-04 RX ADMIN — MYCOPHENOLATE MOFETIL 1000 MG: 250 CAPSULE ORAL at 17:39

## 2025-07-04 RX ADMIN — ACETAMINOPHEN 650 MG: 325 TABLET ORAL at 06:19

## 2025-07-04 RX ADMIN — LEVOTHYROXINE SODIUM 125 MCG: 0.05 TABLET ORAL at 06:17

## 2025-07-04 RX ADMIN — PREDNISONE 25 MG: 20 TABLET ORAL at 09:45

## 2025-07-04 RX ADMIN — SODIUM ZIRCONIUM CYCLOSILICATE 10 G: 10 POWDER, FOR SUSPENSION ORAL at 15:43

## 2025-07-04 RX ADMIN — HYDRALAZINE HYDROCHLORIDE 50 MG: 50 TABLET ORAL at 21:52

## 2025-07-04 RX ADMIN — ASPIRIN 81 MG: 81 TABLET, COATED ORAL at 09:48

## 2025-07-04 RX ADMIN — HEPARIN SODIUM 5000 UNITS: 5000 INJECTION INTRAVENOUS; SUBCUTANEOUS at 17:40

## 2025-07-04 RX ADMIN — MYCOPHENOLATE MOFETIL 1000 MG: 250 CAPSULE ORAL at 06:20

## 2025-07-04 RX ADMIN — SULFAMETHOXAZOLE AND TRIMETHOPRIM 1 TABLET: 400; 80 TABLET ORAL at 09:45

## 2025-07-04 RX ADMIN — HEPARIN SODIUM 5000 UNITS: 5000 INJECTION INTRAVENOUS; SUBCUTANEOUS at 23:19

## 2025-07-04 RX ADMIN — CHOLECALCIFEROL TAB 25 MCG (1000 UNIT) 50 MCG: 25 TAB at 09:45

## 2025-07-04 RX ADMIN — VANCOMYCIN HYDROCHLORIDE 125 MG: 125 CAPSULE ORAL at 15:13

## 2025-07-04 RX ADMIN — ACYCLOVIR 400 MG: 400 TABLET ORAL at 09:45

## 2025-07-04 RX ADMIN — VORICONAZOLE 200 MG: 200 TABLET, COATED ORAL at 21:51

## 2025-07-04 RX ADMIN — SENNOSIDES AND DOCUSATE SODIUM 1 TABLET: 50; 8.6 TABLET ORAL at 09:45

## 2025-07-04 RX ADMIN — CALCIUM 1 TABLET: 500 TABLET ORAL at 21:52

## 2025-07-04 RX ADMIN — HEPARIN SODIUM 5000 UNITS: 5000 INJECTION INTRAVENOUS; SUBCUTANEOUS at 00:39

## 2025-07-04 RX ADMIN — VORICONAZOLE 200 MG: 200 TABLET, COATED ORAL at 09:45

## 2025-07-04 RX ADMIN — FUROSEMIDE 40 MG: 40 TABLET ORAL at 09:45

## 2025-07-04 RX ADMIN — CALCIUM 1 TABLET: 500 TABLET ORAL at 09:45

## 2025-07-04 RX ADMIN — ACETAMINOPHEN 650 MG: 325 TABLET ORAL at 23:18

## 2025-07-04 RX ADMIN — PRAVASTATIN SODIUM 40 MG: 40 TABLET ORAL at 21:51

## 2025-07-04 RX ADMIN — INSULIN LISPRO 4 UNITS: 100 INJECTION, SOLUTION INTRAVENOUS; SUBCUTANEOUS at 09:49

## 2025-07-04 RX ADMIN — VANCOMYCIN HYDROCHLORIDE 125 MG: 125 CAPSULE ORAL at 17:39

## 2025-07-04 RX ADMIN — GABAPENTIN 300 MG: 300 CAPSULE ORAL at 21:52

## 2025-07-04 RX ADMIN — OXYCODONE HYDROCHLORIDE 10 MG: 10 TABLET ORAL at 21:50

## 2025-07-04 RX ADMIN — PREDNISONE 25 MG: 20 TABLET ORAL at 21:51

## 2025-07-04 RX ADMIN — INSULIN LISPRO 4 UNITS: 100 INJECTION, SOLUTION INTRAVENOUS; SUBCUTANEOUS at 21:52

## 2025-07-04 RX ADMIN — TACROLIMUS 2.5 MG: 0.5 CAPSULE ORAL at 06:18

## 2025-07-04 RX ADMIN — VANCOMYCIN HYDROCHLORIDE 125 MG: 125 CAPSULE ORAL at 06:17

## 2025-07-04 RX ADMIN — VANCOMYCIN HYDROCHLORIDE 125 MG: 125 CAPSULE ORAL at 21:52

## 2025-07-04 ASSESSMENT — COGNITIVE AND FUNCTIONAL STATUS - GENERAL
DRESSING REGULAR UPPER BODY CLOTHING: A LOT
MOVING FROM LYING ON BACK TO SITTING ON SIDE OF FLAT BED WITH BEDRAILS: A LITTLE
CLIMB 3 TO 5 STEPS WITH RAILING: A LOT
STANDING UP FROM CHAIR USING ARMS: A LITTLE
TOILETING: A LITTLE
STANDING UP FROM CHAIR USING ARMS: A LITTLE
CLIMB 3 TO 5 STEPS WITH RAILING: A LOT
MOVING TO AND FROM BED TO CHAIR: A LITTLE
DRESSING REGULAR UPPER BODY CLOTHING: A LOT
HELP NEEDED FOR BATHING: A LITTLE
TOILETING: A LITTLE
DAILY ACTIVITIY SCORE: 19
HELP NEEDED FOR BATHING: A LITTLE
DAILY ACTIVITIY SCORE: 19
DRESSING REGULAR LOWER BODY CLOTHING: A LITTLE
DRESSING REGULAR LOWER BODY CLOTHING: A LITTLE
MOBILITY SCORE: 17
TURNING FROM BACK TO SIDE WHILE IN FLAT BAD: A LITTLE
MOVING TO AND FROM BED TO CHAIR: A LITTLE
WALKING IN HOSPITAL ROOM: A LITTLE
MOBILITY SCORE: 17
WALKING IN HOSPITAL ROOM: A LITTLE
TURNING FROM BACK TO SIDE WHILE IN FLAT BAD: A LITTLE
MOVING FROM LYING ON BACK TO SITTING ON SIDE OF FLAT BED WITH BEDRAILS: A LITTLE

## 2025-07-04 ASSESSMENT — PAIN SCALES - GENERAL
PAINLEVEL_OUTOF10: 0 - NO PAIN
PAINLEVEL_OUTOF10: 8
PAINLEVEL_OUTOF10: 2
PAINLEVEL_OUTOF10: 0 - NO PAIN

## 2025-07-04 ASSESSMENT — PAIN - FUNCTIONAL ASSESSMENT
PAIN_FUNCTIONAL_ASSESSMENT: 0-10

## 2025-07-04 NOTE — PROGRESS NOTES
[facility]  Advanced HF PROGRESS NOTE    Anatoly Lane/[id number]    Admit Date: 6/20/2025  Hospital Length of Stay: 14   ICU Length of Stay: 4d   Primary Service: HFICU  Primary HF Cardiologist: Carlton     INTERVAL EVENTS / PERTINENT ROS:   NSR 70-80s on tele   -140  Edema improving on exam, lung sounds improving   PM&R recommending SNF, PT recommending low intensity. TCC to send out SNF referrals.     Plan:  - C/w Lasix 40mg PO daily, can decrease as needed   - Insulin increased at [facility] recs     MEDICATIONS  Infusions:       Scheduled:  acetaminophen, 650 mg, q6h  acyclovir, 400 mg, BID  aspirin, 81 mg, Daily  calcium carbonate, 1,250 mg of calcium carbonate, BID  cholecalciferol, 50 mcg, Daily  furosemide, 40 mg, Daily  gabapentin, 300 mg, Nightly  heparin (porcine), 5,000 Units, q8h  hydrALAZINE, 50 mg, TID  insulin glargine, 16 Units, Nightly  insulin lispro, 0-20 Units, Before meals & nightly  levothyroxine, 125 mcg, Daily  lidocaine, 1 patch, Daily  mycophenolate, 1,000 mg, q12h LUPILLO  pantoprazole, 40 mg, Daily before breakfast  perflutren protein A microsphere, 0.5 mL, Once in imaging  pravastatin, 40 mg, Nightly  predniSONE, 25 mg, q12h LPUILLO  sennosides-docusate sodium, 1 tablet, BID  sodium zirconium cyclosilicate, 10 g, Daily  sulfamethoxazole-trimethoprim, 1 tablet, Daily  sulfur hexafluoride microsphr, 2 mL, Once in imaging  tacrolimus, 2.5 mg, q12h LUPILLO  tamsulosin, 0.4 mg, Daily  vancomycin, 125 mg, 4x daily  voriconazole, 200 mg, q12h LUPILLO      PRN:  cyclobenzaprine, 5 mg, TID PRN  dextrose, 12.5 g, q15 min PRN  dextrose, 25 g, q15 min PRN  dextrose, 25 g, q15 min PRN  glucagon, 1 mg, q15 min PRN  glucagon, 1 mg, q15 min PRN  guaiFENesin, 600 mg, BID PRN  melatonin, 5 mg, Nightly PRN  naloxone, 0.2 mg, q5 min PRN  ondansetron ODT, 4 mg, q8h PRN   Or  ondansetron, 4 mg, q8h PRN  oxyCODONE, 10 mg, q4h PRN  oxyCODONE, 5 mg, q4h PRN  oxygen, , Continuous PRN -  "O2/gases  polyethylene glycol, 17 g, Daily PRN      PHYSICAL EXAM:   Visit Vitals  /74 (BP Location: Right arm, Patient Position: Lying)   Pulse 81   Temp 36.7 °C (98.1 °F) (Temporal)   Resp 18   Ht 1.753 m (5' 9\")   Wt 102 kg (224 lb)   SpO2 95%   BMI 33.08 kg/m²   Smoking Status Former   BSA 2.23 m²       Wt Readings from Last 5 Encounters:   07/02/25 102 kg (224 lb)   02/27/25 102 kg (224 lb)   01/28/25 104 kg (229 lb)   11/19/24 112 kg (246 lb 12.8 oz)   07/15/24 110 kg (243 lb 9.6 oz)       INTAKE/OUTPUT:  I/O last 3 completed shifts:  In: 360 (3.7 mL/kg) [P.O.:360]  Out: 3400 (35.3 mL/kg) [Urine:3400 (1 mL/kg/hr)]  Dosing Weight: 96.3 kg      Physical Exam  HENT:      Head: Normocephalic and atraumatic.   Cardiovascular:      Rate and Rhythm: Normal rate.      Heart sounds: No murmur heard.     No friction rub. No gallop.   Pulmonary:      Effort: Pulmonary effort is normal. No respiratory distress.      Breath sounds: Rhonchi and rales present. No wheezing.      Comments: Stenotomy incision appears C/D/I  Abdominal:      General: Abdomen is flat. There is no distension.      Palpations: Abdomen is soft. There is no mass.      Tenderness: There is no abdominal tenderness. There is no guarding.   Musculoskeletal:      Right lower leg: No edema.      Left lower leg: No edema.   Skin:     General: Skin is warm.      Findings: No rash.   Neurological:      General: No focal deficit present.      Mental Status: He is alert and oriented to person, place, and time.   Psychiatric:         Mood and Affect: Mood normal.         Behavior: Behavior normal.         DATA:  CMP:  Results from last 7 days   Lab Units 07/04/25  0608 07/03/25  0630 07/02/25  0713 07/01/25  0607 06/30/25  0536 06/29/25  0631 06/28/25  0606   SODIUM mmol/L 138 138 136 137 137 137 139   POTASSIUM mmol/L 4.9 4.5 4.7 5.4* 5.1 5.1 4.1   CHLORIDE mmol/L 101 100 98 100 101 101 97*   CO2 mmol/L 30 31 30 32 29 25 37*   ANION GAP mmol/L 12 12 13 10 " "12 16 9*   BUN mg/dL 32* 33* 36* 30* 35* 38* 41*   CREATININE mg/dL 0.81 0.87 0.84 0.81 0.82 0.95 0.94   EGFR mL/min/1.73m*2 >90 >90 >90 >90 >90 89 >90   MAGNESIUM mg/dL 1.99 1.91 2.10 2.26 2.35 2.47* 2.37   ALBUMIN g/dL 3.1* 3.3* 3.3* 3.9 3.3* 3.4 3.7     CBC:  Results from last 7 days   Lab Units 07/04/25  0608 07/03/25  0630 07/02/25  0713 07/01/25  0607 06/30/25  0536 06/29/25  0631 06/28/25  0606   WBC AUTO x10*3/uL 20.0* 22.6* 21.9* 24.9* 17.4* 19.6* 20.7*   HEMOGLOBIN g/dL 9.0* 9.2* 9.5* 9.6* 8.5* 8.6* 8.5*   HEMATOCRIT % 28.7* 29.2* 30.0* 30.7* 26.8* 27.2* 26.4*   PLATELETS AUTO x10*3/uL 303 344 348 315 243 228 193   MCV fL 100 100 100 99 99 98 97     COAG:         ABO:   No results found for: \"ABO\"    HEME/ENDO:         CARDIAC:       No lab exists for component: \"CK\", \"CKMBP\"    ASSESSMENT AND PLAN:   Anatoly Lane is a 64 year old male with relevant PMH of ex smoker, NICM, stage D HFrEF NYHA 2-3, S/p HM2 (4/30/23), VT s/p ICD, CAD, pAF, HTN, BPH, GERD and hypothyroidism.  S/p LVAD explant, AICD removal & OHT by Dr. Lorenzo on 6/21. Now transferred from CTICU to HF ICU 6/24. Patient arrived to HFICU HDS stable, on milrinone drip 0.125 mcg/kg/min. Aiming to wean milrinone and optimize volume while in the HFICU.    Neuro:  Anxiety. Depression, Acute pain   BUE/BLE mild tremor  Acute back pain   - Serial neuro and pain assessments   - PO Tylenol PRN for pain  - PT/OT Consult  - Sleep/wake cycle normalization  - C/w PRN Flexeril and Lidocaine patch     L hand extensor palsy and 4th+5th digital numbness  - On 6/27, patient c/o inability to extend digits 2-5 around the MCP joints. In addition, he is complaining of numbness from the medial aspect of the wrist to the 4th and 5th digits and part of the palm.  - Deficits do not align completely with a specific nerve injury- would not expect numbness in digits 4-5 with a radial nerve palsy and would not expect difficult with finger extension with an ulnar nerve " palsy.  - Ddx includes nerve compression from forearm edema vs brachial plexus injury.  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  Now s/p LVAD explant, AICD removal, & OHT on 6/21  - TTE (6/22): Normal BiV   Donor/Recipient Infectious history:   CMV: -/-   EBV: +/+   Toxo: -/-   Heb B: -/-  Hep C: -/-  HIV: -/-      Rejection/Prophylaxis:    - Induction: s/p methylprednisolone 500mg IV x2, S/p Basiliximab 20mg IV within 1 hour of arrival to CTICU (no need for premedication), s/p 2nd dose: 20mg IVPB on POD4 (06/25)   - Steroids: Prednisone 25mg PO BID (scheduled w/o end date, will need to be manually reduced if next biopsy negative)  - Tacrolimus: Last tacrolimus trough: 10.1 7/4/2025:  6:08 AM 2.5 mg BID (dose adjusted 06/29), will continue same dose today.    - Tacrolimus goal troughs: 10-12  - Cellcept: C/w 1,000mg BID  - Antifungals: c/w voriconazole 200mg BID x3 months end date 09/22/2025   - Antivirals: c/w acylovir 400mg BID (06/22 x3 months. End date 09/22/2025   - Anti PCP & Toxoplasmosis: SS Bactrim daily (end date of 12/22/25)      Next RHC/biopsy: 7/1: negative for AMR/ACR. Next biopsy scheduled 7/8 w/ Dr. Ruano.   Last TTE/BOBBY on 6/23: ; normal BiV function (poor windows on 7/1 TTE)   Last HLA: First due on 7/24  Last Allomap: ~ will assess starting at 6-month ana post-op    Last Allosure: ~ will assess starting at 6-month ana post-op   Last [facility]: 03/2023(pre OHT); First due ~ (one year post tx)   Osteopenia/osteoporosis prophylaxis: C/w Vit D 2000U daily and calcium 500 mg BID (give  calcium 2 hrs after MMF)   Peptic/gastric ulcer prophylaxis: Pantoprazole 40mg daily    CAV Prophylaxis:  C/w  aspirin and  pravastatin 40 mg QHS  Pacing: Pacer back up rate set to 60 BPM. Native conduction is NSR at 75 BPM, pacing wires can be cut.     Subcutaneous emphysema and pneumomediastinum  - Dr. Birch aware, continue to monitor     HTN  - C/w hydralazine 25mg TID and increase as needed      Pulmonary:   Productive cough  Ex-smoker  - Noticed to have mod-severe productive cough with clear expectorant, pt says he has chronic bronchitis from years of smoking  - Auscultation with rough crackles, rhonchi  - CXR 06/29 small stable L pleural effusion, no fevers, WBC elevated but likely from steroids  - CT chest appears to be volume overload, will continue diuresis. WBC downtrending. Obtain follow up CXR in a few days.   - Monitor and maintain SpO2 > 92%     GI:  C- Diff infection, diarrhea (improved)  - Diagnosed 06/25, on PO vanc x 10 days (07/05)  - Diarrhea already better, leucocytosis improving   - Bowel regimen PRN   - PPI     :  H/x BPH  -Baseline Cr: 0.8   -Admit BUN/Cr: Normal   -I/Os  -avoid hypotension and nephrotoxic agents  - C/w home tamsulosin 0.4 mg daily      Heme:  Acute blood loss anemia stable, no active s/s of bleeding  - Trending H/h   - Iron studies: %saturation 21%, ferritin 130. No need for IV iron.      Endo:  Steroid and stress induced hyperglycemia  - Maintain BG <180  - continue SSI to #4   - hgbA1c  was 4.1  - Endo consulted:   16u Lantus daily   C/w SSI #4   Switch shakes to glucerna   Ensure carb controlled diet   Pt will need diabetic education prior to dc     H/o of hypothyroid  -TSH (3/2025): 1.83  - Maintain BG <180      ID:  C Diff infection   Reactive leukocytosis  - Completing PO vanc till 07/05   - completed periop vanc/zosyn x48hrs with hx of LVAD  - trend temps q4h     PHYSICAL AND OCCUPATIONAL THERAPY: PT/OT    DVT: Heparin SQ  ULCER PPX: PPI  GLYCEMIC CONTROL:  SSI  BOWEL CARE: Senna, mirlax  NUTRITION: Adult diet Consistent Carb; CCD 75 gm/meal      EMERGENCY CONTACT: Extended Emergency Contact Information  Primary Emergency Contact: Janice Ramos  Address: [address] [place] [address] [facility]  Home Phone: [phone]  Mobile Phone: [phone]  Relation: Mother  Secondary Emergency Contact: Bird Lane  Address: [address]           [place] [address] [facility]  Home Phone: [phone]  Mobile Phone: [phone]  Relation: Son  Preferred language: English   needed? No  FAMILY UPDATE: At bedside  CODE STATUS: Full Code  DISPO:  PT recommending low intensity and states pt is progressing and ambulation is improving. PM&R consulted and stating he can go to SNF if he medically needs this, otherwise he is safe to be dc'd with home health. He can possibly be discharged after his biopsy next week.     Patient seen and assessed with Dr. Marcus Pabon.

## 2025-07-04 NOTE — PROGRESS NOTES
07/04/25 1414   Discharge Planning   Living Arrangements Alone   Expected Discharge Disposition SNF   Does the patient need discharge transport arranged? Yes   Patient Choice   Provider Choice list and CMS website (https://medicare.gov/care-compare#search) for post-acute Quality and Resource Measure Data were provided and reviewed with: Patient     Met with patient, list provided near his home in [place] for SNF, he will review and TCC Team can follow up on weekend. Will require precert.

## 2025-07-04 NOTE — PROGRESS NOTES
"Anatoly Lane is a 64 y.o. male on day 14 of admission presenting with S/P orthotopic heart transplant.    Subjective   Patient. He received Glargine 12 units yesterday and the recommended dose was 10 units. All his insulin administrations have been 2 hours after the POC checks.     I have reviewed histories, allergies and medications have been reviewed and there are no changes       Objective   Review of Systems  Physical Exam    General - elderly male in NAD  HEENT - At/NC  Resp - no extra wob noted  Abdomen - soft and non tender  Ext - no edema    Last Recorded Vitals  Blood pressure 144/76, pulse 81, temperature 36.4 °C (97.5 °F), temperature source Temporal, resp. rate 18, height 1.753 m (5' 9\"), weight 102 kg (224 lb), SpO2 94%.  Intake/Output last 3 Shifts:  I/O last 3 completed shifts:  In: 360 (3.7 mL/kg) [P.O.:360]  Out: 3400 (35.3 mL/kg) [Urine:3400 (1 mL/kg/hr)]  Dosing Weight: 96.3 kg     Relevant Results  Results from last 7 days   Lab Units 07/04/25  0743 07/04/25  0608 07/03/25  2113 07/03/25  1618 07/03/25  1209 07/03/25  0747 07/03/25  0630 07/02/25  0839 07/02/25  0713 07/01/25  0859 07/01/25  0607 06/30/25  0846 06/30/25  0536   POCT GLUCOSE mg/dL 180*  --  204* 200* 172* 162*  --    < >  --    < >  --    < >  --    GLUCOSE mg/dL  --  204*  --   --   --   --  180*  --  207*  --  162*  --  131*    < > = values in this interval not displayed.     Lab Results   Component Value Date    CREATININE 0.81 07/04/2025    BUN 32 (H) 07/04/2025     07/04/2025    K 4.9 07/04/2025     07/04/2025    CO2 30 07/04/2025       Assessment & Plan  Stress hyperglycemia    The patient is a 64 year old male with a past medical history significant of but not limited to Stage D HFrEF, CAD, PAF, VT, hypothyroidism who is s/p heart transplant on 06/21/2025. We have been asked to see the patient for stress and GC induced hyperglycemia in the setting of pre-existing prediabetes.    GC regimen:  Prednisone 35 mg Q " 12 hours from 06/23 - 07/01 as the biopsy was negative  Prednisone 25 Q 12 hours from 07/01-present    - Glargine 16 units daily  - lispro with SS with meals  - Accuchecks ACHS  - BG goal 120-140  - Hypoglycemia protocol in place  - CCD diet 70 g  - Please switch his ensure shakes to glucerna for optimal carb intake  - We will continue to follow    The patient was discussed with Dr. Hopson.    Marvin Edwards MD  PGY-5 Endocrinology Fellow

## 2025-07-05 LAB
ALBUMIN SERPL BCP-MCNC: 3.3 G/DL (ref 3.4–5)
ANION GAP SERPL CALC-SCNC: 12 MMOL/L (ref 10–20)
BUN SERPL-MCNC: 34 MG/DL (ref 6–23)
CALCIUM SERPL-MCNC: 8.5 MG/DL (ref 8.6–10.6)
CHLORIDE SERPL-SCNC: 101 MMOL/L (ref 98–107)
CO2 SERPL-SCNC: 28 MMOL/L (ref 21–32)
CREAT SERPL-MCNC: 0.86 MG/DL (ref 0.5–1.3)
EGFRCR SERPLBLD CKD-EPI 2021: >90 ML/MIN/1.73M*2
ERYTHROCYTE [DISTWIDTH] IN BLOOD BY AUTOMATED COUNT: 21 % (ref 11.5–14.5)
GLUCOSE BLD MANUAL STRIP-MCNC: 123 MG/DL (ref 74–99)
GLUCOSE BLD MANUAL STRIP-MCNC: 153 MG/DL (ref 74–99)
GLUCOSE BLD MANUAL STRIP-MCNC: 177 MG/DL (ref 74–99)
GLUCOSE BLD MANUAL STRIP-MCNC: 256 MG/DL (ref 74–99)
GLUCOSE SERPL-MCNC: 143 MG/DL (ref 74–99)
HCT VFR BLD AUTO: 29.9 % (ref 41–52)
HGB BLD-MCNC: 9.4 G/DL (ref 13.5–17.5)
MAGNESIUM SERPL-MCNC: 1.95 MG/DL (ref 1.6–2.4)
MCH RBC QN AUTO: 31.8 PG (ref 26–34)
MCHC RBC AUTO-ENTMCNC: 31.4 G/DL (ref 32–36)
MCV RBC AUTO: 101 FL (ref 80–100)
NRBC BLD-RTO: 0 /100 WBCS (ref 0–0)
PHOSPHATE SERPL-MCNC: 3.8 MG/DL (ref 2.5–4.9)
PLATELET # BLD AUTO: 339 X10*3/UL (ref 150–450)
POTASSIUM SERPL-SCNC: 5 MMOL/L (ref 3.5–5.3)
RBC # BLD AUTO: 2.96 X10*6/UL (ref 4.5–5.9)
SODIUM SERPL-SCNC: 136 MMOL/L (ref 136–145)
TACROLIMUS BLD-MCNC: 10.6 NG/ML
WBC # BLD AUTO: 20.4 X10*3/UL (ref 4.4–11.3)

## 2025-07-05 PROCEDURE — 2500000002 HC RX 250 W HCPCS SELF ADMINISTERED DRUGS (ALT 637 FOR MEDICARE OP, ALT 636 FOR OP/ED)

## 2025-07-05 PROCEDURE — 2500000004 HC RX 250 GENERAL PHARMACY W/ HCPCS (ALT 636 FOR OP/ED): Performed by: STUDENT IN AN ORGANIZED HEALTH CARE EDUCATION/TRAINING PROGRAM

## 2025-07-05 PROCEDURE — 2500000004 HC RX 250 GENERAL PHARMACY W/ HCPCS (ALT 636 FOR OP/ED)

## 2025-07-05 PROCEDURE — 2500000001 HC RX 250 WO HCPCS SELF ADMINISTERED DRUGS (ALT 637 FOR MEDICARE OP): Performed by: STUDENT IN AN ORGANIZED HEALTH CARE EDUCATION/TRAINING PROGRAM

## 2025-07-05 PROCEDURE — 99233 SBSQ HOSP IP/OBS HIGH 50: CPT | Performed by: INTERNAL MEDICINE

## 2025-07-05 PROCEDURE — 2500000004 HC RX 250 GENERAL PHARMACY W/ HCPCS (ALT 636 FOR OP/ED): Performed by: REGISTERED NURSE

## 2025-07-05 PROCEDURE — 80069 RENAL FUNCTION PANEL: CPT

## 2025-07-05 PROCEDURE — 80197 ASSAY OF TACROLIMUS: CPT

## 2025-07-05 PROCEDURE — 2500000001 HC RX 250 WO HCPCS SELF ADMINISTERED DRUGS (ALT 637 FOR MEDICARE OP): Performed by: REGISTERED NURSE

## 2025-07-05 PROCEDURE — 2500000001 HC RX 250 WO HCPCS SELF ADMINISTERED DRUGS (ALT 637 FOR MEDICARE OP)

## 2025-07-05 PROCEDURE — 85027 COMPLETE CBC AUTOMATED: CPT

## 2025-07-05 PROCEDURE — 2500000002 HC RX 250 W HCPCS SELF ADMINISTERED DRUGS (ALT 637 FOR MEDICARE OP, ALT 636 FOR OP/ED): Performed by: REGISTERED NURSE

## 2025-07-05 PROCEDURE — 83735 ASSAY OF MAGNESIUM: CPT

## 2025-07-05 PROCEDURE — 36415 COLL VENOUS BLD VENIPUNCTURE: CPT

## 2025-07-05 PROCEDURE — 1100000001 HC PRIVATE ROOM DAILY

## 2025-07-05 PROCEDURE — 82947 ASSAY GLUCOSE BLOOD QUANT: CPT

## 2025-07-05 PROCEDURE — 99232 SBSQ HOSP IP/OBS MODERATE 35: CPT | Performed by: STUDENT IN AN ORGANIZED HEALTH CARE EDUCATION/TRAINING PROGRAM

## 2025-07-05 RX ADMIN — TAMSULOSIN HYDROCHLORIDE 0.4 MG: 0.4 CAPSULE ORAL at 09:45

## 2025-07-05 RX ADMIN — SULFAMETHOXAZOLE AND TRIMETHOPRIM 1 TABLET: 400; 80 TABLET ORAL at 09:46

## 2025-07-05 RX ADMIN — CALCIUM 1 TABLET: 500 TABLET ORAL at 09:47

## 2025-07-05 RX ADMIN — HEPARIN SODIUM 5000 UNITS: 5000 INJECTION INTRAVENOUS; SUBCUTANEOUS at 09:48

## 2025-07-05 RX ADMIN — INSULIN LISPRO 12 UNITS: 100 INJECTION, SOLUTION INTRAVENOUS; SUBCUTANEOUS at 21:31

## 2025-07-05 RX ADMIN — ACYCLOVIR 400 MG: 400 TABLET ORAL at 21:33

## 2025-07-05 RX ADMIN — VANCOMYCIN HYDROCHLORIDE 125 MG: 125 CAPSULE ORAL at 12:57

## 2025-07-05 RX ADMIN — ASPIRIN 81 MG: 81 TABLET, COATED ORAL at 09:45

## 2025-07-05 RX ADMIN — INSULIN LISPRO 4 UNITS: 100 INJECTION, SOLUTION INTRAVENOUS; SUBCUTANEOUS at 12:56

## 2025-07-05 RX ADMIN — PREDNISONE 25 MG: 20 TABLET ORAL at 09:45

## 2025-07-05 RX ADMIN — ACETAMINOPHEN 650 MG: 325 TABLET ORAL at 06:32

## 2025-07-05 RX ADMIN — TACROLIMUS 2.5 MG: 0.5 CAPSULE ORAL at 18:11

## 2025-07-05 RX ADMIN — ACETAMINOPHEN 650 MG: 325 TABLET ORAL at 23:08

## 2025-07-05 RX ADMIN — LEVOTHYROXINE SODIUM 125 MCG: 0.05 TABLET ORAL at 06:32

## 2025-07-05 RX ADMIN — PRAVASTATIN SODIUM 40 MG: 40 TABLET ORAL at 21:34

## 2025-07-05 RX ADMIN — VANCOMYCIN HYDROCHLORIDE 125 MG: 125 CAPSULE ORAL at 16:56

## 2025-07-05 RX ADMIN — MYCOPHENOLATE MOFETIL 1000 MG: 250 CAPSULE ORAL at 18:11

## 2025-07-05 RX ADMIN — TACROLIMUS 2.5 MG: 0.5 CAPSULE ORAL at 06:32

## 2025-07-05 RX ADMIN — PANTOPRAZOLE SODIUM 40 MG: 40 TABLET, DELAYED RELEASE ORAL at 06:32

## 2025-07-05 RX ADMIN — INSULIN GLARGINE 16 UNITS: 100 INJECTION, SOLUTION SUBCUTANEOUS at 21:31

## 2025-07-05 RX ADMIN — ACYCLOVIR 400 MG: 400 TABLET ORAL at 09:46

## 2025-07-05 RX ADMIN — VORICONAZOLE 200 MG: 200 TABLET, COATED ORAL at 21:35

## 2025-07-05 RX ADMIN — CHOLECALCIFEROL TAB 25 MCG (1000 UNIT) 50 MCG: 25 TAB at 09:45

## 2025-07-05 RX ADMIN — VANCOMYCIN HYDROCHLORIDE 125 MG: 125 CAPSULE ORAL at 06:32

## 2025-07-05 RX ADMIN — ACETAMINOPHEN 650 MG: 325 TABLET ORAL at 12:57

## 2025-07-05 RX ADMIN — GABAPENTIN 300 MG: 300 CAPSULE ORAL at 21:32

## 2025-07-05 RX ADMIN — VORICONAZOLE 200 MG: 200 TABLET, COATED ORAL at 09:46

## 2025-07-05 RX ADMIN — CALCIUM 1 TABLET: 500 TABLET ORAL at 21:33

## 2025-07-05 RX ADMIN — HYDRALAZINE HYDROCHLORIDE 50 MG: 50 TABLET ORAL at 09:46

## 2025-07-05 RX ADMIN — PREDNISONE 25 MG: 20 TABLET ORAL at 21:32

## 2025-07-05 RX ADMIN — HEPARIN SODIUM 5000 UNITS: 5000 INJECTION INTRAVENOUS; SUBCUTANEOUS at 16:57

## 2025-07-05 RX ADMIN — HYDRALAZINE HYDROCHLORIDE 50 MG: 50 TABLET ORAL at 21:32

## 2025-07-05 RX ADMIN — ACETAMINOPHEN 650 MG: 325 TABLET ORAL at 18:11

## 2025-07-05 RX ADMIN — HEPARIN SODIUM 5000 UNITS: 5000 INJECTION INTRAVENOUS; SUBCUTANEOUS at 23:08

## 2025-07-05 RX ADMIN — HYDRALAZINE HYDROCHLORIDE 50 MG: 50 TABLET ORAL at 16:56

## 2025-07-05 RX ADMIN — MYCOPHENOLATE MOFETIL 1000 MG: 250 CAPSULE ORAL at 06:32

## 2025-07-05 RX ADMIN — FUROSEMIDE 40 MG: 40 TABLET ORAL at 09:46

## 2025-07-05 ASSESSMENT — COGNITIVE AND FUNCTIONAL STATUS - GENERAL
TURNING FROM BACK TO SIDE WHILE IN FLAT BAD: A LITTLE
CLIMB 3 TO 5 STEPS WITH RAILING: A LOT
MOBILITY SCORE: 17
DAILY ACTIVITIY SCORE: 19
MOVING FROM LYING ON BACK TO SITTING ON SIDE OF FLAT BED WITH BEDRAILS: A LITTLE
WALKING IN HOSPITAL ROOM: A LITTLE
DRESSING REGULAR LOWER BODY CLOTHING: A LITTLE
MOVING FROM LYING ON BACK TO SITTING ON SIDE OF FLAT BED WITH BEDRAILS: A LITTLE
TOILETING: A LITTLE
TURNING FROM BACK TO SIDE WHILE IN FLAT BAD: A LITTLE
TOILETING: A LITTLE
WALKING IN HOSPITAL ROOM: A LITTLE
DRESSING REGULAR UPPER BODY CLOTHING: A LOT
MOVING TO AND FROM BED TO CHAIR: A LITTLE
STANDING UP FROM CHAIR USING ARMS: A LITTLE
DRESSING REGULAR LOWER BODY CLOTHING: A LITTLE
STANDING UP FROM CHAIR USING ARMS: A LITTLE
DAILY ACTIVITIY SCORE: 19
MOBILITY SCORE: 17
HELP NEEDED FOR BATHING: A LITTLE
CLIMB 3 TO 5 STEPS WITH RAILING: A LOT
MOVING TO AND FROM BED TO CHAIR: A LITTLE
HELP NEEDED FOR BATHING: A LITTLE
DRESSING REGULAR UPPER BODY CLOTHING: A LOT

## 2025-07-05 ASSESSMENT — PAIN SCALES - GENERAL
PAINLEVEL_OUTOF10: 0 - NO PAIN
PAINLEVEL_OUTOF10: 0 - NO PAIN

## 2025-07-05 ASSESSMENT — PAIN - FUNCTIONAL ASSESSMENT
PAIN_FUNCTIONAL_ASSESSMENT: 0-10
PAIN_FUNCTIONAL_ASSESSMENT: 0-10

## 2025-07-05 NOTE — PROGRESS NOTES
07/05/25 1335   Discharge Planning   Living Arrangements Alone   Support Systems Family members   Type of Residence Private residence   Home or Post Acute Services In home services   Type of Home Care Services Home nursing visits;Home PT   Expected Discharge Disposition Home H   Does the patient need discharge transport arranged? No     Patient s/p a Heart Transplant.  Discharge pending volume status, endo recommendation and scheduled biopsy the week of 7/7.  Patient will discharge home vs skilled placement.

## 2025-07-05 NOTE — PROGRESS NOTES
"Anatoly Lane is a 64 y.o. male on day 15 of admission presenting with S/P orthotopic heart transplant.    Subjective   Patient's accuchecks and insulin administration are inconsistent. He has been getting insulin hours after the accucheck without re-confirming whether the insulin is needed or no.     I have reviewed histories, allergies and medications have been reviewed and there are no changes       Objective   Review of Systems  Physical Exam    General - elderly male in NAD  HEENT - At/NC  Resp - no extra wob noted  Abdomen - soft and non tender  Ext - no edema    Last Recorded Vitals  Blood pressure 124/72, pulse 85, temperature 36.2 °C (97.2 °F), resp. rate 18, height 1.753 m (5' 9\"), weight 102 kg (224 lb), SpO2 94%.  Intake/Output last 3 Shifts:  I/O last 3 completed shifts:  In: 240 (2.5 mL/kg) [P.O.:240]  Out: 3800 (39.5 mL/kg) [Urine:3800 (1.1 mL/kg/hr)]  Dosing Weight: 96.3 kg     Relevant Results  Results from last 7 days   Lab Units 07/05/25  0753 07/05/25  0539 07/04/25  2109 07/04/25  1750 07/04/25  1212 07/04/25  0743 07/04/25  0608 07/03/25  0747 07/03/25  0630 07/02/25  0839 07/02/25  0713 07/01/25  0859 07/01/25  0607   POCT GLUCOSE mg/dL 123*  --  166* 234* 177* 180*  --    < >  --    < >  --    < >  --    GLUCOSE mg/dL  --  143*  --   --   --   --  204*  --  180*  --  207*  --  162*    < > = values in this interval not displayed.     Lab Results   Component Value Date    CREATININE 0.86 07/05/2025    BUN 34 (H) 07/05/2025     07/05/2025    K 5.0 07/05/2025     07/05/2025    CO2 28 07/05/2025       Assessment & Plan  Stress hyperglycemia    The patient is a 64 year old male with a past medical history significant of but not limited to Stage D HFrEF, CAD, PAF, VT, hypothyroidism who is s/p heart transplant on 06/21/2025. We have been asked to see the patient for stress and GC induced hyperglycemia in the setting of pre-existing prediabetes.    GC regimen:  Prednisone 35 mg Q 12 hours " from 06/23 - 07/01 as the biopsy was negative  Prednisone 25 Q 12 hours from 07/01-present    - Glargine 16 units daily  - Lispro 4 units with meals  - lispro with SS 2:50 >150 with meals  - Accuchecks ACHS  - BG goal 120-140  - Hypoglycemia protocol in place  - CCD diet 70 g  - Please switch his ensure shakes to glucerna for optimal carb intake  - We will continue to follow    The patient was discussed with Dr. Hopson.    Marvin Edwards MD  PGY-5 Endocrinology Fellow

## 2025-07-05 NOTE — PROGRESS NOTES
[facility]  Advanced HF PROGRESS NOTE    Anatoly Lane/[id number]    Admit Date: 6/20/2025  Hospital Length of Stay: 15   ICU Length of Stay: 4d   Primary Service: HFICU  Primary HF Cardiologist: Carlton     INTERVAL EVENTS / PERTINENT ROS:   NSR 70-80s on tele   -140  Edema improving on exam, lung sounds improving   PM&R recommending SNF, PT recommending low intensity. TCC to send out SNF referrals.     Plan:  - C/w Lasix 40mg PO daily, can decrease as needed     MEDICATIONS  Infusions:       Scheduled:  acetaminophen, 650 mg, q6h  acyclovir, 400 mg, BID  aspirin, 81 mg, Daily  calcium carbonate, 1,250 mg of calcium carbonate, BID  cholecalciferol, 50 mcg, Daily  furosemide, 40 mg, Daily  gabapentin, 300 mg, Nightly  heparin (porcine), 5,000 Units, q8h  hydrALAZINE, 50 mg, TID  insulin glargine, 16 Units, Nightly  insulin lispro, 0-20 Units, Before meals & nightly  levothyroxine, 125 mcg, Daily  lidocaine, 1 patch, Daily  mycophenolate, 1,000 mg, q12h LUPILLO  pantoprazole, 40 mg, Daily before breakfast  perflutren protein A microsphere, 0.5 mL, Once in imaging  pravastatin, 40 mg, Nightly  predniSONE, 25 mg, q12h LUPILLO  sennosides-docusate sodium, 1 tablet, BID  sodium zirconium cyclosilicate, 10 g, Daily  sulfamethoxazole-trimethoprim, 1 tablet, Daily  sulfur hexafluoride microsphr, 2 mL, Once in imaging  tacrolimus, 2.5 mg, q12h LUPILLO  tamsulosin, 0.4 mg, Daily  vancomycin, 125 mg, 4x daily  voriconazole, 200 mg, q12h LUPILLO      PRN:  cyclobenzaprine, 5 mg, TID PRN  dextrose, 12.5 g, q15 min PRN  dextrose, 25 g, q15 min PRN  dextrose, 25 g, q15 min PRN  glucagon, 1 mg, q15 min PRN  glucagon, 1 mg, q15 min PRN  guaiFENesin, 600 mg, BID PRN  melatonin, 5 mg, Nightly PRN  naloxone, 0.2 mg, q5 min PRN  ondansetron ODT, 4 mg, q8h PRN   Or  ondansetron, 4 mg, q8h PRN  oxyCODONE, 10 mg, q4h PRN  oxyCODONE, 5 mg, q4h PRN  oxygen, , Continuous PRN - O2/gases  polyethylene glycol, 17 g, Daily  "PRN      PHYSICAL EXAM:   Visit Vitals  /72   Pulse 85   Temp 36.2 °C (97.2 °F)   Resp 18   Ht 1.753 m (5' 9\")   Wt 102 kg (224 lb)   SpO2 94%   BMI 33.08 kg/m²   Smoking Status Former   BSA 2.23 m²       Wt Readings from Last 5 Encounters:   07/02/25 102 kg (224 lb)   02/27/25 102 kg (224 lb)   01/28/25 104 kg (229 lb)   11/19/24 112 kg (246 lb 12.8 oz)   07/15/24 110 kg (243 lb 9.6 oz)       INTAKE/OUTPUT:  I/O last 3 completed shifts:  In: 240 (2.5 mL/kg) [P.O.:240]  Out: 3800 (39.5 mL/kg) [Urine:3800 (1.1 mL/kg/hr)]  Dosing Weight: 96.3 kg      Physical Exam  HENT:      Head: Normocephalic and atraumatic.   Cardiovascular:      Rate and Rhythm: Normal rate.      Heart sounds: No murmur heard.     No friction rub. No gallop.   Pulmonary:      Effort: Pulmonary effort is normal. No respiratory distress.      Breath sounds: Rhonchi and rales present. No wheezing.      Comments: Stenotomy incision appears C/D/I  Abdominal:      General: Abdomen is flat. There is no distension.      Palpations: Abdomen is soft. There is no mass.      Tenderness: There is no abdominal tenderness. There is no guarding.   Musculoskeletal:      Right lower leg: Edema present.      Left lower leg: Edema present.   Skin:     General: Skin is warm.      Findings: No rash.   Neurological:      General: No focal deficit present.      Mental Status: He is alert and oriented to person, place, and time.   Psychiatric:         Mood and Affect: Mood normal.         Behavior: Behavior normal.         DATA:  CMP:  Results from last 7 days   Lab Units 07/05/25  0539 07/04/25  0608 07/03/25  0630 07/02/25  0713 07/01/25  0607 06/30/25  0536 06/29/25  0631   SODIUM mmol/L 136 138 138 136 137 137 137   POTASSIUM mmol/L 5.0 4.9 4.5 4.7 5.4* 5.1 5.1   CHLORIDE mmol/L 101 101 100 98 100 101 101   CO2 mmol/L 28 30 31 30 32 29 25   ANION GAP mmol/L 12 12 12 13 10 12 16   BUN mg/dL 34* 32* 33* 36* 30* 35* 38*   CREATININE mg/dL 0.86 0.81 0.87 0.84 0.81 " "0.82 0.95   EGFR mL/min/1.73m*2 >90 >90 >90 >90 >90 >90 89   MAGNESIUM mg/dL 1.95 1.99 1.91 2.10 2.26 2.35 2.47*   ALBUMIN g/dL 3.3* 3.1* 3.3* 3.3* 3.9 3.3* 3.4     CBC:  Results from last 7 days   Lab Units 07/05/25  0539 07/04/25  0608 07/03/25  0630 07/02/25  0713 07/01/25  0607 06/30/25  0536 06/29/25  0631   WBC AUTO x10*3/uL 20.4* 20.0* 22.6* 21.9* 24.9* 17.4* 19.6*   HEMOGLOBIN g/dL 9.4* 9.0* 9.2* 9.5* 9.6* 8.5* 8.6*   HEMATOCRIT % 29.9* 28.7* 29.2* 30.0* 30.7* 26.8* 27.2*   PLATELETS AUTO x10*3/uL 339 303 344 348 315 243 228   MCV fL 101* 100 100 100 99 99 98     COAG:         ABO:   No results found for: \"ABO\"    HEME/ENDO:         CARDIAC:       No lab exists for component: \"CK\", \"CKMBP\"    ASSESSMENT AND PLAN:   Anatoly Lane is a 64 year old male with relevant PMH of ex smoker, NICM, stage D HFrEF NYHA 2-3, S/p HM2 (4/30/23), VT s/p ICD, CAD, pAF, HTN, BPH, GERD and hypothyroidism.  S/p LVAD explant, AICD removal & OHT by Dr. Lorenzo on 6/21. Now transferred from CTICU to HF ICU 6/24. Patient arrived to HFICU HDS stable, on milrinone drip 0.125 mcg/kg/min. Aiming to wean milrinone and optimize volume while in the HFICU.    Neuro:  Anxiety. Depression, Acute pain   BUE/BLE mild tremor  Acute back pain   - Serial neuro and pain assessments   - PO Tylenol PRN for pain  - PT/OT Consult  - Sleep/wake cycle normalization  - C/w PRN Flexeril and Lidocaine patch     L hand extensor palsy and 4th+5th digital numbness  - On 6/27, patient c/o inability to extend digits 2-5 around the MCP joints. In addition, he is complaining of numbness from the medial aspect of the wrist to the 4th and 5th digits and part of the palm.  - Deficits do not align completely with a specific nerve injury- would not expect numbness in digits 4-5 with a radial nerve palsy and would not expect difficult with finger extension with an ulnar nerve palsy.  - Ddx includes nerve compression from forearm edema vs brachial plexus injury.  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  Now s/p LVAD explant, AICD removal, & OHT on 6/21  - TTE (6/22): Normal BiV   Donor/Recipient Infectious history:   CMV: -/-   EBV: +/+   Toxo: -/-   Heb B: -/-  Hep C: -/-  HIV: -/-      Rejection/Prophylaxis:    - Induction: s/p methylprednisolone 500mg IV x2, S/p Basiliximab 20mg IV within 1 hour of arrival to CTICU (no need for premedication), s/p 2nd dose: 20mg IVPB on POD4 (06/25)   - Steroids: Prednisone 25mg PO BID (scheduled w/o end date, will need to be manually reduced if next biopsy negative)  - Tacrolimus: Last tacrolimus trough: 10.6 7/5/2025:  5:39 AM 2.5 mg BID (dose adjusted 06/29), will continue same dose today.    - Tacrolimus goal troughs: 10-12  - Cellcept: C/w 1,000mg BID  - Antifungals: c/w voriconazole 200mg BID x3 months end date 09/22/2025   - Antivirals: c/w acylovir 400mg BID (06/22 x3 months. End date 09/22/2025   - Anti PCP & Toxoplasmosis: SS Bactrim daily (end date of 12/22/25)      Next RHC/biopsy: 7/1: negative for AMR/ACR. Next biopsy scheduled 7/8 w/ Dr. Ruano.   Last TTE/BOBBY on 6/23: ; normal BiV function (poor windows on 7/1 TTE)   Last HLA: First due on 7/24  Last Allomap: ~ will assess starting at 6-month ana post-op    Last Allosure: ~ will assess starting at 6-month ana post-op   Last LHC: 03/2023(pre OHT); First due ~ (one year post tx)   Osteopenia/osteoporosis prophylaxis: C/w Vit D 2000U daily and calcium 500 mg BID (give calcium 2 hrs after MMF)   Peptic/gastric ulcer prophylaxis: Pantoprazole 40mg daily    CAV  Prophylaxis:  C/w  aspirin and  pravastatin 40 mg QHS  Pacing: Pacer back up rate set to 60 BPM. Native conduction is NSR at 75 BPM, pacing wires can be cut.     Subcutaneous emphysema and pneumomediastinum  - Dr. Birch aware, continue to monitor     HTN  - C/w hydralazine 25mg TID and increase as needed      Pulmonary:   Productive cough  Ex-smoker  - Noticed to have mod-severe productive cough with clear expectorant, pt says he has chronic bronchitis from years of smoking  - Auscultation with rough crackles, rhonchi  - CXR 06/29 small stable L pleural effusion, no fevers, WBC elevated but likely from steroids  - CT chest appears to be volume overload, will continue diuresis. WBC downtrending. Obtain follow up CXR in a few days.   - Monitor and maintain SpO2 > 92%     GI:  C- Diff infection, diarrhea (improved)  - Diagnosed 06/25, on PO vanc x 10 days (07/05)  - Diarrhea already better, leucocytosis improving   - Bowel regimen PRN   - PPI     :  H/x BPH  -Baseline Cr: 0.8   -Admit BUN/Cr: Normal   -I/Os  -avoid hypotension and nephrotoxic agents  - C/w home tamsulosin 0.4 mg daily      Heme:  Acute blood loss anemia stable, no active s/s of bleeding  - Trending H/h   - Iron studies: %saturation 21%, ferritin 130. No need for IV iron.      Endo:  Steroid and stress induced hyperglycemia  - Maintain BG <180  - continue SSI to #4   - hgbA1c  was 4.1  - Endo consulted:   16u Lantus daily   C/w SSI #4   Switch shakes to glucerna   Ensure carb controlled diet   Pt will need diabetic education prior to dc     H/o of hypothyroid  -TSH (3/2025): 1.83  - Maintain BG <180      ID:  C Diff infection   Reactive leukocytosis  - Completing PO vanc till 07/05   - completed periop vanc/zosyn x48hrs with hx of LVAD  - trend temps q4h     PHYSICAL AND OCCUPATIONAL THERAPY: PT/OT    DVT: Heparin SQ  ULCER PPX: PPI  GLYCEMIC CONTROL: SSI  BOWEL CARE: Senna, mirlax  NUTRITION: Adult diet Consistent Carb; CCD 75 gm/meal      EMERGENCY  CONTACT: Extended Emergency Contact Information  Primary Emergency Contact: Janice Ramos  Address: [address] [place] [address] [facility]  Home Phone: [phone]  Mobile Phone: [phone]  Relation: Mother  Secondary Emergency Contact: Bird Lane  Address: [address]           [place] [address] [facility]  Home Phone: [phone]  Mobile Phone: [phone]  Relation: Son  Preferred language: English   needed? No  FAMILY UPDATE: At bedside  CODE STATUS: Full Code  DISPO:  PT recommending low intensity and states pt is progressing and ambulation is improving. PM&R consulted and stating he can go to SNF if he medically needs this, otherwise he is safe to be dc'd with home health. He can possibly be discharged after his biopsy next week.     Patient seen and assessed with Dr. Marcus Pabon.        07/05/25 - Dilia Holly MD   PGY4 Heart Failure Fellow

## 2025-07-06 VITALS
SYSTOLIC BLOOD PRESSURE: 121 MMHG | DIASTOLIC BLOOD PRESSURE: 71 MMHG | HEART RATE: 89 BPM | TEMPERATURE: 97 F | HEIGHT: 69 IN | BODY MASS INDEX: 32.97 KG/M2 | WEIGHT: 222.6 LBS | RESPIRATION RATE: 17 BRPM | OXYGEN SATURATION: 94 %

## 2025-07-06 LAB
ALBUMIN SERPL BCP-MCNC: 3.3 G/DL (ref 3.4–5)
ANION GAP SERPL CALC-SCNC: 15 MMOL/L (ref 10–20)
BNP SERPL-MCNC: 347 PG/ML (ref 0–99)
BUN SERPL-MCNC: 42 MG/DL (ref 6–23)
CALCIUM SERPL-MCNC: 8.5 MG/DL (ref 8.6–10.6)
CHLORIDE SERPL-SCNC: 101 MMOL/L (ref 98–107)
CO2 SERPL-SCNC: 27 MMOL/L (ref 21–32)
CREAT SERPL-MCNC: 1.29 MG/DL (ref 0.5–1.3)
EGFRCR SERPLBLD CKD-EPI 2021: 62 ML/MIN/1.73M*2
ERYTHROCYTE [DISTWIDTH] IN BLOOD BY AUTOMATED COUNT: 21.3 % (ref 11.5–14.5)
GLUCOSE BLD MANUAL STRIP-MCNC: 131 MG/DL (ref 74–99)
GLUCOSE BLD MANUAL STRIP-MCNC: 175 MG/DL (ref 74–99)
GLUCOSE BLD MANUAL STRIP-MCNC: 182 MG/DL (ref 74–99)
GLUCOSE BLD MANUAL STRIP-MCNC: 185 MG/DL (ref 74–99)
GLUCOSE SERPL-MCNC: 122 MG/DL (ref 74–99)
HCT VFR BLD AUTO: 31.5 % (ref 41–52)
HGB BLD-MCNC: 10 G/DL (ref 13.5–17.5)
MAGNESIUM SERPL-MCNC: 2.08 MG/DL (ref 1.6–2.4)
MCH RBC QN AUTO: 31.5 PG (ref 26–34)
MCHC RBC AUTO-ENTMCNC: 31.7 G/DL (ref 32–36)
MCV RBC AUTO: 99 FL (ref 80–100)
NRBC BLD-RTO: 0.1 /100 WBCS (ref 0–0)
PHOSPHATE SERPL-MCNC: 4.4 MG/DL (ref 2.5–4.9)
PLATELET # BLD AUTO: 339 X10*3/UL (ref 150–450)
POTASSIUM SERPL-SCNC: 5.3 MMOL/L (ref 3.5–5.3)
RBC # BLD AUTO: 3.17 X10*6/UL (ref 4.5–5.9)
SODIUM SERPL-SCNC: 138 MMOL/L (ref 136–145)
TACROLIMUS BLD-MCNC: 10.3 NG/ML
WBC # BLD AUTO: 20.4 X10*3/UL (ref 4.4–11.3)

## 2025-07-06 PROCEDURE — 2500000002 HC RX 250 W HCPCS SELF ADMINISTERED DRUGS (ALT 637 FOR MEDICARE OP, ALT 636 FOR OP/ED): Performed by: REGISTERED NURSE

## 2025-07-06 PROCEDURE — 80197 ASSAY OF TACROLIMUS: CPT

## 2025-07-06 PROCEDURE — 85027 COMPLETE CBC AUTOMATED: CPT

## 2025-07-06 PROCEDURE — 2500000001 HC RX 250 WO HCPCS SELF ADMINISTERED DRUGS (ALT 637 FOR MEDICARE OP)

## 2025-07-06 PROCEDURE — 2500000002 HC RX 250 W HCPCS SELF ADMINISTERED DRUGS (ALT 637 FOR MEDICARE OP, ALT 636 FOR OP/ED)

## 2025-07-06 PROCEDURE — 2500000004 HC RX 250 GENERAL PHARMACY W/ HCPCS (ALT 636 FOR OP/ED): Performed by: REGISTERED NURSE

## 2025-07-06 PROCEDURE — 83880 ASSAY OF NATRIURETIC PEPTIDE: CPT

## 2025-07-06 PROCEDURE — 82947 ASSAY GLUCOSE BLOOD QUANT: CPT

## 2025-07-06 PROCEDURE — 2500000001 HC RX 250 WO HCPCS SELF ADMINISTERED DRUGS (ALT 637 FOR MEDICARE OP): Performed by: REGISTERED NURSE

## 2025-07-06 PROCEDURE — 83735 ASSAY OF MAGNESIUM: CPT

## 2025-07-06 PROCEDURE — 2500000005 HC RX 250 GENERAL PHARMACY W/O HCPCS: Performed by: REGISTERED NURSE

## 2025-07-06 PROCEDURE — 2500000001 HC RX 250 WO HCPCS SELF ADMINISTERED DRUGS (ALT 637 FOR MEDICARE OP): Performed by: STUDENT IN AN ORGANIZED HEALTH CARE EDUCATION/TRAINING PROGRAM

## 2025-07-06 PROCEDURE — 99233 SBSQ HOSP IP/OBS HIGH 50: CPT | Performed by: INTERNAL MEDICINE

## 2025-07-06 PROCEDURE — 2500000004 HC RX 250 GENERAL PHARMACY W/ HCPCS (ALT 636 FOR OP/ED): Performed by: STUDENT IN AN ORGANIZED HEALTH CARE EDUCATION/TRAINING PROGRAM

## 2025-07-06 PROCEDURE — 1100000001 HC PRIVATE ROOM DAILY

## 2025-07-06 PROCEDURE — 2500000004 HC RX 250 GENERAL PHARMACY W/ HCPCS (ALT 636 FOR OP/ED)

## 2025-07-06 PROCEDURE — 36415 COLL VENOUS BLD VENIPUNCTURE: CPT

## 2025-07-06 PROCEDURE — 80069 RENAL FUNCTION PANEL: CPT

## 2025-07-06 RX ORDER — FUROSEMIDE 10 MG/ML
40 INJECTION INTRAMUSCULAR; INTRAVENOUS 2 TIMES DAILY
Status: DISCONTINUED | OUTPATIENT
Start: 2025-07-06 | End: 2025-07-08

## 2025-07-06 RX ORDER — FUROSEMIDE 10 MG/ML
40 INJECTION INTRAMUSCULAR; INTRAVENOUS ONCE
Status: COMPLETED | OUTPATIENT
Start: 2025-07-06 | End: 2025-07-06

## 2025-07-06 RX ORDER — INSULIN LISPRO 100 [IU]/ML
4 INJECTION, SOLUTION INTRAVENOUS; SUBCUTANEOUS
Status: DISCONTINUED | OUTPATIENT
Start: 2025-07-06 | End: 2025-07-07

## 2025-07-06 RX ADMIN — GABAPENTIN 300 MG: 300 CAPSULE ORAL at 20:58

## 2025-07-06 RX ADMIN — SODIUM ZIRCONIUM CYCLOSILICATE 10 G: 10 POWDER, FOR SUSPENSION ORAL at 13:14

## 2025-07-06 RX ADMIN — CALCIUM 1 TABLET: 500 TABLET ORAL at 08:54

## 2025-07-06 RX ADMIN — INSULIN LISPRO 4 UNITS: 100 INJECTION, SOLUTION INTRAVENOUS; SUBCUTANEOUS at 13:14

## 2025-07-06 RX ADMIN — HEPARIN SODIUM 5000 UNITS: 5000 INJECTION INTRAVENOUS; SUBCUTANEOUS at 16:41

## 2025-07-06 RX ADMIN — CALCIUM 1 TABLET: 500 TABLET ORAL at 20:58

## 2025-07-06 RX ADMIN — ACETAMINOPHEN 650 MG: 325 TABLET ORAL at 06:27

## 2025-07-06 RX ADMIN — PRAVASTATIN SODIUM 40 MG: 40 TABLET ORAL at 21:15

## 2025-07-06 RX ADMIN — ACETAMINOPHEN 650 MG: 325 TABLET ORAL at 18:46

## 2025-07-06 RX ADMIN — INSULIN LISPRO 4 UNITS: 100 INJECTION, SOLUTION INTRAVENOUS; SUBCUTANEOUS at 22:01

## 2025-07-06 RX ADMIN — FUROSEMIDE 40 MG: 40 TABLET ORAL at 08:55

## 2025-07-06 RX ADMIN — PREDNISONE 25 MG: 20 TABLET ORAL at 08:55

## 2025-07-06 RX ADMIN — PANTOPRAZOLE SODIUM 40 MG: 40 TABLET, DELAYED RELEASE ORAL at 06:27

## 2025-07-06 RX ADMIN — HYDRALAZINE HYDROCHLORIDE 50 MG: 50 TABLET ORAL at 08:55

## 2025-07-06 RX ADMIN — VORICONAZOLE 200 MG: 200 TABLET, COATED ORAL at 08:54

## 2025-07-06 RX ADMIN — INSULIN LISPRO 4 UNITS: 100 INJECTION, SOLUTION INTRAVENOUS; SUBCUTANEOUS at 18:46

## 2025-07-06 RX ADMIN — TACROLIMUS 2.5 MG: 0.5 CAPSULE ORAL at 18:46

## 2025-07-06 RX ADMIN — FUROSEMIDE 40 MG: 10 INJECTION, SOLUTION INTRAMUSCULAR; INTRAVENOUS at 13:14

## 2025-07-06 RX ADMIN — SULFAMETHOXAZOLE AND TRIMETHOPRIM 1 TABLET: 400; 80 TABLET ORAL at 08:54

## 2025-07-06 RX ADMIN — ACYCLOVIR 400 MG: 400 TABLET ORAL at 20:58

## 2025-07-06 RX ADMIN — ASPIRIN 81 MG: 81 TABLET, COATED ORAL at 08:54

## 2025-07-06 RX ADMIN — MYCOPHENOLATE MOFETIL 1000 MG: 250 CAPSULE ORAL at 18:46

## 2025-07-06 RX ADMIN — VORICONAZOLE 200 MG: 200 TABLET, COATED ORAL at 20:58

## 2025-07-06 RX ADMIN — PREDNISONE 25 MG: 20 TABLET ORAL at 20:58

## 2025-07-06 RX ADMIN — INSULIN GLARGINE 16 UNITS: 100 INJECTION, SOLUTION SUBCUTANEOUS at 21:04

## 2025-07-06 RX ADMIN — HYDRALAZINE HYDROCHLORIDE 50 MG: 50 TABLET ORAL at 20:58

## 2025-07-06 RX ADMIN — TACROLIMUS 2.5 MG: 0.5 CAPSULE ORAL at 06:27

## 2025-07-06 RX ADMIN — OXYCODONE HYDROCHLORIDE 5 MG: 5 TABLET ORAL at 21:14

## 2025-07-06 RX ADMIN — ACYCLOVIR 400 MG: 400 TABLET ORAL at 08:55

## 2025-07-06 RX ADMIN — CHOLECALCIFEROL TAB 25 MCG (1000 UNIT) 50 MCG: 25 TAB at 08:55

## 2025-07-06 RX ADMIN — TAMSULOSIN HYDROCHLORIDE 0.4 MG: 0.4 CAPSULE ORAL at 08:54

## 2025-07-06 RX ADMIN — ACETAMINOPHEN 650 MG: 325 TABLET ORAL at 23:26

## 2025-07-06 RX ADMIN — HEPARIN SODIUM 5000 UNITS: 5000 INJECTION INTRAVENOUS; SUBCUTANEOUS at 08:54

## 2025-07-06 RX ADMIN — LEVOTHYROXINE SODIUM 125 MCG: 0.05 TABLET ORAL at 06:27

## 2025-07-06 RX ADMIN — LIDOCAINE 4% 1 PATCH: 40 PATCH TOPICAL at 08:54

## 2025-07-06 RX ADMIN — FUROSEMIDE 40 MG: 10 INJECTION, SOLUTION INTRAMUSCULAR; INTRAVENOUS at 20:58

## 2025-07-06 RX ADMIN — HYDRALAZINE HYDROCHLORIDE 50 MG: 50 TABLET ORAL at 16:41

## 2025-07-06 RX ADMIN — ACETAMINOPHEN 650 MG: 325 TABLET ORAL at 13:14

## 2025-07-06 RX ADMIN — INSULIN LISPRO 4 UNITS: 100 INJECTION, SOLUTION INTRAVENOUS; SUBCUTANEOUS at 09:02

## 2025-07-06 RX ADMIN — OXYCODONE HYDROCHLORIDE 10 MG: 10 TABLET ORAL at 13:27

## 2025-07-06 RX ADMIN — HEPARIN SODIUM 5000 UNITS: 5000 INJECTION INTRAVENOUS; SUBCUTANEOUS at 23:26

## 2025-07-06 RX ADMIN — MYCOPHENOLATE MOFETIL 1000 MG: 250 CAPSULE ORAL at 06:27

## 2025-07-06 ASSESSMENT — COGNITIVE AND FUNCTIONAL STATUS - GENERAL
TURNING FROM BACK TO SIDE WHILE IN FLAT BAD: A LITTLE
HELP NEEDED FOR BATHING: A LITTLE
MOBILITY SCORE: 17
DRESSING REGULAR LOWER BODY CLOTHING: A LITTLE
DRESSING REGULAR UPPER BODY CLOTHING: A LITTLE
MOVING FROM LYING ON BACK TO SITTING ON SIDE OF FLAT BED WITH BEDRAILS: A LITTLE
WALKING IN HOSPITAL ROOM: A LITTLE
MOVING TO AND FROM BED TO CHAIR: A LITTLE
MOVING TO AND FROM BED TO CHAIR: A LITTLE
STANDING UP FROM CHAIR USING ARMS: A LITTLE
STANDING UP FROM CHAIR USING ARMS: A LITTLE
WALKING IN HOSPITAL ROOM: A LITTLE
HELP NEEDED FOR BATHING: A LITTLE
MOBILITY SCORE: 19
DRESSING REGULAR UPPER BODY CLOTHING: A LOT
DRESSING REGULAR LOWER BODY CLOTHING: A LITTLE
CLIMB 3 TO 5 STEPS WITH RAILING: A LOT
TOILETING: A LITTLE
TOILETING: A LITTLE
DAILY ACTIVITIY SCORE: 20
CLIMB 3 TO 5 STEPS WITH RAILING: A LOT
DAILY ACTIVITIY SCORE: 19

## 2025-07-06 ASSESSMENT — PAIN DESCRIPTION - LOCATION: LOCATION: INCISION

## 2025-07-06 ASSESSMENT — PAIN SCALES - GENERAL
PAINLEVEL_OUTOF10: 1
PAINLEVEL_OUTOF10: 7
PAINLEVEL_OUTOF10: 6
PAINLEVEL_OUTOF10: 0 - NO PAIN

## 2025-07-06 ASSESSMENT — PAIN - FUNCTIONAL ASSESSMENT
PAIN_FUNCTIONAL_ASSESSMENT: 0-10

## 2025-07-06 ASSESSMENT — PAIN DESCRIPTION - DESCRIPTORS: DESCRIPTORS: SORE

## 2025-07-06 NOTE — PROGRESS NOTES
[facility]  Advanced HF PROGRESS NOTE    Anatoly Lane/[id number]    Admit Date: 6/20/2025  Hospital Length of Stay: 16   ICU Length of Stay: 4d   Primary Service: HFICU  Primary HF Cardiologist: Carlton     INTERVAL EVENTS / PERTINENT ROS:   NSR 70-80s on tele   -140  Still visibly edematous with crackles on exam   Mild signs of congestion   PM&R recommending SNF, PT recommending low intensity. TCC to send out SNF referrals.     Plan:  - Will increase lasix to 40mg IV BID   - C/w strict I & O's   - Add premeal insulin     MEDICATIONS  Infusions:       Scheduled:  acetaminophen, 650 mg, q6h  acyclovir, 400 mg, BID  aspirin, 81 mg, Daily  calcium carbonate, 1,250 mg of calcium carbonate, BID  cholecalciferol, 50 mcg, Daily  furosemide, 40 mg, Once  furosemide, 40 mg, Daily  gabapentin, 300 mg, Nightly  heparin (porcine), 5,000 Units, q8h  hydrALAZINE, 50 mg, TID  insulin glargine, 16 Units, Nightly  insulin lispro, 0-20 Units, Before meals & nightly  insulin lispro, 4 Units, TID AC  levothyroxine, 125 mcg, Daily  lidocaine, 1 patch, Daily  mycophenolate, 1,000 mg, q12h LUPILLO  pantoprazole, 40 mg, Daily before breakfast  perflutren protein A microsphere, 0.5 mL, Once in imaging  pravastatin, 40 mg, Nightly  predniSONE, 25 mg, q12h LUPILLO  sennosides-docusate sodium, 1 tablet, BID  sodium zirconium cyclosilicate, 10 g, Daily  sulfamethoxazole-trimethoprim, 1 tablet, Daily  sulfur hexafluoride microsphr, 2 mL, Once in imaging  tacrolimus, 2.5 mg, q12h LUPILLO  tamsulosin, 0.4 mg, Daily  voriconazole, 200 mg, q12h LUPILLO      PRN:  cyclobenzaprine, 5 mg, TID PRN  dextrose, 12.5 g, q15 min PRN  dextrose, 25 g, q15 min PRN  dextrose, 25 g, q15 min PRN  glucagon, 1 mg, q15 min PRN  glucagon, 1 mg, q15 min PRN  guaiFENesin, 600 mg, BID PRN  melatonin, 5 mg, Nightly PRN  naloxone, 0.2 mg, q5 min PRN  ondansetron ODT, 4 mg, q8h PRN   Or  ondansetron, 4 mg, q8h PRN  oxyCODONE, 10 mg, q4h PRN  oxyCODONE, 5  "mg, q4h PRN  oxygen, , Continuous PRN - O2/gases  polyethylene glycol, 17 g, Daily PRN      PHYSICAL EXAM:   Visit Vitals  /82 (BP Location: Right arm, Patient Position: Lying)   Pulse 84   Temp 36.1 °C (97 °F) (Temporal)   Resp 18   Ht 1.753 m (5' 9\")   Wt 101 kg (222 lb 4.8 oz)   SpO2 94%   BMI 32.83 kg/m²   Smoking Status Former   BSA 2.22 m²       Wt Readings from Last 5 Encounters:   07/06/25 101 kg (222 lb 4.8 oz)   02/27/25 102 kg (224 lb)   01/28/25 104 kg (229 lb)   11/19/24 112 kg (246 lb 12.8 oz)   07/15/24 110 kg (243 lb 9.6 oz)       INTAKE/OUTPUT:  I/O last 3 completed shifts:  In: 440 (4.6 mL/kg) [P.O.:440]  Out: 3550 (36.9 mL/kg) [Urine:3550 (1 mL/kg/hr)]  Dosing Weight: 96.3 kg      Physical Exam  HENT:      Head: Normocephalic and atraumatic.   Cardiovascular:      Rate and Rhythm: Normal rate.      Heart sounds: No murmur heard.     No friction rub. No gallop.   Pulmonary:      Effort: Pulmonary effort is normal. No respiratory distress.      Breath sounds: Rhonchi and rales present. No wheezing.      Comments: Stenotomy incision appears C/D/I  Abdominal:      General: Abdomen is flat. There is no distension.      Palpations: Abdomen is soft. There is no mass.      Tenderness: There is no abdominal tenderness. There is no guarding.   Musculoskeletal:      Right lower leg: Edema present.      Left lower leg: Edema present.   Skin:     General: Skin is warm.      Findings: No rash.   Neurological:      General: No focal deficit present.      Mental Status: He is alert and oriented to person, place, and time.   Psychiatric:         Mood and Affect: Mood normal.         Behavior: Behavior normal.         DATA:  CMP:  Results from last 7 days   Lab Units 07/06/25  0548 07/05/25  0539 07/04/25  0608 07/03/25  0630 07/02/25  0713 07/01/25  0607 06/30/25  0536   SODIUM mmol/L 138 136 138 138 136 137 137   POTASSIUM mmol/L 5.3 5.0 4.9 4.5 4.7 5.4* 5.1   CHLORIDE mmol/L 101 101 101 100 98 100 101 " "  CO2 mmol/L 27 28 30 31 30 32 29   ANION GAP mmol/L 15 12 12 12 13 10 12   BUN mg/dL 42* 34* 32* 33* 36* 30* 35*   CREATININE mg/dL 1.29 0.86 0.81 0.87 0.84 0.81 0.82   EGFR mL/min/1.73m*2 62 >90 >90 >90 >90 >90 >90   MAGNESIUM mg/dL 2.08 1.95 1.99 1.91 2.10 2.26 2.35   ALBUMIN g/dL 3.3* 3.3* 3.1* 3.3* 3.3* 3.9 3.3*     CBC:  Results from last 7 days   Lab Units 07/05/25  0539 07/04/25  0608 07/03/25  0630 07/02/25  0713 07/01/25  0607 06/30/25  0536   WBC AUTO x10*3/uL 20.4* 20.0* 22.6* 21.9* 24.9* 17.4*   HEMOGLOBIN g/dL 9.4* 9.0* 9.2* 9.5* 9.6* 8.5*   HEMATOCRIT % 29.9* 28.7* 29.2* 30.0* 30.7* 26.8*   PLATELETS AUTO x10*3/uL 339 303 344 348 315 243   MCV fL 101* 100 100 100 99 99     COAG:         ABO:   No results found for: \"ABO\"    HEME/ENDO:         CARDIAC:       No lab exists for component: \"CK\", \"CKMBP\"    ASSESSMENT AND PLAN:   Anatoly Lane is a 64 year old male with relevant PMH of ex smoker, NICM, stage D HFrEF NYHA 2-3, S/p HM2 (4/30/23), VT s/p ICD, CAD, pAF, HTN, BPH, GERD and hypothyroidism.  S/p LVAD explant, AICD removal & OHT by Dr. Lorenzo on 6/21. Now transferred from CTICU to HF ICU 6/24. Patient arrived to HFICU HDS stable, on milrinone drip 0.125 mcg/kg/min. Aiming to wean milrinone and optimize volume while in the HFICU.    Neuro:  Anxiety. Depression, Acute pain   BUE/BLE mild tremor  Acute back pain   - Serial neuro and pain assessments   - PO Tylenol PRN for pain  - PT/OT Consult  - Sleep/wake cycle normalization  - C/w PRN Flexeril and Lidocaine patch     L hand extensor palsy and 4th+5th digital numbness  - On 6/27, patient c/o inability to extend digits 2-5 around the MCP joints. In addition, he is complaining of numbness from the medial aspect of the wrist to the 4th and 5th digits and part of the palm.  - Deficits do not align completely with a specific nerve injury- would not expect numbness in digits 4-5 with a radial nerve palsy and would not expect difficult with finger " extension with an ulnar nerve palsy.  - Ddx includes nerve compression from forearm edema vs brachial plexus injury.  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  Now s/p LVAD explant, AICD removal, & OHT on 6/21  - TTE (6/22): Normal BiV   Donor/Recipient Infectious history:   CMV: -/-   EBV: +/+   Toxo: -/-   Heb B: -/-  Hep C: -/-  HIV: -/-      Rejection/Prophylaxis:    - Induction: s/p methylprednisolone 500mg IV x2, S/p Basiliximab 20mg IV within 1 hour of arrival to CTICU (no need for premedication), s/p 2nd dose: 20mg IVPB on POD4 (06/25)   - Steroids: Prednisone 25mg PO BID (scheduled w/o end date, will need to be manually reduced if next biopsy negative)  - Tacrolimus: Last tacrolimus trough: 10.3 7/6/2025:  5:48 AM 2.5 mg BID (dose adjusted 06/29), will continue same dose today.    - Tacrolimus goal troughs: 10-12  - Cellcept: C/w 1,000mg BID  - Antifungals: c/w voriconazole 200mg BID x3 months end date 09/22/2025   - Antivirals: c/w acylovir 400mg BID (06/22 x3 months. End date 09/22/2025   - Anti PCP & Toxoplasmosis: SS Bactrim daily (end date of 12/22/25)      Next RHC/biopsy: 7/1: negative for AMR/ACR. Next biopsy scheduled 7/8 w/ Dr. Ruano.   Last TTE/BOBBY on 6/23: ; normal BiV function (poor windows on 7/1 TTE)   Last HLA: First due on 7/24  Last Allomap: ~ will assess starting at 6-month ana post-op    Last Allosure: ~ will assess starting at 6-month ana post-op   Last C: 03/2023(pre OHT); First due ~ (one year post tx)   Osteopenia/osteoporosis prophylaxis: C/w Vit D 2000U daily and  calcium 500 mg BID (give calcium 2 hrs after MMF)   Peptic/gastric ulcer prophylaxis: Pantoprazole 40mg daily    CAV Prophylaxis:  C/w  aspirin and  pravastatin 40 mg QHS  Pacing: Pacer back up rate set to 60 BPM. Native conduction is NSR at 75 BPM, pacing wires can be cut.     Volume Overload   Follow up BNP   Lasix 40mg IV BID   Strict I &O's   Goal - 2 L daily   Monitor electrolyte     Subcutaneous emphysema and pneumomediastinum  - Dr. Birch aware, continue to monitor     HTN  - C/w hydralazine 50mg TID and increase as needed      Pulmonary:   Productive cough  Ex-smoker  - Noticed to have mod-severe productive cough with clear expectorant, pt says he has chronic bronchitis from years of smoking  - Auscultation with rough crackles, rhonchi  - CXR 06/29 small stable L pleural effusion, no fevers, WBC elevated but likely from steroids  - CT chest appears to be volume overload, will continue diuresis. WBC downtrending. Obtain follow up CXR in a few days.   - Monitor and maintain SpO2 > 92%     GI:  C- Diff infection, diarrhea (Resolved)  - Diagnosed 06/25, on PO vanc x 10 days (07/05)  - Bowel regimen PRN   - PPI     :  H/x BPH    DEBBIE   Likely cardiorenal   - diuresis as above   -Baseline Cr: 0.8 , Admit BUN/Cr: Normal   - Trend BUN/Cr   -I/Os  -avoid hypotension and nephrotoxic agents  - C/w home tamsulosin 0.4 mg daily      Heme:  Acute blood loss anemia stable, no active s/s of bleeding  - Trending H/h   - Iron studies: %saturation 21%, ferritin 130. No need for IV iron.      Endo:  Steroid and stress induced hyperglycemia  - Maintain BG <180  - continue SSI to #4   - hgbA1c  was 4.1  - Endo consulted:   16u Lantus daily   Lispro 4u w/ meals   C/w SSI #4   C/w glucerna   Ensure carb controlled diet   Pt will need diabetic education prior to dc     H/o of hypothyroid  -TSH (3/2025): 1.83  - Maintain BG <180  - Levothyroxine 125mcg      ID:  C Diff infection   Reactive leukocytosis  - Completing PO vanc till  07/05   - completed periop vanc/zosyn x48hrs with hx of LVAD  - trend temps q4h     PHYSICAL AND OCCUPATIONAL THERAPY: PT/OT    DVT: Heparin SQ  ULCER PPX: PPI  GLYCEMIC CONTROL: SSI  BOWEL CARE: Senna, mirlax  NUTRITION: Adult diet Consistent Carb; CCD 75 gm/meal      EMERGENCY CONTACT: Extended Emergency Contact Information  Primary Emergency Contact: Janice Ramos  Address: [address] [place] [address] [facility]  Home Phone: [phone]  Mobile Phone: [phone]  Relation: Mother  Secondary Emergency Contact: Bird Lane  Address: [address] [place] [address] [address]           [place] [address] [facility]  Home Phone: [phone]  Mobile Phone: [phone]  Relation: Son  Preferred language: English   needed? No  FAMILY UPDATE: At bedside  CODE STATUS: Full Code  DISPO:  PT recommending low intensity and states pt is progressing and ambulation is improving. PM&R consulted and stating he can go to SNF if he medically needs this, otherwise he is safe to be dc'd with home health. He can possibly be discharged after his biopsy next week.     Patient seen and assessed with Dr. Marcus Pabon.        07/06/25 - Dilia Holly MD   PGY4 Heart Failure Fellow

## 2025-07-06 NOTE — PROGRESS NOTES
Physical Therapy                 Therapy Communication Note    Patient Name: Anatoly Lane  MRN: [id number]  Department: [facility]  Room: [address] [place] [address]-A  Today's Date: 7/6/2025     Discipline: Physical Therapy    Missed Visit: PT Missed Visit: Yes     Missed Visit Reason: Missed Visit Reason: Patient refused (c/o chest pain, difficulty breathing. RN aware and entering room wiht medication as therapist leaving. Will continue to follow for PT services.)    Missed Time: Attempt    Comment:

## 2025-07-07 ENCOUNTER — APPOINTMENT (OUTPATIENT)
Dept: RADIOLOGY | Facility: HOSPITAL | Age: 65
End: 2025-07-07
Payer: MEDICARE

## 2025-07-07 ENCOUNTER — APPOINTMENT (OUTPATIENT)
Dept: RADIOLOGY | Facility: HOSPITAL | Age: 65
DRG: 001 | End: 2025-07-07
Payer: MEDICARE

## 2025-07-07 LAB
ALBUMIN SERPL BCP-MCNC: 3.4 G/DL (ref 3.4–5)
ANION GAP SERPL CALC-SCNC: 15 MMOL/L (ref 10–20)
BUN SERPL-MCNC: 57 MG/DL (ref 6–23)
CALCIUM SERPL-MCNC: 8.3 MG/DL (ref 8.6–10.6)
CHLORIDE SERPL-SCNC: 97 MMOL/L (ref 98–107)
CO2 SERPL-SCNC: 31 MMOL/L (ref 21–32)
CREAT SERPL-MCNC: 1.11 MG/DL (ref 0.5–1.3)
EGFRCR SERPLBLD CKD-EPI 2021: 74 ML/MIN/1.73M*2
ERYTHROCYTE [DISTWIDTH] IN BLOOD BY AUTOMATED COUNT: 21.4 % (ref 11.5–14.5)
GLUCOSE BLD MANUAL STRIP-MCNC: 140 MG/DL (ref 74–99)
GLUCOSE BLD MANUAL STRIP-MCNC: 169 MG/DL (ref 74–99)
GLUCOSE BLD MANUAL STRIP-MCNC: 180 MG/DL (ref 74–99)
GLUCOSE BLD MANUAL STRIP-MCNC: 182 MG/DL (ref 74–99)
GLUCOSE BLD MANUAL STRIP-MCNC: 244 MG/DL (ref 74–99)
GLUCOSE BLD MANUAL STRIP-MCNC: 99 MG/DL (ref 74–99)
GLUCOSE SERPL-MCNC: 173 MG/DL (ref 74–99)
HCT VFR BLD AUTO: 30.2 % (ref 41–52)
HGB BLD-MCNC: 10 G/DL (ref 13.5–17.5)
MAGNESIUM SERPL-MCNC: 2.06 MG/DL (ref 1.6–2.4)
MCH RBC QN AUTO: 32.4 PG (ref 26–34)
MCHC RBC AUTO-ENTMCNC: 33.1 G/DL (ref 32–36)
MCV RBC AUTO: 98 FL (ref 80–100)
NRBC BLD-RTO: 0.1 /100 WBCS (ref 0–0)
PHOSPHATE SERPL-MCNC: 4.1 MG/DL (ref 2.5–4.9)
PLATELET # BLD AUTO: 327 X10*3/UL (ref 150–450)
POTASSIUM SERPL-SCNC: 4.5 MMOL/L (ref 3.5–5.3)
RBC # BLD AUTO: 3.09 X10*6/UL (ref 4.5–5.9)
SODIUM SERPL-SCNC: 138 MMOL/L (ref 136–145)
TACROLIMUS BLD-MCNC: 10.4 NG/ML
WBC # BLD AUTO: 18.2 X10*3/UL (ref 4.4–11.3)

## 2025-07-07 PROCEDURE — 2500000002 HC RX 250 W HCPCS SELF ADMINISTERED DRUGS (ALT 637 FOR MEDICARE OP, ALT 636 FOR OP/ED)

## 2025-07-07 PROCEDURE — 2500000001 HC RX 250 WO HCPCS SELF ADMINISTERED DRUGS (ALT 637 FOR MEDICARE OP)

## 2025-07-07 PROCEDURE — 97530 THERAPEUTIC ACTIVITIES: CPT | Mod: GP

## 2025-07-07 PROCEDURE — 2500000005 HC RX 250 GENERAL PHARMACY W/O HCPCS: Performed by: REGISTERED NURSE

## 2025-07-07 PROCEDURE — 2500000004 HC RX 250 GENERAL PHARMACY W/ HCPCS (ALT 636 FOR OP/ED)

## 2025-07-07 PROCEDURE — 2500000004 HC RX 250 GENERAL PHARMACY W/ HCPCS (ALT 636 FOR OP/ED): Performed by: REGISTERED NURSE

## 2025-07-07 PROCEDURE — 99233 SBSQ HOSP IP/OBS HIGH 50: CPT | Performed by: INTERNAL MEDICINE

## 2025-07-07 PROCEDURE — 2500000002 HC RX 250 W HCPCS SELF ADMINISTERED DRUGS (ALT 637 FOR MEDICARE OP, ALT 636 FOR OP/ED): Performed by: REGISTERED NURSE

## 2025-07-07 PROCEDURE — 85027 COMPLETE CBC AUTOMATED: CPT

## 2025-07-07 PROCEDURE — 1200000002 HC GENERAL ROOM WITH TELEMETRY DAILY

## 2025-07-07 PROCEDURE — 71250 CT THORAX DX C-: CPT

## 2025-07-07 PROCEDURE — 82947 ASSAY GLUCOSE BLOOD QUANT: CPT

## 2025-07-07 PROCEDURE — 80069 RENAL FUNCTION PANEL: CPT

## 2025-07-07 PROCEDURE — 71250 CT THORAX DX C-: CPT | Performed by: RADIOLOGY

## 2025-07-07 PROCEDURE — 99232 SBSQ HOSP IP/OBS MODERATE 35: CPT

## 2025-07-07 PROCEDURE — 2500000001 HC RX 250 WO HCPCS SELF ADMINISTERED DRUGS (ALT 637 FOR MEDICARE OP): Performed by: REGISTERED NURSE

## 2025-07-07 PROCEDURE — 83735 ASSAY OF MAGNESIUM: CPT

## 2025-07-07 PROCEDURE — 2500000001 HC RX 250 WO HCPCS SELF ADMINISTERED DRUGS (ALT 637 FOR MEDICARE OP): Performed by: STUDENT IN AN ORGANIZED HEALTH CARE EDUCATION/TRAINING PROGRAM

## 2025-07-07 PROCEDURE — 71045 X-RAY EXAM CHEST 1 VIEW: CPT | Performed by: RADIOLOGY

## 2025-07-07 PROCEDURE — 2500000004 HC RX 250 GENERAL PHARMACY W/ HCPCS (ALT 636 FOR OP/ED): Performed by: STUDENT IN AN ORGANIZED HEALTH CARE EDUCATION/TRAINING PROGRAM

## 2025-07-07 PROCEDURE — 71045 X-RAY EXAM CHEST 1 VIEW: CPT

## 2025-07-07 PROCEDURE — 80197 ASSAY OF TACROLIMUS: CPT

## 2025-07-07 PROCEDURE — 36415 COLL VENOUS BLD VENIPUNCTURE: CPT

## 2025-07-07 RX ORDER — INSULIN GLARGINE 100 [IU]/ML
8 INJECTION, SOLUTION SUBCUTANEOUS NIGHTLY
Status: DISCONTINUED | OUTPATIENT
Start: 2025-07-07 | End: 2025-07-08

## 2025-07-07 RX ORDER — INSULIN LISPRO 100 [IU]/ML
6 INJECTION, SOLUTION INTRAVENOUS; SUBCUTANEOUS
Status: DISCONTINUED | OUTPATIENT
Start: 2025-07-07 | End: 2025-07-09 | Stop reason: HOSPADM

## 2025-07-07 RX ADMIN — HYDRALAZINE HYDROCHLORIDE 50 MG: 50 TABLET ORAL at 21:11

## 2025-07-07 RX ADMIN — TACROLIMUS 2.5 MG: 0.5 CAPSULE ORAL at 06:48

## 2025-07-07 RX ADMIN — INSULIN LISPRO 6 UNITS: 100 INJECTION, SOLUTION INTRAVENOUS; SUBCUTANEOUS at 18:18

## 2025-07-07 RX ADMIN — HYDRALAZINE HYDROCHLORIDE 50 MG: 50 TABLET ORAL at 08:29

## 2025-07-07 RX ADMIN — ACETAMINOPHEN 650 MG: 325 TABLET ORAL at 06:35

## 2025-07-07 RX ADMIN — ASPIRIN 81 MG: 81 TABLET, COATED ORAL at 08:28

## 2025-07-07 RX ADMIN — LEVOTHYROXINE SODIUM 125 MCG: 0.05 TABLET ORAL at 06:35

## 2025-07-07 RX ADMIN — SENNOSIDES AND DOCUSATE SODIUM 1 TABLET: 50; 8.6 TABLET ORAL at 08:29

## 2025-07-07 RX ADMIN — TACROLIMUS 2.5 MG: 0.5 CAPSULE ORAL at 18:14

## 2025-07-07 RX ADMIN — PREDNISONE 25 MG: 20 TABLET ORAL at 08:29

## 2025-07-07 RX ADMIN — HEPARIN SODIUM 5000 UNITS: 5000 INJECTION INTRAVENOUS; SUBCUTANEOUS at 08:28

## 2025-07-07 RX ADMIN — PRAVASTATIN SODIUM 40 MG: 40 TABLET ORAL at 21:19

## 2025-07-07 RX ADMIN — CHOLECALCIFEROL TAB 25 MCG (1000 UNIT) 50 MCG: 25 TAB at 08:28

## 2025-07-07 RX ADMIN — VORICONAZOLE 200 MG: 200 TABLET, COATED ORAL at 08:28

## 2025-07-07 RX ADMIN — INSULIN LISPRO 4 UNITS: 100 INJECTION, SOLUTION INTRAVENOUS; SUBCUTANEOUS at 08:35

## 2025-07-07 RX ADMIN — ACETAMINOPHEN 650 MG: 325 TABLET ORAL at 13:23

## 2025-07-07 RX ADMIN — INSULIN GLARGINE 8 UNITS: 100 INJECTION, SOLUTION SUBCUTANEOUS at 21:11

## 2025-07-07 RX ADMIN — ACYCLOVIR 400 MG: 400 TABLET ORAL at 08:29

## 2025-07-07 RX ADMIN — SULFAMETHOXAZOLE AND TRIMETHOPRIM 1 TABLET: 400; 80 TABLET ORAL at 08:28

## 2025-07-07 RX ADMIN — LIDOCAINE 4% 1 PATCH: 40 PATCH TOPICAL at 08:28

## 2025-07-07 RX ADMIN — OXYCODONE HYDROCHLORIDE 10 MG: 10 TABLET ORAL at 08:29

## 2025-07-07 RX ADMIN — PANTOPRAZOLE SODIUM 40 MG: 40 TABLET, DELAYED RELEASE ORAL at 06:35

## 2025-07-07 RX ADMIN — GABAPENTIN 300 MG: 300 CAPSULE ORAL at 21:11

## 2025-07-07 RX ADMIN — ACETAMINOPHEN 650 MG: 325 TABLET ORAL at 18:14

## 2025-07-07 RX ADMIN — FUROSEMIDE 40 MG: 10 INJECTION, SOLUTION INTRAMUSCULAR; INTRAVENOUS at 21:12

## 2025-07-07 RX ADMIN — ACETAMINOPHEN 650 MG: 325 TABLET ORAL at 23:26

## 2025-07-07 RX ADMIN — FUROSEMIDE 40 MG: 10 INJECTION, SOLUTION INTRAMUSCULAR; INTRAVENOUS at 08:28

## 2025-07-07 RX ADMIN — VORICONAZOLE 200 MG: 200 TABLET, COATED ORAL at 21:11

## 2025-07-07 RX ADMIN — HYDRALAZINE HYDROCHLORIDE 50 MG: 50 TABLET ORAL at 18:14

## 2025-07-07 RX ADMIN — CALCIUM 1 TABLET: 500 TABLET ORAL at 08:28

## 2025-07-07 RX ADMIN — SODIUM ZIRCONIUM CYCLOSILICATE 10 G: 10 POWDER, FOR SUSPENSION ORAL at 13:22

## 2025-07-07 RX ADMIN — PREDNISONE 25 MG: 20 TABLET ORAL at 21:11

## 2025-07-07 RX ADMIN — CALCIUM 1 TABLET: 500 TABLET ORAL at 21:11

## 2025-07-07 RX ADMIN — MYCOPHENOLATE MOFETIL 1000 MG: 250 CAPSULE ORAL at 18:14

## 2025-07-07 RX ADMIN — INSULIN LISPRO 4 UNITS: 100 INJECTION, SOLUTION INTRAVENOUS; SUBCUTANEOUS at 18:17

## 2025-07-07 RX ADMIN — MYCOPHENOLATE MOFETIL 1000 MG: 250 CAPSULE ORAL at 06:48

## 2025-07-07 RX ADMIN — TAMSULOSIN HYDROCHLORIDE 0.4 MG: 0.4 CAPSULE ORAL at 08:29

## 2025-07-07 RX ADMIN — ACYCLOVIR 400 MG: 400 TABLET ORAL at 21:11

## 2025-07-07 ASSESSMENT — COGNITIVE AND FUNCTIONAL STATUS - GENERAL
DAILY ACTIVITIY SCORE: 21
CLIMB 3 TO 5 STEPS WITH RAILING: A LOT
MOVING TO AND FROM BED TO CHAIR: A LITTLE
WALKING IN HOSPITAL ROOM: A LITTLE
MOBILITY SCORE: 19
MOVING TO AND FROM BED TO CHAIR: A LITTLE
DRESSING REGULAR LOWER BODY CLOTHING: A LITTLE
STANDING UP FROM CHAIR USING ARMS: A LITTLE
TURNING FROM BACK TO SIDE WHILE IN FLAT BAD: A LITTLE
CLIMB 3 TO 5 STEPS WITH RAILING: A LOT
MOBILITY SCORE: 20
CLIMB 3 TO 5 STEPS WITH RAILING: A LITTLE
WALKING IN HOSPITAL ROOM: A LITTLE
DAILY ACTIVITIY SCORE: 20
HELP NEEDED FOR BATHING: A LITTLE
DRESSING REGULAR UPPER BODY CLOTHING: A LITTLE
MOVING FROM LYING ON BACK TO SITTING ON SIDE OF FLAT BED WITH BEDRAILS: A LITTLE
WALKING IN HOSPITAL ROOM: A LITTLE
HELP NEEDED FOR BATHING: A LITTLE
MOBILITY SCORE: 18
TOILETING: A LITTLE
DRESSING REGULAR LOWER BODY CLOTHING: A LITTLE
DRESSING REGULAR UPPER BODY CLOTHING: A LITTLE
STANDING UP FROM CHAIR USING ARMS: A LITTLE
STANDING UP FROM CHAIR USING ARMS: A LITTLE

## 2025-07-07 ASSESSMENT — ENCOUNTER SYMPTOMS
APPETITE CHANGE: 0
SHORTNESS OF BREATH: 0
FREQUENCY: 0
ABDOMINAL PAIN: 0
DIZZINESS: 0
VOMITING: 0
NUMBNESS: 0
NAUSEA: 0
DYSURIA: 0

## 2025-07-07 ASSESSMENT — PAIN DESCRIPTION - LOCATION: LOCATION: ABDOMEN

## 2025-07-07 ASSESSMENT — PAIN DESCRIPTION - ORIENTATION: ORIENTATION: MID

## 2025-07-07 ASSESSMENT — PAIN - FUNCTIONAL ASSESSMENT
PAIN_FUNCTIONAL_ASSESSMENT: 0-10

## 2025-07-07 ASSESSMENT — PAIN SCALES - GENERAL
PAINLEVEL_OUTOF10: 10 - WORST POSSIBLE PAIN
PAINLEVEL_OUTOF10: 0 - NO PAIN
PAINLEVEL_OUTOF10: 3

## 2025-07-07 NOTE — PROGRESS NOTES
[facility]  Advanced HF PROGRESS NOTE    Anatoly Lane/[id number]    Admit Date: 6/20/2025  Hospital Length of Stay: 17   ICU Length of Stay: 4d   Primary Service: HFICU  Primary HF Cardiologist: Carlton     INTERVAL EVENTS / PERTINENT ROS:   NSR 70-80s on tele   -140  Edema and SOB improving , neg 4 L yesterday  C/o pain at sternal incision with formation of tense area under skin - likely gas   PM&R recommending SNF, PT recommending low intensity. TCC to send out SNF referrals.     Plan:  - Continue with lasix 40mg IV BID   - C/w strict I & O's   - Will obtain CXR and CT of chest w/o contrast       MEDICATIONS  Infusions:       Scheduled:  acetaminophen, 650 mg, q6h  acyclovir, 400 mg, BID  aspirin, 81 mg, Daily  calcium carbonate, 1,250 mg of calcium carbonate, BID  cholecalciferol, 50 mcg, Daily  furosemide, 40 mg, BID  gabapentin, 300 mg, Nightly  heparin (porcine), 5,000 Units, q8h  hydrALAZINE, 50 mg, TID  insulin glargine, 16 Units, Nightly  insulin lispro, 0-20 Units, Before meals & nightly  insulin lispro, 4 Units, TID AC  levothyroxine, 125 mcg, Daily  lidocaine, 1 patch, Daily  mycophenolate, 1,000 mg, q12h LUPILLO  pantoprazole, 40 mg, Daily before breakfast  perflutren protein A microsphere, 0.5 mL, Once in imaging  pravastatin, 40 mg, Nightly  predniSONE, 25 mg, q12h LUPILLO  sennosides-docusate sodium, 1 tablet, BID  sodium zirconium cyclosilicate, 10 g, Daily  sulfamethoxazole-trimethoprim, 1 tablet, Daily  sulfur hexafluoride microsphr, 2 mL, Once in imaging  tacrolimus, 2.5 mg, q12h LUPILLO  tamsulosin, 0.4 mg, Daily  voriconazole, 200 mg, q12h LUPILLO      PRN:  cyclobenzaprine, 5 mg, TID PRN  dextrose, 12.5 g, q15 min PRN  dextrose, 25 g, q15 min PRN  dextrose, 25 g, q15 min PRN  glucagon, 1 mg, q15 min PRN  glucagon, 1 mg, q15 min PRN  guaiFENesin, 600 mg, BID PRN  melatonin, 5 mg, Nightly PRN  naloxone, 0.2 mg, q5 min PRN  ondansetron ODT, 4 mg, q8h PRN   Or  ondansetron, 4  "mg, q8h PRN  oxyCODONE, 10 mg, q4h PRN  oxyCODONE, 5 mg, q4h PRN  oxygen, , Continuous PRN - O2/gases  polyethylene glycol, 17 g, Daily PRN      PHYSICAL EXAM:   Visit Vitals  /73 (BP Location: Right arm, Patient Position: Lying)   Pulse 90   Temp 36 °C (96.8 °F) (Temporal)   Resp 18   Ht 1.753 m (5' 9\")   Wt 98.2 kg (216 lb 9.6 oz)   SpO2 94%   BMI 31.99 kg/m²   Smoking Status Former   BSA 2.19 m²       Wt Readings from Last 5 Encounters:   07/07/25 98.2 kg (216 lb 9.6 oz)   02/27/25 102 kg (224 lb)   01/28/25 104 kg (229 lb)   11/19/24 112 kg (246 lb 12.8 oz)   07/15/24 110 kg (243 lb 9.6 oz)       INTAKE/OUTPUT:  I/O last 3 completed shifts:  In: 600 (6.2 mL/kg) [P.O.:600]  Out: 6825 (70.9 mL/kg) [Urine:6825 (2 mL/kg/hr)]  Dosing Weight: 96.3 kg      Physical Exam  HENT:      Head: Normocephalic and atraumatic.   Cardiovascular:      Rate and Rhythm: Normal rate.      Heart sounds: No murmur heard.     No friction rub. No gallop.   Pulmonary:      Effort: Pulmonary effort is normal. No respiratory distress.      Breath sounds: Rhonchi and rales present. No wheezing.      Comments: Stenotomy incision appears C/D/I  Abdominal:      General: Abdomen is flat. There is no distension.      Palpations: Abdomen is soft. There is no mass.      Tenderness: There is no abdominal tenderness. There is no guarding.   Musculoskeletal:      Right lower leg: Edema present.      Left lower leg: Edema present.   Skin:     General: Skin is warm.      Findings: No rash.      Comments: Bullae under sternotomy skin site    Neurological:      General: No focal deficit present.      Mental Status: He is alert and oriented to person, place, and time.   Psychiatric:         Mood and Affect: Mood normal.         Behavior: Behavior normal.       DATA:  CMP:  Results from last 7 days   Lab Units 07/07/25  0646 07/06/25  0548 07/05/25  0539 07/04/25  0608 07/03/25  0630 07/02/25  0713 07/01/25  0607   SODIUM mmol/L 138 138 136 138 138 " "136 137   POTASSIUM mmol/L 4.5 5.3 5.0 4.9 4.5 4.7 5.4*   CHLORIDE mmol/L 97* 101 101 101 100 98 100   CO2 mmol/L 31 27 28 30 31 30 32   ANION GAP mmol/L 15 15 12 12 12 13 10   BUN mg/dL 57* 42* 34* 32* 33* 36* 30*   CREATININE mg/dL 1.11 1.29 0.86 0.81 0.87 0.84 0.81   EGFR mL/min/1.73m*2 74 62 >90 >90 >90 >90 >90   MAGNESIUM mg/dL 2.06 2.08 1.95 1.99 1.91 2.10 2.26   ALBUMIN g/dL 3.4 3.3* 3.3* 3.1* 3.3* 3.3* 3.9     CBC:  Results from last 7 days   Lab Units 07/07/25  0646 07/06/25  1159 07/05/25  0539 07/04/25  0608 07/03/25  0630 07/02/25  0713 07/01/25  0607   WBC AUTO x10*3/uL 18.2* 20.4* 20.4* 20.0* 22.6* 21.9* 24.9*   HEMOGLOBIN g/dL 10.0* 10.0* 9.4* 9.0* 9.2* 9.5* 9.6*   HEMATOCRIT % 30.2* 31.5* 29.9* 28.7* 29.2* 30.0* 30.7*   PLATELETS AUTO x10*3/uL 327 339 339 303 344 348 315   MCV fL 98 99 101* 100 100 100 99     COAG:         ABO:   No results found for: \"ABO\"    HEME/ENDO:         CARDIAC:   Results from last 7 days   Lab Units 07/06/25  1159   BNP pg/mL 347*       ASSESSMENT AND PLAN:   Anatoly Lane is a 64 year old male with relevant PMH of ex smoker, NICM, stage D HFrEF NYHA 2-3, S/p HM2 (4/30/23), VT s/p ICD, CAD, pAF, HTN, BPH, GERD and hypothyroidism.  S/p LVAD explant, AICD removal & OHT by Dr. Lorenzo on 6/21. Now transferred from CTICU to HF ICU 6/24. Patient arrived to HFICU HDS stable, on milrinone drip 0.125 mcg/kg/min. Aiming to wean milrinone and optimize volume while in the HFICU.    Neuro:  Anxiety. Depression, Acute pain   BUE/BLE mild tremor  Acute back pain   - Serial neuro and pain assessments   - PO Tylenol PRN for pain  - PT/OT Consult  - Sleep/wake cycle normalization  - C/w PRN Flexeril and Lidocaine patch     L hand extensor palsy and 4th+5th digital numbness  - On 6/27, patient c/o inability to extend digits 2-5 around the MCP joints. In addition, he is complaining of numbness from the medial aspect of the wrist to the 4th and 5th digits and part of the palm.  - Deficits do " not align completely with a specific nerve injury- would not expect numbness in digits 4-5 with a radial nerve palsy and would not expect difficult with finger extension with an ulnar nerve palsy.  - Ddx includes nerve compression from forearm edema vs brachial plexus injury.  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  Now s/p LVAD explant, AICD removal, & OHT on 6/21  - TTE (6/22): Normal BiV   Donor/Recipient Infectious history:   CMV: -/-   EBV: +/+   Toxo: -/-   Heb B: -/-  Hep C: -/-  HIV: -/-      Rejection/Prophylaxis:    - Induction: s/p methylprednisolone 500mg IV x2, S/p Basiliximab 20mg IV within 1 hour of arrival to CTICU (no need for premedication), s/p 2nd dose: 20mg IVPB on POD4 (06/25)   - Steroids: Prednisone 25mg PO BID (scheduled w/o end date, will need to be manually reduced if next biopsy negative)  - Tacrolimus: Last tacrolimus trough: 10.4 7/7/2025:  6:46 AM 2.5 mg BID (dose adjusted 06/29), will continue same dose today.    - Tacrolimus goal troughs: 10-12  - Cellcept: C/w 1,000mg BID  - Antifungals: c/w voriconazole 200mg BID x3 months end date 09/22/2025   - Antivirals: c/w acylovir 400mg BID (06/22 x3 months. End date 09/22/2025   - Anti PCP & Toxoplasmosis: SS Bactrim daily (end date of 12/22/25)      Next RHC/biopsy: 7/1: negative for AMR/ACR. Next biopsy scheduled 7/8 w/ Dr. Ruano.   NELLY @ [facility] 07/07  Last TTE/BOBBY on 6/23: ; normal BiV function (poor windows on 7/1 TTE)   Last HLA: First due on 7/24  Last Allomap: ~ will assess starting at 6-month ana post-op    Last  Allosure: ~ will assess starting at 6-month ana post-op   Last C: 03/2023(pre OHT); First due ~ (one year post tx)   Osteopenia/osteoporosis prophylaxis: C/w Vit D 2000U daily and calcium 500 mg BID (give calcium 2 hrs after MMF)   Peptic/gastric ulcer prophylaxis: Pantoprazole 40mg daily    CAV Prophylaxis:  C/w  aspirin and  pravastatin 40 mg QHS  Pacing: Pacer to be removed today 07/07     Volume Overload   BNP: 347 07/06   Lasix 40mg IV BID   Strict I &O's   Goal - 2 L daily   Monitor electrolyte     Subcutaneous emphysema and pneumomediastinum  - Dr. Birch aware  - made CT surgery aware with new tense gas filled area under substernal incision site   - Per CT surgery Will order CXR and CT chest w/o contrast   - Likely to manage conservatively but will see what imaging shows     HTN  - C/w hydralazine 50mg TID and increase as needed      Pulmonary:   Productive cough  Ex-smoker  - Noticed to have mod-severe productive cough with clear expectorant, pt says he has chronic bronchitis from years of smoking  - Auscultation with rough crackles, rhonchi  - CXR 06/29 small stable L pleural effusion, no fevers, WBC elevated but likely from steroids  - CT chest appears to be volume overload, will continue diuresis. WBC downtrending. Obtain follow up CXR in a few days.   - Monitor and maintain SpO2 > 92%     GI:  C- Diff infection, diarrhea (Resolved)  - Diagnosed 06/25, on PO vanc x 10 days (07/05)  - Bowel regimen PRN   - PPI     :  H/x BPH    DEBBIE (Improving)  Likely cardiorenal   - diuresis as above   -Baseline Cr: 0.8 , Admit BUN/Cr: Normal   - Trend BUN/Cr   -I/Os  -avoid hypotension and nephrotoxic agents  - C/w home tamsulosin 0.4 mg daily      Heme:  Acute blood loss anemia stable, no active s/s of bleeding  - Trending H/h   - Iron studies: %saturation 21%, ferritin 130. No need for IV iron.      Endo:  Steroid and stress induced hyperglycemia  - Maintain BG <180  - continue SSI to #4   - hgbA1c  was 4.1  -  Endo consulted:   16u Lantus daily --- Decrease to 8u 07/07 for Biopsy tomorrow  Lispro 6u w/ meals (Hold whilst NPO)  C/w SSI #4   C/w glucerna   Ensure carb controlled diet   Pt will need diabetic education prior to dc     H/o of hypothyroid  -TSH (3/2025): 1.83  - Maintain BG <180  - Levothyroxine 125mcg      ID:  C Diff infection   Reactive leukocytosis  - Completed PO vanc  07/05   - completed periop vanc/zosyn x48hrs with hx of LVAD  - trend temps q4h     PHYSICAL AND OCCUPATIONAL THERAPY: PT/OT    DVT: Heparin SQ  ULCER PPX: PPI  GLYCEMIC CONTROL: SSI  BOWEL CARE: Senna, mirlax  NUTRITION: Adult diet Consistent Carb; CCD 75 gm/meal  NPO Diet; Effective midnight      EMERGENCY CONTACT: Extended Emergency Contact Information  Primary Emergency Contact: Janice Ramos  Address: [address] [place] [address]  Home Phone: [phone]  Mobile Phone: [phone]  Relation: Mother  Secondary Emergency Contact: Bird Lane  Address: [address] [place] [address] [address]           [place] [id number] [facility]  Home Phone: [phone]  Mobile Phone: [phone]  Relation: Son  Preferred language: English   needed? No  FAMILY UPDATE: At bedside  CODE STATUS: Full Code  DISPO:  PT recommending low intensity and states pt is progressing and ambulation is improving. PM&R consulted and stating he can go to SNF if he medically needs this, otherwise he is safe to be dc'd with home health. He can possibly be discharged after his biopsy next week.     Patient seen and assessed with Dr. Hylton.        07/07/25 - Dilia Holly MD   PGY4 Heart Failure Fellow

## 2025-07-07 NOTE — SIGNIFICANT EVENT
Epicardial Pacing Wires Cut    Atrial and ventricular wires cut at skin level.  Patient instructed to notify heart transplant team of any visible wires or s/s infection. Patient tolerated procedure well.     Procedure proctored by Hannah Luo      Replied to patient's [employer] message with insulin pump adjustments via Dr. Joanna Miranda below.

## 2025-07-07 NOTE — PROGRESS NOTES
07/07/25 1403   Discharge Planning   Living Arrangements Alone   Support Systems Family members   Type of Residence Private residence   Home or Post Acute Services Post acute facilities (Rehab/SNF/etc)   Type of Post Acute Facility Services Skilled nursing   Expected Discharge Disposition SNF   Does the patient need discharge transport arranged? Yes   [facility] coordination needed? Yes     Patient would like skilled placement.  [facility]    [phone].  Referral submitted along with supporting clinicals.   Patient will need OT updates for acceptances.

## 2025-07-07 NOTE — PROGRESS NOTES
Physical Therapy    Physical Therapy Treatment    Patient Name: Anatoly Lane  MRN: [id number]  Department: [place] [address]  Room: [address] [place] [address]  Today's Date: 7/7/2025  Time Calculation  Start Time: 1012  Stop Time: 1041  Time Calculation (min): 29 min         Assessment/Plan   PT Assessment  Barriers to Discharge Home: No anticipated barriers  Evaluation/Treatment Tolerance: Patient tolerated treatment well  Medical Staff Made Aware: Yes  End of Session Communication: Bedside nurse  Assessment Comment: Pt. is progressing appropriately, completing multiple transfers, ambulation trials, and stair training this date. Pt continues to demo decreased functional strength, decreased activity tolerance/endurance, impaired balance, and increased difficulty with functional mobility compared to baseline. Pt. will continue to benefit from skilled PT intervention while inpatient to address deficits and maximize return to PLOF. Pt remains appropriate for low intensity PT upon discharge.  End of Session Patient Position: Up in chair, Alarm off, not on at start of session    PT Plan  Inpatient/Swing Bed or Outpatient: Inpatient  PT Plan  Treatment/Interventions: Bed mobility, Transfer training, Gait training, Stair training, Balance training, Strengthening, Endurance training, Therapeutic exercise, Therapeutic activity  PT Plan: Ongoing PT  PT Frequency: Daily  PT Discharge Recommendations: Low intensity level of continued care  Equipment Recommended upon Discharge: Wheeled walker  PT Recommended Transfer Status: Contact guard, Assistive device  PT - OK to Discharge: Yes    PT Visit Info:  PT Received On: 07/07/25  Response to Previous Treatment: Patient with no complaints from previous session.     General Visit Information:   General  Family/Caregiver Present: No  Prior to Session Communication: Bedside nurse  Patient Position Received: Bed, 3 rail up, Alarm off, not on at start of session  Preferred Learning Style: auditory,  verbal  General Comment: Pt received supine in bed, pleasant and agreeable to participate in PT. Pt reports did not ambulate in hallways this weekend 2/2 fluid overload and SOB.    Subjective   Precautions:  Precautions  Hearing/Visual Limitations: WFL  Medical Precautions: Cardiac precautions, Fall precautions  Post-Surgical Precautions: Move in the Tube  Precautions Comment: AAI @ 90, protective and contact + precautions     Date/Time Vitals Session Patient Position Pulse Resp SpO2 BP MAP (mmHg)    07/07/25 1012 Pre PT  --  90  --  --  --  --           Vital Signs Comment: HR increased to accordingly with exercise, recovered with rest. Post: 95 bpm     Objective   Pain:  Pain Assessment  Pain Assessment: 0-10  0-10 (Numeric) Pain Score: 3  Pain Type: Acute pain  Pain Location: Incision    Cognition:  Cognition  Overall Cognitive Status: Within Functional Limits  Orientation Level: Oriented X4    Postural Control:  Static Sitting Balance  Static Sitting-Balance Support: Feet supported, No upper extremity supported  Static Sitting-Level of Assistance: Distant supervision  Dynamic Sitting Balance  Dynamic Sitting-Balance Support: No upper extremity supported, Feet supported  Dynamic Sitting-Level of Assistance: Distant supervision  Static Standing Balance  Static Standing-Balance Support: No upper extremity supported  Static Standing-Level of Assistance: Close supervision  Dynamic Standing Balance  Dynamic Standing-Balance Support: No upper extremity supported  Dynamic Standing-Level of Assistance: Close supervision    Activity Tolerance:  Activity Tolerance  Endurance: Tolerates 30 min exercise with multiple rests  Early Mobility/Exercise Safety Screen: Proceed with mobilization - No exclusion criteria met  Rate of Perceived Dyspnea (RPD): 6/10    Treatments:  Therapeutic Exercise  Therapeutic Exercise Performed: Yes  Therapeutic Exercise Activity 1: Seated in chair: AP x20 johnathan, LAQ x20 johnathan    Therapeutic  Activity  Therapeutic Activity Performed: Yes  Therapeutic Activity 1: Multiple seated rests throughout session 2/2 fatigue and SOB. Cues for upright posture, PLB  Therapeutic Activity 2: Pt performed step ups on aerobic step, unilateral UE support. x5 reps johnathan with seated rest between sides. Pt demo improved ease with L LE vs R LE, requiring CGAx1 on L and minAx1 on R  Therapeutic Activity 3: Educated pt on the importance of mobilizing in hallways multiple times per day to improve cardiovascular and muscular endurance    Bed Mobility  Bed Mobility: Yes  Bed Mobility 1  Bed Mobility 1: Supine to sitting  Level of Assistance 1: Close supervision  Bed Mobility Comments 1: HOB elevated slightly    Ambulation/Gait Training  Ambulation/Gait Training Performed: Yes  Ambulation/Gait Training 1  Surface 1: Level tile  Device 1: No device  Assistance 1: Contact guard, Minimal verbal cues  Quality of Gait 1: Decreased step length, Wide base of support (slow los)  Comments/Distance (ft) 1: 250 ft x2 trials, 200 ft. (Seated rest between trials)    Transfers  Transfer: Yes  Transfer 1  Transfer From 1: Sit to, Stand to  Transfer to 1: Stand, Sit  Technique 1: Sit to stand, Stand to sit  Transfer Device 1:  (none)  Transfer Level of Assistance 1: Contact guard  Trials/Comments 1: x4 trials, demos increased UE reliance to stand and decreased eccentric control with descent to chair    Stairs  Stairs: Yes (see therapeutic activities)    Outcome Measures:  [facility] Basic Mobility  Turning from your back to your side while in a flat bed without using bedrails: A little  Moving from lying on your back to sitting on the side of a flat bed without using bedrails: A little  Moving to and from bed to chair (including a wheelchair): A little  Standing up from a chair using your arms (e.g. wheelchair or bedside chair): A little  To walk in hospital room: A little  Climbing 3-5 steps with railing: A little  Basic Mobility - Total Score:  18    Education Documentation  Precautions, taught by Erna Diane PT at 7/7/2025 10:56 AM.  Learner: Patient  Readiness: Acceptance  Method: Explanation, Demonstration  Response: Verbalizes Understanding  Comment: activity pacing, PLB    Body Mechanics, taught by Erna Diane PT at 7/7/2025 10:56 AM.  Learner: Patient  Readiness: Acceptance  Method: Explanation, Demonstration  Response: Verbalizes Understanding  Comment: activity pacing, PLB    Mobility Training, taught by Erna Diane PT at 7/7/2025 10:56 AM.  Learner: Patient  Readiness: Acceptance  Method: Explanation, Demonstration  Response: Verbalizes Understanding  Comment: activity pacing, PLB    Education Comments  No comments found.        OP EDUCATION:       Encounter Problems       Encounter Problems (Active)       Balance       Pt will demonstrated ability to score at least 24/28 on the Tinetti balance assessment tool to ensure safety upon D/C.  (Progressing)       Start:  06/23/25    Expected End:  07/21/25               Mobility       Pt will demonstrated ability to ambulate >/=300ft with proper form and no balance deficits for safe home going.   (Not met)       Start:  06/23/25    Expected End:  07/07/25    Resolved:  06/27/25    Updated to: Pt will demonstrated ability to ambulate >/=750 ft with LRAD and supervision with proper form and no balance deficits for safe home going.    Update reason: progressing         Pt will demonstrate ability to ascend/descend at least 5 stairs with unilateral rail and no balance deficits for safe home going.  (Progressing)       Start:  06/23/25    Expected End:  07/21/25            Pt will demonstrated ability to ambulate >/=750 ft with LRAD and supervision with proper form and no balance deficits for safe home going. (Progressing)       Start:  06/27/25    Expected End:  07/21/25                   PT Transfers       Pt will demonstrate ability to complete 5X STS in < 12 sec with good from and  consistency between transfers for safe D/C.  (Progressing)       Start:  06/23/25    Expected End:  07/21/25               PT Transfers       Patient to transfer to and from sit to supine indep (Progressing)       Start:  06/25/25    Expected End:  07/21/25            Patient will transfer sit to and from stand with LRAD and modified indep (Progressing)       Start:  06/25/25    Expected End:  07/21/25 07/07/25 at 10:57 AM - Erna Diane, PT

## 2025-07-07 NOTE — PROGRESS NOTES
F/up from LVAD explant, AICD removal, and heart transplant on 6/21 with surgeons Dr. Schrader and Dr. Pham. SW reviewed, participated and is in agreement with multi-disciplinary plan of care. SW met with Pt at bedside on Vijay Hendley 3.      Functional/Medical Status: Transferred from CTICU to HFICU 6/24; transfer to LT3 on 6/28; Pt ambulated 250' x 2 with no DME and CGA; still experiencing left hand extensor palsy and 4th+5th digital numbness, recommended to complete EMG as outpatient (cannot be completed this inpatient stay d/t iso precautions); biopsy from 7/1 negative for rejection, next biopsy scheduled tomorrow 7/8 w/ Dr. Ruano; still experiencing volume overload, getting lasix BID with strict I&O's; dx with c-diff 6/25, on PO vanco abx through 7/5, diarrhea now resolved; steroid and stress induced hyperglycemia, endo following  Dialysis start date: N/A, but has DEBBIE (likely cardiorenal)  Adaption to the Stress of Transplant: Pt was in good spirits on this date. Pt expressed that he didn't feel like he would be ready to return home alone, specifically citing concerns for not having full use of his left hand, but was open to and agreeable to SNF if recommended with [facility] being his preference and stated he has a relative who is the  at that facility so he'd feel most comfortable being there. Pt excited to see his dog when he returns home. Pt stated his mom and step-dad visited over the weekend and he was particularly encouraged when his step-dad prayed for him.   Mental Health/Substance use: Previous diagnoses of anxiety and depression, did not have interest in participating in any counseling or therapy previously; has taken Zoloft on and off over last couple years, unsure if he was actively taking it prior to transplant. Pt not endorsing any depression or anxiety symptoms at this time.   Post Discharge Supports/Recovery Plan: TBD SNF vs. HHC. Supports include his mother,  step-father, and adult son. Pt stated he uses senior transportation with a few days notice for medical appts. Pt advised his mom and step-dad don't like to drive to [place] and his son works.   Benefits: Insured via [employer]. Receives SSDI.   Impressions: Pt progressing adequately for this stage and remains in good spirits.   Plan: Biopsy scheduled for 7/8 with Dr. Ruano. SW will continue to follow during index stay and on outpatient basis following discharge.      ADINA Mendosa  Transplant

## 2025-07-07 NOTE — PROGRESS NOTES
"Anatoly Lane is a 64 y.o. male on day 17 of admission presenting with S/P orthotopic heart transplant.    Subjective     Patient doing well this morning. He has been on 16u glargine and 4u lispro. Sugars still 169-185.       I have reviewed histories, allergies and medications have been reviewed and there are no changes       Objective   Review of Systems   Constitutional:  Negative for appetite change.   Eyes:  Negative for visual disturbance.   Respiratory:  Negative for shortness of breath.    Gastrointestinal:  Negative for abdominal pain, nausea and vomiting.   Genitourinary:  Negative for dysuria and frequency.   Neurological:  Negative for dizziness and numbness.     Physical Exam    General - elderly male in NAD  HEENT - At/NC  Resp - no extra wob noted  Abdomen - soft and non tender  Ext - no edema    Last Recorded Vitals  Blood pressure 118/73, pulse 90, temperature 36 °C (96.8 °F), temperature source Temporal, resp. rate 18, height 1.753 m (5' 9\"), weight 98.2 kg (216 lb 9.6 oz), SpO2 94%.  Intake/Output last 3 Shifts:  I/O last 3 completed shifts:  In: 600 (6.2 mL/kg) [P.O.:600]  Out: 6825 (70.9 mL/kg) [Urine:6825 (2 mL/kg/hr)]  Dosing Weight: 96.3 kg     Relevant Results  Results from last 7 days   Lab Units 07/07/25  0652 07/07/25  0651 07/07/25  0646 07/06/25  2156 07/06/25  1833 07/06/25  1246 07/06/25  0635 07/06/25  0548 07/05/25  0753 07/05/25  0539 07/04/25  0743 07/04/25  0608 07/03/25  0747 07/03/25  0630   POCT GLUCOSE mg/dL 182* 169*  --  175* 182* 185*   < >  --    < >  --    < >  --    < >  --    GLUCOSE mg/dL  --   --  173*  --   --   --   --  122*  --  143*  --  204*  --  180*    < > = values in this interval not displayed.     Lab Results   Component Value Date    CREATININE 1.11 07/07/2025    BUN 57 (H) 07/07/2025     07/07/2025    K 4.5 07/07/2025    CL 97 (L) 07/07/2025    CO2 31 07/07/2025       Assessment & Plan  Stress hyperglycemia    The patient is a 64 year old male with a " past medical history significant of but not limited to Stage D HFrEF, CAD, PAF, VT, hypothyroidism who is s/p heart transplant on 06/21/2025. We have been asked to see the patient for stress and GC induced hyperglycemia in the setting of pre-existing prediabetes.    GC regimen:  Prednisone 35 mg Q 12 hours from 06/23 - 07/01 as the biopsy was negative  Prednisone 25 Q 12 hours from 07/01-present    7/7 Updates:  - For NPO:  - Glargine 8u in the 7/7 PM  - Accuchecks q4hrs  - Lispro SSI only    Other recs:   - Continue Glargine 16 units daily (aside from NPO recs above)  - Increase Lispro to 6 units with meals  - lispro with SS 2:50 >150 with meals  - Accuchecks ACHS  - BG goal 120-140  - Hypoglycemia protocol in place  - CCD diet 70 g  - Please continue Glucerna shakes over Ensure for optimal carb intake  - We will continue to follow    The patient was discussed with endocrinology fellow Dr. Edwards and attending Dr. Radha Le MD  Internal Medicine PGY-1

## 2025-07-08 ENCOUNTER — SURGERY (OUTPATIENT)
Age: 65
End: 2025-07-08
Payer: MEDICARE

## 2025-07-08 ENCOUNTER — APPOINTMENT (OUTPATIENT)
Dept: CARDIOLOGY | Facility: HOSPITAL | Age: 65
DRG: 001 | End: 2025-07-08
Payer: MEDICARE

## 2025-07-08 ENCOUNTER — TELEPHONE (OUTPATIENT)
Dept: TRANSPLANT | Facility: HOSPITAL | Age: 65
End: 2025-07-08
Payer: MEDICARE

## 2025-07-08 ENCOUNTER — DOCUMENTATION (OUTPATIENT)
Dept: TRANSPLANT | Facility: HOSPITAL | Age: 65
End: 2025-07-08
Payer: MEDICARE

## 2025-07-08 PROBLEM — A04.72 C. DIFFICILE DIARRHEA: Status: RESOLVED | Noted: 2025-06-28 | Resolved: 2025-07-08

## 2025-07-08 PROBLEM — Z79.899 RECEIVING INOTROPIC MEDICATION: Status: RESOLVED | Noted: 2025-06-23 | Resolved: 2025-07-08

## 2025-07-08 LAB
ALBUMIN SERPL BCP-MCNC: 3.3 G/DL (ref 3.4–5)
ANION GAP SERPL CALC-SCNC: 12 MMOL/L (ref 10–20)
BUN SERPL-MCNC: 57 MG/DL (ref 6–23)
CALCIUM SERPL-MCNC: 8.4 MG/DL (ref 8.6–10.6)
CHLORIDE SERPL-SCNC: 97 MMOL/L (ref 98–107)
CO2 SERPL-SCNC: 32 MMOL/L (ref 21–32)
CREAT SERPL-MCNC: 1.15 MG/DL (ref 0.5–1.3)
EGFRCR SERPLBLD CKD-EPI 2021: 71 ML/MIN/1.73M*2
ERYTHROCYTE [DISTWIDTH] IN BLOOD BY AUTOMATED COUNT: 20.8 % (ref 11.5–14.5)
GLUCOSE BLD MANUAL STRIP-MCNC: 119 MG/DL (ref 74–99)
GLUCOSE BLD MANUAL STRIP-MCNC: 140 MG/DL (ref 74–99)
GLUCOSE BLD MANUAL STRIP-MCNC: 278 MG/DL (ref 74–99)
GLUCOSE BLD MANUAL STRIP-MCNC: 296 MG/DL (ref 74–99)
GLUCOSE SERPL-MCNC: 129 MG/DL (ref 74–99)
HCT VFR BLD AUTO: 29.9 % (ref 41–52)
HGB BLD-MCNC: 9.5 G/DL (ref 13.5–17.5)
LAB AP ASR DISCLAIMER: NORMAL
LABORATORY COMMENT REPORT: NORMAL
MAGNESIUM SERPL-MCNC: 2.01 MG/DL (ref 1.6–2.4)
MCH RBC QN AUTO: 31.7 PG (ref 26–34)
MCHC RBC AUTO-ENTMCNC: 31.8 G/DL (ref 32–36)
MCV RBC AUTO: 100 FL (ref 80–100)
MITRAL VALVE E/A RATIO: 2.63
NRBC BLD-RTO: 0.1 /100 WBCS (ref 0–0)
PATH REPORT.FINAL DX SPEC: NORMAL
PATH REPORT.GROSS SPEC: NORMAL
PATH REPORT.RELEVANT HX SPEC: NORMAL
PATH REPORT.TOTAL CANCER: NORMAL
PHOSPHATE SERPL-MCNC: 3.6 MG/DL (ref 2.5–4.9)
PLATELET # BLD AUTO: 310 X10*3/UL (ref 150–450)
POTASSIUM SERPL-SCNC: 4.5 MMOL/L (ref 3.5–5.3)
RBC # BLD AUTO: 3 X10*6/UL (ref 4.5–5.9)
SODIUM SERPL-SCNC: 136 MMOL/L (ref 136–145)
TACROLIMUS BLD-MCNC: 9 NG/ML
WBC # BLD AUTO: 15.5 X10*3/UL (ref 4.4–11.3)

## 2025-07-08 PROCEDURE — 82947 ASSAY GLUCOSE BLOOD QUANT: CPT

## 2025-07-08 PROCEDURE — 4A023N6 MEASUREMENT OF CARDIAC SAMPLING AND PRESSURE, RIGHT HEART, PERCUTANEOUS APPROACH: ICD-10-PCS | Performed by: INTERNAL MEDICINE

## 2025-07-08 PROCEDURE — 93451 RIGHT HEART CATH: CPT | Performed by: INTERNAL MEDICINE

## 2025-07-08 PROCEDURE — 2500000002 HC RX 250 W HCPCS SELF ADMINISTERED DRUGS (ALT 637 FOR MEDICARE OP, ALT 636 FOR OP/ED)

## 2025-07-08 PROCEDURE — 80197 ASSAY OF TACROLIMUS: CPT

## 2025-07-08 PROCEDURE — C1894 INTRO/SHEATH, NON-LASER: HCPCS | Performed by: INTERNAL MEDICINE

## 2025-07-08 PROCEDURE — 2720000007 HC OR 272 NO HCPCS: Performed by: INTERNAL MEDICINE

## 2025-07-08 PROCEDURE — 2500000004 HC RX 250 GENERAL PHARMACY W/ HCPCS (ALT 636 FOR OP/ED): Performed by: INTERNAL MEDICINE

## 2025-07-08 PROCEDURE — 80069 RENAL FUNCTION PANEL: CPT

## 2025-07-08 PROCEDURE — 36415 COLL VENOUS BLD VENIPUNCTURE: CPT

## 2025-07-08 PROCEDURE — 99152 MOD SED SAME PHYS/QHP 5/>YRS: CPT | Performed by: INTERNAL MEDICINE

## 2025-07-08 PROCEDURE — 99232 SBSQ HOSP IP/OBS MODERATE 35: CPT

## 2025-07-08 PROCEDURE — 93308 TTE F-UP OR LMTD: CPT | Performed by: INTERNAL MEDICINE

## 2025-07-08 PROCEDURE — 2500000004 HC RX 250 GENERAL PHARMACY W/ HCPCS (ALT 636 FOR OP/ED)

## 2025-07-08 PROCEDURE — 83735 ASSAY OF MAGNESIUM: CPT

## 2025-07-08 PROCEDURE — 93325 DOPPLER ECHO COLOR FLOW MAPG: CPT | Performed by: INTERNAL MEDICINE

## 2025-07-08 PROCEDURE — 93505 ENDOMYOCARDIAL BIOPSY: CPT | Performed by: INTERNAL MEDICINE

## 2025-07-08 PROCEDURE — 93321 DOPPLER ECHO F-UP/LMTD STD: CPT | Performed by: INTERNAL MEDICINE

## 2025-07-08 PROCEDURE — 2500000004 HC RX 250 GENERAL PHARMACY W/ HCPCS (ALT 636 FOR OP/ED): Performed by: STUDENT IN AN ORGANIZED HEALTH CARE EDUCATION/TRAINING PROGRAM

## 2025-07-08 PROCEDURE — 99153 MOD SED SAME PHYS/QHP EA: CPT | Performed by: INTERNAL MEDICINE

## 2025-07-08 PROCEDURE — 2500000004 HC RX 250 GENERAL PHARMACY W/ HCPCS (ALT 636 FOR OP/ED): Performed by: REGISTERED NURSE

## 2025-07-08 PROCEDURE — 2500000001 HC RX 250 WO HCPCS SELF ADMINISTERED DRUGS (ALT 637 FOR MEDICARE OP): Performed by: STUDENT IN AN ORGANIZED HEALTH CARE EDUCATION/TRAINING PROGRAM

## 2025-07-08 PROCEDURE — 2500000002 HC RX 250 W HCPCS SELF ADMINISTERED DRUGS (ALT 637 FOR MEDICARE OP, ALT 636 FOR OP/ED): Performed by: REGISTERED NURSE

## 2025-07-08 PROCEDURE — 93325 DOPPLER ECHO COLOR FLOW MAPG: CPT

## 2025-07-08 PROCEDURE — RXMED WILLOW AMBULATORY MEDICATION CHARGE

## 2025-07-08 PROCEDURE — C1727 CATH, BAL TIS DIS, NON-VAS: HCPCS | Performed by: INTERNAL MEDICINE

## 2025-07-08 PROCEDURE — 1200000002 HC GENERAL ROOM WITH TELEMETRY DAILY

## 2025-07-08 PROCEDURE — 2500000001 HC RX 250 WO HCPCS SELF ADMINISTERED DRUGS (ALT 637 FOR MEDICARE OP)

## 2025-07-08 PROCEDURE — 88307 TISSUE EXAM BY PATHOLOGIST: CPT | Mod: TC,SUR | Performed by: INTERNAL MEDICINE

## 2025-07-08 PROCEDURE — 02BK3ZX EXCISION OF RIGHT VENTRICLE, PERCUTANEOUS APPROACH, DIAGNOSTIC: ICD-10-PCS | Performed by: INTERNAL MEDICINE

## 2025-07-08 PROCEDURE — 85027 COMPLETE CBC AUTOMATED: CPT

## 2025-07-08 PROCEDURE — 99233 SBSQ HOSP IP/OBS HIGH 50: CPT | Performed by: REGISTERED NURSE

## 2025-07-08 PROCEDURE — 2500000001 HC RX 250 WO HCPCS SELF ADMINISTERED DRUGS (ALT 637 FOR MEDICARE OP): Performed by: REGISTERED NURSE

## 2025-07-08 RX ORDER — ACYCLOVIR 400 MG/1
400 TABLET ORAL 2 TIMES DAILY
Qty: 20 TABLET | Refills: 0 | Status: SHIPPED | OUTPATIENT
Start: 2025-07-08 | End: 2025-07-08

## 2025-07-08 RX ORDER — PRAVASTATIN SODIUM 40 MG/1
40 TABLET ORAL NIGHTLY
Qty: 30 TABLET | Refills: 11 | Status: ON HOLD | OUTPATIENT
Start: 2025-07-08 | End: 2026-07-08

## 2025-07-08 RX ORDER — MIDAZOLAM HYDROCHLORIDE 1 MG/ML
INJECTION, SOLUTION INTRAMUSCULAR; INTRAVENOUS AS NEEDED
Status: DISCONTINUED | OUTPATIENT
Start: 2025-07-08 | End: 2025-07-08 | Stop reason: HOSPADM

## 2025-07-08 RX ORDER — LIDOCAINE 560 MG/1
1 PATCH PERCUTANEOUS; TOPICAL; TRANSDERMAL DAILY
Qty: 30 PATCH | Refills: 3 | Status: ON HOLD | OUTPATIENT
Start: 2025-07-09

## 2025-07-08 RX ORDER — MYCOPHENOLATE MOFETIL 250 MG/1
1000 CAPSULE ORAL EVERY 12 HOURS SCHEDULED
Qty: 240 CAPSULE | Refills: 11 | Status: ON HOLD | OUTPATIENT
Start: 2025-07-08 | End: 2026-07-08

## 2025-07-08 RX ORDER — CALCIUM CARBONATE 500(1250)
1 TABLET ORAL 2 TIMES DAILY
Qty: 60 TABLET | Refills: 11 | Status: ON HOLD | OUTPATIENT
Start: 2025-07-08

## 2025-07-08 RX ORDER — TACROLIMUS 1 MG/1
1 CAPSULE ORAL 2 TIMES DAILY
Qty: 60 CAPSULE | Refills: 11 | Status: SHIPPED | OUTPATIENT
Start: 2025-07-08 | End: 2025-07-14 | Stop reason: ALTCHOICE

## 2025-07-08 RX ORDER — INSULIN GLARGINE 100 [IU]/ML
8 INJECTION, SOLUTION SUBCUTANEOUS NIGHTLY
Qty: 10 ML | Refills: 12 | Status: SHIPPED | OUTPATIENT
Start: 2025-07-08 | End: 2025-07-09 | Stop reason: HOSPADM

## 2025-07-08 RX ORDER — TRAMADOL HYDROCHLORIDE 50 MG/1
50 TABLET, FILM COATED ORAL EVERY 6 HOURS PRN
Qty: 15 TABLET | Refills: 0 | Status: ON HOLD | OUTPATIENT
Start: 2025-07-08 | End: 2025-07-16

## 2025-07-08 RX ORDER — TAMSULOSIN HYDROCHLORIDE 0.4 MG/1
0.4 CAPSULE ORAL DAILY
Qty: 30 CAPSULE | Refills: 11 | Status: ON HOLD | OUTPATIENT
Start: 2025-07-08 | End: 2026-07-08

## 2025-07-08 RX ORDER — ACYCLOVIR 400 MG/1
400 TABLET ORAL 2 TIMES DAILY
Qty: 180 TABLET | Refills: 0 | Status: ON HOLD | OUTPATIENT
Start: 2025-07-08 | End: 2025-10-07

## 2025-07-08 RX ORDER — INSULIN GLARGINE 100 [IU]/ML
16 INJECTION, SOLUTION SUBCUTANEOUS NIGHTLY
Status: DISCONTINUED | OUTPATIENT
Start: 2025-07-08 | End: 2025-07-09 | Stop reason: HOSPADM

## 2025-07-08 RX ORDER — PANTOPRAZOLE SODIUM 40 MG/1
40 TABLET, DELAYED RELEASE ORAL DAILY
Qty: 180 TABLET | Refills: 3 | Status: ON HOLD | OUTPATIENT
Start: 2025-07-08

## 2025-07-08 RX ORDER — LEVOTHYROXINE SODIUM 125 UG/1
125 TABLET ORAL DAILY
Qty: 30 TABLET | Refills: 11 | Status: ON HOLD | OUTPATIENT
Start: 2025-07-08 | End: 2026-07-08

## 2025-07-08 RX ORDER — TRAMADOL HYDROCHLORIDE 50 MG/1
50 TABLET, FILM COATED ORAL EVERY 6 HOURS PRN
Status: DISCONTINUED | OUTPATIENT
Start: 2025-07-08 | End: 2025-07-09 | Stop reason: HOSPADM

## 2025-07-08 RX ORDER — ISOPROPYL ALCOHOL 70 ML/100ML
30 SWAB TOPICAL 3 TIMES DAILY
Qty: 100 EACH | Refills: 3 | Status: ON HOLD | OUTPATIENT
Start: 2025-07-08

## 2025-07-08 RX ORDER — CHOLECALCIFEROL (VITAMIN D3) 50 MCG
50 TABLET ORAL DAILY
Qty: 30 TABLET | Refills: 11 | Status: ON HOLD | OUTPATIENT
Start: 2025-07-09 | End: 2026-07-09

## 2025-07-08 RX ORDER — TACROLIMUS 0.5 MG/1
2.5 CAPSULE, GELATIN COATED ORAL
Qty: 60 CAPSULE | Refills: 3 | Status: SHIPPED | OUTPATIENT
Start: 2025-07-08 | End: 2025-07-14 | Stop reason: DRUGHIGH

## 2025-07-08 RX ORDER — FUROSEMIDE 40 MG/1
40 TABLET ORAL DAILY
Status: DISCONTINUED | OUTPATIENT
Start: 2025-07-09 | End: 2025-07-09 | Stop reason: HOSPADM

## 2025-07-08 RX ORDER — VORICONAZOLE 200 MG/1
200 TABLET, FILM COATED ORAL EVERY 12 HOURS SCHEDULED
Qty: 14 TABLET | Refills: 0 | Status: SHIPPED | OUTPATIENT
Start: 2025-07-08 | End: 2025-07-09

## 2025-07-08 RX ORDER — FENTANYL CITRATE 50 UG/ML
INJECTION, SOLUTION INTRAMUSCULAR; INTRAVENOUS AS NEEDED
Status: DISCONTINUED | OUTPATIENT
Start: 2025-07-08 | End: 2025-07-08 | Stop reason: HOSPADM

## 2025-07-08 RX ORDER — ASPIRIN 81 MG/1
81 TABLET ORAL DAILY
Qty: 90 TABLET | Refills: 3 | Status: ON HOLD | OUTPATIENT
Start: 2025-07-08

## 2025-07-08 RX ORDER — PREDNISONE 5 MG/1
25 TABLET ORAL EVERY 12 HOURS SCHEDULED
Qty: 300 TABLET | Refills: 0 | Status: SHIPPED | OUTPATIENT
Start: 2025-07-08 | End: 2025-07-09

## 2025-07-08 RX ORDER — SULFAMETHOXAZOLE AND TRIMETHOPRIM 400; 80 MG/1; MG/1
1 TABLET ORAL DAILY
Qty: 90 TABLET | Refills: 1 | Status: ON HOLD | OUTPATIENT
Start: 2025-07-09

## 2025-07-08 RX ORDER — LIDOCAINE HYDROCHLORIDE 20 MG/ML
INJECTION, SOLUTION INFILTRATION; PERINEURAL AS NEEDED
Status: DISCONTINUED | OUTPATIENT
Start: 2025-07-08 | End: 2025-07-08 | Stop reason: HOSPADM

## 2025-07-08 RX ORDER — HYDRALAZINE HYDROCHLORIDE 50 MG/1
50 TABLET, FILM COATED ORAL 3 TIMES DAILY
Qty: 90 TABLET | Refills: 11 | Status: ON HOLD | OUTPATIENT
Start: 2025-07-08 | End: 2026-07-08

## 2025-07-08 RX ORDER — FUROSEMIDE 40 MG/1
40 TABLET ORAL DAILY
Qty: 30 TABLET | Refills: 11 | Status: ON HOLD | OUTPATIENT
Start: 2025-07-08 | End: 2026-07-08

## 2025-07-08 RX ADMIN — GABAPENTIN 300 MG: 300 CAPSULE ORAL at 21:31

## 2025-07-08 RX ADMIN — HEPARIN SODIUM 5000 UNITS: 5000 INJECTION INTRAVENOUS; SUBCUTANEOUS at 17:39

## 2025-07-08 RX ADMIN — CALCIUM 1 TABLET: 500 TABLET ORAL at 21:32

## 2025-07-08 RX ADMIN — PRAVASTATIN SODIUM 40 MG: 40 TABLET ORAL at 21:37

## 2025-07-08 RX ADMIN — HYDRALAZINE HYDROCHLORIDE 50 MG: 50 TABLET ORAL at 21:31

## 2025-07-08 RX ADMIN — ACETAMINOPHEN 650 MG: 325 TABLET ORAL at 17:40

## 2025-07-08 RX ADMIN — TACROLIMUS 2.5 MG: 0.5 CAPSULE ORAL at 06:50

## 2025-07-08 RX ADMIN — PANTOPRAZOLE SODIUM 40 MG: 40 TABLET, DELAYED RELEASE ORAL at 06:50

## 2025-07-08 RX ADMIN — SENNOSIDES AND DOCUSATE SODIUM 1 TABLET: 50; 8.6 TABLET ORAL at 09:40

## 2025-07-08 RX ADMIN — INSULIN GLARGINE 16 UNITS: 100 INJECTION, SOLUTION SUBCUTANEOUS at 21:25

## 2025-07-08 RX ADMIN — FUROSEMIDE 40 MG: 10 INJECTION, SOLUTION INTRAMUSCULAR; INTRAVENOUS at 09:39

## 2025-07-08 RX ADMIN — SODIUM ZIRCONIUM CYCLOSILICATE 10 G: 10 POWDER, FOR SUSPENSION ORAL at 13:59

## 2025-07-08 RX ADMIN — INSULIN LISPRO 12 UNITS: 100 INJECTION, SOLUTION INTRAVENOUS; SUBCUTANEOUS at 17:42

## 2025-07-08 RX ADMIN — HUMAN ALBUMIN MICROSPHERES AND PERFLUTREN 1.75 ML: 10; .22 INJECTION, SOLUTION INTRAVENOUS at 11:20

## 2025-07-08 RX ADMIN — LEVOTHYROXINE SODIUM 125 MCG: 0.05 TABLET ORAL at 06:50

## 2025-07-08 RX ADMIN — MIDAZOLAM 1 MG: 1 INJECTION INTRAMUSCULAR; INTRAVENOUS at 08:49

## 2025-07-08 RX ADMIN — HEPARIN SODIUM 5000 UNITS: 5000 INJECTION INTRAVENOUS; SUBCUTANEOUS at 09:40

## 2025-07-08 RX ADMIN — LIDOCAINE HYDROCHLORIDE 5 ML: 20 INJECTION, SOLUTION INFILTRATION; PERINEURAL at 08:51

## 2025-07-08 RX ADMIN — HYDRALAZINE HYDROCHLORIDE 50 MG: 50 TABLET ORAL at 17:39

## 2025-07-08 RX ADMIN — PREDNISONE 25 MG: 20 TABLET ORAL at 09:40

## 2025-07-08 RX ADMIN — TACROLIMUS 2.5 MG: 0.5 CAPSULE ORAL at 17:39

## 2025-07-08 RX ADMIN — VORICONAZOLE 200 MG: 200 TABLET, COATED ORAL at 21:31

## 2025-07-08 RX ADMIN — PREDNISONE 15 MG: 5 TABLET ORAL at 21:31

## 2025-07-08 RX ADMIN — TAMSULOSIN HYDROCHLORIDE 0.4 MG: 0.4 CAPSULE ORAL at 09:40

## 2025-07-08 RX ADMIN — ACYCLOVIR 400 MG: 400 TABLET ORAL at 09:40

## 2025-07-08 RX ADMIN — MYCOPHENOLATE MOFETIL 1000 MG: 250 CAPSULE ORAL at 17:39

## 2025-07-08 RX ADMIN — HYDRALAZINE HYDROCHLORIDE 50 MG: 50 TABLET ORAL at 09:39

## 2025-07-08 RX ADMIN — MYCOPHENOLATE MOFETIL 1000 MG: 250 CAPSULE ORAL at 06:50

## 2025-07-08 RX ADMIN — ACETAMINOPHEN 650 MG: 325 TABLET ORAL at 06:50

## 2025-07-08 RX ADMIN — ASPIRIN 81 MG: 81 TABLET, COATED ORAL at 09:40

## 2025-07-08 RX ADMIN — SULFAMETHOXAZOLE AND TRIMETHOPRIM 1 TABLET: 400; 80 TABLET ORAL at 09:39

## 2025-07-08 RX ADMIN — FENTANYL CITRATE 50 MCG: 50 INJECTION, SOLUTION INTRAMUSCULAR; INTRAVENOUS at 08:49

## 2025-07-08 RX ADMIN — INSULIN LISPRO 6 UNITS: 100 INJECTION, SOLUTION INTRAVENOUS; SUBCUTANEOUS at 19:22

## 2025-07-08 RX ADMIN — CHOLECALCIFEROL TAB 25 MCG (1000 UNIT) 50 MCG: 25 TAB at 09:40

## 2025-07-08 RX ADMIN — CALCIUM 1 TABLET: 500 TABLET ORAL at 09:40

## 2025-07-08 RX ADMIN — ACYCLOVIR 400 MG: 400 TABLET ORAL at 21:30

## 2025-07-08 ASSESSMENT — COGNITIVE AND FUNCTIONAL STATUS - GENERAL
DRESSING REGULAR UPPER BODY CLOTHING: A LITTLE
HELP NEEDED FOR BATHING: A LITTLE
CLIMB 3 TO 5 STEPS WITH RAILING: A LOT
STANDING UP FROM CHAIR USING ARMS: A LITTLE
DAILY ACTIVITIY SCORE: 21
DRESSING REGULAR LOWER BODY CLOTHING: A LITTLE
DRESSING REGULAR LOWER BODY CLOTHING: A LITTLE
STANDING UP FROM CHAIR USING ARMS: A LITTLE
WALKING IN HOSPITAL ROOM: A LITTLE
DRESSING REGULAR UPPER BODY CLOTHING: A LITTLE
HELP NEEDED FOR BATHING: A LITTLE
MOBILITY SCORE: 20
CLIMB 3 TO 5 STEPS WITH RAILING: A LOT
WALKING IN HOSPITAL ROOM: A LITTLE
DAILY ACTIVITIY SCORE: 21
MOBILITY SCORE: 20

## 2025-07-08 ASSESSMENT — ENCOUNTER SYMPTOMS
APPETITE CHANGE: 0
ABDOMINAL PAIN: 0
NAUSEA: 0
FREQUENCY: 0
SHORTNESS OF BREATH: 0
DIZZINESS: 0
DYSURIA: 0
VOMITING: 0
NUMBNESS: 0

## 2025-07-08 ASSESSMENT — PAIN - FUNCTIONAL ASSESSMENT
PAIN_FUNCTIONAL_ASSESSMENT: 0-10
PAIN_FUNCTIONAL_ASSESSMENT: 0-10

## 2025-07-08 ASSESSMENT — PAIN SCALES - GENERAL
PAINLEVEL_OUTOF10: 0 - NO PAIN
PAINLEVEL_OUTOF10: 0 - NO PAIN

## 2025-07-08 NOTE — PROGRESS NOTES
"Anatoly Lane is a 64 y.o. male on day 18 of admission presenting with S/P orthotopic heart transplant.    Subjective     Patient doing well this morning. He has been on 16u glargine and 6u lispro with SSI. Sugars . Biopsy today. May reduce steroids pending results.     I have reviewed histories, allergies and medications have been reviewed and there are no changes       Objective   Review of Systems   Constitutional:  Negative for appetite change.   Eyes:  Negative for visual disturbance.   Respiratory:  Negative for shortness of breath.    Gastrointestinal:  Negative for abdominal pain, nausea and vomiting.   Genitourinary:  Negative for dysuria and frequency.   Neurological:  Negative for dizziness and numbness.     Physical Exam    General - elderly male in NAD  HEENT - At/NC  Resp - no extra wob noted  Abdomen - soft and non tender  Ext - no edema    Last Recorded Vitals  Blood pressure 146/80, pulse 86, temperature 35.9 °C (96.6 °F), temperature source Temporal, resp. rate 18, height 1.753 m (5' 9\"), weight 98.4 kg (216 lb 14.4 oz), SpO2 96%.  Intake/Output last 3 Shifts:  I/O last 3 completed shifts:  In: 720 (7.5 mL/kg) [P.O.:720]  Out: 4850 (50.4 mL/kg) [Urine:4850 (1.4 mL/kg/hr)]  Dosing Weight: 96.3 kg     Relevant Results  Results from last 7 days   Lab Units 07/08/25  0740 07/08/25  0640 07/07/25  2109 07/07/25  1823 07/07/25  1601 07/07/25  1144 07/07/25  0651 07/07/25  0646 07/06/25  0635 07/06/25  0548 07/05/25  0753 07/05/25  0539 07/04/25  0743 07/04/25  0608   POCT GLUCOSE mg/dL 140*  --  99 180* 244* 140*   < >  --    < >  --    < >  --    < >  --    GLUCOSE mg/dL  --  129*  --   --   --   --   --  173*  --  122*  --  143*  --  204*    < > = values in this interval not displayed.     Lab Results   Component Value Date    CREATININE 1.15 07/08/2025    BUN 57 (H) 07/08/2025     07/08/2025    K 4.5 07/08/2025    CL 97 (L) 07/08/2025    CO2 32 07/08/2025       Assessment & Plan  Stress " hyperglycemia    The patient is a 64 year old male with a past medical history significant of but not limited to Stage D HFrEF, CAD, PAF, VT, hypothyroidism who is s/p heart transplant on 06/21/2025. We have been asked to see the patient for stress and GC induced hyperglycemia in the setting of pre-existing prediabetes.    GC regimen:  Prednisone 35 mg Q 12 hours from 06/23 - 07/01 as the biopsy was negative  Prednisone 25 Q 12 hours from 07/01-07/08  Prednisone 30mg daily from 07/09-present    Discharge Recs:  - Diabetes education to be done by primary nursing team prior to discharge  - Discussed Insulin NPH .2mg/kg (18-20u) at the time of steroid dose, but patient stated to primary team that he would not be compliant with insulin and checking sugars. It would be too dangerous to send the patient home on insulin without blood glucose checks, so decided glipizide would better optimize patient compliance.  - Start 10mg Glipizide orally to be taken with 30mg steroid dose in the AM. Can discontinue one steroids are tapered.   - Carb controlled diet at home  - Follow up with endocrinology 1-2 weeks after discharge. Continue to follow as steroids taper.     Inpatient recs:   - Continue Glargine 16 units daily (half if NPO)  - Lispro 6 units with meals  - lispro with SS 2:50 >150 with meals  - Accuchecks ACHS  - BG goal 120-140  - Hypoglycemia protocol in place  - CCD diet 70 g  - Please continue Glucerna shakes over Ensure for optimal carb intake  - We will continue to follow    The patient was discussed with endocrinology fellow Dr. Edwards and attending Dr. Garcia     Thank you for the consult. Please reach out if you have any further questions. We will be following.       Cecille Le MD  Internal Medicine PGY-1

## 2025-07-08 NOTE — PROGRESS NOTES
Faxed progress note to [facility] at [facility] transplant. Fax [phone]

## 2025-07-08 NOTE — PROGRESS NOTES
Physical Therapy                 Therapy Communication Note    Patient Name: Anatoly Lane  MRN: [id number]  Department: [facility] [id number]  Room: [address] [place], [place] [address]  Today's Date: 7/8/2025     Discipline: Physical Therapy    Missed Visit: PT Missed Visit: Yes     Missed Visit Reason: Missed Visit Reason: Patient in a medical procedure (Off floor at [place])    Missed Time: Attempt 1111    Comment:

## 2025-07-08 NOTE — PROGRESS NOTES
Pharmacist Transplant Education Note    The medications for Anatoly Lane were reviewed in conjunction with the multidisciplinary transplant team. The pharmacist is in agreement with the plan of care for medications at this time.     Approximately 35 minutes were spent with this patient providing medication education and reviewing new post-transplant medications. The patient was not accompanied by any family or friends when the education was provided.     An outpatient dosing schedule was created for all oral medications. This schedule was discussed in detail with the patient, including drug name, use, dose, and the appropriate timing of self-administration (i.e. with or without meals, with or without other medications). Also discussed side effects, drug interactions, and the importance of adherence. Both verbal and written instructions were given to the patient outlining the appropriate use of all current oral medications.     The patient demonstrated an acceptable understanding of his current medication list. All questions were answered to the patient's satisfaction, and the patient confirmed understanding.     Follow-up education will take place prior to discharge where a finalized list of discharge medications will be reviewed with the patient. Further educational reinforcement should be provided in the outpatient clinic.    Kenyetta Barrios, PharmD, BCTXP  Clinical Pharmacy Specialist - Solid Organ Transplant

## 2025-07-08 NOTE — H&P
"History Of Present Illness  Anatoly Lane is a 64 y.o. male here for routine RHC/EMBx as part of his post-OHT eval.     Past Medical History  Medical History[1]    Surgical History  Surgical History[2]     Social History  He reports that he has quit smoking. His smoking use included cigarettes. He has never used smokeless tobacco. He reports that he does not drink alcohol and does not use drugs.    Family History  Family History[3]     Allergies  Jardiance [empagliflozin]         Physical Exam  Constitutional:       General: He is not in acute distress.     Appearance: Normal appearance.   HENT:      Mouth/Throat:      Mouth: Mucous membranes are moist.   Cardiovascular:      Rate and Rhythm: Regular rhythm. Tachycardia present.      Pulses: Normal pulses.   Pulmonary:      Breath sounds: Normal breath sounds.   Abdominal:      Palpations: Abdomen is soft.          Last Recorded Vitals  Blood pressure 139/75, pulse 80, temperature 35.9 °C (96.6 °F), temperature source Temporal, resp. rate 18, height 1.753 m (5' 9\"), weight 98.4 kg (216 lb 14.4 oz), SpO2 94%.       Assessment & Plan  S/P orthotopic heart transplant    Hypothyroidism    Stress hyperglycemia    Receiving inotropic medication    History of hypothyroidism    History of BPH    Blood loss anemia    Immunosuppression    C. difficile diarrhea    64M s/p OHT here for routine RHC/EMBx.       Vince Ruano DO         [1]   Past Medical History:  Diagnosis Date    ACC/AHA stage D systolic heart failure     Acute decompensated heart failure 02/23/2024    Acute on chronic combined systolic (congestive) and diastolic (congestive) heart failure 02/23/2024    Acute on chronic systolic heart failure, NYHA class 3     Anticoagulant long-term use 02/26/2024    CAD (coronary artery disease)     Cardiac defibrillator in place 02/14/2024    CHF (congestive heart failure) 02/14/2024    Chronic left systolic heart failure 02/14/2025    Hypothyroid     Hypothyroidism  "    LVAD (left ventricular assist device) present (Multi)     LVAD (left ventricular assist device) present (Multi) 02/14/2024    Non-ischemic cardiomyopathy (Multi) 02/14/2024    PAF (paroxysmal atrial fibrillation) (Multi)     Paroxysmal ventricular tachycardia 02/14/2024   [2]   Past Surgical History:  Procedure Laterality Date    CARDIAC CATHETERIZATION N/A 2/28/2024    Procedure: Right Heart Cath;  Surgeon: José Velasco MD;  Location: [place] [address] Cardiac Cath Lab;  Service: Cardiovascular;  Laterality: N/A;  with RAMP study    CARDIAC CATHETERIZATION N/A 3/26/2025    Procedure: Right Heart Cath;  Surgeon: Vince Ruano DO;  Location: [place] [address] Cardiac Cath Lab;  Service: Cardiovascular;  Laterality: N/A;    CARDIAC CATHETERIZATION N/A 7/1/2025    Procedure: RHC;  Surgeon: José Velasco MD;  Location: [place] [address] Cardiac Cath Lab;  Service: Cardiovascular;  Laterality: N/A;  w/ EMB    CARDIAC CATHETERIZATION N/A 7/1/2025    Procedure: Endomyocardial Biopsy;  Surgeon: José Velasco MD;  Location: [place] [address] Cardiac Cath Lab;  Service: Cardiovascular;  Laterality: N/A;    COLONOSCOPY W/ POLYPECTOMY  2023    CT ABDOMEN PELVIS ANGIOGRAM W AND/OR WO IV CONTRAST  04/13/2023    CT ABDOMEN PELVIS ANGIOGRAM W AND/OR WO IV CONTRAST [facility] CT    LEFT VENTRICULAR ASSIST DEVICE      OTHER SURGICAL HISTORY  01/05/2022    Complete colonoscopy    OTHER SURGICAL HISTORY  01/05/2022    Cardioverter defibrillator insertion   [3]   Family History  Problem Relation Name Age of Onset    Hypertension Father      Colon cancer Father

## 2025-07-08 NOTE — TELEPHONE ENCOUNTER
Received call from [facility] asking for update on if patient was admitted. Advised that patient was still admitted, received a transplant and is no longer on blood thinners. They will require a signed letter confirming that he is no longer on coumadin and can be discharged from their program. Fax number is [phone].

## 2025-07-08 NOTE — POST-PROCEDURE NOTE
Physician Transition of Care Summary  Invasive Cardiovascular Lab    Procedure Date: 7/8/2025  Attending:    * Vince Ruano - Primary  Resident/Fellow/Other Assistant: Surgeons and Role:  * No surgeons found with a matching role *    Indications:   Pre-op Diagnosis      * S/P orthotopic heart transplant [Z94.1]    Post-procedure diagnosis:   Post-op Diagnosis     * S/P orthotopic heart transplant [Z94.1]    Procedure(s):   Endomyocardial Biopsy  05072 - AL ENDOMYOCARDIAL BIOPSY    RHC  92723 - AL RIGHT HEART CATH O2 SATURATION & CARDIAC OUTPUT        Procedure Findings:   Preserved CO/CI with modestly elevated left sided filling pressures.     Description of the Procedure:   RHC w/EMBx via RIJ    Complications:   None    Estimated Blood Loss:   5 mL    Anesthesia: Moderate Sedation Anesthesia Staff: No anesthesia staff entered.    Any Specimen(s) Removed:   Order Name Source Comment Collection Info Order Time   SURGICAL PATHOLOGY EXAM HEART TRANSPLANT BIOPSY  Collected By: Hermila Wiggins RN 7/8/2025  8:37 AM     How many specimens will you need? (If more than 16 start a new case)   1          Source of Specimen A:   HEART TRANSPLANT BIOPSY          Procedure:   RHC/EMBx          Specify Site:   RV          Fixative:   Fresh          Clinical History/Special Request:   s/p OHT          Has this patient had previous specimens that failed molecular testing?   No            Disposition:   Return to LT3 for ongoing management.       Electronically signed by: Vince Ruano DO, 7/8/2025 9:16 AM

## 2025-07-08 NOTE — PROGRESS NOTES
Spoke to patient regarding discharge plan and physical therapy's recommendation for low intensity therapy. Patient is agreeable with plan to discharge to home with PT from [facility].  Fatoumata Del Real RN

## 2025-07-08 NOTE — PROGRESS NOTES
07/08/25 1048   Discharge Planning   Expected Discharge Disposition Home H  ([facility])     [facility] can accept with start of care this week for PT.

## 2025-07-08 NOTE — PROGRESS NOTES
[facility]  Advanced HF PROGRESS NOTE    Anatoly Lane/[id number]    Admit Date: 6/20/2025  Hospital Length of Stay: 18   ICU Length of Stay: 4d   Primary Service: HFICU  Primary HF Cardiologist: Carlton     INTERVAL EVENTS / PERTINENT ROS:   NSR 70-80s on tele   -140  Edema and SOB improving , neg 2 L yesterday  RHC and EMB completed today w/ improved BiV filling pressures   Tacrolimus remains in goal   CT w/ pneumo mediastinum reviewed w/ Dr. Birch and Dr. Lorenzo, no surgical intervention at this time   Insulin plan still pending per endo, pt is refusing to self administer insulin injections at home     Plan:  - Transition from IV Lasix 40 BID to 40mg PO daily   - C/w strict I & O's   - Discharge tomorrow home with PT pending biopsy results      MEDICATIONS  Infusions:       Scheduled:  acetaminophen, 650 mg, q6h  acyclovir, 400 mg, BID  aspirin, 81 mg, Daily  calcium carbonate, 1,250 mg of calcium carbonate, BID  cholecalciferol, 50 mcg, Daily  furosemide, 40 mg, BID  gabapentin, 300 mg, Nightly  heparin (porcine), 5,000 Units, q8h  hydrALAZINE, 50 mg, TID  insulin glargine, 8 Units, Nightly  insulin lispro, 0-20 Units, Before meals & nightly  insulin lispro, 6 Units, TID AC  levothyroxine, 125 mcg, Daily  lidocaine, 1 patch, Daily  mycophenolate, 1,000 mg, q12h LUPILLO  pantoprazole, 40 mg, Daily before breakfast  perflutren protein A microsphere, 0.5 mL, Once in imaging  pravastatin, 40 mg, Nightly  predniSONE, 25 mg, q12h LUPILLO  sennosides-docusate sodium, 1 tablet, BID  sodium zirconium cyclosilicate, 10 g, Daily  sulfamethoxazole-trimethoprim, 1 tablet, Daily  sulfur hexafluoride microsphr, 2 mL, Once in imaging  tacrolimus, 2.5 mg, q12h LUPILLO  tamsulosin, 0.4 mg, Daily  voriconazole, 200 mg, q12h LUPILLO      PRN:  cyclobenzaprine, 5 mg, TID PRN  dextrose, 12.5 g, q15 min PRN  dextrose, 25 g, q15 min PRN  dextrose, 25 g, q15 min PRN  glucagon, 1 mg, q15 min PRN  glucagon, 1 mg, q15  "min PRN  guaiFENesin, 600 mg, BID PRN  melatonin, 5 mg, Nightly PRN  naloxone, 0.2 mg, q5 min PRN  ondansetron ODT, 4 mg, q8h PRN   Or  ondansetron, 4 mg, q8h PRN  oxyCODONE, 10 mg, q4h PRN  oxyCODONE, 5 mg, q4h PRN  oxygen, , Continuous PRN - O2/gases  polyethylene glycol, 17 g, Daily PRN      PHYSICAL EXAM:   Visit Vitals  /80   Pulse 86   Temp 35.9 °C (96.6 °F) (Temporal)   Resp 18   Ht 1.753 m (5' 9\")   Wt 98.4 kg (216 lb 14.4 oz)   SpO2 96%   BMI 32.03 kg/m²   Smoking Status Former   BSA 2.19 m²       Wt Readings from Last 5 Encounters:   07/08/25 98.4 kg (216 lb 14.4 oz)   02/27/25 102 kg (224 lb)   01/28/25 104 kg (229 lb)   11/19/24 112 kg (246 lb 12.8 oz)   07/15/24 110 kg (243 lb 9.6 oz)       INTAKE/OUTPUT:  I/O last 3 completed shifts:  In: 720 (7.5 mL/kg) [P.O.:720]  Out: 4850 (50.4 mL/kg) [Urine:4850 (1.4 mL/kg/hr)]  Dosing Weight: 96.3 kg      Physical Exam  HENT:      Head: Normocephalic and atraumatic.   Cardiovascular:      Rate and Rhythm: Normal rate.      Heart sounds: No murmur heard.     No friction rub. No gallop.   Pulmonary:      Effort: Pulmonary effort is normal. No respiratory distress.      Breath sounds: No wheezing, rhonchi or rales.      Comments: Stenotomy incision appears C/D/I  Abdominal:      General: Abdomen is flat. There is no distension.      Palpations: Abdomen is soft. There is no mass.      Tenderness: There is no abdominal tenderness. There is no guarding.   Musculoskeletal:      Right lower leg: Edema present.      Left lower leg: Edema present.   Skin:     General: Skin is warm.      Findings: No rash.      Comments: Bullae under sternotomy skin site. Crepitus from sternotomy to L earlobe    Neurological:      General: No focal deficit present.      Mental Status: He is alert and oriented to person, place, and time.   Psychiatric:         Mood and Affect: Mood normal.         Behavior: Behavior normal.         DATA:  CMP:  Results from last 7 days   Lab Units " "07/08/25  0640 07/07/25  0646 07/06/25  0548 07/05/25  0539 07/04/25  0608 07/03/25  0630 07/02/25  0713   SODIUM mmol/L 136 138 138 136 138 138 136   POTASSIUM mmol/L 4.5 4.5 5.3 5.0 4.9 4.5 4.7   CHLORIDE mmol/L 97* 97* 101 101 101 100 98   CO2 mmol/L 32 31 27 28 30 31 30   ANION GAP mmol/L 12 15 15 12 12 12 13   BUN mg/dL 57* 57* 42* 34* 32* 33* 36*   CREATININE mg/dL 1.15 1.11 1.29 0.86 0.81 0.87 0.84   EGFR mL/min/1.73m*2 71 74 62 >90 >90 >90 >90   MAGNESIUM mg/dL 2.01 2.06 2.08 1.95 1.99 1.91 2.10   ALBUMIN g/dL 3.3* 3.4 3.3* 3.3* 3.1* 3.3* 3.3*     CBC:  Results from last 7 days   Lab Units 07/08/25  0640 07/07/25  0646 07/06/25  1159 07/05/25  0539 07/04/25  0608 07/03/25  0630 07/02/25  0713   WBC AUTO x10*3/uL 15.5* 18.2* 20.4* 20.4* 20.0* 22.6* 21.9*   HEMOGLOBIN g/dL 9.5* 10.0* 10.0* 9.4* 9.0* 9.2* 9.5*   HEMATOCRIT % 29.9* 30.2* 31.5* 29.9* 28.7* 29.2* 30.0*   PLATELETS AUTO x10*3/uL 310 327 339 339 303 344 348   MCV fL 100 98 99 101* 100 100 100     COAG:         ABO:   No results found for: \"ABO\"    HEME/ENDO:         CARDIAC:   Results from last 7 days   Lab Units 07/06/25  1159   BNP pg/mL 347*       ASSESSMENT AND PLAN:   Anatoly Lane is a 64 year old male with relevant PMH of ex smoker, NICM, stage D HFrEF NYHA 2-3, S/p HM2 (4/30/23), VT s/p ICD, CAD, pAF, HTN, BPH, GERD and hypothyroidism.  S/p LVAD explant, AICD removal & OHT by Dr. Lorenzo on 6/21. Now transferred from CTICU to HF ICU 6/24. Patient arrived to HFICU HDS stable, on milrinone drip 0.125 mcg/kg/min. Milrinone gtt weaned in HFICU and pt was transferred to LT5. Course c/b pneumo mediastinum, steroid induced hyperglycemia, ad L hand palsy.      Neuro:  Anxiety. Depression, Acute pain   BUE/BLE mild tremor  Acute back pain   - Serial neuro and pain assessments   - PO Tylenol PRN for pain  - PT/OT Consult  - Started PRN tramadol     L hand extensor palsy and 4th+5th digital numbness  - On 6/27, patient c/o inability to extend digits " 2-5 around the MCP joints. In addition, he is complaining of numbness from the medial aspect of the wrist to the 4th and 5th digits and part of the palm.  - Deficits do not align completely with a specific nerve injury- would not expect numbness in digits 4-5 with a radial nerve palsy and would not expect difficult with finger extension with an ulnar nerve palsy.  - Ddx includes nerve compression from forearm edema vs brachial plexus injury.  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  Now s/p LVAD explant, AICD removal, & OHT on 6/21  - TTE (6/22): Normal BiV   Donor/Recipient Infectious history:   CMV: -/-   EBV: +/+   Toxo: -/-   Heb B: -/-  Hep C: -/-  HIV: -/-      Rejection/Prophylaxis:    - Induction: s/p methylprednisolone 500mg IV x2, S/p Basiliximab 20mg IV within 1 hour of arrival to CTICU (no need for premedication), s/p 2nd dose: 20mg IVPB on POD4 (06/25)   - Steroids: Prednisone 25mg PO BID (scheduled w/o end date, will need to be manually reduced if next biopsy negative), biopsy pending  - Tacrolimus: Last tacrolimus trough: 10.4 7/7/2025:  6:46 AM 2.5 mg BID (dose adjusted 06/29), will continue same dose today.    - Tacrolimus goal troughs: 10-12  - Cellcept: C/w 1,000mg BID  - Antifungals: c/w voriconazole 200mg BID x3 months end date 09/22/2025   - Antivirals: c/w acylovir 400mg BID (06/22 x3 months. End date 09/22/2025   - Anti PCP & Toxoplasmosis: SS Bactrim daily (end date of 12/22/25)      Next RHC/biopsy: 7/8 completed: pending results. Will tentatively schedule him for biopsy and  outpatient visit 7/14  Last TTE/BOBBY on 6/23: ; normal BiV function (poor windows on 7/1 TTE)   Last HLA: First due on 7/24  Last Allomap: ~ will assess starting at 6-month ana post-op    Last Allosure: ~ will assess starting at 6-month ana post-op   Last C: 03/2023(pre OHT); First due ~ (one year post tx)   Osteopenia/osteoporosis prophylaxis: C/w Vit D 2000U daily and calcium 500 mg BID (give calcium 2 hrs after MMF)   Peptic/gastric ulcer prophylaxis: Pantoprazole 40mg daily    CAV Prophylaxis:  C/w  aspirin and  pravastatin 40 mg QHS  Pacing: Epicardial wires removed 07/07     Volume Overload - resolving   BNP: 347 07/06, send repeat tomorrow   Lasix 40mg PO once daily  Strict I &O's   Goal - 2 L daily   Monitor electrolyte     Subcutaneous emphysema and pneumomediastinum  - Dr. Birch aware, images reviewed today w/o plan for surgical intervention. Patient remains stable on exam and is tolerating RA w/ no acute distress   - made CT surgery aware with new tense gas filled area under substernal incision site   - CXR and CT chest w/o contrast completed 07/07/2025    HTN  - C/w hydralazine 50mg TID and increase as needed      Pulmonary:   Productive cough  Ex-smoker  - Noticed to have mod-severe productive cough with clear expectorant, pt says he has chronic bronchitis from years of smoking  - Auscultation with rough crackles, rhonchi  - CXR 06/29 small stable L pleural effusion, no fevers, WBC elevated but likely from steroids  - CT chest appears to be volume overload, will continue diuresis. WBC downtrending. Obtain follow up CXR in a few days (ordered for tomorrow)   - Monitor and maintain SpO2 > 92%     GI:  C- Diff infection, diarrhea (Resolved)  - Diagnosed 06/25, on PO vanc x 10 days (07/05)  - Bowel regimen PRN   - PPI     :  H/x BPH    DEBBIE - resolved   Likely cardiorenal   - diuresis as above   -Baseline Cr: 0.8 , Admit BUN/Cr: Normal   - Trend BUN/Cr   -I/Os  -avoid hypotension and nephrotoxic  agents  - C/w home tamsulosin 0.4 mg daily      Heme:  Acute blood loss anemia stable, no active s/s of bleeding  - Trending H/h   - Iron studies: %saturation 21%, ferritin 130. No need for IV iron.      Endo:  Steroid and stress induced hyperglycemia  - Maintain BG <180  - continue SSI to #4   - hgbA1c  was 4.1  - Endo consulted:   16u Lantus daily  Lispro 6u w/ meals  C/w SSI #4   C/w glucerna   Ensure carb controlled diet   Pt will need diabetic education prior to dc  Endo may decide to discharge patient on glipizide 2/2 pt refusal to self administer insulin and perform glucose monitoring at home      H/o of hypothyroid  -TSH (3/2025): 1.83  - Maintain BG <180  - Levothyroxine 125mcg      ID:  C Diff infection   Reactive leukocytosis  - Completed PO vanc  07/05   - completed periop vanc/zosyn x48hrs with hx of LVAD  - trend temps q4h     PHYSICAL AND OCCUPATIONAL THERAPY: PT/OT    DVT: Heparin SQ  ULCER PPX: PPI  GLYCEMIC CONTROL: SSI  BOWEL CARE: Senna, mirlax  NUTRITION: NPO Diet Except: Other (specify); Additional Details: Clear liquids until 0500. May have clears up to 2 hours prior to procedure if exact time is known.; Effective midnight      EMERGENCY CONTACT: Extended Emergency Contact Information  Primary Emergency Contact: Janice Ramos  Address: [address] [place] [address]  Home Phone: [phone]  Mobile Phone: [phone]  Relation: Mother  Secondary Emergency Contact: Bird Lane  Address: [address] [place] [address] [address] [place] [address]  Home Phone: [phone]  Mobile Phone: [phone]  Relation: Son  Preferred language: English   needed? No  FAMILY UPDATE: At bedside  CODE STATUS: Full Code  DISPO:  PT recommending low intensity and states pt is progressing and ambulation is improving. PM&R consulted and stating he can go to SNF if he medically needs this, otherwise he is safe to be dc'd with home  health. Pt has no medical needs requiring SNF and can be discharged home with home PT.    Patient seen and assessed with Dr. Hylton.       LEIGHTON HUANG Student    NP Attestation: I was present with the NP student who participated in the documentation of this note. I have personally seen and re-examined the patient and performed the medical decision-making components (assessment and plan of care). I have reviewed the [medical] student documentation and verified the findings in the note as written with additions or exceptions as stated in the body of this note.     Arleth Baker, MSN, [facility]  Advanced Heart Failure LVAD Nurse Practitioner   For floor patients please page HHVI team after 5pm- [phone]  LVAD 24/7 emergency pager [phone]  LVAD 24/7 emergency phone [phone]

## 2025-07-08 NOTE — PROGRESS NOTES
"Nutrition Follow Up Assessment:   Nutrition Assessment    Reason for Assessment: Dietitian discretion (follow up)    NPO for biopsy this morning.    Nutrition History:  Energy Intake: Good > 75 % (6/27 to present per flow sheets)  Food and Nutrient History: Another service at bedside this afternoon - reattempt as nic permits       Anthropometrics:  Height: 175.3 cm (5' 9\")   Weight: 98.4 kg (216 lb 14.4 oz)   BMI (Calculated): 32.02  IBW/kg (Dietitian Calculated): 72.7 kg                         Weight History:     Date/Time Weight   07/08/25 0549 98.4 kg (216 lb 14.4 oz)   07/07/25 0125 98.2 kg (216 lb 9.6 oz)   07/06/25 1420 101 kg (222 lb 9.6 oz)   07/06/25 0335 101 kg (222 lb 4.8 oz)   07/02/25 1226 102 kg (224 lb)   07/02/25 0429 102 kg (225 lb 11.2 oz)   07/01/25 0705 105 kg (230 lb 6.4 oz)   06/30/25 0015 101 kg (223 lb 4.8 oz)   06/29/25 0531 98.8 kg (217 lb 14.4 oz)   06/28/25 0600 101 kg (222 lb 3.6 oz)   06/27/25 0600 101 kg (221 lb 12.8 oz)   06/26/25 1100 101 kg (222 lb 10.6 oz)   06/26/25 0600 102 kg (225 lb 5 oz)   06/26/25 0500 102 kg (225 lb 5 oz)   06/25/25 0600 106 kg (234 lb 6.4 oz)         Weight Change %:       Nutrition Focused Physical Exam Findings:  Edema:  Edema Location: +1 BLE and non-pitting generalized edema  Physical Findings:  Skin:  (surg sites)    Nutrition Significant Labs:  CBC Trend:   Results from last 7 days   Lab Units 07/08/25  0640 07/07/25  0646 07/06/25  1159 07/05/25  0539   WBC AUTO x10*3/uL 15.5* 18.2* 20.4* 20.4*   RBC AUTO x10*6/uL 3.00* 3.09* 3.17* 2.96*   HEMOGLOBIN g/dL 9.5* 10.0* 10.0* 9.4*   HEMATOCRIT % 29.9* 30.2* 31.5* 29.9*   MCV fL 100 98 99 101*   PLATELETS AUTO x10*3/uL 310 327 339 339    , BMP Trend:   Results from last 7 days   Lab Units 07/08/25  0640 07/07/25  0646 07/06/25  0548 07/05/25  0539   GLUCOSE mg/dL 129* 173* 122* 143*   CALCIUM mg/dL 8.4* 8.3* 8.5* 8.5*   SODIUM mmol/L 136 138 138 136   POTASSIUM mmol/L 4.5 4.5 5.3 5.0   CO2 mmol/L 32 31 " 27 28   CHLORIDE mmol/L 97* 97* 101 101   BUN mg/dL 57* 57* 42* 34*   CREATININE mg/dL 1.15 1.11 1.29 0.86    , A1C:  Lab Results   Component Value Date    HGBA1C 4.4 02/23/2024   , BG POCT trend:   Results from last 7 days   Lab Units 07/08/25  0740 07/07/25  2109 07/07/25  1823 07/07/25  1601 07/07/25  1144   POCT GLUCOSE mg/dL 140* 99 180* 244* 140*    , Renal Lab Trend:   Results from last 7 days   Lab Units 07/08/25  0640 07/07/25  0646 07/06/25  0548 07/05/25  0539   POTASSIUM mmol/L 4.5 4.5 5.3 5.0   PHOSPHORUS mg/dL 3.6 4.1 4.4 3.8   SODIUM mmol/L 136 138 138 136   MAGNESIUM mg/dL 2.01 2.06 2.08 1.95   EGFR mL/min/1.73m*2 71 74 62 >90   BUN mg/dL 57* 57* 42* 34*   CREATININE mg/dL 1.15 1.11 1.29 0.86       Nutrition Specific Medications:  calcium carbonate, 1,250 mg of calcium carbonate, oral, BID  cholecalciferol, 50 mcg, oral, Daily  furosemide, 40 mg, intravenous, BID  insulin glargine, 8 Units, subcutaneous, Nightly  insulin lispro, 0-20 Units, subcutaneous, Before meals & nightly  insulin lispro, 6 Units, subcutaneous, TID AC  levothyroxine, 125 mcg, oral, Daily  mycophenolate, 1,000 mg, oral, q12h LUPILLO  pantoprazole, 40 mg, oral, Daily before breakfast  predniSONE, 25 mg, oral, q12h LUPILLO  sennosides-docusate sodium, 1 tablet, oral, BID  tacrolimus, 2.5 mg, oral, q12h LUPILLO  voriconazole, 200 mg, oral, q12h LUPILLO      I/O:   Last BM Date: 07/07/25; Stool Appearance: Unable to assess (07/07/25 2200)    Dietary Orders (From admission, onward)       Start     Ordered    07/08/25 1205  Adult diet Regular, Consistent Carb; CCD 75 gm/meal  Diet effective now        Question Answer Comment   Diet type Regular    Diet type Consistent Carb    Carb diet selection: CCD 75 gm/meal        07/08/25 1204    07/04/25 1244  Oral nutritional supplements - Troy  Until discontinued        Question Answer Comment   Deliver with Breakfast    Deliver with Dinner    Select supplement: Glucerna Shake        07/04/25 1245    06/20/25  2110  May Participate in Room Service  ( ROOM SERVICE MAY PARTICIPATE)  Once        Question:  .  Answer:  Yes    06/20/25 2109                     Estimated Needs:   Total Energy Estimated Needs in 24 hours (kCal): 2400 kCal  Method for Estimating Needs: ~25 kcals/kg using dosing wt of 96.3 kgs  Total Protein Estimated Needs in 24 Hours (g): 125 g  Method for Estimating 24 Hour Protein Needs: ~1.3 gm/kg dosing wt of 96.3 kg  Total Fluid Estimated Needs in 24 Hours (mL):  (per team)           Nutrition Diagnosis   Malnutrition Diagnosis  Patient has Malnutrition Diagnosis: No    Nutrition Diagnosis  Patient has Nutrition Diagnosis: Yes  Diagnosis Status (1): Resolved  Nutrition Diagnosis 1: Inadequate oral intake  Related to (1): post-op course  As Evidenced by (1): pt reports only drinking Ensure thus far  Additional Assessment Information (1): above diagnosis now resolved       Nutrition Interventions/Recommendations   Nutrition prescription for oral nutrition    Nutrition Recommendations:  Individualized Nutrition Prescription Provided for : Continue current diet + ONS as prescribed.    Nutrition Interventions/Goals:   Meals and Snacks: Carbohydrate-modified diet  Goal: 75 gm carb/meal  Medical Food Supplement: Commercial beverage medical food supplement therapy  Goal: Glucerna BID for 220 kcals & 10gm protein each > Troy BID per post-op cardiac surgery care path      Education Documentation  No documentation found.            Nutrition Monitoring and Evaluation   Estimated Energy Intake: Energy intake 50 -75% of estimated energy needs  Intake / Amount of food: Consumes > or equal to 70% of supplement                   Goal Status: Goal(s) achieved    Time Spent (min): 45 minutes

## 2025-07-08 NOTE — NURSING NOTE
Patient educated at bedside. He showed interest in the topics.     He had just had pharmacy education and had many med questions. Patient reported that he does not think he needs insulin and does not want to poke himself. He states that if he is sent home on insulin he does not intend to check his blood sugars because he is not diabetic. He believes that because he is on low carb diet this is not necessary. Reviewed with patient that because of the high doses of prednisone he is taking, his body will break down and process sugars differently, which is why he will require this medication.     Despite endocrinology discussing insulin injections with patient, he states he was never instructed on how to use insulin. Patient's nurse Thea at bedside during conversation. Plan made for nursing staff to guide patient in giving himself insulin injections. TAPAN Baker aware of plan. Patient remains resistant to using medication.     Today discussed complications following transplant (infection, rejection, CAD, CAV) and the denervated heart. Patient stated that he did not want to wear a mask ever again after wearing one throughout Covid. Reviewed rationale with patient - at this time he is receiving triple therapy immunosuppression and will be more susceptible to everyday infections. Patient has cloth mask with exchangeable filters. Advised that this is acceptable at this time.     Patient began to fall asleep at the end of education session. Will return tomorrow to finish education topics beginning at Follow Up expectations.

## 2025-07-09 ENCOUNTER — APPOINTMENT (OUTPATIENT)
Dept: RADIOLOGY | Facility: HOSPITAL | Age: 65
DRG: 001 | End: 2025-07-09
Payer: MEDICARE

## 2025-07-09 ENCOUNTER — PHARMACY VISIT (OUTPATIENT)
Dept: PHARMACY | Facility: CLINIC | Age: 65
End: 2025-07-09
Payer: COMMERCIAL

## 2025-07-09 ENCOUNTER — TELEPHONE (OUTPATIENT)
Dept: TRANSPLANT | Facility: HOSPITAL | Age: 65
End: 2025-07-09
Payer: MEDICARE

## 2025-07-09 VITALS
HEART RATE: 95 BPM | OXYGEN SATURATION: 92 % | DIASTOLIC BLOOD PRESSURE: 64 MMHG | BODY MASS INDEX: 32.12 KG/M2 | TEMPERATURE: 96.8 F | WEIGHT: 216.9 LBS | HEIGHT: 69 IN | RESPIRATION RATE: 17 BRPM | SYSTOLIC BLOOD PRESSURE: 118 MMHG

## 2025-07-09 DIAGNOSIS — Z94.1 S/P ORTHOTOPIC HEART TRANSPLANT: ICD-10-CM

## 2025-07-09 LAB
ALBUMIN SERPL BCP-MCNC: 3.3 G/DL (ref 3.4–5)
ANION GAP SERPL CALC-SCNC: 13 MMOL/L (ref 10–20)
BNP SERPL-MCNC: 308 PG/ML (ref 0–99)
BUN SERPL-MCNC: 51 MG/DL (ref 6–23)
CALCIUM SERPL-MCNC: 8.5 MG/DL (ref 8.6–10.6)
CHLORIDE SERPL-SCNC: 100 MMOL/L (ref 98–107)
CO2 SERPL-SCNC: 29 MMOL/L (ref 21–32)
CREAT SERPL-MCNC: 0.93 MG/DL (ref 0.5–1.3)
EGFRCR SERPLBLD CKD-EPI 2021: >90 ML/MIN/1.73M*2
ERYTHROCYTE [DISTWIDTH] IN BLOOD BY AUTOMATED COUNT: 21 % (ref 11.5–14.5)
GLUCOSE BLD MANUAL STRIP-MCNC: 128 MG/DL (ref 74–99)
GLUCOSE BLD MANUAL STRIP-MCNC: 86 MG/DL (ref 74–99)
GLUCOSE SERPL-MCNC: 125 MG/DL (ref 74–99)
HCT VFR BLD AUTO: 29.2 % (ref 41–52)
HGB BLD-MCNC: 9.6 G/DL (ref 13.5–17.5)
MAGNESIUM SERPL-MCNC: 2.26 MG/DL (ref 1.6–2.4)
MCH RBC QN AUTO: 32.4 PG (ref 26–34)
MCHC RBC AUTO-ENTMCNC: 32.9 G/DL (ref 32–36)
MCV RBC AUTO: 99 FL (ref 80–100)
NRBC BLD-RTO: 0 /100 WBCS (ref 0–0)
PHOSPHATE SERPL-MCNC: 2.9 MG/DL (ref 2.5–4.9)
PLATELET # BLD AUTO: 308 X10*3/UL (ref 150–450)
POTASSIUM SERPL-SCNC: 4.3 MMOL/L (ref 3.5–5.3)
RBC # BLD AUTO: 2.96 X10*6/UL (ref 4.5–5.9)
SODIUM SERPL-SCNC: 138 MMOL/L (ref 136–145)
TACROLIMUS BLD-MCNC: 10 NG/ML
WBC # BLD AUTO: 16.3 X10*3/UL (ref 4.4–11.3)

## 2025-07-09 PROCEDURE — 83735 ASSAY OF MAGNESIUM: CPT

## 2025-07-09 PROCEDURE — 80197 ASSAY OF TACROLIMUS: CPT

## 2025-07-09 PROCEDURE — 2500000001 HC RX 250 WO HCPCS SELF ADMINISTERED DRUGS (ALT 637 FOR MEDICARE OP)

## 2025-07-09 PROCEDURE — 2500000002 HC RX 250 W HCPCS SELF ADMINISTERED DRUGS (ALT 637 FOR MEDICARE OP, ALT 636 FOR OP/ED)

## 2025-07-09 PROCEDURE — 2500000001 HC RX 250 WO HCPCS SELF ADMINISTERED DRUGS (ALT 637 FOR MEDICARE OP): Performed by: REGISTERED NURSE

## 2025-07-09 PROCEDURE — 2500000004 HC RX 250 GENERAL PHARMACY W/ HCPCS (ALT 636 FOR OP/ED)

## 2025-07-09 PROCEDURE — 2500000004 HC RX 250 GENERAL PHARMACY W/ HCPCS (ALT 636 FOR OP/ED): Performed by: STUDENT IN AN ORGANIZED HEALTH CARE EDUCATION/TRAINING PROGRAM

## 2025-07-09 PROCEDURE — 71045 X-RAY EXAM CHEST 1 VIEW: CPT | Performed by: RADIOLOGY

## 2025-07-09 PROCEDURE — RXMED WILLOW AMBULATORY MEDICATION CHARGE

## 2025-07-09 PROCEDURE — 2500000004 HC RX 250 GENERAL PHARMACY W/ HCPCS (ALT 636 FOR OP/ED): Performed by: REGISTERED NURSE

## 2025-07-09 PROCEDURE — 99232 SBSQ HOSP IP/OBS MODERATE 35: CPT

## 2025-07-09 PROCEDURE — 2500000001 HC RX 250 WO HCPCS SELF ADMINISTERED DRUGS (ALT 637 FOR MEDICARE OP): Performed by: STUDENT IN AN ORGANIZED HEALTH CARE EDUCATION/TRAINING PROGRAM

## 2025-07-09 PROCEDURE — 80069 RENAL FUNCTION PANEL: CPT

## 2025-07-09 PROCEDURE — 85027 COMPLETE CBC AUTOMATED: CPT

## 2025-07-09 PROCEDURE — 36415 COLL VENOUS BLD VENIPUNCTURE: CPT

## 2025-07-09 PROCEDURE — 82947 ASSAY GLUCOSE BLOOD QUANT: CPT

## 2025-07-09 PROCEDURE — 83880 ASSAY OF NATRIURETIC PEPTIDE: CPT | Performed by: REGISTERED NURSE

## 2025-07-09 PROCEDURE — 99239 HOSP IP/OBS DSCHRG MGMT >30: CPT | Performed by: REGISTERED NURSE

## 2025-07-09 PROCEDURE — 71045 X-RAY EXAM CHEST 1 VIEW: CPT

## 2025-07-09 RX ORDER — VORICONAZOLE 200 MG/1
200 TABLET, FILM COATED ORAL EVERY 12 HOURS SCHEDULED
Qty: 60 TABLET | Refills: 0 | Status: ON HOLD | OUTPATIENT
Start: 2025-07-09 | End: 2025-08-08

## 2025-07-09 RX ORDER — PREDNISONE 5 MG/1
30 TABLET ORAL DAILY
Qty: 180 TABLET | Refills: 0 | Status: ON HOLD | OUTPATIENT
Start: 2025-07-09 | End: 2025-08-08

## 2025-07-09 RX ORDER — PEN NEEDLE, DIABETIC 29 G X1/2"
NEEDLE, DISPOSABLE MISCELLANEOUS
Qty: 100 EACH | Refills: 1 | Status: CANCELLED | OUTPATIENT
Start: 2025-07-09 | End: 2026-07-09

## 2025-07-09 RX ORDER — BLOOD-GLUCOSE,RECEIVER,CONT
EACH MISCELLANEOUS
Qty: 1 EACH | Refills: 1 | Status: ON HOLD | OUTPATIENT
Start: 2025-07-09

## 2025-07-09 RX ORDER — PEN NEEDLE, DIABETIC 30 GX3/16"
30 NEEDLE, DISPOSABLE MISCELLANEOUS DAILY
Qty: 100 EACH | Refills: 3 | Status: ON HOLD | OUTPATIENT
Start: 2025-07-09 | End: 2025-10-17

## 2025-07-09 RX ORDER — GLIPIZIDE 10 MG/1
10 TABLET ORAL DAILY
Qty: 30 TABLET | Refills: 11 | Status: SHIPPED | OUTPATIENT
Start: 2025-07-09 | End: 2025-07-09 | Stop reason: HOSPADM

## 2025-07-09 RX ORDER — BLOOD-GLUCOSE SENSOR
EACH MISCELLANEOUS
Qty: 2 EACH | Refills: 3 | Status: ON HOLD | OUTPATIENT
Start: 2025-07-09

## 2025-07-09 RX ADMIN — PREDNISONE 15 MG: 5 TABLET ORAL at 08:33

## 2025-07-09 RX ADMIN — SULFAMETHOXAZOLE AND TRIMETHOPRIM 1 TABLET: 400; 80 TABLET ORAL at 08:33

## 2025-07-09 RX ADMIN — FUROSEMIDE 40 MG: 40 TABLET ORAL at 08:33

## 2025-07-09 RX ADMIN — PANTOPRAZOLE SODIUM 40 MG: 40 TABLET, DELAYED RELEASE ORAL at 06:33

## 2025-07-09 RX ADMIN — ACYCLOVIR 400 MG: 400 TABLET ORAL at 08:33

## 2025-07-09 RX ADMIN — VORICONAZOLE 200 MG: 200 TABLET, COATED ORAL at 08:33

## 2025-07-09 RX ADMIN — CALCIUM 1 TABLET: 500 TABLET ORAL at 08:33

## 2025-07-09 RX ADMIN — HYDRALAZINE HYDROCHLORIDE 50 MG: 50 TABLET ORAL at 16:14

## 2025-07-09 RX ADMIN — TAMSULOSIN HYDROCHLORIDE 0.4 MG: 0.4 CAPSULE ORAL at 08:33

## 2025-07-09 RX ADMIN — MYCOPHENOLATE MOFETIL 1000 MG: 250 CAPSULE ORAL at 06:33

## 2025-07-09 RX ADMIN — TACROLIMUS 2.5 MG: 0.5 CAPSULE ORAL at 06:33

## 2025-07-09 RX ADMIN — HYDRALAZINE HYDROCHLORIDE 50 MG: 50 TABLET ORAL at 08:33

## 2025-07-09 RX ADMIN — LEVOTHYROXINE SODIUM 125 MCG: 0.05 TABLET ORAL at 06:33

## 2025-07-09 RX ADMIN — INSULIN LISPRO 6 UNITS: 100 INJECTION, SOLUTION INTRAVENOUS; SUBCUTANEOUS at 08:34

## 2025-07-09 RX ADMIN — ASPIRIN 81 MG: 81 TABLET, COATED ORAL at 08:33

## 2025-07-09 RX ADMIN — HEPARIN SODIUM 5000 UNITS: 5000 INJECTION INTRAVENOUS; SUBCUTANEOUS at 08:34

## 2025-07-09 RX ADMIN — CHOLECALCIFEROL TAB 25 MCG (1000 UNIT) 50 MCG: 25 TAB at 08:33

## 2025-07-09 RX ADMIN — ACETAMINOPHEN 650 MG: 325 TABLET ORAL at 06:33

## 2025-07-09 ASSESSMENT — ENCOUNTER SYMPTOMS
DYSURIA: 0
DIZZINESS: 0
ABDOMINAL PAIN: 0
NAUSEA: 0
APPETITE CHANGE: 0
NUMBNESS: 0
SHORTNESS OF BREATH: 0
VOMITING: 0
FREQUENCY: 0

## 2025-07-09 NOTE — DISCHARGE SUMMARY
Discharge Diagnosis  S/P orthotopic heart transplant    Issues Requiring Follow-Up  RHC/biopsy 7/14 - confirmed patient has transportation   Labs ordered for this appointment  Endo follow up requested for insulin management w/ steroid taper     Needs EMG performed outpatient (ordered)     Hospital Course   Anatoly Lane is a 64 year old male with relevant PMH of ex smoker, NICM, stage D HFrEF NYHA 2-3, S/p HM2 (4/30/23), VT s/p ICD, CAD, pAF, HTN, BPH, GERD and hypothyroidism.  S/p LVAD explant, AICD removal & OHT by Dr. Lorenzo on 6/21. Now transferred from CTICU to HF ICU 6/24. Patient arrived to HFICU HDS stable, on milrinone drip 0.125 mcg/kg/min. Milrinone gtt weaned in HFICU and pt was transferred to LT5. Course c/b pneumo mediastinum, steroid induced hyperglycemia, and L hand palsy. Pneumo mediastinum reviewed by Isiah Birch and Laura Hodges and there is not indication for surgical intervention or chest tube, no barriers to discharge. He is following with endocrine for hyperglycemia control and will follow with neurology outpatient for further palsy workup/ EMG. Heart transplant care as below:     BD OHT 6/21/25    Donor/Recipient Infectious history:    CMV: -/-   EBV: +/+   Toxo: -/-   Heb B: -/-  Hep C: -/-  HIV: -/-     Rejection/Prophylaxis:    - Induction: s/p methylprednisolone 500mg IV x2, S/p Basiliximab 20mg IV within 1 hour of arrival to CTICU (no need for premedication), s/p 2nd dose: 20mg IVPB on POD4 (06/25)   - Steroids: Prednisone 30mg daily (reduced 7/8 after negative biopsy)   - Tacrolimus: 2.5mg BID  - Tacrolimus goal troughs: 10-12; Last tacrolimus trough: 10.0 7/9/2025:  6:28 AM  - Cellcept: 1,000mg BID   - Antifungals: voriconazole 200mg BID x3 months end date 09/22/2025   - Antivirals: acylovir 400mg BID x3 months end date 09/22/2025   - Anti PCP & Toxoplasmosis: SS Bactrim daily end date of 12/22/25    Last cardiac biopsy: 7/8: negative  Last RHC: 7/8   Next RHC/biopsy: 7/14 - patient has  RHC/EMB, physician follow up, and echo ordered for 7/14. Confirmed transportation with the patient.   Last TTE/BOBBY: 7/8 - difficult views 2/2 air  Last HLA: Pre-transplant: PRAs 0%; First due on 7/21  Last Allomap: ~ will assess starting at 6-month ana post-op    Last Allosure: ~ will assess starting at 6-month ana post-op   Last LHC: First due ~ (one year post tx) due 6/2026  Osteopenia/osteoporosis prophylaxis: Vit D 2000U daily and calcium 500 mg BID (give calcium 2 hrs after MMF)   Peptic/gastric ulcer prophylaxis: Pantoprazole 40mg daily    CAV Prophylaxis: Aspirin 81mg daily and Pravastatin 40mg daily     Visit Vitals  /82 (BP Location: Right arm, Patient Position: Sitting)   Pulse 101   Temp 35.9 °C (96.6 °F) (Temporal)   Resp 18     Vitals:    07/09/25 0018   Weight: 98.4 kg (216 lb 14.4 oz)       Immunization History   Administered Date(s) Administered    COVID-19, mRNA, LNP-S, PF, 30 mcg/0.3 mL dose 07/08/2021, 07/29/2021    Flu vaccine (IIV4), preservative free *Check age/dose* 03/05/2024    Hepatitis B vaccine, adult *Check Product/Dose* 03/02/2024    Pneumococcal conjugate vaccine, 20-valent (PREVNAR 20) 03/05/2024    Tdap vaccine, age 7 year and older (BOOSTRIX, ADACEL) 03/05/2024       Results        Pertinent Physical Exam At Time of Discharge  Physical Exam  Constitutional:       Appearance: Normal appearance.   HENT:      Mouth/Throat:      Mouth: Mucous membranes are moist.      Pharynx: Oropharynx is clear.   Eyes:      Extraocular Movements: Extraocular movements intact.      Pupils: Pupils are equal, round, and reactive to light.   Cardiovascular:      Rate and Rhythm: Normal rate and regular rhythm.      Pulses: Normal pulses.      Heart sounds: Normal heart sounds.   Pulmonary:      Effort: Pulmonary effort is normal. No respiratory distress.      Breath sounds: Normal breath sounds. No stridor. No wheezing or rhonchi.   Abdominal:      General: Abdomen is flat. Bowel sounds are  "normal.   Musculoskeletal:         General: No swelling or tenderness.      Right lower leg: No edema.      Left lower leg: No edema.      Comments: L hand extensor palsy and 4th+5th digital numbness   Skin:     General: Skin is warm and dry.      Findings: Bruising present.      Comments: Abdomen/BUE bruising   Crepitus from sternotomy to L clavicle - improving    Neurological:      General: No focal deficit present.      Mental Status: He is alert and oriented to person, place, and time.         Home Medications     Medication List      START taking these medications     acyclovir 400 mg tablet; Commonly known as: Zovirax; Take 1 tablet (400   mg) by mouth 2 times a day.   alcohol swabs; Apply topically 3 times a day.   calcium carbonate 1,250 mg (500 mg elemental) tablet; Commonly known as:   Os-Wilfredo; Take 1 tablet by mouth 2 times a day.   cholecalciferol 50 mcg (2,000 units) tablet; Commonly known as: Vitamin   D-3; Take 1 tablet (50 mcg) by mouth once daily.   FreeStyle Federico 3 Plus Sensor device; Generic drug: blood-glucose   sensor; Use 1 every 15 days.   FreeStyle Federico 3 Panna Maria misc; Generic drug:   blood-glucose,,cont; Use as directed.   furosemide 40 mg tablet; Commonly known as: Lasix; Take 1 tablet (40 mg)   by mouth once daily.   hydrALAZINE 50 mg tablet; Commonly known as: Apresoline; Take 1 tablet   (50 mg) by mouth 3 times a day.   insulin NPH (Isophane) 100 unit/mL (3 mL) pen; Commonly known as:   HumuLIN N,NovoLIN N; Inject 15 Units under the skin once daily in the   morning. With your steroid.   lidocaine 4 % patch; Place 1 patch over 12 hours on the skin once daily.   Remove & discard patch within 12 hours or as directed by MD.   mycophenolate 250 mg capsule; Commonly known as: Cellcept; Take 4   capsules (1,000 mg) by mouth every 12 hours.   pen needle, diabetic 31 gauge x 5/16\" needle; Use 1 once daily or as   directed by provider.   pravastatin 40 mg tablet; Commonly known as: " Pravachol; Take 1 tablet   (40 mg) by mouth once daily at bedtime.   predniSONE 5 mg tablet; Commonly known as: Deltasone; Take 6 tablets (30   mg) by mouth once daily.   * Prograf 0.5 mg capsule; Take 2.5 mg by mouth every 12 hours. Please   take one 0.5 pill and two 1mg pills for a total of 2.5mg twice a day until   instructed differently by transplant team   * tacrolimus 1 mg capsule; Commonly known as: Prograf; Take 1 capsule (1   mg) by mouth 2 times a day. Please take one 0.5 pill and two 1mg pills for   a total of 2.5mg twice a day until instructed differently by transplant   team   sodium zirconium cyclosilicate 10 gram packet; Commonly known as:   Lokelma; Take 10 g by mouth once daily as needed (Only take as instructed   by transplant team).   sulfamethoxazole-trimethoprim 400-80 mg tablet; Commonly known as:   Bactrim; Take 1 tablet by mouth once daily.   traMADol 50 mg tablet; Commonly known as: Ultram; Take 1 tablet (50 mg)   by mouth every 6 hours if needed for severe pain (7 - 10) for up to 7   days.   voriconazole 200 mg tablet; Commonly known as: Vfend; Take 1 tablet (200   mg) by mouth every 12 hours.  * This list has 2 medication(s) that are the same as other medications   prescribed for you. Read the directions carefully, and ask your doctor or   other care provider to review them with you.     CONTINUE taking these medications     aspirin 81 mg EC tablet; Take 1 tablet (81 mg) by mouth once daily.   levothyroxine 125 mcg tablet; Commonly known as: Synthroid, Levoxyl;   Take 1 tablet (125 mcg) by mouth early in the morning.. Take on an empty   stomach at the same time each day, either 30 to 60 minutes prior to   breakfast   pantoprazole 40 mg EC tablet; Commonly known as: ProtoNix; Take 1 tablet   (40 mg) by mouth once daily. Do not crush, chew, or split.   tamsulosin 0.4 mg 24 hr capsule; Commonly known as: Flomax; Take 1   capsule (0.4 mg) by mouth once daily.     STOP taking these medications      amiodarone 200 mg tablet; Commonly known as: Pacerone   digoxin 125 MCG tablet; Commonly known as: Lanoxin   Entresto 49-51 mg tablet; Generic drug: sacubitriL-valsartan   eplerenone 25 mg tablet; Commonly known as: Inspra   icosapent ethyL 1 gram capsule; Commonly known as: Vascepa   potassium chloride CR 10 mEq ER tablet; Commonly known as: Klor-Con   torsemide 10 mg tablet; Commonly known as: Demadex   torsemide 20 mg tablet; Commonly known as: Demadex   warfarin 2.5 mg tablet; Commonly known as: Coumadin   warfarin 4 mg tablet; Commonly known as: Coumadin       Outpatient Follow-Up  Future Appointments   Date Time Provider Department [place]   7/14/2025  1:00 PM [facility] ECHO 3 [id number] [facility] Rad   7/14/2025  3:20 PM Shekhar Hylton MD MPH [id number] [facility] Rad   7/29/2025  1:00 PM Sara Corley MD [id number] Academic       Arleth Baker, APRN-CNP

## 2025-07-09 NOTE — PROGRESS NOTES
"nAatoly Lane is a 64 y.o. male on day 19 of admission presenting with S/P orthotopic heart transplant.    Subjective     Patient doing well this morning. He will be ready to go home soon. He stated his apprehension with insulin are the finger pricks to monitor glucose. Patient states he would be open to doing insulin with a CGM.      I have reviewed histories, allergies and medications have been reviewed and there are no changes       Objective   Review of Systems   Constitutional:  Negative for appetite change.   Eyes:  Negative for visual disturbance.   Respiratory:  Negative for shortness of breath.    Gastrointestinal:  Negative for abdominal pain, nausea and vomiting.   Genitourinary:  Negative for dysuria and frequency.   Neurological:  Negative for dizziness and numbness.     Physical Exam    General - elderly male in NAD  HEENT - At/NC  Resp - no extra wob noted  Abdomen - soft and non tender  Ext - no edema    Last Recorded Vitals  Blood pressure 119/73, pulse 92, temperature 36.3 °C (97.3 °F), temperature source Temporal, resp. rate 18, height 1.753 m (5' 9\"), weight 98.4 kg (216 lb 14.4 oz), SpO2 94%.  Intake/Output last 3 Shifts:  I/O last 3 completed shifts:  In: 840 (8.7 mL/kg) [P.O.:840]  Out: 5755 (59.8 mL/kg) [Urine:5750 (1.7 mL/kg/hr); Blood:5]  Dosing Weight: 96.3 kg     Relevant Results  Results from last 7 days   Lab Units 07/09/25  0714 07/09/25  0628 07/08/25  2123 07/08/25  1846 07/08/25  1648 07/08/25  0740 07/08/25  0640 07/07/25  0651 07/07/25  0646 07/06/25  0635 07/06/25  0548 07/05/25  0753 07/05/25  0539   POCT GLUCOSE mg/dL 128*  --  119* 278* 296* 140*  --    < >  --    < >  --    < >  --    GLUCOSE mg/dL  --  125*  --   --   --   --  129*  --  173*  --  122*  --  143*    < > = values in this interval not displayed.     Lab Results   Component Value Date    CREATININE 0.93 07/09/2025    BUN 51 (H) 07/09/2025     07/09/2025    K 4.3 07/09/2025     07/09/2025    CO2 29 " "07/09/2025       Assessment & Plan  Stress hyperglycemia    The patient is a 64 year old male with a past medical history significant of but not limited to Stage D HFrEF, CAD, PAF, VT, hypothyroidism who is s/p heart transplant on 06/21/2025. We have been asked to see the patient for stress and GC induced hyperglycemia in the setting of pre-existing prediabetes.    GC regimen:  Prednisone 35 mg Q 12 hours from 06/23 - 07/01 as the biopsy was negative  Prednisone 25 Q 12 hours from 07/01-07/08  Prednisone 30mg daily from 07/09-present    Discharge Recs:  - Insulin administration education to be done by primary nursing team prior to discharge  - Discussed Insulin NPH at the time of steroid dose, but patient stated to primary team that he would not be compliant \"pricking fingers\"/checking sugars. Patient amendable to CGM:  - Start Insulin NPH 15u to be taken with 30mg prednisone daily. (Can titrate insulin down to 10u as steroids taper)  - Order Federico sensor and reader  - Endo team will do patient education on CGM  - Carb controlled diet at home  - Follow up with endocrinology 1-2 weeks after discharge. Continue to follow as steroids taper.     The patient was discussed with endocrinology fellow Dr. Edwards and attending Dr. Garcia.    Thank you for the consult. Please reach out if you have any further questions. We will be following.     Cecille Le MD  Internal Medicine PGY-1  "

## 2025-07-09 NOTE — NURSING NOTE
Patient educated at bedside.Today he was engaged and eager to receive education.     Reviewed follow up plan, information about a health lifestyle, food safety, immunizations and contact information.    Advised patient to call transplant team with any further questions and provided office number and on-call transplant coordinator number.    Will return this evening to fill medication box prior to discharge.

## 2025-07-09 NOTE — NURSING NOTE
Reviewed medications with patient and filled pill box for the upcoming week. Patient was able to successfully complete pill box with minimal assistance. Gabapentin was not provided in meds to beds as a prescription. Notified COOKIE Mason. Gabapentin order sent to local [facility] for pickup. Patient also did not receive pantoprazole, multivitamin and tamsulosin; per Arleth those were too early to fill and patient has these at home.     Reviewed plan for follow up - patient has RHC w/EMBx, echo and clinic visit at [facility] on 7/14/25. He arranged for his sister to provide transportation to this. Reviewed contact information and patient wearing wristband with transplant call-phone number on it. Advised him to call if any question arise before his next appointment.

## 2025-07-09 NOTE — PROGRESS NOTES
Physical Therapy                 Therapy Communication Note    Patient Name: Anatoly Lane  MRN: [id number]  Department: [place] [address]  Room: [address] [place] [address]  Today's Date: 7/9/2025     Discipline: Physical Therapy    Missed Visit: PT Missed Visit: Yes     Missed Visit Reason: Missed Visit Reason:  (Pt dressed, preparing for imminent D/C. Reports no PT/mobility concerns prior to leaving.)    Missed Time: Attempt 1406    Comment:

## 2025-07-09 NOTE — DISCHARGE INSTRUCTIONS
Your follow up visit is on July 14th at [facility]. Arrival time is 9am for the heart cath and biopsy. You will also need an echo and labs that day. That afternoon, you will see Dr. Hylton. This is the post op heart transplant care required per protocol.     Heart transplant office: [phone]

## 2025-07-09 NOTE — PROGRESS NOTES
Pharmacist Transplant Discharge Note    Medications for Anatoly Lane were reviewed in conjunction with the multidisciplinary transplant team. The pharmacist is in agreement with the plan of care for medications at this time.     Approximately 15 minutes were spent with this patient providing follow-up education and reviewing his finalized list of discharge medications. An updated outpatient dosing schedule was provided to the patient. All questions were answered to the patient's satisfaction, and the patient confirmed understanding.    The patient had his medications filled at [facility] and delivered prior to discharge. Further educational reinforcement should be provided in the outpatient clinic.    Kenyetta Barrios, PharmD, BCTXP  Clinical Pharmacy Specialist - Solid Organ Transplant

## 2025-07-10 ENCOUNTER — DOCUMENTATION (OUTPATIENT)
Dept: TRANSPLANT | Facility: HOSPITAL | Age: 65
End: 2025-07-10
Payer: MEDICARE

## 2025-07-10 RX ORDER — GABAPENTIN 300 MG/1
300 CAPSULE ORAL 3 TIMES DAILY
Qty: 90 CAPSULE | Refills: 11 | Status: SHIPPED | OUTPATIENT
Start: 2025-07-10 | End: 2026-07-10

## 2025-07-10 NOTE — PROGRESS NOTES
[facility] was notified of patient's discharge home. Discharge documents were sent through [facility].   Fatoumata Del Real RN

## 2025-07-10 NOTE — PROGRESS NOTES
Transplant Cardiologist: Katia Sosa   Transplant Coordinator: Katia Martinez   Primary Care Physician: No Assigned PCP Generic Provider, MD    Patient's Location: [facility] [id number]    Date of Visit: 07/14/2025  3:20 PM EDT  Location of visit: [facility]   Type of Visit: 3-Week Follow up Visit     Date of Transplant: 6/21/2025 (Heart)      HPI / Summary:   Anatoly Lnae is 64 year old male with relevant PMH of ex smoker, NICM, stage D HFrEF NYHA 2-3, S/p HM2 (4/30/23), VT s/p ICD, CAD, pAF, HTN, BPH, GERD and hypothyroidism.  S/p LVAD explant, AICD removal & OHT by Dr. Lorenzo on 6/21. Now transferred from CTICU to HF ICU 6/24. Patient arrived to HFICU HDS stable, on milrinone drip 0.125 mcg/kg/min. Milrinone gtt weaned in HFICU and pt was transferred to LT5. Course c/b pneumo mediastinum, steroid induced hyperglycemia, and L hand palsy. Pneumo mediastinum reviewed by Isiah Birch and Laura Hodges and there is not indication for surgical intervention or chest tube, no barriers to discharge. He is following with endocrine for hyperglycemia control and will follow with neurology outpatient for further palsy workup/ EMG. [facility] to [facility] for 3 week Follow up visit with Routine Biopsy, Echo, Clinic visit and labwork.     BD OHT 6/21/25     Donor/Recipient Infectious history:    CMV: -/-   EBV: +/+   Toxo: -/-   Heb B: -/-  Hep C: -/-  HIV: -/-      Interval history:  He was discharged on 7/9/2025 (5 days ago) and has had a rough time at home. He fell twice. His legs have been swollen and on his own he switched from furosemide to torsemide (he didn't let our team know). Further, he is now coughing up phlegm and feels congested.    Objective   Medical History: Medical History[1]   Surgical Hx: Surgical History[2]   Social Hx: Social History[3]  Family Hx: Family History[4]   Allergies: RX Allergies[5]  Exam:   Vitals:       7/9/2025     4:46 PM 7/9/2025    12:15 PM  7/9/2025     8:46 AM 7/9/2025     4:11 AM 7/9/2025    12:18 AM 7/8/2025     8:21 PM 7/8/2025     5:14 PM   Vitals   Systolic 118 153 119 117 142 115    Diastolic 64 82 73 75 79 70    BP Location Left arm Right arm Right arm Right arm Right arm Left arm    Heart Rate 95 101 92 94 95 90    Temp 36 °C (96.8 °F) 35.9 °C (96.6 °F) 36.3 °C (97.3 °F) 36.5 °C (97.7 °F) 36 °C (96.8 °F) 36 °C (96.8 °F) 36.3 °C (97.3 °F)   Resp 17 18 18 18 18 18    Weight (lb)     216.9     BMI     32.03 kg/m2     BSA (m2)     2.19 m2       Wt Readings from Last 5 Encounters:   07/09/25 98.4 kg (216 lb 14.4 oz)   02/27/25 102 kg (224 lb)   01/28/25 104 kg (229 lb)   11/19/24 112 kg (246 lb 12.8 oz)   07/15/24 110 kg (243 lb 9.6 oz)     Exam: he appears chronically ill, sitting in wheelchair. He has some bruising on his arms. He has no JVD. He has rhonchi. RRR.    Labs:   CMP:  Recent Labs     07/09/25  0628 07/08/25  0640 07/07/25  0646 07/06/25  0548 07/05/25  0539    136 138 138 136   K 4.3 4.5 4.5 5.3 5.0    97* 97* 101 101   CO2 29 32 31 27 28   ANIONGAP 13 12 15 15 12   BUN 51* 57* 57* 42* 34*   CREATININE 0.93 1.15 1.11 1.29 0.86   EGFR >90 71 74 62 >90   MG 2.26 2.01 2.06 2.08 1.95     Recent Labs     07/09/25  0628 07/08/25  0640 07/07/25  0646 07/06/25  0548 07/05/25  0539 06/21/25  0801 06/20/25  1659 03/26/25  1157 01/28/25  1451 04/04/24  1509 02/24/24  0233 02/23/24  1433 05/23/23  0304 05/21/23  1943   ALBUMIN 3.3* 3.3* 3.4 3.3* 3.3*   < > 4.3 3.8 4.2 4.4   < > 4.0   < > CANCELED   ALKPHOS  --   --   --   --   --   --  52 54 56 55  --  47   < > CANCELED   ALT  --   --   --   --   --   --  13 13 12 13  --  13   < > CANCELED   AST  --   --   --   --   --   --  15 31 14 12  --  11   < > CANCELED   BILITOT  --   --   --   --   --   --  0.4 0.5 0.5 0.3  --  0.4   < > CANCELED   LIPASE  --   --   --   --   --   --   --   --   --   --   --   --   --  CANCELED    < > = values in this interval not displayed.   CBC:  Recent  Labs     07/09/25  0628 07/08/25  0640 07/07/25  0646 07/06/25  1159 07/05/25  0539 04/25/23  1613 04/25/23  1229 04/03/23  2140 04/03/23  0237   WBC 16.3* 15.5* 18.2* 20.4* 20.4*   < > 22.9*   < > 4.4   ANC  --   --   --   --   --   --  21.34*  --  3.69   HGB 9.6* 9.5* 10.0* 10.0* 9.4*   < > 9.2*   < > 10.7*   HCT 29.2* 29.9* 30.2* 31.5* 29.9*   < > 27.3*   < > 30.7*    310 327 339 339   < > 130*   < > 84*   MCV 99 100 98 99 101*   < > 94   < > 94    < > = values in this interval not displayed.   COAG:   Recent Labs     06/21/25  0801 06/20/25  2330 06/20/25  2118 06/20/25  1659 03/03/24  2355 03/03/24  1055 03/03/24  0604 05/02/23  0030 05/01/23  0033 04/21/23  1412 04/21/23  1125 04/08/23  0315 04/07/23  1835 04/06/23  1852 04/06/23  1714   INR 1.4* 1.9* 2.4* 3.0*   < >  --  1.6*   < > 1.3*   < > 1.2*   < >  --    < >  --    HAUF  --  0.2  --   --   --  0.3 0.4   < >  --    < >  --    < >  --   --   --    HAPTOGLOBIN  --   --   --   --   --   --   --   --   --   --  <30  --  <30  --  <30   FIBRINOGEN 291  --   --  426*  --   --   --   --  264   < >  --   --   --   --   --     < > = values in this interval not displayed.   ABO:   Recent Labs     06/23/25  1433   ABO AB   HEME/ENDO:  Recent Labs     06/25/25  0609 03/26/25  1157 01/28/25  1451 04/04/24  1509 03/02/24  0610 02/26/24  0811 02/23/24  1433 04/25/23  0911 04/21/23  1125 04/07/23  0416 03/31/23  2331 03/31/23  2142 03/29/23  2151   FERRITIN 130  --   --   --   --   --  104  --  352*  --   --   --  79   IRONSAT 21*  --   --   --   --   --  18*  --  16*  --   --   --  6*   TSH  --  1.83 0.05* 0.70 8.10*   < > 18.02*   < >  --    < >  --    < > 15.36*   HGBA1C  --   --   --   --   --   --  4.4  --   --   --  5.8*  --  6.7*    < > = values in this interval not displayed.    CARDIAC:   Recent Labs     07/09/25  0628 07/06/25  1159 03/26/25  1157 01/28/25  1451 04/04/24  1509 03/06/24  0615 03/05/24  0609 02/24/24  0233 02/23/24  1433 05/21/23  1943    0000   LDH  --   --  263* 145 134 116 142   < > 128  --   --    TROPHS  --   --   --   --   --   --   --   --  30 CANCELED  CANCELED  --    * 347* 57 69 101*  --   --   --  33 CANCELED   < >    < > = values in this interval not displayed.     Recent Labs     02/29/24  0658 02/28/24  0444 03/29/23  2151   CHOL 158 155 126   LDLF  --   --  85   HDL 38.3 39.4 24.0*   TRIG 145  --  83   MICRO:   Recent Labs     06/20/25  1659 01/28/25  1451 02/29/24  0658 05/12/23  0857 04/03/23  1210 04/01/23  0421   CRP  --   --   --  14.77*  --   --    CMVIGG Nonreactive Nonreactive Nonreactive  --    < >  --    TOXOIGG Nonreactive Nonreactive  --   --   --  CANCELED    < > = values in this interval not displayed.   IS:   Recent Labs     07/09/25  0628 07/08/25  0640 07/07/25  0646 07/06/25  0548 07/05/25  0539   TACROLIMUS 10.0 9.0 10.4 10.3 10.6   No results found for the last 90 days.    Notable Studies:   EKG:   Recent Labs     07/01/25  1015 06/20/25  1630 02/23/24  1535   VENTRATE 103 72 75   ATRRATE 103 50 75   QTCFRED 433 459 456   QRSDUR 94 186 128   QTCCALCB 474 473 473     Encounter Date: 06/20/25   Electrocardiogram 12-lead PRN for arrhythmia   Result Value    Ventricular Rate 103    Atrial Rate 103    AK Interval 122    QRS Duration 94    QT Interval 362    QTC Calculation(Bazett) 474    P Axis 93    R Axis 13    T Axis 55    QRS Count 17    Q Onset 219    P Onset 168    P Offset 209    T Offset 400    QTC Fredericia 433    Narrative    Sinus tachycardia  Acute pericarditis  Abnormal ECG  When compared with ECG of 20-JUN-2025 16:12,  Right bundle branch block is no longer Present  Criteria for Lateral infarct are no longer Present  Confirmed by Bridget Ford ([id number]) on 7/1/2025 4:30:04 PM     Echocardiogram:   Recent Labs     07/01/25  1208 06/30/25  1609 06/27/25  1431 06/23/25  1237 06/21/25  0054 02/27/25  1155 02/23/24  1711   EF  --  63 63 67 53 13  --    LVIDD  --  4.40 5.45 4.60  --  8.22  --    RV  --    --  25  --   --  20.6  --    RVFRWALLPKSP 6.75 7.40 10.00  --   --   --  0.60   TAPSE  --   --  1.6  --   --   --  1.4   Transthoracic Echo (TTE) Complete 02/27/2025    [facility], [address] [place] [address]  Tel [phone] and Fax [phone]    TRANSTHORACIC ECHOCARDIOGRAM REPORT      Patient Name:       BILLIE WARD        Nichelle Physician:    [id number] Gabino Rosenthal MD  Study Date:         2/27/2025           Ordering Provider:    [id number] TRINIDAD TIWARI  MRN/PID:            [id number]            Fellow:  Accession#:         [id number]        Nurse:                Tl Rose RN  Date of Birth/Age:  1960 / 64      Sonographer:          Unruly flanagan                                     RDCS, RVT  Gender assigned at  M                   Additional Staff:  Birth:  Height:             175.26 cm           Admit Date:  Weight:             103.87 kg           Admission Status:     Outpatient  BSA / BMI:          2.19 m2 / 33.82     Encounter#:           [id number]  kg/m2  Blood Pressure:     117/80 mmHg         Department Location:  [facility]    Study Type:    TRANSTHORACIC ECHO (TTE) COMPLETE  Diagnosis/ICD: Chronic systolic (congestive) heart failure (CHF)-I50.22  Indication:    listed for heart transplant  CPT Code:      Echo Complete w Full Doppler-83476    Patient History:  Pertinent History: LVAD, CHF, HTN, cardiomyopathy, tachycardia.    Study Detail: The following Echo studies were performed: 2D, M-Mode, Doppler and  color flow. Technically challenging study due to poor acoustic  windows and body habitus.      PHYSICIAN INTERPRETATION:  Left Ventricle: Left ventricular ejection fraction is severely decreased, by visual estimate at 10-15%. There is moderate eccentric left ventricular hypertrophy. There is global hypokinesis of the left ventricle with minor regional variations. The left ventricular cavity size is severely dilated. There is normal  septal and normal posterior left ventricular wall thickness. Spectral Doppler shows an abnormal pattern of left ventricular diastolic filling.  Left Atrium: The left atrial size is mild to moderately dilated.  Right Ventricle: The right ventricle was not well visualized. There is reduced right ventricular systolic function. A device is visualized in the right ventricle.  Right Atrium: The right atrial size was not well visualized. There is a device visualized in the right atrium.  Aortic Valve: The aortic valve was not well visualized. There is trace to mild aortic valve regurgitation.  Mitral Valve: The mitral valve is normal in structure. There is trace mitral valve regurgitation.  Tricuspid Valve: The tricuspid valve was not well visualized. Tricuspid regurgitation was not assessed.  Pulmonic Valve: The pulmonic valve is not well visualized. Pulmonic valve regurgitation was not assessed.  Pericardium: There is no pericardial effusion noted.  Aorta: The aortic root was not well visualized.  Systemic Veins: The inferior vena cava appears small in size, with IVC inspiratory collapse greater than 50%.    LEFT VENTRICULAR ASSIST DEVICE:  LVAD: The patient has a(n) HeartMate 3 LVAD device present.  Inflow Cannula: Visualization of the inflow cannula is technically difficult.  Outflow Cannula: Visualization of the outflow cannula is technically difficult.  AV Leaflet Mobility: The aortic valve leaflets do not appear to open.        CONCLUSIONS:  1. Poorly visualized anatomical structures due to suboptimal image quality.  2. Left ventricular ejection fraction is severely decreased, by visual estimate at 10-15%.  3. There is global hypokinesis of the left ventricle with minor regional variations.  4. Spectral Doppler shows an abnormal pattern of left ventricular diastolic filling.  5. Left ventricular cavity size is severely dilated.  6. There is moderate eccentric left ventricular hypertrophy.  7. There is reduced right  ventricular systolic function.  8. The left atrial size is mild to moderately dilated.    QUANTITATIVE DATA SUMMARY:    2D MEASUREMENTS:          Normal Ranges:  IVSd:            0.70 cm  (0.6-1.1cm)  LVPWd:           0.81 cm  (0.6-1.1cm)  LVIDd:           8.22 cm  (3.9-5.9cm)  LVIDs:           7.85 cm  LV Mass Index:   140 g/m2  LV % FS          4.5 %      LV SYSTOLIC FUNCTION:  Normal Ranges:  EF-Visual:      13 %  LV EF Reported: 13 %      LV DIASTOLIC FUNCTION:          Normal Ranges:  MV Peak E:             0.42 m/s (0.7-1.2 m/s)  MV Peak A:             0.54 m/s (0.42-0.7 m/s)  E/A Ratio:             0.79     (1.0-2.2)  MV medial e'           0.05 m/s      MITRAL VALVE:          Normal Ranges:  MV DT:        219 msec (150-240msec)      AORTIC VALVE:          Normal Ranges:  LVOT Diameter: 2.62 cm (1.8-2.4cm)      TRICUSPID VALVE/RVSP:          Normal Ranges:  Peak TR Velocity:     2.10 m/s  RV Syst Pressure:     21 mmHg  (< 30mmHg)  IVC Diam:             0.75 cm      PULMONIC VALVE:          Normal Ranges:  PV Accel Time:  103 msec (>120ms)  PV Max Nam:     0.8 m/s  (0.6-0.9m/s)  PV Max P.3 mmHg      AORTA:  Asc Ao Diam 3.89 cm      [id number] Gabino Rosenthal MD  Electronically signed on 2025 at 12:06:40 PM        ** Final **    Coronary Angiography:   RHC, RHC 2025    [facility], Cath Lab, [address] [place] [address]    Cardiovascular Catheterization Report    Patient Name:      BILLIE WARD         Performing Physician:  [id number] Ruano DO  Study Date:        2025             Verifying Physician:   Heather Ruano DO  MRN/PID:           [id number]             Cardiologist/Co-Scrub:  Accession#:        [id number]         Ordering Provider:     [id number] CORINNE JOHN  Date of Birth/Age: 1960 / 64 years Cardiologist:  Gender:            M                    Fellow:  Encounter#:        [id number]           Surgeon:      Study:             Right Heart Cath  Additional Study: Endomyocardial Biopsy      Indications:  Procedure performed to evaluate a recipient of a cardiac transplant.    Procedure Description:  After infiltration of local anesthetic, the right internal jugular vein was identified with two-dimensional ultrasound. Under direct ultrasound visualization, the right internal jugular vein was cannulated with a micropuncture technique. A 7 Mohawk sheath was placed in the vein. A balloon tipped catheter was advanced through the right heart to record pressures. Cardiac output was calculated via the Raven method. Post-procedure, the venous sheath was pulled and pressure was applied to the site.    Biopsy:  A total of 3 tissue specimens were taken from the right ventricle. No complications were identified during the procedure.    Right Heart Catheterization:  A balloon tipped catheter was advanced through the right heart to record pressures. Cardiac output was calculated via the Raven method. Normal filling pressures. Cardiac output is normal.    Hemodynamic Pressures:  Post-EMBx RAP: 8.  +---------+-------+-------+-------+--------+-------+           RA mmHgRV mmHgPA mmHgPCW mmHgFA mmHg  +---------+-------+-------+-------+--------+-------+  Systolic        35     35             136      +---------+-------+-------+-------+--------+-------+  Diastolic              19             74       +---------+-------+-------+-------+--------+-------+  EDP             8                              +---------+-------+-------+-------+--------+-------+  Mean     8             24     14      94       +---------+-------+-------+-------+--------+-------+        Oxygen Saturation %:  +---+---+  RPA57%  +---+---+      Cardiac Outputs:  +----+---+---+      CI CO   +----+---+---+  FICK2.75.9  +----+---+---+      Vascular Resistance Calculated Values (Wood Units):  +---+----------+  [id number]  dynes  +---+----------+  PVR1.8 ALVAREZ      +---+----------+      Complications:  No in-lab complications observed.    Cardiac Cath Post Procedure Notes:  Post Procedure           S/p OHT.  Diagnosis:  Blood Loss:              Estimated blood loss during the procedure was < 10 cc  mls.  Specimens Removed:       Number of specimen(s) removed: 3.    ____________________________________________________________________________________  CONCLUSIONS:  1. /74/(94), RA 8, PA 35/19/(24), PCW 14, sat 57%, CO/CI 5.9/2.7, SVR 1175 dynes, PVR 1.8 ALVAREZ.  2. Preserved CO/CI with modestly elevated left sided filling pressure.  3. EMBx x3 sent to pathology.  4. Return to LT3 for ongoing management.    ICD 10 Codes:  Encounter for aftercare following heart transplant-Z48.21; Heart transplant status-Z94.1    Heather Ruano DO  Performing Physician  Electronically signed by Heather Ruano DO on 7/8/2025 at 9:21:23 AM          ** Final **    Right Heart Cath:   RHC, RHC 07/08/2025    [facility], Cath Lab, [address] [place] [address]    Cardiovascular Catheterization Report    Patient Name:      BILLIE WARD         Performing Physician:  Heather Ruano DO  Study Date:        7/8/2025             Verifying Physician:   Heather Ruano DO  MRN/PID:           [id number]             Cardiologist/Co-Scrub:  Accession#:        [id number]         Ordering Provider:     [id number] CORINNE JOHN  Date of Birth/Age: 1960 / 64 years Cardiologist:  Gender:            M                    Fellow:  Encounter#:        [id number]           Surgeon:      Study:            Right Heart Cath  Additional Study: Endomyocardial Biopsy      Indications:  Procedure performed to evaluate a recipient of a cardiac transplant.    Procedure Description:  After infiltration of local anesthetic, the right internal jugular vein was identified with two-dimensional ultrasound. Under  direct ultrasound visualization, the right internal jugular vein was cannulated with a micropuncture technique. A 7 Turkmen sheath was placed in the vein. A balloon tipped catheter was advanced through the right heart to record pressures. Cardiac output was calculated via the Raven method. Post-procedure, the venous sheath was pulled and pressure was applied to the site.    Biopsy:  A total of 3 tissue specimens were taken from the right ventricle. No complications were identified during the procedure.    Right Heart Catheterization:  A balloon tipped catheter was advanced through the right heart to record pressures. Cardiac output was calculated via the Raven method. Normal filling pressures. Cardiac output is normal.    Hemodynamic Pressures:  Post-EMBx RAP: 8.  +---------+-------+-------+-------+--------+-------+           RA mmHgRV mmHgPA mmHgPCW mmHgFA mmHg  +---------+-------+-------+-------+--------+-------+  Systolic        35     35             136      +---------+-------+-------+-------+--------+-------+  Diastolic              19             74       +---------+-------+-------+-------+--------+-------+  EDP             8                              +---------+-------+-------+-------+--------+-------+  Mean     8             24     14      94       +---------+-------+-------+-------+--------+-------+        Oxygen Saturation %:  +---+---+  RPA57%  +---+---+      Cardiac Outputs:  +----+---+---+      CI CO   +----+---+---+  FICK2.75.9  +----+---+---+      Vascular Resistance Calculated Values (Wood Units):  +---+----------+  [id number] dynes  +---+----------+  PVR1.8 ALVAREZ      +---+----------+      Complications:  No in-lab complications observed.    Cardiac Cath Post Procedure Notes:  Post Procedure           S/p OHT.  Diagnosis:  Blood Loss:              Estimated blood loss during the procedure was < 10 cc  mls.  Specimens Removed:       Number of specimen(s)  removed: 3.    ____________________________________________________________________________________  CONCLUSIONS:  1. /74/(94), RA 8, PA 35/19/(24), PCW 14, sat 57%, CO/CI 5.9/2.7, SVR 1175 dynes, PVR 1.8 ALVAREZ.  2. Preserved CO/CI with modestly elevated left sided filling pressure.  3. EMBx x3 sent to pathology.  4. Return to LT3 for ongoing management.    ICD 10 Codes:  Encounter for aftercare following heart transplant-Z48.21; Heart transplant status-Z94.1    [id number] Vince Ruano DO  Performing Physician  Electronically signed by [id number] Vince Ruano DO on 7/8/2025 at 9:21:23 AM          ** Final **    Cardiac Scoring: No results found for this or any previous visit from the past 1800 days.    Cardiac MRI: No results found for this or any previous visit from the past 1800 days.    Nuclear:No results found for this or any previous visit from the past 1800 days.    Metabolic Stress: No results found for this or any previous visit from the past 1800 days.    Med:   Current Outpatient Medications   Medication Instructions    acyclovir (ZOVIRAX) 400 mg, oral, 2 times daily    alcohol swabs Apply topically 3 times a day.    aspirin 81 mg, oral, Daily    blood-glucose sensor (FreeStyle Federico 3 Plus Sensor) device Use 1 every 15 days.    calcium carbonate (Os-Wilfredo) 1,250 mg (500 mg elemental) tablet 1 tablet, oral, 2 times daily    FreeStyle Federico 3 [place] misc Use as directed.    furosemide (LASIX) 40 mg, oral, Daily    HumuLIN N NPH Insulin KwikPen 15 Units, subcutaneous, Every morning, With your steroid.    hydrALAZINE (APRESOLINE) 50 mg, oral, 3 times daily    levothyroxine (SYNTHROID, LEVOXYL) 125 mcg, oral, Daily, Take on an empty stomach at the same time each day, either 30 to 60 minutes prior to breakfast    lidocaine 4 % patch 1 patch, transdermal, Daily, Remove & discard patch within 12 hours or as directed by MD.    Lokelma 10 g, oral, Daily PRN    mycophenolate (CELLCEPT) 1,000 mg, oral, Every 12  "hours scheduled    pantoprazole (PROTONIX) 40 mg, oral, Daily, Do not crush, chew, or split.    pen needle, diabetic 31 gauge x 5/16\" needle Use 1 once daily or as directed by provider.    pravastatin (PRAVACHOL) 40 mg, oral, Nightly    predniSONE (DELTASONE) 30 mg, oral, Daily    sulfamethoxazole-trimethoprim (Bactrim) 400-80 mg tablet 1 tablet, oral, Daily    tacrolimus (PROGRAF) 2.5 mg, oral, Every 12 hours scheduled (0630,1830), Please take one 0.5 pill and two 1mg pills for a total of 2.5mg twice a day until instructed differently by transplant team    tacrolimus (PROGRAF) 1 mg, oral, 2 times daily, Please take one 0.5 pill and two 1mg pills for a total of 2.5mg twice a day until instructed differently by transplant team    tamsulosin (FLOMAX) 0.4 mg, oral, Daily    traMADol (ULTRAM) 50 mg, oral, Every 6 hours PRN    Vitamin D3 50 mcg, oral, Daily    voriconazole (VFEND) 200 mg, oral, Every 12 hours scheduled      Active Patient Thresholds       Patient has no thresholds defined.          64 y.o. WM who is now POD #23 for heart transplantation. He was discharged home but has had a rough time there: falling at home. Taking torsemide for leg swelling. Coughing and bringing up phlegm, today in catheterization lab he was hypoxic.     He underwent RHC/EMBx today. RHC: 121/69 (85), RA 4, PA 32/16 (21), PCWP 10; Raven CI 3.8 and Thermodilution CI 2.4.    Plan is to readmit him to [facility] for management of (1) acute kidney injury, (2) hypoxia and cough with suspicion for pneumonia, (3) falls at home and need to be discharged to SNF.     Rejection/Prophylaxis:    - Induction: s/p methylprednisolone 500mg IV x2, S/p Basiliximab 20mg IV within 1 hour of arrival to CTICU (no need for premedication), s/p 2nd dose: 20mg IVPB on POD4 (06/25)   - Steroids: Prednisone 30mg daily (reduced 7/8 after negative biopsy; will need to be reduced again in a day or two based on biopsy results)   - Tacrolimus: 2.5mg BID  - Tacrolimus goal " troughs: 10-12; Last tacrolimus trough: 10.0 7/9/2025:  6:28 AM  - Cellcept: 1,000mg BID   - Antifungals: voriconazole 200mg BID x3 months end date 09/22/2025   - Antivirals: acylovir 400mg BID x3 months end date 09/22/2025   - Anti PCP & Toxoplasmosis: SS Bactrim daily end date of 12/22/25     Last cardiac biopsy: 7/8: negative  Last RHC: 7/8   Next RHC/biopsy: 7/14 - patient has RHC/EMB, physician follow up, and echo ordered for 7/14. Confirmed transportation with the patient.   Last TTE/BOBBY: 7/8 - difficult views 2/2 air  Last HLA: Pre-transplant: PRAs 0%; First due on 7/21  Last Allomap: ~ will assess starting at 6-month ana post-op    Last Allosure: ~ will assess starting at 6-month ana post-op   Last LHC: First due ~ (one year post tx) due 6/2026  Osteopenia/osteoporosis prophylaxis: Vit D 2000U daily and calcium 500 mg BID (give calcium 2 hrs after MMF)   Peptic/gastric ulcer prophylaxis: Pantoprazole 40mg daily    CAV Prophylaxis: Aspirin 81mg daily and Pravastatin 40mg daily     Problems:   # s/p OHT (6/21/2025 (Heart))  Graft Function: Currently without evidence of graft dysfunction.  - Echocardiogram: 6/30/2025:  4:09 PM  - RHC: 7/14/2025 121/69 (85), RA 4, PA 32/16 (21), PCWP 10; Raven CI 3.8 and Thermodilution CI 2.4.  - LHC: To be done 1-year post transplant  - DSE: To be done 1-year post transplant    Rejection Surveillance:   - Last Rejection: None  - Embx: Performed today 7/14/2025  - Allomap: No results found for requested labs within last 3650 days.  - DSA: No results in last year.    Immunosuppression: Induction:   - Tacrolimus goal trough:   , last trough 10.0, 7/9/2025:  6:28 AM  - Tacrolimus dose:   - Sirolimus goal trough:   , last trough No results found for requested labs within last 365 days., No results in last year.  - Sirolimus dose:   - MMF:   - Prednisone:    ID Prophylaxis: (D/R): CMV: ( / ), EBV ( / ), Toxo ( / )   - Antifungals:   - Antivirals:   - Anti PCP & Toxoplasmosis:   -  Vaccines:     Transplant-related Health Care Maintenance:  - BMD:   - Dermatology:    - Onc:   - Lipid:   - Endocrine: Last HgbA1c: 2/23/2024: 4.4  - Renal function: Last BUN/Cr (GFR) - 51/0.93 (>90), 7/9/2025:  6:28 AM  - Social Work:     Other Issues:  # Falling at home!!      Patient Instructions:  - Please bring a copy of all your medications with each clinic visit.   - Please save the  Transplant Office number in your phone: [phone], and the Transplant Coordinator On Call: [phone]    Orders:  No orders of the defined types were placed in this encounter.     Followup Appts:  Future Appointments   Date Time Provider Department Center   7/14/2025  1:00 PM U ECHO 3 [id number] [facility]   7/14/2025  3:20 PM Shekhar Hylton MD MPH [id number] [facility]   7/29/2025  1:00 PM Sara Corley MD [id number] Academic   ____________________________________________________________  Shekhar Hylton MD MPH   Section of Advanced Heart Failure and Cardiac Transplantation  Division of Cardiovascular Medicine  [facility]       [1]   Past Medical History:  Diagnosis Date    ACC/AHA stage D systolic heart failure     Acute decompensated heart failure 02/23/2024    Acute on chronic combined systolic (congestive) and diastolic (congestive) heart failure 02/23/2024    Acute on chronic systolic heart failure, NYHA class 3     Anticoagulant long-term use 02/26/2024    CAD (coronary artery disease)     Cardiac defibrillator in place 02/14/2024    CHF (congestive heart failure) 02/14/2024    Chronic left systolic heart failure 02/14/2025    Hypothyroid     Hypothyroidism     LVAD (left ventricular assist device) present (Multi)     LVAD (left ventricular assist device) present (Multi) 02/14/2024    Non-ischemic cardiomyopathy (Multi) 02/14/2024    PAF (paroxysmal atrial fibrillation) (Multi)     Paroxysmal ventricular tachycardia 02/14/2024   [2]   Past Surgical  History:  Procedure Laterality Date    CARDIAC CATHETERIZATION N/A 2/28/2024    Procedure: Right Heart Cath;  Surgeon: José Velasco MD;  Location: [place] [address] Cardiac Cath Lab;  Service: Cardiovascular;  Laterality: N/A;  with RAMP study    CARDIAC CATHETERIZATION N/A 3/26/2025    Procedure: Right Heart Cath;  Surgeon: Vince Ruano DO;  Location: [place] [address] Cardiac Cath Lab;  Service: Cardiovascular;  Laterality: N/A;    CARDIAC CATHETERIZATION N/A 7/1/2025    Procedure: RHC;  Surgeon: José Velasco MD;  Location: [place] [address] Cardiac Cath Lab;  Service: Cardiovascular;  Laterality: N/A;  w/ EMB    CARDIAC CATHETERIZATION N/A 7/1/2025    Procedure: Endomyocardial Biopsy;  Surgeon: José Velasco MD;  Location: [place] [address] Cardiac Cath Lab;  Service: Cardiovascular;  Laterality: N/A;    CARDIAC CATHETERIZATION N/A 7/8/2025    Procedure: Endomyocardial Biopsy;  Surgeon: Vince Ruano DO;  Location: [place] [address] Cardiac Cath Lab;  Service: Cardiovascular;  Laterality: N/A;    CARDIAC CATHETERIZATION N/A 7/8/2025    Procedure: RHC;  Surgeon: Vince Ruano DO;  Location: [place] [address] Cardiac Cath Lab;  Service: Cardiovascular;  Laterality: N/A;    COLONOSCOPY W/ POLYPECTOMY  2023    CT ABDOMEN PELVIS ANGIOGRAM W AND/OR WO IV CONTRAST  04/13/2023    CT ABDOMEN PELVIS ANGIOGRAM W AND/OR WO IV CONTRAST [facility] CT    LEFT VENTRICULAR ASSIST DEVICE      OTHER SURGICAL HISTORY  01/05/2022    Complete colonoscopy    OTHER SURGICAL HISTORY  01/05/2022    Cardioverter defibrillator insertion   [3]   Social History  Socioeconomic History    Marital status:    Tobacco Use    Smoking status: Former     Types: Cigarettes    Smokeless tobacco: Never   Substance and Sexual Activity    Alcohol use: Never    Drug use: Never     Social Drivers of Health     Financial Resource Strain: Low Risk  (6/29/2025)    Overall Financial Resource Strain (CARDIA)     Difficulty of Paying Living  Expenses: Not very hard   Food Insecurity: No Food Insecurity (2/26/2024)    Hunger Vital Sign     Worried About Running Out of Food in the Last Year: Never true     Ran Out of Food in the Last Year: Never true   Transportation Needs: No Transportation Needs (6/29/2025)    PRAPARE - Transportation     Lack of Transportation (Medical): No     Lack of Transportation (Non-Medical): No   Physical Activity: Insufficiently Active (6/20/2025)    Exercise Vital Sign     Days of Exercise per Week: 1 day     Minutes of Exercise per Session: 10 min   Stress: No Stress Concern Present (1/28/2025)    [employer] [place] - Occupational Stress Questionnaire     Feeling of Stress : Not at all   Social Connections: Socially Isolated (1/28/2025)    Social Connection and Isolation Panel [NHANES]     Frequency of Communication with Friends and Family: Twice a week     Frequency of Social Gatherings with Friends and Family: Never     Attends Worship Services: Never     Active Member of Clubs or Organizations: No     Attends Club or Organization Meetings: Never     Marital Status:    Intimate Partner Violence: Not At Risk (2/26/2024)    Humiliation, Afraid, Rape, and Kick questionnaire     Fear of Current or Ex-Partner: No     Emotionally Abused: No     Physically Abused: No     Sexually Abused: No   Housing Stability: Low Risk  (6/29/2025)    Housing Stability Vital Sign     Unable to Pay for Housing in the Last Year: No     Number of Times Moved in the Last Year: 0     Homeless in the Last Year: No   [4]   Family History  Problem Relation Name Age of Onset    Hypertension Father      Colon cancer Father     [5]   Allergies  Allergen Reactions    Jardiance [Empagliflozin] Itching

## 2025-07-10 NOTE — TELEPHONE ENCOUNTER
On call Note- 1043pm    Patient called the on call phone after being discharged from the hospital today s/p OHT. He stated that he had some slight swelling in his lower legs. Denies SOB or other symptoms. He was asking if he should take Torsemide to help remove it. Discharge Medication list was reviewed and patient is to take Furosemide 40mg Daily. Patient advised to only take what's on his discharge med list not to take any meds from pre transplant med regimen.     Explained that steroids ( Prednisone)  that he is on can cause water retention/ swelling.  Unsure of what patient diet was after leaving the hospital tonight. Reminded patient that he will be weighing himself daily and any increase more than 3-5lbs in 24 hr period he should alert us.     Will notify team of call and coordinator to follow up with patient tomorrow.

## 2025-07-10 NOTE — TELEPHONE ENCOUNTER
Pt called stating he is completely out of his Gabapentin 300 mg. Please send to [facility] in [facility].

## 2025-07-11 NOTE — DOCUMENTATION CLARIFICATION NOTE
"    PATIENT:               BILLIE WARD  ACCT #:                  [id number]  MRN:                       [id number]  :                       1960  ADMIT DATE:       2025 3:37 PM  DISCH DATE:        2025 6:34 PM  RESPONDING PROVIDER #:        [id number]          PROVIDER RESPONSE TEXT:    Non Cardiogenic acute pulmonary edema    CDI QUERY TEXT:    Clarification    Instruction:    Based on your assessment of the patient and the clinical information, please provide the requested documentation by clicking on the appropriate radio button and enter any additional information if prompted.    Question: Please further clarify the etiology of pulmonary edema    When answering this query, please exercise your independent professional judgment. The fact that a question is being asked, does not imply that any particular answer is desired or expected.    The patient's clinical indicators include:  Clinical Information:  7/3 HF PN \"64 year old male with relevant PMH of ex smoker, NICM, stage D HFrEF NYHA 2-3, S/p HM2 (23), VT s/p ICD, CAD, pAF, HTN,\"   underwent \" LVAD explant, AICD removal & OHT \"    Clinical Indicators:  7/3 transplant noted \" CXR to assess resolution of pulmonary edema \"  \" CT chest appears to be volume overload, will diurese today. \"  \" C/w Lasix 40mg PO daily, \"  \" Edema improving on exam, lung sounds improving \"     TTE \" The left ventricular systolic function is normal with a visually estimated ejection fraction of 60-65%. \"   TTE \" The left ventricle was not well visualized. The left ventricular ejection fraction could not be measured. \"  \" LV function not well seen in the apical views despite the use of echocontrast. The parasternal views demonstrated grossly normal LV systolic function without obvoius regional wall motion abnormalities. \"  \" Unable to determine right ventricular systolic function. \"     HF PN I/O last 3 shifts In 240; Out 3385  7/3 HF PN I/O last 3 shifts In " 360; Out 0704    Treatment:  Lasix IV  Monitor daily CXR and fluid status    Risk Factors:  HFrEF. Former smoker. OHT surgery 6/21  Options provided:  -- Non Cardiogenic acute pulmonary edema  -- Cardiogenic acute pulmonary edema due to acute on chronic HFrEF  -- Other - I will add my own diagnosis  -- Refer to Clinical Documentation Reviewer    Query created by: Radha Desai on 7/11/2025 10:58 AM      Electronically signed by:  CORINNE GARY-CNP 7/11/2025 1:59 PM

## 2025-07-14 ENCOUNTER — TELEPHONE (OUTPATIENT)
Dept: TRANSPLANT | Facility: HOSPITAL | Age: 65
End: 2025-07-14
Payer: MEDICARE

## 2025-07-14 ENCOUNTER — DOCUMENTATION (OUTPATIENT)
Dept: TRANSPLANT | Facility: HOSPITAL | Age: 65
End: 2025-07-14

## 2025-07-14 ENCOUNTER — APPOINTMENT (OUTPATIENT)
Dept: RADIOLOGY | Facility: HOSPITAL | Age: 65
End: 2025-07-14
Payer: MEDICARE

## 2025-07-14 ENCOUNTER — APPOINTMENT (OUTPATIENT)
Dept: TRANSPLANT | Facility: HOSPITAL | Age: 65
End: 2025-07-14
Payer: MEDICARE

## 2025-07-14 ENCOUNTER — APPOINTMENT (OUTPATIENT)
Dept: CARDIOLOGY | Facility: HOSPITAL | Age: 65
DRG: 682 | End: 2025-07-14
Payer: MEDICARE

## 2025-07-14 ENCOUNTER — HOSPITAL ENCOUNTER (OUTPATIENT)
Dept: CARDIOLOGY | Facility: HOSPITAL | Age: 65
Discharge: HOME | End: 2025-07-14
Payer: MEDICARE

## 2025-07-14 ENCOUNTER — OFFICE VISIT (OUTPATIENT)
Dept: TRANSPLANT | Facility: HOSPITAL | Age: 65
End: 2025-07-14
Payer: MEDICARE

## 2025-07-14 ENCOUNTER — APPOINTMENT (OUTPATIENT)
Dept: CARDIOLOGY | Facility: HOSPITAL | Age: 65
End: 2025-07-14
Payer: MEDICARE

## 2025-07-14 ENCOUNTER — APPOINTMENT (OUTPATIENT)
Dept: RADIOLOGY | Facility: HOSPITAL | Age: 65
DRG: 682 | End: 2025-07-14
Payer: MEDICARE

## 2025-07-14 ENCOUNTER — HOSPITAL ENCOUNTER (OUTPATIENT)
Facility: HOSPITAL | Age: 65
Setting detail: OUTPATIENT SURGERY
Discharge: HOME | End: 2025-07-14
Attending: INTERNAL MEDICINE | Admitting: INTERNAL MEDICINE
Payer: MEDICARE

## 2025-07-14 ENCOUNTER — HOSPITAL ENCOUNTER (INPATIENT)
Facility: HOSPITAL | Age: 65
DRG: 682 | End: 2025-07-14
Admitting: NURSE PRACTITIONER
Payer: MEDICARE

## 2025-07-14 VITALS
HEIGHT: 69 IN | DIASTOLIC BLOOD PRESSURE: 57 MMHG | OXYGEN SATURATION: 92 % | WEIGHT: 216.93 LBS | SYSTOLIC BLOOD PRESSURE: 110 MMHG | BODY MASS INDEX: 32.13 KG/M2 | RESPIRATION RATE: 18 BRPM | TEMPERATURE: 97.5 F | HEART RATE: 82 BPM

## 2025-07-14 VITALS
HEIGHT: 69 IN | SYSTOLIC BLOOD PRESSURE: 100 MMHG | RESPIRATION RATE: 16 BRPM | HEART RATE: 88 BPM | OXYGEN SATURATION: 92 % | BODY MASS INDEX: 31 KG/M2 | DIASTOLIC BLOOD PRESSURE: 64 MMHG | WEIGHT: 209.3 LBS

## 2025-07-14 DIAGNOSIS — Z94.1 S/P ORTHOTOPIC HEART TRANSPLANT: ICD-10-CM

## 2025-07-14 DIAGNOSIS — K59.04 CHRONIC IDIOPATHIC CONSTIPATION: ICD-10-CM

## 2025-07-14 DIAGNOSIS — Z94.1 HEART REPLACED BY TRANSPLANT: ICD-10-CM

## 2025-07-14 DIAGNOSIS — Z94.1 S/P ORTHOTOPIC HEART TRANSPLANT: Primary | ICD-10-CM

## 2025-07-14 DIAGNOSIS — R06.02 SOB (SHORTNESS OF BREATH): Primary | ICD-10-CM

## 2025-07-14 DIAGNOSIS — N17.9 AKI (ACUTE KIDNEY INJURY): Primary | ICD-10-CM
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
## 2025-07-14 PROBLEM — R29.898 LEFT HAND WEAKNESS: Status: ACTIVE | Noted: 2025-07-14

## 2025-07-14 LAB
ALBUMIN SERPL BCP-MCNC: 3.2 G/DL (ref 3.4–5)
ALBUMIN SERPL BCP-MCNC: 3.5 G/DL (ref 3.4–5)
ALP SERPL-CCNC: 67 U/L (ref 33–136)
ALP SERPL-CCNC: 78 U/L (ref 33–136)
ALT SERPL W P-5'-P-CCNC: 19 U/L (ref 10–52)
ALT SERPL W P-5'-P-CCNC: 21 U/L (ref 10–52)
ANION GAP SERPL CALC-SCNC: 15 MMOL/L (ref 10–20)
ANION GAP SERPL CALC-SCNC: 19 MMOL/L (ref 10–20)
APTT PPP: 26 SECONDS (ref 26–36)
AST SERPL W P-5'-P-CCNC: 14 U/L (ref 9–39)
AST SERPL W P-5'-P-CCNC: 15 U/L (ref 9–39)
BASOPHILS # BLD AUTO: 0 X10*3/UL (ref 0–0.1)
BASOPHILS # BLD AUTO: 0.01 X10*3/UL (ref 0–0.1)
BASOPHILS NFR BLD AUTO: 0 %
BASOPHILS NFR BLD AUTO: 0.1 %
BILIRUB SERPL-MCNC: 0.4 MG/DL (ref 0–1.2)
BILIRUB SERPL-MCNC: 0.4 MG/DL (ref 0–1.2)
BNP SERPL-MCNC: 362 PG/ML (ref 0–99)
BUN SERPL-MCNC: 67 MG/DL (ref 6–23)
BUN SERPL-MCNC: 73 MG/DL (ref 6–23)
CALCIUM SERPL-MCNC: 7.9 MG/DL (ref 8.6–10.3)
CALCIUM SERPL-MCNC: 8.3 MG/DL (ref 8.6–10.6)
CARDIAC TROPONIN I PNL SERPL HS: 233 NG/L (ref 0–53)
CHLORIDE SERPL-SCNC: 101 MMOL/L (ref 98–107)
CHLORIDE SERPL-SCNC: 104 MMOL/L (ref 98–107)
CHOLEST SERPL-MCNC: 191 MG/DL (ref 0–199)
CHOLESTEROL/HDL RATIO: 2.5
CO2 SERPL-SCNC: 22 MMOL/L (ref 21–32)
CO2 SERPL-SCNC: 28 MMOL/L (ref 21–32)
CREAT SERPL-MCNC: 1.62 MG/DL (ref 0.5–1.3)
CREAT SERPL-MCNC: 1.77 MG/DL (ref 0.5–1.3)
EGFRCR SERPLBLD CKD-EPI 2021: 42 ML/MIN/1.73M*2
EGFRCR SERPLBLD CKD-EPI 2021: 47 ML/MIN/1.73M*2
EOSINOPHIL # BLD AUTO: 0 X10*3/UL (ref 0–0.7)
EOSINOPHIL # BLD AUTO: 0.02 X10*3/UL (ref 0–0.7)
EOSINOPHIL NFR BLD AUTO: 0 %
EOSINOPHIL NFR BLD AUTO: 0.3 %
ERYTHROCYTE [DISTWIDTH] IN BLOOD BY AUTOMATED COUNT: 19.8 % (ref 11.5–14.5)
ERYTHROCYTE [DISTWIDTH] IN BLOOD BY AUTOMATED COUNT: 19.9 % (ref 11.5–14.5)
EST. AVERAGE GLUCOSE BLD GHB EST-MCNC: 100 MG/DL
GLUCOSE SERPL-MCNC: 129 MG/DL (ref 74–99)
GLUCOSE SERPL-MCNC: 261 MG/DL (ref 74–99)
HBA1C MFR BLD: 5.1 % (ref ?–5.7)
HBV CORE IGM SER QL: NONREACTIVE
HBV SURFACE AG SERPL QL IA: NONREACTIVE
HCT VFR BLD AUTO: 28.6 % (ref 41–52)
HCT VFR BLD AUTO: 29.2 % (ref 41–52)
HDLC SERPL-MCNC: 75.7 MG/DL
HGB BLD-MCNC: 8.9 G/DL (ref 13.5–17.5)
HGB BLD-MCNC: 9.9 G/DL (ref 13.5–17.5)
HIV 1+2 AB+HIV1 P24 AG SERPL QL IA: NONREACTIVE
IMM GRANULOCYTES # BLD AUTO: 0.05 X10*3/UL (ref 0–0.7)
IMM GRANULOCYTES # BLD AUTO: 0.05 X10*3/UL (ref 0–0.7)
IMM GRANULOCYTES NFR BLD AUTO: 0.6 % (ref 0–0.9)
IMM GRANULOCYTES NFR BLD AUTO: 0.7 % (ref 0–0.9)
INR PPP: 1 (ref 0.9–1.1)
LACTATE SERPL-SCNC: 1.4 MMOL/L (ref 0.4–2)
LDLC SERPL CALC-MCNC: 74 MG/DL
LYMPHOCYTES # BLD AUTO: 0.13 X10*3/UL (ref 1.2–4.8)
LYMPHOCYTES # BLD AUTO: 0.5 X10*3/UL (ref 1.2–4.8)
LYMPHOCYTES NFR BLD AUTO: 1.6 %
LYMPHOCYTES NFR BLD AUTO: 6.7 %
MAGNESIUM SERPL-MCNC: 1.67 MG/DL (ref 1.6–2.4)
MAGNESIUM SERPL-MCNC: 1.69 MG/DL (ref 1.6–2.4)
MCH RBC QN AUTO: 31.8 PG (ref 26–34)
MCH RBC QN AUTO: 31.8 PG (ref 26–34)
MCHC RBC AUTO-ENTMCNC: 31.1 G/DL (ref 32–36)
MCHC RBC AUTO-ENTMCNC: 33.9 G/DL (ref 32–36)
MCV RBC AUTO: 102 FL (ref 80–100)
MCV RBC AUTO: 94 FL (ref 80–100)
MONOCYTES # BLD AUTO: 0.25 X10*3/UL (ref 0.1–1)
MONOCYTES # BLD AUTO: 0.52 X10*3/UL (ref 0.1–1)
MONOCYTES NFR BLD AUTO: 3.1 %
MONOCYTES NFR BLD AUTO: 7 %
NEUTROPHILS # BLD AUTO: 6.34 X10*3/UL (ref 1.2–7.7)
NEUTROPHILS # BLD AUTO: 7.65 X10*3/UL (ref 1.2–7.7)
NEUTROPHILS NFR BLD AUTO: 85.3 %
NEUTROPHILS NFR BLD AUTO: 94.6 %
NON HDL CHOLESTEROL: 115 MG/DL (ref 0–149)
NRBC BLD-RTO: 0 /100 WBCS (ref 0–0)
NRBC BLD-RTO: 0 /100 WBCS (ref 0–0)
PLATELET # BLD AUTO: 198 X10*3/UL (ref 150–450)
PLATELET # BLD AUTO: 286 X10*3/UL (ref 150–450)
POTASSIUM SERPL-SCNC: 3.5 MMOL/L (ref 3.5–5.3)
POTASSIUM SERPL-SCNC: 3.9 MMOL/L (ref 3.5–5.3)
PROT SERPL-MCNC: 4.9 G/DL (ref 6.4–8.2)
PROT SERPL-MCNC: 5.9 G/DL (ref 6.4–8.2)
PROTHROMBIN TIME: 11 SECONDS (ref 9.8–12.4)
RBC # BLD AUTO: 2.8 X10*6/UL (ref 4.5–5.9)
RBC # BLD AUTO: 3.11 X10*6/UL (ref 4.5–5.9)
SODIUM SERPL-SCNC: 140 MMOL/L (ref 136–145)
SODIUM SERPL-SCNC: 141 MMOL/L (ref 136–145)
TRIGL SERPL-MCNC: 205 MG/DL (ref 0–149)
VLDL: 41 MG/DL (ref 0–40)
WBC # BLD AUTO: 7.4 X10*3/UL (ref 4.4–11.3)
WBC # BLD AUTO: 8.1 X10*3/UL (ref 4.4–11.3)

## 2025-07-14 PROCEDURE — 83735 ASSAY OF MAGNESIUM: CPT | Performed by: NURSE PRACTITIONER

## 2025-07-14 PROCEDURE — 93505 ENDOMYOCARDIAL BIOPSY: CPT | Performed by: INTERNAL MEDICINE

## 2025-07-14 PROCEDURE — 71045 X-RAY EXAM CHEST 1 VIEW: CPT

## 2025-07-14 PROCEDURE — 99215 OFFICE O/P EST HI 40 MIN: CPT | Mod: 25 | Performed by: INTERNAL MEDICINE

## 2025-07-14 PROCEDURE — 2500000004 HC RX 250 GENERAL PHARMACY W/ HCPCS (ALT 636 FOR OP/ED): Mod: JW | Performed by: REGISTERED NURSE

## 2025-07-14 PROCEDURE — 88307 TISSUE EXAM BY PATHOLOGIST: CPT | Mod: TC,AHULAB | Performed by: NURSE PRACTITIONER

## 2025-07-14 PROCEDURE — 3078F DIAST BP <80 MM HG: CPT | Performed by: INTERNAL MEDICINE

## 2025-07-14 PROCEDURE — 2020000001 HC ICU ROOM DAILY

## 2025-07-14 PROCEDURE — C1894 INTRO/SHEATH, NON-LASER: HCPCS | Performed by: INTERNAL MEDICINE

## 2025-07-14 PROCEDURE — 83605 ASSAY OF LACTIC ACID: CPT | Performed by: NURSE PRACTITIONER

## 2025-07-14 PROCEDURE — 36415 COLL VENOUS BLD VENIPUNCTURE: CPT | Performed by: NURSE PRACTITIONER

## 2025-07-14 PROCEDURE — 84484 ASSAY OF TROPONIN QUANT: CPT | Performed by: NURSE PRACTITIONER

## 2025-07-14 PROCEDURE — G2211 COMPLEX E/M VISIT ADD ON: HCPCS | Performed by: INTERNAL MEDICINE

## 2025-07-14 PROCEDURE — 87801 DETECT AGNT MULT DNA AMPLI: CPT | Performed by: NURSE PRACTITIONER

## 2025-07-14 PROCEDURE — 2500000002 HC RX 250 W HCPCS SELF ADMINISTERED DRUGS (ALT 637 FOR MEDICARE OP, ALT 636 FOR OP/ED): Performed by: NURSE PRACTITIONER

## 2025-07-14 PROCEDURE — C1727 CATH, BAL TIS DIS, NON-VAS: HCPCS | Performed by: INTERNAL MEDICINE

## 2025-07-14 PROCEDURE — 86705 HEP B CORE ANTIBODY IGM: CPT | Performed by: NURSE PRACTITIONER

## 2025-07-14 PROCEDURE — 93005 ELECTROCARDIOGRAM TRACING: CPT

## 2025-07-14 PROCEDURE — 2500000005 HC RX 250 GENERAL PHARMACY W/O HCPCS: Performed by: INTERNAL MEDICINE

## 2025-07-14 PROCEDURE — 80053 COMPREHEN METABOLIC PANEL: CPT | Performed by: NURSE PRACTITIONER

## 2025-07-14 PROCEDURE — 2500000004 HC RX 250 GENERAL PHARMACY W/ HCPCS (ALT 636 FOR OP/ED): Performed by: NURSE PRACTITIONER

## 2025-07-14 PROCEDURE — 99215 OFFICE O/P EST HI 40 MIN: CPT | Performed by: INTERNAL MEDICINE

## 2025-07-14 PROCEDURE — 3008F BODY MASS INDEX DOCD: CPT | Performed by: INTERNAL MEDICINE

## 2025-07-14 PROCEDURE — 71045 X-RAY EXAM CHEST 1 VIEW: CPT | Performed by: INTERNAL MEDICINE

## 2025-07-14 PROCEDURE — 87637 SARSCOV2&INF A&B&RSV AMP PRB: CPT | Performed by: NURSE PRACTITIONER

## 2025-07-14 PROCEDURE — 93306 TTE W/DOPPLER COMPLETE: CPT | Performed by: INTERNAL MEDICINE

## 2025-07-14 PROCEDURE — 99152 MOD SED SAME PHYS/QHP 5/>YRS: CPT | Performed by: INTERNAL MEDICINE

## 2025-07-14 PROCEDURE — 85025 COMPLETE CBC W/AUTO DIFF WBC: CPT | Performed by: NURSE PRACTITIONER

## 2025-07-14 PROCEDURE — 80061 LIPID PANEL: CPT | Performed by: NURSE PRACTITIONER

## 2025-07-14 PROCEDURE — 71045 X-RAY EXAM CHEST 1 VIEW: CPT | Performed by: RADIOLOGY

## 2025-07-14 PROCEDURE — 7100000002 HC RECOVERY ROOM TIME - EACH INCREMENTAL 1 MINUTE: Performed by: INTERNAL MEDICINE

## 2025-07-14 PROCEDURE — 3074F SYST BP LT 130 MM HG: CPT | Performed by: INTERNAL MEDICINE

## 2025-07-14 PROCEDURE — 80197 ASSAY OF TACROLIMUS: CPT | Mod: AHULAB | Performed by: NURSE PRACTITIONER

## 2025-07-14 PROCEDURE — 99222 1ST HOSP IP/OBS MODERATE 55: CPT | Performed by: NURSE PRACTITIONER

## 2025-07-14 PROCEDURE — 87340 HEPATITIS B SURFACE AG IA: CPT | Performed by: NURSE PRACTITIONER

## 2025-07-14 PROCEDURE — 93010 ELECTROCARDIOGRAM REPORT: CPT | Performed by: INTERNAL MEDICINE

## 2025-07-14 PROCEDURE — 2720000007 HC OR 272 NO HCPCS: Performed by: INTERNAL MEDICINE

## 2025-07-14 PROCEDURE — 2500000001 HC RX 250 WO HCPCS SELF ADMINISTERED DRUGS (ALT 637 FOR MEDICARE OP): Performed by: NURSE PRACTITIONER

## 2025-07-14 PROCEDURE — 7100000001 HC RECOVERY ROOM TIME - INITIAL BASE CHARGE: Performed by: INTERNAL MEDICINE

## 2025-07-14 PROCEDURE — 99291 CRITICAL CARE FIRST HOUR: CPT | Performed by: NURSE PRACTITIONER

## 2025-07-14 PROCEDURE — 76937 US GUIDE VASCULAR ACCESS: CPT | Performed by: INTERNAL MEDICINE

## 2025-07-14 PROCEDURE — 83880 ASSAY OF NATRIURETIC PEPTIDE: CPT | Performed by: NURSE PRACTITIONER

## 2025-07-14 PROCEDURE — 2500000004 HC RX 250 GENERAL PHARMACY W/ HCPCS (ALT 636 FOR OP/ED): Performed by: INTERNAL MEDICINE

## 2025-07-14 PROCEDURE — 83036 HEMOGLOBIN GLYCOSYLATED A1C: CPT | Performed by: NURSE PRACTITIONER

## 2025-07-14 PROCEDURE — 86707 HEPATITIS BE ANTIBODY: CPT | Performed by: NURSE PRACTITIONER

## 2025-07-14 PROCEDURE — 87389 HIV-1 AG W/HIV-1&-2 AB AG IA: CPT | Performed by: NURSE PRACTITIONER

## 2025-07-14 PROCEDURE — 85730 THROMBOPLASTIN TIME PARTIAL: CPT | Performed by: NURSE PRACTITIONER

## 2025-07-14 PROCEDURE — 87205 SMEAR GRAM STAIN: CPT | Performed by: NURSE PRACTITIONER

## 2025-07-14 PROCEDURE — 93451 RIGHT HEART CATH: CPT | Mod: 59 | Performed by: INTERNAL MEDICINE

## 2025-07-14 PROCEDURE — C8929 TTE W OR WO FOL WCON,DOPPLER: HCPCS

## 2025-07-14 PROCEDURE — 99153 MOD SED SAME PHYS/QHP EA: CPT | Performed by: INTERNAL MEDICINE

## 2025-07-14 RX ORDER — TACROLIMUS 0.5 MG/1
0.5 CAPSULE ORAL 2 TIMES DAILY
Qty: 60 CAPSULE | Refills: 11 | Status: ON HOLD | OUTPATIENT
Start: 2025-07-14 | End: 2026-07-14

## 2025-07-14 RX ORDER — TACROLIMUS 0.5 MG/1
0.5 CAPSULE ORAL 2 TIMES DAILY
Qty: 60 CAPSULE | Refills: 11 | Status: CANCELLED | OUTPATIENT
Start: 2025-07-14 | End: 2026-07-14

## 2025-07-14 RX ORDER — LANOLIN ALCOHOL/MO/W.PET/CERES
800 CREAM (GRAM) TOPICAL ONCE
Status: COMPLETED | OUTPATIENT
Start: 2025-07-15 | End: 2025-07-14

## 2025-07-14 RX ORDER — MIDAZOLAM HYDROCHLORIDE 2 MG/2ML
INJECTION, SOLUTION INTRAMUSCULAR; INTRAVENOUS AS NEEDED
Status: DISCONTINUED | OUTPATIENT
Start: 2025-07-14 | End: 2025-07-14 | Stop reason: HOSPADM

## 2025-07-14 RX ORDER — ACETAMINOPHEN 325 MG/1
650 TABLET ORAL EVERY 6 HOURS PRN
Status: DISCONTINUED | OUTPATIENT
Start: 2025-07-14 | End: 2025-07-14 | Stop reason: HOSPADM

## 2025-07-14 RX ORDER — POLYETHYLENE GLYCOL 3350 17 G/17G
17 POWDER, FOR SOLUTION ORAL DAILY PRN
Status: DISCONTINUED | OUTPATIENT
Start: 2025-07-14 | End: 2025-07-21 | Stop reason: HOSPADM

## 2025-07-14 RX ORDER — PANTOPRAZOLE SODIUM 40 MG/1
40 TABLET, DELAYED RELEASE ORAL DAILY
Status: DISCONTINUED | OUTPATIENT
Start: 2025-07-15 | End: 2025-07-21 | Stop reason: HOSPADM

## 2025-07-14 RX ORDER — PRAVASTATIN SODIUM 40 MG/1
40 TABLET ORAL NIGHTLY
Status: DISCONTINUED | OUTPATIENT
Start: 2025-07-15 | End: 2025-07-21 | Stop reason: HOSPADM

## 2025-07-14 RX ORDER — DEXTROSE 50 % IN WATER (D50W) INTRAVENOUS SYRINGE
12.5
Status: DISCONTINUED | OUTPATIENT
Start: 2025-07-14 | End: 2025-07-21 | Stop reason: HOSPADM

## 2025-07-14 RX ORDER — PREDNISONE 10 MG/1
30 TABLET ORAL DAILY
Status: DISCONTINUED | OUTPATIENT
Start: 2025-07-15 | End: 2025-07-15

## 2025-07-14 RX ORDER — AMOXICILLIN 250 MG
1 CAPSULE ORAL NIGHTLY
Status: DISCONTINUED | OUTPATIENT
Start: 2025-07-14 | End: 2025-07-21 | Stop reason: HOSPADM

## 2025-07-14 RX ORDER — ACETAMINOPHEN 325 MG/1
650 TABLET ORAL EVERY 6 HOURS PRN
Status: ON HOLD | COMMUNITY

## 2025-07-14 RX ORDER — MAGNESIUM SULFATE 1 G/100ML
1 INJECTION INTRAVENOUS ONCE
Status: DISCONTINUED | OUTPATIENT
Start: 2025-07-14 | End: 2025-07-14

## 2025-07-14 RX ORDER — DOCUSATE SODIUM 100 MG/1
100 CAPSULE, LIQUID FILLED ORAL 2 TIMES DAILY
Status: ON HOLD | COMMUNITY

## 2025-07-14 RX ORDER — TACROLIMUS 0.5 MG/1
0.5 CAPSULE ORAL 2 TIMES DAILY
Status: DISCONTINUED | OUTPATIENT
Start: 2025-07-14 | End: 2025-07-15

## 2025-07-14 RX ORDER — CALCIUM CARBONATE 500(1250)
1250 TABLET ORAL
Status: DISCONTINUED | OUTPATIENT
Start: 2025-07-15 | End: 2025-07-21 | Stop reason: HOSPADM

## 2025-07-14 RX ORDER — SULFAMETHOXAZOLE AND TRIMETHOPRIM 400; 80 MG/1; MG/1
1 TABLET ORAL DAILY
Status: DISCONTINUED | OUTPATIENT
Start: 2025-07-15 | End: 2025-07-21 | Stop reason: HOSPADM

## 2025-07-14 RX ORDER — TRAMADOL HYDROCHLORIDE 50 MG/1
50 TABLET, FILM COATED ORAL EVERY 6 HOURS PRN
Status: DISCONTINUED | OUTPATIENT
Start: 2025-07-14 | End: 2025-07-21 | Stop reason: HOSPADM

## 2025-07-14 RX ORDER — TAMSULOSIN HYDROCHLORIDE 0.4 MG/1
0.4 CAPSULE ORAL DAILY
Status: DISCONTINUED | OUTPATIENT
Start: 2025-07-15 | End: 2025-07-21 | Stop reason: HOSPADM

## 2025-07-14 RX ORDER — TACROLIMUS 1 MG/1
2 CAPSULE ORAL 2 TIMES DAILY
Qty: 120 CAPSULE | Refills: 11 | Status: CANCELLED | OUTPATIENT
Start: 2025-07-14 | End: 2026-07-14

## 2025-07-14 RX ORDER — HYDRALAZINE HYDROCHLORIDE 10 MG/1
50 TABLET, FILM COATED ORAL 3 TIMES DAILY
Status: DISCONTINUED | OUTPATIENT
Start: 2025-07-14 | End: 2025-07-20

## 2025-07-14 RX ORDER — MYCOPHENOLATE MOFETIL 250 MG/1
1000 CAPSULE ORAL EVERY 12 HOURS SCHEDULED
Status: DISCONTINUED | OUTPATIENT
Start: 2025-07-14 | End: 2025-07-21 | Stop reason: HOSPADM

## 2025-07-14 RX ORDER — ACETAMINOPHEN 650 MG/1
650 SUPPOSITORY RECTAL EVERY 6 HOURS PRN
Status: DISCONTINUED | OUTPATIENT
Start: 2025-07-14 | End: 2025-07-14 | Stop reason: HOSPADM

## 2025-07-14 RX ORDER — TACROLIMUS 1 MG/1
2 CAPSULE ORAL 2 TIMES DAILY
Qty: 120 CAPSULE | Refills: 11 | Status: ON HOLD | OUTPATIENT
Start: 2025-07-14 | End: 2026-07-14

## 2025-07-14 RX ORDER — CHOLECALCIFEROL (VITAMIN D3) 25 MCG
50 TABLET ORAL DAILY
Status: DISCONTINUED | OUTPATIENT
Start: 2025-07-15 | End: 2025-07-21 | Stop reason: HOSPADM

## 2025-07-14 RX ORDER — POLYETHYLENE GLYCOL 3350 17 G/17G
17 POWDER, FOR SOLUTION ORAL DAILY
Status: ON HOLD | COMMUNITY

## 2025-07-14 RX ORDER — ASPIRIN 81 MG/1
81 TABLET ORAL DAILY
Status: DISCONTINUED | OUTPATIENT
Start: 2025-07-15 | End: 2025-07-21 | Stop reason: HOSPADM

## 2025-07-14 RX ORDER — ACETAMINOPHEN 160 MG/5ML
650 SOLUTION ORAL EVERY 6 HOURS PRN
Status: DISCONTINUED | OUTPATIENT
Start: 2025-07-14 | End: 2025-07-14 | Stop reason: HOSPADM

## 2025-07-14 RX ORDER — SODIUM CHLORIDE 9 MG/ML
100 INJECTION, SOLUTION INTRAVENOUS CONTINUOUS
Status: DISCONTINUED | OUTPATIENT
Start: 2025-07-14 | End: 2025-07-14

## 2025-07-14 RX ORDER — ACETAMINOPHEN 325 MG/1
650 TABLET ORAL EVERY 6 HOURS PRN
Status: DISCONTINUED | OUTPATIENT
Start: 2025-07-14 | End: 2025-07-21 | Stop reason: HOSPADM

## 2025-07-14 RX ORDER — TACROLIMUS 1 MG/1
2 CAPSULE ORAL 2 TIMES DAILY
Status: DISCONTINUED | OUTPATIENT
Start: 2025-07-14 | End: 2025-07-15

## 2025-07-14 RX ORDER — DEXTROSE MONOHYDRATE 50 G/100ML
INJECTION, SOLUTION INTRAVENOUS AS NEEDED
Status: DISCONTINUED | OUTPATIENT
Start: 2025-07-14 | End: 2025-07-14 | Stop reason: HOSPADM

## 2025-07-14 RX ORDER — FENTANYL CITRATE 50 UG/ML
INJECTION, SOLUTION INTRAMUSCULAR; INTRAVENOUS AS NEEDED
Status: DISCONTINUED | OUTPATIENT
Start: 2025-07-14 | End: 2025-07-14 | Stop reason: HOSPADM

## 2025-07-14 RX ORDER — VORICONAZOLE 200 MG/1
200 TABLET, FILM COATED ORAL EVERY 12 HOURS SCHEDULED
Status: DISCONTINUED | OUTPATIENT
Start: 2025-07-14 | End: 2025-07-21 | Stop reason: HOSPADM

## 2025-07-14 RX ORDER — GABAPENTIN 300 MG/1
300 CAPSULE ORAL 3 TIMES DAILY
Status: DISCONTINUED | OUTPATIENT
Start: 2025-07-14 | End: 2025-07-15

## 2025-07-14 RX ORDER — LIDOCAINE HYDROCHLORIDE 10 MG/ML
INJECTION, SOLUTION EPIDURAL; INFILTRATION; INTRACAUDAL; PERINEURAL AS NEEDED
Status: DISCONTINUED | OUTPATIENT
Start: 2025-07-14 | End: 2025-07-14 | Stop reason: HOSPADM

## 2025-07-14 RX ORDER — ACYCLOVIR 400 MG/1
400 TABLET ORAL 2 TIMES DAILY
Status: DISCONTINUED | OUTPATIENT
Start: 2025-07-14 | End: 2025-07-21 | Stop reason: HOSPADM

## 2025-07-14 RX ORDER — DEXTROSE 50 % IN WATER (D50W) INTRAVENOUS SYRINGE
25
Status: DISCONTINUED | OUTPATIENT
Start: 2025-07-14 | End: 2025-07-21 | Stop reason: HOSPADM

## 2025-07-14 RX ADMIN — TACROLIMUS 2 MG: 1 CAPSULE ORAL at 22:46

## 2025-07-14 RX ADMIN — DOCUSATE SODIUM 50 MG AND SENNOSIDES 8.6 MG 1 TABLET: 8.6; 5 TABLET, FILM COATED ORAL at 23:51

## 2025-07-14 RX ADMIN — GABAPENTIN 300 MG: 300 CAPSULE ORAL at 22:46

## 2025-07-14 RX ADMIN — VORICONAZOLE 200 MG: 200 TABLET, COATED ORAL at 22:46

## 2025-07-14 RX ADMIN — MAGNESIUM OXIDE TAB 400 MG (241.3 MG ELEMENTAL MG) 2 TABLET: 400 (241.3 MG) TAB at 23:51

## 2025-07-14 RX ADMIN — HUMAN ALBUMIN MICROSPHERES AND PERFLUTREN 0.5 ML: 10; .22 INJECTION, SOLUTION INTRAVENOUS at 14:04

## 2025-07-14 RX ADMIN — ACYCLOVIR 400 MG: 400 TABLET ORAL at 22:46

## 2025-07-14 RX ADMIN — TACROLIMUS 0.5 MG: 0.5 CAPSULE ORAL at 22:47

## 2025-07-14 RX ADMIN — MYCOPHENOLATE MOFETIL 1000 MG: 250 CAPSULE ORAL at 22:46

## 2025-07-14 RX ADMIN — HYDRALAZINE HYDROCHLORIDE 50 MG: 50 TABLET ORAL at 22:46

## 2025-07-14 ASSESSMENT — ENCOUNTER SYMPTOMS
DIZZINESS: 1
LIGHT-HEADEDNESS: 1
GASTROINTESTINAL NEGATIVE: 1
PSYCHIATRIC NEGATIVE: 1
ALLERGIC/IMMUNOLOGIC NEGATIVE: 1
HEMATOLOGIC/LYMPHATIC NEGATIVE: 1
CONSTITUTIONAL NEGATIVE: 1
WEAKNESS: 1
SHORTNESS OF BREATH: 1
TREMORS: 1
EYES NEGATIVE: 1
ENDOCRINE NEGATIVE: 1

## 2025-07-14 ASSESSMENT — PAIN SCALES - GENERAL
PAINLEVEL_OUTOF10: 0 - NO PAIN

## 2025-07-14 ASSESSMENT — PAIN - FUNCTIONAL ASSESSMENT
PAIN_FUNCTIONAL_ASSESSMENT: 0-10

## 2025-07-14 NOTE — H&P
"History Of Present Illness  Anatoly Lane is a 64 y.o. male presenting for routine endomyocardial biopsy/RHC in the setting of OHT 6/21/25. PMHx significant for  ex smoker, NICM, stage D HFrEF NYHA 2-3, S/p HM2 (4/30/23), VT s/p ICD, CAD, pAF, HTN, BPH, GERD and hypothyroidism, S/p LVAD explant, AICD removal & OHT by Dr. Lorenzo on 6/21/25, course c/b pneumo mediastinum, steroid induced hyperglycemia, and L hand palsy.      Past Medical History:  Medical History[1]     Past Surgical History:  Surgical History[2]       Social History:  Social History[3]    Family History:  Family History[4]     Allergies:  RX Allergies[5]     Home Medications:  Current Outpatient Medications   Medication Instructions    acyclovir (ZOVIRAX) 400 mg, oral, 2 times daily    alcohol swabs Apply topically 3 times a day.    aspirin 81 mg, oral, Daily    blood-glucose sensor (FreeStyle Federico 3 Plus Sensor) device Use 1 every 15 days.    calcium carbonate (Os-Wilfredo) 1,250 mg (500 mg elemental) tablet 1 tablet, oral, 2 times daily    FreeStyle Federico 3 [place] misc Use as directed.    furosemide (LASIX) 40 mg, oral, Daily    gabapentin (NEURONTIN) 300 mg, oral, 3 times daily    HumuLIN N NPH Insulin KwikPen 15 Units, subcutaneous, Every morning, With your steroid.    hydrALAZINE (APRESOLINE) 50 mg, oral, 3 times daily    levothyroxine (SYNTHROID, LEVOXYL) 125 mcg, oral, Daily, Take on an empty stomach at the same time each day, either 30 to 60 minutes prior to breakfast    lidocaine 4 % patch 1 patch, transdermal, Daily, Remove & discard patch within 12 hours or as directed by MD.    Lokelma 10 g, oral, Daily PRN    mycophenolate (CELLCEPT) 1,000 mg, oral, Every 12 hours scheduled    pantoprazole (PROTONIX) 40 mg, oral, Daily, Do not crush, chew, or split.    pen needle, diabetic 31 gauge x 5/16\" needle Use 1 once daily or as directed by provider.    pravastatin (PRAVACHOL) 40 mg, oral, Nightly    predniSONE (DELTASONE) 30 mg, oral, Daily    " sulfamethoxazole-trimethoprim (Bactrim) 400-80 mg tablet 1 tablet, oral, Daily    tacrolimus (PROGRAF) 2.5 mg, oral, Every 12 hours scheduled (0630,1830), Please take one 0.5 pill and two 1mg pills for a total of 2.5mg twice a day until instructed differently by transplant team    tacrolimus (PROGRAF) 1 mg, oral, 2 times daily, Please take one 0.5 pill and two 1mg pills for a total of 2.5mg twice a day until instructed differently by transplant team    tamsulosin (FLOMAX) 0.4 mg, oral, Daily    traMADol (ULTRAM) 50 mg, oral, Every 6 hours PRN    Vitamin D3 50 mcg, oral, Daily    voriconazole (VFEND) 200 mg, oral, Every 12 hours scheduled       Inpatient Medications:  Scheduled Medications[6]  PRN Medications[7]  Continuous Medications[8]      Review of Systems   Constitutional: Negative.    HENT:  Positive for congestion.    Eyes: Negative.    Respiratory:  Positive for shortness of breath (GRAVES).    Cardiovascular:  Positive for chest pain (at MSI) and leg swelling.   Gastrointestinal: Negative.    Endocrine: Negative.    Genitourinary: Negative.    Musculoskeletal:  Positive for gait problem.   Skin: Negative.    Allergic/Immunologic: Negative.    Neurological:  Positive for dizziness, tremors, weakness and light-headedness.   Hematological: Negative.    Psychiatric/Behavioral: Negative.            Physical Exam  General:  Patient is awake, alert, and oriented.  Patient is in no acute distress.  HEENT:  Pupils equal and reactive.  Normocephalic.  Moist mucosa.    Neck:  No JVD.   Cardiovascular:  Regular rate and rhythm.  Normal S1 and S2. No murmurs/rubs/gallops. Radial pulses 2+.   Pulmonary:  Clear to auscultation bilaterally.  Abdomen:  Soft. Non-tender.   Non-distended.  Positive bowel sounds.  Lower Extremities:  Pedal pulses 2+ BLE edema 2+.   Neurologic:  Cranial nerves II-XII grossly intact.   No focal deficit.   Skin: Skin warm and dry, no lesions. Normal skin turgor. MSI, left lower chest drain sites  LINDY  Psychiatric: Normal affect.     Sedation Plan    ASA 3     Mallampati class: III.    Risks, benefits, and alternatives discussed with patient.         NPO since 0000    Last Recorded Vitals  There were no vitals taken for this visit.         Vitals from the Past 24 Hours            Relevant Results    Labs    POCT Glucose:   Results from last 7 days   Lab Units 07/09/25  1202 07/09/25  0714 07/08/25  2123 07/08/25  1846 07/08/25  1648 07/08/25  0740 07/07/25  2109 07/07/25  1823 07/07/25  1601 07/07/25  1144   POCT GLUCOSE mg/dL 86 128* 119* 278* 296* 140* 99 180* 244* 140*      POCT Urine Pregnancy:       CBC:   Recent Labs     07/09/25  0628 07/08/25  0640 07/07/25  0646 07/06/25  1159 07/05/25  0539 07/04/25  0608   WBC 16.3* 15.5* 18.2* 20.4* 20.4* 20.0*   HGB 9.6* 9.5* 10.0* 10.0* 9.4* 9.0*   HCT 29.2* 29.9* 30.2* 31.5* 29.9* 28.7*    310 327 339 339 303   MCV 99 100 98 99 101* 100     BMP/CMP:   Recent Labs     07/09/25  0628 07/08/25  0640 07/07/25  0646 07/06/25  0548 07/05/25  0539 07/04/25  0608 06/21/25  0801 06/20/25  1659 03/26/25  1157 01/28/25  1451 04/04/24  1509 02/24/24  0233 02/23/24  1433 05/31/23  2134    136 138 138 136 138   < > 142 141 141 138   < > 137 CANCELED   K 4.3 4.5 4.5 5.3 5.0 4.9   < > 3.4* 4.2 3.3* 4.1   < > 4.0 CANCELED    97* 97* 101 101 101   < > 102 100 99 101   < > 100 CANCELED   BUN 51* 57* 57* 42* 34* 32*   < > 31* 23 22 29*   < > 44* CANCELED   CREATININE 0.93 1.15 1.11 1.29 0.86 0.81   < > 0.98 1.01 1.01 1.05   < > 1.30 CANCELED   CO2 29 32 31 27 28 30   < > 29 30 31 28   < > 26 CANCELED   CALCIUM 8.5* 8.4* 8.3* 8.5* 8.5* 8.2*   < > 8.9 8.6 9.1 9.3   < > 9.2 CANCELED   PROT  --   --   --   --   --   --   --  6.5 6.3* 6.6 6.7  --  6.1* CANCELED   BILITOT  --   --   --   --   --   --   --  0.4 0.5 0.5 0.3  --  0.4 CANCELED   ALKPHOS  --   --   --   --   --   --   --  52 54 56 55  --  47 CANCELED   ALT  --   --   --   --   --   --   --  13 13 12 13  --  " 13 CANCELED   AST  --   --   --   --   --   --   --  15 31 14 12  --  11 CANCELED   GLUCOSE 125* 129* 173* 122* 143* 204*   < > 97 80 89 82   < > 90 CANCELED    < > = values in this interval not displayed.      Magnesium:   Recent Labs     07/09/25  0628 07/08/25  0640 07/07/25  0646 07/06/25  0548 07/05/25  0539 07/04/25  0608   MG 2.26 2.01 2.06 2.08 1.95 1.99     Lipid Panel:   Recent Labs     02/29/24  0658 02/28/24  0444 03/29/23  2151   CHOL 158 155 126   HDL 38.3 39.4 24.0*   CHHDL 4.1  --  5.3*   VLDL  --   --  17   TRIG 145  --  83     Cardiac   Results from last 7 days   Lab Units 07/09/25  0628   BNP pg/mL 308*      Hemoglobin A1C:   Recent Labs     02/23/24  1433 03/31/23  2331 03/29/23  2151   HGBA1C 4.4 5.8* 6.7*     TSH/ Free T4:   Recent Labs     03/26/25  1157 01/28/25  1451 04/04/24  1509 03/02/24  0610 02/26/24  0811 02/23/24  1433 05/31/23  2134 05/21/23  1943 05/09/23  0642 05/03/23  0228 05/02/23  0030 04/25/23  0911 04/20/23  0323 04/14/23  0540 04/07/23  0416 03/31/23  2142   TSH 1.83 0.05* 0.70 8.10* 11.49*  11.49* 18.02* CANCELED CANCELED 8.41* 6.56* 6.56* 10.34* 13.31* 8.61* 7.79* 16.81*   FREET4  --  2.66*  --  1.52* 1.21  --  CANCELED  --  1.40 1.07 1.18 1.28 1.07 0.99 1.04 0.76*     Iron:   Recent Labs     07/09/25  0628 07/06/25  1159 06/25/25  0609 03/26/25  1157 01/28/25  1451 04/04/24  1509 02/23/24  1433 05/21/23  1943 04/21/23  1125 03/29/23  2151   FERRITIN  --   --  130  --   --   --  104  --  352* 79   TIBC  --   --  196*  --   --   --  398  --  323 465*   IRONSAT  --   --  21*  --   --   --  18*  --  16* 6*   * 347*  --  57 69 101* 33 CANCELED  --  3,231*     Coag:     ABO: No results found for: \"ABO\"    Past Cardiology Tests (Last 3 Years):    EKG:  Recent Labs     07/01/25  1015 06/20/25  1630 02/23/24  1535 05/05/23  1150 04/24/23  1052 04/22/23  1839   ATRRATE 103 50 75   < > 84 88   VENTRATE 103 72 75   < > 84 88   PRINT 122  --   --   --  214 232   QRSDUR 94 186 " 128   < > 146 140   QTCFRED 433 459 456   < > 469 470   QTCCALCB 474 473 473   < > 496 500    < > = values in this interval not displayed.     Encounter Date: 06/20/25   Electrocardiogram 12-lead PRN for arrhythmia   Result Value    Ventricular Rate 103    Atrial Rate 103    MO Interval 122    QRS Duration 94    QT Interval 362    QTC Calculation(Bazett) 474    P Axis 93    R Axis 13    T Axis 55    QRS Count 17    Q Onset 219    P Onset 168    P Offset 209    T Offset 400    QTC Fredericia 433    Narrative    Sinus tachycardia  Acute pericarditis  Abnormal ECG  When compared with ECG of 20-JUN-2025 16:12,  Right bundle branch block is no longer Present  Criteria for Lateral infarct are no longer Present  Confirmed by Bridget Ford ([id number]) on 7/1/2025 4:30:04 PM     Echo:  Echocardiogram:   Transthoracic Echo (TTE) Complete 02/27/2025    [facility], [address] [place] [address]  Tel [phone] and Fax [phone]    TRANSTHORACIC ECHOCARDIOGRAM REPORT      Patient Name:       BILLIESOLO TAFOYACAROLYN Steward Physician:    [id number] Rosenthal MD  Study Date:         2/27/2025           Ordering Provider:    [id number] TIWARI  MRN/PID:            [id number]            Fellow:  Accession#:         [id number]        Nurse:                Tl Rose RN  Date of Birth/Age:  1960 / 64      Sonographer:          Unruly flanagan                                     RDCS, RVT  Gender assigned at  M                   Additional Staff:  Birth:  Height:             175.26 cm           Admit Date:  Weight:             103.87 kg           Admission Status:     Outpatient  BSA / BMI:          2.19 m2 / 33.82     Encounter#:           [id number]  kg/m2  Blood Pressure:     117/80 mmHg         Department Location:  [facility] Echo Lab    Study Type:    TRANSTHORACIC ECHO (TTE) COMPLETE  Diagnosis/ICD: Chronic systolic (congestive) heart failure (CHF)-I50.22  Indication:     listed for heart transplant  CPT Code:      Echo Complete w Full Doppler-68541    Patient History:  Pertinent History: LVAD, CHF, HTN, cardiomyopathy, tachycardia.    Study Detail: The following Echo studies were performed: 2D, M-Mode, Doppler and  color flow. Technically challenging study due to poor acoustic  windows and body habitus.      PHYSICIAN INTERPRETATION:  Left Ventricle: Left ventricular ejection fraction is severely decreased, by visual estimate at 10-15%. There is moderate eccentric left ventricular hypertrophy. There is global hypokinesis of the left ventricle with minor regional variations. The left ventricular cavity size is severely dilated. There is normal septal and normal posterior left ventricular wall thickness. Spectral Doppler shows an abnormal pattern of left ventricular diastolic filling.  Left Atrium: The left atrial size is mild to moderately dilated.  Right Ventricle: The right ventricle was not well visualized. There is reduced right ventricular systolic function. A device is visualized in the right ventricle.  Right Atrium: The right atrial size was not well visualized. There is a device visualized in the right atrium.  Aortic Valve: The aortic valve was not well visualized. There is trace to mild aortic valve regurgitation.  Mitral Valve: The mitral valve is normal in structure. There is trace mitral valve regurgitation.  Tricuspid Valve: The tricuspid valve was not well visualized. Tricuspid regurgitation was not assessed.  Pulmonic Valve: The pulmonic valve is not well visualized. Pulmonic valve regurgitation was not assessed.  Pericardium: There is no pericardial effusion noted.  Aorta: The aortic root was not well visualized.  Systemic Veins: The inferior vena cava appears small in size, with IVC inspiratory collapse greater than 50%.    LEFT VENTRICULAR ASSIST DEVICE:  LVAD: The patient has a(n) HeartMate 3 LVAD device present.  Inflow Cannula: Visualization of the inflow  cannula is technically difficult.  Outflow Cannula: Visualization of the outflow cannula is technically difficult.  AV Leaflet Mobility: The aortic valve leaflets do not appear to open.        CONCLUSIONS:  1. Poorly visualized anatomical structures due to suboptimal image quality.  2. Left ventricular ejection fraction is severely decreased, by visual estimate at 10-15%.  3. There is global hypokinesis of the left ventricle with minor regional variations.  4. Spectral Doppler shows an abnormal pattern of left ventricular diastolic filling.  5. Left ventricular cavity size is severely dilated.  6. There is moderate eccentric left ventricular hypertrophy.  7. There is reduced right ventricular systolic function.  8. The left atrial size is mild to moderately dilated.    QUANTITATIVE DATA SUMMARY:    2D MEASUREMENTS:          Normal Ranges:  IVSd:            0.70 cm  (0.6-1.1cm)  LVPWd:           0.81 cm  (0.6-1.1cm)  LVIDd:           8.22 cm  (3.9-5.9cm)  LVIDs:           7.85 cm  LV Mass Index:   140 g/m2  LV % FS          4.5 %      LV SYSTOLIC FUNCTION:  Normal Ranges:  EF-Visual:      13 %  LV EF Reported: 13 %      LV DIASTOLIC FUNCTION:          Normal Ranges:  MV Peak E:             0.42 m/s (0.7-1.2 m/s)  MV Peak A:             0.54 m/s (0.42-0.7 m/s)  E/A Ratio:             0.79     (1.0-2.2)  MV medial e'           0.05 m/s      MITRAL VALVE:          Normal Ranges:  MV DT:        219 msec (150-240msec)      AORTIC VALVE:          Normal Ranges:  LVOT Diameter: 2.62 cm (1.8-2.4cm)      TRICUSPID VALVE/RVSP:          Normal Ranges:  Peak TR Velocity:     2.10 m/s  RV Syst Pressure:     21 mmHg  (< 30mmHg)  IVC Diam:             0.75 cm      PULMONIC VALVE:          Normal Ranges:  PV Accel Time:  103 msec (>120ms)  PV Max Nam:     0.8 m/s  (0.6-0.9m/s)  PV Max P.3 mmHg      AORTA:  Asc Ao Diam 3.89 cm      [id number] Gabino Rosenthal MD  Electronically signed on 2025 at 12:06:40 PM        ** Final  **    Ejection Fractions:  LV EF   Date/Time Value Ref Range Status   06/30/2025 04:09 PM 63 %    06/27/2025 02:31 PM 63 %    06/23/2025 12:37 PM 67 %      Cath:  Coronary Angiography:   RHC, RHC 07/08/2025    [facility], Cath Lab, [address] [place] [address]    Cardiovascular Catheterization Report    Patient Name:      BILLIE WARD         Performing Physician:  [id number] Vince Ruano DO  Study Date:        7/8/2025             Verifying Physician:   [id number] Vince Ruano DO  MRN/PID:           [id number]             Cardiologist/Co-Scrub:  Accession#:        [id number]         Ordering Provider:     [id number] CORINNE JOHN  Date of Birth/Age: 1960 / 64 years Cardiologist:  Gender:            M                    Fellow:  Encounter#:        [id number]           Surgeon:      Study:            Right Heart Cath  Additional Study: Endomyocardial Biopsy      Indications:  Procedure performed to evaluate a recipient of a cardiac transplant.    Procedure Description:  After infiltration of local anesthetic, the right internal jugular vein was identified with two-dimensional ultrasound. Under direct ultrasound visualization, the right internal jugular vein was cannulated with a micropuncture technique. A 7 Cambodian sheath was placed in the vein. A balloon tipped catheter was advanced through the right heart to record pressures. Cardiac output was calculated via the Raven method. Post-procedure, the venous sheath was pulled and pressure was applied to the site.    Biopsy:  A total of 3 tissue specimens were taken from the right ventricle. No complications were identified during the procedure.    Right Heart Catheterization:  A balloon tipped catheter was advanced through the right heart to record pressures. Cardiac output was calculated via the Raven method. Normal filling pressures. Cardiac output is normal.    Hemodynamic Pressures:  Post-EMBx RAP:  8.  +---------+-------+-------+-------+--------+-------+           RA mmHgRV mmHgPA mmHgPCW mmHgFA mmHg  +---------+-------+-------+-------+--------+-------+  Systolic        35     35             136      +---------+-------+-------+-------+--------+-------+  Diastolic              19             74       +---------+-------+-------+-------+--------+-------+  EDP             8                              +---------+-------+-------+-------+--------+-------+  Mean     8             24     14      94       +---------+-------+-------+-------+--------+-------+        Oxygen Saturation %:  +---+---+  RPA57%  +---+---+      Cardiac Outputs:  +----+---+---+      CI CO   +----+---+---+  FICK2.75.9  +----+---+---+      Vascular Resistance Calculated Values (Wood Units):  +---+----------+  [id number] dynes  +---+----------+  PVR1.8 ALVAREZ      +---+----------+      Complications:  No in-lab complications observed.    Cardiac Cath Post Procedure Notes:  Post Procedure           S/p OHT.  Diagnosis:  Blood Loss:              Estimated blood loss during the procedure was < 10 cc  mls.  Specimens Removed:       Number of specimen(s) removed: 3.    ____________________________________________________________________________________  CONCLUSIONS:  1. /74/(94), RA 8, PA 35/19/(24), PCW 14, sat 57%, CO/CI 5.9/2.7, SVR 1175 dynes, PVR 1.8 ALVAREZ.  2. Preserved CO/CI with modestly elevated left sided filling pressure.  3. EMBx x3 sent to pathology.  4. Return to LT3 for ongoing management.    ICD 10 Codes:  Encounter for aftercare following heart transplant-Z48.21; Heart transplant status-Z94.1    [id number] Vince Ruano DO  Performing Physician  Electronically signed by [id number] Vince Ruano DO on 7/8/2025 at 9:21:23 AM          ** Final **    Right Heart Cath:   RHC, RHC 07/08/2025    [facility], Cath Lab, [address] [place] [address]  [id number]    Cardiovascular Catheterization Report    Patient Name:      BILLIE WARD         Performing Physician:  [id number] Vince Ruano DO  Study Date:        7/8/2025             Verifying Physician:   [id number] Ruano DO  MRN/PID:           [id number]             Cardiologist/Co-Scrub:  Accession#:        [id number]         Ordering Provider:     [id number] CORINNE JOHN  Date of Birth/Age: 1960 / 64 years Cardiologist:  Gender:            M                    Fellow:  Encounter#:        [id number]           Surgeon:      Study:            Right Heart Cath  Additional Study: Endomyocardial Biopsy      Indications:  Procedure performed to evaluate a recipient of a cardiac transplant.    Procedure Description:  After infiltration of local anesthetic, the right internal jugular vein was identified with two-dimensional ultrasound. Under direct ultrasound visualization, the right internal jugular vein was cannulated with a micropuncture technique. A 7 Serbian sheath was placed in the vein. A balloon tipped catheter was advanced through the right heart to record pressures. Cardiac output was calculated via the Raven method. Post-procedure, the venous sheath was pulled and pressure was applied to the site.    Biopsy:  A total of 3 tissue specimens were taken from the right ventricle. No complications were identified during the procedure.    Right Heart Catheterization:  A balloon tipped catheter was advanced through the right heart to record pressures. Cardiac output was calculated via the Raven method. Normal filling pressures. Cardiac output is normal.    Hemodynamic Pressures:  Post-EMBx RAP: 8.  +---------+-------+-------+-------+--------+-------+           RA mmHgRV mmHgPA mmHgPCW mmHgFA mmHg  +---------+-------+-------+-------+--------+-------+  Systolic        35     35             136      +---------+-------+-------+-------+--------+-------+  Diastolic              19              74       +---------+-------+-------+-------+--------+-------+  EDP             8                              +---------+-------+-------+-------+--------+-------+  Mean     8             24     14      94       +---------+-------+-------+-------+--------+-------+        Oxygen Saturation %:  +---+---+  RPA57%  +---+---+      Cardiac Outputs:  +----+---+---+      CI CO   +----+---+---+  FICK2.75.9  +----+---+---+      Vascular Resistance Calculated Values (Wood Units):  +---+----------+  [id number] dynes  +---+----------+  PVR1.8 ALVAREZ      +---+----------+      Complications:  No in-lab complications observed.    Cardiac Cath Post Procedure Notes:  Post Procedure           S/p OHT.  Diagnosis:  Blood Loss:              Estimated blood loss during the procedure was < 10 cc  mls.  Specimens Removed:       Number of specimen(s) removed: 3.    ____________________________________________________________________________________  CONCLUSIONS:  1. /74/(94), RA 8, PA 35/19/(24), PCW 14, sat 57%, CO/CI 5.9/2.7, SVR 1175 dynes, PVR 1.8 ALVAREZ.  2. Preserved CO/CI with modestly elevated left sided filling pressure.  3. EMBx x3 sent to pathology.  4. Return to LT3 for ongoing management.    ICD 10 Codes:  Encounter for aftercare following heart transplant-Z48.21; Heart transplant status-Z94.1    [id number] Vince Ruano DO  Performing Physician  Electronically signed by [id number] Vince Ruano DO on 7/8/2025 at 9:21:23 AM          ** Final **    Stress Test:  Nuclear:No results found for this or any previous visit from the past 1800 days.    Metabolic Stress: No results found for this or any previous visit from the past 1800 days.    Cardiac Imaging:  Cardiac Scoring: No results found for this or any previous visit from the past 1800 days.    Cardiac MRI: No results found for this or any previous visit from the past 1800 days.         Assessment/Plan  Assessment/Plan   Assessment & Plan  S/P  orthotopic heart transplant        -RHC/endomyocardial biopsy with Dr. Hylton today 7/14/25  -CMP, Mg, CBC w/ diff, Tacrolimus labs pre-procedure  -TTE, complete post procedure  -Follow up with Dr. Hylton for AHF as scheduled today 7/14/25 at 3:20 pm       NP discussed with Dr. Hylton regarding plan of care/ discharge plan      I spent 30 minutes in the professional and overall care of this patient.      Monica Paredes, APRN-CNP         [1]   Past Medical History:  Diagnosis Date    ACC/AHA stage D systolic heart failure     Acute decompensated heart failure 02/23/2024    Acute on chronic combined systolic (congestive) and diastolic (congestive) heart failure 02/23/2024    Acute on chronic systolic heart failure, NYHA class 3     Anticoagulant long-term use 02/26/2024    CAD (coronary artery disease)     Cardiac defibrillator in place 02/14/2024    CHF (congestive heart failure) 02/14/2024    Chronic left systolic heart failure 02/14/2025    Hypothyroid     Hypothyroidism     LVAD (left ventricular assist device) present (Multi)     LVAD (left ventricular assist device) present (Multi) 02/14/2024    Non-ischemic cardiomyopathy (Multi) 02/14/2024    PAF (paroxysmal atrial fibrillation) (Multi)     Paroxysmal ventricular tachycardia 02/14/2024   [2]   Past Surgical History:  Procedure Laterality Date    CARDIAC CATHETERIZATION N/A 2/28/2024    Procedure: Right Heart Cath;  Surgeon: José Velasco MD;  Location: [place] [address] Cardiac Cath Lab;  Service: Cardiovascular;  Laterality: N/A;  with RAMP study    CARDIAC CATHETERIZATION N/A 3/26/2025    Procedure: Right Heart Cath;  Surgeon: Vince Ruano DO;  Location: [place] [address] Cardiac Cath Lab;  Service: Cardiovascular;  Laterality: N/A;    CARDIAC CATHETERIZATION N/A 7/1/2025    Procedure: RHC;  Surgeon: José Velasco MD;  Location: [place] [address] Cardiac Cath Lab;  Service: Cardiovascular;  Laterality: N/A;  w/ EMB    CARDIAC  CATHETERIZATION N/A 7/1/2025    Procedure: Endomyocardial Biopsy;  Surgeon: José Velasco MD;  Location: [place] [address] Cardiac Cath Lab;  Service: Cardiovascular;  Laterality: N/A;    CARDIAC CATHETERIZATION N/A 7/8/2025    Procedure: Endomyocardial Biopsy;  Surgeon: Vince Ruano DO;  Location: [place] [address] Cardiac Cath Lab;  Service: Cardiovascular;  Laterality: N/A;    CARDIAC CATHETERIZATION N/A 7/8/2025    Procedure: RHC;  Surgeon: Vince Ruano DO;  Location: [place] [address] Cardiac Cath Lab;  Service: Cardiovascular;  Laterality: N/A;    COLONOSCOPY W/ POLYPECTOMY  2023    CT ABDOMEN PELVIS ANGIOGRAM W AND/OR WO IV CONTRAST  04/13/2023    CT ABDOMEN PELVIS ANGIOGRAM W AND/OR WO IV CONTRAST [facility] CT    LEFT VENTRICULAR ASSIST DEVICE      OTHER SURGICAL HISTORY  01/05/2022    Complete colonoscopy    OTHER SURGICAL HISTORY  01/05/2022    Cardioverter defibrillator insertion   [3]   Social History  Tobacco Use    Smoking status: Former     Types: Cigarettes    Smokeless tobacco: Never   Substance Use Topics    Alcohol use: Never    Drug use: Never   [4]   Family History  Problem Relation Name Age of Onset    Hypertension Father      Colon cancer Father     [5]   Allergies  Allergen Reactions    Jardiance [Empagliflozin] Itching   [6]   Scheduled medications   Medication Dose Route Frequency   [7]   PRN medications   Medication   [8]   Continuous Medications   Medication Dose Last Rate    sodium chloride 0.9%  100 mL/hr

## 2025-07-14 NOTE — DISCHARGE INSTRUCTIONS
CARDIAC CATHETERIZATION DISCHARGE INSTRUCTIONS    Follow up with Dr. Hylton today at 3:20 pm.      FOR SUDDEN AND SEVERE CHEST PAIN, SHORTNESS OF BREATH, EXCESSIVE BLEEDING, SIGNS OF STROKE, OR CHANGES IN MENTAL STATUS YOU SHOULD CALL 911 IMMEDIATELY.     If your provider has prescribed aspirin and/or clopidogrel (Plavix), or prasugrel (Effient), or ticagrelor (Brilinta), DO NOT STOP THESE MEDICATIONS for any reason without talking to your cardiologist first. If any of these were prescribed, you must take them every day without missing a single dose. If you are getting low on these medications, contact your provider immediately for a refill.     FOR NEXT 24 HOURS  - Upon discharge, you should return home and rest for the remainder of the day and evening. You do not have to stay on bed rest but should not be very active.  It is recommended a responsible adult be with you for the first 24 hours after the procedure.    - No driving for 24 hours after procedure. Please arrange for someone to drive you home from the hospital today.     - Do not drive, operate machinery, or use power tools for 24 hours after your procedure.     - Do not make any legal decisions for 24 hours after your procedure.     - Do not drink alcoholic beverages for 24 hours after your procedure.    -Stay extra hydrated for 24 hours after your procedure; goal fluid intake around 64-72 ounces for the next 24 hours.       WOUND CARE   - The transparent dressing should be removed from the site 24 hours after the procedure.  Wash the site gently with soap and water. Rinse well and pat dry. Keep the area clean and dry. You may apply a Band-Aid to the site. Avoid lotions, ointments, or powders until fully healed.     - You may shower the day after your procedure.      - It is normal to notice a small bruise around the puncture site and/or a small grape sized or smaller lump. Any large bruising or large lump warrants a call to the office.     -  If bleeding should occur, lay down and apply pressure to the affected area for 10 minutes.  If the bleeding stops notify your physician.  If there is a large amount of bleeding or spurting of blood CALL 911 immediately.  DO NOT drive yourself to the hospital.    - You may experience some tenderness, bruising or minimal inflammation.  If you have any concerns, you may contact the Cath Lab or if any of these symptoms become excessive, contact your cardiologist or go to the emergency room.     OTHER INSTRUCTIONS  - You may take acetaminophen (Tylenol) as directed for discomfort.  If pain is not relieved with acetaminophen (Tylenol), contact your doctor.    - If you notice or experience any of the following, you should notify your doctor or seek medical attention  Chest pain or discomfort  Change in mental status or weakness in extremities.  Dizziness, light headedness, or feeling faint.  Change in the site where the procedure was performed, such as bleeding or an increased area of bruising or swelling.  Tingling, numbness, pain, or coolness in the leg/arm beyond the site where the procedure was performed.  Signs of infection (i.e. shaking chills, temperature > 100 degrees Fahrenheit, warmth, redness) in the leg/arm area where the procedure was performed.  Changes in urination   Bloody or black stools  Vomiting blood  Severe nose bleeds  Any excessive bleeding    - If you DO NOT have an appointment with your cardiologist within 2-4 weeks following your procedure, please contact their office.

## 2025-07-14 NOTE — PATIENT INSTRUCTIONS
3 week Follow up    -Biopsy- Pending sent to Patho. Will call you with results     -Echo- Pending full read. Will call you with results     -Falls- Need to restablish PT/OT plan. Will confirm in patient.     -Blood pressure- Low /64. Dizziness, Lightheadness. Will address inpatient.     -Chest Xray- Will be followed up inpatient    - CR level- Kidney function has increased x2 to 1.62. You will be admitted based on Acute Kidney Injury, etc.    - Admit order was placed for [facility]. [place] [facility] [place] to be ready. They will call you when ready and give you room number.

## 2025-07-14 NOTE — PROGRESS NOTES
CARINE met with Pt and his sister for outpatient visit at [facility]. This is first outpatient appt since heart transplant on 6/21/25 and hospital DC home on 7/9/25. Pt reports he has been home alone at his own house and has fallen multiple times. Pt not using any DME inside the home, states he can hold onto walls or the furniture. Pt endorsed dizziness and lightheadedness since going home. Pt endorsed taking a medication not on his med list due to what he perceived as excess swelling without approval from transplant team. Pt was tentatively going to DC from hospital to a local SNF (has a family member Princess who is DON), but was deemed more appropriate for home with C on hospital DC. Pt stated if recommended for SNF, he would be willing to go based on experience at home thus far. Based on clinic visit with Dr. Hylton and concern for labs and totality of circumstances, plan for Pt to be readmitted to [facility] via direct admit. Pt and sister aware and agreeable. CARINE will continue to follow Pt on inpatient basis to ensure sufficient discharge planning in place.

## 2025-07-14 NOTE — TELEPHONE ENCOUNTER
Returned patient's call - per admin he is at cath but they are saying he has no appointment. Notified Dr. Hylton.

## 2025-07-14 NOTE — TELEPHONE ENCOUNTER
On call note- Advised patient to follow what was printed on bottle as seems more accurate that typed medication list. Will have team follow up.  Med list states  Gabapentin 300mg 3 times Daily. Patient to follow what's printed on Medication Label.

## 2025-07-15 ENCOUNTER — TELEPHONE (OUTPATIENT)
Dept: TRANSPLANT | Facility: HOSPITAL | Age: 65
End: 2025-07-15
Payer: MEDICARE

## 2025-07-15 LAB
ALBUMIN SERPL BCP-MCNC: 3.1 G/DL (ref 3.4–5)
ANION GAP SERPL CALC-SCNC: 13 MMOL/L (ref 10–20)
AORTIC VALVE MEAN GRADIENT: 3 MMHG
AORTIC VALVE PEAK VELOCITY: 1.13 M/S
ATRIAL RATE: 86 BPM
AV PEAK GRADIENT: 5 MMHG
AVA (PEAK VEL): 3.7 CM2
AVA (VTI): 3.47 CM2
BACTERIA SPEC RESP CULT: ABNORMAL
BASOPHILS # BLD AUTO: 0.01 X10*3/UL (ref 0–0.1)
BASOPHILS NFR BLD AUTO: 0.1 %
BUN SERPL-MCNC: 63 MG/DL (ref 6–23)
CALCIUM SERPL-MCNC: 7.9 MG/DL (ref 8.6–10.6)
CHLORIDE SERPL-SCNC: 103 MMOL/L (ref 98–107)
CO2 SERPL-SCNC: 30 MMOL/L (ref 21–32)
CREAT SERPL-MCNC: 1.27 MG/DL (ref 0.5–1.3)
EGFRCR SERPLBLD CKD-EPI 2021: 63 ML/MIN/1.73M*2
EJECTION FRACTION APICAL 4 CHAMBER: 49.5
EJECTION FRACTION: 65 %
EOSINOPHIL # BLD AUTO: 0.01 X10*3/UL (ref 0–0.7)
EOSINOPHIL NFR BLD AUTO: 0.1 %
ERYTHROCYTE [DISTWIDTH] IN BLOOD BY AUTOMATED COUNT: 19.5 % (ref 11.5–14.5)
FLUAV RNA RESP QL NAA+PROBE: NOT DETECTED
FLUBV RNA RESP QL NAA+PROBE: NOT DETECTED
GLUCOSE SERPL-MCNC: 141 MG/DL (ref 74–99)
GRAM STN SPEC: ABNORMAL
HCT VFR BLD AUTO: 28 % (ref 41–52)
HGB BLD-MCNC: 9.1 G/DL (ref 13.5–17.5)
IMM GRANULOCYTES # BLD AUTO: 0.06 X10*3/UL (ref 0–0.7)
IMM GRANULOCYTES NFR BLD AUTO: 0.8 % (ref 0–0.9)
LAB AP ASR DISCLAIMER: NORMAL
LABORATORY COMMENT REPORT: NORMAL
LEFT ATRIUM VOLUME AREA LENGTH INDEX BSA: 46.2 ML/M2
LEFT VENTRICLE INTERNAL DIMENSION DIASTOLE: 4.96 CM (ref 3.5–6)
LEFT VENTRICULAR OUTFLOW TRACT DIAMETER: 2.3 CM
LYMPHOCYTES # BLD AUTO: 0.37 X10*3/UL (ref 1.2–4.8)
LYMPHOCYTES NFR BLD AUTO: 5.2 %
MAGNESIUM SERPL-MCNC: 1.87 MG/DL (ref 1.6–2.4)
MCH RBC QN AUTO: 31.3 PG (ref 26–34)
MCHC RBC AUTO-ENTMCNC: 32.5 G/DL (ref 32–36)
MCV RBC AUTO: 96 FL (ref 80–100)
MITRAL VALVE E/A RATIO: 1.53
MONOCYTES # BLD AUTO: 0.48 X10*3/UL (ref 0.1–1)
MONOCYTES NFR BLD AUTO: 6.8 %
NEUTROPHILS # BLD AUTO: 6.18 X10*3/UL (ref 1.2–7.7)
NEUTROPHILS NFR BLD AUTO: 87 %
NRBC BLD-RTO: 0 /100 WBCS (ref 0–0)
P AXIS: 73 DEGREES
P OFFSET: 193 MS
P ONSET: 143 MS
PATH REPORT.FINAL DX SPEC: NORMAL
PATH REPORT.GROSS SPEC: NORMAL
PATH REPORT.RELEVANT HX SPEC: NORMAL
PATH REPORT.TOTAL CANCER: NORMAL
PHOSPHATE SERPL-MCNC: 3.2 MG/DL (ref 2.5–4.9)
PLATELET # BLD AUTO: 224 X10*3/UL (ref 150–450)
POTASSIUM SERPL-SCNC: 4 MMOL/L (ref 3.5–5.3)
PR INTERVAL: 146 MS
Q ONSET: 216 MS
QRS COUNT: 14 BEATS
QRS DURATION: 106 MS
QT INTERVAL: 414 MS
QTC CALCULATION(BAZETT): 495 MS
QTC FREDERICIA: 467 MS
R AXIS: 23 DEGREES
RBC # BLD AUTO: 2.91 X10*6/UL (ref 4.5–5.9)
RIGHT VENTRICLE FREE WALL PEAK S': 10 CM/S
RIGHT VENTRICLE PEAK SYSTOLIC PRESSURE: 27 MMHG
RSV RNA RESP QL NAA+PROBE: NOT DETECTED
SARS-COV-2 RNA RESP QL NAA+PROBE: NOT DETECTED
SODIUM SERPL-SCNC: 142 MMOL/L (ref 136–145)
T AXIS: 188 DEGREES
T OFFSET: 423 MS
TACROLIMUS BLD-MCNC: 19.6 NG/ML
TACROLIMUS BLD-MCNC: 22.7 NG/ML
TRICUSPID ANNULAR PLANE SYSTOLIC EXCURSION: 1.5 CM
VENTRICULAR RATE: 86 BPM
WBC # BLD AUTO: 7.1 X10*3/UL (ref 4.4–11.3)

## 2025-07-15 PROCEDURE — 97165 OT EVAL LOW COMPLEX 30 MIN: CPT | Mod: GO

## 2025-07-15 PROCEDURE — 2500000001 HC RX 250 WO HCPCS SELF ADMINISTERED DRUGS (ALT 637 FOR MEDICARE OP): Performed by: NURSE PRACTITIONER

## 2025-07-15 PROCEDURE — 2500000004 HC RX 250 GENERAL PHARMACY W/ HCPCS (ALT 636 FOR OP/ED): Performed by: STUDENT IN AN ORGANIZED HEALTH CARE EDUCATION/TRAINING PROGRAM

## 2025-07-15 PROCEDURE — 97530 THERAPEUTIC ACTIVITIES: CPT | Mod: GO

## 2025-07-15 PROCEDURE — 80197 ASSAY OF TACROLIMUS: CPT | Performed by: NURSE PRACTITIONER

## 2025-07-15 PROCEDURE — 99232 SBSQ HOSP IP/OBS MODERATE 35: CPT | Performed by: STUDENT IN AN ORGANIZED HEALTH CARE EDUCATION/TRAINING PROGRAM

## 2025-07-15 PROCEDURE — 80069 RENAL FUNCTION PANEL: CPT | Performed by: NURSE PRACTITIONER

## 2025-07-15 PROCEDURE — 85025 COMPLETE CBC W/AUTO DIFF WBC: CPT | Performed by: NURSE PRACTITIONER

## 2025-07-15 PROCEDURE — 1100000001 HC PRIVATE ROOM DAILY

## 2025-07-15 PROCEDURE — 36415 COLL VENOUS BLD VENIPUNCTURE: CPT | Performed by: NURSE PRACTITIONER

## 2025-07-15 PROCEDURE — 83735 ASSAY OF MAGNESIUM: CPT | Performed by: NURSE PRACTITIONER

## 2025-07-15 PROCEDURE — 2500000004 HC RX 250 GENERAL PHARMACY W/ HCPCS (ALT 636 FOR OP/ED): Performed by: NURSE PRACTITIONER

## 2025-07-15 PROCEDURE — 99291 CRITICAL CARE FIRST HOUR: CPT | Performed by: STUDENT IN AN ORGANIZED HEALTH CARE EDUCATION/TRAINING PROGRAM

## 2025-07-15 PROCEDURE — 97162 PT EVAL MOD COMPLEX 30 MIN: CPT | Mod: GP

## 2025-07-15 PROCEDURE — 2500000002 HC RX 250 W HCPCS SELF ADMINISTERED DRUGS (ALT 637 FOR MEDICARE OP, ALT 636 FOR OP/ED): Performed by: NURSE PRACTITIONER

## 2025-07-15 RX ORDER — TACROLIMUS 1 MG/1
1 CAPSULE ORAL 2 TIMES DAILY
Status: DISCONTINUED | OUTPATIENT
Start: 2025-07-15 | End: 2025-07-21 | Stop reason: HOSPADM

## 2025-07-15 RX ORDER — HEPARIN SODIUM 5000 [USP'U]/ML
5000 INJECTION, SOLUTION INTRAVENOUS; SUBCUTANEOUS EVERY 8 HOURS
Status: DISCONTINUED | OUTPATIENT
Start: 2025-07-15 | End: 2025-07-21 | Stop reason: HOSPADM

## 2025-07-15 RX ORDER — GUAIFENESIN/DEXTROMETHORPHAN 100-10MG/5
5 SYRUP ORAL EVERY 8 HOURS PRN
Status: DISCONTINUED | OUTPATIENT
Start: 2025-07-15 | End: 2025-07-21 | Stop reason: HOSPADM

## 2025-07-15 RX ORDER — GABAPENTIN 300 MG/1
300 CAPSULE ORAL DAILY
Status: DISCONTINUED | OUTPATIENT
Start: 2025-07-16 | End: 2025-07-21 | Stop reason: HOSPADM

## 2025-07-15 RX ORDER — TACROLIMUS 0.5 MG/1
0.5 CAPSULE ORAL 2 TIMES DAILY
Status: DISCONTINUED | OUTPATIENT
Start: 2025-07-16 | End: 2025-07-16

## 2025-07-15 RX ADMIN — CALCIUM 1 TABLET: 500 TABLET ORAL at 16:18

## 2025-07-15 RX ADMIN — HYDRALAZINE HYDROCHLORIDE 50 MG: 50 TABLET ORAL at 08:14

## 2025-07-15 RX ADMIN — PREDNISONE 30 MG: 10 TABLET ORAL at 08:14

## 2025-07-15 RX ADMIN — TAMSULOSIN HYDROCHLORIDE 0.4 MG: 0.4 CAPSULE ORAL at 08:15

## 2025-07-15 RX ADMIN — INSULIN HUMAN 15 UNITS: 100 INJECTION, SUSPENSION SUBCUTANEOUS at 08:15

## 2025-07-15 RX ADMIN — HYDRALAZINE HYDROCHLORIDE 50 MG: 50 TABLET ORAL at 21:20

## 2025-07-15 RX ADMIN — ACETAMINOPHEN 650 MG: 325 TABLET ORAL at 16:18

## 2025-07-15 RX ADMIN — VORICONAZOLE 200 MG: 200 TABLET, COATED ORAL at 21:20

## 2025-07-15 RX ADMIN — TACROLIMUS 1 MG: 0.5 CAPSULE ORAL at 18:06

## 2025-07-15 RX ADMIN — LEVOTHYROXINE SODIUM 125 MCG: 0.07 TABLET ORAL at 06:04

## 2025-07-15 RX ADMIN — MYCOPHENOLATE MOFETIL 1000 MG: 250 CAPSULE ORAL at 21:20

## 2025-07-15 RX ADMIN — HEPARIN SODIUM 5000 UNITS: 5000 INJECTION, SOLUTION INTRAVENOUS; SUBCUTANEOUS at 14:16

## 2025-07-15 RX ADMIN — PRAVASTATIN SODIUM 40 MG: 40 TABLET ORAL at 21:20

## 2025-07-15 RX ADMIN — TACROLIMUS 0.5 MG: 0.5 CAPSULE ORAL at 06:04

## 2025-07-15 RX ADMIN — CALCIUM 1 TABLET: 500 TABLET ORAL at 08:15

## 2025-07-15 RX ADMIN — VORICONAZOLE 200 MG: 200 TABLET, COATED ORAL at 08:14

## 2025-07-15 RX ADMIN — HEPARIN SODIUM 5000 UNITS: 5000 INJECTION, SOLUTION INTRAVENOUS; SUBCUTANEOUS at 21:20

## 2025-07-15 RX ADMIN — Medication 50 MCG: at 08:14

## 2025-07-15 RX ADMIN — PANTOPRAZOLE SODIUM 40 MG: 40 TABLET, DELAYED RELEASE ORAL at 08:14

## 2025-07-15 RX ADMIN — ACYCLOVIR 400 MG: 400 TABLET ORAL at 08:14

## 2025-07-15 RX ADMIN — TACROLIMUS 2 MG: 1 CAPSULE ORAL at 06:04

## 2025-07-15 RX ADMIN — GABAPENTIN 300 MG: 300 CAPSULE ORAL at 08:14

## 2025-07-15 RX ADMIN — MYCOPHENOLATE MOFETIL 1000 MG: 250 CAPSULE ORAL at 08:14

## 2025-07-15 RX ADMIN — SULFAMETHOXAZOLE AND TRIMETHOPRIM 1 TABLET: 400; 80 TABLET ORAL at 08:14

## 2025-07-15 RX ADMIN — HYDRALAZINE HYDROCHLORIDE 50 MG: 50 TABLET ORAL at 14:16

## 2025-07-15 RX ADMIN — ASPIRIN 81 MG: 81 TABLET, COATED ORAL at 08:14

## 2025-07-15 RX ADMIN — ACYCLOVIR 400 MG: 400 TABLET ORAL at 21:20

## 2025-07-15 SDOH — SOCIAL STABILITY: SOCIAL INSECURITY: WITHIN THE LAST YEAR, HAVE YOU BEEN AFRAID OF YOUR PARTNER OR EX-PARTNER?: NO

## 2025-07-15 SDOH — SOCIAL STABILITY: SOCIAL INSECURITY: ARE YOU MARRIED, WIDOWED, DIVORCED, SEPARATED, NEVER MARRIED, OR LIVING WITH A PARTNER?: DIVORCED

## 2025-07-15 SDOH — SOCIAL STABILITY: SOCIAL INSECURITY: DOES ANYONE TRY TO KEEP YOU FROM HAVING/CONTACTING OTHER FRIENDS OR DOING THINGS OUTSIDE YOUR HOME?: NO

## 2025-07-15 SDOH — SOCIAL STABILITY: SOCIAL NETWORK: HOW OFTEN DO YOU ATTEND CHURCH OR RELIGIOUS SERVICES?: NEVER

## 2025-07-15 SDOH — SOCIAL STABILITY: SOCIAL INSECURITY: WITHIN THE LAST YEAR, HAVE YOU BEEN HUMILIATED OR EMOTIONALLY ABUSED IN OTHER WAYS BY YOUR PARTNER OR EX-PARTNER?: NO

## 2025-07-15 SDOH — SOCIAL STABILITY: SOCIAL INSECURITY: DO YOU FEEL ANYONE HAS EXPLOITED OR TAKEN ADVANTAGE OF YOU FINANCIALLY OR OF YOUR PERSONAL PROPERTY?: NO

## 2025-07-15 SDOH — ECONOMIC STABILITY: INCOME INSECURITY: IN THE PAST 12 MONTHS HAS THE ELECTRIC, GAS, OIL, OR WATER COMPANY THREATENED TO SHUT OFF SERVICES IN YOUR HOME?: NO

## 2025-07-15 SDOH — SOCIAL STABILITY: SOCIAL INSECURITY: ARE YOU OR HAVE YOU BEEN THREATENED OR ABUSED PHYSICALLY, EMOTIONALLY, OR SEXUALLY BY ANYONE?: NO

## 2025-07-15 SDOH — SOCIAL STABILITY: SOCIAL NETWORK
DO YOU BELONG TO ANY CLUBS OR ORGANIZATIONS SUCH AS CHURCH GROUPS, UNIONS, FRATERNAL OR ATHLETIC GROUPS, OR SCHOOL GROUPS?: NO

## 2025-07-15 SDOH — SOCIAL STABILITY: SOCIAL INSECURITY: DO YOU FEEL UNSAFE GOING BACK TO THE PLACE WHERE YOU ARE LIVING?: NO

## 2025-07-15 SDOH — ECONOMIC STABILITY: FOOD INSECURITY: HOW HARD IS IT FOR YOU TO PAY FOR THE VERY BASICS LIKE FOOD, HOUSING, MEDICAL CARE, AND HEATING?: NOT HARD AT ALL

## 2025-07-15 SDOH — ECONOMIC STABILITY: HOUSING INSECURITY: AT ANY TIME IN THE PAST 12 MONTHS, WERE YOU HOMELESS OR LIVING IN A SHELTER (INCLUDING NOW)?: NO

## 2025-07-15 SDOH — SOCIAL STABILITY: SOCIAL NETWORK: HOW OFTEN DO YOU GET TOGETHER WITH FRIENDS OR RELATIVES?: NEVER

## 2025-07-15 SDOH — SOCIAL STABILITY: SOCIAL INSECURITY
WITHIN THE LAST YEAR, HAVE YOU BEEN RAPED OR FORCED TO HAVE ANY KIND OF SEXUAL ACTIVITY BY YOUR PARTNER OR EX-PARTNER?: NO

## 2025-07-15 SDOH — ECONOMIC STABILITY: FOOD INSECURITY: WITHIN THE PAST 12 MONTHS, THE FOOD YOU BOUGHT JUST DIDN'T LAST AND YOU DIDN'T HAVE MONEY TO GET MORE.: NEVER TRUE

## 2025-07-15 SDOH — ECONOMIC STABILITY: FOOD INSECURITY: WITHIN THE PAST 12 MONTHS, YOU WORRIED THAT YOUR FOOD WOULD RUN OUT BEFORE YOU GOT THE MONEY TO BUY MORE.: NEVER TRUE

## 2025-07-15 SDOH — SOCIAL STABILITY: SOCIAL NETWORK: HOW OFTEN DO YOU ATTEND MEETINGS OF THE CLUBS OR ORGANIZATIONS YOU BELONG TO?: NEVER

## 2025-07-15 SDOH — SOCIAL STABILITY: SOCIAL INSECURITY: ARE THERE ANY APPARENT SIGNS OF INJURIES/BEHAVIORS THAT COULD BE RELATED TO ABUSE/NEGLECT?: NO

## 2025-07-15 SDOH — ECONOMIC STABILITY: HOUSING INSECURITY: IN THE LAST 12 MONTHS, WAS THERE A TIME WHEN YOU WERE NOT ABLE TO PAY THE MORTGAGE OR RENT ON TIME?: NO

## 2025-07-15 SDOH — SOCIAL STABILITY: SOCIAL INSECURITY: HAS ANYONE EVER THREATENED TO HURT YOUR FAMILY OR YOUR PETS?: NO

## 2025-07-15 SDOH — SOCIAL STABILITY: SOCIAL INSECURITY: ABUSE: ADULT

## 2025-07-15 SDOH — SOCIAL STABILITY: SOCIAL NETWORK: IN A TYPICAL WEEK, HOW MANY TIMES DO YOU TALK ON THE PHONE WITH FAMILY, FRIENDS, OR NEIGHBORS?: TWICE A WEEK

## 2025-07-15 SDOH — SOCIAL STABILITY: SOCIAL INSECURITY: HAVE YOU HAD THOUGHTS OF HARMING ANYONE ELSE?: NO

## 2025-07-15 SDOH — ECONOMIC STABILITY: TRANSPORTATION INSECURITY: IN THE PAST 12 MONTHS, HAS LACK OF TRANSPORTATION KEPT YOU FROM MEDICAL APPOINTMENTS OR FROM GETTING MEDICATIONS?: NO

## 2025-07-15 SDOH — SOCIAL STABILITY: SOCIAL INSECURITY: WERE YOU ABLE TO COMPLETE ALL THE BEHAVIORAL HEALTH SCREENINGS?: YES

## 2025-07-15 SDOH — SOCIAL STABILITY: SOCIAL INSECURITY: HAVE YOU HAD ANY THOUGHTS OF HARMING ANYONE ELSE?: NO

## 2025-07-15 ASSESSMENT — PATIENT HEALTH QUESTIONNAIRE - PHQ9
2. FEELING DOWN, DEPRESSED OR HOPELESS: NOT AT ALL
SUM OF ALL RESPONSES TO PHQ9 QUESTIONS 1 & 2: 0
1. LITTLE INTEREST OR PLEASURE IN DOING THINGS: NOT AT ALL

## 2025-07-15 ASSESSMENT — ACTIVITIES OF DAILY LIVING (ADL)
ADL_ASSISTANCE: INDEPENDENT
ADEQUATE_TO_COMPLETE_ADL: YES
LACK_OF_TRANSPORTATION: NO
ADL_ASSISTANCE: INDEPENDENT
TOILETING: INDEPENDENT
GROOMING: INDEPENDENT
PATIENT'S MEMORY ADEQUATE TO SAFELY COMPLETE DAILY ACTIVITIES?: YES
HEARING - RIGHT EAR: FUNCTIONAL
BATHING_ASSISTANCE: MINIMAL
FEEDING YOURSELF: INDEPENDENT
DRESSING YOURSELF: INDEPENDENT
JUDGMENT_ADEQUATE_SAFELY_COMPLETE_DAILY_ACTIVITIES: YES
BATHING: INDEPENDENT
WALKS IN HOME: INDEPENDENT
LACK_OF_TRANSPORTATION: NO
HEARING - LEFT EAR: FUNCTIONAL

## 2025-07-15 ASSESSMENT — COGNITIVE AND FUNCTIONAL STATUS - GENERAL
TOILETING: A LITTLE
CLIMB 3 TO 5 STEPS WITH RAILING: A LOT
PATIENT BASELINE BEDBOUND: NO
HELP NEEDED FOR BATHING: A LITTLE
TURNING FROM BACK TO SIDE WHILE IN FLAT BAD: A LITTLE
DRESSING REGULAR UPPER BODY CLOTHING: A LITTLE
MOVING TO AND FROM BED TO CHAIR: A LITTLE
DAILY ACTIVITIY SCORE: 24
PERSONAL GROOMING: A LITTLE
DRESSING REGULAR LOWER BODY CLOTHING: A LITTLE
MOVING FROM LYING ON BACK TO SITTING ON SIDE OF FLAT BED WITH BEDRAILS: A LITTLE
MOBILITY SCORE: 24
WALKING IN HOSPITAL ROOM: A LITTLE
MOBILITY SCORE: 24
STANDING UP FROM CHAIR USING ARMS: A LITTLE
DAILY ACTIVITIY SCORE: 24
MOBILITY SCORE: 17
DAILY ACTIVITIY SCORE: 19

## 2025-07-15 ASSESSMENT — LIFESTYLE VARIABLES
AUDIT-C TOTAL SCORE: 0
SKIP TO QUESTIONS 9-10: 1
HOW OFTEN DO YOU HAVE 6 OR MORE DRINKS ON ONE OCCASION: NEVER
HOW OFTEN DO YOU HAVE A DRINK CONTAINING ALCOHOL: NEVER
HOW MANY STANDARD DRINKS CONTAINING ALCOHOL DO YOU HAVE ON A TYPICAL DAY: PATIENT DOES NOT DRINK
AUDIT-C TOTAL SCORE: 0

## 2025-07-15 ASSESSMENT — PAIN SCALES - GENERAL
PAINLEVEL_OUTOF10: 0 - NO PAIN

## 2025-07-15 ASSESSMENT — PAIN - FUNCTIONAL ASSESSMENT
PAIN_FUNCTIONAL_ASSESSMENT: 0-10

## 2025-07-15 NOTE — PROGRESS NOTES
[facility]  HFICU PROGRESS NOTE    Anatoly Lane/[id number]    Admit Date: 7/14/2025  Hospital Length of Stay: 1   ICU Length of Stay: 16h   Primary Service:   Primary HF Cardiologist:   Referring:    INTERVAL EVENTS / PERTINENT ROS:   No acute overnight events. Diuretics withheld, continued on antirejection and prophylactic meds. Reiterates history from admission. Did report he had been home alone without additional support, and due to swelling self titrated his diuretics which significantly improved.     Plan:  - Reduce Tacrolimus from 2.5 BID to 1mg this evening, and 1.5mg BID tomorrow am  - Continue to withhold diuretics and monitor renal function  - Follow up EMB and TTE results  - Continue with PT/OT    MEDICATIONS  Infusions:     Scheduled:  acyclovir, 400 mg, BID  aspirin, 81 mg, Daily  calcium carbonate, 1,250 mg of calcium carbonate, BID  cholecalciferol, 50 mcg, Daily  [START ON 7/16/2025] gabapentin, 300 mg, Daily  hydrALAZINE, 50 mg, TID  insulin NPH (Isophane), 15 Units, Daily  levothyroxine, 125 mcg, Daily  mycophenolate, 1,000 mg, q12h LUPILLO  pantoprazole, 40 mg, Daily  pravastatin, 40 mg, Nightly  predniSONE, 30 mg, Daily  sennosides-docusate sodium, 1 tablet, Nightly  sulfamethoxazole-trimethoprim, 1 tablet, Daily  [START ON 7/16/2025] tacrolimus, 0.5 mg, BID  tacrolimus, 1 mg, BID  tamsulosin, 0.4 mg, Daily  voriconazole, 200 mg, q12h LUPILLO      PRN:  acetaminophen, 650 mg, q6h PRN  dextrose, 12.5 g, q15 min PRN  dextrose, 25 g, q15 min PRN  glucagon, 1 mg, q15 min PRN  glucagon, 1 mg, q15 min PRN  oxygen, , Continuous PRN - O2/gases  polyethylene glycol, 17 g, Daily PRN  traMADol, 50 mg, q6h PRN        PHYSICAL EXAM:   Visit Vitals  /77   Pulse 93   Temp 36.5 °C (97.7 °F) (Temporal)   Resp 25   Wt 96.3 kg (212 lb 4.9 oz)   SpO2 93%   BMI 31.35 kg/m²   Smoking Status Former   BSA 2.17 m²       Wt Readings from Last 5 Encounters:   07/14/25 96.3 kg (212 lb 4.9 oz)    07/14/25 94.9 kg (209 lb 4.8 oz)   07/14/25 98.4 kg (216 lb 14.9 oz)   07/09/25 98.4 kg (216 lb 14.4 oz)   02/27/25 102 kg (224 lb)       INTAKE/OUTPUT:  I/O last 3 completed shifts:  In: - (0 mL/kg)   Out: 650 (6.7 mL/kg) [Urine:650 (0.2 mL/kg/hr)]  Weight: 96.3 kg      Physical Exam  Constitutional:       General: He is awake.      Appearance: Normal appearance. He is well-developed and well-groomed.   HENT:      Head: Normocephalic and atraumatic.      Mouth/Throat:      Mouth: Mucous membranes are moist.   Eyes:      Extraocular Movements: Extraocular movements intact.      Conjunctiva/sclera: Conjunctivae normal.      Pupils: Pupils are equal, round, and reactive to light.   Neck:      Trachea: Trachea and phonation normal.   Cardiovascular:      Rate and Rhythm: Normal rate and regular rhythm.      Pulses: Normal pulses.         Heart sounds: Normal heart sounds, S1 normal and S2 normal.   Pulmonary:      Effort: Pulmonary effort is normal.      Breath sounds: Bibasilar crackle, diffuse rhonchi, no wheezing.   Chest:      Comments: Midsternal postsurgical incision healing well without s/s of infection.   Abdominal:      General: Abdomen is flat. Bowel sounds are normal.      Palpations: Abdomen is soft.   Musculoskeletal:         General: Normal range of motion.      Left hand: At baseline with reduced strength and sensation.      Cervical back: Full passive range of motion without pain and normal range of motion.      Right lower leg: No edema.      Left lower leg: No edema.      Comments: R groin seroma   Skin:     General: Skin is warm and dry.      Capillary Refill: Capillary refill takes less than 2 seconds.   Neurological:      General: No focal deficit present.      Mental Status: He is alert and oriented to person, place, and time. Mental status is at baseline.      GCS: GCS eye subscore is 4. GCS verbal subscore is 5. GCS motor subscore is 6.      Sensory: Sensation is intact.      Motor: Tremor  "present.   Psychiatric:         Mood and Affect: Mood and affect normal.         Speech: Speech normal.         Behavior: Behavior normal. Behavior is cooperative.    DATA:  CMP:  Results from last 7 days   Lab Units 07/15/25  0603 07/14/25  2056 07/14/25  1126 07/09/25  0628   SODIUM mmol/L 142 140 141 138   POTASSIUM mmol/L 4.0 3.9 3.5 4.3   CHLORIDE mmol/L 103 101 104 100   CO2 mmol/L 30 28 22 29   ANION GAP mmol/L 13 15 19 13   BUN mg/dL 63* 73* 67* 51*   CREATININE mg/dL 1.27 1.77* 1.62* 0.93   EGFR mL/min/1.73m*2 63 42* 47* >90   MAGNESIUM mg/dL 1.87 1.69 1.67 2.26   ALBUMIN g/dL 3.1* 3.5 3.2* 3.3*   ALT U/L  --  21 19  --    AST U/L  --  15 14  --    BILIRUBIN TOTAL mg/dL  --  0.4 0.4  --      CBC:  Results from last 7 days   Lab Units 07/15/25  0603 07/14/25  2056 07/14/25  1126 07/09/25  0628   WBC AUTO x10*3/uL 7.1 8.1 7.4 16.3*   HEMOGLOBIN g/dL 9.1* 9.9* 8.9* 9.6*   HEMATOCRIT % 28.0* 29.2* 28.6* 29.2*   PLATELETS AUTO x10*3/uL 224 286 198 308   MCV fL 96 94 102* 99     COAG:   Results from last 7 days   Lab Units 07/14/25 2056   INR  1.0     ABO: No results found for: \"ABO\"  HEME/ENDO:  Results from last 7 days   Lab Units 07/14/25 2056   HEMOGLOBIN A1C % 5.1      CARDIAC:   Results from last 7 days   Lab Units 07/14/25 2056 07/09/25 0628   TROPHSCMC ng/L 233*  --    BNP pg/mL 362* 308*       ASSESSMENT AND PLAN:   64-year-old male with a history of NICM (s/p HM3 in 2023) s/p OHT on 6/21/2025, pAF, CAD, CKD, HTN, BPH, GERD, hypothyroidism, presents for readmission from outpatient transplant follow-up due to clinical decline. He was discharged on 7/9 after an initial postoperative course complicated by pneumomediastinum (managed conservatively), steroid-induced hyperglycemia, and LUE/RLE weakness. He was admitted with concerns of deconditioning, respiratory distress, and DEBBIE.      Neuro:  # Anxiety. 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 He was recommended outpatient EMG/NCS to further characterize injury. Additionally was noted to have RLE weakness attributed to seroma related femoral nerve compression. concerning for femoral nerve injury.   - Aggressive PT/OT as tolerated  - Continue home Gabapentin  - Neurology follow up along with EMG/NCS outpatient     # Falls  Reports having fallen twice at home. First fall happened when he was climbing a set of stairs and his foot caught on the step.  The second was when his screen door caught the back of his heel as it was swinging closed.  Denies LOC. Suspect mechanical in etiology given underlying weakness, no concern for CVA at this time.   - C/w aggressive PT/OT     Cardiovascular:  #NICM s/p HM2 (2023) s/p OHT on 6/21  Underwent RHC and biopsy at clinic visit 7/14 which showed: 121/69 (85), RA 4, PA 32/16 (21), PCWP 10; Raven CI 3.8 and Thermodilution CI 2.4.  - Continue home hydralazine 50 TID  - TTE (7/14): Pending  - Admit Lactate 1.4   - Admit BNP- 362  Donor/Recipient Infectious history:   CMV: -/-   EBV: +/+   Toxo: -/-   Heb B: -/-  Hep C: -/-  HIV: -/-      Rejection/Prophylaxis:     - Induction: s/p methylprednisolone 500mg IV x2, S/p Basiliximab 20mg IV within 1 hour of arrival to CTICU (no need for premedication), s/p 2nd dose: 20mg IVPB on POD4 (06/25)   - Steroids: Prednisone 30mg daily (reduced 7/8 after negative biopsy; will need to be reduced again in a day or two based on biopsy results)   - Tacrolimus: 2.5mg BID  - Tacrolimus goal troughs: 10-12; Last tacrolimus trough: 10.0 7/9/2025:  6:28 AM  - Cellcept: 1,000mg BID   - Antifungals:  voriconazole 200mg BID x3 months end date 09/22/2025   - Antivirals: acylovir 400mg BID x3 months end date 09/22/2025   - Anti PCP & Toxoplasmosis: SS Bactrim daily end date of 12/22/25     Last cardiac biopsy: 7/14: Pending   Last RHC: 7/14  Next RHC/biopsy: pending   Last TTE/BOBBY: 7/8 - difficult views 2/2 air  Last HLA: Pre-transplant: PRAs 0%; First due on 7/21  Last Allomap: ~ will assess starting at 6-month ana post-op    Last Allosure: ~ will assess starting at 6-month ana post-op   Last LHC: 03/2023(pre OHT). First due ~ (one year post tx) due 6/2026  Osteopenia/osteoporosis prophylaxis: Vit D 2000U daily and calcium 500 mg BID (give calcium 2 hrs after MMF)   Peptic/gastric ulcer prophylaxis: Pantoprazole 40mg daily    CAV Prophylaxis: Aspirin 81mg daily and Pravastatin 40mg daily   Pacing: Epicardial wires removed 07/07      Problems:   # H/o Subcutaneous emphysema and pneumomediastinum   - Observed during recent admission, managed conservatively after consultations with Cam Birch and [place].      Pulmonary:   #Prior smoker  #Cough  #Small stable b/l pleural effusions  #Small L apical pneumothorax  Stable unchanged b/l effusions, and questionable small apical L pneumothorax noted on admit CXR compared to prior. Respiratorily stable, without leukocytosis and afebrile, low concern for active pneumonia at this time.   - Monitor off antibiotics for now  - Monitor and maintain SpO2 > 92%    GI:  #H/o C. Difficile infection s/p tx with po vanc  - Bowel regimen ordered   - PPI     :  #DEBBIE  #BPH  Suspect prerenal in etiology given self reported increase in home diuretics, BUN/Cr ratio, RHC hemodynamics, and improvement in renal function while holding diuretics.   -Baseline BUN/Cr WNL  -Admit BUN/Cr: 73/1.77  -I/Os  -Continue home tamsulosin     Heme:  #H/o anemia  -H&H on admit 9.9/29.2     Endo:  #PreDM  #Steroid induced hyperglycemia  -HgA1c on admit- 5.1%  - Endo consulted during recent admission, home  regimen is the following:  - Insulin NPH 15u to be taken with 30mg prednisone daily  - Can titrate insulin down to 10u as steroids taper  - Transition to SSI while inpatient      #Hypothyroidism  -last TSH (3/26/25)--> 1.83  -Continue home levothyroxine      ID:  -afebrile, nontoxic   -no s/s infx  -trend temps q4h     PHYSICAL AND OCCUPATIONAL THERAPY:    LINES:    DVT: SubQ heparin  VAP BUNDLE:  CENTRAL LINE BUNDLE:  ULCER PPX: Pantoprazole  GLYCEMIC CONTROL: Insulin regimen  BOWEL CARE: Pericolace  INDWELLING CATHETER:  NUTRITION: Adult diet Regular      EMERGENCY CONTACT: Extended Emergency Contact Information  Primary Emergency Contact: Janice Ramos  Address: [address] [place] [address] [facility]  Home Phone: [phone]  Mobile Phone: [phone]  Relation: Mother  Secondary Emergency Contact: Bird Lane  Address: [address] [place] [address] [facility]  Home Phone: [phone]  Mobile Phone: [phone]  Relation: Son  Preferred language: English   needed? No  FAMILY UPDATE:  CODE STATUS: Full Code  DISPO:     Patient seen and assessed with Dr. Chao    I personally spent 60 minutes of critical care time directly and personally managing the patient exclusive of separately billable procedures   _________________________________________________  Hernesto Salcedo DO PGY4, HF Fellow

## 2025-07-15 NOTE — H&P
[facility]  HFICU HISTORY AND PHYSICAL     Anatoly Lane/[id number]    Admit Date: 7/14/2025  Hospital Length of Stay: 0   ICU Length of Stay: 2h   Primary Service: HFICU   Primary HF Cardiologist: Dr. Hylton   Referring:    HPI:   64-year-old male with a history of NICM (s/p HM3 in 2023) s/p OHT on 6/21/2025, pAF, CAD, CKD, HTN, BPH, GERD, hypothyroidism, presents for readmission from outpatient transplant follow-up due to clinical decline. He was discharged on 7/9 after an initial postoperative course complicated by pneumomediastinum (managed conservatively), steroid-induced hyperglycemia, and left hand palsy.    Since discharge, the patient reports two mechanical falls at home, progressive lower extremity edema, and worsening cough productive of sputum. He independently altered his diuretic regimen (from furosemide to torsemide) without team guidance. At clinic today, he appeared hypoxic, frail, in respiratory distress and found to have DEBBIE. He underwent RHC and biopsy which showed: RHC: 121/69 (85), RA 4, PA 32/16 (21), PCWP 10; Raven CI 3.8 and Thermodilution CI 2.4.    Given concerns for deconditioning, respiratory failure, and DEBBIE with need for further evaluation, he is being readmitted to the HF ICU.  Upon arrival he is A&o x3 and appears to be in no acute distress.  He states that he has been lightheaded dizzy and weak at home, though he does assure that both of his falls were mechanical and he at no point loss consciousness.  he endorses taking all of his anti-rejection medications as prescribed and that the only change he made to his regimen without transplant teams knowledge wad the reintroduction of his torsemide.  Patient was mildly cool to the touch with trace edema to feet and ankles.  He c/o a silver dollar sized epigastric pain with coughing likely an abd hernia.  He also complains of occasional oscillopsia.  He denies any chest pain however does endorse SOB, cough and  fatigue.  He also denies any n/v/c/d.  Admit CXR with mild interstitial pulmonary edema.        Past Medical History:  Medical History[1]    Past Surgical History:  Surgical History[2]    Family History:  Family History[3]    Social History:  Social History     Socioeconomic History    Marital status:      Spouse name: Not on file    Number of children: Not on file    Years of education: Not on file    Highest education level: Not on file   Occupational History    Not on file   Tobacco Use    Smoking status: Former     Types: Cigarettes    Smokeless tobacco: Never   Substance and Sexual Activity    Alcohol use: Never    Drug use: Never    Sexual activity: Not on file   Other Topics Concern    Not on file   Social History Narrative    Not on file     Social Drivers of Health     Financial Resource Strain: Low Risk  (6/29/2025)    Overall Financial Resource Strain (CARDIA)     Difficulty of Paying Living Expenses: Not very hard   Food Insecurity: No Food Insecurity (2/26/2024)    Hunger Vital Sign     Worried About Running Out of Food in the Last Year: Never true     Ran Out of Food in the Last Year: Never true   Transportation Needs: No Transportation Needs (6/29/2025)    PRAPARE - Transportation     Lack of Transportation (Medical): No     Lack of Transportation (Non-Medical): No   Physical Activity: Insufficiently Active (6/20/2025)    Exercise Vital Sign     Days of Exercise per Week: 1 day     Minutes of Exercise per Session: 10 min   Stress: No Stress Concern Present (1/28/2025)    [employer] - Occupational Stress Questionnaire     Feeling of Stress : Not at all   Social Connections: Socially Isolated (1/28/2025)    Social Connection and Isolation Panel [NHANES]     Frequency of Communication with Friends and Family: Twice a week     Frequency of Social Gatherings with Friends and Family: Never     Attends Judaism Services: Never     Active Member of Clubs or  Organizations: No     Attends Club or Organization Meetings: Never     Marital Status:    Intimate Partner Violence: Not At Risk (2/26/2024)    Humiliation, Afraid, Rape, and Kick questionnaire     Fear of Current or Ex-Partner: No     Emotionally Abused: No     Physically Abused: No     Sexually Abused: No   Housing Stability: Low Risk  (6/29/2025)    Housing Stability Vital Sign     Unable to Pay for Housing in the Last Year: No     Number of Times Moved in the Last Year: 0     Homeless in the Last Year: No       Allergies:  RX Allergies[4]    Prior to Admission Meds:  Prescriptions Prior to Admission[5]    Current Medications:  Infusions:     Scheduled:     PRN:  oxygen, , Continuous PRN - O2/gases        PHYSICAL EXAM:   Visit Vitals  /69   Pulse 91   Temp 36.5 °C (97.7 °F)   Resp 23   Wt 96.3 kg (212 lb 4.9 oz)   SpO2 93%   BMI 31.35 kg/m²   Smoking Status Former   BSA 2.17 m²       Wt Readings from Last 5 Encounters:   07/14/25 96.3 kg (212 lb 4.9 oz)   07/14/25 94.9 kg (209 lb 4.8 oz)   07/14/25 98.4 kg (216 lb 14.9 oz)   07/09/25 98.4 kg (216 lb 14.4 oz)   02/27/25 102 kg (224 lb)       INTAKE/OUTPUT:  No intake/output data recorded.     Physical Exam  Constitutional:       General: He is awake.      Appearance: Normal appearance. He is well-developed and well-groomed.   HENT:      Head: Normocephalic and atraumatic.      Mouth/Throat:      Mouth: Mucous membranes are moist.   Eyes:      Extraocular Movements: Extraocular movements intact.      Conjunctiva/sclera: Conjunctivae normal.      Pupils: Pupils are equal, round, and reactive to light.   Neck:      Trachea: Trachea and phonation normal.   Cardiovascular:      Rate and Rhythm: Normal rate and regular rhythm.      Pulses: Normal pulses.           Radial pulses are 2+ on the right side and 2+ on the left side.        Dorsalis pedis pulses are 2+ on the right side and 2+ on the left side.        Posterior tibial pulses are 2+ on the right  side and 2+ on the left side.      Heart sounds: Normal heart sounds, S1 normal and S2 normal.   Pulmonary:      Effort: Pulmonary effort is normal.      Breath sounds: Examination of the right-lower field reveals decreased breath sounds and rales. Examination of the left-lower field reveals decreased breath sounds and rales. Decreased breath sounds and rales present.   Chest:      Comments: healing sternal incision with small fluid or air filled pocket noted to the mid strenum  Abdominal:      General: Abdomen is flat. Bowel sounds are normal.      Palpations: Abdomen is soft.   Musculoskeletal:         General: Normal range of motion.      Left hand: Decreased strength. Decreased sensation of the ulnar distribution.      Cervical back: Full passive range of motion without pain and normal range of motion.      Right lower leg: No edema.      Left lower leg: No edema.      Comments: right groin seroma palpable    Skin:     General: Skin is warm and dry.      Capillary Refill: Capillary refill takes less than 2 seconds.      Findings: Bruising present.   Neurological:      General: No focal deficit present.      Mental Status: He is alert and oriented to person, place, and time. Mental status is at baseline.      GCS: GCS eye subscore is 4. GCS verbal subscore is 5. GCS motor subscore is 6.      Sensory: Sensation is intact.      Motor: Tremor present.   Psychiatric:         Mood and Affect: Mood and affect normal.         Speech: Speech normal.         Behavior: Behavior normal. Behavior is cooperative.         Review of Systems    DATA:  CMP:  Recent Labs     07/14/25  1126 07/09/25  0628 07/08/25  0640 07/07/25  0646 07/06/25  0548 07/05/25  0539 07/04/25  0608 07/03/25  0630 07/02/25  0713 07/01/25  0607    138 136 138 138 136 138 138 136 137   K 3.5 4.3 4.5 4.5 5.3 5.0 4.9 4.5 4.7 5.4*    100 97* 97* 101 101 101 100 98 100   CO2 22 29 32 31 27 28 30 31 30 32   ANIONGAP 19 13 12 15 15 12 12 12 13 10    BUN 67* 51* 57* 57* 42* 34* 32* 33* 36* 30*   CREATININE 1.62* 0.93 1.15 1.11 1.29 0.86 0.81 0.87 0.84 0.81   EGFR 47* >90 71 74 62 >90 >90 >90 >90 >90   MG 1.67 2.26 2.01 2.06 2.08 1.95 1.99 1.91 2.10 2.26     Recent Labs     07/14/25  1126 07/09/25  0628 07/08/25  0640 07/07/25  0646 07/06/25  0548 07/05/25  0539 07/04/25  0608 07/03/25  0630 06/21/25  0801 06/20/25  1659 03/26/25  1157 01/28/25  1451 04/04/24  1509 02/24/24  0233 02/23/24  1433 05/31/23  2134 05/28/23  0603 05/23/23  0304 05/21/23  1943   ALBUMIN 3.2* 3.3* 3.3* 3.4 3.3* 3.3* 3.1* 3.3*   < > 4.3 3.8 4.2 4.4   < > 4.0 CANCELED CANCELED   < > CANCELED   ALT 19  --   --   --   --   --   --   --   --  13 13 12 13  --  13 CANCELED CANCELED   < > CANCELED   AST 14  --   --   --   --   --   --   --   --  15 31 14 12  --  11 CANCELED CANCELED   < > CANCELED   BILITOT 0.4  --   --   --   --   --   --   --   --  0.4 0.5 0.5 0.3  --  0.4 CANCELED CANCELED   < > CANCELED   LIPASE  --   --   --   --   --   --   --   --   --   --   --   --   --   --   --   --   --   --  CANCELED    < > = values in this interval not displayed.     CBC:  Recent Labs     07/14/25 2056 07/14/25  1126 07/09/25  0628 07/08/25  0640 07/07/25  0646 07/06/25  1159 07/05/25  0539 07/04/25  0608   WBC 8.1 7.4 16.3* 15.5* 18.2* 20.4* 20.4* 20.0*   HGB 9.9* 8.9* 9.6* 9.5* 10.0* 10.0* 9.4* 9.0*   HCT 29.2* 28.6* 29.2* 29.9* 30.2* 31.5* 29.9* 28.7*    198 308 310 327 339 339 303   MCV 94 102* 99 100 98 99 101* 100     COAG:   Recent Labs     06/21/25  0801 06/20/25  2330 06/20/25  2118 06/20/25  1659 04/04/24  1509 03/06/24  0615 03/05/24  0609 03/03/24  2355   INR 1.4* 1.9* 2.4* 3.0* 2.2* 3.3* 2.6* 1.9*     ABO:   Recent Labs     06/23/25  1433   ABO AB     HEME/ENDO:  Recent Labs     07/14/25  2056 06/25/25  0609 03/26/25  1157 01/28/25  1451 02/26/24  0811 02/23/24  1433   FERRITIN  --  130  --   --   --  104   IRONSAT  --  21*  --   --   --  18*   TSH  --   --  1.83 0.05*   < >  18.02*   HGBA1C 5.1  --   --   --   --  4.4    < > = values in this interval not displayed.      CARDIAC:   Recent Labs     07/09/25  0628 07/06/25  1159 03/26/25  1157 01/28/25  1451 04/04/24  1509 03/06/24  0615 03/05/24  0609 03/04/24  0947 03/03/24  0604 03/02/24  0610 02/24/24  0233 02/23/24  1433 05/21/23  1943 04/02/23  0154 03/29/23  2151   LDH  --   --  263* 145 134 116 142 161 145 147   < > 128  --    < >  --    TROPHS  --   --   --   --   --   --   --   --   --   --   --  30 CANCELED  CANCELED  --   --    * 347* 57 69 101*  --   --   --   --   --   --  33 CANCELED  --  3,231*    < > = values in this interval not displayed.     Recent Labs     06/28/25  0548 06/27/25  2158 06/27/25  1131 06/27/25  0621 06/26/25  2321 06/22/25  1804 06/22/25  0704 06/22/25  0538 06/22/25  0313 06/21/25  2256 06/21/25  1726 06/21/25  1628   LACMX 1.7 1.5 1.8 2.0 2.4*   < >  --   --   --   --   --   --    LACTATEART  --   --   --   --   --   --  1.0  --  0.8 1.0 2.7* 3.3*   SO2MV 60 52 64 65 63   < >  --    < >  --   --   --   --    O2CMX 58.8 50.9 62.9 63.4 61.3   < >  --    < >  --   --   --   --     < > = values in this interval not displayed.     Recent Labs     07/09/25  0628 07/08/25  0640 07/07/25  0646 07/06/25  0548 07/05/25  0539 07/04/25  0608 07/03/25  0630 07/02/25  0713   TACROLIMUS 10.0 9.0 10.4 10.3 10.6 10.1 9.6 11.0         ASSESSMENT AND PLAN:   Neuro:  # Anxiety. 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 He was recommended outpatient EMG/NCS to further characterize injury. Additionally was noted to have RLE  weakness attributed to seroma related femoral nerve compression. concerning for femoral nerve injury.   - Aggressive PT/OT as tolerated  - Continue home Gabapentin  - Neurology follow up along with EMG/NCS outpatient    # Falls  Reports having fallen twice at home.   - first fall happened when he was climbing a set of stairs and his foot caught on the step.  The second was when his screen door caught the back of his heel as it was swinging closed.      Cardiovascular:  #NICM s/p HM2 (2023) s/p OHT on 6/21  - TTE (6/22): Normal BiV   - Admit Lactate 1.4   -Admit BNP- 362  Donor/Recipient Infectious history:   CMV: -/-   EBV: +/+   Toxo: -/-   Heb B: -/-  Hep C: -/-  HIV: -/-     Rejection/Prophylaxis:     - Induction: s/p methylprednisolone 500mg IV x2, S/p Basiliximab 20mg IV within 1 hour of arrival to CTICU (no need for premedication), s/p 2nd dose: 20mg IVPB on POD4 (06/25)   - Steroids: Prednisone 30mg daily (reduced 7/8 after negative biopsy; will need to be reduced again in a day or two based on biopsy results)   - Tacrolimus: 2.5mg BID  - Tacrolimus goal troughs: 10-12; Last tacrolimus trough: 10.0 7/9/2025:  6:28 AM  - Cellcept: 1,000mg BID   - Antifungals: voriconazole 200mg BID x3 months end date 09/22/2025   - Antivirals: acylovir 400mg BID x3 months end date 09/22/2025   - Anti PCP & Toxoplasmosis: SS Bactrim daily end date of 12/22/25     Last cardiac biopsy: 7/14: pending   Last RHC: 7/14  Next RHC/biopsy: pending   Last TTE/BOBBY: 7/8 - difficult views 2/2 air  Last HLA: Pre-transplant: PRAs 0%; First due on 7/21  Last Allomap: ~ will assess starting at 6-month ana post-op    Last Allosure: ~ will assess starting at 6-month ana post-op   Last LHC: 03/2023(pre OHT). First due ~ (one year post tx) due 6/2026  Osteopenia/osteoporosis prophylaxis: Vit D 2000U daily and calcium 500 mg BID (give calcium 2 hrs after MMF)   Peptic/gastric ulcer prophylaxis: Pantoprazole 40mg daily    CAV Prophylaxis: Aspirin  81mg daily and Pravastatin 40mg daily   Pacing: Epicardial wires removed 07/07     Problems:   # H/o Subcutaneous emphysema and pneumomediastinum   - Observed during recent admission, managed conservatively after consultations with Cam Birch and [place].     Pulmonary:   #Prior smoker  #Respiratory failure  -No formal Hx of pulmonary disease/Pulmonary Hypertension  -Monitor and maintain SpO2 > 92%    #c/f PNA  - mild interstitial pulmonary edema noted on admit CXR  - sputum culture sent   - COVID, flu and RSV ordered     GI:  #H/o C. Difficile infection s/p tx with po vanc  - Bowel regimen ordered   - PPI     :  #DEBBIE  #BPH  -Baseline BUN/Cr WNL  -Admit BUN/Cr :73/1.77  -I/Os  -avoid hypotension and nephrotoxic agents  -Continue home tamsulosin    Heme:  #H/o anemia  -H&H on admit 9.9/29.2     Endo:  #PreDM  #Steroid induced hyperglycemia  -HgA1c on admit- 5.1%  - Endo consulted during recent admission, home regimen is the following:  - Insulin NPH 15u to be taken with 30mg prednisone daily  - Can titrate insulin down to 10u as steroids taper  - Transition to SSI while inpatient     #Hypothyroidism  -last TSH (3/26/25)--> 1.83  -Continue home levothyroxine     ID:  -afebrile, nontoxic   -no s/s infx  -trend temps q4h      PHYSICAL AND OCCUPATIONAL THERAPY:    LINES:  PIVs       DVT: NA  VAP BUNDLE: NA  CENTRAL LINE BUNDLE: NA  ULCER PPX: PPI  GLYCEMIC CONTROL: lantus   BOWEL CARE: agatha-colace and miralax   INDWELLING CATHETER: NA  NUTRITION: Adult diet Regular      EMERGENCY CONTACT: Extended Emergency Contact Information  Primary Emergency Contact: Janice Ramos  Address: [address] [place] [id number] [facility]  Home Phone: [phone]  Mobile Phone: [phone]  Relation: Mother  Secondary Emergency Contact: Bird Lane  Address: [address] [place], [place] [address]N [address] [place] [id number] [facility]  Home Phone: [phone]  Mobile Phone: [phone]  Relation:  Son  Preferred language: English   needed? No  FAMILY UPDATE:  CODE STATUS: Full Code  DISPO: remain in HFICU      Dr. Chao aware of admission     I personally spent 60 minutes of critical care time directly and personally managing the patient exclusive of separately billable procedures   _________________________________________________  WES Mosqueda             [1]   Past Medical History:  Diagnosis Date    ACC/AHA stage D systolic heart failure     Acute decompensated heart failure 02/23/2024    Acute on chronic combined systolic (congestive) and diastolic (congestive) heart failure 02/23/2024    Acute on chronic systolic heart failure, NYHA class 3     Anticoagulant long-term use 02/26/2024    CAD (coronary artery disease)     Cardiac defibrillator in place 02/14/2024    CHF (congestive heart failure) 02/14/2024    Chronic left systolic heart failure 02/14/2025    Hypothyroid     Hypothyroidism     LVAD (left ventricular assist device) present (Multi)     LVAD (left ventricular assist device) present (Multi) 02/14/2024    Non-ischemic cardiomyopathy (Multi) 02/14/2024    PAF (paroxysmal atrial fibrillation) (Multi)     Paroxysmal ventricular tachycardia 02/14/2024   [2]   Past Surgical History:  Procedure Laterality Date    CARDIAC CATHETERIZATION N/A 2/28/2024    Procedure: Right Heart Cath;  Surgeon: José Velasco MD;  Location: [place] [address] Cardiac Cath Lab;  Service: Cardiovascular;  Laterality: N/A;  with RAMP study    CARDIAC CATHETERIZATION N/A 3/26/2025    Procedure: Right Heart Cath;  Surgeon: Vince Ruano DO;  Location: [place] [address] Cardiac Cath Lab;  Service: Cardiovascular;  Laterality: N/A;    CARDIAC CATHETERIZATION N/A 7/1/2025    Procedure: RHC;  Surgeon: José Velasco MD;  Location: [place] [address] Cardiac Cath Lab;  Service: Cardiovascular;  Laterality: N/A;  w/ EMB    CARDIAC CATHETERIZATION N/A 7/1/2025    Procedure: Endomyocardial Biopsy;   Surgeon: José Velasco MD;  Location: [place] [address] Cardiac Cath Lab;  Service: Cardiovascular;  Laterality: N/A;    CARDIAC CATHETERIZATION N/A 7/8/2025    Procedure: Endomyocardial Biopsy;  Surgeon: Vince Ruano DO;  Location: [place] [address] Cardiac Cath Lab;  Service: Cardiovascular;  Laterality: N/A;    CARDIAC CATHETERIZATION N/A 7/8/2025    Procedure: RHC;  Surgeon: Vince Ruano DO;  Location: [place] [address] Cardiac Cath Lab;  Service: Cardiovascular;  Laterality: N/A;    COLONOSCOPY W/ POLYPECTOMY  2023    CT ABDOMEN PELVIS ANGIOGRAM W AND/OR WO IV CONTRAST  04/13/2023    CT ABDOMEN PELVIS ANGIOGRAM W AND/OR WO IV CONTRAST [facility] CT    LEFT VENTRICULAR ASSIST DEVICE      OTHER SURGICAL HISTORY  01/05/2022    Complete colonoscopy    OTHER SURGICAL HISTORY  01/05/2022    Cardioverter defibrillator insertion   [3]   Family History  Problem Relation Name Age of Onset    Hypertension Father      Colon cancer Father     [4]   Allergies  Allergen Reactions    Jardiance [Empagliflozin] Itching   [5]   Medications Prior to Admission   Medication Sig Dispense Refill Last Dose/Taking    acetaminophen (Tylenol) 325 mg tablet Take 2 tablets (650 mg) by mouth every 6 hours if needed for mild pain (1 - 3).   7/14/2025    acyclovir (Zovirax) 400 mg tablet Take 1 tablet (400 mg) by mouth 2 times a day. 180 tablet 0 7/14/2025    alcohol swabs Apply topically 3 times a day. 100 each 3 7/14/2025    aspirin 81 mg EC tablet Take 1 tablet (81 mg) by mouth once daily. 90 tablet 3 7/14/2025    blood-glucose sensor (FreeStyle Federico 3 Plus Sensor) device Use 1 every 15 days. 2 each 3 7/14/2025    calcium carbonate (Os-Wilfredo) 1,250 mg (500 mg elemental) tablet Take 1 tablet by mouth 2 times a day. 60 tablet 11 7/14/2025    cholecalciferol (Vitamin D-3) 50 mcg (2,000 units) tablet Take 1 tablet (50 mcg) by mouth once daily. 30 tablet 11 7/14/2025    docusate sodium (Colace) 100 mg capsule Take 1 capsule (100 mg) by mouth  "2 times a day. (Patient taking differently: Take 1 capsule (100 mg) by mouth 2 times a day as needed for constipation.)   7/14/2025    FreeStyle Federico 3 [place] misc Use as directed. 1 each 1 7/14/2025    furosemide (Lasix) 40 mg tablet Take 1 tablet (40 mg) by mouth once daily. 30 tablet 11 7/14/2025    gabapentin (Neurontin) 300 mg capsule Take 1 capsule (300 mg) by mouth 3 times a day. 90 capsule 11 7/14/2025    hydrALAZINE (Apresoline) 50 mg tablet Take 1 tablet (50 mg) by mouth 3 times a day. 90 tablet 11 7/14/2025    insulin NPH, Isophane, (HumuLIN N,NovoLIN N) 100 unit/mL (3 mL) pen Inject 15 Units under the skin once daily in the morning. With your steroid. 15 mL 1 7/14/2025    levothyroxine (Synthroid, Levoxyl) 125 mcg tablet Take 1 tablet (125 mcg) by mouth early in the morning.. Take on an empty stomach at the same time each day, either 30 to 60 minutes prior to breakfast 30 tablet 11 7/14/2025    lidocaine 4 % patch Place 1 patch over 12 hours on the skin once daily. Remove & discard patch within 12 hours or as directed by MD. 30 patch 3 7/14/2025    MULTIVITAMIN ORAL Take 1 tablet by mouth once daily.   7/14/2025    mycophenolate (Cellcept) 250 mg capsule Take 4 capsules (1,000 mg) by mouth every 12 hours. 240 capsule 11 7/14/2025    pantoprazole (ProtoNix) 40 mg EC tablet Take 1 tablet (40 mg) by mouth once daily. Do not crush, chew, or split. 180 tablet 3 7/14/2025    pen needle, diabetic 31 gauge x 5/16\" needle Use 1 once daily or as directed by provider. 100 each 3 7/14/2025    polyethylene glycol (Glycolax, Miralax) 17 gram packet Take 17 g by mouth once daily. (Patient taking differently: Take 17 g by mouth once daily. As needed)   7/14/2025    pravastatin (Pravachol) 40 mg tablet Take 1 tablet (40 mg) by mouth once daily at bedtime. 30 tablet 11 7/14/2025    predniSONE (Deltasone) 5 mg tablet Take 6 tablets (30 mg) by mouth once daily. 180 tablet 0 7/14/2025    sodium zirconium cyclosilicate " (Lokelma) 10 gram packet Take 10 g by mouth once daily as needed (Only take as instructed by transplant team). 30 each 0 7/14/2025    sulfamethoxazole-trimethoprim (Bactrim) 400-80 mg tablet Take 1 tablet by mouth once daily. 90 tablet 1 7/14/2025    tacrolimus (Prograf) 0.5 mg capsule Take 1 capsule (0.5 mg) by mouth 2 times a day. TDD: 2.5mg BID 60 capsule 11 7/14/2025    tacrolimus (Prograf) 1 mg capsule Take 2 capsules (2 mg) by mouth 2 times a day. TDD: 2.5mg  capsule 11 7/14/2025    tamsulosin (Flomax) 0.4 mg 24 hr capsule Take 1 capsule (0.4 mg) by mouth once daily. 30 capsule 11 7/14/2025    traMADol (Ultram) 50 mg tablet Take 1 tablet (50 mg) by mouth every 6 hours if needed for severe pain (7 - 10) for up to 7 days. 15 tablet 0 7/14/2025    voriconazole (Vfend) 200 mg tablet Take 1 tablet (200 mg) by mouth every 12 hours. 60 tablet 0 7/14/2025

## 2025-07-15 NOTE — HOSPITAL COURSE
64-year-old male with a history of NICM (s/p HM3 in 2023) s/p OHT on 6/21/2025, who presented for readmission from outpatient cardiology clinic follow-up due to concerns of deconditioning c/b falls, respiratory distress, and prerenal DEBBIE likely from overdiuresis. He underwent EMB in clinic which returned negative for rejection. Diuretics were withheld with improvement in renal function. Evaluated by PT and will likely need SNF. Tacrolimus trough levels were elevated and dose was adjusted. He is stable for transfer to floor.

## 2025-07-15 NOTE — NURSING NOTE
Orthostatic Blood Pressures:  Laying 143/69 (92)  Sitting on edge of bed 103/82 (88)  Standing 128/77 (93)

## 2025-07-15 NOTE — PROGRESS NOTES
Physical Therapy    Physical Therapy Evaluation    Patient Name: Anatoly Lane  MRN: [id number]  Department: [facility] HFICU    Room: 09/09-A  Today's Date: 7/15/2025   Time Calculation  Start Time: 1040  Stop Time: 1115  Time Calculation (min): 35 min    Assessment/Plan   PT Assessment  PT Assessment Results: Decreased strength, Decreased range of motion, Decreased endurance, Impaired balance, Decreased mobility, Decreased coordination, Impaired judgement, Decreased safety awareness  Rehab Prognosis: Good  Barriers to Discharge Home: Physical needs, Caregiver assistance  Caregiver Assistance: Patient lives alone and/or does not have reliable caregiver assistance  Physical Needs: Stair navigation into home limited by function/safety, Intermittent mobility assistance needed, High falls risk due to function or environment  Evaluation/Treatment Tolerance: Patient tolerated treatment well  Medical Staff Made Aware: Yes  End of Session Communication: Bedside nurse  End of Session Patient Position: Up in chair, Alarm off, not on at start of session  IP OR SWING BED PT PLAN  Inpatient or Swing Bed: Inpatient  PT Plan  Treatment/Interventions: Transfer training, Gait training, Stair training, Balance training, Strengthening, Endurance training, Range of motion, Therapeutic exercise  PT Plan: Ongoing PT  PT Frequency: 3 times per week during hospital stay, but will require 5x/week at SNF for rehab.  PT Discharge Recommendations: Moderate intensity level of continued care  Equipment Recommended upon Discharge: Wheeled walker  PT Recommended Transfer Status: Assist x1  PT - OK to Discharge: Yes (pending medical team clearance)    Subjective     PT Visit Info:  PT Received On: 07/15/25  General Visit Information:  General  Reason for Referral: readmission from outpatient transplant follow-up due to clinical decline. s/p 2 falls at home  Past Medical History Relevant to Rehab: history of NICM (s/p HM3 in 2023) s/p OHT on 6/21/2025,  pAF, CAD, CKD, HTN, BPH, GERD, hypothyroidism  Family/Caregiver Present: No  Prior to Session Communication: Bedside nurse  Patient Position Received: Bed, 3 rail up, Alarm off, not on at start of session  General Comment: Pt received supine in bed, pleasant and agreeable to participate in PT. Was home x approx 1 week, did not start home PT yet. Reported 2 falls at home (1 with fall on stairs entering house, 1 when foot got caught on the screen door). Pt also reporting feeling 'off balance and that he feels dizzy and off center at times'. Screened for vertigo today, noted with L gaze nystagmus (beating to the L), L eye only. Consulted PT colleague for  full vestibular eval tomorrow. Ambulated with walker on unit today, continues to exhibit safety issues during ambulation with decr R LE strength and coordination. Will benefit from MOD intensity PT upon discharge, incr fall risk at this time.    Home Living:  Home Living  Type of Home: House  Lives With: Alone  Home Adaptive Equipment: None  Home Layout: Able to live on main level with bedroom/bathroom, Laundry in basement  Home Access: Stairs to enter with rails  Entrance Stairs-Rails: Left  Entrance Stairs-Number of Steps: 5  Bathroom Shower/Tub: Tub/shower unit  Bathroom Toilet: Standard  Bathroom Equipment: None  Home Living Comments: +dog (but family is taking care of it currently)  Prior Level of Function:  Prior Function Per Pt/Caregiver Report  Level of [place]: Independent with ADLs and functional transfers, Independent with homemaking with ambulation  Receives Help From: Neighbor  ADL Assistance: Independent  Homemaking Assistance: Independent  Ambulatory Assistance: Independent (+recent falls)  Vocational: On disability  Leisure: watching TV  Hand Dominance: Right  Prior Function Comments: (+) drives, (-) falls. Reports limited assistance is available.  Precautions:  Precautions  Medical Precautions: Cardiac precautions, Fall precautions  Post-Surgical  Precautions: Move in the Tube  Precautions Comment: protective precautions     Vitals  PRE: /66, HR 87, spO2 94%,  RR 31  DURING: lowest spO2 90% (with reported SOB), HR max 113  POST: /77, HR 91, RR 20, SPO2 94%          Objective   Pain:  Pain Assessment  Pain Assessment: 0-10  0-10 (Numeric) Pain Score: 0 - No pain  Cognition:  Cognition  Overall Cognitive Status: Within Functional Limits  Arousal/Alertness: Appropriate responses to stimuli  Orientation Level: Oriented X4  Following Commands: Follows all commands and directions without difficulty    General Assessments:       Activity Tolerance  Endurance: Tolerates 30 min exercise with multiple rests  Early Mobility/Exercise Safety Screen: Proceed with mobilization - No exclusion criteria met    Static Sitting Balance  Static Sitting-Balance Support: Feet supported, No upper extremity supported  Static Sitting-Level of Assistance: Close supervision  Dynamic Sitting Balance  Dynamic Sitting-Balance Support: No upper extremity supported, Feet supported  Dynamic Sitting-Level of Assistance: Close supervision    Static Standing Balance  Static Standing-Balance Support: Bilateral upper extremity supported  Static Standing-Level of Assistance: Contact guard  Dynamic Standing Balance  Dynamic Standing-Balance Support: Bilateral upper extremity supported  Dynamic Standing-Level of Assistance: Minimum assistance  Functional Assessments:  Bed Mobility  Bed Mobility: Yes  Bed Mobility 1  Bed Mobility 1: Supine to sitting  Level of Assistance 1: Contact guard    Transfers  Transfer: Yes  Transfer 1  Transfer From 1: Sit to  Transfer to 1: Stand  Technique 1: Sit to stand  Transfer Device 1: Walker  Transfer Level of Assistance 1: Contact guard  Transfers 2  Transfer From 2: Stand to  Transfer to 2: Sit  Technique 2: Stand to sit  Transfer Level of Assistance 2: Contact guard    Ambulation/Gait Training  Ambulation/Gait Training Performed: Yes  Ambulation/Gait  Training 1  Surface 1: Level tile  Device 1: Rolling walker  Assistance 1: Minimum assistance  Quality of Gait 1: Inconsistent stride length, Decreased step length, Shuffling gait  Comments/Distance (ft) 1: 80 ft, 80 ft, 100 ft, 70 ft (constant cues for safety.  required assist during turns and reminders to incr R LE hip and knee flexion to clear foot during swing phase.)    Extremity/Trunk Assessments:  RLE   RLE :  (ROM WFL; MMT hip flex 3/5, knee ext 3+/5, knee flex 3/5, ankle 4-/5)  LLE   LLE :  (ROM WFL; MMT hip flex 4/5, knee ext 4-/5, knee flex 3+/5, ankle 4-/5)    Outcome Measures:  [facility] Basic Mobility  Turning from your back to your side while in a flat bed without using bedrails: A little  Moving from lying on your back to sitting on the side of a flat bed without using bedrails: A little  Moving to and from bed to chair (including a wheelchair): A little  Standing up from a chair using your arms (e.g. wheelchair or bedside chair): A little  To walk in hospital room: A little  Climbing 3-5 steps with railing: A lot  Basic Mobility - Total Score: 17    FSS-ICU  Ambulation: Walks >/ or equal to 150 feet with minimal assistance x1  Rolling: Supervision or set-up only  Sitting: Supervision or set-up only  Transfer Sit-to-Stand: Minimal assistance (performs 75% or more of task)  Transfer Supine-to-Sit: Minimal assistance (performs 75% or more of task)  Total Score: 22      Early Mobility/Exercise Safety Screen: Proceed with mobilization - No exclusion criteria met  ICU Mobility Scale: Walking with assistance of 1 person [8]  E = Exercise and Early Mobility  Early Mobility/Exercise Safety Screen: Proceed with mobilization - No exclusion criteria met  ICU Mobility Scale: Walking with assistance of 1 person    Encounter Problems       Encounter Problems (Active)       PT Problem       Pt will perform a 5x STS, in 15 seconds or less, with RPD 3/10 or less, RPE 13/20 or less with stable vitals.        Start:   07/15/25    Expected End:  07/29/25            Pt will perform a 6MWT, with distance greater than 900 ft  with stable vitals, RPD 3/10 or less, RPE 13/20 or less.        Start:  07/15/25    Expected End:  07/29/25            pt will perform sit<>stand indep with LRD and no acute LOB       Start:  07/15/25    Expected End:  07/29/25            pt will negotiate 5 stairs with handrail and close supervision       Start:  07/15/25    Expected End:  07/29/25                   Education Documentation  Mobility Training, taught by Purvi Hernandez, PT at 7/15/2025  3:22 PM.  Learner: Patient  Readiness: Acceptance  Method: Explanation  Response: Verbalizes Understanding  Comment: POC while IP. Reviewed safety during mobility    Education Comments  No comments found.

## 2025-07-15 NOTE — PROGRESS NOTES
Occupational Therapy    Evaluation/Treatment    Patient Name: Anatoly Lane  MRN: [id number]  Department: [facility] HFICU  Room: 09/09-A  Today's Date: 07/15/25  Time Calculation  Start Time: 1338  Stop Time: 1407  Time Calculation (min): 29 min     Assessment:  OT Assessment: Patient admitted for deconditioning, resp failure and DEBBIE; patient with deficits in ADLs/functional mobility, will benefit from OT services to address deficits.  Prognosis: Good  Barriers to Discharge Home: Caregiver assistance  Caregiver Assistance: Patient lives alone and/or does not have reliable caregiver assistance  End of Session Communication: Bedside nurse  End of Session Patient Position: Up in chair, Alarm off, not on at start of session  OT Assessment Results: Decreased ADL status, Decreased upper extremity strength, Decreased endurance, Decreased functional mobility, Decreased IADLs  Prognosis: Good  Plan:  Treatment Interventions: ADL retraining  OT Frequency: 4 times per week  OT Discharge Recommendations: Moderate intensity level of continued care  Equipment Recommended upon Discharge: Wheeled walker  OT Recommended Transfer Status:  (CGA)  OT - OK to Discharge: Yes  Treatment Interventions: ADL retraining    Subjective   OT Visit Info:  OT Received On: 07/15/25  General Visit Info:   General  Reason for Referral: readmission from outpatient transplant follow-up due to clinical decline, deconditioning, resp failure and DEBBIE. s/p 2 falls at home  Past Medical History Relevant to Rehab: history of NICM (s/p HM3 in 2023) s/p OHT on 6/21/2025, pAF, CAD, CKD, HTN, BPH, GERD, hypothyroidism  Family/Caregiver Present: No  Prior to Session Communication: Bedside nurse  Patient Position Received: Up in chair, Alarm off, not on at start of session  General Comment: patient pleasant and agreeable to OT   Precautions:  Hearing/Visual Limitations: hearing is WFL  Medical Precautions: Cardiac precautions, Fall precautions  Post-Surgical  Precautions: Move in the Tube  Precautions Comment: protective precautions    Vital Signs     Vitals Session Pre OT During OT Post OT   /85  144/80   HR 89  95   SpO2 94  94   MAP (mmHg)        Pain:  Pain Assessment  Pain Assessment: 0-10  0-10 (Numeric) Pain Score: 0 - No pain    Objective   Cognition:  Overall Cognitive Status: Within Functional Limits  Arousal/Alertness: Appropriate responses to stimuli  Orientation Level: Oriented X4  Following Commands: Follows one step commands without difficulty           Home Living:  Type of Home: House  Lives With: Alone  Home Adaptive Equipment: None  Home Layout: Able to live on main level with bedroom/bathroom, Laundry in basement  Home Access: Stairs to enter with rails  Entrance Stairs-Rails: Left  Entrance Stairs-Number of Steps: 5  Bathroom Shower/Tub: Tub/shower unit  Bathroom Equipment: None  Home Living Comments: +dog (but family is taking care of it currently)  Prior Function:  Level of [place]: Independent with ADLs and functional transfers, Independent with homemaking with ambulation  Receives Help From:  (neighbor and son)  ADL Assistance: Independent  Homemaking Assistance: Independent  Ambulatory Assistance: Independent  Vocational: On disability  Leisure: watching TV  Hand Dominance: Right  Prior Function Comments: (+) drives, x2 recent falls; reports limited assistance is available and does not have help for IADLs     ADL:  Eating Assistance: Independent  Eating Deficit: Setup  Grooming Assistance: Stand by  Bathing Assistance: Minimal  Bathing Deficit:  (anticipated)  UE Dressing Assistance: Stand by  UE Dressing Deficit:  (anticipated)  LE Dressing Assistance: Minimal  Toileting Assistance with Device: Minimal  Toileting Deficit:  (anticipated)    Activity Tolerance:  Early Mobility/Exercise Safety Screen: Proceed with mobilization - No exclusion criteria met     Bed Mobility/Transfers: Bed Mobility  Bed Mobility: No    Transfers  Transfer:  Yes  Transfer 1  Transfer From 1: Sit to, Stand to  Transfer to 1: Sit, Stand  Technique 1: Sit to stand, Stand to sit  Transfer Device 1: Walker  Transfer Level of Assistance 1: Contact guard, Minimal verbal cues  Trials/Comments 1: functional mobility with FWW and CGA    Sitting Balance:  Static Sitting Balance  Static Sitting-Level of Assistance: Independent  Dynamic Sitting Balance  Dynamic Sitting-Level of Assistance: Distant supervision  Standing Balance:  Static Standing Balance  Static Standing-Level of Assistance: Close supervision  Dynamic Standing Balance  Dynamic Standing-Level of Assistance: Contact guard     Therapy/Activity: Therapeutic Activity  Therapeutic Activity Performed: Yes  Therapeutic Activity 1: Patient completed functional mobility additional household/community distances with FWW and CGA, x2 episodes of slight LOB with CGA to correct; x2 standing rest breaks during functional mobility 2/2 patient reporting SOB; patient's vital signs stable     Vision:Vision - Basic Assessment  Current Vision:  (wears glasses as needed when watching tv)  Sensation:  Light Touch:  (reports having numbness in (R) digits after surgery ~2 years ago and reports numbness in (L) digits after heart transplant)  Strength:  Strength Comments: (B)  3+/5, (B) shoulders/elbows >/= 3/5     Coordination:  Movements are Fluid and Coordinated: Yes      Extremities: KIM ALVAREZ :  (WFL within MITT) and SHARATH EARLY:  (WFL within MITT)    Outcome Measures: [facility] Daily Activity  Putting on and taking off regular lower body clothing: A little  Bathing (including washing, rinsing, drying): A little  Putting on and taking off regular upper body clothing: A little  Toileting, which includes using toilet, bedpan or urinal: A little  Taking care of personal grooming such as brushing teeth: A little  Eating Meals: None  Daily Activity - Total Score: 19    , Confusion Assessment Method-ICU (CAM-ICU)  Feature 1: Acute Onset or  Fluctuating Course: Negative  Overall CAM-ICU: Negative  , and E = Exercise and Early Mobility  Early Mobility/Exercise Safety Screen: Proceed with mobilization - No exclusion criteria met  ICU Mobility Scale: Walking with assistance of 1 person    Patient scored 61 on MBI (Modified Barthel Index-Troncoso Version) indicatin-90 considered moderate dependence. Per the scoring guidelines, this score indicates a prediction of: 60-80: If living alone will probably need a number of community services to cope.    Deficits noted in the following areas ADLs/functional mobility.     Education Documentation  Body Mechanics, taught by June Vega OT at 7/15/2025  3:45 PM.  Learner: Patient  Readiness: Acceptance  Method: Explanation, Demonstration  Response: Verbalizes Understanding, Needs Reinforcement  Comment: OT POC    Precautions, taught by June Vega OT at 7/15/2025  3:45 PM.  Learner: Patient  Readiness: Acceptance  Method: Explanation, Demonstration  Response: Verbalizes Understanding, Needs Reinforcement  Comment: OT POC    ADL Training, taught by June Vega OT at 7/15/2025  3:45 PM.  Learner: Patient  Readiness: Acceptance  Method: Explanation, Demonstration  Response: Verbalizes Understanding, Needs Reinforcement  Comment: OT POC    Education Comments  No comments found.    Goals:  Encounter Problems       Encounter Problems (Active)       ADLs       Patient will complete daily grooming tasks with supervision level of assistance and PRN adaptive equipment while standing. (Progressing)       Start:  07/15/25    Expected End:  25            Patient will complete toileting including hygiene clothing management/hygiene with supervision level of assistance. (Progressing)       Start:  07/15/25    Expected End:  25            Patient will perform UB dressing with Independent and PRN adaptive equipment. (Progressing)       Start:  07/15/25    Expected End:  25            Patient will  perform LB dressing with Supervision and PRN adaptive equipment. (Progressing)       Start:  07/15/25    Expected End:  07/29/25            Patient will perform basic IADLs/simulated household tasks with Supervision and LRD.  (Progressing)       Start:  07/15/25    Expected End:  07/29/25               COGNITION/SAFETY       Patient will complete pill box simulation independently with use of medication list. (Progressing)       Start:  07/15/25    Expected End:  07/29/25            Patient will participate in cognitive activities to demonstrate WFL on cognitive assessment. (Progressing)       Start:  07/15/25    Expected End:  07/29/25               EXERCISE/STRENGTHENING       Patient will be educated on BUE HEP for increased ADL performance. (Progressing)       Start:  07/15/25    Expected End:  07/29/25            Patient will participate in >15 minutes of activity with <2 rest breaks to increase ADL/functional task endurance. (Progressing)       Start:  07/15/25    Expected End:  07/29/25               MOBILITY       Patient will perform Functional mobility Household distances/Community Distances with supervision level of assistance and least restrictive device in order to improve safety and functional mobility. (Progressing)       Start:  07/15/25    Expected End:  07/29/25               TRANSFERS       Patient will perform bed mobility supervision level of assistance in order to improve safety and independence with mobility (Progressing)       Start:  07/15/25    Expected End:  07/29/25            Patient will complete functional transfers with least restrictive device with supervision level of assistance. (Progressing)       Start:  07/15/25    Expected End:  07/29/25               June Vega OTR/L  Inpatient Occupational Therapist   Rehab Office: [phone]

## 2025-07-15 NOTE — PROGRESS NOTES
Social Work progress note  - HFICU Treatment Plan: s/p LVAD explant and OHT on 6/21; patient was admitted after an outpatient transplant clinic visit due to clinical decline, hypoxic, respiratory distress, DEBBIE and multiple falls at home,   - Additional information: Transfer from [facility]  - Payor: [employer]   - Support: Mother, Janice Castaneda and son Bird are listed as NOK  -Planned Disposition:  Rehab recommendation - Mod intensity. While in the room the patient, he ask if he could go to a SNF for a little while. He reports weakness in his right arm and leg and left hand. I informed him that if the Rehab therapist recommend SNF, then I will put in a referral. [facility] is [facility]. I will send the referral.   [facility] called him today, he did tell them that he is in the hospital   - Social concerns for discharge: none at this time; SW will continue to follow    Discharge assessment   Living arrangements -  lives alone   Supplies - none, but states he could have used something   Medical care: PCP: None    pharmacy: [facility] mail order or  [facility] in [facility] health care: [facility]  Safety - none reported   Housing/food/utilities/ transportation: No issues with utilties and obtaining food. Pt uses the [employer] bus for appointments, he ask that we schedule his appointments at least 7 days in advance so that they can arrange transport.   Patient/Family Concerns: no concerns at this time   Request resource: none requested  - Social Barriers to discharge: none    - Discharge assessment:   Tracy Thorpe, LALITHAA, LSW

## 2025-07-16 LAB
ALBUMIN SERPL BCP-MCNC: 3.2 G/DL (ref 3.4–5)
ANION GAP SERPL CALC-SCNC: 12 MMOL/L (ref 10–20)
BASOPHILS # BLD AUTO: 0.01 X10*3/UL (ref 0–0.1)
BASOPHILS NFR BLD AUTO: 0.1 %
BUN SERPL-MCNC: 53 MG/DL (ref 6–23)
CALCIUM SERPL-MCNC: 8.4 MG/DL (ref 8.6–10.6)
CHLORIDE SERPL-SCNC: 105 MMOL/L (ref 98–107)
CO2 SERPL-SCNC: 27 MMOL/L (ref 21–32)
CREAT SERPL-MCNC: 1.34 MG/DL (ref 0.5–1.3)
EGFRCR SERPLBLD CKD-EPI 2021: 59 ML/MIN/1.73M*2
EOSINOPHIL # BLD AUTO: 0.03 X10*3/UL (ref 0–0.7)
EOSINOPHIL NFR BLD AUTO: 0.4 %
ERYTHROCYTE [DISTWIDTH] IN BLOOD BY AUTOMATED COUNT: 19.2 % (ref 11.5–14.5)
GLUCOSE BLD MANUAL STRIP-MCNC: 109 MG/DL (ref 74–99)
GLUCOSE SERPL-MCNC: 194 MG/DL (ref 74–99)
HBV E AB SERPL QL IA: NEGATIVE
HCT VFR BLD AUTO: 30.9 % (ref 41–52)
HGB BLD-MCNC: 9.6 G/DL (ref 13.5–17.5)
HIV1 RNA SERPL DONR QL PROBE AMP: NORMAL
IMM GRANULOCYTES # BLD AUTO: 0.08 X10*3/UL (ref 0–0.7)
IMM GRANULOCYTES NFR BLD AUTO: 1 % (ref 0–0.9)
LYMPHOCYTES # BLD AUTO: 0.65 X10*3/UL (ref 1.2–4.8)
LYMPHOCYTES NFR BLD AUTO: 8.5 %
MAGNESIUM SERPL-MCNC: 1.9 MG/DL (ref 1.6–2.4)
MCH RBC QN AUTO: 32.3 PG (ref 26–34)
MCHC RBC AUTO-ENTMCNC: 31.1 G/DL (ref 32–36)
MCV RBC AUTO: 104 FL (ref 80–100)
MONOCYTES # BLD AUTO: 0.46 X10*3/UL (ref 0.1–1)
MONOCYTES NFR BLD AUTO: 6 %
NEUTROPHILS # BLD AUTO: 6.44 X10*3/UL (ref 1.2–7.7)
NEUTROPHILS NFR BLD AUTO: 84 %
NRBC BLD-RTO: 0 /100 WBCS (ref 0–0)
PHOSPHATE SERPL-MCNC: 2.1 MG/DL (ref 2.5–4.9)
PLATELET # BLD AUTO: 267 X10*3/UL (ref 150–450)
POTASSIUM SERPL-SCNC: 4.7 MMOL/L (ref 3.5–5.3)
RBC # BLD AUTO: 2.97 X10*6/UL (ref 4.5–5.9)
SODIUM SERPL-SCNC: 139 MMOL/L (ref 136–145)
TACROLIMUS BLD-MCNC: 15.6 NG/ML
WBC # BLD AUTO: 7.7 X10*3/UL (ref 4.4–11.3)

## 2025-07-16 PROCEDURE — 82947 ASSAY GLUCOSE BLOOD QUANT: CPT

## 2025-07-16 PROCEDURE — 99232 SBSQ HOSP IP/OBS MODERATE 35: CPT | Performed by: INTERNAL MEDICINE

## 2025-07-16 PROCEDURE — 80069 RENAL FUNCTION PANEL: CPT

## 2025-07-16 PROCEDURE — 97116 GAIT TRAINING THERAPY: CPT | Mod: GP

## 2025-07-16 PROCEDURE — 2500000004 HC RX 250 GENERAL PHARMACY W/ HCPCS (ALT 636 FOR OP/ED)

## 2025-07-16 PROCEDURE — 97530 THERAPEUTIC ACTIVITIES: CPT | Mod: GP

## 2025-07-16 PROCEDURE — 97112 NEUROMUSCULAR REEDUCATION: CPT | Mod: GP

## 2025-07-16 PROCEDURE — 85025 COMPLETE CBC W/AUTO DIFF WBC: CPT

## 2025-07-16 PROCEDURE — 2500000001 HC RX 250 WO HCPCS SELF ADMINISTERED DRUGS (ALT 637 FOR MEDICARE OP)

## 2025-07-16 PROCEDURE — 36415 COLL VENOUS BLD VENIPUNCTURE: CPT

## 2025-07-16 PROCEDURE — 1100000001 HC PRIVATE ROOM DAILY

## 2025-07-16 PROCEDURE — 80197 ASSAY OF TACROLIMUS: CPT

## 2025-07-16 PROCEDURE — 83735 ASSAY OF MAGNESIUM: CPT

## 2025-07-16 PROCEDURE — 2500000002 HC RX 250 W HCPCS SELF ADMINISTERED DRUGS (ALT 637 FOR MEDICARE OP, ALT 636 FOR OP/ED)

## 2025-07-16 RX ORDER — TORSEMIDE 20 MG/1
20 TABLET ORAL ONCE
Status: COMPLETED | OUTPATIENT
Start: 2025-07-16 | End: 2025-07-16

## 2025-07-16 RX ORDER — DOXYCYCLINE HYCLATE 100 MG
100 TABLET ORAL EVERY 12 HOURS SCHEDULED
Status: COMPLETED | OUTPATIENT
Start: 2025-07-16 | End: 2025-07-20

## 2025-07-16 RX ORDER — SODIUM,POTASSIUM PHOSPHATES 280-250MG
2 POWDER IN PACKET (EA) ORAL ONCE
Status: COMPLETED | OUTPATIENT
Start: 2025-07-16 | End: 2025-07-16

## 2025-07-16 RX ORDER — TACROLIMUS 0.5 MG/1
0.5 CAPSULE ORAL EVERY MORNING
Status: DISCONTINUED | OUTPATIENT
Start: 2025-07-17 | End: 2025-07-19

## 2025-07-16 RX ADMIN — TACROLIMUS 1 MG: 0.5 CAPSULE ORAL at 06:43

## 2025-07-16 RX ADMIN — VORICONAZOLE 200 MG: 200 TABLET, COATED ORAL at 09:55

## 2025-07-16 RX ADMIN — SULFAMETHOXAZOLE AND TRIMETHOPRIM 1 TABLET: 400; 80 TABLET ORAL at 09:25

## 2025-07-16 RX ADMIN — TAMSULOSIN HYDROCHLORIDE 0.4 MG: 0.4 CAPSULE ORAL at 09:35

## 2025-07-16 RX ADMIN — PANTOPRAZOLE SODIUM 40 MG: 40 TABLET, DELAYED RELEASE ORAL at 09:25

## 2025-07-16 RX ADMIN — HYDRALAZINE HYDROCHLORIDE 50 MG: 50 TABLET ORAL at 09:25

## 2025-07-16 RX ADMIN — CALCIUM 1 TABLET: 500 TABLET ORAL at 18:05

## 2025-07-16 RX ADMIN — HYDRALAZINE HYDROCHLORIDE 50 MG: 50 TABLET ORAL at 21:55

## 2025-07-16 RX ADMIN — ASPIRIN 81 MG: 81 TABLET, COATED ORAL at 09:26

## 2025-07-16 RX ADMIN — TACROLIMUS 0.5 MG: 0.5 CAPSULE ORAL at 06:42

## 2025-07-16 RX ADMIN — ACYCLOVIR 400 MG: 400 TABLET ORAL at 09:26

## 2025-07-16 RX ADMIN — DOXYCYCLINE HYCLATE 100 MG: 100 TABLET, FILM COATED ORAL at 15:25

## 2025-07-16 RX ADMIN — MYCOPHENOLATE MOFETIL 1000 MG: 250 CAPSULE ORAL at 21:55

## 2025-07-16 RX ADMIN — HEPARIN SODIUM 5000 UNITS: 5000 INJECTION, SOLUTION INTRAVENOUS; SUBCUTANEOUS at 14:23

## 2025-07-16 RX ADMIN — MYCOPHENOLATE MOFETIL 1000 MG: 250 CAPSULE ORAL at 09:25

## 2025-07-16 RX ADMIN — Medication 50 MCG: at 09:25

## 2025-07-16 RX ADMIN — DOXYCYCLINE HYCLATE 100 MG: 100 TABLET, FILM COATED ORAL at 21:55

## 2025-07-16 RX ADMIN — PRAVASTATIN SODIUM 40 MG: 40 TABLET ORAL at 21:55

## 2025-07-16 RX ADMIN — CALCIUM 1 TABLET: 500 TABLET ORAL at 09:26

## 2025-07-16 RX ADMIN — INSULIN HUMAN 15 UNITS: 100 INJECTION, SUSPENSION SUBCUTANEOUS at 11:09

## 2025-07-16 RX ADMIN — LEVOTHYROXINE SODIUM 125 MCG: 0.07 TABLET ORAL at 06:43

## 2025-07-16 RX ADMIN — GUAIFENESIN AND DEXTROMETHORPHAN 5 ML: 100; 10 SYRUP ORAL at 03:00

## 2025-07-16 RX ADMIN — GABAPENTIN 300 MG: 300 CAPSULE ORAL at 09:25

## 2025-07-16 RX ADMIN — TORSEMIDE 20 MG: 20 TABLET ORAL at 15:26

## 2025-07-16 RX ADMIN — HYDRALAZINE HYDROCHLORIDE 50 MG: 50 TABLET ORAL at 14:23

## 2025-07-16 RX ADMIN — TACROLIMUS 1 MG: 0.5 CAPSULE ORAL at 18:00

## 2025-07-16 RX ADMIN — POTASSIUM & SODIUM PHOSPHATES POWDER PACK 280-160-250 MG 2 PACKET: 280-160-250 PACK at 11:14

## 2025-07-16 RX ADMIN — VORICONAZOLE 200 MG: 200 TABLET, COATED ORAL at 21:55

## 2025-07-16 RX ADMIN — ACYCLOVIR 400 MG: 400 TABLET ORAL at 21:55

## 2025-07-16 RX ADMIN — PREDNISONE 25 MG: 20 TABLET ORAL at 09:25

## 2025-07-16 ASSESSMENT — COGNITIVE AND FUNCTIONAL STATUS - GENERAL
MOVING FROM LYING ON BACK TO SITTING ON SIDE OF FLAT BED WITH BEDRAILS: A LITTLE
DRESSING REGULAR LOWER BODY CLOTHING: A LITTLE
DAILY ACTIVITIY SCORE: 21
TURNING FROM BACK TO SIDE WHILE IN FLAT BAD: A LITTLE
DAILY ACTIVITIY SCORE: 24
WALKING IN HOSPITAL ROOM: A LITTLE
MOVING TO AND FROM BED TO CHAIR: A LITTLE
MOBILITY SCORE: 24
WALKING IN HOSPITAL ROOM: A LITTLE
CLIMB 3 TO 5 STEPS WITH RAILING: A LOT
CLIMB 3 TO 5 STEPS WITH RAILING: A LITTLE
STANDING UP FROM CHAIR USING ARMS: A LITTLE
DRESSING REGULAR UPPER BODY CLOTHING: A LITTLE
HELP NEEDED FOR BATHING: A LITTLE
MOBILITY SCORE: 19
MOVING TO AND FROM BED TO CHAIR: A LITTLE
STANDING UP FROM CHAIR USING ARMS: A LITTLE
MOBILITY SCORE: 18

## 2025-07-16 ASSESSMENT — PAIN - FUNCTIONAL ASSESSMENT
PAIN_FUNCTIONAL_ASSESSMENT: 0-10

## 2025-07-16 ASSESSMENT — PAIN SCALES - GENERAL
PAINLEVEL_OUTOF10: 0 - NO PAIN

## 2025-07-16 NOTE — PROGRESS NOTES
"Physical Therapy    Physical Therapy Treatment    Patient Name: Anatoly Lane  MRN: [id number]  Department: [place] [address]  Room: [facility]-A  Today's Date: 7/16/2025  Time Calculation  Start Time: 0830  Stop Time: 0928  Time Calculation (min): 58 min         Assessment/Plan   PT Assessment  PT Assessment Results: Decreased strength, Decreased range of motion, Decreased endurance, Impaired balance, Decreased mobility, Decreased coordination, Impaired judgement, Decreased safety awareness  Rehab Prognosis: Good  Barriers to Discharge Home: Physical needs, Caregiver assistance  Caregiver Assistance: Patient lives alone and/or does not have reliable caregiver assistance  Physical Needs: Stair navigation into home limited by function/safety, Intermittent mobility assistance needed, High falls risk due to function or environment  Evaluation/Treatment Tolerance: Patient limited by fatigue  Medical Staff Made Aware: Yes  End of Session Communication: Bedside nurse  Assessment Comment: Vestibular PT consulted to assess for vestibular dysfunction. Patient reported no spinning sensation with all testing and mobility, but does report his \"equilibrium\" being  \"off\". Patient reports some \"floaties\" with vision post transplant, but denies any blurred vision or vision loss. Patient denied changes in hearing, smell, taste, and facial sensation. Upper trapezius and facial muscles normal and symmetrical without tongue deviation. HINTS negative for acute vestibular syndromes with patient having end range fatiguable nystagmus to the left only (2-3 beats). Patient has no truncal ataxia nor gait ataxia when walking without walker. Patient has shakiness in UE that is consistent (med interaction?), but was on target with finger to nose +/- eyes closed. Patient accurate with up/down proprioception in 1st digit on both feet. BPPV testing not performed today as patient reported no spinning sensation.          Patient does have mild instability when " looking left/right and moderate instability when in tandem stance, but did pass two balance screens (11/12 on 4-item DGI, 25/28 on Tinetti). Patient does have weakness in right hip flexor compared to left and reported falls on the stairs secondary to catching toes. Patient given seated marching HEP and PT to address stair climbing in future sessions. Patient reported mild transient lightheadedness with supine to sit and nursing reports that they can assess orthostatics today. Vestibular PT to defer to primary PT for discharge recommendation to SNF and to continue care. If patient shows vestibular concerns in the future, please re-consult vestibular PT.  End of Session Patient Position: Up in chair, Alarm off, not on at start of session     PT Plan  Treatment/Interventions: Bed mobility, Transfer training, Gait training, Stair training, Balance training, Neuromuscular re-education, Neurodevelopmental intervention, Strengthening, Endurance training, Range of motion, Therapeutic exercise, Therapeutic activity, Home exercise program  PT Plan: Ongoing PT  PT Frequency: 3 times per week  PT Discharge Recommendations: Moderate intensity level of continued care  Equipment Recommended upon Discharge: Wheeled walker  PT Recommended Transfer Status: Stand by assist  PT - OK to Discharge: Yes    PT Visit Info:  PT Received On: 07/16/25  Response to Previous Treatment: Patient with no complaints from previous session.     General Visit Information:   General  Reason for Referral: readmission from outpatient transplant follow-up due to clinical decline, deconditioning, resp failure and DEBBIE. s/p 2 falls at home  Past Medical History Relevant to Rehab: history of NICM (s/p HM3 in 2023) s/p OHT on 6/21/2025, pAF, CAD, CKD, HTN, BPH, GERD, hypothyroidism  Family/Caregiver Present: No  Prior to Session Communication: Bedside nurse  Patient Position Received: Bed, 2 rail up, Alarm off, not on at start of session  General Comment:  Paqtient open to working with PT    Subjective   Precautions:  Precautions  Hearing/Visual Limitations: hearing is WFL  Medical Precautions: Cardiac precautions, Fall precautions  Post-Surgical Precautions: Move in the Tube  Precautions Comment: protective precautions and contact plus precautions     Date/Time Vitals Session Patient Position Pulse Resp SpO2 BP MAP (mmHg)    07/16/25 0754 --  --  85  18  95 %  148/82  104            Objective   Pain:  Pain Assessment  Pain Assessment: 0-10  0-10 (Numeric) Pain Score: 0 - No pain  Cognition:  Cognition  Overall Cognitive Status: Within Functional Limits  Arousal/Alertness: Appropriate responses to stimuli  Orientation Level: Oriented X4  Following Commands: Follows all commands and directions without difficulty  Insight: Mild  Impulsive: Mildly  Processing Speed: Within funtional limits  Coordination:       Activity Tolerance:  Activity Tolerance  Endurance: Decreased tolerance for upright activites  Treatments:  Therapeutic Activity  Therapeutic Activity 1: Bed mobility  Therapeutic Activity 2: Sit to stand x3  Therapeutic Activity 3: Seated marching    Balance/Neuromuscular Re-Education  Balance/Neuromuscular Re-Education Activity 1: Vestibular education and assessment  Balance/Neuromuscular Re-Education Activity 2: Tinetti testing  Balance/Neuromuscular Re-Education Activity 3: Tandem and single leg stance    Bed Mobility  Bed Mobility: Yes  Bed Mobility 1  Bed Mobility 1: Supine to sitting  Level of Assistance 1: Close supervision  Bed Mobility Comments 1: No assistance given or needed. Reported slight lighteadedness sitting up (transient)    Ambulation/Gait Training 1  Surface 1: Level tile  Device 1: No device  Assistance 1: Contact guard  Comments/Distance (ft) 1: 30ft x3. 11/12 on 4-item DGI (-1 for horizontal head turns). Able to walk 10 steps with his eyes closed without loss of balance. 25/28 on Tinetti  Transfer 1  Transfer From 1: Sit to, Stand  to  Transfer to 1: Sit, Stand  Technique 1: Sit to stand, Stand to sit  Transfer Device 1:  (none)  Transfer Level of Assistance 1: Contact guard, Close supervision  Trials/Comments 1: Slow, but without need for assist. Able to stand without UE, but it is difficult    Stairs  Stairs: No    Outcome Measures:  [facility] Basic Mobility  Turning from your back to your side while in a flat bed without using bedrails: None  Moving from lying on your back to sitting on the side of a flat bed without using bedrails: None  Moving to and from bed to chair (including a wheelchair): A little  Standing up from a chair using your arms (e.g. wheelchair or bedside chair): A little  To walk in hospital room: A little  Climbing 3-5 steps with railing: A lot  Basic Mobility - Total Score: 19    Tinetti  Sitting Balance: Steady, safe  Arises: Able without using arms  Attempts to Arise: Able to arise, one attempt  Immediate Standing Balance (First 5 Seconds): Steady without walker or other support  Standing Balance: Narrow stance without support  Nudged: Staggers, grabs, catches self  Eyes Closed: Steady  Turned 360 Degrees: Steadiness: Steady  Turned 360 Degrees: Continuity of Steps: Continuous  Sitting Down: Uses arms or not a smooth motion  Balance Score: 14  Initiation of Gait: No hesitancy  Step Height: R Swing Foot: Right foot does not clear floor completely with step  Step Length: R Swing Foot: Passes left stance foot  Step Height: L Swing Foot: Left foot complete clears floor  Step Length: L Swing Foot: Passes right stance foot  Step Symmetry: Right and left step appear equal  Step Continuity: Steps appear continuous  Path: Straight without walking aid  Trunk: No sway, no flexion, no use of arms, no walking aid  Walking Time: Heels almost touching while walking  Gait Score: 11  Total Score: 25    Education Documentation  Precautions, taught by Victor Manuel Rose PT at 7/16/2025  9:52 AM.  Learner: Patient  Readiness:  Acceptance  Method: Demonstration, Explanation  Response: Verbalizes Understanding, Demonstrated Understanding  Comment: Goals of PT, safety with mobility, vestibular PT, marching HEP    Body Mechanics, taught by Victor Manuel Rose PT at 7/16/2025  9:52 AM.  Learner: Patient  Readiness: Acceptance  Method: Demonstration, Explanation  Response: Verbalizes Understanding, Demonstrated Understanding  Comment: Goals of PT, safety with mobility, vestibular PT, marching HEP    Mobility Training, taught by Victor Manuel Rose PT at 7/16/2025  9:52 AM.  Learner: Patient  Readiness: Acceptance  Method: Demonstration, Explanation  Response: Verbalizes Understanding, Demonstrated Understanding  Comment: Goals of PT, safety with mobility, vestibular PT, marching HEP    Education Comments  No comments found.        OP EDUCATION:       Encounter Problems       Encounter Problems (Active)       PT Problem       Pt will perform a 5x STS, in 15 seconds or less, with RPD 3/10 or less, RPE 13/20 or less with stable vitals.  (Progressing)       Start:  07/15/25    Expected End:  07/29/25            Pt will perform a 6MWT, with distance greater than 900 ft  with stable vitals, RPD 3/10 or less, RPE 13/20 or less.  (Progressing)       Start:  07/15/25    Expected End:  07/29/25            pt will perform sit<>stand indep with LRD and no acute LOB (Progressing)       Start:  07/15/25    Expected End:  07/29/25            pt will negotiate 5 stairs with handrail and close supervision (Progressing)       Start:  07/15/25    Expected End:  07/29/25

## 2025-07-16 NOTE — PROGRESS NOTES
07/16/25 1034   Discharge Planning   Living Arrangements Alone   Support Systems Family members   Assistance Needed independent   Type of Residence Private residence   Number of Stairs to Enter Residence 5   Do you have animals or pets at home? Yes   Type of Animals or Pets Dog   Who is requesting discharge planning? Provider   Home or Post Acute Services None   Type of Home Care Services Home nursing visits;Home PT   Expected Discharge Disposition SNF   Does the patient need discharge transport arranged? Yes   [facility] coordination needed? Yes   Has discharge transport been arranged? No     Met with patient and introduced myself as Care Coordinator and member of the discharge planning team.  Patient with history of OHT on 6/21/25 was readmitted after falling at home. He discharged to home with [facility]. Patient complains of numbness in both hands and his right leg. He feels like he needs to discharge to SNF. A referral has been made to [facility]  and they are reviewing. Patient has no PCP at present. He follows with the transplant team for his medical care. He says he uses a [facility] mail in pharmacy or [facility] for immediate needs. Care Coordinator will continue to follow for home going needs.

## 2025-07-16 NOTE — CARE PLAN
The patient's goals for the shift include      The clinical goals for the shift include Pt VS will remain WNL       Problem: Pain - Adult  Goal: Verbalizes/displays adequate comfort level or baseline comfort level  Outcome: Progressing     Problem: Safety - Adult  Goal: Free from fall injury  Outcome: Progressing     Problem: Discharge Planning  Goal: Discharge to home or other facility with appropriate resources  Outcome: Progressing     Problem: Chronic Conditions and Co-morbidities  Goal: Patient's chronic conditions and co-morbidity symptoms are monitored and maintained or improved  Outcome: Progressing     Problem: Nutrition  Goal: Nutrient intake appropriate for maintaining nutritional needs  Outcome: Progressing

## 2025-07-16 NOTE — SIGNIFICANT EVENT
64-year-old male with a history of NICM (s/p HM3 in 2023) s/p OHT on 6/21/2025, who presented for readmission from outpatient cardiology clinic follow-up due to concerns of deconditioning c/b falls, respiratory distress, and prerenal DEBBIE likely from overdiuresis.  Patient was seen on the floor.     Continue plan as stated in Dr Salcedo notes

## 2025-07-16 NOTE — PROGRESS NOTES
[facility]  PROGRESS NOTE    Anatoly Lane/[id number]    Admit Date: 7/14/2025  Hospital Length of Stay: 2   ICU Length of Stay: 1d 2h   Primary Service:   Primary HF Cardiologist:   Referring:    INTERVAL EVENTS / PERTINENT ROS:   Patient mentions that he feels unwell this morning, he has shortness of breath, his legs are still swollen, and he still has pain in his upper epigastric area when he coughs.  Tac level above goal today.    Plan:  - Decrease tacrolimus to 1.5 mg a.m, 1 mg p.m.  - Will give 20 of torsemide today  - Will start on doxycycline 100 mg twice daily to cover for any infectious sources  - Biopsy results negative prednisone decreased to 25 mg daily  - Continue with PT/OT-rec towards SNF    MEDICATIONS  Infusions:     Scheduled:  acyclovir, 400 mg, BID  aspirin, 81 mg, Daily  calcium carbonate, 1,250 mg of calcium carbonate, BID  cholecalciferol, 50 mcg, Daily  gabapentin, 300 mg, Daily  heparin, 5,000 Units, q8h  hydrALAZINE, 50 mg, TID  insulin NPH (Isophane), 15 Units, Daily  levothyroxine, 125 mcg, Daily  mycophenolate, 1,000 mg, q12h LUPILLO  pantoprazole, 40 mg, Daily  pravastatin, 40 mg, Nightly  predniSONE, 25 mg, Daily  sennosides-docusate sodium, 1 tablet, Nightly  sulfamethoxazole-trimethoprim, 1 tablet, Daily  tacrolimus, 0.5 mg, BID  tacrolimus, 1 mg, BID  tamsulosin, 0.4 mg, Daily  voriconazole, 200 mg, q12h LUPILLO      PRN:  acetaminophen, 650 mg, q6h PRN  dextromethorphan-guaifenesin, 5 mL, q8h PRN  dextrose, 12.5 g, q15 min PRN  dextrose, 25 g, q15 min PRN  glucagon, 1 mg, q15 min PRN  glucagon, 1 mg, q15 min PRN  oxygen, , Continuous PRN - O2/gases  polyethylene glycol, 17 g, Daily PRN  traMADol, 50 mg, q6h PRN        PHYSICAL EXAM:   Visit Vitals  /82 (BP Location: Right arm, Patient Position: Lying)   Pulse 85   Temp 36.4 °C (97.5 °F) (Temporal)   Resp 18   Wt 97.5 kg (214 lb 14.4 oz)   SpO2 95%   BMI 31.74 kg/m²   Smoking Status Former   BSA 2.18 m²        Wt Readings from Last 5 Encounters:   07/16/25 97.5 kg (214 lb 14.4 oz)   07/14/25 94.9 kg (209 lb 4.8 oz)   07/14/25 98.4 kg (216 lb 14.9 oz)   07/09/25 98.4 kg (216 lb 14.4 oz)   02/27/25 102 kg (224 lb)       INTAKE/OUTPUT:  I/O last 3 completed shifts:  In: 1310 (13.4 mL/kg) [P.O.:1310]  Out: 2375 (24.4 mL/kg) [Urine:2375 (0.7 mL/kg/hr)]  Weight: 97.5 kg      Physical Exam  Constitutional:       General: He is awake.      Appearance: Normal appearance. He is well-developed and well-groomed.   HENT:      Head: Normocephalic and atraumatic.      Mouth/Throat:      Mouth: Mucous membranes are moist.   Eyes:      Extraocular Movements: Extraocular movements intact.      Conjunctiva/sclera: Conjunctivae normal.      Pupils: Pupils are equal, round, and reactive to light.   Neck:      Trachea: Trachea and phonation normal.   Cardiovascular:      Rate and Rhythm: Normal rate and regular rhythm.      Pulses: Normal pulses.         Heart sounds: Normal heart sounds, S1 normal and S2 normal.   Pulmonary:      Effort: Pulmonary effort is normal.      Breath sounds: Bibasilar crackle, diffuse rhonchi, no wheezing.   Chest:      Comments: Midsternal postsurgical incision healing well without s/s of infection.   Abdominal:      General: Abdomen is flat. Bowel sounds are normal.      Palpations: Abdomen is soft.   Musculoskeletal:         General: Normal range of motion.      Left hand: At baseline with reduced strength and sensation.      Cervical back: Full passive range of motion without pain and normal range of motion.      Right lower leg: No edema.      Left lower leg: No edema.      Comments: R groin seroma   Skin:     General: Skin is warm and dry.      Capillary Refill: Capillary refill takes less than 2 seconds.   Neurological:      General: No focal deficit present.      Mental Status: He is alert and oriented to person, place, and time. Mental status is at baseline.      GCS: GCS eye subscore is 4. GCS verbal  "subscore is 5. GCS motor subscore is 6.      Sensory: Sensation is intact.      Motor: Tremor present.   Psychiatric:         Mood and Affect: Mood and affect normal.         Speech: Speech normal.         Behavior: Behavior normal. Behavior is cooperative.    DATA:  CMP:  Results from last 7 days   Lab Units 07/16/25  0527 07/15/25  0603 07/14/25 2056 07/14/25  1126   SODIUM mmol/L 139 142 140 141   POTASSIUM mmol/L 4.7 4.0 3.9 3.5   CHLORIDE mmol/L 105 103 101 104   CO2 mmol/L 27 30 28 22   ANION GAP mmol/L 12 13 15 19   BUN mg/dL 53* 63* 73* 67*   CREATININE mg/dL 1.34* 1.27 1.77* 1.62*   EGFR mL/min/1.73m*2 59* 63 42* 47*   MAGNESIUM mg/dL 1.90 1.87 1.69 1.67   ALBUMIN g/dL 3.2* 3.1* 3.5 3.2*   ALT U/L  --   --  21 19   AST U/L  --   --  15 14   BILIRUBIN TOTAL mg/dL  --   --  0.4 0.4     CBC:  Results from last 7 days   Lab Units 07/16/25  0527 07/15/25  0603 07/14/25 2056 07/14/25  1126   WBC AUTO x10*3/uL 7.7 7.1 8.1 7.4   HEMOGLOBIN g/dL 9.6* 9.1* 9.9* 8.9*   HEMATOCRIT % 30.9* 28.0* 29.2* 28.6*   PLATELETS AUTO x10*3/uL 267 224 286 198   MCV fL 104* 96 94 102*     COAG:   Results from last 7 days   Lab Units 07/14/25 2056   INR  1.0     ABO: No results found for: \"ABO\"  HEME/ENDO:  Results from last 7 days   Lab Units 07/14/25 2056   HEMOGLOBIN A1C % 5.1      CARDIAC:   Results from last 7 days   Lab Units 07/14/25 2056   TROPHSCMC ng/L 233*   BNP pg/mL 362*       ASSESSMENT AND PLAN:   64-year-old male with a history of NICM (s/p HM3 in 2023) s/p OHT on 6/21/2025, pAF, CAD, CKD, HTN, BPH, GERD, hypothyroidism, presents for readmission from outpatient transplant follow-up due to clinical decline. He was discharged on 7/9 after an initial postoperative course complicated by pneumomediastinum (managed conservatively), steroid-induced hyperglycemia, and LUE/RLE weakness. He was admitted with concerns of deconditioning, respiratory distress, and DEBBIE.      Neuro:  # Anxiety. 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 He was recommended outpatient EMG/NCS to further characterize injury. Additionally was noted to have RLE weakness attributed to seroma related femoral nerve compression. concerning for femoral nerve injury.   - Aggressive PT/OT as tolerated  - Continue home Gabapentin  - Neurology follow up along with EMG/NCS outpatient     # Falls  Reports having fallen twice at home. First fall happened when he was climbing a set of stairs and his foot caught on the step.  The second was when his screen door caught the back of his heel as it was swinging closed.  Denies LOC. Suspect mechanical in etiology given underlying weakness, no concern for CVA at this time.   - C/w aggressive PT/OT     Cardiovascular:  #NICM s/p HM2 (2023) s/p OHT on 6/21  Underwent RHC and biopsy at clinic visit 7/14 which showed: 121/69 (85), RA 4, PA 32/16 (21), PCWP 10; Raven CI 3.8 and Thermodilution CI 2.4.  Biopsy result negative.  -Next biopsy week 7/21 to 7/25 date to be determined.  - Continue home hydralazine 50 TID  - TTE (7/14): Pending  - Admit Lactate 1.4   - Admit BNP- 362  Donor/Recipient Infectious history:   CMV: -/-   EBV: +/+   Toxo: -/-   Heb B: -/-  Hep C: -/-  HIV: -/-      Rejection/Prophylaxis:     - Induction: s/p methylprednisolone 500mg IV x2, S/p Basiliximab 20mg IV within 1 hour of arrival to CTICU (no need for premedication), s/p 2nd dose: 20mg IVPB on POD4 (06/25)   - Steroids: Prednisone 25mg daily (reduced 7/14 after negative biopsy  - Tacrolimus: 1.5mg am,  1mg pm.  - Tacrolimus goal troughs: 10-12; Last tacrolimus  trough: 15.6 7/16/2025:  5:27 AM   - Cellcept: 1,000mg BID   - Antifungals: voriconazole 200mg BID x3 months end date 09/22/2025   - Antivirals: acylovir 400mg BID x3 months end date 09/22/2025   - Anti PCP & Toxoplasmosis: SS Bactrim daily end date of 12/22/25     Last cardiac biopsy: 7/14: Pending   Last RHC: 7/14 121/69 (85), RA 4, PA 32/16 (21), PCWP 10; Raven CI 3.8 and Thermodilution CI 2.4.   Last TTE/BOBBY: 7/8 - difficult views 2/2 air  Last HLA: Pre-transplant: PRAs 0%; First due on 7/21  Last Allomap: ~ will assess starting at 6-month ana post-op    Last Allosure: ~ will assess starting at 6-month ana post-op   Last LHC: 03/2023(pre OHT). First due ~ (one year post tx) due 6/2026  Osteopenia/osteoporosis prophylaxis: Vit D 2000U daily and calcium 500 mg BID (give calcium 2 hrs after MMF)   Peptic/gastric ulcer prophylaxis: Pantoprazole 40mg daily    CAV Prophylaxis: Aspirin 81mg daily and Pravastatin 40mg daily   Pacing: Epicardial wires removed 07/07      Problems:   # H/o Subcutaneous emphysema and pneumomediastinum   - Observed during recent admission, managed conservatively after consultations with Cam Birch and [facility].      Pulmonary:   #Prior smoker  #Cough  #Small stable b/l pleural effusions  #Small L apical pneumothorax  Stable unchanged b/l effusions, and questionable small apical L pneumothorax noted on admit CXR compared to prior. Respiratorily stable, without leukocytosis and afebrile, low concern for active pneumonia at this time.   - Start Doxycycline 100mg BID   - Give Torsemide 20mg x 1  - Monitor and maintain SpO2 > 92%    GI:  #H/o C. Difficile infection s/p tx with po vanc  - Bowel regimen ordered   - PPI     :  #DEBBIE  #BPH  Suspect prerenal in etiology given self reported increase in home diuretics, BUN/Cr ratio, RHC hemodynamics, and improvement in renal function while holding diuretics.   -Baseline BUN/Cr WNL  -Admit BUN/Cr: 73/1.77  -I/Os  -Continue home tamsulosin     Heme:  #H/o  anemia  -H&H on admit 9.9/29.2     Endo:  #PreDM  #Steroid induced hyperglycemia  -HgA1c on admit- 5.1%  - Endo consulted during recent admission, home regimen is the following:  - Insulin NPH 15u to be taken with 25mg prednisone daily  - Can titrate insulin down to 10u as steroids taper  - Transition to SSI while inpatient      #Hypothyroidism  -last TSH (3/26/25)--> 1.83  -Continue home levothyroxine      ID:  -afebrile, nontoxic    Concern for pulm infection   - Start Doxycycline 100mg BID   - If febrile, send cx and broaden abx   - trend temps q4h     PHYSICAL AND OCCUPATIONAL THERAPY:    LINES:    DVT: SubQ heparin  VAP BUNDLE:  CENTRAL LINE BUNDLE:  ULCER PPX: Pantoprazole  GLYCEMIC CONTROL: Insulin regimen  BOWEL CARE: Pericolace  INDWELLING CATHETER:  NUTRITION: Adult diet Regular      EMERGENCY CONTACT: Extended Emergency Contact Information  Primary Emergency Contact: Janice Ramos  Address: [address] [place] [address]  Home Phone: [phone]  Mobile Phone: [phone]  Relation: Mother  Secondary Emergency Contact: Bird Lane  Address: [address] [place] [address] [address]           [place] [address] [facility]  Home Phone: [phone]  Mobile Phone: [phone]  Relation: Son  Preferred language: English   needed? No  FAMILY UPDATE:  CODE STATUS: Full Code  DISPO:     Patient seen and assessed with Dr. Chao    I personally spent 60 minutes of critical care time directly and personally managing the patient exclusive of separately billable procedures   _________________________________________________  Dilia Holly MD PGY4, HF Fellow    Biopsy returned negative yesterday we reduce prednisone and transferred to floor, today he reports he feels terribe reports cough and abdominal pain we cannot reproduce with palpation, will give torsemide x1 and start doxycycline for CAP, will avoid broad spectrum abx given recent C DIff      I saw  and evaluated the patient. I personally obtained the key and critical portions of the history and physical exam or was physically present for key and critical portions performed by the resident/fellow. I reviewed the resident/fellow's documentation and discussed the patient with the resident/fellow. I agree with the resident/fellow's medical decision making as documented in the note.

## 2025-07-17 LAB
ALBUMIN SERPL BCP-MCNC: 3.4 G/DL (ref 3.4–5)
ANION GAP SERPL CALC-SCNC: 13 MMOL/L (ref 10–20)
BASOPHILS # BLD AUTO: 0.01 X10*3/UL (ref 0–0.1)
BASOPHILS NFR BLD AUTO: 0.1 %
BUN SERPL-MCNC: 38 MG/DL (ref 6–23)
CALCIUM SERPL-MCNC: 8.3 MG/DL (ref 8.6–10.6)
CHLORIDE SERPL-SCNC: 104 MMOL/L (ref 98–107)
CO2 SERPL-SCNC: 28 MMOL/L (ref 21–32)
CREAT SERPL-MCNC: 1.08 MG/DL (ref 0.5–1.3)
EGFRCR SERPLBLD CKD-EPI 2021: 77 ML/MIN/1.73M*2
EOSINOPHIL # BLD AUTO: 0.01 X10*3/UL (ref 0–0.7)
EOSINOPHIL NFR BLD AUTO: 0.1 %
ERYTHROCYTE [DISTWIDTH] IN BLOOD BY AUTOMATED COUNT: 19.3 % (ref 11.5–14.5)
GLUCOSE SERPL-MCNC: 131 MG/DL (ref 74–99)
HCT VFR BLD AUTO: 32.3 % (ref 41–52)
HGB BLD-MCNC: 10.1 G/DL (ref 13.5–17.5)
IMM GRANULOCYTES # BLD AUTO: 0.07 X10*3/UL (ref 0–0.7)
IMM GRANULOCYTES NFR BLD AUTO: 1 % (ref 0–0.9)
LYMPHOCYTES # BLD AUTO: 0.54 X10*3/UL (ref 1.2–4.8)
LYMPHOCYTES NFR BLD AUTO: 7.7 %
MAGNESIUM SERPL-MCNC: 1.66 MG/DL (ref 1.6–2.4)
MCH RBC QN AUTO: 32 PG (ref 26–34)
MCHC RBC AUTO-ENTMCNC: 31.3 G/DL (ref 32–36)
MCV RBC AUTO: 102 FL (ref 80–100)
MONOCYTES # BLD AUTO: 0.39 X10*3/UL (ref 0.1–1)
MONOCYTES NFR BLD AUTO: 5.6 %
NEUTROPHILS # BLD AUTO: 5.97 X10*3/UL (ref 1.2–7.7)
NEUTROPHILS NFR BLD AUTO: 85.5 %
NRBC BLD-RTO: 0 /100 WBCS (ref 0–0)
PHOSPHATE SERPL-MCNC: 2.5 MG/DL (ref 2.5–4.9)
PLATELET # BLD AUTO: 263 X10*3/UL (ref 150–450)
POTASSIUM SERPL-SCNC: 4.6 MMOL/L (ref 3.5–5.3)
RBC # BLD AUTO: 3.16 X10*6/UL (ref 4.5–5.9)
SODIUM SERPL-SCNC: 140 MMOL/L (ref 136–145)
TACROLIMUS BLD-MCNC: 12 NG/ML
WBC # BLD AUTO: 7 X10*3/UL (ref 4.4–11.3)

## 2025-07-17 PROCEDURE — 85025 COMPLETE CBC W/AUTO DIFF WBC: CPT

## 2025-07-17 PROCEDURE — 2500000002 HC RX 250 W HCPCS SELF ADMINISTERED DRUGS (ALT 637 FOR MEDICARE OP, ALT 636 FOR OP/ED)

## 2025-07-17 PROCEDURE — 36415 COLL VENOUS BLD VENIPUNCTURE: CPT

## 2025-07-17 PROCEDURE — 83735 ASSAY OF MAGNESIUM: CPT

## 2025-07-17 PROCEDURE — 2500000004 HC RX 250 GENERAL PHARMACY W/ HCPCS (ALT 636 FOR OP/ED)

## 2025-07-17 PROCEDURE — 1100000001 HC PRIVATE ROOM DAILY

## 2025-07-17 PROCEDURE — 80197 ASSAY OF TACROLIMUS: CPT

## 2025-07-17 PROCEDURE — 99232 SBSQ HOSP IP/OBS MODERATE 35: CPT | Performed by: INTERNAL MEDICINE

## 2025-07-17 PROCEDURE — 2500000001 HC RX 250 WO HCPCS SELF ADMINISTERED DRUGS (ALT 637 FOR MEDICARE OP): Performed by: STUDENT IN AN ORGANIZED HEALTH CARE EDUCATION/TRAINING PROGRAM

## 2025-07-17 PROCEDURE — 2500000001 HC RX 250 WO HCPCS SELF ADMINISTERED DRUGS (ALT 637 FOR MEDICARE OP)

## 2025-07-17 PROCEDURE — 80069 RENAL FUNCTION PANEL: CPT

## 2025-07-17 PROCEDURE — 2500000004 HC RX 250 GENERAL PHARMACY W/ HCPCS (ALT 636 FOR OP/ED): Performed by: INTERNAL MEDICINE

## 2025-07-17 RX ORDER — TORSEMIDE 20 MG/1
20 TABLET ORAL DAILY
Status: DISCONTINUED | OUTPATIENT
Start: 2025-07-17 | End: 2025-07-21 | Stop reason: HOSPADM

## 2025-07-17 RX ADMIN — MYCOPHENOLATE MOFETIL 1000 MG: 250 CAPSULE ORAL at 08:58

## 2025-07-17 RX ADMIN — Medication 50 MCG: at 08:58

## 2025-07-17 RX ADMIN — CALCIUM 1 TABLET: 500 TABLET ORAL at 18:33

## 2025-07-17 RX ADMIN — VORICONAZOLE 200 MG: 200 TABLET, COATED ORAL at 21:36

## 2025-07-17 RX ADMIN — TACROLIMUS 0.5 MG: 0.5 CAPSULE ORAL at 06:21

## 2025-07-17 RX ADMIN — PREDNISONE 25 MG: 20 TABLET ORAL at 08:58

## 2025-07-17 RX ADMIN — MYCOPHENOLATE MOFETIL 1000 MG: 250 CAPSULE ORAL at 21:34

## 2025-07-17 RX ADMIN — GABAPENTIN 300 MG: 300 CAPSULE ORAL at 08:58

## 2025-07-17 RX ADMIN — TORSEMIDE 20 MG: 20 TABLET ORAL at 14:41

## 2025-07-17 RX ADMIN — DOXYCYCLINE HYCLATE 100 MG: 100 TABLET, FILM COATED ORAL at 08:57

## 2025-07-17 RX ADMIN — PANTOPRAZOLE SODIUM 40 MG: 40 TABLET, DELAYED RELEASE ORAL at 08:58

## 2025-07-17 RX ADMIN — TAMSULOSIN HYDROCHLORIDE 0.4 MG: 0.4 CAPSULE ORAL at 08:58

## 2025-07-17 RX ADMIN — DOXYCYCLINE HYCLATE 100 MG: 100 TABLET, FILM COATED ORAL at 21:36

## 2025-07-17 RX ADMIN — ACYCLOVIR 400 MG: 400 TABLET ORAL at 21:37

## 2025-07-17 RX ADMIN — ACYCLOVIR 400 MG: 400 TABLET ORAL at 08:58

## 2025-07-17 RX ADMIN — TACROLIMUS 1 MG: 0.5 CAPSULE ORAL at 06:21

## 2025-07-17 RX ADMIN — CALCIUM 1 TABLET: 500 TABLET ORAL at 08:58

## 2025-07-17 RX ADMIN — PRAVASTATIN SODIUM 40 MG: 40 TABLET ORAL at 21:34

## 2025-07-17 RX ADMIN — VORICONAZOLE 200 MG: 200 TABLET, COATED ORAL at 08:57

## 2025-07-17 RX ADMIN — HYDRALAZINE HYDROCHLORIDE 50 MG: 50 TABLET ORAL at 14:41

## 2025-07-17 RX ADMIN — INSULIN HUMAN 15 UNITS: 100 INJECTION, SUSPENSION SUBCUTANEOUS at 08:57

## 2025-07-17 RX ADMIN — SULFAMETHOXAZOLE AND TRIMETHOPRIM 1 TABLET: 400; 80 TABLET ORAL at 08:58

## 2025-07-17 RX ADMIN — HYDRALAZINE HYDROCHLORIDE 50 MG: 50 TABLET ORAL at 21:35

## 2025-07-17 RX ADMIN — TACROLIMUS 1 MG: 0.5 CAPSULE ORAL at 18:33

## 2025-07-17 RX ADMIN — ASPIRIN 81 MG: 81 TABLET, COATED ORAL at 08:58

## 2025-07-17 RX ADMIN — HYDRALAZINE HYDROCHLORIDE 50 MG: 50 TABLET ORAL at 08:58

## 2025-07-17 RX ADMIN — LEVOTHYROXINE SODIUM 125 MCG: 0.07 TABLET ORAL at 06:22

## 2025-07-17 ASSESSMENT — COGNITIVE AND FUNCTIONAL STATUS - GENERAL
DRESSING REGULAR UPPER BODY CLOTHING: A LITTLE
MOVING FROM LYING ON BACK TO SITTING ON SIDE OF FLAT BED WITH BEDRAILS: A LITTLE
DRESSING REGULAR LOWER BODY CLOTHING: A LITTLE
TURNING FROM BACK TO SIDE WHILE IN FLAT BAD: A LITTLE
WALKING IN HOSPITAL ROOM: A LITTLE
MOBILITY SCORE: 18
STANDING UP FROM CHAIR USING ARMS: A LITTLE
MOVING TO AND FROM BED TO CHAIR: A LITTLE
DAILY ACTIVITIY SCORE: 21
CLIMB 3 TO 5 STEPS WITH RAILING: A LITTLE
HELP NEEDED FOR BATHING: A LITTLE

## 2025-07-17 ASSESSMENT — PAIN - FUNCTIONAL ASSESSMENT: PAIN_FUNCTIONAL_ASSESSMENT: 0-10

## 2025-07-17 ASSESSMENT — PAIN SCALES - GENERAL: PAINLEVEL_OUTOF10: 0 - NO PAIN

## 2025-07-17 NOTE — PROGRESS NOTES
07/17/25 1457   Discharge Planning   Living Arrangements Alone   Support Systems Family members   Type of Residence Private residence   Who is requesting discharge planning? Provider   Home or Post Acute Services Post acute facilities (Rehab/SNF/etc)   Type of Post Acute Facility Services Skilled nursing   Expected Discharge Disposition SNF     Patient is requesting skilled placement.  [facility]    [phone] is unable to accept stating.    We can offer private pay for skilled stay. Please let me know if patient / family are interested and I can provide rates! Thank you. Kind Regards.  This information was discussed with patient/sister.  Updated clinicals submitted for reconsideration.  Patient has also been provided a list of alternate facilities all 4-5 star facilities within 20 miles of his home to review and select from.

## 2025-07-17 NOTE — CARE PLAN
The patient's goals for the shift include      The clinical goals for the shift include Pt VS will remain WNL    Patient will remain safe throughout the shift.

## 2025-07-17 NOTE — PROGRESS NOTES
[facility]  PROGRESS NOTE    Anatoly Lane/[id number]    Admit Date: 7/14/2025  Hospital Length of Stay: 3   ICU Length of Stay: 1d 2h   Primary Service: HF  Primary HF Cardiologist: Dr Hylton    INTERVAL EVENTS / PERTINENT ROS:   - UOP 2.8L after once dose torsemide 20 mg PO, this AM improving Cr to 1.1 from 1.3 and BUN to 38 from 53, euvolemic on exam  - Prednisone decreased to 25 mg daily after negative EMBx  - Seen by vestibular PT who report minimal vestibular issues and signed off  -  working on SNF placement  - Started doxycycline 100 mg BID for ?respiratory infection  - Tac decreased to 1.5/1 after supratherapeutic level 15.6 yesterday, today down to 12.0      Plan:  - Continue decreased tacrolimus at 1.5 mg a.m, 1 mg p.m.  - Diuresis: continue torsemide 20 mg PO daily with goal net even  - ABx: continue doxycycline 100 mg bid for 5 days (7/16-7/20) CAP coverage, preferred over broader Abx as currently non-toxic with recent C Diff  - Continue with PT/OT-rec towards SNF, per CM patient's preferred facility unable to accept as he is too high functioning  - Prior to discharge coordinate EMBx due week of 7/21    MEDICATIONS  Infusions:     Scheduled:  acyclovir, 400 mg, BID  aspirin, 81 mg, Daily  calcium carbonate, 1,250 mg of calcium carbonate, BID  cholecalciferol, 50 mcg, Daily  doxycylcine, 100 mg, q12h LUPILLO  gabapentin, 300 mg, Daily  heparin, 5,000 Units, q8h  hydrALAZINE, 50 mg, TID  insulin NPH (Isophane), 15 Units, Daily  levothyroxine, 125 mcg, Daily  mycophenolate, 1,000 mg, q12h LUPILLO  pantoprazole, 40 mg, Daily  pravastatin, 40 mg, Nightly  predniSONE, 25 mg, Daily  sennosides-docusate sodium, 1 tablet, Nightly  sulfamethoxazole-trimethoprim, 1 tablet, Daily  tacrolimus, 0.5 mg, q AM  tacrolimus, 1 mg, BID  tamsulosin, 0.4 mg, Daily  voriconazole, 200 mg, q12h LUPILLO      PRN:  acetaminophen, 650 mg, q6h PRN  dextromethorphan-guaifenesin, 5 mL, q8h PRN  dextrose,  12.5 g, q15 min PRN  dextrose, 25 g, q15 min PRN  glucagon, 1 mg, q15 min PRN  glucagon, 1 mg, q15 min PRN  oxygen, , Continuous PRN - O2/gases  polyethylene glycol, 17 g, Daily PRN  traMADol, 50 mg, q6h PRN        PHYSICAL EXAM:   Visit Vitals  /82 (BP Location: Right arm, Patient Position: Sitting)   Pulse 88   Temp 36.2 °C (97.2 °F) (Temporal)   Resp 18   Wt 97.5 kg (214 lb 14.4 oz)   SpO2 95%   BMI 31.74 kg/m²   Smoking Status Former   BSA 2.18 m²       Wt Readings from Last 5 Encounters:   07/16/25 97.5 kg (214 lb 14.4 oz)   07/14/25 94.9 kg (209 lb 4.8 oz)   07/14/25 98.4 kg (216 lb 14.9 oz)   07/09/25 98.4 kg (216 lb 14.4 oz)   02/27/25 102 kg (224 lb)       INTAKE/OUTPUT:  I/O last 3 completed shifts:  In: 1840 (18.9 mL/kg) [P.O.:1840]  Out: 3526 (36.2 mL/kg) [Urine:3526 (1 mL/kg/hr)]  Weight: 97.5 kg      Physical Exam  Physical Exam  GEN: resting in NAD  HEENT: EOMI, no scleral icterus or injection  PULM: normal work of breathing  CV: regular rate and rhythm, JVP below clavicle at 45 degrees  ABD: non tender, non distended. No guarding or rebound.  EXT: no peripheral edema, warm and well perfused.  SKIN: no rashes or lesions noted  MSK: moves all extremities without visible impairment  NEURO: CN II-XII, motor function and sensation grossly intact  PSYCH: Appropriate mood and affect, normal speech and cognition      DATA:  CMP:  Results from last 7 days   Lab Units 07/17/25  0602 07/16/25  0527 07/15/25  0603 07/14/25 2056 07/14/25  1126   SODIUM mmol/L 140 139 142 140 141   POTASSIUM mmol/L 4.6 4.7 4.0 3.9 3.5   CHLORIDE mmol/L 104 105 103 101 104   CO2 mmol/L 28 27 30 28 22   ANION GAP mmol/L 13 12 13 15 19   BUN mg/dL 38* 53* 63* 73* 67*   CREATININE mg/dL 1.08 1.34* 1.27 1.77* 1.62*   EGFR mL/min/1.73m*2 77 59* 63 42* 47*   MAGNESIUM mg/dL 1.66 1.90 1.87 1.69 1.67   ALBUMIN g/dL 3.4 3.2* 3.1* 3.5 3.2*   ALT U/L  --   --   --  21 19   AST U/L  --   --   --  15 14   BILIRUBIN TOTAL mg/dL  --   --    "--  0.4 0.4     CBC:  Results from last 7 days   Lab Units 07/17/25  0602 07/16/25  0527 07/15/25  0603 07/14/25 2056 07/14/25  1126   WBC AUTO x10*3/uL 7.0 7.7 7.1 8.1 7.4   HEMOGLOBIN g/dL 10.1* 9.6* 9.1* 9.9* 8.9*   HEMATOCRIT % 32.3* 30.9* 28.0* 29.2* 28.6*   PLATELETS AUTO x10*3/uL 263 267 224 286 198   MCV fL 102* 104* 96 94 102*     COAG:   Results from last 7 days   Lab Units 07/14/25 2056   INR  1.0     ABO: No results found for: \"ABO\"  HEME/ENDO:  Results from last 7 days   Lab Units 07/14/25 2056   HEMOGLOBIN A1C % 5.1      CARDIAC:   Results from last 7 days   Lab Units 07/14/25 2056   TROPHSCMC ng/L 233*   BNP pg/mL 362*       ASSESSMENT AND PLAN:   64-year-old male with a history of NICM (s/p HM3 in 2023) s/p OHT on 6/21/2025, pAF, CAD, CKD, HTN, BPH, GERD, hypothyroidism, presents for readmission from outpatient transplant follow-up due to clinical decline. He was discharged on 7/9 after an initial postoperative course complicated by pneumomediastinum (managed conservatively), steroid-induced hyperglycemia, and LUE/RLE weakness. He was admitted with concerns of deconditioning, respiratory distress, and DEBBIE.      Neuro:  # Anxiety. 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 He was recommended outpatient EMG/NCS to further characterize injury. Additionally was noted to have RLE weakness attributed to seroma related femoral nerve compression. concerning for femoral nerve injury.   - Aggressive PT/OT as tolerated  - Continue home Gabapentin  - Neurology follow up along with EMG/NCS outpatient     # " Falls  Reports having fallen twice at home. First fall happened when he was climbing a set of stairs and his foot caught on the step.  The second was when his screen door caught the back of his heel as it was swinging closed.  Denies LOC. Suspect mechanical in etiology given underlying weakness, no concern for CVA at this time.   - C/w aggressive PT/OT     Cardiovascular:  #NICM s/p HM2 (2023) s/p OHT on 6/21  Underwent RHC and biopsy at clinic visit 7/14 which showed: 121/69 (85), RA 4, PA 32/16 (21), PCWP 10; Raven CI 3.8 and Thermodilution CI 2.4.  Biopsy result negative.  -Next biopsy week 7/21 to 7/25 date to be determined.  - Continue home hydralazine 50 TID  - TTE (7/14): Pending  - Admit Lactate 1.4   - Admit BNP- 362  Donor/Recipient Infectious history:   CMV: -/-   EBV: +/+   Toxo: -/-   Heb B: -/-  Hep C: -/-  HIV: -/-      Rejection/Prophylaxis:     - Induction: s/p methylprednisolone 500mg IV x2, S/p Basiliximab 20mg IV within 1 hour of arrival to CTICU (no need for premedication), s/p 2nd dose: 20mg IVPB on POD4 (06/25)   - Steroids: Prednisone 25mg daily (reduced 7/14 after negative biopsy  - Tacrolimus: 1.5mg am,  1mg pm.  - Tacrolimus goal troughs: 10-12; Last tacrolimus trough: 15.6 7/16/2025:  5:27 AM   - Cellcept: 1,000mg BID   - Antifungals: voriconazole 200mg BID x3 months end date 09/22/2025   - Antivirals: acylovir 400mg BID x3 months end date 09/22/2025   - Anti PCP & Toxoplasmosis: SS Bactrim daily end date of 12/22/25     Last cardiac biopsy: 7/14: Pending   Last RHC: 7/14 121/69 (85), RA 4, PA 32/16 (21), PCWP 10; Raven CI 3.8 and Thermodilution CI 2.4.   Last TTE/BOBBY: 7/8 - difficult views 2/2 air  Last HLA: Pre-transplant: PRAs 0%; First due on 7/21  Last Allomap: ~ will assess starting at 6-month ana post-op    Last Allosure: ~ will assess starting at 6-month ana post-op   Last LHC: 03/2023(pre OHT). First due ~ (one year post tx) due 6/2026  Osteopenia/osteoporosis prophylaxis: Vit D  2000U daily and calcium 500 mg BID (give calcium 2 hrs after MMF)   Peptic/gastric ulcer prophylaxis: Pantoprazole 40mg daily    CAV Prophylaxis: Aspirin 81mg daily and Pravastatin 40mg daily   Pacing: Epicardial wires removed 07/07      Problems:   # H/o Subcutaneous emphysema and pneumomediastinum   - Observed during recent admission, managed conservatively after consultations with Cam Birch and [place].      Pulmonary:   #Prior smoker  #Cough  #Small stable b/l pleural effusions  #Small L apical pneumothorax  Stable unchanged b/l effusions, and questionable small apical L pneumothorax noted on admit CXR compared to prior. Respiratorily stable, without leukocytosis and afebrile, low concern for active pneumonia at this time.   - Start Doxycycline 100mg BID   - Give Torsemide 20mg x 1  - Monitor and maintain SpO2 > 92%    GI:  #H/o C. Difficile infection s/p tx with po vanc  - Bowel regimen ordered   - PPI     :  #DEBBIE  #BPH  Suspect prerenal in etiology given self reported increase in home diuretics, BUN/Cr ratio, RHC hemodynamics, and improvement in renal function while holding diuretics.   -Baseline BUN/Cr WNL  -Admit BUN/Cr: 73/1.77  -I/Os  -Continue home tamsulosin     Heme:  #H/o anemia  -H&H on admit 9.9/29.2     Endo:  #PreDM  #Steroid induced hyperglycemia  -HgA1c on admit- 5.1%  - Endo consulted during recent admission, home regimen is the following:  - Insulin NPH 15u to be taken with 25mg prednisone daily  - Can titrate insulin down to 10u as steroids taper  - Transition to SSI while inpatient      #Hypothyroidism  -last TSH (3/26/25)--> 1.83  -Continue home levothyroxine      ID:  -afebrile, nontoxic    Concern for pulm infection   - Start Doxycycline 100mg BID   - If febrile, send cx and broaden abx   - trend temps q4h     PHYSICAL AND OCCUPATIONAL THERAPY:    LINES:    DVT: SubQ heparin  VAP BUNDLE:  CENTRAL LINE BUNDLE:  ULCER PPX: Pantoprazole  GLYCEMIC CONTROL: Insulin regimen  BOWEL CARE:  Pericolace  INDWELLING CATHETER:  NUTRITION: Adult diet Regular      EMERGENCY CONTACT: Extended Emergency Contact Information  Primary Emergency Contact: Janice Ramos  Address: [address] [place] [address] [facility]  Home Phone: [phone]  Mobile Phone: [phone]  Relation: Mother  Secondary Emergency Contact: Bird Lane  Address: [address] [place] [address] [address]           [place] [address] [facility]  Home Phone: [phone]  Mobile Phone: [phone]  Relation: Son  Preferred language: English   needed? No  FAMILY UPDATE:  CODE STATUS: Full Code  DISPO:     Patient seen and assessed with Dr. Chao   _________________________________________________  Kristopher Chavez MD      He feels quite a bit better today we will continue current regimen of diuretics and antibiotics, will continue to work on placement he is borderline for placement per physical therapy will ask occupational therapy for a MoCA score and to assess his cognitive ability to be home by himself    I saw and evaluated the patient. I personally obtained the key and critical portions of the history and physical exam or was physically present for key and critical portions performed by the resident/fellow. I reviewed the resident/fellow's documentation and discussed the patient with the resident/fellow. I agree with the resident/fellow's medical decision making as documented in the note.

## 2025-07-17 NOTE — PROGRESS NOTES
CARINE received call from Pt's sister Abigail concerned she was advised by care coordinator Pt could not go to [facility] in [place] as he was deemed too high functioning. Per care transitions Lucshyamtia, SNF did deny admission on that bases per message from Kristy. CARINE left  for SNF  Yohana Ramos (ph: [phone]) who is family member of Pt's sister Abigail to clarify if this admission answer is accurate as Pt had previously been discussed with her earlier in the week and indicated he should be able to admit. SW awaiting return call.     ADINA Mendosa  Transplant

## 2025-07-18 ENCOUNTER — TELEPHONE (OUTPATIENT)
Dept: RESPIRATORY THERAPY | Facility: HOSPITAL | Age: 65
End: 2025-07-18

## 2025-07-18 LAB
ALBUMIN SERPL BCP-MCNC: 3.4 G/DL (ref 3.4–5)
ANION GAP SERPL CALC-SCNC: 12 MMOL/L (ref 10–20)
BASOPHILS # BLD AUTO: 0.01 X10*3/UL (ref 0–0.1)
BASOPHILS NFR BLD AUTO: 0.1 %
BUN SERPL-MCNC: 32 MG/DL (ref 6–23)
CALCIUM SERPL-MCNC: 8.2 MG/DL (ref 8.6–10.6)
CHLORIDE SERPL-SCNC: 104 MMOL/L (ref 98–107)
CO2 SERPL-SCNC: 28 MMOL/L (ref 21–32)
CREAT SERPL-MCNC: 1.3 MG/DL (ref 0.5–1.3)
EGFRCR SERPLBLD CKD-EPI 2021: 61 ML/MIN/1.73M*2
EOSINOPHIL # BLD AUTO: 0.05 X10*3/UL (ref 0–0.7)
EOSINOPHIL NFR BLD AUTO: 0.7 %
ERYTHROCYTE [DISTWIDTH] IN BLOOD BY AUTOMATED COUNT: 18.7 % (ref 11.5–14.5)
GLUCOSE BLD MANUAL STRIP-MCNC: 249 MG/DL (ref 74–99)
GLUCOSE SERPL-MCNC: 118 MG/DL (ref 74–99)
HCT VFR BLD AUTO: 32.2 % (ref 41–52)
HGB BLD-MCNC: 10.1 G/DL (ref 13.5–17.5)
IMM GRANULOCYTES # BLD AUTO: 0.09 X10*3/UL (ref 0–0.7)
IMM GRANULOCYTES NFR BLD AUTO: 1.3 % (ref 0–0.9)
LYMPHOCYTES # BLD AUTO: 0.75 X10*3/UL (ref 1.2–4.8)
LYMPHOCYTES NFR BLD AUTO: 11.2 %
MAGNESIUM SERPL-MCNC: 1.47 MG/DL (ref 1.6–2.4)
MCH RBC QN AUTO: 31.3 PG (ref 26–34)
MCHC RBC AUTO-ENTMCNC: 31.4 G/DL (ref 32–36)
MCV RBC AUTO: 100 FL (ref 80–100)
MONOCYTES # BLD AUTO: 0.43 X10*3/UL (ref 0.1–1)
MONOCYTES NFR BLD AUTO: 6.4 %
NEUTROPHILS # BLD AUTO: 5.34 X10*3/UL (ref 1.2–7.7)
NEUTROPHILS NFR BLD AUTO: 80.3 %
NRBC BLD-RTO: 0 /100 WBCS (ref 0–0)
PHOSPHATE SERPL-MCNC: 2.4 MG/DL (ref 2.5–4.9)
PLATELET # BLD AUTO: 262 X10*3/UL (ref 150–450)
POTASSIUM SERPL-SCNC: 4.3 MMOL/L (ref 3.5–5.3)
RBC # BLD AUTO: 3.23 X10*6/UL (ref 4.5–5.9)
SODIUM SERPL-SCNC: 140 MMOL/L (ref 136–145)
TACROLIMUS BLD-MCNC: 10.6 NG/ML
WBC # BLD AUTO: 6.7 X10*3/UL (ref 4.4–11.3)

## 2025-07-18 PROCEDURE — 97530 THERAPEUTIC ACTIVITIES: CPT | Mod: GP,CQ

## 2025-07-18 PROCEDURE — 2500000001 HC RX 250 WO HCPCS SELF ADMINISTERED DRUGS (ALT 637 FOR MEDICARE OP)

## 2025-07-18 PROCEDURE — 2500000002 HC RX 250 W HCPCS SELF ADMINISTERED DRUGS (ALT 637 FOR MEDICARE OP, ALT 636 FOR OP/ED)

## 2025-07-18 PROCEDURE — 2500000004 HC RX 250 GENERAL PHARMACY W/ HCPCS (ALT 636 FOR OP/ED): Performed by: INTERNAL MEDICINE

## 2025-07-18 PROCEDURE — 2500000004 HC RX 250 GENERAL PHARMACY W/ HCPCS (ALT 636 FOR OP/ED)

## 2025-07-18 PROCEDURE — 99232 SBSQ HOSP IP/OBS MODERATE 35: CPT | Performed by: INTERNAL MEDICINE

## 2025-07-18 PROCEDURE — 80069 RENAL FUNCTION PANEL: CPT

## 2025-07-18 PROCEDURE — 82947 ASSAY GLUCOSE BLOOD QUANT: CPT

## 2025-07-18 PROCEDURE — 97129 THER IVNTJ 1ST 15 MIN: CPT | Mod: GO

## 2025-07-18 PROCEDURE — 36415 COLL VENOUS BLD VENIPUNCTURE: CPT

## 2025-07-18 PROCEDURE — 2500000001 HC RX 250 WO HCPCS SELF ADMINISTERED DRUGS (ALT 637 FOR MEDICARE OP): Performed by: STUDENT IN AN ORGANIZED HEALTH CARE EDUCATION/TRAINING PROGRAM

## 2025-07-18 PROCEDURE — 83735 ASSAY OF MAGNESIUM: CPT

## 2025-07-18 PROCEDURE — 1100000001 HC PRIVATE ROOM DAILY

## 2025-07-18 PROCEDURE — 85025 COMPLETE CBC W/AUTO DIFF WBC: CPT

## 2025-07-18 PROCEDURE — 80197 ASSAY OF TACROLIMUS: CPT

## 2025-07-18 RX ORDER — SUCRALFATE 1 G/10ML
1 SUSPENSION ORAL EVERY 6 HOURS PRN
Status: DISCONTINUED | OUTPATIENT
Start: 2025-07-18 | End: 2025-07-21 | Stop reason: HOSPADM

## 2025-07-18 RX ORDER — SODIUM,POTASSIUM PHOSPHATES 280-250MG
2 POWDER IN PACKET (EA) ORAL ONCE
Status: COMPLETED | OUTPATIENT
Start: 2025-07-18 | End: 2025-07-18

## 2025-07-18 RX ORDER — MAGNESIUM SULFATE HEPTAHYDRATE 40 MG/ML
2 INJECTION, SOLUTION INTRAVENOUS ONCE
Status: COMPLETED | OUTPATIENT
Start: 2025-07-18 | End: 2025-07-18

## 2025-07-18 RX ADMIN — SULFAMETHOXAZOLE AND TRIMETHOPRIM 1 TABLET: 400; 80 TABLET ORAL at 08:35

## 2025-07-18 RX ADMIN — MYCOPHENOLATE MOFETIL 1000 MG: 250 CAPSULE ORAL at 21:21

## 2025-07-18 RX ADMIN — PANTOPRAZOLE SODIUM 40 MG: 40 TABLET, DELAYED RELEASE ORAL at 08:35

## 2025-07-18 RX ADMIN — ACYCLOVIR 400 MG: 400 TABLET ORAL at 08:34

## 2025-07-18 RX ADMIN — VORICONAZOLE 200 MG: 200 TABLET, COATED ORAL at 21:24

## 2025-07-18 RX ADMIN — CALCIUM 1 TABLET: 500 TABLET ORAL at 08:34

## 2025-07-18 RX ADMIN — HYDRALAZINE HYDROCHLORIDE 50 MG: 50 TABLET ORAL at 14:08

## 2025-07-18 RX ADMIN — TORSEMIDE 20 MG: 20 TABLET ORAL at 08:34

## 2025-07-18 RX ADMIN — TAMSULOSIN HYDROCHLORIDE 0.4 MG: 0.4 CAPSULE ORAL at 08:35

## 2025-07-18 RX ADMIN — ACETAMINOPHEN 650 MG: 325 TABLET ORAL at 10:39

## 2025-07-18 RX ADMIN — LEVOTHYROXINE SODIUM 125 MCG: 0.07 TABLET ORAL at 05:50

## 2025-07-18 RX ADMIN — TACROLIMUS 0.5 MG: 0.5 CAPSULE ORAL at 05:49

## 2025-07-18 RX ADMIN — MAGNESIUM SULFATE HEPTAHYDRATE 2 G: 40 INJECTION, SOLUTION INTRAVENOUS at 09:51

## 2025-07-18 RX ADMIN — POTASSIUM & SODIUM PHOSPHATES POWDER PACK 280-160-250 MG 2 PACKET: 280-160-250 PACK at 11:47

## 2025-07-18 RX ADMIN — MYCOPHENOLATE MOFETIL 1000 MG: 250 CAPSULE ORAL at 08:35

## 2025-07-18 RX ADMIN — DOXYCYCLINE HYCLATE 100 MG: 100 TABLET, FILM COATED ORAL at 21:24

## 2025-07-18 RX ADMIN — HYDRALAZINE HYDROCHLORIDE 50 MG: 50 TABLET ORAL at 21:23

## 2025-07-18 RX ADMIN — TACROLIMUS 1 MG: 0.5 CAPSULE ORAL at 18:00

## 2025-07-18 RX ADMIN — ASPIRIN 81 MG: 81 TABLET, COATED ORAL at 08:35

## 2025-07-18 RX ADMIN — ACYCLOVIR 400 MG: 400 TABLET ORAL at 21:22

## 2025-07-18 RX ADMIN — Medication 50 MCG: at 08:35

## 2025-07-18 RX ADMIN — HYDRALAZINE HYDROCHLORIDE 50 MG: 50 TABLET ORAL at 08:35

## 2025-07-18 RX ADMIN — VORICONAZOLE 200 MG: 200 TABLET, COATED ORAL at 08:34

## 2025-07-18 RX ADMIN — SUCRALFATE 1 G: 1 SUSPENSION ORAL at 11:47

## 2025-07-18 RX ADMIN — TACROLIMUS 1 MG: 0.5 CAPSULE ORAL at 05:49

## 2025-07-18 RX ADMIN — PRAVASTATIN SODIUM 40 MG: 40 TABLET ORAL at 21:24

## 2025-07-18 RX ADMIN — PREDNISONE 25 MG: 20 TABLET ORAL at 08:35

## 2025-07-18 RX ADMIN — CALCIUM 1 TABLET: 500 TABLET ORAL at 18:00

## 2025-07-18 RX ADMIN — DOXYCYCLINE HYCLATE 100 MG: 100 TABLET, FILM COATED ORAL at 08:34

## 2025-07-18 RX ADMIN — GABAPENTIN 300 MG: 300 CAPSULE ORAL at 08:35

## 2025-07-18 RX ADMIN — INSULIN HUMAN 15 UNITS: 100 INJECTION, SUSPENSION SUBCUTANEOUS at 08:33

## 2025-07-18 ASSESSMENT — COGNITIVE AND FUNCTIONAL STATUS - GENERAL
MOVING FROM LYING ON BACK TO SITTING ON SIDE OF FLAT BED WITH BEDRAILS: A LITTLE
DAILY ACTIVITIY SCORE: 21
WALKING IN HOSPITAL ROOM: A LITTLE
DRESSING REGULAR UPPER BODY CLOTHING: A LITTLE
HELP NEEDED FOR BATHING: A LITTLE
TOILETING: A LITTLE
MOBILITY SCORE: 18
WALKING IN HOSPITAL ROOM: A LITTLE
MOBILITY SCORE: 20
MOVING TO AND FROM BED TO CHAIR: A LITTLE
MOVING TO AND FROM BED TO CHAIR: A LITTLE
TURNING FROM BACK TO SIDE WHILE IN FLAT BAD: A LITTLE
STANDING UP FROM CHAIR USING ARMS: A LITTLE
HELP NEEDED FOR BATHING: A LITTLE
DRESSING REGULAR LOWER BODY CLOTHING: A LITTLE
CLIMB 3 TO 5 STEPS WITH RAILING: A LITTLE
DAILY ACTIVITIY SCORE: 21
CLIMB 3 TO 5 STEPS WITH RAILING: A LITTLE
STANDING UP FROM CHAIR USING ARMS: A LITTLE
DRESSING REGULAR LOWER BODY CLOTHING: A LITTLE

## 2025-07-18 ASSESSMENT — PAIN - FUNCTIONAL ASSESSMENT
PAIN_FUNCTIONAL_ASSESSMENT: 0-10

## 2025-07-18 ASSESSMENT — PAIN SCALES - GENERAL
PAINLEVEL_OUTOF10: 0 - NO PAIN
PAINLEVEL_OUTOF10: 3
PAINLEVEL_OUTOF10: 0 - NO PAIN

## 2025-07-18 NOTE — PROGRESS NOTES
CARINE was able to speak with admin Yohana from [facility] who advised the documentation sent to them thus far did not appear to be robust enough to prove skilled need and they would not submit for authorization from [facility]. Yohana requested updated therapy note; CARINE relayed to therapy team and transplant physician Dr. Chao.     CARINE met with Pt at bedside later same day. Pt had expressed interest in going home due to delay in discharge planning to [facility]F. Per discussion with transplant team, team would recommend someone stay with Pt for first few days until outpatient clinic follow-up next week due to Pt's recent hx of falls, med non-compliance, etc. Pt stated he would not have someone readily available for this task and would rather go to a SNF. Pt indicated he would also consider [facility] or [facility] SNFs if [facility] couldn't accept. Pt also advised he would feel comfortable in private room due to immunosuppressed status, but not shared room. CARINE relayed to care coordinator for updated documentation to be sent.

## 2025-07-18 NOTE — PROGRESS NOTES
Physical Therapy    Physical Therapy Treatment    Patient Name: Anatoly Lane  MRN: [id number]  Department: [place] [address]  Room: [address] [place] [address]  Today's Date: 7/18/2025  Time Calculation  Start Time: 1025  Stop Time: 1043  Start Time: 1315  Stop Time: 1343  Time Calculation (min): 46 min  Mobility and functional test completed on re-entry to patient room.         Assessment/Plan   PT Assessment  End of Session Communication: Bedside nurse  Assessment Comment: pt demosntrates continued SOB with exertion requiring rest breaks throughout. pt able to clear floor throughout session however inconsisent when LEs fatigue resulting in poor foot clearance. pt requiring additonal time to negotiate stairs and requires immediate rest upon completion secodary to fatigue. pt demonstrates difficulty with L hand digit extension.  End of Session Patient Position: Up in chair, Alarm off, not on at start of session     PT Plan  Treatment/Interventions: Bed mobility, Transfer training, Gait training, Stair training, Balance training, Neuromuscular re-education, Neurodevelopmental intervention, Strengthening, Endurance training, Range of motion, Therapeutic exercise, Therapeutic activity, Home exercise program  PT Plan: Ongoing PT  PT Frequency: 3 times per week  PT Discharge Recommendations: Other (comment) (Per converstaion with PT. pt is rec'd for low intensity PT with 24/7 supervision per requirement in recent care coordination note. If pt is unable to secure 24 supervision, pt would require moderate intesity PT.)  Equipment Recommended upon Discharge: Wheeled walker  PT Recommended Transfer Status: Stand by assist  PT - OK to Discharge: Yes    PT Visit Info:  PT Received On: 07/18/25     General Visit Information:   General  Prior to Session Communication: Bedside nurse  Patient Position Received: Bed, 2 rail up, Alarm off, not on at start of session  General Comment: Paqtient open to working with PT. pt declining mobility secondary to IV  running requesting PTA to return later. pt agreeable to therex and receptive to education while IV is running and agreed to mobilize when medication is complete    Subjective   Precautions:  Precautions  Medical Precautions: Cardiac precautions, Fall precautions  Post-Surgical Precautions: Move in the Tube     Date/Time Vitals Session Patient Position Pulse Resp SpO2 BP MAP (mmHg)    07/18/25 1626 --  --  87  18  94 %  137/71  93            Objective   Pain:  Pain Assessment  0-10 (Numeric) Pain Score: 0 - No pain  Cognition:  Cognition  Orientation Level: Oriented X4    Treatments:  Therapeutic Exercise  Therapeutic Exercise Activity 1: supine heel slides, AP, and SLR x10 reps AROM         Ambulation/Gait Training 1  Surface 1: Level tile  Device 1: Rolling walker  Assistance 1: Distant supervision  Quality of Gait 1: Forward flexed posture  Comments/Distance (ft) 1: 2x150', cues for posture and adequate hip flexion for improved foot clearance  Transfer 1  Technique 1: Sit to stand, Stand to sit  Transfer Device 1: Walker  Transfer Level of Assistance 1: Distant supervision  Trials/Comments 1: cues for se up    Stairs  Rails 1: Right  Device 1: Railing  Assistance 1: Close supervision  Comment/Number of Steps 1: x5, cues for adequate R hip fleixon to improve clearance of step    Outcome Measures:  [facility] Basic Mobility  Turning from your back to your side while in a flat bed without using bedrails: None  Moving from lying on your back to sitting on the side of a flat bed without using bedrails: None  Moving to and from bed to chair (including a wheelchair): A little  Standing up from a chair using your arms (e.g. wheelchair or bedside chair): A little  To walk in hospital room: A little  Climbing 3-5 steps with railing: A little  Basic Mobility - Total Score: 20    Other Measures  5x Sit to Stand: 15.01    Education Documentation  Body Mechanics, taught by Luis M Baca PTA at 7/18/2025  1:47 PM.  Learner:  Patient  Readiness: Acceptance  Method: Explanation  Response: Verbalizes Understanding    Education Comments  No comments found.        OP EDUCATION:       Encounter Problems       Encounter Problems (Active)       PT Problem       Pt will perform a 5x STS, in 15 seconds or less, with RPD 3/10 or less, RPE 13/20 or less with stable vitals.  (Progressing)       Start:  07/15/25    Expected End:  07/29/25            Pt will perform a 6MWT, with distance greater than 900 ft  with stable vitals, RPD 3/10 or less, RPE 13/20 or less.  (Progressing)       Start:  07/15/25    Expected End:  07/29/25            pt will perform sit<>stand indep with LRD and no acute LOB (Progressing)       Start:  07/15/25    Expected End:  07/29/25            pt will negotiate 5 stairs with handrail and close supervision (Progressing)       Start:  07/15/25    Expected End:  07/29/25

## 2025-07-18 NOTE — PROGRESS NOTES
07/18/25 1533   Discharge Planning   Living Arrangements Alone   Support Systems Family members   Type of Residence Private residence   Home or Post Acute Services Post acute facilities (Rehab/SNF/etc)   Type of Post Acute Facility Services Skilled nursing   Expected Discharge Disposition SNF   Does the patient need discharge transport arranged? Yes   [facility] coordination needed? Yes     Referral resent to [facility] for review.  Patient has also requested a referral be placed with [facility].  Updated clinicals submitted will continue to monitor for acceptance.    [facility] unable to patient with a private room therefore, unable to accept.

## 2025-07-18 NOTE — CARE PLAN
Problem: Pain - Adult  Goal: Verbalizes/displays adequate comfort level or baseline comfort level  Outcome: Progressing     Problem: Safety - Adult  Goal: Free from fall injury  Outcome: Progressing     Problem: Discharge Planning  Goal: Discharge to home or other facility with appropriate resources  Outcome: Progressing     Problem: Chronic Conditions and Co-morbidities  Goal: Patient's chronic conditions and co-morbidity symptoms are monitored and maintained or improved  Outcome: Progressing     Problem: Nutrition  Goal: Nutrient intake appropriate for maintaining nutritional needs  Outcome: Progressing   The patient's goals for the shift include      The clinical goals for the shift include Patient will remain hemodynamically stable throughout the shift.     unk

## 2025-07-18 NOTE — PROGRESS NOTES
[facility]  PROGRESS NOTE    Anatoly Lane/[id number]    Admit Date: 7/14/2025  Hospital Length of Stay: 4   ICU Length of Stay: 1d 2h   Primary Service: HF  Primary HF Cardiologist: Dr Hylton    INTERVAL EVENTS / PERTINENT ROS:   - UOP net -2.8L after once dose torsemide 20 mg PO, creatinine up to 1.30 from 1.08  -  working on SNF placement  - Tac level today is 10.6  - Complains of heartburn and Carafate added to medication      Plan:  - Continue decreased tacrolimus at 1.5 mg a.m, 1 mg p.m.  - Diuresis: continue torsemide 20 mg PO daily with goal net even  - ABx: continue doxycycline 100 mg bid for 5 days (7/16-7/20) CAP coverage, preferred over broader Abx as currently non-toxic with recent C Diff  - Continue with PT/OT-rec towards SNF, per CM patient's preferred facility unable to accept as he is too high functioning  - Prior to discharge coordinate EMBx due week of 7/21    MEDICATIONS  Infusions:     Scheduled:  acyclovir, 400 mg, BID  aspirin, 81 mg, Daily  calcium carbonate, 1,250 mg of calcium carbonate, BID  cholecalciferol, 50 mcg, Daily  doxycylcine, 100 mg, q12h LUPILLO  gabapentin, 300 mg, Daily  heparin, 5,000 Units, q8h  hydrALAZINE, 50 mg, TID  insulin NPH (Isophane), 15 Units, Daily  levothyroxine, 125 mcg, Daily  magnesium sulfate, 2 g, Once  mycophenolate, 1,000 mg, q12h LUPILLO  pantoprazole, 40 mg, Daily  potassium, sodium phosphates, 2 packet, Once  pravastatin, 40 mg, Nightly  predniSONE, 25 mg, Daily  sennosides-docusate sodium, 1 tablet, Nightly  sulfamethoxazole-trimethoprim, 1 tablet, Daily  tacrolimus, 0.5 mg, q AM  tacrolimus, 1 mg, BID  tamsulosin, 0.4 mg, Daily  torsemide, 20 mg, Daily  voriconazole, 200 mg, q12h LUPILLO      PRN:  acetaminophen, 650 mg, q6h PRN  dextromethorphan-guaifenesin, 5 mL, q8h PRN  dextrose, 12.5 g, q15 min PRN  dextrose, 25 g, q15 min PRN  glucagon, 1 mg, q15 min PRN  glucagon, 1 mg, q15 min PRN  oxygen, , Continuous PRN -  O2/gases  polyethylene glycol, 17 g, Daily PRN  sucralfate, 1 g, q6h PRN  traMADol, 50 mg, q6h PRN        PHYSICAL EXAM:   Visit Vitals  /72   Pulse 91   Temp 36.6 °C (97.9 °F)   Resp 18   Wt 93.9 kg (207 lb 1.6 oz)   SpO2 96%   BMI 30.58 kg/m²   Smoking Status Former   BSA 2.14 m²       Wt Readings from Last 5 Encounters:   07/18/25 93.9 kg (207 lb 1.6 oz)   07/14/25 94.9 kg (209 lb 4.8 oz)   07/14/25 98.4 kg (216 lb 14.9 oz)   07/09/25 98.4 kg (216 lb 14.4 oz)   02/27/25 102 kg (224 lb)       INTAKE/OUTPUT:  I/O last 3 completed shifts:  In: 640 (6.8 mL/kg) [P.O.:640]  Out: 5075 (54 mL/kg) [Urine:5075 (1.5 mL/kg/hr)]  Weight: 93.9 kg      Physical Exam  Physical Exam  GEN: resting in NAD  HEENT: EOMI, no scleral icterus or injection  PULM: normal work of breathing  CV: regular rate and rhythm, JVP below clavicle at 45 degrees  ABD: non tender, non distended. No guarding or rebound.  EXT: no peripheral edema, warm and well perfused.  SKIN: no rashes or lesions noted  MSK: moves all extremities without visible impairment  NEURO: CN II-XII, motor function and sensation grossly intact  PSYCH: Appropriate mood and affect, normal speech and cognition      DATA:  CMP:  Results from last 7 days   Lab Units 07/18/25  0437 07/17/25  0602 07/16/25  0527 07/15/25  0603 07/14/25  2056 07/14/25  1126   SODIUM mmol/L 140 140 139 142 140 141   POTASSIUM mmol/L 4.3 4.6 4.7 4.0 3.9 3.5   CHLORIDE mmol/L 104 104 105 103 101 104   CO2 mmol/L 28 28 27 30 28 22   ANION GAP mmol/L 12 13 12 13 15 19   BUN mg/dL 32* 38* 53* 63* 73* 67*   CREATININE mg/dL 1.30 1.08 1.34* 1.27 1.77* 1.62*   EGFR mL/min/1.73m*2 61 77 59* 63 42* 47*   MAGNESIUM mg/dL 1.47* 1.66 1.90 1.87 1.69 1.67   ALBUMIN g/dL 3.4 3.4 3.2* 3.1* 3.5 3.2*   ALT U/L  --   --   --   --  21 19   AST U/L  --   --   --   --  15 14   BILIRUBIN TOTAL mg/dL  --   --   --   --  0.4 0.4     CBC:  Results from last 7 days   Lab Units 07/18/25  0437 07/17/25  0602 07/16/25  0527  "07/15/25  0603 07/14/25 2056 07/14/25  1126   WBC AUTO x10*3/uL 6.7 7.0 7.7 7.1 8.1 7.4   HEMOGLOBIN g/dL 10.1* 10.1* 9.6* 9.1* 9.9* 8.9*   HEMATOCRIT % 32.2* 32.3* 30.9* 28.0* 29.2* 28.6*   PLATELETS AUTO x10*3/uL 262 263 267 224 286 198   MCV fL 100 102* 104* 96 94 102*     COAG:   Results from last 7 days   Lab Units 07/14/25 2056   INR  1.0     ABO: No results found for: \"ABO\"  HEME/ENDO:  Results from last 7 days   Lab Units 07/14/25 2056   HEMOGLOBIN A1C % 5.1      CARDIAC:   Results from last 7 days   Lab Units 07/14/25 2056   TROPHSCMC ng/L 233*   BNP pg/mL 362*       ASSESSMENT AND PLAN:   64-year-old male with a history of NICM (s/p HM3 in 2023) s/p OHT on 6/21/2025, pAF, CAD, CKD, HTN, BPH, GERD, hypothyroidism, presents for readmission from outpatient transplant follow-up due to clinical decline. He was discharged on 7/9 after an initial postoperative course complicated by pneumomediastinum (managed conservatively), steroid-induced hyperglycemia, and LUE/RLE weakness. He was admitted with concerns of deconditioning, respiratory distress, and DEBBIE.      Neuro:  # Anxiety. 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 He was recommended outpatient EMG/NCS to further characterize injury. Additionally was noted to have RLE weakness attributed to seroma related femoral nerve compression. concerning for femoral nerve injury.   - Aggressive PT/OT as tolerated  - Continue home Gabapentin  - Neurology follow up along with EMG/NCS outpatient     # Falls  Reports having fallen twice at home. First fall happened when he was " climbing a set of stairs and his foot caught on the step.  The second was when his screen door caught the back of his heel as it was swinging closed.  Denies LOC. Suspect mechanical in etiology given underlying weakness, no concern for CVA at this time.   - C/w aggressive PT/OT     Cardiovascular:  #NICM s/p HM2 (2023) s/p OHT on 6/21  Underwent RHC and biopsy at clinic visit 7/14 which showed: 121/69 (85), RA 4, PA 32/16 (21), PCWP 10; Raven CI 3.8 and Thermodilution CI 2.4.  Biopsy result negative.  -Next biopsy week 7/21.  Tentatively plan for 07/23.  - Continue home hydralazine 50 TID  - TTE (7/14): Pending  - Admit Lactate 1.4   - Admit BNP- 362  Donor/Recipient Infectious history:   CMV: -/-   EBV: +/+   Toxo: -/-   Heb B: -/-  Hep C: -/-  HIV: -/-      Rejection/Prophylaxis:     - Induction: s/p methylprednisolone 500mg IV x2, S/p Basiliximab 20mg IV within 1 hour of arrival to CTICU (no need for premedication), s/p 2nd dose: 20mg IVPB on POD4 (06/25)   - Steroids: Prednisone 25mg daily (reduced 7/14 after negative biopsy  - Tacrolimus: 1.5mg am,  1mg pm.  - Tacrolimus goal troughs: 10-12; Last tacrolimus trough: 10.6 7/18/2025:  4:37 AM   - Cellcept: 1,000mg BID   - Antifungals: voriconazole 200mg BID x3 months end date 09/22/2025   - Antivirals: acylovir 400mg BID x3 months end date 09/22/2025   - Anti PCP & Toxoplasmosis: SS Bactrim daily end date of 12/22/25     Last cardiac biopsy: 7/14: Pending   Last RHC: 7/14 121/69 (85), RA 4, PA 32/16 (21), PCWP 10; Raven CI 3.8 and Thermodilution CI 2.4.   Last TTE/BOBBY: 7/8 - difficult views 2/2 air  Last HLA: Pre-transplant: PRAs 0%; First due on 7/21  Last Allomap: ~ will assess starting at 6-month ana post-op    Last Allosure: ~ will assess starting at 6-month ana post-op   Last C: 03/2023(pre OHT). First due ~ (one year post tx) due 6/2026  Osteopenia/osteoporosis prophylaxis: Vit D 2000U daily and calcium 500 mg BID (give calcium 2 hrs after MMF)    Peptic/gastric ulcer prophylaxis: Pantoprazole 40mg daily    CAV Prophylaxis: Aspirin 81mg daily and Pravastatin 40mg daily   Pacing: Epicardial wires removed 07/07      Problems:   # H/o Subcutaneous emphysema and pneumomediastinum   - Observed during recent admission, managed conservatively after consultations with Cam Birch and [place].      Pulmonary:   #Prior smoker  #Cough  #Small stable b/l pleural effusions  #Small L apical pneumothorax  Stable unchanged b/l effusions, and questionable small apical L pneumothorax noted on admit CXR compared to prior. Respiratorily stable, without leukocytosis and afebrile, low concern for active pneumonia at this time.   - C/w Doxycycline 100mg BID (07/16-07/20)  - C.w Torsemide 20mg   - Monitor and maintain SpO2 > 92%    GI:  #H/o C. Difficile infection s/p tx with po vanc  - Bowel regimen ordered   - PPI     :  #DEBBIE  #BPH  Suspect prerenal in etiology given self reported increase in home diuretics, BUN/Cr ratio, RHC hemodynamics, and improvement in renal function while holding diuretics.   -Baseline BUN/Cr WNL  -Admit BUN/Cr: 73/1.77  -I/Os  -Continue home tamsulosin     Heme:  #H/o anemia  -H&H on admit 9.9/29.2     Endo:  #PreDM  #Steroid induced hyperglycemia  -HgA1c on admit- 5.1%  - Endo consulted during recent admission, home regimen is the following:  - Insulin NPH 15u to be taken with 25mg prednisone daily  - Can titrate insulin down to 10u as steroids taper  - Transition to SSI while inpatient      #Hypothyroidism  -last TSH (3/26/25)--> 1.83  -Continue home levothyroxine      ID:  -afebrile, nontoxic    Concern for pulm infection   - Start Doxycycline 100mg BID   - If febrile, send cx and broaden abx   - trend temps q4h     PHYSICAL AND OCCUPATIONAL THERAPY:    LINES:    DVT: SubQ heparin  VAP BUNDLE:  CENTRAL LINE BUNDLE:  ULCER PPX: Pantoprazole  GLYCEMIC CONTROL: Insulin regimen  BOWEL CARE: Pericolace  INDWELLING CATHETER:  NUTRITION: Adult diet  Regular      EMERGENCY CONTACT: Extended Emergency Contact Information  Primary Emergency Contact: Janice Ramos  Address: [address] [place] [address] [facility]  Home Phone: [phone]  Mobile Phone: [phone]  Relation: Mother  Secondary Emergency Contact: Bird Lane  Address: [address] [facility] [facility] [address]           [place] [address] [facility]  Home Phone: [phone]  Mobile Phone: [phone]  Relation: Son  Preferred language: English   needed? No  FAMILY UPDATE:  CODE STATUS: Full Code  DISPO:     Patient seen and assessed with Dr. Chao   _________________________________________________  Dilia Holly MD      He continues to feel well unfortunately unclear if he will have a viable discharge disposition, we discussed that he is not safe to discharge to home on his own, however possible with some degree of family support he has had 3 negative biopsies does not necessarily need another next week could skip and biopsy in 2 weeks    I saw and evaluated the patient. I personally obtained the key and critical portions of the history and physical exam or was physically present for key and critical portions performed by the resident/fellow. I reviewed the resident/fellow's documentation and discussed the patient with the resident/fellow. I agree with the resident/fellow's medical decision making as documented in the note.

## 2025-07-18 NOTE — PROGRESS NOTES
Occupational Therapy    OT Treatment    Patient Name: Anatoly Lane  MRN: [id number]  Department: [place] [address]  Room: [address] [place] [address]  Today's Date: 7/18/2025  Time Calculation  Start Time: 1058  Stop Time: 1115  Time Calculation (min): 17 min        Assessment:  OT Assessment: Pt will benefit from continued skilled OT to increase independence in ADLs, functional mobility, activity tolerance, safety, and strength.  Prognosis: Excellent  Medical Staff Made Aware: Yes  End of Session Communication: Bedside nurse  End of Session Patient Position: Bed, 3 rail up, Alarm off, not on at start of session  OT Assessment Results: Decreased ADL status, Decreased upper extremity strength, Decreased safe judgment during ADL, Decreased endurance, Decreased functional mobility, Decreased IADLs  Prognosis: Excellent  Medical Staff Made Aware: Yes  Strengths: Attitude of self  Barriers to Participation: Comorbidities  Plan:  Treatment Interventions: ADL retraining, Functional transfer training, Endurance training, Patient/family training, Equipment evaluation/education, Compensatory technique education  OT Frequency: 2 times per week  OT Discharge Recommendations: Low intensity level of continued care  Equipment Recommended upon Discharge: Wheeled walker  OT Recommended Transfer Status: Assist of 1  OT - OK to Discharge: Yes  Treatment Interventions: ADL retraining, Functional transfer training, Endurance training, Patient/family training, Equipment evaluation/education, Compensatory technique education    Subjective   OT Visit Info:  OT Received On: 07/18/25  General Visit Info:  General  Reason for Referral: readmission from outpatient transplant follow-up due to clinical decline, deconditioning, resp failure and DEBBIE. s/p 2 falls at home  Past Medical History Relevant to Rehab: history of NICM (s/p HM3 in 2023) s/p OHT on 6/21/2025, pAF, CAD, CKD, HTN, BPH, GERD, hypothyroidism  Family/Caregiver Present: No  Prior to Session Communication:  "Bedside nurse  Patient Position Received: Bed, 3 rail up, Alarm off, not on at start of session  General Comment: Pt supine in bed upon arrival, agreeable to OT  Precautions:  Medical Precautions: Cardiac precautions, Fall precautions  Post-Surgical Precautions: Move in the Tube    Pain:  Pain Assessment  Pain Assessment: 0-10  0-10 (Numeric) Pain Score: 0 - No pain    Objective    Cognition:  Cognition  Overall Cognitive Status: Within Functional Limits  Orientation Level: Oriented X4  Following Commands: Follows all commands and directions without difficulty  Attention: Within Functional Limits  Memory: Within Funtional Limits  Problem Solving: Within Functional Limits  Safety/Judgement: Within Functional Limits  Insight: Mild  Impulsive: Mildly  Processing Speed: Within funtional limits  Coordination:  Movements are Fluid and Coordinated: Yes  Activities of Daily Living: Feeding  Feeding Comments: Pt reports has been feeding self I since admission    Bed Mobility/Transfers: Bed Mobility  Bed Mobility: No (pt refused mobility 2/2 IV running, educated on benefits of OOB)    Modalities:  Modalities Used: No    Therapy/Activity:      Therapeutic Activity  Therapeutic Activity Performed: Yes  Therapeutic Activity 1: educated on benefits of mobility    Cognitive Skill Development:  Cognitive Skill Development Activity 1: Pt completed MOCA-BLIND and scored 18/22, a score > or = 18/22 is considered \"normal cognition\"     Patient scored 18/22 on MoCA (West Cognitive Assessment) indicatin or greater considered normal cognitive function.    Deficits noted in the following areas orientation, attention, and memory.     Other Activity:     RUE   RUE : Within Functional Limits    LUE:  (elbow, shoulder and wrist AROM WFL, digit extension impaired, finger flexion WFL)    Outcome Measures:[facility] Daily Activity  Putting on and taking off regular lower body clothing: A little  Bathing (including washing, rinsing, drying): A " little  Putting on and taking off regular upper body clothing: None  Toileting, which includes using toilet, bedpan or urinal: A little  Taking care of personal grooming such as brushing teeth: None  Eating Meals: None  Daily Activity - Total Score: 21     and  MOCA-BLIND: 18/22    Education Documentation  Body Mechanics, taught by Mani Coates OT at 7/18/2025  2:49 PM.  Learner: Patient  Readiness: Acceptance  Method: Explanation  Response: Verbalizes Understanding  Comment: OT POC, d/c rec, safety, ADLs    Precautions, taught by Mani Coates OT at 7/18/2025  2:49 PM.  Learner: Patient  Readiness: Acceptance  Method: Explanation  Response: Verbalizes Understanding  Comment: OT POC, d/c rec, safety, ADLs    ADL Training, taught by Mani Coates OT at 7/18/2025  2:49 PM.  Learner: Patient  Readiness: Acceptance  Method: Explanation  Response: Verbalizes Understanding  Comment: OT POC, d/c rec, safety, ADLs    Education Comments  No comments found.      Goals:  Encounter Problems       Encounter Problems (Active)       ADLs       Patient will complete daily grooming tasks with supervision level of assistance and PRN adaptive equipment while standing. (Progressing)       Start:  07/15/25    Expected End:  07/29/25            Patient will complete toileting including hygiene clothing management/hygiene with supervision level of assistance. (Progressing)       Start:  07/15/25    Expected End:  07/29/25            Patient will perform UB dressing with Independent and PRN adaptive equipment. (Progressing)       Start:  07/15/25    Expected End:  07/29/25            Patient will perform LB dressing with Supervision and PRN adaptive equipment. (Progressing)       Start:  07/15/25    Expected End:  07/29/25            Patient will perform basic IADLs/simulated household tasks with Supervision and LRD.  (Progressing)       Start:  07/15/25    Expected End:  07/29/25               COGNITION/SAFETY       Patient will  complete pill box simulation independently with use of medication list. (Progressing)       Start:  07/15/25    Expected End:  07/29/25            Patient will participate in cognitive activities to demonstrate WFL on cognitive assessment. (Met)       Start:  07/15/25    Expected End:  07/29/25    Resolved:  07/18/25            EXERCISE/STRENGTHENING       Patient will be educated on BUE HEP for increased ADL performance. (Progressing)       Start:  07/15/25    Expected End:  07/29/25            Patient will participate in >15 minutes of activity with <2 rest breaks to increase ADL/functional task endurance. (Progressing)       Start:  07/15/25    Expected End:  07/29/25               MOBILITY       Patient will perform Functional mobility Household distances/Community Distances with supervision level of assistance and least restrictive device in order to improve safety and functional mobility. (Progressing)       Start:  07/15/25    Expected End:  07/29/25               TRANSFERS       Patient will perform bed mobility supervision level of assistance in order to improve safety and independence with mobility (Progressing)       Start:  07/15/25    Expected End:  07/29/25            Patient will complete functional transfers with least restrictive device with supervision level of assistance. (Progressing)       Start:  07/15/25    Expected End:  07/29/25                 Mani Coates, OTR/L

## 2025-07-19 LAB
ALBUMIN SERPL BCP-MCNC: 3.3 G/DL (ref 3.4–5)
ANION GAP SERPL CALC-SCNC: 12 MMOL/L (ref 10–20)
BASOPHILS # BLD AUTO: 0.01 X10*3/UL (ref 0–0.1)
BASOPHILS NFR BLD AUTO: 0.1 %
BUN SERPL-MCNC: 32 MG/DL (ref 6–23)
CALCIUM SERPL-MCNC: 8.2 MG/DL (ref 8.6–10.6)
CHLORIDE SERPL-SCNC: 103 MMOL/L (ref 98–107)
CO2 SERPL-SCNC: 29 MMOL/L (ref 21–32)
CREAT SERPL-MCNC: 0.84 MG/DL (ref 0.5–1.3)
EGFRCR SERPLBLD CKD-EPI 2021: >90 ML/MIN/1.73M*2
EOSINOPHIL # BLD AUTO: 0.08 X10*3/UL (ref 0–0.7)
EOSINOPHIL NFR BLD AUTO: 1.2 %
ERYTHROCYTE [DISTWIDTH] IN BLOOD BY AUTOMATED COUNT: 18.6 % (ref 11.5–14.5)
GLUCOSE SERPL-MCNC: 178 MG/DL (ref 74–99)
HCT VFR BLD AUTO: 31.6 % (ref 41–52)
HGB BLD-MCNC: 10.2 G/DL (ref 13.5–17.5)
IMM GRANULOCYTES # BLD AUTO: 0.08 X10*3/UL (ref 0–0.7)
IMM GRANULOCYTES NFR BLD AUTO: 1.2 % (ref 0–0.9)
LYMPHOCYTES # BLD AUTO: 1.12 X10*3/UL (ref 1.2–4.8)
LYMPHOCYTES NFR BLD AUTO: 16.3 %
MAGNESIUM SERPL-MCNC: 1.84 MG/DL (ref 1.6–2.4)
MCH RBC QN AUTO: 31.8 PG (ref 26–34)
MCHC RBC AUTO-ENTMCNC: 32.3 G/DL (ref 32–36)
MCV RBC AUTO: 98 FL (ref 80–100)
MONOCYTES # BLD AUTO: 0.47 X10*3/UL (ref 0.1–1)
MONOCYTES NFR BLD AUTO: 6.9 %
NEUTROPHILS # BLD AUTO: 5.1 X10*3/UL (ref 1.2–7.7)
NEUTROPHILS NFR BLD AUTO: 74.3 %
NRBC BLD-RTO: 0 /100 WBCS (ref 0–0)
PHOSPHATE SERPL-MCNC: 2.3 MG/DL (ref 2.5–4.9)
PLATELET # BLD AUTO: 277 X10*3/UL (ref 150–450)
POTASSIUM SERPL-SCNC: 4.6 MMOL/L (ref 3.5–5.3)
RBC # BLD AUTO: 3.21 X10*6/UL (ref 4.5–5.9)
SODIUM SERPL-SCNC: 139 MMOL/L (ref 136–145)
TACROLIMUS BLD-MCNC: 9 NG/ML
WBC # BLD AUTO: 6.9 X10*3/UL (ref 4.4–11.3)

## 2025-07-19 PROCEDURE — 2500000001 HC RX 250 WO HCPCS SELF ADMINISTERED DRUGS (ALT 637 FOR MEDICARE OP)

## 2025-07-19 PROCEDURE — 36415 COLL VENOUS BLD VENIPUNCTURE: CPT

## 2025-07-19 PROCEDURE — 84100 ASSAY OF PHOSPHORUS: CPT

## 2025-07-19 PROCEDURE — 2500000004 HC RX 250 GENERAL PHARMACY W/ HCPCS (ALT 636 FOR OP/ED)

## 2025-07-19 PROCEDURE — 2500000002 HC RX 250 W HCPCS SELF ADMINISTERED DRUGS (ALT 637 FOR MEDICARE OP, ALT 636 FOR OP/ED)

## 2025-07-19 PROCEDURE — 99232 SBSQ HOSP IP/OBS MODERATE 35: CPT | Performed by: INTERNAL MEDICINE

## 2025-07-19 PROCEDURE — 1100000001 HC PRIVATE ROOM DAILY

## 2025-07-19 PROCEDURE — 2500000004 HC RX 250 GENERAL PHARMACY W/ HCPCS (ALT 636 FOR OP/ED): Performed by: INTERNAL MEDICINE

## 2025-07-19 PROCEDURE — 85025 COMPLETE CBC W/AUTO DIFF WBC: CPT

## 2025-07-19 PROCEDURE — 2500000001 HC RX 250 WO HCPCS SELF ADMINISTERED DRUGS (ALT 637 FOR MEDICARE OP): Performed by: STUDENT IN AN ORGANIZED HEALTH CARE EDUCATION/TRAINING PROGRAM

## 2025-07-19 PROCEDURE — 83735 ASSAY OF MAGNESIUM: CPT

## 2025-07-19 PROCEDURE — 80197 ASSAY OF TACROLIMUS: CPT

## 2025-07-19 RX ORDER — TACROLIMUS 0.5 MG/1
0.5 CAPSULE ORAL
Status: DISCONTINUED | OUTPATIENT
Start: 2025-07-19 | End: 2025-07-21 | Stop reason: HOSPADM

## 2025-07-19 RX ADMIN — HYDRALAZINE HYDROCHLORIDE 50 MG: 50 TABLET ORAL at 22:13

## 2025-07-19 RX ADMIN — CALCIUM 1 TABLET: 500 TABLET ORAL at 08:59

## 2025-07-19 RX ADMIN — DIBASIC SODIUM PHOSPHATE, MONOBASIC POTASSIUM PHOSPHATE AND MONOBASIC SODIUM PHOSPHATE 250 MG: 852; 155; 130 TABLET ORAL at 10:28

## 2025-07-19 RX ADMIN — ACYCLOVIR 400 MG: 400 TABLET ORAL at 22:17

## 2025-07-19 RX ADMIN — MYCOPHENOLATE MOFETIL 1000 MG: 250 CAPSULE ORAL at 08:58

## 2025-07-19 RX ADMIN — DIBASIC SODIUM PHOSPHATE, MONOBASIC POTASSIUM PHOSPHATE AND MONOBASIC SODIUM PHOSPHATE 250 MG: 852; 155; 130 TABLET ORAL at 22:01

## 2025-07-19 RX ADMIN — ASPIRIN 81 MG: 81 TABLET, COATED ORAL at 08:59

## 2025-07-19 RX ADMIN — ACETAMINOPHEN 650 MG: 325 TABLET ORAL at 23:46

## 2025-07-19 RX ADMIN — DOXYCYCLINE HYCLATE 100 MG: 100 TABLET, FILM COATED ORAL at 09:00

## 2025-07-19 RX ADMIN — CALCIUM 1 TABLET: 500 TABLET ORAL at 17:47

## 2025-07-19 RX ADMIN — TAMSULOSIN HYDROCHLORIDE 0.4 MG: 0.4 CAPSULE ORAL at 09:04

## 2025-07-19 RX ADMIN — TORSEMIDE 20 MG: 20 TABLET ORAL at 09:00

## 2025-07-19 RX ADMIN — VORICONAZOLE 200 MG: 200 TABLET, COATED ORAL at 22:00

## 2025-07-19 RX ADMIN — SULFAMETHOXAZOLE AND TRIMETHOPRIM 1 TABLET: 400; 80 TABLET ORAL at 08:58

## 2025-07-19 RX ADMIN — DOXYCYCLINE HYCLATE 100 MG: 100 TABLET, FILM COATED ORAL at 23:46

## 2025-07-19 RX ADMIN — ACYCLOVIR 400 MG: 400 TABLET ORAL at 09:00

## 2025-07-19 RX ADMIN — INSULIN HUMAN 15 UNITS: 100 INJECTION, SUSPENSION SUBCUTANEOUS at 09:10

## 2025-07-19 RX ADMIN — TACROLIMUS 0.5 MG: 0.5 CAPSULE ORAL at 05:50

## 2025-07-19 RX ADMIN — MYCOPHENOLATE MOFETIL 1000 MG: 250 CAPSULE ORAL at 22:00

## 2025-07-19 RX ADMIN — TACROLIMUS 0.5 MG: 0.5 CAPSULE ORAL at 18:26

## 2025-07-19 RX ADMIN — PREDNISONE 25 MG: 20 TABLET ORAL at 08:58

## 2025-07-19 RX ADMIN — ACETAMINOPHEN 650 MG: 325 TABLET ORAL at 12:56

## 2025-07-19 RX ADMIN — TACROLIMUS 1 MG: 0.5 CAPSULE ORAL at 05:50

## 2025-07-19 RX ADMIN — VORICONAZOLE 200 MG: 200 TABLET, COATED ORAL at 08:59

## 2025-07-19 RX ADMIN — LEVOTHYROXINE SODIUM 125 MCG: 0.07 TABLET ORAL at 05:50

## 2025-07-19 RX ADMIN — PRAVASTATIN SODIUM 40 MG: 40 TABLET ORAL at 22:01

## 2025-07-19 RX ADMIN — Medication 50 MCG: at 08:58

## 2025-07-19 RX ADMIN — GABAPENTIN 300 MG: 300 CAPSULE ORAL at 08:58

## 2025-07-19 RX ADMIN — HYDRALAZINE HYDROCHLORIDE 50 MG: 50 TABLET ORAL at 08:59

## 2025-07-19 RX ADMIN — PANTOPRAZOLE SODIUM 40 MG: 40 TABLET, DELAYED RELEASE ORAL at 08:58

## 2025-07-19 RX ADMIN — TACROLIMUS 1 MG: 0.5 CAPSULE ORAL at 18:26

## 2025-07-19 ASSESSMENT — COGNITIVE AND FUNCTIONAL STATUS - GENERAL
TOILETING: A LITTLE
DRESSING REGULAR UPPER BODY CLOTHING: A LITTLE
DAILY ACTIVITIY SCORE: 20
DRESSING REGULAR LOWER BODY CLOTHING: A LITTLE
HELP NEEDED FOR BATHING: A LITTLE
MOBILITY SCORE: 23
CLIMB 3 TO 5 STEPS WITH RAILING: A LITTLE

## 2025-07-19 NOTE — PROGRESS NOTES
[facility]  PROGRESS NOTE    Anatoly Lane/[id number]    Admit Date: 7/14/2025  Hospital Length of Stay: 5   ICU Length of Stay: 1d 2h   Primary Service: HF  Primary HF Cardiologist: Dr Hylton    INTERVAL EVENTS / PERTINENT ROS:   - UOP net -1.2 L yesterday.  Creatinine function improved creatinine down to 0.84 today.  -  working on SNF placement  - Tac level today is Last tacrolimus trough: 9.0 7/19/2025:  5:49 AM       Plan:  - Increased tacrolimus to 1.5 mg twice daily  - Diuresis: continue torsemide 20 mg PO daily with goal net even  - ABx: continue doxycycline 100 mg bid for 5 days (7/16-7/20) CAP coverage, preferred over broader Abx as currently non-toxic with recent C Diff  - Continue with PT/OT-rec towards SNF, per CM patient's preferred facility unable to accept as he is too high functioning  - Prior to discharge coordinate EMBx due week of 7/21    MEDICATIONS  Infusions:     Scheduled:  acyclovir, 400 mg, BID  aspirin, 81 mg, Daily  calcium carbonate, 1,250 mg of calcium carbonate, BID  cholecalciferol, 50 mcg, Daily  doxycylcine, 100 mg, q12h LUPILLO  gabapentin, 300 mg, Daily  heparin, 5,000 Units, q8h  hydrALAZINE, 50 mg, TID  insulin NPH (Isophane), 15 Units, Daily  levothyroxine, 125 mcg, Daily  mycophenolate, 1,000 mg, q12h LUPILLO  pantoprazole, 40 mg, Daily  pravastatin, 40 mg, Nightly  predniSONE, 25 mg, Daily  sennosides-docusate sodium, 1 tablet, Nightly  sod phos di, mono-K phos mono, 250 mg, BID  sulfamethoxazole-trimethoprim, 1 tablet, Daily  tacrolimus, 0.5 mg, q12h LUPILLO  tacrolimus, 1 mg, BID  tamsulosin, 0.4 mg, Daily  torsemide, 20 mg, Daily  voriconazole, 200 mg, q12h LUPILLO      PRN:  acetaminophen, 650 mg, q6h PRN  dextromethorphan-guaifenesin, 5 mL, q8h PRN  dextrose, 12.5 g, q15 min PRN  dextrose, 25 g, q15 min PRN  glucagon, 1 mg, q15 min PRN  glucagon, 1 mg, q15 min PRN  oxygen, , Continuous PRN - O2/gases  polyethylene glycol, 17 g, Daily  PRN  sucralfate, 1 g, q6h PRN  traMADol, 50 mg, q6h PRN        PHYSICAL EXAM:   Visit Vitals  /70   Pulse 101   Temp 36 °C (96.8 °F)   Resp 17   Wt 93.9 kg (207 lb 1.6 oz)   SpO2 92%   BMI 30.58 kg/m²   Smoking Status Former   BSA 2.14 m²       Wt Readings from Last 5 Encounters:   07/18/25 93.9 kg (207 lb 1.6 oz)   07/14/25 94.9 kg (209 lb 4.8 oz)   07/14/25 98.4 kg (216 lb 14.9 oz)   07/09/25 98.4 kg (216 lb 14.4 oz)   02/27/25 102 kg (224 lb)       INTAKE/OUTPUT:  I/O last 3 completed shifts:  In: - (0 mL/kg)   Out: 3325 (35.4 mL/kg) [Urine:3325 (1 mL/kg/hr)]  Weight: 93.9 kg      Physical Exam  Physical Exam  GEN: resting in NAD  HEENT: EOMI, no scleral icterus or injection  PULM: normal work of breathing  CV: regular rate and rhythm, JVP below clavicle at 45 degrees  ABD: non tender, non distended. No guarding or rebound.  EXT: no peripheral edema, warm and well perfused.  SKIN: no rashes or lesions noted  MSK: moves all extremities without visible impairment  NEURO: CN II-XII, motor function and sensation grossly intact  PSYCH: Appropriate mood and affect, normal speech and cognition      DATA:  CMP:  Results from last 7 days   Lab Units 07/19/25  0549 07/18/25  0437 07/17/25  0602 07/16/25  0527 07/15/25  0603 07/14/25  2056 07/14/25  1126   SODIUM mmol/L 139 140 140 139 142 140 141   POTASSIUM mmol/L 4.6 4.3 4.6 4.7 4.0 3.9 3.5   CHLORIDE mmol/L 103 104 104 105 103 101 104   CO2 mmol/L 29 28 28 27 30 28 22   ANION GAP mmol/L 12 12 13 12 13 15 19   BUN mg/dL 32* 32* 38* 53* 63* 73* 67*   CREATININE mg/dL 0.84 1.30 1.08 1.34* 1.27 1.77* 1.62*   EGFR mL/min/1.73m*2 >90 61 77 59* 63 42* 47*   MAGNESIUM mg/dL 1.84 1.47* 1.66 1.90 1.87 1.69 1.67   ALBUMIN g/dL 3.3* 3.4 3.4 3.2* 3.1* 3.5 3.2*   ALT U/L  --   --   --   --   --  21 19   AST U/L  --   --   --   --   --  15 14   BILIRUBIN TOTAL mg/dL  --   --   --   --   --  0.4 0.4     CBC:  Results from last 7 days   Lab Units 07/19/25  0549 07/18/25  0437  "07/17/25  0602 07/16/25  0527 07/15/25  0603 07/14/25 2056 07/14/25  1126   WBC AUTO x10*3/uL 6.9 6.7 7.0 7.7 7.1 8.1 7.4   HEMOGLOBIN g/dL 10.2* 10.1* 10.1* 9.6* 9.1* 9.9* 8.9*   HEMATOCRIT % 31.6* 32.2* 32.3* 30.9* 28.0* 29.2* 28.6*   PLATELETS AUTO x10*3/uL 277 262 263 267 224 286 198   MCV fL 98 100 102* 104* 96 94 102*     COAG:   Results from last 7 days   Lab Units 07/14/25 2056   INR  1.0     ABO: No results found for: \"ABO\"  HEME/ENDO:  Results from last 7 days   Lab Units 07/14/25 2056   HEMOGLOBIN A1C % 5.1      CARDIAC:   Results from last 7 days   Lab Units 07/14/25 2056   TROPHSCMC ng/L 233*   BNP pg/mL 362*       ASSESSMENT AND PLAN:   64-year-old male with a history of NICM (s/p HM3 in 2023) s/p OHT on 6/21/2025, pAF, CAD, CKD, HTN, BPH, GERD, hypothyroidism, presents for readmission from outpatient transplant follow-up due to clinical decline. He was discharged on 7/9 after an initial postoperative course complicated by pneumomediastinum (managed conservatively), steroid-induced hyperglycemia, and LUE/RLE weakness. He was admitted with concerns of deconditioning, respiratory distress, and DEBBIE.      Neuro:  # Anxiety. 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 He was recommended outpatient EMG/NCS to further characterize injury. Additionally was noted to have RLE weakness attributed to seroma related femoral nerve compression. concerning for femoral nerve injury.   - Aggressive PT/OT as tolerated  - Continue home Gabapentin  - Neurology follow up along with EMG/NCS outpatient     # Falls  Reports having " fallen twice at home. First fall happened when he was climbing a set of stairs and his foot caught on the step.  The second was when his screen door caught the back of his heel as it was swinging closed.  Denies LOC. Suspect mechanical in etiology given underlying weakness, no concern for CVA at this time.   - C/w aggressive PT/OT     Cardiovascular:  #NICM s/p HM2 (2023) s/p OHT on 6/21  Underwent RHC and biopsy at clinic visit 7/14 which showed: 121/69 (85), RA 4, PA 32/16 (21), PCWP 10; Raven CI 3.8 and Thermodilution CI 2.4.  Biopsy result negative.  -Next biopsy week 7/21.  Tentatively plan for 07/23.  - Continue home hydralazine 50 TID  - TTE (7/14): Pending  - Admit Lactate 1.4   - Admit BNP- 362  Donor/Recipient Infectious history:   CMV: -/-   EBV: +/+   Toxo: -/-   Heb B: -/-  Hep C: -/-  HIV: -/-      Rejection/Prophylaxis:     - Induction: s/p methylprednisolone 500mg IV x2, S/p Basiliximab 20mg IV within 1 hour of arrival to CTICU (no need for premedication), s/p 2nd dose: 20mg IVPB on POD4 (06/25)   - Steroids: Prednisone 25mg daily (reduced 7/14 after negative biopsy  - Tacrolimus: 1.5mg twice daily  - Tacrolimus goal troughs: 10-12; Last tacrolimus trough: 9.0 7/19/2025:  5:49 AM   - Cellcept: 1,000mg BID   - Antifungals: voriconazole 200mg BID x3 months end date 09/22/2025   - Antivirals: acylovir 400mg BID x3 months end date 09/22/2025   - Anti PCP & Toxoplasmosis: SS Bactrim daily end date of 12/22/25     Last cardiac biopsy: 7/14: Pending   Last RHC: 7/14 121/69 (85), RA 4, PA 32/16 (21), PCWP 10; Raven CI 3.8 and Thermodilution CI 2.4.   Last TTE/BOBBY: 7/8 - difficult views 2/2 air  Last HLA: Pre-transplant: PRAs 0%; First due on 7/21  Last Allomap: ~ will assess starting at 6-month ana post-op    Last Allosure: ~ will assess starting at 6-month ana post-op   Last C: 03/2023(pre OHT). First due ~ (one year post tx) due 6/2026  Osteopenia/osteoporosis prophylaxis: Vit D 2000U daily and calcium  500 mg BID (give calcium 2 hrs after MMF)   Peptic/gastric ulcer prophylaxis: Pantoprazole 40mg daily    CAV Prophylaxis: Aspirin 81mg daily and Pravastatin 40mg daily   Pacing: Epicardial wires removed 07/07      Problems:   # H/o Subcutaneous emphysema and pneumomediastinum   - Observed during recent admission, managed conservatively after consultations with Cam Birch and Carroll.      Pulmonary:   #Prior smoker  #Cough  #Small stable b/l pleural effusions  #Small L apical pneumothorax  Stable unchanged b/l effusions, and questionable small apical L pneumothorax noted on admit CXR compared to prior. Respiratorily stable, without leukocytosis and afebrile, low concern for active pneumonia at this time.   - C/w Doxycycline 100mg BID (07/16-07/20)  - C.w Torsemide 20mg   - Monitor and maintain SpO2 > 92%    GI:  #H/o C. Difficile infection s/p tx with po vanc  - Bowel regimen ordered   - PPI     :  #DEBBIE  #BPH  Suspect prerenal in etiology given self reported increase in home diuretics, BUN/Cr ratio, RHC hemodynamics, and improvement in renal function while holding diuretics.   -Baseline BUN/Cr WNL  -Admit BUN/Cr: 73/1.77  -I/Os  -Continue home tamsulosin     Heme:  #H/o anemia  -H&H on admit 9.9/29.2     Endo:  #PreDM  #Steroid induced hyperglycemia  -HgA1c on admit- 5.1%  - Endo consulted during recent admission, home regimen is the following:  - Insulin NPH 15u to be taken with 25mg prednisone daily  - Can titrate insulin down to 10u as steroids taper  - Transition to SSI while inpatient      #Hypothyroidism  -last TSH (3/26/25)--> 1.83  -Continue home levothyroxine      ID:  -afebrile, nontoxic    Concern for pulm infection   - Start Doxycycline 100mg BID   - If febrile, send cx and broaden abx   - trend temps q4h     PHYSICAL AND OCCUPATIONAL THERAPY:    LINES:    DVT: SubQ heparin  VAP BUNDLE:  CENTRAL LINE BUNDLE:  ULCER PPX: Pantoprazole  GLYCEMIC CONTROL: Insulin regimen  BOWEL CARE: Pericolace  INDWELLING  CATHETER:  NUTRITION: Adult diet Regular      EMERGENCY CONTACT: Extended Emergency Contact Information  Primary Emergency Contact: Janice Ramos  Address: [address] [place] [address] [facility]  Home Phone: [phone]  Mobile Phone: [phone]  Relation: Mother  Secondary Emergency Contact: Bird Lane  Address: [address]           [place] [address] [facility]  Home Phone: [phone]  Mobile Phone: [phone]  Relation: Son  Preferred language: English   needed? No  FAMILY UPDATE:  CODE STATUS: Full Code  DISPO:     Patient seen and assessed with Dr. Chao   _________________________________________________  Dilia Holly MD      He continues to feel well will complete a course of doxycycline edema is improving but renal function is likewise stable to improving blood pressure is controlled may consider switching him to a calcium channel blocker for medication simplicity my only hesitation is that it may worsen his lower extremity edema, increase tacrolimus level slightly as his level continues to fall we will wait for Monday to evaluate his disposition options    I saw and evaluated the patient. I personally obtained the key and critical portions of the history and physical exam or was physically present for key and critical portions performed by the resident/fellow. I reviewed the resident/fellow's documentation and discussed the patient with the resident/fellow. I agree with the resident/fellow's medical decision making as documented in the note.

## 2025-07-19 NOTE — CARE PLAN
Problem: Pain - Adult  Goal: Verbalizes/displays adequate comfort level or baseline comfort level  Outcome: Progressing     Problem: Safety - Adult  Goal: Free from fall injury  Outcome: Progressing     Problem: Discharge Planning  Goal: Discharge to home or other facility with appropriate resources  Outcome: Progressing     Problem: Chronic Conditions and Co-morbidities  Goal: Patient's chronic conditions and co-morbidity symptoms are monitored and maintained or improved  Outcome: Progressing     Problem: Nutrition  Goal: Nutrient intake appropriate for maintaining nutritional needs  Outcome: Progressing   The patient's goals for the shift include      The clinical goals for the shift include safety

## 2025-07-20 VITALS
HEART RATE: 89 BPM | WEIGHT: 204.8 LBS | OXYGEN SATURATION: 94 % | TEMPERATURE: 97.9 F | SYSTOLIC BLOOD PRESSURE: 154 MMHG | RESPIRATION RATE: 18 BRPM | BODY MASS INDEX: 30.24 KG/M2 | DIASTOLIC BLOOD PRESSURE: 88 MMHG

## 2025-07-20 LAB
ALBUMIN SERPL BCP-MCNC: 3.3 G/DL (ref 3.4–5)
ANION GAP SERPL CALC-SCNC: 13 MMOL/L (ref 10–20)
BASOPHILS # BLD AUTO: 0.01 X10*3/UL (ref 0–0.1)
BASOPHILS NFR BLD AUTO: 0.1 %
BUN SERPL-MCNC: 35 MG/DL (ref 6–23)
CALCIUM SERPL-MCNC: 8.3 MG/DL (ref 8.6–10.6)
CHLORIDE SERPL-SCNC: 105 MMOL/L (ref 98–107)
CO2 SERPL-SCNC: 26 MMOL/L (ref 21–32)
CREAT SERPL-MCNC: 0.89 MG/DL (ref 0.5–1.3)
EGFRCR SERPLBLD CKD-EPI 2021: >90 ML/MIN/1.73M*2
EOSINOPHIL # BLD AUTO: 0.05 X10*3/UL (ref 0–0.7)
EOSINOPHIL NFR BLD AUTO: 0.6 %
ERYTHROCYTE [DISTWIDTH] IN BLOOD BY AUTOMATED COUNT: 18.3 % (ref 11.5–14.5)
GLUCOSE SERPL-MCNC: 152 MG/DL (ref 74–99)
HCT VFR BLD AUTO: 33.9 % (ref 41–52)
HGB BLD-MCNC: 10.3 G/DL (ref 13.5–17.5)
IMM GRANULOCYTES # BLD AUTO: 0.11 X10*3/UL (ref 0–0.7)
IMM GRANULOCYTES NFR BLD AUTO: 1.4 % (ref 0–0.9)
LYMPHOCYTES # BLD AUTO: 0.99 X10*3/UL (ref 1.2–4.8)
LYMPHOCYTES NFR BLD AUTO: 12.8 %
MAGNESIUM SERPL-MCNC: 1.75 MG/DL (ref 1.6–2.4)
MCH RBC QN AUTO: 31.1 PG (ref 26–34)
MCHC RBC AUTO-ENTMCNC: 30.4 G/DL (ref 32–36)
MCV RBC AUTO: 102 FL (ref 80–100)
MONOCYTES # BLD AUTO: 0.47 X10*3/UL (ref 0.1–1)
MONOCYTES NFR BLD AUTO: 6.1 %
NEUTROPHILS # BLD AUTO: 6.09 X10*3/UL (ref 1.2–7.7)
NEUTROPHILS NFR BLD AUTO: 79 %
NRBC BLD-RTO: 0 /100 WBCS (ref 0–0)
PHOSPHATE SERPL-MCNC: 2.7 MG/DL (ref 2.5–4.9)
PLATELET # BLD AUTO: 297 X10*3/UL (ref 150–450)
POTASSIUM SERPL-SCNC: 4.2 MMOL/L (ref 3.5–5.3)
RBC # BLD AUTO: 3.31 X10*6/UL (ref 4.5–5.9)
SODIUM SERPL-SCNC: 140 MMOL/L (ref 136–145)
TACROLIMUS BLD-MCNC: 9.1 NG/ML
WBC # BLD AUTO: 7.7 X10*3/UL (ref 4.4–11.3)

## 2025-07-20 PROCEDURE — 2500000002 HC RX 250 W HCPCS SELF ADMINISTERED DRUGS (ALT 637 FOR MEDICARE OP, ALT 636 FOR OP/ED)

## 2025-07-20 PROCEDURE — 2500000004 HC RX 250 GENERAL PHARMACY W/ HCPCS (ALT 636 FOR OP/ED)

## 2025-07-20 PROCEDURE — 2500000001 HC RX 250 WO HCPCS SELF ADMINISTERED DRUGS (ALT 637 FOR MEDICARE OP): Performed by: STUDENT IN AN ORGANIZED HEALTH CARE EDUCATION/TRAINING PROGRAM

## 2025-07-20 PROCEDURE — 83735 ASSAY OF MAGNESIUM: CPT

## 2025-07-20 PROCEDURE — 36415 COLL VENOUS BLD VENIPUNCTURE: CPT

## 2025-07-20 PROCEDURE — 2500000001 HC RX 250 WO HCPCS SELF ADMINISTERED DRUGS (ALT 637 FOR MEDICARE OP)

## 2025-07-20 PROCEDURE — 85025 COMPLETE CBC W/AUTO DIFF WBC: CPT

## 2025-07-20 PROCEDURE — 80069 RENAL FUNCTION PANEL: CPT

## 2025-07-20 PROCEDURE — 80197 ASSAY OF TACROLIMUS: CPT

## 2025-07-20 PROCEDURE — 99232 SBSQ HOSP IP/OBS MODERATE 35: CPT | Performed by: INTERNAL MEDICINE

## 2025-07-20 PROCEDURE — 1100000001 HC PRIVATE ROOM DAILY

## 2025-07-20 PROCEDURE — 2500000004 HC RX 250 GENERAL PHARMACY W/ HCPCS (ALT 636 FOR OP/ED): Performed by: INTERNAL MEDICINE

## 2025-07-20 PROCEDURE — 2500000001 HC RX 250 WO HCPCS SELF ADMINISTERED DRUGS (ALT 637 FOR MEDICARE OP): Performed by: INTERNAL MEDICINE

## 2025-07-20 RX ORDER — HYDRALAZINE HYDROCHLORIDE 10 MG/1
25 TABLET, FILM COATED ORAL 3 TIMES DAILY
Status: DISCONTINUED | OUTPATIENT
Start: 2025-07-20 | End: 2025-07-21 | Stop reason: HOSPADM

## 2025-07-20 RX ADMIN — MYCOPHENOLATE MOFETIL 1000 MG: 250 CAPSULE ORAL at 22:18

## 2025-07-20 RX ADMIN — ACETAMINOPHEN 650 MG: 325 TABLET ORAL at 06:52

## 2025-07-20 RX ADMIN — HYDRALAZINE HYDROCHLORIDE 25 MG: 10 TABLET ORAL at 15:09

## 2025-07-20 RX ADMIN — CALCIUM 1 TABLET: 500 TABLET ORAL at 18:20

## 2025-07-20 RX ADMIN — SULFAMETHOXAZOLE AND TRIMETHOPRIM 1 TABLET: 400; 80 TABLET ORAL at 09:11

## 2025-07-20 RX ADMIN — TACROLIMUS 0.5 MG: 0.5 CAPSULE ORAL at 06:52

## 2025-07-20 RX ADMIN — DOXYCYCLINE HYCLATE 100 MG: 100 TABLET, FILM COATED ORAL at 22:17

## 2025-07-20 RX ADMIN — LEVOTHYROXINE SODIUM 125 MCG: 0.07 TABLET ORAL at 06:52

## 2025-07-20 RX ADMIN — ASPIRIN 81 MG: 81 TABLET, COATED ORAL at 09:11

## 2025-07-20 RX ADMIN — MYCOPHENOLATE MOFETIL 1000 MG: 250 CAPSULE ORAL at 09:12

## 2025-07-20 RX ADMIN — INSULIN HUMAN 15 UNITS: 100 INJECTION, SUSPENSION SUBCUTANEOUS at 09:10

## 2025-07-20 RX ADMIN — TACROLIMUS 1 MG: 0.5 CAPSULE ORAL at 18:20

## 2025-07-20 RX ADMIN — GABAPENTIN 300 MG: 300 CAPSULE ORAL at 09:11

## 2025-07-20 RX ADMIN — TACROLIMUS 0.5 MG: 0.5 CAPSULE ORAL at 18:20

## 2025-07-20 RX ADMIN — PANTOPRAZOLE SODIUM 40 MG: 40 TABLET, DELAYED RELEASE ORAL at 09:11

## 2025-07-20 RX ADMIN — DOXYCYCLINE HYCLATE 100 MG: 100 TABLET, FILM COATED ORAL at 09:11

## 2025-07-20 RX ADMIN — HYDRALAZINE HYDROCHLORIDE 50 MG: 50 TABLET ORAL at 09:11

## 2025-07-20 RX ADMIN — VORICONAZOLE 200 MG: 200 TABLET, COATED ORAL at 22:15

## 2025-07-20 RX ADMIN — ACYCLOVIR 400 MG: 400 TABLET ORAL at 22:16

## 2025-07-20 RX ADMIN — PRAVASTATIN SODIUM 40 MG: 40 TABLET ORAL at 22:15

## 2025-07-20 RX ADMIN — Medication 50 MCG: at 09:11

## 2025-07-20 RX ADMIN — CALCIUM 1 TABLET: 500 TABLET ORAL at 09:11

## 2025-07-20 RX ADMIN — HYDRALAZINE HYDROCHLORIDE 25 MG: 10 TABLET ORAL at 22:14

## 2025-07-20 RX ADMIN — VORICONAZOLE 200 MG: 200 TABLET, COATED ORAL at 09:11

## 2025-07-20 RX ADMIN — ACYCLOVIR 400 MG: 400 TABLET ORAL at 09:11

## 2025-07-20 RX ADMIN — TACROLIMUS 1 MG: 0.5 CAPSULE ORAL at 06:53

## 2025-07-20 RX ADMIN — PREDNISONE 25 MG: 20 TABLET ORAL at 09:11

## 2025-07-20 RX ADMIN — TORSEMIDE 20 MG: 20 TABLET ORAL at 09:11

## 2025-07-20 RX ADMIN — TAMSULOSIN HYDROCHLORIDE 0.4 MG: 0.4 CAPSULE ORAL at 09:11

## 2025-07-20 ASSESSMENT — COGNITIVE AND FUNCTIONAL STATUS - GENERAL
DAILY ACTIVITIY SCORE: 24
CLIMB 3 TO 5 STEPS WITH RAILING: A LITTLE
MOBILITY SCORE: 23

## 2025-07-20 NOTE — PROGRESS NOTES
[facility]  PROGRESS NOTE    Anatoly Lane/[id number]    Admit Date: 7/14/2025  Hospital Length of Stay: 6   ICU Length of Stay: 1d 2h   Primary Service: HF  Primary HF Cardiologist: Dr Hylton    INTERVAL EVENTS / PERTINENT ROS:   - UOP net -1.7 L yesterday.  Creatinine has been stable.  -  working on SNF placement  - Tac level today is Last tacrolimus trough: 9.1 7/20/2025:  6:33 AM       Plan:  - continue tacrolimus 1.5 mg twice daily  - Diuresis: continue torsemide 20 mg PO daily with goal net even  - Antibiotic course to end today  - Continue with PT/OT-rec towards SNF, per CM patient's preferred facility unable to accept as he is too high functioning  - Prior to discharge coordinate EMBx due week of 7/21    MEDICATIONS  Infusions:     Scheduled:  acyclovir, 400 mg, BID  aspirin, 81 mg, Daily  calcium carbonate, 1,250 mg of calcium carbonate, BID  cholecalciferol, 50 mcg, Daily  doxycylcine, 100 mg, q12h LUPILLO  gabapentin, 300 mg, Daily  heparin, 5,000 Units, q8h  hydrALAZINE, 25 mg, TID  insulin NPH (Isophane), 15 Units, Daily  levothyroxine, 125 mcg, Daily  mycophenolate, 1,000 mg, q12h LUPILLO  pantoprazole, 40 mg, Daily  pravastatin, 40 mg, Nightly  predniSONE, 25 mg, Daily  sennosides-docusate sodium, 1 tablet, Nightly  sulfamethoxazole-trimethoprim, 1 tablet, Daily  tacrolimus, 0.5 mg, q12h LUPILLO  tacrolimus, 1 mg, BID  tamsulosin, 0.4 mg, Daily  torsemide, 20 mg, Daily  voriconazole, 200 mg, q12h LUPILLO      PRN:  acetaminophen, 650 mg, q6h PRN  dextromethorphan-guaifenesin, 5 mL, q8h PRN  dextrose, 12.5 g, q15 min PRN  dextrose, 25 g, q15 min PRN  glucagon, 1 mg, q15 min PRN  glucagon, 1 mg, q15 min PRN  oxygen, , Continuous PRN - O2/gases  polyethylene glycol, 17 g, Daily PRN  sucralfate, 1 g, q6h PRN  traMADol, 50 mg, q6h PRN        PHYSICAL EXAM:   Visit Vitals  /81 (BP Location: Left arm, Patient Position: Lying)   Pulse 93   Temp 37 °C (98.6 °F) (Temporal)   Resp  18   Wt 92.9 kg (204 lb 12.8 oz)   SpO2 94%   BMI 30.24 kg/m²   Smoking Status Former   BSA 2.13 m²       Wt Readings from Last 5 Encounters:   07/20/25 92.9 kg (204 lb 12.8 oz)   07/14/25 94.9 kg (209 lb 4.8 oz)   07/14/25 98.4 kg (216 lb 14.9 oz)   07/09/25 98.4 kg (216 lb 14.4 oz)   02/27/25 102 kg (224 lb)       INTAKE/OUTPUT:  I/O last 3 completed shifts:  In: 906 (9.8 mL/kg) [P.O.:906]  Out: 3100 (33.4 mL/kg) [Urine:3100 (0.9 mL/kg/hr)]  Weight: 92.9 kg      Physical Exam  Physical Exam  GEN: resting in NAD  HEENT: EOMI, no scleral icterus or injection  PULM: normal work of breathing  CV: regular rate and rhythm, JVP below clavicle at 45 degrees  ABD: non tender, non distended. No guarding or rebound.  EXT: no peripheral edema, warm and well perfused.  SKIN: no rashes or lesions noted  MSK: moves all extremities without visible impairment  NEURO: CN II-XII, motor function and sensation grossly intact  PSYCH: Appropriate mood and affect, normal speech and cognition      DATA:  CMP:  Results from last 7 days   Lab Units 07/20/25  0633 07/19/25  0549 07/18/25  0437 07/17/25  0602 07/16/25  0527 07/15/25  0603 07/14/25  2056 07/14/25  1126   SODIUM mmol/L 140 139 140 140 139 142 140 141   POTASSIUM mmol/L 4.2 4.6 4.3 4.6 4.7 4.0 3.9 3.5   CHLORIDE mmol/L 105 103 104 104 105 103 101 104   CO2 mmol/L 26 29 28 28 27 30 28 22   ANION GAP mmol/L 13 12 12 13 12 13 15 19   BUN mg/dL 35* 32* 32* 38* 53* 63* 73* 67*   CREATININE mg/dL 0.89 0.84 1.30 1.08 1.34* 1.27 1.77* 1.62*   EGFR mL/min/1.73m*2 >90 >90 61 77 59* 63 42* 47*   MAGNESIUM mg/dL 1.75 1.84 1.47* 1.66 1.90 1.87 1.69 1.67   ALBUMIN g/dL 3.3* 3.3* 3.4 3.4 3.2* 3.1* 3.5 3.2*   ALT U/L  --   --   --   --   --   --  21 19   AST U/L  --   --   --   --   --   --  15 14   BILIRUBIN TOTAL mg/dL  --   --   --   --   --   --  0.4 0.4     CBC:  Results from last 7 days   Lab Units 07/20/25  0633 07/19/25  0549 07/18/25  0437 07/17/25  0602 07/16/25  0527 07/15/25  0603  "07/14/25 2056 07/14/25  1126   WBC AUTO x10*3/uL 7.7 6.9 6.7 7.0 7.7 7.1 8.1 7.4   HEMOGLOBIN g/dL 10.3* 10.2* 10.1* 10.1* 9.6* 9.1* 9.9* 8.9*   HEMATOCRIT % 33.9* 31.6* 32.2* 32.3* 30.9* 28.0* 29.2* 28.6*   PLATELETS AUTO x10*3/uL 297 277 262 263 267 224 286 198   MCV fL 102* 98 100 102* 104* 96 94 102*     COAG:   Results from last 7 days   Lab Units 07/14/25 2056   INR  1.0     ABO: No results found for: \"ABO\"  HEME/ENDO:  Results from last 7 days   Lab Units 07/14/25 2056   HEMOGLOBIN A1C % 5.1      CARDIAC:   Results from last 7 days   Lab Units 07/14/25 2056   TROPHSCMC ng/L 233*   BNP pg/mL 362*       ASSESSMENT AND PLAN:   64-year-old male with a history of NICM (s/p HM3 in 2023) s/p OHT on 6/21/2025, pAF, CAD, CKD, HTN, BPH, GERD, hypothyroidism, presents for readmission from outpatient transplant follow-up due to clinical decline. He was discharged on 7/9 after an initial postoperative course complicated by pneumomediastinum (managed conservatively), steroid-induced hyperglycemia, and LUE/RLE weakness. He was admitted with concerns of deconditioning, respiratory distress, and DEBBIE.      Neuro:  # Anxiety. 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 He was recommended outpatient EMG/NCS to further characterize injury. Additionally was noted to have RLE weakness attributed to seroma related femoral nerve compression. concerning for femoral nerve injury.   - Aggressive PT/OT as tolerated  - Continue home Gabapentin  - Neurology follow up along with EMG/NCS outpatient     # Falls  Reports having fallen twice at home. " First fall happened when he was climbing a set of stairs and his foot caught on the step.  The second was when his screen door caught the back of his heel as it was swinging closed.  Denies LOC. Suspect mechanical in etiology given underlying weakness, no concern for CVA at this time.   - C/w aggressive PT/OT     Cardiovascular:  #NICM s/p HM2 (2023) s/p OHT on 6/21  Underwent RHC and biopsy at clinic visit 7/14 which showed: 121/69 (85), RA 4, PA 32/16 (21), PCWP 10; Raven CI 3.8 and Thermodilution CI 2.4.  Biopsy result negative.  -Next biopsy week 7/21.  Tentatively plan for 07/23.  - Continue home hydralazine 50 TID  - TTE (7/14): Pending  - Admit Lactate 1.4   - Admit BNP- 362  Donor/Recipient Infectious history:   CMV: -/-   EBV: +/+   Toxo: -/-   Heb B: -/-  Hep C: -/-  HIV: -/-      Rejection/Prophylaxis:     - Induction: s/p methylprednisolone 500mg IV x2, S/p Basiliximab 20mg IV within 1 hour of arrival to CTICU (no need for premedication), s/p 2nd dose: 20mg IVPB on POD4 (06/25)   - Steroids: Prednisone 25mg daily (reduced 7/14 after negative biopsy  - Tacrolimus: 1.5mg twice daily  - Tacrolimus goal troughs: 10-12; Last tacrolimus trough: 9.1 7/20/2025:  6:33 AM   - Cellcept: 1,000mg BID   - Antifungals: voriconazole 200mg BID x3 months end date 09/22/2025   - Antivirals: acylovir 400mg BID x3 months end date 09/22/2025   - Anti PCP & Toxoplasmosis: SS Bactrim daily end date of 12/22/25     Last cardiac biopsy: 7/14: Pending   Last RHC: 7/14 121/69 (85), RA 4, PA 32/16 (21), PCWP 10; Raven CI 3.8 and Thermodilution CI 2.4.   Last TTE/BOBBY: 7/8 - difficult views 2/2 air  Last HLA: Pre-transplant: PRAs 0%; First due on 7/21  Last Allomap: ~ will assess starting at 6-month ana post-op    Last Allosure: ~ will assess starting at 6-month ana post-op   Last C: 03/2023(pre OHT). First due ~ (one year post tx) due 6/2026  Osteopenia/osteoporosis prophylaxis: Vit D 2000U daily and calcium 500 mg BID (give  calcium 2 hrs after MMF)   Peptic/gastric ulcer prophylaxis: Pantoprazole 40mg daily    CAV Prophylaxis: Aspirin 81mg daily and Pravastatin 40mg daily   Pacing: Epicardial wires removed 07/07      Problems:   # H/o Subcutaneous emphysema and pneumomediastinum   - Observed during recent admission, managed conservatively after consultations with Cam Birch and Carroll.      Pulmonary:   #Prior smoker  #Cough  #Small stable b/l pleural effusions  #Small L apical pneumothorax  Stable unchanged b/l effusions, and questionable small apical L pneumothorax noted on admit CXR compared to prior. Respiratorily stable, without leukocytosis and afebrile, low concern for active pneumonia at this time.   - C/w Doxycycline 100mg BID (07/16-07/20)  - C.w Torsemide 20mg   - Monitor and maintain SpO2 > 92%    GI:  #H/o C. Difficile infection s/p tx with po vanc  - Bowel regimen ordered   - PPI     :  #DEBBIE  #BPH  Suspect prerenal in etiology given self reported increase in home diuretics, BUN/Cr ratio, RHC hemodynamics, and improvement in renal function while holding diuretics.   -Baseline BUN/Cr WNL  -Admit BUN/Cr: 73/1.77  -I/Os  -Continue home tamsulosin     Heme:  #H/o anemia  -H&H on admit 9.9/29.2     Endo:  #PreDM  #Steroid induced hyperglycemia  -HgA1c on admit- 5.1%  - Endo consulted during recent admission, home regimen is the following:  - Insulin NPH 15u to be taken with 25mg prednisone daily  - Can titrate insulin down to 10u as steroids taper  - Transition to SSI while inpatient      #Hypothyroidism  -last TSH (3/26/25)--> 1.83  -Continue home levothyroxine      ID:  -afebrile, nontoxic    Concern for pulm infection   - Start Doxycycline 100mg BID   - If febrile, send cx and broaden abx   - trend temps q4h     PHYSICAL AND OCCUPATIONAL THERAPY:    LINES:    DVT: SubQ heparin  VAP BUNDLE:  CENTRAL LINE BUNDLE:  ULCER PPX: Pantoprazole  GLYCEMIC CONTROL: Insulin regimen  BOWEL CARE: Pericolace  INDWELLING  CATHETER:  NUTRITION: Adult diet Regular      EMERGENCY CONTACT: Extended Emergency Contact Information  Primary Emergency Contact: Janice Ramos  Address: [address] [place] [address] [facility]  Home Phone: [phone]  Mobile Phone: [phone]  Relation: Mother  Secondary Emergency Contact: Bird Lane  Address: [address]           [place] [address] [facility]  Home Phone: [phone]  Mobile Phone: [phone]  Relation: Son  Preferred language: English   needed? No  FAMILY UPDATE:  CODE STATUS: Full Code  DISPO:     Patient seen and assessed with Dr. Chao   _________________________________________________  Dilia Holly MD      He continues to feel well renal function and edema have improved markedly over the past few days I suspect his DEBBIE was mostly due to high tacrolimus level would like to simplify his regimen a bit we will discontinue transitioning him from hydralazine to a once daily medication although I am not sure that he necessarily needs anything as his hydralazine was held for low blood pressure yesterday we will reduce the dose today may add low-dose calcium channel blocker combo like was not sure he will need 20 mg of torsemide daily at discharge potentially reduce tomorrow      I saw and evaluated the patient. I personally obtained the key and critical portions of the history and physical exam or was physically present for key and critical portions performed by the resident/fellow. I reviewed the resident/fellow's documentation and discussed the patient with the resident/fellow. I agree with the resident/fellow's medical decision making as documented in the note.

## 2025-07-21 ENCOUNTER — TELEPHONE (OUTPATIENT)
Dept: TRANSPLANT | Facility: HOSPITAL | Age: 65
End: 2025-07-21
Payer: MEDICARE

## 2025-07-21 ENCOUNTER — HOSPITAL ENCOUNTER (OUTPATIENT)
Facility: HOSPITAL | Age: 65
Setting detail: OUTPATIENT SURGERY
End: 2025-07-21
Attending: INTERNAL MEDICINE | Admitting: INTERNAL MEDICINE
Payer: MEDICARE

## 2025-07-21 VITALS
OXYGEN SATURATION: 93 % | SYSTOLIC BLOOD PRESSURE: 126 MMHG | HEART RATE: 93 BPM | WEIGHT: 202.5 LBS | TEMPERATURE: 97.3 F | DIASTOLIC BLOOD PRESSURE: 83 MMHG | RESPIRATION RATE: 18 BRPM | BODY MASS INDEX: 29.9 KG/M2

## 2025-07-21 DIAGNOSIS — Z94.1 HEART TRANSPLANT RECIPIENT: ICD-10-CM

## 2025-07-21 DIAGNOSIS — Z94.1 HEART REPLACED BY TRANSPLANT: ICD-10-CM

## 2025-07-21 DIAGNOSIS — Z94.1 S/P ORTHOTOPIC HEART TRANSPLANT: ICD-10-CM

## 2025-07-21 PROBLEM — R06.02 SOB (SHORTNESS OF BREATH): Status: RESOLVED | Noted: 2025-07-14 | Resolved: 2025-07-21

## 2025-07-21 LAB
ALBUMIN SERPL BCP-MCNC: 3.5 G/DL (ref 3.4–5)
ANION GAP SERPL CALC-SCNC: 13 MMOL/L (ref 10–20)
BASOPHILS # BLD AUTO: 0.02 X10*3/UL (ref 0–0.1)
BASOPHILS NFR BLD AUTO: 0.2 %
BUN SERPL-MCNC: 37 MG/DL (ref 6–23)
CALCIUM SERPL-MCNC: 8.7 MG/DL (ref 8.6–10.6)
CHLORIDE SERPL-SCNC: 104 MMOL/L (ref 98–107)
CO2 SERPL-SCNC: 29 MMOL/L (ref 21–32)
CREAT SERPL-MCNC: 1.04 MG/DL (ref 0.5–1.3)
EGFRCR SERPLBLD CKD-EPI 2021: 80 ML/MIN/1.73M*2
EOSINOPHIL # BLD AUTO: 0.02 X10*3/UL (ref 0–0.7)
EOSINOPHIL NFR BLD AUTO: 0.2 %
ERYTHROCYTE [DISTWIDTH] IN BLOOD BY AUTOMATED COUNT: 18.3 % (ref 11.5–14.5)
GLUCOSE SERPL-MCNC: 108 MG/DL (ref 74–99)
HCT VFR BLD AUTO: 33.9 % (ref 41–52)
HGB BLD-MCNC: 10.8 G/DL (ref 13.5–17.5)
IMM GRANULOCYTES # BLD AUTO: 0.14 X10*3/UL (ref 0–0.7)
IMM GRANULOCYTES NFR BLD AUTO: 1.6 % (ref 0–0.9)
LYMPHOCYTES # BLD AUTO: 0.93 X10*3/UL (ref 1.2–4.8)
LYMPHOCYTES NFR BLD AUTO: 10.9 %
MAGNESIUM SERPL-MCNC: 1.64 MG/DL (ref 1.6–2.4)
MCH RBC QN AUTO: 31.3 PG (ref 26–34)
MCHC RBC AUTO-ENTMCNC: 31.9 G/DL (ref 32–36)
MCV RBC AUTO: 98 FL (ref 80–100)
MONOCYTES # BLD AUTO: 0.52 X10*3/UL (ref 0.1–1)
MONOCYTES NFR BLD AUTO: 6.1 %
NEUTROPHILS # BLD AUTO: 6.89 X10*3/UL (ref 1.2–7.7)
NEUTROPHILS NFR BLD AUTO: 81 %
NRBC BLD-RTO: 0 /100 WBCS (ref 0–0)
PHOSPHATE SERPL-MCNC: 3.3 MG/DL (ref 2.5–4.9)
PLATELET # BLD AUTO: 305 X10*3/UL (ref 150–450)
POTASSIUM SERPL-SCNC: 4.2 MMOL/L (ref 3.5–5.3)
RBC # BLD AUTO: 3.45 X10*6/UL (ref 4.5–5.9)
SODIUM SERPL-SCNC: 142 MMOL/L (ref 136–145)
TACROLIMUS BLD-MCNC: 9.1 NG/ML
WBC # BLD AUTO: 8.5 X10*3/UL (ref 4.4–11.3)

## 2025-07-21 PROCEDURE — 2500000004 HC RX 250 GENERAL PHARMACY W/ HCPCS (ALT 636 FOR OP/ED)

## 2025-07-21 PROCEDURE — 2500000001 HC RX 250 WO HCPCS SELF ADMINISTERED DRUGS (ALT 637 FOR MEDICARE OP)

## 2025-07-21 PROCEDURE — 80197 ASSAY OF TACROLIMUS: CPT

## 2025-07-21 PROCEDURE — 80069 RENAL FUNCTION PANEL: CPT

## 2025-07-21 PROCEDURE — 83735 ASSAY OF MAGNESIUM: CPT

## 2025-07-21 PROCEDURE — 36415 COLL VENOUS BLD VENIPUNCTURE: CPT

## 2025-07-21 PROCEDURE — 2500000002 HC RX 250 W HCPCS SELF ADMINISTERED DRUGS (ALT 637 FOR MEDICARE OP, ALT 636 FOR OP/ED)

## 2025-07-21 PROCEDURE — 97116 GAIT TRAINING THERAPY: CPT | Mod: GP,CQ

## 2025-07-21 PROCEDURE — 97535 SELF CARE MNGMENT TRAINING: CPT | Mod: GO

## 2025-07-21 PROCEDURE — 2500000001 HC RX 250 WO HCPCS SELF ADMINISTERED DRUGS (ALT 637 FOR MEDICARE OP): Performed by: INTERNAL MEDICINE

## 2025-07-21 PROCEDURE — 2500000001 HC RX 250 WO HCPCS SELF ADMINISTERED DRUGS (ALT 637 FOR MEDICARE OP): Performed by: STUDENT IN AN ORGANIZED HEALTH CARE EDUCATION/TRAINING PROGRAM

## 2025-07-21 PROCEDURE — 99239 HOSP IP/OBS DSCHRG MGMT >30: CPT | Performed by: STUDENT IN AN ORGANIZED HEALTH CARE EDUCATION/TRAINING PROGRAM

## 2025-07-21 PROCEDURE — 2500000004 HC RX 250 GENERAL PHARMACY W/ HCPCS (ALT 636 FOR OP/ED): Performed by: INTERNAL MEDICINE

## 2025-07-21 PROCEDURE — 97530 THERAPEUTIC ACTIVITIES: CPT | Mod: GO

## 2025-07-21 PROCEDURE — 85025 COMPLETE CBC W/AUTO DIFF WBC: CPT

## 2025-07-21 RX ORDER — GUAIFENESIN/DEXTROMETHORPHAN 100-10MG/5
5 SYRUP ORAL EVERY 8 HOURS PRN
Qty: 118 ML | Refills: 0 | Status: CANCELLED | OUTPATIENT
Start: 2025-07-21

## 2025-07-21 RX ORDER — PREDNISONE 5 MG/1
25 TABLET ORAL DAILY
Qty: 150 TABLET | Refills: 0 | Status: SHIPPED | OUTPATIENT
Start: 2025-07-22 | End: 2025-07-22 | Stop reason: SDUPTHER

## 2025-07-21 RX ORDER — AMOXICILLIN 250 MG
1 CAPSULE ORAL NIGHTLY PRN
Qty: 30 TABLET | Refills: 0 | Status: ON HOLD | OUTPATIENT
Start: 2025-07-21 | End: 2025-08-20

## 2025-07-21 RX ORDER — TORSEMIDE 20 MG/1
20 TABLET ORAL DAILY PRN
Qty: 10 TABLET | Refills: 0 | Status: SHIPPED | OUTPATIENT
Start: 2025-07-21 | End: 2025-07-22 | Stop reason: SDUPTHER

## 2025-07-21 RX ORDER — HYDRALAZINE HYDROCHLORIDE 25 MG/1
25 TABLET, FILM COATED ORAL 3 TIMES DAILY
Qty: 270 TABLET | Refills: 0 | Status: SHIPPED | OUTPATIENT
Start: 2025-07-21 | End: 2025-07-22 | Stop reason: SDUPTHER

## 2025-07-21 RX ORDER — TACROLIMUS 1 MG/1
1 CAPSULE ORAL 2 TIMES DAILY
Qty: 60 CAPSULE | Refills: 2 | Status: SHIPPED | OUTPATIENT
Start: 2025-07-21 | End: 2025-07-22 | Stop reason: SDUPTHER

## 2025-07-21 RX ORDER — TACROLIMUS 0.5 MG/1
0.5 CAPSULE ORAL 2 TIMES DAILY
Qty: 60 CAPSULE | Refills: 11 | Status: SHIPPED | OUTPATIENT
Start: 2025-07-21 | End: 2025-07-22 | Stop reason: SDUPTHER

## 2025-07-21 RX ORDER — TORSEMIDE 20 MG/1
20 TABLET ORAL DAILY PRN
Qty: 10 TABLET | Refills: 0 | Status: CANCELLED | OUTPATIENT
Start: 2025-07-21 | End: 2025-08-20

## 2025-07-21 RX ADMIN — MYCOPHENOLATE MOFETIL 1000 MG: 250 CAPSULE ORAL at 09:38

## 2025-07-21 RX ADMIN — ACYCLOVIR 400 MG: 400 TABLET ORAL at 09:40

## 2025-07-21 RX ADMIN — SULFAMETHOXAZOLE AND TRIMETHOPRIM 1 TABLET: 400; 80 TABLET ORAL at 09:38

## 2025-07-21 RX ADMIN — LEVOTHYROXINE SODIUM 125 MCG: 0.07 TABLET ORAL at 06:41

## 2025-07-21 RX ADMIN — CALCIUM 1 TABLET: 500 TABLET ORAL at 09:40

## 2025-07-21 RX ADMIN — ACETAMINOPHEN 650 MG: 325 TABLET ORAL at 00:03

## 2025-07-21 RX ADMIN — INSULIN HUMAN 15 UNITS: 100 INJECTION, SUSPENSION SUBCUTANEOUS at 09:35

## 2025-07-21 RX ADMIN — TACROLIMUS 0.5 MG: 0.5 CAPSULE ORAL at 06:41

## 2025-07-21 RX ADMIN — PREDNISONE 25 MG: 20 TABLET ORAL at 09:37

## 2025-07-21 RX ADMIN — Medication 50 MCG: at 09:36

## 2025-07-21 RX ADMIN — PANTOPRAZOLE SODIUM 40 MG: 40 TABLET, DELAYED RELEASE ORAL at 09:39

## 2025-07-21 RX ADMIN — TACROLIMUS 1 MG: 0.5 CAPSULE ORAL at 06:41

## 2025-07-21 RX ADMIN — VORICONAZOLE 200 MG: 200 TABLET, COATED ORAL at 09:41

## 2025-07-21 RX ADMIN — TAMSULOSIN HYDROCHLORIDE 0.4 MG: 0.4 CAPSULE ORAL at 09:36

## 2025-07-21 RX ADMIN — TORSEMIDE 20 MG: 20 TABLET ORAL at 09:41

## 2025-07-21 RX ADMIN — HYDRALAZINE HYDROCHLORIDE 25 MG: 10 TABLET ORAL at 09:35

## 2025-07-21 RX ADMIN — ASPIRIN 81 MG: 81 TABLET, COATED ORAL at 09:39

## 2025-07-21 RX ADMIN — GABAPENTIN 300 MG: 300 CAPSULE ORAL at 09:38

## 2025-07-21 ASSESSMENT — COGNITIVE AND FUNCTIONAL STATUS - GENERAL
MOBILITY SCORE: 24
EATING MEALS: A LITTLE
MOBILITY SCORE: 22
DAILY ACTIVITIY SCORE: 24
HELP NEEDED FOR BATHING: A LITTLE
DRESSING REGULAR LOWER BODY CLOTHING: A LITTLE
WALKING IN HOSPITAL ROOM: A LITTLE
TOILETING: A LITTLE
CLIMB 3 TO 5 STEPS WITH RAILING: A LITTLE
DAILY ACTIVITIY SCORE: 20

## 2025-07-21 ASSESSMENT — PAIN - FUNCTIONAL ASSESSMENT
PAIN_FUNCTIONAL_ASSESSMENT: 0-10

## 2025-07-21 ASSESSMENT — PAIN DESCRIPTION - LOCATION: LOCATION: CHEST

## 2025-07-21 ASSESSMENT — PAIN SCALES - GENERAL
PAINLEVEL_OUTOF10: 0 - NO PAIN
PAINLEVEL_OUTOF10: 0 - NO PAIN
PAINLEVEL_OUTOF10: 2
PAINLEVEL_OUTOF10: 0 - NO PAIN

## 2025-07-21 ASSESSMENT — ACTIVITIES OF DAILY LIVING (ADL): HOME_MANAGEMENT_TIME_ENTRY: 12

## 2025-07-21 NOTE — DISCHARGE SUMMARY
Discharge Diagnosis  SOB (shortness of breath)    Issues Requiring Follow-Up      Test Results Pending At Discharge  Pending Labs       No current pending labs.            Hospital Course  64-year-old male with a history of NICM (s/p HM3 in 2023) s/p OHT on 6/21/2025, who presented for readmission from outpatient cardiology clinic follow-up due to concerns of deconditioning c/b falls, respiratory distress, and prerenal DEBBIE likely from overdiuresis. He underwent EMB in clinic which returned negative for rejection. Diuretics were withheld with improvement in renal function. Evaluated by PT and was initially thought to need SNF . Tacrolimus trough levels were elevated and dose was adjusted.He was transferred to the [facility]. DEBBIE continued to improve. Pt developed SOB and Torsemide was resumed and he was also started on doxycycline for concern of possible infection. Pt responded well to torsemide and is now euvolemic and completed course of doxycycline for 5 days. Pt reevaluated pt and deemed safe to go home with services. .    Neuro:  # Anxiety. Depression, Acute pain   # H/o BUE/BLE mild tremor  # H/o Back pain  - Gabapentin 300mg BID   - Follow up with neurology for EMG studies      # LUE weakness  # RLE weakness  - During prior admission, neurology was consulted for L hand palsy symptoms, which were thought to be secondary to brachial plexus injury as a result of sternotomy. He was recommended outpatient EMG/NCS to further characterize injury. Additionally was noted to have RLE weakness attributed to seroma related femoral nerve compression. concerning for femoral nerve injury.   - Aggressive PT/OT as tolerated  - Continue home Gabapentin  - Neurology follow up along with EMG/NCS outpatient     # Falls  Reports having fallen twice at home. First fall happened when he was climbing a set of stairs and his foot caught on the step.  The second was when his screen door caught the back of his heel as it was swinging closed.   Denies LOC. Suspect mechanical in etiology given underlying weakness, no concern for CVA at this time.   - C/w aggressive PT/OT     Cardiovascular:  #NICM s/p HM2 (2023) s/p OHT on 6/21  Underwent RHC and biopsy at clinic visit 7/14 which showed: 121/69 (85), RA 4, PA 32/16 (21), PCWP 10; Raven CI 3.8 and Thermodilution CI 2.4.  Biopsy result negative.  -Next biopsy scheduled 07/30/2025   - RHC scheduled 07/30/25 as well as Echo   - Continue home hydralazine 25 TID  - TTE (7/14): Pending  - Admit Lactate 1.4   - Admit BNP- 362  Donor/Recipient Infectious history:   CMV: -/-   EBV: +/+   Toxo: -/-   Heb B: -/-  Hep C: -/-  HIV: -/-      Rejection/Prophylaxis:     - Induction: s/p methylprednisolone 500mg IV x2, S/p Basiliximab 20mg IV within 1 hour of arrival to CTICU (no need for premedication), s/p 2nd dose: 20mg IVPB on POD4 (06/25)   - Steroids: Prednisone 25mg daily (reduced 7/14 after negative biopsy)  - Tacrolimus: 1.5mg twice daily  - Tacrolimus goal troughs: 10-12; Last tacrolimus trough: 9.1 7/21/2025:  6:33 AM   - Cellcept: 1,000mg BID   - Antifungals: voriconazole 200mg BID x3 months end date 09/22/2025   - Antivirals: acylovir 400mg BID x3 months end date 09/22/2025   - Anti PCP & Toxoplasmosis: SS Bactrim daily end date of 12/22/25     Last cardiac biopsy: 7/14: Pending   Last RHC: 7/14 121/69 (85), RA 4, PA 32/16 (21), PCWP 10; Raven CI 3.8 and Thermodilution CI 2.4.   Last TTE/BOBBY: 7/8 - difficult views 2/2 air  Last HLA: Pre-transplant: PRAs 0%; First due on 7/21  Last Allomap: ~ will assess starting at 6-month ana post-op    Last Allosure: ~ will assess starting at 6-month ana post-op   Last LHC: 03/2023(pre OHT). First due ~ (one year post tx) due 6/2026  Osteopenia/osteoporosis prophylaxis: Vit D 2000U daily and calcium 500 mg BID (give calcium 2 hrs after MMF)   Peptic/gastric ulcer prophylaxis: Pantoprazole 40mg daily    CAV Prophylaxis: Aspirin 81mg daily and Pravastatin 40mg daily   Pacing:  Epicardial wires removed 07/07      Problems:   # H/o Subcutaneous emphysema and pneumomediastinum   - Observed during recent admission, managed conservatively after consultations with Cam Birch and [facility].      Pulmonary:   #Prior smoker  #Cough  #Small stable b/l pleural effusions  #Small L apical pneumothorax  Stable unchanged b/l effusions, and questionable small apical L pneumothorax noted on admit CXR compared to prior. Respiratorily stable, without leukocytosis and afebrile, low concern for active pneumonia at this time.   - S/p Doxycycline 100mg BID (07/16-07/20)  - Torsemide 20mg PRN for volume overload   - Monitor and maintain SpO2 > 92%     GI:  #H/o C. Difficile infection s/p tx with po vanc  - Bowel regimen ordered   - PPI     :  #DEBBIE( resolved)  #BPH  Suspect prerenal in etiology given self reported increase in home diuretics, BUN/Cr ratio, RHC hemodynamics, and improvement in renal function while holding diuretics.   -Baseline BUN/Cr WNL  -I/Os  -Continue home tamsulosin     Heme:  #H/o anemia  -H&H on admit 9.9/29.2     Endo:  #PreDM  #Steroid induced hyperglycemia  -HgA1c on admit- 5.1%  - Endo consulted during recent admission, home regimen is the following:  - Insulin NPH 15u to be taken with 25mg prednisone daily  - Can titrate insulin down to 10u as steroids taper     #Hypothyroidism  -last TSH (3/26/25)--> 1.83  -Continue home levothyroxine      ID:  -afebrile, nontoxic       Visit Vitals  /83 (BP Location: Right arm, Patient Position: Sitting)   Pulse 93   Temp 36.3 °C (97.3 °F) (Temporal)   Resp 18     Vitals:    07/21/25 0722   Weight: 91.9 kg (202 lb 8 oz)       Immunization History   Administered Date(s) Administered    COVID-19, mRNA, LNP-S, PF, 30 mcg/0.3 mL dose 07/08/2021, 07/29/2021    Flu vaccine (IIV4), preservative free *Check age/dose* 03/05/2024    Hepatitis B vaccine, adult *Check Product/Dose* 03/02/2024    Pneumococcal conjugate vaccine, 20-valent (PREVNAR 20)  03/05/2024    Tdap vaccine, age 7 year and older (BOOSTRIX, ADACEL) 03/05/2024       Results        Pertinent Physical Exam At Time of Discharge  Physical Exam    Home Medications     Medication List      START taking these medications     sennosides-docusate sodium 8.6-50 mg tablet; Commonly known as:   Dahlia-Colace; Take 1 tablet by mouth as needed at bedtime for constipation.   torsemide 20 mg tablet; Commonly known as: Demadex; Take 1 tablet (20   mg) by mouth once daily as needed (Fluid overload).     CHANGE how you take these medications     docusate sodium 100 mg capsule; Commonly known as: Colace; What changed:   when to take this, reasons to take this   hydrALAZINE 25 mg tablet; Commonly known as: Apresoline; Take 1 tablet   (25 mg) by mouth 3 times a day.; What changed: medication strength, how   much to take   polyethylene glycol 17 gram packet; Commonly known as: Glycolax,   Miralax; What changed: additional instructions   predniSONE 5 mg tablet; Commonly known as: Deltasone; Take 5 tablets (25   mg) by mouth once daily. Please taper when told after your next biopsy on   the 30th of July; Start taking on: July 22, 2025; What changed: how much   to take, additional instructions   * tacrolimus 1 mg capsule; Commonly known as: Prograf; Take 1 capsule (1   mg) by mouth 2 times a day. TDD: 2.5mg BID; What changed: how much to take   * tacrolimus 0.5 mg capsule; Commonly known as: Prograf; Take 1 capsule   (0.5 mg) by mouth 2 times a day. TDD: 2.5mg BID; What changed: Another   medication with the same name was changed. Make sure you understand how   and when to take each.  * This list has 2 medication(s) that are the same as other medications   prescribed for you. Read the directions carefully, and ask your doctor or   other care provider to review them with you.     CONTINUE taking these medications     acetaminophen 325 mg tablet; Commonly known as: Tylenol   acyclovir 400 mg tablet; Commonly known as:  "Zovirax; Take 1 tablet (400   mg) by mouth 2 times a day.   aspirin 81 mg EC tablet; Take 1 tablet (81 mg) by mouth once daily.   BD Ultra-Fine Short Pen Needle 31 gauge x 5/16\" needle; Generic drug:   pen needle, diabetic; Use 1 once daily or as directed by provider.   Easy Touch Alcohol Prep Pads; Generic drug: alcohol swabs; Apply   topically 3 times a day.   FreeStyle Federico 3 Plus Sensor device; Generic drug: blood-glucose   sensor; Use 1 every 15 days.   FreeStyle Federico 3 [place] misc; Generic drug:   blood-glucose,,cont; Use as directed.   gabapentin 300 mg capsule; Commonly known as: Neurontin; Take 1 capsule   (300 mg) by mouth 3 times a day.   HumuLIN N NPH Insulin KwikPen 100 unit/mL (3 mL) pen; Generic drug:   insulin NPH (Isophane); Inject 15 Units under the skin once daily in the   morning. With your steroid.   levothyroxine 125 mcg tablet; Commonly known as: Synthroid, Levoxyl;   Take 1 tablet (125 mcg) by mouth early in the morning.. Take on an empty   stomach at the same time each day, either 30 to 60 minutes prior to   breakfast   lidocaine 4 % patch; Place 1 patch over 12 hours on the skin once daily.   Remove & discard patch within 12 hours or as directed by MD.   MULTIVITAMIN ORAL   mycophenolate 250 mg capsule; Commonly known as: Cellcept; Take 4   capsules (1,000 mg) by mouth every 12 hours.   Oyster Shell Calcium 500 500 mg calcium (1,250 mg) tablet; Generic drug:   calcium carbonate; Take 1 tablet by mouth 2 times a day.   pantoprazole 40 mg EC tablet; Commonly known as: ProtoNix; Take 1 tablet   (40 mg) by mouth once daily. Do not crush, chew, or split.   pravastatin 40 mg tablet; Commonly known as: Pravachol; Take 1 tablet   (40 mg) by mouth once daily at bedtime.   sulfamethoxazole-trimethoprim 400-80 mg tablet; Commonly known as:   Bactrim; Take 1 tablet by mouth once daily.   tamsulosin 0.4 mg 24 hr capsule; Commonly known as: Flomax; Take 1   capsule (0.4 mg) by mouth once " daily.   Vitamin D3 50 mcg (2,000 unit) tablet; Generic drug: cholecalciferol;   Take 1 tablet (50 mcg) by mouth once daily.   voriconazole 200 mg tablet; Commonly known as: Vfend; Take 1 tablet (200   mg) by mouth every 12 hours.     STOP taking these medications     furosemide 40 mg tablet; Commonly known as: Lasix   Lokelma 10 gram packet; Generic drug: sodium zirconium cyclosilicate   traMADol 50 mg tablet; Commonly known as: Ultram       Outpatient Follow-Up  Future Appointments   Date Time Provider Department Center   7/29/2025  1:00 PM Sara Corley MD [id number] [facility]       Dilia Holly MD    Attending: Dr Velasco

## 2025-07-21 NOTE — PROGRESS NOTES
[facility]  PROGRESS NOTE    Anatoly Lane/[id number]    Admit Date: 7/14/2025  Hospital Length of Stay: 7   ICU Length of Stay: 1d 2h   Primary Service: HF  Primary HF Cardiologist: Dr Hylton    INTERVAL EVENTS / PERTINENT ROS:   - UOP net  -700 cc yesterday.  Creatinine has been stable.  - PT reassessed today, states stable for home with home PT  - Tac level today is Last tacrolimus trough: 9.1 7/21/2025:  6:36 AM       Plan:  - continue tacrolimus 1.5 mg twice daily  - Prior to discharge coordinate EMBx due 07/30  - Pt recommended for Home with services, plan to discharge today     MEDICATIONS  Infusions:     Scheduled:  acyclovir, 400 mg, BID  aspirin, 81 mg, Daily  calcium carbonate, 1,250 mg of calcium carbonate, BID  cholecalciferol, 50 mcg, Daily  gabapentin, 300 mg, Daily  heparin, 5,000 Units, q8h  hydrALAZINE, 25 mg, TID  insulin NPH (Isophane), 15 Units, Daily  levothyroxine, 125 mcg, Daily  mycophenolate, 1,000 mg, q12h LUPILLO  pantoprazole, 40 mg, Daily  pravastatin, 40 mg, Nightly  predniSONE, 25 mg, Daily  sennosides-docusate sodium, 1 tablet, Nightly  sulfamethoxazole-trimethoprim, 1 tablet, Daily  tacrolimus, 0.5 mg, q12h LUPILLO  tacrolimus, 1 mg, BID  tamsulosin, 0.4 mg, Daily  [Held by provider] torsemide, 20 mg, Daily  voriconazole, 200 mg, q12h LUPILLO      PRN:  acetaminophen, 650 mg, q6h PRN  dextromethorphan-guaifenesin, 5 mL, q8h PRN  dextrose, 12.5 g, q15 min PRN  dextrose, 25 g, q15 min PRN  glucagon, 1 mg, q15 min PRN  glucagon, 1 mg, q15 min PRN  oxygen, , Continuous PRN - O2/gases  polyethylene glycol, 17 g, Daily PRN  sucralfate, 1 g, q6h PRN  traMADol, 50 mg, q6h PRN        PHYSICAL EXAM:   Visit Vitals  /83 (BP Location: Right arm, Patient Position: Sitting)   Pulse 93   Temp 36.3 °C (97.3 °F) (Temporal)   Resp 18   Wt 91.9 kg (202 lb 8 oz)   SpO2 93%   BMI 29.90 kg/m²   Smoking Status Former   BSA 2.12 m²       Wt Readings from Last 5 Encounters:   07/21/25  91.9 kg (202 lb 8 oz)   07/14/25 94.9 kg (209 lb 4.8 oz)   07/14/25 98.4 kg (216 lb 14.9 oz)   07/09/25 98.4 kg (216 lb 14.4 oz)   02/27/25 102 kg (224 lb)       INTAKE/OUTPUT:  I/O last 3 completed shifts:  In: 924 (9.9 mL/kg) [P.O.:924]  Out: 2125 (22.9 mL/kg) [Urine:2125 (0.6 mL/kg/hr)]  Weight: 92.9 kg      Physical Exam  Constitutional:       General: He is not in acute distress.     Appearance: Normal appearance.     Cardiovascular:      Rate and Rhythm: Regular rhythm. Tachycardia present.      Pulses: Normal pulses.      Heart sounds: Normal heart sounds. No murmur heard.  Pulmonary:      Effort: Pulmonary effort is normal. No respiratory distress.      Breath sounds: Normal breath sounds. No stridor.     Musculoskeletal:      Right lower leg: No edema.      Left lower leg: No edema.     Neurological:      Mental Status: He is alert and oriented to person, place, and time.       Physical Exam  GEN: resting in NAD  HEENT: EOMI, no scleral icterus or injection  PULM: normal work of breathing  CV: regular rate and rhythm, JVP below clavicle at 45 degrees  ABD: non tender, non distended. No guarding or rebound.  EXT: no peripheral edema, warm and well perfused.  SKIN: no rashes or lesions noted  MSK: moves all extremities without visible impairment  NEURO: CN II-XII, motor function and sensation grossly intact  PSYCH: Appropriate mood and affect, normal speech and cognition      DATA:  CMP:  Results from last 7 days   Lab Units 07/21/25  0636 07/20/25  0633 07/19/25  0549 07/18/25  0437 07/17/25  0602 07/16/25  0527 07/15/25  0603 07/14/25  2056   SODIUM mmol/L 142 140 139 140 140 139 142 140   POTASSIUM mmol/L 4.2 4.2 4.6 4.3 4.6 4.7 4.0 3.9   CHLORIDE mmol/L 104 105 103 104 104 105 103 101   CO2 mmol/L 29 26 29 28 28 27 30 28   ANION GAP mmol/L 13 13 12 12 13 12 13 15   BUN mg/dL 37* 35* 32* 32* 38* 53* 63* 73*   CREATININE mg/dL 1.04 0.89 0.84 1.30 1.08 1.34* 1.27 1.77*   EGFR mL/min/1.73m*2 80 >90 >90 61 77  "59* 63 42*   MAGNESIUM mg/dL 1.64 1.75 1.84 1.47* 1.66 1.90 1.87 1.69   ALBUMIN g/dL 3.5 3.3* 3.3* 3.4 3.4 3.2* 3.1* 3.5   ALT U/L  --   --   --   --   --   --   --  21   AST U/L  --   --   --   --   --   --   --  15   BILIRUBIN TOTAL mg/dL  --   --   --   --   --   --   --  0.4     CBC:  Results from last 7 days   Lab Units 07/21/25  0636 07/20/25  0633 07/19/25  0549 07/18/25  0437 07/17/25  0602 07/16/25  0527 07/15/25  0603 07/14/25  2056   WBC AUTO x10*3/uL 8.5 7.7 6.9 6.7 7.0 7.7 7.1 8.1   HEMOGLOBIN g/dL 10.8* 10.3* 10.2* 10.1* 10.1* 9.6* 9.1* 9.9*   HEMATOCRIT % 33.9* 33.9* 31.6* 32.2* 32.3* 30.9* 28.0* 29.2*   PLATELETS AUTO x10*3/uL 305 297 277 262 263 267 224 286   MCV fL 98 102* 98 100 102* 104* 96 94     COAG:   Results from last 7 days   Lab Units 07/14/25 2056   INR  1.0     ABO: No results found for: \"ABO\"  HEME/ENDO:  Results from last 7 days   Lab Units 07/14/25 2056   HEMOGLOBIN A1C % 5.1      CARDIAC:   Results from last 7 days   Lab Units 07/14/25 2056   TROPHSCMC ng/L 233*   BNP pg/mL 362*       ASSESSMENT AND PLAN:   64-year-old male with a history of NICM (s/p HM3 in 2023) s/p OHT on 6/21/2025, pAF, CAD, CKD, HTN, BPH, GERD, hypothyroidism, presents for readmission from outpatient transplant follow-up due to clinical decline. He was discharged on 7/9 after an initial postoperative course complicated by pneumomediastinum (managed conservatively), steroid-induced hyperglycemia, and LUE/RLE weakness. He was admitted with concerns of deconditioning, respiratory distress, and DEBBIE.      Neuro:  # Anxiety. 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 He was recommended outpatient EMG/NCS to further characterize injury. Additionally was noted to have RLE weakness attributed to seroma related femoral nerve compression. concerning for femoral nerve injury.   - Aggressive PT/OT as tolerated  - Continue home Gabapentin  - Neurology follow up along with EMG/NCS outpatient     # Falls  Reports having fallen twice at home. First fall happened when he was climbing a set of stairs and his foot caught on the step.  The second was when his screen door caught the back of his heel as it was swinging closed.  Denies LOC. Suspect mechanical in etiology given underlying weakness, no concern for CVA at this time.   - C/w aggressive PT/OT     Cardiovascular:  #NICM s/p HM2 (2023) s/p OHT on 6/21  Underwent RHC and biopsy at clinic visit 7/14 which showed: 121/69 (85), RA 4, PA 32/16 (21), PCWP 10; Raven CI 3.8 and Thermodilution CI 2.4.  Biopsy result negative.  -Next biopsy planned for 07/30   - Continue home hydralazine 50 TID  - TTE (7/14): Pending  - Admit Lactate 1.4   - Admit BNP- 362  Donor/Recipient Infectious history:   CMV: -/-   EBV: +/+   Toxo: -/-   Heb B: -/-  Hep C: -/-  HIV: -/-      Rejection/Prophylaxis:     - Induction: s/p methylprednisolone 500mg IV x2, S/p Basiliximab 20mg IV within 1 hour of arrival to CTICU (no need for premedication), s/p 2nd dose: 20mg IVPB on POD4 (06/25)   - Steroids: Prednisone 25mg daily (reduced 7/14 after negative biopsy  - Tacrolimus: 1.5mg twice daily  - Tacrolimus goal troughs: 10-12; Last tacrolimus trough: 9.1 7/21/2025:  6:36 AM   - Cellcept: 1,000mg BID   - Antifungals: voriconazole 200mg BID x3 months end date 09/22/2025   - Antivirals: acylovir 400mg BID x3 months end date 09/22/2025   - Anti PCP & Toxoplasmosis: SS Bactrim daily end date of 12/22/25     Last cardiac biopsy: 7/14: Pending   Last RHC: 7/14 121/69 (85), RA 4, PA 32/16 (21), PCWP 10; Raven CI 3.8 and  Thermodilution CI 2.4.   Last TTE/BOBBY: 7/8 - difficult views 2/2 air  Last HLA: Pre-transplant: PRAs 0%; First due on 7/21  Last Allomap: ~ will assess starting at 6-month ana post-op    Last Allosure: ~ will assess starting at 6-month ana post-op   Last LHC: 03/2023(pre OHT). First due ~ (one year post tx) due 6/2026  Osteopenia/osteoporosis prophylaxis: Vit D 2000U daily and calcium 500 mg BID (give calcium 2 hrs after MMF)   Peptic/gastric ulcer prophylaxis: Pantoprazole 40mg daily    CAV Prophylaxis: Aspirin 81mg daily and Pravastatin 40mg daily   Pacing: Epicardial wires removed 07/07      Problems:   # H/o Subcutaneous emphysema and pneumomediastinum   - Observed during recent admission, managed conservatively after consultations with Cam Birch and [place].      Pulmonary:   #Prior smoker  #Cough  #Small stable b/l pleural effusions  #Small L apical pneumothorax  Stable unchanged b/l effusions, and questionable small apical L pneumothorax noted on admit CXR compared to prior. Respiratorily stable, without leukocytosis and afebrile, low concern for active pneumonia at this time.   - C/w Doxycycline 100mg BID (07/16-07/20)  - C.w Torsemide 20mg   - Monitor and maintain SpO2 > 92%    GI:  #H/o C. Difficile infection s/p tx with po vanc  - Bowel regimen ordered   - PPI     :  #DEBBIE  #BPH  Suspect prerenal in etiology given self reported increase in home diuretics, BUN/Cr ratio, RHC hemodynamics, and improvement in renal function while holding diuretics.   -Baseline BUN/Cr WNL  -Admit BUN/Cr: 73/1.77  -I/Os  -Continue home tamsulosin     Heme:  #H/o anemia  -H&H on admit 9.9/29.2     Endo:  #PreDM  #Steroid induced hyperglycemia  -HgA1c on admit- 5.1%  - Endo consulted during recent admission, home regimen is the following:  - Insulin NPH 15u to be taken with 25mg prednisone daily  - Can titrate insulin down to 10u as steroids taper  - Transition to SSI while inpatient      #Hypothyroidism  -last TSH (3/26/25)-->  1.83  -Continue home levothyroxine      ID:  -afebrile, nontoxic      PHYSICAL AND OCCUPATIONAL THERAPY:    LINES:    DVT: SubQ heparin  VAP BUNDLE:  CENTRAL LINE BUNDLE:  ULCER PPX: Pantoprazole  GLYCEMIC CONTROL: Insulin regimen  BOWEL CARE: Pericolace  INDWELLING CATHETER:  NUTRITION: Adult diet Regular      EMERGENCY CONTACT: Extended Emergency Contact Information  Primary Emergency Contact: Janice Ramos  Address: [address] [place] [address] [facility]  Home Phone: [phone]  Mobile Phone: [phone]  Relation: Mother  Secondary Emergency Contact: Bird Lane  Address: [address] [place] [address] [address] [place] [address] [facility]  Home Phone: [phone]  Mobile Phone: [phone]  Relation: Son  Preferred language: English   needed? No  FAMILY UPDATE:  CODE STATUS: Full Code  DISPO:     Patient seen and assessed with Dr. Velasco  _________________________________________________  Dilia Holly MD

## 2025-07-21 NOTE — PROGRESS NOTES
Occupational Therapy    OT Treatment    Patient Name: Anatoly Lane  MRN: [id number]  Department: [place] [address]  Room: [facility]019-A  Today's Date: 7/21/2025  Time Calculation  Start Time: 1037  Stop Time: 1101  Time Calculation (min): 24 min        Assessment:  OT Assessment:  (Pt has progressed during hospitalization and would benefit from low intensity OT to address ADLs/IADLs, mobility, and endurance.)  End of Session Communication: Bedside nurse  End of Session Patient Position: Up in chair, Alarm off, not on at start of session     Plan:  Treatment Interventions: ADL retraining, Functional transfer training, Endurance training, Patient/family training, Equipment evaluation/education, Compensatory technique education  OT Frequency: 2 times per week  OT Discharge Recommendations: Low intensity level of continued care  Equipment Recommended upon Discharge: Wheeled walker  OT Recommended Transfer Status: Assist of 1  OT - OK to Discharge: Yes  Treatment Interventions: ADL retraining, Functional transfer training, Endurance training, Patient/family training, Equipment evaluation/education, Compensatory technique education    Subjective   OT Visit Info:  OT Received On: 07/21/25  General Visit Info:  General  Family/Caregiver Present: No  Prior to Session Communication: Bedside nurse  Patient Position Received: Up in chair, Alarm off, not on at start of session  Preferred Learning Style: verbal  General Comment:  (pleasant, motivated, addressed (L) UE FMC concerns)  Precautions:  Medical Precautions: Cardiac precautions, Fall precautions  Post-Surgical Precautions: Move in the Tube        Pain:  Pain Assessment  Pain Assessment: 0-10  0-10 (Numeric) Pain Score: 0 - No pain    Objective    Cognition:  Cognition  Overall Cognitive Status: Within Functional Limits  Orientation Level: Oriented X4  Following Commands: Follows all commands and directions without difficulty  Activities of Daily Living: Grooming  Grooming Level of  Assistance: Independent  Grooming Where Assessed: Standing sinkside  Grooming Comments:  (hand hygiene)    LE Dressing  LE Dressing: Yes  Shoe Level of Assistance: Independent  LE Dressing Where Assessed: Chair  LE Dressing Comments:  (pt donned (B) slip on shoes at chair level)     Bed Mobility/Transfers: Bed Mobility  Bed Mobility: No (pt seated up in chair pre/post session)    Transfers  Transfer: Yes  Transfer 1  Transfer From 1: Sit to  Transfer to 1: Stand  Transfer Device 1:  (no device)  Transfer Level of Assistance 1: Distant supervision      Functional Mobility:  Functional Mobility  Functional Mobility Performed: Yes  Functional Mobility 1  Comments 1:  ((S) ambulating in hospital room, to/from bathroom without device, no acute LOB)  Sitting Balance:  Static Sitting Balance  Static Sitting-Level of Assistance: Independent  Standing Balance:  Static Standing Balance  Static Standing-Level of Assistance: Distant supervision (no device)     Therapy/Activity: Therapeutic Activity  Therapeutic Activity Performed: Yes  Therapeutic Activity 1:  (Provided pt with red built up handles for increased independence when handling eating utensils with (L) hand. Pt able to flex (L) digits but has difficulty fully extending digits.)  Therapeutic Activity 2:  (Educated pt on placing (L) hand on towel/pillow case and passively extending/stretching digits on the table)      Outcome Measures:[facility] Daily Activity  Putting on and taking off regular lower body clothing: A little  Bathing (including washing, rinsing, drying): A little  Putting on and taking off regular upper body clothing: None  Toileting, which includes using toilet, bedpan or urinal: A little  Taking care of personal grooming such as brushing teeth: None  Eating Meals: A little  Daily Activity - Total Score: 20   and Brief Confusion Assessment Method (bCAM)  CAM Result: CAM -    Education Documentation  Body Mechanics, taught by Xuan Bautista OT at 7/21/2025  11:10 AM.  Learner: Patient  Readiness: Acceptance  Method: Explanation  Response: Verbalizes Understanding    Precautions, taught by Xuan Bautista OT at 7/21/2025 11:10 AM.  Learner: Patient  Readiness: Acceptance  Method: Explanation  Response: Verbalizes Understanding    ADL Training, taught by Xuan Bautista OT at 7/21/2025 11:10 AM.  Learner: Patient  Readiness: Acceptance  Method: Explanation  Response: Verbalizes Understanding    Education Comments  No comments found.           Goals:  Encounter Problems       Encounter Problems (Active)       ADLs       Patient will complete daily grooming tasks with supervision level of assistance and PRN adaptive equipment while standing. (Progressing)       Start:  07/15/25    Expected End:  07/29/25            Patient will complete toileting including hygiene clothing management/hygiene with supervision level of assistance. (Progressing)       Start:  07/15/25    Expected End:  07/29/25            Patient will perform UB dressing with Independent and PRN adaptive equipment. (Progressing)       Start:  07/15/25    Expected End:  07/29/25            Patient will perform LB dressing with Supervision and PRN adaptive equipment. (Progressing)       Start:  07/15/25    Expected End:  07/29/25            Patient will perform basic IADLs/simulated household tasks with Supervision and LRD.  (Progressing)       Start:  07/15/25    Expected End:  07/29/25               COGNITION/SAFETY       Patient will complete pill box simulation independently with use of medication list. (Progressing)       Start:  07/15/25    Expected End:  07/29/25            Patient will participate in cognitive activities to demonstrate WFL on cognitive assessment. (Met)       Start:  07/15/25    Expected End:  07/29/25    Resolved:  07/18/25            EXERCISE/STRENGTHENING       Patient will be educated on BUE HEP for increased ADL performance. (Progressing)       Start:  07/15/25    Expected End:   07/29/25            Patient will participate in >15 minutes of activity with <2 rest breaks to increase ADL/functional task endurance. (Progressing)       Start:  07/15/25    Expected End:  07/29/25               MOBILITY       Patient will perform Functional mobility Household distances/Community Distances with supervision level of assistance and least restrictive device in order to improve safety and functional mobility. (Progressing)       Start:  07/15/25    Expected End:  07/29/25               TRANSFERS       Patient will perform bed mobility supervision level of assistance in order to improve safety and independence with mobility (Progressing)       Start:  07/15/25    Expected End:  07/29/25            Patient will complete functional transfers with least restrictive device with supervision level of assistance. (Progressing)       Start:  07/15/25    Expected End:  07/29/25

## 2025-07-21 NOTE — PROGRESS NOTES
07/21/25 1330   Discharge Planning   Living Arrangements Alone   Support Systems Family members   Type of Residence Private residence   Who is requesting discharge planning? Provider   Home or Post Acute Services In home services   Type of Home Care Services Home nursing visits;Home PT   Expected Discharge Disposition [facility]  ([facility])   Does the patient need discharge transport arranged? No   [facility] coordination needed? No     Patient is medically ready for discharge.  Patient had not been approved for skilled placement.  Final home care orders submitted to [facility] for resumption of care.    7/23/2025@ 14:13.  Referral submitted to [facility] for a wheeled walker. ROC[facility] IS COVERED BY [facility], NUMBER FOR FOLLOW UP [phone]     normal color

## 2025-07-21 NOTE — DISCHARGE INSTRUCTIONS
Please follow up for your biopsy scheduled 07/20/2025   You will also have your Right heart cath done that same day   You will also have an echo done     Please follow up with your heart transplant team as soon as possible   Please take all your medications as schedule

## 2025-07-21 NOTE — PROGRESS NOTES
Physical Therapy    Physical Therapy Treatment    Patient Name: Anatoly Lane  MRN: [id number]  Department: [place] [id number]  Room: [address] [place] [address]  Today's Date: 7/21/2025  Time Calculation  Start Time: 0836  Stop Time: 0845  Time Calculation (min): 9 min         Assessment/Plan   PT Assessment  PT Assessment Results: Decreased mobility  Rehab Prognosis: Excellent  Barriers to Discharge Home: No anticipated barriers  Evaluation/Treatment Tolerance: Patient tolerated treatment well  Medical Staff Made Aware: Yes  End of Session Communication: Bedside nurse  Assessment Comment: Pt has made excellent progress, demos safe ambulation without AD, Tinetti assessment= 28/28,  pt demos safe stair negotiation and verbalizes understanding of factors leading to fall prior to admission. Discussed pt progress with supervising PT, in agreement pt is appropriate for low intensity therapy, does not require 24/7 support, may benefit from home health aide PRN.  End of Session Patient Position: Up in chair, Alarm off, not on at start of session     PT Plan  Treatment/Interventions: Bed mobility, Transfer training, Gait training, Stair training, Balance training, Neuromuscular re-education, Neurodevelopmental intervention, Strengthening, Endurance training, Range of motion, Therapeutic exercise, Therapeutic activity, Home exercise program  PT Plan: Ongoing PT  PT Frequency: 3 times per week  PT Discharge Recommendations: Low intensity level of continued care  Equipment Recommended upon Discharge: Wheeled walker  PT Recommended Transfer Status: Stand by assist  PT - OK to Discharge: Yes    PT Visit Info:  PT Received On: 07/21/25  Response to Previous Treatment: Patient with no complaints from previous session.     General Visit Information:   General  Prior to Session Communication: Bedside nurse  Patient Position Received:  (Ambulating in jorgensen)  General Comment: Pt up and ambulating in jorgensen, pleasant and agreeable to therapy  Per handoff with RN, pt  is appropriate for therapy, vitals are stable and pain is controlled. Other concerns prior to tx are: none    Subjective   Precautions:  Precautions  Medical Precautions: Cardiac precautions, Fall precautions  Post-Surgical Precautions: Move in the Tube  Precautions Comment: protective precautions and contact plus precautions    Objective   Pain:  Pain Assessment  Pain Assessment: 0-10  0-10 (Numeric) Pain Score: 0 - No pain  Cognition:  Cognition  Overall Cognitive Status: Within Functional Limits  Orientation Level: Oriented X4    Treatments:     Balance/Neuromuscular Re-Education  Balance/Neuromuscular Re-Education Activity Performed: Yes  Balance/Neuromuscular Re-Education Activity 1: Tinetti assessment completed    Ambulation/Gait Training  Ambulation/Gait Training Performed: Yes  Ambulation/Gait Training 1  Surface 1: Level tile  Device 1: No device  Assistance 1: Distant supervision  Comments/Distance (ft) 1: 400'x1  Transfers  Transfer: Yes  Transfer 1  Transfer From 1: Sit to, Stand to  Transfer to 1: Sit  Technique 1: Sit to stand, Stand to sit  Transfer Device 1:  (no AD)  Transfer Level of Assistance 1: Independent    Stairs  Stairs: Yes  Stairs  Rails 1: Left  Device 1: Railing  Assistance 1: Close supervision  Comment/Number of Steps 1: 6 standard steps, non-recip sequence    Outcome Measures:     [facility] Basic Mobility  Turning from your back to your side while in a flat bed without using bedrails: None  Moving from lying on your back to sitting on the side of a flat bed without using bedrails: None  Moving to and from bed to chair (including a wheelchair): None  Standing up from a chair using your arms (e.g. wheelchair or bedside chair): None  To walk in hospital room: A little  Climbing 3-5 steps with railing: A little  Basic Mobility - Total Score: 22    Tinetti  Sitting Balance: Steady, safe  Arises: Able without using arms  Attempts to Arise: Able to arise, one attempt  Immediate Standing Balance  (First 5 Seconds): Steady without walker or other support  Standing Balance: Narrow stance without support  Nudged: Steady without walker or other support  Eyes Closed: Steady  Turned 360 Degrees: Steadiness: Steady  Turned 360 Degrees: Continuity of Steps: Continuous  Sitting Down: Safe, smooth motion  Balance Score: 16  Initiation of Gait: No hesitancy  Step Height: R Swing Foot: Right foot complete clears floor  Step Length: R Swing Foot: Passes left stance foot  Step Height: L Swing Foot: Left foot complete clears floor  Step Length: L Swing Foot: Passes right stance foot  Step Symmetry: Right and left step appear equal  Step Continuity: Steps appear continuous  Path: Straight without walking aid  Trunk: No sway, no flexion, no use of arms, no walking aid  Walking Time: Heels almost touching while walking  Gait Score: 12  Total Score: 28    Education Documentation  Precautions, taught by Parul Boo PTA at 7/21/2025  9:24 AM.  Learner: Patient  Readiness: Acceptance  Method: Explanation, Demonstration  Response: Verbalizes Understanding, Demonstrated Understanding  Comment: precautions, safe mobility, PT POC    Body Mechanics, taught by Parul Boo PTA at 7/21/2025  9:24 AM.  Learner: Patient  Readiness: Acceptance  Method: Explanation, Demonstration  Response: Verbalizes Understanding, Demonstrated Understanding  Comment: precautions, safe mobility, PT POC    Mobility Training, taught by Parul Boo PTA at 7/21/2025  9:24 AM.  Learner: Patient  Readiness: Acceptance  Method: Explanation, Demonstration  Response: Verbalizes Understanding, Demonstrated Understanding  Comment: precautions, safe mobility, PT POC    Education Comments  No comments found.        OP EDUCATION:       Encounter Problems       Encounter Problems (Active)       PT Problem       Pt will perform a 5x STS, in 15 seconds or less, with RPD 3/10 or less, RPE 13/20 or less with stable vitals.  (Progressing)       Start:  07/15/25     Expected End:  07/29/25            Pt will perform a 6MWT, with distance greater than 900 ft  with stable vitals, RPD 3/10 or less, RPE 13/20 or less.  (Progressing)       Start:  07/15/25    Expected End:  07/29/25            pt will perform sit<>stand indep with LRD and no acute LOB (Progressing)       Start:  07/15/25    Expected End:  07/29/25            pt will negotiate 5 stairs with handrail and close supervision (Progressing)       Start:  07/15/25    Expected End:  07/29/25                 DEBORAH Wilson

## 2025-07-22 ENCOUNTER — TELEPHONE (OUTPATIENT)
Dept: TRANSPLANT | Facility: HOSPITAL | Age: 65
End: 2025-07-22
Payer: MEDICARE

## 2025-07-22 DIAGNOSIS — K59.04 CHRONIC IDIOPATHIC CONSTIPATION: ICD-10-CM

## 2025-07-22 DIAGNOSIS — R73.9 HYPERGLYCEMIA: ICD-10-CM

## 2025-07-22 DIAGNOSIS — K29.61 GASTROINTESTINAL HEMORRHAGE ASSOCIATED WITH OTHER GASTRITIS: ICD-10-CM

## 2025-07-22 DIAGNOSIS — F32.A DEPRESSION, UNSPECIFIED DEPRESSION TYPE: ICD-10-CM

## 2025-07-22 DIAGNOSIS — E03.9 HYPOTHYROIDISM, UNSPECIFIED TYPE: ICD-10-CM

## 2025-07-22 DIAGNOSIS — R73.9 STRESS HYPERGLYCEMIA: ICD-10-CM

## 2025-07-22 DIAGNOSIS — I50.82 ACUTE ON CHRONIC CONGESTIVE HEART FAILURE WITH RIGHT VENTRICULAR DIASTOLIC DYSFUNCTION: ICD-10-CM

## 2025-07-22 DIAGNOSIS — Z86.39 HISTORY OF HYPOTHYROIDISM: ICD-10-CM

## 2025-07-22 DIAGNOSIS — Z94.1 S/P ORTHOTOPIC HEART TRANSPLANT: ICD-10-CM

## 2025-07-22 DIAGNOSIS — E09.65 DRUG OR CHEMICAL INDUCED DIABETES MELLITUS WITH HYPERGLYCEMIA, UNSPECIFIED WHETHER LONG TERM INSULIN USE: ICD-10-CM

## 2025-07-22 DIAGNOSIS — I50.33 ACUTE ON CHRONIC CONGESTIVE HEART FAILURE WITH RIGHT VENTRICULAR DIASTOLIC DYSFUNCTION: ICD-10-CM

## 2025-07-22 DIAGNOSIS — I51.9 RIGHT VENTRICULAR DYSFUNCTION: ICD-10-CM

## 2025-07-22 DIAGNOSIS — E87.6 HYPOKALEMIA: ICD-10-CM

## 2025-07-22 RX ORDER — PREDNISONE 5 MG/1
25 TABLET ORAL DAILY
Qty: 150 TABLET | Refills: 0 | Status: ON HOLD | OUTPATIENT
Start: 2025-07-22 | End: 2025-08-21

## 2025-07-22 RX ORDER — LEVOTHYROXINE SODIUM 125 UG/1
125 TABLET ORAL DAILY
Qty: 90 TABLET | Refills: 3 | Status: ON HOLD | OUTPATIENT
Start: 2025-07-22 | End: 2026-07-22

## 2025-07-22 RX ORDER — CALCIUM CARBONATE 500(1250)
1 TABLET ORAL 2 TIMES DAILY
Qty: 180 TABLET | Refills: 3 | Status: ON HOLD | OUTPATIENT
Start: 2025-07-22 | End: 2026-07-22

## 2025-07-22 RX ORDER — LIDOCAINE 560 MG/1
1 PATCH PERCUTANEOUS; TOPICAL; TRANSDERMAL DAILY
Qty: 30 PATCH | Refills: 3 | Status: ON HOLD | OUTPATIENT
Start: 2025-07-22

## 2025-07-22 RX ORDER — TACROLIMUS 0.5 MG/1
0.5 CAPSULE ORAL 2 TIMES DAILY
Qty: 180 CAPSULE | Refills: 3 | Status: SHIPPED | OUTPATIENT
Start: 2025-07-22 | End: 2025-07-22

## 2025-07-22 RX ORDER — TACROLIMUS 1 MG/1
1 CAPSULE ORAL 2 TIMES DAILY
Qty: 180 CAPSULE | Refills: 3 | Status: SHIPPED | OUTPATIENT
Start: 2025-07-22 | End: 2025-07-22

## 2025-07-22 RX ORDER — PEN NEEDLE, DIABETIC 30 GX3/16"
30 NEEDLE, DISPOSABLE MISCELLANEOUS DAILY
Qty: 100 EACH | Refills: 3 | Status: ON HOLD | OUTPATIENT
Start: 2025-07-22 | End: 2025-08-21

## 2025-07-22 RX ORDER — TAMSULOSIN HYDROCHLORIDE 0.4 MG/1
0.4 CAPSULE ORAL DAILY
Qty: 90 CAPSULE | Refills: 3 | Status: ON HOLD | OUTPATIENT
Start: 2025-07-22 | End: 2026-07-22

## 2025-07-22 RX ORDER — BLOOD-GLUCOSE SENSOR
EACH MISCELLANEOUS
Qty: 2 EACH | Refills: 3 | Status: ON HOLD | OUTPATIENT
Start: 2025-07-22

## 2025-07-22 RX ORDER — CHOLECALCIFEROL (VITAMIN D3) 50 MCG
50 TABLET ORAL DAILY
Qty: 90 TABLET | Refills: 3 | Status: ON HOLD | OUTPATIENT
Start: 2025-07-22 | End: 2026-07-22

## 2025-07-22 RX ORDER — ACETAMINOPHEN 325 MG/1
650 TABLET ORAL EVERY 6 HOURS PRN
Qty: 30 TABLET | Refills: 1 | Status: ON HOLD | OUTPATIENT
Start: 2025-07-22 | End: 2025-10-20

## 2025-07-22 RX ORDER — ISOPROPYL ALCOHOL 70 ML/100ML
30 SWAB TOPICAL 3 TIMES DAILY
Qty: 100 EACH | Refills: 3 | Status: ON HOLD | OUTPATIENT
Start: 2025-07-22

## 2025-07-22 RX ORDER — ASPIRIN 81 MG/1
81 TABLET ORAL DAILY
Qty: 90 TABLET | Refills: 3 | Status: ON HOLD | OUTPATIENT
Start: 2025-07-22

## 2025-07-22 RX ORDER — HYDRALAZINE HYDROCHLORIDE 25 MG/1
25 TABLET, FILM COATED ORAL 3 TIMES DAILY
Qty: 270 TABLET | Refills: 0 | Status: ON HOLD | OUTPATIENT
Start: 2025-07-22 | End: 2025-10-20

## 2025-07-22 RX ORDER — TACROLIMUS 1 MG/1
1 CAPSULE ORAL 2 TIMES DAILY
Qty: 180 CAPSULE | Refills: 3 | Status: ON HOLD | OUTPATIENT
Start: 2025-07-22 | End: 2025-07-29 | Stop reason: HOSPADM

## 2025-07-22 RX ORDER — MYCOPHENOLATE MOFETIL 250 MG/1
1000 CAPSULE ORAL EVERY 12 HOURS SCHEDULED
Qty: 720 CAPSULE | Refills: 3 | Status: ON HOLD | OUTPATIENT
Start: 2025-07-22 | End: 2026-07-22

## 2025-07-22 RX ORDER — DOCUSATE SODIUM 100 MG/1
100 CAPSULE, LIQUID FILLED ORAL 2 TIMES DAILY
Qty: 180 CAPSULE | Refills: 3 | Status: ON HOLD | OUTPATIENT
Start: 2025-07-22 | End: 2026-07-22

## 2025-07-22 RX ORDER — PRAVASTATIN SODIUM 40 MG/1
40 TABLET ORAL NIGHTLY
Qty: 90 TABLET | Refills: 3 | Status: ON HOLD | OUTPATIENT
Start: 2025-07-22 | End: 2026-07-22

## 2025-07-22 RX ORDER — TORSEMIDE 20 MG/1
20 TABLET ORAL DAILY PRN
Qty: 10 TABLET | Refills: 1 | Status: ON HOLD | OUTPATIENT
Start: 2025-07-22 | End: 2025-08-21

## 2025-07-22 RX ORDER — BLOOD-GLUCOSE,RECEIVER,CONT
EACH MISCELLANEOUS
Qty: 1 EACH | Refills: 1 | Status: ON HOLD | OUTPATIENT
Start: 2025-07-22

## 2025-07-22 RX ORDER — PANTOPRAZOLE SODIUM 40 MG/1
40 TABLET, DELAYED RELEASE ORAL DAILY
Qty: 180 TABLET | Refills: 3 | Status: ON HOLD | OUTPATIENT
Start: 2025-07-22

## 2025-07-22 RX ORDER — TACROLIMUS 0.5 MG/1
0.5 CAPSULE ORAL 2 TIMES DAILY
Qty: 180 CAPSULE | Refills: 3 | Status: ON HOLD | OUTPATIENT
Start: 2025-07-22 | End: 2025-07-29 | Stop reason: HOSPADM

## 2025-07-22 RX ORDER — VORICONAZOLE 200 MG/1
200 TABLET, FILM COATED ORAL EVERY 12 HOURS SCHEDULED
Qty: 60 TABLET | Refills: 2 | Status: ON HOLD | OUTPATIENT
Start: 2025-07-22 | End: 2025-10-20

## 2025-07-22 RX ORDER — ACYCLOVIR 400 MG/1
400 TABLET ORAL 2 TIMES DAILY
Qty: 180 TABLET | Refills: 0 | Status: ON HOLD | OUTPATIENT
Start: 2025-07-22 | End: 2025-10-20

## 2025-07-22 RX ORDER — GABAPENTIN 300 MG/1
300 CAPSULE ORAL 3 TIMES DAILY
Qty: 90 CAPSULE | Refills: 11 | Status: ON HOLD | OUTPATIENT
Start: 2025-07-22 | End: 2026-07-22

## 2025-07-22 RX ORDER — SULFAMETHOXAZOLE AND TRIMETHOPRIM 400; 80 MG/1; MG/1
1 TABLET ORAL DAILY
Qty: 90 TABLET | Refills: 1 | Status: ON HOLD | OUTPATIENT
Start: 2025-07-22

## 2025-07-22 NOTE — TELEPHONE ENCOUNTER
Called patient to discuss tacrolimus dosing - when transferring scripts wording was unclear. Patient states he is currently taking 2.5mg BID. Clarified with discharging fellow that patient should be taking tacrolimus 1.5mg BID. Scripts updated to reflect appropriate comments. Patient verbalized correct dosing and will adjust med box.

## 2025-07-22 NOTE — TELEPHONE ENCOUNTER
Returned call. Patient states he needs medications to be sent to [facility] so that his next fill will be on time. Updated all scripts and sent to provider for auth.

## 2025-07-25 ENCOUNTER — TELEPHONE (OUTPATIENT)
Dept: TRANSPLANT | Facility: HOSPITAL | Age: 65
End: 2025-07-25
Payer: MEDICARE

## 2025-07-25 ENCOUNTER — HOSPITAL ENCOUNTER (INPATIENT)
Facility: HOSPITAL | Age: 65
DRG: 863 | End: 2025-07-25
Attending: STUDENT IN AN ORGANIZED HEALTH CARE EDUCATION/TRAINING PROGRAM | Admitting: STUDENT IN AN ORGANIZED HEALTH CARE EDUCATION/TRAINING PROGRAM
Payer: MEDICARE

## 2025-07-25 DIAGNOSIS — Z94.1 HEART TRANSPLANT RECIPIENT: ICD-10-CM

## 2025-07-25 DIAGNOSIS — Z48.21 ENCOUNTER FOR AFTERCARE FOLLOWING HEART TRANSPLANT: ICD-10-CM

## 2025-07-25 DIAGNOSIS — Z94.1 HEART TRANSPLANT STATUS: Primary | ICD-10-CM

## 2025-07-25 DIAGNOSIS — Z94.1 HEART REPLACED BY TRANSPLANT: ICD-10-CM

## 2025-07-25 DIAGNOSIS — T88.8XXA FLUID COLLECTION AT SURGICAL SITE, INITIAL ENCOUNTER: ICD-10-CM

## 2025-07-25 PROCEDURE — 1100000001 HC PRIVATE ROOM DAILY

## 2025-07-25 NOTE — TELEPHONE ENCOUNTER
Received call from patient stating that he made it to the ED, had an US of leg and labwork completed. [facility] provided with brief background and advised their charge nurse to have provider call transfer center to speak with HFICU attending re: dispo.     Patient also notified RN that he does not think that he can make his cath this coming week because his ride decided to go camping instead. He cannot schedule a ride with senior services because he needs someone to go with him. Will follow up with team.

## 2025-07-25 NOTE — PROGRESS NOTES
[on call note] received call from patient stating that he doesn’t think he can be admitted because his dog is at home in its crate. Asked who took care of the dog when he was in the hospital, he said his son. He said his son is unable to care for the dog any longer. He also stated that his parents cannot take care of the dog. He stated he was unsure why he couldn’t just get a course of oral antibiotics and stay local. Advised patient that infections can be deadly to transplant patients and it’s important our team is aggressive. Will follow up with HFICU attending Dr. Velasco.

## 2025-07-25 NOTE — Clinical Note
1mg versed and 50mcg fent given. R groin fluid aspiration. 90cc's serous fluid removed. Dressing CDI. PT stable throughout procedure. Report given to RPCU RN, PT placed in transport.

## 2025-07-25 NOTE — TELEPHONE ENCOUNTER
"Patient called office to notify that he has a red lump at groin bypass site. Lump is 1\"x2\" with redness at site and extending down to knee. He states OT was at his house earlier and stated that he should probably gets some antibiotics. Advised for patient to proceed to [facility] ED for evaluation. Patient does not have transportation at this time but his son will get off work around 3pm and take him to ED. He will call when he is en route. Notified HFICU attending Dr. Velasco of patient.   "

## 2025-07-26 PROBLEM — Z94.1 HEART TRANSPLANT STATUS: Status: ACTIVE | Noted: 2025-07-26

## 2025-07-26 LAB
ABO GROUP (TYPE) IN BLOOD: NORMAL
ALBUMIN SERPL BCP-MCNC: 3.5 G/DL (ref 3.4–5)
ALP SERPL-CCNC: 117 U/L (ref 33–136)
ALT SERPL W P-5'-P-CCNC: 15 U/L (ref 10–52)
ANION GAP SERPL CALC-SCNC: 15 MMOL/L (ref 10–20)
ANTIBODY SCREEN: NORMAL
APTT PPP: 25 SECONDS (ref 26–36)
AST SERPL W P-5'-P-CCNC: 14 U/L (ref 9–39)
BILIRUB SERPL-MCNC: 0.4 MG/DL (ref 0–1.2)
BUN SERPL-MCNC: 33 MG/DL (ref 6–23)
CALCIUM SERPL-MCNC: 8.4 MG/DL (ref 8.6–10.6)
CHLORIDE SERPL-SCNC: 99 MMOL/L (ref 98–107)
CO2 SERPL-SCNC: 30 MMOL/L (ref 21–32)
CREAT SERPL-MCNC: 1.06 MG/DL (ref 0.5–1.3)
EGFRCR SERPLBLD CKD-EPI 2021: 78 ML/MIN/1.73M*2
ERYTHROCYTE [DISTWIDTH] IN BLOOD BY AUTOMATED COUNT: 17.6 % (ref 11.5–14.5)
GLUCOSE SERPL-MCNC: 104 MG/DL (ref 74–99)
HCT VFR BLD AUTO: 32.5 % (ref 41–52)
HGB BLD-MCNC: 10 G/DL (ref 13.5–17.5)
INR PPP: 1 (ref 0.9–1.1)
MAGNESIUM SERPL-MCNC: 1.65 MG/DL (ref 1.6–2.4)
MCH RBC QN AUTO: 30.3 PG (ref 26–34)
MCHC RBC AUTO-ENTMCNC: 30.8 G/DL (ref 32–36)
MCV RBC AUTO: 99 FL (ref 80–100)
NRBC BLD-RTO: 0 /100 WBCS (ref 0–0)
PLATELET # BLD AUTO: 319 X10*3/UL (ref 150–450)
POTASSIUM SERPL-SCNC: 3.7 MMOL/L (ref 3.5–5.3)
PROT SERPL-MCNC: 5.5 G/DL (ref 6.4–8.2)
PROTHROMBIN TIME: 10.9 SECONDS (ref 9.8–12.4)
RBC # BLD AUTO: 3.3 X10*6/UL (ref 4.5–5.9)
RH FACTOR (ANTIGEN D): NORMAL
SODIUM SERPL-SCNC: 140 MMOL/L (ref 136–145)
TACROLIMUS BLD-MCNC: 14 NG/ML
WBC # BLD AUTO: 8.6 X10*3/UL (ref 4.4–11.3)

## 2025-07-26 PROCEDURE — 2500000004 HC RX 250 GENERAL PHARMACY W/ HCPCS (ALT 636 FOR OP/ED): Performed by: STUDENT IN AN ORGANIZED HEALTH CARE EDUCATION/TRAINING PROGRAM

## 2025-07-26 PROCEDURE — 85027 COMPLETE CBC AUTOMATED: CPT | Performed by: STUDENT IN AN ORGANIZED HEALTH CARE EDUCATION/TRAINING PROGRAM

## 2025-07-26 PROCEDURE — 2500000002 HC RX 250 W HCPCS SELF ADMINISTERED DRUGS (ALT 637 FOR MEDICARE OP, ALT 636 FOR OP/ED): Performed by: STUDENT IN AN ORGANIZED HEALTH CARE EDUCATION/TRAINING PROGRAM

## 2025-07-26 PROCEDURE — 80053 COMPREHEN METABOLIC PANEL: CPT | Performed by: STUDENT IN AN ORGANIZED HEALTH CARE EDUCATION/TRAINING PROGRAM

## 2025-07-26 PROCEDURE — 2500000001 HC RX 250 WO HCPCS SELF ADMINISTERED DRUGS (ALT 637 FOR MEDICARE OP): Performed by: STUDENT IN AN ORGANIZED HEALTH CARE EDUCATION/TRAINING PROGRAM

## 2025-07-26 PROCEDURE — 80197 ASSAY OF TACROLIMUS: CPT | Performed by: STUDENT IN AN ORGANIZED HEALTH CARE EDUCATION/TRAINING PROGRAM

## 2025-07-26 PROCEDURE — 36415 COLL VENOUS BLD VENIPUNCTURE: CPT | Performed by: STUDENT IN AN ORGANIZED HEALTH CARE EDUCATION/TRAINING PROGRAM

## 2025-07-26 PROCEDURE — 86901 BLOOD TYPING SEROLOGIC RH(D): CPT | Performed by: STUDENT IN AN ORGANIZED HEALTH CARE EDUCATION/TRAINING PROGRAM

## 2025-07-26 PROCEDURE — 87040 BLOOD CULTURE FOR BACTERIA: CPT | Performed by: STUDENT IN AN ORGANIZED HEALTH CARE EDUCATION/TRAINING PROGRAM

## 2025-07-26 PROCEDURE — 83735 ASSAY OF MAGNESIUM: CPT | Performed by: STUDENT IN AN ORGANIZED HEALTH CARE EDUCATION/TRAINING PROGRAM

## 2025-07-26 PROCEDURE — 99233 SBSQ HOSP IP/OBS HIGH 50: CPT | Performed by: STUDENT IN AN ORGANIZED HEALTH CARE EDUCATION/TRAINING PROGRAM

## 2025-07-26 PROCEDURE — 1200000002 HC GENERAL ROOM WITH TELEMETRY DAILY

## 2025-07-26 PROCEDURE — 85730 THROMBOPLASTIN TIME PARTIAL: CPT | Performed by: STUDENT IN AN ORGANIZED HEALTH CARE EDUCATION/TRAINING PROGRAM

## 2025-07-26 RX ORDER — POLYETHYLENE GLYCOL 3350 17 G/17G
17 POWDER, FOR SOLUTION ORAL DAILY
Status: DISCONTINUED | OUTPATIENT
Start: 2025-07-26 | End: 2025-07-26 | Stop reason: SDUPTHER

## 2025-07-26 RX ORDER — DOCUSATE SODIUM 100 MG/1
100 CAPSULE, LIQUID FILLED ORAL 2 TIMES DAILY
Status: DISCONTINUED | OUTPATIENT
Start: 2025-07-26 | End: 2025-07-31 | Stop reason: HOSPADM

## 2025-07-26 RX ORDER — TACROLIMUS 0.5 MG/1
0.5 CAPSULE ORAL 2 TIMES DAILY
Status: DISCONTINUED | OUTPATIENT
Start: 2025-07-26 | End: 2025-07-28

## 2025-07-26 RX ORDER — SULFAMETHOXAZOLE AND TRIMETHOPRIM 400; 80 MG/1; MG/1
1 TABLET ORAL DAILY
Status: DISCONTINUED | OUTPATIENT
Start: 2025-07-26 | End: 2025-07-31 | Stop reason: HOSPADM

## 2025-07-26 RX ORDER — LIDOCAINE 560 MG/1
1 PATCH PERCUTANEOUS; TOPICAL; TRANSDERMAL DAILY
Status: DISCONTINUED | OUTPATIENT
Start: 2025-07-26 | End: 2025-07-31 | Stop reason: HOSPADM

## 2025-07-26 RX ORDER — VANCOMYCIN HYDROCHLORIDE 1 G/200ML
1000 INJECTION, SOLUTION INTRAVENOUS EVERY 12 HOURS
Status: DISCONTINUED | OUTPATIENT
Start: 2025-07-26 | End: 2025-07-30

## 2025-07-26 RX ORDER — GABAPENTIN 300 MG/1
300 CAPSULE ORAL 3 TIMES DAILY
Status: DISCONTINUED | OUTPATIENT
Start: 2025-07-26 | End: 2025-07-31 | Stop reason: HOSPADM

## 2025-07-26 RX ORDER — ACETAMINOPHEN 325 MG/1
650 TABLET ORAL EVERY 6 HOURS PRN
Status: DISCONTINUED | OUTPATIENT
Start: 2025-07-26 | End: 2025-07-31 | Stop reason: HOSPADM

## 2025-07-26 RX ORDER — CHOLECALCIFEROL (VITAMIN D3) 25 MCG
50 TABLET ORAL DAILY
Status: DISCONTINUED | OUTPATIENT
Start: 2025-07-26 | End: 2025-07-31 | Stop reason: HOSPADM

## 2025-07-26 RX ORDER — ACYCLOVIR 400 MG/1
400 TABLET ORAL 2 TIMES DAILY
Status: DISCONTINUED | OUTPATIENT
Start: 2025-07-26 | End: 2025-07-31 | Stop reason: HOSPADM

## 2025-07-26 RX ORDER — PRAVASTATIN SODIUM 40 MG/1
40 TABLET ORAL NIGHTLY
Status: DISCONTINUED | OUTPATIENT
Start: 2025-07-26 | End: 2025-07-31 | Stop reason: HOSPADM

## 2025-07-26 RX ORDER — TACROLIMUS 1 MG/1
1 CAPSULE ORAL 2 TIMES DAILY
Status: DISCONTINUED | OUTPATIENT
Start: 2025-07-26 | End: 2025-07-28

## 2025-07-26 RX ORDER — TORSEMIDE 20 MG/1
20 TABLET ORAL DAILY PRN
Status: DISCONTINUED | OUTPATIENT
Start: 2025-07-26 | End: 2025-07-31 | Stop reason: HOSPADM

## 2025-07-26 RX ORDER — VORICONAZOLE 200 MG/1
200 TABLET, FILM COATED ORAL EVERY 12 HOURS SCHEDULED
Status: DISCONTINUED | OUTPATIENT
Start: 2025-07-26 | End: 2025-07-31 | Stop reason: HOSPADM

## 2025-07-26 RX ORDER — CALCIUM CARBONATE 500(1250)
1250 TABLET ORAL 2 TIMES DAILY
Status: DISCONTINUED | OUTPATIENT
Start: 2025-07-26 | End: 2025-07-31 | Stop reason: HOSPADM

## 2025-07-26 RX ORDER — MYCOPHENOLATE MOFETIL 250 MG/1
1000 CAPSULE ORAL EVERY 12 HOURS SCHEDULED
Status: DISCONTINUED | OUTPATIENT
Start: 2025-07-26 | End: 2025-07-31 | Stop reason: HOSPADM

## 2025-07-26 RX ORDER — HYDRALAZINE HYDROCHLORIDE 25 MG/1
25 TABLET, FILM COATED ORAL 3 TIMES DAILY
Status: DISCONTINUED | OUTPATIENT
Start: 2025-07-26 | End: 2025-07-31 | Stop reason: HOSPADM

## 2025-07-26 RX ORDER — POLYETHYLENE GLYCOL 3350 17 G/17G
17 POWDER, FOR SOLUTION ORAL DAILY
Status: DISCONTINUED | OUTPATIENT
Start: 2025-07-26 | End: 2025-07-31 | Stop reason: HOSPADM

## 2025-07-26 RX ORDER — VANCOMYCIN HYDROCHLORIDE 1 G/20ML
INJECTION, POWDER, LYOPHILIZED, FOR SOLUTION INTRAVENOUS DAILY PRN
Status: DISCONTINUED | OUTPATIENT
Start: 2025-07-26 | End: 2025-07-30

## 2025-07-26 RX ORDER — AMOXICILLIN 250 MG
1 CAPSULE ORAL NIGHTLY PRN
Status: DISCONTINUED | OUTPATIENT
Start: 2025-07-26 | End: 2025-07-31 | Stop reason: HOSPADM

## 2025-07-26 RX ORDER — TAMSULOSIN HYDROCHLORIDE 0.4 MG/1
0.4 CAPSULE ORAL DAILY
Status: DISCONTINUED | OUTPATIENT
Start: 2025-07-26 | End: 2025-07-31 | Stop reason: HOSPADM

## 2025-07-26 RX ORDER — ASPIRIN 81 MG/1
81 TABLET ORAL DAILY
Status: DISCONTINUED | OUTPATIENT
Start: 2025-07-26 | End: 2025-07-31 | Stop reason: HOSPADM

## 2025-07-26 RX ORDER — PANTOPRAZOLE SODIUM 40 MG/1
40 TABLET, DELAYED RELEASE ORAL DAILY
Status: DISCONTINUED | OUTPATIENT
Start: 2025-07-26 | End: 2025-07-31 | Stop reason: HOSPADM

## 2025-07-26 RX ADMIN — VANCOMYCIN HYDROCHLORIDE 1000 MG: 1 INJECTION, SOLUTION INTRAVENOUS at 17:01

## 2025-07-26 RX ADMIN — GABAPENTIN 300 MG: 300 CAPSULE ORAL at 20:21

## 2025-07-26 RX ADMIN — CALCIUM 1 TABLET: 500 TABLET ORAL at 10:10

## 2025-07-26 RX ADMIN — HYDRALAZINE HYDROCHLORIDE 25 MG: 25 TABLET ORAL at 10:10

## 2025-07-26 RX ADMIN — TACROLIMUS 1 MG: 1 CAPSULE ORAL at 10:10

## 2025-07-26 RX ADMIN — TACROLIMUS 1 MG: 1 CAPSULE ORAL at 20:20

## 2025-07-26 RX ADMIN — PIPERACILLIN SODIUM AND TAZOBACTAM SODIUM 3.38 G: 3; .375 INJECTION, SOLUTION INTRAVENOUS at 10:13

## 2025-07-26 RX ADMIN — SULFAMETHOXAZOLE AND TRIMETHOPRIM 1 TABLET: 400; 80 TABLET ORAL at 10:11

## 2025-07-26 RX ADMIN — Medication 50 MCG: at 10:10

## 2025-07-26 RX ADMIN — MYCOPHENOLATE MOFETIL 1000 MG: 250 CAPSULE ORAL at 10:11

## 2025-07-26 RX ADMIN — PANTOPRAZOLE SODIUM 40 MG: 40 TABLET, DELAYED RELEASE ORAL at 10:10

## 2025-07-26 RX ADMIN — ACYCLOVIR 400 MG: 400 TABLET ORAL at 21:11

## 2025-07-26 RX ADMIN — PIPERACILLIN SODIUM AND TAZOBACTAM SODIUM 3.38 G: 3; .375 INJECTION, SOLUTION INTRAVENOUS at 02:20

## 2025-07-26 RX ADMIN — PIPERACILLIN SODIUM AND TAZOBACTAM SODIUM 3.38 G: 3; .375 INJECTION, SOLUTION INTRAVENOUS at 16:10

## 2025-07-26 RX ADMIN — ASPIRIN 81 MG: 81 TABLET, COATED ORAL at 10:10

## 2025-07-26 RX ADMIN — GABAPENTIN 300 MG: 300 CAPSULE ORAL at 16:09

## 2025-07-26 RX ADMIN — MYCOPHENOLATE MOFETIL 1000 MG: 250 CAPSULE ORAL at 20:21

## 2025-07-26 RX ADMIN — VANCOMYCIN HYDROCHLORIDE 1000 MG: 1 INJECTION, SOLUTION INTRAVENOUS at 02:29

## 2025-07-26 RX ADMIN — TAMSULOSIN HYDROCHLORIDE 0.4 MG: 0.4 CAPSULE ORAL at 10:10

## 2025-07-26 RX ADMIN — TACROLIMUS 0.5 MG: 1 CAPSULE ORAL at 20:21

## 2025-07-26 RX ADMIN — DOCUSATE SODIUM 100 MG: 100 CAPSULE, LIQUID FILLED ORAL at 20:21

## 2025-07-26 RX ADMIN — CALCIUM 1 TABLET: 500 TABLET ORAL at 20:21

## 2025-07-26 RX ADMIN — HYDRALAZINE HYDROCHLORIDE 25 MG: 25 TABLET ORAL at 20:21

## 2025-07-26 RX ADMIN — ACYCLOVIR 400 MG: 400 TABLET ORAL at 10:10

## 2025-07-26 RX ADMIN — HYDRALAZINE HYDROCHLORIDE 25 MG: 25 TABLET ORAL at 16:09

## 2025-07-26 RX ADMIN — PRAVASTATIN SODIUM 40 MG: 40 TABLET ORAL at 20:20

## 2025-07-26 RX ADMIN — TACROLIMUS 0.5 MG: 1 CAPSULE ORAL at 10:11

## 2025-07-26 RX ADMIN — VORICONAZOLE 200 MG: 200 TABLET, COATED ORAL at 21:11

## 2025-07-26 RX ADMIN — VORICONAZOLE 200 MG: 200 TABLET, COATED ORAL at 10:11

## 2025-07-26 RX ADMIN — LEVOTHYROXINE SODIUM 125 MCG: 0.1 TABLET ORAL at 06:07

## 2025-07-26 RX ADMIN — PIPERACILLIN SODIUM AND TAZOBACTAM SODIUM 3.38 G: 3; .375 INJECTION, SOLUTION INTRAVENOUS at 22:01

## 2025-07-26 RX ADMIN — GABAPENTIN 300 MG: 300 CAPSULE ORAL at 10:10

## 2025-07-26 RX ADMIN — PREDNISONE 25 MG: 20 TABLET ORAL at 10:10

## 2025-07-26 SDOH — ECONOMIC STABILITY: INCOME INSECURITY: IN THE PAST 12 MONTHS HAS THE ELECTRIC, GAS, OIL, OR WATER COMPANY THREATENED TO SHUT OFF SERVICES IN YOUR HOME?: NO

## 2025-07-26 SDOH — SOCIAL STABILITY: SOCIAL INSECURITY: WITHIN THE LAST YEAR, HAVE YOU BEEN AFRAID OF YOUR PARTNER OR EX-PARTNER?: PATIENT DECLINED

## 2025-07-26 SDOH — SOCIAL STABILITY: SOCIAL INSECURITY: ARE THERE ANY APPARENT SIGNS OF INJURIES/BEHAVIORS THAT COULD BE RELATED TO ABUSE/NEGLECT?: NO

## 2025-07-26 SDOH — SOCIAL STABILITY: SOCIAL INSECURITY: ABUSE: ADULT

## 2025-07-26 SDOH — ECONOMIC STABILITY: HOUSING INSECURITY: DO YOU FEEL UNSAFE GOING BACK TO THE PLACE WHERE YOU LIVE?: NO

## 2025-07-26 SDOH — ECONOMIC STABILITY: FOOD INSECURITY: WITHIN THE PAST 12 MONTHS, YOU WORRIED THAT YOUR FOOD WOULD RUN OUT BEFORE YOU GOT THE MONEY TO BUY MORE.: NEVER TRUE

## 2025-07-26 SDOH — SOCIAL STABILITY: SOCIAL INSECURITY
WITHIN THE LAST YEAR, HAVE YOU BEEN RAPED OR FORCED TO HAVE ANY KIND OF SEXUAL ACTIVITY BY YOUR PARTNER OR EX-PARTNER?: PATIENT DECLINED

## 2025-07-26 SDOH — SOCIAL STABILITY: SOCIAL INSECURITY: WERE YOU ABLE TO COMPLETE ALL THE BEHAVIORAL HEALTH SCREENINGS?: YES

## 2025-07-26 SDOH — SOCIAL STABILITY: SOCIAL INSECURITY
WITHIN THE LAST YEAR, HAVE YOU BEEN HUMILIATED OR EMOTIONALLY ABUSED IN OTHER WAYS BY YOUR PARTNER OR EX-PARTNER?: PATIENT DECLINED

## 2025-07-26 SDOH — SOCIAL STABILITY: SOCIAL INSECURITY: HAVE YOU HAD ANY THOUGHTS OF HARMING ANYONE ELSE?: NO

## 2025-07-26 SDOH — ECONOMIC STABILITY: FOOD INSECURITY: WITHIN THE PAST 12 MONTHS, THE FOOD YOU BOUGHT JUST DIDN'T LAST AND YOU DIDN'T HAVE MONEY TO GET MORE.: NEVER TRUE

## 2025-07-26 SDOH — SOCIAL STABILITY: SOCIAL INSECURITY: DO YOU FEEL ANYONE HAS EXPLOITED OR TAKEN ADVANTAGE OF YOU FINANCIALLY OR OF YOUR PERSONAL PROPERTY?: NO

## 2025-07-26 SDOH — SOCIAL STABILITY: SOCIAL INSECURITY
WITHIN THE LAST YEAR, HAVE YOU BEEN KICKED, HIT, SLAPPED, OR OTHERWISE PHYSICALLY HURT BY YOUR PARTNER OR EX-PARTNER?: PATIENT DECLINED

## 2025-07-26 SDOH — SOCIAL STABILITY: SOCIAL INSECURITY: HAVE YOU HAD THOUGHTS OF HARMING ANYONE ELSE?: NO

## 2025-07-26 SDOH — SOCIAL STABILITY: SOCIAL INSECURITY: DO YOU FEEL UNSAFE GOING BACK TO THE PLACE WHERE YOU ARE LIVING?: NO

## 2025-07-26 SDOH — SOCIAL STABILITY: SOCIAL INSECURITY: DOES ANYONE TRY TO KEEP YOU FROM HAVING/CONTACTING OTHER FRIENDS OR DOING THINGS OUTSIDE YOUR HOME?: NO

## 2025-07-26 SDOH — SOCIAL STABILITY: SOCIAL INSECURITY: ARE YOU OR HAVE YOU BEEN THREATENED OR ABUSED PHYSICALLY, EMOTIONALLY, OR SEXUALLY BY ANYONE?: NO

## 2025-07-26 SDOH — SOCIAL STABILITY: SOCIAL INSECURITY
ASK PARENT OR GUARDIAN: ARE THERE TIMES WHEN YOU, YOUR CHILD(REN), OR ANY MEMBER OF YOUR HOUSEHOLD FEEL UNSAFE, HARMED, OR THREATENED AROUND PERSONS WITH WHOM YOU KNOW OR LIVE?: NO

## 2025-07-26 SDOH — SOCIAL STABILITY: SOCIAL INSECURITY: HAS ANYONE EVER THREATENED TO HURT YOUR FAMILY OR YOUR PETS?: NO

## 2025-07-26 ASSESSMENT — COGNITIVE AND FUNCTIONAL STATUS - GENERAL
DAILY ACTIVITIY SCORE: 24
CLIMB 3 TO 5 STEPS WITH RAILING: A LITTLE
MOBILITY SCORE: 24
MOBILITY SCORE: 22
MOBILITY SCORE: 22
PATIENT BASELINE BEDBOUND: NO
WALKING IN HOSPITAL ROOM: A LITTLE
CLIMB 3 TO 5 STEPS WITH RAILING: A LITTLE
WALKING IN HOSPITAL ROOM: A LITTLE

## 2025-07-26 ASSESSMENT — ACTIVITIES OF DAILY LIVING (ADL)
HEARING - RIGHT EAR: FUNCTIONAL
HEARING - RIGHT EAR: FUNCTIONAL
JUDGMENT_ADEQUATE_SAFELY_COMPLETE_DAILY_ACTIVITIES: YES
PATIENT'S MEMORY ADEQUATE TO SAFELY COMPLETE DAILY ACTIVITIES?: YES
HEARING - LEFT EAR: FUNCTIONAL
FEEDING YOURSELF: INDEPENDENT
ADEQUATE_TO_COMPLETE_ADL: YES
GROOMING: INDEPENDENT
WALKS IN HOME: INDEPENDENT
BATHING: INDEPENDENT
HEARING - LEFT EAR: FUNCTIONAL
GROOMING: INDEPENDENT
WALKS IN HOME: INDEPENDENT
TOILETING: INDEPENDENT
FEEDING YOURSELF: INDEPENDENT
DRESSING YOURSELF: INDEPENDENT
TOILETING: INDEPENDENT
PATIENT'S MEMORY ADEQUATE TO SAFELY COMPLETE DAILY ACTIVITIES?: YES
DRESSING YOURSELF: INDEPENDENT
ADEQUATE_TO_COMPLETE_ADL: YES
LACK_OF_TRANSPORTATION: NO
BATHING: INDEPENDENT
JUDGMENT_ADEQUATE_SAFELY_COMPLETE_DAILY_ACTIVITIES: YES

## 2025-07-26 ASSESSMENT — LIFESTYLE VARIABLES
AUDIT-C TOTAL SCORE: 0
HOW OFTEN DO YOU HAVE A DRINK CONTAINING ALCOHOL: NEVER
HOW MANY STANDARD DRINKS CONTAINING ALCOHOL DO YOU HAVE ON A TYPICAL DAY: PATIENT DOES NOT DRINK
HOW OFTEN DO YOU HAVE 6 OR MORE DRINKS ON ONE OCCASION: NEVER
AUDIT-C TOTAL SCORE: 0
SKIP TO QUESTIONS 9-10: 1

## 2025-07-26 ASSESSMENT — PAIN - FUNCTIONAL ASSESSMENT
PAIN_FUNCTIONAL_ASSESSMENT: 0-10

## 2025-07-26 ASSESSMENT — PAIN SCALES - GENERAL
PAINLEVEL_OUTOF10: 0 - NO PAIN

## 2025-07-26 ASSESSMENT — PATIENT HEALTH QUESTIONNAIRE - PHQ9
2. FEELING DOWN, DEPRESSED OR HOPELESS: NOT AT ALL
1. LITTLE INTEREST OR PLEASURE IN DOING THINGS: NOT AT ALL
SUM OF ALL RESPONSES TO PHQ9 QUESTIONS 1 & 2: 0

## 2025-07-26 NOTE — CONSULTS
Vancomycin Dosing by Pharmacy- INITIAL    Anatoly Lane is a 64 y.o. year old male who Pharmacy has been consulted for vancomycin dosing for cellulitis, skin and soft tissue. Based on the patient's indication and renal status this patient will be dosed based on a goal AUC of 400-600.     Renal function is currently stable.    Visit Vitals  /81 (BP Location: Left arm, Patient Position: Sitting)   Pulse 94   Temp 36.3 °C (97.3 °F) (Temporal)   Resp 18        Lab Results   Component Value Date    CREATININE 1.04 2025    CREATININE 0.89 2025    CREATININE 0.84 2025    CREATININE 1.30 2025        Patient weight is as follows:   Vitals:    25 0012   Weight: 93.3 kg (205 lb 11 oz)       Cultures:  No results found for the encounter in last 14 days.        No intake/output data recorded.  I/O during current shift:  No intake/output data recorded.    Temp (24hrs), Av.5 °C (97.7 °F), Min:36.3 °C (97.3 °F), Max:36.7 °C (98 °F)         Assessment/Plan     Patient will start on 1000 mg Q12H.    This dosing regimen is predicted by SP3HRx to result in the following pharmacokinetic parameters:  Regimen: 1000 mg IV every 12 hours.  Start time: 0200 on 2025  Exposure target: AUC24 (range) 400-600 mg/L.hr   [id number]: 393 mg/L.hr  AUC24,ss: 527 mg/L.hr  Probability of AUC24 > 400: 78 %    Follow-up level tomorrow morning.  Will continue to monitor renal function daily while on vancomycin and order serum creatinine at least every 48 hours if not already ordered.  Follow for continued vancomycin needs, clinical response, and signs/symptoms of toxicity.       Escobar Avalos, PharmD

## 2025-07-26 NOTE — CARE PLAN
Problem: Heart Failure  Goal: Improved urinary output this shift  Outcome: Progressing  Goal: Reduction in peripheral edema within 24 hours  Outcome: Progressing  Goal: Report improvement of dyspnea/breathlessness this shift  Outcome: Progressing  Goal: Weight from fluid excess reduced over 2-3 days, then stabilize  Outcome: Progressing  Goal: Increase self care and/or family involvement in 24 hours  Outcome: Progressing     Problem: Nutrition  Goal: Nutrient intake appropriate for maintaining nutritional needs  Outcome: Met     Problem: Safety - Adult  Goal: Free from fall injury  Outcome: Met     Problem: Pain - Adult  Goal: Verbalizes/displays adequate comfort level or baseline comfort level  Outcome: Met      The clinical goals for the shift include Patient will have no fever and HDS this shift.

## 2025-07-26 NOTE — PROGRESS NOTES
HFICU Attending Note    64M s/p HM3 then OHT (6/21/25) admitted 7/25 with worsening erythema and purulence over R groin seroma (old femoral access). Likely evolving abscess. ACS consulted for eval and drainage. No systemic signs. Will reassess for de-escalation. Plan biopsy this coming week.     - Empiric vanc/zosyn.   - Tacrolimus 1.5 mg BID. Goal 10-12  - Mycophenolate 1000 mg BID.   - Prednisone 25 mg daily.  - Voriconazole, acyclovir through 9/22. Bactrim through 12/22.      Objective    Admit Date: 7/25/2025  Hospital Length of Stay: 1   ICU Length of Stay: Patient does not have an ICU stay during this admission.   Home: [place] [address]    MEDICATIONS  Infusions:     Scheduled:  acyclovir, 400 mg, BID  aspirin, 81 mg, Daily  calcium carbonate, 1,250 mg of calcium carbonate, BID  cholecalciferol, 50 mcg, Daily  docusate sodium, 100 mg, BID  gabapentin, 300 mg, TID  hydrALAZINE, 25 mg, TID  insulin NPH (Isophane), 10 Units, q AM  levothyroxine, 125 mcg, Daily  lidocaine, 1 patch, Daily  mycophenolate, 1,000 mg, q12h LUPILLO  pantoprazole, 40 mg, Daily  piperacillin-tazobactam, 3.375 g, q6h  polyethylene glycol, 17 g, Daily  pravastatin, 40 mg, Nightly  predniSONE, 25 mg, Daily  sulfamethoxazole-trimethoprim, 1 tablet, Daily  tacrolimus, 0.5 mg, BID  tacrolimus, 1 mg, BID  tamsulosin, 0.4 mg, Daily  vancomycin, 1,000 mg, q12h  voriconazole, 200 mg, q12h LUPILLO      PRN:  acetaminophen, 650 mg, q6h PRN  sennosides-docusate sodium, 1 tablet, Nightly PRN  torsemide, 20 mg, Daily PRN  vancomycin, , Daily PRN        Prior to Admission Meds:  Prescriptions Prior to Admission[1]    Invasive Hemodynamics:    Most Recent Range Past 24hrs   BP (Art)   No data recorded   MAP(Art)   No data recorded   RA/CVP   No data recorded   PA   No data recorded   PA(mean)   No data recorded   PCWP   No data recorded   CO   No data recorded   CI   No data recorded   Mixed Venous   No data recorded   SVR    No data recorded   PVR   No data  "recorded     MCS:   Heart Mate III:     Most Recent Range Past 24hrs   Flow   No data recorded   Speed   No data recorded   Power   No data recorded   PI   No data recorded     ECMO:     Most Recent Range Past 24hrs   Flow   No data recorded   Speed   No data recorded   Sweep   No data recorded     Impella:      Most Recent Range Past 24hrs   Performance Level   No data recorded   Flow (L/min)   No data recorded   Motor Current   No data recorded   Placement Signal    Placement OK could not be evaluated. This SmartLink does not work with rows of the type: Custom List   Purge (mmHg)   No data recorded   Purge rate (mL/hr)   No data recorded     VENT:    Most Recent Range Past 24hrs   Mode      FiO2   No data recorded   Rate   No data recorded   Vt    No data recorded   PEEP   No data recorded         7/26/2025     8:00 AM 7/26/2025     7:55 AM 7/26/2025     4:43 AM 7/26/2025     4:00 AM 7/26/2025    12:12 AM 7/25/2025     8:31 PM 7/25/2025     5:00 PM   Vitals   Systolic  125 127  132 128 142   Diastolic  84 73  81 65 60   BP Location  Left arm Left arm  Left arm     Heart Rate 91  81 83 94 86 90   Temp  36.2 °C (97.2 °F) 36.5 °C (97.7 °F)  36.3 °C (97.3 °F)  36.7 °C (98 °F)   Resp 14  14 15 18 18 18   Height     1.753 m (5' 9.02\")     Weight (lb)     205.69  208.34   BMI     30.36 kg/m2  30.77 kg/m2   BSA (m2)     2.13 m2  2.14 m2     Visit Vitals  /84 (BP Location: Left arm, Patient Position: Sitting)   Pulse 91   Temp 36.2 °C (97.2 °F) (Temporal)   Resp 14   Ht 1.753 m (5' 9.02\")   Wt 93.3 kg (205 lb 11 oz)   SpO2 93%   BMI 30.36 kg/m²   Smoking Status Former   BSA 2.13 m²     Wt Readings from Last 5 Encounters:   07/26/25 93.3 kg (205 lb 11 oz)   07/25/25 94.5 kg (208 lb 5.4 oz)   07/21/25 91.9 kg (202 lb 8 oz)   07/14/25 94.9 kg (209 lb 4.8 oz)   07/14/25 98.4 kg (216 lb 14.9 oz)       Intake/Output Summary (Last 24 hours) at 7/26/2025 0807  Last data filed at 7/26/2025 0319  Gross per 24 hour   Intake 250 " ml   Output --   Net 250 ml     CHEST:  ,    CV:  Normal sinus rhythm  ABD:      Bowel sounds:  , Flatus:    EXT:   RLE: Appropriate for ethnicity,Warm, Dry  DP: Moderate  PT: Weak  LLE: Appropriate for ethnicity,Warm, Dry  DP: Moderate  PT: Weak  NEURO:   RASS:    CAM:    LOC: Alert  Cognition: Appropriate judgement, Appropriate safety awareness, Appropriate attention/concentration, Appropriate for developmental age, Follows commands  GCS: 15    DATA:  CMP:  Recent Labs     07/26/25  0126 07/21/25  0636 07/20/25  0633 07/19/25  0549 07/18/25  0437 07/17/25  0602 07/16/25  0527 07/15/25  0603 07/14/25  2056 07/14/25  1126    142 140 139 140 140 139 142 140 141   K 3.7 4.2 4.2 4.6 4.3 4.6 4.7 4.0 3.9 3.5   CL 99 104 105 103 104 104 105 103 101 104   CO2 30 29 26 29 28 28 27 30 28 22   ANIONGAP 15 13 13 12 12 13 12 13 15 19   BUN 33* 37* 35* 32* 32* 38* 53* 63* 73* 67*   CREATININE 1.06 1.04 0.89 0.84 1.30 1.08 1.34* 1.27 1.77* 1.62*   EGFR 78 80 >90 >90 61 77 59* 63 42* 47*   MG 1.65 1.64 1.75 1.84 1.47* 1.66 1.90 1.87 1.69 1.67     Recent Labs     07/26/25  0126 07/21/25  0636 07/20/25  0633 07/19/25  0549 07/18/25  0437 07/17/25  0602 07/16/25  0527 07/15/25  0603 07/14/25  2056 07/14/25  1126 06/21/25  0801 06/20/25  1659 03/26/25  1157 01/28/25  1451 04/04/24  1509 02/24/24  0233 02/23/24  1433 05/23/23  0304 05/21/23  1943   ALBUMIN 3.5 3.5 3.3* 3.3* 3.4 3.4 3.2* 3.1* 3.5 3.2*   < > 4.3 3.8 4.2 4.4   < > 4.0   < > CANCELED   ALT 15  --   --   --   --   --   --   --  21 19  --  13 13 12 13  --  13   < > CANCELED   AST 14  --   --   --   --   --   --   --  15 14  --  15 31 14 12  --  11   < > CANCELED   BILITOT 0.4  --   --   --   --   --   --   --  0.4 0.4  --  0.4 0.5 0.5 0.3  --  0.4   < > CANCELED   LIPASE  --   --   --   --   --   --   --   --   --   --   --   --   --   --   --   --   --   --  CANCELED    < > = values in this interval not displayed.     CBC:  Recent Labs     07/26/25  0126  07/21/25  0636 07/20/25  0633 07/19/25  0549 07/18/25  0437 07/17/25  0602 07/16/25  0527 07/15/25  0603   WBC 8.6 8.5 7.7 6.9 6.7 7.0 7.7 7.1   HGB 10.0* 10.8* 10.3* 10.2* 10.1* 10.1* 9.6* 9.1*   HCT 32.5* 33.9* 33.9* 31.6* 32.2* 32.3* 30.9* 28.0*    305 297 277 262 263 267 224   MCV 99 98 102* 98 100 102* 104* 96     COAG:   Recent Labs     07/26/25  0126 07/14/25  2056 06/21/25  0801 06/20/25  2330 06/20/25  2118 06/20/25  1659 04/04/24  1509 03/06/24  0615 03/03/24  2355 03/03/24  1055 03/03/24  0604 03/02/24  2315 03/02/24  2200 05/02/23  0030 05/01/23  0033 04/30/23  1619 04/30/23  0413 04/21/23  1412 04/21/23  1125 04/08/23  0315 04/07/23  1835 04/06/23  1852 04/06/23  1714 04/04/23  0531 04/03/23  0237   INR 1.0 1.0 1.4* 1.9* 2.4* 3.0* 2.2* 3.3*   < >  --  1.6*  --   --    < > 1.3* 1.3* 1.2*   < > 1.2*   < >  --    < >  --    < > 1.3*   HAUF  --   --   --  0.2  --   --   --   --   --  0.3 0.4 0.2 0.2   < >  --   --  0.3   < >  --    < >  --   --   --   --   --    HAPTOGLOBIN  --   --   --   --   --   --   --   --   --   --   --   --   --   --   --   --   --   --  <30  --  <30  --  <30  --  <30   FIBRINOGEN  --   --  291  --   --  426*  --   --   --   --   --   --   --   --  264 255 356   < >  --   --   --   --   --   --   --     < > = values in this interval not displayed.     ABO:   Recent Labs     07/26/25  0126   ABO AB     HEME/ENDO:   Recent Labs     07/14/25  2056 06/25/25  0609 03/26/25  1157 01/28/25  1451 04/04/24  1509 03/02/24  0610 02/26/24  0811 02/23/24  1433 04/25/23  0911 04/21/23  1125 04/07/23  0416 03/31/23  2331 03/31/23  2142 03/29/23  2151   FERRITIN  --  130  --   --   --   --   --  104  --  352*  --   --   --  79   IRONSAT  --  21*  --   --   --   --   --  18*  --  16*  --   --   --  6*   TSH  --   --  1.83 0.05* 0.70 8.10*   < > 18.02*   < >  --    < >  --    < > 15.36*   HGBA1C 5.1  --   --   --   --   --   --  4.4  --   --   --  5.8*  --  6.7*    < > = values in this  interval not displayed.     CARDIAC:   Recent Labs     07/14/25 2056 07/09/25  0628 07/06/25  1159 03/26/25  1157 01/28/25  1451 04/04/24  1509 03/06/24  0615 03/05/24  0609 03/04/24  0947 03/03/24  0604 03/02/24  0610 02/24/24  0233 02/23/24  1433 05/21/23  1943   LDH  --   --   --  263* 145 134 116 142 161 145 147   < > 128  --    TROPHS 233*  --   --   --   --   --   --   --   --   --   --   --  30 CANCELED  CANCELED   * 308* 347* 57 69 101*  --   --   --   --   --   --  33 CANCELED    < > = values in this interval not displayed.     Recent Labs     06/28/25  0548 06/27/25  2158 06/27/25  1131 06/27/25  0621 06/26/25  2321 06/22/25  1804 06/22/25  0704 06/22/25  0538 06/22/25  0313 06/21/25  2256 06/21/25  1726 06/21/25  1628   LACMX 1.7 1.5 1.8 2.0 2.4*   < >  --   --   --   --   --   --    LACTATEART  --   --   --   --   --   --  1.0  --  0.8 1.0 2.7* 3.3*   SO2MV 60 52 64 65 63   < >  --    < >  --   --   --   --     < > = values in this interval not displayed.     Recent Labs     07/21/25  0636 07/20/25  0633 07/19/25  0549 07/18/25  0437 07/17/25  0602 07/16/25  0527 07/15/25  0603 07/14/25  1126   TACROLIMUS 9.1 9.1 9.0 10.6 12.0 15.6* 22.7* 19.6*     Recent Labs     07/14/25 2056 02/29/24  0658 02/28/24  0444 03/29/23  2151   CHOL 191 158 155 126   LDLF  --   --   --  85   LDLCALC 74  --   --   --    HDL 75.7 38.3 39.4 24.0*   TRIG 205* 145  --  83     MICRO:   Recent Labs     05/12/23  0857   CRP 14.77*     No results found for the last 90 days.    Assessment & Plan  Heart transplant status        EKG:   Recent Labs     07/14/25  2228 07/01/25  1015 06/20/25  1630 05/05/23  1150 04/24/23  1052   ATRRATE 86 103 50   < > 84   VENTRATE 86 103 72   < > 84   PRINT 146 122  --   --  214   QRSDUR 106 94 186   < > 146   QTCFRED 467 433 459   < > 469   QTCCALCB 495 474 473   < > 496    < > = values in this interval not displayed.     Encounter Date: 07/14/25   ECG 12 lead   Result Value    Ventricular  Rate 86    Atrial Rate 86    AK Interval 146    QRS Duration 106    QT Interval 414    QTC Calculation(Bazett) 495    P Axis 73    R Axis 23    T Axis 188    QRS Count 14    Q Onset 216    P Onset 143    P Offset 193    T Offset 423    QTC Fredericia 467    Narrative    Normal sinus rhythm  T wave abnormality, consider inferior ischemia  T wave abnormality, consider anterolateral ischemia  Prolonged QT  Abnormal ECG  When compared with ECG of 01-JUL-2025 10:14,  ST no longer elevated in Inferior leads  ST no longer elevated in Anterolateral leads  T wave inversion now evident in Inferior leads  T wave inversion now evident in Anterolateral leads  Confirmed by Cameron Garner ([id number]) on 7/15/2025 8:49:22 PM     Echocardiogram:   Recent Labs     07/14/25  1300 07/01/25  1208 06/30/25  1609 06/27/25  1431 06/23/25  1237 06/21/25  0054 02/27/25  1155 02/27/25  1155 02/23/24  1711   EF 65  --  63 63 67 53   < > 13  --    LVIDD 4.96  --  4.40 5.45 4.60  --   --  8.22  --    RV 27  --   --  25  --   --   --  20.6  --    RVFRWALLPKSP 10.00 6.75 7.40 10.00  --   --   --   --  0.60   TAPSE 1.5  --   --  1.6  --   --   --   --  1.4    < > = values in this interval not displayed.   Transthoracic Echo (TTE) Complete With Contrast 07/14/2025    [facility], [address] [place] [address]  Tel [phone] and Fax [phone]    TRANSTHORACIC ECHOCARDIOGRAM REPORT      Patient Name:       BILLIESOLO TAFOYACAROLYN Steward Physician:    [id number] Preston Claudio MD  Study Date:         7/14/2025           Ordering Provider:    [id number] CORINNE JOHN  MRN/PID:            [id number]            Fellow:  Accession#:         [id number]        Nurse:  Date of Birth/Age:  1960 / 64      Sonographer:          Fe Crawford RDCS  years  Gender assigned at  M                   Additional Staff:  Birth:  Height:             175.00 cm           Admit Date:           7/14/2025  Weight:             98.40 kg             Admission Status:     Outpatient  BSA / BMI:          2.14 m2 / 32.13     Encounter#:           [id number]  kg/m2  Blood Pressure:     118/64 mmHg         Department Location:  [facility] Cath  Lab    Study Type:    TRANSTHORACIC ECHO (TTE) COMPLETE  Diagnosis/ICD: Heart transplant status-Z94.1  Indication:    s/p heart transplant  CPT Code:      Echo Complete w Full Doppler-11810    Patient History:  Pertinent History: S/p heart transplant 6/20/25, HTN, former smoker, NICM.    Study Detail: The following Echo studies were performed: 2D, M-Mode, Doppler and  color flow. Technically challenging study due to patient lying in  supine position, body habitus, postoperative dressings, prominent  lung artifact and poor acoustic windows. Optison used as a  contrast agent for endocardial border definition. Total contrast  used for this procedure was 4 mL via IV push.      PHYSICIAN INTERPRETATION:  Left Ventricle: Left ventricular ejection fraction is normal by visual estimate at 65%. There are no regional left ventricular wall motion abnormalities. The left ventricular cavity size is normal. There is normal septal and normal posterior left ventricular wall thickness. Left ventricular diastolic filling cannot be determined due to heart transplant.  Left Atrium: The left atrial size is moderately dilated.  Right Ventricle: The right ventricle is normal in size. There is low normal right ventricular systolic function.  Right Atrium: The right atrium is mildly dilated.  Aortic Valve: The aortic valve is trileaflet. The aortic valve area by VTI is 3.47 cmï¿½ with a peak velocity of 1.13 m/s. The peak and mean gradients are 5 mmHg and 3 mmHg, respectively with a dimensionless index of 0.84. There is no evidence of aortic valve regurgitation.  Mitral Valve: The mitral valve is normal in structure. There is trace to mild mitral valve regurgitation. The E Vmax is 0.61 m/s.  Tricuspid Valve: The tricuspid valve is structurally  normal. There is trace tricuspid regurgitation. The Doppler estimated right ventricular systolic pressure (RVSP) is within normal limits at 27 mmHg. Reported right ventricular systolic pressure may be underestimated due to incomplete or suboptimal Doppler envelope.  Pulmonic Valve: The pulmonic valve is structurally normal. There is no indication of pulmonic valve regurgitation.  Pericardium: There is no pericardial effusion noted.  Pleural: There is left pleural effusion.  Aorta: The aortic root is normal. The aortic root is at the upper limits of normal size.  Systemic Veins: The inferior vena cava appears normal in size, with IVC inspiratory collapse greater than 50%.  In comparison to the previous echocardiogram(s): Compared with study dated 7/8/2025, the previous echocardiogram was of poor quality and not readily comparable to the present study.      CONCLUSIONS:  1. Left ventricular ejection fraction is normal by visual estimate at 65%.  2. There is low normal right ventricular systolic function.  3. The left atrial size is moderately dilated.  4. The Doppler estimated RVSP is within normal limits at 27 mmHg.    QUANTITATIVE DATA SUMMARY:    2D MEASUREMENTS:          Normal Ranges:  LAs:             5.55 cm  (2.7-4.0cm)  IVSd:            0.76 cm  (0.6-1.1cm)  LVPWd:           0.92 cm  (0.6-1.1cm)  LVIDd:           4.96 cm  (3.9-5.9cm)  LVIDs:           3.41 cm  LV Mass Index:   67 g/m2  LVEDV Index:     52 ml/m2  LV % FS          31.3 %      LEFT ATRIUM:                  Normal Ranges:  LA Vol A4C:        110.9 ml   (22+/-6mL/m2)  LA Vol A2C:        87.7 ml  LA Vol BP:         98.6 ml  LA Vol Index A4C:  51.9ml/m2  LA Vol Index A2C:  41.1 ml/m2  LA Vol Index BP:   46.2 ml/m2  LA Area A4C:       28.9 cm2  LA Area A2C:       25.7 cm2  LA Major Axis A4C: 6.4 cm  LA Major Axis A2C: 6.4 cm  LA Volume Index:   46.2 ml/m2  LA Vol A4C:        102.8 ml  LA Vol A2C:        83.1 ml  LA Vol Index BSA:  43.5  ml/m2      RIGHT ATRIUM:                 Normal Ranges:  RA Vol A4C:        61.6 ml    (8.3-19.5ml)  RA Vol Index A4C:  28.8 ml/m2  RA Area A4C:       19.6 cm2  RA Major Axis A4C: 5.3 cm      M-MODE MEASUREMENTS:         Normal Ranges:  LAs:                 5.71 cm (2.7-4.0cm)      AORTA MEASUREMENTS:         Normal Ranges:  Ao Sinus, d:        3.50 cm (2.1-3.5cm)  Ao STJ, d:          2.90 cm (1.7-3.4cm)  Asc Ao, d:          3.00 cm (2.1-3.4cm)      LV SYSTOLIC FUNCTION:  Normal Ranges:  EF-A4C View:    50 % (>=55%)  EF-A2C View:    64 %  EF-Biplane:     58 %  EF-Visual:      65 %  LV EF Reported: 65 %      LV DIASTOLIC FUNCTION:            Normal Ranges:  MV Peak E:             0.61 m/s   (0.7-1.2 m/s)  MV Peak A:             0.40 m/s   (0.42-0.7 m/s)  E/A Ratio:             1.53       (1.0-2.2)  MV e'                  0.100 m/s  (>8.0)  MV lateral e'          0.11 m/s  MV medial e'           0.09 m/s  MV A Dur:              91.34 msec  E/e' Ratio:            6.11       (<8.0)  a'                     0.07 m/s      MITRAL VALVE:          Normal Ranges:  MV DT:        102 msec (150-240msec)      AORTIC VALVE:                     Normal Ranges:  AoV Vmax:                1.13 m/s (<=1.7m/s)  AoV Peak P.1 mmHg (<20mmHg)  AoV Mean P.8 mmHg (1.7-11.5mmHg)  LVOT Max Nam:            1.01 m/s (<=1.1m/s)  AoV VTI:                 21.65 cm (18-25cm)  LVOT VTI:                18.10 cm  LVOT Diameter:           2.30 cm  (1.8-2.4cm)  AoV Area, VTI:           3.47 cm2 (2.5-5.5cm2)  AoV Area,Vmax:           3.70 cm2 (2.5-4.5cm2)  AoV Dimensionless Index: 0.84      RIGHT VENTRICLE:  RV Basal 3.70 cm  RV Mid   3.40 cm  RV Major 6.0 cm  TAPSE:   15.0 mm  RV s'    0.10 m/s      TRICUSPID VALVE/RVSP:          Normal Ranges:  Peak TR Velocity:     2.47 m/s  Est. RA Pressure:     3  RV Syst Pressure:     27       (< 30mmHg)  IVC Diam:             1.71 cm      PULMONIC VALVE:          Normal Ranges:  PV  Accel Time:  72 msec  (>120ms)  PV Max Nam:     1.1 m/s  (0.6-0.9m/s)  PV Max P.0 mmHg      AORTA:  Asc Ao Diam 3.01 cm      [id number] Preston Claudio MD  Electronically signed on 7/15/2025 at 3:22:54 PM        ** Final **    Coronary Angiography:   Right Heart Catheterization, Right Heart Catheterization 2025    [facility], Cath Lab, [address] [place] [address]    Cardiovascular Catheterization Report    Patient Name:      BILLIE WARD         Performing Physician:  [id number] Hylton MD  Study Date:        2025            Verifying Physician:   Dania Hylton MD  MRN/PID:           [id number]             Cardiologist/Co-Scrub:  Accession#:        [id number]         Ordering Provider:     [id number] CORINNE JOHN  Date of Birth/Age: 1960 / 64 years Cardiologist:  Gender:            M                    Fellow:  Encounter#:        [id number]           Surgeon:      Study:            Endomyocardial Biopsy  Additional Study: Right Heart Cath      Indications:  Procedure performed to evaluate a recipient of a cardiac transplant.    Hemo Personnel:  +-------------------+---------+  Name               Duty       +-------------------+---------+  Shekhar Hylton MD 1  +-------------------+---------+      Hemodynamic Pressures:    +---------+-------+-------+-------+--------+           RA mmHgRV mmHgPA mmHgPCW mmHg  +---------+-------+-------+-------+--------+  Systolic        32     32               +---------+-------+-------+-------+--------+  Diastolic              16               +---------+-------+-------+-------+--------+  EDP             4                       +---------+-------+-------+-------+--------+  Mean     4             21     10        +---------+-------+-------+-------+--------+  +----+---------------------+----------+-------------+--------------+---------+  Site      Date Time      Phase  NameSystolic mmHgDiastolic mmHgMean mmHg  +----+---------------------+----------+-------------+--------------+---------+    AO7/14/2025 12:01:48 PM  AIR REST          121            69       85  +----+---------------------+----------+-------------+--------------+---------+        Oxygen Saturation %:  +-----------+----------+------------+  Sample SiteO2 Sat (%)HB (g/100ml)  +-----------+----------+------------+           FA        94         9.6  +-----------+----------+------------+           PA        67         9.6  +-----------+----------+------------+      Cardiac Outputs:  +----------------+---------------+--------+--------------+-------------+-------+         Thermo CO      Thermo CI  Thermo       WERO CO      WERO CIFICK SV           (l/min)     (l/min/m2)      SV       (l/min)   (l/min/m2)         +----------------+---------------+--------+--------------+-------------+-------+               5.2            2.4    67.3           8.1          3.8  104.8  +----------------+---------------+--------+--------------+-------------+-------+      Complications:  No in-lab complications observed.    Cardiac Cath Post Procedure Notes:  Post Procedure           Post heart transplant.  Diagnosis:  Blood Loss:              Estimated blood loss during the procedure was Minimal  mls.  Specimens Removed:       Number of specimen(s) removed: 3.    ____________________________________________________________________________________  CONCLUSIONS:  1. Blood pressure at start of case 121/69 (85).  2. RHC: RA 4, PA 32/16 (21), PCWP 10; Wero CI 3.8 and Thermodilution CI 2.4.  3. Endomyocardial biopsy x 3 completed.    ICD 10 Codes:  Heart transplant status-Z94.1    CPT Codes:  Ultrasound guidance for vascular access-27822; Endomyocardial Biopsy-22340; Right Heart Cath O2/Cardiac output with biopsy (RHC)[id number]; Moderate Sedation Services 1st additional 15 minutes patient >5  years-11492; Moderate Sedation Services 2nd additional 15 minutes patient >5 years-98293    [id number] Hylton MD  Performing Physician  Electronically signed by Dania Hylton MD on 7/14/2025 at 3:06:39 PM          ** Final **    Right Heart Cath:   Right Heart Catheterization, Right Heart Catheterization 07/14/2025    [facility], Cath Lab, [address] [place] [address]    Cardiovascular Catheterization Report    Patient Name:      BILLIE WARD         Performing Physician:  Dania Hylton MD  Study Date:        7/14/2025            Verifying Physician:   Dania Hylton MD  MRN/PID:           [id number]             Cardiologist/Co-Scrub:  Accession#:        [id number]         Ordering Provider:     [id number] CORINNE JOHN  Date of Birth/Age: 1960 / 64 years Cardiologist:  Gender:            M                    Fellow:  Encounter#:        [id number]           Surgeon:      Study:            Endomyocardial Biopsy  Additional Study: Right Heart Cath      Indications:  Procedure performed to evaluate a recipient of a cardiac transplant.    Hemo Personnel:  +-------------------+---------+  Name               Duty       +-------------------+---------+  Shekhar Hylton MD 1  +-------------------+---------+      Hemodynamic Pressures:    +---------+-------+-------+-------+--------+           RA mmHgRV mmHgPA mmHgPCW mmHg  +---------+-------+-------+-------+--------+  Systolic        32     32               +---------+-------+-------+-------+--------+  Diastolic              16               +---------+-------+-------+-------+--------+  EDP             4                       +---------+-------+-------+-------+--------+  Mean     4             21     10        +---------+-------+-------+-------+--------+  +----+---------------------+----------+-------------+--------------+---------+  Site      Date Time       Phase NameSystolic mmHgDiastolic mmHgMean mmHg  +----+---------------------+----------+-------------+--------------+---------+    AO7/14/2025 12:01:48 PM  AIR REST          121            69       85  +----+---------------------+----------+-------------+--------------+---------+        Oxygen Saturation %:  +-----------+----------+------------+  Sample SiteO2 Sat (%)HB (g/100ml)  +-----------+----------+------------+           FA        94         9.6  +-----------+----------+------------+           PA        67         9.6  +-----------+----------+------------+      Cardiac Outputs:  +----------------+---------------+--------+--------------+-------------+-------+         Thermo CO      Thermo CI  Thermo       WERO CO      WERO CIFICK SV           (l/min)     (l/min/m2)      SV       (l/min)   (l/min/m2)         +----------------+---------------+--------+--------------+-------------+-------+               5.2            2.4    67.3           8.1          3.8  104.8  +----------------+---------------+--------+--------------+-------------+-------+      Complications:  No in-lab complications observed.    Cardiac Cath Post Procedure Notes:  Post Procedure           Post heart transplant.  Diagnosis:  Blood Loss:              Estimated blood loss during the procedure was Minimal  mls.  Specimens Removed:       Number of specimen(s) removed: 3.    ____________________________________________________________________________________  CONCLUSIONS:  1. Blood pressure at start of case 121/69 (85).  2. RHC: RA 4, PA 32/16 (21), PCWP 10; Wero CI 3.8 and Thermodilution CI 2.4.  3. Endomyocardial biopsy x 3 completed.    ICD 10 Codes:  Heart transplant status-Z94.1    CPT Codes:  Ultrasound guidance for vascular access-45588; Endomyocardial Biopsy-69406; Right Heart Cath O2/Cardiac output with biopsy (RHC)[id number]; Moderate Sedation Services 1st additional 15 minutes patient >5  years-53769; Moderate Sedation Services 2nd additional 15 minutes patient >5 years-09004    [id number] Shekhar Hylton MD  Performing Physician  Electronically signed by [id number] Shekhar Hylton MD on 7/14/2025 at 3:06:39 PM          ** Final **    Cardiac Scoring: No results found for this or any previous visit from the past 1800 days.    Cardiac MRI: No results found for this or any previous visit from the past 1800 days.    Nuclear:No results found for this or any previous visit from the past 1800 days.    Metabolic Stress: No results found for this or any previous visit from the past 1800 days.      Imaging  No results found.    Cardiology, Vascular, and Other Imaging  No other imaging results found for the past 7 days           LDA:   NUTRITION: NPO Diet; Effective now  EMERGENCY CONTACT: Extended Emergency Contact Information  Primary Emergency Contact: Janice Ramos  Address: [address] [place] [address]  Home Phone: [phone]  Mobile Phone: [phone]  Relation: Mother  Secondary Emergency Contact: Bird Lane  Address: [address] [place] [address] [address] [place] [address]  Home Phone: [phone]  Mobile Phone: [phone]  Relation: Son  Preferred language: English   needed? No  CODE STATUS: Full Code  DISPO: Discharge Planning  Living Arrangements: Alone  Support Systems: Family members  [facility] Ne[facility]: [facility]  Type of Residence: Private residence  Home or Post Acute Services: In home services  Expected Discharge Disposition: Home  FOLLOWUP:   Future Appointments   Date Time Provider Department Center   7/30/2025 11:00 AM [facility] 2 [id number] [id number]   7/30/2025  1:40 PM Vince Ruano,  [id number] [facility]                [1]   Medications Prior to Admission   Medication Sig Dispense Refill Last Dose/Taking    acetaminophen (Tylenol) 325 mg tablet Take 2 tablets (650 mg) by mouth every 6 hours if needed for  "mild pain (1 - 3). 30 tablet 1     acyclovir (Zovirax) 400 mg tablet Take 1 tablet (400 mg) by mouth 2 times a day. 180 tablet 0     alcohol swabs Apply topically 3 times a day. 100 each 3     aspirin 81 mg EC tablet Take 1 tablet (81 mg) by mouth once daily. 90 tablet 3     blood-glucose sensor (FreeStyle Federico 3 Plus Sensor) device Use 1 every 15 days. 2 each 3     calcium carbonate (Os-Wilfredo) 1,250 mg (500 mg elemental) tablet Take 1 tablet by mouth 2 times a day. 180 tablet 3     cholecalciferol (Vitamin D-3) 50 mcg (2,000 units) tablet Take 1 tablet (50 mcg) by mouth once daily. 90 tablet 3     docusate sodium (Colace) 100 mg capsule Take 1 capsule (100 mg) by mouth 2 times a day. 180 capsule 3     FreeStyle Federico 3 [place] misc Use as directed. 1 each 1     gabapentin (Neurontin) 300 mg capsule Take 1 capsule (300 mg) by mouth 3 times a day. 90 capsule 11     hydrALAZINE (Apresoline) 25 mg tablet Take 1 tablet (25 mg) by mouth 3 times a day. 270 tablet 0     insulin NPH, Isophane, (HumuLIN N,NovoLIN N) 100 unit/mL (3 mL) pen Inject 15 Units under the skin once daily in the morning. With your steroid. 15 mL 1     levothyroxine (Synthroid, Levoxyl) 125 mcg tablet Take 1 tablet (125 mcg) by mouth early in the morning.. Take on an empty stomach at the same time each day, either 30 to 60 minutes prior to breakfast 90 tablet 3     lidocaine 4 % patch Place 1 patch over 12 hours on the skin once daily. Remove & discard patch within 12 hours or as directed by MD. 30 patch 3     MULTIVITAMIN ORAL Take 1 tablet by mouth once daily.       mycophenolate (Cellcept) 250 mg capsule Take 4 capsules (1,000 mg) by mouth every 12 hours. 720 capsule 3     pantoprazole (ProtoNix) 40 mg EC tablet Take 1 tablet (40 mg) by mouth once daily. Do not crush, chew, or split. 180 tablet 3     pen needle, diabetic 31 gauge x 5/16\" needle Use 1 once daily or as directed by provider. 100 each 3     polyethylene glycol (Glycolax, Miralax) 17 gram " packet Take 17 g by mouth once daily.       pravastatin (Pravachol) 40 mg tablet Take 1 tablet (40 mg) by mouth once daily at bedtime. 90 tablet 3     predniSONE (Deltasone) 5 mg tablet Take 5 tablets (25 mg) by mouth once daily. Please taper when told after your next biopsy on the 30th of July 150 tablet 0     sennosides-docusate sodium (Dahlia-Colace) 8.6-50 mg tablet Take 1 tablet by mouth as needed at bedtime for constipation. 30 tablet 0     sulfamethoxazole-trimethoprim (Bactrim) 400-80 mg tablet Take 1 tablet by mouth once daily. 90 tablet 1     tacrolimus (Prograf) 0.5 mg capsule Take 1 capsule (0.5 mg) by mouth 2 times a day. TDD: 1.5mg  capsule 3     tacrolimus (Prograf) 1 mg capsule Take 1 capsule (1 mg) by mouth 2 times a day. TDD: 1.5mg  capsule 3     tamsulosin (Flomax) 0.4 mg 24 hr capsule Take 1 capsule (0.4 mg) by mouth once daily. 90 capsule 3     torsemide (Demadex) 20 mg tablet Take 1 tablet (20 mg) by mouth once daily as needed (Fluid overload). 10 tablet 1     voriconazole (Vfend) 200 mg tablet Take 1 tablet (200 mg) by mouth every 12 hours. 60 tablet 2

## 2025-07-26 NOTE — CONSULTS
[facility]  ACUTE CARE SURGERY - HISTORY AND PHYSICAL    Patient Name: Anatoly Lane MRN: [id number]  Admit Date: 725  : 1960  AGE: 64 y.o.   GENDER: male    ==============================================================================  ASSESSMENT AND PLAN:  Anatoly Lane is 64-year old male with a history of NICM (s/p HM3 in ) s/p OHT on 2025, pAF, CAD, CKD, HTN, BPH, GERD, hypothyroidism presenting for R groin infection.            ==============================================================================    CHIEF COMPLAINT:  R groin mass    HISTORY OF PRESENT ILLNESS:  Anatoly Lane is 64-year old male with a history of NICM (s/p HM3 in ) s/p OHT on 2025, pAF, CAD, CKD, HTN, BPH, GERD, hypothyroidism presenting for R groin infection. He had a R groin seroma post decannulation, pt  had R grin serom, now presenting with worsening signs of infection.      PAST MEDICAL HISTORY:  Medical History[1]    PAST SURGICAL HISTORY:  Surgical History[2]    PAST SOCIAL HISTORY:  Denies tobacco use  Denies EtOH use  Denies use of illicit or recreational drugs    PAST FAMILY HISTORY:  Family History[3]    HOME MEDICATIONS:  Prior to Admission medications   Medication Sig Start Date End Date Taking? Authorizing Provider   acetaminophen (Tylenol) 325 mg tablet Take 2 tablets (650 mg) by mouth every 6 hours if needed for mild pain (1 - 3). 7/22/25 10/20/25  Vince Ruano,    acyclovir (Zovirax) 400 mg tablet Take 1 tablet (400 mg) by mouth 2 times a day. 7/22/25 10/20/25  Vince Ruano,    alcohol swabs Apply topically 3 times a day. 25   Vince Ruano DO   aspirin 81 mg EC tablet Take 1 tablet (81 mg) by mouth once daily. 25   Vince Ruano DO   blood-glucose sensor (FreeStyle Federico 3 Plus Sensor) device Use 1 every 15 days. 25   Vince Ruano DO   calcium carbonate (Os-Wilfredo) 1,250 mg (500 mg elemental) tablet Take 1  "tablet by mouth 2 times a day. 7/22/25 7/22/26  Vince Ruano DO   cholecalciferol (Vitamin D-3) 50 mcg (2,000 units) tablet Take 1 tablet (50 mcg) by mouth once daily. 7/22/25 7/22/26  Vince Ruano DO   docusate sodium (Colace) 100 mg capsule Take 1 capsule (100 mg) by mouth 2 times a day. 7/22/25 7/22/26  Vince Ruano DO   FreeStyle Federico 3 [place] misc Use as directed. 7/22/25   Vince Ruano DO   gabapentin (Neurontin) 300 mg capsule Take 1 capsule (300 mg) by mouth 3 times a day. 7/22/25 7/22/26  Vince Ruano DO   hydrALAZINE (Apresoline) 25 mg tablet Take 1 tablet (25 mg) by mouth 3 times a day. 7/22/25 10/20/25  Vince Ruano DO   insulin NPH, Isophane, (HumuLIN N,NovoLIN N) 100 unit/mL (3 mL) pen Inject 15 Units under the skin once daily in the morning. With your steroid. 7/22/25   Vince Ruano DO   levothyroxine (Synthroid, Levoxyl) 125 mcg tablet Take 1 tablet (125 mcg) by mouth early in the morning.. Take on an empty stomach at the same time each day, either 30 to 60 minutes prior to breakfast 7/22/25 7/22/26  Vince Ruano DO   lidocaine 4 % patch Place 1 patch over 12 hours on the skin once daily. Remove & discard patch within 12 hours or as directed by MD. 7/22/25   Vince Ruano DO   MULTIVITAMIN ORAL Take 1 tablet by mouth once daily.    Historical Provider, MD   mycophenolate (Cellcept) 250 mg capsule Take 4 capsules (1,000 mg) by mouth every 12 hours. 7/22/25 7/22/26  Vince Ruano DO   pantoprazole (ProtoNix) 40 mg EC tablet Take 1 tablet (40 mg) by mouth once daily. Do not crush, chew, or split. 7/22/25   Vince Ruano DO   pen needle, diabetic 31 gauge x 5/16\" needle Use 1 once daily or as directed by provider. 7/22/25 8/21/25  Vince Ruano,    polyethylene glycol (Glycolax, Miralax) 17 gram packet Take 17 g by mouth once daily.    Historical Provider, MD   pravastatin (Pravachol) 40 mg tablet Take 1 tablet " (40 mg) by mouth once daily at bedtime. 7/22/25 7/22/26  Vince Ruano DO   predniSONE (Deltasone) 5 mg tablet Take 5 tablets (25 mg) by mouth once daily. Please taper when told after your next biopsy on the 30th of July 7/22/25 8/21/25  Vince Ruano DO   sennosides-docusate sodium (Dahlia-Colace) 8.6-50 mg tablet Take 1 tablet by mouth as needed at bedtime for constipation. 7/21/25 8/20/25  Dilia Holly MD   sulfamethoxazole-trimethoprim (Bactrim) 400-80 mg tablet Take 1 tablet by mouth once daily. 7/22/25   Vince Ruano DO   tacrolimus (Prograf) 0.5 mg capsule Take 1 capsule (0.5 mg) by mouth 2 times a day. TDD: 1.5mg BID 7/22/25   Dilia Holly MD   tacrolimus (Prograf) 1 mg capsule Take 1 capsule (1 mg) by mouth 2 times a day. TDD: 1.5mg BID 7/22/25   Dilia Holly MD   tamsulosin (Flomax) 0.4 mg 24 hr capsule Take 1 capsule (0.4 mg) by mouth once daily. 7/22/25 7/22/26  Vince Ruano DO   torsemide (Demadex) 20 mg tablet Take 1 tablet (20 mg) by mouth once daily as needed (Fluid overload). 7/22/25 8/21/25  Vince Ruano DO   voriconazole (Vfend) 200 mg tablet Take 1 tablet (200 mg) by mouth every 12 hours. 7/22/25 10/20/25  Vince Ruano DO   acetaminophen (Tylenol) 325 mg tablet Take 2 tablets (650 mg) by mouth every 6 hours if needed for mild pain (1 - 3).  7/22/25  Historical Provider, MD   acyclovir (Zovirax) 400 mg tablet Take 1 tablet (400 mg) by mouth 2 times a day. 7/8/25 7/22/25  COOKIE Martinez-CNP   alcohol swabs Apply topically 3 times a day. 7/8/25 7/22/25  WES Martinez   aspirin 81 mg EC tablet Take 1 tablet (81 mg) by mouth once daily. 7/8/25 7/22/25  WES Martinez   blood-glucose sensor (FreeStyle Federico 3 Plus Sensor) device Use 1 every 15 days. 7/9/25 7/22/25  WES Martinez   calcium carbonate (Os-Wilfredo) 1,250 mg (500 mg elemental) tablet Take 1 tablet by mouth 2 times a day. 7/8/25  "7/22/25  WES Martinez   cholecalciferol (Vitamin D-3) 50 mcg (2,000 units) tablet Take 1 tablet (50 mcg) by mouth once daily. 7/9/25 7/22/25  WES Martinez   docusate sodium (Colace) 100 mg capsule Take 1 capsule (100 mg) by mouth 2 times a day.  7/22/25  Historical Provider, MD   FreeStyle Federico 3 [place] misc Use as directed. 7/9/25 7/22/25  WES Martinez   furosemide (Lasix) 40 mg tablet Take 1 tablet (40 mg) by mouth once daily. 7/8/25 7/21/25  WES Martinez   gabapentin (Neurontin) 300 mg capsule Take 1 capsule (300 mg) by mouth 3 times a day. 7/10/25 7/22/25  Vince Ruano DO   hydrALAZINE (Apresoline) 25 mg tablet Take 1 tablet (25 mg) by mouth 3 times a day. 7/21/25 7/22/25  Dilia Holly MD   hydrALAZINE (Apresoline) 50 mg tablet Take 1 tablet (50 mg) by mouth 3 times a day. 7/8/25 7/21/25  WES Martinez   insulin NPH, Isophane, (HumuLIN N,NovoLIN N) 100 unit/mL (3 mL) pen Inject 15 Units under the skin once daily in the morning. With your steroid. 7/9/25 7/22/25  WES Martinez   levothyroxine (Synthroid, Levoxyl) 125 mcg tablet Take 1 tablet (125 mcg) by mouth early in the morning.. Take on an empty stomach at the same time each day, either 30 to 60 minutes prior to breakfast 7/8/25 7/22/25  WES Martinez   lidocaine 4 % patch Place 1 patch over 12 hours on the skin once daily. Remove & discard patch within 12 hours or as directed by MD. 7/9/25 7/22/25  WES Martinez   mycophenolate (Cellcept) 250 mg capsule Take 4 capsules (1,000 mg) by mouth every 12 hours. 7/8/25 7/22/25  WES Martinez   pantoprazole (ProtoNix) 40 mg EC tablet Take 1 tablet (40 mg) by mouth once daily. Do not crush, chew, or split. 7/8/25 7/22/25  COOKIE Martinez-CNP   pen needle, diabetic 31 gauge x 5/16\" needle Use 1 once daily or as directed by provider. 7/9/25 7/22/25  " WES Martinez   pravastatin (Pravachol) 40 mg tablet Take 1 tablet (40 mg) by mouth once daily at bedtime. 7/8/25 7/22/25  WES Martinez   predniSONE (Deltasone) 5 mg tablet Take 6 tablets (30 mg) by mouth once daily. 7/9/25 7/21/25  WES Martinez   predniSONE (Deltasone) 5 mg tablet Take 5 tablets (25 mg) by mouth once daily. Please taper when told after your next biopsy on the 30th of July 7/22/25 7/22/25  Dilia Holly MD   sodium zirconium cyclosilicate (Lokelma) 10 gram packet Take 10 g by mouth once daily as needed (Only take as instructed by transplant team). 7/8/25 7/21/25  WES Martinez   sulfamethoxazole-trimethoprim (Bactrim) 400-80 mg tablet Take 1 tablet by mouth once daily. 7/9/25 7/22/25  WES Martinez   tacrolimus (Prograf) 0.5 mg capsule Take 1 capsule (0.5 mg) by mouth 2 times a day. TDD: 2.5mg BID 7/14/25 7/21/25  Shekhar Hylton MD MPH   tacrolimus (Prograf) 0.5 mg capsule Take 1 capsule (0.5 mg) by mouth 2 times a day. TDD: 2.5mg BID 7/21/25 7/22/25  Dilia Holly MD   tacrolimus (Prograf) 0.5 mg capsule Take 1 capsule (0.5 mg) by mouth 2 times a day. TDD: 2.5mg BID 7/22/25 7/22/25  Vince Ruano DO   tacrolimus (Prograf) 1 mg capsule Take 2 capsules (2 mg) by mouth 2 times a day. TDD: 2.5mg BID 7/14/25 7/21/25  Shekhar Hylton MD MPH   tacrolimus (Prograf) 1 mg capsule Take 1 capsule (1 mg) by mouth 2 times a day. TDD: 2.5mg BID 7/21/25 7/22/25  Dilia Holly MD   tacrolimus (Prograf) 1 mg capsule Take 1 capsule (1 mg) by mouth 2 times a day. TDD: 2.5mg BID 7/22/25 7/22/25  Vince Ruano DO   tamsulosin (Flomax) 0.4 mg 24 hr capsule Take 1 capsule (0.4 mg) by mouth once daily. 7/8/25 7/22/25  COOKIE Martinez-CNP   torsemide (Demadex) 20 mg tablet Take 1 tablet (20 mg) by mouth once daily as needed (Fluid overload). 7/21/25 7/22/25  Dilia Holly MD   traMADol (Ultram) 50 mg  tablet Take 1 tablet (50 mg) by mouth every 6 hours if needed for severe pain (7 - 10) for up to 7 days. 7/8/25 7/21/25  WES Martinez   voriconazole (Vfend) 200 mg tablet Take 1 tablet (200 mg) by mouth every 12 hours. 7/9/25 7/22/25  WES Martinez       ALLERGIES  RX Allergies[4]    REVIEW OF SYSTEMS:  14-point ROS performed; negative unless otherwise indicated in HPI.    ==============================================================================    VITALS:  Vitals:    07/26/25 0012   BP: 132/81   Pulse: 94   Resp: 18   Temp: 36.3 °C (97.3 °F)   SpO2: 94%       PHYSICAL EXAM:  Gen:  NAD, non-toxic appearing  HEENT:  Atraumatic, normocephalic  Eyes:  No scleral icterus  Card:  RRR  Resp:  Non-labored breathing on RA  Abd:  Soft, non-tender, non-distended  Ext:  WWP, no swelling of BLE  MSK:  GAINES  Neuro:  AOx3, no gross neurological deficits  Psych:  Appropriate mood and affect    IMAGING SUMMARY:  ***    LABS:  No results found for this or any previous visit (from the past 24 hours).    I have reviewed all laboratory and imaging results ordered/pertinent for this encounter.        [1]   Past Medical History:  Diagnosis Date    ACC/AHA stage D systolic heart failure     Acute decompensated heart failure 02/23/2024    Acute on chronic combined systolic (congestive) and diastolic (congestive) heart failure 02/23/2024    Acute on chronic systolic heart failure, NYHA class 3     Anticoagulant long-term use 02/26/2024    CAD (coronary artery disease)     Cardiac defibrillator in place 02/14/2024    CHF (congestive heart failure) 02/14/2024    Chronic left systolic heart failure 02/14/2025    Hypothyroid     Hypothyroidism     LVAD (left ventricular assist device) present (Multi)     LVAD (left ventricular assist device) present (Multi) 02/14/2024    Non-ischemic cardiomyopathy (Multi) 02/14/2024    PAF (paroxysmal atrial fibrillation) (Multi)     Paroxysmal ventricular tachycardia  02/14/2024   [2]   Past Surgical History:  Procedure Laterality Date    CARDIAC CATHETERIZATION N/A 2/28/2024    Procedure: Right Heart Cath;  Surgeon: José Velasco MD;  Location: [place] [address] Cardiac Cath Lab;  Service: Cardiovascular;  Laterality: N/A;  with RAMP study    CARDIAC CATHETERIZATION N/A 3/26/2025    Procedure: Right Heart Cath;  Surgeon: Vince Ruano DO;  Location: [place] [address] Cardiac Cath Lab;  Service: Cardiovascular;  Laterality: N/A;    CARDIAC CATHETERIZATION N/A 7/1/2025    Procedure: RHC;  Surgeon: José Velasco MD;  Location: [place] [address] Cardiac Cath Lab;  Service: Cardiovascular;  Laterality: N/A;  w/ EMB    CARDIAC CATHETERIZATION N/A 7/1/2025    Procedure: Endomyocardial Biopsy;  Surgeon: José Velasco MD;  Location: [place] [address] Cardiac Cath Lab;  Service: Cardiovascular;  Laterality: N/A;    CARDIAC CATHETERIZATION N/A 7/8/2025    Procedure: Endomyocardial Biopsy;  Surgeon: Vince Ruano DO;  Location: [place] [address] Cardiac Cath Lab;  Service: Cardiovascular;  Laterality: N/A;    CARDIAC CATHETERIZATION N/A 7/8/2025    Procedure: RHC;  Surgeon: Vince Ruano DO;  Location: [place] [address] Cardiac Cath Lab;  Service: Cardiovascular;  Laterality: N/A;    CARDIAC CATHETERIZATION N/A 7/14/2025    Procedure: RHC;  Surgeon: Shekhar Hylton MD MPH;  Location: [facility] Cardiac Cath Lab;  Service: Cardiovascular;  Laterality: N/A;    CARDIAC CATHETERIZATION N/A 7/14/2025    Procedure: Endomyocardial Biopsy;  Surgeon: Shekhar Hylton MD MPH;  Location: [facility] Cardiac Cath Lab;  Service: Cardiovascular;  Laterality: N/A;    COLONOSCOPY W/ POLYPECTOMY  2023    CT ABDOMEN PELVIS ANGIOGRAM W AND/OR WO IV CONTRAST  04/13/2023    CT ABDOMEN PELVIS ANGIOGRAM W AND/OR WO IV CONTRAST [facility] CT    LEFT VENTRICULAR ASSIST DEVICE      OTHER SURGICAL HISTORY  01/05/2022    Complete colonoscopy    OTHER SURGICAL HISTORY  01/05/2022    Cardioverter defibrillator insertion    [3]   Family History  Problem Relation Name Age of Onset    Hypertension Father      Colon cancer Father     [4]   Allergies  Allergen Reactions    Jardiance [Empagliflozin] Itching

## 2025-07-26 NOTE — PROGRESS NOTES
"[facility]  HFICU PROGRESS NOTE    Anatoly Lane/[id number]    Admit Date: 7/25/2025  Hospital Length of Stay: 1   ICU Length of Stay: Patient does not have an ICU stay during this admission.   Primary Service: F Cardiology    INTERVAL EVENTS / PERTINENT ROS:   Admitted overnight for R groin infection. He has had worsening erythema, warmth, tenderness over his R groin for the past 2 days after his recent discharge, no fevers.     Plan:  -Continue empiric Vanc/Zosyn  -Follow up ACS recommendations  -Tentative plan for EMBx next week    MEDICATIONS  Infusions:     Scheduled:  acyclovir, 400 mg, BID  aspirin, 81 mg, Daily  calcium carbonate, 1,250 mg of calcium carbonate, BID  cholecalciferol, 50 mcg, Daily  docusate sodium, 100 mg, BID  gabapentin, 300 mg, TID  hydrALAZINE, 25 mg, TID  insulin NPH (Isophane), 10 Units, q AM  levothyroxine, 125 mcg, Daily  lidocaine, 1 patch, Daily  mycophenolate, 1,000 mg, q12h LUPILLO  pantoprazole, 40 mg, Daily  piperacillin-tazobactam, 3.375 g, q6h  polyethylene glycol, 17 g, Daily  pravastatin, 40 mg, Nightly  predniSONE, 25 mg, Daily  sulfamethoxazole-trimethoprim, 1 tablet, Daily  tacrolimus, 0.5 mg, BID  tacrolimus, 1 mg, BID  tamsulosin, 0.4 mg, Daily  vancomycin, 1,000 mg, q12h  voriconazole, 200 mg, q12h LUPILLO      PRN:  acetaminophen, 650 mg, q6h PRN  sennosides-docusate sodium, 1 tablet, Nightly PRN  torsemide, 20 mg, Daily PRN  vancomycin, , Daily PRN        PHYSICAL EXAM:   Visit Vitals  /77 (BP Location: Left arm, Patient Position: Lying)   Pulse 84   Temp 36.2 °C (97.2 °F) (Temporal)   Resp 16   Ht 1.753 m (5' 9.02\")   Wt 93.3 kg (205 lb 11 oz)   SpO2 91%   BMI 30.36 kg/m²   Smoking Status Former   BSA 2.13 m²       Wt Readings from Last 5 Encounters:   07/26/25 93.3 kg (205 lb 11 oz)   07/25/25 94.5 kg (208 lb 5.4 oz)   07/21/25 91.9 kg (202 lb 8 oz)   07/14/25 94.9 kg (209 lb 4.8 oz)   07/14/25 98.4 kg (216 lb 14.9 oz) " "      INTAKE/OUTPUT:  I/O last 3 completed shifts:  In: 250 (2.7 mL/kg) [IV Piggyback:250]  Out: - (0 mL/kg)   Weight: 93.3 kg      Exam:  GEN: NAD  HEENT: ATNC,  anicteric, no JVD  CV: RRR, no m/r/g  Pulm: CTAB  Abdomen: NTND  Ext: warm, no LE edema noted  Neuro: A+Ox3  Psych: appropriate  Ext: R groin fluctuant mass approx 5x5, tender to palpation and superficial warmth and erythema extending down to mid thigh.    DATA:  CMP:  Results from last 7 days   Lab Units 07/26/25  0126 07/21/25  0636 07/20/25  0633   SODIUM mmol/L 140 142 140   POTASSIUM mmol/L 3.7 4.2 4.2   CHLORIDE mmol/L 99 104 105   CO2 mmol/L 30 29 26   ANION GAP mmol/L 15 13 13   BUN mg/dL 33* 37* 35*   CREATININE mg/dL 1.06 1.04 0.89   EGFR mL/min/1.73m*2 78 80 >90   MAGNESIUM mg/dL 1.65 1.64 1.75   ALBUMIN g/dL 3.5 3.5 3.3*   ALT U/L 15  --   --    AST U/L 14  --   --    BILIRUBIN TOTAL mg/dL 0.4  --   --      CBC:  Results from last 7 days   Lab Units 07/26/25  0126 07/21/25  0636 07/20/25  0633   WBC AUTO x10*3/uL 8.6 8.5 7.7   HEMOGLOBIN g/dL 10.0* 10.8* 10.3*   HEMATOCRIT % 32.5* 33.9* 33.9*   PLATELETS AUTO x10*3/uL 319 305 297   MCV fL 99 98 102*     COAG:   Results from last 7 days   Lab Units 07/26/25 0126   INR  1.0     ABO:   ABO TYPE   Date Value Ref Range Status   07/26/2025 AB  Final     HEME/ENDO:     CARDIAC:       No lab exists for component: \"CK\", \"CKMBP\"    ASSESSMENT AND PLAN:   Anatoly Lane is a 64-year-old male with a history of NICM (s/p HM3 in 2023) s/p OHT on 6/21/2025, pAF, CAD, CKD, HTN, BPH, GERD, hypothyroidism presenting for R groin infection. He had a R groin seroma post decannulation, however, with now worsening signs of infection.     MSK:  #R groin infection   -Likely abscess vs cellulitis.  -Fluctuant mass with no systemic signs of infection, no leukocytosis  -Follow up blood cx x2  -Vanc/zosyn 2/2 immunosuppressed status.   -ACS consulted for assessment of I&D.      Neuro:  # Anxiety. Depression  # H/o BUE/BLE " mild tremor  # H/o Back pain  - Serial neuro and pain assessments   - PO Tylenol PRN for pain     # LUE weakness  # RLE weakness  - Continue home Gabapentin  - Neurology follow up along with EMG/NCS outpatient        Cardiovascular:  #NICM s/p HM2 (2023) s/p OHT on 6/21  - Continue home hydralazine 25 TID  Donor/Recipient Infectious history:   CMV: -/-   EBV: +/+   Toxo: -/-   Heb B: -/-  Hep C: -/-  HIV: -/-      Rejection/Prophylaxis:     - Induction: s/p methylprednisolone 500mg IV x2, S/p Basiliximab 20mg IV within 1 hour of arrival to CTICU (no need for premedication), s/p 2nd dose: 20mg IVPB on POD4 (06/25)   - Steroids: Prednisone 25mg daily (reduced 7/14 after negative biopsy)  - Tacrolimus: 1.5mg twice daily  - Tacrolimus goal troughs: 10-12; (TACROLIMUS SCHEDULED AT 1:30 AM as patient missed his dose during transport) UPDATE DOSING TIMES AND LEVELS IN AM   - Cellcept: 1,000mg BID ( SCHEDULED AT 1:30 AM as patient missed his dose during transport). UPDATE DOSING TIMES AND LEVELS IN AM   - Antifungals: voriconazole 200mg BID x3 months end date 09/22/2025   - Antivirals: acylovir 400mg BID x3 months end date 09/22/2025   - Anti PCP & Toxoplasmosis: SS Bactrim daily end date of 12/22/25     RHC: 7/14 121/69 (85), RA 4, PA 32/16 (21), PCWP 10; Raven CI 3.8 and Thermodilution CI 2.4.   Last TTE/BOBBY: 7/9, EF 65%   Last HLA: Pre-transplant: PRAs 0%; First due on 7/21  Last Allomap: ~ will assess starting at 6-month ana post-op    Last Allosure: ~ will assess starting at 6-month ana post-op   Last LHC: 03/2023(pre OHT). First due ~ (one year post tx) due 6/2026  Osteopenia/osteoporosis prophylaxis: Vit D 2000U daily and calcium 500 mg BID (give calcium 2 hrs after MMF)   Peptic/gastric ulcer prophylaxis: Pantoprazole 40mg daily    CAV Prophylaxis: Aspirin 81mg daily and Pravastatin 40mg daily   Pacing: Epicardial wires removed 07/07      Problems:   # H/o Subcutaneous emphysema and pneumomediastinum   - Observed  during recent admission, managed conservatively after consultations with Cam Birch and Carroll.      Pulmonary:   #Prior smoker  -c/w abstinence     GI:  #H/o C. Difficile infection s/p tx with po vanc  - Bowel regimen ordered   - PPI     :  #BPH  -Baseline BUN/Cr WNL  -Continue home tamsulosin     Heme:  #H/o anemia  -trend H&H      Endo:  #PreDM  #Steroid induced hyperglycemia  -HgA1c on admit- 5.1%  - Endo consulted during recent admission, home regimen is the following:  - Insulin NPH 10u qd   - Can titrate insulin down as steroids taper  - Transition to SSI while inpatient      #Hypothyroidism  -Continue home levothyroxine      ID:  -afebrile, nontoxic  -See above for groin infection,      PHYSICAL AND OCCUPATIONAL THERAPY:     LINES:     DVT: SubQ heparin (HOLDING FOR POTENTIAL PROCEDURE)   VAP BUNDLE:  CENTRAL LINE BUNDLE:  ULCER PPX: Pantoprazole  GLYCEMIC CONTROL: Insulin regimen  BOWEL CARE: Pericolace  INDWELLING CATHETER:  NUTRITION: Adult diet Regular, Cardiac; 70 gm fat; 2 - 3 grams Sodium      EMERGENCY CONTACT: Extended Emergency Contact Information  Primary Emergency Contact: Janice Ramos  Address: [address] [place] [address]  Home Phone: [phone]  Mobile Phone: [phone]  Relation: Mother  Secondary Emergency Contact: Bird Lane  Address: [address] [place] [address] [address]           [address]  Home Phone: [phone]  Mobile Phone: [phone]  Relation: Son  Preferred language: English   needed? No  FAMILY UPDATE:  CODE STATUS: Full Code  DISPO:     Patient seen and assessed with Dr. Velasco    I personally spent 60 minutes of critical care time directly and personally managing the patient exclusive of separately billable procedures   _________________________________________________  Hernesto Salcedo,  PGY4, HF Fellow

## 2025-07-26 NOTE — NURSING NOTE
RN consulted VAST team for PIV restart, this RN went to patients room to place PIV and patient refused because he was eating lunch. Bedside RN aware. Will assess situation at a later time.

## 2025-07-26 NOTE — H&P
Inpatient Cardiology Admission Note    Reason for admission: R groin infection     HPI:   Anatoly Lane is a 64-year-old male with a history of NICM (s/p HM3 in 2023) s/p OHT on 6/21/2025, pAF, CAD, CKD, HTN, BPH, GERD, hypothyroidism  presenting for R groin infection. He had a R groin seroma post decannulation, however, with now worsening signs of infection.     On admission, he denied CP, SOB, LH, dizziness. He endorsed medication adherence. He denied fever or chills or feeling unwell in general. He endorsed increasing redness and tenderness around the R groin for the last 4 days.   Of note, on 7/1/2025,  A 2.6 x 2.5 x 5.1 cm right inguinal simple fluid collection  abutting the right femoral vessels, which is incompletely evaluated  given lack of IV contrast. However findings likely represent  postprocedural seroma. This was on his CT A/P. However, patient states that since then the swelling, pain and redness have gotten worse and extended to the mid thigh.         All system reviewed and negative unless mentioned above.     Cardiac studies:   EKG 7/14/2025: NSR, nonspec ST changes.     Echo:   7/9/2025   1. Left ventricular ejection fraction is normal by visual estimate at 65%.   2. There is low normal right ventricular systolic function.   3. The left atrial size is moderately dilated.   4. The Doppler estimated RVSP is within normal limits at 27 mmHg.      Current Medications[1]    Objective:    Vitals:    07/26/25 0012   BP: 132/81   Pulse: 94   Resp: 18   Temp: 36.3 °C (97.3 °F)   SpO2: 94%       Exam:  GEN: NAD  HEENT: ATNC,  anicteric, no JVD  CV: RRR, no m/r/g  Pulm: CTAB  Abdomen: NTND  Ext: warm, no LE edema noted  Neuro: A+Ox3  Psych: appropriate  Ext: R groin 5x5 cm mass that is mobile, with slight purulence over the groin access site. Erythema extending down to mid thigh.       Labs/Imaging:                 Assessment and Plan:   Anatoly Lane is a 64-year-old male with a history of NICM (s/p HM3 in  2023) s/p OHT on 6/21/2025, pAF, CAD, CKD, HTN, BPH, GERD, hypothyroidism  presenting for R groin infection. He had a R groin seroma post decannulation, however, with now worsening signs of infection.     #R groin infection   -Likely abscess vs cellulitis.  -Fluctuant mass with no systemic signs of infection, WBC count pending   -Will obtain blood cx x2  -Start vanc/zosyn 2/2 immunosuppressed status.   -ACS consult for assessment of I&D.     Neuro:  # Anxiety. Depression  # H/o BUE/BLE mild tremor  # H/o Back pain  - Serial neuro and pain assessments   - PO Tylenol PRN for pain     # LUE weakness  # RLE weakness  - Continue home Gabapentin  - Neurology follow up along with EMG/NCS outpatient        Cardiovascular:  #NICM s/p HM2 (2023) s/p OHT on 6/21  - Continue home hydralazine 25 TID  Donor/Recipient Infectious history:   CMV: -/-   EBV: +/+   Toxo: -/-   Heb B: -/-  Hep C: -/-  HIV: -/-      Rejection/Prophylaxis:     - Induction: s/p methylprednisolone 500mg IV x2, S/p Basiliximab 20mg IV within 1 hour of arrival to CTICU (no need for premedication), s/p 2nd dose: 20mg IVPB on POD4 (06/25)   - Steroids: Prednisone 25mg daily (reduced 7/14 after negative biopsy  - Tacrolimus: 1.5mg twice daily  - Tacrolimus goal troughs: 10-12; (TACROLIMUS SCHEDULED AT 1:30 AM as patient missed his dose during transport) UPDATE DOSING TIMES AND LEVELS IN AM   - Cellcept: 1,000mg BID ( SCHEDULED AT 1:30 AM as patient missed his dose during transport). UPDATE DOSING TIMES AND LEVELS IN AM   - Antifungals: voriconazole 200mg BID x3 months end date 09/22/2025   - Antivirals: acylovir 400mg BID x3 months end date 09/22/2025   - Anti PCP & Toxoplasmosis: SS Bactrim daily end date of 12/22/25     RHC: 7/14 121/69 (85), RA 4, PA 32/16 (21), PCWP 10; Raven CI 3.8 and Thermodilution CI 2.4.   Last TTE/BOBBY: 7/9, EF 65%   Last HLA: Pre-transplant: PRAs 0%; First due on 7/21  Last Allomap: ~ will assess starting at 6-month ana post-op     Last Allosure: ~ will assess starting at 6-month ana post-op   Last LHC: 03/2023(pre OHT). First due ~ (one year post tx) due 6/2026  Osteopenia/osteoporosis prophylaxis: Vit D 2000U daily and calcium 500 mg BID (give calcium 2 hrs after MMF)   Peptic/gastric ulcer prophylaxis: Pantoprazole 40mg daily    CAV Prophylaxis: Aspirin 81mg daily and Pravastatin 40mg daily   Pacing: Epicardial wires removed 07/07      Problems:   # H/o Subcutaneous emphysema and pneumomediastinum   - Observed during recent admission, managed conservatively after consultations with Cam Birch and Carroll.      Pulmonary:   #Prior smoker  -c/w abstinence     GI:  #H/o C. Difficile infection s/p tx with po vanc  - Bowel regimen ordered   - PPI     :  #DEBBIE  #BPH  Suspect prerenal in etiology given self reported increase in home diuretics, BUN/Cr ratio, RHC hemodynamics, and improvement in renal function while holding diuretics.   -Baseline BUN/Cr WNL  -I/Os  -Continue home tamsulosin     Heme:  #H/o anemia  -trend H&H      Endo:  #PreDM  #Steroid induced hyperglycemia  -HgA1c on admit- 5.1%  - Endo consulted during recent admission, home regimen is the following:  - Insulin NPH 15u to be taken with 25mg prednisone daily--> Decreased to 10 units since he is npo.   - Can titrate insulin down to 10u as steroids taper  - Transition to SSI while inpatient      #Hypothyroidism  -last TSH (3/26/25)--> 1.83  -Continue home levothyroxine      ID:  -afebrile, nontoxic   -See above for groin infection,      PHYSICAL AND OCCUPATIONAL THERAPY:     LINES:     DVT: SubQ heparin (HOLD FOR POTENTIAL UPCOMING PROCEDURE)   VAP BUNDLE:  CENTRAL LINE BUNDLE:  ULCER PPX: Pantoprazole  GLYCEMIC CONTROL: Insulin regimen  BOWEL CARE: Pericolace  INDWELLING CATHETER:  NUTRITION: Adult diet Regular        EMERGENCY CONTACT: Extended Emergency Contact Information  Primary Emergency Contact: Janice Ramos  Address: [address] [place] [address] [place]  [facility]  Home Phone: [phone]  Mobile Phone: [phone]  Relation: Mother  Secondary Emergency Contact: Bird Lane  Address: [address] [address] [place] [address] [facility]  Home Phone: [phone]  Mobile Phone: [phone]    Plans are preliminary until note is co-signed by an attending. To be staffed by Dr. Velasco.     Alla Arora  Cardiology Fellow   PGY6       Alla Arora MD         [1]   Current Facility-Administered Medications:     acetaminophen (Tylenol) tablet 650 mg, 650 mg, oral, q6h PRN, Alla Arora MD    acyclovir (Zovirax) tablet 400 mg, 400 mg, oral, BID, Alla Arora MD    aspirin EC tablet 81 mg, 81 mg, oral, Daily, Alla Arora MD    calcium carbonate (Os-Wilfredo) 1,250 mg (500 mg elemental) tablet 1 tablet, 1,250 mg of calcium carbonate, oral, BID, Alla Arora MD    cholecalciferol (Vitamin D-3) tablet 50 mcg, 50 mcg, oral, Daily, Alla Arora MD    docusate sodium (Colace) capsule 100 mg, 100 mg, oral, BID, Alla Arora MD    gabapentin (Neurontin) capsule 300 mg, 300 mg, oral, TID, Alla Arora MD    hydrALAZINE (Apresoline) tablet 25 mg, 25 mg, oral, TID, Alla Arora MD    insulin NPH (Isophane) (HumuLIN N,NovoLIN N) pen 7 Units, 7 Units, subcutaneous, q AM, Alla Arora MD    levothyroxine (Synthroid, Levoxyl) tablet 125 mcg, 125 mcg, oral, Daily, Alla Arora MD    lidocaine 4 % patch 1 patch, 1 patch, transdermal, Daily, Alla Arora MD    mycophenolate (Cellcept) capsule 1,000 mg, 1,000 mg, oral, q12h LUPILLO, Alla Arora MD    pantoprazole (ProtoNix) EC tablet 40 mg, 40 mg, oral, Daily, Alla Arora MD    polyethylene glycol (Glycolax, Miralax) packet 17 g, 17 g, oral, Daily, Alla Arora MD    pravastatin (Pravachol) tablet 40 mg, 40 mg, oral, Nightly, Alla Arora MD    predniSONE (Deltasone) tablet 25 mg, 25 mg, oral, Daily, Alla Arora MD    sennosides-docusate sodium (Dahlia-Colace) 8.6-50 mg  per tablet 1 tablet, 1 tablet, oral, Nightly PRN, Alla Arora MD    sulfamethoxazole-trimethoprim (Bactrim) 400-80 mg per tablet 1 tablet, 1 tablet, oral, Daily, Alla Arora MD    tacrolimus (Prograf) capsule 0.5 mg, 0.5 mg, oral, BID, Alla Arora MD    tacrolimus (Prograf) capsule 1 mg, 1 mg, oral, BID, Alla Arora MD    tamsulosin (Flomax) 24 hr capsule 0.4 mg, 0.4 mg, oral, Daily, Alla Arora MD    torsemide (Demadex) tablet 20 mg, 20 mg, oral, Daily PRN, Alla Arora MD    voriconazole (Vfend) tablet 200 mg, 200 mg, oral, q12h LUPILLO, Alla Arora MD

## 2025-07-26 NOTE — CARE PLAN
The patient's goals for the shift include patient will remain afebrile without further signs of infections this shift.    The clinical goals for the shift include Patient will have no fever and HDS this shift.    Over the shift, the patient remained afebrile and continues on IV ATBC's.  He reports discomfort but does not required pain medications.  Patient reports no SOB or GRAVES and remained safe free from falls and injury this shift.      Problem: Discharge Planning  Goal: Discharge to home or other facility with appropriate resources  Outcome: Progressing     Problem: Chronic Conditions and Co-morbidities  Goal: Patient's chronic conditions and co-morbidity symptoms are monitored and maintained or improved  Outcome: Progressing     Problem: Heart Failure  Goal: Improved urinary output this shift  Outcome: Progressing  Goal: Reduction in peripheral edema within 24 hours  Outcome: Progressing  Goal: Report improvement of dyspnea/breathlessness this shift  Outcome: Progressing  Goal: Weight from fluid excess reduced over 2-3 days, then stabilize  Outcome: Progressing  Goal: Increase self care and/or family involvement in 24 hours  Outcome: Progressing

## 2025-07-26 NOTE — PROGRESS NOTES
Pharmacy Medication History Review    Anatoly Lane is a 64 y.o. male admitted for Heart transplant status. Pharmacy reviewed the patient's [id number] medications and allergies for accuracy.    Medications ADDED:  none  Medications CHANGED:  none  Medications REMOVED:   MiraLax     The list below reflects the updated PTA list.   Prior to Admission Medications   Prescriptions Informant   FreeStyle Federico 3 [place] misc Self   Sig: Use as directed.   MULTIVITAMIN ORAL Self   Sig: Take 1 tablet by mouth once daily.   acetaminophen (Tylenol) 325 mg tablet Self   Sig: Take 2 tablets (650 mg) by mouth every 6 hours if needed for mild pain (1 - 3).   acyclovir (Zovirax) 400 mg tablet Self   Sig: Take 1 tablet (400 mg) by mouth 2 times a day.   alcohol swabs Self   Sig: Apply topically 3 times a day.   aspirin 81 mg EC tablet Self   Sig: Take 1 tablet (81 mg) by mouth once daily.   blood-glucose sensor (FreeStyle Federico 3 Plus Sensor) device Self   Sig: Use 1 every 15 days.   calcium carbonate (Os-Wilfredo) 1,250 mg (500 mg elemental) tablet Self   Sig: Take 1 tablet by mouth 2 times a day.   cholecalciferol (Vitamin D-3) 50 mcg (2,000 units) tablet Self   Sig: Take 1 tablet (50 mcg) by mouth once daily.   docusate sodium (Colace) 100 mg capsule Self   Sig: Take 1 capsule (100 mg) by mouth 2 times a day.   gabapentin (Neurontin) 300 mg capsule Self   Sig: Take 1 capsule (300 mg) by mouth 3 times a day.   hydrALAZINE (Apresoline) 25 mg tablet Self   Sig: Take 1 tablet (25 mg) by mouth 3 times a day.   insulin NPH, Isophane, (HumuLIN N,NovoLIN N) 100 unit/mL (3 mL) pen Self   Sig: Inject 15 Units under the skin once daily in the morning. With your steroid.   levothyroxine (Synthroid, Levoxyl) 125 mcg tablet Self   Sig: Take 1 tablet (125 mcg) by mouth early in the morning.. Take on an empty stomach at the same time each day, either 30 to 60 minutes prior to breakfast   lidocaine 4 % patch Self   Sig: Place 1 patch over 12  "hours on the skin once daily. Remove & discard patch within 12 hours or as directed by MD.   Patient not taking: Reported on 7/26/2025  PT states he hasn't used as he cannot reach where the patch is supposed to be placed.    mycophenolate (Cellcept) 250 mg capsule Self   Sig: Take 4 capsules (1,000 mg) by mouth every 12 hours.   pantoprazole (ProtoNix) 40 mg EC tablet Self   Sig: Take 1 tablet (40 mg) by mouth once daily. Do not crush, chew, or split.   pen needle, diabetic 31 gauge x 5/16\" needle Self   Sig: Use 1 once daily or as directed by provider.   pravastatin (Pravachol) 40 mg tablet Self   Sig: Take 1 tablet (40 mg) by mouth once daily at bedtime.   predniSONE (Deltasone) 5 mg tablet Self   Sig: Take 5 tablets (25 mg) by mouth once daily. Please taper when told after your next biopsy on the 30th of July   sennosides-docusate sodium (Dahlia-Colace) 8.6-50 mg tablet Self   Sig: Take 1 tablet by mouth as needed at bedtime for constipation.   Patient not taking: Reported on 7/26/2025   sulfamethoxazole-trimethoprim (Bactrim) 400-80 mg tablet Self   Sig: Take 1 tablet by mouth once daily.   tacrolimus (Prograf) 0.5 mg capsule Self   Sig: Take 1 capsule (0.5 mg) by mouth 2 times a day. TDD: 1.5mg BID   tacrolimus (Prograf) 1 mg capsule Self   Sig: Take 1 capsule (1 mg) by mouth 2 times a day. TDD: 1.5mg BID   tamsulosin (Flomax) 0.4 mg 24 hr capsule Self   Sig: Take 1 capsule (0.4 mg) by mouth once daily.   torsemide (Demadex) 20 mg tablet Self   Sig: Take 1 tablet (20 mg) by mouth once daily as needed (Fluid overload).   voriconazole (Vfend) 200 mg tablet Self   Sig: Take 1 tablet (200 mg) by mouth every 12 hours.      Facility-Administered Medications: None        The list below reflects the updated allergy list. Please review each documented allergy for additional clarification and justification.  Allergies  Reviewed by Nohemy Zheng on 7/26/2025        Severity Reactions Comments    Jardiance [empagliflozin] " "Not Specified Itching             Patient accepts M2B at discharge.     Sources:   Pharmacy dispense history  Patient Interview Good historian  Chart Review     Additional Comments:  PT had updated PTA list.      LEANDER JOHNS  Pharmacy Technician  07/26/25     Secure Chat preferred   If no response call [phone] or [employer] \"Med Rec\"   "

## 2025-07-27 ENCOUNTER — APPOINTMENT (OUTPATIENT)
Dept: RADIOLOGY | Facility: HOSPITAL | Age: 65
DRG: 863 | End: 2025-07-27
Payer: MEDICARE

## 2025-07-27 LAB
ALBUMIN SERPL BCP-MCNC: 3.2 G/DL (ref 3.4–5)
ALP SERPL-CCNC: 105 U/L (ref 33–136)
ALT SERPL W P-5'-P-CCNC: 13 U/L (ref 10–52)
ANION GAP SERPL CALC-SCNC: 12 MMOL/L (ref 10–20)
AST SERPL W P-5'-P-CCNC: 9 U/L (ref 9–39)
BACTERIA BLD CULT: NORMAL
BACTERIA BLD CULT: NORMAL
BILIRUB SERPL-MCNC: 0.3 MG/DL (ref 0–1.2)
BUN SERPL-MCNC: 23 MG/DL (ref 6–23)
CALCIUM SERPL-MCNC: 8.7 MG/DL (ref 8.6–10.6)
CHLORIDE SERPL-SCNC: 99 MMOL/L (ref 98–107)
CO2 SERPL-SCNC: 30 MMOL/L (ref 21–32)
CREAT SERPL-MCNC: 1.19 MG/DL (ref 0.5–1.3)
EGFRCR SERPLBLD CKD-EPI 2021: 68 ML/MIN/1.73M*2
ERYTHROCYTE [DISTWIDTH] IN BLOOD BY AUTOMATED COUNT: 17.9 % (ref 11.5–14.5)
GLUCOSE SERPL-MCNC: 245 MG/DL (ref 74–99)
HCT VFR BLD AUTO: 32 % (ref 41–52)
HGB BLD-MCNC: 9.6 G/DL (ref 13.5–17.5)
MCH RBC QN AUTO: 31.3 PG (ref 26–34)
MCHC RBC AUTO-ENTMCNC: 30 G/DL (ref 32–36)
MCV RBC AUTO: 104 FL (ref 80–100)
NRBC BLD-RTO: 0.4 /100 WBCS (ref 0–0)
PLATELET # BLD AUTO: 235 X10*3/UL (ref 150–450)
POTASSIUM SERPL-SCNC: 4.8 MMOL/L (ref 3.5–5.3)
PROT SERPL-MCNC: 4.9 G/DL (ref 6.4–8.2)
RBC # BLD AUTO: 3.07 X10*6/UL (ref 4.5–5.9)
SODIUM SERPL-SCNC: 136 MMOL/L (ref 136–145)
TACROLIMUS BLD-MCNC: 13.2 NG/ML
VANCOMYCIN SERPL-MCNC: 12 UG/ML (ref 5–20)
WBC # BLD AUTO: 8.5 X10*3/UL (ref 4.4–11.3)

## 2025-07-27 PROCEDURE — 80202 ASSAY OF VANCOMYCIN: CPT

## 2025-07-27 PROCEDURE — 76882 US LMTD JT/FCL EVL NVASC XTR: CPT | Performed by: RADIOLOGY

## 2025-07-27 PROCEDURE — 99232 SBSQ HOSP IP/OBS MODERATE 35: CPT | Performed by: STUDENT IN AN ORGANIZED HEALTH CARE EDUCATION/TRAINING PROGRAM

## 2025-07-27 PROCEDURE — 2500000002 HC RX 250 W HCPCS SELF ADMINISTERED DRUGS (ALT 637 FOR MEDICARE OP, ALT 636 FOR OP/ED): Performed by: STUDENT IN AN ORGANIZED HEALTH CARE EDUCATION/TRAINING PROGRAM

## 2025-07-27 PROCEDURE — 1200000002 HC GENERAL ROOM WITH TELEMETRY DAILY

## 2025-07-27 PROCEDURE — 80053 COMPREHEN METABOLIC PANEL: CPT | Performed by: STUDENT IN AN ORGANIZED HEALTH CARE EDUCATION/TRAINING PROGRAM

## 2025-07-27 PROCEDURE — 76881 US COMPL JOINT R-T W/IMG: CPT

## 2025-07-27 PROCEDURE — 2500000004 HC RX 250 GENERAL PHARMACY W/ HCPCS (ALT 636 FOR OP/ED): Performed by: STUDENT IN AN ORGANIZED HEALTH CARE EDUCATION/TRAINING PROGRAM

## 2025-07-27 PROCEDURE — 80197 ASSAY OF TACROLIMUS: CPT | Performed by: STUDENT IN AN ORGANIZED HEALTH CARE EDUCATION/TRAINING PROGRAM

## 2025-07-27 PROCEDURE — 85027 COMPLETE CBC AUTOMATED: CPT | Performed by: STUDENT IN AN ORGANIZED HEALTH CARE EDUCATION/TRAINING PROGRAM

## 2025-07-27 PROCEDURE — 76937 US GUIDE VASCULAR ACCESS: CPT

## 2025-07-27 PROCEDURE — 36415 COLL VENOUS BLD VENIPUNCTURE: CPT | Performed by: STUDENT IN AN ORGANIZED HEALTH CARE EDUCATION/TRAINING PROGRAM

## 2025-07-27 PROCEDURE — 2500000001 HC RX 250 WO HCPCS SELF ADMINISTERED DRUGS (ALT 637 FOR MEDICARE OP): Performed by: STUDENT IN AN ORGANIZED HEALTH CARE EDUCATION/TRAINING PROGRAM

## 2025-07-27 RX ADMIN — VANCOMYCIN HYDROCHLORIDE 1000 MG: 1 INJECTION, SOLUTION INTRAVENOUS at 04:02

## 2025-07-27 RX ADMIN — HYDRALAZINE HYDROCHLORIDE 25 MG: 25 TABLET ORAL at 20:45

## 2025-07-27 RX ADMIN — TACROLIMUS 1 MG: 1 CAPSULE ORAL at 08:30

## 2025-07-27 RX ADMIN — SULFAMETHOXAZOLE AND TRIMETHOPRIM 1 TABLET: 400; 80 TABLET ORAL at 08:31

## 2025-07-27 RX ADMIN — PANTOPRAZOLE SODIUM 40 MG: 40 TABLET, DELAYED RELEASE ORAL at 08:31

## 2025-07-27 RX ADMIN — ASPIRIN 81 MG: 81 TABLET, COATED ORAL at 08:31

## 2025-07-27 RX ADMIN — ACETAMINOPHEN 650 MG: 325 TABLET ORAL at 15:59

## 2025-07-27 RX ADMIN — Medication 50 MCG: at 08:31

## 2025-07-27 RX ADMIN — GABAPENTIN 300 MG: 300 CAPSULE ORAL at 08:31

## 2025-07-27 RX ADMIN — DOCUSATE SODIUM 50 MG AND SENNOSIDES 8.6 MG 1 TABLET: 8.6; 5 TABLET, FILM COATED ORAL at 21:50

## 2025-07-27 RX ADMIN — GABAPENTIN 300 MG: 300 CAPSULE ORAL at 20:45

## 2025-07-27 RX ADMIN — PIPERACILLIN SODIUM AND TAZOBACTAM SODIUM 3.38 G: 3; .375 INJECTION, SOLUTION INTRAVENOUS at 21:29

## 2025-07-27 RX ADMIN — CALCIUM 1 TABLET: 500 TABLET ORAL at 08:30

## 2025-07-27 RX ADMIN — DOCUSATE SODIUM 100 MG: 100 CAPSULE, LIQUID FILLED ORAL at 20:44

## 2025-07-27 RX ADMIN — PIPERACILLIN SODIUM AND TAZOBACTAM SODIUM 3.38 G: 3; .375 INJECTION, SOLUTION INTRAVENOUS at 03:05

## 2025-07-27 RX ADMIN — VORICONAZOLE 200 MG: 200 TABLET, COATED ORAL at 20:46

## 2025-07-27 RX ADMIN — PRAVASTATIN SODIUM 40 MG: 40 TABLET ORAL at 20:44

## 2025-07-27 RX ADMIN — TACROLIMUS 0.5 MG: 1 CAPSULE ORAL at 20:45

## 2025-07-27 RX ADMIN — VORICONAZOLE 200 MG: 200 TABLET, COATED ORAL at 08:31

## 2025-07-27 RX ADMIN — PIPERACILLIN SODIUM AND TAZOBACTAM SODIUM 3.38 G: 3; .375 INJECTION, SOLUTION INTRAVENOUS at 08:31

## 2025-07-27 RX ADMIN — HYDRALAZINE HYDROCHLORIDE 25 MG: 25 TABLET ORAL at 14:55

## 2025-07-27 RX ADMIN — INSULIN HUMAN 10 UNITS: 100 INJECTION, SUSPENSION SUBCUTANEOUS at 10:22

## 2025-07-27 RX ADMIN — GABAPENTIN 300 MG: 300 CAPSULE ORAL at 14:55

## 2025-07-27 RX ADMIN — MYCOPHENOLATE MOFETIL 1000 MG: 250 CAPSULE ORAL at 20:44

## 2025-07-27 RX ADMIN — LEVOTHYROXINE SODIUM 125 MCG: 0.1 TABLET ORAL at 05:04

## 2025-07-27 RX ADMIN — ACYCLOVIR 400 MG: 400 TABLET ORAL at 20:44

## 2025-07-27 RX ADMIN — TAMSULOSIN HYDROCHLORIDE 0.4 MG: 0.4 CAPSULE ORAL at 08:31

## 2025-07-27 RX ADMIN — PIPERACILLIN SODIUM AND TAZOBACTAM SODIUM 3.38 G: 3; .375 INJECTION, SOLUTION INTRAVENOUS at 15:33

## 2025-07-27 RX ADMIN — PREDNISONE 25 MG: 20 TABLET ORAL at 08:31

## 2025-07-27 RX ADMIN — CALCIUM 1 TABLET: 500 TABLET ORAL at 20:45

## 2025-07-27 RX ADMIN — VANCOMYCIN HYDROCHLORIDE 1000 MG: 1 INJECTION, SOLUTION INTRAVENOUS at 16:18

## 2025-07-27 RX ADMIN — TACROLIMUS 1 MG: 1 CAPSULE ORAL at 20:45

## 2025-07-27 RX ADMIN — TACROLIMUS 0.5 MG: 1 CAPSULE ORAL at 08:30

## 2025-07-27 RX ADMIN — HYDRALAZINE HYDROCHLORIDE 25 MG: 25 TABLET ORAL at 08:31

## 2025-07-27 RX ADMIN — ACYCLOVIR 400 MG: 400 TABLET ORAL at 08:31

## 2025-07-27 RX ADMIN — MYCOPHENOLATE MOFETIL 1000 MG: 250 CAPSULE ORAL at 08:31

## 2025-07-27 ASSESSMENT — COGNITIVE AND FUNCTIONAL STATUS - GENERAL
WALKING IN HOSPITAL ROOM: A LITTLE
WALKING IN HOSPITAL ROOM: A LITTLE
MOBILITY SCORE: 22
DAILY ACTIVITIY SCORE: 24
CLIMB 3 TO 5 STEPS WITH RAILING: A LITTLE
MOBILITY SCORE: 22
MOBILITY SCORE: 23
DAILY ACTIVITIY SCORE: 24
CLIMB 3 TO 5 STEPS WITH RAILING: A LITTLE
CLIMB 3 TO 5 STEPS WITH RAILING: A LITTLE
DAILY ACTIVITIY SCORE: 24

## 2025-07-27 ASSESSMENT — PAIN DESCRIPTION - DESCRIPTORS: DESCRIPTORS: ACHING

## 2025-07-27 ASSESSMENT — PAIN SCALES - GENERAL
PAINLEVEL_OUTOF10: 0 - NO PAIN
PAINLEVEL_OUTOF10: 7
PAINLEVEL_OUTOF10: 0 - NO PAIN
PAINLEVEL_OUTOF10: 0 - NO PAIN

## 2025-07-27 ASSESSMENT — PAIN - FUNCTIONAL ASSESSMENT
PAIN_FUNCTIONAL_ASSESSMENT: 0-10

## 2025-07-27 ASSESSMENT — PAIN DESCRIPTION - LOCATION: LOCATION: BACK

## 2025-07-27 NOTE — PROGRESS NOTES
"[facility]  HFICU PROGRESS NOTE    Anatoly Lane/[id number]    Admit Date: 7/25/2025  Hospital Length of Stay: 2   ICU Length of Stay: Patient does not have an ICU stay during this admission.   Primary Service: F Cardiology    INTERVAL EVENTS / PERTINENT ROS:   BRANDON Denies fevers, reports his RLE erythema and tenderness seem to be improving on Antibiotics.    Plan:  -Continue empiric Vanc/Zosyn, follow up RLE ultrasound  -Continue current transplant immunosuppressives  -CTS aware of admission, appreciate recommendations re: R groin infection   -Tentative plan for EMBx next week    MEDICATIONS  Infusions:     Scheduled:  acyclovir, 400 mg, BID  aspirin, 81 mg, Daily  calcium carbonate, 1,250 mg of calcium carbonate, BID  cholecalciferol, 50 mcg, Daily  docusate sodium, 100 mg, BID  gabapentin, 300 mg, TID  hydrALAZINE, 25 mg, TID  insulin NPH (Isophane), 10 Units, q AM  levothyroxine, 125 mcg, Daily  lidocaine, 1 patch, Daily  mycophenolate, 1,000 mg, q12h LUPILLO  pantoprazole, 40 mg, Daily  piperacillin-tazobactam, 3.375 g, q6h  polyethylene glycol, 17 g, Daily  pravastatin, 40 mg, Nightly  predniSONE, 25 mg, Daily  sulfamethoxazole-trimethoprim, 1 tablet, Daily  tacrolimus, 0.5 mg, BID  tacrolimus, 1 mg, BID  tamsulosin, 0.4 mg, Daily  vancomycin, 1,000 mg, q12h  voriconazole, 200 mg, q12h LUPILLO      PRN:  acetaminophen, 650 mg, q6h PRN  sennosides-docusate sodium, 1 tablet, Nightly PRN  torsemide, 20 mg, Daily PRN  vancomycin, , Daily PRN        PHYSICAL EXAM:   Visit Vitals  /73 (BP Location: Left arm, Patient Position: Lying)   Pulse 91   Temp 36.3 °C (97.3 °F) (Temporal)   Resp 15   Ht 1.753 m (5' 9.02\")   Wt 94.3 kg (207 lb 14.3 oz)   SpO2 95%   BMI 30.69 kg/m²   Smoking Status Former   BSA 2.14 m²       Wt Readings from Last 5 Encounters:   07/27/25 94.3 kg (207 lb 14.3 oz)   07/25/25 94.5 kg (208 lb 5.4 oz)   07/21/25 91.9 kg (202 lb 8 oz)   07/14/25 94.9 kg (209 lb 4.8 oz) " "  07/14/25 98.4 kg (216 lb 14.9 oz)       INTAKE/OUTPUT:  I/O last 3 completed shifts:  In: 990 (10.5 mL/kg) [P.O.:440; IV Piggyback:550]  Out: 5352 (56.8 mL/kg) [Urine:5352 (1.6 mL/kg/hr)]  Weight: 94.3 kg      Exam:  GEN: NAD  HEENT: ATNC,  anicteric, no JVD  CV: RRR, no m/r/g  Pulm: CTAB  Abdomen: NTND  Ext: warm, no LE edema noted  Neuro: A+Ox3  Psych: appropriate  Ext: R groin fluctuant mass approx 5x5, tender to palpation and superficial warmth and erythema extending down to mid thigh.    DATA:  CMP:  Results from last 7 days   Lab Units 07/26/25  0126 07/21/25  0636   SODIUM mmol/L 140 142   POTASSIUM mmol/L 3.7 4.2   CHLORIDE mmol/L 99 104   CO2 mmol/L 30 29   ANION GAP mmol/L 15 13   BUN mg/dL 33* 37*   CREATININE mg/dL 1.06 1.04   EGFR mL/min/1.73m*2 78 80   MAGNESIUM mg/dL 1.65 1.64   ALBUMIN g/dL 3.5 3.5   ALT U/L 15  --    AST U/L 14  --    BILIRUBIN TOTAL mg/dL 0.4  --      CBC:  Results from last 7 days   Lab Units 07/26/25  0126 07/21/25  0636   WBC AUTO x10*3/uL 8.6 8.5   HEMOGLOBIN g/dL 10.0* 10.8*   HEMATOCRIT % 32.5* 33.9*   PLATELETS AUTO x10*3/uL 319 305   MCV fL 99 98     COAG:   Results from last 7 days   Lab Units 07/26/25  0126   INR  1.0     ABO:   ABO TYPE   Date Value Ref Range Status   07/26/2025 AB  Final     HEME/ENDO:     CARDIAC:       No lab exists for component: \"CK\", \"CKMBP\"    ASSESSMENT AND PLAN:   Anatoly Lane is a 64-year-old male with a history of NICM (s/p HM3 in 2023) s/p OHT on 6/21/2025, pAF, CAD, CKD, HTN, BPH, GERD, hypothyroidism presenting for R groin infection. He had a R groin seroma post decannulation, however, with now worsening signs of infection.     MSK:  #R groin infection   -Likely abscess vs cellulitis.  -Fluctuant mass with no systemic signs of infection, no leukocytosis  -Follow up blood cx x2  -Vanc/zosyn 2/2 immunosuppressed status.   -CTS aware, consulted for further assessment   -Follow up RLE ultrasound     Neuro:  # Anxiety. Depression  # H/o " BUE/BLE mild tremor  # H/o Back pain  - Serial neuro and pain assessments   - PO Tylenol PRN for pain     # LUE weakness  # RLE weakness  - Continue home Gabapentin  - Neurology follow up along with EMG/NCS outpatient        Cardiovascular:  #NICM s/p HM2 (2023) s/p OHT on 6/21  - Continue home hydralazine 25 TID  Donor/Recipient Infectious history:   CMV: -/-   EBV: +/+   Toxo: -/-   Heb B: -/-  Hep C: -/-  HIV: -/-      Rejection/Prophylaxis:     - Induction: s/p methylprednisolone 500mg IV x2, S/p Basiliximab 20mg IV within 1 hour of arrival to CTICU (no need for premedication), s/p 2nd dose: 20mg IVPB on POD4 (06/25)   - Steroids: Prednisone 25mg daily (reduced 7/14 after negative biopsy)  - Tacrolimus: 1.5mg twice daily  - Tacrolimus goal troughs: 10-12   - Cellcept: 1,000mg BID   - Antifungals: voriconazole 200mg BID x3 months end date 09/22/2025   - Antivirals: acylovir 400mg BID x3 months end date 09/22/2025   - Anti PCP & Toxoplasmosis: SS Bactrim daily end date of 12/22/25     RHC: 7/14 121/69 (85), RA 4, PA 32/16 (21), PCWP 10; Raven CI 3.8 and Thermodilution CI 2.4.   Last TTE/BOBBY: 7/9, EF 65%   Last HLA: Pre-transplant: PRAs 0%; First due on 7/21  Last Allomap: ~ will assess starting at 6-month ana post-op    Last Allosure: ~ will assess starting at 6-month ana post-op   Last LHC: 03/2023(pre OHT). First due ~ (one year post tx) due 6/2026  Osteopenia/osteoporosis prophylaxis: Vit D 2000U daily and calcium 500 mg BID (give calcium 2 hrs after MMF)   Peptic/gastric ulcer prophylaxis: Pantoprazole 40mg daily    CAV Prophylaxis: Aspirin 81mg daily and Pravastatin 40mg daily   Pacing: Epicardial wires removed 07/07      Problems:   # H/o Subcutaneous emphysema and pneumomediastinum   - Observed during recent admission, managed conservatively after consultations with Cam Birch and [facility].      Pulmonary:   #Prior smoker  -c/w abstinence     GI:  #H/o C. Difficile infection s/p tx with po vanc  - Bowel  regimen ordered   - PPI     :  #BPH  -Baseline BUN/Cr WNL  -Continue home tamsulosin     Heme:  #H/o anemia  -trend H&H      Endo:  #PreDM  #Steroid induced hyperglycemia  -HgA1c on admit- 5.1%  - Endo consulted during recent admission, home regimen is the following:  - Insulin NPH 10u qd   - Can titrate insulin down as steroids taper  - Transition to SSI while inpatient      #Hypothyroidism  -Continue home levothyroxine      ID:  -afebrile, nontoxic  -See above for groin infection,      PHYSICAL AND OCCUPATIONAL THERAPY:     LINES:     DVT: SubQ heparin (HOLDING FOR POTENTIAL PROCEDURE)   VAP BUNDLE:  CENTRAL LINE BUNDLE:  ULCER PPX: Pantoprazole  GLYCEMIC CONTROL: Insulin regimen  BOWEL CARE: Pericolace  INDWELLING CATHETER:  NUTRITION: Adult diet Regular, Cardiac; 70 gm fat; 2 - 3 grams Sodium      EMERGENCY CONTACT: Extended Emergency Contact Information  Primary Emergency Contact: Janice Ramos  Address: [address] [place] [address]  Home Phone: [phone]  Mobile Phone: [phone]  Relation: Mother  Secondary Emergency Contact: Bird Lane  Address: [address] [place] [address] [address]           [place] [id number] [facility]  Home Phone: [phone]  Mobile Phone: [phone]  Relation: Son  Preferred language: English   needed? No  FAMILY UPDATE:  CODE STATUS: Full Code  DISPO:     Patient seen and assessed with Dr. Velasco    I personally spent 60 minutes of critical care time directly and personally managing the patient exclusive of separately billable procedures   _________________________________________________  Hernesto Salcedo,  PGY4, HF Fellow

## 2025-07-27 NOTE — CARE PLAN
The patient's goals for the shift include to find out if I am going to have surgery today andif not I want to eat    The clinical goals for the shift include Patient will remain afebrile with no signs or symptoms of infection.    Over the shift, the patient did not make progress toward the following goals. Barriers to progression include patient admitted with red swollen right groin access site. Recommendations to address these barriers include monitor for s/s of systemic infection and give Antibiotics as ordered.

## 2025-07-27 NOTE — CARE PLAN
The patient's goals for the shift include To find out how this swelling in my groin is going to go away    The clinical goals for the shift include To get US today    Over the shift, the patient had Pelvic US completed, patient medicated for pain as ordered.  He reports GRAVES when ambulating in the halls and remained safe free from falls and injury this shift.    Note:  Patient refused lab work earlier which caused a delay in ATBC's due to eating his lunch, team notified.      Problem: Discharge Planning  Goal: Discharge to home or other facility with appropriate resources  7/27/2025 1636 by Jocy Faust RN  Outcome: Progressing  7/27/2025 1636 by Jocy Faust RN  Outcome: Progressing     Problem: Chronic Conditions and Co-morbidities  Goal: Patient's chronic conditions and co-morbidity symptoms are monitored and maintained or improved  7/27/2025 1636 by Jocy Faust RN  Outcome: Progressing  7/27/2025 1636 by Jocy Faust RN  Outcome: Progressing     Problem: Heart Failure  Goal: Improved urinary output this shift  7/27/2025 1636 by Jocy Faust RN  Outcome: Progressing  7/27/2025 1636 by Jocy Faust RN  Outcome: Progressing  Goal: Reduction in peripheral edema within 24 hours  7/27/2025 1636 by Jocy Faust RN  Outcome: Progressing  7/27/2025 1636 by Jocy Faust RN  Outcome: Progressing  Goal: Report improvement of dyspnea/breathlessness this shift  7/27/2025 1636 by Jocy Faust RN  Outcome: Progressing  7/27/2025 1636 by Jocy Faust RN  Outcome: Progressing  Goal: Weight from fluid excess reduced over 2-3 days, then stabilize  7/27/2025 1636 by Jocy Faust RN  Outcome: Progressing  7/27/2025 1636 by Jocy Faust RN  Outcome: Progressing  Goal: Increase self care and/or family involvement in 24 hours  7/27/2025 1636 by Jocy Faust RN  Outcome: Progressing  7/27/2025 1636 by Jocy Faust RN  Outcome: Progressing     Problem: Skin  Goal: Decreased wound  size/increased tissue granulation at next dressing change  Outcome: Progressing  Flowsheets (Taken 7/27/2025 1636)  Decreased wound size/increased tissue granulation at next dressing change:   Promote sleep for wound healing   Protective dressings over bony prominences  Goal: Participates in plan/prevention/treatment measures  Outcome: Progressing  Flowsheets (Taken 7/27/2025 1636)  Participates in plan/prevention/treatment measures:   Elevate heels   Increase activity/out of bed for meals  Goal: Prevent/manage excess moisture  Outcome: Progressing  Flowsheets (Taken 7/27/2025 1636)  Prevent/manage excess moisture:   Cleanse incontinence/protect with barrier cream   Follow provider orders for dressing changes   Moisturize dry skin   Monitor for/manage infection if present  Goal: Prevent/minimize sheer/friction injuries  Outcome: Progressing  Flowsheets (Taken 7/27/2025 1636)  Prevent/minimize sheer/friction injuries:   HOB 30 degrees or less   Increase activity/out of bed for meals   Turn/reposition every 2 hours/use positioning/transfer devices  Goal: Promote/optimize nutrition  Outcome: Progressing  Flowsheets (Taken 7/27/2025 1636)  Promote/optimize nutrition:   Consume > 50% meals/supplements   Monitor/record intake including meals   Offer water/supplements/favorite foods  Goal: Promote skin healing  Outcome: Progressing  Flowsheets (Taken 7/27/2025 1636)  Promote skin healing:   Assess skin/pad under line(s)/device(s)   Ensure correct size (line/device) and apply per  instructions   Protective dressings over bony prominences   Rotate device position/do not position patient on device   Turn/reposition every 2 hours/use positioning/transfer devices

## 2025-07-27 NOTE — PROGRESS NOTES
Vancomycin Dosing by Pharmacy- FOLLOW UP    Anatoly Lane is a 64 y.o. year old male who Pharmacy has been consulted for vancomycin dosing for cellulitis, skin and soft tissue. Based on the patient's indication and renal status this patient is being dosed based on a goal AUC of 400-600.     Renal function is currently stable.    Current vancomycin dose: 1000 mg given every 12 hours    Estimated vancomycin AUC on current dose: 557 mg/L.hr     Visit Vitals  /75 (BP Location: Left arm, Patient Position: Lying)   Pulse 86   Temp 35.9 °C (96.6 °F) (Temporal)   Resp 19        Lab Results   Component Value Date    CREATININE 1.19 2025    CREATININE 1.06 2025    CREATININE 1.04 2025    CREATININE 0.89 2025        Patient weight is as follows:   Vitals:    25 0454   Weight: 94.3 kg (207 lb 14.3 oz)       Cultures:  No results found for the encounter in last 14 days.       I/O last 3 completed shifts:  In: 990 (10.5 mL/kg) [P.O.:440; IV Piggyback:550]  Out: 5352 (56.8 mL/kg) [Urine:5352 (1.6 mL/kg/hr)]  Weight: 94.3 kg   I/O during current shift:  I/O this shift:  In: 1200 [P.O.:1200]  Out: 900 [Urine:900]    Temp (24hrs), Av.2 °C (97.1 °F), Min:35.9 °C (96.6 °F), Max:36.5 °C (97.7 °F)      Assessment/Plan    Within goal AUC range. Continue current vancomycin regimen.    This dosing regimen is predicted by [employer] to result in the following pharmacokinetic parameters:  Loading dose: N/A  Regimen: 1000 mg IV every 12 hours.  Start time: 04:18 on 2025  Exposure target: AUC24 (range) 400-600 mg/L.hr   UTM70-33: 510 mg/L.hr  AUC24,ss: 557 mg/L.hr  Probability of AUC24 > 400: 94 %  Ctrough,ss: 18.9 mg/L  Probability of Ctrough,ss > 20: 43 %      The next level will be obtained on  at am draw. May be obtained sooner if clinically indicated.   Will continue to monitor renal function daily while on vancomycin and order serum creatinine at least every 48 hours if not already  ordered.  Follow for continued vancomycin needs, clinical response, and signs/symptoms of toxicity.       Stefani Walker, PharmD

## 2025-07-28 VITALS
TEMPERATURE: 98.1 F | WEIGHT: 207.89 LBS | SYSTOLIC BLOOD PRESSURE: 157 MMHG | BODY MASS INDEX: 30.79 KG/M2 | HEART RATE: 81 BPM | DIASTOLIC BLOOD PRESSURE: 90 MMHG | OXYGEN SATURATION: 95 % | HEIGHT: 69 IN | RESPIRATION RATE: 16 BRPM

## 2025-07-28 PROBLEM — T88.8XXA FLUID COLLECTION AT SURGICAL SITE: Status: ACTIVE | Noted: 2025-07-25

## 2025-07-28 LAB
ABO GROUP (TYPE) IN BLOOD: NORMAL
ALBUMIN SERPL BCP-MCNC: 3.7 G/DL (ref 3.4–5)
ALP SERPL-CCNC: 113 U/L (ref 33–136)
ALT SERPL W P-5'-P-CCNC: 10 U/L (ref 10–52)
ANION GAP SERPL CALC-SCNC: 14 MMOL/L (ref 10–20)
ANTIBODY SCREEN: NORMAL
AST SERPL W P-5'-P-CCNC: 10 U/L (ref 9–39)
BILIRUB SERPL-MCNC: 0.3 MG/DL (ref 0–1.2)
BUN SERPL-MCNC: 19 MG/DL (ref 6–23)
CALCIUM SERPL-MCNC: 8.7 MG/DL (ref 8.6–10.6)
CHLORIDE SERPL-SCNC: 102 MMOL/L (ref 98–107)
CO2 SERPL-SCNC: 26 MMOL/L (ref 21–32)
CREAT SERPL-MCNC: 0.95 MG/DL (ref 0.5–1.3)
EGFRCR SERPLBLD CKD-EPI 2021: 89 ML/MIN/1.73M*2
GLUCOSE SERPL-MCNC: 147 MG/DL (ref 74–99)
POTASSIUM SERPL-SCNC: 4 MMOL/L (ref 3.5–5.3)
PROT SERPL-MCNC: 6 G/DL (ref 6.4–8.2)
RH FACTOR (ANTIGEN D): NORMAL
SODIUM SERPL-SCNC: 138 MMOL/L (ref 136–145)
TACROLIMUS BLD-MCNC: 12.6 NG/ML

## 2025-07-28 PROCEDURE — 99233 SBSQ HOSP IP/OBS HIGH 50: CPT | Performed by: STUDENT IN AN ORGANIZED HEALTH CARE EDUCATION/TRAINING PROGRAM

## 2025-07-28 PROCEDURE — 2500000001 HC RX 250 WO HCPCS SELF ADMINISTERED DRUGS (ALT 637 FOR MEDICARE OP): Performed by: STUDENT IN AN ORGANIZED HEALTH CARE EDUCATION/TRAINING PROGRAM

## 2025-07-28 PROCEDURE — 2500000002 HC RX 250 W HCPCS SELF ADMINISTERED DRUGS (ALT 637 FOR MEDICARE OP, ALT 636 FOR OP/ED): Performed by: STUDENT IN AN ORGANIZED HEALTH CARE EDUCATION/TRAINING PROGRAM

## 2025-07-28 PROCEDURE — 36415 COLL VENOUS BLD VENIPUNCTURE: CPT | Performed by: STUDENT IN AN ORGANIZED HEALTH CARE EDUCATION/TRAINING PROGRAM

## 2025-07-28 PROCEDURE — 2500000004 HC RX 250 GENERAL PHARMACY W/ HCPCS (ALT 636 FOR OP/ED): Performed by: STUDENT IN AN ORGANIZED HEALTH CARE EDUCATION/TRAINING PROGRAM

## 2025-07-28 PROCEDURE — 86900 BLOOD TYPING SEROLOGIC ABO: CPT | Performed by: STUDENT IN AN ORGANIZED HEALTH CARE EDUCATION/TRAINING PROGRAM

## 2025-07-28 PROCEDURE — 1200000002 HC GENERAL ROOM WITH TELEMETRY DAILY

## 2025-07-28 PROCEDURE — 80197 ASSAY OF TACROLIMUS: CPT | Performed by: STUDENT IN AN ORGANIZED HEALTH CARE EDUCATION/TRAINING PROGRAM

## 2025-07-28 PROCEDURE — 80053 COMPREHEN METABOLIC PANEL: CPT | Performed by: STUDENT IN AN ORGANIZED HEALTH CARE EDUCATION/TRAINING PROGRAM

## 2025-07-28 RX ORDER — TACROLIMUS 1 MG/1
1 CAPSULE ORAL
Status: DISCONTINUED | OUTPATIENT
Start: 2025-07-28 | End: 2025-07-28 | Stop reason: SDUPTHER

## 2025-07-28 RX ADMIN — VORICONAZOLE 200 MG: 200 TABLET, COATED ORAL at 21:02

## 2025-07-28 RX ADMIN — GABAPENTIN 300 MG: 300 CAPSULE ORAL at 21:03

## 2025-07-28 RX ADMIN — PREDNISONE 25 MG: 20 TABLET ORAL at 09:10

## 2025-07-28 RX ADMIN — HYDRALAZINE HYDROCHLORIDE 25 MG: 25 TABLET ORAL at 09:10

## 2025-07-28 RX ADMIN — MYCOPHENOLATE MOFETIL 1000 MG: 250 CAPSULE ORAL at 21:02

## 2025-07-28 RX ADMIN — PIPERACILLIN SODIUM AND TAZOBACTAM SODIUM 3.38 G: 3; .375 INJECTION, SOLUTION INTRAVENOUS at 14:37

## 2025-07-28 RX ADMIN — TAMSULOSIN HYDROCHLORIDE 0.4 MG: 0.4 CAPSULE ORAL at 09:11

## 2025-07-28 RX ADMIN — SULFAMETHOXAZOLE AND TRIMETHOPRIM 1 TABLET: 400; 80 TABLET ORAL at 09:10

## 2025-07-28 RX ADMIN — GABAPENTIN 300 MG: 300 CAPSULE ORAL at 16:01

## 2025-07-28 RX ADMIN — PANTOPRAZOLE SODIUM 40 MG: 40 TABLET, DELAYED RELEASE ORAL at 09:10

## 2025-07-28 RX ADMIN — HYDRALAZINE HYDROCHLORIDE 25 MG: 25 TABLET ORAL at 16:01

## 2025-07-28 RX ADMIN — LEVOTHYROXINE SODIUM 125 MCG: 0.1 TABLET ORAL at 05:16

## 2025-07-28 RX ADMIN — INSULIN HUMAN 10 UNITS: 100 INJECTION, SUSPENSION SUBCUTANEOUS at 09:12

## 2025-07-28 RX ADMIN — PIPERACILLIN SODIUM AND TAZOBACTAM SODIUM 3.38 G: 3; .375 INJECTION, SOLUTION INTRAVENOUS at 21:27

## 2025-07-28 RX ADMIN — GABAPENTIN 300 MG: 300 CAPSULE ORAL at 09:10

## 2025-07-28 RX ADMIN — DOCUSATE SODIUM 100 MG: 100 CAPSULE, LIQUID FILLED ORAL at 21:02

## 2025-07-28 RX ADMIN — VANCOMYCIN HYDROCHLORIDE 1000 MG: 1 INJECTION, SOLUTION INTRAVENOUS at 04:15

## 2025-07-28 RX ADMIN — VORICONAZOLE 200 MG: 200 TABLET, COATED ORAL at 09:10

## 2025-07-28 RX ADMIN — TACROLIMUS 1.5 MG: 1 CAPSULE ORAL at 19:12

## 2025-07-28 RX ADMIN — ACYCLOVIR 400 MG: 400 TABLET ORAL at 21:02

## 2025-07-28 RX ADMIN — CALCIUM 1 TABLET: 500 TABLET ORAL at 09:10

## 2025-07-28 RX ADMIN — TACROLIMUS 0.5 MG: 1 CAPSULE ORAL at 09:11

## 2025-07-28 RX ADMIN — CALCIUM 1 TABLET: 500 TABLET ORAL at 21:23

## 2025-07-28 RX ADMIN — PIPERACILLIN SODIUM AND TAZOBACTAM SODIUM 3.38 G: 3; .375 INJECTION, SOLUTION INTRAVENOUS at 09:12

## 2025-07-28 RX ADMIN — MYCOPHENOLATE MOFETIL 1000 MG: 250 CAPSULE ORAL at 09:10

## 2025-07-28 RX ADMIN — Medication 50 MCG: at 09:10

## 2025-07-28 RX ADMIN — TACROLIMUS 1 MG: 1 CAPSULE ORAL at 09:11

## 2025-07-28 RX ADMIN — PIPERACILLIN SODIUM AND TAZOBACTAM SODIUM 3.38 G: 3; .375 INJECTION, SOLUTION INTRAVENOUS at 03:37

## 2025-07-28 RX ADMIN — PRAVASTATIN SODIUM 40 MG: 40 TABLET ORAL at 21:02

## 2025-07-28 RX ADMIN — VANCOMYCIN HYDROCHLORIDE 1000 MG: 1 INJECTION, SOLUTION INTRAVENOUS at 15:58

## 2025-07-28 RX ADMIN — HYDRALAZINE HYDROCHLORIDE 25 MG: 25 TABLET ORAL at 21:03

## 2025-07-28 RX ADMIN — ACYCLOVIR 400 MG: 400 TABLET ORAL at 09:11

## 2025-07-28 RX ADMIN — ASPIRIN 81 MG: 81 TABLET, COATED ORAL at 09:10

## 2025-07-28 ASSESSMENT — COGNITIVE AND FUNCTIONAL STATUS - GENERAL
DAILY ACTIVITIY SCORE: 24
MOBILITY SCORE: 23
MOBILITY SCORE: 23
CLIMB 3 TO 5 STEPS WITH RAILING: A LITTLE
MOBILITY SCORE: 23
CLIMB 3 TO 5 STEPS WITH RAILING: A LITTLE
CLIMB 3 TO 5 STEPS WITH RAILING: A LITTLE
DAILY ACTIVITIY SCORE: 24
DAILY ACTIVITIY SCORE: 24

## 2025-07-28 ASSESSMENT — PAIN SCALES - GENERAL
PAINLEVEL_OUTOF10: 0 - NO PAIN
PAINLEVEL_OUTOF10: 0 - NO PAIN

## 2025-07-28 ASSESSMENT — PAIN - FUNCTIONAL ASSESSMENT
PAIN_FUNCTIONAL_ASSESSMENT: 0-10
PAIN_FUNCTIONAL_ASSESSMENT: 0-10

## 2025-07-28 NOTE — CARE PLAN
The patient's goals for the shift include Pateint will have a plan of care outlined for Right Groin Swelling today    The clinical goals for the shift include Patient will have CT Scan today    Over the shift, the patient continues on IV ATBC's and was seen by surgical team for I/D of Right Groin Abscess/Edema.  Patient to be NPO after midnight for surgery tomorrow afternoon.  Patient reports 0/10 pain and no SOB or GRAVES.  Patient ambulated around the nursing unit today.  He remained safe free from falls and injury this shift.      Problem: Discharge Planning  Goal: Discharge to home or other facility with appropriate resources  Outcome: Progressing     Problem: Chronic Conditions and Co-morbidities  Goal: Patient's chronic conditions and co-morbidity symptoms are monitored and maintained or improved  Outcome: Progressing     Problem: Heart Failure  Goal: Improved urinary output this shift  Outcome: Progressing  Goal: Reduction in peripheral edema within 24 hours  Outcome: Progressing  Goal: Report improvement of dyspnea/breathlessness this shift  Outcome: Progressing  Goal: Weight from fluid excess reduced over 2-3 days, then stabilize  Outcome: Progressing  Goal: Increase self care and/or family involvement in 24 hours  Outcome: Progressing     Problem: Skin  Goal: Decreased wound size/increased tissue granulation at next dressing change  Outcome: Progressing  Flowsheets (Taken 7/28/2025 1623)  Decreased wound size/increased tissue granulation at next dressing change:   Promote sleep for wound healing   Protective dressings over bony prominences  Goal: Participates in plan/prevention/treatment measures  Outcome: Progressing  Flowsheets (Taken 7/28/2025 1623)  Participates in plan/prevention/treatment measures:   Elevate heels   Increase activity/out of bed for meals  Goal: Prevent/manage excess moisture  Outcome: Progressing  Flowsheets (Taken 7/28/2025 1623)  Prevent/manage excess moisture:   Cleanse  incontinence/protect with barrier cream   Follow provider orders for dressing changes   Moisturize dry skin   Monitor for/manage infection if present  Goal: Prevent/minimize sheer/friction injuries  Outcome: Progressing  Flowsheets (Taken 7/28/2025 1623)  Prevent/minimize sheer/friction injuries:   HOB 30 degrees or less   Increase activity/out of bed for meals   Turn/reposition every 2 hours/use positioning/transfer devices  Goal: Promote/optimize nutrition  Outcome: Progressing  Flowsheets (Taken 7/28/2025 1623)  Promote/optimize nutrition:   Consume > 50% meals/supplements   Monitor/record intake including meals   Offer water/supplements/favorite foods  Goal: Promote skin healing  Outcome: Progressing  Flowsheets (Taken 7/28/2025 1623)  Promote skin healing:   Assess skin/pad under line(s)/device(s)   Ensure correct size (line/device) and apply per  instructions   Protective dressings over bony prominences   Rotate device position/do not position patient on device   Turn/reposition every 2 hours/use positioning/transfer devices

## 2025-07-28 NOTE — PROGRESS NOTES
"[facility]  HFICU PROGRESS NOTE    Anatoly Lane/[id number]    Admit Date: 7/25/2025  Hospital Length of Stay: 3   ICU Length of Stay: Patient does not have an ICU stay during this admission.   Primary Service: F Cardiology    INTERVAL EVENTS / PERTINENT ROS:   NAOE. Denies any complaints, R inguinal erythema and tenderness continue to improve.     Plan:  -Continue empiric Vanc/Zosyn, Bcx NGTD  -Continue current transplant immunosuppressives, monitor Tac levels  -Will order CT A/P for comparison per CTS recs, and tentatively plan for I&D on 7/29  -Tentative plan for EMBx this week    MEDICATIONS  Infusions:     Scheduled:  acyclovir, 400 mg, BID  aspirin, 81 mg, Daily  calcium carbonate, 1,250 mg of calcium carbonate, BID  cholecalciferol, 50 mcg, Daily  docusate sodium, 100 mg, BID  gabapentin, 300 mg, TID  hydrALAZINE, 25 mg, TID  insulin NPH (Isophane), 10 Units, q AM  levothyroxine, 125 mcg, Daily  lidocaine, 1 patch, Daily  mycophenolate, 1,000 mg, q12h LUPILLO  pantoprazole, 40 mg, Daily  piperacillin-tazobactam, 3.375 g, q6h  polyethylene glycol, 17 g, Daily  pravastatin, 40 mg, Nightly  predniSONE, 25 mg, Daily  sulfamethoxazole-trimethoprim, 1 tablet, Daily  tacrolimus, 0.5 mg, BID  tacrolimus, 1 mg, BID  tamsulosin, 0.4 mg, Daily  vancomycin, 1,000 mg, q12h  voriconazole, 200 mg, q12h LUPILLO      PRN:  acetaminophen, 650 mg, q6h PRN  sennosides-docusate sodium, 1 tablet, Nightly PRN  torsemide, 20 mg, Daily PRN  vancomycin, , Daily PRN        PHYSICAL EXAM:   Visit Vitals  /77 (BP Location: Left arm, Patient Position: Lying)   Pulse 94   Temp 36.3 °C (97.3 °F) (Temporal)   Resp 18   Ht 1.753 m (5' 9.02\")   Wt 94.4 kg (208 lb 1.8 oz)   SpO2 93%   BMI 30.72 kg/m²   Smoking Status Former   BSA 2.14 m²       Wt Readings from Last 5 Encounters:   07/28/25 94.4 kg (208 lb 1.8 oz)   07/25/25 94.5 kg (208 lb 5.4 oz)   07/21/25 91.9 kg (202 lb 8 oz)   07/14/25 94.9 kg (209 lb 4.8 oz) " "  07/14/25 98.4 kg (216 lb 14.9 oz)       INTAKE/OUTPUT:  I/O last 3 completed shifts:  In: 1800 (19.1 mL/kg) [P.O.:1400; IV Piggyback:400]  Out: 7352 (77.9 mL/kg) [Urine:7352 (2.2 mL/kg/hr)]  Weight: 94.4 kg      Exam:  GEN: NAD  HEENT: ATNC,  anicteric, no JVD  CV: RRR, no m/r/g  Pulm: CTAB  Abdomen: NTND  Ext: warm, no LE edema noted  Neuro: A+Ox3  Psych: appropriate  Ext: R groin fluctuant mass approx 5x5, tender to palpation and superficial warmth and erythema extending down to mid thigh.    DATA:  CMP:  Results from last 7 days   Lab Units 07/28/25  0635 07/27/25  1526 07/26/25  0126   SODIUM mmol/L 138 136 140   POTASSIUM mmol/L 4.0 4.8 3.7   CHLORIDE mmol/L 102 99 99   CO2 mmol/L 26 30 30   ANION GAP mmol/L 14 12 15   BUN mg/dL 19 23 33*   CREATININE mg/dL 0.95 1.19 1.06   EGFR mL/min/1.73m*2 89 68 78   MAGNESIUM mg/dL  --   --  1.65   ALBUMIN g/dL 3.7 3.2* 3.5   ALT U/L 10 13 15   AST U/L 10 9 14   BILIRUBIN TOTAL mg/dL 0.3 0.3 0.4     CBC:  Results from last 7 days   Lab Units 07/27/25  0554 07/26/25  0126   WBC AUTO x10*3/uL 8.5 8.6   HEMOGLOBIN g/dL 9.6* 10.0*   HEMATOCRIT % 32.0* 32.5*   PLATELETS AUTO x10*3/uL 235 319   MCV fL 104* 99     COAG:   Results from last 7 days   Lab Units 07/26/25  0126   INR  1.0     ABO:   ABO TYPE   Date Value Ref Range Status   07/26/2025 AB  Final     HEME/ENDO:     CARDIAC:       No lab exists for component: \"CK\", \"CKMBP\"    ASSESSMENT AND PLAN:   Anatoly Lane is a 64-year-old male with a history of NICM (s/p HM3 in 2023) s/p OHT on 6/21/2025, pAF, CAD, CKD, HTN, BPH, GERD, hypothyroidism presenting for R groin infection. He had a R groin seroma post decannulation, however, with now signs of infection.     MSK:  #R groin infection   -Vanc/zosyn 2/2 immunosuppressed status.   -Bcx with NGTD  -CTS consulted   -Ultrasound notes 6cm superolateral seroma, and additionally 5.4cm inferomedial and superficial right groin collection, possibly reflecting a resolving hematoma.  "   -Will follow up with non CT A/P per CTS recommendations for comparison  -Tentative plan for I&D on 7/29       Neuro:  # Anxiety. Depression  # H/o BUE/BLE mild tremor  # H/o Back pain  - Serial neuro and pain assessments   - PO Tylenol PRN for pain     # LUE weakness  # RLE weakness  - Continue home Gabapentin  - Neurology follow up along with EMG/NCS outpatient        Cardiovascular:  #NICM s/p HM2 (2023) s/p OHT on 6/21  - Continue home hydralazine 25 TID  Donor/Recipient Infectious history:   CMV: -/-   EBV: +/+   Toxo: -/-   Heb B: -/-  Hep C: -/-  HIV: -/-      Rejection/Prophylaxis:     - Induction: s/p methylprednisolone 500mg IV x2, S/p Basiliximab 20mg IV within 1 hour of arrival to CTICU (no need for premedication), s/p 2nd dose: 20mg IVPB on POD4 (06/25)   - Steroids: Prednisone 25mg daily (reduced 7/14 after negative biopsy)  - Tacrolimus: 1.5mg twice daily  - Tacrolimus goal troughs: 10-12  - Cellcept: 1,000mg BID   - Antifungals: voriconazole 200mg BID x3 months end date 09/22/2025   - Antivirals: acylovir 400mg BID x3 months end date 09/22/2025   - Anti PCP & Toxoplasmosis: SS Bactrim daily end date of 12/22/25     RHC: 7/14 121/69 (85), RA 4, PA 32/16 (21), PCWP 10; Raven CI 3.8 and Thermodilution CI 2.4.   Last TTE/BOBBY: 7/9, EF 65%   Last HLA: Pre-transplant: PRAs 0%; First due on 7/21  Last Allomap: ~ will assess starting at 6-month ana post-op    Last Allosure: ~ will assess starting at 6-month ana post-op   Last LHC: 03/2023(pre OHT). First due ~ (one year post tx) due 6/2026  Osteopenia/osteoporosis prophylaxis: Vit D 2000U daily and calcium 500 mg BID (give calcium 2 hrs after MMF)   Peptic/gastric ulcer prophylaxis: Pantoprazole 40mg daily    CAV Prophylaxis: Aspirin 81mg daily and Pravastatin 40mg daily   Pacing: Epicardial wires removed 07/07      Problems:   # H/o Subcutaneous emphysema and pneumomediastinum   - Observed during recent admission, managed conservatively after consultations  with Cam Birch and [place].      Pulmonary:   #Prior smoker  -c/w abstinence     GI:  #H/o C. Difficile infection s/p tx with po vanc  - Bowel regimen ordered   - PPI     :  #BPH  -Baseline BUN/Cr WNL  -Continue home tamsulosin     Heme:  #H/o anemia  -trend H&H      Endo:  #PreDM  #Steroid induced hyperglycemia  -HgA1c on admit- 5.1%  - Endo consulted during recent admission, home regimen is the following:  - Insulin NPH 10u qd   - Can titrate insulin down as steroids taper  - Transition to SSI while inpatient      #Hypothyroidism  -Continue home levothyroxine      ID:  -afebrile, nontoxic  -See above for groin infection,      PHYSICAL AND OCCUPATIONAL THERAPY:     LINES:     DVT: SubQ heparin (HOLDING FOR POTENTIAL PROCEDURE)   VAP BUNDLE:  CENTRAL LINE BUNDLE:  ULCER PPX: Pantoprazole  GLYCEMIC CONTROL: Insulin regimen  BOWEL CARE: Pericolace  INDWELLING CATHETER:  NUTRITION: Adult diet Regular, Cardiac; 70 gm fat; 2 - 3 grams Sodium      EMERGENCY CONTACT: Extended Emergency Contact Information  Primary Emergency Contact: Janice Ramos  Address: [address] [place] [address]  Home Phone: [phone]  Mobile Phone: [phone]  Relation: Mother  Secondary Emergency Contact: Bird Lane  Address: [address] [place] [address] [address]           [address]  Home Phone: [phone]  Mobile Phone: [phone]  Relation: Son  Preferred language: English   needed? No  FAMILY UPDATE:  CODE STATUS: Full Code  DISPO: Pending evaluation of R grion infection    Patient seen and assessed with Dr. Sameera HOLLIDAY personally spent 60 minutes of critical care time directly and personally managing the patient exclusive of separately billable procedures   _________________________________________________  Hernesto Salcedo, DO PGY4, HF Fellow

## 2025-07-28 NOTE — CARE PLAN
The patient's goals for the shift include To find out how this swelling in my groin is going to go away    The clinical goals for the shift include Patient will be free from s/s of infection.    Over the shift, the patient did not make progress toward the following goals. Barriers to progression include patient with redness and swelling to right groin access site. Recommendations to address these barriers include monitor for s/s of systemic infection and continue to give ATBs as ordered.

## 2025-07-29 ENCOUNTER — APPOINTMENT (OUTPATIENT)
Dept: RADIOLOGY | Facility: HOSPITAL | Age: 65
DRG: 863 | End: 2025-07-29
Payer: MEDICARE

## 2025-07-29 ENCOUNTER — APPOINTMENT (OUTPATIENT)
Dept: ENDOCRINOLOGY | Facility: CLINIC | Age: 65
End: 2025-07-29
Payer: MEDICARE

## 2025-07-29 LAB
ALBUMIN SERPL BCP-MCNC: 3.5 G/DL (ref 3.4–5)
ALP SERPL-CCNC: 105 U/L (ref 33–136)
ALT SERPL W P-5'-P-CCNC: 14 U/L (ref 10–52)
ANION GAP SERPL CALC-SCNC: 13 MMOL/L (ref 10–20)
AST SERPL W P-5'-P-CCNC: 23 U/L (ref 9–39)
BILIRUB SERPL-MCNC: 0.4 MG/DL (ref 0–1.2)
BUN SERPL-MCNC: 20 MG/DL (ref 6–23)
CALCIUM SERPL-MCNC: 9 MG/DL (ref 8.6–10.6)
CHLORIDE SERPL-SCNC: 103 MMOL/L (ref 98–107)
CO2 SERPL-SCNC: 29 MMOL/L (ref 21–32)
CREAT SERPL-MCNC: 0.76 MG/DL (ref 0.5–1.3)
EGFRCR SERPLBLD CKD-EPI 2021: >90 ML/MIN/1.73M*2
ERYTHROCYTE [DISTWIDTH] IN BLOOD BY AUTOMATED COUNT: 17.5 % (ref 11.5–14.5)
GLUCOSE SERPL-MCNC: 148 MG/DL (ref 74–99)
HCT VFR BLD AUTO: 36.5 % (ref 41–52)
HGB BLD-MCNC: 11 G/DL (ref 13.5–17.5)
MCH RBC QN AUTO: 31.3 PG (ref 26–34)
MCHC RBC AUTO-ENTMCNC: 30.1 G/DL (ref 32–36)
MCV RBC AUTO: 104 FL (ref 80–100)
NRBC BLD-RTO: 0 /100 WBCS (ref 0–0)
PLATELET # BLD AUTO: 309 X10*3/UL (ref 150–450)
POTASSIUM SERPL-SCNC: 4.5 MMOL/L (ref 3.5–5.3)
PROT SERPL-MCNC: 5.9 G/DL (ref 6.4–8.2)
RBC # BLD AUTO: 3.52 X10*6/UL (ref 4.5–5.9)
SODIUM SERPL-SCNC: 140 MMOL/L (ref 136–145)
TACROLIMUS BLD-MCNC: 13.1 NG/ML
WBC # BLD AUTO: 9.4 X10*3/UL (ref 4.4–11.3)

## 2025-07-29 PROCEDURE — 2500000004 HC RX 250 GENERAL PHARMACY W/ HCPCS (ALT 636 FOR OP/ED): Performed by: RADIOLOGY

## 2025-07-29 PROCEDURE — 80053 COMPREHEN METABOLIC PANEL: CPT | Performed by: STUDENT IN AN ORGANIZED HEALTH CARE EDUCATION/TRAINING PROGRAM

## 2025-07-29 PROCEDURE — 0Y953ZZ DRAINAGE OF RIGHT INGUINAL REGION, PERCUTANEOUS APPROACH: ICD-10-PCS | Performed by: RADIOLOGY

## 2025-07-29 PROCEDURE — 1100000001 HC PRIVATE ROOM DAILY

## 2025-07-29 PROCEDURE — C1729 CATH, DRAINAGE: HCPCS

## 2025-07-29 PROCEDURE — 2500000004 HC RX 250 GENERAL PHARMACY W/ HCPCS (ALT 636 FOR OP/ED): Performed by: STUDENT IN AN ORGANIZED HEALTH CARE EDUCATION/TRAINING PROGRAM

## 2025-07-29 PROCEDURE — 2500000001 HC RX 250 WO HCPCS SELF ADMINISTERED DRUGS (ALT 637 FOR MEDICARE OP): Performed by: STUDENT IN AN ORGANIZED HEALTH CARE EDUCATION/TRAINING PROGRAM

## 2025-07-29 PROCEDURE — 7100000010 HC PHASE TWO TIME - EACH INCREMENTAL 1 MINUTE

## 2025-07-29 PROCEDURE — 99233 SBSQ HOSP IP/OBS HIGH 50: CPT | Performed by: STUDENT IN AN ORGANIZED HEALTH CARE EDUCATION/TRAINING PROGRAM

## 2025-07-29 PROCEDURE — 76942 ECHO GUIDE FOR BIOPSY: CPT

## 2025-07-29 PROCEDURE — 85027 COMPLETE CBC AUTOMATED: CPT | Performed by: STUDENT IN AN ORGANIZED HEALTH CARE EDUCATION/TRAINING PROGRAM

## 2025-07-29 PROCEDURE — 2720000007 HC OR 272 NO HCPCS

## 2025-07-29 PROCEDURE — 74176 CT ABD & PELVIS W/O CONTRAST: CPT

## 2025-07-29 PROCEDURE — 36415 COLL VENOUS BLD VENIPUNCTURE: CPT | Performed by: STUDENT IN AN ORGANIZED HEALTH CARE EDUCATION/TRAINING PROGRAM

## 2025-07-29 PROCEDURE — 74176 CT ABD & PELVIS W/O CONTRAST: CPT | Performed by: RADIOLOGY

## 2025-07-29 PROCEDURE — 2500000002 HC RX 250 W HCPCS SELF ADMINISTERED DRUGS (ALT 637 FOR MEDICARE OP, ALT 636 FOR OP/ED): Performed by: STUDENT IN AN ORGANIZED HEALTH CARE EDUCATION/TRAINING PROGRAM

## 2025-07-29 PROCEDURE — 80197 ASSAY OF TACROLIMUS: CPT | Performed by: STUDENT IN AN ORGANIZED HEALTH CARE EDUCATION/TRAINING PROGRAM

## 2025-07-29 PROCEDURE — 7100000009 HC PHASE TWO TIME - INITIAL BASE CHARGE

## 2025-07-29 RX ORDER — MIDAZOLAM HYDROCHLORIDE 2 MG/2ML
INJECTION, SOLUTION INTRAMUSCULAR; INTRAVENOUS
Status: COMPLETED | OUTPATIENT
Start: 2025-07-29 | End: 2025-07-29

## 2025-07-29 RX ORDER — FENTANYL CITRATE 50 UG/ML
INJECTION, SOLUTION INTRAMUSCULAR; INTRAVENOUS
Status: COMPLETED | OUTPATIENT
Start: 2025-07-29 | End: 2025-07-29

## 2025-07-29 RX ADMIN — PIPERACILLIN SODIUM AND TAZOBACTAM SODIUM 3.38 G: 3; .375 INJECTION, SOLUTION INTRAVENOUS at 16:22

## 2025-07-29 RX ADMIN — SULFAMETHOXAZOLE AND TRIMETHOPRIM 1 TABLET: 400; 80 TABLET ORAL at 08:28

## 2025-07-29 RX ADMIN — GABAPENTIN 300 MG: 300 CAPSULE ORAL at 08:28

## 2025-07-29 RX ADMIN — PIPERACILLIN SODIUM AND TAZOBACTAM SODIUM 3.38 G: 3; .375 INJECTION, SOLUTION INTRAVENOUS at 04:16

## 2025-07-29 RX ADMIN — TACROLIMUS 1.5 MG: 1 CAPSULE ORAL at 06:46

## 2025-07-29 RX ADMIN — PRAVASTATIN SODIUM 40 MG: 40 TABLET ORAL at 22:00

## 2025-07-29 RX ADMIN — TACROLIMUS 1.5 MG: 1 CAPSULE ORAL at 18:22

## 2025-07-29 RX ADMIN — GABAPENTIN 300 MG: 300 CAPSULE ORAL at 15:43

## 2025-07-29 RX ADMIN — MYCOPHENOLATE MOFETIL 1000 MG: 250 CAPSULE ORAL at 21:52

## 2025-07-29 RX ADMIN — TAMSULOSIN HYDROCHLORIDE 0.4 MG: 0.4 CAPSULE ORAL at 08:29

## 2025-07-29 RX ADMIN — INSULIN HUMAN 10 UNITS: 100 INJECTION, SUSPENSION SUBCUTANEOUS at 08:29

## 2025-07-29 RX ADMIN — HYDRALAZINE HYDROCHLORIDE 25 MG: 25 TABLET ORAL at 15:43

## 2025-07-29 RX ADMIN — ASPIRIN 81 MG: 81 TABLET, COATED ORAL at 08:28

## 2025-07-29 RX ADMIN — ACYCLOVIR 400 MG: 400 TABLET ORAL at 08:29

## 2025-07-29 RX ADMIN — HYDRALAZINE HYDROCHLORIDE 25 MG: 25 TABLET ORAL at 08:28

## 2025-07-29 RX ADMIN — MIDAZOLAM HYDROCHLORIDE 1 MG: 2 INJECTION, SOLUTION INTRAMUSCULAR; INTRAVENOUS at 14:58

## 2025-07-29 RX ADMIN — PREDNISONE 25 MG: 20 TABLET ORAL at 08:28

## 2025-07-29 RX ADMIN — MYCOPHENOLATE MOFETIL 1000 MG: 250 CAPSULE ORAL at 08:27

## 2025-07-29 RX ADMIN — CALCIUM 1 TABLET: 500 TABLET ORAL at 08:39

## 2025-07-29 RX ADMIN — PIPERACILLIN SODIUM AND TAZOBACTAM SODIUM 3.38 G: 3; .375 INJECTION, SOLUTION INTRAVENOUS at 10:54

## 2025-07-29 RX ADMIN — Medication 50 MCG: at 08:28

## 2025-07-29 RX ADMIN — VORICONAZOLE 200 MG: 200 TABLET, COATED ORAL at 08:29

## 2025-07-29 RX ADMIN — FENTANYL CITRATE 50 MCG: 50 INJECTION, SOLUTION INTRAMUSCULAR; INTRAVENOUS at 14:58

## 2025-07-29 RX ADMIN — PIPERACILLIN SODIUM AND TAZOBACTAM SODIUM 3.38 G: 3; .375 INJECTION, SOLUTION INTRAVENOUS at 22:00

## 2025-07-29 RX ADMIN — VANCOMYCIN HYDROCHLORIDE 1000 MG: 1 INJECTION, SOLUTION INTRAVENOUS at 03:01

## 2025-07-29 RX ADMIN — DOCUSATE SODIUM 100 MG: 100 CAPSULE, LIQUID FILLED ORAL at 21:52

## 2025-07-29 RX ADMIN — CALCIUM 1 TABLET: 500 TABLET ORAL at 22:00

## 2025-07-29 RX ADMIN — PANTOPRAZOLE SODIUM 40 MG: 40 TABLET, DELAYED RELEASE ORAL at 08:27

## 2025-07-29 RX ADMIN — ACYCLOVIR 400 MG: 400 TABLET ORAL at 21:50

## 2025-07-29 RX ADMIN — GABAPENTIN 300 MG: 300 CAPSULE ORAL at 21:51

## 2025-07-29 RX ADMIN — VORICONAZOLE 200 MG: 200 TABLET, COATED ORAL at 21:51

## 2025-07-29 RX ADMIN — LEVOTHYROXINE SODIUM 125 MCG: 0.1 TABLET ORAL at 07:25

## 2025-07-29 RX ADMIN — VANCOMYCIN HYDROCHLORIDE 1000 MG: 1 INJECTION, SOLUTION INTRAVENOUS at 13:57

## 2025-07-29 RX ADMIN — HYDRALAZINE HYDROCHLORIDE 25 MG: 25 TABLET ORAL at 21:51

## 2025-07-29 ASSESSMENT — COGNITIVE AND FUNCTIONAL STATUS - GENERAL
CLIMB 3 TO 5 STEPS WITH RAILING: A LITTLE
DAILY ACTIVITIY SCORE: 24
MOBILITY SCORE: 23
MOBILITY SCORE: 23
CLIMB 3 TO 5 STEPS WITH RAILING: A LITTLE
DAILY ACTIVITIY SCORE: 24

## 2025-07-29 ASSESSMENT — PAIN SCALES - GENERAL
PAINLEVEL_OUTOF10: 0 - NO PAIN

## 2025-07-29 ASSESSMENT — PAIN - FUNCTIONAL ASSESSMENT
PAIN_FUNCTIONAL_ASSESSMENT: 0-10

## 2025-07-29 NOTE — CARE PLAN
CARDIAC SURGERY PLAN OF CARE    I reviewed Mr. Lane's CT A/P performed this morning.   2 fluid collections appreciated over his right femoral cannulation site.   Following CT review by the reading radiologist, Dr. Richie Baer, these fluid collections may be amenable to ultrasound-guided aspiration by interventional radiology.     Plan to defer surgical incision and drainage at this time.   We will consult interventional radiology for ultrasound-guided aspiration.   We would recommend applying a pressure dressing at the site following aspiration to reduce the likelihood of recurrence.     Plan to continue antibiotic therapy and send the aspirated fluid for culture.   If his fluid collections reaccumulate following aspiration, we will reevaluate him for surgical drainage.     Plan of care discussed with the patient, Dr. Schrader, and the primary team.   Please call with questions or concerns.    Hannah Birch MD  Cardiac Surgeon  Division of Cardiac Surgery  [facility] [place]

## 2025-07-29 NOTE — CARE PLAN
Problem: Discharge Planning  Goal: Discharge to home or other facility with appropriate resources  Outcome: Progressing     Problem: Chronic Conditions and Co-morbidities  Goal: Patient's chronic conditions and co-morbidity symptoms are monitored and maintained or improved  Outcome: Progressing     Problem: Heart Failure  Goal: Improved urinary output this shift  Outcome: Progressing  Goal: Reduction in peripheral edema within 24 hours  Outcome: Progressing  Goal: Report improvement of dyspnea/breathlessness this shift  Outcome: Progressing  Goal: Weight from fluid excess reduced over 2-3 days, then stabilize  Outcome: Progressing  Goal: Increase self care and/or family involvement in 24 hours  Outcome: Progressing   The patient's goals for the shift include Pateint will have a plan of care outlined for Right Groin Swelling today    The clinical goals for the shift include pt will remain safe and HDS    Over the shift, the patient remained safe and stable. Pt got ultrasound guided aspiration to his right groin.

## 2025-07-29 NOTE — CARE PLAN
The patient's goals for the shift include Pateint will have a plan of care outlined for Right Groin Swelling today    The clinical goals for the shift include Patient will have CT Scan today

## 2025-07-29 NOTE — POST-PROCEDURE NOTE
Interventional Radiology Brief Postprocedure Note    Attending: Dr. Cardoza    Assistant: Dr. Bustillo    Diagnosis: Right groin collections    Description of procedure: US guided drainage of right groin collection     Anesthesia:  Local    Complications: None    Estimated Blood Loss: minimal    Medications  As of 07/29/25 1511      sennosides-docusate sodium (Dahlia-Colace) 8.6-50 mg per tablet 1 tablet (tablet) Total dose:  1 tablet Dosing weight:  93.3      Date/Time Rate/Dose/Volume Action       07/27/25  2150 1 tablet Given               acyclovir (Zovirax) tablet 400 mg (mg) Total dose:  2,800 mg* Dosing weight:  93.3   *Administration not included in total     Date/Time Rate/Dose/Volume Action       07/26/25  0242 *400 mg Missed      1010 400 mg Given      2111 400 mg Given     07/27/25  0831 400 mg Given      2044 400 mg Given     07/28/25  0911 400 mg Given      2102 400 mg Given     07/29/25  0829 400 mg Given               aspirin EC tablet 81 mg (mg) Total dose:  324 mg      Date/Time Rate/Dose/Volume Action       07/26/25  1010 81 mg Given     07/27/25  0831 81 mg Given     07/28/25  0910 81 mg Given     07/29/25  0828 81 mg Given               calcium carbonate (Os-Wilfredo) 1,250 mg (500 mg elemental) tablet 1 tablet (tablet) Total dose:  8,750 mg of calcium carbonate*   *Administration not included in total     Date/Time Rate/Dose/Volume Action       07/26/25  0242 *1 tablet Missed      1010 1 tablet Given      2021 1 tablet Given     07/27/25  0830 1 tablet Given      2045 1 tablet Given     07/28/25  0910 1 tablet Given      2123 1 tablet Given     07/29/25  0839 1 tablet Given               cholecalciferol (Vitamin D-3) tablet 50 mcg (mcg) Total dose:  200 mcg Dosing weight:  93.3      Date/Time Rate/Dose/Volume Action       07/26/25  1010 50 mcg Given     07/27/25  0831 50 mcg Given     07/28/25  0910 50 mcg Given     07/29/25  0828 50 mcg Given               levothyroxine (Synthroid, Levoxyl) tablet 125  mcg (mcg) Total volume:  Not documented* Dosing weight:  93.3   *Total volume has not been documented. View each administration to see the amount administered.     Date/Time Rate/Dose/Volume Action       07/26/25  0607 125 mcg Given     07/27/25  0504 125 mcg Given     07/28/25  0516 125 mcg Given     07/29/25  0725 125 mcg Given               predniSONE (Deltasone) tablet 25 mg (mg) Total volume:  Not documented* Dosing weight:  93.3   *Total volume has not been documented. View each administration to see the amount administered.     Date/Time Rate/Dose/Volume Action       07/26/25  1010 25 mg Given     07/27/25  0831 25 mg Given     07/28/25  0910 25 mg Given     07/29/25  0828 25 mg Given               lidocaine 4 % patch 1 patch (patch) Total dose:  0 patch* Dosing weight:  93.3   *Administration not included in total     Date/Time Rate/Dose/Volume Action       07/26/25  1014 *1 patch (over 720 min) Missed     07/27/25  0815 *1 patch (over 720 min) Missed     07/28/25  0912 *1 patch (over 720 min) Missed     07/29/25  0840 *1 patch (over 720 min) Missed               mycophenolate (Cellcept) capsule 1,000 mg (mg) Total dose:  7,000 mg* Dosing weight:  93.3   *Administration not included in total     Date/Time Rate/Dose/Volume Action       07/26/25  0243 *1,000 mg Missed      1011 1,000 mg Given      2021 1,000 mg Given     07/27/25  0831 1,000 mg Given      2044 1,000 mg Given     07/28/25  0910 1,000 mg Given      2102 1,000 mg Given     07/29/25  0827 1,000 mg Given               pantoprazole (ProtoNix) EC tablet 40 mg (mg) Total dose:  160 mg Dosing weight:  93.3      Date/Time Rate/Dose/Volume Action       07/26/25  1010 40 mg Given     07/27/25  0831 40 mg Given     07/28/25  0910 40 mg Given     07/29/25  0827 40 mg Given               pravastatin (Pravachol) tablet 40 mg (mg) Total dose:  120 mg* Dosing weight:  93.3   *Administration not included in total     Date/Time Rate/Dose/Volume Action        07/26/25  0243 *40 mg Missed      2020 40 mg Given     07/27/25  2044 40 mg Given     07/28/25  2102 40 mg Given               sulfamethoxazole-trimethoprim (Bactrim) 400-80 mg per tablet 1 tablet (tablet) Total dose:  4 tablet Dosing weight:  93.3      Date/Time Rate/Dose/Volume Action       07/26/25  1011 1 tablet Given     07/27/25  0831 1 tablet Given     07/28/25  0910 1 tablet Given     07/29/25  0828 1 tablet Given               tamsulosin (Flomax) 24 hr capsule 0.4 mg (mg) Total dose:  1.6 mg Dosing weight:  93.3      Date/Time Rate/Dose/Volume Action       07/26/25  1010 0.4 mg Given     07/27/25  0831 0.4 mg Given     07/28/25  0911 0.4 mg Given     07/29/25  0829 0.4 mg Given               voriconazole (Vfend) tablet 200 mg (mg) Total dose:  1,400 mg* Dosing weight:  93.3   *Administration not included in total     Date/Time Rate/Dose/Volume Action       07/26/25  0243 *200 mg Missed      1011 200 mg Given      2111 200 mg Given     07/27/25  0831 200 mg Given      2046 200 mg Given     07/28/25  0910 200 mg Given      2102 200 mg Given     07/29/25  0829 200 mg Given               acetaminophen (Tylenol) tablet 650 mg (mg) Total dose:  650 mg Dosing weight:  93.3      Date/Time Rate/Dose/Volume Action       07/27/25  1559 650 mg Given               docusate sodium (Colace) capsule 100 mg (mg) Total dose:  300 mg* Dosing weight:  93.3   *Administration not included in total     Date/Time Rate/Dose/Volume Action       07/26/25  0242 *100 mg Missed      1013 *100 mg Missed      2021 100 mg Given     07/27/25  0815 *100 mg Missed      2044 100 mg Given     07/28/25  0854 *100 mg Missed      2102 100 mg Given     07/29/25  0839 *100 mg Missed               gabapentin (Neurontin) capsule 300 mg (mg) Total dose:  3,000 mg Dosing weight:  93.3      Date/Time Rate/Dose/Volume Action       07/26/25  1010 300 mg Given      1609 300 mg Given      2021 300 mg Given     07/27/25 0831 300 mg Given      1455 300 mg  Given      2045 300 mg Given     07/28/25  0910 300 mg Given      1601 300 mg Given      2103 300 mg Given     07/29/25  0828 300 mg Given               hydrALAZINE (Apresoline) tablet 25 mg (mg) Total dose:  250 mg Dosing weight:  93.3      Date/Time Rate/Dose/Volume Action       07/26/25  1010 25 mg Given      1609 25 mg Given      2021 25 mg Given     07/27/25  0831 25 mg Given      1455 25 mg Given      2045 25 mg Given     07/28/25  0910 25 mg Given      1601 25 mg Given      2103 25 mg Given     07/29/25  0828 25 mg Given               tacrolimus (Prograf) capsule 0.5 mg (mg) Total dose:  2.5 mg* Dosing weight:  93.3   *Administration not included in total     Date/Time Rate/Dose/Volume Action       07/26/25  0241 *0.5 mg Missed      1011 0.5 mg Given      2021 0.5 mg Given     07/27/25  0830 0.5 mg Given      2045 0.5 mg Given     07/28/25  0911 0.5 mg Given               tacrolimus (Prograf) capsule 1.5 mg (mg) Total dose:  3 mg Dosing weight:  94.4      Date/Time Rate/Dose/Volume Action       07/28/25  1912 1.5 mg Given     07/29/25  0646 1.5 mg Given               tacrolimus (Prograf) capsule 1 mg (mg) Total dose:  5 mg* Dosing weight:  93.3   *Administration not included in total     Date/Time Rate/Dose/Volume Action       07/26/25  0242 *1 mg Missed      1010 1 mg Given      2020 1 mg Given     07/27/25  0830 1 mg Given      2045 1 mg Given     07/28/25  0911 1 mg Given               polyethylene glycol (Glycolax, Miralax) packet 17 g (g) Total dose:  0 g* Dosing weight:  93.3   *Administration not included in total     Date/Time Rate/Dose/Volume Action       07/26/25  1014 *17 g Missed     07/27/25  0815 *17 g Missed     07/28/25  0854 *17 g Missed     07/29/25  0840 *17 g Missed               insulin NPH (Isophane) (HumuLIN N,NovoLIN N) injection 10 Units (Units) Total dose:  30 Units* Dosing weight:  93.3   *Administration not included in total     Date/Time Rate/Dose/Volume Action       07/26/25   1014 *10 Units Missed     07/27/25  1022 10 Units Given     07/28/25  0912 10 Units Given     07/29/25  0829 10 Units Given               piperacillin-tazobactam (Zosyn) 3.375 g in dextrose (iso) IV 50 mL (g) Total dose:  16.875 g* Dosing weight:  93.3   *From user-documented volume     Date/Time Rate/Dose/Volume Action       07/26/25  0220 3.375 g (over 30 min) New Bag      0257 50 mL Stopped      1013 3.375 g (over 30 min) New Bag      1043  (over 30 min) Stopped      1610 3.375 g (over 30 min) New Bag      1640  (over 30 min) Stopped      2201 3.375 g (over 30 min) New Bag      2243 50 mL Stopped     07/27/25  0305 3.375 g (over 30 min) New Bag      0342 50 mL Stopped      0831 3.375 g (over 30 min) New Bag      0901  (over 30 min) Stopped      1533 3.375 g (over 30 min) New Bag      1603  (over 30 min) Stopped      2129 3.375 g (over 30 min) New Bag      2233 50 mL Stopped     07/28/25  0337 3.375 g (over 30 min) New Bag      0414 50 mL Stopped      0912 3.375 g (over 30 min) New Bag      0945  (over 30 min) Stopped      1437 3.375 g (over 30 min) New Bag      1510  (over 30 min) Stopped      2127 3.375 g (over 30 min) New Bag      2206  (over 30 min) Stopped     07/29/25  0416 3.375 g (over 30 min) New Bag      0501  (over 30 min) Stopped      1054 3.375 g (over 30 min) New Bag      1159  (over 30 min) Stopped               vancomycin (Vancocin) 1,000 mg in dextrose 5%  mL (mL/hr) Total dose:  2,000 mg* Dosing weight:  93.3   *From user-documented volume     Date/Time Rate/Dose/Volume Action       07/26/25  0229 1,000 mg - 200 mL/hr (over 60 min) New Bag      0319 200 mL Stopped      1701 1,000 mg - 200 mL/hr (over 60 min) New Bag      1801  (over 60 min) Stopped     07/27/25  0402 1,000 mg - 200 mL/hr (over 60 min) New Bag      0503 200 mL Stopped      1618 1,000 mg - 200 mL/hr (over 60 min) New Bag      1718  (over 60 min) Stopped     07/28/25  0415 1,000 mg - 200 mL/hr (over 60 min) New Bag      0518   (over 60 min) Stopped      1558 1,000 mg - 200 mL/hr (over 60 min) New Bag      1659  (over 60 min) Stopped     07/29/25  0301 1,000 mg - 200 mL/hr (over 60 min) New Bag      0400  (over 60 min) Stopped      1357 1,000 mg - 200 mL/hr (over 60 min) New Bag               midazolam (Versed) injection (mg) Total dose:  1 mg      Date/Time Rate/Dose/Volume Action       07/29/25  1458 1 mg Given               fentaNYL PF (Sublimaze) injection (mcg) Total dose:  50 mcg      Date/Time Rate/Dose/Volume Action       07/29/25  1458 50 mcg Given                   No specimens collected      See detailed result report with images in PACS.    The patient tolerated the procedure well without incident or complication and is in stable condition.

## 2025-07-29 NOTE — PRE-PROCEDURE NOTE
Interventional Radiology Preprocedure Note    Indication for procedure: The primary encounter diagnosis was Heart transplant status. Diagnoses of Fluid collection at surgical site, initial encounter and Heart replaced by transplant were also pertinent to this visit.    Relevant review of systems: NA    Relevant Labs:   Lab Results   Component Value Date    CREATININE 0.76 07/29/2025    EGFR >90 07/29/2025    INR 1.0 07/26/2025    PROTIME 10.9 07/26/2025       Planned Sedation/Anesthesia: Minimal    Airway assessment: normal      Mallampati: III (soft and hard palate and base of uvula visible)    ASA Score: ASA 3 - Patient with moderate systemic disease with functional limitations    Benefits, risks and alternatives of procedure and planned sedation have been discussed with the patient and/or their representative. All questions answered and they agree to proceed.

## 2025-07-29 NOTE — CARE PLAN
Problem: Discharge Planning  Goal: Discharge to home or other facility with appropriate resources  7/29/2025 0201 by Andrei Calvert RN  Outcome: Progressing  7/29/2025 0159 by Andrei Calvert RN  Outcome: Progressing     Problem: Chronic Conditions and Co-morbidities  Goal: Patient's chronic conditions and co-morbidity symptoms are monitored and maintained or improved  7/29/2025 0201 by Andrei Calvert RN  Outcome: Progressing  7/29/2025 0159 by Andrei Calvert RN  Outcome: Progressing     Problem: Heart Failure  Goal: Improved urinary output this shift  7/29/2025 0201 by Andrei Calvert RN  Outcome: Progressing  7/29/2025 0159 by Andrei Calvert RN  Outcome: Progressing  Goal: Reduction in peripheral edema within 24 hours  7/29/2025 0201 by Andrei Calvert RN  Outcome: Progressing  7/29/2025 0159 by Andrei Calvert RN  Outcome: Progressing  Goal: Report improvement of dyspnea/breathlessness this shift  7/29/2025 0201 by Andrei Calvert RN  Outcome: Progressing  7/29/2025 0159 by Andrei Calvert RN  Outcome: Progressing  Goal: Weight from fluid excess reduced over 2-3 days, then stabilize  7/29/2025 0201 by Andrei Calvert RN  Outcome: Progressing  7/29/2025 0159 by Andrei Calvert RN  Outcome: Progressing  Goal: Increase self care and/or family involvement in 24 hours  7/29/2025 0201 by Andrei Calvert RN  Outcome: Progressing  7/29/2025 0159 by Andrei Calvert RN  Outcome: Progressing     Problem: Skin  Goal: Decreased wound size/increased tissue granulation at next dressing change  7/29/2025 0201 by Andrei Calvert RN  Outcome: Progressing  7/29/2025 0159 by Andrei Calvert RN  Outcome: Progressing  Goal: Participates in plan/prevention/treatment measures  7/29/2025 0201 by Andrei Calvert RN  Outcome: Progressing  7/29/2025 0159 by Andrei Calvert RN  Outcome: Progressing  Goal: Prevent/manage excess moisture  7/29/2025 0201 by Andrei Calvert RN  Outcome: Progressing  7/29/2025 0159 by Andrei Calvert  RN  Outcome: Progressing  Goal: Prevent/minimize sheer/friction injuries  7/29/2025 0201 by Andrei Calvert RN  Outcome: Progressing  7/29/2025 0159 by Andrei Calvert RN  Outcome: Progressing  Goal: Promote/optimize nutrition  7/29/2025 0201 by Andrei Calvert RN  Outcome: Progressing  7/29/2025 0159 by Andrei Calevrt RN  Outcome: Progressing  Goal: Promote skin healing  7/29/2025 0201 by Andrei Calvert RN  Outcome: Progressing  7/29/2025 0159 by Andrei Calvert RN  Outcome: Progressing   The patient's goals for the shift include Pateint will have a plan of care outlined for Right Groin Swelling today    The clinical goals for the shift include Patient will have CT Scan today

## 2025-07-29 NOTE — PROGRESS NOTES
07/29/25 1416   Rapid Rounds   Attendance Care Transitions;Nurse;Pharmacist   Today we still await: Clinical stability   Review at Escalation Rounds Clinically complex

## 2025-07-29 NOTE — PROGRESS NOTES
"[facility]  HFICU PROGRESS NOTE    Anatoly Lane/[id number]    Admit Date: 7/25/2025  Hospital Length of Stay: 4   ICU Length of Stay: Patient does not have an ICU stay during this admission.   Primary Service: F Cardiology    INTERVAL EVENTS / PERTINENT ROS:   NAOE. Denies any complaints, R inguinal erythema and tenderness continue to improve.     Plan:  -Continue empiric Vanc/Zosyn, Bcx NGTD  -Continue current transplant immunosuppressives, monitor Tac levels  -IR consult for U/S guided aspiration of R inguinal fluid collections   -Tentative plan for EMBx 7/30 pending schedule for IR guided procedure    MEDICATIONS  Infusions:     Scheduled:  acyclovir, 400 mg, BID  aspirin, 81 mg, Daily  calcium carbonate, 1,250 mg of calcium carbonate, BID  cholecalciferol, 50 mcg, Daily  docusate sodium, 100 mg, BID  gabapentin, 300 mg, TID  hydrALAZINE, 25 mg, TID  insulin NPH (Isophane), 10 Units, q AM  levothyroxine, 125 mcg, Daily  lidocaine, 1 patch, Daily  mycophenolate, 1,000 mg, q12h LUPILLO  pantoprazole, 40 mg, Daily  piperacillin-tazobactam, 3.375 g, q6h  polyethylene glycol, 17 g, Daily  pravastatin, 40 mg, Nightly  predniSONE, 25 mg, Daily  sulfamethoxazole-trimethoprim, 1 tablet, Daily  tacrolimus, 1.5 mg, q12h LUPILLO  tamsulosin, 0.4 mg, Daily  vancomycin, 1,000 mg, q12h  voriconazole, 200 mg, q12h LUPILLO      PRN:  acetaminophen, 650 mg, q6h PRN  sennosides-docusate sodium, 1 tablet, Nightly PRN  torsemide, 20 mg, Daily PRN  vancomycin, , Daily PRN        PHYSICAL EXAM:   Visit Vitals  /85 (BP Location: Left arm, Patient Position: Lying)   Pulse 81   Temp 36.2 °C (97.2 °F) (Temporal)   Resp 18   Ht 1.753 m (5' 9.02\")   Wt 94.2 kg (207 lb 10.8 oz)   SpO2 95%   BMI 30.65 kg/m²   Smoking Status Former   BSA 2.14 m²       Wt Readings from Last 5 Encounters:   07/29/25 94.2 kg (207 lb 10.8 oz)   07/25/25 94.5 kg (208 lb 5.4 oz)   07/21/25 91.9 kg (202 lb 8 oz)   07/14/25 94.9 kg (209 lb 4.8 " "oz)   07/14/25 98.4 kg (216 lb 14.9 oz)       INTAKE/OUTPUT:  I/O last 3 completed shifts:  In: 580 (6.2 mL/kg) [P.O.:480; IV Piggyback:100]  Out: 2300 (24.4 mL/kg) [Urine:2300 (0.7 mL/kg/hr)]  Weight: 94.2 kg      Exam:  GEN: NAD  HEENT: ATNC,  anicteric, no JVD  CV: RRR, no m/r/g  Pulm: CTAB  Abdomen: NTND  Ext: warm, no LE edema noted  Neuro: A+Ox3  Psych: appropriate  Ext: R groin fluctuant mass approx 5x5, tender to palpation and superficial warmth and erythema extending down to mid thigh.    DATA:  CMP:  Results from last 7 days   Lab Units 07/29/25  0658 07/28/25  0635 07/27/25  1526 07/26/25  0126   SODIUM mmol/L 140 138 136 140   POTASSIUM mmol/L 4.5 4.0 4.8 3.7   CHLORIDE mmol/L 103 102 99 99   CO2 mmol/L 29 26 30 30   ANION GAP mmol/L 13 14 12 15   BUN mg/dL 20 19 23 33*   CREATININE mg/dL 0.76 0.95 1.19 1.06   EGFR mL/min/1.73m*2 >90 89 68 78   MAGNESIUM mg/dL  --   --   --  1.65   ALBUMIN g/dL 3.5 3.7 3.2* 3.5   ALT U/L 14 10 13 15   AST U/L 23 10 9 14   BILIRUBIN TOTAL mg/dL 0.4 0.3 0.3 0.4     CBC:  Results from last 7 days   Lab Units 07/29/25  0658 07/27/25  0554 07/26/25  0126   WBC AUTO x10*3/uL 9.4 8.5 8.6   HEMOGLOBIN g/dL 11.0* 9.6* 10.0*   HEMATOCRIT % 36.5* 32.0* 32.5*   PLATELETS AUTO x10*3/uL 309 235 319   MCV fL 104* 104* 99     COAG:   Results from last 7 days   Lab Units 07/26/25  0126   INR  1.0     ABO:   ABO TYPE   Date Value Ref Range Status   07/28/2025 AB  Final     HEME/ENDO:     CARDIAC:       No lab exists for component: \"CK\", \"CKMBP\"    ASSESSMENT AND PLAN:   Anatoly Lane is a 64-year-old male with a history of NICM (s/p HM3 in 2023) s/p OHT on 6/21/2025, pAF, CAD, CKD, HTN, BPH, GERD, hypothyroidism presenting for R groin infection. He had a R groin seroma post decannulation, however, with now signs of infection.     MSK:  #R groin infection   -Vanc/zosyn 2/2 immunosuppressed status.   -Bcx with NGTD  -CTS consulted (Dr. Birch)  -Ultrasound notes 6cm superolateral seroma, and " additionally 5.4cm inferomedial and superficial right groin collection. CT A/P re-demonstrates R inguinal fluid collections, which might be amenable to U/S guided aspiration.  -IR consulted for U/s-guided aspiration, time TBD.      Neuro:  # Anxiety. Depression  # H/o BUE/BLE mild tremor  # H/o Back pain  - Serial neuro and pain assessments   - PO Tylenol PRN for pain     # LUE weakness  # RLE weakness  - Continue home Gabapentin  - Neurology follow up along with EMG/NCS outpatient        Cardiovascular:  #NICM s/p HM2 (2023) s/p OHT on 6/21  - Continue home hydralazine 25 TID  Donor/Recipient Infectious history:   CMV: -/-   EBV: +/+   Toxo: -/-   Heb B: -/-  Hep C: -/-  HIV: -/-      Rejection/Prophylaxis:     - Induction: s/p methylprednisolone 500mg IV x2, S/p Basiliximab 20mg IV within 1 hour of arrival to CTICU (no need for premedication), s/p 2nd dose: 20mg IVPB on POD4 (06/25)   - Steroids: Prednisone 25mg daily (reduced 7/14 after negative biopsy)  - Tacrolimus: 1.5mg twice daily  - Tacrolimus goal troughs: 10-12  - Cellcept: 1,000mg BID   - Antifungals: voriconazole 200mg BID x3 months end date 09/22/2025   - Antivirals: acylovir 400mg BID x3 months end date 09/22/2025   - Anti PCP & Toxoplasmosis: SS Bactrim daily end date of 12/22/25     RHC: 7/14 121/69 (85), RA 4, PA 32/16 (21), PCWP 10; Raven CI 3.8 and Thermodilution CI 2.4.   Last TTE/BOBBY: 7/9, EF 65%   Last HLA: Pre-transplant: PRAs 0%; First due on 7/21  Last Allomap: ~ will assess starting at 6-month ana post-op    Last Allosure: ~ will assess starting at 6-month ana post-op   Last LHC: 03/2023(pre OHT). First due ~ (one year post tx) due 6/2026  Osteopenia/osteoporosis prophylaxis: Vit D 2000U daily and calcium 500 mg BID (give calcium 2 hrs after MMF)   Peptic/gastric ulcer prophylaxis: Pantoprazole 40mg daily    CAV Prophylaxis: Aspirin 81mg daily and Pravastatin 40mg daily   Pacing: Epicardial wires removed 07/07      Problems:   # H/o  Subcutaneous emphysema and pneumomediastinum   - Observed during recent admission, managed conservatively after consultations with Cam Birch and [place].      Pulmonary:   #Prior smoker  -c/w abstinence     GI:  #H/o C. Difficile infection s/p tx with po vanc  - Bowel regimen ordered   - PPI     :  #BPH  -Baseline BUN/Cr WNL  -Continue home tamsulosin     Heme:  #H/o anemia  -trend H&H      Endo:  #PreDM  #Steroid induced hyperglycemia  -HgA1c on admit- 5.1%  - Endo consulted during recent admission, home regimen is the following:  - Insulin NPH 10u qd   - Can titrate insulin down as steroids taper  - Transition to SSI while inpatient      #Hypothyroidism  -Continue home levothyroxine      ID:  -afebrile, nontoxic  -See above for groin infection,      PHYSICAL AND OCCUPATIONAL THERAPY:     LINES:     DVT: SubQ heparin (HOLDING FOR POTENTIAL PROCEDURE)   VAP BUNDLE:  CENTRAL LINE BUNDLE:  ULCER PPX: Pantoprazole  GLYCEMIC CONTROL: Insulin regimen  BOWEL CARE: Pericolace  INDWELLING CATHETER:  NUTRITION: NPO Diet Except: Other (specify), Sips with meds; Additional Details: NPO except sips with meds; Effective midnight      EMERGENCY CONTACT: Extended Emergency Contact Information  Primary Emergency Contact: Janice Ramos  Address: [address] [place] [address]  Home Phone: [phone]  Mobile Phone: [phone]  Relation: Mother  Secondary Emergency Contact: Bird Lane  Address: [address] [place] [address] [address] [place] [address] [id number] [place] [address]  Home Phone: [phone]  Mobile Phone: [phone]  Relation: Son  Preferred language: English   needed? No  FAMILY UPDATE:  CODE STATUS: Full Code  DISPO: Pending evaluation of R grion infection    Patient seen and assessed with Dr. Sameera HOLLIDAY personally spent 60 minutes of critical care time directly and personally managing the patient exclusive of separately billable procedures    _________________________________________________  Hernesto Salcedo DO PGY4, [facility]

## 2025-07-30 ENCOUNTER — APPOINTMENT (OUTPATIENT)
Dept: CARDIOLOGY | Facility: HOSPITAL | Age: 65
DRG: 863 | End: 2025-07-30
Payer: MEDICARE

## 2025-07-30 ENCOUNTER — APPOINTMENT (OUTPATIENT)
Dept: TRANSPLANT | Facility: HOSPITAL | Age: 65
End: 2025-07-30
Payer: MEDICARE

## 2025-07-30 ENCOUNTER — APPOINTMENT (OUTPATIENT)
Dept: CARDIOLOGY | Facility: HOSPITAL | Age: 65
End: 2025-07-30
Payer: MEDICARE

## 2025-07-30 LAB
ALBUMIN SERPL BCP-MCNC: 3.7 G/DL (ref 3.4–5)
ALP SERPL-CCNC: 117 U/L (ref 33–136)
ALT SERPL W P-5'-P-CCNC: 18 U/L (ref 10–52)
ANION GAP SERPL CALC-SCNC: 12 MMOL/L (ref 10–20)
AST SERPL W P-5'-P-CCNC: 13 U/L (ref 9–39)
BACTERIA BLD CULT: NORMAL
BACTERIA BLD CULT: NORMAL
BILIRUB SERPL-MCNC: 0.4 MG/DL (ref 0–1.2)
BUN SERPL-MCNC: 19 MG/DL (ref 6–23)
CALCIUM SERPL-MCNC: 8.9 MG/DL (ref 8.6–10.6)
CHLORIDE SERPL-SCNC: 105 MMOL/L (ref 98–107)
CO2 SERPL-SCNC: 26 MMOL/L (ref 21–32)
CREAT SERPL-MCNC: 0.89 MG/DL (ref 0.5–1.3)
EGFRCR SERPLBLD CKD-EPI 2021: >90 ML/MIN/1.73M*2
EJECTION FRACTION APICAL 4 CHAMBER: 64.7
EJECTION FRACTION: 64 %
ERYTHROCYTE [DISTWIDTH] IN BLOOD BY AUTOMATED COUNT: 17.2 % (ref 11.5–14.5)
GLUCOSE SERPL-MCNC: 116 MG/DL (ref 74–99)
HCT VFR BLD AUTO: 36.1 % (ref 41–52)
HGB BLD-MCNC: 11.3 G/DL (ref 13.5–17.5)
LEFT VENTRICLE INTERNAL DIMENSION DIASTOLE: 5.19 CM (ref 3.5–6)
LEFT VENTRICULAR OUTFLOW TRACT DIAMETER: 2.19 CM
MCH RBC QN AUTO: 30.7 PG (ref 26–34)
MCHC RBC AUTO-ENTMCNC: 31.3 G/DL (ref 32–36)
MCV RBC AUTO: 98 FL (ref 80–100)
MITRAL VALVE E/A RATIO: 1.82
NRBC BLD-RTO: 0 /100 WBCS (ref 0–0)
PLATELET # BLD AUTO: 385 X10*3/UL (ref 150–450)
POTASSIUM SERPL-SCNC: 4.4 MMOL/L (ref 3.5–5.3)
PROT SERPL-MCNC: 5.7 G/DL (ref 6.4–8.2)
RBC # BLD AUTO: 3.68 X10*6/UL (ref 4.5–5.9)
SODIUM SERPL-SCNC: 139 MMOL/L (ref 136–145)
TACROLIMUS BLD-MCNC: 16.9 NG/ML
VANCOMYCIN SERPL-MCNC: 19.7 UG/ML (ref 5–20)
WBC # BLD AUTO: 11.9 X10*3/UL (ref 4.4–11.3)

## 2025-07-30 PROCEDURE — 93325 DOPPLER ECHO COLOR FLOW MAPG: CPT | Performed by: INTERNAL MEDICINE

## 2025-07-30 PROCEDURE — 80197 ASSAY OF TACROLIMUS: CPT | Performed by: STUDENT IN AN ORGANIZED HEALTH CARE EDUCATION/TRAINING PROGRAM

## 2025-07-30 PROCEDURE — 80053 COMPREHEN METABOLIC PANEL: CPT | Performed by: STUDENT IN AN ORGANIZED HEALTH CARE EDUCATION/TRAINING PROGRAM

## 2025-07-30 PROCEDURE — 93505 ENDOMYOCARDIAL BIOPSY: CPT | Performed by: INTERNAL MEDICINE

## 2025-07-30 PROCEDURE — 88350 IMFLUOR EA ADDL 1ANTB STN PX: CPT | Mod: TC,SUR | Performed by: INTERNAL MEDICINE

## 2025-07-30 PROCEDURE — 93451 RIGHT HEART CATH: CPT | Performed by: INTERNAL MEDICINE

## 2025-07-30 PROCEDURE — 2500000002 HC RX 250 W HCPCS SELF ADMINISTERED DRUGS (ALT 637 FOR MEDICARE OP, ALT 636 FOR OP/ED): Performed by: STUDENT IN AN ORGANIZED HEALTH CARE EDUCATION/TRAINING PROGRAM

## 2025-07-30 PROCEDURE — C1727 CATH, BAL TIS DIS, NON-VAS: HCPCS | Performed by: INTERNAL MEDICINE

## 2025-07-30 PROCEDURE — 93010 ELECTROCARDIOGRAM REPORT: CPT | Performed by: INTERNAL MEDICINE

## 2025-07-30 PROCEDURE — 2720000007 HC OR 272 NO HCPCS: Performed by: INTERNAL MEDICINE

## 2025-07-30 PROCEDURE — C1894 INTRO/SHEATH, NON-LASER: HCPCS | Performed by: INTERNAL MEDICINE

## 2025-07-30 PROCEDURE — 99152 MOD SED SAME PHYS/QHP 5/>YRS: CPT | Performed by: INTERNAL MEDICINE

## 2025-07-30 PROCEDURE — 93308 TTE F-UP OR LMTD: CPT | Performed by: INTERNAL MEDICINE

## 2025-07-30 PROCEDURE — 2500000004 HC RX 250 GENERAL PHARMACY W/ HCPCS (ALT 636 FOR OP/ED): Performed by: STUDENT IN AN ORGANIZED HEALTH CARE EDUCATION/TRAINING PROGRAM

## 2025-07-30 PROCEDURE — 4A023N6 MEASUREMENT OF CARDIAC SAMPLING AND PRESSURE, RIGHT HEART, PERCUTANEOUS APPROACH: ICD-10-PCS | Performed by: INTERNAL MEDICINE

## 2025-07-30 PROCEDURE — 99222 1ST HOSP IP/OBS MODERATE 55: CPT | Performed by: INTERNAL MEDICINE

## 2025-07-30 PROCEDURE — 2500000001 HC RX 250 WO HCPCS SELF ADMINISTERED DRUGS (ALT 637 FOR MEDICARE OP): Performed by: STUDENT IN AN ORGANIZED HEALTH CARE EDUCATION/TRAINING PROGRAM

## 2025-07-30 PROCEDURE — 80202 ASSAY OF VANCOMYCIN: CPT | Performed by: STUDENT IN AN ORGANIZED HEALTH CARE EDUCATION/TRAINING PROGRAM

## 2025-07-30 PROCEDURE — 99233 SBSQ HOSP IP/OBS HIGH 50: CPT | Performed by: STUDENT IN AN ORGANIZED HEALTH CARE EDUCATION/TRAINING PROGRAM

## 2025-07-30 PROCEDURE — 02BK3ZX EXCISION OF RIGHT VENTRICLE, PERCUTANEOUS APPROACH, DIAGNOSTIC: ICD-10-PCS | Performed by: INTERNAL MEDICINE

## 2025-07-30 PROCEDURE — 2500000004 HC RX 250 GENERAL PHARMACY W/ HCPCS (ALT 636 FOR OP/ED): Performed by: INTERNAL MEDICINE

## 2025-07-30 PROCEDURE — 36415 COLL VENOUS BLD VENIPUNCTURE: CPT | Performed by: STUDENT IN AN ORGANIZED HEALTH CARE EDUCATION/TRAINING PROGRAM

## 2025-07-30 PROCEDURE — 93325 DOPPLER ECHO COLOR FLOW MAPG: CPT

## 2025-07-30 PROCEDURE — 76937 US GUIDE VASCULAR ACCESS: CPT | Performed by: INTERNAL MEDICINE

## 2025-07-30 PROCEDURE — 93321 DOPPLER ECHO F-UP/LMTD STD: CPT | Performed by: INTERNAL MEDICINE

## 2025-07-30 PROCEDURE — 85027 COMPLETE CBC AUTOMATED: CPT | Performed by: STUDENT IN AN ORGANIZED HEALTH CARE EDUCATION/TRAINING PROGRAM

## 2025-07-30 PROCEDURE — 93005 ELECTROCARDIOGRAM TRACING: CPT

## 2025-07-30 PROCEDURE — 1200000002 HC GENERAL ROOM WITH TELEMETRY DAILY

## 2025-07-30 RX ORDER — CEPHALEXIN 500 MG/1
500 CAPSULE ORAL EVERY 8 HOURS SCHEDULED
Status: DISCONTINUED | OUTPATIENT
Start: 2025-07-30 | End: 2025-07-31 | Stop reason: HOSPADM

## 2025-07-30 RX ORDER — FENTANYL CITRATE 50 UG/ML
INJECTION, SOLUTION INTRAMUSCULAR; INTRAVENOUS AS NEEDED
Status: DISCONTINUED | OUTPATIENT
Start: 2025-07-30 | End: 2025-07-30 | Stop reason: HOSPADM

## 2025-07-30 RX ORDER — DOXYCYCLINE HYCLATE 100 MG
100 TABLET ORAL EVERY 12 HOURS SCHEDULED
Status: DISCONTINUED | OUTPATIENT
Start: 2025-07-30 | End: 2025-07-31 | Stop reason: HOSPADM

## 2025-07-30 RX ORDER — LIDOCAINE HYDROCHLORIDE 10 MG/ML
INJECTION, SOLUTION EPIDURAL; INFILTRATION; INTRACAUDAL; PERINEURAL AS NEEDED
Status: DISCONTINUED | OUTPATIENT
Start: 2025-07-30 | End: 2025-07-30 | Stop reason: HOSPADM

## 2025-07-30 RX ORDER — PREDNISONE 20 MG/1
20 TABLET ORAL DAILY
Status: DISCONTINUED | OUTPATIENT
Start: 2025-07-31 | End: 2025-07-31 | Stop reason: HOSPADM

## 2025-07-30 RX ORDER — MIDAZOLAM HYDROCHLORIDE 1 MG/ML
INJECTION, SOLUTION INTRAMUSCULAR; INTRAVENOUS AS NEEDED
Status: DISCONTINUED | OUTPATIENT
Start: 2025-07-30 | End: 2025-07-30 | Stop reason: HOSPADM

## 2025-07-30 RX ADMIN — HYDRALAZINE HYDROCHLORIDE 25 MG: 25 TABLET ORAL at 20:55

## 2025-07-30 RX ADMIN — DOXYCYCLINE HYCLATE 100 MG: 100 TABLET, FILM COATED ORAL at 20:55

## 2025-07-30 RX ADMIN — PIPERACILLIN SODIUM AND TAZOBACTAM SODIUM 3.38 G: 3; .375 INJECTION, SOLUTION INTRAVENOUS at 04:42

## 2025-07-30 RX ADMIN — GABAPENTIN 300 MG: 300 CAPSULE ORAL at 20:55

## 2025-07-30 RX ADMIN — ASPIRIN 81 MG: 81 TABLET, COATED ORAL at 10:27

## 2025-07-30 RX ADMIN — MYCOPHENOLATE MOFETIL 1000 MG: 250 CAPSULE ORAL at 10:40

## 2025-07-30 RX ADMIN — SULFAMETHOXAZOLE AND TRIMETHOPRIM 1 TABLET: 400; 80 TABLET ORAL at 10:27

## 2025-07-30 RX ADMIN — LEVOTHYROXINE SODIUM 125 MCG: 0.1 TABLET ORAL at 07:05

## 2025-07-30 RX ADMIN — GABAPENTIN 300 MG: 300 CAPSULE ORAL at 10:27

## 2025-07-30 RX ADMIN — GABAPENTIN 300 MG: 300 CAPSULE ORAL at 15:08

## 2025-07-30 RX ADMIN — PRAVASTATIN SODIUM 40 MG: 40 TABLET ORAL at 20:55

## 2025-07-30 RX ADMIN — TAMSULOSIN HYDROCHLORIDE 0.4 MG: 0.4 CAPSULE ORAL at 10:27

## 2025-07-30 RX ADMIN — PIPERACILLIN SODIUM AND TAZOBACTAM SODIUM 3.38 G: 3; .375 INJECTION, SOLUTION INTRAVENOUS at 10:40

## 2025-07-30 RX ADMIN — VANCOMYCIN HYDROCHLORIDE 1000 MG: 1 INJECTION, SOLUTION INTRAVENOUS at 01:48

## 2025-07-30 RX ADMIN — CEPHALEXIN 500 MG: 500 CAPSULE ORAL at 21:01

## 2025-07-30 RX ADMIN — TACROLIMUS 1.5 MG: 1 CAPSULE ORAL at 06:33

## 2025-07-30 RX ADMIN — Medication 50 MCG: at 10:27

## 2025-07-30 RX ADMIN — CALCIUM 1 TABLET: 500 TABLET ORAL at 10:27

## 2025-07-30 RX ADMIN — CALCIUM 1 TABLET: 500 TABLET ORAL at 20:55

## 2025-07-30 RX ADMIN — ACYCLOVIR 400 MG: 400 TABLET ORAL at 10:28

## 2025-07-30 RX ADMIN — HYDRALAZINE HYDROCHLORIDE 25 MG: 25 TABLET ORAL at 15:08

## 2025-07-30 RX ADMIN — HYDRALAZINE HYDROCHLORIDE 25 MG: 25 TABLET ORAL at 10:27

## 2025-07-30 RX ADMIN — VORICONAZOLE 200 MG: 200 TABLET, COATED ORAL at 10:28

## 2025-07-30 RX ADMIN — HUMAN ALBUMIN MICROSPHERES AND PERFLUTREN 1.5 ML: 10; .22 INJECTION, SOLUTION INTRAVENOUS at 13:55

## 2025-07-30 RX ADMIN — PREDNISONE 25 MG: 20 TABLET ORAL at 10:27

## 2025-07-30 RX ADMIN — VORICONAZOLE 200 MG: 200 TABLET, COATED ORAL at 20:55

## 2025-07-30 RX ADMIN — ACETAMINOPHEN 650 MG: 325 TABLET ORAL at 15:08

## 2025-07-30 RX ADMIN — VANCOMYCIN HYDROCHLORIDE 1000 MG: 1 INJECTION, SOLUTION INTRAVENOUS at 14:42

## 2025-07-30 RX ADMIN — ACYCLOVIR 400 MG: 400 TABLET ORAL at 20:55

## 2025-07-30 RX ADMIN — PIPERACILLIN SODIUM AND TAZOBACTAM SODIUM 3.38 G: 3; .375 INJECTION, SOLUTION INTRAVENOUS at 17:48

## 2025-07-30 RX ADMIN — MYCOPHENOLATE MOFETIL 1000 MG: 250 CAPSULE ORAL at 20:55

## 2025-07-30 RX ADMIN — INSULIN HUMAN 10 UNITS: 100 INJECTION, SUSPENSION SUBCUTANEOUS at 10:29

## 2025-07-30 RX ADMIN — TACROLIMUS 1.5 MG: 1 CAPSULE ORAL at 19:18

## 2025-07-30 RX ADMIN — PANTOPRAZOLE SODIUM 40 MG: 40 TABLET, DELAYED RELEASE ORAL at 10:27

## 2025-07-30 ASSESSMENT — COGNITIVE AND FUNCTIONAL STATUS - GENERAL
DAILY ACTIVITIY SCORE: 24
MOBILITY SCORE: 23
MOBILITY SCORE: 23
CLIMB 3 TO 5 STEPS WITH RAILING: A LITTLE
CLIMB 3 TO 5 STEPS WITH RAILING: A LITTLE
DAILY ACTIVITIY SCORE: 24

## 2025-07-30 ASSESSMENT — PAIN SCALES - GENERAL
PAINLEVEL_OUTOF10: 0 - NO PAIN
PAINLEVEL_OUTOF10: 0 - NO PAIN

## 2025-07-30 ASSESSMENT — PAIN - FUNCTIONAL ASSESSMENT
PAIN_FUNCTIONAL_ASSESSMENT: 0-10
PAIN_FUNCTIONAL_ASSESSMENT: 0-10

## 2025-07-30 NOTE — CARE PLAN
The patient's goals for the shift include Pateint will have a plan of care outlined for Right Groin Swelling today    The clinical goals for the shift include pt will remain safe and HDS      Problem: Discharge Planning  Goal: Discharge to home or other facility with appropriate resources  Outcome: Progressing     Problem: Chronic Conditions and Co-morbidities  Goal: Patient's chronic conditions and co-morbidity symptoms are monitored and maintained or improved  Outcome: Progressing     Problem: Heart Failure  Goal: Improved urinary output this shift  Outcome: Progressing  Goal: Reduction in peripheral edema within 24 hours  Outcome: Progressing  Goal: Report improvement of dyspnea/breathlessness this shift  Outcome: Progressing  Goal: Weight from fluid excess reduced over 2-3 days, then stabilize  Outcome: Progressing  Goal: Increase self care and/or family involvement in 24 hours  Outcome: Progressing     Problem: Skin  Goal: Decreased wound size/increased tissue granulation at next dressing change  Outcome: Progressing  Goal: Participates in plan/prevention/treatment measures  Outcome: Progressing  Goal: Prevent/manage excess moisture  Outcome: Progressing  Goal: Prevent/minimize sheer/friction injuries  Outcome: Progressing  Goal: Promote/optimize nutrition  Outcome: Progressing  Goal: Promote skin healing  Outcome: Progressing

## 2025-07-30 NOTE — PROGRESS NOTES
Vancomycin Dosing by Pharmacy- FOLLOW UP    Anatoly Lane is a 64 y.o. year old male who Pharmacy has been consulted for vancomycin dosing for cellulitis, skin and soft tissue. Based on the patient's indication and renal status this patient is being dosed based on a goal AUC of 400-600.     Renal function is currently stable.    Current vancomycin dose: 1000 mg given every 12 hours    Estimated vancomycin AUC on current dose: 462 mg/L.hr     Visit Vitals  /90 (BP Location: Left arm, Patient Position: Lying)   Pulse 100   Temp 36.1 °C (97 °F) (Temporal)   Resp 16        Lab Results   Component Value Date    CREATININE 0.89 2025    CREATININE 0.76 2025    CREATININE 0.95 2025    CREATININE 1.19 2025        Patient weight is as follows:   Vitals:    25 0519   Weight: 94.8 kg (208 lb 15.9 oz)       Cultures:  No results found for the encounter in last 14 days.       I/O last 3 completed shifts:  In: - (0 mL/kg)   Out: 2250 (23.7 mL/kg) [Urine:2250 (0.7 mL/kg/hr)]  Weight: 94.8 kg   I/O during current shift:  I/O this shift:  In: -   Out: 504 [Urine:500; Blood:4]    Temp (24hrs), Av.4 °C (97.5 °F), Min:36 °C (96.8 °F), Max:37 °C (98.6 °F)      Assessment/Plan    Within goal AUC range. Continue current vancomycin regimen.    This dosing regimen is predicted by [employer] to result in the following pharmacokinetic parameters:  Loading dose: N/A  Regimen: 1000 mg IV every 12 hours.  Start time: 13:48 on 2025  Exposure target: AUC24 (range) 400-600 mg/L.hr   [id number]: 459 mg/L.hr  AUC24,ss: 462 mg/L.hr  Probability of AUC24 > 400: 87 %  Ctrough,ss: 15.1 mg/L  Probability of Ctrough,ss > 20: 9 %      The next level will be obtained on  at am lab draw. May be obtained sooner if clinically indicated.   Will continue to monitor renal function daily while on vancomycin and order serum creatinine at least every 48 hours if not already ordered.  Follow for continued vancomycin needs,  clinical response, and signs/symptoms of toxicity.       Luis Peck, PharmD

## 2025-07-30 NOTE — CONSULTS
Inpatient consult to Infectious Diseases  Consult performed by: Amol Benoit MD  Consult ordered by: Hernesto Salcedo DO        Referred by Dr.Ramani Andrews MD: No Assigned PCP Generic Provider, MD    Reason For Consult: Cellulitis    History Of Present Illness  Anatoly Lane is a 64 y.o. male with history of hypertension, hyperlipidemia, GERD, hypothyroidism, CAD, C. difficile infection, paroxysmal A-fib NICM s/p HeartMate 3 and ICD (2023) and OHT (CMV D-/R-, 6/21/2025, induction with Simulect) and anti-infective prophylaxis with voriconazole, Bactrim and acyclovir and immunosuppression with CellCept, tacrolimus and prednisone and recent hospital admission for DEBBIE and weakness presented to emergency room for right groin redness and pain.    Patient was recently admitted for DEBBIE and left arm weakness.  Diuretics were withheld but then again started for volume overload.  Patient also received a 5-day course of doxycycline during that admission.    Patient was doing well until a few days ago when he noticed discomfort in the right groin.  Patient also noticed redness spreading over inner thigh.  Patient denied fever or chills.  Patient denied nausea vomiting or diarrhea.  Patient got concerned and called her transplant coordinator.    Patient was admitted on July 25 and started on vancomycin and Zosyn.  Patient is evaluated by cardiac surgery who recommended IR aspiration of seroma.  Patient underwent IR aspiration of right groin with removal of 80 cc of clear fluid from 1 collection and 20 cc of reddish fluid from another collection.    ID is consulted for antibiotic recommendations.     Past Medical History  He has a past medical history of ACC/AHA stage D systolic heart failure, Acute decompensated heart failure (02/23/2024), Acute on chronic combined systolic (congestive) and diastolic (congestive) heart failure (02/23/2024), Acute on chronic systolic heart failure, NYHA class 3, Anticoagulant long-term  use (02/26/2024), CAD (coronary artery disease), Cardiac defibrillator in place (02/14/2024), CHF (congestive heart failure) (02/14/2024), Chronic left systolic heart failure (02/14/2025), Congenital heart disease (?), Hypothyroid, Hypothyroidism, LVAD (left ventricular assist device) present (Multi), LVAD (left ventricular assist device) present (Multi) (02/14/2024), Non-ischemic cardiomyopathy (Multi) (02/14/2024), PAF (paroxysmal atrial fibrillation) (Multi), and Paroxysmal ventricular tachycardia (02/14/2024).    Surgical History  He has a past surgical history that includes Other surgical history (01/05/2022); Other surgical history (01/05/2022); CT angio abdomen pelvis w and or wo IV IV contrast (04/13/2023); Left ventricular assist device; Colonoscopy w/ polypectomy (2023); Cardiac catheterization (N/A, 2/28/2024); Cardiac catheterization (N/A, 3/26/2025); Cardiac catheterization (N/A, 7/1/2025); Cardiac catheterization (N/A, 7/1/2025); Cardiac catheterization (N/A, 7/8/2025); Cardiac catheterization (N/A, 7/8/2025); Cardiac catheterization (N/A, 7/14/2025); Cardiac catheterization (N/A, 7/14/2025); Insert / replace / remove pacemaker (?); Cardiac catheterization (N/A, 7/30/2025); and Cardiac catheterization (N/A, 7/30/2025).     Social History     Occupational History    Not on file   Tobacco Use    Smoking status: Former     Types: Cigarettes    Smokeless tobacco: Never   Substance and Sexual Activity    Alcohol use: Never    Drug use: Never    Sexual activity: Not on file     Travel History   Travel since 06/30/25    No documented travel since 06/30/25            amily History  Family History[1]  Allergies  Jardiance [empagliflozin]     Immunization History   Administered Date(s) Administered    COVID-19, mRNA, LNP-S, PF, 30 mcg/0.3 mL dose 07/08/2021, 07/29/2021    Flu vaccine (IIV4), preservative free *Check age/dose* 03/05/2024    Hepatitis B vaccine, adult *Check Product/Dose* 03/02/2024     Pneumococcal conjugate vaccine, 20-valent (PREVNAR 20) 03/05/2024    Tdap vaccine, age 7 year and older (BOOSTRIX, ADACEL) 03/05/2024     Medications  Home medications:  Prescriptions Prior to Admission[2]  Current medications:  Scheduled medications  Scheduled Medications[3]  Continuous medications  Continuous Medications[4]  PRN medications  PRN Medications[5]    Review of Systems negative except above     Objective  Range of Vitals (last 24 hours)  Heart Rate:  []   Temp:  [36 °C (96.8 °F)-37 °C (98.6 °F)]   Resp:  [6-18]   BP: (124-151)/(64-90)   Weight:  [94.8 kg (208 lb 15.9 oz)]   SpO2:  [94 %-98 %]   Daily Weight  07/30/25 : 94.8 kg (208 lb 15.9 oz)    Body mass index is 30.85 kg/m².     Physical Exam  GENERAL APPEARANCE: Awake and alert and not in any distress  HEENT: Atraumatic and normocephalic  CARDIAC: Regular   LUNGS: Clear  ABDOMEN: Soft and nontender  EXTREMITIES: No edema  SKIN: Median sternotomy incision is healing well.  Small area of induration and redness noted in right groin.  No significant tenderness.  Redness has improved when compared to presentation as seen by pictures.       Labs  Results from last 72 hours   Lab Units 07/30/25  0744 07/29/25  0658   WBC AUTO x10*3/uL 11.9* 9.4   HEMOGLOBIN g/dL 11.3* 11.0*   HEMATOCRIT % 36.1* 36.5*   PLATELETS AUTO x10*3/uL 385 309     Results from last 72 hours   Lab Units 07/30/25  0743 07/29/25  0658 07/28/25  0635   SODIUM mmol/L 139 140 138   POTASSIUM mmol/L 4.4 4.5 4.0   CHLORIDE mmol/L 105 103 102   CO2 mmol/L 26 29 26   BUN mg/dL 19 20 19   CREATININE mg/dL 0.89 0.76 0.95   GLUCOSE mg/dL 116* 148* 147*   CALCIUM mg/dL 8.9 9.0 8.7   ANION GAP mmol/L 12 13 14   EGFR mL/min/1.73m*2 >90 >90 89     Results from last 72 hours   Lab Units 07/30/25  0743 07/29/25  0658 07/28/25  0635   ALK PHOS U/L 117 105 113   BILIRUBIN TOTAL mg/dL 0.4 0.4 0.3   PROTEIN TOTAL g/dL 5.7* 5.9* 6.0*   ALT U/L 18 14 10   AST U/L 13 23 10   ALBUMIN g/dL 3.7 3.5 3.7      Estimated Creatinine Clearance: 95.2 mL/min (by C-G formula based on SCr of 0.89 mg/dL).  CRP   Date Value Ref Range Status   05/12/2023 14.77 (A) mg/dL Final     Comment:     REF VALUE  < 1.00       Sedimentation Rate   Date Value Ref Range Status   05/12/2023 49 (H) 0 - 20 mm/h Final     HIV 1/2 Antigen/Antibody Screen with Reflex to Confirmation   Date Value Ref Range Status   07/14/2025 Nonreactive Nonreactive Final     Hepatitis C AB   Date Value Ref Range Status   06/20/2025 Nonreactive Nonreactive Final     Comment:     Results from patients taking biotin supplements or receiving high-dose biotin therapy should be interpreted with caution due to possible interference with this test. Providers may contact their local laboratory for further information.     HCV PCR Quant   Date Value Ref Range Status   04/04/2023 NOT DETECTED IU/mL Final     Comment:     REF VALUE  NOT DETECTED       Microbiology    Imaging         Assessment/Plan     Anatoly Lane is a 64 y.o. male with history of hypertension, hyperlipidemia, GERD, hypothyroidism, CAD, C. difficile infection, paroxysmal A-fib NICM s/p HeartMate 3 and ICD (2023) and OHT (CMV D-/R-, 6/21/2025, induction with Simulect) and anti-infective prophylaxis with voriconazole, Bactrim and acyclovir and immunosuppression with CellCept, tacrolimus and prednisone and recent hospital admission for DEBBIE and weakness presented to emergency room for right groin redness and pain.    Problems  Right groin seroma with cellulitis status post IR drain        Patient underwent IR aspiration of right groin with removal of 80 cc of clear fluid from 1 collection and 20 cc of reddish fluid from another collection.    2.  OHT (CMV D-/R-, 6/21/2025, induction with Simulect)     Plan  There is clinical improvement of cellulitis since admission.  I do not see any cultures being sent by IR after aspiration of right groin seroma.  Will treat empirically for cellulitis covering staph and  strep.  Recommend to stop vancomycin and Zosyn.  Please start doxycycline 100 mg twice daily and cephalexin 500 mg p.o. every 8 hours for another 10 days.  I informed the patient that seroma can recur and if he has increased redness pain or fever he should call our transplant coordinator immediately.  Patient should call my office or heart transplant coordinator if he has diarrhea after discharge from hospital.  Continue anti-infective prophylaxis with acyclovir and voriconazole for 3 months and Bactrim SS for 6 months per heart transplant protocol.  Wiill follow peripherally.      Amol Benoit MD       [1]   Family History  Problem Relation Name Age of Onset    Hypertension Father      Colon cancer Father     [2]   Medications Prior to Admission   Medication Sig Dispense Refill Last Dose/Taking    acetaminophen (Tylenol) 325 mg tablet Take 2 tablets (650 mg) by mouth every 6 hours if needed for mild pain (1 - 3). 30 tablet 1     acyclovir (Zovirax) 400 mg tablet Take 1 tablet (400 mg) by mouth 2 times a day. 180 tablet 0     alcohol swabs Apply topically 3 times a day. 100 each 3     aspirin 81 mg EC tablet Take 1 tablet (81 mg) by mouth once daily. 90 tablet 3     blood-glucose sensor (FreeStyle Federico 3 Plus Sensor) device Use 1 every 15 days. 2 each 3     calcium carbonate (Os-Wilfredo) 1,250 mg (500 mg elemental) tablet Take 1 tablet by mouth 2 times a day. 180 tablet 3     cholecalciferol (Vitamin D-3) 50 mcg (2,000 units) tablet Take 1 tablet (50 mcg) by mouth once daily. 90 tablet 3     docusate sodium (Colace) 100 mg capsule Take 1 capsule (100 mg) by mouth 2 times a day. 180 capsule 3     FreeStyle Federico 3 [place] misc Use as directed. 1 each 1     gabapentin (Neurontin) 300 mg capsule Take 1 capsule (300 mg) by mouth 3 times a day. 90 capsule 11     hydrALAZINE (Apresoline) 25 mg tablet Take 1 tablet (25 mg) by mouth 3 times a day. 270 tablet 0     insulin NPH, Isophane, (HumuLIN N,NovoLIN N) 100 unit/mL  "(3 mL) pen Inject 15 Units under the skin once daily in the morning. With your steroid. 15 mL 1     levothyroxine (Synthroid, Levoxyl) 125 mcg tablet Take 1 tablet (125 mcg) by mouth early in the morning.. Take on an empty stomach at the same time each day, either 30 to 60 minutes prior to breakfast 90 tablet 3     lidocaine 4 % patch Place 1 patch over 12 hours on the skin once daily. Remove & discard patch within 12 hours or as directed by MD. (Patient not taking: Reported on 7/26/2025) 30 patch 3 Not Taking    MULTIVITAMIN ORAL Take 1 tablet by mouth once daily.       mycophenolate (Cellcept) 250 mg capsule Take 4 capsules (1,000 mg) by mouth every 12 hours. 720 capsule 3     pantoprazole (ProtoNix) 40 mg EC tablet Take 1 tablet (40 mg) by mouth once daily. Do not crush, chew, or split. 180 tablet 3     pen needle, diabetic 31 gauge x 5/16\" needle Use 1 once daily or as directed by provider. 100 each 3     pravastatin (Pravachol) 40 mg tablet Take 1 tablet (40 mg) by mouth once daily at bedtime. 90 tablet 3     predniSONE (Deltasone) 5 mg tablet Take 5 tablets (25 mg) by mouth once daily. Please taper when told after your next biopsy on the 30th of July 150 tablet 0     sennosides-docusate sodium (Dahlia-Colace) 8.6-50 mg tablet Take 1 tablet by mouth as needed at bedtime for constipation. (Patient not taking: Reported on 7/26/2025) 30 tablet 0 Not Taking    sulfamethoxazole-trimethoprim (Bactrim) 400-80 mg tablet Take 1 tablet by mouth once daily. 90 tablet 1     tamsulosin (Flomax) 0.4 mg 24 hr capsule Take 1 capsule (0.4 mg) by mouth once daily. 90 capsule 3     torsemide (Demadex) 20 mg tablet Take 1 tablet (20 mg) by mouth once daily as needed (Fluid overload). 10 tablet 1     voriconazole (Vfend) 200 mg tablet Take 1 tablet (200 mg) by mouth every 12 hours. 60 tablet 2    [3] acyclovir, 400 mg, oral, BID  aspirin, 81 mg, oral, Daily  calcium carbonate, 1,250 mg of calcium carbonate, oral, " BID  cholecalciferol, 50 mcg, oral, Daily  docusate sodium, 100 mg, oral, BID  gabapentin, 300 mg, oral, TID  hydrALAZINE, 25 mg, oral, TID  insulin NPH (Isophane), 10 Units, subcutaneous, q AM  levothyroxine, 125 mcg, oral, Daily  lidocaine, 1 patch, transdermal, Daily  mycophenolate, 1,000 mg, oral, q12h LUPILLO  pantoprazole, 40 mg, oral, Daily  piperacillin-tazobactam, 3.375 g, intravenous, q6h  polyethylene glycol, 17 g, oral, Daily  pravastatin, 40 mg, oral, Nightly  predniSONE, 25 mg, oral, Daily  sulfamethoxazole-trimethoprim, 1 tablet, oral, Daily  tacrolimus, 1.5 mg, oral, q12h LUPILLO  tamsulosin, 0.4 mg, oral, Daily  vancomycin, 1,000 mg, intravenous, q12h  voriconazole, 200 mg, oral, q12h LUPILLO    [4]    [5] PRN medications: acetaminophen, sennosides-docusate sodium, torsemide, vancomycin

## 2025-07-30 NOTE — CARE PLAN
The patient's goals for the shift include Pateint will have a plan of care outlined for Right Groin Swelling today    The clinical goals for the shift include pt will remain safe and HDS    Over the shift, the patient remained safe and had an uneventful shift. Pt remained safe and stable throughput the shift.

## 2025-07-30 NOTE — PROGRESS NOTES
Vancomycin Dosing by Pharmacy- Cessation of Therapy    Consult to pharmacy for vancomycin dosing has been discontinued by the prescriber, pharmacy will sign off at this time.    Please call pharmacy if there are further questions or re-enter a consult if vancomycin is resumed.     Luis Peck, YimiD

## 2025-07-30 NOTE — PROGRESS NOTES
07/30/25        Transitional Care Coordination Progress Note:   Patient discussed during interdisciplinary rounds.   Team members present: JUVENCIO TCC MD   Plan per Medical/Surgical team: Awaiting blood cultures   Discharge disposition: Home with [facility]    Status-Inpatient   Payer- [employer] MEDICARE    Potential Barriers: None   ADOD: 8-2-2025   Juan Lockett RN [facility] [phone]

## 2025-07-30 NOTE — POST-PROCEDURE NOTE
Physician Transition of Care Summary  Invasive Cardiovascular Lab    Procedure Date: 7/30/2025  Attending:    * Vince Ruano - Primary  Resident/Fellow/Other Assistant: Surgeons and Role:  * No surgeons found with a matching role *    Indications:   Pre-op Diagnosis      * Heart replaced by transplant [Z94.1]    Post-procedure diagnosis:   Post-op Diagnosis     * Heart replaced by transplant [Z94.1]    Procedure(s):   Endomyocardial Biopsy  02382 - [place] ENDOMYOCARDIAL BIOPSY    Right Heart Catheterization  43199 - [place] RIGHT HEART CATH O2 SATURATION & CARDIAC OUTPUT        Procedure Findings:   Preserved CO/CI with normal filling pressures    Description of the Procedure:   RHC w/EMBx via RIJ    Complications:   None    Estimated Blood Loss:   4 mL    Anesthesia: Moderate Sedation Anesthesia Staff: No anesthesia staff entered.    Any Specimen(s) Removed:   Order Name Source Comment Collection Info Order Time   SURGICAL PATHOLOGY EXAM HEART TRANSPLANT BIOPSY  Collected By: Rashid Torres RN 7/30/2025  7:55 AM     How many specimens will you need? (If more than 16 start a new case)   1          Source of Specimen A:   HEART TRANSPLANT BIOPSY          Procedure:   RHC/EMBx          Specify Site:   RV          Fixative:   Fresh          Clinical History/Special Request:   s/p OHT          Has this patient had previous specimens that failed molecular testing?   No            Disposition:   Return to inpt Transplant service for ongoing management.       Electronically signed by: Vince Ruano DO, 7/30/2025 9:58 AM

## 2025-07-30 NOTE — PROGRESS NOTES
"[facility]  HFICU PROGRESS NOTE    Anatoly Lane/[id number]    Admit Date: 7/25/2025  Hospital Length of Stay: 5   ICU Length of Stay: Patient does not have an ICU stay during this admission.   Primary Service: F Cardiology    INTERVAL EVENTS / PERTINENT ROS:   NAOE. Denies any complaints, is status post RHC/EMBx this am.     Plan:  -Continue empiric Vanc/Zosyn, Bcx NGTD, appreciate ID input re antibiotics  -Continue current transplant immunosuppressives  -Reduce Tacrolimus to 1.5mg + 1mg  -Last biopsy (7/30): Follow up results    MEDICATIONS  Infusions:     Scheduled:  acyclovir, 400 mg, BID  aspirin, 81 mg, Daily  calcium carbonate, 1,250 mg of calcium carbonate, BID  cholecalciferol, 50 mcg, Daily  docusate sodium, 100 mg, BID  gabapentin, 300 mg, TID  hydrALAZINE, 25 mg, TID  insulin NPH (Isophane), 10 Units, q AM  levothyroxine, 125 mcg, Daily  lidocaine, 1 patch, Daily  mycophenolate, 1,000 mg, q12h LUPILLO  pantoprazole, 40 mg, Daily  piperacillin-tazobactam, 3.375 g, q6h  polyethylene glycol, 17 g, Daily  pravastatin, 40 mg, Nightly  predniSONE, 25 mg, Daily  sulfamethoxazole-trimethoprim, 1 tablet, Daily  tacrolimus, 1.5 mg, q12h LUPILLO  tamsulosin, 0.4 mg, Daily  vancomycin, 1,000 mg, q12h  voriconazole, 200 mg, q12h LUPILLO      PRN:  acetaminophen, 650 mg, q6h PRN  sennosides-docusate sodium, 1 tablet, Nightly PRN  torsemide, 20 mg, Daily PRN  vancomycin, , Daily PRN        PHYSICAL EXAM:   Visit Vitals  /89 (BP Location: Left arm, Patient Position: Lying)   Pulse 81   Temp 36 °C (96.8 °F) (Temporal)   Resp 16   Ht 1.753 m (5' 9.02\")   Wt 94.8 kg (208 lb 15.9 oz)   SpO2 96%   BMI 30.85 kg/m²   Smoking Status Former   BSA 2.15 m²       Wt Readings from Last 5 Encounters:   07/30/25 94.8 kg (208 lb 15.9 oz)   07/25/25 94.5 kg (208 lb 5.4 oz)   07/21/25 91.9 kg (202 lb 8 oz)   07/14/25 94.9 kg (209 lb 4.8 oz)   07/14/25 98.4 kg (216 lb 14.9 oz)       INTAKE/OUTPUT:  I/O last 3 " "completed shifts:  In: - (0 mL/kg)   Out: 2250 (23.7 mL/kg) [Urine:2250 (0.7 mL/kg/hr)]  Weight: 94.8 kg      Exam:  GEN: NAD  HEENT: ATNC,  anicteric, no JVD  CV: RRR, no m/r/g  Pulm: CTAB  Abdomen: NTND  Ext: warm, no LE edema noted  Neuro: A+Ox3  Psych: appropriate  Ext: R groin fluctuant mass approx 5x5, tender to palpation and superficial warmth and erythema extending down to mid thigh.    DATA:  CMP:  Results from last 7 days   Lab Units 07/29/25  0658 07/28/25  0635 07/27/25  1526 07/26/25  0126   SODIUM mmol/L 140 138 136 140   POTASSIUM mmol/L 4.5 4.0 4.8 3.7   CHLORIDE mmol/L 103 102 99 99   CO2 mmol/L 29 26 30 30   ANION GAP mmol/L 13 14 12 15   BUN mg/dL 20 19 23 33*   CREATININE mg/dL 0.76 0.95 1.19 1.06   EGFR mL/min/1.73m*2 >90 89 68 78   MAGNESIUM mg/dL  --   --   --  1.65   ALBUMIN g/dL 3.5 3.7 3.2* 3.5   ALT U/L 14 10 13 15   AST U/L 23 10 9 14   BILIRUBIN TOTAL mg/dL 0.4 0.3 0.3 0.4     CBC:  Results from last 7 days   Lab Units 07/29/25  0658 07/27/25  0554 07/26/25  0126   WBC AUTO x10*3/uL 9.4 8.5 8.6   HEMOGLOBIN g/dL 11.0* 9.6* 10.0*   HEMATOCRIT % 36.5* 32.0* 32.5*   PLATELETS AUTO x10*3/uL 309 235 319   MCV fL 104* 104* 99     COAG:   Results from last 7 days   Lab Units 07/26/25  0126   INR  1.0     ABO:   ABO TYPE   Date Value Ref Range Status   07/28/2025 AB  Final     HEME/ENDO:     CARDIAC:       No lab exists for component: \"CK\", \"CKMBP\"    ASSESSMENT AND PLAN:   Anatoly Lane is a 64-year-old male with a history of NICM (s/p HM3 in 2023) s/p OHT on 6/21/2025, pAF, CAD, CKD, HTN, BPH, GERD, hypothyroidism presenting for R groin infection. He had a R groin seroma post decannulation, however, with now signs of infection.     MSK:  #R groin infection   -Vanc/zosyn 2/2 immunosuppressed status. Consulted ID.  -Bcx with NGTD  -CTS consulted (Dr. Birch)  -Ultrasound notes 6cm superolateral seroma, and additionally 5.4cm inferomedial and superficial right groin collection. CT A/P " re-demonstrates R inguinal fluid collections, which might be amenable to U/S guided aspiration.  -S/p IR U/s-guided aspiration on 7/29.      Neuro:  # Anxiety. Depression  # H/o BUE/BLE mild tremor  # H/o Back pain  - Serial neuro and pain assessments   - PO Tylenol PRN for pain     # LUE weakness  # RLE weakness  - Continue home Gabapentin  - Neurology follow up along with EMG/NCS outpatient        Cardiovascular:  #NICM s/p HM2 (2023) s/p OHT on 6/21  - Continue home hydralazine 25 TID  Donor/Recipient Infectious history:   CMV: -/-   EBV: +/+   Toxo: -/-   Heb B: -/-  Hep C: -/-  HIV: -/-      Rejection/Prophylaxis:     - Induction: s/p methylprednisolone 500mg IV x2, S/p Basiliximab 20mg IV within 1 hour of arrival to CTICU (no need for premedication), s/p 2nd dose: 20mg IVPB on POD4 (06/25)   - Steroids: Prednisone 25mg daily (reduced 7/14 after negative biopsy)  - Tacrolimus: 1.5mg qam + 1mg qpm  - Tacrolimus goal troughs: 10-12, monitor levels daily  - Cellcept: 1,000mg BID   - Antifungals: voriconazole 200mg BID x3 months end date 09/22/2025   - Antivirals: acylovir 400mg BID x3 months end date 09/22/2025   - Anti PCP & Toxoplasmosis: SS Bactrim daily end date of 12/22/25     RHC (7/30): /75/(94), RA 5, PA 35/15/(21), PCW 11, sat 67%, CO/CI 7.3/3.4,  dynes, PVR 1.5 LAVAREZ   Last biopsy (7/30): Follow up biopsy results  Last TTE/BOBBY: 7/9, EF 65%   Last HLA: Pre-transplant: PRAs 0%  Last Allomap: ~ will assess starting at 6-month ana post-op    Last Allosure: ~ will assess starting at 6-month ana post-op   Last LHC: 03/2023(pre OHT). First due ~ (one year post tx) due 6/2026  Osteopenia/osteoporosis prophylaxis: Vit D 2000U daily and calcium 500 mg BID (give calcium 2 hrs after MMF)   Peptic/gastric ulcer prophylaxis: Pantoprazole 40mg daily    CAV Prophylaxis: Aspirin 81mg daily and Pravastatin 40mg daily   Pacing: Epicardial wires removed 07/07      Problems:   # H/o Subcutaneous emphysema and  pneumomediastinum   - Observed during recent admission, managed conservatively after consultations with Cam Birch and [place].      Pulmonary:   #Prior smoker  -c/w abstinence     GI:  #H/o C. Difficile infection s/p tx with po vanc  - Bowel regimen ordered   - PPI     :  #BPH  -Baseline BUN/Cr WNL  -Continue home tamsulosin     Heme:  #H/o anemia  -trend H&H      Endo:  #PreDM  #Steroid induced hyperglycemia  -HgA1c on admit- 5.1%  - Endo consulted during recent admission, home regimen is the following:  - Insulin NPH 10u qd   - Can titrate insulin down as steroids taper  - Transition to SSI while inpatient      #Hypothyroidism  -Continue home levothyroxine      ID:  -afebrile, nontoxic  -See above for groin infection,      PHYSICAL AND OCCUPATIONAL THERAPY:     LINES:     DVT: SubQ heparin (HOLDING FOR POTENTIAL PROCEDURE)   VAP BUNDLE:  CENTRAL LINE BUNDLE:  ULCER PPX: Pantoprazole  GLYCEMIC CONTROL: Insulin regimen  BOWEL CARE: Pericolace  INDWELLING CATHETER:  NUTRITION: NPO Diet Except: Sips with meds; Effective now      EMERGENCY CONTACT: Extended Emergency Contact Information  Primary Emergency Contact: Janice Ramos  Address: [address] [place] [address]  Home Phone: [phone]  Mobile Phone: [phone]  Relation: Mother  Secondary Emergency Contact: Bird Lane  Address: [address] [place] [address] [address] [place] [address]  Home Phone: [phone]  Mobile Phone: [phone]  Relation: Son  Preferred language: English   needed? No  FAMILY UPDATE:  CODE STATUS: Full Code  DISPO: Pending evaluation of R grion infection    Patient seen and assessed with Dr. Sameera HOLLIDAY personally spent 60 minutes of critical care time directly and personally managing the patient exclusive of separately billable procedures   _________________________________________________  Hernesto Salcedo,  PGY4, HF Fellow

## 2025-07-31 ENCOUNTER — DOCUMENTATION (OUTPATIENT)
Dept: HOME HEALTH SERVICES | Facility: HOME HEALTH | Age: 65
End: 2025-07-31
Payer: MEDICARE

## 2025-07-31 ENCOUNTER — APPOINTMENT (OUTPATIENT)
Dept: RADIOLOGY | Facility: HOSPITAL | Age: 65
DRG: 863 | End: 2025-07-31
Payer: MEDICARE

## 2025-07-31 ENCOUNTER — TELEPHONE (OUTPATIENT)
Dept: TRANSPLANT | Facility: HOSPITAL | Age: 65
End: 2025-07-31
Payer: MEDICARE

## 2025-07-31 ENCOUNTER — PHARMACY VISIT (OUTPATIENT)
Dept: PHARMACY | Facility: CLINIC | Age: 65
End: 2025-07-31
Payer: COMMERCIAL

## 2025-07-31 VITALS
HEART RATE: 89 BPM | RESPIRATION RATE: 19 BRPM | SYSTOLIC BLOOD PRESSURE: 144 MMHG | DIASTOLIC BLOOD PRESSURE: 84 MMHG | HEIGHT: 69 IN | BODY MASS INDEX: 30.96 KG/M2 | TEMPERATURE: 97 F | OXYGEN SATURATION: 96 % | WEIGHT: 209 LBS

## 2025-07-31 LAB
ALBUMIN SERPL BCP-MCNC: 3.2 G/DL (ref 3.4–5)
ALP SERPL-CCNC: 105 U/L (ref 33–136)
ALT SERPL W P-5'-P-CCNC: 15 U/L (ref 10–52)
ANION GAP SERPL CALC-SCNC: 10 MMOL/L (ref 10–20)
AST SERPL W P-5'-P-CCNC: 14 U/L (ref 9–39)
ATRIAL RATE: 85 BPM
BILIRUB SERPL-MCNC: 0.3 MG/DL (ref 0–1.2)
BUN SERPL-MCNC: 19 MG/DL (ref 6–23)
CALCIUM SERPL-MCNC: 8.2 MG/DL (ref 8.6–10.6)
CHLORIDE SERPL-SCNC: 105 MMOL/L (ref 98–107)
CO2 SERPL-SCNC: 29 MMOL/L (ref 21–32)
CREAT SERPL-MCNC: 0.77 MG/DL (ref 0.5–1.3)
EGFRCR SERPLBLD CKD-EPI 2021: >90 ML/MIN/1.73M*2
ERYTHROCYTE [DISTWIDTH] IN BLOOD BY AUTOMATED COUNT: 17 % (ref 11.5–14.5)
GLUCOSE SERPL-MCNC: 156 MG/DL (ref 74–99)
HCT VFR BLD AUTO: 31.7 % (ref 41–52)
HGB BLD-MCNC: 9.9 G/DL (ref 13.5–17.5)
MCH RBC QN AUTO: 30.8 PG (ref 26–34)
MCHC RBC AUTO-ENTMCNC: 31.2 G/DL (ref 32–36)
MCV RBC AUTO: 99 FL (ref 80–100)
NRBC BLD-RTO: 0 /100 WBCS (ref 0–0)
P AXIS: 62 DEGREES
P OFFSET: 201 MS
P ONSET: 155 MS
PLATELET # BLD AUTO: 301 X10*3/UL (ref 150–450)
POTASSIUM SERPL-SCNC: 4.1 MMOL/L (ref 3.5–5.3)
PR INTERVAL: 136 MS
PROT SERPL-MCNC: 4.9 G/DL (ref 6.4–8.2)
Q ONSET: 223 MS
QRS COUNT: 14 BEATS
QRS DURATION: 104 MS
QT INTERVAL: 346 MS
QTC CALCULATION(BAZETT): 411 MS
QTC FREDERICIA: 388 MS
R AXIS: 19 DEGREES
RBC # BLD AUTO: 3.21 X10*6/UL (ref 4.5–5.9)
SODIUM SERPL-SCNC: 140 MMOL/L (ref 136–145)
T AXIS: -68 DEGREES
T OFFSET: 396 MS
TACROLIMUS BLD-MCNC: 9 NG/ML
VENTRICULAR RATE: 85 BPM
WBC # BLD AUTO: 10.1 X10*3/UL (ref 4.4–11.3)

## 2025-07-31 PROCEDURE — 2500000004 HC RX 250 GENERAL PHARMACY W/ HCPCS (ALT 636 FOR OP/ED): Performed by: STUDENT IN AN ORGANIZED HEALTH CARE EDUCATION/TRAINING PROGRAM

## 2025-07-31 PROCEDURE — 2500000004 HC RX 250 GENERAL PHARMACY W/ HCPCS (ALT 636 FOR OP/ED): Performed by: REGISTERED NURSE

## 2025-07-31 PROCEDURE — RXMED WILLOW AMBULATORY MEDICATION CHARGE

## 2025-07-31 PROCEDURE — 2500000001 HC RX 250 WO HCPCS SELF ADMINISTERED DRUGS (ALT 637 FOR MEDICARE OP): Performed by: STUDENT IN AN ORGANIZED HEALTH CARE EDUCATION/TRAINING PROGRAM

## 2025-07-31 PROCEDURE — 99238 HOSP IP/OBS DSCHRG MGMT 30/<: CPT | Performed by: SPECIALIST

## 2025-07-31 PROCEDURE — 2500000002 HC RX 250 W HCPCS SELF ADMINISTERED DRUGS (ALT 637 FOR MEDICARE OP, ALT 636 FOR OP/ED): Performed by: STUDENT IN AN ORGANIZED HEALTH CARE EDUCATION/TRAINING PROGRAM

## 2025-07-31 PROCEDURE — 80197 ASSAY OF TACROLIMUS: CPT | Performed by: STUDENT IN AN ORGANIZED HEALTH CARE EDUCATION/TRAINING PROGRAM

## 2025-07-31 PROCEDURE — 80053 COMPREHEN METABOLIC PANEL: CPT | Performed by: STUDENT IN AN ORGANIZED HEALTH CARE EDUCATION/TRAINING PROGRAM

## 2025-07-31 PROCEDURE — 76882 US LMTD JT/FCL EVL NVASC XTR: CPT | Performed by: RADIOLOGY

## 2025-07-31 PROCEDURE — 85027 COMPLETE CBC AUTOMATED: CPT | Performed by: STUDENT IN AN ORGANIZED HEALTH CARE EDUCATION/TRAINING PROGRAM

## 2025-07-31 PROCEDURE — 76881 US COMPL JOINT R-T W/IMG: CPT

## 2025-07-31 PROCEDURE — 36415 COLL VENOUS BLD VENIPUNCTURE: CPT | Performed by: STUDENT IN AN ORGANIZED HEALTH CARE EDUCATION/TRAINING PROGRAM

## 2025-07-31 RX ORDER — PREDNISONE 20 MG/1
20 TABLET ORAL DAILY
Qty: 90 TABLET | Refills: 3 | Status: SHIPPED | OUTPATIENT
Start: 2025-08-01

## 2025-07-31 RX ORDER — TACROLIMUS 0.5 MG/1
1.5 CAPSULE, GELATIN COATED ORAL
Qty: 180 CAPSULE | Refills: 3 | Status: SHIPPED | OUTPATIENT
Start: 2025-07-31 | End: 2025-10-29

## 2025-07-31 RX ORDER — CEPHALEXIN 500 MG/1
500 CAPSULE ORAL EVERY 8 HOURS SCHEDULED
Qty: 28 CAPSULE | Refills: 0 | Status: SHIPPED | OUTPATIENT
Start: 2025-07-31 | End: 2025-08-10

## 2025-07-31 RX ORDER — DOXYCYCLINE 100 MG/1
100 CAPSULE ORAL EVERY 12 HOURS SCHEDULED
Qty: 18 CAPSULE | Refills: 0 | Status: SHIPPED | OUTPATIENT
Start: 2025-07-31 | End: 2025-08-09

## 2025-07-31 RX ADMIN — INSULIN HUMAN 10 UNITS: 100 INJECTION, SUSPENSION SUBCUTANEOUS at 08:47

## 2025-07-31 RX ADMIN — CEPHALEXIN 500 MG: 500 CAPSULE ORAL at 14:35

## 2025-07-31 RX ADMIN — Medication 50 MCG: at 08:38

## 2025-07-31 RX ADMIN — GABAPENTIN 300 MG: 300 CAPSULE ORAL at 14:35

## 2025-07-31 RX ADMIN — LEVOTHYROXINE SODIUM 125 MCG: 0.1 TABLET ORAL at 06:17

## 2025-07-31 RX ADMIN — VORICONAZOLE 200 MG: 200 TABLET, COATED ORAL at 08:38

## 2025-07-31 RX ADMIN — PREDNISONE 20 MG: 20 TABLET ORAL at 08:38

## 2025-07-31 RX ADMIN — DOXYCYCLINE HYCLATE 100 MG: 100 TABLET, FILM COATED ORAL at 08:39

## 2025-07-31 RX ADMIN — PANTOPRAZOLE SODIUM 40 MG: 40 TABLET, DELAYED RELEASE ORAL at 08:38

## 2025-07-31 RX ADMIN — CALCIUM 1 TABLET: 500 TABLET ORAL at 08:46

## 2025-07-31 RX ADMIN — MYCOPHENOLATE MOFETIL 1000 MG: 250 CAPSULE ORAL at 08:38

## 2025-07-31 RX ADMIN — ASPIRIN 81 MG: 81 TABLET, COATED ORAL at 08:38

## 2025-07-31 RX ADMIN — CEPHALEXIN 500 MG: 500 CAPSULE ORAL at 06:17

## 2025-07-31 RX ADMIN — GABAPENTIN 300 MG: 300 CAPSULE ORAL at 08:39

## 2025-07-31 RX ADMIN — HYDRALAZINE HYDROCHLORIDE 25 MG: 25 TABLET ORAL at 08:46

## 2025-07-31 RX ADMIN — TACROLIMUS 1.5 MG: 1 CAPSULE ORAL at 06:17

## 2025-07-31 RX ADMIN — TAMSULOSIN HYDROCHLORIDE 0.4 MG: 0.4 CAPSULE ORAL at 08:39

## 2025-07-31 RX ADMIN — ACYCLOVIR 400 MG: 400 TABLET ORAL at 08:39

## 2025-07-31 RX ADMIN — HYDRALAZINE HYDROCHLORIDE 25 MG: 25 TABLET ORAL at 14:35

## 2025-07-31 RX ADMIN — SULFAMETHOXAZOLE AND TRIMETHOPRIM 1 TABLET: 400; 80 TABLET ORAL at 08:39

## 2025-07-31 ASSESSMENT — PAIN - FUNCTIONAL ASSESSMENT: PAIN_FUNCTIONAL_ASSESSMENT: 0-10

## 2025-07-31 ASSESSMENT — PAIN SCALES - GENERAL: PAINLEVEL_OUTOF10: 0 - NO PAIN

## 2025-07-31 NOTE — NURSING NOTE
Discharge orders were placed by provider. IV was removed and monitor was disconnected. Discharge paperwork and all questions were answered. Patient was brought down to lobby by transport with family member driving him home.

## 2025-07-31 NOTE — CARE PLAN
The clinical goals for the shift include pt will be discharged by end of shift    Problem: Discharge Planning  Goal: Discharge to home or other facility with appropriate resources  Outcome: Progressing     Problem: Chronic Conditions and Co-morbidities  Goal: Patient's chronic conditions and co-morbidity symptoms are monitored and maintained or improved  Outcome: Progressing     Problem: Heart Failure  Goal: Improved urinary output this shift  Outcome: Progressing  Goal: Reduction in peripheral edema within 24 hours  Outcome: Progressing  Goal: Report improvement of dyspnea/breathlessness this shift  Outcome: Progressing  Goal: Weight from fluid excess reduced over 2-3 days, then stabilize  Outcome: Progressing  Goal: Increase self care and/or family involvement in 24 hours  Outcome: Progressing     Problem: Skin  Goal: Decreased wound size/increased tissue granulation at next dressing change  Outcome: Progressing  Goal: Participates in plan/prevention/treatment measures  Outcome: Progressing  Goal: Prevent/manage excess moisture  Outcome: Progressing  Goal: Prevent/minimize sheer/friction injuries  Outcome: Progressing  Goal: Promote/optimize nutrition  Outcome: Progressing  Goal: Promote skin healing  Outcome: Progressing

## 2025-07-31 NOTE — HH CARE COORDINATION
This referral has been made a Non Admit with  Home Care due to Patient's Home is Outside of [facility] Service Area. If you have further questions, feel free to reach out to our office at [phone]. Thank you, [facility] Intake.    Provider Notification:  Sent In Basket to Hernesto Salcedo DO, informing that [facility] has not accepted patient referral.

## 2025-07-31 NOTE — DISCHARGE SUMMARY
Discharge Diagnosis  Heart transplant status  R Inguinal cellulitis, seroma, and hematoma    Issues Requiring Follow-Up  R Inguinal fluid collections (seroma, hematoma)  EMBx results (7/30)    Test Results Pending At Discharge  Pending Labs       Order Current Status    Surgical Pathology Exam In process            Hospital Course    64-year-old male with a history of orthotopic heart transplantation (OHT) on 6/21/2025 by Dr. Lorenzo, status post LVAD explant and AICD removal, who was admitted on 7/26/2025 for evaluation of a right groin infection at the prior decannulation site.     On presentation, the patient endorsed four days of worsening pain, erythema, and swelling in the right groin, extending to the mid-thigh. He reported adherence to immunosuppressive therapy and other medications. Prior imaging on 7/1/2025 CT A/P revealed a right inguinal fluid collection, identified as a likely postprocedural seroma  (2.6 x 2.5 x 5.1 cm). Ultrasound imaging obtained and showed a 6 cm superolateral seroma and a 5.4 cm inferomedial superficial right groin collection. Cardiothoracic surgery consulted (Dr. Birch) with recommendations for a repeat CT for comparison and confirmed superficial collection 5.3 x 3.6 cm and deep collection 4.9 x 3.0 cm. Subsequently interventional radiology was consulted, and the patient underwent ultrasound-guided aspiration of both collections on 7/29. Blood cultures showed no growth to date. He was initially treated empirically with vancomycin and piperacillin-tazobactam, then transitioned to oral doxycycline and cephalexin per infectious disease recommendation to complete a 10-day course (7/30-8/8). He continued his baseline immunosuppression regimen. He also underwent routine right heart catheterization and endomyocardial biopsy on 7/30; results were pending at the time of discharge. Day of discharge a repeat ultrasound was obtained given concern for recurrence, and there was evidence of  slight interval increase in size. Due to clinical improvement and patient preference, he was stable for discharge with close outpatient follow-up in 1 week.    Visit Vitals  /77 (BP Location: Left arm, Patient Position: Lying)   Pulse 86   Temp 36.1 °C (97 °F) (Temporal)   Resp 19     Vitals:    07/30/25 0519   Weight: 94.8 kg (208 lb 15.9 oz)       Immunization History   Administered Date(s) Administered    COVID-19, mRNA, LNP-S, PF, 30 mcg/0.3 mL dose 07/08/2021, 07/29/2021    Flu vaccine (IIV4), preservative free *Check age/dose* 03/05/2024    Hepatitis B vaccine, adult *Check Product/Dose* 03/02/2024    Pneumococcal conjugate vaccine, 20-valent (PREVNAR 20) 03/05/2024    Tdap vaccine, age 7 year and older (BOOSTRIX, ADACEL) 03/05/2024       Results      Pertinent Physical Exam At Time of Discharge  Physical Exam    Home Medications     Medication List      START taking these medications     cephalexin 500 mg capsule; Commonly known as: Keflex; Take 1 capsule   (500 mg) by mouth every 8 hours for 28 doses.   doxycycline 100 mg capsule; Commonly known as: Vibramycin; Take 1   capsule (100 mg) by mouth every 12 hours for 18 doses. Take with a full   glass of water and do not lie down for at least 30 minutes after.     CHANGE how you take these medications     * insulin NPH (Isophane) 100 unit/mL (3 mL) pen; Commonly known as:   HumuLIN N,NovoLIN N; Inject 15 Units under the skin once daily in the   morning. With your steroid.; What changed: Another medication with the   same name was added. Make sure you understand how and when to take each.   * insulin NPH (Isophane) 100 unit/mL (3 mL) pen; Commonly known as:   HumuLIN N,NovoLIN N; Inject 10 Units under the skin once daily in the   morning. Take as directed per insulin instructions. Discard each pen after   28 days of opening.; Start taking on: August 1, 2025; What changed: You   were already taking a medication with the same name, and this prescription    was added. Make sure you understand how and when to take each.   * predniSONE 5 mg tablet; Commonly known as: Deltasone; Take 5 tablets   (25 mg) by mouth once daily. Please taper when told after your next biopsy   on the 30th of July; What changed: Another medication with the same name   was added. Make sure you understand how and when to take each.   * predniSONE 20 mg tablet; Commonly known as: Deltasone; Take 1 tablet   (20 mg) by mouth once daily.; Start taking on: August 1, 2025; What   changed: You were already taking a medication with the same name, and this   prescription was added. Make sure you understand how and when to take   each.   tacrolimus 0.5 mg capsule; Commonly known as: Prograf; Take 1.5 mg by   mouth every 12 hours.; What changed: how much to take, when to take this,   additional instructions  * This list has 4 medication(s) that are the same as other medications   prescribed for you. Read the directions carefully, and ask your doctor or   other care provider to review them with you.     CONTINUE taking these medications     acetaminophen 325 mg tablet; Commonly known as: Tylenol; Take 2 tablets   (650 mg) by mouth every 6 hours if needed for mild pain (1 - 3).   acyclovir 400 mg tablet; Commonly known as: Zovirax; Take 1 tablet (400   mg) by mouth 2 times a day.   alcohol swabs; Apply topically 3 times a day.   aspirin 81 mg EC tablet; Take 1 tablet (81 mg) by mouth once daily.   calcium carbonate 1,250 mg (500 mg elemental) tablet; Commonly known as:   Os-Wilfredo; Take 1 tablet by mouth 2 times a day.   cholecalciferol 50 mcg (2,000 units) tablet; Commonly known as: Vitamin   D-3; Take 1 tablet (50 mcg) by mouth once daily.   docusate sodium 100 mg capsule; Commonly known as: Colace; Take 1   capsule (100 mg) by mouth 2 times a day.   FreeStyle Federico 3 Plus Sensor device; Generic drug: blood-glucose   sensor; Use 1 every 15 days.   FreeStyle Federico 3 [place] misc; Generic drug:  "  blood-glucose,,cont; Use as directed.   gabapentin 300 mg capsule; Commonly known as: Neurontin; Take 1 capsule   (300 mg) by mouth 3 times a day.   hydrALAZINE 25 mg tablet; Commonly known as: Apresoline; Take 1 tablet   (25 mg) by mouth 3 times a day.   levothyroxine 125 mcg tablet; Commonly known as: Synthroid, Levoxyl;   Take 1 tablet (125 mcg) by mouth early in the morning.. Take on an empty   stomach at the same time each day, either 30 to 60 minutes prior to   breakfast   lidocaine 4 % patch; Place 1 patch over 12 hours on the skin once daily.   Remove & discard patch within 12 hours or as directed by MD.   MULTIVITAMIN ORAL   mycophenolate 250 mg capsule; Commonly known as: Cellcept; Take 4   capsules (1,000 mg) by mouth every 12 hours.   pantoprazole 40 mg EC tablet; Commonly known as: ProtoNix; Take 1 tablet   (40 mg) by mouth once daily. Do not crush, chew, or split.   pen needle, diabetic 31 gauge x 5/16\" needle; Use 1 once daily or as   directed by provider.   pravastatin 40 mg tablet; Commonly known as: Pravachol; Take 1 tablet   (40 mg) by mouth once daily at bedtime.   sennosides-docusate sodium 8.6-50 mg tablet; Commonly known as:   Dahlia-Colace; Take 1 tablet by mouth as needed at bedtime for constipation.   sulfamethoxazole-trimethoprim 400-80 mg tablet; Commonly known as:   Bactrim; Take 1 tablet by mouth once daily.   tamsulosin 0.4 mg 24 hr capsule; Commonly known as: Flomax; Take 1   capsule (0.4 mg) by mouth once daily.   torsemide 20 mg tablet; Commonly known as: Demadex; Take 1 tablet (20   mg) by mouth once daily as needed (Fluid overload).   voriconazole 200 mg tablet; Commonly known as: Vfend; Take 1 tablet (200   mg) by mouth every 12 hours.       Outpatient Follow-Up  No future appointments.    Hernesto Salcedo, DO  "

## 2025-07-31 NOTE — CARE PLAN
The patient's goals for the shift include Pateint will have a plan of care outlined for Right Groin Swelling today    The clinical goals for the shift include Patient will remain safe and HDS throughout shift    Problem: Heart Failure  Goal: Report improvement of dyspnea/breathlessness this shift  Outcome: Progressing     Patient remained HDS throughout shift.

## 2025-07-31 NOTE — PROGRESS NOTES
Patient to discharge today with [facility] soc date 24-48 post discharge

## 2025-07-31 NOTE — PROGRESS NOTES
"[facility]  HFICU PROGRESS NOTE    Anatoly Lane/[id number]    Admit Date: 7/25/2025  Hospital Length of Stay: 6   ICU Length of Stay: Patient does not have an ICU stay during this admission.   Primary Service: F Cardiology    INTERVAL EVENTS / PERTINENT ROS:   NAOE. Denies any complaints, feel well and ready to go home.     Plan:  -Transitioned to Doxycyline + Keflex for 10d course per ID  -Continue current transplant immunosuppressives  -Tacrolimus 1.5mg BID, Tac trough at 9  -Last biopsy (7/30): Follow up results  -Due to concern for recurrence of seroma, will repeat U/S and if unremarkable plan for discharge    MEDICATIONS  Infusions:     Scheduled:  acyclovir, 400 mg, BID  aspirin, 81 mg, Daily  calcium carbonate, 1,250 mg of calcium carbonate, BID  cephalexin, 500 mg, q8h LUPILLO  cholecalciferol, 50 mcg, Daily  docusate sodium, 100 mg, BID  doxycylcine, 100 mg, q12h LUPILLO  gabapentin, 300 mg, TID  hydrALAZINE, 25 mg, TID  insulin NPH (Isophane), 10 Units, q AM  levothyroxine, 125 mcg, Daily  lidocaine, 1 patch, Daily  mycophenolate, 1,000 mg, q12h LUPILLO  pantoprazole, 40 mg, Daily  polyethylene glycol, 17 g, Daily  pravastatin, 40 mg, Nightly  predniSONE, 20 mg, Daily  sulfamethoxazole-trimethoprim, 1 tablet, Daily  tacrolimus, 1.5 mg, q12h LUPILLO  tamsulosin, 0.4 mg, Daily  voriconazole, 200 mg, q12h LUPILLO      PRN:  acetaminophen, 650 mg, q6h PRN  sennosides-docusate sodium, 1 tablet, Nightly PRN  torsemide, 20 mg, Daily PRN        PHYSICAL EXAM:   Visit Vitals  /90 (BP Location: Left arm, Patient Position: Lying)   Pulse 90   Temp 36.3 °C (97.3 °F) (Temporal)   Resp 18   Ht 1.753 m (5' 9.02\")   Wt 94.8 kg (208 lb 15.9 oz)   SpO2 95%   BMI 30.85 kg/m²   Smoking Status Former   BSA 2.15 m²       Wt Readings from Last 5 Encounters:   07/30/25 94.8 kg (208 lb 15.9 oz)   07/25/25 94.5 kg (208 lb 5.4 oz)   07/21/25 91.9 kg (202 lb 8 oz)   07/14/25 94.9 kg (209 lb 4.8 oz)   07/14/25 98.4 kg " "(216 lb 14.9 oz)       INTAKE/OUTPUT:  I/O last 3 completed shifts:  In: - (0 mL/kg)   Out: 2704 (28.5 mL/kg) [Urine:2700 (0.8 mL/kg/hr); Blood:4]  Weight: 94.8 kg      Exam:  GEN: NAD  HEENT: ATNC,  anicteric, no JVD  CV: RRR, no m/r/g  Pulm: CTAB  Abdomen: NTND  Ext: warm, no LE edema noted  Neuro: A+Ox3  Psych: appropriate  Ext: R inguinal palpable mass improving in size, minimal erythema/tenderness    DATA:  CMP:  Results from last 7 days   Lab Units 07/31/25  0614 07/30/25  0743 07/29/25  0658 07/28/25  0635 07/27/25  1526 07/26/25  0126   SODIUM mmol/L 140 139 140 138 136 140   POTASSIUM mmol/L 4.1 4.4 4.5 4.0 4.8 3.7   CHLORIDE mmol/L 105 105 103 102 99 99   CO2 mmol/L 29 26 29 26 30 30   ANION GAP mmol/L 10 12 13 14 12 15   BUN mg/dL 19 19 20 19 23 33*   CREATININE mg/dL 0.77 0.89 0.76 0.95 1.19 1.06   EGFR mL/min/1.73m*2 >90 >90 >90 89 68 78   MAGNESIUM mg/dL  --   --   --   --   --  1.65   ALBUMIN g/dL 3.2* 3.7 3.5 3.7 3.2* 3.5   ALT U/L 15 18 14 10 13 15   AST U/L 14 13 23 10 9 14   BILIRUBIN TOTAL mg/dL 0.3 0.4 0.4 0.3 0.3 0.4     CBC:  Results from last 7 days   Lab Units 07/31/25  0614 07/30/25  0744 07/29/25  0658 07/27/25  0554 07/26/25  0126   WBC AUTO x10*3/uL 10.1 11.9* 9.4 8.5 8.6   HEMOGLOBIN g/dL 9.9* 11.3* 11.0* 9.6* 10.0*   HEMATOCRIT % 31.7* 36.1* 36.5* 32.0* 32.5*   PLATELETS AUTO x10*3/uL 301 385 309 235 319   MCV fL 99 98 104* 104* 99     COAG:   Results from last 7 days   Lab Units 07/26/25  0126   INR  1.0     ABO:   ABO TYPE   Date Value Ref Range Status   07/28/2025 AB  Final     HEME/ENDO:     CARDIAC:       No lab exists for component: \"CK\", \"CKMBP\"    ASSESSMENT AND PLAN:   Anatoly Lane is a 64-year-old male with a history of NICM (s/p HM3 in 2023) s/p OHT on 6/21/2025, pAF, CAD, CKD, HTN, BPH, GERD, hypothyroidism presenting for R groin infection. He had a R groin seroma post decannulation, however, with now signs of infection.     MSK:  #R groin infection   -Initially on " Vanc/zosyn,Transitioned to Doxycyline + Keflex for 10d course per ID (7/30-)  -Bcx with NGTD  -CTS consulted (Dr. Birch)  -Ultrasound notes 6cm superolateral seroma, and additionally 5.4cm inferomedial and superficial right groin collection. CT A/P re-demonstrates R inguinal fluid collections, which might be amenable to U/S guided aspiration.  -S/p IR U/s-guided aspiration on 7/29.      Neuro:  # Anxiety. Depression  # H/o BUE/BLE mild tremor  # H/o Back pain  - Serial neuro and pain assessments   - PO Tylenol PRN for pain     # LUE weakness  # RLE weakness  - Continue home Gabapentin  - Neurology follow up along with EMG/NCS outpatient        Cardiovascular:  #NICM s/p HM2 (2023) s/p OHT on 6/21  - Continue home hydralazine 25 TID  Donor/Recipient Infectious history:   CMV: -/-   EBV: +/+   Toxo: -/-   Heb B: -/-  Hep C: -/-  HIV: -/-      Rejection/Prophylaxis:     - Induction: s/p methylprednisolone 500mg IV x2, S/p Basiliximab 20mg IV within 1 hour of arrival to CTICU (no need for premedication), s/p 2nd dose: 20mg IVPB on POD4 (06/25)   - Steroids: Prednisone 25mg daily (reduced 7/14 after negative biopsy)  - Tacrolimus: 1.5mg qam + 1mg qpm  - Tacrolimus goal troughs: 10-12, monitor levels daily  - Cellcept: 1,000mg BID   - Antifungals: voriconazole 200mg BID x3 months end date 09/22/2025   - Antivirals: acylovir 400mg BID x3 months end date 09/22/2025   - Anti PCP & Toxoplasmosis: SS Bactrim daily end date of 12/22/25     RHC (7/30): /75/(94), RA 5, PA 35/15/(21), PCW 11, sat 67%, CO/CI 7.3/3.4,  dynes, PVR 1.5 ALVAREZ   Last biopsy (7/30): Follow up biopsy results  Last TTE/BOBBY: 7/9, EF 65%   Last HLA: Pre-transplant: PRAs 0%  Last Allomap: ~ will assess starting at 6-month ana post-op    Last Allosure: ~ will assess starting at 6-month ana post-op   Last LHC: 03/2023(pre OHT). First due ~ (one year post tx) due 6/2026  Osteopenia/osteoporosis prophylaxis: Vit D 2000U daily and calcium 500 mg BID  (give calcium 2 hrs after MMF)   Peptic/gastric ulcer prophylaxis: Pantoprazole 40mg daily    CAV Prophylaxis: Aspirin 81mg daily and Pravastatin 40mg daily   Pacing: Epicardial wires removed 07/07      Problems:   # H/o Subcutaneous emphysema and pneumomediastinum   - Observed during recent admission, managed conservatively after consultations with Cam Birch and [place].      Pulmonary:   #Prior smoker  -c/w abstinence     GI:  #H/o C. Difficile infection s/p tx with po vanc  - Bowel regimen ordered   - PPI     :  #BPH  -Baseline BUN/Cr WNL  -Continue home tamsulosin     Heme:  #H/o anemia  -trend H&H      Endo:  #PreDM  #Steroid induced hyperglycemia  -HgA1c on admit- 5.1%  - Endo consulted during recent admission, home regimen is the following:  - Insulin NPH 10u qd   - Can titrate insulin down as steroids taper  - Transition to SSI while inpatient      #Hypothyroidism  -Continue home levothyroxine      ID:  -afebrile, nontoxic  -See above for groin infection,      PHYSICAL AND OCCUPATIONAL THERAPY:     LINES:     DVT: SubQ heparin (HOLDING FOR POTENTIAL PROCEDURE)   VAP BUNDLE:  CENTRAL LINE BUNDLE:  ULCER PPX: Pantoprazole  GLYCEMIC CONTROL: Insulin regimen  BOWEL CARE: Pericolace  INDWELLING CATHETER:  NUTRITION: Adult diet Regular, Cardiac; 70 gm fat; 2 - 3 grams Sodium      EMERGENCY CONTACT: Extended Emergency Contact Information  Primary Emergency Contact: Janice Ramos  Address: [address] [place] [address]  Home Phone: [phone]  Mobile Phone: [phone]  Relation: Mother  Secondary Emergency Contact: Bird Lane  Address: [address] [place] [address] [address]           [place] [address] [facility]  Home Phone: [phone]  Mobile Phone: [phone]  Relation: Son  Preferred language: English   needed? No  FAMILY UPDATE:  CODE STATUS: Full Code  DISPO: Pending evaluation of R grion infection    Patient seen and assessed with   Sameera HOLLIDAY personally spent 60 minutes of critical care time directly and personally managing the patient exclusive of separately billable procedures   _________________________________________________  Hernesto Salcedo DO PGY4, HF Fellow

## 2025-08-01 ENCOUNTER — TELEPHONE (OUTPATIENT)
Dept: TRANSPLANT | Facility: HOSPITAL | Age: 65
End: 2025-08-01
Payer: MEDICARE

## 2025-08-01 DIAGNOSIS — E09.65 DRUG OR CHEMICAL INDUCED DIABETES MELLITUS WITH HYPERGLYCEMIA, UNSPECIFIED WHETHER LONG TERM INSULIN USE: ICD-10-CM

## 2025-08-01 DIAGNOSIS — I50.33 ACUTE ON CHRONIC CONGESTIVE HEART FAILURE WITH RIGHT VENTRICULAR DIASTOLIC DYSFUNCTION: ICD-10-CM

## 2025-08-01 DIAGNOSIS — Z94.1 HEART TRANSPLANT RECIPIENT: ICD-10-CM

## 2025-08-01 DIAGNOSIS — R73.9 HYPERGLYCEMIA: ICD-10-CM

## 2025-08-01 DIAGNOSIS — I50.82 ACUTE ON CHRONIC CONGESTIVE HEART FAILURE WITH RIGHT VENTRICULAR DIASTOLIC DYSFUNCTION: ICD-10-CM

## 2025-08-01 DIAGNOSIS — K59.04 CHRONIC IDIOPATHIC CONSTIPATION: ICD-10-CM

## 2025-08-01 DIAGNOSIS — Z86.39 HISTORY OF HYPOTHYROIDISM: ICD-10-CM

## 2025-08-01 DIAGNOSIS — F32.A DEPRESSION, UNSPECIFIED DEPRESSION TYPE: ICD-10-CM

## 2025-08-01 DIAGNOSIS — Z94.1 S/P ORTHOTOPIC HEART TRANSPLANT: ICD-10-CM

## 2025-08-01 DIAGNOSIS — I51.9 RIGHT VENTRICULAR DYSFUNCTION: ICD-10-CM

## 2025-08-01 DIAGNOSIS — R73.9 STRESS HYPERGLYCEMIA: ICD-10-CM

## 2025-08-01 DIAGNOSIS — T88.8XXA FLUID COLLECTION AT SURGICAL SITE, INITIAL ENCOUNTER: ICD-10-CM

## 2025-08-01 RX ORDER — HYDRALAZINE HYDROCHLORIDE 25 MG/1
25 TABLET, FILM COATED ORAL 3 TIMES DAILY
Qty: 270 TABLET | Refills: 0 | Status: SHIPPED | OUTPATIENT
Start: 2025-08-01 | End: 2025-10-30

## 2025-08-01 RX ORDER — SULFAMETHOXAZOLE AND TRIMETHOPRIM 400; 80 MG/1; MG/1
1 TABLET ORAL DAILY
Qty: 90 TABLET | Refills: 1 | Status: SHIPPED | OUTPATIENT
Start: 2025-08-01

## 2025-08-01 RX ORDER — ACYCLOVIR 400 MG/1
400 TABLET ORAL 2 TIMES DAILY
Qty: 180 TABLET | Refills: 0 | Status: SHIPPED | OUTPATIENT
Start: 2025-08-01 | End: 2025-10-30

## 2025-08-01 RX ORDER — TAMSULOSIN HYDROCHLORIDE 0.4 MG/1
0.4 CAPSULE ORAL DAILY
Qty: 90 CAPSULE | Refills: 3 | Status: SHIPPED | OUTPATIENT
Start: 2025-08-01 | End: 2026-08-01

## 2025-08-01 RX ORDER — TORSEMIDE 20 MG/1
20 TABLET ORAL DAILY PRN
Qty: 30 TABLET | Refills: 1 | Status: SHIPPED | OUTPATIENT
Start: 2025-08-01 | End: 2025-09-30

## 2025-08-01 RX ORDER — PREDNISONE 5 MG/1
20 TABLET ORAL DAILY
Qty: 120 TABLET | Refills: 0 | Status: SHIPPED | OUTPATIENT
Start: 2025-08-01 | End: 2025-08-31

## 2025-08-01 RX ORDER — CHOLECALCIFEROL (VITAMIN D3) 50 MCG
50 TABLET ORAL DAILY
Qty: 90 TABLET | Refills: 3 | Status: SHIPPED | OUTPATIENT
Start: 2025-08-01 | End: 2026-08-01

## 2025-08-01 RX ORDER — CALCIUM CARBONATE 500(1250)
1 TABLET ORAL 2 TIMES DAILY
Qty: 180 TABLET | Refills: 3 | Status: SHIPPED | OUTPATIENT
Start: 2025-08-01 | End: 2026-08-01

## 2025-08-01 RX ORDER — GABAPENTIN 300 MG/1
300 CAPSULE ORAL 3 TIMES DAILY
Qty: 90 CAPSULE | Refills: 11 | Status: SHIPPED | OUTPATIENT
Start: 2025-08-01 | End: 2026-08-01

## 2025-08-01 RX ORDER — BLOOD-GLUCOSE SENSOR
EACH MISCELLANEOUS
Qty: 2 EACH | Refills: 3 | Status: SHIPPED | OUTPATIENT
Start: 2025-08-01

## 2025-08-01 RX ORDER — PRAVASTATIN SODIUM 40 MG/1
40 TABLET ORAL NIGHTLY
Qty: 90 TABLET | Refills: 3 | Status: SHIPPED | OUTPATIENT
Start: 2025-08-01 | End: 2026-08-01

## 2025-08-01 RX ORDER — VORICONAZOLE 200 MG/1
200 TABLET, FILM COATED ORAL EVERY 12 HOURS SCHEDULED
Qty: 60 TABLET | Refills: 2 | Status: SHIPPED | OUTPATIENT
Start: 2025-08-01 | End: 2025-10-30

## 2025-08-01 RX ORDER — MYCOPHENOLATE MOFETIL 250 MG/1
1000 CAPSULE ORAL EVERY 12 HOURS SCHEDULED
Qty: 720 CAPSULE | Refills: 3 | Status: SHIPPED | OUTPATIENT
Start: 2025-08-01 | End: 2026-08-01

## 2025-08-01 NOTE — TELEPHONE ENCOUNTER
"Patient called and stated that [facility] cancelled \"a ton of my medications\". Went through patient's medication list with him and a refill request was sent to Dr. Velasco for 15 medications. Patient was advised to call the pharmacy in the next few days to set up a delivery so that he will not run out of medications. Patient states that he has more than enough medications to get him through the weekend/early next week.   "

## 2025-08-04 DIAGNOSIS — Z74.09 IMPAIRED MOBILITY AND ADLS: Primary | ICD-10-CM

## 2025-08-04 DIAGNOSIS — Z78.9 IMPAIRED MOBILITY AND ADLS: Primary | ICD-10-CM

## 2025-08-04 LAB
ATRIAL RATE: 85 BPM
LAB AP ASR DISCLAIMER: NORMAL
LABORATORY COMMENT REPORT: NORMAL
P AXIS: 62 DEGREES
P OFFSET: 201 MS
P ONSET: 155 MS
PATH REPORT.COMMENTS IMP SPEC: NORMAL
PATH REPORT.FINAL DX SPEC: NORMAL
PATH REPORT.GROSS SPEC: NORMAL
PATH REPORT.RELEVANT HX SPEC: NORMAL
PATH REPORT.TOTAL CANCER: NORMAL
PR INTERVAL: 136 MS
Q ONSET: 223 MS
QRS COUNT: 14 BEATS
QRS DURATION: 104 MS
QT INTERVAL: 346 MS
QTC CALCULATION(BAZETT): 411 MS
QTC FREDERICIA: 388 MS
R AXIS: 19 DEGREES
T AXIS: -68 DEGREES
T OFFSET: 396 MS
VENTRICULAR RATE: 85 BPM

## 2025-08-05 ENCOUNTER — APPOINTMENT (OUTPATIENT)
Dept: CARDIOLOGY | Facility: HOSPITAL | Age: 65
End: 2025-08-05
Payer: MEDICARE

## 2025-08-05 ENCOUNTER — TELEPHONE (OUTPATIENT)
Dept: TRANSPLANT | Facility: HOSPITAL | Age: 65
End: 2025-08-05
Payer: MEDICARE

## 2025-08-05 ENCOUNTER — HOSPITAL ENCOUNTER (OUTPATIENT)
Facility: HOSPITAL | Age: 65
Setting detail: OUTPATIENT SURGERY
End: 2025-08-05
Attending: INTERNAL MEDICINE | Admitting: INTERNAL MEDICINE
Payer: MEDICARE

## 2025-08-05 ENCOUNTER — TELEPHONE (OUTPATIENT)
Dept: RESPIRATORY THERAPY | Facility: HOSPITAL | Age: 65
End: 2025-08-05
Payer: MEDICARE

## 2025-08-05 DIAGNOSIS — Z94.1 HEART REPLACED BY TRANSPLANT: Primary | ICD-10-CM

## 2025-08-05 NOTE — TELEPHONE ENCOUNTER
Called PT advised of Heart TXP FUV on Monday 8/11/25 at [facility] please arrive at 9:30am. Pt agree

## 2025-08-05 NOTE — TELEPHONE ENCOUNTER
Returned patient's call.     Patient was discharged on 10 units of NPH insulin. He states that when he is taking the NPH at night, every night his BG is dropping to 50s around 3am and alarming. When he doesn't take it, BG runs around 160s. He would like to know if he must continue at this dose or if it can be decreased or stopped. He does not have typical glucometer at home because he declines to poke himself for BG checks, also he states he is not a side sleeper, so there is low chance for falsely detected lows d/t pressure on sensor. He also reports that 1 hour ago he had a reading of 69 mg/dL while awake, did not have any symptoms.     Will review with team.

## 2025-08-05 NOTE — TELEPHONE ENCOUNTER
Called patient to notify that per Dr. Chao he can just stop NPH insulin moving forward and monitor sugars. He states he already took 5 units today. Advised that he can make change moving forward and to just keep an ear out for felicia alarms. Advised that if it shows a low he should eat a health snack with protein. He will continue to trend Bgs and follow up with team at [facility] on 8/11.

## 2025-08-05 NOTE — TELEPHONE ENCOUNTER
Called patient to notify that per Dr. Chao he should drop NPH insulin to 5 units daily. Patient states that he actually has not taken any insulin at all for the past 2 days, and was dropping to the 60s. Will let team know.

## 2025-08-06 RX ORDER — DEXTROSE 50 % IN WATER (D50W) INTRAVENOUS SYRINGE
12.5
Status: CANCELLED | OUTPATIENT
Start: 2025-08-06

## 2025-08-06 RX ORDER — DEXTROSE 50 % IN WATER (D50W) INTRAVENOUS SYRINGE
25
Status: CANCELLED | OUTPATIENT
Start: 2025-08-06

## 2025-08-06 RX ORDER — SODIUM CHLORIDE 9 MG/ML
50 INJECTION, SOLUTION INTRAVENOUS CONTINUOUS
Status: CANCELLED | OUTPATIENT
Start: 2025-08-06 | End: 2025-08-07

## 2025-08-08 ENCOUNTER — TELEPHONE (OUTPATIENT)
Facility: HOSPITAL | Age: 65
End: 2025-08-08
Payer: MEDICARE

## 2025-08-08 NOTE — TELEPHONE ENCOUNTER
Called patient to advise that his cath was cancelled on Monday morning but that he was still expected to come in for his appointments at 2pm and 4pm. Patient unhappy but agreeable.

## 2025-08-08 NOTE — TELEPHONE ENCOUNTER
Called patient to discuss scheduling for cath next week. Insurance has not provided precert for cath on 8/11, so it will need to be rescheduled. Need to confirm if patient can secure transportation to be seen on 8/13. NA/ left VM.

## 2025-08-11 ENCOUNTER — APPOINTMENT (OUTPATIENT)
Dept: TRANSPLANT | Facility: HOSPITAL | Age: 65
End: 2025-08-11
Payer: MEDICARE

## 2025-08-11 ENCOUNTER — TELEPHONE (OUTPATIENT)
Dept: TRANSPLANT | Facility: HOSPITAL | Age: 65
End: 2025-08-11
Payer: MEDICARE

## 2025-08-11 ENCOUNTER — APPOINTMENT (OUTPATIENT)
Dept: CARDIOLOGY | Facility: HOSPITAL | Age: 65
End: 2025-08-11
Payer: MEDICARE

## 2025-08-11 DIAGNOSIS — Z94.1 HEART REPLACED BY TRANSPLANT: ICD-10-CM

## 2025-08-12 ENCOUNTER — TELEPHONE (OUTPATIENT)
Dept: RESPIRATORY THERAPY | Facility: HOSPITAL | Age: 65
End: 2025-08-12
Payer: MEDICARE

## 2025-08-12 DIAGNOSIS — Z94.1 HEART REPLACED BY TRANSPLANT: ICD-10-CM

## 2025-08-13 ENCOUNTER — HOSPITAL ENCOUNTER (INPATIENT)
Facility: HOSPITAL | Age: 65
DRG: 920 | End: 2025-08-13
Attending: STUDENT IN AN ORGANIZED HEALTH CARE EDUCATION/TRAINING PROGRAM | Admitting: STUDENT IN AN ORGANIZED HEALTH CARE EDUCATION/TRAINING PROGRAM
Payer: MEDICARE

## 2025-08-13 DIAGNOSIS — Z94.1 HEART REPLACED BY TRANSPLANT: Primary | ICD-10-CM

## 2025-08-13 DIAGNOSIS — T88.8XXA FLUID COLLECTION AT SURGICAL SITE, INITIAL ENCOUNTER: ICD-10-CM

## 2025-08-13 DIAGNOSIS — R10.31 RIGHT INGUINAL PAIN: ICD-10-CM

## 2025-08-13 DIAGNOSIS — D84.9 IMMUNOSUPPRESSION: ICD-10-CM

## 2025-08-13 DIAGNOSIS — I10 HYPERTENSION, UNSPECIFIED TYPE: ICD-10-CM

## 2025-08-13 DIAGNOSIS — Z94.1 HEART TRANSPLANT STATUS: ICD-10-CM

## 2025-08-13 DIAGNOSIS — Z94.1 HEART TRANSPLANT RECIPIENT: ICD-10-CM

## 2025-08-13 DIAGNOSIS — Z48.21 ENCOUNTER FOR AFTERCARE FOLLOWING HEART TRANSPLANT: ICD-10-CM

## 2025-08-13 DIAGNOSIS — I97.640 SEROMA OF CIRCULATORY SYSTEM AFTER CARDIAC CATHETERIZATION: ICD-10-CM

## 2025-08-13 LAB
ALBUMIN SERPL BCP-MCNC: 3.7 G/DL (ref 3.4–5)
ALP SERPL-CCNC: 88 U/L (ref 33–136)
ALT SERPL W P-5'-P-CCNC: 15 U/L (ref 10–52)
ANION GAP SERPL CALC-SCNC: 13 MMOL/L (ref 10–20)
AST SERPL W P-5'-P-CCNC: 8 U/L (ref 9–39)
BASOPHILS # BLD AUTO: 0.02 X10*3/UL (ref 0–0.1)
BASOPHILS NFR BLD AUTO: 0.2 %
BILIRUB SERPL-MCNC: 0.3 MG/DL (ref 0–1.2)
BUN SERPL-MCNC: 31 MG/DL (ref 6–23)
CALCIUM SERPL-MCNC: 8.7 MG/DL (ref 8.6–10.6)
CHLORIDE SERPL-SCNC: 99 MMOL/L (ref 98–107)
CO2 SERPL-SCNC: 30 MMOL/L (ref 21–32)
CREAT SERPL-MCNC: 1.08 MG/DL (ref 0.5–1.3)
EGFRCR SERPLBLD CKD-EPI 2021: 77 ML/MIN/1.73M*2
EOSINOPHIL # BLD AUTO: 0 X10*3/UL (ref 0–0.7)
EOSINOPHIL NFR BLD AUTO: 0 %
ERYTHROCYTE [DISTWIDTH] IN BLOOD BY AUTOMATED COUNT: 16.5 % (ref 11.5–14.5)
GLUCOSE BLD MANUAL STRIP-MCNC: 168 MG/DL (ref 74–99)
GLUCOSE BLD MANUAL STRIP-MCNC: 244 MG/DL (ref 74–99)
GLUCOSE SERPL-MCNC: 165 MG/DL (ref 74–99)
HCT VFR BLD AUTO: 32.6 % (ref 41–52)
HGB BLD-MCNC: 10 G/DL (ref 13.5–17.5)
IMM GRANULOCYTES # BLD AUTO: 0.11 X10*3/UL (ref 0–0.7)
IMM GRANULOCYTES NFR BLD AUTO: 1 % (ref 0–0.9)
INR PPP: 1 (ref 0.9–1.1)
LYMPHOCYTES # BLD AUTO: 0.52 X10*3/UL (ref 1.2–4.8)
LYMPHOCYTES NFR BLD AUTO: 4.7 %
MCH RBC QN AUTO: 29.6 PG (ref 26–34)
MCHC RBC AUTO-ENTMCNC: 30.7 G/DL (ref 32–36)
MCV RBC AUTO: 96 FL (ref 80–100)
MONOCYTES # BLD AUTO: 0.33 X10*3/UL (ref 0.1–1)
MONOCYTES NFR BLD AUTO: 3 %
NEUTROPHILS # BLD AUTO: 10.2 X10*3/UL (ref 1.2–7.7)
NEUTROPHILS NFR BLD AUTO: 91.1 %
NRBC BLD-RTO: 0 /100 WBCS (ref 0–0)
PLATELET # BLD AUTO: 265 X10*3/UL (ref 150–450)
POTASSIUM SERPL-SCNC: 3.9 MMOL/L (ref 3.5–5.3)
PROT SERPL-MCNC: 5.4 G/DL (ref 6.4–8.2)
PROTHROMBIN TIME: 11.1 SECONDS (ref 9.8–12.4)
RBC # BLD AUTO: 3.38 X10*6/UL (ref 4.5–5.9)
SODIUM SERPL-SCNC: 138 MMOL/L (ref 136–145)
WBC # BLD AUTO: 11.2 X10*3/UL (ref 4.4–11.3)

## 2025-08-13 PROCEDURE — 85610 PROTHROMBIN TIME: CPT | Performed by: STUDENT IN AN ORGANIZED HEALTH CARE EDUCATION/TRAINING PROGRAM

## 2025-08-13 PROCEDURE — 2500000001 HC RX 250 WO HCPCS SELF ADMINISTERED DRUGS (ALT 637 FOR MEDICARE OP): Performed by: STUDENT IN AN ORGANIZED HEALTH CARE EDUCATION/TRAINING PROGRAM

## 2025-08-13 PROCEDURE — 2500000002 HC RX 250 W HCPCS SELF ADMINISTERED DRUGS (ALT 637 FOR MEDICARE OP, ALT 636 FOR OP/ED): Performed by: STUDENT IN AN ORGANIZED HEALTH CARE EDUCATION/TRAINING PROGRAM

## 2025-08-13 PROCEDURE — 85025 COMPLETE CBC W/AUTO DIFF WBC: CPT | Performed by: STUDENT IN AN ORGANIZED HEALTH CARE EDUCATION/TRAINING PROGRAM

## 2025-08-13 PROCEDURE — 84075 ASSAY ALKALINE PHOSPHATASE: CPT | Performed by: STUDENT IN AN ORGANIZED HEALTH CARE EDUCATION/TRAINING PROGRAM

## 2025-08-13 PROCEDURE — 99223 1ST HOSP IP/OBS HIGH 75: CPT | Performed by: STUDENT IN AN ORGANIZED HEALTH CARE EDUCATION/TRAINING PROGRAM

## 2025-08-13 PROCEDURE — 36415 COLL VENOUS BLD VENIPUNCTURE: CPT | Performed by: STUDENT IN AN ORGANIZED HEALTH CARE EDUCATION/TRAINING PROGRAM

## 2025-08-13 PROCEDURE — 87040 BLOOD CULTURE FOR BACTERIA: CPT | Performed by: STUDENT IN AN ORGANIZED HEALTH CARE EDUCATION/TRAINING PROGRAM

## 2025-08-13 PROCEDURE — 2500000004 HC RX 250 GENERAL PHARMACY W/ HCPCS (ALT 636 FOR OP/ED): Performed by: STUDENT IN AN ORGANIZED HEALTH CARE EDUCATION/TRAINING PROGRAM

## 2025-08-13 PROCEDURE — 1100000001 HC PRIVATE ROOM DAILY

## 2025-08-13 PROCEDURE — 82947 ASSAY GLUCOSE BLOOD QUANT: CPT

## 2025-08-13 RX ORDER — INSULIN LISPRO 100 [IU]/ML
0-5 INJECTION, SOLUTION INTRAVENOUS; SUBCUTANEOUS
Status: DISCONTINUED | OUTPATIENT
Start: 2025-08-13 | End: 2025-08-15

## 2025-08-13 RX ORDER — TACROLIMUS 0.5 MG/1
1.5 CAPSULE, GELATIN COATED ORAL EVERY 24 HOURS
Status: DISCONTINUED | OUTPATIENT
Start: 2025-08-14 | End: 2025-08-13

## 2025-08-13 RX ORDER — CALCIUM CARBONATE 500(1250)
1250 TABLET ORAL 2 TIMES DAILY
Status: DISCONTINUED | OUTPATIENT
Start: 2025-08-13 | End: 2025-08-20 | Stop reason: HOSPADM

## 2025-08-13 RX ORDER — GABAPENTIN 300 MG/1
300 CAPSULE ORAL 3 TIMES DAILY
Status: DISCONTINUED | OUTPATIENT
Start: 2025-08-13 | End: 2025-08-20 | Stop reason: HOSPADM

## 2025-08-13 RX ORDER — VORICONAZOLE 200 MG/1
200 TABLET, FILM COATED ORAL EVERY 12 HOURS SCHEDULED
Status: DISCONTINUED | OUTPATIENT
Start: 2025-08-13 | End: 2025-08-20 | Stop reason: HOSPADM

## 2025-08-13 RX ORDER — HYDRALAZINE HYDROCHLORIDE 25 MG/1
25 TABLET, FILM COATED ORAL 3 TIMES DAILY
Status: DISCONTINUED | OUTPATIENT
Start: 2025-08-13 | End: 2025-08-18

## 2025-08-13 RX ORDER — TAMSULOSIN HYDROCHLORIDE 0.4 MG/1
0.4 CAPSULE ORAL DAILY
Status: DISCONTINUED | OUTPATIENT
Start: 2025-08-14 | End: 2025-08-20 | Stop reason: HOSPADM

## 2025-08-13 RX ORDER — INSULIN LISPRO 100 [IU]/ML
6 INJECTION, SOLUTION INTRAVENOUS; SUBCUTANEOUS ONCE
Status: COMPLETED | OUTPATIENT
Start: 2025-08-13 | End: 2025-08-13

## 2025-08-13 RX ORDER — MYCOPHENOLATE MOFETIL 250 MG/1
1000 CAPSULE ORAL EVERY 12 HOURS SCHEDULED
Status: DISCONTINUED | OUTPATIENT
Start: 2025-08-13 | End: 2025-08-20 | Stop reason: HOSPADM

## 2025-08-13 RX ORDER — PRAVASTATIN SODIUM 40 MG/1
40 TABLET ORAL NIGHTLY
Status: DISCONTINUED | OUTPATIENT
Start: 2025-08-13 | End: 2025-08-20 | Stop reason: HOSPADM

## 2025-08-13 RX ORDER — PREDNISONE 20 MG/1
20 TABLET ORAL DAILY
Status: DISCONTINUED | OUTPATIENT
Start: 2025-08-13 | End: 2025-08-19

## 2025-08-13 RX ORDER — DEXTROSE 50 % IN WATER (D50W) INTRAVENOUS SYRINGE
25
Status: DISCONTINUED | OUTPATIENT
Start: 2025-08-13 | End: 2025-08-20 | Stop reason: HOSPADM

## 2025-08-13 RX ORDER — ASPIRIN 81 MG/1
81 TABLET ORAL DAILY
Status: DISCONTINUED | OUTPATIENT
Start: 2025-08-13 | End: 2025-08-20 | Stop reason: HOSPADM

## 2025-08-13 RX ORDER — PANTOPRAZOLE SODIUM 40 MG/1
40 TABLET, DELAYED RELEASE ORAL DAILY
Status: DISCONTINUED | OUTPATIENT
Start: 2025-08-13 | End: 2025-08-20 | Stop reason: HOSPADM

## 2025-08-13 RX ORDER — TACROLIMUS 1 MG/1
1 CAPSULE ORAL EVERY 24 HOURS
Status: DISCONTINUED | OUTPATIENT
Start: 2025-08-13 | End: 2025-08-13

## 2025-08-13 RX ORDER — DEXTROSE 50 % IN WATER (D50W) INTRAVENOUS SYRINGE
12.5
Status: DISCONTINUED | OUTPATIENT
Start: 2025-08-13 | End: 2025-08-20 | Stop reason: HOSPADM

## 2025-08-13 RX ORDER — SULFAMETHOXAZOLE AND TRIMETHOPRIM 400; 80 MG/1; MG/1
1 TABLET ORAL DAILY
Status: DISCONTINUED | OUTPATIENT
Start: 2025-08-14 | End: 2025-08-20 | Stop reason: HOSPADM

## 2025-08-13 RX ORDER — ACYCLOVIR 400 MG/1
400 TABLET ORAL EVERY 12 HOURS SCHEDULED
Status: DISCONTINUED | OUTPATIENT
Start: 2025-08-13 | End: 2025-08-20 | Stop reason: HOSPADM

## 2025-08-13 RX ORDER — ACETAMINOPHEN 325 MG/1
650 TABLET ORAL EVERY 6 HOURS PRN
Status: DISCONTINUED | OUTPATIENT
Start: 2025-08-13 | End: 2025-08-20 | Stop reason: HOSPADM

## 2025-08-13 RX ADMIN — ACYCLOVIR 400 MG: 400 TABLET ORAL at 21:19

## 2025-08-13 RX ADMIN — GABAPENTIN 300 MG: 300 CAPSULE ORAL at 18:20

## 2025-08-13 RX ADMIN — MYCOPHENOLATE MOFETIL 1000 MG: 250 CAPSULE ORAL at 21:19

## 2025-08-13 RX ADMIN — TACROLIMUS 1.5 MG: 1 CAPSULE ORAL at 18:20

## 2025-08-13 RX ADMIN — INSULIN LISPRO 6 UNITS: 100 INJECTION, SOLUTION INTRAVENOUS; SUBCUTANEOUS at 22:10

## 2025-08-13 RX ADMIN — HYDRALAZINE HYDROCHLORIDE 25 MG: 25 TABLET ORAL at 17:46

## 2025-08-13 RX ADMIN — VORICONAZOLE 200 MG: 200 TABLET, COATED ORAL at 21:19

## 2025-08-13 RX ADMIN — CALCIUM 1 TABLET: 500 TABLET ORAL at 21:19

## 2025-08-13 RX ADMIN — HYDRALAZINE HYDROCHLORIDE 25 MG: 25 TABLET ORAL at 21:19

## 2025-08-13 RX ADMIN — PRAVASTATIN SODIUM 40 MG: 40 TABLET ORAL at 21:19

## 2025-08-13 SDOH — ECONOMIC STABILITY: INCOME INSECURITY: IN THE PAST 12 MONTHS HAS THE ELECTRIC, GAS, OIL, OR WATER COMPANY THREATENED TO SHUT OFF SERVICES IN YOUR HOME?: NO

## 2025-08-13 SDOH — SOCIAL STABILITY: SOCIAL INSECURITY: WERE YOU ABLE TO COMPLETE ALL THE BEHAVIORAL HEALTH SCREENINGS?: YES

## 2025-08-13 SDOH — SOCIAL STABILITY: SOCIAL INSECURITY: WITHIN THE LAST YEAR, HAVE YOU BEEN AFRAID OF YOUR PARTNER OR EX-PARTNER?: NO

## 2025-08-13 SDOH — ECONOMIC STABILITY: HOUSING INSECURITY: IN THE PAST 12 MONTHS, HOW MANY TIMES HAVE YOU MOVED WHERE YOU WERE LIVING?: 0

## 2025-08-13 SDOH — SOCIAL STABILITY: SOCIAL INSECURITY: DOES ANYONE TRY TO KEEP YOU FROM HAVING/CONTACTING OTHER FRIENDS OR DOING THINGS OUTSIDE YOUR HOME?: NO

## 2025-08-13 SDOH — SOCIAL STABILITY: SOCIAL INSECURITY: WITHIN THE LAST YEAR, HAVE YOU BEEN HUMILIATED OR EMOTIONALLY ABUSED IN OTHER WAYS BY YOUR PARTNER OR EX-PARTNER?: NO

## 2025-08-13 SDOH — SOCIAL STABILITY: SOCIAL INSECURITY: DO YOU FEEL ANYONE HAS EXPLOITED OR TAKEN ADVANTAGE OF YOU FINANCIALLY OR OF YOUR PERSONAL PROPERTY?: NO

## 2025-08-13 SDOH — ECONOMIC STABILITY: FOOD INSECURITY: WITHIN THE PAST 12 MONTHS, THE FOOD YOU BOUGHT JUST DIDN'T LAST AND YOU DIDN'T HAVE MONEY TO GET MORE.: NEVER TRUE

## 2025-08-13 SDOH — ECONOMIC STABILITY: FOOD INSECURITY: WITHIN THE PAST 12 MONTHS, YOU WORRIED THAT YOUR FOOD WOULD RUN OUT BEFORE YOU GOT THE MONEY TO BUY MORE.: NEVER TRUE

## 2025-08-13 SDOH — ECONOMIC STABILITY: HOUSING INSECURITY: IN THE LAST 12 MONTHS, WAS THERE A TIME WHEN YOU WERE NOT ABLE TO PAY THE MORTGAGE OR RENT ON TIME?: NO

## 2025-08-13 SDOH — SOCIAL STABILITY: SOCIAL INSECURITY: HAS ANYONE EVER THREATENED TO HURT YOUR FAMILY OR YOUR PETS?: NO

## 2025-08-13 SDOH — SOCIAL STABILITY: SOCIAL INSECURITY: HAVE YOU HAD THOUGHTS OF HARMING ANYONE ELSE?: NO

## 2025-08-13 SDOH — ECONOMIC STABILITY: HOUSING INSECURITY: AT ANY TIME IN THE PAST 12 MONTHS, WERE YOU HOMELESS OR LIVING IN A SHELTER (INCLUDING NOW)?: NO

## 2025-08-13 SDOH — SOCIAL STABILITY: SOCIAL INSECURITY: ABUSE: ADULT

## 2025-08-13 SDOH — ECONOMIC STABILITY: FOOD INSECURITY: HOW HARD IS IT FOR YOU TO PAY FOR THE VERY BASICS LIKE FOOD, HOUSING, MEDICAL CARE, AND HEATING?: NOT HARD AT ALL

## 2025-08-13 SDOH — SOCIAL STABILITY: SOCIAL INSECURITY: ARE YOU OR HAVE YOU BEEN THREATENED OR ABUSED PHYSICALLY, EMOTIONALLY, OR SEXUALLY BY ANYONE?: NO

## 2025-08-13 SDOH — ECONOMIC STABILITY: TRANSPORTATION INSECURITY: IN THE PAST 12 MONTHS, HAS LACK OF TRANSPORTATION KEPT YOU FROM MEDICAL APPOINTMENTS OR FROM GETTING MEDICATIONS?: NO

## 2025-08-13 SDOH — SOCIAL STABILITY: SOCIAL INSECURITY: DO YOU FEEL UNSAFE GOING BACK TO THE PLACE WHERE YOU ARE LIVING?: NO

## 2025-08-13 SDOH — SOCIAL STABILITY: SOCIAL INSECURITY: ARE THERE ANY APPARENT SIGNS OF INJURIES/BEHAVIORS THAT COULD BE RELATED TO ABUSE/NEGLECT?: NO

## 2025-08-13 ASSESSMENT — COGNITIVE AND FUNCTIONAL STATUS - GENERAL
MOBILITY SCORE: 24
MOBILITY SCORE: 23
DAILY ACTIVITIY SCORE: 23
DRESSING REGULAR LOWER BODY CLOTHING: A LITTLE
DRESSING REGULAR LOWER BODY CLOTHING: A LITTLE
PATIENT BASELINE BEDBOUND: NO
DAILY ACTIVITIY SCORE: 23
MOBILITY SCORE: 24
DRESSING REGULAR LOWER BODY CLOTHING: A LITTLE
CLIMB 3 TO 5 STEPS WITH RAILING: A LITTLE

## 2025-08-13 ASSESSMENT — LIFESTYLE VARIABLES
HOW MANY STANDARD DRINKS CONTAINING ALCOHOL DO YOU HAVE ON A TYPICAL DAY: PATIENT DOES NOT DRINK
HOW OFTEN DO YOU HAVE 6 OR MORE DRINKS ON ONE OCCASION: NEVER
AUDIT-C TOTAL SCORE: 0
HOW OFTEN DO YOU HAVE A DRINK CONTAINING ALCOHOL: NEVER
SKIP TO QUESTIONS 9-10: 1
AUDIT-C TOTAL SCORE: 0

## 2025-08-13 ASSESSMENT — ACTIVITIES OF DAILY LIVING (ADL)
DRESSING YOURSELF: INDEPENDENT
LACK_OF_TRANSPORTATION: NO
PATIENT'S MEMORY ADEQUATE TO SAFELY COMPLETE DAILY ACTIVITIES?: YES
BATHING: INDEPENDENT
WALKS IN HOME: INDEPENDENT
ADEQUATE_TO_COMPLETE_ADL: YES
HEARING - RIGHT EAR: FUNCTIONAL
HEARING - LEFT EAR: FUNCTIONAL
LACK_OF_TRANSPORTATION: NO
GROOMING: INDEPENDENT
FEEDING YOURSELF: INDEPENDENT
TOILETING: INDEPENDENT
JUDGMENT_ADEQUATE_SAFELY_COMPLETE_DAILY_ACTIVITIES: YES

## 2025-08-13 ASSESSMENT — PATIENT HEALTH QUESTIONNAIRE - PHQ9
1. LITTLE INTEREST OR PLEASURE IN DOING THINGS: NOT AT ALL
SUM OF ALL RESPONSES TO PHQ9 QUESTIONS 1 & 2: 0
2. FEELING DOWN, DEPRESSED OR HOPELESS: NOT AT ALL

## 2025-08-13 ASSESSMENT — PAIN - FUNCTIONAL ASSESSMENT
PAIN_FUNCTIONAL_ASSESSMENT: 0-10
PAIN_FUNCTIONAL_ASSESSMENT: 0-10

## 2025-08-13 ASSESSMENT — PAIN SCALES - GENERAL
PAINLEVEL_OUTOF10: 0 - NO PAIN
PAINLEVEL_OUTOF10: 0 - NO PAIN

## 2025-08-14 ENCOUNTER — CLINICAL SUPPORT (OUTPATIENT)
Dept: CARDIOLOGY | Facility: HOSPITAL | Age: 65
End: 2025-08-14
Payer: MEDICARE

## 2025-08-14 ENCOUNTER — LAB REQUISITION (OUTPATIENT)
Dept: LAB | Facility: HOSPITAL | Age: 65
DRG: 920 | End: 2025-08-14
Payer: MEDICARE

## 2025-08-14 ENCOUNTER — APPOINTMENT (OUTPATIENT)
Dept: RADIOLOGY | Facility: HOSPITAL | Age: 65
DRG: 920 | End: 2025-08-14
Payer: MEDICARE

## 2025-08-14 PROBLEM — R10.31 RIGHT INGUINAL PAIN: Status: ACTIVE | Noted: 2025-08-14

## 2025-08-14 PROBLEM — I97.640: Status: ACTIVE | Noted: 2025-08-12

## 2025-08-14 LAB
ABO GROUP (TYPE) IN BLOOD: NORMAL
ALBUMIN SERPL BCP-MCNC: 3.5 G/DL (ref 3.4–5)
ALP SERPL-CCNC: 79 U/L (ref 33–136)
ALT SERPL W P-5'-P-CCNC: 13 U/L (ref 10–52)
ANION GAP SERPL CALC-SCNC: 14 MMOL/L (ref 10–20)
ANTIBODY SCREEN: NORMAL
AST SERPL W P-5'-P-CCNC: 12 U/L (ref 9–39)
BASOPHILS # BLD AUTO: 0.02 X10*3/UL (ref 0–0.1)
BASOPHILS NFR BLD AUTO: 0.2 %
BILIRUB SERPL-MCNC: 0.3 MG/DL (ref 0–1.2)
BUN SERPL-MCNC: 25 MG/DL (ref 6–23)
CALCIUM SERPL-MCNC: 8.4 MG/DL (ref 8.6–10.6)
CHLORIDE SERPL-SCNC: 100 MMOL/L (ref 98–107)
CO2 SERPL-SCNC: 30 MMOL/L (ref 21–32)
CREAT SERPL-MCNC: 0.89 MG/DL (ref 0.5–1.3)
EGFRCR SERPLBLD CKD-EPI 2021: >90 ML/MIN/1.73M*2
EOSINOPHIL # BLD AUTO: 0 X10*3/UL (ref 0–0.7)
EOSINOPHIL NFR BLD AUTO: 0 %
ERYTHROCYTE [DISTWIDTH] IN BLOOD BY AUTOMATED COUNT: 16.4 % (ref 11.5–14.5)
GLUCOSE BLD MANUAL STRIP-MCNC: 207 MG/DL (ref 74–99)
GLUCOSE BLD MANUAL STRIP-MCNC: 321 MG/DL (ref 74–99)
GLUCOSE SERPL-MCNC: 179 MG/DL (ref 74–99)
HCT VFR BLD AUTO: 31.7 % (ref 41–52)
HGB BLD-MCNC: 10 G/DL (ref 13.5–17.5)
IMM GRANULOCYTES # BLD AUTO: 0.07 X10*3/UL (ref 0–0.7)
IMM GRANULOCYTES NFR BLD AUTO: 0.8 % (ref 0–0.9)
LYMPHOCYTES # BLD AUTO: 1.51 X10*3/UL (ref 1.2–4.8)
LYMPHOCYTES NFR BLD AUTO: 17.6 %
MCH RBC QN AUTO: 30.1 PG (ref 26–34)
MCHC RBC AUTO-ENTMCNC: 31.5 G/DL (ref 32–36)
MCV RBC AUTO: 96 FL (ref 80–100)
MONOCYTES # BLD AUTO: 0.61 X10*3/UL (ref 0.1–1)
MONOCYTES NFR BLD AUTO: 7.1 %
NEUTROPHILS # BLD AUTO: 6.37 X10*3/UL (ref 1.2–7.7)
NEUTROPHILS NFR BLD AUTO: 74.3 %
NRBC BLD-RTO: 0 /100 WBCS (ref 0–0)
PLATELET # BLD AUTO: 243 X10*3/UL (ref 150–450)
POTASSIUM SERPL-SCNC: 3.8 MMOL/L (ref 3.5–5.3)
PROT SERPL-MCNC: 5 G/DL (ref 6.4–8.2)
RBC # BLD AUTO: 3.32 X10*6/UL (ref 4.5–5.9)
RH FACTOR (ANTIGEN D): NORMAL
SODIUM SERPL-SCNC: 140 MMOL/L (ref 136–145)
TACROLIMUS BLD-MCNC: 9 NG/ML
WBC # BLD AUTO: 8.6 X10*3/UL (ref 4.4–11.3)

## 2025-08-14 PROCEDURE — 84075 ASSAY ALKALINE PHOSPHATASE: CPT | Performed by: STUDENT IN AN ORGANIZED HEALTH CARE EDUCATION/TRAINING PROGRAM

## 2025-08-14 PROCEDURE — 99222 1ST HOSP IP/OBS MODERATE 55: CPT | Performed by: INTERNAL MEDICINE

## 2025-08-14 PROCEDURE — 93010 ELECTROCARDIOGRAM REPORT: CPT | Performed by: INTERNAL MEDICINE

## 2025-08-14 PROCEDURE — 36415 COLL VENOUS BLD VENIPUNCTURE: CPT | Performed by: STUDENT IN AN ORGANIZED HEALTH CARE EDUCATION/TRAINING PROGRAM

## 2025-08-14 PROCEDURE — 93005 ELECTROCARDIOGRAM TRACING: CPT

## 2025-08-14 PROCEDURE — 85025 COMPLETE CBC W/AUTO DIFF WBC: CPT | Performed by: STUDENT IN AN ORGANIZED HEALTH CARE EDUCATION/TRAINING PROGRAM

## 2025-08-14 PROCEDURE — 80197 ASSAY OF TACROLIMUS: CPT | Performed by: STUDENT IN AN ORGANIZED HEALTH CARE EDUCATION/TRAINING PROGRAM

## 2025-08-14 PROCEDURE — 2500000002 HC RX 250 W HCPCS SELF ADMINISTERED DRUGS (ALT 637 FOR MEDICARE OP, ALT 636 FOR OP/ED): Performed by: STUDENT IN AN ORGANIZED HEALTH CARE EDUCATION/TRAINING PROGRAM

## 2025-08-14 PROCEDURE — 74176 CT ABD & PELVIS W/O CONTRAST: CPT

## 2025-08-14 PROCEDURE — 99223 1ST HOSP IP/OBS HIGH 75: CPT | Performed by: PHYSICIAN ASSISTANT

## 2025-08-14 PROCEDURE — 1100000001 HC PRIVATE ROOM DAILY

## 2025-08-14 PROCEDURE — 74176 CT ABD & PELVIS W/O CONTRAST: CPT | Performed by: RADIOLOGY

## 2025-08-14 PROCEDURE — 2500000004 HC RX 250 GENERAL PHARMACY W/ HCPCS (ALT 636 FOR OP/ED): Performed by: STUDENT IN AN ORGANIZED HEALTH CARE EDUCATION/TRAINING PROGRAM

## 2025-08-14 PROCEDURE — 82947 ASSAY GLUCOSE BLOOD QUANT: CPT

## 2025-08-14 PROCEDURE — 2500000001 HC RX 250 WO HCPCS SELF ADMINISTERED DRUGS (ALT 637 FOR MEDICARE OP): Performed by: STUDENT IN AN ORGANIZED HEALTH CARE EDUCATION/TRAINING PROGRAM

## 2025-08-14 PROCEDURE — 86850 RBC ANTIBODY SCREEN: CPT | Performed by: STUDENT IN AN ORGANIZED HEALTH CARE EDUCATION/TRAINING PROGRAM

## 2025-08-14 RX ORDER — INSULIN LISPRO 100 [IU]/ML
0-10 INJECTION, SOLUTION INTRAVENOUS; SUBCUTANEOUS
Status: DISCONTINUED | OUTPATIENT
Start: 2025-08-15 | End: 2025-08-20 | Stop reason: HOSPADM

## 2025-08-14 RX ORDER — DEXTROSE 50 % IN WATER (D50W) INTRAVENOUS SYRINGE
12.5
Status: DISCONTINUED | OUTPATIENT
Start: 2025-08-14 | End: 2025-08-20 | Stop reason: HOSPADM

## 2025-08-14 RX ORDER — INSULIN LISPRO 100 [IU]/ML
2 INJECTION, SOLUTION INTRAVENOUS; SUBCUTANEOUS ONCE
Status: COMPLETED | OUTPATIENT
Start: 2025-08-14 | End: 2025-08-14

## 2025-08-14 RX ORDER — AMOXICILLIN 250 MG
1 CAPSULE ORAL NIGHTLY PRN
Status: DISCONTINUED | OUTPATIENT
Start: 2025-08-14 | End: 2025-08-20 | Stop reason: HOSPADM

## 2025-08-14 RX ORDER — DOCUSATE SODIUM 100 MG/1
100 CAPSULE, LIQUID FILLED ORAL 2 TIMES DAILY PRN
Status: DISCONTINUED | OUTPATIENT
Start: 2025-08-14 | End: 2025-08-20 | Stop reason: HOSPADM

## 2025-08-14 RX ORDER — CHOLECALCIFEROL (VITAMIN D3) 25 MCG
50 TABLET ORAL DAILY
Status: DISCONTINUED | OUTPATIENT
Start: 2025-08-14 | End: 2025-08-20 | Stop reason: HOSPADM

## 2025-08-14 RX ORDER — DEXTROSE 50 % IN WATER (D50W) INTRAVENOUS SYRINGE
25
Status: DISCONTINUED | OUTPATIENT
Start: 2025-08-14 | End: 2025-08-20 | Stop reason: HOSPADM

## 2025-08-14 RX ADMIN — LEVOTHYROXINE SODIUM 125 MCG: 0.1 TABLET ORAL at 06:05

## 2025-08-14 RX ADMIN — GABAPENTIN 300 MG: 300 CAPSULE ORAL at 20:59

## 2025-08-14 RX ADMIN — SULFAMETHOXAZOLE AND TRIMETHOPRIM 1 TABLET: 400; 80 TABLET ORAL at 09:59

## 2025-08-14 RX ADMIN — HYDRALAZINE HYDROCHLORIDE 25 MG: 25 TABLET ORAL at 10:00

## 2025-08-14 RX ADMIN — TACROLIMUS 1.5 MG: 1 CAPSULE ORAL at 06:37

## 2025-08-14 RX ADMIN — VORICONAZOLE 200 MG: 200 TABLET, COATED ORAL at 09:59

## 2025-08-14 RX ADMIN — GABAPENTIN 300 MG: 300 CAPSULE ORAL at 10:00

## 2025-08-14 RX ADMIN — HYDRALAZINE HYDROCHLORIDE 25 MG: 25 TABLET ORAL at 16:01

## 2025-08-14 RX ADMIN — VORICONAZOLE 200 MG: 200 TABLET, COATED ORAL at 20:59

## 2025-08-14 RX ADMIN — ACYCLOVIR 400 MG: 400 TABLET ORAL at 20:59

## 2025-08-14 RX ADMIN — Medication 50 MCG: at 18:00

## 2025-08-14 RX ADMIN — INSULIN LISPRO 1 UNITS: 100 INJECTION, SOLUTION INTRAVENOUS; SUBCUTANEOUS at 17:02

## 2025-08-14 RX ADMIN — ASPIRIN 81 MG: 81 TABLET, COATED ORAL at 09:59

## 2025-08-14 RX ADMIN — PRAVASTATIN SODIUM 40 MG: 40 TABLET ORAL at 20:59

## 2025-08-14 RX ADMIN — TACROLIMUS 1.5 MG: 1 CAPSULE ORAL at 17:59

## 2025-08-14 RX ADMIN — MYCOPHENOLATE MOFETIL 1000 MG: 250 CAPSULE ORAL at 20:59

## 2025-08-14 RX ADMIN — CALCIUM 1 TABLET: 500 TABLET ORAL at 16:01

## 2025-08-14 RX ADMIN — ACYCLOVIR 400 MG: 400 TABLET ORAL at 10:00

## 2025-08-14 RX ADMIN — PREDNISONE 20 MG: 20 TABLET ORAL at 10:01

## 2025-08-14 RX ADMIN — INSULIN LISPRO 2 UNITS: 100 INJECTION, SOLUTION INTRAVENOUS; SUBCUTANEOUS at 18:00

## 2025-08-14 RX ADMIN — TAMSULOSIN HYDROCHLORIDE 0.4 MG: 0.4 CAPSULE ORAL at 09:59

## 2025-08-14 RX ADMIN — HYDRALAZINE HYDROCHLORIDE 25 MG: 25 TABLET ORAL at 20:59

## 2025-08-14 RX ADMIN — GABAPENTIN 300 MG: 300 CAPSULE ORAL at 16:01

## 2025-08-14 RX ADMIN — MYCOPHENOLATE MOFETIL 1000 MG: 250 CAPSULE ORAL at 09:59

## 2025-08-14 ASSESSMENT — ENCOUNTER SYMPTOMS
HEMATOLOGIC/LYMPHATIC NEGATIVE: 1
CARDIOVASCULAR NEGATIVE: 1
RESPIRATORY NEGATIVE: 1
MUSCULOSKELETAL NEGATIVE: 1
ENDOCRINE NEGATIVE: 1
CONSTITUTIONAL NEGATIVE: 1
EYES NEGATIVE: 1
NEUROLOGICAL NEGATIVE: 1
GASTROINTESTINAL NEGATIVE: 1
PSYCHIATRIC NEGATIVE: 1

## 2025-08-14 ASSESSMENT — COGNITIVE AND FUNCTIONAL STATUS - GENERAL
DRESSING REGULAR LOWER BODY CLOTHING: A LITTLE
MOBILITY SCORE: 24
DAILY ACTIVITIY SCORE: 23
DAILY ACTIVITIY SCORE: 24
MOBILITY SCORE: 24

## 2025-08-14 ASSESSMENT — PAIN - FUNCTIONAL ASSESSMENT: PAIN_FUNCTIONAL_ASSESSMENT: 0-10

## 2025-08-14 ASSESSMENT — ACTIVITIES OF DAILY LIVING (ADL): LACK_OF_TRANSPORTATION: NO

## 2025-08-14 ASSESSMENT — PAIN SCALES - GENERAL
PAINLEVEL_OUTOF10: 0 - NO PAIN
PAINLEVEL_OUTOF10: 0 - NO PAIN

## 2025-08-15 ENCOUNTER — ANESTHESIA (OUTPATIENT)
Dept: OPERATING ROOM | Facility: HOSPITAL | Age: 65
End: 2025-08-15
Payer: MEDICARE

## 2025-08-15 ENCOUNTER — ANESTHESIA EVENT (OUTPATIENT)
Dept: OPERATING ROOM | Facility: HOSPITAL | Age: 65
End: 2025-08-15
Payer: MEDICARE

## 2025-08-15 LAB
ALBUMIN SERPL BCP-MCNC: 3.2 G/DL (ref 3.4–5)
ALBUMIN SERPL BCP-MCNC: 3.4 G/DL (ref 3.4–5)
ALP SERPL-CCNC: 73 U/L (ref 33–136)
ALP SERPL-CCNC: 74 U/L (ref 33–136)
ALT SERPL W P-5'-P-CCNC: 10 U/L (ref 10–52)
ALT SERPL W P-5'-P-CCNC: 10 U/L (ref 10–52)
ANION GAP SERPL CALC-SCNC: 10 MMOL/L (ref 10–20)
ANION GAP SERPL CALC-SCNC: 12 MMOL/L (ref 10–20)
AST SERPL W P-5'-P-CCNC: 7 U/L (ref 9–39)
AST SERPL W P-5'-P-CCNC: 9 U/L (ref 9–39)
ATRIAL RATE: 91 BPM
BASOPHILS # BLD AUTO: 0.01 X10*3/UL (ref 0–0.1)
BASOPHILS # BLD AUTO: 0.01 X10*3/UL (ref 0–0.1)
BASOPHILS NFR BLD AUTO: 0.1 %
BASOPHILS NFR BLD AUTO: 0.1 %
BILIRUB SERPL-MCNC: 0.2 MG/DL (ref 0–1.2)
BILIRUB SERPL-MCNC: 0.3 MG/DL (ref 0–1.2)
BUN SERPL-MCNC: 24 MG/DL (ref 6–23)
BUN SERPL-MCNC: 24 MG/DL (ref 6–23)
CALCIUM SERPL-MCNC: 8.1 MG/DL (ref 8.6–10.6)
CALCIUM SERPL-MCNC: 8.5 MG/DL (ref 8.6–10.6)
CHLORIDE SERPL-SCNC: 101 MMOL/L (ref 98–107)
CHLORIDE SERPL-SCNC: 104 MMOL/L (ref 98–107)
CO2 SERPL-SCNC: 26 MMOL/L (ref 21–32)
CO2 SERPL-SCNC: 29 MMOL/L (ref 21–32)
CREAT SERPL-MCNC: 0.76 MG/DL (ref 0.5–1.3)
CREAT SERPL-MCNC: 0.85 MG/DL (ref 0.5–1.3)
EGFRCR SERPLBLD CKD-EPI 2021: >90 ML/MIN/1.73M*2
EGFRCR SERPLBLD CKD-EPI 2021: >90 ML/MIN/1.73M*2
EOSINOPHIL # BLD AUTO: 0 X10*3/UL (ref 0–0.7)
EOSINOPHIL # BLD AUTO: 0.03 X10*3/UL (ref 0–0.7)
EOSINOPHIL NFR BLD AUTO: 0 %
EOSINOPHIL NFR BLD AUTO: 0.2 %
ERYTHROCYTE [DISTWIDTH] IN BLOOD BY AUTOMATED COUNT: 16.3 % (ref 11.5–14.5)
ERYTHROCYTE [DISTWIDTH] IN BLOOD BY AUTOMATED COUNT: 16.4 % (ref 11.5–14.5)
GLUCOSE BLD MANUAL STRIP-MCNC: 179 MG/DL (ref 74–99)
GLUCOSE BLD MANUAL STRIP-MCNC: 243 MG/DL (ref 74–99)
GLUCOSE SERPL-MCNC: 169 MG/DL (ref 74–99)
GLUCOSE SERPL-MCNC: 206 MG/DL (ref 74–99)
HBV CORE AB SER QL: NONREACTIVE
HBV CORE IGM SER QL: NONREACTIVE
HBV SURFACE AB SER-ACNC: <3.1 MIU/ML
HBV SURFACE AG SERPL QL IA: NONREACTIVE
HCT VFR BLD AUTO: 33 % (ref 41–52)
HCT VFR BLD AUTO: 33.6 % (ref 41–52)
HCV AB SER QL: NONREACTIVE
HGB BLD-MCNC: 10.1 G/DL (ref 13.5–17.5)
HGB BLD-MCNC: 10.5 G/DL (ref 13.5–17.5)
IMM GRANULOCYTES # BLD AUTO: 0.11 X10*3/UL (ref 0–0.7)
IMM GRANULOCYTES # BLD AUTO: 0.14 X10*3/UL (ref 0–0.7)
IMM GRANULOCYTES NFR BLD AUTO: 1 % (ref 0–0.9)
IMM GRANULOCYTES NFR BLD AUTO: 1.1 % (ref 0–0.9)
LYMPHOCYTES # BLD AUTO: 0.42 X10*3/UL (ref 1.2–4.8)
LYMPHOCYTES # BLD AUTO: 0.73 X10*3/UL (ref 1.2–4.8)
LYMPHOCYTES NFR BLD AUTO: 3.1 %
LYMPHOCYTES NFR BLD AUTO: 7.6 %
MAGNESIUM SERPL-MCNC: 1.63 MG/DL (ref 1.6–2.4)
MCH RBC QN AUTO: 29.9 PG (ref 26–34)
MCH RBC QN AUTO: 30.4 PG (ref 26–34)
MCHC RBC AUTO-ENTMCNC: 30.6 G/DL (ref 32–36)
MCHC RBC AUTO-ENTMCNC: 31.3 G/DL (ref 32–36)
MCV RBC AUTO: 97 FL (ref 80–100)
MCV RBC AUTO: 98 FL (ref 80–100)
MONOCYTES # BLD AUTO: 0.56 X10*3/UL (ref 0.1–1)
MONOCYTES # BLD AUTO: 0.57 X10*3/UL (ref 0.1–1)
MONOCYTES NFR BLD AUTO: 4.2 %
MONOCYTES NFR BLD AUTO: 5.9 %
NEUTROPHILS # BLD AUTO: 12.53 X10*3/UL (ref 1.2–7.7)
NEUTROPHILS # BLD AUTO: 8.16 X10*3/UL (ref 1.2–7.7)
NEUTROPHILS NFR BLD AUTO: 85.3 %
NEUTROPHILS NFR BLD AUTO: 91.4 %
NRBC BLD-RTO: 0 /100 WBCS (ref 0–0)
NRBC BLD-RTO: 0 /100 WBCS (ref 0–0)
P AXIS: 73 DEGREES
P OFFSET: 205 MS
P ONSET: 154 MS
PLATELET # BLD AUTO: 246 X10*3/UL (ref 150–450)
PLATELET # BLD AUTO: 252 X10*3/UL (ref 150–450)
POTASSIUM SERPL-SCNC: 3.8 MMOL/L (ref 3.5–5.3)
POTASSIUM SERPL-SCNC: 4.3 MMOL/L (ref 3.5–5.3)
PR INTERVAL: 138 MS
PROT SERPL-MCNC: 5.1 G/DL (ref 6.4–8.2)
PROT SERPL-MCNC: 5.2 G/DL (ref 6.4–8.2)
Q ONSET: 223 MS
QRS COUNT: 15 BEATS
QRS DURATION: 104 MS
QT INTERVAL: 386 MS
QTC CALCULATION(BAZETT): 474 MS
QTC FREDERICIA: 443 MS
R AXIS: 14 DEGREES
RBC # BLD AUTO: 3.38 X10*6/UL (ref 4.5–5.9)
RBC # BLD AUTO: 3.45 X10*6/UL (ref 4.5–5.9)
SODIUM SERPL-SCNC: 136 MMOL/L (ref 136–145)
SODIUM SERPL-SCNC: 138 MMOL/L (ref 136–145)
T AXIS: 8 DEGREES
T OFFSET: 416 MS
TACROLIMUS BLD-MCNC: 12.5 NG/ML
TACROLIMUS BLD-MCNC: 15.4 NG/ML
VENTRICULAR RATE: 91 BPM
WBC # BLD AUTO: 13.7 X10*3/UL (ref 4.4–11.3)
WBC # BLD AUTO: 9.6 X10*3/UL (ref 4.4–11.3)

## 2025-08-15 PROCEDURE — 2500000001 HC RX 250 WO HCPCS SELF ADMINISTERED DRUGS (ALT 637 FOR MEDICARE OP): Performed by: STUDENT IN AN ORGANIZED HEALTH CARE EDUCATION/TRAINING PROGRAM

## 2025-08-15 PROCEDURE — 7100000002 HC RECOVERY ROOM TIME - EACH INCREMENTAL 1 MINUTE: Performed by: THORACIC SURGERY (CARDIOTHORACIC VASCULAR SURGERY)

## 2025-08-15 PROCEDURE — 2500000004 HC RX 250 GENERAL PHARMACY W/ HCPCS (ALT 636 FOR OP/ED): Performed by: ANESTHESIOLOGY

## 2025-08-15 PROCEDURE — 3600000002 HC OR TIME - INITIAL BASE CHARGE - PROCEDURE LEVEL TWO: Performed by: THORACIC SURGERY (CARDIOTHORACIC VASCULAR SURGERY)

## 2025-08-15 PROCEDURE — 87801 DETECT AGNT MULT DNA AMPLI: CPT | Performed by: STUDENT IN AN ORGANIZED HEALTH CARE EDUCATION/TRAINING PROGRAM

## 2025-08-15 PROCEDURE — 87340 HEPATITIS B SURFACE AG IA: CPT | Performed by: STUDENT IN AN ORGANIZED HEALTH CARE EDUCATION/TRAINING PROGRAM

## 2025-08-15 PROCEDURE — 80197 ASSAY OF TACROLIMUS: CPT | Performed by: STUDENT IN AN ORGANIZED HEALTH CARE EDUCATION/TRAINING PROGRAM

## 2025-08-15 PROCEDURE — 2500000002 HC RX 250 W HCPCS SELF ADMINISTERED DRUGS (ALT 637 FOR MEDICARE OP, ALT 636 FOR OP/ED): Performed by: STUDENT IN AN ORGANIZED HEALTH CARE EDUCATION/TRAINING PROGRAM

## 2025-08-15 PROCEDURE — 84075 ASSAY ALKALINE PHOSPHATASE: CPT | Performed by: STUDENT IN AN ORGANIZED HEALTH CARE EDUCATION/TRAINING PROGRAM

## 2025-08-15 PROCEDURE — 87077 CULTURE AEROBIC IDENTIFY: CPT | Performed by: THORACIC SURGERY (CARDIOTHORACIC VASCULAR SURGERY)

## 2025-08-15 PROCEDURE — 83735 ASSAY OF MAGNESIUM: CPT | Performed by: STUDENT IN AN ORGANIZED HEALTH CARE EDUCATION/TRAINING PROGRAM

## 2025-08-15 PROCEDURE — 2500000004 HC RX 250 GENERAL PHARMACY W/ HCPCS (ALT 636 FOR OP/ED): Performed by: THORACIC SURGERY (CARDIOTHORACIC VASCULAR SURGERY)

## 2025-08-15 PROCEDURE — 87015 SPECIMEN INFECT AGNT CONCNTJ: CPT | Performed by: THORACIC SURGERY (CARDIOTHORACIC VASCULAR SURGERY)

## 2025-08-15 PROCEDURE — 85025 COMPLETE CBC W/AUTO DIFF WBC: CPT | Performed by: STUDENT IN AN ORGANIZED HEALTH CARE EDUCATION/TRAINING PROGRAM

## 2025-08-15 PROCEDURE — 7100000001 HC RECOVERY ROOM TIME - INITIAL BASE CHARGE: Performed by: THORACIC SURGERY (CARDIOTHORACIC VASCULAR SURGERY)

## 2025-08-15 PROCEDURE — 3700000002 HC GENERAL ANESTHESIA TIME - EACH INCREMENTAL 1 MINUTE: Performed by: THORACIC SURGERY (CARDIOTHORACIC VASCULAR SURGERY)

## 2025-08-15 PROCEDURE — 2500000004 HC RX 250 GENERAL PHARMACY W/ HCPCS (ALT 636 FOR OP/ED): Performed by: STUDENT IN AN ORGANIZED HEALTH CARE EDUCATION/TRAINING PROGRAM

## 2025-08-15 PROCEDURE — 86803 HEPATITIS C AB TEST: CPT | Performed by: STUDENT IN AN ORGANIZED HEALTH CARE EDUCATION/TRAINING PROGRAM

## 2025-08-15 PROCEDURE — 82947 ASSAY GLUCOSE BLOOD QUANT: CPT

## 2025-08-15 PROCEDURE — 10140 I&D HMTMA SEROMA/FLUID COLLJ: CPT | Performed by: THORACIC SURGERY (CARDIOTHORACIC VASCULAR SURGERY)

## 2025-08-15 PROCEDURE — 36415 COLL VENOUS BLD VENIPUNCTURE: CPT | Performed by: PHYSICIAN ASSISTANT

## 2025-08-15 PROCEDURE — 2500000004 HC RX 250 GENERAL PHARMACY W/ HCPCS (ALT 636 FOR OP/ED)

## 2025-08-15 PROCEDURE — 87389 HIV-1 AG W/HIV-1&-2 AB AG IA: CPT | Performed by: STUDENT IN AN ORGANIZED HEALTH CARE EDUCATION/TRAINING PROGRAM

## 2025-08-15 PROCEDURE — 3600000003 HC OR TIME - INITIAL BASE CHARGE - PROCEDURE LEVEL THREE: Performed by: THORACIC SURGERY (CARDIOTHORACIC VASCULAR SURGERY)

## 2025-08-15 PROCEDURE — 86706 HEP B SURFACE ANTIBODY: CPT | Performed by: STUDENT IN AN ORGANIZED HEALTH CARE EDUCATION/TRAINING PROGRAM

## 2025-08-15 PROCEDURE — 2500000005 HC RX 250 GENERAL PHARMACY W/O HCPCS: Performed by: THORACIC SURGERY (CARDIOTHORACIC VASCULAR SURGERY)

## 2025-08-15 PROCEDURE — 2060000001 HC INTERMEDIATE ICU ROOM DAILY

## 2025-08-15 PROCEDURE — 3600000008 HC OR TIME - EACH INCREMENTAL 1 MINUTE - PROCEDURE LEVEL THREE: Performed by: THORACIC SURGERY (CARDIOTHORACIC VASCULAR SURGERY)

## 2025-08-15 PROCEDURE — 3600000007 HC OR TIME - EACH INCREMENTAL 1 MINUTE - PROCEDURE LEVEL TWO: Performed by: THORACIC SURGERY (CARDIOTHORACIC VASCULAR SURGERY)

## 2025-08-15 PROCEDURE — 3700000001 HC GENERAL ANESTHESIA TIME - INITIAL BASE CHARGE: Performed by: THORACIC SURGERY (CARDIOTHORACIC VASCULAR SURGERY)

## 2025-08-15 PROCEDURE — 86705 HEP B CORE ANTIBODY IGM: CPT | Performed by: STUDENT IN AN ORGANIZED HEALTH CARE EDUCATION/TRAINING PROGRAM

## 2025-08-15 PROCEDURE — 87102 FUNGUS ISOLATION CULTURE: CPT | Performed by: THORACIC SURGERY (CARDIOTHORACIC VASCULAR SURGERY)

## 2025-08-15 PROCEDURE — 36415 COLL VENOUS BLD VENIPUNCTURE: CPT | Performed by: STUDENT IN AN ORGANIZED HEALTH CARE EDUCATION/TRAINING PROGRAM

## 2025-08-15 PROCEDURE — 99233 SBSQ HOSP IP/OBS HIGH 50: CPT | Performed by: STUDENT IN AN ORGANIZED HEALTH CARE EDUCATION/TRAINING PROGRAM

## 2025-08-15 PROCEDURE — 87070 CULTURE OTHR SPECIMN AEROBIC: CPT | Performed by: THORACIC SURGERY (CARDIOTHORACIC VASCULAR SURGERY)

## 2025-08-15 PROCEDURE — 86704 HEP B CORE ANTIBODY TOTAL: CPT | Performed by: STUDENT IN AN ORGANIZED HEALTH CARE EDUCATION/TRAINING PROGRAM

## 2025-08-15 RX ORDER — OXYCODONE HYDROCHLORIDE 5 MG/1
10 TABLET ORAL EVERY 4 HOURS PRN
Status: DISCONTINUED | OUTPATIENT
Start: 2025-08-15 | End: 2025-08-15 | Stop reason: HOSPADM

## 2025-08-15 RX ORDER — OXYCODONE HYDROCHLORIDE 5 MG/1
5 TABLET ORAL EVERY 4 HOURS PRN
Status: DISCONTINUED | OUTPATIENT
Start: 2025-08-15 | End: 2025-08-15 | Stop reason: HOSPADM

## 2025-08-15 RX ORDER — SODIUM CHLORIDE 0.9 G/100ML
INJECTION, SOLUTION IRRIGATION AS NEEDED
Status: DISCONTINUED | OUTPATIENT
Start: 2025-08-15 | End: 2025-08-15 | Stop reason: HOSPADM

## 2025-08-15 RX ORDER — LIDOCAINE HYDROCHLORIDE 10 MG/ML
INJECTION, SOLUTION INFILTRATION; PERINEURAL AS NEEDED
Status: DISCONTINUED | OUTPATIENT
Start: 2025-08-15 | End: 2025-08-15 | Stop reason: HOSPADM

## 2025-08-15 RX ORDER — FENTANYL CITRATE 50 UG/ML
12.5 INJECTION, SOLUTION INTRAMUSCULAR; INTRAVENOUS EVERY 5 MIN PRN
Status: DISCONTINUED | OUTPATIENT
Start: 2025-08-15 | End: 2025-08-15 | Stop reason: HOSPADM

## 2025-08-15 RX ORDER — FENTANYL CITRATE 50 UG/ML
50 INJECTION, SOLUTION INTRAMUSCULAR; INTRAVENOUS EVERY 5 MIN PRN
Status: DISCONTINUED | OUTPATIENT
Start: 2025-08-15 | End: 2025-08-15 | Stop reason: HOSPADM

## 2025-08-15 RX ORDER — FENTANYL CITRATE 50 UG/ML
25 INJECTION, SOLUTION INTRAMUSCULAR; INTRAVENOUS EVERY 5 MIN PRN
Status: DISCONTINUED | OUTPATIENT
Start: 2025-08-15 | End: 2025-08-15 | Stop reason: HOSPADM

## 2025-08-15 RX ORDER — CEFAZOLIN 1 G/1
INJECTION, POWDER, FOR SOLUTION INTRAVENOUS AS NEEDED
Status: DISCONTINUED | OUTPATIENT
Start: 2025-08-15 | End: 2025-08-15

## 2025-08-15 RX ORDER — VANCOMYCIN HYDROCHLORIDE 1 G/20ML
INJECTION, POWDER, LYOPHILIZED, FOR SOLUTION INTRAVENOUS AS NEEDED
Status: DISCONTINUED | OUTPATIENT
Start: 2025-08-15 | End: 2025-08-15 | Stop reason: HOSPADM

## 2025-08-15 RX ORDER — ALBUTEROL SULFATE 0.83 MG/ML
2.5 SOLUTION RESPIRATORY (INHALATION) ONCE AS NEEDED
Status: DISCONTINUED | OUTPATIENT
Start: 2025-08-15 | End: 2025-08-15 | Stop reason: HOSPADM

## 2025-08-15 RX ORDER — LIDOCAINE HYDROCHLORIDE 20 MG/ML
INJECTION, SOLUTION INFILTRATION; PERINEURAL AS NEEDED
Status: DISCONTINUED | OUTPATIENT
Start: 2025-08-15 | End: 2025-08-15

## 2025-08-15 RX ORDER — DEXTROSE 50 % IN WATER (D50W) INTRAVENOUS SYRINGE
25
Status: DISCONTINUED | OUTPATIENT
Start: 2025-08-15 | End: 2025-08-15

## 2025-08-15 RX ORDER — DEXTROSE 50 % IN WATER (D50W) INTRAVENOUS SYRINGE
12.5
Status: DISCONTINUED | OUTPATIENT
Start: 2025-08-15 | End: 2025-08-15

## 2025-08-15 RX ORDER — MIDAZOLAM HYDROCHLORIDE 2 MG/2ML
INJECTION, SOLUTION INTRAMUSCULAR; INTRAVENOUS AS NEEDED
Status: DISCONTINUED | OUTPATIENT
Start: 2025-08-15 | End: 2025-08-15

## 2025-08-15 RX ORDER — SODIUM CHLORIDE 9 MG/ML
50 INJECTION, SOLUTION INTRAVENOUS CONTINUOUS
Status: DISCONTINUED | OUTPATIENT
Start: 2025-08-15 | End: 2025-08-15

## 2025-08-15 RX ORDER — LIDOCAINE HYDROCHLORIDE 10 MG/ML
0.1 INJECTION, SOLUTION INFILTRATION; PERINEURAL ONCE
Status: DISCONTINUED | OUTPATIENT
Start: 2025-08-15 | End: 2025-08-15 | Stop reason: HOSPADM

## 2025-08-15 RX ORDER — ROCURONIUM BROMIDE 10 MG/ML
INJECTION, SOLUTION INTRAVENOUS AS NEEDED
Status: DISCONTINUED | OUTPATIENT
Start: 2025-08-15 | End: 2025-08-15

## 2025-08-15 RX ORDER — PROPOFOL 10 MG/ML
INJECTION, EMULSION INTRAVENOUS AS NEEDED
Status: DISCONTINUED | OUTPATIENT
Start: 2025-08-15 | End: 2025-08-15

## 2025-08-15 RX ORDER — LABETALOL HYDROCHLORIDE 5 MG/ML
5 INJECTION, SOLUTION INTRAVENOUS EVERY 10 MIN PRN
Status: DISCONTINUED | OUTPATIENT
Start: 2025-08-15 | End: 2025-08-15 | Stop reason: HOSPADM

## 2025-08-15 RX ORDER — PROCHLORPERAZINE EDISYLATE 5 MG/ML
5 INJECTION INTRAMUSCULAR; INTRAVENOUS ONCE AS NEEDED
Status: DISCONTINUED | OUTPATIENT
Start: 2025-08-15 | End: 2025-08-15 | Stop reason: HOSPADM

## 2025-08-15 RX ORDER — ONDANSETRON HYDROCHLORIDE 2 MG/ML
INJECTION, SOLUTION INTRAVENOUS AS NEEDED
Status: DISCONTINUED | OUTPATIENT
Start: 2025-08-15 | End: 2025-08-15

## 2025-08-15 RX ORDER — FENTANYL CITRATE 50 UG/ML
INJECTION, SOLUTION INTRAMUSCULAR; INTRAVENOUS AS NEEDED
Status: DISCONTINUED | OUTPATIENT
Start: 2025-08-15 | End: 2025-08-15

## 2025-08-15 RX ORDER — SODIUM CHLORIDE, SODIUM LACTATE, POTASSIUM CHLORIDE, CALCIUM CHLORIDE 600; 310; 30; 20 MG/100ML; MG/100ML; MG/100ML; MG/100ML
100 INJECTION, SOLUTION INTRAVENOUS CONTINUOUS
Status: DISCONTINUED | OUTPATIENT
Start: 2025-08-15 | End: 2025-08-15 | Stop reason: HOSPADM

## 2025-08-15 RX ORDER — ONDANSETRON HYDROCHLORIDE 2 MG/ML
4 INJECTION, SOLUTION INTRAVENOUS ONCE AS NEEDED
Status: DISCONTINUED | OUTPATIENT
Start: 2025-08-15 | End: 2025-08-15 | Stop reason: HOSPADM

## 2025-08-15 RX ORDER — HYDRALAZINE HYDROCHLORIDE 20 MG/ML
5 INJECTION INTRAMUSCULAR; INTRAVENOUS ONCE AS NEEDED
Status: DISCONTINUED | OUTPATIENT
Start: 2025-08-15 | End: 2025-08-15 | Stop reason: HOSPADM

## 2025-08-15 RX ADMIN — GABAPENTIN 300 MG: 300 CAPSULE ORAL at 21:59

## 2025-08-15 RX ADMIN — ACYCLOVIR 400 MG: 400 TABLET ORAL at 21:59

## 2025-08-15 RX ADMIN — PRAVASTATIN SODIUM 40 MG: 40 TABLET ORAL at 21:59

## 2025-08-15 RX ADMIN — GABAPENTIN 300 MG: 300 CAPSULE ORAL at 08:50

## 2025-08-15 RX ADMIN — ASPIRIN 81 MG: 81 TABLET, COATED ORAL at 08:50

## 2025-08-15 RX ADMIN — TACROLIMUS 1.5 MG: 1 CAPSULE ORAL at 17:45

## 2025-08-15 RX ADMIN — CALCIUM 1 TABLET: 500 TABLET ORAL at 17:06

## 2025-08-15 RX ADMIN — MYCOPHENOLATE MOFETIL 1000 MG: 250 CAPSULE ORAL at 08:50

## 2025-08-15 RX ADMIN — PANTOPRAZOLE SODIUM 40 MG: 40 TABLET, DELAYED RELEASE ORAL at 08:50

## 2025-08-15 RX ADMIN — SULFAMETHOXAZOLE AND TRIMETHOPRIM 1 TABLET: 400; 80 TABLET ORAL at 08:50

## 2025-08-15 RX ADMIN — MIDAZOLAM HYDROCHLORIDE 2 MG: 2 INJECTION, SOLUTION INTRAMUSCULAR; INTRAVENOUS at 13:48

## 2025-08-15 RX ADMIN — HYDRALAZINE HYDROCHLORIDE 25 MG: 25 TABLET ORAL at 21:59

## 2025-08-15 RX ADMIN — HYDRALAZINE HYDROCHLORIDE 25 MG: 25 TABLET ORAL at 08:50

## 2025-08-15 RX ADMIN — ONDANSETRON 4 MG: 2 INJECTION INTRAMUSCULAR; INTRAVENOUS at 14:27

## 2025-08-15 RX ADMIN — ACETAMINOPHEN 650 MG: 325 TABLET ORAL at 18:17

## 2025-08-15 RX ADMIN — Medication 50 MCG: at 08:50

## 2025-08-15 RX ADMIN — PROPOFOL 100 MG: 10 INJECTION, EMULSION INTRAVENOUS at 13:48

## 2025-08-15 RX ADMIN — SODIUM CHLORIDE: 0.9 INJECTION, SOLUTION INTRAVENOUS at 13:37

## 2025-08-15 RX ADMIN — VORICONAZOLE 200 MG: 200 TABLET, COATED ORAL at 08:50

## 2025-08-15 RX ADMIN — HYDRALAZINE HYDROCHLORIDE 25 MG: 25 TABLET ORAL at 17:11

## 2025-08-15 RX ADMIN — GABAPENTIN 300 MG: 300 CAPSULE ORAL at 17:10

## 2025-08-15 RX ADMIN — TACROLIMUS 1.5 MG: 1 CAPSULE ORAL at 06:32

## 2025-08-15 RX ADMIN — PREDNISONE 20 MG: 20 TABLET ORAL at 08:50

## 2025-08-15 RX ADMIN — TAMSULOSIN HYDROCHLORIDE 0.4 MG: 0.4 CAPSULE ORAL at 08:50

## 2025-08-15 RX ADMIN — LEVOTHYROXINE SODIUM 125 MCG: 0.1 TABLET ORAL at 05:34

## 2025-08-15 RX ADMIN — INSULIN LISPRO 4 UNITS: 100 INJECTION, SOLUTION INTRAVENOUS; SUBCUTANEOUS at 18:07

## 2025-08-15 RX ADMIN — MYCOPHENOLATE MOFETIL 1000 MG: 250 CAPSULE ORAL at 21:59

## 2025-08-15 RX ADMIN — ACYCLOVIR 400 MG: 400 TABLET ORAL at 08:50

## 2025-08-15 RX ADMIN — CEFAZOLIN 2 G: 1 INJECTION, POWDER, FOR SOLUTION INTRAMUSCULAR; INTRAVENOUS at 14:06

## 2025-08-15 RX ADMIN — ROCURONIUM BROMIDE 50 MG: 10 INJECTION INTRAVENOUS at 13:49

## 2025-08-15 RX ADMIN — FENTANYL CITRATE 100 MCG: 50 INJECTION, SOLUTION INTRAMUSCULAR; INTRAVENOUS at 13:48

## 2025-08-15 RX ADMIN — LIDOCAINE HYDROCHLORIDE 100 MG: 20 INJECTION, SOLUTION INFILTRATION; PERINEURAL at 13:48

## 2025-08-15 RX ADMIN — SUGAMMADEX 200 MG: 100 INJECTION, SOLUTION INTRAVENOUS at 14:45

## 2025-08-15 ASSESSMENT — COGNITIVE AND FUNCTIONAL STATUS - GENERAL
CLIMB 3 TO 5 STEPS WITH RAILING: A LITTLE
WALKING IN HOSPITAL ROOM: A LITTLE
PERSONAL GROOMING: A LITTLE
DRESSING REGULAR LOWER BODY CLOTHING: A LITTLE
MOVING TO AND FROM BED TO CHAIR: A LITTLE
TURNING FROM BACK TO SIDE WHILE IN FLAT BAD: A LITTLE
STANDING UP FROM CHAIR USING ARMS: A LITTLE
HELP NEEDED FOR BATHING: A LITTLE
DAILY ACTIVITIY SCORE: 19
DRESSING REGULAR UPPER BODY CLOTHING: A LITTLE
MOBILITY SCORE: 19
TOILETING: A LITTLE

## 2025-08-15 ASSESSMENT — PAIN - FUNCTIONAL ASSESSMENT
PAIN_FUNCTIONAL_ASSESSMENT: 0-10

## 2025-08-15 ASSESSMENT — PAIN SCALES - GENERAL
PAINLEVEL_OUTOF10: 2
PAINLEVEL_OUTOF10: 2
PAINLEVEL_OUTOF10: 0 - NO PAIN
PAINLEVEL_OUTOF10: 2
PAINLEVEL_OUTOF10: 0 - NO PAIN
PAINLEVEL_OUTOF10: 2

## 2025-08-16 LAB
ACID FAST STN SPEC: NORMAL
ALBUMIN SERPL BCP-MCNC: 3.3 G/DL (ref 3.4–5)
ALP SERPL-CCNC: 72 U/L (ref 33–136)
ALT SERPL W P-5'-P-CCNC: 9 U/L (ref 10–52)
ANION GAP SERPL CALC-SCNC: 12 MMOL/L (ref 10–20)
AST SERPL W P-5'-P-CCNC: 9 U/L (ref 9–39)
BASOPHILS # BLD AUTO: 0.03 X10*3/UL (ref 0–0.1)
BASOPHILS NFR BLD AUTO: 0.3 %
BILIRUB SERPL-MCNC: 0.3 MG/DL (ref 0–1.2)
BUN SERPL-MCNC: 21 MG/DL (ref 6–23)
CALCIUM SERPL-MCNC: 8.3 MG/DL (ref 8.6–10.6)
CHLORIDE SERPL-SCNC: 105 MMOL/L (ref 98–107)
CO2 SERPL-SCNC: 27 MMOL/L (ref 21–32)
CREAT SERPL-MCNC: 1.03 MG/DL (ref 0.5–1.3)
EGFRCR SERPLBLD CKD-EPI 2021: 81 ML/MIN/1.73M*2
EOSINOPHIL # BLD AUTO: 0.02 X10*3/UL (ref 0–0.7)
EOSINOPHIL NFR BLD AUTO: 0.2 %
ERYTHROCYTE [DISTWIDTH] IN BLOOD BY AUTOMATED COUNT: 16.2 % (ref 11.5–14.5)
FUNGUS SPEC FUNGUS STN: NORMAL
FUNGUS SPEC FUNGUS STN: NORMAL
GLUCOSE BLD MANUAL STRIP-MCNC: 154 MG/DL (ref 74–99)
GLUCOSE BLD MANUAL STRIP-MCNC: 234 MG/DL (ref 74–99)
GLUCOSE BLD MANUAL STRIP-MCNC: 242 MG/DL (ref 74–99)
GLUCOSE SERPL-MCNC: 121 MG/DL (ref 74–99)
HCT VFR BLD AUTO: 34.8 % (ref 41–52)
HGB BLD-MCNC: 10.5 G/DL (ref 13.5–17.5)
HIV 1+2 AB+HIV1 P24 AG SERPL QL IA: NONREACTIVE
IMM GRANULOCYTES # BLD AUTO: 0.11 X10*3/UL (ref 0–0.7)
IMM GRANULOCYTES NFR BLD AUTO: 1 % (ref 0–0.9)
LYMPHOCYTES # BLD AUTO: 1.04 X10*3/UL (ref 1.2–4.8)
LYMPHOCYTES NFR BLD AUTO: 9.4 %
MCH RBC QN AUTO: 30.1 PG (ref 26–34)
MCHC RBC AUTO-ENTMCNC: 30.2 G/DL (ref 32–36)
MCV RBC AUTO: 100 FL (ref 80–100)
MONOCYTES # BLD AUTO: 0.73 X10*3/UL (ref 0.1–1)
MONOCYTES NFR BLD AUTO: 6.6 %
MYCOBACTERIUM SPEC CULT: NORMAL
NEUTROPHILS # BLD AUTO: 9.08 X10*3/UL (ref 1.2–7.7)
NEUTROPHILS NFR BLD AUTO: 82.5 %
NRBC BLD-RTO: 0 /100 WBCS (ref 0–0)
PLATELET # BLD AUTO: 246 X10*3/UL (ref 150–450)
POTASSIUM SERPL-SCNC: 4.1 MMOL/L (ref 3.5–5.3)
PROT SERPL-MCNC: 5.1 G/DL (ref 6.4–8.2)
RBC # BLD AUTO: 3.49 X10*6/UL (ref 4.5–5.9)
SODIUM SERPL-SCNC: 140 MMOL/L (ref 136–145)
TACROLIMUS BLD-MCNC: 9.2 NG/ML
WBC # BLD AUTO: 11 X10*3/UL (ref 4.4–11.3)

## 2025-08-16 PROCEDURE — 2500000004 HC RX 250 GENERAL PHARMACY W/ HCPCS (ALT 636 FOR OP/ED): Performed by: STUDENT IN AN ORGANIZED HEALTH CARE EDUCATION/TRAINING PROGRAM

## 2025-08-16 PROCEDURE — 80053 COMPREHEN METABOLIC PANEL: CPT | Performed by: STUDENT IN AN ORGANIZED HEALTH CARE EDUCATION/TRAINING PROGRAM

## 2025-08-16 PROCEDURE — 2500000001 HC RX 250 WO HCPCS SELF ADMINISTERED DRUGS (ALT 637 FOR MEDICARE OP): Performed by: STUDENT IN AN ORGANIZED HEALTH CARE EDUCATION/TRAINING PROGRAM

## 2025-08-16 PROCEDURE — 99233 SBSQ HOSP IP/OBS HIGH 50: CPT

## 2025-08-16 PROCEDURE — 36415 COLL VENOUS BLD VENIPUNCTURE: CPT | Performed by: STUDENT IN AN ORGANIZED HEALTH CARE EDUCATION/TRAINING PROGRAM

## 2025-08-16 PROCEDURE — 82947 ASSAY GLUCOSE BLOOD QUANT: CPT

## 2025-08-16 PROCEDURE — 2500000002 HC RX 250 W HCPCS SELF ADMINISTERED DRUGS (ALT 637 FOR MEDICARE OP, ALT 636 FOR OP/ED): Performed by: STUDENT IN AN ORGANIZED HEALTH CARE EDUCATION/TRAINING PROGRAM

## 2025-08-16 PROCEDURE — 2500000004 HC RX 250 GENERAL PHARMACY W/ HCPCS (ALT 636 FOR OP/ED)

## 2025-08-16 PROCEDURE — 80197 ASSAY OF TACROLIMUS: CPT | Performed by: STUDENT IN AN ORGANIZED HEALTH CARE EDUCATION/TRAINING PROGRAM

## 2025-08-16 PROCEDURE — 85025 COMPLETE CBC W/AUTO DIFF WBC: CPT | Performed by: STUDENT IN AN ORGANIZED HEALTH CARE EDUCATION/TRAINING PROGRAM

## 2025-08-16 PROCEDURE — 2060000001 HC INTERMEDIATE ICU ROOM DAILY

## 2025-08-16 RX ORDER — TACROLIMUS 1 MG/1
2 CAPSULE ORAL
Status: DISCONTINUED | OUTPATIENT
Start: 2025-08-16 | End: 2025-08-20 | Stop reason: HOSPADM

## 2025-08-16 RX ADMIN — ACETAMINOPHEN 650 MG: 325 TABLET ORAL at 09:35

## 2025-08-16 RX ADMIN — PRAVASTATIN SODIUM 40 MG: 40 TABLET ORAL at 21:14

## 2025-08-16 RX ADMIN — TAMSULOSIN HYDROCHLORIDE 0.4 MG: 0.4 CAPSULE ORAL at 09:34

## 2025-08-16 RX ADMIN — SULFAMETHOXAZOLE AND TRIMETHOPRIM 1 TABLET: 400; 80 TABLET ORAL at 10:07

## 2025-08-16 RX ADMIN — HYDRALAZINE HYDROCHLORIDE 25 MG: 25 TABLET ORAL at 21:14

## 2025-08-16 RX ADMIN — PREDNISONE 20 MG: 20 TABLET ORAL at 09:35

## 2025-08-16 RX ADMIN — HYDRALAZINE HYDROCHLORIDE 25 MG: 25 TABLET ORAL at 14:41

## 2025-08-16 RX ADMIN — INSULIN LISPRO 4 UNITS: 100 INJECTION, SOLUTION INTRAVENOUS; SUBCUTANEOUS at 14:41

## 2025-08-16 RX ADMIN — VORICONAZOLE 200 MG: 200 TABLET, COATED ORAL at 00:44

## 2025-08-16 RX ADMIN — VORICONAZOLE 200 MG: 200 TABLET, COATED ORAL at 21:14

## 2025-08-16 RX ADMIN — INSULIN LISPRO 4 UNITS: 100 INJECTION, SOLUTION INTRAVENOUS; SUBCUTANEOUS at 21:15

## 2025-08-16 RX ADMIN — MYCOPHENOLATE MOFETIL 1000 MG: 250 CAPSULE ORAL at 09:34

## 2025-08-16 RX ADMIN — HYDRALAZINE HYDROCHLORIDE 25 MG: 25 TABLET ORAL at 09:34

## 2025-08-16 RX ADMIN — TACROLIMUS 1.5 MG: 1 CAPSULE ORAL at 06:09

## 2025-08-16 RX ADMIN — ACYCLOVIR 400 MG: 400 TABLET ORAL at 09:34

## 2025-08-16 RX ADMIN — CALCIUM 1 TABLET: 500 TABLET ORAL at 13:30

## 2025-08-16 RX ADMIN — ASPIRIN 81 MG: 81 TABLET, COATED ORAL at 09:34

## 2025-08-16 RX ADMIN — MYCOPHENOLATE MOFETIL 1000 MG: 250 CAPSULE ORAL at 21:14

## 2025-08-16 RX ADMIN — Medication 50 MCG: at 09:34

## 2025-08-16 RX ADMIN — GABAPENTIN 300 MG: 300 CAPSULE ORAL at 14:41

## 2025-08-16 RX ADMIN — ACYCLOVIR 400 MG: 400 TABLET ORAL at 21:14

## 2025-08-16 RX ADMIN — VORICONAZOLE 200 MG: 200 TABLET, COATED ORAL at 09:33

## 2025-08-16 RX ADMIN — LEVOTHYROXINE SODIUM 125 MCG: 0.1 TABLET ORAL at 06:08

## 2025-08-16 RX ADMIN — GABAPENTIN 300 MG: 300 CAPSULE ORAL at 09:34

## 2025-08-16 RX ADMIN — PANTOPRAZOLE SODIUM 40 MG: 40 TABLET, DELAYED RELEASE ORAL at 09:34

## 2025-08-16 RX ADMIN — CALCIUM 1 TABLET: 500 TABLET ORAL at 18:08

## 2025-08-16 RX ADMIN — GABAPENTIN 300 MG: 300 CAPSULE ORAL at 21:15

## 2025-08-16 RX ADMIN — TACROLIMUS 2 MG: 1 CAPSULE ORAL at 18:07

## 2025-08-16 ASSESSMENT — COGNITIVE AND FUNCTIONAL STATUS - GENERAL
TOILETING: A LITTLE
MOBILITY SCORE: 24
WALKING IN HOSPITAL ROOM: A LITTLE
TURNING FROM BACK TO SIDE WHILE IN FLAT BAD: A LITTLE
DRESSING REGULAR LOWER BODY CLOTHING: A LITTLE
MOBILITY SCORE: 19
HELP NEEDED FOR BATHING: A LITTLE
DRESSING REGULAR UPPER BODY CLOTHING: A LITTLE
PERSONAL GROOMING: A LITTLE
DAILY ACTIVITIY SCORE: 22
MOVING TO AND FROM BED TO CHAIR: A LITTLE
DAILY ACTIVITIY SCORE: 19
HELP NEEDED FOR BATHING: A LITTLE
DRESSING REGULAR LOWER BODY CLOTHING: A LITTLE
STANDING UP FROM CHAIR USING ARMS: A LITTLE
CLIMB 3 TO 5 STEPS WITH RAILING: A LITTLE

## 2025-08-16 ASSESSMENT — PAIN - FUNCTIONAL ASSESSMENT: PAIN_FUNCTIONAL_ASSESSMENT: 0-10

## 2025-08-16 ASSESSMENT — PAIN SCALES - GENERAL: PAINLEVEL_OUTOF10: 0 - NO PAIN

## 2025-08-17 VITALS
WEIGHT: 207.67 LBS | OXYGEN SATURATION: 94 % | SYSTOLIC BLOOD PRESSURE: 137 MMHG | HEIGHT: 69 IN | TEMPERATURE: 98.4 F | BODY MASS INDEX: 30.76 KG/M2 | RESPIRATION RATE: 20 BRPM | HEART RATE: 93 BPM | DIASTOLIC BLOOD PRESSURE: 77 MMHG

## 2025-08-17 LAB
ALBUMIN SERPL BCP-MCNC: 3.7 G/DL (ref 3.4–5)
ALP SERPL-CCNC: 81 U/L (ref 33–136)
ALT SERPL W P-5'-P-CCNC: 7 U/L (ref 10–52)
ANION GAP SERPL CALC-SCNC: 14 MMOL/L (ref 10–20)
AST SERPL W P-5'-P-CCNC: 14 U/L (ref 9–39)
BACTERIA BLD CULT: NORMAL
BACTERIA FLD CULT: NORMAL
BACTERIA SPEC CULT: ABNORMAL
BASOPHILS # BLD AUTO: 0.02 X10*3/UL (ref 0–0.1)
BASOPHILS NFR BLD AUTO: 0.2 %
BILIRUB SERPL-MCNC: 0.3 MG/DL (ref 0–1.2)
BUN SERPL-MCNC: 20 MG/DL (ref 6–23)
CALCIUM SERPL-MCNC: 9.1 MG/DL (ref 8.6–10.6)
CHLORIDE SERPL-SCNC: 105 MMOL/L (ref 98–107)
CO2 SERPL-SCNC: 26 MMOL/L (ref 21–32)
CREAT SERPL-MCNC: 0.74 MG/DL (ref 0.5–1.3)
EGFRCR SERPLBLD CKD-EPI 2021: >90 ML/MIN/1.73M*2
EOSINOPHIL # BLD AUTO: 0.01 X10*3/UL (ref 0–0.7)
EOSINOPHIL NFR BLD AUTO: 0.1 %
ERYTHROCYTE [DISTWIDTH] IN BLOOD BY AUTOMATED COUNT: 16.2 % (ref 11.5–14.5)
GLUCOSE BLD MANUAL STRIP-MCNC: 150 MG/DL (ref 74–99)
GLUCOSE BLD MANUAL STRIP-MCNC: 196 MG/DL (ref 74–99)
GLUCOSE BLD MANUAL STRIP-MCNC: 222 MG/DL (ref 74–99)
GLUCOSE SERPL-MCNC: 159 MG/DL (ref 74–99)
GRAM STN SPEC: ABNORMAL
GRAM STN SPEC: ABNORMAL
GRAM STN SPEC: NORMAL
GRAM STN SPEC: NORMAL
HCT VFR BLD AUTO: 32.4 % (ref 41–52)
HGB BLD-MCNC: 9.9 G/DL (ref 13.5–17.5)
IMM GRANULOCYTES # BLD AUTO: 0.18 X10*3/UL (ref 0–0.7)
IMM GRANULOCYTES NFR BLD AUTO: 1.6 % (ref 0–0.9)
LYMPHOCYTES # BLD AUTO: 0.41 X10*3/UL (ref 1.2–4.8)
LYMPHOCYTES NFR BLD AUTO: 3.7 %
MCH RBC QN AUTO: 29.6 PG (ref 26–34)
MCHC RBC AUTO-ENTMCNC: 30.6 G/DL (ref 32–36)
MCV RBC AUTO: 97 FL (ref 80–100)
MONOCYTES # BLD AUTO: 0.46 X10*3/UL (ref 0.1–1)
MONOCYTES NFR BLD AUTO: 4.2 %
NEUTROPHILS # BLD AUTO: 9.91 X10*3/UL (ref 1.2–7.7)
NEUTROPHILS NFR BLD AUTO: 90.2 %
NRBC BLD-RTO: 0 /100 WBCS (ref 0–0)
PLATELET # BLD AUTO: 234 X10*3/UL (ref 150–450)
POTASSIUM SERPL-SCNC: 4.5 MMOL/L (ref 3.5–5.3)
PROT SERPL-MCNC: 5.9 G/DL (ref 6.4–8.2)
RBC # BLD AUTO: 3.34 X10*6/UL (ref 4.5–5.9)
SODIUM SERPL-SCNC: 140 MMOL/L (ref 136–145)
TACROLIMUS BLD-MCNC: 10.8 NG/ML
WBC # BLD AUTO: 11 X10*3/UL (ref 4.4–11.3)

## 2025-08-17 PROCEDURE — 2500000001 HC RX 250 WO HCPCS SELF ADMINISTERED DRUGS (ALT 637 FOR MEDICARE OP): Performed by: STUDENT IN AN ORGANIZED HEALTH CARE EDUCATION/TRAINING PROGRAM

## 2025-08-17 PROCEDURE — 2500000004 HC RX 250 GENERAL PHARMACY W/ HCPCS (ALT 636 FOR OP/ED)

## 2025-08-17 PROCEDURE — 99233 SBSQ HOSP IP/OBS HIGH 50: CPT

## 2025-08-17 PROCEDURE — 2500000004 HC RX 250 GENERAL PHARMACY W/ HCPCS (ALT 636 FOR OP/ED): Performed by: STUDENT IN AN ORGANIZED HEALTH CARE EDUCATION/TRAINING PROGRAM

## 2025-08-17 PROCEDURE — 80053 COMPREHEN METABOLIC PANEL: CPT | Performed by: STUDENT IN AN ORGANIZED HEALTH CARE EDUCATION/TRAINING PROGRAM

## 2025-08-17 PROCEDURE — 36415 COLL VENOUS BLD VENIPUNCTURE: CPT | Performed by: STUDENT IN AN ORGANIZED HEALTH CARE EDUCATION/TRAINING PROGRAM

## 2025-08-17 PROCEDURE — 85025 COMPLETE CBC W/AUTO DIFF WBC: CPT | Performed by: STUDENT IN AN ORGANIZED HEALTH CARE EDUCATION/TRAINING PROGRAM

## 2025-08-17 PROCEDURE — 82947 ASSAY GLUCOSE BLOOD QUANT: CPT

## 2025-08-17 PROCEDURE — 2060000001 HC INTERMEDIATE ICU ROOM DAILY

## 2025-08-17 PROCEDURE — 2500000002 HC RX 250 W HCPCS SELF ADMINISTERED DRUGS (ALT 637 FOR MEDICARE OP, ALT 636 FOR OP/ED): Performed by: STUDENT IN AN ORGANIZED HEALTH CARE EDUCATION/TRAINING PROGRAM

## 2025-08-17 PROCEDURE — 80197 ASSAY OF TACROLIMUS: CPT | Performed by: STUDENT IN AN ORGANIZED HEALTH CARE EDUCATION/TRAINING PROGRAM

## 2025-08-17 RX ADMIN — SULFAMETHOXAZOLE AND TRIMETHOPRIM 1 TABLET: 400; 80 TABLET ORAL at 08:23

## 2025-08-17 RX ADMIN — ACYCLOVIR 400 MG: 400 TABLET ORAL at 08:22

## 2025-08-17 RX ADMIN — HYDRALAZINE HYDROCHLORIDE 25 MG: 25 TABLET ORAL at 08:23

## 2025-08-17 RX ADMIN — PANTOPRAZOLE SODIUM 40 MG: 40 TABLET, DELAYED RELEASE ORAL at 08:23

## 2025-08-17 RX ADMIN — CALCIUM 1 TABLET: 500 TABLET ORAL at 14:09

## 2025-08-17 RX ADMIN — CALCIUM 1 TABLET: 500 TABLET ORAL at 18:03

## 2025-08-17 RX ADMIN — VORICONAZOLE 200 MG: 200 TABLET, COATED ORAL at 20:55

## 2025-08-17 RX ADMIN — INSULIN LISPRO 2 UNITS: 100 INJECTION, SOLUTION INTRAVENOUS; SUBCUTANEOUS at 06:49

## 2025-08-17 RX ADMIN — LEVOTHYROXINE SODIUM 125 MCG: 0.1 TABLET ORAL at 06:48

## 2025-08-17 RX ADMIN — HYDRALAZINE HYDROCHLORIDE 25 MG: 25 TABLET ORAL at 14:09

## 2025-08-17 RX ADMIN — TACROLIMUS 2 MG: 1 CAPSULE ORAL at 06:49

## 2025-08-17 RX ADMIN — GABAPENTIN 300 MG: 300 CAPSULE ORAL at 20:56

## 2025-08-17 RX ADMIN — VORICONAZOLE 200 MG: 200 TABLET, COATED ORAL at 08:24

## 2025-08-17 RX ADMIN — MYCOPHENOLATE MOFETIL 1000 MG: 250 CAPSULE ORAL at 08:23

## 2025-08-17 RX ADMIN — ASPIRIN 81 MG: 81 TABLET, COATED ORAL at 08:23

## 2025-08-17 RX ADMIN — GABAPENTIN 300 MG: 300 CAPSULE ORAL at 14:09

## 2025-08-17 RX ADMIN — Medication 50 MCG: at 08:22

## 2025-08-17 RX ADMIN — TACROLIMUS 2 MG: 1 CAPSULE ORAL at 18:03

## 2025-08-17 RX ADMIN — ACYCLOVIR 400 MG: 400 TABLET ORAL at 20:55

## 2025-08-17 RX ADMIN — INSULIN LISPRO 4 UNITS: 100 INJECTION, SOLUTION INTRAVENOUS; SUBCUTANEOUS at 21:14

## 2025-08-17 RX ADMIN — HYDRALAZINE HYDROCHLORIDE 25 MG: 25 TABLET ORAL at 20:56

## 2025-08-17 RX ADMIN — GABAPENTIN 300 MG: 300 CAPSULE ORAL at 08:23

## 2025-08-17 RX ADMIN — TAMSULOSIN HYDROCHLORIDE 0.4 MG: 0.4 CAPSULE ORAL at 08:23

## 2025-08-17 RX ADMIN — PREDNISONE 20 MG: 20 TABLET ORAL at 08:23

## 2025-08-17 RX ADMIN — PRAVASTATIN SODIUM 40 MG: 40 TABLET ORAL at 20:55

## 2025-08-17 RX ADMIN — MYCOPHENOLATE MOFETIL 1000 MG: 250 CAPSULE ORAL at 20:55

## 2025-08-17 ASSESSMENT — COGNITIVE AND FUNCTIONAL STATUS - GENERAL
MOBILITY SCORE: 24
MOBILITY SCORE: 24
DAILY ACTIVITIY SCORE: 24
DAILY ACTIVITIY SCORE: 24

## 2025-08-17 ASSESSMENT — PAIN - FUNCTIONAL ASSESSMENT
PAIN_FUNCTIONAL_ASSESSMENT: 0-10
PAIN_FUNCTIONAL_ASSESSMENT: 0-10

## 2025-08-17 ASSESSMENT — PAIN SCALES - GENERAL
PAINLEVEL_OUTOF10: 0 - NO PAIN
PAINLEVEL_OUTOF10: 0 - NO PAIN

## 2025-08-18 ENCOUNTER — TELEPHONE (OUTPATIENT)
Dept: TRANSPLANT | Facility: HOSPITAL | Age: 65
End: 2025-08-18
Payer: MEDICARE

## 2025-08-18 LAB
ALBUMIN SERPL BCP-MCNC: 3.3 G/DL (ref 3.4–5)
ALP SERPL-CCNC: 74 U/L (ref 33–136)
ALT SERPL W P-5'-P-CCNC: 8 U/L (ref 10–52)
ANION GAP SERPL CALC-SCNC: 11 MMOL/L (ref 10–20)
AST SERPL W P-5'-P-CCNC: 6 U/L (ref 9–39)
BACTERIA FLD CULT: NORMAL
BACTERIA SPEC CULT: ABNORMAL
BASOPHILS # BLD AUTO: 0.02 X10*3/UL (ref 0–0.1)
BASOPHILS NFR BLD AUTO: 0.2 %
BILIRUB SERPL-MCNC: 0.2 MG/DL (ref 0–1.2)
BUN SERPL-MCNC: 20 MG/DL (ref 6–23)
CALCIUM SERPL-MCNC: 8.2 MG/DL (ref 8.6–10.6)
CHLORIDE SERPL-SCNC: 104 MMOL/L (ref 98–107)
CO2 SERPL-SCNC: 28 MMOL/L (ref 21–32)
CREAT SERPL-MCNC: 0.8 MG/DL (ref 0.5–1.3)
EGFRCR SERPLBLD CKD-EPI 2021: >90 ML/MIN/1.73M*2
EOSINOPHIL # BLD AUTO: 0.06 X10*3/UL (ref 0–0.7)
EOSINOPHIL NFR BLD AUTO: 0.6 %
ERYTHROCYTE [DISTWIDTH] IN BLOOD BY AUTOMATED COUNT: 16.3 % (ref 11.5–14.5)
GLUCOSE BLD MANUAL STRIP-MCNC: 166 MG/DL (ref 74–99)
GLUCOSE BLD MANUAL STRIP-MCNC: 168 MG/DL (ref 74–99)
GLUCOSE BLD MANUAL STRIP-MCNC: 212 MG/DL (ref 74–99)
GLUCOSE BLD MANUAL STRIP-MCNC: 234 MG/DL (ref 74–99)
GLUCOSE SERPL-MCNC: 161 MG/DL (ref 74–99)
GRAM STN SPEC: ABNORMAL
GRAM STN SPEC: ABNORMAL
GRAM STN SPEC: NORMAL
GRAM STN SPEC: NORMAL
HCT VFR BLD AUTO: 31.5 % (ref 41–52)
HGB BLD-MCNC: 9.7 G/DL (ref 13.5–17.5)
IMM GRANULOCYTES # BLD AUTO: 0.16 X10*3/UL (ref 0–0.7)
IMM GRANULOCYTES NFR BLD AUTO: 1.6 % (ref 0–0.9)
LYMPHOCYTES # BLD AUTO: 0.89 X10*3/UL (ref 1.2–4.8)
LYMPHOCYTES NFR BLD AUTO: 9 %
MCH RBC QN AUTO: 29.6 PG (ref 26–34)
MCHC RBC AUTO-ENTMCNC: 30.8 G/DL (ref 32–36)
MCV RBC AUTO: 96 FL (ref 80–100)
MONOCYTES # BLD AUTO: 0.7 X10*3/UL (ref 0.1–1)
MONOCYTES NFR BLD AUTO: 7.1 %
NEUTROPHILS # BLD AUTO: 8.06 X10*3/UL (ref 1.2–7.7)
NEUTROPHILS NFR BLD AUTO: 81.5 %
NRBC BLD-RTO: 0 /100 WBCS (ref 0–0)
PLATELET # BLD AUTO: 239 X10*3/UL (ref 150–450)
POTASSIUM SERPL-SCNC: 4.1 MMOL/L (ref 3.5–5.3)
PROT SERPL-MCNC: 4.9 G/DL (ref 6.4–8.2)
RBC # BLD AUTO: 3.28 X10*6/UL (ref 4.5–5.9)
SODIUM SERPL-SCNC: 139 MMOL/L (ref 136–145)
TACROLIMUS BLD-MCNC: 11.2 NG/ML
WBC # BLD AUTO: 9.9 X10*3/UL (ref 4.4–11.3)

## 2025-08-18 PROCEDURE — 82947 ASSAY GLUCOSE BLOOD QUANT: CPT

## 2025-08-18 PROCEDURE — 99232 SBSQ HOSP IP/OBS MODERATE 35: CPT | Performed by: INTERNAL MEDICINE

## 2025-08-18 PROCEDURE — 36415 COLL VENOUS BLD VENIPUNCTURE: CPT | Performed by: STUDENT IN AN ORGANIZED HEALTH CARE EDUCATION/TRAINING PROGRAM

## 2025-08-18 PROCEDURE — 2500000004 HC RX 250 GENERAL PHARMACY W/ HCPCS (ALT 636 FOR OP/ED)

## 2025-08-18 PROCEDURE — 80053 COMPREHEN METABOLIC PANEL: CPT | Performed by: STUDENT IN AN ORGANIZED HEALTH CARE EDUCATION/TRAINING PROGRAM

## 2025-08-18 PROCEDURE — 80197 ASSAY OF TACROLIMUS: CPT | Performed by: STUDENT IN AN ORGANIZED HEALTH CARE EDUCATION/TRAINING PROGRAM

## 2025-08-18 PROCEDURE — 2500000001 HC RX 250 WO HCPCS SELF ADMINISTERED DRUGS (ALT 637 FOR MEDICARE OP): Performed by: STUDENT IN AN ORGANIZED HEALTH CARE EDUCATION/TRAINING PROGRAM

## 2025-08-18 PROCEDURE — 85025 COMPLETE CBC W/AUTO DIFF WBC: CPT | Performed by: STUDENT IN AN ORGANIZED HEALTH CARE EDUCATION/TRAINING PROGRAM

## 2025-08-18 PROCEDURE — 2060000001 HC INTERMEDIATE ICU ROOM DAILY

## 2025-08-18 PROCEDURE — 2500000002 HC RX 250 W HCPCS SELF ADMINISTERED DRUGS (ALT 637 FOR MEDICARE OP, ALT 636 FOR OP/ED): Performed by: STUDENT IN AN ORGANIZED HEALTH CARE EDUCATION/TRAINING PROGRAM

## 2025-08-18 PROCEDURE — 2500000004 HC RX 250 GENERAL PHARMACY W/ HCPCS (ALT 636 FOR OP/ED): Performed by: STUDENT IN AN ORGANIZED HEALTH CARE EDUCATION/TRAINING PROGRAM

## 2025-08-18 RX ORDER — AMLODIPINE BESYLATE 2.5 MG/1
2.5 TABLET ORAL DAILY
Status: DISCONTINUED | OUTPATIENT
Start: 2025-08-18 | End: 2025-08-19

## 2025-08-18 RX ADMIN — ACYCLOVIR 400 MG: 400 TABLET ORAL at 20:36

## 2025-08-18 RX ADMIN — VORICONAZOLE 200 MG: 200 TABLET, COATED ORAL at 09:09

## 2025-08-18 RX ADMIN — LEVOTHYROXINE SODIUM 125 MCG: 0.1 TABLET ORAL at 06:51

## 2025-08-18 RX ADMIN — SULFAMETHOXAZOLE AND TRIMETHOPRIM 1 TABLET: 400; 80 TABLET ORAL at 09:09

## 2025-08-18 RX ADMIN — HYDRALAZINE HYDROCHLORIDE 25 MG: 25 TABLET ORAL at 09:08

## 2025-08-18 RX ADMIN — TACROLIMUS 2 MG: 1 CAPSULE ORAL at 18:23

## 2025-08-18 RX ADMIN — MYCOPHENOLATE MOFETIL 1000 MG: 250 CAPSULE ORAL at 09:08

## 2025-08-18 RX ADMIN — ASPIRIN 81 MG: 81 TABLET, COATED ORAL at 09:08

## 2025-08-18 RX ADMIN — PIPERACILLIN SODIUM AND TAZOBACTAM SODIUM 4.5 G: 4; .5 INJECTION, SOLUTION INTRAVENOUS at 15:10

## 2025-08-18 RX ADMIN — GABAPENTIN 300 MG: 300 CAPSULE ORAL at 15:10

## 2025-08-18 RX ADMIN — ACYCLOVIR 400 MG: 400 TABLET ORAL at 09:08

## 2025-08-18 RX ADMIN — TACROLIMUS 2 MG: 1 CAPSULE ORAL at 06:51

## 2025-08-18 RX ADMIN — VORICONAZOLE 200 MG: 200 TABLET, COATED ORAL at 20:36

## 2025-08-18 RX ADMIN — PRAVASTATIN SODIUM 40 MG: 40 TABLET ORAL at 20:37

## 2025-08-18 RX ADMIN — PREDNISONE 20 MG: 20 TABLET ORAL at 09:08

## 2025-08-18 RX ADMIN — GABAPENTIN 300 MG: 300 CAPSULE ORAL at 20:36

## 2025-08-18 RX ADMIN — TAMSULOSIN HYDROCHLORIDE 0.4 MG: 0.4 CAPSULE ORAL at 09:08

## 2025-08-18 RX ADMIN — AMLODIPINE BESYLATE 2.5 MG: 2.5 TABLET ORAL at 15:10

## 2025-08-18 RX ADMIN — GABAPENTIN 300 MG: 300 CAPSULE ORAL at 09:08

## 2025-08-18 RX ADMIN — CALCIUM 1 TABLET: 500 TABLET ORAL at 12:47

## 2025-08-18 RX ADMIN — INSULIN LISPRO 2 UNITS: 100 INJECTION, SOLUTION INTRAVENOUS; SUBCUTANEOUS at 12:47

## 2025-08-18 RX ADMIN — PANTOPRAZOLE SODIUM 40 MG: 40 TABLET, DELAYED RELEASE ORAL at 09:08

## 2025-08-18 RX ADMIN — CALCIUM 1 TABLET: 500 TABLET ORAL at 18:23

## 2025-08-18 RX ADMIN — PIPERACILLIN SODIUM AND TAZOBACTAM SODIUM 4.5 G: 4; .5 INJECTION, SOLUTION INTRAVENOUS at 10:06

## 2025-08-18 RX ADMIN — MYCOPHENOLATE MOFETIL 1000 MG: 250 CAPSULE ORAL at 20:36

## 2025-08-18 RX ADMIN — INSULIN LISPRO 2 UNITS: 100 INJECTION, SOLUTION INTRAVENOUS; SUBCUTANEOUS at 09:00

## 2025-08-18 RX ADMIN — Medication 50 MCG: at 09:08

## 2025-08-18 RX ADMIN — PIPERACILLIN SODIUM AND TAZOBACTAM SODIUM 4.5 G: 4; .5 INJECTION, SOLUTION INTRAVENOUS at 20:36

## 2025-08-18 ASSESSMENT — COGNITIVE AND FUNCTIONAL STATUS - GENERAL
DAILY ACTIVITIY SCORE: 24
DAILY ACTIVITIY SCORE: 24
MOBILITY SCORE: 24
MOBILITY SCORE: 24

## 2025-08-18 ASSESSMENT — PAIN SCALES - GENERAL
PAINLEVEL_OUTOF10: 0 - NO PAIN

## 2025-08-18 ASSESSMENT — PAIN - FUNCTIONAL ASSESSMENT
PAIN_FUNCTIONAL_ASSESSMENT: 0-10

## 2025-08-19 ENCOUNTER — APPOINTMENT (OUTPATIENT)
Dept: CARDIOLOGY | Facility: HOSPITAL | Age: 65
DRG: 920 | End: 2025-08-19
Payer: MEDICARE

## 2025-08-19 LAB
ALBUMIN SERPL BCP-MCNC: 3.3 G/DL (ref 3.4–5)
ALP SERPL-CCNC: 68 U/L (ref 33–136)
ALT SERPL W P-5'-P-CCNC: 7 U/L (ref 10–52)
ANION GAP SERPL CALC-SCNC: 12 MMOL/L (ref 10–20)
AST SERPL W P-5'-P-CCNC: 7 U/L (ref 9–39)
BASOPHILS # BLD AUTO: 0.02 X10*3/UL (ref 0–0.1)
BASOPHILS NFR BLD AUTO: 0.1 %
BILIRUB SERPL-MCNC: 0.3 MG/DL (ref 0–1.2)
BUN SERPL-MCNC: 24 MG/DL (ref 6–23)
CALCIUM SERPL-MCNC: 8.4 MG/DL (ref 8.6–10.6)
CHLORIDE SERPL-SCNC: 104 MMOL/L (ref 98–107)
CO2 SERPL-SCNC: 27 MMOL/L (ref 21–32)
CREAT SERPL-MCNC: 0.93 MG/DL (ref 0.5–1.3)
EGFRCR SERPLBLD CKD-EPI 2021: >90 ML/MIN/1.73M*2
EJECTION FRACTION APICAL 4 CHAMBER: 59.6
EJECTION FRACTION: 63 %
EOSINOPHIL # BLD AUTO: 0.01 X10*3/UL (ref 0–0.7)
EOSINOPHIL NFR BLD AUTO: 0.1 %
ERYTHROCYTE [DISTWIDTH] IN BLOOD BY AUTOMATED COUNT: 16.2 % (ref 11.5–14.5)
GLUCOSE BLD MANUAL STRIP-MCNC: 198 MG/DL (ref 74–99)
GLUCOSE BLD MANUAL STRIP-MCNC: 205 MG/DL (ref 74–99)
GLUCOSE BLD MANUAL STRIP-MCNC: 231 MG/DL (ref 74–99)
GLUCOSE SERPL-MCNC: 181 MG/DL (ref 74–99)
HCT VFR BLD AUTO: 33.1 % (ref 41–52)
HGB BLD-MCNC: 10 G/DL (ref 13.5–17.5)
HIV1 RNA SERPL DONR QL PROBE AMP: NORMAL
IMM GRANULOCYTES # BLD AUTO: 0.19 X10*3/UL (ref 0–0.7)
IMM GRANULOCYTES NFR BLD AUTO: 1.3 % (ref 0–0.9)
LYMPHOCYTES # BLD AUTO: 0.56 X10*3/UL (ref 1.2–4.8)
LYMPHOCYTES NFR BLD AUTO: 3.9 %
MCH RBC QN AUTO: 29.4 PG (ref 26–34)
MCHC RBC AUTO-ENTMCNC: 30.2 G/DL (ref 32–36)
MCV RBC AUTO: 97 FL (ref 80–100)
MONOCYTES # BLD AUTO: 0.67 X10*3/UL (ref 0.1–1)
MONOCYTES NFR BLD AUTO: 4.7 %
NEUTROPHILS # BLD AUTO: 12.74 X10*3/UL (ref 1.2–7.7)
NEUTROPHILS NFR BLD AUTO: 89.9 %
NRBC BLD-RTO: 0 /100 WBCS (ref 0–0)
PLATELET # BLD AUTO: 251 X10*3/UL (ref 150–450)
POTASSIUM SERPL-SCNC: 4.2 MMOL/L (ref 3.5–5.3)
PROT SERPL-MCNC: 5.4 G/DL (ref 6.4–8.2)
RBC # BLD AUTO: 3.4 X10*6/UL (ref 4.5–5.9)
RIGHT VENTRICLE FREE WALL PEAK S': 9 CM/S
RIGHT VENTRICLE PEAK SYSTOLIC PRESSURE: 30 MMHG
SODIUM SERPL-SCNC: 139 MMOL/L (ref 136–145)
TACROLIMUS BLD-MCNC: 12 NG/ML
TRICUSPID ANNULAR PLANE SYSTOLIC EXCURSION: 1 CM
WBC # BLD AUTO: 14.2 X10*3/UL (ref 4.4–11.3)

## 2025-08-19 PROCEDURE — 80197 ASSAY OF TACROLIMUS: CPT | Performed by: STUDENT IN AN ORGANIZED HEALTH CARE EDUCATION/TRAINING PROGRAM

## 2025-08-19 PROCEDURE — 93505 ENDOMYOCARDIAL BIOPSY: CPT | Performed by: INTERNAL MEDICINE

## 2025-08-19 PROCEDURE — 93325 DOPPLER ECHO COLOR FLOW MAPG: CPT | Performed by: INTERNAL MEDICINE

## 2025-08-19 PROCEDURE — 2500000004 HC RX 250 GENERAL PHARMACY W/ HCPCS (ALT 636 FOR OP/ED): Performed by: INTERNAL MEDICINE

## 2025-08-19 PROCEDURE — C1894 INTRO/SHEATH, NON-LASER: HCPCS | Performed by: INTERNAL MEDICINE

## 2025-08-19 PROCEDURE — 82947 ASSAY GLUCOSE BLOOD QUANT: CPT

## 2025-08-19 PROCEDURE — 85025 COMPLETE CBC W/AUTO DIFF WBC: CPT | Performed by: STUDENT IN AN ORGANIZED HEALTH CARE EDUCATION/TRAINING PROGRAM

## 2025-08-19 PROCEDURE — 93321 DOPPLER ECHO F-UP/LMTD STD: CPT | Performed by: INTERNAL MEDICINE

## 2025-08-19 PROCEDURE — RXMED WILLOW AMBULATORY MEDICATION CHARGE

## 2025-08-19 PROCEDURE — C1727 CATH, BAL TIS DIS, NON-VAS: HCPCS | Performed by: INTERNAL MEDICINE

## 2025-08-19 PROCEDURE — 02BK3ZX EXCISION OF RIGHT VENTRICLE, PERCUTANEOUS APPROACH, DIAGNOSTIC: ICD-10-PCS | Performed by: INTERNAL MEDICINE

## 2025-08-19 PROCEDURE — 93451 RIGHT HEART CATH: CPT | Performed by: INTERNAL MEDICINE

## 2025-08-19 PROCEDURE — 2500000004 HC RX 250 GENERAL PHARMACY W/ HCPCS (ALT 636 FOR OP/ED): Performed by: STUDENT IN AN ORGANIZED HEALTH CARE EDUCATION/TRAINING PROGRAM

## 2025-08-19 PROCEDURE — 2500000001 HC RX 250 WO HCPCS SELF ADMINISTERED DRUGS (ALT 637 FOR MEDICARE OP): Performed by: STUDENT IN AN ORGANIZED HEALTH CARE EDUCATION/TRAINING PROGRAM

## 2025-08-19 PROCEDURE — 2500000002 HC RX 250 W HCPCS SELF ADMINISTERED DRUGS (ALT 637 FOR MEDICARE OP, ALT 636 FOR OP/ED): Performed by: STUDENT IN AN ORGANIZED HEALTH CARE EDUCATION/TRAINING PROGRAM

## 2025-08-19 PROCEDURE — 99152 MOD SED SAME PHYS/QHP 5/>YRS: CPT | Performed by: INTERNAL MEDICINE

## 2025-08-19 PROCEDURE — 99232 SBSQ HOSP IP/OBS MODERATE 35: CPT | Performed by: INTERNAL MEDICINE

## 2025-08-19 PROCEDURE — 93325 DOPPLER ECHO COLOR FLOW MAPG: CPT

## 2025-08-19 PROCEDURE — 4A023N6 MEASUREMENT OF CARDIAC SAMPLING AND PRESSURE, RIGHT HEART, PERCUTANEOUS APPROACH: ICD-10-PCS | Performed by: INTERNAL MEDICINE

## 2025-08-19 PROCEDURE — 36415 COLL VENOUS BLD VENIPUNCTURE: CPT | Performed by: STUDENT IN AN ORGANIZED HEALTH CARE EDUCATION/TRAINING PROGRAM

## 2025-08-19 PROCEDURE — 93308 TTE F-UP OR LMTD: CPT | Performed by: INTERNAL MEDICINE

## 2025-08-19 PROCEDURE — 2720000007 HC OR 272 NO HCPCS: Performed by: INTERNAL MEDICINE

## 2025-08-19 PROCEDURE — 99153 MOD SED SAME PHYS/QHP EA: CPT | Performed by: INTERNAL MEDICINE

## 2025-08-19 PROCEDURE — 1200000002 HC GENERAL ROOM WITH TELEMETRY DAILY

## 2025-08-19 PROCEDURE — 76937 US GUIDE VASCULAR ACCESS: CPT | Performed by: INTERNAL MEDICINE

## 2025-08-19 PROCEDURE — 2500000004 HC RX 250 GENERAL PHARMACY W/ HCPCS (ALT 636 FOR OP/ED)

## 2025-08-19 PROCEDURE — 84075 ASSAY ALKALINE PHOSPHATASE: CPT | Performed by: STUDENT IN AN ORGANIZED HEALTH CARE EDUCATION/TRAINING PROGRAM

## 2025-08-19 PROCEDURE — 88307 TISSUE EXAM BY PATHOLOGIST: CPT | Mod: TC,SUR | Performed by: NURSE PRACTITIONER

## 2025-08-19 RX ORDER — AMLODIPINE BESYLATE 2.5 MG/1
5 TABLET ORAL DAILY
Status: DISCONTINUED | OUTPATIENT
Start: 2025-08-20 | End: 2025-08-20 | Stop reason: HOSPADM

## 2025-08-19 RX ORDER — FENTANYL CITRATE 50 UG/ML
INJECTION, SOLUTION INTRAMUSCULAR; INTRAVENOUS AS NEEDED
Status: DISCONTINUED | OUTPATIENT
Start: 2025-08-19 | End: 2025-08-19 | Stop reason: HOSPADM

## 2025-08-19 RX ORDER — TACROLIMUS 0.5 MG/1
2 CAPSULE, GELATIN COATED ORAL
Qty: 120 CAPSULE | Refills: 3 | Status: SHIPPED | OUTPATIENT
Start: 2025-08-19 | End: 2025-08-29 | Stop reason: SDUPTHER

## 2025-08-19 RX ORDER — PREDNISONE 5 MG/1
15 TABLET ORAL DAILY
Status: DISCONTINUED | OUTPATIENT
Start: 2025-08-20 | End: 2025-08-20 | Stop reason: HOSPADM

## 2025-08-19 RX ORDER — LIDOCAINE HYDROCHLORIDE 20 MG/ML
INJECTION, SOLUTION INFILTRATION; PERINEURAL AS NEEDED
Status: DISCONTINUED | OUTPATIENT
Start: 2025-08-19 | End: 2025-08-19 | Stop reason: HOSPADM

## 2025-08-19 RX ORDER — MIDAZOLAM HYDROCHLORIDE 1 MG/ML
INJECTION, SOLUTION INTRAMUSCULAR; INTRAVENOUS AS NEEDED
Status: DISCONTINUED | OUTPATIENT
Start: 2025-08-19 | End: 2025-08-19 | Stop reason: HOSPADM

## 2025-08-19 RX ORDER — AMLODIPINE BESYLATE 5 MG/1
5 TABLET ORAL DAILY
Qty: 30 TABLET | Refills: 3 | Status: SHIPPED | OUTPATIENT
Start: 2025-08-20 | End: 2025-08-29 | Stop reason: SDUPTHER

## 2025-08-19 RX ORDER — LEVOFLOXACIN 750 MG/1
750 TABLET, FILM COATED ORAL DAILY
Qty: 10 TABLET | Refills: 0 | Status: SHIPPED | OUTPATIENT
Start: 2025-08-19 | End: 2025-08-30

## 2025-08-19 RX ADMIN — GABAPENTIN 300 MG: 300 CAPSULE ORAL at 15:57

## 2025-08-19 RX ADMIN — SULFAMETHOXAZOLE AND TRIMETHOPRIM 1 TABLET: 400; 80 TABLET ORAL at 09:50

## 2025-08-19 RX ADMIN — INSULIN LISPRO 4 UNITS: 100 INJECTION, SOLUTION INTRAVENOUS; SUBCUTANEOUS at 13:00

## 2025-08-19 RX ADMIN — PIPERACILLIN SODIUM AND TAZOBACTAM SODIUM 4.5 G: 4; .5 INJECTION, SOLUTION INTRAVENOUS at 20:52

## 2025-08-19 RX ADMIN — MYCOPHENOLATE MOFETIL 1000 MG: 250 CAPSULE ORAL at 09:50

## 2025-08-19 RX ADMIN — LEVOTHYROXINE SODIUM 125 MCG: 0.1 TABLET ORAL at 06:26

## 2025-08-19 RX ADMIN — PANTOPRAZOLE SODIUM 40 MG: 40 TABLET, DELAYED RELEASE ORAL at 09:49

## 2025-08-19 RX ADMIN — TAMSULOSIN HYDROCHLORIDE 0.4 MG: 0.4 CAPSULE ORAL at 09:49

## 2025-08-19 RX ADMIN — VORICONAZOLE 200 MG: 200 TABLET, COATED ORAL at 09:50

## 2025-08-19 RX ADMIN — PIPERACILLIN SODIUM AND TAZOBACTAM SODIUM 4.5 G: 4; .5 INJECTION, SOLUTION INTRAVENOUS at 02:27

## 2025-08-19 RX ADMIN — TACROLIMUS 2 MG: 1 CAPSULE ORAL at 17:45

## 2025-08-19 RX ADMIN — PIPERACILLIN SODIUM AND TAZOBACTAM SODIUM 4.5 G: 4; .5 INJECTION, SOLUTION INTRAVENOUS at 09:49

## 2025-08-19 RX ADMIN — Medication 50 MCG: at 09:49

## 2025-08-19 RX ADMIN — INSULIN LISPRO 4 UNITS: 100 INJECTION, SOLUTION INTRAVENOUS; SUBCUTANEOUS at 17:48

## 2025-08-19 RX ADMIN — ACYCLOVIR 400 MG: 400 TABLET ORAL at 20:52

## 2025-08-19 RX ADMIN — MYCOPHENOLATE MOFETIL 1000 MG: 250 CAPSULE ORAL at 20:52

## 2025-08-19 RX ADMIN — ACYCLOVIR 400 MG: 400 TABLET ORAL at 09:50

## 2025-08-19 RX ADMIN — VORICONAZOLE 200 MG: 200 TABLET, COATED ORAL at 20:52

## 2025-08-19 RX ADMIN — PREDNISONE 20 MG: 20 TABLET ORAL at 09:49

## 2025-08-19 RX ADMIN — GABAPENTIN 300 MG: 300 CAPSULE ORAL at 09:50

## 2025-08-19 RX ADMIN — PERFLUTREN 1 ML OF DILUTION: 6.52 INJECTION, SUSPENSION INTRAVENOUS at 15:46

## 2025-08-19 RX ADMIN — AMLODIPINE BESYLATE 2.5 MG: 2.5 TABLET ORAL at 09:49

## 2025-08-19 RX ADMIN — GABAPENTIN 300 MG: 300 CAPSULE ORAL at 20:52

## 2025-08-19 RX ADMIN — TACROLIMUS 2 MG: 1 CAPSULE ORAL at 06:26

## 2025-08-19 RX ADMIN — CALCIUM 1 TABLET: 500 TABLET ORAL at 13:00

## 2025-08-19 RX ADMIN — ASPIRIN 81 MG: 81 TABLET, COATED ORAL at 09:50

## 2025-08-19 RX ADMIN — CALCIUM 1 TABLET: 500 TABLET ORAL at 17:45

## 2025-08-19 RX ADMIN — PIPERACILLIN SODIUM AND TAZOBACTAM SODIUM 4.5 G: 4; .5 INJECTION, SOLUTION INTRAVENOUS at 15:57

## 2025-08-19 RX ADMIN — PRAVASTATIN SODIUM 40 MG: 40 TABLET ORAL at 20:52

## 2025-08-19 ASSESSMENT — COGNITIVE AND FUNCTIONAL STATUS - GENERAL
MOBILITY SCORE: 24
MOBILITY SCORE: 24
DAILY ACTIVITIY SCORE: 24
DAILY ACTIVITIY SCORE: 24

## 2025-08-19 ASSESSMENT — PAIN SCALES - GENERAL
PAINLEVEL_OUTOF10: 0 - NO PAIN
PAINLEVEL_OUTOF10: 0 - NO PAIN

## 2025-08-19 ASSESSMENT — PAIN - FUNCTIONAL ASSESSMENT: PAIN_FUNCTIONAL_ASSESSMENT: 0-10

## 2025-08-20 ENCOUNTER — PHARMACY VISIT (OUTPATIENT)
Dept: PHARMACY | Facility: CLINIC | Age: 65
End: 2025-08-20
Payer: COMMERCIAL

## 2025-08-20 ENCOUNTER — DOCUMENTATION (OUTPATIENT)
Dept: HOME HEALTH SERVICES | Facility: HOME HEALTH | Age: 65
End: 2025-08-20
Payer: MEDICARE

## 2025-08-20 VITALS
HEART RATE: 84 BPM | TEMPERATURE: 96.8 F | DIASTOLIC BLOOD PRESSURE: 89 MMHG | HEIGHT: 69 IN | OXYGEN SATURATION: 96 % | RESPIRATION RATE: 16 BRPM | SYSTOLIC BLOOD PRESSURE: 148 MMHG | WEIGHT: 207.67 LBS | BODY MASS INDEX: 30.76 KG/M2

## 2025-08-20 LAB
ACID FAST STN SPEC: NORMAL
ALBUMIN SERPL BCP-MCNC: 3.4 G/DL (ref 3.4–5)
ALP SERPL-CCNC: 74 U/L (ref 33–136)
ALT SERPL W P-5'-P-CCNC: 7 U/L (ref 10–52)
ANION GAP SERPL CALC-SCNC: 13 MMOL/L (ref 10–20)
AST SERPL W P-5'-P-CCNC: 6 U/L (ref 9–39)
BASOPHILS # BLD AUTO: 0.03 X10*3/UL (ref 0–0.1)
BASOPHILS NFR BLD AUTO: 0.3 %
BILIRUB SERPL-MCNC: 0.3 MG/DL (ref 0–1.2)
BUN SERPL-MCNC: 23 MG/DL (ref 6–23)
CALCIUM SERPL-MCNC: 8.4 MG/DL (ref 8.6–10.6)
CHLORIDE SERPL-SCNC: 105 MMOL/L (ref 98–107)
CO2 SERPL-SCNC: 27 MMOL/L (ref 21–32)
CREAT SERPL-MCNC: 0.68 MG/DL (ref 0.5–1.3)
EGFRCR SERPLBLD CKD-EPI 2021: >90 ML/MIN/1.73M*2
EOSINOPHIL # BLD AUTO: 0.04 X10*3/UL (ref 0–0.7)
EOSINOPHIL NFR BLD AUTO: 0.3 %
ERYTHROCYTE [DISTWIDTH] IN BLOOD BY AUTOMATED COUNT: 16.3 % (ref 11.5–14.5)
FUNGUS SPEC CULT: NORMAL
FUNGUS SPEC CULT: NORMAL
FUNGUS SPEC FUNGUS STN: NORMAL
FUNGUS SPEC FUNGUS STN: NORMAL
GLUCOSE BLD MANUAL STRIP-MCNC: 155 MG/DL (ref 74–99)
GLUCOSE SERPL-MCNC: 173 MG/DL (ref 74–99)
HCT VFR BLD AUTO: 33.1 % (ref 41–52)
HGB BLD-MCNC: 10.2 G/DL (ref 13.5–17.5)
IMM GRANULOCYTES # BLD AUTO: 0.18 X10*3/UL (ref 0–0.7)
IMM GRANULOCYTES NFR BLD AUTO: 1.5 % (ref 0–0.9)
LYMPHOCYTES # BLD AUTO: 0.87 X10*3/UL (ref 1.2–4.8)
LYMPHOCYTES NFR BLD AUTO: 7.3 %
MCH RBC QN AUTO: 29.4 PG (ref 26–34)
MCHC RBC AUTO-ENTMCNC: 30.8 G/DL (ref 32–36)
MCV RBC AUTO: 95 FL (ref 80–100)
MONOCYTES # BLD AUTO: 0.61 X10*3/UL (ref 0.1–1)
MONOCYTES NFR BLD AUTO: 5.1 %
MYCOBACTERIUM SPEC CULT: NORMAL
NEUTROPHILS # BLD AUTO: 10.12 X10*3/UL (ref 1.2–7.7)
NEUTROPHILS NFR BLD AUTO: 85.5 %
NRBC BLD-RTO: 0 /100 WBCS (ref 0–0)
PLATELET # BLD AUTO: 246 X10*3/UL (ref 150–450)
POTASSIUM SERPL-SCNC: 4.1 MMOL/L (ref 3.5–5.3)
PROT SERPL-MCNC: 5.2 G/DL (ref 6.4–8.2)
RBC # BLD AUTO: 3.47 X10*6/UL (ref 4.5–5.9)
SODIUM SERPL-SCNC: 141 MMOL/L (ref 136–145)
TACROLIMUS BLD-MCNC: 11 NG/ML
WBC # BLD AUTO: 11.9 X10*3/UL (ref 4.4–11.3)

## 2025-08-20 PROCEDURE — 80197 ASSAY OF TACROLIMUS: CPT | Performed by: STUDENT IN AN ORGANIZED HEALTH CARE EDUCATION/TRAINING PROGRAM

## 2025-08-20 PROCEDURE — 2500000004 HC RX 250 GENERAL PHARMACY W/ HCPCS (ALT 636 FOR OP/ED)

## 2025-08-20 PROCEDURE — 85025 COMPLETE CBC W/AUTO DIFF WBC: CPT | Performed by: STUDENT IN AN ORGANIZED HEALTH CARE EDUCATION/TRAINING PROGRAM

## 2025-08-20 PROCEDURE — 36415 COLL VENOUS BLD VENIPUNCTURE: CPT | Performed by: STUDENT IN AN ORGANIZED HEALTH CARE EDUCATION/TRAINING PROGRAM

## 2025-08-20 PROCEDURE — 2500000002 HC RX 250 W HCPCS SELF ADMINISTERED DRUGS (ALT 637 FOR MEDICARE OP, ALT 636 FOR OP/ED): Performed by: STUDENT IN AN ORGANIZED HEALTH CARE EDUCATION/TRAINING PROGRAM

## 2025-08-20 PROCEDURE — 2500000004 HC RX 250 GENERAL PHARMACY W/ HCPCS (ALT 636 FOR OP/ED): Performed by: STUDENT IN AN ORGANIZED HEALTH CARE EDUCATION/TRAINING PROGRAM

## 2025-08-20 PROCEDURE — 2500000001 HC RX 250 WO HCPCS SELF ADMINISTERED DRUGS (ALT 637 FOR MEDICARE OP): Performed by: STUDENT IN AN ORGANIZED HEALTH CARE EDUCATION/TRAINING PROGRAM

## 2025-08-20 PROCEDURE — 2500000004 HC RX 250 GENERAL PHARMACY W/ HCPCS (ALT 636 FOR OP/ED): Performed by: INTERNAL MEDICINE

## 2025-08-20 PROCEDURE — 82947 ASSAY GLUCOSE BLOOD QUANT: CPT

## 2025-08-20 PROCEDURE — 99239 HOSP IP/OBS DSCHRG MGMT >30: CPT | Performed by: INTERNAL MEDICINE

## 2025-08-20 PROCEDURE — 80053 COMPREHEN METABOLIC PANEL: CPT | Performed by: STUDENT IN AN ORGANIZED HEALTH CARE EDUCATION/TRAINING PROGRAM

## 2025-08-20 RX ORDER — PREDNISONE 5 MG/1
15 TABLET ORAL DAILY
Qty: 90 TABLET | Refills: 3 | Status: SHIPPED | OUTPATIENT
Start: 2025-08-20 | End: 2025-08-29 | Stop reason: SDUPTHER

## 2025-08-20 RX ORDER — PREDNISONE 5 MG/1
15 TABLET ORAL DAILY
Qty: 90 TABLET | Refills: 3 | Status: SHIPPED | OUTPATIENT
Start: 2025-08-20 | End: 2025-08-20 | Stop reason: HOSPADM

## 2025-08-20 RX ADMIN — PREDNISONE 15 MG: 5 TABLET ORAL at 09:43

## 2025-08-20 RX ADMIN — Medication 50 MCG: at 09:42

## 2025-08-20 RX ADMIN — AMLODIPINE BESYLATE 5 MG: 2.5 TABLET ORAL at 09:43

## 2025-08-20 RX ADMIN — TAMSULOSIN HYDROCHLORIDE 0.4 MG: 0.4 CAPSULE ORAL at 09:43

## 2025-08-20 RX ADMIN — SULFAMETHOXAZOLE AND TRIMETHOPRIM 1 TABLET: 400; 80 TABLET ORAL at 09:43

## 2025-08-20 RX ADMIN — LEVOTHYROXINE SODIUM 125 MCG: 0.1 TABLET ORAL at 05:41

## 2025-08-20 RX ADMIN — TACROLIMUS 2 MG: 1 CAPSULE ORAL at 06:01

## 2025-08-20 RX ADMIN — GABAPENTIN 300 MG: 300 CAPSULE ORAL at 09:42

## 2025-08-20 RX ADMIN — ACYCLOVIR 400 MG: 400 TABLET ORAL at 09:43

## 2025-08-20 RX ADMIN — PIPERACILLIN SODIUM AND TAZOBACTAM SODIUM 4.5 G: 4; .5 INJECTION, SOLUTION INTRAVENOUS at 09:42

## 2025-08-20 RX ADMIN — ASPIRIN 81 MG: 81 TABLET, COATED ORAL at 09:43

## 2025-08-20 RX ADMIN — PANTOPRAZOLE SODIUM 40 MG: 40 TABLET, DELAYED RELEASE ORAL at 09:43

## 2025-08-20 RX ADMIN — VORICONAZOLE 200 MG: 200 TABLET, COATED ORAL at 09:43

## 2025-08-20 RX ADMIN — MYCOPHENOLATE MOFETIL 1000 MG: 250 CAPSULE ORAL at 09:42

## 2025-08-20 RX ADMIN — PIPERACILLIN SODIUM AND TAZOBACTAM SODIUM 4.5 G: 4; .5 INJECTION, SOLUTION INTRAVENOUS at 02:52

## 2025-08-20 ASSESSMENT — COGNITIVE AND FUNCTIONAL STATUS - GENERAL
MOBILITY SCORE: 24
DAILY ACTIVITIY SCORE: 24

## 2025-08-20 ASSESSMENT — PAIN SCALES - GENERAL: PAINLEVEL_OUTOF10: 0 - NO PAIN

## 2025-08-21 ENCOUNTER — DOCUMENTATION (OUTPATIENT)
Dept: TRANSPLANT | Facility: HOSPITAL | Age: 65
End: 2025-08-21
Payer: MEDICARE

## 2025-08-21 DIAGNOSIS — Z94.1 HEART REPLACED BY TRANSPLANT: ICD-10-CM

## 2025-08-22 ENCOUNTER — TELEPHONE (OUTPATIENT)
Dept: TRANSPLANT | Facility: HOSPITAL | Age: 65
End: 2025-08-22
Payer: MEDICARE

## 2025-08-22 DIAGNOSIS — Z94.1 HEART TRANSPLANT RECIPIENT: ICD-10-CM

## 2025-08-25 LAB
LAB AP ASR DISCLAIMER: NORMAL
LABORATORY COMMENT REPORT: NORMAL
PATH REPORT.COMMENTS IMP SPEC: NORMAL
PATH REPORT.FINAL DX SPEC: NORMAL
PATH REPORT.GROSS SPEC: NORMAL
PATH REPORT.RELEVANT HX SPEC: NORMAL
PATH REPORT.TOTAL CANCER: NORMAL

## 2025-08-27 ENCOUNTER — TELEPHONE (OUTPATIENT)
Dept: TRANSPLANT | Facility: HOSPITAL | Age: 65
End: 2025-08-27
Payer: MEDICARE

## 2025-08-27 LAB
ACID FAST STN SPEC: NORMAL
MYCOBACTERIUM SPEC CULT: NORMAL
NON-UH HIE ALANINE AMINOTRANSFERASE: 13 U/L (ref 7–52)
NON-UH HIE ALBUMIN LEVEL: 3.8 G/DL (ref 3.5–5.7)
NON-UH HIE ALBUMIN/GLOBULIN RATIO: 2.1
NON-UH HIE ALKALINE PHOSPHATASE: 71 U/L (ref 34–104)
NON-UH HIE ANION GAP: 12.7 (ref 6–15)
NON-UH HIE ASPARTATE AMINO TRANSFERASE: 9 U/L (ref 13–39)
NON-UH HIE BASOPHILS # (AUTO): 0.1 10*3/UL (ref 0–0.2)
NON-UH HIE BASOPHILS % (AUTO): 0.5 %
NON-UH HIE BILIRUBIN,TOTAL: 0.3 MG/DL (ref 0.3–1)
NON-UH HIE BLOOD UREA NITROGEN: 36 MG/DL (ref 7–25)
NON-UH HIE CALCIUM: 8.4 MG/DL (ref 8.6–10.3)
NON-UH HIE CARBON DIOXIDE: 27.2 MMOL/L (ref 21–31)
NON-UH HIE CHLORIDE: 105 MMOL/L (ref 98–107)
NON-UH HIE CREATININE: 1.11 MG/DL (ref 0.7–1.3)
NON-UH HIE EOSINOPHILS # (AUTO): 0 10*3/UL (ref 0–0.45)
NON-UH HIE EOSINOPHILS % (AUTO): 0.4 %
NON-UH HIE ESTIMATED GFR: >60
NON-UH HIE GLOBULIN: 1.8 G/DL
NON-UH HIE GLUCOSE: 86 MG/DL (ref 70–100)
NON-UH HIE HEMATOCRIT: 32.5 % (ref 38.8–50)
NON-UH HIE HEMOGLOBIN: 10.7 G/DL (ref 13–17)
NON-UH HIE LYMPHOCYTES # (AUTO): 1.2 10*3/UL (ref 1–4.8)
NON-UH HIE LYMPHOCYTES % (AUTO): 11.3 %
NON-UH HIE MEAN CORPUSCULAR HEMOGLOBIN: 29.3 PG (ref 27.5–35.2)
NON-UH HIE MEAN CORPUSCULAR HGB CONC: 32.8 G/DL (ref 32.5–35.6)
NON-UH HIE MEAN CORPUSCULAR VOLUME: 89.5 FL (ref 83.5–101)
NON-UH HIE MEAN PLATELET VOLUME: 8.9 FL (ref 6.6–10.1)
NON-UH HIE MONOCYTES # (AUTO): 0.7 10*3/UL (ref 0–0.8)
NON-UH HIE MONOCYTES % (AUTO): 6.5 %
NON-UH HIE NEUTROPHILS # (AUTO): 9 10*3/UL (ref 1.8–7.7)
NON-UH HIE NEUTROPHILS % (AUTO): 81.3 %
NON-UH HIE NRBC%: 0.1 /100{WBC} (ref 0–0.5)
NON-UH HIE PLATELET COUNT: 295 10*3/UL (ref 150–450)
NON-UH HIE POTASSIUM: 3.9 MMOL/L (ref 3.5–5.1)
NON-UH HIE RED BLOOD COUNT: 3.63 10*6/UL (ref 3.9–5.6)
NON-UH HIE RED CELL DISTRIBUTION WIDTH: 18 % (ref 12–14.8)
NON-UH HIE SODIUM: 141 MMOL/L (ref 136–145)
NON-UH HIE TOTAL PROTEIN: 5.6 G/DL (ref 6.4–8.9)
NON-UH HIE UNCORRECTED WBC: 11.1 10*3/UL (ref 4.1–10.5)
NON-UH HIE WHITE BLOOD COUNT: 11.1 [CFU]/ML (ref 4.1–10.5)

## 2025-08-29 ENCOUNTER — TELEPHONE (OUTPATIENT)
Dept: RESPIRATORY THERAPY | Facility: HOSPITAL | Age: 65
End: 2025-08-29
Payer: MEDICARE

## 2025-08-29 DIAGNOSIS — Z94.1 HEART TRANSPLANT RECIPIENT: ICD-10-CM

## 2025-08-29 DIAGNOSIS — T88.8XXA FLUID COLLECTION AT SURGICAL SITE, INITIAL ENCOUNTER: ICD-10-CM

## 2025-08-29 DIAGNOSIS — Z94.1 HEART REPLACED BY TRANSPLANT: ICD-10-CM

## 2025-08-29 DIAGNOSIS — R73.9 HYPERGLYCEMIA: ICD-10-CM

## 2025-08-29 DIAGNOSIS — I10 HYPERTENSION, UNSPECIFIED TYPE: ICD-10-CM

## 2025-08-29 DIAGNOSIS — Z94.1 S/P ORTHOTOPIC HEART TRANSPLANT: ICD-10-CM

## 2025-08-29 DIAGNOSIS — Z86.39 HISTORY OF HYPOTHYROIDISM: ICD-10-CM

## 2025-08-29 RX ORDER — TACROLIMUS 0.5 MG/1
2 CAPSULE, GELATIN COATED ORAL
Qty: 120 CAPSULE | Refills: 3 | Status: SHIPPED | OUTPATIENT
Start: 2025-08-29

## 2025-08-29 RX ORDER — VORICONAZOLE 200 MG/1
200 TABLET, FILM COATED ORAL EVERY 12 HOURS SCHEDULED
Qty: 60 TABLET | Refills: 2 | Status: SHIPPED | OUTPATIENT
Start: 2025-08-29 | End: 2025-11-27

## 2025-08-29 RX ORDER — ACYCLOVIR 400 MG/1
400 TABLET ORAL 2 TIMES DAILY
Qty: 180 TABLET | Refills: 0 | Status: SHIPPED | OUTPATIENT
Start: 2025-08-29 | End: 2025-11-27

## 2025-08-29 RX ORDER — AMLODIPINE BESYLATE 5 MG/1
5 TABLET ORAL DAILY
Qty: 30 TABLET | Refills: 3 | Status: SHIPPED | OUTPATIENT
Start: 2025-08-29

## 2025-08-29 RX ORDER — PREDNISONE 5 MG/1
15 TABLET ORAL DAILY
Qty: 90 TABLET | Refills: 3 | Status: SHIPPED | OUTPATIENT
Start: 2025-08-29

## 2025-08-29 RX ORDER — GABAPENTIN 300 MG/1
300 CAPSULE ORAL 3 TIMES DAILY
Qty: 90 CAPSULE | Refills: 11 | Status: SHIPPED | OUTPATIENT
Start: 2025-08-29 | End: 2026-08-29

## 2025-08-31 LAB
FUNGUS SPEC CULT: NORMAL
FUNGUS SPEC CULT: NORMAL
FUNGUS SPEC FUNGUS STN: NORMAL
FUNGUS SPEC FUNGUS STN: NORMAL

## 2025-09-01 LAB — Lab: 10.2 NG/ML (ref 5–20)

## 2025-09-02 ENCOUNTER — RESULTS FOLLOW-UP (OUTPATIENT)
Dept: TRANSPLANT | Facility: HOSPITAL | Age: 65
End: 2025-09-02
Payer: MEDICARE

## 2025-09-03 ENCOUNTER — TELEPHONE (OUTPATIENT)
Dept: TRANSPLANT | Facility: HOSPITAL | Age: 65
End: 2025-09-03
Payer: MEDICARE

## 2025-09-03 ENCOUNTER — HOSPITAL ENCOUNTER (OUTPATIENT)
Dept: CARDIOLOGY | Facility: HOSPITAL | Age: 65
Setting detail: OUTPATIENT SURGERY
Discharge: HOME | End: 2025-09-03
Payer: MEDICARE

## 2025-09-03 ENCOUNTER — HOSPITAL ENCOUNTER (OUTPATIENT)
Facility: HOSPITAL | Age: 65
Setting detail: OUTPATIENT SURGERY
Discharge: HOME | End: 2025-09-03
Attending: INTERNAL MEDICINE | Admitting: INTERNAL MEDICINE
Payer: MEDICARE

## 2025-09-03 VITALS
RESPIRATION RATE: 16 BRPM | HEART RATE: 89 BPM | OXYGEN SATURATION: 93 % | DIASTOLIC BLOOD PRESSURE: 78 MMHG | SYSTOLIC BLOOD PRESSURE: 127 MMHG

## 2025-09-03 DIAGNOSIS — Z94.1 HEART REPLACED BY TRANSPLANT: ICD-10-CM

## 2025-09-03 DIAGNOSIS — I10 HYPERTENSION, UNSPECIFIED TYPE: ICD-10-CM

## 2025-09-03 DIAGNOSIS — T88.8XXA FLUID COLLECTION AT SURGICAL SITE, INITIAL ENCOUNTER: ICD-10-CM

## 2025-09-03 DIAGNOSIS — Z94.1 HEART TRANSPLANT RECIPIENT: ICD-10-CM

## 2025-09-03 LAB
ACID FAST STN SPEC: NORMAL
ALBUMIN SERPL BCP-MCNC: 3.8 G/DL (ref 3.4–5)
ALP SERPL-CCNC: 53 U/L (ref 33–136)
ALT SERPL W P-5'-P-CCNC: 9 U/L (ref 10–52)
ANION GAP SERPL CALC-SCNC: 11 MMOL/L (ref 10–20)
AST SERPL W P-5'-P-CCNC: 8 U/L (ref 9–39)
BILIRUB SERPL-MCNC: 0.4 MG/DL (ref 0–1.2)
BUN SERPL-MCNC: 34 MG/DL (ref 6–23)
CALCIUM SERPL-MCNC: 8.9 MG/DL (ref 8.6–10.6)
CHLORIDE SERPL-SCNC: 107 MMOL/L (ref 98–107)
CO2 SERPL-SCNC: 30 MMOL/L (ref 21–32)
CREAT SERPL-MCNC: 0.68 MG/DL (ref 0.5–1.3)
EGFRCR SERPLBLD CKD-EPI 2021: >90 ML/MIN/1.73M*2
ERYTHROCYTE [DISTWIDTH] IN BLOOD BY AUTOMATED COUNT: 16.2 % (ref 11.5–14.5)
GLUCOSE SERPL-MCNC: 100 MG/DL (ref 74–99)
HCT VFR BLD AUTO: 33.5 % (ref 41–52)
HGB BLD-MCNC: 10.2 G/DL (ref 13.5–17.5)
MAGNESIUM SERPL-MCNC: 1.77 MG/DL (ref 1.6–2.4)
MCH RBC QN AUTO: 28.6 PG (ref 26–34)
MCHC RBC AUTO-ENTMCNC: 30.4 G/DL (ref 32–36)
MCV RBC AUTO: 94 FL (ref 80–100)
MYCOBACTERIUM SPEC CULT: NORMAL
NRBC BLD-RTO: 0 /100 WBCS (ref 0–0)
PLATELET # BLD AUTO: 263 X10*3/UL (ref 150–450)
POTASSIUM SERPL-SCNC: 3.6 MMOL/L (ref 3.5–5.3)
PROT SERPL-MCNC: 5.5 G/DL (ref 6.4–8.2)
RBC # BLD AUTO: 3.57 X10*6/UL (ref 4.5–5.9)
SODIUM SERPL-SCNC: 144 MMOL/L (ref 136–145)
TACROLIMUS BLD-MCNC: 7.9 NG/ML
WBC # BLD AUTO: 11.4 X10*3/UL (ref 4.4–11.3)

## 2025-09-03 PROCEDURE — 80053 COMPREHEN METABOLIC PANEL: CPT | Performed by: STUDENT IN AN ORGANIZED HEALTH CARE EDUCATION/TRAINING PROGRAM

## 2025-09-03 PROCEDURE — 99152 MOD SED SAME PHYS/QHP 5/>YRS: CPT | Performed by: INTERNAL MEDICINE

## 2025-09-03 PROCEDURE — 7100000009 HC PHASE TWO TIME - INITIAL BASE CHARGE: Performed by: INTERNAL MEDICINE

## 2025-09-03 PROCEDURE — 36415 COLL VENOUS BLD VENIPUNCTURE: CPT | Performed by: STUDENT IN AN ORGANIZED HEALTH CARE EDUCATION/TRAINING PROGRAM

## 2025-09-03 PROCEDURE — 2500000004 HC RX 250 GENERAL PHARMACY W/ HCPCS (ALT 636 FOR OP/ED): Performed by: INTERNAL MEDICINE

## 2025-09-03 PROCEDURE — C1894 INTRO/SHEATH, NON-LASER: HCPCS | Performed by: INTERNAL MEDICINE

## 2025-09-03 PROCEDURE — 2720000007 HC OR 272 NO HCPCS: Performed by: INTERNAL MEDICINE

## 2025-09-03 PROCEDURE — 76937 US GUIDE VASCULAR ACCESS: CPT | Performed by: INTERNAL MEDICINE

## 2025-09-03 PROCEDURE — 93306 TTE W/DOPPLER COMPLETE: CPT | Performed by: INTERNAL MEDICINE

## 2025-09-03 PROCEDURE — 93505 ENDOMYOCARDIAL BIOPSY: CPT | Performed by: INTERNAL MEDICINE

## 2025-09-03 PROCEDURE — 83735 ASSAY OF MAGNESIUM: CPT | Performed by: STUDENT IN AN ORGANIZED HEALTH CARE EDUCATION/TRAINING PROGRAM

## 2025-09-03 PROCEDURE — 76942 ECHO GUIDE FOR BIOPSY: CPT | Performed by: INTERNAL MEDICINE

## 2025-09-03 PROCEDURE — 99153 MOD SED SAME PHYS/QHP EA: CPT | Performed by: INTERNAL MEDICINE

## 2025-09-03 PROCEDURE — 93451 RIGHT HEART CATH: CPT | Performed by: INTERNAL MEDICINE

## 2025-09-03 PROCEDURE — 7100000002 HC RECOVERY ROOM TIME - EACH INCREMENTAL 1 MINUTE: Performed by: INTERNAL MEDICINE

## 2025-09-03 PROCEDURE — 7100000010 HC PHASE TWO TIME - EACH INCREMENTAL 1 MINUTE: Performed by: INTERNAL MEDICINE

## 2025-09-03 PROCEDURE — 7100000001 HC RECOVERY ROOM TIME - INITIAL BASE CHARGE: Performed by: INTERNAL MEDICINE

## 2025-09-03 PROCEDURE — C8929 TTE W OR WO FOL WCON,DOPPLER: HCPCS

## 2025-09-03 PROCEDURE — C1727 CATH, BAL TIS DIS, NON-VAS: HCPCS | Performed by: INTERNAL MEDICINE

## 2025-09-03 PROCEDURE — 2550000001 HC RX 255 CONTRASTS: Mod: JW | Performed by: INTERNAL MEDICINE

## 2025-09-03 PROCEDURE — 2500000004 HC RX 250 GENERAL PHARMACY W/ HCPCS (ALT 636 FOR OP/ED): Mod: JW | Performed by: INTERNAL MEDICINE

## 2025-09-03 PROCEDURE — 80197 ASSAY OF TACROLIMUS: CPT | Performed by: STUDENT IN AN ORGANIZED HEALTH CARE EDUCATION/TRAINING PROGRAM

## 2025-09-03 PROCEDURE — 85027 COMPLETE CBC AUTOMATED: CPT | Performed by: STUDENT IN AN ORGANIZED HEALTH CARE EDUCATION/TRAINING PROGRAM

## 2025-09-03 RX ORDER — LEVOFLOXACIN 750 MG/1
750 TABLET, FILM COATED ORAL DAILY
Qty: 10 TABLET | Refills: 0 | Status: SHIPPED | OUTPATIENT
Start: 2025-09-03

## 2025-09-03 RX ORDER — MIDAZOLAM HYDROCHLORIDE 1 MG/ML
INJECTION, SOLUTION INTRAMUSCULAR; INTRAVENOUS AS NEEDED
Status: DISCONTINUED | OUTPATIENT
Start: 2025-09-03 | End: 2025-09-03 | Stop reason: HOSPADM

## 2025-09-03 RX ORDER — FENTANYL CITRATE 50 UG/ML
INJECTION, SOLUTION INTRAMUSCULAR; INTRAVENOUS AS NEEDED
Status: DISCONTINUED | OUTPATIENT
Start: 2025-09-03 | End: 2025-09-03 | Stop reason: HOSPADM

## 2025-09-03 RX ORDER — LIDOCAINE HYDROCHLORIDE 20 MG/ML
INJECTION, SOLUTION INFILTRATION; PERINEURAL AS NEEDED
Status: DISCONTINUED | OUTPATIENT
Start: 2025-09-03 | End: 2025-09-03 | Stop reason: HOSPADM

## 2025-09-03 RX ADMIN — HUMAN ALBUMIN MICROSPHERES AND PERFLUTREN 1.5 ML: 10; .22 INJECTION, SOLUTION INTRAVENOUS at 17:16

## 2025-09-04 LAB
AORTIC VALVE PEAK VELOCITY: 1.15 M/S
AV PEAK GRADIENT: 5 MMHG
AVA (PEAK VEL): 2.88 CM2
EJECTION FRACTION APICAL 4 CHAMBER: 68.8
EJECTION FRACTION: 70 %
LAB AP ASR DISCLAIMER: NORMAL
LABORATORY COMMENT REPORT: NORMAL
LEFT VENTRICLE INTERNAL DIMENSION DIASTOLE: 4.4 CM (ref 3.5–6)
LEFT VENTRICULAR OUTFLOW TRACT DIAMETER: 2.1 CM
PATH REPORT.FINAL DX SPEC: NORMAL
PATH REPORT.GROSS SPEC: NORMAL
PATH REPORT.RELEVANT HX SPEC: NORMAL
PATH REPORT.TOTAL CANCER: NORMAL
RIGHT VENTRICLE FREE WALL PEAK S': 19 CM/S

## 2025-09-04 RX ORDER — PREDNISONE 5 MG/1
12.5 TABLET ORAL DAILY
Qty: 90 TABLET | Refills: 0 | Status: SHIPPED | OUTPATIENT
Start: 2025-09-04 | End: 2025-12-03

## 2025-09-05 ENCOUNTER — TELEPHONE (OUTPATIENT)
Dept: TRANSPLANT | Facility: HOSPITAL | Age: 65
End: 2025-09-05
Payer: MEDICARE

## (undated) PROCEDURE — 4A023N6 MEASUREMENT OF CARDIAC SAMPLING AND PRESSURE, RIGHT HEART, PERCUTANEOUS APPROACH: ICD-10-PCS

## (undated) PROCEDURE — 02BK3ZX EXCISION OF RIGHT VENTRICLE, PERCUTANEOUS APPROACH, DIAGNOSTIC: ICD-10-PCS

## (undated) DEVICE — SHEATH, PINNACLE, 10 CM,  7FR INTRODUCER, 7FR DIA, 2.5 CM DIALATOR

## (undated) DEVICE — RETRACTOR, SUTURE, HOLDING, INSERT, OCTOBASE, DISPOSABLE

## (undated) DEVICE — CATHETER, THERMODILUTION, SWAN GANZ, HI-SHORE, COATED, W/GUIDEWIRE, 7 FR, 110 CM

## (undated) DEVICE — SUTURE, SILK, 2-0, 30 IN, SH, CONTROL RELEASE, MULTIPACK, BLACK

## (undated) DEVICE — KIT, TOURNIQUET, 7"

## (undated) DEVICE — COVER, CART, 45 X 27 X 48 IN, CLEAR

## (undated) DEVICE — SUTURE, PROLENE, 5-0, 36 IN, RB1, DA, BLUE

## (undated) DEVICE — ELECTRODE, QUICK-COMBO, EDGE SYSTEM, REDI PACK

## (undated) DEVICE — TUBING, 0.375 X 3/32 IN X 6 FT

## (undated) DEVICE — DRAPE, MAGENTIC INSTRUMENT, 12X16

## (undated) DEVICE — CATHETER, VENT, LEFT HEART, MULTI PORT TIP, W/MALLEABLE INTRODUCER, ADULT, 16 FR X 41 CM

## (undated) DEVICE — WAX, BONE, 2.5 GM

## (undated) DEVICE — CATHETER, VENT, LEFT HEART, MULTI PORT TIP, W/MALLEABLE INTRODUCER, ADULT, 20 FR X 41 CM

## (undated) DEVICE — SUTURE, ETHIBOND, XTRA, 2-0, GREEN

## (undated) DEVICE — LOOP, VESSEL, MAXI, RED

## (undated) DEVICE — ELECTRODE, ELECTROSURGICAL, BLADE EXT 4 INCH, INSULATED

## (undated) DEVICE — ACCESS KIT, S-MAK MINI, 4FR 10CM 0.018IN 40CM, NT/PT, ECHO ENHANCE NEEDLE

## (undated) DEVICE — DRESSING, ADHESIVE, ISLAND, TELFA, 4 X 14 IN

## (undated) DEVICE — COLLECTION UNIT, DRAINAGE, THORACIC, SINGLE TUBE, DRY SUCTION, ATS COMPATIBLE, OASIS 3600, LF

## (undated) DEVICE — SUTURE, PROLENE 4-0, TAPER POINT, SH-1 BLUE 30 INCH

## (undated) DEVICE — SUTURE, ETHIBOND, XTRA, 30 IN, 0, CT-1, GREEN

## (undated) DEVICE — CONNECTOR, STRAIGHT, 0.375 X 0.375 IN

## (undated) DEVICE — PACING CABLE, EXTENSION, 12 FT BLUE, DISPOSABLE

## (undated) DEVICE — LEAD, PACING, MYOCARDIAL, BIPOLAR, TEMPORARY

## (undated) DEVICE — SEALANT, HEMOSTATIC, FLOSEAL, 10 ML

## (undated) DEVICE — DRESSING, ISLAND, TELFA, 4 X 5 IN

## (undated) DEVICE — CANNULA, CARDIAC SUMP

## (undated) DEVICE — SHEATH, GLIDESHEATH, SLENDER, 6FR 10CM

## (undated) DEVICE — FORCEPS, BIOPSY, BIPAL 7FR, JAW 2.2/50

## (undated) DEVICE — Device

## (undated) DEVICE — DRAPE, FLUID WARMER

## (undated) DEVICE — DRAPE, SHEET, CARDIOVASCULAR, ANTIMICROBIAL, W/ANESTHESIA SCREEN, IOBAN 2, STERI DRAPE, 107 X 133 IN, DISPOSABLE, FABRIC, BLUE, STERILE

## (undated) DEVICE — SUTURE, MONOCRYL, 3-0, 18 IN, PS2, UNDYED

## (undated) DEVICE — APPLICATOR, CHLORAPREP, W/ORANGE TINT, 26ML

## (undated) DEVICE — MANIFOLD, 4 PORT NEPTUNE STANDARD

## (undated) DEVICE — COUNTER, NEEDLE, FOAM BLOCK, POP-N-COUNT, W/BLADEGUARD, W/ADHESIVE 40 COUNT, RED

## (undated) DEVICE — CANNULA, BIOMEDICUS 25FR, FEMORAL VENOUS

## (undated) DEVICE — SUTURE, PROLENE, 3-0, 36 IN, RB1, DA, BLUE

## (undated) DEVICE — SURGISLEEVE, WOUND PROTECTOR, SMALL 2.5-6CM

## (undated) DEVICE — GOWN, SURGICAL, SMARTGOWN, XLARGE, STERILE

## (undated) DEVICE — SUTURE, SURGIPRO II 3-0, 54 BLUE, V-20D/A"

## (undated) DEVICE — INSERT, CLAMP, SURGICAL, SOFT/TRACTION, STEALTH, 5 MM

## (undated) DEVICE — TUBING, SUCTION, CONNECTING, NON-CONDUCTIVE, SURE GRIP CONNECTORS, 3/16 X 18 IN, PVC

## (undated) DEVICE — TRAY, SURESTEP, URINE METER, 14FR, SILICONE

## (undated) DEVICE — LOOP, VESSEL, MAXI, BLUE

## (undated) DEVICE — PERFUSION SERVICES

## (undated) DEVICE — CATHETER, DRAINAGE, NASOGASTRIC, DOUBLE LUMEN, FUNNEL END, SUMP, SALEM, 18 FR, 48 IN, PVC, STERILE

## (undated) DEVICE — SUTURE, PROLENE, 4-0, 36 IN, RB1, DA, BLUE

## (undated) DEVICE — YANKAUER, RIGID, STRAIGHT, OPEN TIP, NO VENT

## (undated) DEVICE — CATHETER, THORACIC, STRAIGHT, ADULT, 28 FR, PVC

## (undated) DEVICE — DRESSING, MEPILEX, BORDER, SACRUM, 8.7 X 9.8 IN

## (undated) DEVICE — DRAPE, INSTRUMENT, W/POUCH, STERI DRAPE, 7 X 11 IN, DISPOSABLE, STERILE

## (undated) DEVICE — PACING CABLE, EXTENSION, 12 FT BEIGE, DISPOSABLE

## (undated) DEVICE — TOWEL, SURGICAL, NEURO, O/R, 16 X 26, BLUE, STERILE

## (undated) DEVICE — INTRODUCER, SHEATH, PINNACLE, 7 FR, 10 CM

## (undated) DEVICE — GUIDEWIRE, INQWIRE, 3MM J, .035 X 210CM, FIXED

## (undated) DEVICE — SUTURE, PROLENE, 3-0, 36 IN, SH, DA, BLUE

## (undated) DEVICE — SPONGE, HEMOSTAT, SURGICEL FIBRILLAR, ABS, 4 X 4, LF

## (undated) DEVICE — CONNECTOR, Y, 0.375 X 0.375 X 0.5 IN

## (undated) DEVICE — EVACUATOR, WOUND, CLOSED, 3 SPRING, 400 CC, Y CONNECTING TUBE

## (undated) DEVICE — SUTURE, ETHIBOND, 5, 30 IN, V40, MULTIPACK, GREEN

## (undated) DEVICE — CATHETER, THORACIC 28FR RIGHT ANGLE

## (undated) DEVICE — SPONGE, LAP, XRAY DECT, 18IN X 18IN, W/LOOP, STERILE

## (undated) DEVICE — PLEDGET, PTFE, SOFT, LARGE, 3/8 X 3/16 X 1/16 IN

## (undated) DEVICE — CANNULA, VENOUS SNGL STAGE RT ANGLE 24 FR

## (undated) DEVICE — CANNULA, ARTERIAL, BIO-MEDICUS, 19FR, VENTED

## (undated) DEVICE — TUBING, SMOKE EVAC, 3/8 X 10 FT

## (undated) DEVICE — CLIPPER, SURGICAL BLADE ASSEMBLY, SPECIALITY, SINGLE USE

## (undated) DEVICE — TAPE, POLYESTER, BRAIDED, PRE-CUT 2 X 30, WHITE"

## (undated) DEVICE — DRAIN, WOUND, ROUND, W/TROCAR, HOLE PATTERN, 10 IN, MEDIUM/LARGE, 3/16 X 49 IN

## (undated) DEVICE — CATHETER, SUCTION, SAFE-T-VAC VALVE, COIL PACKED, 14 FR

## (undated) DEVICE — CLEANER, ELECTROSURGICAL, TIP, 5 X 5 CM, LF

## (undated) DEVICE — ADAPTER, CARDIOPLEGIA, VENTING, Y, 19.1 CM

## (undated) DEVICE — DRESSING, MEPILEX, BORDER, HEEL, 8.7 X 9.1 IN

## (undated) DEVICE — COVER, TABLE, 44 X 75 IN, DISPOSABLE, LF, STERILE

## (undated) DEVICE — CANNULA, AORTIC ROOT, 12G

## (undated) DEVICE — TUBE, SALEM SUMP, 16 FR X 48IN, ENFIT

## (undated) DEVICE — INTRODUCER SET, MICROPUNCT, STIFF, 4FR 10CM, ECHO TIP, NITINOL WIRE

## (undated) DEVICE — CAUTERY, PENCIL, PUSH BUTTON, SMOKE EVAC, 70MM

## (undated) DEVICE — CLIPPER, SURGICAL BLADE ASSEMBLY, GENERAL PURPOSE, SINGLE USE

## (undated) DEVICE — DRESSING, ADHESIVE, ISLAND, TELFA, 2 X 3.75 IN, LF

## (undated) DEVICE — MARKER, SKIN, DUAL TIP INK W/9 LABEL AND REMOVABLE TIME OUT SLEEVE

## (undated) DEVICE — SHEATH, PINNACLE, 10 CM,  6FR INTRODUCER, 6FR DIA, 2.5 CM DIALATOR

## (undated) DEVICE — PAD, ELECTRODE DEFIB PADPRO ADULT STRL W/ADAPTER

## (undated) DEVICE — BLADE, SAW STERNUM, STERILE

## (undated) DEVICE — COVER, EQUIPMENT, SOLUTION, SLUSH, 112 X 168 CM, LF, STERILE

## (undated) DEVICE — CARDIOPLEGIA SET

## (undated) DEVICE — PITCHER, GRADUATE, 32 OZ (1200CC), STERILE

## (undated) DEVICE — SUTURE, SURGICAL STEEL, STERNUM 7, 18 IN, KV40, SINGL-WIRE